# Patient Record
Sex: FEMALE | Race: WHITE | NOT HISPANIC OR LATINO | Employment: UNEMPLOYED | URBAN - METROPOLITAN AREA
[De-identification: names, ages, dates, MRNs, and addresses within clinical notes are randomized per-mention and may not be internally consistent; named-entity substitution may affect disease eponyms.]

---

## 2019-02-07 ENCOUNTER — HOSPITAL ENCOUNTER (EMERGENCY)
Facility: HOSPITAL | Age: 54
Discharge: HOME | End: 2019-02-08
Attending: EMERGENCY MEDICINE | Admitting: EMERGENCY MEDICINE
Payer: COMMERCIAL

## 2019-02-07 DIAGNOSIS — N39.0 UTI (URINARY TRACT INFECTION): ICD-10-CM

## 2019-02-07 DIAGNOSIS — I89.0 LYMPHEDEMA: ICD-10-CM

## 2019-02-07 DIAGNOSIS — R03.0 ELEVATED BLOOD PRESSURE READING: Primary | ICD-10-CM

## 2019-02-07 LAB
ALBUMIN SERPL BCP-MCNC: 4.4 G/DL (ref 3.5–5.7)
ALP SERPL-CCNC: 105 U/L (ref 40–150)
ALT SERPL W P-5'-P-CCNC: 39 U/L (ref 7–52)
AMPHETAMINES SERPL QL SCN: NEGATIVE
ANION GAP SERPL CALCULATED.3IONS-SCNC: 10 MMOL/L (ref 4–13)
AST SERPL W P-5'-P-CCNC: 22 U/L (ref 13–39)
ATRIAL RATE: 99 BPM
BACTERIA UR QL AUTO: ABNORMAL /HPF
BARBITURATES UR QL: NEGATIVE
BASOPHILS # BLD AUTO: 0.1 THOUSANDS/ΜL (ref 0–0.1)
BASOPHILS NFR BLD AUTO: 1 % (ref 0–2)
BENZODIAZ UR QL: NEGATIVE
BILIRUB SERPL-MCNC: 0.7 MG/DL (ref 0.2–1)
BILIRUB UR QL STRIP: NEGATIVE
BUN SERPL-MCNC: 22 MG/DL (ref 7–25)
CALCIUM SERPL-MCNC: 9.9 MG/DL (ref 8.6–10.5)
CHLORIDE SERPL-SCNC: 104 MMOL/L (ref 98–107)
CLARITY UR: ABNORMAL
CO2 SERPL-SCNC: 22 MMOL/L (ref 21–31)
COCAINE UR QL: NEGATIVE
COLOR UR: YELLOW
CREAT SERPL-MCNC: 1.07 MG/DL (ref 0.6–1.2)
EOSINOPHIL # BLD AUTO: 0.5 THOUSAND/ΜL (ref 0–0.61)
EOSINOPHIL NFR BLD AUTO: 4 % (ref 0–5)
ERYTHROCYTE [DISTWIDTH] IN BLOOD BY AUTOMATED COUNT: 14.8 % (ref 11.5–14.5)
ETHANOL SERPL-MCNC: <10 MG/DL
FINE GRAN CASTS URNS QL MICRO: ABNORMAL /LPF
GFR SERPL CREATININE-BSD FRML MDRD: 59 ML/MIN/1.73SQ M
GLUCOSE SERPL-MCNC: 119 MG/DL (ref 65–99)
GLUCOSE UR STRIP-MCNC: NEGATIVE MG/DL
HCT VFR BLD AUTO: 41 % (ref 34.8–46.1)
HGB BLD-MCNC: 13.5 G/DL (ref 12–16)
HGB UR QL STRIP.AUTO: ABNORMAL
HYALINE CASTS #/AREA URNS LPF: ABNORMAL /LPF
KETONES UR STRIP-MCNC: NEGATIVE MG/DL
LEUKOCYTE ESTERASE UR QL STRIP: ABNORMAL
LYMPHOCYTES # BLD AUTO: 2.4 THOUSANDS/ΜL (ref 0.6–4.47)
LYMPHOCYTES NFR BLD AUTO: 19 % (ref 21–51)
MCH RBC QN AUTO: 27.1 PG (ref 26–34)
MCHC RBC AUTO-ENTMCNC: 33 G/DL (ref 31–37)
MCV RBC AUTO: 82 FL (ref 81–99)
METHADONE UR QL: NEGATIVE
MONOCYTES # BLD AUTO: 1 THOUSAND/ΜL (ref 0.17–1.22)
MONOCYTES NFR BLD AUTO: 8 % (ref 2–12)
NEUTROPHILS # BLD AUTO: 8.7 THOUSANDS/ΜL (ref 1.4–6.5)
NEUTS SEG NFR BLD AUTO: 69 % (ref 42–75)
NITRITE UR QL STRIP: POSITIVE
NON-SQ EPI CELLS URNS QL MICRO: ABNORMAL /HPF
NRBC BLD AUTO-RTO: 0 /100 WBCS
OPIATES UR QL SCN: NEGATIVE
P AXIS: 62 DEGREES
PCP UR QL: NEGATIVE
PH UR STRIP.AUTO: 5 [PH] (ref 5–8)
PLATELET # BLD AUTO: 337 THOUSANDS/UL (ref 149–390)
PMV BLD AUTO: 7.8 FL (ref 8.6–11.7)
POTASSIUM SERPL-SCNC: 4.3 MMOL/L (ref 3.5–5.5)
PR INTERVAL: 152 MS
PROT SERPL-MCNC: 7.9 G/DL (ref 6.4–8.9)
PROT UR STRIP-MCNC: ABNORMAL MG/DL
QRS AXIS: 67 DEGREES
QRSD INTERVAL: 78 MS
QT INTERVAL: 356 MS
QTC INTERVAL: 456 MS
RBC # BLD AUTO: 4.99 MILLION/UL (ref 3.9–5.2)
RBC #/AREA URNS AUTO: ABNORMAL /HPF
SODIUM SERPL-SCNC: 136 MMOL/L (ref 134–143)
SP GR UR STRIP.AUTO: >=1.03 (ref 1–1.03)
T WAVE AXIS: 33 DEGREES
THC UR QL: NEGATIVE
UROBILINOGEN UR QL STRIP.AUTO: 0.2 E.U./DL
VENTRICULAR RATE: 99 BPM
WBC # BLD AUTO: 12.6 THOUSAND/UL (ref 4.8–10.8)
WBC #/AREA URNS AUTO: ABNORMAL /HPF

## 2019-02-07 PROCEDURE — 93005 ELECTROCARDIOGRAM TRACING: CPT

## 2019-02-07 PROCEDURE — 93010 ELECTROCARDIOGRAM REPORT: CPT | Performed by: INTERNAL MEDICINE

## 2019-02-07 PROCEDURE — 85025 COMPLETE CBC W/AUTO DIFF WBC: CPT | Performed by: EMERGENCY MEDICINE

## 2019-02-07 PROCEDURE — 36415 COLL VENOUS BLD VENIPUNCTURE: CPT | Performed by: EMERGENCY MEDICINE

## 2019-02-07 PROCEDURE — 99284 EMERGENCY DEPT VISIT MOD MDM: CPT

## 2019-02-07 PROCEDURE — 80320 DRUG SCREEN QUANTALCOHOLS: CPT | Performed by: EMERGENCY MEDICINE

## 2019-02-07 PROCEDURE — 80307 DRUG TEST PRSMV CHEM ANLYZR: CPT | Performed by: EMERGENCY MEDICINE

## 2019-02-07 PROCEDURE — 81001 URINALYSIS AUTO W/SCOPE: CPT | Performed by: EMERGENCY MEDICINE

## 2019-02-07 PROCEDURE — 80053 COMPREHEN METABOLIC PANEL: CPT | Performed by: EMERGENCY MEDICINE

## 2019-02-07 RX ORDER — IBUPROFEN 200 MG
600 TABLET ORAL EVERY 6 HOURS PRN
COMMUNITY
End: 2019-02-27 | Stop reason: SDUPTHER

## 2019-02-07 RX ORDER — TRAMADOL HYDROCHLORIDE 50 MG/1
50 TABLET ORAL ONCE
Status: COMPLETED | OUTPATIENT
Start: 2019-02-07 | End: 2019-02-07

## 2019-02-07 RX ORDER — NITROFURANTOIN 25; 75 MG/1; MG/1
100 CAPSULE ORAL 2 TIMES DAILY
Qty: 10 CAPSULE | Refills: 0 | Status: SHIPPED | OUTPATIENT
Start: 2019-02-07 | End: 2019-02-12

## 2019-02-07 RX ORDER — METOPROLOL TARTRATE 50 MG/1
25 TABLET, FILM COATED ORAL DAILY
Qty: 10 TABLET | Refills: 0 | Status: SHIPPED | OUTPATIENT
Start: 2019-02-07 | End: 2019-02-27

## 2019-02-07 RX ORDER — NITROFURANTOIN 25; 75 MG/1; MG/1
100 CAPSULE ORAL 2 TIMES DAILY WITH MEALS
Status: DISCONTINUED | OUTPATIENT
Start: 2019-02-08 | End: 2019-02-08 | Stop reason: HOSPADM

## 2019-02-07 RX ADMIN — METOPROLOL TARTRATE 25 MG: 25 TABLET ORAL at 22:57

## 2019-02-07 RX ADMIN — TRAMADOL HYDROCHLORIDE 50 MG: 50 TABLET, COATED ORAL at 22:57

## 2019-02-08 VITALS
SYSTOLIC BLOOD PRESSURE: 132 MMHG | BODY MASS INDEX: 45.99 KG/M2 | RESPIRATION RATE: 16 BRPM | TEMPERATURE: 98.6 F | HEIGHT: 67 IN | WEIGHT: 293 LBS | DIASTOLIC BLOOD PRESSURE: 94 MMHG | HEART RATE: 100 BPM | OXYGEN SATURATION: 97 %

## 2019-02-08 RX ORDER — TRAMADOL HYDROCHLORIDE 50 MG/1
50 TABLET ORAL EVERY 6 HOURS PRN
Qty: 30 TABLET | Refills: 0 | Status: SHIPPED | OUTPATIENT
Start: 2019-02-07 | End: 2019-02-17

## 2019-02-08 NOTE — ED NOTES
Crisis contacted [facility] and spoke with [address] [facility] [address] @ [phone] who brought the Patient to the ED  He stated he was called to the foreclosed home of Patient's  father after an off duty  saw a light coming from the shed on the property  The trooper stated the Patient has been staying there for 2 days and the realtor was aware  Patient agreed to come to the ED to be evaluated according to the luis but she did not see anything wrong with staying in the shed  Crisis will f/u with Patient once she is medically cleared

## 2019-02-08 NOTE — DISCHARGE INSTRUCTIONS
PLEASE TAKE YOUR BLOOD PRESSURE MEDICINE ONCE DAILY AS PRESCRIBED  IN ADDITION THE ANTIBIOTIC FOR THE URINARY TRACT INFECTION IS 1 CAPSULE TWICE DAILY, FOR 5 DAYS  PLEASE DISCUSS WITH YOUR FAMILY MEMBERS HAVING THEIR DOCTORS A SIMILARLY YOU INTO THEIR PRACTICE TO THAT YOU CAN HAVE YOUR BLOOD PRESSURE RECHECK AND FOLLOW, AS WELL AS YOUR LYMPHEDEMA ADDRESSED  Urinary Tract Infection in Women   WHAT YOU NEED TO KNOW:   A urinary tract infection (UTI) is caused by bacteria that get inside your urinary tract  Most bacteria that enter your urinary tract come out when you urinate  If the bacteria stay in your urinary tract, you may get an infection  Your urinary tract includes your kidneys, ureters, bladder, and urethra  Urine is made in your kidneys, and it flows from the ureters to the bladder  Urine leaves the bladder through the urethra  A UTI is more common in your lower urinary tract, which includes your bladder and urethra  DISCHARGE INSTRUCTIONS:   Return to the emergency department if:   · You are urinating very little or not at all  · You have a high fever with shaking chills  · You have side or back pain that gets worse  Contact your healthcare provider if:   · You have a fever  · You do not feel better after 2 days of taking antibiotics  · You are vomiting  · You have questions or concerns about your condition or care  Medicines:   · Antibiotics  help fight a bacterial infection  · Medicines  may be given to decrease pain and burning when you urinate  They will also help decrease the feeling that you need to urinate often  These medicines will make your urine orange or red  · Take your medicine as directed  Contact your healthcare provider if you think your medicine is not helping or if you have side effects  Tell him or her if you are allergic to any medicine  Keep a list of the medicines, vitamins, and herbs you take   Include the amounts, and when and why you take them  Bring the list or the pill bottles to follow-up visits  Carry your medicine list with you in case of an emergency  Follow up with your healthcare provider as directed:  Write down your questions so you remember to ask them during your visits  Prevent another UTI:   · Empty your bladder often  Urinate and empty your bladder as soon as you feel the need  Do not hold your urine for long periods of time  · Wipe from front to back after you urinate or have a bowel movement  This will help prevent germs from getting into your urinary tract through your urethra  · Drink liquids as directed  Ask how much liquid to drink each day and which liquids are best for you  You may need to drink more liquids than usual to help flush out the bacteria  Do not drink alcohol, caffeine, or citrus juices  These can irritate your bladder and increase your symptoms  Your healthcare provider may recommend cranberry juice to help prevent a UTI  · Urinate after you have sex  This can help flush out bacteria passed during sex  · Do not douche or use feminine deodorants  These can change the chemical balance in your vagina  · Change sanitary pads or tampons often  This will help prevent germs from getting into your urinary tract  · Do pelvic muscle exercises often  Pelvic muscle exercises may help you start and stop urinating  Strong pelvic muscles may help you empty your bladder easier  Squeeze these muscles tightly for 5 seconds like you are trying to hold back urine  Then relax for 5 seconds  Gradually work up to squeezing for 10 seconds  Do 3 sets of 15 repetitions a day, or as directed  © 2017 [address] Information is for End User's use only and may not be sold, redistributed or otherwise used for commercial purposes  All illustrations and images included in CareNotes® are the copyrighted property of [employer]  or [employer] Matta    The above information is an educational aid only  It is not intended as medical advice for individual conditions or treatments  Talk to your doctor, nurse or pharmacist before following any medical regimen to see if it is safe and effective for you  Lymphedema   WHAT YOU NEED TO KNOW:   There is no cure for lymphedema  Treatment can relieve symptoms and prevent lymphedema from getting worse  DISCHARGE INSTRUCTIONS:   Contact your healthcare provider or lymphedema specialist if:   · You have a fever or chills  · You have an open area of skin that looks red or swollen, or drains pus  · Your symptoms, such as swelling or pain, get worse  · Your arms or legs feel heavy, or you cannot move them  · Your skin becomes hard, thick, or rough  · You have a skin wound that will not heal      · Your shoes, clothes, or jewelry feel tighter  · You have questions or concerns about your condition or care  Self-care:   · Elevate  your arm or leg above the level of your heart as often as you can  This will help decrease swelling and pain  Prop your arm or leg on pillows or blankets to keep it elevated comfortably  · Wear compression socks, sleeves, or bandages  as directed  Compression devices must be fitted by a healthcare provider  Compression devices may need to be replaced every 3 to 6 months  · Exercise  can help you maintain or regain function of your arm or leg  Ask your healthcare provider what type of exercise to do and how often to do it  Start slow, take breaks, and gradually do more each day  Do not do vigorous, repeated exercises  Watch for changes in your arm or leg during exercise  Stop and rest if you have swelling, increased pain, or heaviness  Elevate your arm or leg above the level of your heart  · Change your position often  to help move lymphatic fluid through your body  Do not sit or  one position for more than 30 minutes  Do not cross your legs when you sit   These actions can cause lymphatic fluid to buildup  · Maintain a healthy weight  Ask your healthcare provider what you should weigh  Weight loss may improve your symptoms  If you need to lose weight, your healthcare provider can help you create a weight loss program   Prevent infection with proper skin care:  A skin infection can make lymphedema worse  Do the following to decrease your risk for a skin infection in your arm or leg with lymphedema:  · Wash your skin gently and dry it well  Use a mild soap to wash your skin  Gently pat your skin dry after you bathe  Apply a mild cream or lotion to moisturize your skin and prevent dryness or cracking  Keep your feet clean and dry  · Protect your skin from injury  Wear gloves when you garden and wash dishes  Cut your nails straight across to prevent injury to your fingers and toes  Use sunscreen and insect repellant to avoid burns and punctures  Wear slippers in the house  Wear shoes when you go outside  · Check your skin every day for signs of infection  Signs of infection include redness, swelling, increased heat, or pus  You may also have a fever or chills  · Care for cuts, scratches or burns  Apply antibiotic ointment to cuts and other small breaks in the skin  Apply a cold pack or cold water to a burn for 15 minutes  Then wash it with soap and water  Cover scratches, cuts, or burns with a clean, dry gauze or bandage as directed  Keep the area clean and dry  · Tell healthcare providers that you have lymphedema  Tell them not to do, IVs, blood draws, and blood pressure readings in the arm or leg with lymphedema  If there is lymphedema in both arms, ask them to take your blood pressure on your leg  Do not allow flu shots or vaccinations in your arm with lymphedema  Follow up with your healthcare provider or lymphedema specialist as directed: You will need regular visits so healthcare providers can examine the affected areas   You may also be referred to a clinic that specializes in lymphedema treatment  Write down your questions so you remember to ask them during your visits  © 2017 [address] Information is for End User's use only and may not be sold, redistributed or otherwise used for commercial purposes  All illustrations and images included in CareNotes® are the copyrighted property of [employer]  or [employer] Matta  The above information is an  only  It is not intended as medical advice for individual conditions or treatments  Talk to your doctor, nurse or pharmacist before following any medical regimen to see if it is safe and effective for you  Hypertension   WHAT YOU NEED TO KNOW:   Hypertension is high blood pressure (BP)  Your BP is the force of your blood moving against the walls of your arteries  Normal BP is less than 120/80  Prehypertension is between 120/80 and 139/89  Hypertension is 140/90 or higher  Hypertension causes your BP to get so high that your heart has to work much harder than normal  This can damage your heart  You can control hypertension with a healthy lifestyle or medicines  A controlled blood pressure helps protect your organs, such as your heart, lungs, brain, and kidneys  DISCHARGE INSTRUCTIONS:   Call 911 for any of the following:   · You have discomfort in your chest that feels like squeezing, pressure, fullness, or pain  · You become confused or have difficulty speaking  · You suddenly feel lightheaded or have trouble breathing  · You have pain or discomfort in your back, neck, jaw, stomach, or arm  Return to the emergency department if:   · You have a severe headache or vision loss  · You have weakness in an arm or leg  Contact your healthcare provider if:   · You feel faint, dizzy, confused, or drowsy  · You have been taking your BP medicine and your BP is still higher than your healthcare provider says it should be      · You have questions or concerns about your condition or care   Medicines: You may  need any of the following:  · Medicine  may be used to help lower your BP  You may need more than one type of medicine  Take the medicine exactly as directed  · Diuretics  help decrease extra fluid that collects in your body  This will help lower your BP  You may urinate more often while you take this medicine  · Cholesterol medicine  helps lower your cholesterol level  A low cholesterol level helps prevent heart disease and makes it easier to control your blood pressure  · Take your medicine as directed  Contact your healthcare provider if you think your medicine is not helping or if you have side effects  Tell him or her if you are allergic to any medicine  Keep a list of the medicines, vitamins, and herbs you take  Include the amounts, and when and why you take them  Bring the list or the pill bottles to follow-up visits  Carry your medicine list with you in case of an emergency  Follow up with your healthcare provider as directed: You will need to return to have your BP checked and to have other lab tests done  Write down your questions so you remember to ask them during your visits  Manage hypertension:  Talk with your healthcare provider about these and other ways to manage hypertension:  · Check your BP at home  Sit and rest for 5 minutes before you take your BP  Extend your arm and support it on a flat surface  Your arm should be at the same level as your heart  Follow the directions that came with your BP monitor  If possible, take at least 2 BP readings each time  Take your BP at least twice a day at the same times each day, such as morning and evening  Keep a record of your BP readings and bring it to your follow-up visits  Ask your healthcare provider what your BP should be  · Limit sodium (salt) as directed  Too much sodium can affect your fluid balance  Check labels to find low-sodium or no-salt-added foods   Some low-sodium foods use potassium salts for flavor  Too much potassium can also cause health problems  Your healthcare provider will tell you how much sodium and potassium are safe for you to have in a day  He or she may recommend that you limit sodium to 2,300 mg a day  · Follow the meal plan recommended by your healthcare provider  A dietitian or your provider can give you more information on low-sodium plans or the DASH (Dietary Approaches to Stop Hypertension) eating plan  The DASH plan is low in sodium, unhealthy fats, and total fat  It is high in potassium, calcium, and fiber  · Exercise to maintain a healthy weight  Exercise at least 30 minutes per day, on most days of the week  This will help decrease your blood pressure  Ask your healthcare provider about the best exercise plan for you  · Decrease stress  This may help lower your BP  Learn ways to relax, such as deep breathing or listening to music  · Limit alcohol  Women should limit alcohol to 1 drink a day  Men should limit alcohol to 2 drinks a day  A drink of alcohol is 12 ounces of beer, 5 ounces of wine, or 1½ ounces of liquor  · Do not smoke  Nicotine and other chemicals in cigarettes and cigars can increase your BP and also cause lung damage  Ask your healthcare provider for information if you currently smoke and need help to quit  E-cigarettes or smokeless tobacco still contain nicotine  Talk to your healthcare provider before you use these products  · Manage any other health conditions you have  Health conditions such as diabetes can increase your risk for hypertension  Follow your healthcare provider's instructions and take all your medicines as directed  © 2017 [facility] Information is for End User's use only and may not be sold, redistributed or otherwise used for commercial purposes  All illustrations and images included in CareNotes® are the copyrighted property of [employer]  or Vladimir Matta    The above information is an  only  It is not intended as medical advice for individual conditions or treatments  Talk to your doctor, nurse or pharmacist before following any medical regimen to see if it is safe and effective for you

## 2019-02-08 NOTE — ED PROVIDER NOTES
History  Chief Complaint   Patient presents with    Psychiatric Evaluation     Welfare check, neighbors observed pt staying in outdoor shed on  father's property     [facility]  AMBULANCE WAS CALLED BY POLICE AND THEY WERE DIRECTED TO BRING THE PATIENT TO THE EMERGENCY ROOM FOR EVALUATION, A WELFARE CHECK  PATIENT HAS BEEN LIVING AT HER FATHER'S PROPERTY  SHE IS ESSENTIALLY SQUATTING ON THE PROPERTY, AS HER FATHER PASSED AWAY IN  AND THE PROPERTY WAS RECENTLY SOLID AT A 'S SALE APPROXIMATELY 2 WEEKS AGO  WHEN SHE WAS EVICTED FROM THE HOUSE THIS PAST WEEK, SHE HAS BEEN STAYING IN A SHED THAT IS ON THE LOT ADJACENT TO THE HOUSE  THE PATIENT DENIES ANY COMPLAINTS  SHE ADMITS TO CHRONIC LYMPHEDEMA AND DISCOMFORT OF HER LOWER LEGS, FOR WHICH SHE TAKES 600 MG OF IBUPROFEN TWICE DAILY  SHE DENIES ANY CHEST PAIN, HEADACHE, SHORTNESS OF BREATH  DENIES ANY FEVER CHILLS  SHE DENIES ANY CONFUSION  Prior to Admission Medications   Prescriptions Last Dose Informant Patient Reported? Taking?   ibuprofen (MOTRIN) 200 mg tablet   Yes Yes   Sig: Take 600 mg by mouth every 6 (six) hours as needed for mild pain (back pain TID)      Facility-Administered Medications: None       Past Medical History:   Diagnosis Date    Lymphedema 2006    Sciatic leg pain 2006    left side       Past Surgical History:   Procedure Laterality Date     SECTION      TONSILLECTOMY         History reviewed  No pertinent family history  I have reviewed and agree with the history as documented  Social History   Substance Use Topics    Smoking status: Current Some Day Smoker     Types: Cigarettes    Smokeless tobacco: Never Used      Comment: occasional less than one per day    Alcohol use No        Review of Systems   Constitutional: Negative for chills and fever  HENT: Negative for sore throat  Eyes: Negative for visual disturbance     Respiratory: Negative for cough and shortness of breath  Cardiovascular: Positive for leg swelling  Negative for chest pain and palpitations  CHRONIC, SEVERE LOWER EXTREMITY EDEMA, FOR MANY YEARS  Gastrointestinal: Negative for abdominal pain and nausea  Genitourinary: Positive for frequency and urgency  Negative for difficulty urinating, dysuria and flank pain  Musculoskeletal: Positive for arthralgias  CHRONIC ARTHRALGIAS, NO ACUTE CHANGES   Skin: Negative for rash and wound  Neurological: Negative for dizziness, weakness and headaches  Psychiatric/Behavioral: Negative for confusion  Physical Exam  Physical Exam   Constitutional: She is oriented to person, place, and time  She appears well-developed and well-nourished  PLEASANT AMBULATORY FEMALE WHO IS MORBIDLY OBESE  GENERAL HYGIENE IS ACCEPTABLE  DENTAL HYGIENE IS POOR HOWEVER  HENT:   Head: Normocephalic and atraumatic  Mouth/Throat: Oropharynx is clear and moist    MULTIPLE CARIES AND MISSING TEETH  Eyes: Conjunctivae and EOM are normal  No scleral icterus  Neck: Normal range of motion  Neck supple  Cardiovascular: Normal rate, regular rhythm and normal heart sounds  Pulmonary/Chest: Effort normal and breath sounds normal  No respiratory distress  She has no wheezes  Abdominal: Soft  Bowel sounds are normal  There is no tenderness  Musculoskeletal: She exhibits edema  She exhibits no deformity  4+ LYMPHEDEMA CHANGES  NO DISTINCT STASIS DERMATITIS OR ULCERATIONS  SOME DISCOLORATION HOWEVER  Lymphadenopathy:     She has no cervical adenopathy  Neurological: She is alert and oriented to person, place, and time  She displays normal reflexes  No cranial nerve deficit  She exhibits normal muscle tone  GAIT IS STEADY  Skin: Capillary refill takes less than 2 seconds  No erythema         Vital Signs  ED Triage Vitals   Temperature Pulse Respirations Blood Pressure SpO2   02/07/19 1949 02/07/19 1949 02/07/19 1949 02/07/19 1949 02/07/19 1949 98 °F (36 7 °C) (!) 114 22 (!) 193/105 94 %      Temp Source Heart Rate Source Patient Position - Orthostatic VS BP Location FiO2 (%)   02/07/19 1949 02/07/19 1949 02/07/19 1949 02/07/19 1949 --   Temporal Monitor Sitting Left arm       Pain Score       02/07/19 1950       No Pain           Vitals:    02/07/19 2257 02/07/19 2314 02/07/19 2315 02/08/19 0012   BP: (!) 188/98 (!) 140/102 (!) 140/102 132/94   Pulse:  (!) 114 (!) 110 100   Patient Position - Orthostatic VS:  Sitting Sitting Sitting       Visual Acuity      ED Medications  Medications   metoprolol tartrate (LOPRESSOR) tablet 25 mg (25 mg Oral Given 2/7/19 2257)   traMADol (ULTRAM) tablet 50 mg (50 mg Oral Given 2/7/19 2257)       Diagnostic Studies  Results Reviewed     Procedure Component Value Units Date/Time    Rapid drug screen, urine [[id number]]  (Normal) Collected:  02/07/19 2239    Lab Status:  Final result Specimen:  Urine from Urine, Clean Catch Updated:  02/07/19 2305     Amph/Meth UR Negative     Barbiturate Ur Negative     Benzodiazepine Urine Negative     Cocaine Urine Negative     Methadone Urine Negative     Opiate Urine Negative     PCP Ur Negative     THC Urine Negative    Narrative:         FOR MEDICAL PURPOSES ONLY  IF CONFIRMATION NEEDED PLEASE CONTACT THE LAB WITHIN 5 DAYS      Drug Screen Cutoff Levels:  AMPHETAMINE/METHAMPHETAMINES  1000 ng/mL  BARBITURATES     200 ng/mL  BENZODIAZEPINES     200 ng/mL  COCAINE      300 ng/mL  METHADONE      300 ng/mL  OPIATES      300 ng/mL  PHENCYCLIDINE     25 ng/mL  THC       50 ng/mL    Urine Microscopic [[id number]]  (Abnormal) Collected:  02/07/19 2239    Lab Status:  Final result Specimen:  Urine from Urine, Clean Catch Updated:  02/07/19 2254     RBC, UA 2-4 (A) /hpf      WBC, UA 10-20 (A) /hpf      Epithelial Cells Occasional /hpf      Bacteria, UA Moderate (A) /hpf      Hyaline Casts, UA 1-2 (A) /lpf      Fine granular casts 0-1 /lpf     UA w Reflex to Microscopic [[id number]]  (Abnormal) Collected:  02/07/19 2239    Lab Status:  Final result Specimen:  Urine from Urine, Clean Catch Updated:  02/07/19 2247     Color, UA Yellow     Clarity, UA Slightly Cloudy (A)     Specific Gravity, UA >=1 030 (H)     pH, UA 5 0     Leukocytes, UA 1+ (A)     Nitrite, UA Positive (A)     Protein, UA Trace (A) mg/dl      Glucose, UA Negative mg/dl      Ketones, UA Negative mg/dl      Urobilinogen, UA 0 2 E U /dl      Bilirubin, UA Negative     Blood, UA Trace-Intact (A)    Ethanol [[id number]]  (Normal) Collected:  02/07/19 2120    Lab Status:  Final result Specimen:  Blood from Arm, Left Updated:  02/07/19 2145     Ethanol Lvl <10 mg/dL     Comprehensive metabolic panel [[id number]]  (Abnormal) Collected:  02/07/19 2120    Lab Status:  Final result Specimen:  Blood from Arm, Left Updated:  02/07/19 2145     Sodium 136 mmol/L      Potassium 4 3 mmol/L      Chloride 104 mmol/L      CO2 22 mmol/L      ANION GAP 10 mmol/L      BUN 22 mg/dL      Creatinine 1 07 mg/dL      Glucose 119 (H) mg/dL      Calcium 9 9 mg/dL      AST 22 U/L      ALT 39 U/L      Alkaline Phosphatase 105 U/L      Total Protein 7 9 g/dL      Albumin 4 4 g/dL      Total Bilirubin 0 70 mg/dL      eGFR 59 ml/min/1 73sq m     Narrative:         National Kidney Disease Education Program recommendations are as follows:  GFR calculation is accurate only with a steady state creatinine  Chronic Kidney disease less than 60 ml/min/1 73 sq  meters  Kidney failure less than 15 ml/min/1 73 sq  meters      CBC and differential [[id number]]  (Abnormal) Collected:  02/07/19 2120    Lab Status:  Final result Specimen:  Blood from Arm, Left Updated:  02/07/19 2127     WBC 12 60 (H) Thousand/uL      RBC 4 99 Million/uL      Hemoglobin 13 5 g/dL      Hematocrit 41 0 %      MCV 82 fL      MCH 27 1 pg      MCHC 33 0 g/dL      RDW 14 8 (H) %      MPV 7 8 (L) fL      Platelets 661 Thousands/uL      nRBC 0 /100 WBCs      Neutrophils Relative 69 %      Lymphocytes Relative 19 (L) %      Monocytes Relative 8 %      Eosinophils Relative 4 %      Basophils Relative 1 %      Neutrophils Absolute 8 70 (H) Thousands/µL      Lymphocytes Absolute 2 40 Thousands/µL      Monocytes Absolute 1 00 Thousand/µL      Eosinophils Absolute 0 50 Thousand/µL      Basophils Absolute 0 10 Thousands/µL                  No orders to display              Procedures  Procedures       Phone Contacts  ED Phone Contact    ED Course      EXAMINATION AND EVALUATION  GCS IS 15  PATIENT HAS NO SIGNS OF CONFUSION  ON EXAM SHE IS HYPERTENSIVE HAD OBESE BUT OTHERWISE NO PARTICULAR PHYSICAL FINDINGS  FOR HER ELEVATED BLOOD PRESSURE, I DID TREAT THAT WITH 25 MG OF METOPROLOL WITH SOME MODERATE IMPROVEMENT  IN ADDITION PICK FOR HER CHRONIC LOWER EXTREMITY PAIN THAT SHE REPORTS FROM THE LYMPHEDEMA SHE WAS GIVEN TRAMADOL  URINALYSIS REVEALS PYURIA AND BACTERIURIA  SHE WAS GIVEN MACRODANTIN AS WELL AS A PRESCRIPTION  OVERALL, THE PATIENT IS APPROPRIATE FOR DISCHARGE  SHE POLITELY BUT ADAMANTLY DECLINES REFERRALS TO ANY SHELTERS  SHE INDICATES THAT HER SON WORKS OVERNIGHT AND WILL PICK HER UP IN THE MORNING  SHE REQUESTED TO BE DISCHARGED AND INDICATED SHE WOULD WAIT IN THE WAITING ROOM UNTIL HER SON COULD PICK HER UP                           MDM    Disposition  Final diagnoses:   Elevated blood pressure reading   UTI (urinary tract infection)   Lymphedema     Time reflects when diagnosis was documented in both MDM as applicable and the Disposition within this note     Time User Action Codes Description Comment    2/7/2019 11:56 PM Twila Goltz Add [R03 0] Elevated blood pressure reading     2/7/2019 11:57 PM Twila Goltz Add [N39 0] UTI (urinary tract infection)     2/7/2019 11:57 PM Twila Goltz Add [I89 0] Lymphedema       ED Disposition     ED Disposition Condition Date/Time Comment    Discharge  Thu Feb 7, 2019 11:57 PM Swati Castro discharge to home/self care      Condition at discharge: Stable Follow-up Information     Follow up With Specialties Details Why Contact Info      In 1 week BP RECHECK           Discharge Medication List as of 2/8/2019 12:03 AM      START taking these medications    Details   metoprolol tartrate (LOPRESSOR) 50 mg tablet Take 0 5 tablets (25 mg total) by mouth daily, Starting u 2/7/2019, Print      nitrofurantoin (MACROBID) 100 mg capsule Take 1 capsule (100 mg total) by mouth 2 (two) times a day for 10 doses, Starting Thu 2/7/2019, Until Tue 2/12/2019, Print      traMADol (ULTRAM) 50 mg tablet Take 1 tablet (50 mg total) by mouth every 6 (six) hours as needed for moderate pain for up to 10 days, Starting Thu 2/7/2019, Until Sun 2/17/2019, Print         CONTINUE these medications which have NOT CHANGED    Details   ibuprofen (MOTRIN) 200 mg tablet Take 600 mg by mouth every 6 (six) hours as needed for mild pain (back pain TID), Historical Med           No discharge procedures on file      ED Provider  Electronically Signed by           Lyla Day DO  02/08/19 8753

## 2019-02-22 ENCOUNTER — HOSPITAL ENCOUNTER (EMERGENCY)
Facility: HOSPITAL | Age: 54
Discharge: HOME | End: 2019-02-22
Payer: COMMERCIAL

## 2019-02-22 ENCOUNTER — APPOINTMENT (EMERGENCY)
Dept: RADIOLOGY | Facility: HOSPITAL | Age: 54
End: 2019-02-22
Payer: COMMERCIAL

## 2019-02-22 VITALS
RESPIRATION RATE: 22 BRPM | BODY MASS INDEX: 45.99 KG/M2 | SYSTOLIC BLOOD PRESSURE: 124 MMHG | WEIGHT: 293 LBS | OXYGEN SATURATION: 97 % | HEART RATE: 78 BPM | TEMPERATURE: 97.6 F | DIASTOLIC BLOOD PRESSURE: 68 MMHG | HEIGHT: 67 IN

## 2019-02-22 DIAGNOSIS — R06.02 SOB (SHORTNESS OF BREATH): Primary | ICD-10-CM

## 2019-02-22 DIAGNOSIS — E66.01 MORBID OBESITY (HCC): ICD-10-CM

## 2019-02-22 DIAGNOSIS — I10 HYPERTENSION, UNSPECIFIED TYPE: ICD-10-CM

## 2019-02-22 LAB
ALBUMIN SERPL BCP-MCNC: 3.8 G/DL (ref 3.5–5.7)
ALP SERPL-CCNC: 96 U/L (ref 40–150)
ALT SERPL W P-5'-P-CCNC: 52 U/L (ref 7–52)
ANION GAP SERPL CALCULATED.3IONS-SCNC: 6 MMOL/L (ref 4–13)
APTT PPP: 34 SECONDS (ref 26–38)
AST SERPL W P-5'-P-CCNC: 29 U/L (ref 13–39)
ATRIAL RATE: 63 BPM
BASOPHILS # BLD AUTO: 0.1 THOUSANDS/ΜL (ref 0–0.1)
BASOPHILS NFR BLD AUTO: 1 % (ref 0–2)
BILIRUB SERPL-MCNC: 0.5 MG/DL (ref 0.2–1)
BNP SERPL-MCNC: 135 PG/ML (ref 1–100)
BUN SERPL-MCNC: 12 MG/DL (ref 7–25)
CALCIUM SERPL-MCNC: 9 MG/DL (ref 8.6–10.5)
CHLORIDE SERPL-SCNC: 105 MMOL/L (ref 98–107)
CO2 SERPL-SCNC: 28 MMOL/L (ref 21–31)
CREAT SERPL-MCNC: 0.9 MG/DL (ref 0.6–1.2)
EOSINOPHIL # BLD AUTO: 0.6 THOUSAND/ΜL (ref 0–0.61)
EOSINOPHIL NFR BLD AUTO: 8 % (ref 0–5)
ERYTHROCYTE [DISTWIDTH] IN BLOOD BY AUTOMATED COUNT: 14.6 % (ref 11.5–14.5)
GFR SERPL CREATININE-BSD FRML MDRD: 72 ML/MIN/1.73SQ M
GLUCOSE SERPL-MCNC: 106 MG/DL (ref 65–99)
HCT VFR BLD AUTO: 35.4 % (ref 42–47)
HGB BLD-MCNC: 11.8 G/DL (ref 12–16)
INR PPP: 1.03 (ref 0.9–1.5)
LYMPHOCYTES # BLD AUTO: 1.8 THOUSANDS/ΜL (ref 0.6–4.47)
LYMPHOCYTES NFR BLD AUTO: 24 % (ref 21–51)
MCH RBC QN AUTO: 27.7 PG (ref 26–34)
MCHC RBC AUTO-ENTMCNC: 33.5 G/DL (ref 31–37)
MCV RBC AUTO: 83 FL (ref 81–99)
MONOCYTES # BLD AUTO: 0.6 THOUSAND/ΜL (ref 0.17–1.22)
MONOCYTES NFR BLD AUTO: 9 % (ref 2–12)
NEUTROPHILS # BLD AUTO: 4.1 THOUSANDS/ΜL (ref 1.4–6.5)
NEUTS SEG NFR BLD AUTO: 58 % (ref 42–75)
NRBC BLD AUTO-RTO: 0 /100 WBCS
P AXIS: 57 DEGREES
PLATELET # BLD AUTO: 294 THOUSANDS/UL (ref 149–390)
PMV BLD AUTO: 7.9 FL (ref 8.6–11.7)
POTASSIUM SERPL-SCNC: 4.5 MMOL/L (ref 3.5–5.5)
PR INTERVAL: 180 MS
PROT SERPL-MCNC: 6.6 G/DL (ref 6.4–8.9)
PROTHROMBIN TIME: 11.9 SECONDS (ref 10.2–13)
QRS AXIS: 49 DEGREES
QRSD INTERVAL: 82 MS
QT INTERVAL: 424 MS
QTC INTERVAL: 433 MS
RBC # BLD AUTO: 4.27 MILLION/UL (ref 3.9–5.2)
SODIUM SERPL-SCNC: 139 MMOL/L (ref 134–143)
T WAVE AXIS: 31 DEGREES
TROPONIN I SERPL-MCNC: <0.03 NG/ML
TROPONIN I SERPL-MCNC: <0.03 NG/ML
VENTRICULAR RATE: 63 BPM
WBC # BLD AUTO: 7.2 THOUSAND/UL (ref 4.8–10.8)

## 2019-02-22 PROCEDURE — 36415 COLL VENOUS BLD VENIPUNCTURE: CPT

## 2019-02-22 PROCEDURE — 83880 ASSAY OF NATRIURETIC PEPTIDE: CPT

## 2019-02-22 PROCEDURE — 96376 TX/PRO/DX INJ SAME DRUG ADON: CPT

## 2019-02-22 PROCEDURE — 93005 ELECTROCARDIOGRAM TRACING: CPT

## 2019-02-22 PROCEDURE — 85610 PROTHROMBIN TIME: CPT

## 2019-02-22 PROCEDURE — 84484 ASSAY OF TROPONIN QUANT: CPT

## 2019-02-22 PROCEDURE — 80053 COMPREHEN METABOLIC PANEL: CPT

## 2019-02-22 PROCEDURE — 71046 X-RAY EXAM CHEST 2 VIEWS: CPT

## 2019-02-22 PROCEDURE — 96374 THER/PROPH/DIAG INJ IV PUSH: CPT

## 2019-02-22 PROCEDURE — 85730 THROMBOPLASTIN TIME PARTIAL: CPT

## 2019-02-22 PROCEDURE — 85025 COMPLETE CBC W/AUTO DIFF WBC: CPT

## 2019-02-22 PROCEDURE — 99285 EMERGENCY DEPT VISIT HI MDM: CPT

## 2019-02-22 PROCEDURE — 93010 ELECTROCARDIOGRAM REPORT: CPT | Performed by: INTERNAL MEDICINE

## 2019-02-22 RX ORDER — HYDRALAZINE HYDROCHLORIDE 20 MG/ML
20 INJECTION INTRAMUSCULAR; INTRAVENOUS ONCE
Status: COMPLETED | OUTPATIENT
Start: 2019-02-22 | End: 2019-02-22

## 2019-02-22 RX ORDER — HYDRALAZINE HYDROCHLORIDE 20 MG/ML
10 INJECTION INTRAMUSCULAR; INTRAVENOUS ONCE
Status: COMPLETED | OUTPATIENT
Start: 2019-02-22 | End: 2019-02-22

## 2019-02-22 RX ORDER — LISINOPRIL 10 MG/1
10 TABLET ORAL DAILY
Qty: 30 TABLET | Refills: 0 | Status: SHIPPED | OUTPATIENT
Start: 2019-02-22 | End: 2019-02-27 | Stop reason: SDUPTHER

## 2019-02-22 RX ADMIN — HYDRALAZINE HYDROCHLORIDE 10 MG: 20 INJECTION INTRAMUSCULAR; INTRAVENOUS at 13:55

## 2019-02-22 RX ADMIN — HYDRALAZINE HYDROCHLORIDE 20 MG: 20 INJECTION INTRAMUSCULAR; INTRAVENOUS at 11:56

## 2019-02-22 NOTE — SOCIAL WORK
CM was consulted regarding pt in ED  CM met with pt at bedside in ED and explained role  Pt is accompanied by 2 [facility], Samara Dakin and Emanuel  Pt reports she has been homeless since earlier this month  Pt had been essentially squatting at the foreclosed home of her  father for some time  Pt was then forced off the property by the authorities  Since that time pt had been sleeping outside and other various places  Pt's [employer] has been paying for her to stay in a motel for the past few days  Per pt she can stay in motel for the next few days as the [employer] has paid for same  Per pt she has no source of income at this time  She has no friends or family that she would be able to stay with temporarily or permanently  Per [employer] support they are seeking assistance with getting pt a PCP appointment in order to assist pt with completing welfare paperwork in order to gain housing  They are aware They are looking at getting pt into the [facility] but need paperwork completed for same  CM was able to secure an appointment with Laine BARTON at the [address] [place] [address] in [place] [address] on  at 1100 AM  They were made aware of pts needs and are able to assist  CM contacted the [facility] on Aging regarding pt  Per Haskel Bamberger since pt is not over the age of 59 they are unable to assist her  Haskel Bamberger did provided CM with additional resources for potential placement  CM met with pt and [employer] support at bedside again to review  They were made aware of follow up PCP appointment that was scheduled  CM also provided them with information for women's homeless shelters  [facility] in [place],  [facility] and [address] [place] [address] of [facility]  CM also provided pt with assisted living [address]  in [place] and  [address] in [facility]   Both were given to CM by Chel Teran from Aging as places that have taken pt on emergency basis if they have no source of income in the past  Pt indicates she has 5 more days of her blood pressure medication that was given to her in ED earlier this month  Pt reports her BP meds only cost her $1  Same will hold her over until PCP appointment on 2/26  She does have [employer] that is active and has a prescription plan  CM encouraged her to follow up with her insurance company for assistance with medical needs in the future  She was made aware they can assign her a  through her insurance for further assistance  Pt and supports voiced agreement and understanding of plan  Supports will transport back to [facility]

## 2019-02-22 NOTE — ED NOTES
Awaiting call back from [facility] for pt homeless situation and pt unable to secure a family doctor  msg left on their answer machine       Michelle Ortiz RN  02/22/19 3100

## 2019-02-22 NOTE — ED PROVIDER NOTES
History  Chief Complaint   Patient presents with    Shortness of Breath     pt here for SOB for past 2 weeks  Pt taking BP pills,has only 5 more left and BP is high  Pt is homeless and was brought here by [facility] women  Frances Cotter is a 78-year-old female who was brought to the emergency department by her friends due to shortness of breath with started several days prior to arrival   She was also noted to have high blood pressure  Patient denies chest pain  History provided by:  Patient   used: No    Shortness of Breath   Severity:  Mild  Onset quality:  Gradual  Duration: Several   Timing:  Intermittent  Progression:  Waxing and waning  Chronicity:  New  Context: activity    Context: not animal exposure, not emotional upset, not fumes, not known allergens, not occupational exposure, not pollens, not smoke exposure, not strong odors, not URI and not weather changes    Relieved by:  Nothing  Worsened by:  Nothing  Ineffective treatments:  None tried  Associated symptoms: no abdominal pain, no chest pain, no claudication, no cough, no diaphoresis, no ear pain, no fever, no headaches, no hemoptysis, no neck pain, no PND, no rash, no sore throat, no sputum production, no syncope, no swollen glands, no vomiting and no wheezing        Prior to Admission Medications   Prescriptions Last Dose Informant Patient Reported?  Taking?   ibuprofen (MOTRIN) 200 mg tablet 2019 at Unknown time  Yes Yes   Sig: Take 600 mg by mouth every 6 (six) hours as needed for mild pain (back pain TID)   metoprolol tartrate (LOPRESSOR) 50 mg tablet 2019 at Unknown time  No Yes   Sig: Take 0 5 tablets (25 mg total) by mouth daily      Facility-Administered Medications: None       Past Medical History:   Diagnosis Date    Hypertension     Lymphedema 2006    Sciatic leg pain 2006    left side       Past Surgical History:   Procedure Laterality Date     SECTION      TONSILLECTOMY         History reviewed  No pertinent family history  I have reviewed and agree with the history as documented  Social History     Tobacco Use    Smoking status: Current Some Day Smoker     Types: Cigarettes    Smokeless tobacco: Never Used    Tobacco comment: occasional less than one per day   Substance Use Topics    Alcohol use: No    Drug use: No        Review of Systems   Constitutional: Negative for diaphoresis and fever  HENT: Negative for ear pain and sore throat  Eyes: Negative  Respiratory: Positive for shortness of breath  Negative for cough, hemoptysis, sputum production and wheezing  Cardiovascular: Negative for chest pain, claudication, syncope and PND  Gastrointestinal: Negative for abdominal pain and vomiting  Endocrine: Negative  Genitourinary: Negative  Musculoskeletal: Negative for neck pain  Skin: Negative for rash  Allergic/Immunologic: Negative  Neurological: Negative for headaches  Hematological: Negative  Psychiatric/Behavioral: Negative  Physical Exam  Physical Exam   Constitutional: She is oriented to person, place, and time  She appears well-developed and well-nourished  Non-toxic appearance  She does not appear ill  No distress  HENT:   Head: Normocephalic and atraumatic  Mouth/Throat: Oropharynx is clear and moist  No oropharyngeal exudate or posterior oropharyngeal edema  Eyes: Pupils are equal, round, and reactive to light  EOM are normal    Neck: Normal range of motion  Neck supple  No hepatojugular reflux and no JVD present  No tracheal deviation present  No thyromegaly present  Cardiovascular: Normal rate and normal heart sounds  Pulmonary/Chest: Effort normal and breath sounds normal  No stridor  No apnea  No respiratory distress  She exhibits no mass, no tenderness, no laceration and no crepitus  Abdominal: Soft  Bowel sounds are normal  She exhibits no distension  There is no tenderness     Musculoskeletal:        Right lower leg: Normal  She exhibits no tenderness and no edema  Left lower leg: Normal  She exhibits no tenderness and no edema  Lymphadenopathy:     She has no cervical adenopathy  Neurological: She is alert and oriented to person, place, and time  She is not disoriented  No cranial nerve deficit  Skin: Skin is warm and dry  Capillary refill takes less than 2 seconds  No ecchymosis and no rash noted  She is not diaphoretic  No cyanosis or erythema  No pallor  Nails show no clubbing  Psychiatric: She has a normal mood and affect  Her behavior is normal  Her mood appears not anxious  She is not agitated  Nursing note and vitals reviewed        Vital Signs  ED Triage Vitals   Temperature Pulse Respirations Blood Pressure SpO2   02/22/19 1131 02/22/19 1130 02/22/19 1133 02/22/19 1130 02/22/19 1130   97 6 °F (36 4 °C) 66 22 (!) 205/93 97 %      Temp Source Heart Rate Source Patient Position - Orthostatic VS BP Location FiO2 (%)   02/22/19 1131 02/22/19 1133 02/22/19 1133 02/22/19 1133 --   Temporal Monitor Sitting Left arm       Pain Score       02/22/19 1133       9           Vitals:    02/22/19 1300 02/22/19 1315 02/22/19 1330 02/22/19 1355   BP:    (!) 179/100   Pulse: 68 64 65    Patient Position - Orthostatic VS:           Visual Acuity      ED Medications  Medications   hydrALAZINE (APRESOLINE) injection 20 mg (20 mg Intravenous Given 2/22/19 1156)   hydrALAZINE (APRESOLINE) injection 10 mg (10 mg Intravenous Given 2/22/19 1355)       Diagnostic Studies  Results Reviewed     Procedure Component Value Units Date/Time    Troponin I [[id number]]     Lab Status:  No result Specimen:  Blood     Troponin I [[id number]] Collected:  02/22/19 1533    Lab Status:  No result Specimen:  Blood from Arm, Left     B-Type Natriuretic Peptide [facility] ONLY) [[id number]]  (Abnormal) Collected:  02/22/19 1150    Lab Status:  Final result Specimen:  Blood from Arm, Left Updated:  02/22/19 1246      pg/mL     Troponin I [[id number]]  (Normal) Collected:  02/22/19 1150    Lab Status:  Final result Specimen:  Blood from Arm, Left Updated:  02/22/19 1219     Troponin I <0 03 ng/mL     Protime-INR [[id number]]  (Normal) Collected:  02/22/19 1150    Lab Status:  Final result Specimen:  Blood from Arm, Left Updated:  02/22/19 1219     Protime 11 9 seconds      INR 1 03    APTT [[id number]]  (Normal) Collected:  02/22/19 1150    Lab Status:  Final result Specimen:  Blood from Arm, Left Updated:  02/22/19 1219     PTT 34 seconds     CBC and differential [[id number]]  (Abnormal) Collected:  02/22/19 1150    Lab Status:  Final result Specimen:  Blood from Arm, Left Updated:  02/22/19 1219     WBC 7 20 Thousand/uL      RBC 4 27 Million/uL      Hemoglobin 11 8 g/dL      Hematocrit 35 4 %      MCV 83 fL      MCH 27 7 pg      MCHC 33 5 g/dL      RDW 14 6 %      MPV 7 9 fL      Platelets 967 Thousands/uL      nRBC 0 /100 WBCs      Neutrophils Relative 58 %      Lymphocytes Relative 24 %      Monocytes Relative 9 %      Eosinophils Relative 8 %      Basophils Relative 1 %      Neutrophils Absolute 4 10 Thousands/µL      Lymphocytes Absolute 1 80 Thousands/µL      Monocytes Absolute 0 60 Thousand/µL      Eosinophils Absolute 0 60 Thousand/µL      Basophils Absolute 0 10 Thousands/µL     Comprehensive metabolic panel [[id number]]  (Abnormal) Collected:  02/22/19 1150    Lab Status:  Final result Specimen:  Blood from Arm, Left Updated:  02/22/19 1215     Sodium 139 mmol/L      Potassium 4 5 mmol/L      Chloride 105 mmol/L      CO2 28 mmol/L      ANION GAP 6 mmol/L      BUN 12 mg/dL      Creatinine 0 90 mg/dL      Glucose 106 mg/dL      Calcium 9 0 mg/dL      AST 29 U/L      ALT 52 U/L      Alkaline Phosphatase 96 U/L      Total Protein 6 6 g/dL      Albumin 3 8 g/dL      Total Bilirubin 0 50 mg/dL      eGFR 72 ml/min/1 73sq m     Narrative:       National Kidney Disease Education Program recommendations are as follows:  GFR calculation is accurate only with a steady state creatinine  Chronic Kidney disease less than 60 ml/min/1 73 sq  meters  Kidney failure less than 15 ml/min/1 73 sq  meters  XR chest 2 views   ED Interpretation by Sharona Maloney MD (02/22 5900)   No acute disease      Final Result by Cristina Arevalo MD (02/22 4378)      No acute cardiopulmonary disease  Workstation performed: [id number]                    Procedures  ECG 12 Lead Documentation  Date/Time: 2/22/2019 12:09 PM  Performed by: Sharona Maloney MD  Authorized by: Sharona Maloney MD     Indications / Diagnosis:  Shortness of breath  ECG reviewed by me, the ED Provider: yes    Patient location:  ED and bedside  Previous ECG:     Previous ECG:  Unavailable    Comparison to cardiac monitor: Yes    Interpretation:     Interpretation: normal    Rate:     ECG rate:  63 beats per minute    ECG rate assessment: normal    Rhythm:     Rhythm: sinus rhythm    Ectopy:     Ectopy: none    QRS:     QRS axis:  Normal    QRS intervals:  Normal  Conduction:     Conduction: normal    ST segments:     ST segments:  Normal  T waves:     T waves: normal             Phone Contacts  ED Phone Contact    ED Course  ED Course as of Feb 22 1535   Fri Feb 22, 2019   1305 Patient is resting comfortably on the stretcher  Blood pressure is improved from the hydralazine that was given earlier  I discussed with her the results of the blood work,chest x-ray and EKG  At this time for a consult to the  will be initiated                                    MDM  Number of Diagnoses or Management Options  Hypertension, unspecified type: new and requires workup  Morbid obesity ([address] ): new and requires workup  SOB (shortness of breath): new and requires workup     Amount and/or Complexity of Data Reviewed  Clinical lab tests: ordered and reviewed  Tests in the radiology section of CPT®: ordered and reviewed  Tests in the medicine section of CPT®: ordered and reviewed  Decide to obtain previous medical records or to obtain history from someone other than the patient: yes  Obtain history from someone other than the patient: yes  Review and summarize past medical records: yes  Independent visualization of images, tracings, or specimens: yes    Risk of Complications, Morbidity, and/or Mortality  Presenting problems: moderate  Diagnostic procedures: moderate  Management options: moderate    Patient Progress  Patient progress: stable      Disposition  Final diagnoses:   SOB (shortness of breath)   Morbid obesity ([facility] )   Hypertension, unspecified type     Time reflects when diagnosis was documented in both MDM as applicable and the Disposition within this note     Time User Action Codes Description Comment    2/22/2019  1:09 PM Zain Duran Add [R06 02] SOB (shortness of breath)     2/22/2019  1:09 PM Chapincito Ambriz Add [E66 01] Morbid obesity ([facility] [address] )     2/22/2019  1:09 PM Chapincito Ambriz Add [I10] Hypertension, unspecified type       ED Disposition     ED Disposition Condition Date/Time Comment    Discharge Stable Fri Feb 22, 2019  3:34 PM Juan Diego Nair discharge to home/self care  Follow-up Information     Follow up With Specialties Details Why Contact Info    Primary care provider  Schedule an appointment as soon as possible for a visit             Patient's Medications   Discharge Prescriptions    LISINOPRIL (ZESTRIL) 10 MG TABLET    Take 1 tablet (10 mg total) by mouth daily for 30 days       Start Date: 2/22/2019 End Date: 3/24/2019       Order Dose: 10 mg       Quantity: 30 tablet    Refills: 0    METOPROLOL TARTRATE (LOPRESSOR) 25 MG TABLET    Take 1 tablet (25 mg total) by mouth every 12 (twelve) hours for 30 days       Start Date: 2/22/2019 End Date: 3/24/2019       Order Dose: 25 mg       Quantity: 60 tablet    Refills: 0     No discharge procedures on file      ED Provider  Electronically Signed by           Fred Castrejon MD  02/22/19 9365

## 2019-02-26 ENCOUNTER — TELEPHONE (OUTPATIENT)
Dept: FAMILY MEDICINE CLINIC | Facility: CLINIC | Age: 54
End: 2019-02-26

## 2019-02-27 ENCOUNTER — OFFICE VISIT (OUTPATIENT)
Dept: FAMILY MEDICINE CLINIC | Facility: CLINIC | Age: 54
End: 2019-02-27
Payer: COMMERCIAL

## 2019-02-27 VITALS
TEMPERATURE: 96.7 F | OXYGEN SATURATION: 98 % | BODY MASS INDEX: 45.99 KG/M2 | WEIGHT: 293 LBS | SYSTOLIC BLOOD PRESSURE: 142 MMHG | HEIGHT: 67 IN | HEART RATE: 75 BPM | DIASTOLIC BLOOD PRESSURE: 84 MMHG

## 2019-02-27 DIAGNOSIS — Z76.89 ENCOUNTER TO ESTABLISH CARE: Primary | ICD-10-CM

## 2019-02-27 DIAGNOSIS — E66.01 MORBID OBESITY (HCC): ICD-10-CM

## 2019-02-27 DIAGNOSIS — I10 HYPERTENSION, UNSPECIFIED TYPE: ICD-10-CM

## 2019-02-27 DIAGNOSIS — I10 ESSENTIAL HYPERTENSION: ICD-10-CM

## 2019-02-27 DIAGNOSIS — R06.02 SOB (SHORTNESS OF BREATH): ICD-10-CM

## 2019-02-27 DIAGNOSIS — I89.0 LYMPHEDEMA: ICD-10-CM

## 2019-02-27 PROBLEM — L73.2 HIDRADENITIS SUPPURATIVA: Status: ACTIVE | Noted: 2019-02-27

## 2019-02-27 PROCEDURE — T1015 CLINIC SERVICE: HCPCS | Performed by: FAMILY MEDICINE

## 2019-02-27 RX ORDER — IBUPROFEN 600 MG/1
600 TABLET ORAL EVERY 8 HOURS PRN
Qty: 90 TABLET | Refills: 0 | Status: SHIPPED | OUTPATIENT
Start: 2019-02-27 | End: 2019-03-28 | Stop reason: SDUPTHER

## 2019-02-27 RX ORDER — LISINOPRIL 10 MG/1
10 TABLET ORAL DAILY
Qty: 30 TABLET | Refills: 0 | Status: SHIPPED | OUTPATIENT
Start: 2019-02-27 | End: 2019-03-14 | Stop reason: SDUPTHER

## 2019-02-27 NOTE — PROGRESS NOTES
Assessment/Plan:     Diagnoses and all orders for this visit:    Encounter to establish care    Morbid obesity ([facility] )  -     HEMOGLOBIN A1C W/ EAG ESTIMATION; Future    Essential hypertension  -     CBC and differential; Future  -     Comprehensive metabolic panel; Future  -     TSH, 3rd generation with Free T4 reflex; Future  -     Lipid Panel with Direct LDL reflex; Future  -     UA w Reflex to Microscopic w Reflex to Culture -Lab Collect  -     Microalbumin / creatinine urine ratio; Future    Lymphedema  -     TEDS Stockings  -     Ambulatory referral to Physical Therapy; Future  -     Compression bandages  -     ibuprofen (IBU) 600 mg tablet; Take 1 tablet (600 mg total) by mouth every 8 (eight) hours as needed (pain)    SOB (shortness of breath)  -     metoprolol tartrate (LOPRESSOR) 25 mg tablet; Take 1 tablet (25 mg total) by mouth every 12 (twelve) hours  -     lisinopril (ZESTRIL) 10 mg tablet; Take 1 tablet (10 mg total) by mouth daily    Hypertension, unspecified type  -     metoprolol tartrate (LOPRESSOR) 25 mg tablet; Take 1 tablet (25 mg total) by mouth every 12 (twelve) hours  -     lisinopril (ZESTRIL) 10 mg tablet; Take 1 tablet (10 mg total) by mouth daily        Discussion/Plan:  Establish care  Morbid obesity - discussed need to change diet and exercise as able  Reduce stress  Nutritional advice given  Lymphedema - SUZY stockings and compression bandages ordered  Referral to PT  Ibuprofen renewed for pain  Discussed need for weight loss and exercise to improve lymphedema  Hypertension - start lisinopril  Continue metoprolol  Healthy diet, exercise, weight loss  Check labs  RTC 2 weeks or sooner if needed  Subjective:      Patient ID: Fadumo Maracno is a 52 y o  female  Chief Complaint   Patient presents with   [address] [facility] [place] [place] Hypertension       Patient is a 52year old female who presents to the office today to establish care   She has history of hypertension, lymphedema, obesity, hidradenitis  She is requesting to have disability forms filled out, states she cannot work secondary to lymphedema  She reports she cannot stand or sit for too long, gets shortness of breath with minimal activity  She is morbidly obese with BMI of 61 83, she states she is obese due to lymphedema  She is very stressed  She is homeless, was living in shed, which prompted her first ED visit  [place] living [place] the mall  She is currently staying in room at [facility] secondary to help from [employer]  She was found to have hypertension, taking metoprolol  She has not started lisinopril  The following portions of the patient's history were reviewed and updated as appropriate: allergies, current medications, past family history, past medical history, past social history, past surgical history and problem list   Patient Active Problem List   Diagnosis    Lymphedema    Essential hypertension    Morbid obesity ([facility] )    Hidradenitis suppurativa     No current outpatient medications on file prior to visit  No current facility-administered medications on file prior to visit  Review of Systems   Constitutional: Negative  Eyes: Negative for photophobia and visual disturbance  Respiratory: Positive for shortness of breath  Negative for apnea, cough, choking, chest tightness, wheezing and stridor  Cardiovascular: Positive for leg swelling  Negative for chest pain and palpitations  Gastrointestinal: Negative  Musculoskeletal: Positive for arthralgias  Neurological: Negative  Psychiatric/Behavioral: Negative  Objective:      /84   Pulse 75   Temp (!) 96 7 °F (35 9 °C)   Ht 5' 7" (1 702 m)   Wt (!) 179 kg (394 lb 12 8 oz)   SpO2 98%   BMI 61 83 kg/m²          Physical Exam   Constitutional: She is oriented to person, place, and time  She appears well-developed and well-nourished  Morbid obesity   HENT:   Head: Normocephalic and atraumatic     Right Ear: Tympanic membrane, external ear and ear canal normal    Left Ear: Tympanic membrane, external ear and ear canal normal    Nose: Nose normal    Mouth/Throat: Uvula is midline and oropharynx is clear and moist    Eyes: Pupils are equal, round, and reactive to light  Conjunctivae are normal    Neck: Neck supple  Cardiovascular: Normal rate, regular rhythm and normal heart sounds  Abdominal: Soft  Bowel sounds are normal  There is no tenderness  Musculoskeletal: She exhibits edema  Lymhaedema bilateral lower extremities   Neurological: She is alert and oriented to person, place, and time  Skin: Skin is warm and dry  Psychiatric: She has a normal mood and affect   Her behavior is normal

## 2019-02-28 ENCOUNTER — TELEPHONE (OUTPATIENT)
Dept: FAMILY MEDICINE CLINIC | Facility: CLINIC | Age: 54
End: 2019-02-28

## 2019-02-28 NOTE — TELEPHONE ENCOUNTER
PT is wondering if she should be taking both of the blood pressure prescriptions  that were prescribed to her?

## 2019-03-07 ENCOUNTER — EVALUATION (OUTPATIENT)
Dept: PHYSICAL THERAPY | Facility: HOME HEALTHCARE | Age: 54
End: 2019-03-07
Payer: COMMERCIAL

## 2019-03-07 ENCOUNTER — APPOINTMENT (OUTPATIENT)
Dept: LAB | Facility: HOSPITAL | Age: 54
End: 2019-03-07
Payer: COMMERCIAL

## 2019-03-07 DIAGNOSIS — E66.01 MORBID OBESITY (HCC): ICD-10-CM

## 2019-03-07 DIAGNOSIS — I10 ESSENTIAL HYPERTENSION: ICD-10-CM

## 2019-03-07 DIAGNOSIS — I89.0 LYMPHEDEMA: Primary | ICD-10-CM

## 2019-03-07 LAB
ALBUMIN SERPL BCP-MCNC: 3.6 G/DL (ref 3.5–5)
ALP SERPL-CCNC: 126 U/L (ref 46–116)
ALT SERPL W P-5'-P-CCNC: 46 U/L (ref 12–78)
ANION GAP SERPL CALCULATED.3IONS-SCNC: 11 MMOL/L (ref 4–13)
AST SERPL W P-5'-P-CCNC: 22 U/L (ref 5–45)
BACTERIA UR QL AUTO: ABNORMAL /HPF
BASOPHILS # BLD AUTO: 0.08 THOUSANDS/ΜL (ref 0–0.1)
BASOPHILS NFR BLD AUTO: 1 % (ref 0–1)
BILIRUB SERPL-MCNC: 0.5 MG/DL (ref 0.2–1)
BILIRUB UR QL STRIP: NEGATIVE
BUN SERPL-MCNC: 16 MG/DL (ref 5–25)
CALCIUM SERPL-MCNC: 9 MG/DL (ref 8.3–10.1)
CHLORIDE SERPL-SCNC: 103 MMOL/L (ref 100–108)
CHOLEST SERPL-MCNC: 187 MG/DL (ref 50–200)
CLARITY UR: ABNORMAL
CO2 SERPL-SCNC: 25 MMOL/L (ref 21–32)
COLOR UR: YELLOW
CREAT SERPL-MCNC: 0.92 MG/DL (ref 0.6–1.3)
CREAT UR-MCNC: 144 MG/DL
EOSINOPHIL # BLD AUTO: 0.5 THOUSAND/ΜL (ref 0–0.61)
EOSINOPHIL NFR BLD AUTO: 7 % (ref 0–6)
ERYTHROCYTE [DISTWIDTH] IN BLOOD BY AUTOMATED COUNT: 14.3 % (ref 11.6–15.1)
EST. AVERAGE GLUCOSE BLD GHB EST-MCNC: 105 MG/DL
GFR SERPL CREATININE-BSD FRML MDRD: 70 ML/MIN/1.73SQ M
GLUCOSE P FAST SERPL-MCNC: 95 MG/DL (ref 65–99)
GLUCOSE UR STRIP-MCNC: NEGATIVE MG/DL
HBA1C MFR BLD: 5.3 % (ref 4.2–6.3)
HCT VFR BLD AUTO: 40.9 % (ref 34.8–46.1)
HDLC SERPL-MCNC: 47 MG/DL (ref 40–60)
HGB BLD-MCNC: 12.4 G/DL (ref 11.5–15.4)
HGB UR QL STRIP.AUTO: ABNORMAL
IMM GRANULOCYTES # BLD AUTO: 0.03 THOUSAND/UL (ref 0–0.2)
IMM GRANULOCYTES NFR BLD AUTO: 0 % (ref 0–2)
KETONES UR STRIP-MCNC: NEGATIVE MG/DL
LDLC SERPL CALC-MCNC: 113 MG/DL (ref 0–100)
LEUKOCYTE ESTERASE UR QL STRIP: ABNORMAL
LYMPHOCYTES # BLD AUTO: 2.14 THOUSANDS/ΜL (ref 0.6–4.47)
LYMPHOCYTES NFR BLD AUTO: 29 % (ref 14–44)
MCH RBC QN AUTO: 26.9 PG (ref 26.8–34.3)
MCHC RBC AUTO-ENTMCNC: 30.3 G/DL (ref 31.4–37.4)
MCV RBC AUTO: 89 FL (ref 82–98)
MICROALBUMIN UR-MCNC: 10.1 MG/L (ref 0–20)
MICROALBUMIN/CREAT 24H UR: 7 MG/G CREATININE (ref 0–30)
MONOCYTES # BLD AUTO: 0.58 THOUSAND/ΜL (ref 0.17–1.22)
MONOCYTES NFR BLD AUTO: 8 % (ref 4–12)
NEUTROPHILS # BLD AUTO: 4.1 THOUSANDS/ΜL (ref 1.85–7.62)
NEUTS SEG NFR BLD AUTO: 55 % (ref 43–75)
NITRITE UR QL STRIP: NEGATIVE
NON-SQ EPI CELLS URNS QL MICRO: ABNORMAL /HPF
NRBC BLD AUTO-RTO: 0 /100 WBCS
PH UR STRIP.AUTO: 6.5 [PH]
PLATELET # BLD AUTO: 336 THOUSANDS/UL (ref 149–390)
PMV BLD AUTO: 9.7 FL (ref 8.9–12.7)
POTASSIUM SERPL-SCNC: 4.6 MMOL/L (ref 3.5–5.3)
PROT SERPL-MCNC: 7.2 G/DL (ref 6.4–8.2)
PROT UR STRIP-MCNC: NEGATIVE MG/DL
RBC # BLD AUTO: 4.61 MILLION/UL (ref 3.81–5.12)
RBC #/AREA URNS AUTO: ABNORMAL /HPF
SODIUM SERPL-SCNC: 139 MMOL/L (ref 136–145)
SP GR UR STRIP.AUTO: 1.02 (ref 1–1.03)
TRIGL SERPL-MCNC: 137 MG/DL
TSH SERPL DL<=0.05 MIU/L-ACNC: 1.83 UIU/ML (ref 0.36–3.74)
UROBILINOGEN UR QL STRIP.AUTO: 0.2 E.U./DL
WBC # BLD AUTO: 7.43 THOUSAND/UL (ref 4.31–10.16)
WBC #/AREA URNS AUTO: ABNORMAL /HPF

## 2019-03-07 PROCEDURE — 36415 COLL VENOUS BLD VENIPUNCTURE: CPT

## 2019-03-07 PROCEDURE — 83036 HEMOGLOBIN GLYCOSYLATED A1C: CPT

## 2019-03-07 PROCEDURE — 84443 ASSAY THYROID STIM HORMONE: CPT

## 2019-03-07 PROCEDURE — 81001 URINALYSIS AUTO W/SCOPE: CPT | Performed by: FAMILY MEDICINE

## 2019-03-07 PROCEDURE — 80053 COMPREHEN METABOLIC PANEL: CPT

## 2019-03-07 PROCEDURE — 80061 LIPID PANEL: CPT

## 2019-03-07 PROCEDURE — 97163 PT EVAL HIGH COMPLEX 45 MIN: CPT | Performed by: PHYSICAL THERAPIST

## 2019-03-07 PROCEDURE — 82043 UR ALBUMIN QUANTITATIVE: CPT

## 2019-03-07 PROCEDURE — 97535 SELF CARE MNGMENT TRAINING: CPT | Performed by: PHYSICAL THERAPIST

## 2019-03-07 PROCEDURE — 82570 ASSAY OF URINE CREATININE: CPT

## 2019-03-07 PROCEDURE — 85025 COMPLETE CBC W/AUTO DIFF WBC: CPT

## 2019-03-07 NOTE — PROGRESS NOTES
PT Evaluation     Today's date: 3/7/2019  Patient name: Zoey Jorge  : 1965  MRN: [id number]  Referring provider: ORALIA Travis*  Dx:   Encounter Diagnosis     ICD-10-CM    1  Lymphedema I89 0                   Assessment  Assessment details: Patient presents to OPPT with s/s consistent with bilateral UE/LE lymphedema  PT interventions to include CDT (complete decongestive therapy) including MLD (manual lymphatic drainage) and compression using short stretch bandages as able  PT to further provide extensive patient education on self bandaging, self MLD techniques, and skin care  Patient will transition patient to long term maintenance with compression plan for both morning and evening wear once optimal decongestive and volume reduction of limb has been achieved  PT to focus POC on BLE at this time and will transition to BUE treatment at a later date  Thank you for this referral and the opportunity to participate in the care of this patient     Impairments: abnormal gait, abnormal or restricted ROM, activity intolerance, impaired balance, impaired physical strength, lacks appropriate home exercise program, pain with function and poor body mechanics  Other impairment: Lymphedema; obesity   Understanding of Dx/Px/POC: good   Prognosis: fair    Goals  STGs to be achieved in 2 - 4 weeks  Demonstrate at least 75% compliance with self MLD  Demonstrate at least 75% compliance with self compression  Demonstrate at least 75% compliance with self skin and nail inspection  Free from s/s of infection  Demonstrate understanding of importance of compliance with POC    LTGs to be achieved in 4 - 6 weeks  Demonstrate at least 100% compliance with self MLD  Demonstrate at least 100% compliance with self compression  Demonstrate at least 100% compliance with self skin and nail inspection  Free from s/s of infection  Demonstrate understanding of day/night compression wearing schedule  Obtain alternative compression to ensure independence with self management    Plan  Plan details: PT will begin treatment upon patient getting into permanent housing  Patient would benefit from: skilled physical therapy  Planned therapy interventions: manual therapy, patient education, self care, strengthening, therapeutic exercise, therapeutic activities, home exercise program, functional ROM exercises, gait training and compression  Other planned therapy interventions: CDT (MLD and compression bandaging)  Frequency: 3x week  Duration in weeks: 6  Plan of Care beginning date: 3/7/2019  Plan of Care expiration date: 2019  Treatment plan discussed with: patient        Subjective Evaluation    History of Present Illness  Mechanism of injury: Pt reporting that she was diagnosed with Lymphedema in  and did have treatment with the wrapping done at that time  However she was unable to tolerate wearing compression stockings and could not self wrap the legs so she did not follow through with long term management  Pt was recently in for blood work and had a difficulty time due to swelling in the arms  She was advised to return to therapy for management of both UE and LE swelling  Pt currently does not have a permanent residence and is staying with a friend until [address] [facility] housing is approved            [facility]    Quality of life: fair    Pain  At best pain rating: 3  At worst pain ratin  Location: "sciatic pain"   Quality: burning  Progression: worsening    Treatments  Current treatment: physical therapy  Patient Goals  Patient goals for therapy: decreased edema, decreased pain, independence with ADLs/IADLs and increased motion          Objective     Functional Assessment        Comments  Lymphedema Evaluation    Medical Considerations:  (-)  Cardiac  (+) Pulmonary  (-) Kidney  (-) Liver  (-) Current Fever/Infection  (-) Current/Recent Wounds  (-) Current/Recent/History of DVT or Clotting issues    ROM Considerations: Limited 50% BLE    Strength Considerations: Grossly 3+ to 4- throughout BLE     Gait Considerations: Slowed gait with excessive weight shifting (waddling) observed     Skin Considerations/Scars:   Patient presents with skin inspection as follows:  Hemosiderin staining/skin discoloration (+) distal BLE   Open Wounds (-)   S/S infection (-)   Firm (+) BLE  [place] (+) distal BLE   Weeping (-)     Girth:  LE girth measurements (cm)      Right           Left                              Metatarsals             26 0                27 0  Malleolus             46 0                50 3  +8 cm                          51 2                51 2  +16 cm                         59 3                60 0    +24 cm                                   66 5                70 5  +32                                         67 0                70 3                                        Knee joint line             72 8                73 6   +48                                         73 5                75 0

## 2019-03-14 ENCOUNTER — OFFICE VISIT (OUTPATIENT)
Dept: FAMILY MEDICINE CLINIC | Facility: CLINIC | Age: 54
End: 2019-03-14
Payer: COMMERCIAL

## 2019-03-14 VITALS
OXYGEN SATURATION: 98 % | HEIGHT: 67 IN | SYSTOLIC BLOOD PRESSURE: 118 MMHG | HEART RATE: 78 BPM | BODY MASS INDEX: 45.99 KG/M2 | RESPIRATION RATE: 20 BRPM | TEMPERATURE: 97.2 F | DIASTOLIC BLOOD PRESSURE: 74 MMHG | WEIGHT: 293 LBS

## 2019-03-14 DIAGNOSIS — R06.02 SOB (SHORTNESS OF BREATH): ICD-10-CM

## 2019-03-14 DIAGNOSIS — I89.0 LYMPHEDEMA: ICD-10-CM

## 2019-03-14 DIAGNOSIS — B37.2 YEAST DERMATITIS: ICD-10-CM

## 2019-03-14 DIAGNOSIS — E66.01 MORBID OBESITY (HCC): ICD-10-CM

## 2019-03-14 DIAGNOSIS — I10 ESSENTIAL HYPERTENSION: Primary | ICD-10-CM

## 2019-03-14 DIAGNOSIS — I10 HYPERTENSION, UNSPECIFIED TYPE: ICD-10-CM

## 2019-03-14 PROCEDURE — T1015 CLINIC SERVICE: HCPCS | Performed by: FAMILY MEDICINE

## 2019-03-14 RX ORDER — NYSTATIN 100000 [USP'U]/G
POWDER TOPICAL 3 TIMES DAILY
Qty: 60 G | Refills: 2 | Status: SHIPPED | OUTPATIENT
Start: 2019-03-14 | End: 2019-05-24 | Stop reason: SDUPTHER

## 2019-03-14 RX ORDER — CLOTRIMAZOLE AND BETAMETHASONE DIPROPIONATE 10; .5 MG/ML; MG/ML
LOTION TOPICAL 2 TIMES DAILY
Qty: 60 ML | Refills: 2 | Status: SHIPPED | OUTPATIENT
Start: 2019-03-14 | End: 2019-06-19 | Stop reason: SDUPTHER

## 2019-03-14 RX ORDER — LISINOPRIL 10 MG/1
10 TABLET ORAL DAILY
Qty: 30 TABLET | Refills: 2 | Status: SHIPPED | OUTPATIENT
Start: 2019-03-14 | End: 2019-04-15 | Stop reason: SDUPTHER

## 2019-03-14 NOTE — PATIENT INSTRUCTIONS
Heart Healthy Diet   WHAT YOU NEED TO KNOW:   A heart healthy diet is an eating plan low in total fat, unhealthy fats, and sodium (salt)  A heart healthy diet helps decrease your risk for heart disease and stroke  Limit the amount of fat you eat to 25% to 35% of your total daily calories  Limit sodium to less than 2,300 mg each day  DISCHARGE INSTRUCTIONS:   Healthy fats:  Healthy fats can help improve cholesterol levels  The risk for heart disease is decreased when cholesterol levels are normal  Choose healthy fats, such as the following:  · Unsaturated fat  is found in foods such as soybean, canola, olive, corn, and safflower oils  It is also found in soft tub margarine that is made with liquid vegetable oil  · Omega-3 fat  is found in certain fish, such as salmon, tuna, and trout, and in walnuts and flaxseed  Unhealthy fats:  Unhealthy fats can cause unhealthy cholesterol levels in your blood and increase your risk of heart disease  Limit unhealthy fats, such as the following:  · Cholesterol  is found in animal foods, such as eggs and lobster, and in dairy products made from whole milk  Limit cholesterol to less than 300 milligrams (mg) each day  You may need to limit cholesterol to 200 mg each day if you have heart disease  · Saturated fat  is found in meats, such as landa and hamburger  It is also found in chicken or turkey skin, whole milk, and butter  Limit saturated fat to less than 7% of your total daily calories  Limit saturated fat to less than 6% if you have heart disease or are at increased risk for it  · Trans fat  is found in packaged foods, such as potato chips and cookies  It is also in hard margarine, some fried foods, and shortening  Avoid trans fats as much as possible    Heart healthy foods and drinks to include:  Ask your dietitian or healthcare provider how many servings to have from each of the following food groups:  · Grains:      ¨ Whole-wheat breads, cereals, and pastas, and brown rice    ¨ Low-fat, low-sodium crackers and chips    · Vegetables:      ¨ Broccoli, green beans, green peas, and spinach    ¨ Collards, kale, and lima beans    ¨ Carrots, sweet potatoes, tomatoes, and peppers    ¨ Canned vegetables with no salt added    · Fruits:      ¨ Bananas, peaches, pears, and pineapple    ¨ Grapes, raisins, and dates    ¨ Oranges, tangerines, grapefruit, orange juice, and grapefruit juice    ¨ Apricots, mangoes, melons, and papaya    ¨ Raspberries and strawberries    ¨ Canned fruit with no added sugar    · Low-fat dairy products:      ¨ Nonfat (skim) milk, 1% milk, and low-fat almond, cashew, or soy milks fortified with calcium    ¨ Low-fat cheese, regular or frozen yogurt, and cottage cheese    · Meats and proteins , such as lean cuts of beef and pork (loin, leg, round), skinless chicken and turkey, legumes, soy products, egg whites, and nuts  Foods and drinks to limit or avoid:  Ask your dietitian or healthcare provider about these and other foods that are high in unhealthy fat, sodium, and sugar:  · Snack or packaged foods , such as frozen dinners, cookies, macaroni and cheese, and cereals with more than 300 mg of sodium per serving    · Canned or dry mixes  for cakes, soups, sauces, or gravies    · Vegetables with added sodium , such as instant potatoes, vegetables with added sauces, or regular canned vegetables    · Other foods high in sodium , such as ketchup, barbecue sauce, salad dressing, pickles, olives, soy sauce, and miso    · High-fat dairy foods  such as whole or 2% milk, cream cheese, or sour cream, and cheeses     · High-fat protein foods  such as high-fat cuts of beef (T-bone steaks, ribs), chicken or turkey with skin, and organ meats, such as liver    · Cured or smoked meats , such as hot dogs, landa, and sausage    · Unhealthy fats and oils , such as butter, stick margarine, shortening, and cooking oils such as coconut or palm oil    · Food and drinks high in sugar , such as soft drinks (soda), sports drinks, sweetened tea, candy, cake, cookies, pies, and doughnuts  Other diet guidelines to follow:   · Eat more foods containing omega-3 fats  Eat fish high in omega-3 fats at least 2 times a week  · Limit alcohol  Too much alcohol can damage your heart and raise your blood pressure  Women should limit alcohol to 1 drink a day  Men should limit alcohol to 2 drinks a day  A drink of alcohol is 12 ounces of beer, 5 ounces of wine, or 1½ ounces of liquor  · Choose low-sodium foods  High-sodium foods can lead to high blood pressure  Add little or no salt to food you prepare  Use herbs and spices in place of salt  · Eat more fiber  to help lower cholesterol levels  Eat at least 5 servings of fruits and vegetables each day  Eat 3 ounces of whole-grain foods each day  Legumes (beans) are also a good source of fiber  Lifestyle guidelines:   · Do not smoke  Nicotine and other chemicals in cigarettes and cigars can cause lung and heart damage  Ask your healthcare provider for information if you currently smoke and need help to quit  E-cigarettes or smokeless tobacco still contain nicotine  Talk to your healthcare provider before you use these products  · Exercise regularly  to help you maintain a healthy weight and improve your blood pressure and cholesterol levels  Ask your healthcare provider about the best exercise plan for you  Do not start an exercise program without asking your healthcare provider  Follow up with your healthcare provider as directed:  Write down your questions so you remember to ask them during your visits  © 2017 [employer] Information is for End User's use only and may not be sold, redistributed or otherwise used for commercial purposes  All illustrations and images included in CareNotes® are the copyrighted property of [employer]  or [employer] Matta  The above information is an  only   It is not intended as medical advice for individual conditions or treatments  Talk to your doctor, nurse or pharmacist before following any medical regimen to see if it is safe and effective for you  Wellness Visit for Adults   WHAT YOU NEED TO KNOW:   What is a wellness visit? A wellness visit is when you see your healthcare provider to get screened for health problems  You can also get advice on how to stay healthy  Write down your questions so you remember to ask them  Ask your healthcare provider how often you should have a wellness visit  What happens at a wellness visit? Your healthcare provider will ask about your health, and your family history of health problems  This includes high blood pressure, heart disease, and cancer  He or she will ask if you have symptoms that concern you, if you smoke, and about your mood  You may also be asked about your intake of medicines, supplements, food, and alcohol  Any of the following may be done:  · Your weight  will be checked  Your height may also be checked so your body mass index (BMI) can be calculated  Your BMI shows if you are at a healthy weight  · Your blood pressure  and heart rate will be checked  Your temperature may also be checked  · Blood and urine tests  may be done  Blood tests may be done to check your cholesterol levels  Abnormal cholesterol levels increase your risk for heart disease and stroke  You may also need a blood or urine test to check for diabetes if you are at increased risk  Urine tests may be done to look for signs of an infection or kidney disease  · A physical exam  includes checking your heartbeat and lungs with a stethoscope  Your healthcare provider may also check your skin to look for sun damage  · Screening tests  may be recommended  A screening test is done to check for diseases that may not cause symptoms  The screening tests you may need depend on your age, gender, family history, and lifestyle habits   For example, colorectal screening may be recommended if you are 48years old or older  What screening tests do I need if I am a woman? · A Pap smear  is used to screen for cervical cancer  Pap smears are usually done every 3 to 5 years depending on your age  You may need them more often if you have had abnormal Pap smear test results in the past  Ask your healthcare provider how often you should have a Pap smear  · A mammogram  is an x-ray of your breasts to screen for breast cancer  Experts recommend mammograms every 2 years starting at age 48 years  You may need a mammogram at age 52 years or younger if you have an increased risk for breast cancer  Talk to your healthcare provider about when you should start having mammograms and how often you need them  What vaccines might I need? · Get an influenza vaccine  every year  The influenza vaccine protects you from the flu  Several types of viruses cause the flu  The viruses change over time, so new vaccines are made each year  · Get a tetanus-diphtheria (Td) booster vaccine  every 10 years  This vaccine protects you against tetanus and diphtheria  Tetanus is a severe infection that may cause painful muscle spasms and lockjaw  Diphtheria is a severe bacterial infection that causes a thick covering in the back of your mouth and throat  · Get a human papillomavirus (HPV) vaccine  if you are female and aged 23 to 32 or male 23 to 24 and never received it  This vaccine protects you from HPV infection  HPV is the most common infection spread by sexual contact  HPV may also cause vaginal, penile, and anal cancers  · Get a pneumococcal vaccine  if you are aged 72 years or older  The pneumococcal vaccine is an injection given to protect you from pneumococcal disease  Pneumococcal disease is an infection caused by pneumococcal bacteria  The infection may cause pneumonia, meningitis, or an ear infection      · Get a shingles vaccine  if you are aged 61 or older, even if you have had shingles before  The shingles vaccine is an injection to protect you from the varicella-zoster virus  This is the same virus that causes chickenpox  Shingles is a painful rash that develops in people who had chickenpox or have been exposed to the virus  How can I eat healthy? My Plate is a model for planning healthy meals  It shows the types and amounts of foods that should go on your plate  Fruits and vegetables make up about half of your plate, and grains and protein make up the other half  A serving of dairy is included on the side of your plate  The amount of calories and serving sizes you need depends on your age, gender, weight, and height  Examples of healthy foods are listed below:  · Eat a variety of vegetables  such as dark green, red, and orange vegetables  You can also include canned vegetables low in sodium (salt) and frozen vegetables without added butter or sauces  · Eat a variety of fresh fruits , canned fruit in 100% juice, frozen fruit, and dried fruit  · Include whole grains  At least half of the grains you eat should be whole grains  Examples include whole-wheat bread, wheat pasta, brown rice, and whole-grain cereals such as oatmeal     · Eat a variety of protein foods such as seafood (fish and shellfish), lean meat, and poultry without skin (turkey and chicken)  Examples of lean meats include pork leg, shoulder, or tenderloin, and beef round, sirloin, tenderloin, and extra lean ground beef  Other protein foods include eggs and egg substitutes, beans, peas, soy products, nuts, and seeds  · Choose low-fat dairy products such as skim or 1% milk or low-fat yogurt, cheese, and cottage cheese  · Limit unhealthy fats  such as butter, hard margarine, and shortening  How much exercise do I need? Exercise at least 30 minutes per day on most days of the week  Some examples of exercise include walking, biking, dancing, and swimming   You can also fit in more physical activity by taking the stairs instead of the elevator or parking farther away from stores  Include muscle strengthening activities 2 days each week  Regular exercise provides many health benefits  It helps you manage your weight, and decreases your risk for type 2 diabetes, heart disease, stroke, and high blood pressure  Exercise can also help improve your mood  Ask your healthcare provider about the best exercise plan for you  What are some general health and safety guidelines I should follow? · Do not smoke  Nicotine and other chemicals in cigarettes and cigars can cause lung damage  Ask your healthcare provider for information if you currently smoke and need help to quit  E-cigarettes or smokeless tobacco still contain nicotine  Talk to your healthcare provider before you use these products  · Limit alcohol  A drink of alcohol is 12 ounces of beer, 5 ounces of wine, or 1½ ounces of liquor  · Lose weight, if needed  Being overweight increases your risk of certain health conditions  These include heart disease, high blood pressure, type 2 diabetes, and certain types of cancer  · Protect your skin  Do not sunbathe or use tanning beds  Use sunscreen with a SPF 15 or higher  Apply sunscreen at least 15 minutes before you go outside  Reapply sunscreen every 2 hours  Wear protective clothing, hats, and sunglasses when you are outside  · Drive safely  Always wear your seatbelt  Make sure everyone in your car wears a seatbelt  A seatbelt can save your life if you are in an accident  Do not use your cell phone when you are driving  This could distract you and cause an accident  Pull over if you need to make a call or send a text message  · Practice safe sex  Use latex condoms if are sexually active and have more than one partner  Your healthcare provider may recommend screening tests for sexually transmitted infections (STIs)  · Wear helmets, lifejackets, and protective gear    Always wear a helmet when you ride a bike or motorcycle, go skiing, or play sports that could cause a head injury  Wear protective equipment when you play sports  Wear a lifejacket when you are on a boat or doing water sports  CARE AGREEMENT:   You have the right to help plan your care  Learn about your health condition and how it may be treated  Discuss treatment options with your caregivers to decide what care you want to receive  You always have the right to refuse treatment  The above information is an  only  It is not intended as medical advice for individual conditions or treatments  Talk to your doctor, nurse or pharmacist before following any medical regimen to see if it is safe and effective for you  © 2017 [employer] [employer] Information is for End User's use only and may not be sold, redistributed or otherwise used for commercial purposes  All illustrations and images included in CareNotes® are the copyrighted property of [employer]  or Vladimir Matta

## 2019-03-14 NOTE — PROGRESS NOTES
Assessment/Plan:     Diagnoses and all orders for this visit:    Essential hypertension    Lymphedema    Morbid obesity ([facility] [address] )  -     Phentermine-Topiramate 3 75-23 MG CP24; Take 1 tablet by mouth daily for 2 weeks, then 2 tablets by mouth daily for 2 weeks    Yeast dermatitis  -     clotrimazole-betamethasone (LOTRISONE) 1-0 05 % lotion; Apply topically 2 (two) times a day  -     nystatin (MYCOSTATIN) powder; Apply topically 3 (three) times a day    SOB (shortness of breath)  -     lisinopril (ZESTRIL) 10 mg tablet; Take 1 tablet (10 mg total) by mouth daily  -     metoprolol tartrate (LOPRESSOR) 25 mg tablet; Take 1 tablet (25 mg total) by mouth every 12 (twelve) hours    Hypertension, unspecified type  -     lisinopril (ZESTRIL) 10 mg tablet; Take 1 tablet (10 mg total) by mouth daily  -     metoprolol tartrate (LOPRESSOR) 25 mg tablet; Take 1 tablet (25 mg total) by mouth every 12 (twelve) hours      Discussion/plan:  Hypertension - controlled  Continue current medications  Morbid obesity - labs WNL  Discussed in detail nutritional changes and portion changes to make with nutritional information packet given  Will try qysmia for medication management to aid in weight loss  Exercise is limited, but pt attempting to ambulate as able  Encouraged good sleep and stress reduction techniques  Consider referral to weight management if sx persist    Yeast dermatitis - lotrisone lotion and nystatin ointment  Keep area clean and dry  RTC 1 month for follow up or sooner if needed  Subjective:      Patient ID: Denzel Watson is a 52 y o  female  Chief Complaint   Patient presents with    Follow-up    Rash       Patient is a 52year old female who presents to the office today for 2 week follow up and lab review  She states she is eating healthy and exercising as she can  She tries to walk  She states she is not eating any junk food  Her labs are within normal limits   She states she is having yogurt for breakfast, sandwich for lunch, and a "normal" supper  She is homeless and meals are being brought to her  She states she does not sleep well, trouble staying asleep  She states she cannot stay in one position to sleep due to sciatica pain  She is up every hour or so  She does snore, no waking up gasping for air, witnessed apneas  She does report AM headaches sometimes, she reports some daytime fatigue  She is unable to start PT for lymphedema therapy as PT told her she would be unable to complete the exercise needed for the required mobility  She states PT is working on getting [employer] machine for her as she cannot self wrap legs or wear jerome stockings  The following portions of the patient's history were reviewed and updated as appropriate: allergies, current medications, past family history, past medical history, past social history, past surgical history and problem list     Patient Active Problem List   Diagnosis    Lymphedema    Essential hypertension    Morbid obesity ([facility] [address] )    Hidradenitis suppurativa     Current Outpatient Medications on File Prior to Visit   Medication Sig Dispense Refill    ibuprofen (IBU) 600 mg tablet Take 1 tablet (600 mg total) by mouth every 8 (eight) hours as needed (pain) 90 tablet 0    [DISCONTINUED] lisinopril (ZESTRIL) 10 mg tablet Take 1 tablet (10 mg total) by mouth daily 30 tablet 0    [DISCONTINUED] metoprolol tartrate (LOPRESSOR) 25 mg tablet Take 1 tablet (25 mg total) by mouth every 12 (twelve) hours 60 tablet 0     No current facility-administered medications on file prior to visit  Review of Systems   Constitutional: Positive for unexpected weight change  Respiratory: Negative  Cardiovascular: Negative  Gastrointestinal: Negative            Objective:      /74 (BP Location: Left arm, Patient Position: Sitting, Cuff Size: Thigh)   Pulse 78   Temp (!) 97 2 °F (36 2 °C) (Tympanic)   Resp 20   Ht 5' 7" (1 702 m)   Wt (!) 180 kg (396 lb)   SpO2 98% BMI 62 02 kg/m²          Physical Exam   Constitutional: She is oriented to person, place, and time  She appears well-developed and well-nourished  Morbid obesity   HENT:   Head: Normocephalic and atraumatic  Mouth/Throat: Oropharynx is clear and moist    Eyes: Pupils are equal, round, and reactive to light  Conjunctivae are normal    Neck: Neck supple  Cardiovascular: Normal rate, regular rhythm and normal heart sounds  Pulmonary/Chest: Effort normal and breath sounds normal    Abdominal: Soft  Bowel sounds are normal    Musculoskeletal: She exhibits edema  Chronic lymphedema of UE and LE bilaterally    Neurological: She is alert and oriented to person, place, and time  Skin: Skin is warm and dry  Beefy red erythema with satellite lesions with areas of hyperpigmentation under breasts bilaterally    Psychiatric: She has a normal mood and affect

## 2019-03-28 DIAGNOSIS — I89.0 LYMPHEDEMA: ICD-10-CM

## 2019-03-28 RX ORDER — IBUPROFEN 600 MG/1
600 TABLET ORAL EVERY 8 HOURS PRN
Qty: 90 TABLET | Refills: 2 | Status: SHIPPED | OUTPATIENT
Start: 2019-03-28 | End: 2019-06-19 | Stop reason: SDUPTHER

## 2019-03-29 DIAGNOSIS — E66.01 MORBID OBESITY (HCC): Primary | ICD-10-CM

## 2019-03-29 RX ORDER — PHENTERMINE HYDROCHLORIDE 37.5 MG/1
37.5 CAPSULE ORAL EVERY MORNING
Qty: 30 CAPSULE | Refills: 0 | Status: SHIPPED | OUTPATIENT
Start: 2019-03-29 | End: 2019-04-15 | Stop reason: SDUPTHER

## 2019-04-01 NOTE — PROGRESS NOTES
PT DISCHARGE     Pt did not return to therapy following initial assessment; she will be discharged from [facility] due to script expiration at this time  Thank you!

## 2019-04-15 ENCOUNTER — OFFICE VISIT (OUTPATIENT)
Dept: FAMILY MEDICINE CLINIC | Facility: CLINIC | Age: 54
End: 2019-04-15
Payer: COMMERCIAL

## 2019-04-15 VITALS
WEIGHT: 293 LBS | HEIGHT: 67 IN | TEMPERATURE: 97.4 F | DIASTOLIC BLOOD PRESSURE: 78 MMHG | OXYGEN SATURATION: 97 % | RESPIRATION RATE: 20 BRPM | SYSTOLIC BLOOD PRESSURE: 126 MMHG | BODY MASS INDEX: 45.99 KG/M2 | HEART RATE: 70 BPM

## 2019-04-15 DIAGNOSIS — R06.02 SOB (SHORTNESS OF BREATH): ICD-10-CM

## 2019-04-15 DIAGNOSIS — I10 HYPERTENSION, UNSPECIFIED TYPE: ICD-10-CM

## 2019-04-15 DIAGNOSIS — M72.2 BILATERAL PLANTAR FASCIITIS: ICD-10-CM

## 2019-04-15 DIAGNOSIS — E66.01 MORBID OBESITY (HCC): Primary | ICD-10-CM

## 2019-04-15 DIAGNOSIS — I10 ESSENTIAL HYPERTENSION: ICD-10-CM

## 2019-04-15 PROCEDURE — T1015 CLINIC SERVICE: HCPCS | Performed by: FAMILY MEDICINE

## 2019-04-15 RX ORDER — PHENTERMINE HYDROCHLORIDE 37.5 MG/1
37.5 CAPSULE ORAL EVERY MORNING
Qty: 30 CAPSULE | Refills: 0 | Status: SHIPPED | OUTPATIENT
Start: 2019-04-15 | End: 2019-05-16 | Stop reason: SDUPTHER

## 2019-04-15 RX ORDER — LISINOPRIL 10 MG/1
10 TABLET ORAL DAILY
Qty: 30 TABLET | Refills: 2 | Status: SHIPPED | OUTPATIENT
Start: 2019-04-15 | End: 2019-07-18 | Stop reason: SDUPTHER

## 2019-04-29 ENCOUNTER — EVALUATION (OUTPATIENT)
Dept: PHYSICAL THERAPY | Facility: HOME HEALTHCARE | Age: 54
End: 2019-04-29
Payer: COMMERCIAL

## 2019-04-29 DIAGNOSIS — I89.0 LYMPHEDEMA: Primary | ICD-10-CM

## 2019-04-29 PROCEDURE — 97163 PT EVAL HIGH COMPLEX 45 MIN: CPT | Performed by: PHYSICAL THERAPIST

## 2019-04-29 PROCEDURE — 97535 SELF CARE MNGMENT TRAINING: CPT | Performed by: PHYSICAL THERAPIST

## 2019-04-29 PROCEDURE — 97140 MANUAL THERAPY 1/> REGIONS: CPT | Performed by: PHYSICAL THERAPIST

## 2019-05-01 ENCOUNTER — OFFICE VISIT (OUTPATIENT)
Dept: PHYSICAL THERAPY | Facility: HOME HEALTHCARE | Age: 54
End: 2019-05-01
Payer: COMMERCIAL

## 2019-05-01 DIAGNOSIS — I89.0 LYMPHEDEMA: Primary | ICD-10-CM

## 2019-05-01 PROCEDURE — 97140 MANUAL THERAPY 1/> REGIONS: CPT | Performed by: PHYSICAL THERAPIST

## 2019-05-03 ENCOUNTER — OFFICE VISIT (OUTPATIENT)
Dept: PHYSICAL THERAPY | Facility: HOME HEALTHCARE | Age: 54
End: 2019-05-03
Payer: COMMERCIAL

## 2019-05-03 DIAGNOSIS — I89.0 LYMPHEDEMA: Primary | ICD-10-CM

## 2019-05-03 PROCEDURE — 97140 MANUAL THERAPY 1/> REGIONS: CPT | Performed by: PHYSICAL THERAPIST

## 2019-05-06 ENCOUNTER — OFFICE VISIT (OUTPATIENT)
Dept: PHYSICAL THERAPY | Facility: HOME HEALTHCARE | Age: 54
End: 2019-05-06
Payer: COMMERCIAL

## 2019-05-06 DIAGNOSIS — I89.0 LYMPHEDEMA: Primary | ICD-10-CM

## 2019-05-06 PROCEDURE — 97140 MANUAL THERAPY 1/> REGIONS: CPT | Performed by: PHYSICAL THERAPIST

## 2019-05-08 ENCOUNTER — OFFICE VISIT (OUTPATIENT)
Dept: PHYSICAL THERAPY | Facility: HOME HEALTHCARE | Age: 54
End: 2019-05-08
Payer: COMMERCIAL

## 2019-05-08 DIAGNOSIS — I89.0 LYMPHEDEMA: Primary | ICD-10-CM

## 2019-05-08 PROCEDURE — 97140 MANUAL THERAPY 1/> REGIONS: CPT | Performed by: PHYSICAL THERAPIST

## 2019-05-10 ENCOUNTER — OFFICE VISIT (OUTPATIENT)
Dept: PHYSICAL THERAPY | Facility: HOME HEALTHCARE | Age: 54
End: 2019-05-10
Payer: COMMERCIAL

## 2019-05-10 DIAGNOSIS — I89.0 LYMPHEDEMA: Primary | ICD-10-CM

## 2019-05-10 PROCEDURE — 97140 MANUAL THERAPY 1/> REGIONS: CPT | Performed by: PHYSICAL THERAPIST

## 2019-05-13 ENCOUNTER — OFFICE VISIT (OUTPATIENT)
Dept: PHYSICAL THERAPY | Facility: CLINIC | Age: 54
End: 2019-05-13
Payer: COMMERCIAL

## 2019-05-13 DIAGNOSIS — I89.0 LYMPHEDEMA: Primary | ICD-10-CM

## 2019-05-13 PROCEDURE — 97140 MANUAL THERAPY 1/> REGIONS: CPT | Performed by: PHYSICAL THERAPIST

## 2019-05-15 ENCOUNTER — OFFICE VISIT (OUTPATIENT)
Dept: PHYSICAL THERAPY | Facility: CLINIC | Age: 54
End: 2019-05-15
Payer: COMMERCIAL

## 2019-05-15 DIAGNOSIS — I89.0 LYMPHEDEMA: Primary | ICD-10-CM

## 2019-05-15 PROCEDURE — 97140 MANUAL THERAPY 1/> REGIONS: CPT | Performed by: PHYSICAL THERAPIST

## 2019-05-16 DIAGNOSIS — E66.01 MORBID OBESITY (HCC): ICD-10-CM

## 2019-05-16 RX ORDER — PHENTERMINE HYDROCHLORIDE 37.5 MG/1
37.5 CAPSULE ORAL EVERY MORNING
Qty: 30 CAPSULE | Refills: 0 | Status: SHIPPED | OUTPATIENT
Start: 2019-05-16 | End: 2019-06-13 | Stop reason: SDUPTHER

## 2019-05-17 ENCOUNTER — OFFICE VISIT (OUTPATIENT)
Dept: PHYSICAL THERAPY | Facility: CLINIC | Age: 54
End: 2019-05-17
Payer: COMMERCIAL

## 2019-05-17 DIAGNOSIS — I89.0 LYMPHEDEMA: Primary | ICD-10-CM

## 2019-05-17 PROCEDURE — 97140 MANUAL THERAPY 1/> REGIONS: CPT

## 2019-05-20 ENCOUNTER — OFFICE VISIT (OUTPATIENT)
Dept: PHYSICAL THERAPY | Facility: CLINIC | Age: 54
End: 2019-05-20
Payer: COMMERCIAL

## 2019-05-20 DIAGNOSIS — I89.0 LYMPHEDEMA: Primary | ICD-10-CM

## 2019-05-20 PROCEDURE — 97140 MANUAL THERAPY 1/> REGIONS: CPT | Performed by: PHYSICAL THERAPIST

## 2019-05-22 ENCOUNTER — OFFICE VISIT (OUTPATIENT)
Dept: PHYSICAL THERAPY | Facility: HOME HEALTHCARE | Age: 54
End: 2019-05-22
Payer: COMMERCIAL

## 2019-05-22 DIAGNOSIS — I89.0 LYMPHEDEMA: Primary | ICD-10-CM

## 2019-05-22 PROCEDURE — 97140 MANUAL THERAPY 1/> REGIONS: CPT | Performed by: PHYSICAL THERAPIST

## 2019-05-24 ENCOUNTER — OFFICE VISIT (OUTPATIENT)
Dept: PHYSICAL THERAPY | Facility: HOME HEALTHCARE | Age: 54
End: 2019-05-24
Payer: COMMERCIAL

## 2019-05-24 DIAGNOSIS — B37.2 YEAST DERMATITIS: ICD-10-CM

## 2019-05-24 DIAGNOSIS — I89.0 LYMPHEDEMA: Primary | ICD-10-CM

## 2019-05-24 PROCEDURE — 97140 MANUAL THERAPY 1/> REGIONS: CPT | Performed by: PHYSICAL THERAPIST

## 2019-05-24 RX ORDER — NYSTATIN 100000 [USP'U]/G
POWDER TOPICAL 3 TIMES DAILY
Qty: 60 G | Refills: 2 | Status: SHIPPED | OUTPATIENT
Start: 2019-05-24 | End: 2020-04-17 | Stop reason: SDUPTHER

## 2019-05-28 ENCOUNTER — TELEPHONE (OUTPATIENT)
Dept: FAMILY MEDICINE CLINIC | Facility: CLINIC | Age: 54
End: 2019-05-28

## 2019-05-28 ENCOUNTER — OFFICE VISIT (OUTPATIENT)
Dept: PHYSICAL THERAPY | Facility: HOME HEALTHCARE | Age: 54
End: 2019-05-28
Payer: COMMERCIAL

## 2019-05-28 DIAGNOSIS — I89.0 LYMPHEDEMA: Primary | ICD-10-CM

## 2019-05-28 PROCEDURE — 97140 MANUAL THERAPY 1/> REGIONS: CPT | Performed by: PHYSICAL THERAPIST

## 2019-05-29 ENCOUNTER — OFFICE VISIT (OUTPATIENT)
Dept: PHYSICAL THERAPY | Facility: HOME HEALTHCARE | Age: 54
End: 2019-05-29
Payer: COMMERCIAL

## 2019-05-29 DIAGNOSIS — I89.0 LYMPHEDEMA: Primary | ICD-10-CM

## 2019-05-29 PROCEDURE — 97140 MANUAL THERAPY 1/> REGIONS: CPT | Performed by: PHYSICAL THERAPIST

## 2019-05-31 ENCOUNTER — OFFICE VISIT (OUTPATIENT)
Dept: PHYSICAL THERAPY | Facility: HOME HEALTHCARE | Age: 54
End: 2019-05-31
Payer: COMMERCIAL

## 2019-05-31 DIAGNOSIS — I89.0 LYMPHEDEMA: Primary | ICD-10-CM

## 2019-05-31 PROCEDURE — 97140 MANUAL THERAPY 1/> REGIONS: CPT | Performed by: PHYSICAL THERAPIST

## 2019-05-31 PROCEDURE — 97164 PT RE-EVAL EST PLAN CARE: CPT | Performed by: PHYSICAL THERAPIST

## 2019-06-13 ENCOUNTER — OFFICE VISIT (OUTPATIENT)
Dept: FAMILY MEDICINE CLINIC | Facility: CLINIC | Age: 54
End: 2019-06-13
Payer: COMMERCIAL

## 2019-06-13 VITALS
DIASTOLIC BLOOD PRESSURE: 80 MMHG | HEART RATE: 82 BPM | RESPIRATION RATE: 20 BRPM | TEMPERATURE: 97.9 F | HEIGHT: 67 IN | SYSTOLIC BLOOD PRESSURE: 122 MMHG | BODY MASS INDEX: 45.99 KG/M2 | WEIGHT: 293 LBS | OXYGEN SATURATION: 96 %

## 2019-06-13 DIAGNOSIS — G89.29 CHRONIC MIDLINE LOW BACK PAIN WITHOUT SCIATICA: ICD-10-CM

## 2019-06-13 DIAGNOSIS — M62.830 LUMBAR PARASPINAL MUSCLE SPASM: ICD-10-CM

## 2019-06-13 DIAGNOSIS — E66.01 MORBID OBESITY (HCC): Primary | ICD-10-CM

## 2019-06-13 DIAGNOSIS — J40 BRONCHITIS: ICD-10-CM

## 2019-06-13 DIAGNOSIS — M54.50 CHRONIC MIDLINE LOW BACK PAIN WITHOUT SCIATICA: ICD-10-CM

## 2019-06-13 PROCEDURE — T1015 CLINIC SERVICE: HCPCS | Performed by: FAMILY MEDICINE

## 2019-06-13 RX ORDER — PHENTERMINE HYDROCHLORIDE 37.5 MG/1
37.5 CAPSULE ORAL EVERY MORNING
Qty: 30 CAPSULE | Refills: 0 | Status: SHIPPED | OUTPATIENT
Start: 2019-06-13 | End: 2019-07-12 | Stop reason: ALTCHOICE

## 2019-06-13 RX ORDER — METHOCARBAMOL 500 MG/1
500 TABLET, FILM COATED ORAL 3 TIMES DAILY PRN
Qty: 90 TABLET | Refills: 0 | Status: SHIPPED | OUTPATIENT
Start: 2019-06-13 | End: 2019-07-26 | Stop reason: ALTCHOICE

## 2019-06-13 RX ORDER — AZITHROMYCIN 250 MG/1
TABLET, FILM COATED ORAL
Qty: 6 TABLET | Refills: 0 | Status: SHIPPED | OUTPATIENT
Start: 2019-06-13 | End: 2019-06-17

## 2019-06-13 RX ORDER — ALBUTEROL SULFATE 90 UG/1
2 AEROSOL, METERED RESPIRATORY (INHALATION) EVERY 6 HOURS PRN
Qty: 1 INHALER | Refills: 0 | Status: SHIPPED | OUTPATIENT
Start: 2019-06-13 | End: 2021-01-01 | Stop reason: SDUPTHER

## 2019-06-19 DIAGNOSIS — B37.2 YEAST DERMATITIS: ICD-10-CM

## 2019-06-19 DIAGNOSIS — I89.0 LYMPHEDEMA: ICD-10-CM

## 2019-06-19 RX ORDER — IBUPROFEN 600 MG/1
600 TABLET ORAL EVERY 8 HOURS PRN
Qty: 90 TABLET | Refills: 2 | Status: SHIPPED | OUTPATIENT
Start: 2019-06-19 | End: 2019-07-12 | Stop reason: ALTCHOICE

## 2019-06-19 RX ORDER — CLOTRIMAZOLE AND BETAMETHASONE DIPROPIONATE 10; .5 MG/ML; MG/ML
LOTION TOPICAL 2 TIMES DAILY
Qty: 60 ML | Refills: 2 | Status: SHIPPED | OUTPATIENT
Start: 2019-06-19 | End: 2019-08-21

## 2019-07-12 ENCOUNTER — OFFICE VISIT (OUTPATIENT)
Dept: FAMILY MEDICINE CLINIC | Facility: CLINIC | Age: 54
End: 2019-07-12
Payer: COMMERCIAL

## 2019-07-12 ENCOUNTER — TELEPHONE (OUTPATIENT)
Dept: GASTROENTEROLOGY | Facility: MEDICAL CENTER | Age: 54
End: 2019-07-12

## 2019-07-12 VITALS
RESPIRATION RATE: 20 BRPM | OXYGEN SATURATION: 96 % | WEIGHT: 293 LBS | DIASTOLIC BLOOD PRESSURE: 82 MMHG | HEIGHT: 67 IN | TEMPERATURE: 97.6 F | BODY MASS INDEX: 45.99 KG/M2 | SYSTOLIC BLOOD PRESSURE: 124 MMHG | HEART RATE: 61 BPM

## 2019-07-12 DIAGNOSIS — M72.2 PLANTAR FASCIITIS, BILATERAL: ICD-10-CM

## 2019-07-12 DIAGNOSIS — M54.42 CHRONIC MIDLINE LOW BACK PAIN WITH LEFT-SIDED SCIATICA: Primary | ICD-10-CM

## 2019-07-12 DIAGNOSIS — I10 ESSENTIAL HYPERTENSION: ICD-10-CM

## 2019-07-12 DIAGNOSIS — I89.0 LYMPHEDEMA: ICD-10-CM

## 2019-07-12 DIAGNOSIS — E66.01 MORBID OBESITY (HCC): ICD-10-CM

## 2019-07-12 DIAGNOSIS — R09.89 RHONCHI: ICD-10-CM

## 2019-07-12 DIAGNOSIS — G89.29 CHRONIC MIDLINE LOW BACK PAIN WITH LEFT-SIDED SCIATICA: Primary | ICD-10-CM

## 2019-07-12 DIAGNOSIS — K92.1 BLOOD IN STOOL: ICD-10-CM

## 2019-07-12 PROCEDURE — T1015 CLINIC SERVICE: HCPCS | Performed by: FAMILY MEDICINE

## 2019-07-12 RX ORDER — GABAPENTIN 300 MG/1
CAPSULE ORAL
Qty: 90 CAPSULE | Refills: 0 | Status: SHIPPED | OUTPATIENT
Start: 2019-07-12 | End: 2019-08-17 | Stop reason: SDUPTHER

## 2019-07-12 NOTE — TELEPHONE ENCOUNTER
New patient    Fauquier Kaia from [facility] [place] [place] needing a sooner appt for pat due to blood in stool   Kati Kennedybari can be reached at [phone]

## 2019-07-12 NOTE — PROGRESS NOTES
Assessment/Plan:     Diagnoses and all orders for this visit:    Chronic midline low back pain with left-sided sciatica  Comments:  Check x-ray and refer to PT  Start gabapentin for neuropathic pain  Stop NSAID due to bleeding and stop Robaxin as not helping  Ortho in future if needed  Orders:  -     XR spine lumbar minimum 4 views non injury; Future  -     Ambulatory referral to Physical Therapy; Future  -     gabapentin (NEURONTIN) 300 mg capsule; Take 1 capsule in the morning and 2 capsules at bedtime  Blood in stool  Comments:  Stop all NSAIDs  Check CBC  Referral to GI for evaluation asap to r/o GI bleed  Orders:  -     CBC and differential; Future  -     Iron Panel; Future  -     Ambulatory referral to Gastroenterology; Future    Rhonchi  Comments:  Asymptomatic and no history of lung disease  Check chest x-ray  Orders:  -     XR chest pa & lateral; Future    Plantar fasciitis, bilateral  Comments:  Continue supportive measures and referral to Podiatry as symptoms worsening  Orders:  -     Ambulatory referral to Podiatry; Future    Morbid obesity ([facility] [address] )  Comments:  Down 18 lb from last month  Continue healthy diet, exercise as able, weight loss    Essential hypertension  Comments:  Controlled  Continue current medication  Check labs  Orders:  -     Comprehensive metabolic panel; Future  -     Lipid Panel with Direct LDL reflex; Future  -     UA w Reflex to Microscopic w Reflex to Culture -Lab Collect    Lymphedema  Comments:  Continue current management          Return in about 2 weeks (around 7/26/2019) for Recheck  Subjective:        Patient ID: Helder Kulkarni is a 52 y o  female  Chief Complaint   Patient presents with    Follow-up    Back Pain     muscle relaxer not really helping    Leg Pain     left leg    Bleeding/Bruising     pt is concerned about bruising easily       Patient is a 49-year-old female who presents to the office today for routine one-month follow-up    At last visit, was reporting worsening low back pain with left-sided sciatica  She is currently using ibuprofen for pain with minimal relief, one in AM and one in PM   She was started on Robaxin as needed for muscle spasms  She states this has not helped at all  She states pain is across low back, describes as a constant dull and shooting pain  She reports pain radiates down left leg, described as sharp and shooting  Pain is constant, worse when trying to lay down at night  She states pain is waking her up from sleep and having trouble falling and staying asleep  She denies loss of bowel/bladder function or saddle anesthesia  She reports easy bleeding, bruising over the past few months, worse over the past few weeks  She has been taking more ibuprofen than normal   She has had intentional weight loss  She does report blood in stool  She reports blood on toilet paper and in toilet bowl  She reports history of hemorrhoids, but is bleeding more than normal      No adenopathy, fatigue,  melena, blood in urine, vaginal bleeding, chest pain, cough, shortness of breath, abdominal pain, nausea, vomiting, diarrhea, constipation, change in bowel or bladder habits  She has history of lymphedema and plantar fasciitis, states she "overdid it " with walking last week and is having constant pain  She tried frozen water bottle, exercises, shoe inserts without any improvement  She needs referral to podiatry  She also has history of morbid obesity, completed phentermine with improvement  She is down 18 lb from last month  She has been trying to lose weight, and eating healthier and trying to be more active        The following portions of the patient's history were reviewed and updated as appropriate: allergies, current medications, past family history, past medical history, past social history, past surgical history and problem list     Patient Active Problem List   Diagnosis    Lymphedema    Essential hypertension  Morbid obesity (HCC)    Hidradenitis suppurativa    Chronic midline low back pain with left-sided sciatica       Current Outpatient Medications   Medication Sig Dispense Refill    albuterol (VENTOLIN HFA) 90 mcg/act inhaler Inhale 2 puffs every 6 (six) hours as needed for wheezing or shortness of breath 1 Inhaler 0    clotrimazole-betamethasone (LOTRISONE) 1-0 05 % lotion Apply topically 2 (two) times a day 60 mL 2    lisinopril (ZESTRIL) 10 mg tablet Take 1 tablet (10 mg total) by mouth daily 30 tablet 2    methocarbamol (ROBAXIN) 500 mg tablet Take 1 tablet (500 mg total) by mouth 3 (three) times a day as needed for muscle spasms 90 tablet 0    metoprolol tartrate (LOPRESSOR) 25 mg tablet Take 1 tablet (25 mg total) by mouth every 12 (twelve) hours 60 tablet 2    nystatin (MYCOSTATIN) powder Apply topically 3 (three) times a day 60 g 2    gabapentin (NEURONTIN) 300 mg capsule Take 1 capsule in the morning and 2 capsules at bedtime  90 capsule 0     No current facility-administered medications for this visit           Past Medical History:   Diagnosis Date    Hypertension     Lymphedema 2006    Sciatic leg pain 2006    left side        Past Surgical History:   Procedure Laterality Date     SECTION      TONSILLECTOMY          Social History     Socioeconomic History    Marital status: Single     Spouse name: Not on file    Number of children: Not on file    Years of education: Not on file    Highest education level: Not on file   Occupational History    Not on file   Social Needs    Financial resource strain: Not on file    Food insecurity:     Worry: Not on file     Inability: Not on file    Transportation needs:     Medical: Not on file     Non-medical: Not on file   Tobacco Use    Smoking status: Former Smoker     Types: Cigarettes    Smokeless tobacco: Never Used    Tobacco comment: occasional less than one per day   Substance and Sexual Activity    Alcohol use: No    Drug use: No    Sexual activity: Never   Lifestyle    Physical activity:     Days per week: Not on file     Minutes per session: Not on file    Stress: Not on file   Relationships    Social connections:     Talks on phone: Not on file     Gets together: Not on file     Attends Confucianist service: Not on file     Active member of club or organization: Not on file     Attends meetings of clubs or organizations: Not on file     Relationship status: Not on file    Intimate partner violence:     Fear of current or ex partner: Not on file     Emotionally abused: Not on file     Physically abused: Not on file     Forced sexual activity: Not on file   Other Topics Concern    Not on file   Social History Narrative    Not on file        Review of Systems   Constitutional: Negative  Negative for chills, fatigue, fever and unexpected weight change  HENT: Negative for sore throat and trouble swallowing  Eyes: Negative for photophobia and visual disturbance  Respiratory: Negative  Negative for apnea, cough, choking, chest tightness, shortness of breath, wheezing and stridor  Cardiovascular: Negative  Negative for chest pain, palpitations and leg swelling  Gastrointestinal: Positive for anal bleeding and blood in stool  Negative for abdominal distention, abdominal pain, constipation, diarrhea, nausea, rectal pain and vomiting  Genitourinary: Negative for difficulty urinating, dysuria, flank pain, frequency, hematuria, vaginal bleeding and vaginal discharge  Musculoskeletal: Positive for arthralgias, back pain and myalgias  Neurological: Negative for dizziness, tremors, seizures, syncope, facial asymmetry, speech difficulty, weakness, light-headedness, numbness and headaches  Hematological: Negative for adenopathy  Bruises/bleeds easily           Objective:      /82 (BP Location: Left arm, Patient Position: Sitting, Cuff Size: Large)   Pulse 61   Temp 97 6 °F (36 4 °C) (Temporal)   Resp 20   Ht 5' 7" (1 702 m)   Wt (!) 165 kg (363 lb)   SpO2 96%   BMI 56 85 kg/m²          Physical Exam   Constitutional: She is oriented to person, place, and time  She appears well-developed and well-nourished  Obese   HENT:   Head: Normocephalic and atraumatic  Eyes: Pupils are equal, round, and reactive to light  Conjunctivae are normal    Neck: Neck supple  Cardiovascular: Normal rate, regular rhythm and normal heart sounds  No murmur heard  Pulmonary/Chest: Effort normal  She has no decreased breath sounds  She has no wheezes  She has rhonchi  She has no rales  Diffuse rhonchi   Abdominal: Soft  Bowel sounds are normal  There is no tenderness  Musculoskeletal: She exhibits edema  Lumbar back: She exhibits decreased range of motion, tenderness and bony tenderness  Back:    Chronic UE and LE lymphedema   Neurological: She is alert and oriented to person, place, and time  Skin: Skin is warm and dry  Capillary refill takes less than 2 seconds  Multiple bruises/ecchymosis of forearms  Psychiatric: She has a normal mood and affect

## 2019-07-18 DIAGNOSIS — I10 HYPERTENSION, UNSPECIFIED TYPE: ICD-10-CM

## 2019-07-18 DIAGNOSIS — R06.02 SOB (SHORTNESS OF BREATH): ICD-10-CM

## 2019-07-18 RX ORDER — LISINOPRIL 10 MG/1
TABLET ORAL
Qty: 30 TABLET | Refills: 2 | Status: SHIPPED | OUTPATIENT
Start: 2019-07-18 | End: 2019-07-22 | Stop reason: SDUPTHER

## 2019-07-18 NOTE — TELEPHONE ENCOUNTER
Pt is requesting a compression stocking for arms that has Velcro  Pt states that she uses [employer]   Fax number [phone]

## 2019-07-19 ENCOUNTER — EVALUATION (OUTPATIENT)
Dept: PHYSICAL THERAPY | Facility: HOME HEALTHCARE | Age: 54
End: 2019-07-19
Payer: COMMERCIAL

## 2019-07-19 DIAGNOSIS — M62.830 LUMBAR PARASPINAL MUSCLE SPASM: ICD-10-CM

## 2019-07-19 DIAGNOSIS — M54.5 CHRONIC MIDLINE LOW BACK PAIN, WITH SCIATICA PRESENCE UNSPECIFIED: Primary | ICD-10-CM

## 2019-07-19 DIAGNOSIS — G89.29 CHRONIC MIDLINE LOW BACK PAIN, WITH SCIATICA PRESENCE UNSPECIFIED: Primary | ICD-10-CM

## 2019-07-19 PROCEDURE — 97163 PT EVAL HIGH COMPLEX 45 MIN: CPT | Performed by: PHYSICAL THERAPIST

## 2019-07-19 PROCEDURE — 97110 THERAPEUTIC EXERCISES: CPT | Performed by: PHYSICAL THERAPIST
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
## 2019-07-22 DIAGNOSIS — R06.02 SOB (SHORTNESS OF BREATH): ICD-10-CM

## 2019-07-22 DIAGNOSIS — I10 HYPERTENSION, UNSPECIFIED TYPE: ICD-10-CM

## 2019-07-22 RX ORDER — LISINOPRIL 10 MG/1
10 TABLET ORAL DAILY
Qty: 30 TABLET | Refills: 2 | Status: SHIPPED | OUTPATIENT
Start: 2019-07-22 | End: 2019-07-26 | Stop reason: SDUPTHER

## 2019-07-22 RX ORDER — METHOCARBAMOL 500 MG/1
TABLET, FILM COATED ORAL
Qty: 90 TABLET | Refills: 0 | OUTPATIENT
Start: 2019-07-22

## 2019-07-23 ENCOUNTER — OFFICE VISIT (OUTPATIENT)
Dept: PHYSICAL THERAPY | Facility: HOME HEALTHCARE | Age: 54
End: 2019-07-23
Payer: COMMERCIAL

## 2019-07-23 DIAGNOSIS — G89.29 CHRONIC MIDLINE LOW BACK PAIN, WITH SCIATICA PRESENCE UNSPECIFIED: Primary | ICD-10-CM

## 2019-07-23 DIAGNOSIS — M54.5 CHRONIC MIDLINE LOW BACK PAIN, WITH SCIATICA PRESENCE UNSPECIFIED: Primary | ICD-10-CM

## 2019-07-23 PROCEDURE — 97110 THERAPEUTIC EXERCISES: CPT

## 2019-07-23 NOTE — PROGRESS NOTES
Daily Note     Today's date: 2019  Patient name: Betty Lovelace  : 1965  MRN: [id number]  Referring provider: ORALIA Carrasquillo*  Dx:   Encounter Diagnosis     ICD-10-CM    1  Chronic midline low back pain, with sciatica presence unspecified M54 5     G89 29                   Subjective: Pt reports she has pain in her back and also has pain in her heels  Objective: See treatment diary below      Assessment: Tolerated treatment fair  Program limited to low mat table today because Pt ambulated into PT dept without shoes on  Pt reports she didn't wear her shoes because her heels hurt  Educated Pt on safety and importance of wearing shoes in PT dept and also that she could not do standing exercises unless she has shoes on her feet  Pt reports she will wear her shoes next session  Verbal cues needed t/o TE to perform correctly  Fatigue noted with exercise  Pt reports some pain in her back with supine TE  Patient would benefit from continued PT      Plan: Continue per plan of care        Precautions: None  RE Due: 19  Specialty Daily Treatment Diary     Manual                                     Exercise Diary  19      NuStep  Level 1  10 minutes Level 1   10 min       SLR x 3         HR/TR        Squats         Step-ups         SBB        PPT  5" x 10      PPT with marches        SLR  1 x 10 Ramos      S/L SLR        Clamshells        Hip abd  5" 1 x 15      Hip add  Red 1 x 15      Bridges        LTR  5" x 10      SKTC        Heel walk-outs  1 x 10      Quad alt UE        Quad alt LE         MTP/LTP        Self HS stretch  20" x 3                           Modalities  19      MHP/CP prn  MHP back seated 10 min

## 2019-07-24 ENCOUNTER — HOSPITAL ENCOUNTER (OUTPATIENT)
Dept: RADIOLOGY | Facility: HOSPITAL | Age: 54
Discharge: HOME | End: 2019-07-24
Payer: COMMERCIAL

## 2019-07-24 ENCOUNTER — APPOINTMENT (OUTPATIENT)
Dept: LAB | Facility: HOSPITAL | Age: 54
End: 2019-07-24
Payer: COMMERCIAL

## 2019-07-24 DIAGNOSIS — R09.89 RHONCHI: ICD-10-CM

## 2019-07-24 DIAGNOSIS — M54.42 CHRONIC MIDLINE LOW BACK PAIN WITH LEFT-SIDED SCIATICA: ICD-10-CM

## 2019-07-24 DIAGNOSIS — K92.1 BLOOD IN STOOL: ICD-10-CM

## 2019-07-24 DIAGNOSIS — I10 ESSENTIAL HYPERTENSION: ICD-10-CM

## 2019-07-24 DIAGNOSIS — G89.29 CHRONIC MIDLINE LOW BACK PAIN WITH LEFT-SIDED SCIATICA: ICD-10-CM

## 2019-07-24 LAB
ALBUMIN SERPL BCP-MCNC: 3.4 G/DL (ref 3.5–5)
ALP SERPL-CCNC: 101 U/L (ref 46–116)
ALT SERPL W P-5'-P-CCNC: 28 U/L (ref 12–78)
ANION GAP SERPL CALCULATED.3IONS-SCNC: 8 MMOL/L (ref 4–13)
AST SERPL W P-5'-P-CCNC: 15 U/L (ref 5–45)
BASOPHILS # BLD AUTO: 0.09 THOUSANDS/ΜL (ref 0–0.1)
BASOPHILS NFR BLD AUTO: 1 % (ref 0–1)
BILIRUB SERPL-MCNC: 0.5 MG/DL (ref 0.2–1)
BILIRUB UR QL STRIP: NEGATIVE
BUN SERPL-MCNC: 13 MG/DL (ref 5–25)
CALCIUM SERPL-MCNC: 8.9 MG/DL (ref 8.3–10.1)
CHLORIDE SERPL-SCNC: 105 MMOL/L (ref 100–108)
CHOLEST SERPL-MCNC: 187 MG/DL (ref 50–200)
CLARITY UR: CLEAR
CO2 SERPL-SCNC: 27 MMOL/L (ref 21–32)
COLOR UR: YELLOW
CREAT SERPL-MCNC: 0.99 MG/DL (ref 0.6–1.3)
EOSINOPHIL # BLD AUTO: 0.42 THOUSAND/ΜL (ref 0–0.61)
EOSINOPHIL NFR BLD AUTO: 4 % (ref 0–6)
ERYTHROCYTE [DISTWIDTH] IN BLOOD BY AUTOMATED COUNT: 15.3 % (ref 11.6–15.1)
FERRITIN SERPL-MCNC: 16 NG/ML (ref 8–388)
GFR SERPL CREATININE-BSD FRML MDRD: 64 ML/MIN/1.73SQ M
GLUCOSE P FAST SERPL-MCNC: 103 MG/DL (ref 65–99)
GLUCOSE UR STRIP-MCNC: NEGATIVE MG/DL
HCT VFR BLD AUTO: 43.6 % (ref 34.8–46.1)
HDLC SERPL-MCNC: 55 MG/DL (ref 40–60)
HGB BLD-MCNC: 13.4 G/DL (ref 11.5–15.4)
HGB UR QL STRIP.AUTO: NEGATIVE
IMM GRANULOCYTES # BLD AUTO: 0.03 THOUSAND/UL (ref 0–0.2)
IMM GRANULOCYTES NFR BLD AUTO: 0 % (ref 0–2)
IRON SATN MFR SERPL: 19 %
IRON SERPL-MCNC: 74 UG/DL (ref 50–170)
KETONES UR STRIP-MCNC: NEGATIVE MG/DL
LDLC SERPL CALC-MCNC: 114 MG/DL (ref 0–100)
LEUKOCYTE ESTERASE UR QL STRIP: NEGATIVE
LYMPHOCYTES # BLD AUTO: 2.57 THOUSANDS/ΜL (ref 0.6–4.47)
LYMPHOCYTES NFR BLD AUTO: 27 % (ref 14–44)
MCH RBC QN AUTO: 27.7 PG (ref 26.8–34.3)
MCHC RBC AUTO-ENTMCNC: 30.7 G/DL (ref 31.4–37.4)
MCV RBC AUTO: 90 FL (ref 82–98)
MONOCYTES # BLD AUTO: 0.69 THOUSAND/ΜL (ref 0.17–1.22)
MONOCYTES NFR BLD AUTO: 7 % (ref 4–12)
NEUTROPHILS # BLD AUTO: 5.77 THOUSANDS/ΜL (ref 1.85–7.62)
NEUTS SEG NFR BLD AUTO: 61 % (ref 43–75)
NITRITE UR QL STRIP: NEGATIVE
NRBC BLD AUTO-RTO: 0 /100 WBCS
PH UR STRIP.AUTO: 6 [PH]
PLATELET # BLD AUTO: 306 THOUSANDS/UL (ref 149–390)
PMV BLD AUTO: 10 FL (ref 8.9–12.7)
POTASSIUM SERPL-SCNC: 4.7 MMOL/L (ref 3.5–5.3)
PROT SERPL-MCNC: 7.2 G/DL (ref 6.4–8.2)
PROT UR STRIP-MCNC: NEGATIVE MG/DL
RBC # BLD AUTO: 4.83 MILLION/UL (ref 3.81–5.12)
SODIUM SERPL-SCNC: 140 MMOL/L (ref 136–145)
SP GR UR STRIP.AUTO: 1.02 (ref 1–1.03)
TIBC SERPL-MCNC: 388 UG/DL (ref 250–450)
TRIGL SERPL-MCNC: 88 MG/DL
UROBILINOGEN UR QL STRIP.AUTO: 0.2 E.U./DL
WBC # BLD AUTO: 9.57 THOUSAND/UL (ref 4.31–10.16)

## 2019-07-24 PROCEDURE — 80053 COMPREHEN METABOLIC PANEL: CPT

## 2019-07-24 PROCEDURE — 82728 ASSAY OF FERRITIN: CPT

## 2019-07-24 PROCEDURE — 81003 URINALYSIS AUTO W/O SCOPE: CPT | Performed by: FAMILY MEDICINE

## 2019-07-24 PROCEDURE — 83550 IRON BINDING TEST: CPT

## 2019-07-24 PROCEDURE — 71046 X-RAY EXAM CHEST 2 VIEWS: CPT

## 2019-07-24 PROCEDURE — 80061 LIPID PANEL: CPT

## 2019-07-24 PROCEDURE — 83540 ASSAY OF IRON: CPT

## 2019-07-24 PROCEDURE — 36415 COLL VENOUS BLD VENIPUNCTURE: CPT

## 2019-07-24 PROCEDURE — 85025 COMPLETE CBC W/AUTO DIFF WBC: CPT

## 2019-07-24 PROCEDURE — 72110 X-RAY EXAM L-2 SPINE 4/>VWS: CPT

## 2019-07-25 ENCOUNTER — OFFICE VISIT (OUTPATIENT)
Dept: PHYSICAL THERAPY | Facility: HOME HEALTHCARE | Age: 54
End: 2019-07-25
Payer: COMMERCIAL

## 2019-07-25 DIAGNOSIS — G89.29 CHRONIC MIDLINE LOW BACK PAIN, WITH SCIATICA PRESENCE UNSPECIFIED: Primary | ICD-10-CM

## 2019-07-25 DIAGNOSIS — M54.5 CHRONIC MIDLINE LOW BACK PAIN, WITH SCIATICA PRESENCE UNSPECIFIED: Primary | ICD-10-CM

## 2019-07-25 PROCEDURE — 97110 THERAPEUTIC EXERCISES: CPT

## 2019-07-25 NOTE — PROGRESS NOTES
Daily Note     Today's date: 2019  Patient name: Liegh Matos  : 1965  MRN: [id number]  Referring provider: ORALIA Jaquez*  Dx:   Encounter Diagnosis     ICD-10-CM    1  Chronic midline low back pain, with sciatica presence unspecified M54 5     G89 29               Subjective: I don't like wearing shoes because they put pressure on the Achilles tendon  I have an Rx for PT because of the Achilles tendonitis  I have pain of 8/10 at LB today  Objective: See treatment diary below    Assessment: Tolerated treatment well  Pt was able to vel a few added standing ex today and some increased reps during hutchins[pine  Patient would benefit from continued PT    Plan: Continue per plan of care        Precautions: None  RE Due: 19  Specialty Daily Treatment Diary     Manual                                     Exercise Diary  19     NuStep  Level 1  10 minutes Level 1   10 min  L 2  10'     SLR x 3    Ramos 1 x 10 ea     HR/TR   1 x 10     Squats         Step-ups         SBB   10" x 5     PPT  5" x 10 5" x 10     PPT with marches        SLR  1 x 10 Ramos 1 x 10 Ramos     S/L SLR        Clamshells        Hip abd  5" 1 x 15 5" 1 x 15     Hip add  Red 1 x 15 Green 1 x 15     Bridges        LTR  5" x 10 5" 1 x 15     SKTC        Heel walk-outs  1 x 10 1 x10     Quad alt UE        Quad alt LE         MTP/LTP        Self HS stretch  20" x 3  20" x 3 ea  Calf/HS                         Modalities  19     MHP/CP prn  MHP back seated 10 min MHP LB 10'  seated

## 2019-07-26 ENCOUNTER — OFFICE VISIT (OUTPATIENT)
Dept: FAMILY MEDICINE CLINIC | Facility: CLINIC | Age: 54
End: 2019-07-26
Payer: COMMERCIAL

## 2019-07-26 VITALS
HEART RATE: 61 BPM | SYSTOLIC BLOOD PRESSURE: 124 MMHG | HEIGHT: 67 IN | OXYGEN SATURATION: 97 % | DIASTOLIC BLOOD PRESSURE: 82 MMHG | TEMPERATURE: 97.1 F | RESPIRATION RATE: 16 BRPM | WEIGHT: 293 LBS | BODY MASS INDEX: 45.99 KG/M2

## 2019-07-26 DIAGNOSIS — I10 HYPERTENSION, UNSPECIFIED TYPE: Primary | ICD-10-CM

## 2019-07-26 DIAGNOSIS — E61.1 IRON DEFICIENCY: ICD-10-CM

## 2019-07-26 DIAGNOSIS — M62.830 LUMBAR PARASPINAL MUSCLE SPASM: ICD-10-CM

## 2019-07-26 DIAGNOSIS — G89.29 CHRONIC MIDLINE LOW BACK PAIN WITH LEFT-SIDED SCIATICA: ICD-10-CM

## 2019-07-26 DIAGNOSIS — M54.42 CHRONIC MIDLINE LOW BACK PAIN WITH LEFT-SIDED SCIATICA: ICD-10-CM

## 2019-07-26 DIAGNOSIS — M19.90 ARTHRITIS: ICD-10-CM

## 2019-07-26 DIAGNOSIS — F41.8 OTHER SPECIFIED ANXIETY DISORDERS: ICD-10-CM

## 2019-07-26 DIAGNOSIS — E78.5 BORDERLINE HYPERLIPIDEMIA: ICD-10-CM

## 2019-07-26 PROCEDURE — T1015 CLINIC SERVICE: HCPCS | Performed by: FAMILY MEDICINE

## 2019-07-26 RX ORDER — SERTRALINE HYDROCHLORIDE 25 MG/1
25 TABLET, FILM COATED ORAL DAILY
Qty: 30 TABLET | Refills: 0 | Status: SHIPPED | OUTPATIENT
Start: 2019-07-26 | End: 2019-08-17 | Stop reason: SDUPTHER

## 2019-07-26 RX ORDER — HYDROXYZINE HYDROCHLORIDE 10 MG/1
TABLET, FILM COATED ORAL
Qty: 90 TABLET | Refills: 1 | Status: SHIPPED | OUTPATIENT
Start: 2019-07-26 | End: 2019-09-19 | Stop reason: SDUPTHER

## 2019-07-26 RX ORDER — IBUPROFEN 600 MG/1
600 TABLET ORAL EVERY 6 HOURS PRN
Qty: 90 TABLET | Refills: 0 | Status: CANCELLED | OUTPATIENT
Start: 2019-07-26

## 2019-07-26 RX ORDER — IBUPROFEN 600 MG/1
TABLET ORAL EVERY 6 HOURS PRN
COMMUNITY
End: 2019-07-26 | Stop reason: SINTOL

## 2019-07-26 RX ORDER — CYCLOBENZAPRINE HCL 10 MG
10 TABLET ORAL 3 TIMES DAILY PRN
Qty: 90 TABLET | Refills: 0 | Status: SHIPPED | OUTPATIENT
Start: 2019-07-26 | End: 2019-08-17 | Stop reason: SDUPTHER

## 2019-07-26 RX ORDER — FERROUS SULFATE TAB EC 324 MG (65 MG FE EQUIVALENT) 324 (65 FE) MG
324 TABLET DELAYED RESPONSE ORAL
Qty: 60 TABLET | Refills: 1 | Status: SHIPPED | OUTPATIENT
Start: 2019-07-26 | End: 2019-09-19 | Stop reason: SDUPTHER

## 2019-07-26 RX ORDER — METHOCARBAMOL 500 MG/1
500 TABLET, FILM COATED ORAL 3 TIMES DAILY PRN
Qty: 90 TABLET | Refills: 0 | Status: CANCELLED | OUTPATIENT
Start: 2019-07-26

## 2019-07-26 RX ORDER — LISINOPRIL 10 MG/1
10 TABLET ORAL DAILY
Qty: 90 TABLET | Refills: 1 | Status: SHIPPED | OUTPATIENT
Start: 2019-07-26 | End: 2020-03-19 | Stop reason: SDUPTHER

## 2019-07-26 RX ORDER — LISINOPRIL 10 MG/1
10 TABLET ORAL DAILY
Qty: 30 TABLET | Refills: 2 | Status: SHIPPED | OUTPATIENT
Start: 2019-07-26 | End: 2019-07-26 | Stop reason: SDUPTHER

## 2019-07-26 NOTE — PROGRESS NOTES
Assessment/Plan:     Diagnoses and all orders for this visit:    Hypertension, unspecified type  Comments:  continue current medications  continue healthy diet, exercise, weight loss  Orders:  -     Discontinue: lisinopril (ZESTRIL) 10 mg tablet; Take 1 tablet (10 mg total) by mouth daily  -     lisinopril (ZESTRIL) 10 mg tablet; Take 1 tablet (10 mg total) by mouth daily    Lumbar paraspinal muscle spasm  Comments:  no relief with robaxin  will try flexeril TID PRN  Orders:  -     cyclobenzaprine (FLEXERIL) 10 mg tablet; Take 1 tablet (10 mg total) by mouth 3 (three) times a day as needed for muscle spasms    Chronic midline low back pain with left-sided sciatica  Comments:  xrays in process  no PO NSAIDS until GI evaluation due to rectal bleeding  Diclofenac gel sent to pharmacy  Continue gabapentin, physical therapy, weight loss  Orders:  -     diclofenac sodium (VOLTAREN) 1 %; Apply 2 g topically 4 (four) times a day    Other specified anxiety disorders  Comments:  start zoloft 25 mg daily with hydroxyzine PRN acute anxiety  No SI/HI/AH/VH/manic sx  Orders:  -     sertraline (ZOLOFT) 25 mg tablet; Take 1 tablet (25 mg total) by mouth daily  -     hydrOXYzine HCL (ATARAX) 10 mg tablet; Take 1-2 tablets every 8 hours as needed for anxiety    Arthritis  -     diclofenac sodium (VOLTAREN) 1 %; Apply 2 g topically 4 (four) times a day    Iron deficiency  Comments:  CBC WNL  Low iron saturation and low-normal iron level  ferrous sulfate supplement given  recheck labs next visit  Orders:  -     ferrous sulfate 324 (65 Fe) mg; Take 1 tablet (324 mg total) by mouth 2 (two) times a day before meals    Borderline hyperlipidemia  Comments:  will monitor  advised reducing saturated fats, healthy diet, exercise, weight loss  Other orders  -     Discontinue: ibuprofen (MOTRIN) 600 mg tablet; Take by mouth every 6 (six) hours as needed  -     Cancel: ibuprofen (MOTRIN) 600 mg tablet;  Take 1 tablet (600 mg total) by mouth every 6 (six) hours as needed (as needed for pain)  -     Cancel: methocarbamol (ROBAXIN) 500 mg tablet; Take 1 tablet (500 mg total) by mouth 3 (three) times a day as needed for muscle spasms          Return in about 4 weeks (around 8/23/2019) for Recheck sciatica, anxiety, labs  Subjective:        Patient ID: Paco Forrester is a 52 y o  female  Chief Complaint   Patient presents with    Follow-up    Sciatica    Back Pain    Stress       Patient is a 57-year-old female who presents to the office today for 2 week follow-up and lab review  At last visit, was reporting or rectal bleeding  She does have history of hemorrhoids, but reported more bleeding than normal and easy bruising  She was advised to stop ibuprofen and labs obtained  She was referred to GI, has appointment in September  Labs were completed, CBC within normal limits  Iron saturation and iron level mildly low  Lipid panel also revealing borderline hyperlipidemia  At last visit, she was also started on gabapentin and referred to physical therapy for chronic back pain with sciatica  Robaxin was stopped as it was not helping  She reports ongoing back pain, muscular tightness in low back and upper back, sharp stabbing pain and low back that radiates down leg  She has started physical therapy and had x-ray done - results not yet available  She states gabapentin is helping some, but states she did have to take Motrin for pain relief  She denies change in bowel or bladder function, saddle anesthesia  She reports long history of anxiety, was on medication in the past   She states with recent health issues, financial issues, and chronic pain that her anxiety is worsening  She denies SI/HI/AH/VH/manic sx, but  reports feeling on edge and overwhelmed/stressed  with acute episodes of panic/anxiety  She reports trouble sleeping, appetite is good         The following portions of the patient's history were reviewed and updated as appropriate: allergies, current medications, past family history, past medical history, past social history, past surgical history and problem list     Patient Active Problem List   Diagnosis    Lymphedema    Hypertension    Morbid obesity ([address] )    Hidradenitis suppurativa    Chronic midline low back pain with left-sided sciatica    Other specified anxiety disorders    Lumbar paraspinal muscle spasm    Borderline hyperlipidemia    Iron deficiency       Current Outpatient Medications   Medication Sig Dispense Refill    albuterol (VENTOLIN HFA) 90 mcg/act inhaler Inhale 2 puffs every 6 (six) hours as needed for wheezing or shortness of breath 1 Inhaler 0    clotrimazole-betamethasone (LOTRISONE) 1-0 05 % lotion Apply topically 2 (two) times a day 60 mL 2    gabapentin (NEURONTIN) 300 mg capsule Take 1 capsule in the morning and 2 capsules at bedtime  90 capsule 0    lisinopril (ZESTRIL) 10 mg tablet Take 1 tablet (10 mg total) by mouth daily 90 tablet 1    metoprolol tartrate (LOPRESSOR) 25 mg tablet Take 1 tablet (25 mg total) by mouth every 12 (twelve) hours 180 tablet 0    nystatin (MYCOSTATIN) powder Apply topically 3 (three) times a day 60 g 2    cyclobenzaprine (FLEXERIL) 10 mg tablet Take 1 tablet (10 mg total) by mouth 3 (three) times a day as needed for muscle spasms 90 tablet 0    diclofenac sodium (VOLTAREN) 1 % Apply 2 g topically 4 (four) times a day 300 g 2    ferrous sulfate 324 (65 Fe) mg Take 1 tablet (324 mg total) by mouth 2 (two) times a day before meals 60 tablet 1    hydrOXYzine HCL (ATARAX) 10 mg tablet Take 1-2 tablets every 8 hours as needed for anxiety 90 tablet 1    sertraline (ZOLOFT) 25 mg tablet Take 1 tablet (25 mg total) by mouth daily 30 tablet 0     No current facility-administered medications for this visit           Past Medical History:   Diagnosis Date    Hypertension     Lymphedema 2006    Sciatic leg pain 2006    left side        Past Surgical History:   Procedure Laterality Date     SECTION      TONSILLECTOMY          Social History     Socioeconomic History    Marital status: Single     Spouse name: Not on file    Number of children: Not on file    Years of education: Not on file    Highest education level: Not on file   Occupational History    Not on file   Social Needs    Financial resource strain: Not on file    Food insecurity:     Worry: Not on file     Inability: Not on file    Transportation needs:     Medical: Not on file     Non-medical: Not on file   Tobacco Use    Smoking status: Former Smoker     Types: Cigarettes    Smokeless tobacco: Never Used    Tobacco comment: occasional less than one per day   Substance and Sexual Activity    Alcohol use: No    Drug use: No    Sexual activity: Never   Lifestyle    Physical activity:     Days per week: Not on file     Minutes per session: Not on file    Stress: Not on file   Relationships    Social connections:     Talks on phone: Not on file     Gets together: Not on file     Attends Nondenominational service: Not on file     Active member of club or organization: Not on file     Attends meetings of clubs or organizations: Not on file     Relationship status: Not on file    Intimate partner violence:     Fear of current or ex partner: Not on file     Emotionally abused: Not on file     Physically abused: Not on file     Forced sexual activity: Not on file   Other Topics Concern    Not on file   Social History Narrative    Not on file        Review of Systems   Constitutional: Negative  Respiratory: Negative  Cardiovascular: Negative  Gastrointestinal: Negative  Musculoskeletal: Positive for arthralgias and back pain  Neurological: Positive for numbness  Neuropathic pain   Psychiatric/Behavioral: Positive for dysphoric mood and sleep disturbance   Negative for behavioral problems, confusion, decreased concentration, hallucinations, self-injury and suicidal ideas  The patient is nervous/anxious  The patient is not hyperactive  Objective:      /82 (BP Location: Left arm, Patient Position: Standing, Cuff Size: Large)   Pulse 61   Temp (!) 97 1 °F (36 2 °C) (Tympanic)   Resp 16   Ht 5' 7" (1 702 m)   Wt (!) 165 kg (363 lb)   SpO2 97%   BMI 56 85 kg/m²          Physical Exam   Constitutional: She is oriented to person, place, and time  She appears well-developed and well-nourished  Morbid obesity   HENT:   Head: Normocephalic and atraumatic  Eyes: Pupils are equal, round, and reactive to light  Conjunctivae are normal    Neck: Neck supple  Cardiovascular: Normal rate and regular rhythm  Pulmonary/Chest: Effort normal and breath sounds normal    Abdominal: Soft  Bowel sounds are normal  There is no tenderness  Musculoskeletal: She exhibits edema  Chronic UE and LE lymphedema   Neurological: She is alert and oriented to person, place, and time  Skin: Skin is warm and dry  Capillary refill takes less than 2 seconds  Psychiatric: Her speech is normal and behavior is normal  Her mood appears anxious

## 2019-07-30 ENCOUNTER — OFFICE VISIT (OUTPATIENT)
Dept: PHYSICAL THERAPY | Facility: HOME HEALTHCARE | Age: 54
End: 2019-07-30
Payer: COMMERCIAL

## 2019-07-30 DIAGNOSIS — M54.5 CHRONIC MIDLINE LOW BACK PAIN, WITH SCIATICA PRESENCE UNSPECIFIED: Primary | ICD-10-CM

## 2019-07-30 DIAGNOSIS — G89.29 CHRONIC MIDLINE LOW BACK PAIN, WITH SCIATICA PRESENCE UNSPECIFIED: Primary | ICD-10-CM

## 2019-07-30 PROCEDURE — 97110 THERAPEUTIC EXERCISES: CPT

## 2019-07-30 NOTE — PROGRESS NOTES
Daily Note     Today's date: 2019  Patient name: Kaley Palma  : 1965  MRN: [id number]  Referring provider: ORALIA Sutton*  Dx:   Encounter Diagnosis     ICD-10-CM    1  Chronic midline low back pain, with sciatica presence unspecified M54 5     G89 29               Subjective: Pt reports she has pain in her low back  Objective: See treatment diary below      Assessment: Tolerated treatment fairly well today  Verbal cues needed t/o TE to perform correctly  Pain level same t/o session  Mild fatigue noted with TE at end of session  Patient would benefit from continued PT      Plan: Continue per plan of care        Precautions: None  RE Due: 19  Specialty Daily Treatment Diary     Manual                                     Exercise Diary  19    NuStep  Level 1  10 minutes Level 1   10 min  L 2  10' Level 2   10 min    SLR x 3    Ramos 1 x 10 ea Ramos 1 x 10 ea     HR/TR   1 x 10 1 x 10 ea     Squats         Step-ups         SBB   10" x 5 10" x 5     PPT  5" x 10 5" x 10 5" x 10    PPT with marches        SLR  1 x 10 Ramos 1 x 10 Ramos 1x 10 Ramos    S/L SLR        Clamshells        Hip abd  5" 1 x 15 5" 1 x 15 5" 1 x 15    Hip add  Red 1 x 15 Green 1 x 15 Green 1 x 15    Bridges        LTR  5" x 10 5" 1 x 15 5" 1 x 15    SKTC        Heel walk-outs  1 x 10 1 x10 1 x 10    Quad alt UE        Quad alt LE         MTP/LTP        Self HS stretch  20" x 3  20" x 3 ea  Calf/HS 20" x 3 ea   Calf/HS                        Modalities  19    MHP/CP prn  MHP back seated 10 min MHP LB 10'  seated MHP LB seated 10 min

## 2019-08-01 ENCOUNTER — APPOINTMENT (OUTPATIENT)
Dept: PHYSICAL THERAPY | Facility: HOME HEALTHCARE | Age: 54
End: 2019-08-01
Payer: COMMERCIAL

## 2019-08-01 ENCOUNTER — EVALUATION (OUTPATIENT)
Dept: PHYSICAL THERAPY | Facility: HOME HEALTHCARE | Age: 54
End: 2019-08-01
Payer: COMMERCIAL

## 2019-08-01 DIAGNOSIS — M76.62 ACHILLES TENDONITIS, BILATERAL: Primary | ICD-10-CM

## 2019-08-01 DIAGNOSIS — M76.61 ACHILLES TENDONITIS, BILATERAL: Primary | ICD-10-CM

## 2019-08-01 PROCEDURE — 97116 GAIT TRAINING THERAPY: CPT | Performed by: PHYSICAL THERAPIST

## 2019-08-01 PROCEDURE — 97110 THERAPEUTIC EXERCISES: CPT | Performed by: PHYSICAL THERAPIST

## 2019-08-01 PROCEDURE — 97162 PT EVAL MOD COMPLEX 30 MIN: CPT | Performed by: PHYSICAL THERAPIST

## 2019-08-01 NOTE — LETTER
2019    [facility] [id number]    Patient: Andrea Dorsey   YOB: 1965   Date of Visit: 2019     Encounter Diagnosis     ICD-10-CM    1  Achilles tendonitis, bilateral M76 61     M76 62        Dear Dr Kiran Sanchez: Thank you for your recent referral of Andrea Dorsey  Please review the attached evaluation summary from Marjan's recent visit  Please verify that you agree with the plan of care by signing the attached order  If you have any questions or concerns, please do not hesitate to call  I sincerely appreciate the opportunity to share in the care of one of your patients and hope to have another opportunity to work with you in the near future  Sincerely,    Reyna Valles, PT      Referring Provider:      I certify that I have read the below Plan of Care and certify the need for these services furnished under this plan of treatment while under my care  [facility] [address]  [address] [place] [address] Avenue: [phone]          PT Evaluation     Today's date: 2019  Patient name: Andrea Dorsey  : 1965  MRN: [id number]  Referring provider: Jaden Tang DPM  Dx:   Encounter Diagnosis     ICD-10-CM    1  Achilles tendonitis, bilateral M76 61     M76 62                   Assessment  Assessment details: Pt Leeanne Triana is a 52 y o  who presents to OPPT with s/s consistent with B Achilles tendonesis  PT notes that pt continues to demonstrate B ankle ROM WFL and with only mild strength deficits noted at this time  However, pt is very TTP along Achilles > towards insertion point  She reports that wearing shoes irritates the back of her heels and she is limited with walking distances because of the pain  PT notes that pt has diagnosis of lipedema and is carrying excess adipose tissue in B LE which is contributing to inflammatory process currently present   Pt would benefit from skilled therapy services to address outlined impairments, work towards goals, and restore pts PLOF  Thank you! Impairments: abnormal gait, abnormal or restricted ROM, abnormal movement, activity intolerance, difficulty understanding, impaired balance, impaired physical strength, lacks appropriate home exercise program, pain with function, safety issue and weight-bearing intolerance  Other impairment: lipiedema   Barriers to therapy: Pt with difficulty understanding Lipedema diagnosis and impact on 2* conditions   Understanding of Dx/Px/POC: fair   Prognosis: fair    Goals  STG: to be achieved within 4 weeks   Decrease pain 25-50%  Improve ankle DF by 2-3 degrees   Improve strength by 1/2 grade     LTG: to be achieved within 8 weeks   Independent with HEP   ADL function returned to PLOF  Ambulation > 30 minutes without increase in pain   Pt able to wear shoes without irritation to heel     Plan  Patient would benefit from: skilled physical therapy  Planned modality interventions: cryotherapy  Planned therapy interventions: manual therapy, neuromuscular re-education, patient education, self care, strengthening, stretching, therapeutic exercise, home exercise program, gait training, functional ROM exercises, flexibility and balance  Frequency: 1x week  Duration in weeks: 6  Treatment plan discussed with: patient        Subjective Evaluation    History of Present Illness  Mechanism of injury: Pt reporting that she has been have B ankle pain for several months  She notes that she finally went to see [facility] per Physical Therapist recommendation  She was given oral steroids to take which she is now finished and states didn't change her pain  She is being referred to OPPT for conservative management of s/s and will return to MD again in 3 weeks     Pain  At best pain ratin  At worst pain rating: 10  Location: R > L Achillies   Quality: burning and sharp  Relieving factors: ice  Aggravating factors: standing, walking and stair climbing  Progression: worsening      Diagnostic Tests  No diagnostic tests performed  Treatments  Current treatment: physical therapy  Patient Goals  Patient goals for therapy: decreased edema, increased motion, decreased pain, increased strength and independence with ADLs/IADLs          Objective     Tenderness   Left Ankle/Foot   Tenderness in the Achilles insertion  Right Ankle/Foot   Tenderness in the Achilles insertion       Neurological Testing     Sensation     Ankle/Foot   Left Ankle/Foot   Intact: light touch    Right Ankle/Foot   Intact: light touch     Active Range of Motion   Left Ankle/Foot   Dorsiflexion (ke): 0 degrees   Plantar flexion: 60 degrees   Inversion: 20 degrees   Eversion: 20 degrees     Right Ankle/Foot   Dorsiflexion (ke): 0 degrees   Plantar flexion: 60 degrees   Inversion: 20 degrees   Eversion: 20 degrees     Strength/Myotome Testing     Left Ankle/Foot   Dorsiflexion: 4-  Plantar flexion: 4-  Inversion: 4-  Eversion: 4-    Right Ankle/Foot   Dorsiflexion: 4-  Plantar flexion: 4-  Inversion: 4-  Eversion: 4-      Flowsheet Rows      Most Recent Value   PT/OT G-Codes   Current Score  32   Projected Score  51             Precautions: None  RE Due: 9/1/19  Specialty Daily Treatment Diary     Manual  8/1/19                           Exercise Diary  8/1/19       NuStep  Level 1  10 minutes       Pro stretch        Ankle ROM        Ankle circles        Tband: all planes        Self towel stretch        BAPs         Towel in/ev        Toe curls        Standing HR/TR        Step-ups         Patricia orantes        SLS - EO/EC        Tadem - EO/EC        Biodex LOS        Biodex LOS        Biodex Maze        Biodex Maze        Biodex Random        Biodex Random            Modalities        CP prn

## 2019-08-01 NOTE — PROGRESS NOTES
PT Evaluation     Today's date: 2019  Patient name: Juventino Rowley  : 1965  MRN: [id number]  Referring provider: Ammon Lopez DPM  Dx:   Encounter Diagnosis     ICD-10-CM    1  Achilles tendonitis, bilateral M76 61     M76 62                   Assessment  Assessment details: Pt Maykel Silveira is a 52 y o  who presents to OPPT with s/s consistent with B Achilles tendonesis  PT notes that pt continues to demonstrate B ankle ROM WFL and with only mild strength deficits noted at this time  However, pt is very TTP along Achilles > towards insertion point  She reports that wearing shoes irritates the back of her heels and she is limited with walking distances because of the pain  PT notes that pt has diagnosis of lipedema and is carrying excess adipose tissue in B LE which is contributing to inflammatory process currently present  Pt would benefit from skilled therapy services to address outlined impairments, work towards goals, and restore pts PLOF  Thank you!    Impairments: abnormal gait, abnormal or restricted ROM, abnormal movement, activity intolerance, difficulty understanding, impaired balance, impaired physical strength, lacks appropriate home exercise program, pain with function, safety issue and weight-bearing intolerance  Other impairment: lipiedema   Barriers to therapy: Pt with difficulty understanding Lipedema diagnosis and impact on 2* conditions   Understanding of Dx/Px/POC: fair   Prognosis: fair    Goals  STG: to be achieved within 4 weeks   Decrease pain 25-50%  Improve ankle DF by 2-3 degrees   Improve strength by 1/2 grade     LTG: to be achieved within 8 weeks   Independent with HEP   ADL function returned to PLOF  Ambulation > 30 minutes without increase in pain   Pt able to wear shoes without irritation to heel     Plan  Patient would benefit from: skilled physical therapy  Planned modality interventions: cryotherapy  Planned therapy interventions: manual therapy, neuromuscular re-education, patient education, self care, strengthening, stretching, therapeutic exercise, home exercise program, gait training, functional ROM exercises, flexibility and balance  Frequency: 1x week  Duration in weeks: 6  Treatment plan discussed with: patient        Subjective Evaluation    History of Present Illness  Mechanism of injury: Pt reporting that she has been have B ankle pain for several months  She notes that she finally went to [facility] per Physical Therapist recommendation  She was given oral steroids to take which she is now finished and states didn't change her pain  She is being referred to OPPT for conservative management of s/s and will return to MD again in 3 weeks  Pain  At best pain ratin  At worst pain rating: 10  Location: R > L Achillies   Quality: burning and sharp  Relieving factors: ice  Aggravating factors: standing, walking and stair climbing  Progression: worsening      Diagnostic Tests  No diagnostic tests performed  Treatments  Current treatment: physical therapy  Patient Goals  Patient goals for therapy: decreased edema, increased motion, decreased pain, increased strength and independence with ADLs/IADLs          Objective     Tenderness   Left Ankle/Foot   Tenderness in the Achilles insertion  Right Ankle/Foot   Tenderness in the Achilles insertion       Neurological Testing     Sensation     Ankle/Foot   Left Ankle/Foot   Intact: light touch    Right Ankle/Foot   Intact: light touch     Active Range of Motion   Left Ankle/Foot   Dorsiflexion (ke): 0 degrees   Plantar flexion: 60 degrees   Inversion: 20 degrees   Eversion: 20 degrees     Right Ankle/Foot   Dorsiflexion (ke): 0 degrees   Plantar flexion: 60 degrees   Inversion: 20 degrees   Eversion: 20 degrees     Strength/Myotome Testing     Left Ankle/Foot   Dorsiflexion: 4-  Plantar flexion: 4-  Inversion: 4-  Eversion: 4-    Right Ankle/Foot   Dorsiflexion: 4-  Plantar flexion: 4-  Inversion: 4-  Eversion: 4-      Flowsheet Rows      Most Recent Value   PT/OT G-Codes   Current Score  32   Projected Score  51             Precautions: None  RE Due: 9/1/19  Specialty Daily Treatment Diary     Manual  8/1/19                           Exercise Diary  8/1/19       NuStep  Level 1  10 minutes       Pro stretch        Ankle ROM        Ankle circles        Tband: all planes        Self towel stretch        BAPs         Towel in/ev        Toe curls        Standing HR/TR        Step-ups         Bosu lunges        SLS - EO/EC        Tadem - EO/EC        Biodex LOS        Biodex LOS        Biodex Maze        Biodex Maze        Biodex Random        Biodex Random            Modalities        CP prn

## 2019-08-05 RX ORDER — METHYLPREDNISOLONE 4 MG/1
TABLET ORAL
Refills: 0 | COMMUNITY
Start: 2019-07-25 | End: 2019-08-06 | Stop reason: HOSPADM

## 2019-08-06 ENCOUNTER — CONSULT (OUTPATIENT)
Dept: GASTROENTEROLOGY | Facility: HOSPITAL | Age: 54
End: 2019-08-06
Payer: COMMERCIAL

## 2019-08-06 VITALS
HEART RATE: 68 BPM | HEIGHT: 67 IN | DIASTOLIC BLOOD PRESSURE: 75 MMHG | SYSTOLIC BLOOD PRESSURE: 117 MMHG | BODY MASS INDEX: 45.99 KG/M2 | TEMPERATURE: 98.7 F | WEIGHT: 293 LBS

## 2019-08-06 DIAGNOSIS — K92.1 BLOOD IN STOOL: Primary | ICD-10-CM

## 2019-08-06 PROCEDURE — 99244 OFF/OP CNSLTJ NEW/EST MOD 40: CPT | Performed by: INTERNAL MEDICINE

## 2019-08-06 NOTE — PATIENT INSTRUCTIONS
Scheduled patient for Colonoscopy with Dr Jimmy Moreno on 08/21/2019 @ [facility] gave  Miralax/ Dulcolax directions

## 2019-08-06 NOTE — H&P (VIEW-ONLY)
[facility] - Outpatient Consultation  Brie Keller 52 y o  female MRN: [id number]  Encounter: [id number]          ASSESSMENT AND PLAN:    Brie Keller is a 52 y o  female who presents with complaint of rectal bleeding  DDx includes hemorrhoids vs fissures vs SRUS vs polyp/malignancy  It has been ongoing for several years, which is reassuring  Labs and imaging reviewed  1  Blood in stool      Orders Placed This Encounter   Procedures    Colonoscopy       -- Will try to get colonoscopy as soon as possible  ______________________________________________________________________    HPI:    Brie Keller is a 52 y o  female who presents with complaint of rectal bleeding  She has been having several years (at least 21) of blood in her stool, now with some blood clots in the toilet  It is intermittent  It occurs once every few months  Last occurred 2 weeks ago  She was previously told it is from hemorrhoids  She has occasional constipated, once a week, with hard stools and some straining  She can have blood even when she is not straining  No change in stool caliber  No abdominal pain, black stools  Never had a colonoscopy  No heartburn, dysphagia, odynophagia  Some nausea but no vomiting  She lost 50 lbs this year, and she has been taking a diet pill  No FH of colon cancer       REVIEW OF SYSTEMS:  10 point ROS reviewed and negative, except as above    Historical Information   Past Medical History:   Diagnosis Date    Hypertension     Lymphedema     Sciatic leg pain 2006    left side     Past Surgical History:   Procedure Laterality Date     SECTION      TONSILLECTOMY       Social History   Social History     Substance and Sexual Activity   Alcohol Use No     Social History     Substance and Sexual Activity   Drug Use No     Social History     Tobacco Use   Smoking Status Former Smoker    Types: Cigarettes   Smokeless Tobacco Never Used Tobacco Comment    occasional less than one per day     Family History   Problem Relation Age of Onset    Cancer Mother     Hypertension Mother     COPD Father     Diabetes Father        Meds/Allergies       Current Outpatient Medications:     albuterol (VENTOLIN HFA) 90 mcg/act inhaler    clotrimazole-betamethasone (LOTRISONE) 1-0 05 % lotion    cyclobenzaprine (FLEXERIL) 10 mg tablet    diclofenac sodium (VOLTAREN) 1 %    ferrous sulfate 324 (65 Fe) mg    gabapentin (NEURONTIN) 300 mg capsule    hydrOXYzine HCL (ATARAX) 10 mg tablet    lisinopril (ZESTRIL) 10 mg tablet    metoprolol tartrate (LOPRESSOR) 25 mg tablet    nystatin (MYCOSTATIN) powder    sertraline (ZOLOFT) 25 mg tablet    Allergies   Allergen Reactions    Bee Venom            Objective     Blood pressure 117/75, pulse 68, temperature 98 7 °F (37 1 °C), temperature source Tympanic, height 5' 7" (1 702 m), weight (!) 159 kg (350 lb)  Body mass index is 54 82 kg/m²  PHYSICAL EXAM:      General Appearance:   Alert, cooperative, no distress   HEENT:   Normocephalic, atraumatic, anicteric  Neck:  Supple, symmetrical, trachea midline   Lungs:   Clear to auscultation bilaterally; no rales, rhonchi or wheezing; respirations unlabored    Heart[de-identified]   Regular rate and rhythm; no murmur, rub, or gallop  Abdomen:   Soft, non-tender, non-distended; normal bowel sounds; no masses, no organomegaly    Genitalia:   Deferred    Rectal:   Deferred    Extremities:  No cyanosis, clubbing or edema    Pulses:  2+ and symmetric    Skin:  No jaundice, rashes, or lesions    Lymph nodes:  No palpable cervical lymphadenopathy        Lab Results:   No visits with results within 1 Day(s) from this visit     Latest known visit with results is:   Appointment on 07/24/2019   Component Date Value    WBC 07/24/2019 9 57     RBC 07/24/2019 4 83     Hemoglobin 07/24/2019 13 4     Hematocrit 07/24/2019 43 6     MCV 07/24/2019 90     MCH 07/24/2019 27 7  MCHC 07/24/2019 30 7*    RDW 07/24/2019 15 3*    MPV 07/24/2019 10 0     Platelets 95/71/0839 306     nRBC 07/24/2019 0     Neutrophils Relative 07/24/2019 61     Immat GRANS % 07/24/2019 0     Lymphocytes Relative 07/24/2019 27     Monocytes Relative 07/24/2019 7     Eosinophils Relative 07/24/2019 4     Basophils Relative 07/24/2019 1     Neutrophils Absolute 07/24/2019 5 77     Immature Grans Absolute 07/24/2019 0 03     Lymphocytes Absolute 07/24/2019 2 57     Monocytes Absolute 07/24/2019 0 69     Eosinophils Absolute 07/24/2019 0 42     Basophils Absolute 07/24/2019 0 09     Sodium 07/24/2019 140     Potassium 07/24/2019 4 7     Chloride 07/24/2019 105     CO2 07/24/2019 27     ANION GAP 07/24/2019 8     BUN 07/24/2019 13     Creatinine 07/24/2019 0 99     Glucose, Fasting 07/24/2019 103*    Calcium 07/24/2019 8 9     AST 07/24/2019 15     ALT 07/24/2019 28     Alkaline Phosphatase 07/24/2019 101     Total Protein 07/24/2019 7 2     Albumin 07/24/2019 3 4*    Total Bilirubin 07/24/2019 0 50     eGFR 07/24/2019 64     Cholesterol 07/24/2019 187     Triglycerides 07/24/2019 88     HDL, Direct 07/24/2019 55     LDL Calculated 07/24/2019 114*    Iron Saturation 07/24/2019 19     TIBC 07/24/2019 388     Iron 07/24/2019 74     Ferritin 07/24/2019 16          Radiology Results:   Xr Chest Pa & Lateral    Result Date: 7/28/2019  Narrative: CHEST INDICATION:   R09 89: Other specified symptoms and signs involving the circulatory and respiratory systems  COMPARISON:  None EXAM PERFORMED/VIEWS:  XR CHEST PA & LATERAL FINDINGS: Cardiomediastinal silhouette appears unremarkable  The lungs are clear  No pneumothorax or pleural effusion  Osseous structures appear within normal limits for patient age  Impression: No acute cardiopulmonary disease   Workstation performed: [id number]     Xr Spine Lumbar Minimum 4 Views Non Injury    Result Date: 7/28/2019  Narrative: LUMBAR SPINE INDICATION:   M54 42: Lumbago with sciatica, left side G89 29: Other chronic pain  COMPARISON:  None VIEWS:  XR SPINE LUMBAR MINIMUM 4 VIEWS NON INJURY FINDINGS: There is no evidence of acute fracture or destructive osseous lesion  Mild scoliotic deformity is noted  Alignment is otherwise unremarkable  There are mild endplate and facet joint degenerative changes throughout the lumbar spine most prominent at L4-5 and L5-S1  The pedicles appear intact  Soft tissues are unremarkable  Impression: No acute osseous abnormality  Degenerative changes as described   Workstation performed: [id number]

## 2019-08-06 NOTE — PROGRESS NOTES
[facility] - Outpatient Consultation  Lefty Ross 52 y o  female MRN: [id number]  Encounter: [id number]          ASSESSMENT AND PLAN:    Lefty Ross is a 52 y o  female who presents with complaint of rectal bleeding  DDx includes hemorrhoids vs fissures vs SRUS vs polyp/malignancy  It has been ongoing for several years, which is reassuring  Labs and imaging reviewed  1  Blood in stool      Orders Placed This Encounter   Procedures    Colonoscopy       -- Will try to get colonoscopy as soon as possible  ______________________________________________________________________    HPI:    Lefty Ross is a 52 y o  female who presents with complaint of rectal bleeding  She has been having several years (at least 21) of blood in her stool, now with some blood clots in the toilet  It is intermittent  It occurs once every few months  Last occurred 2 weeks ago  She was previously told it is from hemorrhoids  She has occasional constipated, once a week, with hard stools and some straining  She can have blood even when she is not straining  No change in stool caliber  No abdominal pain, black stools  Never had a colonoscopy  No heartburn, dysphagia, odynophagia  Some nausea but no vomiting  She lost 50 lbs this year, and she has been taking a diet pill  No FH of colon cancer       REVIEW OF SYSTEMS:  10 point ROS reviewed and negative, except as above    Historical Information   Past Medical History:   Diagnosis Date    Hypertension     Lymphedema     Sciatic leg pain 2006    left side     Past Surgical History:   Procedure Laterality Date     SECTION      TONSILLECTOMY       Social History   Social History     Substance and Sexual Activity   Alcohol Use No     Social History     Substance and Sexual Activity   Drug Use No     Social History     Tobacco Use   Smoking Status Former Smoker    Types: Cigarettes   Smokeless Tobacco Never Used Tobacco Comment    occasional less than one per day     Family History   Problem Relation Age of Onset    Cancer Mother     Hypertension Mother     COPD Father     Diabetes Father        Meds/Allergies       Current Outpatient Medications:     albuterol (VENTOLIN HFA) 90 mcg/act inhaler    clotrimazole-betamethasone (LOTRISONE) 1-0 05 % lotion    cyclobenzaprine (FLEXERIL) 10 mg tablet    diclofenac sodium (VOLTAREN) 1 %    ferrous sulfate 324 (65 Fe) mg    gabapentin (NEURONTIN) 300 mg capsule    hydrOXYzine HCL (ATARAX) 10 mg tablet    lisinopril (ZESTRIL) 10 mg tablet    metoprolol tartrate (LOPRESSOR) 25 mg tablet    nystatin (MYCOSTATIN) powder    sertraline (ZOLOFT) 25 mg tablet    Allergies   Allergen Reactions    Bee Venom            Objective     Blood pressure 117/75, pulse 68, temperature 98 7 °F (37 1 °C), temperature source Tympanic, height 5' 7" (1 702 m), weight (!) 159 kg (350 lb)  Body mass index is 54 82 kg/m²  PHYSICAL EXAM:      General Appearance:   Alert, cooperative, no distress   HEENT:   Normocephalic, atraumatic, anicteric  Neck:  Supple, symmetrical, trachea midline   Lungs:   Clear to auscultation bilaterally; no rales, rhonchi or wheezing; respirations unlabored    Heart[de-identified]   Regular rate and rhythm; no murmur, rub, or gallop  Abdomen:   Soft, non-tender, non-distended; normal bowel sounds; no masses, no organomegaly    Genitalia:   Deferred    Rectal:   Deferred    Extremities:  No cyanosis, clubbing or edema    Pulses:  2+ and symmetric    Skin:  No jaundice, rashes, or lesions    Lymph nodes:  No palpable cervical lymphadenopathy        Lab Results:   No visits with results within 1 Day(s) from this visit     Latest known visit with results is:   Appointment on 07/24/2019   Component Date Value    WBC 07/24/2019 9 57     RBC 07/24/2019 4 83     Hemoglobin 07/24/2019 13 4     Hematocrit 07/24/2019 43 6     MCV 07/24/2019 90     MCH 07/24/2019 27 7  MCHC 07/24/2019 30 7*    RDW 07/24/2019 15 3*    MPV 07/24/2019 10 0     Platelets 99/87/2466 306     nRBC 07/24/2019 0     Neutrophils Relative 07/24/2019 61     Immat GRANS % 07/24/2019 0     Lymphocytes Relative 07/24/2019 27     Monocytes Relative 07/24/2019 7     Eosinophils Relative 07/24/2019 4     Basophils Relative 07/24/2019 1     Neutrophils Absolute 07/24/2019 5 77     Immature Grans Absolute 07/24/2019 0 03     Lymphocytes Absolute 07/24/2019 2 57     Monocytes Absolute 07/24/2019 0 69     Eosinophils Absolute 07/24/2019 0 42     Basophils Absolute 07/24/2019 0 09     Sodium 07/24/2019 140     Potassium 07/24/2019 4 7     Chloride 07/24/2019 105     CO2 07/24/2019 27     ANION GAP 07/24/2019 8     BUN 07/24/2019 13     Creatinine 07/24/2019 0 99     Glucose, Fasting 07/24/2019 103*    Calcium 07/24/2019 8 9     AST 07/24/2019 15     ALT 07/24/2019 28     Alkaline Phosphatase 07/24/2019 101     Total Protein 07/24/2019 7 2     Albumin 07/24/2019 3 4*    Total Bilirubin 07/24/2019 0 50     eGFR 07/24/2019 64     Cholesterol 07/24/2019 187     Triglycerides 07/24/2019 88     HDL, Direct 07/24/2019 55     LDL Calculated 07/24/2019 114*    Iron Saturation 07/24/2019 19     TIBC 07/24/2019 388     Iron 07/24/2019 74     Ferritin 07/24/2019 16          Radiology Results:   Xr Chest Pa & Lateral    Result Date: 7/28/2019  Narrative: CHEST INDICATION:   R09 89: Other specified symptoms and signs involving the circulatory and respiratory systems  COMPARISON:  None EXAM PERFORMED/VIEWS:  XR CHEST PA & LATERAL FINDINGS: Cardiomediastinal silhouette appears unremarkable  The lungs are clear  No pneumothorax or pleural effusion  Osseous structures appear within normal limits for patient age  Impression: No acute cardiopulmonary disease   Workstation performed: [id number]     Xr Spine Lumbar Minimum 4 Views Non Injury    Result Date: 7/28/2019  Narrative: LUMBAR SPINE INDICATION:   M54 42: Lumbago with sciatica, left side G89 29: Other chronic pain  COMPARISON:  None VIEWS:  XR SPINE LUMBAR MINIMUM 4 VIEWS NON INJURY FINDINGS: There is no evidence of acute fracture or destructive osseous lesion  Mild scoliotic deformity is noted  Alignment is otherwise unremarkable  There are mild endplate and facet joint degenerative changes throughout the lumbar spine most prominent at L4-5 and L5-S1  The pedicles appear intact  Soft tissues are unremarkable  Impression: No acute osseous abnormality  Degenerative changes as described   Workstation performed: [id number]

## 2019-08-08 ENCOUNTER — TRANSCRIBE ORDERS (OUTPATIENT)
Dept: PHYSICAL THERAPY | Facility: HOME HEALTHCARE | Age: 54
End: 2019-08-08

## 2019-08-08 ENCOUNTER — APPOINTMENT (OUTPATIENT)
Dept: PHYSICAL THERAPY | Facility: HOME HEALTHCARE | Age: 54
End: 2019-08-08
Payer: COMMERCIAL

## 2019-08-08 DIAGNOSIS — M76.62 TENDONITIS, ACHILLES, LEFT: ICD-10-CM

## 2019-08-08 DIAGNOSIS — M76.61 TENDONITIS, ACHILLES, RIGHT: Primary | ICD-10-CM

## 2019-08-13 ENCOUNTER — OFFICE VISIT (OUTPATIENT)
Dept: PHYSICAL THERAPY | Facility: HOME HEALTHCARE | Age: 54
End: 2019-08-13
Payer: COMMERCIAL

## 2019-08-13 DIAGNOSIS — G89.29 CHRONIC MIDLINE LOW BACK PAIN, WITH SCIATICA PRESENCE UNSPECIFIED: Primary | ICD-10-CM

## 2019-08-13 DIAGNOSIS — M54.5 CHRONIC MIDLINE LOW BACK PAIN, WITH SCIATICA PRESENCE UNSPECIFIED: Primary | ICD-10-CM

## 2019-08-13 PROCEDURE — 97110 THERAPEUTIC EXERCISES: CPT

## 2019-08-13 NOTE — PROGRESS NOTES
Daily Note     Today's date: 2019  Patient name: Lise Smith  : 1965  MRN: [id number]  Referring provider: ORALIA Mcdaniel*  Dx:   Encounter Diagnosis     ICD-10-CM    1  Chronic midline low back pain, with sciatica presence unspecified M54 5     G89 29               Subjective: Pt reports she has some pain in her LB  Objective: See treatment diary below      Assessment: Tolerated treatment fairly well  Verbal cues needed t/o TE to perform correctly  No increased pain reported t/o session  Some fatigue noted with exercise  Patient would benefit from continued PT      Plan: Continue per plan of care        Precautions: None  RE Due: 19  Specialty Daily Treatment Diary     Manual                                     Exercise Diary  19   NuStep  Level 1  10 minutes Level 1   10 min  L 2  10' Level 2   10 min Level 2   10 min   SLR x 3    Ramos 1 x 10 ea Ramos 1 x 10 ea  Ramos 1 x 10 ea    HR/TR   1 x 10 1 x 10 ea  1 x 10 ea    Squats         Step-ups         SBB   10" x 5 10" x 5  10" x 5    PPT  5" x 10 5" x 10 5" x 10 5" x 10   PPT with marches        SLR  1 x 10 Ramos 1 x 10 Ramos 1x 10 Ramos 1 x 10 Ramos    S/L SLR        Clamshells        Hip abd  5" 1 x 15 5" 1 x 15 5" 1 x 15 5" 1 x 15    Hip add  Red 1 x 15 Green 1 x 15 Green 1 x 15 Green 1 x 15    Bridges        LTR  5" x 10 5" 1 x 15 5" 1 x 15 5" x 15   SKTC        Heel walk-outs  1 x 10 1 x10 1 x 10 1 x 10   Quad alt UE        Quad alt LE         MTP/LTP        Self HS stretch  20" x 3  20" x 3 ea  Calf/HS 20" x 3 ea   Calf/HS 20" x 3 ea   Calf/HS                       Modalities  19   MHP/CP prn  MHP back seated 10 min MHP LB 10'  seated MHP LB seated 10 min MHP LB  Seated 10 min

## 2019-08-15 ENCOUNTER — OFFICE VISIT (OUTPATIENT)
Dept: PHYSICAL THERAPY | Facility: HOME HEALTHCARE | Age: 54
End: 2019-08-15
Payer: COMMERCIAL

## 2019-08-15 ENCOUNTER — APPOINTMENT (OUTPATIENT)
Dept: PHYSICAL THERAPY | Facility: HOME HEALTHCARE | Age: 54
End: 2019-08-15
Payer: COMMERCIAL

## 2019-08-15 DIAGNOSIS — M76.61 ACHILLES TENDONITIS, BILATERAL: Primary | ICD-10-CM

## 2019-08-15 DIAGNOSIS — M76.62 ACHILLES TENDONITIS, BILATERAL: Primary | ICD-10-CM

## 2019-08-15 PROCEDURE — 97110 THERAPEUTIC EXERCISES: CPT | Performed by: PHYSICAL THERAPIST

## 2019-08-15 NOTE — PROGRESS NOTES
Daily Note     Today's date: 8/15/2019  Patient name: Shannon Allen  : 1965  MRN: [id number]  Referring provider: Ernie Fleischer, PA*  Dx:   Encounter Diagnosis     ICD-10-CM    1  Achilles tendonitis, bilateral M76 61     M76 62                   Subjective: Pt reporting that sometimes she walks without her shoes on  Objective: See treatment diary below      Assessment: Pt with poor tolerance to initiation of TE program, stating increase in pain and non agreeable to further exercises after basic ROM and stretching activities  PT providing education on proper rest and ice to address inflammation in B heels  Plan: Continue per plan of care        Precautions: None  RE Due: 19  Specialty Daily Treatment Diary     Manual  8/1/19 8/15/19                          Exercise Diary  8/1/19 8/15/19      NuStep  Level 1  10 minutes Level 1  10 minutes      Pro stretch        Ankle ROM  2 x 10       Ankle circles        Tband: all planes        Self towel stretch  With strap   15" x 4       BAPs         Towel in/ev        Toe curls        Standing HR/TR  Seated   2 x 10       Step-ups         Patricia lungpartha        SLS - EO/EC        Tadem - EO/EC        Biodex LOS        Biodex LOS        Biodex Maze        Biodex Maze        Biodex Random        Biodex Random            Modalities        CP prn  10 minutes

## 2019-08-17 DIAGNOSIS — F41.8 OTHER SPECIFIED ANXIETY DISORDERS: ICD-10-CM

## 2019-08-17 DIAGNOSIS — M54.42 CHRONIC MIDLINE LOW BACK PAIN WITH LEFT-SIDED SCIATICA: ICD-10-CM

## 2019-08-17 DIAGNOSIS — M62.830 LUMBAR PARASPINAL MUSCLE SPASM: ICD-10-CM

## 2019-08-17 DIAGNOSIS — G89.29 CHRONIC MIDLINE LOW BACK PAIN WITH LEFT-SIDED SCIATICA: ICD-10-CM

## 2019-08-20 ENCOUNTER — ANESTHESIA EVENT (OUTPATIENT)
Dept: PERIOP | Facility: HOSPITAL | Age: 54
End: 2019-08-20

## 2019-08-20 ENCOUNTER — OFFICE VISIT (OUTPATIENT)
Dept: PHYSICAL THERAPY | Facility: HOME HEALTHCARE | Age: 54
End: 2019-08-20
Payer: COMMERCIAL

## 2019-08-20 DIAGNOSIS — M54.5 CHRONIC MIDLINE LOW BACK PAIN, WITH SCIATICA PRESENCE UNSPECIFIED: Primary | ICD-10-CM

## 2019-08-20 DIAGNOSIS — G89.29 CHRONIC MIDLINE LOW BACK PAIN, WITH SCIATICA PRESENCE UNSPECIFIED: Primary | ICD-10-CM

## 2019-08-20 PROCEDURE — 97110 THERAPEUTIC EXERCISES: CPT

## 2019-08-20 RX ORDER — SERTRALINE HYDROCHLORIDE 25 MG/1
TABLET, FILM COATED ORAL
Qty: 30 TABLET | Refills: 0 | Status: SHIPPED | OUTPATIENT
Start: 2019-08-20 | End: 2019-09-19 | Stop reason: SDUPTHER

## 2019-08-20 RX ORDER — GABAPENTIN 300 MG/1
CAPSULE ORAL
Qty: 90 CAPSULE | Refills: 0 | Status: SHIPPED | OUTPATIENT
Start: 2019-08-20 | End: 2019-09-19 | Stop reason: SDUPTHER

## 2019-08-20 RX ORDER — CYCLOBENZAPRINE HCL 10 MG
TABLET ORAL
Qty: 90 TABLET | Refills: 0 | Status: SHIPPED | OUTPATIENT
Start: 2019-08-20 | End: 2019-09-19 | Stop reason: SDUPTHER

## 2019-08-20 NOTE — PROGRESS NOTES
Daily Note     Today's date: 2019  Patient name: Libia Alexandre  : 1965  MRN: [id number]  Referring provider: ORALIA Caldwell*  Dx:   Encounter Diagnosis     ICD-10-CM    1  Chronic midline low back pain, with sciatica presence unspecified M54 5     G89 29               Subjective: Pt reports she is ok right now, doesn't have any pain in her back  Objective: See treatment diary below      Assessment: Tolerated treatment fairly well  Verbal cues needed t/o TE to perform correctly  Added SKTC and MTP/LTP to program today  Pt reports no pain in her back t/o session  Patient would benefit from continued PT      Plan: Continue per plan of care        Precautions: None  RE Due: 19  Specialty Daily Treatment Diary     Manual                                     Exercise Diary  19   NuStep  Level 2  10 minutes Level 1   10 min  L 2  10' Level 2   10 min Level 2   10 min   SLR x 3  Ramos 1 x 10 ea   Ramos 1 x 10 ea Ramos 1 x 10 ea  Ramos 1 x 10 ea    HR/TR 1 x 10 ea   1 x 10 1 x 10 ea  1 x 10 ea    Squats         Step-ups         SBB 10"x 5  10" x 5 10" x 5  10" x 5    PPT 5" x 10 5" x 10 5" x 10 5" x 10 5" x 10   PPT with marches        SLR 1 x 15 Ramos 1 x 10 Ramos 1 x 10 Ramos 1x 10 Ramos 1 x 10 Ramos    S/L SLR        Clamshells        Hip abd 5" 1 x 15 5" 1 x 15 5" 1 x 15 5" 1 x 15 5" 1 x 15    Hip add Green 1 x 15 Red 1 x 15 Green 1 x 15 Green 1 x 15 Green 1 x 15    Bridges        LTR 5" x 15 5" x 10 5" 1 x 15 5" 1 x 15 5" x 15   SKTC 5" x 5        Heel walk-outs 1 x 10 1 x 10 1 x10 1 x 10 1 x 10   Quad alt UE        Quad alt LE         MTP/LTP Red 1  X 15 ea        Self HS stretch 20" x 3 ea   Calf/HS 20" x 3  20" x 3 ea  Calf/HS 20" x 3 ea   Calf/HS 20" x 3 ea   Calf/HS                       Modalities 19   MHP/CP prn MHP LB seated 10 min  MHP back seated 10 min MHP LB 10'  seated MHP LB seated 10 min MHP LB  Seated 10 min

## 2019-08-21 ENCOUNTER — HOSPITAL ENCOUNTER (OUTPATIENT)
Dept: PERIOP | Facility: HOSPITAL | Age: 54
Setting detail: OUTPATIENT SURGERY
Discharge: HOME | End: 2019-08-21
Attending: INTERNAL MEDICINE
Payer: COMMERCIAL

## 2019-08-21 ENCOUNTER — ANESTHESIA (OUTPATIENT)
Dept: PERIOP | Facility: HOSPITAL | Age: 54
End: 2019-08-21

## 2019-08-21 VITALS
DIASTOLIC BLOOD PRESSURE: 67 MMHG | HEART RATE: 67 BPM | OXYGEN SATURATION: 99 % | TEMPERATURE: 96.7 F | RESPIRATION RATE: 18 BRPM | SYSTOLIC BLOOD PRESSURE: 135 MMHG

## 2019-08-21 DIAGNOSIS — K92.1 BLOOD IN STOOL: ICD-10-CM

## 2019-08-21 PROCEDURE — 45330 DIAGNOSTIC SIGMOIDOSCOPY: CPT | Performed by: INTERNAL MEDICINE

## 2019-08-21 RX ORDER — PROPOFOL 10 MG/ML
INJECTION, EMULSION INTRAVENOUS AS NEEDED
Status: DISCONTINUED | OUTPATIENT
Start: 2019-08-21 | End: 2019-08-21 | Stop reason: SURG

## 2019-08-21 RX ORDER — ONDANSETRON 2 MG/ML
4 INJECTION INTRAMUSCULAR; INTRAVENOUS ONCE AS NEEDED
Status: DISCONTINUED | OUTPATIENT
Start: 2019-08-21 | End: 2019-08-25 | Stop reason: HOSPADM

## 2019-08-21 RX ORDER — LIDOCAINE HYDROCHLORIDE 10 MG/ML
INJECTION, SOLUTION INFILTRATION; PERINEURAL AS NEEDED
Status: DISCONTINUED | OUTPATIENT
Start: 2019-08-21 | End: 2019-08-21 | Stop reason: SURG

## 2019-08-21 RX ORDER — SODIUM CHLORIDE, SODIUM LACTATE, POTASSIUM CHLORIDE, CALCIUM CHLORIDE 600; 310; 30; 20 MG/100ML; MG/100ML; MG/100ML; MG/100ML
125 INJECTION, SOLUTION INTRAVENOUS CONTINUOUS
Status: DISCONTINUED | OUTPATIENT
Start: 2019-08-21 | End: 2019-08-25 | Stop reason: HOSPADM

## 2019-08-21 RX ADMIN — PROPOFOL 30 MG: 10 INJECTION, EMULSION INTRAVENOUS at 07:30

## 2019-08-21 RX ADMIN — LIDOCAINE HYDROCHLORIDE 50 MG: 10 INJECTION, SOLUTION INFILTRATION; PERINEURAL at 07:26

## 2019-08-21 RX ADMIN — SODIUM CHLORIDE, SODIUM LACTATE, POTASSIUM CHLORIDE, AND CALCIUM CHLORIDE 125 ML/HR: .6; .31; .03; .02 INJECTION, SOLUTION INTRAVENOUS at 07:20

## 2019-08-21 RX ADMIN — PROPOFOL 100 MG: 10 INJECTION, EMULSION INTRAVENOUS at 07:26

## 2019-08-21 NOTE — ANESTHESIA PREPROCEDURE EVALUATION
Review of Systems/Medical History  Patient summary reviewed    No history of anesthetic complications     Cardiovascular  EKG reviewed, Hyperlipidemia, Hypertension controlled, No dysrhythmias , No angina ,    Pulmonary  Smoker cigarette smoker  , Tobacco cessation counseling given , No shortness of breath, Sleep apnea ,        GI/Hepatic    Bowel prep       Negative  ROS        Endo/Other  Negative endo/other ROS   Obesity  morbid obesity   GYN       Hematology  Negative hematology ROS      Musculoskeletal  Negative musculoskeletal ROS        Neurology  Negative neurology ROS      Psychology   Anxiety,              Physical Exam    Airway    Mallampati score: I  TM Distance: >3 FB  Neck ROM: full     Dental       Cardiovascular  Rhythm: regular, Rate: normal,     Pulmonary  Breath sounds clear to auscultation,     Other Findings        Anesthesia Plan  ASA Score- 3     Anesthesia Type- IV sedation with anesthesia with ASA Monitors  Additional Monitors:   Airway Plan:         Plan Factors- Patient instructed to abstain from smoking on day of procedure  Patient smoked on day of surgery  Induction- intravenous  Postoperative Plan-     Informed Consent- Anesthetic plan and risks discussed with patient  I personally reviewed this patient with the CRNA  Discussed and agreed on the Anesthesia Plan with the CRNA  Sally Yan

## 2019-08-21 NOTE — ANESTHESIA POSTPROCEDURE EVALUATION
Post-Op Assessment Note    CV Status:  Stable  Pain Score: 0    Pain management: adequate     Mental Status:  Alert and awake   Hydration Status:  Euvolemic   PONV Controlled:  Controlled   Airway Patency:  Patent   Post Op Vitals Reviewed: Yes      Staff: Anesthesiologist, CRNA           BP   103/68   Temp  96 9   Pulse  62   Resp   20   SpO2  100%

## 2019-08-21 NOTE — INTERVAL H&P NOTE
H&P reviewed  After examining the patient I find no changes in the patients condition since the H&P had been written      Vitals:    08/21/19 0632   BP: 134/71   Pulse: 84   Resp: 20   Temp: (!) 97 3 °F (36 3 °C)   SpO2: 95%

## 2019-08-22 ENCOUNTER — APPOINTMENT (OUTPATIENT)
Dept: PHYSICAL THERAPY | Facility: HOME HEALTHCARE | Age: 54
End: 2019-08-22
Payer: COMMERCIAL

## 2019-08-26 ENCOUNTER — OFFICE VISIT (OUTPATIENT)
Dept: FAMILY MEDICINE CLINIC | Facility: CLINIC | Age: 54
End: 2019-08-26
Payer: COMMERCIAL

## 2019-08-26 VITALS
WEIGHT: 293 LBS | DIASTOLIC BLOOD PRESSURE: 84 MMHG | SYSTOLIC BLOOD PRESSURE: 126 MMHG | RESPIRATION RATE: 16 BRPM | BODY MASS INDEX: 45.99 KG/M2 | OXYGEN SATURATION: 99 % | TEMPERATURE: 96.4 F | HEIGHT: 67 IN | HEART RATE: 67 BPM

## 2019-08-26 DIAGNOSIS — R22.31 MASS OF RIGHT UPPER EXTREMITY: ICD-10-CM

## 2019-08-26 DIAGNOSIS — F41.8 OTHER SPECIFIED ANXIETY DISORDERS: ICD-10-CM

## 2019-08-26 DIAGNOSIS — I89.0 LYMPHEDEMA: ICD-10-CM

## 2019-08-26 DIAGNOSIS — E66.01 MORBID OBESITY (HCC): Primary | ICD-10-CM

## 2019-08-26 DIAGNOSIS — Z23 NEED FOR TDAP VACCINATION: ICD-10-CM

## 2019-08-26 DIAGNOSIS — G89.29 CHRONIC MIDLINE LOW BACK PAIN WITH LEFT-SIDED SCIATICA: ICD-10-CM

## 2019-08-26 DIAGNOSIS — I10 ESSENTIAL HYPERTENSION: ICD-10-CM

## 2019-08-26 DIAGNOSIS — M54.42 CHRONIC MIDLINE LOW BACK PAIN WITH LEFT-SIDED SCIATICA: ICD-10-CM

## 2019-08-26 PROCEDURE — T1015 CLINIC SERVICE: HCPCS | Performed by: FAMILY MEDICINE

## 2019-08-26 NOTE — PROGRESS NOTES
Assessment/Plan:     Diagnoses and all orders for this visit:    Morbid obesity ([address] )    Essential hypertension    Mass of right upper extremity  -     US extremity soft tissue; Future    Need for Tdap vaccination  -     Tdap vaccine greater than or equal to 6yo IM    Lymphedema    Other specified anxiety disorders    Chronic midline low back pain with left-sided sciatica        Hypertension - controlled  Continue current medications, healthy diet, exercise, weight loss  Obesity - down 2 lbs from 7/26  Continue healthy diet, portion reduction, physical activity as able  Lymphedema - continue f/u specialists  Will be starting compression wraps  Anxiety - symptoms improved and controlled  Continue zoloft 25mg daily  Chronic back pain with sciatica - improving  Continue current medications, physical therapy, weight loss  Mass of RUE - likely cyst  Check US and further management once results available  TDAP given in office today  Return in about 6 weeks (around 10/7/2019) for Recheck, Next scheduled follow up  Subjective:        Patient ID: Fernando Dutton is a 52 y o  female  Chief Complaint   Patient presents with    Medication Refill    Arm Pain     right arm pain       Patient is a 52year old female who presents to the office today for follow up  She reports right upper arm lump that is painful when she touches area  No RUE weakness, numbness/tingling, joint pain, myalgia  No known trauma/injury  No fevers/chills, wound, erythema, edema, drainage  Lump has been present for awhile, no change in size or symptoms since she first noticed it  She was scheduled to undergo colonoscopy, poor prep and has to repeat  She has history of chronic UE and LE edema, will be starting wraps soon to help  She has been trying to lose weight, following healthy diet and is physically active as able  She has lost 2 lbs since last visit in office 1 month ago       She states anxiety has improved and feeling much better since starting zoloft  No SI/HI/AH/VH/manic sx  She states sciatica has improved with gabapentin  She has started PT for sciatica, doing well  No change in bowel/bladder function or saddle anesthesia         The following portions of the patient's history were reviewed and updated as appropriate: allergies, current medications, past family history, past medical history, past social history, past surgical history and problem list     Patient Active Problem List   Diagnosis    Lymphedema    Hypertension    Morbid obesity ([address] )    Hidradenitis suppurativa    Chronic midline low back pain with left-sided sciatica    Other specified anxiety disorders    Lumbar paraspinal muscle spasm    Borderline hyperlipidemia    Iron deficiency       Current Outpatient Medications   Medication Sig Dispense Refill    albuterol (VENTOLIN HFA) 90 mcg/act inhaler Inhale 2 puffs every 6 (six) hours as needed for wheezing or shortness of breath 1 Inhaler 0    cyclobenzaprine (FLEXERIL) 10 mg tablet take 1 tablet by mouth three times a day if needed for muscle spasm 90 tablet 0    diclofenac sodium (VOLTAREN) 1 % Apply 2 g topically 4 (four) times a day 300 g 2    ferrous sulfate 324 (65 Fe) mg Take 1 tablet (324 mg total) by mouth 2 (two) times a day before meals 60 tablet 1    gabapentin (NEURONTIN) 300 mg capsule TAKE 1 CAPSULE BY MOUTH IN THE MORNING AND 2 CAPSULES AT BEDTIME 90 capsule 0    hydrOXYzine HCL (ATARAX) 10 mg tablet Take 1-2 tablets every 8 hours as needed for anxiety 90 tablet 1    lisinopril (ZESTRIL) 10 mg tablet Take 1 tablet (10 mg total) by mouth daily 90 tablet 1    metoprolol tartrate (LOPRESSOR) 25 mg tablet Take 1 tablet (25 mg total) by mouth every 12 (twelve) hours 180 tablet 0    nystatin (MYCOSTATIN) powder Apply topically 3 (three) times a day 60 g 2    sertraline (ZOLOFT) 25 mg tablet take 1 tablet by mouth once daily 30 tablet 0     No current facility-administered medications for this visit  Past Medical History:   Diagnosis Date    Anxiety     Hypertension     Lymphedema 2006    Sciatic leg pain 2006    left side        Past Surgical History:   Procedure Laterality Date     SECTION      TONSILLECTOMY          Social History     Socioeconomic History    Marital status: Single     Spouse name: Not on file    Number of children: Not on file    Years of education: Not on file    Highest education level: Not on file   Occupational History    Not on file   Social Needs    Financial resource strain: Not on file    Food insecurity:     Worry: Not on file     Inability: Not on file    Transportation needs:     Medical: Not on file     Non-medical: Not on file   Tobacco Use    Smoking status: Former Smoker     Types: Cigarettes    Smokeless tobacco: Never Used    Tobacco comment: occasional less than one per day   Substance and Sexual Activity    Alcohol use: No    Drug use: No    Sexual activity: Never   Lifestyle    Physical activity:     Days per week: Not on file     Minutes per session: Not on file    Stress: Not on file   Relationships    Social connections:     Talks on phone: Not on file     Gets together: Not on file     Attends Orthodox service: Not on file     Active member of club or organization: Not on file     Attends meetings of clubs or organizations: Not on file     Relationship status: Not on file    Intimate partner violence:     Fear of current or ex partner: Not on file     Emotionally abused: Not on file     Physically abused: Not on file     Forced sexual activity: Not on file   Other Topics Concern    Not on file   Social History Narrative    Not on file          Review of Systems      Objective:      /84 (BP Location: Left arm, Patient Position: Sitting, Cuff Size: Large)   Pulse 67   Temp (!) 96 4 °F (35 8 °C) (Tympanic)   Resp 16   Ht 5' 7" (1 702 m)   Wt (!) 164 kg (361 lb)   SpO2 99%   BMI 56 54 kg/m²          Physical Exam   Constitutional: She is oriented to person, place, and time  She appears well-developed and well-nourished  obese   HENT:   Head: Normocephalic and atraumatic  Right Ear: Tympanic membrane, external ear and ear canal normal    Left Ear: Tympanic membrane, external ear and ear canal normal    Nose: Nose normal    Mouth/Throat: Uvula is midline and oropharynx is clear and moist    Eyes: Pupils are equal, round, and reactive to light  Conjunctivae are normal    Cardiovascular: Normal rate, regular rhythm and normal heart sounds  Exam reveals no gallop and no friction rub  No murmur heard  Pulmonary/Chest: Effort normal and breath sounds normal    Abdominal: Soft  Bowel sounds are normal  There is no tenderness  Musculoskeletal: She exhibits no edema  Arms:  Neurological: She is alert and oriented to person, place, and time  Skin: Skin is warm and dry  Capillary refill takes less than 2 seconds  Chronic UE and LE lymphedema  Psychiatric: She has a normal mood and affect

## 2019-08-27 ENCOUNTER — APPOINTMENT (OUTPATIENT)
Dept: PHYSICAL THERAPY | Facility: HOME HEALTHCARE | Age: 54
End: 2019-08-27
Payer: COMMERCIAL

## 2019-08-29 ENCOUNTER — OFFICE VISIT (OUTPATIENT)
Dept: PHYSICAL THERAPY | Facility: HOME HEALTHCARE | Age: 54
End: 2019-08-29
Payer: COMMERCIAL

## 2019-08-29 DIAGNOSIS — M76.61 ACHILLES TENDONITIS, BILATERAL: Primary | ICD-10-CM

## 2019-08-29 DIAGNOSIS — M76.62 ACHILLES TENDONITIS, BILATERAL: Primary | ICD-10-CM

## 2019-08-29 PROCEDURE — 97110 THERAPEUTIC EXERCISES: CPT | Performed by: PHYSICAL THERAPIST

## 2019-08-29 NOTE — PROGRESS NOTES
PT DISCHARGE     Pt was last treated in clinic on 19  She did not return to therapy x 3 weeks and will be D/C from OPPT 2* to script expiration  Daily Note     Today's date: 2019  Patient name: Shannon Allen  : 1965  MRN: [id number]  Referring provider: Ernie Fleischer, PA*  Dx:   Encounter Diagnosis     ICD-10-CM    1  Achilles tendonitis, bilateral M76 61     M76 62                   Subjective: Pt reporting that she will see the foot Dr Roddy Daniel later today  Objective: See treatment diary below      Assessment: Pt remains limited with program due to complaints of increased pain with all activities  She was able to complete HR standing this session but states that standing/walking in the shoes is very painful and she likes to walk without shoes as much as possible  Plan: Continue per plan of care        Precautions: None  RE Due: 19  Specialty Daily Treatment Diary     Manual  8/1/19 8/15/19 8/29/19                         Exercise Diary  8/1/19 8/15/19 8/29/19     NuStep  Level 1  10 minutes Level 1  10 minutes Level 1  10 minutes     Pro stretch   Seated  20" x 4      Ankle ROM  2 x 10  2 x 10      Ankle circles        Tband: all planes        Self towel stretch  With strap   15" x 4  NP     BAPs         Towel in/ev        Toe curls   1 x 20      Standing HR/TR  Seated   2 x 10  Standing HR  X 20      Step-ups         Patricia orantes        SLS - EO/EC        Tadem - EO/EC        Biodex LOS        Biodex LOS        Biodex Maze        Biodex Maze        Biodex Random        Biodex Random            Modalities        CP prn  10 minutes 10 minutes

## 2019-09-03 ENCOUNTER — APPOINTMENT (OUTPATIENT)
Dept: PHYSICAL THERAPY | Facility: HOME HEALTHCARE | Age: 54
End: 2019-09-03
Payer: COMMERCIAL

## 2019-09-05 ENCOUNTER — APPOINTMENT (OUTPATIENT)
Dept: PHYSICAL THERAPY | Facility: HOME HEALTHCARE | Age: 54
End: 2019-09-05
Payer: COMMERCIAL

## 2019-09-12 ENCOUNTER — APPOINTMENT (OUTPATIENT)
Dept: PHYSICAL THERAPY | Facility: HOME HEALTHCARE | Age: 54
End: 2019-09-12
Payer: COMMERCIAL

## 2019-09-19 DIAGNOSIS — F41.8 OTHER SPECIFIED ANXIETY DISORDERS: ICD-10-CM

## 2019-09-19 DIAGNOSIS — G89.29 CHRONIC MIDLINE LOW BACK PAIN WITH LEFT-SIDED SCIATICA: ICD-10-CM

## 2019-09-19 DIAGNOSIS — M62.830 LUMBAR PARASPINAL MUSCLE SPASM: ICD-10-CM

## 2019-09-19 DIAGNOSIS — E61.1 IRON DEFICIENCY: ICD-10-CM

## 2019-09-19 DIAGNOSIS — M54.42 CHRONIC MIDLINE LOW BACK PAIN WITH LEFT-SIDED SCIATICA: ICD-10-CM

## 2019-09-22 RX ORDER — GABAPENTIN 300 MG/1
CAPSULE ORAL
Qty: 90 CAPSULE | Refills: 3 | Status: SHIPPED | OUTPATIENT
Start: 2019-09-22 | End: 2020-03-19

## 2019-09-22 RX ORDER — HYDROXYZINE HYDROCHLORIDE 10 MG/1
TABLET, FILM COATED ORAL
Qty: 90 TABLET | Refills: 3 | Status: SHIPPED | OUTPATIENT
Start: 2019-09-22 | End: 2020-03-19

## 2019-09-22 RX ORDER — SERTRALINE HYDROCHLORIDE 25 MG/1
TABLET, FILM COATED ORAL
Qty: 30 TABLET | Refills: 3 | Status: SHIPPED | OUTPATIENT
Start: 2019-09-22 | End: 2020-01-17

## 2019-09-22 RX ORDER — CYCLOBENZAPRINE HCL 10 MG
TABLET ORAL
Qty: 90 TABLET | Refills: 3 | Status: SHIPPED | OUTPATIENT
Start: 2019-09-22 | End: 2020-01-17

## 2019-09-22 RX ORDER — FERROUS SULFATE TAB EC 324 MG (65 MG FE EQUIVALENT) 324 (65 FE) MG
TABLET DELAYED RESPONSE ORAL
Qty: 60 TABLET | Refills: 3 | Status: SHIPPED | OUTPATIENT
Start: 2019-09-22 | End: 2020-03-19

## 2019-09-24 ENCOUNTER — EVALUATION (OUTPATIENT)
Dept: PHYSICAL THERAPY | Facility: HOME HEALTHCARE | Age: 54
End: 2019-09-24
Payer: COMMERCIAL

## 2019-09-24 ENCOUNTER — APPOINTMENT (OUTPATIENT)
Dept: PHYSICAL THERAPY | Facility: HOME HEALTHCARE | Age: 54
End: 2019-09-24
Payer: COMMERCIAL

## 2019-09-24 DIAGNOSIS — M62.830 LUMBAR PARASPINAL MUSCLE SPASM: Primary | ICD-10-CM

## 2019-09-24 DIAGNOSIS — M76.61 ACHILLES TENDONITIS, BILATERAL: ICD-10-CM

## 2019-09-24 DIAGNOSIS — M76.62 ACHILLES TENDONITIS, BILATERAL: ICD-10-CM

## 2019-09-24 PROCEDURE — 97110 THERAPEUTIC EXERCISES: CPT

## 2019-09-24 PROCEDURE — 97164 PT RE-EVAL EST PLAN CARE: CPT

## 2019-09-24 NOTE — PROGRESS NOTES
Daily Note     Today's date: 2019  Patient name: Pasha Holloway  : 1965  MRN: [id number]  Referring provider: ORALIA Brambila*  Dx:   Encounter Diagnosis     ICD-10-CM    1  Chronic midline low back pain, with sciatica presence unspecified M54 5     G89 29               Subjective: Pt reports she has a little pain in her back today  Objective: See treatment diary below      Assessment: See re-eval today dated 19 for full assessment  Plan: Continue per plan of care        Precautions: None  RE Due: 19  Specialty Daily Treatment Diary     Manual                                     Exercise Diary  19   NuStep  Level 2  10 minutes Level 2  10 min  L 2  10' Level 2   10 min Level 2   10 min   SLR x 3  Ramos 1 x 10 ea  Ramos 1 x 10 ea  Ramos 1 x 10 ea Ramos 1 x 10 ea  Ramos 1 x 10 ea    HR/TR 1 x 10 ea  1 x 10 ea  1 x 10 1 x 10 ea  1 x 10 ea    Squats         Step-ups         SBB 10"x 5 10" x 5  10" x 5 10" x 5  10" x 5    PPT 5" x 10 5" x 10 5" x 10 5" x 10 5" x 10   PPT with marches        SLR 1 x 15 Ramos 1 x 15 Ramos 1 x 10 Ramos 1x 10 Ramos 1 x 10 Ramos    S/L SLR        Clamshells        Hip abd 5" 1 x 15 5" 1 x 15 5" 1 x 15 5" 1 x 15 5" 1 x 15    Hip add Green 1 x 15 Green  1 x 15 Green 1 x 15 Green 1 x 15 Green 1 x 15    Bridges        LTR 5" x 15 5" x 15 5" 1 x 15 5" 1 x 15 5" x 15   SKTC 5" x 5  5" x 5      Heel walk-outs 1 x 10 1 x 10 1 x10 1 x 10 1 x 10   Quad alt UE        Quad alt LE         MTP/LTP Red 1  X 15 ea  Red 1 x 15 ea       Self HS stretch 20" x 3 ea   Calf/HS 20" x 3 ea   Calf/HS 20" x 3 ea  Calf/HS 20" x 3 ea   Calf/HS 20" x 3 ea   Calf/HS                       Modalities 19   MHP/CP prn MHP LB seated 10 min  MHP back seated 10 min MHP LB 10'  seated MHP LB seated 10 min MHP LB  Seated 10 min
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
## 2019-09-25 ENCOUNTER — HOSPITAL ENCOUNTER (OUTPATIENT)
Dept: ULTRASOUND IMAGING | Facility: HOSPITAL | Age: 54
Discharge: HOME | End: 2019-09-25
Payer: COMMERCIAL

## 2019-09-25 ENCOUNTER — TRANSCRIBE ORDERS (OUTPATIENT)
Dept: ADMINISTRATIVE | Facility: HOSPITAL | Age: 54
End: 2019-09-25

## 2019-09-25 ENCOUNTER — HOSPITAL ENCOUNTER (OUTPATIENT)
Dept: RADIOLOGY | Facility: HOSPITAL | Age: 54
Discharge: HOME | End: 2019-09-25
Attending: PODIATRIST
Payer: COMMERCIAL

## 2019-09-25 DIAGNOSIS — M79.672 LEFT FOOT PAIN: ICD-10-CM

## 2019-09-25 DIAGNOSIS — R22.31 MASS OF RIGHT UPPER EXTREMITY: ICD-10-CM

## 2019-09-25 DIAGNOSIS — M79.671 RIGHT FOOT PAIN: ICD-10-CM

## 2019-09-25 DIAGNOSIS — M79.671 RIGHT FOOT PAIN: Primary | ICD-10-CM

## 2019-09-25 PROCEDURE — 73630 X-RAY EXAM OF FOOT: CPT

## 2019-09-25 PROCEDURE — 76882 US LMTD JT/FCL EVL NVASC XTR: CPT

## 2019-09-26 ENCOUNTER — OFFICE VISIT (OUTPATIENT)
Dept: PHYSICAL THERAPY | Facility: HOME HEALTHCARE | Age: 54
End: 2019-09-26
Payer: COMMERCIAL

## 2019-09-26 ENCOUNTER — APPOINTMENT (OUTPATIENT)
Dept: PHYSICAL THERAPY | Facility: HOME HEALTHCARE | Age: 54
End: 2019-09-26
Payer: COMMERCIAL

## 2019-09-26 DIAGNOSIS — M62.830 LUMBAR PARASPINAL MUSCLE SPASM: Primary | ICD-10-CM

## 2019-09-26 PROCEDURE — 97110 THERAPEUTIC EXERCISES: CPT

## 2019-09-26 NOTE — PROGRESS NOTES
Daily Note     Today's date: 2019  Patient name: Dallas Myrick  : 1965  MRN: [id number]  Referring provider: ORALIA Styles*  Dx:   Encounter Diagnosis     ICD-10-CM    1  Lumbar paraspinal muscle spasm M62 830                 Subjective: LBP of 5/10 today  Objective: See treatment diary below    Assessment: Tolerated treatment well  Pt requires vc's for correct form and to perform in available ranges  Pt reports same pain at end  Patient would benefit from continued PT    Plan: Continue per plan of care          Precautions: None  RE Due: 19  Specialty Daily Treatment Diary      Manual                                                              Exercise Diary  19   NuStep  Level 2  10 minutes Level 2  10 min  L 2  10' Level 2   10 min Level 2   10 min   SLR x 3  Ramos 1 x 10 ea  Ramos 1 x 10 ea  Ramos 1 x 10 ea Ramos 1 x 10 ea  Ramos 1 x 10 ea    HR/TR 1 x 10 ea  1 x 10 ea  1 x 10 1 x 10 ea  1 x 10 ea    Squats              Step-ups              SBB 10"x 5 10" x 5  10" x 5 10" x 5  10" x 5    PPT 5" x 10 5" x 10 5" x 10 5" x 10 5" x 10   PPT with marches             SLR 1 x 15 Ramos 1 x 15 Ramos 1 x 10 Ramos 1x 10 Ramos 1 x 10 Ramos    S/L SLR             Clamshells             Hip abd 5" 1 x 15 5" 1 x 15 5" 1 x 15 5" 1 x 15 5" 1 x 15    Hip add Green 1 x 15 Green  1 x 15 Green 1 x 15 Green 1 x 15 Green 1 x 15    Bridges             LTR 5" x 15 5" x 15 5" 1 x 15 5" 1 x 15 5" x 15   SKTC 5" x 5  5" x 5         Heel walk-outs 1 x 10 1 x 10 1 x10 1 x 10 1 x 10   Quad alt UE             Quad alt LE              MTP/LTP Red 1  X 15 ea  Red 1 x 15 ea          Self HS stretch 20" x 3 ea   Calf/HS 20" x 3 ea   Calf/HS 20" x 3 ea  Calf/HS 20" x 3 ea   Calf/HS 20" x 3 ea   Calf/HS                                     Modalities 19   MHP/CP prn MHP LB seated 10 min  MHP back seated 10 min MHP LB 10'  seated MHP LB seated 10 min MHP LB  Seated 10 min

## 2019-10-12 DIAGNOSIS — I89.0 LYMPHEDEMA: ICD-10-CM

## 2019-10-13 RX ORDER — IBUPROFEN 600 MG/1
TABLET ORAL
Qty: 90 TABLET | Refills: 2 | OUTPATIENT
Start: 2019-10-13

## 2019-10-13 NOTE — TELEPHONE ENCOUNTER
She is to avoid Ibuprofen and NSAIDs due to blood in stool, colonoscopy repeat as recommended by GI

## 2019-10-20 DIAGNOSIS — I89.0 LYMPHEDEMA: ICD-10-CM

## 2019-10-21 RX ORDER — IBUPROFEN 600 MG/1
TABLET ORAL
Qty: 90 TABLET | Refills: 2 | OUTPATIENT
Start: 2019-10-21

## 2019-10-28 NOTE — PROGRESS NOTES
PT DISCHARGE     Pt was last seen in clinic on 9/26/19  She did not continue with POC and has not phoned clinic with status update  She will be D/C from OPPT 2* to script expiration  Thank you

## 2019-11-05 DIAGNOSIS — R06.02 SOB (SHORTNESS OF BREATH): ICD-10-CM

## 2019-11-05 DIAGNOSIS — I10 HYPERTENSION, UNSPECIFIED TYPE: ICD-10-CM

## 2019-12-04 ENCOUNTER — OFFICE VISIT (OUTPATIENT)
Dept: URGENT CARE | Facility: CLINIC | Age: 54
End: 2019-12-04
Payer: COMMERCIAL

## 2019-12-04 ENCOUNTER — APPOINTMENT (OUTPATIENT)
Dept: RADIOLOGY | Facility: CLINIC | Age: 54
End: 2019-12-04
Payer: COMMERCIAL

## 2019-12-04 VITALS
HEART RATE: 88 BPM | OXYGEN SATURATION: 97 % | WEIGHT: 293 LBS | RESPIRATION RATE: 20 BRPM | TEMPERATURE: 98.1 F | SYSTOLIC BLOOD PRESSURE: 140 MMHG | BODY MASS INDEX: 45.99 KG/M2 | HEIGHT: 67 IN | DIASTOLIC BLOOD PRESSURE: 80 MMHG

## 2019-12-04 DIAGNOSIS — R05.9 COUGH: ICD-10-CM

## 2019-12-04 DIAGNOSIS — R05.9 COUGH: Primary | ICD-10-CM

## 2019-12-04 PROCEDURE — S9088 SERVICES PROVIDED IN URGENT: HCPCS | Performed by: PHYSICIAN ASSISTANT

## 2019-12-04 PROCEDURE — 71046 X-RAY EXAM CHEST 2 VIEWS: CPT

## 2019-12-04 PROCEDURE — 99213 OFFICE O/P EST LOW 20 MIN: CPT | Performed by: PHYSICIAN ASSISTANT

## 2019-12-04 RX ORDER — BENZONATATE 100 MG/1
100 CAPSULE ORAL 3 TIMES DAILY PRN
Qty: 30 CAPSULE | Refills: 0 | Status: SHIPPED | OUTPATIENT
Start: 2019-12-04 | End: 2020-03-19

## 2019-12-04 RX ORDER — PREDNISONE 10 MG/1
TABLET ORAL
Qty: 18 TABLET | Refills: 0 | Status: SHIPPED | OUTPATIENT
Start: 2019-12-04 | End: 2020-03-19

## 2019-12-04 RX ORDER — AZITHROMYCIN 250 MG/1
TABLET, FILM COATED ORAL
Qty: 6 TABLET | Refills: 0 | Status: SHIPPED | OUTPATIENT
Start: 2019-12-04 | End: 2019-12-08

## 2019-12-04 NOTE — PROGRESS NOTES
[facility]        NAME: Meron Holt is a 52 y o  female  : 1965    MRN: [id number]  DATE: 2019  TIME: 11:59 AM    Assessment and Plan   Cough [R05]  1  Cough  XR chest pa & lateral    azithromycin (ZITHROMAX) 250 mg tablet    benzonatate (TESSALON PERLES) 100 mg capsule    predniSONE 10 mg tablet         Patient Instructions   Patient Instructions     Chest x-ray is clear  Lungs are okay  Due to several weeks of cough we can treat with azithromycin cough suppression and prednisone  Follow up with PCP  Follow up with PCP in 3-5 days  Proceed to  ER if symptoms worsen  Chief Complaint     Chief Complaint   Patient presents with    Cold Like Symptoms     cough and congestion started last month  History of Present Illness        43-year-old female presents to the clinic with cough and congestion for 1 month  She has been taking Mucinex over-the-counter with no relief  No fever  No nausea vomiting  Intermittent sore throat  No sinus pain or pressure  She denies a history of asthma or inhaler use  Review of Systems   Review of Systems   Constitutional: Negative for chills and fever  HENT: Positive for congestion and sore throat  Negative for postnasal drip and rhinorrhea  Respiratory: Positive for cough and wheezing  Negative for shortness of breath  Cardiovascular: Negative  Gastrointestinal: Negative            Current Medications       Current Outpatient Medications:     albuterol (VENTOLIN HFA) 90 mcg/act inhaler, Inhale 2 puffs every 6 (six) hours as needed for wheezing or shortness of breath, Disp: 1 Inhaler, Rfl: 0    cyclobenzaprine (FLEXERIL) 10 mg tablet, TAKE 1 TABLET BY MOUTH 3 TIMES A DAY IF NEEDED FOR MUSCLE SPASM, Disp: 90 tablet, Rfl: 3    diclofenac sodium (VOLTAREN) 1 %, Apply 2 g topically 4 (four) times a day, Disp: 300 g, Rfl: 2    ferrous sulfate 324 (65 Fe) mg, take 1 tablet by mouth twice a day before meals, Disp: 60 tablet, Rfl: 3    gabapentin (NEURONTIN) 300 mg capsule, TAKE 1 CAPSULE BY MOUTH IN THE MORNING AND 2 CAPSULES AT BEDTIME, Disp: 90 capsule, Rfl: 3    hydrOXYzine HCL (ATARAX) 10 mg tablet, take 1-2 tablets by mouth every 8 hours if needed for anxiety, Disp: 90 tablet, Rfl: 3    lisinopril (ZESTRIL) 10 mg tablet, Take 1 tablet (10 mg total) by mouth daily, Disp: 90 tablet, Rfl: 1    metoprolol tartrate (LOPRESSOR) 25 mg tablet, take 1 tablet by mouth twice a day, Disp: 180 tablet, Rfl: 0    nystatin (MYCOSTATIN) powder, Apply topically 3 (three) times a day, Disp: 60 g, Rfl: 2    sertraline (ZOLOFT) 25 mg tablet, take 1 tablet by mouth once daily, Disp: 30 tablet, Rfl: 3    azithromycin (ZITHROMAX) 250 mg tablet, 2 tabs on day 1, then 1 tab daily for 4 days, Disp: 6 tablet, Rfl: 0    benzonatate (TESSALON PERLES) 100 mg capsule, Take 1 capsule (100 mg total) by mouth 3 (three) times a day as needed for cough, Disp: 30 capsule, Rfl: 0    predniSONE 10 mg tablet, 3 tabs daily for 3 days, 2 tabs daily for 3 days, 1 tab daily for 3 days, Disp: 18 tablet, Rfl: 0    Current Allergies     Allergies as of 2019 - Reviewed 2019   Allergen Reaction Noted    Bee venom  2011            The following portions of the patient's history were reviewed and updated as appropriate: allergies, current medications, past family history, past medical history, past social history, past surgical history and problem list      Past Medical History:   Diagnosis Date    Anxiety     Hypertension     Lymphedema 2006    Sciatic leg pain 2006    left side       Past Surgical History:   Procedure Laterality Date     SECTION      TONSILLECTOMY         Family History   Problem Relation Age of Onset    Cancer Mother     Hypertension Mother     COPD Father     Diabetes Father          Medications have been verified          Objective   /80   Pulse 88   Temp 98 1 °F (36 7 °C)   Resp 20   Ht 5' 7" (1 702 m)   Wt (!) 163 kg (360 lb)   SpO2 97%   BMI 56 38 kg/m²        Physical Exam     Physical Exam   Constitutional: She is oriented to person, place, and time  She appears well-developed and well-nourished  No distress  HENT:   Head: Normocephalic and atraumatic  Right Ear: Tympanic membrane, external ear and ear canal normal  Tympanic membrane is not erythematous, not retracted and not bulging  No middle ear effusion  Left Ear: Tympanic membrane, external ear and ear canal normal  Tympanic membrane is not erythematous, not retracted and not bulging  No middle ear effusion  Nose: Nose normal  No mucosal edema or rhinorrhea  Right sinus exhibits no maxillary sinus tenderness and no frontal sinus tenderness  Left sinus exhibits no maxillary sinus tenderness and no frontal sinus tenderness  Mouth/Throat: Oropharynx is clear and moist and mucous membranes are normal  No posterior oropharyngeal erythema  Eyes: Pupils are equal, round, and reactive to light  Conjunctivae and EOM are normal  Right eye exhibits no chemosis and no discharge  Left eye exhibits no chemosis and no discharge  Right conjunctiva is not injected  Left conjunctiva is not injected  Neck: Normal range of motion  Neck supple  Cardiovascular: Normal rate, regular rhythm and normal heart sounds  Pulmonary/Chest: Effort normal and breath sounds normal  No respiratory distress  She has no wheezes  She has no rales  Lymphadenopathy:     She has no cervical adenopathy  Right cervical: No superficial cervical adenopathy present  Left cervical: No superficial cervical adenopathy present  Neurological: She is alert and oriented to person, place, and time  No cranial nerve deficit  Skin: Skin is warm and dry  No rash noted

## 2019-12-04 NOTE — PATIENT INSTRUCTIONS
Chest x-ray is clear  Lungs are okay  Due to several weeks of cough we can treat with azithromycin cough suppression and prednisone  Follow up with PCP

## 2020-01-13 DIAGNOSIS — F41.8 OTHER SPECIFIED ANXIETY DISORDERS: ICD-10-CM

## 2020-01-13 DIAGNOSIS — M62.830 LUMBAR PARASPINAL MUSCLE SPASM: ICD-10-CM

## 2020-01-17 DIAGNOSIS — E61.1 IRON DEFICIENCY: ICD-10-CM

## 2020-01-17 DIAGNOSIS — G89.29 CHRONIC MIDLINE LOW BACK PAIN WITH LEFT-SIDED SCIATICA: ICD-10-CM

## 2020-01-17 DIAGNOSIS — M54.42 CHRONIC MIDLINE LOW BACK PAIN WITH LEFT-SIDED SCIATICA: ICD-10-CM

## 2020-01-17 DIAGNOSIS — F41.8 OTHER SPECIFIED ANXIETY DISORDERS: ICD-10-CM

## 2020-01-17 RX ORDER — SERTRALINE HYDROCHLORIDE 25 MG/1
TABLET, FILM COATED ORAL
Qty: 30 TABLET | Refills: 0 | Status: SHIPPED | OUTPATIENT
Start: 2020-01-17 | End: 2020-02-20

## 2020-01-17 RX ORDER — CYCLOBENZAPRINE HCL 10 MG
TABLET ORAL
Qty: 90 TABLET | Refills: 0 | Status: SHIPPED | OUTPATIENT
Start: 2020-01-17 | End: 2020-02-20

## 2020-01-29 DIAGNOSIS — R06.02 SOB (SHORTNESS OF BREATH): ICD-10-CM

## 2020-01-29 DIAGNOSIS — I10 HYPERTENSION, UNSPECIFIED TYPE: ICD-10-CM

## 2020-02-13 DIAGNOSIS — Z12.31 ENCOUNTER FOR SCREENING MAMMOGRAM FOR BREAST CANCER: Primary | ICD-10-CM

## 2020-02-20 DIAGNOSIS — M62.830 LUMBAR PARASPINAL MUSCLE SPASM: ICD-10-CM

## 2020-02-20 DIAGNOSIS — F41.8 OTHER SPECIFIED ANXIETY DISORDERS: ICD-10-CM

## 2020-02-20 RX ORDER — CYCLOBENZAPRINE HCL 10 MG
TABLET ORAL
Qty: 90 TABLET | Refills: 0 | Status: SHIPPED | OUTPATIENT
Start: 2020-02-20 | End: 2020-03-23 | Stop reason: SDUPTHER

## 2020-02-20 RX ORDER — SERTRALINE HYDROCHLORIDE 25 MG/1
TABLET, FILM COATED ORAL
Qty: 30 TABLET | Refills: 0 | Status: SHIPPED | OUTPATIENT
Start: 2020-02-20 | End: 2020-03-19 | Stop reason: SDUPTHER

## 2020-03-09 DIAGNOSIS — R06.02 SOB (SHORTNESS OF BREATH): ICD-10-CM

## 2020-03-09 DIAGNOSIS — I10 HYPERTENSION, UNSPECIFIED TYPE: ICD-10-CM

## 2020-03-19 ENCOUNTER — OFFICE VISIT (OUTPATIENT)
Dept: FAMILY MEDICINE CLINIC | Facility: CLINIC | Age: 55
End: 2020-03-19
Payer: COMMERCIAL

## 2020-03-19 VITALS
RESPIRATION RATE: 16 BRPM | BODY MASS INDEX: 45.99 KG/M2 | SYSTOLIC BLOOD PRESSURE: 128 MMHG | TEMPERATURE: 98.6 F | HEIGHT: 67 IN | OXYGEN SATURATION: 97 % | WEIGHT: 293 LBS | DIASTOLIC BLOOD PRESSURE: 80 MMHG | HEART RATE: 97 BPM

## 2020-03-19 DIAGNOSIS — I10 HYPERTENSION, UNSPECIFIED TYPE: ICD-10-CM

## 2020-03-19 DIAGNOSIS — I89.0 LYMPHEDEMA: ICD-10-CM

## 2020-03-19 DIAGNOSIS — Z12.4 CERVICAL CANCER SCREENING: ICD-10-CM

## 2020-03-19 DIAGNOSIS — E66.01 MORBID OBESITY (HCC): ICD-10-CM

## 2020-03-19 DIAGNOSIS — F41.8 OTHER SPECIFIED ANXIETY DISORDERS: ICD-10-CM

## 2020-03-19 DIAGNOSIS — Z11.4 ENCOUNTER FOR SCREENING FOR HIV: ICD-10-CM

## 2020-03-19 DIAGNOSIS — E78.5 BORDERLINE HYPERLIPIDEMIA: ICD-10-CM

## 2020-03-19 DIAGNOSIS — Z11.59 NEED FOR HEPATITIS C SCREENING TEST: ICD-10-CM

## 2020-03-19 DIAGNOSIS — R06.02 SOB (SHORTNESS OF BREATH): ICD-10-CM

## 2020-03-19 DIAGNOSIS — Z13.1 SCREENING FOR DIABETES MELLITUS: ICD-10-CM

## 2020-03-19 DIAGNOSIS — I10 ESSENTIAL HYPERTENSION: Primary | ICD-10-CM

## 2020-03-19 PROCEDURE — T1015 CLINIC SERVICE: HCPCS | Performed by: FAMILY MEDICINE

## 2020-03-19 RX ORDER — LISINOPRIL 10 MG/1
10 TABLET ORAL DAILY
Qty: 90 TABLET | Refills: 1 | Status: SHIPPED | OUTPATIENT
Start: 2020-03-19 | End: 2020-10-22 | Stop reason: SDUPTHER

## 2020-03-19 RX ORDER — SERTRALINE HYDROCHLORIDE 25 MG/1
25 TABLET, FILM COATED ORAL DAILY
Qty: 30 TABLET | Refills: 0 | Status: SHIPPED | OUTPATIENT
Start: 2020-03-19 | End: 2020-04-15

## 2020-03-19 RX ORDER — PHENTERMINE HYDROCHLORIDE 37.5 MG/1
37.5 CAPSULE ORAL EVERY MORNING
Qty: 30 CAPSULE | Refills: 0 | Status: SHIPPED | OUTPATIENT
Start: 2020-03-19 | End: 2020-04-22 | Stop reason: SDUPTHER

## 2020-03-19 NOTE — PROGRESS NOTES
Assessment/Plan:     Diagnoses and all orders for this visit:    Essential hypertension  -     CBC and differential; Future  -     Comprehensive metabolic panel; Future  -     TSH, 3rd generation with Free T4 reflex; Future  -     UA w Reflex to Microscopic w Reflex to Culture -Lab Collect  -     Microalbumin / creatinine urine ratio; Future    Morbid obesity (HCC)  -     phentermine 37 5 MG capsule; Take 1 capsule (37 5 mg total) by mouth every morning    Lymphedema  -     Ambulatory referral to Physical Therapy; Future    Borderline hyperlipidemia  -     Lipid Panel with Direct LDL reflex; Future    Need for hepatitis C screening test  -     Hepatitis C antibody; Future    Encounter for screening for HIV  -     HIV 1/2 Antigen/Antibody (4th Generation) w Reflex [facility]; Future    Screening for diabetes mellitus  -     HEMOGLOBIN A1C W/ EAG ESTIMATION; Future    Cervical cancer screening  -     Ambulatory referral to Obstetrics / Gynecology; Future    SOB (shortness of breath)  -     metoprolol tartrate (LOPRESSOR) 25 mg tablet; Take 1 tablet (25 mg total) by mouth 2 (two) times a day    Hypertension, unspecified type  -     metoprolol tartrate (LOPRESSOR) 25 mg tablet; Take 1 tablet (25 mg total) by mouth 2 (two) times a day  -     lisinopril (ZESTRIL) 10 mg tablet; Take 1 tablet (10 mg total) by mouth daily    Hypertension, unspecified type  Comments:  continue current medications  continue healthy diet, exercise, weight loss  Orders:  -     metoprolol tartrate (LOPRESSOR) 25 mg tablet; Take 1 tablet (25 mg total) by mouth 2 (two) times a day  -     lisinopril (ZESTRIL) 10 mg tablet; Take 1 tablet (10 mg total) by mouth daily    Other specified anxiety disorders  Comments:  start zoloft 25 mg daily with hydroxyzine PRN acute anxiety  No SI/HI/AH/VH/manic sx  Orders:  -     sertraline (ZOLOFT) 25 mg tablet; Take 1 tablet (25 mg total) by mouth daily          Restart phentermine daily for morbid obesity    PDMP reviewed without red flags  Follow-up in 1 month for weight check/med refill  BMI Counseling: Body mass index is 60 93 kg/m²  The BMI is above normal  Nutrition recommendations include reducing portion sizes, decreasing overall calorie intake, 3-5 servings of fruits/vegetables daily, reducing fast food intake, consuming healthier snacks, decreasing soda and/or juice intake, moderation in carbohydrate intake, increasing intake of lean protein, reducing intake of saturated fat and trans fat and reducing intake of cholesterol  Exercise recommendations include exercising 3-5 times per week and strength training exercises  Pharmacotherapy was ordered for patient to aid in weight loss  Continue other medications as directed, medications renewed today  Referral to physical therapy for lymphedema management  Continue follow-up with specialty advised use of compression stockings  Continue follow-up with podiatry for calcaneal spurs/plantar fasciitis  Routine labs and OBGYN referral ordered today  Return in about 4 weeks (around 4/16/2020), or if symptoms worsen or fail to improve, for routine follow up, and follow up with specialist as directed  Subjective:        Patient ID: Liang Gutierrez is a 62 y o  female  Chief Complaint   Patient presents with    heal spur    Wants referral       Patient is a 68-year-old female who presents the office today for follow-up  She has past medical history of morbid obesity, lymphedema, hypertension, anxiety  She also has known history of heel spurs and plantar fasciitis, following Podiatry  She reports burning pain bottoms of both feet, worse with for steps in the morning and limit mobility at times  She has tried physical therapy without much relief  She is considering steroid injections per podiatry  She is not a candidate for surgery if for the heel spurs secondary to her lymphedema    She has followed up with specialists for lymphedema, needs referral to physical therapy today for continued care& to aid in use of compression stockings  Her weight has gone up to 389 lb from 361 lb  She has been working on diet with minimal exercise  She has cut down on sodium and cooks own meals, no fast food, soda, etc   She would like to try phentermine again to help her lose some of the weight  She had good results with phentermine in the past, last fill May 2019  She tolerated medication well without side effects  She reports anxiety is relatively controlled with Zoloft 25 mg daily, she reports having increased symptoms some days & reports that her pain and limited mobility does contribute to this  She reports good sleep and appetite, no SI/HI/AH/VH  She is due for labs and OBGYN referral for cervical cancer screening  She denies headache, dizziness, fevers or chills, cough, shortness of breath, chest pain, dyspnea on exertion, abdominal pain, nausea vomiting diarrhea, blood in stool, weakness  She offers no additional complaints or concerns today        The following portions of the patient's history were reviewed and updated as appropriate: allergies, current medications, past family history, past medical history, past social history, past surgical history and problem list     Patient Active Problem List   Diagnosis    Lymphedema    Hypertension    Morbid obesity ([facility] [address] )    Hidradenitis suppurativa    Chronic midline low back pain with left-sided sciatica    Other specified anxiety disorders    Lumbar paraspinal muscle spasm    Borderline hyperlipidemia    Iron deficiency       Current Outpatient Medications   Medication Sig Dispense Refill    albuterol (VENTOLIN HFA) 90 mcg/act inhaler Inhale 2 puffs every 6 (six) hours as needed for wheezing or shortness of breath 1 Inhaler 0    cyclobenzaprine (FLEXERIL) 10 mg tablet take 1 tablet by mouth three times a day if needed for muscle spasm 90 tablet 0    diclofenac sodium (VOLTAREN) 1 % Apply 2 g topically 4 (four) times a day 300 g 2    lisinopril (ZESTRIL) 10 mg tablet Take 1 tablet (10 mg total) by mouth daily 90 tablet 1    metoprolol tartrate (LOPRESSOR) 25 mg tablet Take 1 tablet (25 mg total) by mouth 2 (two) times a day 180 tablet 1    nystatin (MYCOSTATIN) powder Apply topically 3 (three) times a day 60 g 2    sertraline (ZOLOFT) 25 mg tablet Take 1 tablet (25 mg total) by mouth daily 30 tablet 0    phentermine 37 5 MG capsule Take 1 capsule (37 5 mg total) by mouth every morning 30 capsule 0     No current facility-administered medications for this visit           Past Medical History:   Diagnosis Date    Anxiety     Hypertension     Lymphedema 2006    Sciatic leg pain 2006    left side        Past Surgical History:   Procedure Laterality Date     SECTION      TONSILLECTOMY          Social History     Socioeconomic History    Marital status: Single     Spouse name: Not on file    Number of children: Not on file    Years of education: Not on file    Highest education level: Not on file   Occupational History    Not on file   Social Needs    Financial resource strain: Not on file    Food insecurity:     Worry: Not on file     Inability: Not on file    Transportation needs:     Medical: Not on file     Non-medical: Not on file   Tobacco Use    Smoking status: Former Smoker     Types: Cigarettes    Smokeless tobacco: Never Used    Tobacco comment: occasional less than one per day   Substance and Sexual Activity    Alcohol use: No    Drug use: No    Sexual activity: Never   Lifestyle    Physical activity:     Days per week: Not on file     Minutes per session: Not on file    Stress: Not on file   Relationships    Social connections:     Talks on phone: Not on file     Gets together: Not on file     Attends Jainism service: Not on file     Active member of club or organization: Not on file     Attends meetings of clubs or organizations: Not on file     Relationship status: Not on file    Intimate partner violence:     Fear of current or ex partner: Not on file     Emotionally abused: Not on file     Physically abused: Not on file     Forced sexual activity: Not on file   Other Topics Concern    Not on file   Social History Narrative    Not on file        Review of Systems   Constitutional: Negative  Respiratory: Negative  Cardiovascular: Negative  Gastrointestinal: Negative  Musculoskeletal: Positive for arthralgias  Neurological: Negative  Objective:      /80 (BP Location: Right arm, Patient Position: Sitting, Cuff Size: Large)   Pulse 97   Temp 98 6 °F (37 °C) (Tympanic)   Resp 16   Ht 5' 7" (1 702 m)   Wt (!) 176 kg (389 lb)   SpO2 97%   BMI 60 93 kg/m²          Physical Exam   Constitutional: She is oriented to person, place, and time  She appears well-developed and well-nourished  obese   HENT:   Head: Normocephalic and atraumatic  Mouth/Throat: Oropharynx is clear and moist    Eyes: Pupils are equal, round, and reactive to light  Conjunctivae are normal    Neck: Neck supple  Cardiovascular: Normal rate and regular rhythm  Pulmonary/Chest: Effort normal  She has no decreased breath sounds  She has no wheezes  She has rhonchi  She has no rales  Diffuse rhonchi   Abdominal: Soft  Bowel sounds are normal  There is no tenderness  Neurological: She is alert and oriented to person, place, and time  Skin: Skin is warm and dry  Capillary refill takes less than 2 seconds  Psychiatric: She has a normal mood and affect

## 2020-03-22 DIAGNOSIS — M62.830 LUMBAR PARASPINAL MUSCLE SPASM: ICD-10-CM

## 2020-03-23 DIAGNOSIS — M62.830 LUMBAR PARASPINAL MUSCLE SPASM: ICD-10-CM

## 2020-03-23 RX ORDER — CYCLOBENZAPRINE HCL 10 MG
10 TABLET ORAL 3 TIMES DAILY PRN
Qty: 90 TABLET | Refills: 1 | Status: SHIPPED | OUTPATIENT
Start: 2020-03-23 | End: 2020-06-01

## 2020-03-23 RX ORDER — HYDROXYZINE HYDROCHLORIDE 10 MG/1
TABLET, FILM COATED ORAL
Qty: 90 TABLET | Refills: 0 | Status: SHIPPED | OUTPATIENT
Start: 2020-03-23 | End: 2020-04-17 | Stop reason: SDUPTHER

## 2020-03-23 RX ORDER — FERROUS SULFATE TAB EC 324 MG (65 MG FE EQUIVALENT) 324 (65 FE) MG
TABLET DELAYED RESPONSE ORAL
Qty: 60 TABLET | Refills: 0 | Status: SHIPPED | OUTPATIENT
Start: 2020-03-23 | End: 2020-04-17 | Stop reason: SDUPTHER

## 2020-03-23 RX ORDER — GABAPENTIN 300 MG/1
CAPSULE ORAL
Qty: 90 CAPSULE | Refills: 0 | Status: SHIPPED | OUTPATIENT
Start: 2020-03-23 | End: 2020-04-01 | Stop reason: ALTCHOICE

## 2020-03-23 RX ORDER — CYCLOBENZAPRINE HCL 10 MG
TABLET ORAL
Qty: 90 TABLET | Refills: 0 | OUTPATIENT
Start: 2020-03-23

## 2020-03-26 DIAGNOSIS — K92.1 BLOOD IN STOOL: Primary | ICD-10-CM

## 2020-03-26 NOTE — TELEPHONE ENCOUNTER
rcvd fax from [facility] [phone]  suprep bowel prep kit not on formulary     Alternative medication is requested    Routing to pa  What do you suggest?  Thank you

## 2020-03-26 NOTE — TELEPHONE ENCOUNTER
Patient had a poor prep (Miralax/Dulcolax) on last colonoscopy  Would you prefer Suprep for the upcoming colonoscopy on 6/10 with Dr Edyth Lesch? I will mail direction to her home

## 2020-03-31 ENCOUNTER — PREP FOR PROCEDURE (OUTPATIENT)
Dept: SURGERY | Facility: CLINIC | Age: 55
End: 2020-03-31

## 2020-03-31 DIAGNOSIS — E61.1 IRON DEFICIENCY: Primary | ICD-10-CM

## 2020-04-01 ENCOUNTER — TELEMEDICINE (OUTPATIENT)
Dept: FAMILY MEDICINE CLINIC | Facility: CLINIC | Age: 55
End: 2020-04-01
Payer: COMMERCIAL

## 2020-04-01 DIAGNOSIS — S80.822A BLISTER OF LEFT LOWER EXTREMITY, INITIAL ENCOUNTER: ICD-10-CM

## 2020-04-01 DIAGNOSIS — Z01.20 DENTAL EXAMINATION: ICD-10-CM

## 2020-04-01 DIAGNOSIS — M54.42 CHRONIC MIDLINE LOW BACK PAIN WITH LEFT-SIDED SCIATICA: ICD-10-CM

## 2020-04-01 DIAGNOSIS — G89.29 CHRONIC MIDLINE LOW BACK PAIN WITH LEFT-SIDED SCIATICA: ICD-10-CM

## 2020-04-01 DIAGNOSIS — Z12.4 SCREENING FOR CERVICAL CANCER: ICD-10-CM

## 2020-04-01 PROCEDURE — G0071 COMM SVCS BY RHC/FQHC 5 MIN: HCPCS | Performed by: FAMILY MEDICINE

## 2020-04-01 RX ORDER — CEPHALEXIN 500 MG/1
500 CAPSULE ORAL EVERY 6 HOURS SCHEDULED
Qty: 20 CAPSULE | Refills: 0 | Status: SHIPPED | OUTPATIENT
Start: 2020-04-01 | End: 2020-04-06

## 2020-04-01 RX ORDER — NAPROXEN 500 MG/1
500 TABLET ORAL 2 TIMES DAILY WITH MEALS
Qty: 60 TABLET | Refills: 1 | Status: SHIPPED | OUTPATIENT
Start: 2020-04-01 | End: 2020-06-01 | Stop reason: SDUPTHER

## 2020-04-12 DIAGNOSIS — F41.8 OTHER SPECIFIED ANXIETY DISORDERS: ICD-10-CM

## 2020-04-15 RX ORDER — SERTRALINE HYDROCHLORIDE 25 MG/1
TABLET, FILM COATED ORAL
Qty: 30 TABLET | Refills: 2 | Status: SHIPPED | OUTPATIENT
Start: 2020-04-15 | End: 2020-08-10 | Stop reason: SDUPTHER

## 2020-04-17 ENCOUNTER — TELEMEDICINE (OUTPATIENT)
Dept: FAMILY MEDICINE CLINIC | Facility: HOME HEALTHCARE | Age: 55
End: 2020-04-17
Payer: COMMERCIAL

## 2020-04-17 DIAGNOSIS — F41.8 OTHER SPECIFIED ANXIETY DISORDERS: ICD-10-CM

## 2020-04-17 DIAGNOSIS — E61.1 IRON DEFICIENCY: ICD-10-CM

## 2020-04-17 DIAGNOSIS — L03.116 CELLULITIS OF LEFT LOWER EXTREMITY: Primary | ICD-10-CM

## 2020-04-17 DIAGNOSIS — B37.2 YEAST DERMATITIS: ICD-10-CM

## 2020-04-17 PROCEDURE — G0071 COMM SVCS BY RHC/FQHC 5 MIN: HCPCS | Performed by: FAMILY MEDICINE

## 2020-04-17 RX ORDER — HYDROXYZINE HYDROCHLORIDE 10 MG/1
TABLET, FILM COATED ORAL
Qty: 90 TABLET | Refills: 0 | Status: SHIPPED | OUTPATIENT
Start: 2020-04-17 | End: 2020-06-01

## 2020-04-17 RX ORDER — SULFAMETHOXAZOLE AND TRIMETHOPRIM 800; 160 MG/1; MG/1
1 TABLET ORAL EVERY 12 HOURS SCHEDULED
Qty: 14 TABLET | Refills: 0 | Status: SHIPPED | OUTPATIENT
Start: 2020-04-17 | End: 2020-04-24

## 2020-04-17 RX ORDER — FERROUS SULFATE TAB EC 324 MG (65 MG FE EQUIVALENT) 324 (65 FE) MG
324 TABLET DELAYED RESPONSE ORAL
Qty: 60 TABLET | Refills: 0 | Status: SHIPPED | OUTPATIENT
Start: 2020-04-17 | End: 2020-06-01

## 2020-04-17 RX ORDER — NYSTATIN 100000 [USP'U]/G
POWDER TOPICAL 3 TIMES DAILY
Qty: 60 G | Refills: 2 | Status: SHIPPED | OUTPATIENT
Start: 2020-04-17 | End: 2021-01-01

## 2020-04-22 ENCOUNTER — TELEMEDICINE (OUTPATIENT)
Dept: FAMILY MEDICINE CLINIC | Facility: HOME HEALTHCARE | Age: 55
End: 2020-04-22
Payer: COMMERCIAL

## 2020-04-22 DIAGNOSIS — L03.116 CELLULITIS OF LEFT ANTERIOR LOWER LEG: Primary | ICD-10-CM

## 2020-04-22 DIAGNOSIS — E66.01 MORBID OBESITY (HCC): ICD-10-CM

## 2020-04-22 PROCEDURE — G0071 COMM SVCS BY RHC/FQHC 5 MIN: HCPCS | Performed by: FAMILY MEDICINE

## 2020-04-22 RX ORDER — CLOTRIMAZOLE 1 %
1 CREAM (GRAM) TOPICAL DAILY PRN
COMMUNITY
End: 2020-09-21 | Stop reason: SDUPTHER

## 2020-04-22 RX ORDER — PHENTERMINE HYDROCHLORIDE 37.5 MG/1
37.5 CAPSULE ORAL EVERY MORNING
Qty: 30 CAPSULE | Refills: 0 | Status: SHIPPED | OUTPATIENT
Start: 2020-04-22 | End: 2020-06-01

## 2020-06-01 DIAGNOSIS — F41.8 OTHER SPECIFIED ANXIETY DISORDERS: ICD-10-CM

## 2020-06-01 DIAGNOSIS — E61.1 IRON DEFICIENCY: ICD-10-CM

## 2020-06-01 DIAGNOSIS — M62.830 LUMBAR PARASPINAL MUSCLE SPASM: ICD-10-CM

## 2020-06-01 DIAGNOSIS — G89.29 CHRONIC MIDLINE LOW BACK PAIN WITH LEFT-SIDED SCIATICA: ICD-10-CM

## 2020-06-01 DIAGNOSIS — Z11.59 SCREENING FOR VIRAL DISEASE: Primary | ICD-10-CM

## 2020-06-01 DIAGNOSIS — M54.42 CHRONIC MIDLINE LOW BACK PAIN WITH LEFT-SIDED SCIATICA: ICD-10-CM

## 2020-06-01 DIAGNOSIS — E66.01 MORBID OBESITY (HCC): ICD-10-CM

## 2020-06-01 RX ORDER — CYCLOBENZAPRINE HCL 10 MG
TABLET ORAL
Qty: 90 TABLET | Refills: 1 | Status: SHIPPED | OUTPATIENT
Start: 2020-06-01 | End: 2020-08-06

## 2020-06-01 RX ORDER — FERROUS SULFATE TAB EC 324 MG (65 MG FE EQUIVALENT) 324 (65 FE) MG
TABLET DELAYED RESPONSE ORAL
Qty: 60 TABLET | Refills: 2 | Status: SHIPPED | OUTPATIENT
Start: 2020-06-01 | End: 2020-10-19

## 2020-06-01 RX ORDER — HYDROXYZINE HYDROCHLORIDE 10 MG/1
TABLET, FILM COATED ORAL
Qty: 90 TABLET | Refills: 0 | Status: SHIPPED | OUTPATIENT
Start: 2020-06-01 | End: 2020-08-06

## 2020-06-01 RX ORDER — NAPROXEN 500 MG/1
500 TABLET ORAL 2 TIMES DAILY WITH MEALS
Qty: 60 TABLET | Refills: 2 | Status: SHIPPED | OUTPATIENT
Start: 2020-06-01 | End: 2020-08-06 | Stop reason: ALTCHOICE

## 2020-06-01 RX ORDER — PHENTERMINE HYDROCHLORIDE 37.5 MG/1
CAPSULE ORAL
Qty: 30 CAPSULE | Refills: 0 | Status: SHIPPED | OUTPATIENT
Start: 2020-06-01 | End: 2021-01-01 | Stop reason: ALTCHOICE

## 2020-06-01 RX ORDER — SERTRALINE HYDROCHLORIDE 25 MG/1
25 TABLET, FILM COATED ORAL DAILY
Qty: 30 TABLET | Refills: 2 | OUTPATIENT
Start: 2020-06-01

## 2020-06-02 ENCOUNTER — TELEPHONE (OUTPATIENT)
Dept: GASTROENTEROLOGY | Facility: CLINIC | Age: 55
End: 2020-06-02

## 2020-06-17 ENCOUNTER — OFFICE VISIT (OUTPATIENT)
Dept: URGENT CARE | Facility: CLINIC | Age: 55
End: 2020-06-17
Payer: COMMERCIAL

## 2020-06-17 ENCOUNTER — APPOINTMENT (OUTPATIENT)
Dept: RADIOLOGY | Facility: CLINIC | Age: 55
End: 2020-06-17
Payer: COMMERCIAL

## 2020-06-17 VITALS
SYSTOLIC BLOOD PRESSURE: 147 MMHG | HEART RATE: 77 BPM | HEIGHT: 67 IN | RESPIRATION RATE: 22 BRPM | OXYGEN SATURATION: 96 % | WEIGHT: 293 LBS | BODY MASS INDEX: 45.99 KG/M2 | DIASTOLIC BLOOD PRESSURE: 83 MMHG | TEMPERATURE: 99 F

## 2020-06-17 DIAGNOSIS — S83.91XA SPRAIN OF RIGHT KNEE, UNSPECIFIED LIGAMENT, INITIAL ENCOUNTER: Primary | ICD-10-CM

## 2020-06-17 DIAGNOSIS — S83.91XA SPRAIN OF RIGHT KNEE, UNSPECIFIED LIGAMENT, INITIAL ENCOUNTER: ICD-10-CM

## 2020-06-17 PROCEDURE — 99214 OFFICE O/P EST MOD 30 MIN: CPT | Performed by: FAMILY MEDICINE

## 2020-06-17 PROCEDURE — 73564 X-RAY EXAM KNEE 4 OR MORE: CPT

## 2020-06-17 PROCEDURE — S9088 SERVICES PROVIDED IN URGENT: HCPCS | Performed by: FAMILY MEDICINE

## 2020-06-17 RX ORDER — CELECOXIB 200 MG/1
200 CAPSULE ORAL 2 TIMES DAILY
Qty: 30 CAPSULE | Refills: 0 | Status: SHIPPED | OUTPATIENT
Start: 2020-06-17 | End: 2020-08-06 | Stop reason: SDUPTHER

## 2020-06-23 ENCOUNTER — OFFICE VISIT (OUTPATIENT)
Dept: OBGYN CLINIC | Facility: CLINIC | Age: 55
End: 2020-06-23
Payer: COMMERCIAL

## 2020-06-23 VITALS
SYSTOLIC BLOOD PRESSURE: 135 MMHG | HEART RATE: 81 BPM | HEIGHT: 67 IN | WEIGHT: 293 LBS | DIASTOLIC BLOOD PRESSURE: 86 MMHG | TEMPERATURE: 96.7 F | BODY MASS INDEX: 45.99 KG/M2

## 2020-06-23 DIAGNOSIS — M17.11 PRIMARY OSTEOARTHRITIS OF RIGHT KNEE: Primary | ICD-10-CM

## 2020-06-23 DIAGNOSIS — S83.91XA SPRAIN OF RIGHT KNEE, UNSPECIFIED LIGAMENT, INITIAL ENCOUNTER: ICD-10-CM

## 2020-06-23 DIAGNOSIS — M17.12 PRIMARY OSTEOARTHRITIS OF LEFT KNEE: ICD-10-CM

## 2020-06-23 PROCEDURE — 99203 OFFICE O/P NEW LOW 30 MIN: CPT | Performed by: ORTHOPAEDIC SURGERY

## 2020-06-23 PROCEDURE — 3008F BODY MASS INDEX DOCD: CPT | Performed by: ORTHOPAEDIC SURGERY

## 2020-06-23 PROCEDURE — 1036F TOBACCO NON-USER: CPT | Performed by: ORTHOPAEDIC SURGERY

## 2020-06-23 PROCEDURE — 3079F DIAST BP 80-89 MM HG: CPT | Performed by: ORTHOPAEDIC SURGERY

## 2020-06-23 PROCEDURE — 3075F SYST BP GE 130 - 139MM HG: CPT | Performed by: ORTHOPAEDIC SURGERY

## 2020-07-09 DIAGNOSIS — F41.8 OTHER SPECIFIED ANXIETY DISORDERS: ICD-10-CM

## 2020-08-05 DIAGNOSIS — M62.830 LUMBAR PARASPINAL MUSCLE SPASM: ICD-10-CM

## 2020-08-06 ENCOUNTER — TELEPHONE (OUTPATIENT)
Dept: GASTROENTEROLOGY | Facility: CLINIC | Age: 55
End: 2020-08-06

## 2020-08-06 ENCOUNTER — DOCUMENTATION (OUTPATIENT)
Dept: FAMILY MEDICINE CLINIC | Facility: CLINIC | Age: 55
End: 2020-08-06

## 2020-08-06 ENCOUNTER — TELEMEDICINE (OUTPATIENT)
Dept: FAMILY MEDICINE CLINIC | Facility: CLINIC | Age: 55
End: 2020-08-06
Payer: COMMERCIAL

## 2020-08-06 DIAGNOSIS — E66.01 MORBID OBESITY WITH BMI OF 60.0-69.9, ADULT (HCC): ICD-10-CM

## 2020-08-06 DIAGNOSIS — F41.8 OTHER SPECIFIED ANXIETY DISORDERS: ICD-10-CM

## 2020-08-06 DIAGNOSIS — M17.11 PRIMARY OSTEOARTHRITIS OF RIGHT KNEE: ICD-10-CM

## 2020-08-06 DIAGNOSIS — I89.0 LYMPHEDEMA: Primary | ICD-10-CM

## 2020-08-06 PROCEDURE — 99213 OFFICE O/P EST LOW 20 MIN: CPT | Performed by: FAMILY MEDICINE

## 2020-08-06 RX ORDER — CELECOXIB 200 MG/1
200 CAPSULE ORAL 2 TIMES DAILY
Qty: 60 CAPSULE | Refills: 2 | Status: SHIPPED | OUTPATIENT
Start: 2020-08-06 | End: 2020-11-09 | Stop reason: SDUPTHER

## 2020-08-06 RX ORDER — HYDROXYZINE HYDROCHLORIDE 10 MG/1
TABLET, FILM COATED ORAL
Qty: 90 TABLET | Refills: 0 | Status: SHIPPED | OUTPATIENT
Start: 2020-08-06 | End: 2020-09-18

## 2020-08-06 RX ORDER — CYCLOBENZAPRINE HCL 10 MG
TABLET ORAL
Qty: 90 TABLET | Refills: 1 | Status: SHIPPED | OUTPATIENT
Start: 2020-08-06 | End: 2020-10-19

## 2020-08-10 DIAGNOSIS — F41.8 OTHER SPECIFIED ANXIETY DISORDERS: ICD-10-CM

## 2020-08-10 RX ORDER — SERTRALINE HYDROCHLORIDE 25 MG/1
25 TABLET, FILM COATED ORAL DAILY
Qty: 30 TABLET | Refills: 2 | Status: SHIPPED | OUTPATIENT
Start: 2020-08-10 | End: 2020-11-16

## 2020-08-11 ENCOUNTER — HOSPITAL ENCOUNTER (OUTPATIENT)
Dept: PERIOP | Facility: HOSPITAL | Age: 55
Setting detail: OUTPATIENT SURGERY
Discharge: HOME | End: 2020-08-11
Attending: INTERNAL MEDICINE

## 2020-08-26 ENCOUNTER — TELEPHONE (OUTPATIENT)
Dept: OBGYN CLINIC | Facility: HOSPITAL | Age: 55
End: 2020-08-26

## 2020-08-26 NOTE — TELEPHONE ENCOUNTER
TO BE COMPLETED BY :     Office location appointment scheduled: [place]    Date of First Appointment scheduled: 08/28/20    Additional Comments:

## 2020-08-28 ENCOUNTER — OFFICE VISIT (OUTPATIENT)
Dept: OBGYN CLINIC | Facility: CLINIC | Age: 55
End: 2020-08-28
Payer: COMMERCIAL

## 2020-08-28 VITALS
BODY MASS INDEX: 45.99 KG/M2 | HEART RATE: 82 BPM | DIASTOLIC BLOOD PRESSURE: 81 MMHG | TEMPERATURE: 96.9 F | HEIGHT: 67 IN | SYSTOLIC BLOOD PRESSURE: 124 MMHG | WEIGHT: 293 LBS

## 2020-08-28 DIAGNOSIS — M17.11 PRIMARY OSTEOARTHRITIS OF RIGHT KNEE: Primary | ICD-10-CM

## 2020-08-28 DIAGNOSIS — M17.12 PRIMARY OSTEOARTHRITIS OF LEFT KNEE: ICD-10-CM

## 2020-08-28 PROCEDURE — 3079F DIAST BP 80-89 MM HG: CPT | Performed by: ORTHOPAEDIC SURGERY

## 2020-08-28 PROCEDURE — 3008F BODY MASS INDEX DOCD: CPT | Performed by: ORTHOPAEDIC SURGERY

## 2020-08-28 PROCEDURE — 1036F TOBACCO NON-USER: CPT | Performed by: ORTHOPAEDIC SURGERY

## 2020-08-28 PROCEDURE — 20610 DRAIN/INJ JOINT/BURSA W/O US: CPT | Performed by: ORTHOPAEDIC SURGERY

## 2020-08-28 PROCEDURE — 3074F SYST BP LT 130 MM HG: CPT | Performed by: ORTHOPAEDIC SURGERY

## 2020-08-28 NOTE — PROGRESS NOTES
Assessment/Plan:  Assessment/Plan   Diagnoses and all orders for this visit:    Primary osteoarthritis of right knee  -     Large joint arthrocentesis    Primary osteoarthritis of left knee  -     Large joint arthrocentesis    Patient was provided with bilateral Synvisc-One viscosupplementation injection of the knees  Discussed with patient that she can repeat this treatment every 6 months  She should continue working with weight management on weight loss  Continue crutches as needed until she is able to walk without a limp  Prior authorization for repeat injections were ordered for 6 months from today  Should she have any recurrence of her symptoms, she can contact the office and schedule a sooner appointment  Subjective:   Patient ID: Elver Schultz is a 62 y o  female  HPI  Patient presents today for follow-up evaluation and Synvisc-One viscosupplementation injections for bilateral primary osteoarthritis of the knees  Patient reports that she has had less symptom exacerbation with weight-bearing activity, however she still uses bilateral crutches for support  She states that she experiences feelings of weakness and giving way in the right knee almost daily  She denies any recent bruising, numbness, tingling, or mechanical locking      The following portions of the patient's history were reviewed and updated as appropriate: allergies, current medications, past family history, past medical history, past social history, past surgical history and problem list     Past Medical History:   Diagnosis Date    Anxiety     Hypertension     Lymphedema 2006    Sciatic leg pain 2006    left side     Past Surgical History:   Procedure Laterality Date     SECTION      TONSILLECTOMY       Family History   Problem Relation Age of Onset   Caldera Cancer Mother     Hypertension Mother     COPD Father     Diabetes Father      Social History     Socioeconomic History    Marital status: Single     Spouse name: None    Number of children: None    Years of education: None    Highest education level: None   Occupational History    None   Social Needs    Financial resource strain: None    Food insecurity     Worry: None     Inability: None    Transportation needs     Medical: None     Non-medical: None   Tobacco Use    Smoking status: Former Smoker     Types: Cigarettes    Smokeless tobacco: Never Used    Tobacco comment: occasional less than one per day   Substance and Sexual Activity    Alcohol use: No    Drug use: No    Sexual activity: Never   Lifestyle    Physical activity     Days per week: None     Minutes per session: None    Stress: None   Relationships    Social connections     Talks on phone: None     Gets together: None     Attends Congregation service: None     Active member of club or organization: None     Attends meetings of clubs or organizations: None     Relationship status: None    Intimate partner violence     Fear of current or ex partner: None     Emotionally abused: None     Physically abused: None     Forced sexual activity: None   Other Topics Concern    None   Social History Narrative    None       Current Outpatient Medications:     albuterol (VENTOLIN HFA) 90 mcg/act inhaler, Inhale 2 puffs every 6 (six) hours as needed for wheezing or shortness of breath, Disp: 1 Inhaler, Rfl: 0    celecoxib (CeleBREX) 200 mg capsule, Take 1 capsule (200 mg total) by mouth 2 (two) times a day, Disp: 60 capsule, Rfl: 2    clotrimazole (LOTRIMIN) 1 % cream, Apply 1 application topically daily as needed, Disp: , Rfl:     cyclobenzaprine (FLEXERIL) 10 mg tablet, take 1 tablet by mouth three times a day if needed for muscle spasm, Disp: 90 tablet, Rfl: 1    ferrous sulfate 324 (65 Fe) mg, take 1 tablet by mouth twice a day before meals, Disp: 60 tablet, Rfl: 2    hydrOXYzine HCL (ATARAX) 10 mg tablet, TAKE 1 TO 2 TABLETS BY MOUTH EVERY 8 HOURS IF NEEDED FOR ANXIETY, Disp: 90 tablet, Rfl: 0    lisinopril (ZESTRIL) 10 mg tablet, Take 1 tablet (10 mg total) by mouth daily, Disp: 90 tablet, Rfl: 1    metoprolol tartrate (LOPRESSOR) 25 mg tablet, Take 1 tablet (25 mg total) by mouth 2 (two) times a day, Disp: 180 tablet, Rfl: 1    mupirocin (BACTROBAN) 2 % ointment, Apply topically 3 (three) times a day (Patient not taking: Reported on 6/17/2020), Disp: 22 g, Rfl: 0    nystatin (MYCOSTATIN) powder, Apply topically 3 (three) times a day, Disp: 60 g, Rfl: 2    phentermine 37 5 MG capsule, take 1 capsule by mouth every morning, Disp: 30 capsule, Rfl: 0    polyethylene glycol (GOLYTELY) 4000 mL solution, Take 4,000 mL by mouth once for 1 dose (Patient not taking: Reported on 4/22/2020), Disp: 4000 mL, Rfl: 0    sertraline (ZOLOFT) 25 mg tablet, Take 1 tablet (25 mg total) by mouth daily, Disp: 30 tablet, Rfl: 2    Allergies   Allergen Reactions    Bee Venom Anaphylaxis, Shortness Of Breath and Swelling       Review of Systems    Objective:  /81 (BP Location: Right arm, Patient Position: Sitting, Cuff Size: Large)   Pulse 82   Temp (!) 96 9 °F (36 1 °C)   Ht 5' 7" (1 702 m)   Wt (!) 180 kg (396 lb)   BMI 62 02 kg/m²     Ortho Exam  Bilateral knees -   Patient ambulates with bilateral crutches  Venous stasis dermatitis noted in bilateral lower extremities  Notable soft tissue swelling secondary to lymphedema and lipedema  Patient tender to palpation in medial and lateral joint line  Able to demonstrate active knee extension  ROM 0°-110°  Knees are stable to varus and valgus stress  Distal pulses not palpable due to soft tissue proliferation    Physical Exam    Imaging:  No new imaging reviewed this visit     Large joint arthrocentesis: bilateral knee  Date/Time: 8/28/2020 8:48 AM  Consent given by: patient  Supporting Documentation  Indications: pain and joint swelling   Procedure Details  Location: knee - bilateral knee  Needle size: 22 G  Ultrasound guidance: no  Approach: anterolateral    Medications (Right): 48 mg hylan 48 MG/6MLMedications (Left): 48 mg hylan 48 MG/6ML   Patient tolerance: patient tolerated the procedure well with no immediate complications  Dressing:  Sterile dressing applied        Scribe Attestation    I,:   Santino Zuñiga am acting as a scribe while in the presence of the attending physician :        I,:   Summer Arias MD personally performed the services described in this documentation    as scribed in my presence :

## 2020-09-08 ENCOUNTER — TELEMEDICINE (OUTPATIENT)
Dept: FAMILY MEDICINE CLINIC | Facility: CLINIC | Age: 55
End: 2020-09-08
Payer: COMMERCIAL

## 2020-09-08 DIAGNOSIS — M53.3 COCCYGEAL PAIN, ACUTE: Primary | ICD-10-CM

## 2020-09-08 PROCEDURE — 99213 OFFICE O/P EST LOW 20 MIN: CPT | Performed by: FAMILY MEDICINE

## 2020-09-08 RX ORDER — PREDNISONE 20 MG/1
TABLET ORAL
Qty: 18 TABLET | Refills: 0 | Status: SHIPPED | OUTPATIENT
Start: 2020-09-08 | End: 2020-11-09 | Stop reason: ALTCHOICE

## 2020-09-08 NOTE — PROGRESS NOTES
Virtual Regular Visit      Assessment/Plan:    Problem List Items Addressed This Visit     None      Visit Diagnoses     Coccygeal pain, acute    -  Primary    Relevant Medications    predniSONE 20 mg tablet    Misc  Devices (Carex Coccyx Cushion) MISC    Other Relevant Orders    XR sacrum and coccyx        -advised patient to hold celebrex until prednisone is completed  Recommend donut pillow and leaning forward to relief pain  X-ray to evaluate for fracture and/or osteoarthritis given patient history  -patient states she is also due for blood work which she will complete when getting x-ray  -follow up in 1 month for annual physical or sooner if pain does not improve        Reason for visit is   Chief Complaint   Patient presents with    Follow-up     Pt is complaining of pain in her tailbone, she denies any falls that would cause this     Virtual Regular Visit        Encounter provider Marcelino Pierre PA-C    Provider located at [address] [id number]      Recent Visits  No visits were found meeting these conditions  Showing recent visits within past 7 days and meeting all other requirements     Today's Visits  Date Type Provider Dept   09/08/20 Telemedicine Cindy Smiley PA-C [facility] [address] [facility] today's visits and meeting all other requirements     Future Appointments  No visits were found meeting these conditions  Showing future appointments within next 150 days and meeting all other requirements        The patient was identified by name and date of birth  Quan Moore was informed that this is a telemedicine visit and that the visit is being conducted through [employer] and patient was informed that this is not a secure, HIPAA-complaint platform  She agrees to proceed     My office door was closed  No one else was in the room  She acknowledged consent and understanding of privacy and security of the video platform  The patient has agreed to participate and understands they can discontinue the visit at any time  Patient is aware this is a billable service  Subjective  Celestine Mckinnon is a 62 y o  female who presents with complains of acute tailbone pain for the past 3 days  She states she has not injured the area of fallen  It is a sharp constant pain that does not radiate  It is worse with sitting and relieved with laying down or walking  She denies associated numbness or tingling  No bowel/bladder incontinence  She has known osteoarthrits is multiple locations and is taking celebrex BID without relief of symptoms  She follow with orthopaedics           HPI     Past Medical History:   Diagnosis Date    Anxiety     Hypertension     Lymphedema 2006    Sciatic leg pain 2006    left side       Past Surgical History:   Procedure Laterality Date     SECTION      TONSILLECTOMY         Current Outpatient Medications   Medication Sig Dispense Refill    albuterol (VENTOLIN HFA) 90 mcg/act inhaler Inhale 2 puffs every 6 (six) hours as needed for wheezing or shortness of breath 1 Inhaler 0    celecoxib (CeleBREX) 200 mg capsule Take 1 capsule (200 mg total) by mouth 2 (two) times a day 60 capsule 2    clotrimazole (LOTRIMIN) 1 % cream Apply 1 application topically daily as needed      cyclobenzaprine (FLEXERIL) 10 mg tablet take 1 tablet by mouth three times a day if needed for muscle spasm 90 tablet 1    ferrous sulfate 324 (65 Fe) mg take 1 tablet by mouth twice a day before meals 60 tablet 2    hydrOXYzine HCL (ATARAX) 10 mg tablet TAKE 1 TO 2 TABLETS BY MOUTH EVERY 8 HOURS IF NEEDED FOR ANXIETY 90 tablet 0    lisinopril (ZESTRIL) 10 mg tablet Take 1 tablet (10 mg total) by mouth daily 90 tablet 1    metoprolol tartrate (LOPRESSOR) 25 mg tablet Take 1 tablet (25 mg total) by mouth 2 (two) times a day 180 tablet 1    nystatin (MYCOSTATIN) powder Apply topically 3 (three) times a day 60 g 2    sertraline (ZOLOFT) 25 mg tablet Take 1 tablet (25 mg total) by mouth daily 30 tablet 2    Misc  Devices (Carex Coccyx Cushion) MISC by Does not apply route 2 (two) times a day for 10 days 1 each 0    mupirocin (BACTROBAN) 2 % ointment Apply topically 3 (three) times a day (Patient not taking: Reported on 6/17/2020) 22 g 0    phentermine 37 5 MG capsule take 1 capsule by mouth every morning (Patient not taking: Reported on 9/8/2020) 30 capsule 0    polyethylene glycol (GOLYTELY) 4000 mL solution Take 4,000 mL by mouth once for 1 dose (Patient not taking: Reported on 4/22/2020) 4000 mL 0    predniSONE 20 mg tablet Take 3 tablets once daily for 3 days  Take 2 tablets once daily for 3 days  Take 1 tablet once daily for 3 days  18 tablet 0     No current facility-administered medications for this visit  Allergies   Allergen Reactions    Bee Venom Anaphylaxis, Shortness Of Breath and Swelling       Review of Systems   Genitourinary:        Denies bowel or bladder incontinence    Musculoskeletal: Positive for arthralgias and back pain  Negative for gait problem, joint swelling, myalgias, neck pain and neck stiffness  Skin: Negative for rash and wound  Neurological: Negative for weakness and numbness  Video Exam    There were no vitals filed for this visit  Physical Exam  Constitutional:       Appearance: Normal appearance  She is obese  HENT:      Head: Normocephalic and atraumatic  Pulmonary:      Effort: Pulmonary effort is normal  No respiratory distress  Neurological:      General: No focal deficit present  Mental Status: She is alert and oriented to person, place, and time  Mental status is at baseline  Psychiatric:         Mood and Affect: Mood normal          Behavior: Behavior normal          Thought Content:  Thought content normal          Judgment: Judgment normal           I spent 15 minutes with patient today in which greater than 50% of the time was spent in counseling/coordination of care regarding assessment and plan of care      VIRTUAL VISIT [address] acknowledges that she has consented to an online visit or consultation  She understands that the online visit is based solely on information provided by her, and that, in the absence of a face-to-face physical evaluation by the physician, the diagnosis she receives is both limited and provisional in terms of accuracy and completeness  This is not intended to replace a full medical face-to-face evaluation by the physician  Fadumo Marcano understands and accepts these terms

## 2020-09-09 ENCOUNTER — APPOINTMENT (OUTPATIENT)
Dept: LAB | Facility: HOSPITAL | Age: 55
End: 2020-09-09
Payer: COMMERCIAL

## 2020-09-09 ENCOUNTER — HOSPITAL ENCOUNTER (OUTPATIENT)
Dept: RADIOLOGY | Facility: HOSPITAL | Age: 55
Discharge: HOME | End: 2020-09-09
Payer: COMMERCIAL

## 2020-09-09 DIAGNOSIS — Z13.1 SCREENING FOR DIABETES MELLITUS: ICD-10-CM

## 2020-09-09 DIAGNOSIS — I10 ESSENTIAL HYPERTENSION: ICD-10-CM

## 2020-09-09 DIAGNOSIS — M53.3 COCCYGEAL PAIN, ACUTE: ICD-10-CM

## 2020-09-09 DIAGNOSIS — Z11.59 NEED FOR HEPATITIS C SCREENING TEST: ICD-10-CM

## 2020-09-09 DIAGNOSIS — Z11.4 ENCOUNTER FOR SCREENING FOR HIV: ICD-10-CM

## 2020-09-09 DIAGNOSIS — E78.5 BORDERLINE HYPERLIPIDEMIA: ICD-10-CM

## 2020-09-09 LAB
ALBUMIN SERPL BCP-MCNC: 3.3 G/DL (ref 3.5–5)
ALP SERPL-CCNC: 107 U/L (ref 46–116)
ALT SERPL W P-5'-P-CCNC: 32 U/L (ref 12–78)
ANION GAP SERPL CALCULATED.3IONS-SCNC: 9 MMOL/L (ref 4–13)
AST SERPL W P-5'-P-CCNC: 9 U/L (ref 5–45)
BASOPHILS # BLD AUTO: 0.06 THOUSANDS/ΜL (ref 0–0.1)
BASOPHILS NFR BLD AUTO: 0 % (ref 0–1)
BILIRUB SERPL-MCNC: 0.4 MG/DL (ref 0.2–1)
BILIRUB UR QL STRIP: NEGATIVE
BUN SERPL-MCNC: 21 MG/DL (ref 5–25)
CALCIUM SERPL-MCNC: 9.2 MG/DL (ref 8.3–10.1)
CHLORIDE SERPL-SCNC: 104 MMOL/L (ref 100–108)
CHOLEST SERPL-MCNC: 198 MG/DL (ref 50–200)
CLARITY UR: NORMAL
CO2 SERPL-SCNC: 26 MMOL/L (ref 21–32)
COLOR UR: YELLOW
CREAT SERPL-MCNC: 1.08 MG/DL (ref 0.6–1.3)
CREAT UR-MCNC: 140 MG/DL
EOSINOPHIL # BLD AUTO: 0.07 THOUSAND/ΜL (ref 0–0.61)
EOSINOPHIL NFR BLD AUTO: 1 % (ref 0–6)
ERYTHROCYTE [DISTWIDTH] IN BLOOD BY AUTOMATED COUNT: 13.8 % (ref 11.6–15.1)
EST. AVERAGE GLUCOSE BLD GHB EST-MCNC: 117 MG/DL
GFR SERPL CREATININE-BSD FRML MDRD: 58 ML/MIN/1.73SQ M
GLUCOSE P FAST SERPL-MCNC: 103 MG/DL (ref 65–99)
GLUCOSE UR STRIP-MCNC: NEGATIVE MG/DL
HBA1C MFR BLD: 5.7 %
HCT VFR BLD AUTO: 41.2 % (ref 34.8–46.1)
HCV AB SER QL: NORMAL
HDLC SERPL-MCNC: 59 MG/DL
HGB BLD-MCNC: 13.1 G/DL (ref 11.5–15.4)
HGB UR QL STRIP.AUTO: NEGATIVE
IMM GRANULOCYTES # BLD AUTO: 0.07 THOUSAND/UL (ref 0–0.2)
IMM GRANULOCYTES NFR BLD AUTO: 1 % (ref 0–2)
KETONES UR STRIP-MCNC: NEGATIVE MG/DL
LDLC SERPL CALC-MCNC: 126 MG/DL (ref 0–100)
LEUKOCYTE ESTERASE UR QL STRIP: NEGATIVE
LYMPHOCYTES # BLD AUTO: 2.46 THOUSANDS/ΜL (ref 0.6–4.47)
LYMPHOCYTES NFR BLD AUTO: 18 % (ref 14–44)
MCH RBC QN AUTO: 27.2 PG (ref 26.8–34.3)
MCHC RBC AUTO-ENTMCNC: 31.8 G/DL (ref 31.4–37.4)
MCV RBC AUTO: 86 FL (ref 82–98)
MICROALBUMIN UR-MCNC: 10.3 MG/L (ref 0–20)
MICROALBUMIN/CREAT 24H UR: 7 MG/G CREATININE (ref 0–30)
MONOCYTES # BLD AUTO: 0.93 THOUSAND/ΜL (ref 0.17–1.22)
MONOCYTES NFR BLD AUTO: 7 % (ref 4–12)
NEUTROPHILS # BLD AUTO: 9.76 THOUSANDS/ΜL (ref 1.85–7.62)
NEUTS SEG NFR BLD AUTO: 73 % (ref 43–75)
NITRITE UR QL STRIP: NEGATIVE
NRBC BLD AUTO-RTO: 0 /100 WBCS
PH UR STRIP.AUTO: 6 [PH]
PLATELET # BLD AUTO: 437 THOUSANDS/UL (ref 149–390)
PMV BLD AUTO: 8.8 FL (ref 8.9–12.7)
POTASSIUM SERPL-SCNC: 4.1 MMOL/L (ref 3.5–5.3)
PROT SERPL-MCNC: 7.9 G/DL (ref 6.4–8.2)
PROT UR STRIP-MCNC: NEGATIVE MG/DL
RBC # BLD AUTO: 4.82 MILLION/UL (ref 3.81–5.12)
SODIUM SERPL-SCNC: 139 MMOL/L (ref 136–145)
SP GR UR STRIP.AUTO: 1.02 (ref 1–1.03)
TRIGL SERPL-MCNC: 67 MG/DL
TSH SERPL DL<=0.05 MIU/L-ACNC: 0.91 UIU/ML (ref 0.36–3.74)
UROBILINOGEN UR QL STRIP.AUTO: 0.2 E.U./DL
WBC # BLD AUTO: 13.35 THOUSAND/UL (ref 4.31–10.16)

## 2020-09-09 PROCEDURE — 83036 HEMOGLOBIN GLYCOSYLATED A1C: CPT

## 2020-09-09 PROCEDURE — 72220 X-RAY EXAM SACRUM TAILBONE: CPT

## 2020-09-09 PROCEDURE — 85025 COMPLETE CBC W/AUTO DIFF WBC: CPT

## 2020-09-09 PROCEDURE — 87389 HIV-1 AG W/HIV-1&-2 AB AG IA: CPT

## 2020-09-09 PROCEDURE — 80053 COMPREHEN METABOLIC PANEL: CPT

## 2020-09-09 PROCEDURE — 82043 UR ALBUMIN QUANTITATIVE: CPT

## 2020-09-09 PROCEDURE — 86803 HEPATITIS C AB TEST: CPT

## 2020-09-09 PROCEDURE — 80061 LIPID PANEL: CPT

## 2020-09-09 PROCEDURE — 84443 ASSAY THYROID STIM HORMONE: CPT

## 2020-09-09 PROCEDURE — 81003 URINALYSIS AUTO W/O SCOPE: CPT | Performed by: FAMILY MEDICINE

## 2020-09-09 PROCEDURE — 82570 ASSAY OF URINE CREATININE: CPT

## 2020-09-09 PROCEDURE — 36415 COLL VENOUS BLD VENIPUNCTURE: CPT

## 2020-09-10 LAB — HIV 1+2 AB+HIV1 P24 AG SERPL QL IA: NORMAL

## 2020-09-14 ENCOUNTER — TELEPHONE (OUTPATIENT)
Dept: FAMILY MEDICINE CLINIC | Facility: CLINIC | Age: 55
End: 2020-09-14

## 2020-09-14 NOTE — TELEPHONE ENCOUNTER
----- Message from Norma Evans PA-C sent at 9/14/2020  8:56 AM EDT -----  Please let patient know her x-ray showed arthritis in her lumbar/lower spine  No fractures in her tailbone  Thanks!

## 2020-09-17 DIAGNOSIS — F41.8 OTHER SPECIFIED ANXIETY DISORDERS: ICD-10-CM

## 2020-09-18 RX ORDER — HYDROXYZINE HYDROCHLORIDE 10 MG/1
TABLET, FILM COATED ORAL
Qty: 90 TABLET | Refills: 0 | Status: SHIPPED | OUTPATIENT
Start: 2020-09-18 | End: 2020-10-22

## 2020-09-21 DIAGNOSIS — B37.2 YEAST DERMATITIS: Primary | ICD-10-CM

## 2020-09-21 RX ORDER — CLOTRIMAZOLE 1 %
1 CREAM (GRAM) TOPICAL DAILY PRN
Qty: 30 G | Refills: 1 | Status: SHIPPED | OUTPATIENT
Start: 2020-09-21 | End: 2020-09-22 | Stop reason: ALTCHOICE

## 2020-09-22 ENCOUNTER — TELEPHONE (OUTPATIENT)
Dept: FAMILY MEDICINE CLINIC | Facility: CLINIC | Age: 55
End: 2020-09-22

## 2020-09-22 DIAGNOSIS — B37.2 YEAST DERMATITIS: Primary | ICD-10-CM

## 2020-09-22 RX ORDER — CLOTRIMAZOLE AND BETAMETHASONE DIPROPIONATE 10; .64 MG/G; MG/G
CREAM TOPICAL 2 TIMES DAILY
Qty: 30 G | Refills: 1 | Status: SHIPPED | OUTPATIENT
Start: 2020-09-22 | End: 2020-11-09 | Stop reason: SDUPTHER

## 2020-09-22 NOTE — TELEPHONE ENCOUNTER
Pt is concerned the cream that was called in is the wrong cream  She said she is used to getting clotrimazole-betamethasone and she thinks this was an error and that it won't work as well  Please advise

## 2020-10-19 DIAGNOSIS — M62.830 LUMBAR PARASPINAL MUSCLE SPASM: ICD-10-CM

## 2020-10-19 DIAGNOSIS — E61.1 IRON DEFICIENCY: ICD-10-CM

## 2020-10-19 RX ORDER — CYCLOBENZAPRINE HCL 10 MG
TABLET ORAL
Qty: 90 TABLET | Refills: 2 | Status: SHIPPED | OUTPATIENT
Start: 2020-10-19 | End: 2021-03-15

## 2020-10-19 RX ORDER — FERROUS SULFATE TAB EC 324 MG (65 MG FE EQUIVALENT) 324 (65 FE) MG
TABLET DELAYED RESPONSE ORAL
Qty: 60 TABLET | Refills: 2 | Status: SHIPPED | OUTPATIENT
Start: 2020-10-19 | End: 2022-01-01 | Stop reason: HOSPADM

## 2020-10-22 DIAGNOSIS — I10 HYPERTENSION, UNSPECIFIED TYPE: ICD-10-CM

## 2020-10-22 DIAGNOSIS — F41.8 OTHER SPECIFIED ANXIETY DISORDERS: ICD-10-CM

## 2020-10-22 RX ORDER — HYDROXYZINE HYDROCHLORIDE 10 MG/1
TABLET, FILM COATED ORAL
Qty: 90 TABLET | Refills: 2 | Status: SHIPPED | OUTPATIENT
Start: 2020-10-22 | End: 2021-02-15

## 2020-10-22 RX ORDER — LISINOPRIL 10 MG/1
10 TABLET ORAL DAILY
Qty: 90 TABLET | Refills: 1 | Status: SHIPPED | OUTPATIENT
Start: 2020-10-22 | End: 2021-02-09 | Stop reason: SDUPTHER

## 2020-11-03 RX ORDER — SERTRALINE HYDROCHLORIDE 25 MG/1
TABLET, FILM COATED ORAL
Qty: 30 TABLET | Refills: 2 | OUTPATIENT
Start: 2020-11-03

## 2020-11-09 ENCOUNTER — OFFICE VISIT (OUTPATIENT)
Dept: FAMILY MEDICINE CLINIC | Facility: CLINIC | Age: 55
End: 2020-11-09
Payer: COMMERCIAL

## 2020-11-09 VITALS
SYSTOLIC BLOOD PRESSURE: 116 MMHG | TEMPERATURE: 97.3 F | WEIGHT: 293 LBS | HEART RATE: 82 BPM | HEIGHT: 67 IN | BODY MASS INDEX: 45.99 KG/M2 | RESPIRATION RATE: 18 BRPM | DIASTOLIC BLOOD PRESSURE: 88 MMHG | OXYGEN SATURATION: 97 %

## 2020-11-09 DIAGNOSIS — Z12.11 COLON CANCER SCREENING: ICD-10-CM

## 2020-11-09 DIAGNOSIS — I10 HYPERTENSION, UNSPECIFIED TYPE: ICD-10-CM

## 2020-11-09 DIAGNOSIS — R22.31 MASS OF RIGHT UPPER EXTREMITY: ICD-10-CM

## 2020-11-09 DIAGNOSIS — M17.11 PRIMARY OSTEOARTHRITIS OF RIGHT KNEE: ICD-10-CM

## 2020-11-09 DIAGNOSIS — B37.2 YEAST DERMATITIS: ICD-10-CM

## 2020-11-09 DIAGNOSIS — S31.109A OPEN WOUND OF ANTERIOR ABDOMINAL WALL, INITIAL ENCOUNTER: Primary | ICD-10-CM

## 2020-11-09 PROCEDURE — T1015 CLINIC SERVICE: HCPCS | Performed by: FAMILY MEDICINE

## 2020-11-09 RX ORDER — CLOTRIMAZOLE AND BETAMETHASONE DIPROPIONATE 10; .64 MG/G; MG/G
CREAM TOPICAL 2 TIMES DAILY
Qty: 30 G | Refills: 3 | Status: SHIPPED | OUTPATIENT
Start: 2020-11-09 | End: 2021-03-19

## 2020-11-09 RX ORDER — CLINDAMYCIN PHOSPHATE 11.9 MG/ML
SOLUTION TOPICAL 2 TIMES DAILY
Qty: 30 ML | Refills: 0 | Status: SHIPPED | OUTPATIENT
Start: 2020-11-09 | End: 2021-03-30 | Stop reason: ALTCHOICE

## 2020-11-09 RX ORDER — CELECOXIB 200 MG/1
200 CAPSULE ORAL 2 TIMES DAILY
Qty: 60 CAPSULE | Refills: 2 | Status: SHIPPED | OUTPATIENT
Start: 2020-11-09 | End: 2021-03-15

## 2020-11-14 DIAGNOSIS — F41.8 OTHER SPECIFIED ANXIETY DISORDERS: ICD-10-CM

## 2020-11-16 ENCOUNTER — TELEPHONE (OUTPATIENT)
Dept: OBGYN CLINIC | Facility: CLINIC | Age: 55
End: 2020-11-16

## 2020-11-16 DIAGNOSIS — R06.02 SOB (SHORTNESS OF BREATH): ICD-10-CM

## 2020-11-16 DIAGNOSIS — I10 HYPERTENSION, UNSPECIFIED TYPE: ICD-10-CM

## 2020-11-16 RX ORDER — SERTRALINE HYDROCHLORIDE 25 MG/1
TABLET, FILM COATED ORAL
Qty: 30 TABLET | Refills: 2 | Status: SHIPPED | OUTPATIENT
Start: 2020-11-16 | End: 2021-02-11

## 2020-11-17 DIAGNOSIS — M17.11 PRIMARY OSTEOARTHRITIS OF RIGHT KNEE: Primary | ICD-10-CM

## 2020-11-17 DIAGNOSIS — M17.12 PRIMARY OSTEOARTHRITIS OF LEFT KNEE: ICD-10-CM

## 2020-11-18 ENCOUNTER — OFFICE VISIT (OUTPATIENT)
Dept: WOUND CARE | Facility: CLINIC | Age: 55
End: 2020-11-18
Payer: COMMERCIAL

## 2020-11-18 VITALS
TEMPERATURE: 96.8 F | RESPIRATION RATE: 22 BRPM | HEART RATE: 91 BPM | WEIGHT: 293 LBS | DIASTOLIC BLOOD PRESSURE: 85 MMHG | SYSTOLIC BLOOD PRESSURE: 153 MMHG | HEIGHT: 67 IN | BODY MASS INDEX: 45.99 KG/M2

## 2020-11-18 DIAGNOSIS — S21.002A OPEN WOUND OF LEFT BREAST, INITIAL ENCOUNTER: Primary | ICD-10-CM

## 2020-11-18 DIAGNOSIS — S31.109A OPEN WOUND OF ANTERIOR ABDOMINAL WALL, INITIAL ENCOUNTER: ICD-10-CM

## 2020-11-18 DIAGNOSIS — L73.2 HIDRADENITIS SUPPURATIVA: ICD-10-CM

## 2020-11-18 DIAGNOSIS — L98.9 DISORDER OF THE SKIN AND SUBCUTANEOUS TISSUE, UNSPECIFIED: ICD-10-CM

## 2020-11-18 DIAGNOSIS — I89.0 LYMPHEDEMA: ICD-10-CM

## 2020-11-18 DIAGNOSIS — E66.01 MORBID OBESITY (HCC): ICD-10-CM

## 2020-11-18 PROCEDURE — 99204 OFFICE O/P NEW MOD 45 MIN: CPT | Performed by: NURSE PRACTITIONER

## 2020-11-18 PROCEDURE — 99213 OFFICE O/P EST LOW 20 MIN: CPT | Performed by: NURSE PRACTITIONER

## 2020-11-18 RX ORDER — LIDOCAINE 40 MG/G
CREAM TOPICAL ONCE
Status: COMPLETED | OUTPATIENT
Start: 2020-11-18 | End: 2020-11-18

## 2020-11-18 RX ORDER — NITROFURANTOIN 25; 75 MG/1; MG/1
CAPSULE ORAL
COMMUNITY
End: 2021-01-01 | Stop reason: ALTCHOICE

## 2020-11-18 RX ADMIN — LIDOCAINE: 40 CREAM TOPICAL at 09:49

## 2020-11-22 NOTE — PROGRESS NOTES
PT Evaluation     Today's date: 2020  Patient name: David Shepherd  : 1965  MRN: [id number]  Referring provider: ORALIA Mota*  Dx:   Encounter Diagnosis     ICD-10-CM    1  Primary osteoarthritis of right knee  M17 11    2  Primary osteoarthritis of left knee  M17 12                   Assessment  Assessment details: The patient is a 63 y/o female who presents to PT with diagnosis of primary OA of B knees  She has complaints of pain with most pain being   She also demonstrates deficits with decreased ROM and strength, decreased flexibility, limited standing tolerance, difficulty with stair negotiation, decreased balance and proprioception and pain with completing her ADLs  She remains I with all her ADLs though she has pain with completing them  The patient would benefit from continued PT to address deficits and improve function  Tx to include ROM, stretching, strengthening, modalities, HEP, pt education, postural ed, lifting/body mechanics, neuro re-ed, balance/proprioception Te, MT and equipment  Impairments: abnormal gait, abnormal or restricted ROM, activity intolerance, impaired balance, impaired physical strength, lacks appropriate home exercise program, pain with function and weight-bearing intolerance  Other impairment: decreased flexibility  Functional limitations: difficulty with stair negotiationUnderstanding of Dx/Px/POC: good   Prognosis: fair    Goals  STGs:  1  Initiate and complete HEP with verbal cues  2   Decrease B knee pain by 25% in 4 weeks  3   Improve B knee ROM by 5-10 degrees in 4 weeks  4   Improve BLE strength by 1/2 grade in 4 weeks  LTGs:  1  Patient to be I with HEP in 12 weeks  2  Improve B knee ROM to degrees in 12 weeks to improve function  3  Improve BLE strength to 5/5 t/o in 12 weeks to improve function  4  Decrease B knee pain to < or = to /10 with activity in 12 weeks to improve function    5   Patient to ambulate with normalized gait pattern in 12 weeks  6   Stair negotiation is improved to PLOF in 12 weeks  7   Recreational performance is improved to PLOF in 12 weeks  8   ADL performance is improved to PLOF in 12 weeks  9   Work performance is improved to maximal level of function in 12 weeks  Plan  Plan details: Modalities and therapy interventions prn      Patient would benefit from: skilled physical therapy  Planned modality interventions: cryotherapy and thermotherapy: hydrocollator packs  Planned therapy interventions: manual therapy, balance, balance/weight bearing training, neuromuscular re-education, patient education, postural training, self care, strengthening, therapeutic activities, stretching, therapeutic exercise, flexibility and home exercise program  Frequency: 2x week  Duration in weeks: 12  Plan of Care beginning date: 12/2/2020  Plan of Care expiration date: 3/2/2021  Treatment plan discussed with: patient        Subjective Evaluation    Pain  Location: B Knees    Patient Goals  Patient goals for therapy: increased motion, decreased pain and increased strength          Objective           Precautions: None  Re-Eval DUE: 1/2/21    Manuals 12/2/20       BLE NV                               Neuro Re-Ed        BOSU Lunges        SLS        Tandem Stance                                        Ther Ex        NuStep        HR/TR        SLR x 3        Marches        Squats        Ham Curls        TKE w/TBand        Stepups F/L        LAQ        Hams Curl with TBand        QSets        SLR        Hip Add w/Ball        Hip Abd with TBand        SAQ        Heel Slides                        Ther Activity                        Gait Training                        Modalities        HP/CP prn

## 2020-12-01 ENCOUNTER — CONSULT (OUTPATIENT)
Dept: SURGERY | Facility: CLINIC | Age: 55
End: 2020-12-01
Payer: COMMERCIAL

## 2020-12-01 VITALS
SYSTOLIC BLOOD PRESSURE: 123 MMHG | TEMPERATURE: 98.5 F | HEIGHT: 67 IN | WEIGHT: 293 LBS | BODY MASS INDEX: 45.99 KG/M2 | HEART RATE: 91 BPM | DIASTOLIC BLOOD PRESSURE: 85 MMHG

## 2020-12-01 DIAGNOSIS — R22.31 MASS OF RIGHT UPPER EXTREMITY: Primary | ICD-10-CM

## 2020-12-01 PROCEDURE — 3008F BODY MASS INDEX DOCD: CPT | Performed by: SURGERY

## 2020-12-01 PROCEDURE — 3079F DIAST BP 80-89 MM HG: CPT | Performed by: SURGERY

## 2020-12-01 PROCEDURE — 4004F PT TOBACCO SCREEN RCVD TLK: CPT | Performed by: SURGERY

## 2020-12-01 PROCEDURE — 3074F SYST BP LT 130 MM HG: CPT | Performed by: SURGERY

## 2020-12-01 PROCEDURE — 99244 OFF/OP CNSLTJ NEW/EST MOD 40: CPT | Performed by: SURGERY

## 2020-12-01 RX ORDER — HEPARIN SODIUM 5000 [USP'U]/ML
5000 INJECTION, SOLUTION INTRAVENOUS; SUBCUTANEOUS ONCE
Status: CANCELLED | OUTPATIENT
Start: 2020-12-08 | End: 2020-12-01

## 2020-12-01 RX ORDER — CHLORHEXIDINE GLUCONATE 4 G/100ML
SOLUTION TOPICAL DAILY PRN
Status: CANCELLED | OUTPATIENT
Start: 2020-12-01

## 2020-12-01 RX ORDER — SODIUM CHLORIDE, SODIUM LACTATE, POTASSIUM CHLORIDE, CALCIUM CHLORIDE 600; 310; 30; 20 MG/100ML; MG/100ML; MG/100ML; MG/100ML
125 INJECTION, SOLUTION INTRAVENOUS CONTINUOUS
Status: CANCELLED | OUTPATIENT
Start: 2020-12-08

## 2020-12-02 ENCOUNTER — APPOINTMENT (OUTPATIENT)
Dept: PHYSICAL THERAPY | Facility: HOME HEALTHCARE | Age: 55
End: 2020-12-02
Payer: COMMERCIAL

## 2020-12-04 ENCOUNTER — HOSPITAL ENCOUNTER (OUTPATIENT)
Dept: ULTRASOUND IMAGING | Facility: HOSPITAL | Age: 55
Discharge: HOME | End: 2020-12-04
Attending: SURGERY
Payer: COMMERCIAL

## 2020-12-04 ENCOUNTER — HOSPITAL ENCOUNTER (OUTPATIENT)
Dept: NON INVASIVE DIAGNOSTICS | Facility: HOSPITAL | Age: 55
Discharge: HOME | End: 2020-12-04
Attending: SURGERY
Payer: COMMERCIAL

## 2020-12-04 DIAGNOSIS — R22.31 MASS OF RIGHT UPPER EXTREMITY: ICD-10-CM

## 2020-12-04 LAB
ATRIAL RATE: 80 BPM
P AXIS: 80 DEGREES
PR INTERVAL: 194 MS
QRS AXIS: 76 DEGREES
QRSD INTERVAL: 72 MS
QT INTERVAL: 362 MS
QTC INTERVAL: 417 MS
T WAVE AXIS: 74 DEGREES
VENTRICULAR RATE: 80 BPM

## 2020-12-04 PROCEDURE — 93005 ELECTROCARDIOGRAM TRACING: CPT

## 2020-12-04 PROCEDURE — 76882 US LMTD JT/FCL EVL NVASC XTR: CPT

## 2020-12-04 PROCEDURE — 93010 ELECTROCARDIOGRAM REPORT: CPT | Performed by: INTERNAL MEDICINE

## 2020-12-08 ENCOUNTER — HOSPITAL ENCOUNTER (OUTPATIENT)
Facility: HOSPITAL | Age: 55
Setting detail: OUTPATIENT SURGERY
Discharge: HOME | End: 2020-12-08
Attending: SURGERY | Admitting: SURGERY
Payer: COMMERCIAL

## 2020-12-08 ENCOUNTER — ANESTHESIA EVENT (OUTPATIENT)
Dept: PERIOP | Facility: HOSPITAL | Age: 55
End: 2020-12-08
Payer: COMMERCIAL

## 2020-12-08 ENCOUNTER — ANESTHESIA (OUTPATIENT)
Dept: PERIOP | Facility: HOSPITAL | Age: 55
End: 2020-12-08
Payer: COMMERCIAL

## 2020-12-08 VITALS
RESPIRATION RATE: 18 BRPM | HEART RATE: 66 BPM | SYSTOLIC BLOOD PRESSURE: 145 MMHG | DIASTOLIC BLOOD PRESSURE: 56 MMHG | OXYGEN SATURATION: 97 % | TEMPERATURE: 97 F | WEIGHT: 293 LBS | BODY MASS INDEX: 45.99 KG/M2 | HEIGHT: 67 IN

## 2020-12-08 VITALS — HEART RATE: 79 BPM

## 2020-12-08 DIAGNOSIS — R22.31 MASS OF RIGHT UPPER EXTREMITY: ICD-10-CM

## 2020-12-08 PROCEDURE — 88304 TISSUE EXAM BY PATHOLOGIST: CPT | Performed by: PATHOLOGY

## 2020-12-08 PROCEDURE — 24075 EXC ARM/ELBOW LES SC < 3 CM: CPT | Performed by: PHYSICIAN ASSISTANT

## 2020-12-08 PROCEDURE — 24075 EXC ARM/ELBOW LES SC < 3 CM: CPT | Performed by: SURGERY

## 2020-12-08 RX ORDER — FENTANYL CITRATE/PF 50 MCG/ML
50 SYRINGE (ML) INJECTION
Status: DISCONTINUED | OUTPATIENT
Start: 2020-12-08 | End: 2020-12-08 | Stop reason: HOSPADM

## 2020-12-08 RX ORDER — HEPARIN SODIUM 5000 [USP'U]/ML
5000 INJECTION, SOLUTION INTRAVENOUS; SUBCUTANEOUS ONCE
Status: COMPLETED | OUTPATIENT
Start: 2020-12-08 | End: 2020-12-08

## 2020-12-08 RX ORDER — ONDANSETRON 2 MG/ML
4 INJECTION INTRAMUSCULAR; INTRAVENOUS EVERY 8 HOURS PRN
Status: DISCONTINUED | OUTPATIENT
Start: 2020-12-08 | End: 2020-12-08 | Stop reason: HOSPADM

## 2020-12-08 RX ORDER — MAGNESIUM HYDROXIDE 1200 MG/15ML
LIQUID ORAL AS NEEDED
Status: DISCONTINUED | OUTPATIENT
Start: 2020-12-08 | End: 2020-12-08 | Stop reason: HOSPADM

## 2020-12-08 RX ORDER — LIDOCAINE HYDROCHLORIDE 10 MG/ML
INJECTION, SOLUTION EPIDURAL; INFILTRATION; INTRACAUDAL; PERINEURAL AS NEEDED
Status: DISCONTINUED | OUTPATIENT
Start: 2020-12-08 | End: 2020-12-08

## 2020-12-08 RX ORDER — MORPHINE SULFATE 4 MG/ML
2 INJECTION, SOLUTION INTRAMUSCULAR; INTRAVENOUS EVERY 4 HOURS PRN
Status: DISCONTINUED | OUTPATIENT
Start: 2020-12-08 | End: 2020-12-08 | Stop reason: HOSPADM

## 2020-12-08 RX ORDER — OXYCODONE HYDROCHLORIDE AND ACETAMINOPHEN 5; 325 MG/1; MG/1
2 TABLET ORAL EVERY 4 HOURS PRN
Status: DISCONTINUED | OUTPATIENT
Start: 2020-12-08 | End: 2020-12-08 | Stop reason: HOSPADM

## 2020-12-08 RX ORDER — FENTANYL CITRATE 50 UG/ML
INJECTION, SOLUTION INTRAMUSCULAR; INTRAVENOUS AS NEEDED
Status: DISCONTINUED | OUTPATIENT
Start: 2020-12-08 | End: 2020-12-08

## 2020-12-08 RX ORDER — METOCLOPRAMIDE HYDROCHLORIDE 5 MG/ML
10 INJECTION INTRAMUSCULAR; INTRAVENOUS ONCE AS NEEDED
Status: DISCONTINUED | OUTPATIENT
Start: 2020-12-08 | End: 2020-12-08 | Stop reason: HOSPADM

## 2020-12-08 RX ORDER — PROPOFOL 10 MG/ML
INJECTION, EMULSION INTRAVENOUS CONTINUOUS PRN
Status: DISCONTINUED | OUTPATIENT
Start: 2020-12-08 | End: 2020-12-08

## 2020-12-08 RX ORDER — PROPOFOL 10 MG/ML
INJECTION, EMULSION INTRAVENOUS AS NEEDED
Status: DISCONTINUED | OUTPATIENT
Start: 2020-12-08 | End: 2020-12-08

## 2020-12-08 RX ORDER — SODIUM CHLORIDE, SODIUM LACTATE, POTASSIUM CHLORIDE, CALCIUM CHLORIDE 600; 310; 30; 20 MG/100ML; MG/100ML; MG/100ML; MG/100ML
125 INJECTION, SOLUTION INTRAVENOUS CONTINUOUS
Status: DISCONTINUED | OUTPATIENT
Start: 2020-12-08 | End: 2020-12-08

## 2020-12-08 RX ORDER — ONDANSETRON 2 MG/ML
4 INJECTION INTRAMUSCULAR; INTRAVENOUS ONCE AS NEEDED
Status: DISCONTINUED | OUTPATIENT
Start: 2020-12-08 | End: 2020-12-08 | Stop reason: HOSPADM

## 2020-12-08 RX ORDER — SODIUM CHLORIDE, SODIUM LACTATE, POTASSIUM CHLORIDE, CALCIUM CHLORIDE 600; 310; 30; 20 MG/100ML; MG/100ML; MG/100ML; MG/100ML
125 INJECTION, SOLUTION INTRAVENOUS CONTINUOUS
Status: DISCONTINUED | OUTPATIENT
Start: 2020-12-08 | End: 2020-12-08 | Stop reason: HOSPADM

## 2020-12-08 RX ORDER — BUPIVACAINE HYDROCHLORIDE 5 MG/ML
INJECTION, SOLUTION EPIDURAL; INTRACAUDAL AS NEEDED
Status: DISCONTINUED | OUTPATIENT
Start: 2020-12-08 | End: 2020-12-08 | Stop reason: HOSPADM

## 2020-12-08 RX ORDER — CHLORHEXIDINE GLUCONATE 4 G/100ML
SOLUTION TOPICAL DAILY PRN
Status: DISCONTINUED | OUTPATIENT
Start: 2020-12-08 | End: 2020-12-08 | Stop reason: HOSPADM

## 2020-12-08 RX ORDER — MIDAZOLAM HYDROCHLORIDE 2 MG/2ML
INJECTION, SOLUTION INTRAMUSCULAR; INTRAVENOUS AS NEEDED
Status: DISCONTINUED | OUTPATIENT
Start: 2020-12-08 | End: 2020-12-08

## 2020-12-08 RX ADMIN — FENTANYL CITRATE 50 MCG: 50 INJECTION, SOLUTION INTRAMUSCULAR; INTRAVENOUS at 10:42

## 2020-12-08 RX ADMIN — CEFAZOLIN 3000 MG: 1 INJECTION, POWDER, FOR SOLUTION INTRAMUSCULAR; INTRAVENOUS at 10:39

## 2020-12-08 RX ADMIN — PROPOFOL 75 MCG/KG/MIN: 10 INJECTION, EMULSION INTRAVENOUS at 10:53

## 2020-12-08 RX ADMIN — SODIUM CHLORIDE, SODIUM LACTATE, POTASSIUM CHLORIDE, AND CALCIUM CHLORIDE 125 ML/HR: .6; .31; .03; .02 INJECTION, SOLUTION INTRAVENOUS at 09:49

## 2020-12-08 RX ADMIN — HEPARIN SODIUM 5000 UNITS: 5000 INJECTION INTRAVENOUS; SUBCUTANEOUS at 09:56

## 2020-12-08 RX ADMIN — MIDAZOLAM HYDROCHLORIDE 2 MG: 1 INJECTION, SOLUTION INTRAMUSCULAR; INTRAVENOUS at 10:44

## 2020-12-08 RX ADMIN — LIDOCAINE HYDROCHLORIDE 50 MG: 10 INJECTION, SOLUTION EPIDURAL; INFILTRATION; INTRACAUDAL; PERINEURAL at 10:47

## 2020-12-08 RX ADMIN — PROPOFOL 80 MG: 10 INJECTION, EMULSION INTRAVENOUS at 10:47

## 2020-12-08 RX ADMIN — PROPOFOL 50 MG: 10 INJECTION, EMULSION INTRAVENOUS at 10:49

## 2020-12-08 RX ADMIN — FENTANYL CITRATE 50 MCG: 50 INJECTION, SOLUTION INTRAMUSCULAR; INTRAVENOUS at 10:59

## 2020-12-08 RX ADMIN — PROPOFOL 50 MG: 10 INJECTION, EMULSION INTRAVENOUS at 10:53

## 2020-12-10 ENCOUNTER — EVALUATION (OUTPATIENT)
Dept: PHYSICAL THERAPY | Facility: HOME HEALTHCARE | Age: 55
End: 2020-12-10
Payer: COMMERCIAL

## 2020-12-10 DIAGNOSIS — M17.12 PRIMARY OSTEOARTHRITIS OF LEFT KNEE: Primary | ICD-10-CM

## 2020-12-10 DIAGNOSIS — M17.11 PRIMARY OSTEOARTHRITIS OF RIGHT KNEE: ICD-10-CM

## 2020-12-10 PROCEDURE — 97162 PT EVAL MOD COMPLEX 30 MIN: CPT | Performed by: PHYSICAL THERAPIST

## 2020-12-15 ENCOUNTER — OFFICE VISIT (OUTPATIENT)
Dept: PHYSICAL THERAPY | Facility: HOME HEALTHCARE | Age: 55
End: 2020-12-15
Payer: COMMERCIAL

## 2020-12-15 DIAGNOSIS — M17.11 PRIMARY OSTEOARTHRITIS OF RIGHT KNEE: ICD-10-CM

## 2020-12-15 DIAGNOSIS — M17.12 PRIMARY OSTEOARTHRITIS OF LEFT KNEE: Primary | ICD-10-CM

## 2020-12-15 PROCEDURE — 97110 THERAPEUTIC EXERCISES: CPT

## 2020-12-17 ENCOUNTER — APPOINTMENT (OUTPATIENT)
Dept: PHYSICAL THERAPY | Facility: HOME HEALTHCARE | Age: 55
End: 2020-12-17
Payer: COMMERCIAL

## 2020-12-18 ENCOUNTER — APPOINTMENT (OUTPATIENT)
Dept: PHYSICAL THERAPY | Facility: HOME HEALTHCARE | Age: 55
End: 2020-12-18
Payer: COMMERCIAL

## 2020-12-22 ENCOUNTER — APPOINTMENT (OUTPATIENT)
Dept: PHYSICAL THERAPY | Facility: HOME HEALTHCARE | Age: 55
End: 2020-12-22
Payer: COMMERCIAL

## 2020-12-24 ENCOUNTER — APPOINTMENT (OUTPATIENT)
Dept: PHYSICAL THERAPY | Facility: HOME HEALTHCARE | Age: 55
End: 2020-12-24
Payer: COMMERCIAL

## 2020-12-29 ENCOUNTER — APPOINTMENT (OUTPATIENT)
Dept: PHYSICAL THERAPY | Facility: HOME HEALTHCARE | Age: 55
End: 2020-12-29
Payer: COMMERCIAL

## 2020-12-29 ENCOUNTER — TELEMEDICINE (OUTPATIENT)
Dept: FAMILY MEDICINE CLINIC | Facility: CLINIC | Age: 55
End: 2020-12-29
Payer: COMMERCIAL

## 2020-12-29 DIAGNOSIS — J01.00 ACUTE NON-RECURRENT MAXILLARY SINUSITIS: ICD-10-CM

## 2020-12-29 DIAGNOSIS — J01.00 ACUTE NON-RECURRENT MAXILLARY SINUSITIS: Primary | ICD-10-CM

## 2020-12-29 PROCEDURE — U0003 INFECTIOUS AGENT DETECTION BY NUCLEIC ACID (DNA OR RNA); SEVERE ACUTE RESPIRATORY SYNDROME CORONAVIRUS 2 (SARS-COV-2) (CORONAVIRUS DISEASE [COVID-19]), AMPLIFIED PROBE TECHNIQUE, MAKING USE OF HIGH THROUGHPUT TECHNOLOGIES AS DESCRIBED BY CMS-2020-01-R: HCPCS | Performed by: PHYSICIAN ASSISTANT

## 2020-12-29 PROCEDURE — 99213 OFFICE O/P EST LOW 20 MIN: CPT | Performed by: FAMILY MEDICINE

## 2020-12-29 RX ORDER — FLUTICASONE PROPIONATE 50 MCG
1 SPRAY, SUSPENSION (ML) NASAL DAILY
Qty: 1 BOTTLE | Refills: 5 | Status: SHIPPED | OUTPATIENT
Start: 2020-12-29 | End: 2021-01-01

## 2020-12-29 RX ORDER — AMOXICILLIN AND CLAVULANATE POTASSIUM 875; 125 MG/1; MG/1
1 TABLET, FILM COATED ORAL EVERY 12 HOURS SCHEDULED
Qty: 14 TABLET | Refills: 0 | Status: SHIPPED | OUTPATIENT
Start: 2020-12-29 | End: 2021-01-05

## 2020-12-30 ENCOUNTER — TELEPHONE (OUTPATIENT)
Dept: FAMILY MEDICINE CLINIC | Facility: CLINIC | Age: 55
End: 2020-12-30

## 2020-12-30 LAB — SARS-COV-2 RNA SPEC QL NAA+PROBE: DETECTED

## 2020-12-30 NOTE — RESULT ENCOUNTER NOTE
Please call the patient and let her know that her COVID-19 swab was positive  Continue symptomatic treatment  Advise that she implement home isolation measures including      Staying home  Stay in a specific "sick room" or area and away from other people or animals, including pets  Wear a mask when leaving your room  Use a separate bathroom, if available  Wipe down all commonly touched surfaces with household   Please schedule follow up video visit tomorrow

## 2020-12-31 ENCOUNTER — TELEMEDICINE (OUTPATIENT)
Dept: FAMILY MEDICINE CLINIC | Facility: CLINIC | Age: 55
End: 2020-12-31
Payer: COMMERCIAL

## 2020-12-31 ENCOUNTER — APPOINTMENT (OUTPATIENT)
Dept: PHYSICAL THERAPY | Facility: HOME HEALTHCARE | Age: 55
End: 2020-12-31
Payer: COMMERCIAL

## 2020-12-31 DIAGNOSIS — U07.1 COVID-19: Primary | ICD-10-CM

## 2020-12-31 PROCEDURE — G0071 COMM SVCS BY RHC/FQHC 5 MIN: HCPCS | Performed by: FAMILY MEDICINE

## 2021-01-01 ENCOUNTER — TELEMEDICINE (OUTPATIENT)
Dept: FAMILY MEDICINE CLINIC | Facility: CLINIC | Age: 56
End: 2021-01-01
Payer: COMMERCIAL

## 2021-01-01 ENCOUNTER — OFFICE VISIT (OUTPATIENT)
Dept: FAMILY MEDICINE CLINIC | Facility: CLINIC | Age: 56
End: 2021-01-01
Payer: COMMERCIAL

## 2021-01-01 ENCOUNTER — TELEPHONE (OUTPATIENT)
Dept: FAMILY MEDICINE CLINIC | Facility: CLINIC | Age: 56
End: 2021-01-01

## 2021-01-01 ENCOUNTER — TELEPHONE (OUTPATIENT)
Dept: BARIATRICS | Facility: CLINIC | Age: 56
End: 2021-01-01

## 2021-01-01 ENCOUNTER — TELEMEDICINE (OUTPATIENT)
Dept: BARIATRICS | Facility: CLINIC | Age: 56
End: 2021-01-01
Payer: COMMERCIAL

## 2021-01-01 ENCOUNTER — OFFICE VISIT (OUTPATIENT)
Dept: WOUND CARE | Facility: CLINIC | Age: 56
End: 2021-01-01
Payer: COMMERCIAL

## 2021-01-01 VITALS
TEMPERATURE: 96.1 F | DIASTOLIC BLOOD PRESSURE: 72 MMHG | SYSTOLIC BLOOD PRESSURE: 113 MMHG | RESPIRATION RATE: 18 BRPM | HEART RATE: 91 BPM

## 2021-01-01 VITALS
SYSTOLIC BLOOD PRESSURE: 138 MMHG | WEIGHT: 293 LBS | TEMPERATURE: 96.1 F | HEIGHT: 67 IN | OXYGEN SATURATION: 99 % | BODY MASS INDEX: 45.99 KG/M2 | HEART RATE: 80 BPM | DIASTOLIC BLOOD PRESSURE: 86 MMHG

## 2021-01-01 VITALS
OXYGEN SATURATION: 93 % | DIASTOLIC BLOOD PRESSURE: 76 MMHG | TEMPERATURE: 98.6 F | WEIGHT: 293 LBS | BODY MASS INDEX: 63.02 KG/M2 | SYSTOLIC BLOOD PRESSURE: 136 MMHG | HEART RATE: 76 BPM | RESPIRATION RATE: 20 BRPM

## 2021-01-01 VITALS
SYSTOLIC BLOOD PRESSURE: 135 MMHG | HEART RATE: 119 BPM | TEMPERATURE: 97.2 F | DIASTOLIC BLOOD PRESSURE: 84 MMHG | RESPIRATION RATE: 24 BRPM

## 2021-01-01 DIAGNOSIS — G43.009 MIGRAINE WITHOUT AURA AND WITHOUT STATUS MIGRAINOSUS, NOT INTRACTABLE: ICD-10-CM

## 2021-01-01 DIAGNOSIS — B34.9 VIRAL SYNDROME: ICD-10-CM

## 2021-01-01 DIAGNOSIS — E66.01 MORBID OBESITY WITH BMI OF 60.0-69.9, ADULT (HCC): ICD-10-CM

## 2021-01-01 DIAGNOSIS — R10.9 ABDOMINAL CRAMPING: ICD-10-CM

## 2021-01-01 DIAGNOSIS — R11.0 NAUSEA: ICD-10-CM

## 2021-01-01 DIAGNOSIS — R11.0 NAUSEA: Primary | ICD-10-CM

## 2021-01-01 DIAGNOSIS — J40 BRONCHITIS: ICD-10-CM

## 2021-01-01 DIAGNOSIS — R06.02 SOB (SHORTNESS OF BREATH): ICD-10-CM

## 2021-01-01 DIAGNOSIS — I10 ESSENTIAL HYPERTENSION: Primary | ICD-10-CM

## 2021-01-01 DIAGNOSIS — S31.109A OPEN WOUND OF ANTERIOR ABDOMINAL WALL, INITIAL ENCOUNTER: ICD-10-CM

## 2021-01-01 DIAGNOSIS — G89.29 CHRONIC MIDLINE LOW BACK PAIN WITH LEFT-SIDED SCIATICA: ICD-10-CM

## 2021-01-01 DIAGNOSIS — E78.5 BORDERLINE HYPERLIPIDEMIA: ICD-10-CM

## 2021-01-01 DIAGNOSIS — L73.2 HIDRADENITIS SUPPURATIVA: Primary | ICD-10-CM

## 2021-01-01 DIAGNOSIS — M17.11 PRIMARY OSTEOARTHRITIS OF RIGHT KNEE: ICD-10-CM

## 2021-01-01 DIAGNOSIS — K21.9 GASTROESOPHAGEAL REFLUX DISEASE, UNSPECIFIED WHETHER ESOPHAGITIS PRESENT: ICD-10-CM

## 2021-01-01 DIAGNOSIS — Z12.4 SCREENING FOR CERVICAL CANCER: ICD-10-CM

## 2021-01-01 DIAGNOSIS — B37.2 YEAST DERMATITIS: ICD-10-CM

## 2021-01-01 DIAGNOSIS — M62.830 LUMBAR PARASPINAL MUSCLE SPASM: ICD-10-CM

## 2021-01-01 DIAGNOSIS — I10 ESSENTIAL HYPERTENSION: ICD-10-CM

## 2021-01-01 DIAGNOSIS — S21.002A OPEN WOUND OF LEFT BREAST, INITIAL ENCOUNTER: ICD-10-CM

## 2021-01-01 DIAGNOSIS — I10 HYPERTENSION, UNSPECIFIED TYPE: ICD-10-CM

## 2021-01-01 DIAGNOSIS — S71.109A MULTIPLE OPEN WOUNDS OF THIGH: ICD-10-CM

## 2021-01-01 DIAGNOSIS — Z12.31 ENCOUNTER FOR SCREENING MAMMOGRAM FOR MALIGNANT NEOPLASM OF BREAST: Primary | ICD-10-CM

## 2021-01-01 DIAGNOSIS — R30.0 DYSURIA: ICD-10-CM

## 2021-01-01 DIAGNOSIS — B36.9 FUNGAL DERMATITIS: Primary | ICD-10-CM

## 2021-01-01 DIAGNOSIS — R63.5 ABNORMAL WEIGHT GAIN: Primary | ICD-10-CM

## 2021-01-01 DIAGNOSIS — Z12.11 SCREENING FOR COLON CANCER: ICD-10-CM

## 2021-01-01 DIAGNOSIS — F41.9 ANXIETY: ICD-10-CM

## 2021-01-01 DIAGNOSIS — F41.8 OTHER SPECIFIED ANXIETY DISORDERS: ICD-10-CM

## 2021-01-01 DIAGNOSIS — S71.109A MULTIPLE OPEN WOUNDS OF THIGH: Primary | ICD-10-CM

## 2021-01-01 DIAGNOSIS — R31.0 GROSS HEMATURIA: Primary | ICD-10-CM

## 2021-01-01 DIAGNOSIS — M54.42 CHRONIC MIDLINE LOW BACK PAIN WITH LEFT-SIDED SCIATICA: ICD-10-CM

## 2021-01-01 DIAGNOSIS — L73.2 HIDRADENITIS SUPPURATIVA: ICD-10-CM

## 2021-01-01 DIAGNOSIS — R10.9 ABDOMINAL CRAMPING: Primary | ICD-10-CM

## 2021-01-01 PROCEDURE — G0071 COMM SVCS BY RHC/FQHC 5 MIN: HCPCS | Performed by: FAMILY MEDICINE

## 2021-01-01 PROCEDURE — 99214 OFFICE O/P EST MOD 30 MIN: CPT | Performed by: FAMILY MEDICINE

## 2021-01-01 PROCEDURE — T1015 CLINIC SERVICE: HCPCS | Performed by: FAMILY MEDICINE

## 2021-01-01 PROCEDURE — G0071 COMM SVCS BY RHC/FQHC 5 MIN: HCPCS | Performed by: PHYSICIAN ASSISTANT

## 2021-01-01 PROCEDURE — 4004F PT TOBACCO SCREEN RCVD TLK: CPT | Performed by: PHYSICIAN ASSISTANT

## 2021-01-01 PROCEDURE — 99244 OFF/OP CNSLTJ NEW/EST MOD 40: CPT | Performed by: PHYSICIAN ASSISTANT

## 2021-01-01 RX ORDER — ALBUTEROL SULFATE 90 UG/1
2 AEROSOL, METERED RESPIRATORY (INHALATION) EVERY 6 HOURS PRN
Qty: 18 G | Refills: 5 | Status: SHIPPED | OUTPATIENT
Start: 2021-01-01

## 2021-01-01 RX ORDER — DOXYCYCLINE HYCLATE 100 MG/1
100 CAPSULE ORAL EVERY 12 HOURS SCHEDULED
Qty: 14 CAPSULE | Refills: 0 | Status: SHIPPED | OUTPATIENT
Start: 2021-01-01 | End: 2021-01-01

## 2021-01-01 RX ORDER — CYCLOBENZAPRINE HCL 10 MG
TABLET ORAL
Qty: 90 TABLET | Refills: 2 | Status: SHIPPED | OUTPATIENT
Start: 2021-01-01 | End: 2021-01-01

## 2021-01-01 RX ORDER — LISINOPRIL 10 MG/1
10 TABLET ORAL DAILY
Qty: 90 TABLET | Refills: 1 | Status: SHIPPED | OUTPATIENT
Start: 2021-01-01 | End: 2022-01-01 | Stop reason: SDUPTHER

## 2021-01-01 RX ORDER — DICYCLOMINE HCL 20 MG
TABLET ORAL
Qty: 60 TABLET | Refills: 2 | Status: SHIPPED | OUTPATIENT
Start: 2021-01-01 | End: 2022-01-01 | Stop reason: ALTCHOICE

## 2021-01-01 RX ORDER — SUMATRIPTAN 25 MG/1
TABLET, FILM COATED ORAL
Qty: 9 TABLET | Refills: 2 | Status: SHIPPED | OUTPATIENT
Start: 2021-01-01

## 2021-01-01 RX ORDER — CELECOXIB 200 MG/1
CAPSULE ORAL
Qty: 60 CAPSULE | Refills: 2 | Status: SHIPPED | OUTPATIENT
Start: 2021-01-01 | End: 2021-01-01

## 2021-01-01 RX ORDER — ONDANSETRON HYDROCHLORIDE 8 MG/1
8 TABLET, FILM COATED ORAL EVERY 8 HOURS PRN
Qty: 20 TABLET | Refills: 0 | Status: SHIPPED | OUTPATIENT
Start: 2021-01-01 | End: 2021-01-01

## 2021-01-01 RX ORDER — CYCLOBENZAPRINE HCL 10 MG
TABLET ORAL
Qty: 90 TABLET | Refills: 2 | Status: SHIPPED | OUTPATIENT
Start: 2021-01-01 | End: 2022-01-01

## 2021-01-01 RX ORDER — HYDROXYZINE HYDROCHLORIDE 10 MG/1
TABLET, FILM COATED ORAL
Qty: 90 TABLET | Refills: 2 | Status: SHIPPED | OUTPATIENT
Start: 2021-01-01 | End: 2021-01-01

## 2021-01-01 RX ORDER — FLUCONAZOLE 200 MG/1
200 TABLET ORAL DAILY
Qty: 7 TABLET | Refills: 0 | Status: SHIPPED | OUTPATIENT
Start: 2021-01-01 | End: 2021-01-01

## 2021-01-01 RX ORDER — CLOTRIMAZOLE 1 %
CREAM (GRAM) TOPICAL 2 TIMES DAILY
Qty: 60 G | Refills: 1 | Status: SHIPPED | OUTPATIENT
Start: 2021-01-01 | End: 2022-01-01 | Stop reason: ALTCHOICE

## 2021-01-01 RX ORDER — DICYCLOMINE HCL 20 MG
20 TABLET ORAL 4 TIMES DAILY PRN
Qty: 60 TABLET | Refills: 2 | Status: SHIPPED | OUTPATIENT
Start: 2021-01-01 | End: 2021-01-01

## 2021-01-01 RX ORDER — CLINDAMYCIN PHOSPHATE 11.9 MG/ML
SOLUTION TOPICAL 2 TIMES DAILY
Qty: 30 ML | Refills: 1 | Status: SHIPPED | OUTPATIENT
Start: 2021-01-01 | End: 2022-01-01 | Stop reason: HOSPADM

## 2021-01-01 RX ORDER — CLOTRIMAZOLE AND BETAMETHASONE DIPROPIONATE 10; .64 MG/G; MG/G
CREAM TOPICAL
Qty: 30 G | Refills: 3 | Status: SHIPPED | OUTPATIENT
Start: 2021-01-01 | End: 2021-01-01

## 2021-01-01 RX ORDER — OMEPRAZOLE 20 MG/1
20 CAPSULE, DELAYED RELEASE ORAL DAILY
Qty: 90 CAPSULE | Refills: 1 | Status: SHIPPED | OUTPATIENT
Start: 2021-01-01 | End: 2022-01-01 | Stop reason: SDUPTHER

## 2021-01-01 RX ORDER — SUMATRIPTAN 25 MG/1
TABLET, FILM COATED ORAL
Qty: 9 TABLET | Refills: 2 | Status: SHIPPED | OUTPATIENT
Start: 2021-01-01 | End: 2021-01-01

## 2021-01-01 RX ORDER — HYDROXYZINE HYDROCHLORIDE 25 MG/1
25 TABLET, FILM COATED ORAL 3 TIMES DAILY PRN
Qty: 90 TABLET | Refills: 2 | Status: SHIPPED | OUTPATIENT
Start: 2021-01-01 | End: 2021-01-01

## 2021-01-01 RX ORDER — DICYCLOMINE HCL 20 MG
TABLET ORAL
Qty: 60 TABLET | Refills: 2 | Status: SHIPPED | OUTPATIENT
Start: 2021-01-01 | End: 2021-01-01

## 2021-01-01 RX ORDER — NITROFURANTOIN 25; 75 MG/1; MG/1
100 CAPSULE ORAL 2 TIMES DAILY
Qty: 10 CAPSULE | Refills: 0 | Status: SHIPPED | OUTPATIENT
Start: 2021-01-01 | End: 2021-01-01

## 2021-01-01 RX ORDER — SULFAMETHOXAZOLE AND TRIMETHOPRIM 800; 160 MG/1; MG/1
1 TABLET ORAL EVERY 12 HOURS SCHEDULED
Qty: 14 TABLET | Refills: 0 | Status: SHIPPED | OUTPATIENT
Start: 2021-01-01 | End: 2021-01-01

## 2021-01-01 RX ORDER — ONDANSETRON HYDROCHLORIDE 8 MG/1
TABLET, FILM COATED ORAL
Qty: 20 TABLET | Refills: 0 | Status: SHIPPED | OUTPATIENT
Start: 2021-01-01 | End: 2021-01-01 | Stop reason: ALTCHOICE

## 2021-01-01 RX ORDER — CEPHALEXIN 500 MG/1
500 CAPSULE ORAL EVERY 6 HOURS SCHEDULED
Qty: 28 CAPSULE | Refills: 0 | Status: SHIPPED | OUTPATIENT
Start: 2021-01-01 | End: 2021-01-01

## 2021-01-01 RX ORDER — HYDROXYZINE HYDROCHLORIDE 10 MG/1
TABLET, FILM COATED ORAL
Qty: 90 TABLET | Refills: 2 | Status: SHIPPED | OUTPATIENT
Start: 2021-01-01 | End: 2021-01-01 | Stop reason: SDUPTHER

## 2021-01-02 NOTE — PROGRESS NOTES
PT Re-Evaluation     Today's date: 2021  Patient name: Isis Colbert  : 1965  MRN: [id number]  Referring provider: David Licea MD  Dx:   Encounter Diagnosis     ICD-10-CM    1  Primary osteoarthritis of left knee  M17 12                   Assessment  Assessment details: The patient has been seen in PT for a total of visits since [facility]  She had missed visits secondary to being diagnosed with COVID  She has complaints of pain with most pain being   She also demonstrates deficits with decreased ROM and strength, decreased flexibility, antalgic gait with use of AD, decreased balance and proprioception, decreased activity tolerance, difficulty with stair negotiation and pain and difficulty with completing her ADLs and tasks at home  Isis Colbert is a 62 y o  female who presents to outpatient PT with a  Primary osteoarthritis of left knee  (primary encounter diagnosis)  Primary osteoarthritis of right knee  No further referral appears necessary at this time based upon examination results  Pt presents with decreased strength, ROM, balance, functional activity tolerance, and pain with movement in her bilateral knees  which is  limiting her ability to perform the aforementioned functional activities  Etiologic factors include repetitive poor body mechanics  Patient ambulates with Bilateral axillary crutches this date, Significant lower extremity lymphedema, and excessive left ankle pronation during amb  Prognosis is fair given HEP compliance and PT 2/wk  Please contact me if you have any questions or recommendations  Thank you for the opportunity to share in  [facility]       Impairments: abnormal coordination, abnormal gait, abnormal muscle firing, abnormal muscle tone, abnormal or restricted ROM, abnormal movement, activity intolerance, difficulty understanding, impaired balance, impaired physical strength, lacks appropriate home exercise program, safety issue, weight-bearing intolerance, poor posture  and poor body mechanics  Functional limitations: ambulating with assistive device, difficulty with ambulation, stair negotiation  Symptom irritability: moderateUnderstanding of Dx/Px/POC: fair   Prognosis: fair    Goals  STG to be achieved in 4 weeks     1  Pt will reduce subjective pain rating by at least 50 percent the help facilitate return to PLOF  2  Pt will improve ROM by at least 10 degrees to help promote improved functional activity tolerance   3  Pt will improve MMT scores by at least 1/2 grade to promote improved functional activity tolerance       LTG to be achieved in 6-8 weeks   1  Pt will be complaint with HEP   2  Pt will improve ROM to [facility], to help facilitate independence with ADL's, IADL's, and functional activities   3  Pt will improve Strength to [facility] to help facilitate independence with ADL's, IADL's, and functional activities   4  Pt will have no limitations with ambulation to help facilitate independence at home and in the community  5  Pt will have no limitations with stair negotiation to help facilitate independence at home and in the community       Plan  Plan details: Modalities and therapy interventions prn  Patient would benefit from: skilled physical therapy  Planned therapy interventions: ADL training, flexibility, functional ROM exercises, gait training, graded activity, graded exercise, home exercise program, joint mobilization, manual therapy, neuromuscular re-education, patient education, postural training, strengthening, stretching, therapeutic activities and therapeutic exercise  Frequency: 2x week  Duration in weeks: 12  Plan of Care beginning date: 1/12/2021  Plan of Care expiration date: 4/12/2021  Treatment plan discussed with: patient        Subjective Evaluation    History of Present Illness  Mechanism of injury: The patient is a 62year old female who presents to outpatient PT with a chief complaint of right knee pain   Patient reports a history of arthritis in B/L Knees  Reports she receives injections in B/L knee, which do help to ease sx  She ambulates with B/L axillary crutches, and feels like she is going to fall if she doesn't use them  Primary impairments are prolonged walking, and standing     Pain  Current pain ratin  At best pain rating: 3  At worst pain ratin  Quality: grinding  Relieving factors: change in position, relaxation, rest, support and medications  Aggravating factors: standing, stair climbing and sitting  Progression: no change    Treatments  Current treatment: physical therapy  Patient Goals  Patient goals for therapy: increased strength, decreased pain and increased motion          Objective     Active Range of Motion   Left Knee   Flexion: 75 degrees   Extension: -5 degrees     Right Knee   Flexion: 105 degrees   Extension: -5 degrees     Strength/Myotome Testing     Left Hip   Planes of Motion   Flexion: 4-  Abduction: 4-  Adduction: 4-    Right Hip   Planes of Motion   Flexion: 4-  Abduction: 4-  Adduction: 4-    Left Knee   Flexion: 4-  Extension: 4-    Right Knee   Flexion: 4-  Extension: 4-

## 2021-01-04 ENCOUNTER — TELEMEDICINE (OUTPATIENT)
Dept: FAMILY MEDICINE CLINIC | Facility: CLINIC | Age: 56
End: 2021-01-04
Payer: COMMERCIAL

## 2021-01-04 DIAGNOSIS — U07.1 COVID-19: Primary | ICD-10-CM

## 2021-01-04 PROCEDURE — 99213 OFFICE O/P EST LOW 20 MIN: CPT | Performed by: FAMILY MEDICINE

## 2021-01-04 RX ORDER — BENZONATATE 100 MG/1
100 CAPSULE ORAL 3 TIMES DAILY PRN
Qty: 20 CAPSULE | Refills: 0 | Status: SHIPPED | OUTPATIENT
Start: 2021-01-04 | End: 2021-03-30 | Stop reason: ALTCHOICE

## 2021-01-04 NOTE — PROGRESS NOTES
COVID-19 Virtual Visit     Assessment/Plan:    Problem List Items Addressed This Visit     None      Visit Diagnoses     COVID-19    -  Primary    Relevant Medications    benzonatate (TESSALON PERLES) 100 mg capsule         Disposition:     I recommended continued isolation until at least 24 hours have passed since recovery defined as resolution of fever without the use of fever-reducing medications and improvement in respiratory symptoms (e g , cough, shortness of breath) AND 10 days have passed since onset of symptoms  Finish course of augmentin as prescribed  Will prescribe tessalon pearls for additional cough relief  Encouraged to call the office with any worsening symptoms  Otherwise follow up in 3 days for recheck  I have spent 10 minutes directly with the patient  Encounter provider Flaco Nava PA-C    Provider located at [address] [place] [address]    Recent Visits  Date Type Provider Dept   12/31/20 [address] [place] [address]   12/29/20 Telemedicine [facility]   Showing recent visits within past 7 days and meeting all other requirements     Today's Visits  Date Type Provider Dept   01/04/21 Telemedicine [facility]   Showing today's visits and meeting all other requirements     Future Appointments  No visits were found meeting these conditions  Showing future appointments within next 150 days and meeting all other requirements        Patient agrees to participate in a virtual check in via telephone or video visit instead of presenting to the office to address urgent/immediate medical needs  Patient is aware this is a billable service  After connecting through [facility], the patient was identified by name and date of birth   Cathy Pina was informed that this was a telemedicine visit and that the exam was being conducted confidentially over secure lines  My office door was closed  No one else was in the room  David Shepherd acknowledged consent and understanding of privacy and security of the telemedicine visit  I informed the patient that I have reviewed her record in Epic and presented the opportunity for her to ask any questions regarding the visit today  The patient agreed to participate  Subjective:   David Shepherd is a 60 y o  female who has been screened for COVID-19  Symptom change since last report: improving  Date of symptom onset: 12/23/2020    Patient's symptoms include cough  Patient denies fever, chills, fatigue, congestion, rhinorrhea, sore throat, anosmia, loss of taste, shortness of breath, chest tightness, abdominal pain, nausea, vomiting, diarrhea, myalgias and headaches  Mariann Russo has been staying home and has isolated themselves in her home  She is taking care to not share personal items and is cleaning all surfaces that are touched often, like counters, tabletops, and doorknobs using household cleaning sprays or wipes  She is wearing a mask when she leaves her room  Symptoms began approximately 12/23  COVID test positive on 12/29  Patient was started on Augmentin, Mucinex, and Flonase on 12/29  Has been taking tylenol prn  Today she reports symptoms are improving  Her only remaining symptom is a persistent cough, occasionally productive of brown mucus, otherwise dry  She has been taking mucinex without much relief  Reports having 1 day left of Augmentin  Denies SOB, wheezing or chest tightness      Lab Results   Component Value Date    SARSCOV2 Detected (A) 12/29/2020     Past Medical History:   Diagnosis Date    Anxiety     Arthritis     GERD (gastroesophageal reflux disease)     Hidradenitis suppurativa     Hypertension     Lipodermatosclerosis of both lower extremities     Lymphedema 2006    Sciatic leg pain 2006    left side    SOB (shortness of breath)  Stomach ulcer      Past Surgical History:   Procedure Laterality Date     SECTION      ID EXC SKIN BENIG 3 1-4 CM TRUNK,ARM,LEG Right 2020    Procedure: EXCISION BIOPSY TISSUE LESION/MASS UPPER EXTREMITY;  Surgeon: Carlos Johansen DO;  Location: [facility];  Service: General    TONSILLECTOMY      TUMOR REMOVAL Right     5th finger     Current Outpatient Medications   Medication Sig Dispense Refill    albuterol (VENTOLIN HFA) 90 mcg/act inhaler Inhale 2 puffs every 6 (six) hours as needed for wheezing or shortness of breath 1 Inhaler 0    amoxicillin-clavulanate (AUGMENTIN) 875-125 mg per tablet Take 1 tablet by mouth every 12 (twelve) hours for 7 days 14 tablet 0    celecoxib (CeleBREX) 200 mg capsule Take 1 capsule (200 mg total) by mouth 2 (two) times a day 60 capsule 2    cyclobenzaprine (FLEXERIL) 10 mg tablet take 1 tablet by mouth three times a day if needed for muscle spasm 90 tablet 2    dextromethorphan-guaifenesin (MUCINEX DM)  MG per 12 hr tablet Take 1 tablet by mouth every 12 (twelve) hours 14 tablet 0    ferrous sulfate 324 (65 Fe) mg take 1 tablet by mouth twice a day before meals 60 tablet 2    fluticasone (FLONASE) 50 mcg/act nasal spray 1 spray into each nostril daily 1 Bottle 5    hydrOXYzine HCL (ATARAX) 10 mg tablet take 1-2 tablets by mouth every 8 hours if needed for anxiety 90 tablet 2    lisinopril (ZESTRIL) 10 mg tablet Take 1 tablet (10 mg total) by mouth daily 90 tablet 1    metoprolol tartrate (LOPRESSOR) 25 mg tablet Take 1 tablet (25 mg total) by mouth 2 (two) times a day 180 tablet 1    nitrofurantoin (MACROBID) 100 mg capsule nitrofurantoin monohydrate/macrocrystals 100 mg capsule      nystatin (MYCOSTATIN) powder Apply topically 3 (three) times a day 60 g 2    sertraline (ZOLOFT) 25 mg tablet take 1 tablet by mouth once daily 30 tablet 2    benzonatate (TESSALON PERLES) 100 mg capsule Take 1 capsule (100 mg total) by mouth 3 (three) times a day as needed for cough 20 capsule 0    clindamycin (CLEOCIN T) 1 % external solution Apply topically 2 (two) times a day (Patient not taking: Reported on 12/29/2020) 30 mL 0    clotrimazole-betamethasone (LOTRISONE) 1-0 05 % cream Apply topically 2 (two) times a day (Patient not taking: Reported on 12/29/2020) 30 g 3    mupirocin (BACTROBAN) 2 % ointment Apply topically 3 (three) times a day (Patient not taking: Reported on 12/29/2020) 22 g 0    phentermine 37 5 MG capsule take 1 capsule by mouth every morning (Patient not taking: Reported on 12/1/2020) 30 capsule 0    polyethylene glycol (GOLYTELY) 4000 mL solution Take 4,000 mL by mouth once for 1 dose (Patient not taking: Reported on 4/22/2020) 4000 mL 0     No current facility-administered medications for this visit  Allergies   Allergen Reactions    Bee Venom Anaphylaxis, Shortness Of Breath and Swelling       Review of Systems   Constitutional: Negative for chills, fatigue and fever  HENT: Negative for congestion, ear pain, rhinorrhea and sore throat  Respiratory: Positive for cough  Negative for chest tightness, shortness of breath and wheezing  Cardiovascular: Negative for chest pain and palpitations  Gastrointestinal: Negative for abdominal pain, diarrhea, nausea and vomiting  Musculoskeletal: Negative for myalgias  Neurological: Negative for headaches  Objective: There were no vitals filed for this visit  Physical Exam  Constitutional:       General: She is not in acute distress  Appearance: She is obese  HENT:      Head: Normocephalic and atraumatic  Pulmonary:      Effort: Pulmonary effort is normal  No respiratory distress  Neurological:      General: No focal deficit present  Mental Status: She is alert and oriented to person, place, and time     Psychiatric:         Mood and Affect: Mood normal          Behavior: Behavior normal        VIRTUAL VISIT [facility] acknowledges that she has consented to an online visit or consultation  She understands that the online visit is based solely on information provided by her, and that, in the absence of a face-to-face physical evaluation by the physician, the diagnosis she receives is both limited and provisional in terms of accuracy and completeness  This is not intended to replace a full medical face-to-face evaluation by the physician  David Shepherd understands and accepts these terms

## 2021-01-05 ENCOUNTER — APPOINTMENT (OUTPATIENT)
Dept: PHYSICAL THERAPY | Facility: HOME HEALTHCARE | Age: 56
End: 2021-01-05
Payer: COMMERCIAL

## 2021-01-07 ENCOUNTER — TELEMEDICINE (OUTPATIENT)
Dept: FAMILY MEDICINE CLINIC | Facility: CLINIC | Age: 56
End: 2021-01-07
Payer: COMMERCIAL

## 2021-01-07 DIAGNOSIS — J34.89 RHINORRHEA: ICD-10-CM

## 2021-01-07 DIAGNOSIS — U07.1 COVID-19: Primary | ICD-10-CM

## 2021-01-07 PROCEDURE — G0071 COMM SVCS BY RHC/FQHC 5 MIN: HCPCS | Performed by: FAMILY MEDICINE

## 2021-01-07 RX ORDER — LORATADINE 10 MG/1
10 TABLET ORAL DAILY
Qty: 30 TABLET | Refills: 2 | Status: SHIPPED | OUTPATIENT
Start: 2021-01-07 | End: 2021-04-26

## 2021-01-07 NOTE — PROGRESS NOTES
COVID-19 Virtual Visit     Assessment/Plan:    Problem List Items Addressed This Visit     None      Visit Diagnoses     COVID-19    -  Primary    Relevant Medications    loratadine (CLARITIN) 10 mg tablet    Rhinorrhea        Relevant Medications    loratadine (CLARITIN) 10 mg tablet         Disposition:     I recommended continued isolation until at least 24 hours have passed since recovery defined as resolution of fever without the use of fever-reducing medications AND improvement in COVID symptoms AND 10 days have passed since onset of symptoms (or 10 days have passed since date of first positive viral diagnostic test for asymptomatic patients)  Continue supportive care with tylenol, tessalon pearls, and Flonase  Will add Claritin for sinus symptoms  May use a humidifier to help with cough and sinuses  Defer further antibiotic treatment at this time  Follow up on Monday for recheck  Advised to call the office sooner if symptoms worsen  I have spent 10 minutes directly with the patient  Encounter provider Eamon Espinal PA-C    Provider located at [address] [place] [id number]    Recent Visits  Date Type Provider Dept   01/04/21 [address] [place], [place] [address]L[facility]   12/31/20 [address] [place] [address] 55 recent visits within past 7 days and meeting all other requirements     Today's Visits  Date Type Provider Dept   01/07/21 Telemedicine [facility]   Showing today's visits and meeting all other requirements     Future Appointments  No visits were found meeting these conditions  Showing future appointments within next 150 days and meeting all other requirements        Patient agrees to participate in a virtual check in via telephone or video visit instead of presenting to the office to address urgent/immediate medical needs  Patient is aware this is a billable service  After connecting through Telephone, the patient was identified by name and date of birth  Argenis Carrasco was informed that this was a telemedicine visit and that the exam was being conducted confidentially over secure lines  My office door was closed  No one else was in the room  Argenis Carrasco acknowledged consent and understanding of privacy and security of the telemedicine visit  I informed the patient that I have reviewed her record in Epic and presented the opportunity for her to ask any questions regarding the visit today  The patient agreed to participate  It was my intent to perform this visit via video technology but the patient was not able to do a video connection so the visit was completed via audio telephone only  Subjective:   Argenis Carrasco is a 60 y o  female who has been screened for COVID-19  Symptom change since last report: worsening  Patient's symptoms include rhinorrhea, cough and headache  Patient denies fever, chills, fatigue, congestion, sore throat, anosmia, loss of taste, shortness of breath, chest tightness, abdominal pain, nausea, vomiting, diarrhea and myalgias  Lisa Josue has been staying home and has isolated themselves in her home  She is taking care to not share personal items and is cleaning all surfaces that are touched often, like counters, tabletops, and doorknobs using household cleaning sprays or wipes  She is wearing a mask when she leaves her room  Date of symptom onset: 12/23/2020  Date of positive COVID-19 PCR: 12/29/2020    Symptoms began approximately 12/23  COVID test positive on 12/29  Patient was treated with Augmentin x 7 days, Mucinex, and Flonase on 12/29  At last visit, patient was improving and stated her only remaining symptoms was a cough    However, today she reports that after finishing the Augmentin her symptoms seem to have come back and now complains of rhinorrhea, headache, and ear pain  Denies fevers, sinus pressure, SOB, wheezing, chest tightness, N/V/D  States her cough has been improving  Continues to use Flonase and tessalon pearls as well as tylenol for headache      Lab Results   Component Value Date    SARSCOV2 Detected (A) 2020     Past Medical History:   Diagnosis Date    Anxiety     Arthritis     GERD (gastroesophageal reflux disease)     Hidradenitis suppurativa     Hypertension     Lipodermatosclerosis of both lower extremities     Lymphedema     Sciatic leg pain     left side    SOB (shortness of breath)     Stomach ulcer      Past Surgical History:   Procedure Laterality Date     SECTION      UT EXC SKIN BENIG 3 1-4 CM TRUNK,ARM,LEG Right 2020    Procedure: EXCISION BIOPSY TISSUE LESION/MASS UPPER EXTREMITY;  Surgeon: Bhavana Hernandez DO;  Location: [facility];  Service: General    TONSILLECTOMY      TUMOR REMOVAL Right     5th finger     Current Outpatient Medications   Medication Sig Dispense Refill    albuterol (VENTOLIN HFA) 90 mcg/act inhaler Inhale 2 puffs every 6 (six) hours as needed for wheezing or shortness of breath 1 Inhaler 0    benzonatate (TESSALON PERLES) 100 mg capsule Take 1 capsule (100 mg total) by mouth 3 (three) times a day as needed for cough 20 capsule 0    celecoxib (CeleBREX) 200 mg capsule Take 1 capsule (200 mg total) by mouth 2 (two) times a day 60 capsule 2    cyclobenzaprine (FLEXERIL) 10 mg tablet take 1 tablet by mouth three times a day if needed for muscle spasm 90 tablet 2    dextromethorphan-guaifenesin (MUCINEX DM)  MG per 12 hr tablet Take 1 tablet by mouth every 12 (twelve) hours 14 tablet 0    ferrous sulfate 324 (65 Fe) mg take 1 tablet by mouth twice a day before meals 60 tablet 2    fluticasone (FLONASE) 50 mcg/act nasal spray 1 spray into each nostril daily 1 Bottle 5    hydrOXYzine HCL (ATARAX) 10 mg tablet take 1-2 tablets by mouth every 8 hours if needed for anxiety 90 tablet 2    lisinopril (ZESTRIL) 10 mg tablet Take 1 tablet (10 mg total) by mouth daily 90 tablet 1    metoprolol tartrate (LOPRESSOR) 25 mg tablet Take 1 tablet (25 mg total) by mouth 2 (two) times a day 180 tablet 1    nitrofurantoin (MACROBID) 100 mg capsule nitrofurantoin monohydrate/macrocrystals 100 mg capsule      sertraline (ZOLOFT) 25 mg tablet take 1 tablet by mouth once daily 30 tablet 2    clindamycin (CLEOCIN T) 1 % external solution Apply topically 2 (two) times a day (Patient not taking: Reported on 12/29/2020) 30 mL 0    clotrimazole-betamethasone (LOTRISONE) 1-0 05 % cream Apply topically 2 (two) times a day (Patient not taking: Reported on 12/29/2020) 30 g 3    loratadine (CLARITIN) 10 mg tablet Take 1 tablet (10 mg total) by mouth daily 30 tablet 2    mupirocin (BACTROBAN) 2 % ointment Apply topically 3 (three) times a day (Patient not taking: Reported on 12/29/2020) 22 g 0    nystatin (MYCOSTATIN) powder Apply topically 3 (three) times a day (Patient not taking: Reported on 1/7/2021) 60 g 2    phentermine 37 5 MG capsule take 1 capsule by mouth every morning (Patient not taking: Reported on 12/1/2020) 30 capsule 0    polyethylene glycol (GOLYTELY) 4000 mL solution Take 4,000 mL by mouth once for 1 dose (Patient not taking: Reported on 4/22/2020) 4000 mL 0     No current facility-administered medications for this visit  Allergies   Allergen Reactions    Bee Venom Anaphylaxis, Shortness Of Breath and Swelling       Review of Systems   Constitutional: Negative for chills, fatigue and fever  HENT: Positive for ear pain, postnasal drip and rhinorrhea  Negative for congestion, ear discharge, sinus pressure, sinus pain and sore throat  Respiratory: Positive for cough  Negative for chest tightness, shortness of breath and wheezing  Cardiovascular: Negative for chest pain and palpitations  Gastrointestinal: Negative for abdominal pain, diarrhea, nausea and vomiting  Musculoskeletal: Negative for myalgias  Neurological: Positive for headaches  Negative for dizziness, syncope, weakness and light-headedness  VIRTUAL VISIT Michelle 109 acknowledges that she has consented to an online visit or consultation  She understands that the online visit is based solely on information provided by her, and that, in the absence of a face-to-face physical evaluation by the physician, the diagnosis she receives is both limited and provisional in terms of accuracy and completeness  This is not intended to replace a full medical face-to-face evaluation by the physician  Kavitha Alcala understands and accepts these terms

## 2021-01-08 ENCOUNTER — APPOINTMENT (OUTPATIENT)
Dept: PHYSICAL THERAPY | Facility: HOME HEALTHCARE | Age: 56
End: 2021-01-08
Payer: COMMERCIAL

## 2021-01-11 ENCOUNTER — TELEMEDICINE (OUTPATIENT)
Dept: FAMILY MEDICINE CLINIC | Facility: CLINIC | Age: 56
End: 2021-01-11
Payer: COMMERCIAL

## 2021-01-11 DIAGNOSIS — U07.1 COVID-19: Primary | ICD-10-CM

## 2021-01-11 PROCEDURE — G0071 COMM SVCS BY RHC/FQHC 5 MIN: HCPCS | Performed by: FAMILY MEDICINE

## 2021-01-11 NOTE — PROGRESS NOTES
COVID-19 Virtual Visit     Assessment/Plan:    Problem List Items Addressed This Visit     None      Visit Diagnoses     COVID-19    -  Primary         Disposition:     I recommended continued isolation until at least 24 hours have passed since recovery defined as resolution of fever without the use of fever-reducing medications AND improvement in COVID symptoms AND 10 days have passed since onset of symptoms (or 10 days have passed since date of first positive viral diagnostic test for asymptomatic patients)  Patient is now 14 days since her positive COVID test and has not had fever or respiratory symptoms in the last 24 hours  Advised to continue with Claritin and Flonase and may alternate tylenol/ibuprofen prn headache  Advised to increase water intake as well  Return if symptoms persist or worsen  I have spent 10 minutes directly with the patient  Encounter provider Pavel Jimenez PA-C    Provider located at [address] [place] [address]    Recent Visits  Date Type Provider Dept   01/07/21 [address] [place], [place] [address] Cln   01/04/21 Telemedicine [facility]   Showing recent visits within past 7 days and meeting all other requirements     Today's Visits  Date Type Provider Dept   01/11/21 Telemedicine [facility]   Showing today's visits and meeting all other requirements     Future Appointments  No visits were found meeting these conditions  Showing future appointments within next 150 days and meeting all other requirements        Patient agrees to participate in a virtual check in via telephone or video visit instead of presenting to the office to address urgent/immediate medical needs  Patient is aware this is a billable service  After connecting through Telephone, the patient was identified by name and date of birth   Lizandro Bravo Sohail Silva was informed that this was a telemedicine visit and that the exam was being conducted confidentially over secure lines  My office door was closed  No one else was in the room  Celeste Jaime acknowledged consent and understanding of privacy and security of the telemedicine visit  I informed the patient that I have reviewed her record in Epic and presented the opportunity for her to ask any questions regarding the visit today  The patient agreed to participate  It was my intent to perform this visit via video technology but the patient was not able to do a video connection so the visit was completed via audio telephone only  Subjective:   Celeste Jaime is a 60 y o  female who has been screened for COVID-19  Symptom change since last report: improving  Patient's symptoms include fatigue and headache  Patient denies fever, chills, congestion, rhinorrhea, sore throat, anosmia, loss of taste, cough, shortness of breath, chest tightness, abdominal pain, nausea, vomiting, diarrhea and myalgias  Catia Díaz has been staying home and has isolated themselves in her home  She is taking care to not share personal items and is cleaning all surfaces that are touched often, like counters, tabletops, and doorknobs using household cleaning sprays or wipes  She is wearing a mask when she leaves her room  Date of symptom onset: 12/23/2020  Date of positive COVID-19 PCR: 12/29/2020    Symptoms began approximately 12/23  COVID test positive on 12/29  Patient was treated with Augmentin x 7 days, Mucinex, and Flonase on 12/29  At last visit, patient complained of ongoing rhinorrhea, headache, and ear pain  She was started on Claritin  Today patient denies fevers, congestion, sinus pressure, cough, SOB, wheezing, chest tightness, N/V/D  Continues to use Flonase and Claritin daily  Reports she continues to get headaches and feels tired, but overall improved      Lab Results   Component Value Date SARSCOV2 Detected (A) 2020     Past Medical History:   Diagnosis Date    Anxiety     Arthritis     GERD (gastroesophageal reflux disease)     Hidradenitis suppurativa     Hypertension     Lipodermatosclerosis of both lower extremities     Lymphedema     Sciatic leg pain 2006    left side    SOB (shortness of breath)     Stomach ulcer      Past Surgical History:   Procedure Laterality Date     SECTION      LA EXC SKIN BENIG 3 1-4 CM TRUNK,ARM,LEG Right 2020    Procedure: EXCISION BIOPSY TISSUE LESION/MASS UPPER EXTREMITY;  Surgeon: Yuly Eckert DO;  Location: [facility];  Service: General    TONSILLECTOMY      TUMOR REMOVAL Right     5th finger     Current Outpatient Medications   Medication Sig Dispense Refill    albuterol (VENTOLIN HFA) 90 mcg/act inhaler Inhale 2 puffs every 6 (six) hours as needed for wheezing or shortness of breath 1 Inhaler 0    benzonatate (TESSALON PERLES) 100 mg capsule Take 1 capsule (100 mg total) by mouth 3 (three) times a day as needed for cough 20 capsule 0    celecoxib (CeleBREX) 200 mg capsule Take 1 capsule (200 mg total) by mouth 2 (two) times a day 60 capsule 2    cyclobenzaprine (FLEXERIL) 10 mg tablet take 1 tablet by mouth three times a day if needed for muscle spasm 90 tablet 2    dextromethorphan-guaifenesin (MUCINEX DM)  MG per 12 hr tablet Take 1 tablet by mouth every 12 (twelve) hours 14 tablet 0    ferrous sulfate 324 (65 Fe) mg take 1 tablet by mouth twice a day before meals 60 tablet 2    fluticasone (FLONASE) 50 mcg/act nasal spray 1 spray into each nostril daily 1 Bottle 5    hydrOXYzine HCL (ATARAX) 10 mg tablet take 1-2 tablets by mouth every 8 hours if needed for anxiety 90 tablet 2    lisinopril (ZESTRIL) 10 mg tablet Take 1 tablet (10 mg total) by mouth daily 90 tablet 1    loratadine (CLARITIN) 10 mg tablet Take 1 tablet (10 mg total) by mouth daily 30 tablet 2    metoprolol tartrate (LOPRESSOR) 25 mg tablet Take 1 tablet (25 mg total) by mouth 2 (two) times a day 180 tablet 1    nitrofurantoin (MACROBID) 100 mg capsule nitrofurantoin monohydrate/macrocrystals 100 mg capsule      sertraline (ZOLOFT) 25 mg tablet take 1 tablet by mouth once daily 30 tablet 2    clindamycin (CLEOCIN T) 1 % external solution Apply topically 2 (two) times a day (Patient not taking: Reported on 12/29/2020) 30 mL 0    clotrimazole-betamethasone (LOTRISONE) 1-0 05 % cream Apply topically 2 (two) times a day (Patient not taking: Reported on 12/29/2020) 30 g 3    mupirocin (BACTROBAN) 2 % ointment Apply topically 3 (three) times a day (Patient not taking: Reported on 12/29/2020) 22 g 0    nystatin (MYCOSTATIN) powder Apply topically 3 (three) times a day (Patient not taking: Reported on 1/7/2021) 60 g 2    phentermine 37 5 MG capsule take 1 capsule by mouth every morning (Patient not taking: Reported on 12/1/2020) 30 capsule 0    polyethylene glycol (GOLYTELY) 4000 mL solution Take 4,000 mL by mouth once for 1 dose (Patient not taking: Reported on 4/22/2020) 4000 mL 0     No current facility-administered medications for this visit  Allergies   Allergen Reactions    Bee Venom Anaphylaxis, Shortness Of Breath and Swelling       Review of Systems   Constitutional: Positive for fatigue  Negative for chills and fever  HENT: Negative for congestion, rhinorrhea and sore throat  Eyes: Negative for photophobia, pain, discharge, redness, itching and visual disturbance  Respiratory: Negative for cough, chest tightness and shortness of breath  Gastrointestinal: Negative for abdominal pain, diarrhea, nausea and vomiting  Musculoskeletal: Negative for myalgias  Neurological: Positive for headaches  Negative for dizziness, syncope, weakness and light-headedness  VIRTUAL VISIT Michelle 109 acknowledges that she has consented to an online visit or consultation   She understands that the online visit is based solely on information provided by her, and that, in the absence of a face-to-face physical evaluation by the physician, the diagnosis she receives is both limited and provisional in terms of accuracy and completeness  This is not intended to replace a full medical face-to-face evaluation by the physician  Dorene Melgoza understands and accepts these terms

## 2021-01-12 ENCOUNTER — APPOINTMENT (OUTPATIENT)
Dept: PHYSICAL THERAPY | Facility: HOME HEALTHCARE | Age: 56
End: 2021-01-12
Payer: COMMERCIAL

## 2021-01-15 ENCOUNTER — APPOINTMENT (OUTPATIENT)
Dept: PHYSICAL THERAPY | Facility: HOME HEALTHCARE | Age: 56
End: 2021-01-15
Payer: COMMERCIAL

## 2021-01-19 ENCOUNTER — EVALUATION (OUTPATIENT)
Dept: PHYSICAL THERAPY | Facility: HOME HEALTHCARE | Age: 56
End: 2021-01-19
Payer: COMMERCIAL

## 2021-01-19 DIAGNOSIS — M17.12 PRIMARY OSTEOARTHRITIS OF LEFT KNEE: Primary | ICD-10-CM

## 2021-01-19 DIAGNOSIS — M17.11 PRIMARY OSTEOARTHRITIS OF RIGHT KNEE: ICD-10-CM

## 2021-01-19 PROCEDURE — 97110 THERAPEUTIC EXERCISES: CPT

## 2021-01-19 NOTE — PROGRESS NOTES
Daily Note     Today's date: 2021  Patient name: Carson Day  : 1965  MRN: [id number]  Referring provider: Tom Thompson MD  Dx:   Encounter Diagnosis     ICD-10-CM    1  Primary osteoarthritis of left knee  M17 12    2  Primary osteoarthritis of right knee  M17 11               Subjective: Pt reports she has 8/10 pain in both of her knees today  Objective: See treatment diary below      Assessment: Tolerated treatment fair  Verbal cues needed t/o exercise to perform correctly  Pt with decreased endurance and fatigue with exercises  Pt reports increased pain in both knees with exercise  Patient would benefit from continued PT      Plan: Continue per plan of care  Re-eval next session        Precautions:     Manuals  12-15-20 1/19/21       -Attempted   -Pt declined 2* pain w/knee ext              Neuro Re-Ed                        Ther Ex  12-15-20 1/19/21     Nu Step   L 1  6' L1  6 min      Hip Adduction   5" 1 x 10 5" 1 x 10      Hip abduction   Red 1 x 10 Red 1 x 10      LAQ /SAQ  Ramos 1 x 10 Ramos 1 x 10      HS curls   Ramos  Red 1 x 10 Ramos Red 1 x 10      Marches   1 x 10 Standing   1 x 10 Ramos     Heel Slides   Ramos 1 x 10 Ramos 1 x 10      HR/TR   1 x 10 1 x 10 ea      Mini Squat  NP NV     Standing Three way   1 x 10 Ramos 1 x 10 ea Ramos     Step Up                                                                 Ther Activity                        Gait Training                        Modalities

## 2021-01-19 NOTE — PROGRESS NOTES
PT Re-Evaluation     Today's date: 2021  Patient name: Lilian Franco  : 1965  MRN: [id number]  Referring provider: Vinny Henry MD  Dx:   Encounter Diagnosis     ICD-10-CM    1  Primary osteoarthritis of left knee  M17 12                   Assessment  Assessment details: The patient has been seen in PT for a total of 4 visits since [facility]  She had missed appointments secondary to COVID-19  She has complaints of pain in B knees  She also demonstrates deficits with decreased ROM and strength, decreased balance and proprioception, functional activity tolerance, difficulty with stair negotiation and pain with completing her ADLs and tasks at home  Lilian Franco is a 62 y o  female who presents to outpatient PT with a  Primary osteoarthritis of left knee  (primary encounter diagnosis)  Primary osteoarthritis of right knee  No further referral appears necessary at this time based upon examination results  Pt presents with decreased strength, ROM, balance, functional activity tolerance, and pain with movement in her bilateral knees  which is  limiting her ability to perform the aforementioned functional activities  Etiologic factors include repetitive poor body mechanics  Patient ambulates with Bilateral axillary crutches this date, Significant lower extremity lymphedema, and excessive left ankle pronation during amb  Prognosis is fair given HEP compliance and PT 2/wk  Please contact me if you have any questions or recommendations  Thank you for the opportunity to share in  [facility]       Impairments: abnormal coordination, abnormal gait, abnormal muscle firing, abnormal muscle tone, abnormal or restricted ROM, abnormal movement, activity intolerance, difficulty understanding, impaired balance, impaired physical strength, lacks appropriate home exercise program, safety issue, weight-bearing intolerance, poor posture  and poor body mechanics  Functional limitations: ambulating with assistive device, difficulty with ambulation, stair negotiation  Symptom irritability: moderateUnderstanding of Dx/Px/POC: fair   Prognosis: fair    Goals  STG to be achieved in 4 weeks     1  Pt will reduce subjective pain rating by at least 50 percent the help facilitate return to PLOF  2  Pt will improve ROM by at least 10 degrees to help promote improved functional activity tolerance   3  Pt will improve MMT scores by at least 1/2 grade to promote improved functional activity tolerance       LTG to be achieved in 6-8 weeks   1  Pt will be complaint with HEP   2  Pt will improve ROM to [facility], to help facilitate independence with ADL's, IADL's, and functional activities   3  Pt will improve Strength to [facility] to help facilitate independence with ADL's, IADL's, and functional activities   4  Pt will have no limitations with ambulation to help facilitate independence at home and in the community  5  Pt will have no limitations with stair negotiation to help facilitate independence at home and in the community       Plan  Plan details: Modalities and therapy interventions prn  Patient would benefit from: skilled physical therapy  Planned therapy interventions: ADL training, flexibility, functional ROM exercises, gait training, graded activity, graded exercise, home exercise program, joint mobilization, manual therapy, neuromuscular re-education, patient education, postural training, strengthening, stretching, therapeutic activities and therapeutic exercise  Frequency: 2x week  Duration in weeks: 12  Plan of Care beginning date: 1/21/2021  Plan of Care expiration date: 4/21/2021  Treatment plan discussed with: patient, PTA and referring physician        Subjective Evaluation    History of Present Illness  Mechanism of injury: The patient is a 62year old female who presents to outpatient PT with a chief complaint of right knee pain  Patient reports a history of arthritis in B/L Knees   Reports she receives injections in B/L knee, which do help to ease sx  She ambulates with B/L axillary crutches, and feels like she is going to fall if she doesn't use them  Primary impairments are prolonged walking, and standing     Pain  Current pain ratin  At best pain rating: 3  At worst pain ratin  Quality: grinding  Relieving factors: change in position, relaxation, rest, support and medications  Aggravating factors: standing, stair climbing and sitting  Progression: no change    Treatments  Current treatment: physical therapy  Patient Goals  Patient goals for therapy: increased strength, decreased pain and increased motion          Objective     Active Range of Motion   Left Knee   Flexion: 75 degrees   Extension: -5 degrees     Right Knee   Flexion: 105 degrees   Extension: -5 degrees     Strength/Myotome Testing     Left Hip   Planes of Motion   Flexion: 4-  Abduction: 4-  Adduction: 4-    Right Hip   Planes of Motion   Flexion: 4-  Abduction: 4-  Adduction: 4-    Left Knee   Flexion: 4-  Extension: 4-    Right Knee   Flexion: 4-  Extension: 4-

## 2021-01-22 ENCOUNTER — APPOINTMENT (OUTPATIENT)
Dept: PHYSICAL THERAPY | Facility: HOME HEALTHCARE | Age: 56
End: 2021-01-22
Payer: COMMERCIAL

## 2021-01-25 ENCOUNTER — TELEMEDICINE (OUTPATIENT)
Dept: FAMILY MEDICINE CLINIC | Facility: CLINIC | Age: 56
End: 2021-01-25
Payer: COMMERCIAL

## 2021-01-25 DIAGNOSIS — J06.9 UPPER RESPIRATORY TRACT INFECTION, UNSPECIFIED TYPE: Primary | ICD-10-CM

## 2021-01-25 PROCEDURE — 99213 OFFICE O/P EST LOW 20 MIN: CPT | Performed by: FAMILY MEDICINE

## 2021-01-25 RX ORDER — AZITHROMYCIN 250 MG/1
TABLET, FILM COATED ORAL
Qty: 6 TABLET | Refills: 0 | Status: SHIPPED | OUTPATIENT
Start: 2021-01-25 | End: 2021-01-29

## 2021-01-25 NOTE — PROGRESS NOTES
Virtual Regular Visit      Assessment/Plan:    Problem List Items Addressed This Visit     None      Visit Diagnoses     Upper respiratory tract infection, unspecified type    -  Primary    Relevant Medications    azithromycin (ZITHROMAX) 250 mg tablet        - Advised to continue Claritin and Flonase daily  Ibuprofen/tylenol prn  Will treat with Antonina Root  Return if no improvement  Reason for visit is   Chief Complaint   Patient presents with    Virtual Regular Visit        Encounter provider Charlotte Bronson PA-C    Provider located at [address] [place] [id number]      Recent Visits  No visits were found meeting these conditions  Showing recent visits within past 7 days and meeting all other requirements     Today's Visits  Date Type Provider Dept   01/25/21 Telemedicine LUCY Nation  Cln   Showing today's visits and meeting all other requirements     Future Appointments  No visits were found meeting these conditions  Showing future appointments within next 150 days and meeting all other requirements        The patient was identified by name and date of birth  Zachariah Tomlin was informed that this is a telemedicine visit and that the visit is being conducted through [employer] and patient was informed that this is not a secure, HIPAA-compliant platform  She agrees to proceed     My office door was closed  No one else was in the room  She acknowledged consent and understanding of privacy and security of the video platform  The patient has agreed to participate and understands they can discontinue the visit at any time  Patient is aware this is a billable service  Subjective  Zachariah Tomlin is a 60 y o  female  Kadeem Jones is a 60year old female with history of HTN, lymphedema, morbid obesity, and hidradenitis suppurativa, presenting with URI symptoms      Patient has a history of COVID infection with positive test on 2020  She was treated with Augmentin x 7 days, Mucinex, and Flonase and reported overall feeling improved at our last follow up on   Today patient presents with complaint of worsening fatigue, HA, sore throat, bilateral ear pain, rhinorrhea, cough, nausea, muscle aches x 3 days  Reports she has continued to take Flonase and Claritin daily with tylenol prn         Past Medical History:   Diagnosis Date    Anxiety     Arthritis     GERD (gastroesophageal reflux disease)     Hidradenitis suppurativa     Hypertension     Lipodermatosclerosis of both lower extremities     Lymphedema     Sciatic leg pain     left side    SOB (shortness of breath)     Stomach ulcer        Past Surgical History:   Procedure Laterality Date     SECTION      NE EXC SKIN BENIG 3 1-4 CM TRUNK,ARM,LEG Right 2020    Procedure: EXCISION BIOPSY TISSUE LESION/MASS UPPER EXTREMITY;  Surgeon: Worley Severs, DO;  Location: [facility];  Service: General    TONSILLECTOMY      TUMOR REMOVAL Right     5th finger       Current Outpatient Medications   Medication Sig Dispense Refill    albuterol (VENTOLIN HFA) 90 mcg/act inhaler Inhale 2 puffs every 6 (six) hours as needed for wheezing or shortness of breath 1 Inhaler 0    azithromycin (ZITHROMAX) 250 mg tablet Take 2 tablets today then 1 tablet daily x 4 days 6 tablet 0    benzonatate (TESSALON PERLES) 100 mg capsule Take 1 capsule (100 mg total) by mouth 3 (three) times a day as needed for cough 20 capsule 0    celecoxib (CeleBREX) 200 mg capsule Take 1 capsule (200 mg total) by mouth 2 (two) times a day 60 capsule 2    clindamycin (CLEOCIN T) 1 % external solution Apply topically 2 (two) times a day (Patient not taking: Reported on 2020) 30 mL 0    clotrimazole-betamethasone (LOTRISONE) 1-0 05 % cream Apply topically 2 (two) times a day (Patient not taking: Reported on 2020) 30 g 3    cyclobenzaprine (FLEXERIL) 10 mg tablet take 1 tablet by mouth three times a day if needed for muscle spasm 90 tablet 2    dextromethorphan-guaifenesin (MUCINEX DM)  MG per 12 hr tablet Take 1 tablet by mouth every 12 (twelve) hours 14 tablet 0    ferrous sulfate 324 (65 Fe) mg take 1 tablet by mouth twice a day before meals 60 tablet 2    fluticasone (FLONASE) 50 mcg/act nasal spray 1 spray into each nostril daily 1 Bottle 5    hydrOXYzine HCL (ATARAX) 10 mg tablet take 1-2 tablets by mouth every 8 hours if needed for anxiety 90 tablet 2    lisinopril (ZESTRIL) 10 mg tablet Take 1 tablet (10 mg total) by mouth daily 90 tablet 1    loratadine (CLARITIN) 10 mg tablet Take 1 tablet (10 mg total) by mouth daily 30 tablet 2    metoprolol tartrate (LOPRESSOR) 25 mg tablet Take 1 tablet (25 mg total) by mouth 2 (two) times a day 180 tablet 1    mupirocin (BACTROBAN) 2 % ointment Apply topically 3 (three) times a day (Patient not taking: Reported on 12/29/2020) 22 g 0    nitrofurantoin (MACROBID) 100 mg capsule nitrofurantoin monohydrate/macrocrystals 100 mg capsule      nystatin (MYCOSTATIN) powder Apply topically 3 (three) times a day (Patient not taking: Reported on 1/7/2021) 60 g 2    phentermine 37 5 MG capsule take 1 capsule by mouth every morning (Patient not taking: Reported on 12/1/2020) 30 capsule 0    polyethylene glycol (GOLYTELY) 4000 mL solution Take 4,000 mL by mouth once for 1 dose (Patient not taking: Reported on 4/22/2020) 4000 mL 0    sertraline (ZOLOFT) 25 mg tablet take 1 tablet by mouth once daily 30 tablet 2     No current facility-administered medications for this visit  Allergies   Allergen Reactions    Bee Venom Anaphylaxis, Shortness Of Breath and Swelling       Review of Systems   Constitutional: Positive for chills and fatigue  Negative for fever  HENT: Positive for ear pain, rhinorrhea, sneezing and sore throat  Negative for congestion and sinus pressure      Eyes: Negative for photophobia, pain, discharge, redness, itching and visual disturbance  Respiratory: Positive for cough  Negative for chest tightness, shortness of breath and wheezing  Cardiovascular: Negative for chest pain, palpitations and leg swelling  Gastrointestinal: Positive for nausea  Negative for abdominal pain, constipation, diarrhea and vomiting  Musculoskeletal: Positive for myalgias  Skin: Negative for rash and wound  Neurological: Positive for headaches  Negative for dizziness, syncope, weakness and light-headedness  Video Exam    There were no vitals filed for this visit  Physical Exam  Constitutional:       General: She is not in acute distress  Appearance: Normal appearance  She is obese  HENT:      Head: Normocephalic and atraumatic  Pulmonary:      Effort: Pulmonary effort is normal  No respiratory distress  Neurological:      General: No focal deficit present  Mental Status: She is alert and oriented to person, place, and time  Psychiatric:         Mood and Affect: Mood normal          Behavior: Behavior normal           I spent 10 minutes directly with the patient during this visit      VIRTUAL VISIT Michelle 109 acknowledges that she has consented to an online visit or consultation  She understands that the online visit is based solely on information provided by her, and that, in the absence of a face-to-face physical evaluation by the physician, the diagnosis she receives is both limited and provisional in terms of accuracy and completeness  This is not intended to replace a full medical face-to-face evaluation by the physician  Hong Sharp understands and accepts these terms

## 2021-02-09 ENCOUNTER — OFFICE VISIT (OUTPATIENT)
Dept: FAMILY MEDICINE CLINIC | Facility: CLINIC | Age: 56
End: 2021-02-09
Payer: COMMERCIAL

## 2021-02-09 VITALS
DIASTOLIC BLOOD PRESSURE: 70 MMHG | OXYGEN SATURATION: 97 % | WEIGHT: 293 LBS | HEART RATE: 88 BPM | HEIGHT: 67 IN | RESPIRATION RATE: 18 BRPM | TEMPERATURE: 97 F | BODY MASS INDEX: 45.99 KG/M2 | SYSTOLIC BLOOD PRESSURE: 138 MMHG

## 2021-02-09 DIAGNOSIS — I10 ESSENTIAL HYPERTENSION: ICD-10-CM

## 2021-02-09 DIAGNOSIS — E66.01 MORBID OBESITY WITH BMI OF 60.0-69.9, ADULT (HCC): ICD-10-CM

## 2021-02-09 DIAGNOSIS — G43.009 MIGRAINE WITHOUT AURA AND WITHOUT STATUS MIGRAINOSUS, NOT INTRACTABLE: ICD-10-CM

## 2021-02-09 DIAGNOSIS — Z00.00 ANNUAL PHYSICAL EXAM: Primary | ICD-10-CM

## 2021-02-09 DIAGNOSIS — Z91.89 NEED FOR DENTAL CARE: ICD-10-CM

## 2021-02-09 DIAGNOSIS — Z20.822 ENCOUNTER FOR SCREENING LABORATORY TESTING FOR COVID-19 VIRUS IN ASYMPTOMATIC PATIENT: ICD-10-CM

## 2021-02-09 PROBLEM — R22.31 MASS OF RIGHT UPPER EXTREMITY: Status: RESOLVED | Noted: 2020-12-01 | Resolved: 2021-02-09

## 2021-02-09 PROCEDURE — U0005 INFEC AGEN DETEC AMPLI PROBE: HCPCS | Performed by: PHYSICIAN ASSISTANT

## 2021-02-09 PROCEDURE — T1015 CLINIC SERVICE: HCPCS | Performed by: FAMILY MEDICINE

## 2021-02-09 PROCEDURE — U0003 INFECTIOUS AGENT DETECTION BY NUCLEIC ACID (DNA OR RNA); SEVERE ACUTE RESPIRATORY SYNDROME CORONAVIRUS 2 (SARS-COV-2) (CORONAVIRUS DISEASE [COVID-19]), AMPLIFIED PROBE TECHNIQUE, MAKING USE OF HIGH THROUGHPUT TECHNOLOGIES AS DESCRIBED BY CMS-2020-01-R: HCPCS | Performed by: PHYSICIAN ASSISTANT

## 2021-02-09 RX ORDER — LISINOPRIL 10 MG/1
10 TABLET ORAL DAILY
Qty: 90 TABLET | Refills: 1 | Status: SHIPPED | OUTPATIENT
Start: 2021-02-09 | End: 2021-01-01 | Stop reason: SDUPTHER

## 2021-02-09 RX ORDER — SUMATRIPTAN 25 MG/1
25 TABLET, FILM COATED ORAL ONCE AS NEEDED
Qty: 10 TABLET | Refills: 0 | Status: SHIPPED | OUTPATIENT
Start: 2021-02-09 | End: 2021-03-10

## 2021-02-09 NOTE — PROGRESS NOTES
ADULT ANNUAL PHYSICAL  [address]    NAME: Chacho Mercado  AGE: 60 y o  SEX: female  : 1965     DATE: 2021     Assessment and Plan:     Problem List Items Addressed This Visit        Cardiovascular and Mediastinum    Hypertension    Relevant Medications    lisinopril (ZESTRIL) 10 mg tablet       Other    Morbid obesity with BMI of 60 0-69 9, adult ([facility] )      Other Visit Diagnoses     Annual physical exam    -  Primary    Migraine without aura and without status migrainosus, not intractable        Relevant Medications    SUMAtriptan (IMITREX) 25 mg tablet    Encounter for screening laboratory testing for COVID-19 virus in asymptomatic patient        Relevant Orders    Novel Coronavirus (COVID-19), PCR [facility] Collected in Office    Need for dental care        Relevant Orders    Ambulatory referral to Dentistry        - Patient declines mammogram and pap smear  Cologuard previously ordered, patient states she will complete this  - Follow up with ortho and bariatrics as scheduled  Immunizations and preventive care screenings were discussed with patient today  Appropriate education was printed on patient's after visit summary  Counseling:  Alcohol/drug use: discussed moderation in alcohol intake, the recommendations for healthy alcohol use, and avoidance of illicit drug use  Dental Health: discussed importance of regular tooth brushing, flossing, and dental visits  Injury prevention: discussed safety/seat belts, safety helmets, smoke detectors, carbon dioxide detectors, and smoking near bedding or upholstery  Sexual health: discussed sexually transmitted diseases, partner selection, use of condoms, avoidance of unintended pregnancy, and contraceptive alternatives  · Exercise: the importance of regular exercise/physical activity was discussed  Recommend exercise 3-5 times per week for at least 30 minutes       BMI Counseling: Body mass index is 62 84 kg/m²  The BMI is above normal  Nutrition recommendations include decreasing portion sizes, encouraging healthy choices of fruits and vegetables, decreasing fast food intake, consuming healthier snacks, limiting drinks that contain sugar, moderation in carbohydrate intake, increasing intake of lean protein, reducing intake of saturated and trans fat and reducing intake of cholesterol  Exercise recommendations include moderate physical activity 150 minutes/week, vigorous physical activity 75 minutes/week, exercising 3-5 times per week, obtaining a gym membership and strength training exercises  No pharmacotherapy was ordered  Patient referred to weight management due to patient being overweight  Return in about 3 months (around 5/9/2021) for Next scheduled follow up  Chief Complaint:     Chief Complaint   Patient presents with    Annual Exam      History of Present Illness:     Adult Annual Physical   Patient here for a comprehensive physical exam  The patient reports no problems  Diet and Physical Activity  · Diet/Nutrition: well balanced diet, limited junk food, consuming 3-5 servings of fruits/vegetables daily and adequate whole grain intake  · Exercise: no formal exercise  Depression Screening  PHQ-9 Depression Screening    PHQ-9:   Frequency of the following problems over the past two weeks:           General Health  · Sleep: sleeps poorly, gets 1-3 hours of sleep on average and snores loudly  · Hearing: normal - bilateral   · Vision: vision problems: blurred vision with far objects, most recent eye exam >1 year ago and wears glasses  · Dental: no dental visits for >1 year, brushes teeth once daily and does not floss         /GYN Health  · Patient is: postmenopausal  · Last menstrual period: approx 13 years ago  · Contraceptive method: postmenopausal      Review of Systems:     Review of Systems   Constitutional: Negative for chills, diaphoresis and fever  HENT: Negative for congestion, ear pain, rhinorrhea, sinus pressure and sore throat  Eyes: Negative for photophobia, pain, discharge, redness and itching  Respiratory: Negative for cough, chest tightness, shortness of breath and wheezing  Cardiovascular: Negative for chest pain and palpitations  Gastrointestinal: Negative for abdominal pain, blood in stool, constipation, diarrhea, nausea and vomiting  Genitourinary: Negative for difficulty urinating, dysuria, frequency, hematuria and urgency  Skin: Positive for wound  Negative for rash  Neurological: Negative for dizziness, syncope, weakness, light-headedness and headaches        Past Medical History:     Past Medical History:   Diagnosis Date    Anxiety     Arthritis     GERD (gastroesophageal reflux disease)     Hidradenitis suppurativa     Hypertension     Lipodermatosclerosis of both lower extremities     Lymphedema     Sciatic leg pain 2006    left side    SOB (shortness of breath)     Stomach ulcer       Past Surgical History:     Past Surgical History:   Procedure Laterality Date     SECTION      NC EXC SKIN BENIG 3 1-4 CM TRUNK,ARM,LEG Right 2020    Procedure: EXCISION BIOPSY TISSUE LESION/MASS UPPER EXTREMITY;  Surgeon: Yuly Eckert DO;  Location: [facility];  Service: General    TONSILLECTOMY      TUMOR REMOVAL Right     5th finger      Social History:     E-Cigarette/Vaping    E-Cigarette Use Never User      E-Cigarette/Vaping Substances    Nicotine No     THC No     CBD No     Flavoring No     Other No     Unknown No      Social History     Socioeconomic History    Marital status: Single     Spouse name: None    Number of children: 2    Years of education: Some college    Highest education level: Some college, no degree   Occupational History    None   Social Needs    Financial resource strain: None    Food insecurity     Worry: None     Inability: None    Transportation needs Medical: None     Non-medical: None   Tobacco Use    Smoking status: Current Some Day Smoker     Packs/day: 0 25     Types: Cigarettes    Smokeless tobacco: Never Used    Tobacco comment: currently smokes some days - 1 pack per week   Substance and Sexual Activity    Alcohol use: Not Currently     Frequency: Monthly or less     Drinks per session: 1 or 2     Binge frequency: Never     Comment: holidays    Drug use: No    Sexual activity: Not Currently   Lifestyle    Physical activity     Days per week: None     Minutes per session: None    Stress: None   Relationships    Social connections     Talks on phone: None     Gets together: None     Attends Gnosticist service: None     Active member of club or organization: None     Attends meetings of clubs or organizations: None     Relationship status: None    Intimate partner violence     Fear of current or ex partner: None     Emotionally abused: None     Physically abused: None     Forced sexual activity: None   Other Topics Concern    None   Social History Narrative    None      Family History:     Family History   Problem Relation Age of Onset    Cancer Mother     Hypertension Mother     COPD Father     Diabetes Father       Current Medications:     Current Outpatient Medications   Medication Sig Dispense Refill    albuterol (VENTOLIN HFA) 90 mcg/act inhaler Inhale 2 puffs every 6 (six) hours as needed for wheezing or shortness of breath 1 Inhaler 0    celecoxib (CeleBREX) 200 mg capsule Take 1 capsule (200 mg total) by mouth 2 (two) times a day 60 capsule 2    cyclobenzaprine (FLEXERIL) 10 mg tablet take 1 tablet by mouth three times a day if needed for muscle spasm 90 tablet 2    fluticasone (FLONASE) 50 mcg/act nasal spray 1 spray into each nostril daily 1 Bottle 5    hydrOXYzine HCL (ATARAX) 10 mg tablet take 1-2 tablets by mouth every 8 hours if needed for anxiety 90 tablet 2    lisinopril (ZESTRIL) 10 mg tablet Take 1 tablet (10 mg total) by mouth daily 90 tablet 1    loratadine (CLARITIN) 10 mg tablet Take 1 tablet (10 mg total) by mouth daily 30 tablet 2    metoprolol tartrate (LOPRESSOR) 25 mg tablet Take 1 tablet (25 mg total) by mouth 2 (two) times a day 180 tablet 1    sertraline (ZOLOFT) 25 mg tablet take 1 tablet by mouth once daily 30 tablet 2    benzonatate (TESSALON PERLES) 100 mg capsule Take 1 capsule (100 mg total) by mouth 3 (three) times a day as needed for cough (Patient not taking: Reported on 2/9/2021) 20 capsule 0    clindamycin (CLEOCIN T) 1 % external solution Apply topically 2 (two) times a day (Patient not taking: Reported on 12/29/2020) 30 mL 0    clotrimazole-betamethasone (LOTRISONE) 1-0 05 % cream Apply topically 2 (two) times a day (Patient not taking: Reported on 12/29/2020) 30 g 3    dextromethorphan-guaifenesin (MUCINEX DM)  MG per 12 hr tablet Take 1 tablet by mouth every 12 (twelve) hours (Patient not taking: Reported on 2/9/2021) 14 tablet 0    ferrous sulfate 324 (65 Fe) mg take 1 tablet by mouth twice a day before meals (Patient not taking: Reported on 2/9/2021) 60 tablet 2    mupirocin (BACTROBAN) 2 % ointment Apply topically 3 (three) times a day (Patient not taking: Reported on 12/29/2020) 22 g 0    nitrofurantoin (MACROBID) 100 mg capsule nitrofurantoin monohydrate/macrocrystals 100 mg capsule      nystatin (MYCOSTATIN) powder Apply topically 3 (three) times a day (Patient not taking: Reported on 1/7/2021) 60 g 2    phentermine 37 5 MG capsule take 1 capsule by mouth every morning (Patient not taking: Reported on 12/1/2020) 30 capsule 0    polyethylene glycol (GOLYTELY) 4000 mL solution Take 4,000 mL by mouth once for 1 dose (Patient not taking: Reported on 4/22/2020) 4000 mL 0    SUMAtriptan (IMITREX) 25 mg tablet Take 1 tablet (25 mg total) by mouth once as needed for migraine for up to 1 dose 10 tablet 0     No current facility-administered medications for this visit         Allergies: Allergies   Allergen Reactions    Bee Venom Anaphylaxis, Shortness Of Breath and Swelling      Physical Exam:     /70 (BP Location: Left arm, Patient Position: Sitting, Cuff Size: Large)   Pulse 88   Temp (!) 97 °F (36 1 °C) (Tympanic)   Resp 18   Ht 5' 7" (1 702 m)   Wt (!) 182 kg (401 lb 3 2 oz)   SpO2 97%   BMI 62 84 kg/m²     Physical Exam  Vitals signs and nursing note reviewed  Constitutional:       General: She is not in acute distress  Appearance: Normal appearance  She is obese  HENT:      Head: Normocephalic and atraumatic  Right Ear: Tympanic membrane, ear canal and external ear normal       Left Ear: Tympanic membrane, ear canal and external ear normal       Nose: Nose normal       Mouth/Throat:      Mouth: Mucous membranes are moist       Dentition: Dental caries present  Pharynx: Oropharynx is clear  No oropharyngeal exudate  Eyes:      Extraocular Movements: Extraocular movements intact  Conjunctiva/sclera: Conjunctivae normal       Pupils: Pupils are equal, round, and reactive to light  Neck:      Musculoskeletal: Normal range of motion and neck supple  Cardiovascular:      Rate and Rhythm: Normal rate and regular rhythm  Heart sounds: Normal heart sounds  No murmur  Pulmonary:      Effort: Pulmonary effort is normal  No respiratory distress  Breath sounds: Normal breath sounds  No wheezing  Lymphadenopathy:      Cervical: No cervical adenopathy  Skin:     General: Skin is warm and dry  Neurological:      General: No focal deficit present  Mental Status: She is alert and oriented to person, place, and time  Cranial Nerves: No cranial nerve deficit     Psychiatric:         Mood and Affect: Mood normal          Behavior: Behavior normal           Lawrence Seats, [address] [facility]

## 2021-02-09 NOTE — PATIENT INSTRUCTIONS

## 2021-02-11 DIAGNOSIS — F41.8 OTHER SPECIFIED ANXIETY DISORDERS: ICD-10-CM

## 2021-02-11 LAB — SARS-COV-2 RNA RESP QL NAA+PROBE: NEGATIVE

## 2021-02-11 RX ORDER — SERTRALINE HYDROCHLORIDE 25 MG/1
TABLET, FILM COATED ORAL
Qty: 30 TABLET | Refills: 5 | Status: SHIPPED | OUTPATIENT
Start: 2021-02-11 | End: 2021-01-01 | Stop reason: SDUPTHER

## 2021-02-13 DIAGNOSIS — F41.8 OTHER SPECIFIED ANXIETY DISORDERS: ICD-10-CM

## 2021-02-15 RX ORDER — HYDROXYZINE HYDROCHLORIDE 10 MG/1
TABLET, FILM COATED ORAL
Qty: 90 TABLET | Refills: 2 | Status: SHIPPED | OUTPATIENT
Start: 2021-02-15 | End: 2021-01-01

## 2021-02-16 ENCOUNTER — APPOINTMENT (OUTPATIENT)
Dept: PHYSICAL THERAPY | Facility: HOME HEALTHCARE | Age: 56
End: 2021-02-16
Payer: COMMERCIAL

## 2021-02-19 ENCOUNTER — OFFICE VISIT (OUTPATIENT)
Dept: PHYSICAL THERAPY | Facility: HOME HEALTHCARE | Age: 56
End: 2021-02-19
Payer: COMMERCIAL

## 2021-02-19 DIAGNOSIS — M17.12 PRIMARY OSTEOARTHRITIS OF LEFT KNEE: Primary | ICD-10-CM

## 2021-02-19 PROCEDURE — 97110 THERAPEUTIC EXERCISES: CPT

## 2021-02-19 NOTE — PROGRESS NOTES
Daily Note     Today's date: 2021  Patient name: Kavitha Alcala  : 1965  MRN: [id number]  Referring provider: Whitney Chowdary MD  Dx: No diagnosis found  Subjective: I don't know why but the other night I had weird cramping type pain in my L ankle  Both knees are about the same with pain of 7-8/10 today  Objective: See treatment diary below    Assessment: Tolerated treatment fair  Pt required encouragement with TE as per flow sheet and was able to increase reps of a few ex without incident  Pt with strength limitations evident  Encouraged HEP as daily ability allows  Patient would benefit from continued PT    Plan: Pt  DC to I HEP        Precautions:     Manuals  12-15-20 1/19/21 2-19-21      -Attempted   -Pt declined 2* pain w/knee ext              Neuro Re-Ed                        Ther Ex  12-15-20 1/19/21 2-19-21    Nu Step   L 1  6' L1  6 min  L 1  6'    Hip Adduction   5" 1 x 10 5" 1 x 10  5" 1 x 9    Hip abduction   Red 1 x 10 Red 1 x 10  Red 1 x 15    LAQ  Ramos 1 x 10 Ramos 1 x 10  Ramos 1 x 10    HS curls   Ramos  Red 1 x 10 Ramos Red 1 x 10  Ramos Red 1  x10    Marches   1 x 10 Standing   1 x 10 Ramos Standing   1 x 10 Ramos    SAQ    1 x 15 Ramos     Heel Slides   Ramos 1 x 10 Ramos 1 x 10  Ramos 1 x 15    HR/TR   1 x 10 1 x 10 ea  1 x 10    Mini Squat  NP NV NV    Standing Three way   1 x 10 Ramso 1 x 10 ea Ramos 1 x 10 ea Ramos    Step Up                                                                 Ther Activity                        Gait Training                        Modalities

## 2021-02-19 NOTE — PROGRESS NOTES
PT Discharge    Today's date: 2021  Patient name: John Barker  : 1965  MRN: [id number]  Referring provider: Pascual Cantu MD  Dx:   Encounter Diagnosis     ICD-10-CM    1  Primary osteoarthritis of left knee  M17 12        Start Time: 0700  Stop Time: 3385  Total time in clinic (min): 50 minutes    Assessment  Assessment details: John Barker is a 62 y o  female who presents to outpatient PT with a  Primary osteoarthritis of left knee  (primary encounter diagnosis)  Primary osteoarthritis of right knee  No further referral appears necessary at this time based upon examination results  Pt presents with decreased strength, ROM, balance, functional activity tolerance, and pain with movement in her bilateral knees  which is  limiting her ability to perform the aforementioned functional activities  Etiologic factors include repetitive poor body mechanics  Patient ambulates with Bilateral axillary crutches this date, Significant lower extremity lymphedema, and excessive left ankle pronation during amb  Prognosis is fair given HEP compliance and PT 2/wk  Please contact me if you have any questions or recommendations  Thank you for the opportunity to share in  [place] care  UPDATE: Pt has only been seen in therapy 4 times over the past 3 months  She will be D/C from OPPT due to non-compliance with therapy sessions and will need to return to MD for further assessment in order to return to therapy  Goals have not been met due to lack of attendance       Impairments: abnormal coordination, abnormal gait, abnormal muscle firing, abnormal muscle tone, abnormal or restricted ROM, abnormal movement, activity intolerance, difficulty understanding, impaired balance, impaired physical strength, lacks appropriate home exercise program, safety issue, weight-bearing intolerance, poor posture  and poor body mechanics  Functional limitations: ambulating with assistive device, difficulty with ambulation, stair negotiation  Symptom irritability: moderateBarriers to therapy: Bilateral Lower extremity lymphedema, Discussed possible referral to lymphedema, PT  Understanding of Dx/Px/POC: fair   Prognosis: fair    Goals  Goals have not been met due to lack of attendance  STG to be achieved in 4 weeks     1  Pt will reduce subjective pain rating by at least 50 percent the help facilitate return to PLOF  2  Pt will improve ROM by at least 10 degrees to help promote improved functional activity tolerance   3  Pt will improve MMT scores by at least 1/2 grade to promote improved functional activity tolerance       LTG to be achieved in 6-8 weeks   1  Pt will be complaint with HEP   2  Pt will improve ROM to [facility], to help facilitate independence with ADL's, IADL's, and functional activities   3  Pt will improve Strength to [facility] to help facilitate independence with ADL's, IADL's, and functional activities   4  Pt will have no limitations with ambulation to help facilitate independence at home and in the community  5  Pt will have no limitations with stair negotiation to help facilitate independence at home and in the community       Plan  Plan details: Pt to be D/C from OPPT   Frequency: 2x week  Duration in weeks: 12  Plan of Care beginning date: 12/10/2020  Plan of Care expiration date: 3/10/2021  Treatment plan discussed with: patient        Subjective Evaluation    History of Present Illness  Mechanism of injury: The patient is a 62year old female who presents to outpatient PT with a chief complaint of right knee pain  Patient reports a history of arthritis in B/L Knees  Reports she receives injections in B/L knee, which do help to ease sx  She ambulates with B/L axillary crutches, and feels like she is going to fall if she doesn't use them  Primary impairments are prolonged walking, and standing     Pain  Current pain ratin  At best pain rating: 3  At worst pain ratin  Quality: grinding  Relieving factors: change in position, relaxation, rest, support and medications  Aggravating factors: standing, stair climbing and sitting  Progression: no change    Treatments  Current treatment: physical therapy  Patient Goals  Patient goals for therapy: increased strength, decreased pain and increased motion          Objective     Active Range of Motion   Left Knee   Flexion: 75 degrees   Extension: -5 degrees     Right Knee   Flexion: 105 degrees   Extension: -5 degrees     Strength/Myotome Testing     Left Hip   Planes of Motion   Flexion: 4-  Abduction: 4-  Adduction: 4-    Right Hip   Planes of Motion   Flexion: 4-  Abduction: 4-  Adduction: 4-    Left Knee   Flexion: 4-  Extension: 4-    Right Knee   Flexion: 4-  Extension: 4-

## 2021-03-02 ENCOUNTER — OFFICE VISIT (OUTPATIENT)
Dept: OBGYN CLINIC | Facility: CLINIC | Age: 56
End: 2021-03-02
Payer: COMMERCIAL

## 2021-03-02 VITALS
SYSTOLIC BLOOD PRESSURE: 150 MMHG | WEIGHT: 293 LBS | BODY MASS INDEX: 45.99 KG/M2 | HEIGHT: 67 IN | HEART RATE: 88 BPM | DIASTOLIC BLOOD PRESSURE: 85 MMHG

## 2021-03-02 DIAGNOSIS — M17.11 PRIMARY OSTEOARTHRITIS OF RIGHT KNEE: Primary | ICD-10-CM

## 2021-03-02 DIAGNOSIS — M17.12 PRIMARY OSTEOARTHRITIS OF LEFT KNEE: ICD-10-CM

## 2021-03-02 PROCEDURE — 20610 DRAIN/INJ JOINT/BURSA W/O US: CPT | Performed by: PHYSICIAN ASSISTANT

## 2021-03-02 NOTE — PROGRESS NOTES
60 y o  female presents for Durolane injection to the bilateral knee  Warts having Visco supplements in the past 2020  Just both knees are tender  She was in physical therapy but had to stop due to COVID she wants to continue therapy and is requesting new therapy script      Review of Systems  Review of systems negative unless otherwise specified in HPI    Past Medical History  Past Medical History:   Diagnosis Date    Anxiety     Arthritis     GERD (gastroesophageal reflux disease)     Hidradenitis suppurativa     Hypertension     Lipodermatosclerosis of both lower extremities     Lymphedema     Sciatic leg pain     left side    SOB (shortness of breath)     Stomach ulcer      Past Surgical History  Past Surgical History:   Procedure Laterality Date     SECTION      CA EXC SKIN BENIG 3 1-4 CM TRUNK,ARM,LEG Right 2020    Procedure: EXCISION BIOPSY TISSUE LESION/MASS UPPER EXTREMITY;  Surgeon: Raffy Montes De Oca DO;  Location: [facility];  Service: General    TONSILLECTOMY      TUMOR REMOVAL Right     5th finger     Current Medications  Current Outpatient Medications on File Prior to Visit   Medication Sig Dispense Refill    albuterol (VENTOLIN HFA) 90 mcg/act inhaler Inhale 2 puffs every 6 (six) hours as needed for wheezing or shortness of breath 1 Inhaler 0    benzonatate (TESSALON PERLES) 100 mg capsule Take 1 capsule (100 mg total) by mouth 3 (three) times a day as needed for cough (Patient not taking: Reported on 2021) 20 capsule 0    celecoxib (CeleBREX) 200 mg capsule Take 1 capsule (200 mg total) by mouth 2 (two) times a day 60 capsule 2    clindamycin (CLEOCIN T) 1 % external solution Apply topically 2 (two) times a day (Patient not taking: Reported on 2020) 30 mL 0    clotrimazole-betamethasone (LOTRISONE) 1-0 05 % cream Apply topically 2 (two) times a day (Patient not taking: Reported on 2020) 30 g 3    cyclobenzaprine (FLEXERIL) 10 mg tablet take 1 tablet by mouth three times a day if needed for muscle spasm 90 tablet 2    dextromethorphan-guaifenesin (MUCINEX DM)  MG per 12 hr tablet Take 1 tablet by mouth every 12 (twelve) hours (Patient not taking: Reported on 2/9/2021) 14 tablet 0    ferrous sulfate 324 (65 Fe) mg take 1 tablet by mouth twice a day before meals (Patient not taking: Reported on 2/9/2021) 60 tablet 2    fluticasone (FLONASE) 50 mcg/act nasal spray 1 spray into each nostril daily 1 Bottle 5    hydrOXYzine HCL (ATARAX) 10 mg tablet take 1-2 tablets by mouth every 8 hours if needed for anxiety 90 tablet 2    lisinopril (ZESTRIL) 10 mg tablet Take 1 tablet (10 mg total) by mouth daily 90 tablet 1    loratadine (CLARITIN) 10 mg tablet Take 1 tablet (10 mg total) by mouth daily 30 tablet 2    metoprolol tartrate (LOPRESSOR) 25 mg tablet Take 1 tablet (25 mg total) by mouth 2 (two) times a day 180 tablet 1    mupirocin (BACTROBAN) 2 % ointment Apply topically 3 (three) times a day (Patient not taking: Reported on 12/29/2020) 22 g 0    nitrofurantoin (MACROBID) 100 mg capsule nitrofurantoin monohydrate/macrocrystals 100 mg capsule      nystatin (MYCOSTATIN) powder Apply topically 3 (three) times a day (Patient not taking: Reported on 1/7/2021) 60 g 2    phentermine 37 5 MG capsule take 1 capsule by mouth every morning (Patient not taking: Reported on 12/1/2020) 30 capsule 0    polyethylene glycol (GOLYTELY) 4000 mL solution Take 4,000 mL by mouth once for 1 dose (Patient not taking: Reported on 4/22/2020) 4000 mL 0    sertraline (ZOLOFT) 25 mg tablet take 1 tablet by mouth once daily 30 tablet 5    SUMAtriptan (IMITREX) 25 mg tablet Take 1 tablet (25 mg total) by mouth once as needed for migraine for up to 1 dose 10 tablet 0     No current facility-administered medications on file prior to visit        Recent Labs [facility])  0   Lab Value Date/Time    HCT 41 2 09/09/2020 0921    HGB 13 1 09/09/2020 0921    WBC 13 35 (H) 09/09/2020 0921    INR 1 03 02/22/2019 1150    HGBA1C 5 7 (H) 09/09/2020 0921     Physical exam:  Body mass index is 62 81 kg/m²  · General: Awake, Alert, Oriented  · Eyes: Pupils equal, round and reactive to light  · Heart: regular rate and rhythm  · Lungs: No audible wheezing  · Abdomen: soft  bilateral Knee exam  ·  unable to palpate  anygross effusion, exam difficult due to the lipodermatosclerosis of both legs and large size  Signs of stasis dermatitis at the ankles bilaterally  Pain with palpation over both knees  No gross ligamentous laxity  Imaging    None today    Assessment:  bilateral knee arthritis  Large joint arthrocentesis: R knee  Universal Protocol:  Consent: Verbal consent obtained  Risks and benefits: risks, benefits and alternatives were discussed  Consent given by: patient  Time out: Immediately prior to procedure a "time out" was called to verify the correct patient, procedure, equipment, support staff and site/side marked as required  Timeout called at: 3/2/2021 10:18 AM   Patient understanding: patient states understanding of the procedure being performed  Site marked: the operative site was marked  Patient identity confirmed: verbally with patient    Supporting Documentation  Indications: pain   Procedure Details  Location: knee - R knee  Preparation: Patient was prepped and draped in the usual sterile fashion  Needle size: 22 G  Ultrasound guidance: no  Approach: anterolateral  Medications administered: 3 mL sodium hyaluronate 60 MG/3ML    Patient tolerance: patient tolerated the procedure well with no immediate complications  Dressing:  Sterile dressing applied    Large joint arthrocentesis: L knee  Universal Protocol:  Consent: Verbal consent obtained    Risks and benefits: risks, benefits and alternatives were discussed  Consent given by: patient  Time out: Immediately prior to procedure a "time out" was called to verify the correct patient, procedure, equipment, support staff and site/side marked as required  Timeout called at: 3/2/2021 10:19 AM   Patient understanding: patient states understanding of the procedure being performed  Site marked: the operative site was marked  Patient identity confirmed: verbally with patient    Supporting Documentation  Indications: pain   Procedure Details  Location: knee - L knee  Preparation: Patient was prepped and draped in the usual sterile fashion  Needle size: 22 G  Ultrasound guidance: no  Approach: anterolateral  Medications administered: 3 mL sodium hyaluronate 60 MG/3ML    Patient tolerance: patient tolerated the procedure well with no immediate complications  Dressing:  Sterile dressing applied            Plan:   Patient will ice bilateral knee for 20 minutes 1-2 times today  Limit activity for the next 24 hours before resuming normal activity  Weightbearing as tolerated  Patient may use over-the-counter ibuprofen or Tylenol as needed for discomfort  Follow up in 4 months for repeat evaluation and decision on review ordering injection  Prescription for therapy provided  This note was created with voice recognition software

## 2021-03-02 NOTE — PATIENT INSTRUCTIONS
Patient will ice bilateral knee for 20 minutes 1-2 times today  Limit activity for the next 24 hours before resuming normal activity  Weightbearing as tolerated  Patient may use over-the-counter ibuprofen or Tylenol as needed for discomfort  Follow up in 4 months for repeat evaluation and decision on review ordering injection  Prescription for therapy provided

## 2021-03-10 DIAGNOSIS — G43.009 MIGRAINE WITHOUT AURA AND WITHOUT STATUS MIGRAINOSUS, NOT INTRACTABLE: ICD-10-CM

## 2021-03-10 RX ORDER — SUMATRIPTAN 25 MG/1
TABLET, FILM COATED ORAL
Qty: 9 TABLET | Refills: 2 | Status: SHIPPED | OUTPATIENT
Start: 2021-03-10 | End: 2021-01-01

## 2021-03-14 DIAGNOSIS — M17.11 PRIMARY OSTEOARTHRITIS OF RIGHT KNEE: ICD-10-CM

## 2021-03-14 DIAGNOSIS — M62.830 LUMBAR PARASPINAL MUSCLE SPASM: ICD-10-CM

## 2021-03-15 RX ORDER — CYCLOBENZAPRINE HCL 10 MG
TABLET ORAL
Qty: 90 TABLET | Refills: 2 | Status: SHIPPED | OUTPATIENT
Start: 2021-03-15 | End: 2021-01-01

## 2021-03-15 RX ORDER — CELECOXIB 200 MG/1
CAPSULE ORAL
Qty: 60 CAPSULE | Refills: 2 | Status: SHIPPED | OUTPATIENT
Start: 2021-03-15 | End: 2021-01-01

## 2021-03-18 ENCOUNTER — EVALUATION (OUTPATIENT)
Dept: PHYSICAL THERAPY | Facility: HOME HEALTHCARE | Age: 56
End: 2021-03-18
Payer: COMMERCIAL

## 2021-03-18 DIAGNOSIS — M17.12 PRIMARY OSTEOARTHRITIS OF LEFT KNEE: ICD-10-CM

## 2021-03-18 DIAGNOSIS — M17.11 PRIMARY OSTEOARTHRITIS OF RIGHT KNEE: ICD-10-CM

## 2021-03-18 PROCEDURE — 97163 PT EVAL HIGH COMPLEX 45 MIN: CPT | Performed by: PHYSICAL THERAPIST

## 2021-03-18 PROCEDURE — 97110 THERAPEUTIC EXERCISES: CPT | Performed by: PHYSICAL THERAPIST

## 2021-03-18 PROCEDURE — 97530 THERAPEUTIC ACTIVITIES: CPT | Performed by: PHYSICAL THERAPIST

## 2021-03-18 NOTE — PROGRESS NOTES
PT Evaluation     Today's date: 3/18/2021  Patient name: Ruby Shaw  : 1965  MRN: [id number]  Referring provider: Damian De Luna PA-C  Dx:   Encounter Diagnosis     ICD-10-CM    1  Primary osteoarthritis of right knee  M17 11 Ambulatory referral to Physical Therapy   2  Primary osteoarthritis of left knee  M17 12 Ambulatory referral to Physical Therapy                  Assessment  Assessment details: Ruby Shaw is a 62 y o  female who presents to outpatient PT with a  Primary osteoarthritis of left knee  (primary encounter diagnosis)  Primary osteoarthritis of right knee  Pt presents with decreased strength, ROM, balance, functional activity tolerance, and pain with movement in her bilateral knees,  which is  limiting her ability to perform functional activities such as transfers, stair negotiation, ambulation, and washing/dressing  Etiologic factors include repetitive poor body mechanics  Patient ambulates with Bilateral axillary crutches this date, Significant lower extremity lipidemia, and excessive left ankle pronation during amb  Prognosis is fair given HEP compliance and PT 2/wk  Please contact me if you have any questions or recommendations     Impairments: abnormal gait, abnormal or restricted ROM, abnormal movement, activity intolerance, difficulty understanding, impaired balance, impaired physical strength, lacks appropriate home exercise program, pain with function, safety issue, weight-bearing intolerance, poor posture  and poor body mechanics  Other impairment: lipidemia  Barriers to therapy: Prior therapy with poor attendance and compliance to HEP   Lipidemia in BUE/BLE with decreased understanding of Lipidemia condition   Understanding of Dx/Px/POC: poor   Prognosis: poor    Goals  STGs to be achieved in 4 weeks:  -Pt to demonstrate reduced subjective pain rating "at worst" by at least 2-3 points from Initial Eval to allow for reduced pain with ADLs and improved functional activity tolerance    -Pt to demonstrate pain free B knee ROM WFL in order to maximize joint mobility and function and allow for progression of exercise program and achievement of goals    -Pt to demonstrate increased MMT of B knees by at least 1/2 grade in order to improve safety and stability with ADLs and functional mobility  LTGs to be achieved upon discharge:   -Pt will be I with HEP in order to continue to improve quality of life and independence and reduce risk for re-injury    -Pt to demonstrate return to activities of daily living without limiations or restrictions    -Pt will return to ambulation >10 to help facilitate return to community activities independently   -Pt to demonstrate return to work activities without limitations or restrictions  Plan  Patient would benefit from: skilled physical therapy  Planned modality interventions: thermotherapy: hydrocollator packs  Planned therapy interventions: balance, balance/weight bearing training, manual therapy, neuromuscular re-education, home exercise program, gait training, functional ROM exercises, flexibility, therapeutic activities, therapeutic exercise and patient education  Frequency: 2x week  Duration in weeks: 4  Plan of Care beginning date: 3/18/2021  Plan of Care expiration date: 4/18/2021  Treatment plan discussed with: patient        Subjective Evaluation    History of Present Illness  Mechanism of injury: The patient is a 62year old female who presents to outpatient PT with a chief complaint of right knee pain  Patient reports a history of arthritis in B/L Knees  Reports she received 2nd set of injections in B/L knee about 1-2 weeks ago which have not really changed her sx  [facility] therapy completed in January 2021 with poor attendance and no significant changes in pain  She ambulates with B/L axillary crutches, and feels like she is going to fall if she doesn't use them  Primary impairments are prolonged walking, and standing     Quality of life: poor    Pain  At best pain ratin  At worst pain ratin  Quality: dull ache and tight  Relieving factors: heat and medications  Aggravating factors: standing, walking, stair climbing and sitting  Progression: worsening    Social Support  Stairs in house: no   Lives in: apartment  Lives with: alone    Employment status: not working    Diagnostic Tests  X-ray: abnormal  Treatments  Previous treatment: injection treatment  Current treatment: physical therapy  Patient Goals  Patient goals for therapy: increased strength, independence with ADLs/IADLs, increased motion, improved balance and decreased pain          Objective     Observations     Additional Observation Details  Significant pronation of B feet L > R side during ambulation     Tenderness   Left Knee   Tenderness in the medial joint line  No tenderness in the lateral joint line  Right Knee   Tenderness in the medial joint line  No tenderness in the lateral joint line  Neurological Testing     Sensation     Knee   Left Knee   Intact: light touch    Right Knee   Intact: light touch     Active Range of Motion   Left Knee   Flexion: 102 degrees   Extension: -3 degrees     Right Knee   Flexion: 100 degrees   Extension: -5 degrees with pain    Strength/Myotome Testing     Left Knee   Flexion: 3+  Extension: 3+  Quadriceps contraction: fair    Right Knee   Flexion: 3  Extension: 3  Quadriceps contraction: fair    Ambulation   Weight-Bearing Status   Assistive device used: crutches    Additional Weight-Bearing Status Details  Can walk short distances around her apartment without use of crutches     Ambulation: Level Surfaces     Additional Level Surfaces Ambulation Details  Tolerance x 10 minutes with use of crutches     Ambulation: Stairs   Pattern: non-reciprocal  Railings: one rail  Pattern: non-reciprocal    Additional Stairs Ambulation Details  Only has to complete 2-3 steps to get on transit bus     General Comments:       Ankle/Foot Comments   Lipidemia in B UE/LE with wrist and foot sparing   Mild trophic changes noted B LE proximal to ankles with possible mild fluid retention; PT advised pt to wear compression wraps previously obtained to manage edema and discomfort                Re-eval Date: 4/18/21    Date 3/18       Visit Count 1       FOTO              Precautions: Antonietta Lewis; chronic pain       Manuals 3/18                                       Neuro Re-Ed                                         Ther Ex        NuStep  L1  10 minutes        HR/TR        Standing hip flex/abd/ext        Step-ups   Fwd/Lat        Step-down        LAQ         Ham curl with tband        QS        Heel slides        Hip add        Hip abd        SLR        Bridges                Self HS stretch                 Ther Activity        Education  Lipidemia condition and physiological impact on joints/knee pain   Reviewed management options                Gait Training                        Modalities

## 2021-03-19 DIAGNOSIS — B37.2 YEAST DERMATITIS: ICD-10-CM

## 2021-03-19 RX ORDER — CLOTRIMAZOLE AND BETAMETHASONE DIPROPIONATE 10; .64 MG/G; MG/G
CREAM TOPICAL
Qty: 30 G | Refills: 3 | Status: SHIPPED | OUTPATIENT
Start: 2021-03-19 | End: 2021-01-01

## 2021-03-23 ENCOUNTER — OFFICE VISIT (OUTPATIENT)
Dept: PHYSICAL THERAPY | Facility: HOME HEALTHCARE | Age: 56
End: 2021-03-23
Payer: COMMERCIAL

## 2021-03-23 DIAGNOSIS — M17.11 PRIMARY OSTEOARTHRITIS OF RIGHT KNEE: Primary | ICD-10-CM

## 2021-03-23 PROCEDURE — 97110 THERAPEUTIC EXERCISES: CPT

## 2021-03-25 ENCOUNTER — OFFICE VISIT (OUTPATIENT)
Dept: PHYSICAL THERAPY | Facility: HOME HEALTHCARE | Age: 56
End: 2021-03-25
Payer: COMMERCIAL

## 2021-03-25 DIAGNOSIS — M17.11 PRIMARY OSTEOARTHRITIS OF RIGHT KNEE: Primary | ICD-10-CM

## 2021-03-25 PROCEDURE — 97110 THERAPEUTIC EXERCISES: CPT

## 2021-03-25 NOTE — PROGRESS NOTES
Daily Note     Today's date: 3/25/2021  Patient name: David Shepherd  : 1965  MRN: [id number]  Referring provider: Juan Dodge PA-C  Dx:   Encounter Diagnosis     ICD-10-CM    1  Primary osteoarthritis of right knee  M17 11        Start Time: 0700          Subjective: I did ok with the ex last visit  I have pain at B knees and my back hurts  Objective: See treatment diary below    Assessment: Tolerated treatment well  Able to vel added TE as per flow sheet with minimal rests needed  Pt reports pain behind R knee during QS  Adjusted crutches as per pt request  Pt voiced interest in wt loss and was provided with information pamphlet for wt management physician  Patient would benefit from continued PT    Plan: Continue per plan of care        Re-eval Date: 21    Date 3/18 3-23-21 3-25-21     Visit Count 1 2 3     FOTO              Precautions: Lipidemia; chronic pain       Manuals 3/18 3-23-21 3-25-21             Neuro Re-Ed                         Ther Ex        NuStep  L1  10 minutes  L 1    10' L 1   10'     HR/TR        Standing hip flex/abd/ext  R only  1 x 5 ea R 1 x 5 ea  L x 5 fwd     Standing HS curl  1 x 5   R only 1 x 5 Virginie        Step-ups   Fwd/Lat        Step-down        LAQ   Virginie 1 x 10 Virginie 1 x 10     Seated march  1 x 10 1 x 10     Ham curl with tband  NV Red 1 x 10 virginie     QS  Virginie 5" 1 x 10 Virginie 5" 1 x 10     Heel slides        Hip add  Seated 1 x 10 Seated 1 x 10     Hip abd        SLR        Bridges  1 x 10 1 x 10             Self HS stretch   NV NP             Ther Activity        Education  Lipidemia condition and physiological impact on joints/knee pain   Reviewed management options                Gait Training                        Modalities

## 2021-03-30 ENCOUNTER — OFFICE VISIT (OUTPATIENT)
Dept: PHYSICAL THERAPY | Facility: HOME HEALTHCARE | Age: 56
End: 2021-03-30
Payer: COMMERCIAL

## 2021-03-30 ENCOUNTER — TELEMEDICINE (OUTPATIENT)
Dept: FAMILY MEDICINE CLINIC | Facility: CLINIC | Age: 56
End: 2021-03-30
Payer: COMMERCIAL

## 2021-03-30 DIAGNOSIS — M54.2 NECK PAIN: ICD-10-CM

## 2021-03-30 DIAGNOSIS — M54.50 ACUTE MIDLINE LOW BACK PAIN WITHOUT SCIATICA: Primary | ICD-10-CM

## 2021-03-30 DIAGNOSIS — M17.12 PRIMARY OSTEOARTHRITIS OF LEFT KNEE: ICD-10-CM

## 2021-03-30 DIAGNOSIS — M17.11 PRIMARY OSTEOARTHRITIS OF RIGHT KNEE: Primary | ICD-10-CM

## 2021-03-30 PROCEDURE — 99213 OFFICE O/P EST LOW 20 MIN: CPT | Performed by: FAMILY MEDICINE

## 2021-03-30 PROCEDURE — 97110 THERAPEUTIC EXERCISES: CPT

## 2021-03-30 RX ORDER — LIDOCAINE 50 MG/G
1 PATCH TOPICAL DAILY
Qty: 30 PATCH | Refills: 0 | Status: SHIPPED | OUTPATIENT
Start: 2021-03-30 | End: 2022-01-01 | Stop reason: SDDI

## 2021-03-30 NOTE — PROGRESS NOTES
Daily Note     Today's date: 3/30/2021  Patient name: Jennifer Melendez  : 1965  MRN: [id number]  Referring provider: Aurora Schultz PA-C  Dx:   Encounter Diagnosis     ICD-10-CM    1  Primary osteoarthritis of right knee  M17 11    2  Primary osteoarthritis of left knee  M17 12               Subjective: Pt reports she has a lot of pain in her back and tailbone since last session  Pt also reports she has 8/10 pain in both of her knees  Objective: See treatment diary below      Assessment: Tolerated treatment fair  Verbal cues needed t/o exercise to perform correctly  Pt reports pain in her back t/o session and tailbone with sitting  Modified program today due to Pt's request to hold standing exercises in parallel bars due to back pain  Increased reps to fifteen with seated and supine exercises today  Attempted supine SLR but Pt was unable to complete  Plan: Continue per plan of care        Re-eval Date: 21    Date 3/18 3-23-21 3-25-21 3/30/21    Visit Count 1 2 3 4    FOTO              Precautions: Lipidemia; chronic pain       Manuals 3/18 3-23-21 3-25-21 3/30/21            Neuro Re-Ed                         Ther Ex        NuStep  L1  10 minutes  L 1    10' L 1   10' L1  10 min     HR/TR        Standing hip flex/abd/ext  R only  1 x 5 ea R 1 x 5 ea  L x 5 fwd Hold     Standing HS curl  1 x 5   R only 1 x 5 Virginie    Hold     Step-ups   Fwd/Lat        Step-down        LAQ   Virginie 1 x 10 Virginie 1 x 10 Virginie 1 x 15     Seated march  1 x 10 1 x 10 1 x 15     Ham curl with tband  NV Red 1 x 10 virginie Red 1 x 15     QS  Virginie 5" 1 x 10 Virginie 5" 1 x 10 Virginie 5" 1 x 15     Heel slides        Hip add  Seated 1 x 10 Seated 1 x 10 Seated 1 x 15     Hip abd        SLR    Pt unable     Bridges  1 x 10 1 x 10 Hold             Self HS stretch   NV NP NP            Ther Activity        Education  Lipidemia condition and physiological impact on joints/knee pain   Reviewed management options                Gait Training Modalities

## 2021-03-30 NOTE — PROGRESS NOTES
Virtual Regular Visit      Assessment/Plan:    Problem List Items Addressed This Visit     None      Visit Diagnoses     Acute midline low back pain without sciatica    -  Primary    Relevant Medications    lidocaine (LIDODERM) 5 %    Neck pain        Relevant Medications    lidocaine (LIDODERM) 5 %        - Advised patient to re-start Celebrex and flexeril as previously prescribed  Will add a lidocaine patch for addition pain relief  Return if no improvement  Reason for visit is   Chief Complaint   Patient presents with    Back Pain    Neck Pain    Virtual Regular Visit        Encounter provider Mike Uriarte PA-C    Provider located at [address] [place] [address]      Recent Visits  No visits were found meeting these conditions  Showing recent visits within past 7 days and meeting all other requirements     Today's Visits  Date Type Provider Dept   03/30/21 Telemedicine [facility]   Showing today's visits and meeting all other requirements     Future Appointments  No visits were found meeting these conditions  Showing future appointments within next 150 days and meeting all other requirements        The patient was identified by name and date of birth  Kavitha Alcala was informed that this is a telemedicine visit and that the visit is being conducted through [employer] and patient was informed that this is not a secure, HIPAA-compliant platform  She agrees to proceed     My office door was closed  No one else was in the room  She acknowledged consent and understanding of privacy and security of the video platform  The patient has agreed to participate and understands they can discontinue the visit at any time  Patient is aware this is a billable service  Subjective  Kavitha Alcala is a 60 y o  female        Arnulfo Mason is a 60year old female with history of HTN, lymphedema, morbid obesity, hidradenitis suppurativa, and bilateral knee osteoarthritis, presenting with concern for back and neck pain  Patient has been attending PT for bilateral knee pain secondary to osteoarthritis  Reports that for the past 6 days she has been having worsening back and neck pain  She is prescribed flexeril 10 mg TID and Celebrex 200 mg BID for chronic pain, however, reports that she has been out of these for the past week and just picked up her refills today  In the interim she has been taking ibuprofen without relief  Endorses midline low back pain and neck pain which is constant  Denies radiation into her arms or legs  Denies numbness, tingling, or weakness in her arms or legs         Past Medical History:   Diagnosis Date    Anxiety     Arthritis     GERD (gastroesophageal reflux disease)     Hidradenitis suppurativa     Hypertension     Lipodermatosclerosis of both lower extremities     Lymphedema     Sciatic leg pain     left side    SOB (shortness of breath)     Stomach ulcer        Past Surgical History:   Procedure Laterality Date     SECTION      [place] EXC SKIN BENIG 3 1-4 CM TRUNK,ARM,LEG Right 2020    Procedure: EXCISION BIOPSY TISSUE LESION/MASS UPPER EXTREMITY;  Surgeon: Kyle Martinez DO;  Location: [facility];  Service: General    TONSILLECTOMY      TUMOR REMOVAL Right     5th finger       Current Outpatient Medications   Medication Sig Dispense Refill    albuterol (VENTOLIN HFA) 90 mcg/act inhaler Inhale 2 puffs every 6 (six) hours as needed for wheezing or shortness of breath 1 Inhaler 0    celecoxib (CeleBREX) 200 mg capsule take 1 capsule by mouth twice a day 60 capsule 2    clotrimazole-betamethasone (LOTRISONE) 1-0 05 % cream apply to affected area twice a day 30 g 3    cyclobenzaprine (FLEXERIL) 10 mg tablet take 1 tablet by mouth three times a day if needed for muscle spasm 90 tablet 2    fluticasone (FLONASE) 50 mcg/act nasal spray 1 spray into each nostril daily 1 Bottle 5    hydrOXYzine HCL (ATARAX) 10 mg tablet take 1-2 tablets by mouth every 8 hours if needed for anxiety 90 tablet 2    lisinopril (ZESTRIL) 10 mg tablet Take 1 tablet (10 mg total) by mouth daily 90 tablet 1    loratadine (CLARITIN) 10 mg tablet Take 1 tablet (10 mg total) by mouth daily 30 tablet 2    metoprolol tartrate (LOPRESSOR) 25 mg tablet Take 1 tablet (25 mg total) by mouth 2 (two) times a day 180 tablet 1    nitrofurantoin (MACROBID) 100 mg capsule nitrofurantoin monohydrate/macrocrystals 100 mg capsule      nystatin (MYCOSTATIN) powder Apply topically 3 (three) times a day 60 g 2    sertraline (ZOLOFT) 25 mg tablet take 1 tablet by mouth once daily 30 tablet 5    SUMAtriptan (IMITREX) 25 mg tablet TAKE 1 TABLET BY MOUTH ONCE AS NEEDED FOR MIGRAINE FOR UP TO 1 DOSE 9 tablet 2    ferrous sulfate 324 (65 Fe) mg take 1 tablet by mouth twice a day before meals (Patient not taking: Reported on 2/9/2021) 60 tablet 2    lidocaine (LIDODERM) 5 % Apply 1 patch topically daily Remove & Discard patch within 12 hours or as directed by MD 30 patch 0    phentermine 37 5 MG capsule take 1 capsule by mouth every morning (Patient not taking: Reported on 12/1/2020) 30 capsule 0     No current facility-administered medications for this visit  Allergies   Allergen Reactions    Bee Venom Anaphylaxis, Shortness Of Breath and Swelling       Review of Systems   Constitutional: Negative for chills, diaphoresis and fever  Respiratory: Negative for cough, chest tightness, shortness of breath and wheezing  Cardiovascular: Negative for chest pain and palpitations  Gastrointestinal: Negative for abdominal pain, blood in stool, constipation, diarrhea, nausea and vomiting  Musculoskeletal: Positive for back pain and neck pain  Skin: Negative for rash and wound  Neurological: Negative for dizziness, syncope, weakness, light-headedness, numbness and headaches         Video Exam    There were no vitals filed for this visit  Physical Exam  Constitutional:       General: She is not in acute distress  Appearance: Normal appearance  She is obese  HENT:      Head: Normocephalic and atraumatic  Pulmonary:      Effort: Pulmonary effort is normal  No respiratory distress  Neurological:      General: No focal deficit present  Mental Status: She is alert and oriented to person, place, and time  Psychiatric:         Mood and Affect: Mood normal          Behavior: Behavior normal           I spent 10 minutes directly with the patient during this visit      VIRTUAL VISIT Michelle Greco acknowledges that she has consented to an online visit or consultation  She understands that the online visit is based solely on information provided by her, and that, in the absence of a face-to-face physical evaluation by the physician, the diagnosis she receives is both limited and provisional in terms of accuracy and completeness  This is not intended to replace a full medical face-to-face evaluation by the physician  Angelina Moulton understands and accepts these terms

## 2021-04-01 ENCOUNTER — OFFICE VISIT (OUTPATIENT)
Dept: PHYSICAL THERAPY | Facility: HOME HEALTHCARE | Age: 56
End: 2021-04-01
Payer: COMMERCIAL

## 2021-04-01 DIAGNOSIS — M17.12 PRIMARY OSTEOARTHRITIS OF LEFT KNEE: ICD-10-CM

## 2021-04-01 DIAGNOSIS — M17.11 PRIMARY OSTEOARTHRITIS OF RIGHT KNEE: Primary | ICD-10-CM

## 2021-04-01 PROCEDURE — 97110 THERAPEUTIC EXERCISES: CPT

## 2021-04-01 NOTE — PROGRESS NOTES
Daily Note     Today's date: 2021  Patient name: João Miranda  : 1965  MRN: [id number]  Referring provider: Johnathan Cruz PA-C  Dx:   Encounter Diagnosis     ICD-10-CM    1  Primary osteoarthritis of right knee  M17 11    2  Primary osteoarthritis of left knee  M17 12               Subjective: Pt reports she still has a lot of pain in her back and some pain in her legs  Pt reports 9/10 back pain  Pt also reports she is waiting for a pain patch to be called in to the pharmacy from her Dr Paul Toussaint: See treatment diary below      Assessment: Tolerated treatment fair  Pt reports pain in her back t/o exercise  No increased pain reported in her knees t/o session  Modified program again this session due back pain  Unable to progress program  due to back pain and high pain levels  Plan: Continue per plan of care        Re-eval Date: 21    Date 3/18 3-23-21 3-25-21 3/30/21 4/1/21   Visit Count 1 2 3 4 5   FOTO              Precautions: Lipidemia; chronic pain       Manuals 3/18 3-23-21 3-25-21 3/30/21 4/1/21           Neuro Re-Ed                         Ther Ex        NuStep  L1  10 minutes  L 1    10' L 1   10' L1  10 min  L1  10 min    HR/TR        Standing hip flex/abd/ext  R only  1 x 5 ea R 1 x 5 ea  L x 5 fwd Hold  Hold    Standing HS curl  1 x 5   R only 1 x 5 Virginie    Hold  Hold    Step-ups   Fwd/Lat        Step-down        LAQ   Virginie 1 x 10 Virginie 1 x 10 Virginie 1 x 15  Virginie 1 x 15    Seated march  1 x 10 1 x 10 1 x 15  1 x 15    Ham curl with tband  NV Red 1 x 10 virginie Red 1 x 15  Red 1 x 15    QS  Virginie 5" 1 x 10 Virginie 5" 1 x 10 Virginie 5" 1 x 15  Virginie 5" 1 x 5    Heel slides        Hip add  Seated 1 x 10 Seated 1 x 10 Seated 1 x 15  Seated 1 x 15    Hip abd        SLR    Pt unable     Bridges  1 x 10 1 x 10 Hold  Hold            Self HS stretch   NV NP NP NP           Ther Activity        Education  Lipidemia condition and physiological impact on joints/knee pain   Reviewed management options Gait Training                        Modalities

## 2021-04-06 ENCOUNTER — APPOINTMENT (OUTPATIENT)
Dept: PHYSICAL THERAPY | Facility: HOME HEALTHCARE | Age: 56
End: 2021-04-06
Payer: COMMERCIAL

## 2021-04-07 ENCOUNTER — OFFICE VISIT (OUTPATIENT)
Dept: PHYSICAL THERAPY | Facility: HOME HEALTHCARE | Age: 56
End: 2021-04-07
Payer: COMMERCIAL

## 2021-04-07 DIAGNOSIS — M17.11 PRIMARY OSTEOARTHRITIS OF RIGHT KNEE: Primary | ICD-10-CM

## 2021-04-07 DIAGNOSIS — M17.12 PRIMARY OSTEOARTHRITIS OF LEFT KNEE: ICD-10-CM

## 2021-04-07 PROCEDURE — 97110 THERAPEUTIC EXERCISES: CPT | Performed by: PHYSICAL THERAPIST

## 2021-04-07 NOTE — PROGRESS NOTES
Daily Note     Today's date: 2021  Patient name: Siddharth Singh  : 1965  MRN: [id number]  Referring provider: Noe Simpson PA-C  Dx:   Encounter Diagnosis     ICD-10-CM    1  Primary osteoarthritis of right knee  M17 11    2  Primary osteoarthritis of left knee  M17 12                   Subjective: Pt with no new complaints this date  Objective: See treatment diary below      Assessment: Pt was able to complete standing activities this date but with evident SOB noted with minimal time standing  She also requires use of p-bars for balance and weight bearing support  Plan: Continue per plan of care        Re-eval Date: 21    Date 21   Visit Count 6    5   FOTO              Precautions: Lipidemia; chronic pain       Manuals 21           Neuro Re-Ed                         Ther Ex        NuStep  L1 10 minutes     L1  10 min    HR/TR 1 x 15        Standing hip flex/abd/ext 1 x 15     Hold    Standing HS curl     Hold    Step-ups   Fwd/Lat        Step-down        LAQ  Ramos x 15     Ramos 1 x 15    Seated march 1 x 15     1 x 15    Ham curl with tband Green x 15     Red 1 x 15    QS     Ramos 5" 1 x 5    Heel slides        Hip add Seated 1 x 15     Seated 1 x 15    Hip abd        SLR        Bridges     Hold            Self HS stretch      NP           Ther Activity        Education                 Gait Training                        Modalities

## 2021-04-13 ENCOUNTER — OFFICE VISIT (OUTPATIENT)
Dept: PHYSICAL THERAPY | Facility: HOME HEALTHCARE | Age: 56
End: 2021-04-13
Payer: COMMERCIAL

## 2021-04-13 DIAGNOSIS — M17.11 PRIMARY OSTEOARTHRITIS OF RIGHT KNEE: Primary | ICD-10-CM

## 2021-04-13 PROCEDURE — 97110 THERAPEUTIC EXERCISES: CPT

## 2021-04-13 NOTE — PROGRESS NOTES
Daily Note     Today's date: 2021  Patient name: Isauro Shah  : 1965  MRN: [id number]  Referring provider: Naveen Hernandez PA-C  Dx:   Encounter Diagnosis     ICD-10-CM    1  Primary osteoarthritis of right knee  M17 11        Start Time: 0700          Subjective: My back is 7/10 and my knees are a little better 5/10  I wouldn't be able to do the PT without my medication  I use a Lidocaine patch at my back  Objective: See treatment diary below    Assessment: Pt with fair vel to session  She reports L knee pain > R during wt bearing  Pt was ablle to resume QS and heel slides today, but declined bridges 2* LBP  Patient would benefit from continued PT to improve endurance, strength and flexibility  Plan: Continue per plan of care        Re-eval Date: 21    Date 21   Visit Count 6 7   5   FOTO              Precautions: Lipidemia; chronic pain       Manuals 21           Neuro Re-Ed                         Ther Ex  21      NuStep  L1 10 minutes  L 1   10'   L1  10 min    HR/TR 1 x 15  1 x 15      Standing hip flex/abd/ext 1 x 15 ea 1 x 10 flex  1 x 15 abd  1 x 10 ext   Hold    Standing HS curl     Hold    Step-ups   Fwd/Lat        Step-down        LAQ  Ramos x 15  Ramos 1 x 15   Ramos 1 x 15    Seated march 1 x 15  1 x 15    1 x 15    Ham curl with tband Green x 15  Green x 15   Red 1 x 15    QS  1 x 15 Ramos   Ramos 5" 1 x 5    Heel slides  1 x 10 Ramos      Hip add Seated 1 x 15  Seated 1 x 15   Seated 1 x 15    Hip abd        SLR        Bridges     Hold            Self HS stretch      NP           Ther Activity        Education                 Gait Training                        Modalities

## 2021-04-16 ENCOUNTER — OFFICE VISIT (OUTPATIENT)
Dept: PHYSICAL THERAPY | Facility: HOME HEALTHCARE | Age: 56
End: 2021-04-16
Payer: COMMERCIAL

## 2021-04-16 DIAGNOSIS — M17.11 PRIMARY OSTEOARTHRITIS OF RIGHT KNEE: Primary | ICD-10-CM

## 2021-04-16 PROCEDURE — 97110 THERAPEUTIC EXERCISES: CPT

## 2021-04-16 NOTE — PROGRESS NOTES
Daily Note     Today's date: 2021  Patient name: Everardo Villanueva  : 1965  MRN: [id number]  Referring provider: Dandre Claros PA-C  Dx: No diagnosis found  Subjective: My R knee hurts more than the L but that is not unusual      Objective: See treatment diary below    Assessment: Tolerated treatment well  Pt was able to vle advancement in program as per flow sheet without incident  Pt reports feeling well at end of Tx and states slight improvement with ambulation distance and vel to ADL's since [facility]  Patient would benefit from continued PT    Plan: Continue per plan of care        Re-eval Date: 21    Date 21   Visit Count 6 7 8  5   FOTO              Precautions: Lipidemia; chronic pain       Manuals 21           Neuro Re-Ed                         Ther Ex  21     NuStep  L1 10 minutes  L 1   10' L 1  10'    L1  10 min    HR/TR 1 x 15  1 x 15 1 x 15     Standing hip flex/abd/ext 1 x 15 ea 1 x 10 flex  1 x 15 abd  1 x 10 ext 1 x 15 flex  1 x 15 abd  1 x 15 ext  Hold    Standing March   1 x 15  Hold    Step-ups   Fwd/Lat        Step-down        LAQ  Ramos x 15  Ramos 1 x 15 Ramos 1 x 15  Ramos 1 x 15    Seated march 1 x 15  1 x 15  Standing above  1 x 15    Ham curl with tband Green x 15  Green x 15 Green x 15  Red 1 x 15    QS  1 x 15 Ramos 1 x 15 Ramos  Ramos 5" 1 x 5    Heel slides  1 x 10 Ramos 1 x 10 Ramos     Hip add Seated 1 x 15  Seated 1 x 15 Seated 1 x 15  Seated 1 x 15    Hip abd        SLR        Bridges     Hold            Self HS stretch    Calf  15" x 3 Ramos  NP           Ther Activity        Education                 Gait Training                        Modalities

## 2021-04-20 ENCOUNTER — EVALUATION (OUTPATIENT)
Dept: PHYSICAL THERAPY | Facility: HOME HEALTHCARE | Age: 56
End: 2021-04-20
Payer: COMMERCIAL

## 2021-04-20 DIAGNOSIS — M17.12 PRIMARY OSTEOARTHRITIS OF LEFT KNEE: ICD-10-CM

## 2021-04-20 DIAGNOSIS — M17.11 PRIMARY OSTEOARTHRITIS OF RIGHT KNEE: Primary | ICD-10-CM

## 2021-04-20 PROCEDURE — 97110 THERAPEUTIC EXERCISES: CPT

## 2021-04-20 NOTE — PROGRESS NOTES
PT Discharge    Today's date: 2021  Patient name: Osvaldo Barriga  : 1965  MRN: [id number]  Referring provider: Isidoro Garay PA-C  Dx:   Encounter Diagnosis     ICD-10-CM    1  Primary osteoarthritis of right knee  M17 11    2  Primary osteoarthritis of left knee  M17 12                   Assessment  Assessment details: Osvaldo Barriga is a 62 y o  female who presents to outpatient PT with a  Primary osteoarthritis of left knee  (primary encounter diagnosis)  Primary osteoarthritis of right knee  Pt presents with decreased strength, ROM, balance, functional activity tolerance, and pain with movement in her bilateral knees,  which is  limiting her ability to perform functional activities such as transfers, stair negotiation, ambulation, and washing/dressing  Etiologic factors include repetitive poor body mechanics  Patient ambulates with Bilateral axillary crutches this date, Significant lower extremity lipidemia, and excessive left ankle pronation during amb  Prognosis is fair given HEP compliance and PT 2/wk  Please contact me if you have any questions or recommendations  UPDATE: Pt with no significant changes since SOC  PT has spent extensive time reviewing lipedema dx with patient and the impact on her orthopedic conditions and overall declined mobility  She was advised to follow up with primary care physician and speak about possible medical management for lipedema as condition remains primary source for pain/immobility  She voiced understanding  DC from OPPT 2* to lack of progression seen with POC   Thank you for this referral    Impairments: abnormal gait, abnormal or restricted ROM, abnormal movement, activity intolerance, difficulty understanding, impaired balance, impaired physical strength, lacks appropriate home exercise program, pain with function, safety issue, weight-bearing intolerance, poor posture  and poor body mechanics  Other impairment: lipidemia  Barriers to therapy: Prior therapy with poor attendance and compliance to HEP   Lipidemia in BUE/BLE with decreased understanding of Lipidemia condition   Understanding of Dx/Px/POC: poor   Prognosis: poor    Goals  STGs to be achieved in 4 weeks:  -Pt to demonstrate reduced subjective pain rating "at worst" by at least 2-3 points from Initial Eval to allow for reduced pain with ADLs and improved functional activity tolerance  - NOT MET  -Pt to demonstrate pain free B knee ROM WFL in order to maximize joint mobility and function and allow for progression of exercise program and achievement of goals  - NOT MET  -Pt to demonstrate increased MMT of B knees by at least 1/2 grade in order to improve safety and stability with ADLs and functional mobility  - NOT MET    LTGs to be achieved upon discharge:   -Pt will be I with HEP in order to continue to improve quality of life and independence and reduce risk for re-injury  - NOT MET  -Pt to demonstrate return to activities of daily living without limiations or restrictions  - NOT MET  -Pt will return to ambulation >10 to help facilitate return to community activities independently - NOT MET  -Pt to demonstrate return to work activities without limitations or restrictions  - NOT MET    Plan  Plan details: Pt to be D/C from OPPT   Frequency: 2x week  Duration in weeks: 4  Plan of Care beginning date: 3/18/2021  Plan of Care expiration date: 4/20/2021  Treatment plan discussed with: patient        Subjective Evaluation    History of Present Illness  Mechanism of injury: The patient is a 62year old female who presents to outpatient PT with a chief complaint of right knee pain  Patient reports a history of arthritis in B/L Knees  Reports she received 2nd set of injections in B/L knee about 1-2 weeks ago which have not really changed her sx  Piror therapy completed in January 2021 with poor attendance and no significant changes in pain   She ambulates with B/L axillary crutches, and feels like she is going to fall if she doesn't use them  Primary impairments are prolonged walking, and standing  UPDATE: Pt notes that she still has pain in the legs and difficulty getting around; not much change since start of therapy  Quality of life: poor    Pain  At best pain ratin  At worst pain ratin  Quality: dull ache and tight  Relieving factors: heat and medications  Aggravating factors: standing, walking, stair climbing and sitting  Progression: worsening    Social Support  Stairs in house: no   Lives in: apartment  Lives with: alone    Employment status: not working    Diagnostic Tests  X-ray: abnormal  Treatments  Previous treatment: injection treatment  Current treatment: physical therapy  Patient Goals  Patient goals for therapy: increased strength, independence with ADLs/IADLs, increased motion, improved balance and decreased pain          Objective     Observations     Additional Observation Details  Significant pronation of B feet L > R side during ambulation     Tenderness   Left Knee   Tenderness in the medial joint line  No tenderness in the lateral joint line  Right Knee   Tenderness in the medial joint line  No tenderness in the lateral joint line       Neurological Testing     Sensation     Knee   Left Knee   Intact: light touch    Right Knee   Intact: light touch     Active Range of Motion   Left Knee   Flexion: 102 degrees   Extension: -3 degrees     Right Knee   Flexion: 100 degrees   Extension: -5 degrees with pain    Strength/Myotome Testing     Left Knee   Flexion: 3+  Extension: 3+  Quadriceps contraction: fair    Right Knee   Flexion: 3  Extension: 3  Quadriceps contraction: fair    Ambulation   Weight-Bearing Status   Assistive device used: crutches    Additional Weight-Bearing Status Details  Can walk short distances around her apartment without use of crutches     Ambulation: Level Surfaces     Additional Level Surfaces Ambulation Details  Tolerance x 10 minutes with use of crutches     Ambulation: Stairs   Pattern: non-reciprocal  Railings: one rail  Pattern: non-reciprocal    Additional Stairs Ambulation Details  Only has to complete 2-3 steps to get on transit bus     General Comments:       Ankle/Foot Comments   Lipidemia in B UE/LE with wrist and foot sparing   Mild trophic changes noted B LE proximal to ankles with possible mild fluid retention; PT advised pt to wear compression wraps previously obtained to manage edema and discomfort

## 2021-04-20 NOTE — PROGRESS NOTES
Daily Note     Today's date: 2021  Patient name: Megan Strauss  : 1965  MRN: [id number]  Referring provider: Travis Garcia PA-C  Dx:   Encounter Diagnosis     ICD-10-CM    1  Primary osteoarthritis of right knee  M17 11    2  Primary osteoarthritis of left knee  M17 12               Subjective: Pt reports her knees have been buckling when she walks  Objective: See treatment diary below      Assessment: Tolerated treatment well  Pt with no c/o pain t/o session  See re-eval/ DC summary  today dated 21 for assessment  Plan: Pt to be DC from PT        Re-eval Date: 21    Date 21   Visit Count 6 7 8 9 5   FOTO              Precautions: Lipidemia; chronic pain       Manuals 21           Neuro Re-Ed                         Ther Ex  21     NuStep  L1 10 minutes  L 1   10' L 1  10'   L 1  10 min  L1  10 min    HR/TR 1 x 15  1 x 15 1 x 15 1 x 15     Standing hip flex/abd/ext 1 x 15 ea 1 x 10 flex  1 x 15 abd  1 x 10 ext 1 x 15 flex  1 x 15 abd  1 x 15 ext 1 x 15 flex  1 x 15 abd  1 x 15 ext Hold    Standing March   1 x 15 1 x 15  Hold    Step-ups   Fwd/Lat        Step-down        LAQ  Ramos x 15  Ramos 1 x 15 Ramos 1 x 15 Ramos 1 x 15  Ramos 1 x 15    Seated march 1 x 15  1 x 15  Standing above  1 x 15    Ham curl with tband Green x 15  Green x 15 Green x 15 Green 1 x 15  Red 1 x 15    QS  1 x 15 Ramos 1 x 15 Ramos 1 x 15 Ramos  Ramos 5" 1 x 5    Heel slides  1 x 10 Ramos 1 x 10 Ramos 1 x 10 Ramos    Hip add Seated 1 x 15  Seated 1 x 15 Seated 1 x 15 Seated 1 x 15  Seated 1 x 15    Hip abd        SLR        Bridges     Hold            Self HS stretch    Calf  15" x 3 Ramos Calf 15" x 3 Ramos NP           Ther Activity        Education                 Gait Training                        Modalities

## 2021-04-22 ENCOUNTER — APPOINTMENT (OUTPATIENT)
Dept: PHYSICAL THERAPY | Facility: HOME HEALTHCARE | Age: 56
End: 2021-04-22
Payer: COMMERCIAL

## 2021-04-26 DIAGNOSIS — J34.89 RHINORRHEA: ICD-10-CM

## 2021-04-26 DIAGNOSIS — U07.1 COVID-19: ICD-10-CM

## 2021-04-26 RX ORDER — LORATADINE 10 MG/1
TABLET ORAL
Qty: 30 TABLET | Refills: 11 | Status: SHIPPED | OUTPATIENT
Start: 2021-04-26

## 2021-04-27 ENCOUNTER — APPOINTMENT (OUTPATIENT)
Dept: PHYSICAL THERAPY | Facility: HOME HEALTHCARE | Age: 56
End: 2021-04-27
Payer: COMMERCIAL

## 2021-04-29 ENCOUNTER — APPOINTMENT (OUTPATIENT)
Dept: PHYSICAL THERAPY | Facility: HOME HEALTHCARE | Age: 56
End: 2021-04-29
Payer: COMMERCIAL

## 2021-05-10 NOTE — PROGRESS NOTES
Assessment/Plan:     Diagnoses and all orders for this visit:    Essential hypertension  -     lisinopril (ZESTRIL) 10 mg tablet; Take 1 tablet (10 mg total) by mouth daily    Dysuria  -     nitrofurantoin (MACROBID) 100 mg capsule; Take 1 capsule (100 mg total) by mouth 2 (two) times a day for 5 days    Abdominal cramping  -     dicyclomine (BENTYL) 20 mg tablet; Take 1 tablet (20 mg total) by mouth 4 (four) times a day as needed (abdominal cramping)    Bronchitis  -     albuterol (Ventolin HFA) 90 mcg/act inhaler; Inhale 2 puffs every 6 (six) hours as needed for wheezing or shortness of breath    Morbid obesity with BMI of 60 0-69 9, adult [facility])  -     Ambulatory referral to Bariatric Surgery; Future        - Will treat suspected UTI with Macrobid BID x 5 days  Patient states she is unable to void today  Advised to call the office if symptoms persist and will attempt to obtain a UA for culture  - Trial bentyl for abdominal cramping  Advised to call if symptoms persist or worsen  - Referral provided to weight management for bariatric surgery consult  - Will order routine labs at next visit  Return in about 3 months (around 8/10/2021) for Next scheduled follow up  Subjective:        Patient ID: João Miranda is a 60 y o  female  Chief Complaint   Patient presents with    Hypertension     Pt is also complaining of GI issues, says she gets period like cramps related to bowels but her stools are soft so she does not believe she is constipated   Urinary Tract Infection     pt also thinks she may have a UTI, says she feels like she has to go but is hard to go        Ana M is a 60year old female with history of HTN, lymphedema, morbid obesity, and hidradenitis suppurativa, presenting for routine follow up  HTN managed with metoprolol 25 mg BID and lisinopril 10 mg daily  /86 today  Patient denies HA, dizziness, chest pain or palpitations  Patients current BMI is 65 75    She has been referred to weight management in the past but has not established  She continues with PT for chronic lymphedema and reports using her compression device once daily  Today patient is concerned with UTI symptoms  Reports dysuria, hesitancy, and incomplete bladder emptying x 1 week  Denies hematuria, flank pain or fever  Patient also reports 1 week of abdominal cramping and frequent soft stools which occur after eating  There are no specific foods which prompt this  She denies diarrhea, constipation, nausea or vomiting        The following portions of the patient's history were reviewed and updated as appropriate: allergies, current medications, past family history, past medical history, past social history, past surgical history and problem list     Patient Active Problem List   Diagnosis    Lymphedema    Hypertension    Morbid obesity with BMI of 60 0-69 9, adult ([facility] [address] )    Hidradenitis suppurativa    Chronic midline low back pain with left-sided sciatica    Other specified anxiety disorders    Lumbar paraspinal muscle spasm    Borderline hyperlipidemia    Iron deficiency       Current Outpatient Medications   Medication Sig Dispense Refill    albuterol (Ventolin HFA) 90 mcg/act inhaler Inhale 2 puffs every 6 (six) hours as needed for wheezing or shortness of breath 18 g 5    celecoxib (CeleBREX) 200 mg capsule take 1 capsule by mouth twice a day 60 capsule 2    clotrimazole-betamethasone (LOTRISONE) 1-0 05 % cream apply to affected area twice a day 30 g 3    cyclobenzaprine (FLEXERIL) 10 mg tablet take 1 tablet by mouth three times a day if needed for muscle spasm 90 tablet 2    fluticasone (FLONASE) 50 mcg/act nasal spray 1 spray into each nostril daily 1 Bottle 5    hydrOXYzine HCL (ATARAX) 10 mg tablet take 1-2 tablets by mouth every 8 hours if needed for anxiety 90 tablet 2    lidocaine (LIDODERM) 5 % Apply 1 patch topically daily Remove & Discard patch within 12 hours or as directed by MD 30 patch 0    lisinopril (ZESTRIL) 10 mg tablet Take 1 tablet (10 mg total) by mouth daily 90 tablet 1    loratadine (CLARITIN) 10 mg tablet take 1 tablet by mouth once daily 30 tablet 11    sertraline (ZOLOFT) 25 mg tablet take 1 tablet by mouth once daily 30 tablet 5    SUMAtriptan (IMITREX) 25 mg tablet TAKE 1 TABLET BY MOUTH ONCE AS NEEDED FOR MIGRAINE FOR UP TO 1 DOSE 9 tablet 2    dicyclomine (BENTYL) 20 mg tablet Take 1 tablet (20 mg total) by mouth 4 (four) times a day as needed (abdominal cramping) 60 tablet 2    ferrous sulfate 324 (65 Fe) mg take 1 tablet by mouth twice a day before meals (Patient not taking: Reported on 2021) 60 tablet 2    metoprolol tartrate (LOPRESSOR) 25 mg tablet take 1 tablet by mouth twice a day 180 tablet 1    nitrofurantoin (MACROBID) 100 mg capsule Take 1 capsule (100 mg total) by mouth 2 (two) times a day for 5 days 10 capsule 0    nystatin (MYCOSTATIN) powder Apply topically 3 (three) times a day (Patient not taking: Reported on 5/10/2021) 60 g 2    phentermine 37 5 MG capsule take 1 capsule by mouth every morning (Patient not taking: Reported on 2020) 30 capsule 0     No current facility-administered medications for this visit           Past Medical History:   Diagnosis Date    Anxiety     Arthritis     GERD (gastroesophageal reflux disease)     Hidradenitis suppurativa     Hypertension     Lipodermatosclerosis of both lower extremities     Lymphedema     Sciatic leg pain 2006    left side    SOB (shortness of breath)     Stomach ulcer         Past Surgical History:   Procedure Laterality Date     SECTION      DC EXC SKIN BENIG 3 1-4 CM TRUNK,ARM,LEG Right 2020    Procedure: EXCISION BIOPSY TISSUE LESION/MASS UPPER EXTREMITY;  Surgeon: Savannah Murillo DO;  Location: [facility];  Service: General    TONSILLECTOMY      TUMOR REMOVAL Right     5th finger        Social History     Socioeconomic History    Marital status: Single     Spouse name: Not on file    Number of children: 2    Years of education: Some college    Highest education level: Some college, no degree   Occupational History    Not on file   Social Needs    Financial resource strain: Not on file    Food insecurity     Worry: Not on file     Inability: Not on file    Transportation needs     Medical: Not on file     Non-medical: Not on file   Tobacco Use    Smoking status: Light Tobacco Smoker     Packs/day: 0 25     Years: 20 00     Pack years: 5 00     Types: Cigarettes    Smokeless tobacco: Never Used    Tobacco comment: Only smoke once in awhile   Substance and Sexual Activity    Alcohol use: No     Frequency: Monthly or less     Drinks per session: 1 or 2     Binge frequency: Never     Comment: holidays    Drug use: No    Sexual activity: Not Currently   Lifestyle    Physical activity     Days per week: Not on file     Minutes per session: Not on file    Stress: Not on file   Relationships    Social connections     Talks on phone: Not on file     Gets together: Not on file     Attends [employer] service: Not on file     Active member of club or organization: Not on file     Attends meetings of clubs or organizations: Not on file     Relationship status: Not on file    Intimate partner violence     Fear of current or ex partner: Not on file     Emotionally abused: Not on file     Physically abused: Not on file     Forced sexual activity: Not on file   Other Topics Concern    Not on file   Social History Narrative    Not on file        Review of Systems   Constitutional: Negative for chills, diaphoresis and fever  HENT: Negative for congestion, ear pain, postnasal drip, rhinorrhea, sinus pressure and sore throat  Respiratory: Negative for cough, chest tightness, shortness of breath and wheezing  Cardiovascular: Positive for leg swelling  Negative for chest pain and palpitations  Gastrointestinal: Positive for abdominal pain   Negative for blood in stool, constipation, diarrhea, nausea and vomiting  Genitourinary: Positive for decreased urine volume, difficulty urinating and dysuria  Negative for flank pain and hematuria  Skin: Negative for rash and wound  Neurological: Negative for dizziness, syncope, weakness, light-headedness and headaches  Objective:      /86   Pulse 80   Temp (!) 96 1 °F (35 6 °C) (Tympanic)   Ht 5' 7" (1 702 m)   Wt (!) 190 kg (419 lb 12 8 oz)   SpO2 99%   BMI 65 75 kg/m²          Physical Exam  Vitals signs and nursing note reviewed  Constitutional:       General: She is not in acute distress  Appearance: Normal appearance  She is obese  Comments: Ambulating with crutches   HENT:      Head: Normocephalic and atraumatic  Cardiovascular:      Rate and Rhythm: Normal rate and regular rhythm  Heart sounds: Normal heart sounds  No murmur  Pulmonary:      Effort: Pulmonary effort is normal  No respiratory distress  Breath sounds: Normal breath sounds  No wheezing  Abdominal:      Palpations: Abdomen is soft  Tenderness: There is no abdominal tenderness  Musculoskeletal:      Right lower leg: Edema present  Left lower leg: Edema present  Skin:     General: Skin is warm and dry  Neurological:      General: No focal deficit present  Mental Status: She is alert and oriented to person, place, and time  Cranial Nerves: No cranial nerve deficit  Motor: No weakness     Psychiatric:         Mood and Affect: Mood normal          Behavior: Behavior normal

## 2021-07-21 NOTE — PROGRESS NOTES
Virtual Brief Visit    Verification of patient location:    Patient is currently located in the [place] [facility]  Patient is currently located in a state in which I am licensed    Assessment/Plan:    Problem List Items Addressed This Visit     None      Visit Diagnoses     Nausea    -  Primary    Relevant Medications    ondansetron (ZOFRAN) 8 mg tablet    Viral syndrome            - Symptoms are likely viral   Advised Tylenol/ibuprofen p r n  headache/fever  Will prescribe Zofran p r n  nausea  Follow-up if symptoms persist or worsen  Reason for visit is   Chief Complaint   Patient presents with    Nausea     since sunday    Headache     intermittent    Earache     intermittent has lessened since sunday    Virtual Brief Visit        Encounter provider Ankit Pryor PA-C    Provider located at [address] [place] [address] [facility], [address]    Recent Visits  No visits were found meeting these conditions  Showing recent visits within past 7 days and meeting all other requirements  Today's Visits  Date Type Provider Dept   07/21/21 Telemedicine LUCY Jain   Showing today's visits and meeting all other requirements  Future Appointments  No visits were found meeting these conditions  Showing future appointments within next 150 days and meeting all other requirements       After connecting through telephone, the patient was identified by name and date of birth  Sudhado Crew was informed that this is a telemedicine visit and that the visit is being conducted through telephone  My office door was closed  No one else was in the room  She acknowledged consent and understanding of privacy and security of the platform  The patient has agreed to participate and understands she can discontinue the visit at any time    It was my intent to perform this visit via video technology but the patient was not able to do a video connection so the visit was completed via audio telephone only  Patient is aware this is a billable service  Subjective    Crystal De La Vega is a 60 y o  female  Andra López is a 60year old female with history of HTN, lymphedema, morbid obesity, and hidradenitis suppurativa, presenting for an acute visit  Patient endorses nausea, chills, body aches, decreased appetite, headaches, and ear pain x3 days  Reports T-max 101 4° last evening  She states that since symptom onset her headaches and ear pain have been improving, however she continues with significant nausea  She has not been taking any medication for her symptoms  Denies congestion, sinus pressure, sore throat, cough, wheezing, shortness of breath, chest tightness, abdominal pain, vomiting, diarrhea  Denies sick contacts          Past Medical History:   Diagnosis Date    Anxiety     Arthritis     GERD (gastroesophageal reflux disease)     Hidradenitis suppurativa     Hypertension     Lipodermatosclerosis of both lower extremities     Lymphedema     Sciatic leg pain     left side    SOB (shortness of breath)     Stomach ulcer        Past Surgical History:   Procedure Laterality Date     SECTION      [place] EXC SKIN BENIG 3 1-4 CM TRUNK,ARM,LEG Right 2020    Procedure: EXCISION BIOPSY TISSUE LESION/MASS UPPER EXTREMITY;  Surgeon: Ruel Payton DO;  Location: [facility];  Service: General    TONSILLECTOMY      TUMOR REMOVAL Right     5th finger       Current Outpatient Medications   Medication Sig Dispense Refill    celecoxib (CeleBREX) 200 mg capsule take 1 capsule by mouth twice a day 60 capsule 2    clotrimazole-betamethasone (LOTRISONE) 1-0 05 % cream apply to affected area twice a day 30 g 3    cyclobenzaprine (FLEXERIL) 10 mg tablet take 1 tablet by mouth three times a day if needed for muscle spasm 90 tablet 2    dicyclomine (BENTYL) 20 mg tablet take 1 tablet by mouth four times a day if needed for ABDOMINAL CRAMPING 60 tablet 2    fluticasone (FLONASE) 50 mcg/act nasal spray 1 spray into each nostril daily 1 Bottle 5    hydrOXYzine HCL (ATARAX) 10 mg tablet take 1-2 tablets by mouth every 8 hours if needed for anxiety 90 tablet 2    lidocaine (LIDODERM) 5 % Apply 1 patch topically daily Remove & Discard patch within 12 hours or as directed by MD 30 patch 0    lisinopril (ZESTRIL) 10 mg tablet Take 1 tablet (10 mg total) by mouth daily 90 tablet 1    loratadine (CLARITIN) 10 mg tablet take 1 tablet by mouth once daily 30 tablet 11    metoprolol tartrate (LOPRESSOR) 25 mg tablet take 1 tablet by mouth twice a day 180 tablet 1    sertraline (ZOLOFT) 25 mg tablet take 1 tablet by mouth once daily 30 tablet 5    SUMAtriptan (IMITREX) 25 mg tablet TAKE 1 TABLET BY MOUTH ONCE AS NEEDED FOR MIGRAINE FOR UP TO 1 DOSE 9 tablet 2    albuterol (Ventolin HFA) 90 mcg/act inhaler Inhale 2 puffs every 6 (six) hours as needed for wheezing or shortness of breath 18 g 5    ferrous sulfate 324 (65 Fe) mg take 1 tablet by mouth twice a day before meals (Patient not taking: Reported on 2/9/2021) 60 tablet 2    nystatin (MYCOSTATIN) powder Apply topically 3 (three) times a day (Patient not taking: Reported on 5/10/2021) 60 g 2    ondansetron (ZOFRAN) 8 mg tablet Take 1 tablet (8 mg total) by mouth every 8 (eight) hours as needed for nausea or vomiting 20 tablet 0    phentermine 37 5 MG capsule take 1 capsule by mouth every morning (Patient not taking: Reported on 12/1/2020) 30 capsule 0     No current facility-administered medications for this visit  Allergies   Allergen Reactions    Bee Venom Anaphylaxis, Shortness Of Breath and Swelling       Review of Systems   Constitutional: Positive for chills, fatigue and fever (101 4)  Negative for diaphoresis  HENT: Positive for ear pain  Negative for congestion, postnasal drip, rhinorrhea, sinus pressure and sore throat      Respiratory: Negative for cough, chest tightness, shortness of breath and wheezing  Cardiovascular: Negative for chest pain and palpitations  Gastrointestinal: Positive for nausea  Negative for abdominal pain, blood in stool, diarrhea and vomiting  Musculoskeletal: Positive for myalgias  Skin: Negative for rash and wound  Neurological: Positive for weakness and headaches  Negative for dizziness, syncope and light-headedness  There were no vitals filed for this visit  I spent 10 minutes directly with the patient during this visit    VIRTUAL VISIT [facility] verbally agrees to participate in [employer]  Pt is aware that [employer] could be limited without vital signs or the ability to perform a full hands-on physical [place] understands she or the provider may request at any time to terminate the video visit and request the patient to seek care or treatment in person

## 2021-08-16 NOTE — PATIENT INSTRUCTIONS
Goals: Food log (ie ) www myfitnesspal com,sparkpeople  com,loseit com,calorieking  com,etc  baritastic  No sugary beverages  At least 64oz of water daily  Increase physical activity by 10 minutes daily  Gradually increase physical activity to a goal of 5 days per week for 30 minutes of MODERATE intensity PLUS 2 days per week of FULL BODY resistance training  5-10 servings of fruits and vegetables per day and 25-35 grams of dietary fiber per day, gradually increasing  Measure all portions: creamers, sugars, cooking oils/butters, condiments, dressings, etc and log calories  7575-1137 calories   If choosing starch pick whole grain/complex carbs and keep to 1/2-3/4 cup: oatmeal, brown rice, quinoa, whole wheat pasta, potatoes, sweet potato, chickpea noodles, red lentil noodles  Recommend checking lab coverage before having labs drawn  Monitor blood pressure and notify the provider if consistently around 100/60 or you have symptoms of low blood pressure (lightheadedness/dizziness)  Recommend checking lab coverage before having labs drawn    GERD (Gastroesophageal Reflux Disease)   WHAT YOU NEED TO KNOW:   What is gastroesophageal reflux disease (GERD)? GERD is reflux that occurs more than twice a week for a few weeks  Reflux means acid and food in the stomach back up into the esophagus  It usually causes heartburn and other symptoms  GERD can cause other health problems over time if it is not treated  What causes GERD? GERD often occurs when the lower muscle (sphincter) of the esophagus does not close properly  The sphincter normally opens to let food into the stomach  It then closes to keep food and stomach acid in the stomach  If the sphincter does not close properly, stomach acid and food back up (reflux) into the esophagus   The following may increase your risk for GERD:  · Certain foods such as spicy foods, chocolate, foods that contain caffeine, peppermint, and fried foods    · Hiatal hernia    · Certain medicines such as calcium channel blockers (used to treat high blood pressure), allergy medicines, sedatives, or antidepressants    · Pregnancy or obesity    · Lying down after a meal    · Drinking alcohol or smoking    What are the signs and symptoms of GERD? · Heartburn (burning pain in your chest)    · Pain after meals that spreads to your neck, jaw, or shoulder    · Pain that gets better when you change positions    · Bitter or acid taste in your mouth    · A dry cough    · Trouble swallowing or pain with swallowing    · Hoarseness or a sore throat    · Burping or hiccups    · Feeling full soon after you start eating    How is GERD diagnosed? Your healthcare provider will ask about your symptoms and when they started  Tell him or her about other medical conditions you have, your eating habits, and your activities  You may also need any of the following:  · The amount of acid  in your esophagus and stomach may be checked  A small probe is used to check the amount  · An endoscopy  is a procedure used to look at the inside of your esophagus and stomach  An endoscope is a bendable tube with a light and camera on the end  Your healthcare provider may remove a small sample of tissue and send it to a lab for tests  · X-ray  pictures may be taken of your stomach and intestines (bowel)  You may be given a chalky liquid to drink before the pictures are taken  This liquid helps your stomach and intestines show up better on the x-rays  · Pressure and function  tests of your esophagus can help find problems such as a hiatal hernia  How is GERD treated? · Medicines  are used to decrease stomach acid  Medicine may also be used to help your lower esophageal sphincter and stomach contract (tighten) more  · Surgery  is done to wrap the upper part of the stomach around the esophageal sphincter  This will strengthen the sphincter and prevent reflux  How can I manage GERD?        · Do not have foods or drinks that may increase heartburn  These include chocolate, peppermint, fried or fatty foods, drinks that contain caffeine, or carbonated drinks (soda)  Other foods include spicy foods, onions, tomatoes, and tomato-based foods  Do not have foods or drinks that can irritate your esophagus, such as citrus fruits, juices, and alcohol  · Do not eat large meals  When you eat a lot of food at one time, your stomach needs more acid to digest it  Eat 6 small meals each day instead of 3 large ones, and eat slowly  Do not eat meals 2 to 3 hours before bedtime  · Elevate the head of your bed  Place 6-inch blocks under the head of your bed frame  You may also use more than one pillow under your head and shoulders while you sleep  · Maintain a healthy weight  If you are overweight, weight loss may help relieve symptoms of GERD  · Do not smoke  Smoking weakens the lower esophageal sphincter and increases the risk of GERD  Ask your healthcare provider for information if you currently smoke and need help to quit  E-cigarettes or smokeless tobacco still contain nicotine  Talk to your healthcare provider before you use these products  · Do not wear clothing that is tight around your waist   Tight clothing can put pressure on your stomach and cause or worsen GERD symptoms  Call your local emergency number (911 in the [address] [facility]) if:   · You have severe chest pain and sudden trouble breathing  When should I seek immediate care? · You have trouble breathing after you vomit  · You have trouble swallowing, or pain with swallowing  · Your bowel movements are black, bloody, or tarry-looking  · Your vomit looks like coffee grounds or has blood in it  When should I call my doctor? · You feel full and cannot burp or vomit  · You vomit large amounts, or you vomit often  · You are losing weight without trying  · Your symptoms get worse or do not improve with treatment      · You have questions or concerns about your condition or care  CARE AGREEMENT:   You have the right to help plan your care  Learn about your health condition and how it may be treated  Discuss treatment options with your healthcare providers to decide what care you want to receive  You always have the right to refuse treatment  The above information is an  only  It is not intended as medical advice for individual conditions or treatments  Talk to your doctor, nurse or pharmacist before following any medical regimen to see if it is safe and effective for you  © Copyright [employer] 2021 Information is for End User's use only and may not be sold, redistributed or otherwise used for commercial purposes   All illustrations and images included in CareNotes® are the copyrighted property of A D A M , Inc  or [address] [place], [place] [address]pe

## 2021-08-16 NOTE — PROGRESS NOTES
Assessment/Plan:  -Discussed options of [facility]-Intensive Lifestyle Intervention Program, Very Low Calorie Diet-VLCD, Conservative Program, [place]-En-Y Gastric Bypass and Vertical Sleeve Gastrectomy and the role of weight loss medications   -Initial weight loss goal of 5-10% weight loss for improved health  -Not interested in bariatric surgery: has panic attacks in hospital settings    -Screening labs: as ordered with Mg as she is considering VLCD; A1c, LP, TSH, CMP reviewed from 9/9/21  -Patient is interested in pursuing Conservative Program   -Stopped smoking due to nausea- encouraged staying away from smoking!! Risks reviewed  -Calorie goals, sample menu, portion size guidelines, and food logging reviewed with the patient  Check STOP BANG at next visit  Discussed realistic goal setting    Hypertension  -taking lisinopril  -may improve with weight loss and dietary changes    GERD (gastroesophageal reflux disease)  -started on PPI today  -may improve with weight loss and dietary changes      Follow up in approximately 2 months with Non-Surgical Physician/Advanced Practitioner  Goals:  Food log (ie ) www myfitnesspal com,sparkpeople  com,loseit com,calorieking  com,etc  baritastic  No sugary beverages  At least 64oz of water daily  Increase physical activity by 10 minutes daily   Gradually increase physical activity to a goal of 5 days per week for 30 minutes of MODERATE intensity PLUS 2 days per week of FULL BODY resistance training  5-10 servings of fruits and vegetables per day and 25-35 grams of dietary fiber per day, gradually increasing  Measure all portions: creamers, sugars, cooking oils/butters, condiments, dressings, etc and log calories  0907-0654 calories   If choosing starch pick whole grain/complex carbs and keep to 1/2-3/4 cup: oatmeal, brown rice, quinoa, whole wheat pasta, potatoes, sweet potato, chickpea noodles, red lentil noodles  Recommend checking lab coverage before having labs drawn  Monitor blood pressure and notify the provider if consistently around 100/60 or you have symptoms of low blood pressure (lightheadedness/dizziness)  Recommend checking lab coverage before having labs drawn    Diagnoses and all orders for this visit:    Abnormal weight gain  -     Magnesium; Future    Essential hypertension  -     Magnesium; Future    Gastroesophageal reflux disease, unspecified whether esophagitis present    Body mass index 60 0-69 9, adult (HCC)        Subjective:   Chief Complaint   Patient presents with    Virtual Regular Visit     Patient ID: Skinny Bhandari  is a 60 y o  female with excess weight/obesity here to pursue weight management  Past Medical History:   Diagnosis Date    Anxiety     Arthritis     GERD (gastroesophageal reflux disease)     Hidradenitis suppurativa     Hypertension     Lipodermatosclerosis of both lower extremities     Lymphedema 2006    Sciatic leg pain 2006    left side    SOB (shortness of breath)     Stomach ulcer      HPI:  Obesity/Excess Weight:  Severity: Very Severe  Onset: Many years    Modifiers: Prescription Weight Loss Medications - 3 months and then had to stop per prescribing provider to due potential SE  Contributing factors: lymphedema  Associated symptoms: comorbid conditions and trouble walking    Goals: weigh 140- discussed realistic goal setting  Hydration: water intake varies, drinks ice tea and soda "sometimes," drinks coffee with cream/half and half  Alcohol: denies  Exercise: no  Occupation: disability    B: skips or 2-3 eggs cooked in butter +2 slices with butter (white)  S: none  L: salad or yogurt  S: none  D: meat or spaghetti  S: none    The following portions of the patient's history were reviewed and updated as appropriate: allergies, current medications, past family history, past medical history, past social history, past surgical history and problem list     Objective:     There were no vitals taken for this visit  Reviewed from her PCP appt earlier today  Virtual Regular Visit    Verification of patient location:    Patient is located in the following state in which I hold an active license [place]      Assessment/Plan:    Problem List Items Addressed This Visit        Digestive    GERD (gastroesophageal reflux disease)     -started on PPI today  -may improve with weight loss and dietary changes            Cardiovascular and Mediastinum    Hypertension     -taking lisinopril  -may improve with weight loss and dietary changes         Relevant Orders    Magnesium      Other Visit Diagnoses     Abnormal weight gain    -  Primary    Relevant Orders    Magnesium    Body mass index 60 0-69 9, adult ([facility] [address] )                   Reason for visit is   Chief Complaint   Patient presents with    Virtual Regular Visit        Encounter provider Rajat Miller PA-C    Provider located at [address] [address] [id number] [phone]      Recent Visits  No visits were found meeting these conditions  Showing recent visits within past 7 days and meeting all other requirements  Today's Visits  Date Type Provider Dept   08/16/21 Telemedicine [facility], [facility] [place]   08/16/21 Office Visit [facility]UC[facility] Holly  Thadde[facility]   Showing today's visits and meeting all other requirements  Future Appointments  No visits were found meeting these conditions  Showing future appointments within next 150 days and meeting all other requirements       The patient was identified by name and date of birth  Joseph Marcelino was informed that this is a telemedicine visit and that the visit is being conducted through [address] [place] [address] Now and patient was informed that this is a secure, HIPAA-compliant platform  She agrees to proceed     My office door was closed  Other methods to assure confidentiality were taken  No one else was in the room    She acknowledged consent and understanding of privacy and security of the video platform  The patient has agreed to participate and understands they can discontinue the visit at any time  Patient is aware this is a billable service  Subjective  Morelia Stuart is a 60 y o  female for [facility] Consult       HPI     Past Medical History:   Diagnosis Date    Anxiety     Arthritis     GERD (gastroesophageal reflux disease)     Hidradenitis suppurativa     Hypertension     Lipodermatosclerosis of both lower extremities     Lymphedema     Sciatic leg pain     left side    SOB (shortness of breath)     Stomach ulcer        Past Surgical History:   Procedure Laterality Date     SECTION      WA EXC SKIN BENIG 3 1-4 CM TRUNK,ARM,LEG Right 2020    Procedure: EXCISION BIOPSY TISSUE LESION/MASS UPPER EXTREMITY;  Surgeon: Pete Rich DO;  Location: [facility];  Service: General    TONSILLECTOMY      TUMOR REMOVAL Right     5th finger       Current Outpatient Medications   Medication Sig Dispense Refill    albuterol (Ventolin HFA) 90 mcg/act inhaler Inhale 2 puffs every 6 (six) hours as needed for wheezing or shortness of breath 18 g 5    celecoxib (CeleBREX) 200 mg capsule take 1 capsule by mouth twice a day 60 capsule 2    clotrimazole-betamethasone (LOTRISONE) 1-0 05 % cream apply to affected area twice a day 30 g 3    cyclobenzaprine (FLEXERIL) 10 mg tablet take 1 tablet by mouth three times a day if needed for muscle spasm 90 tablet 2    dicyclomine (BENTYL) 20 mg tablet take 1 tablet by mouth four times a day if needed for ABDOMINAL CRAMPING (Patient not taking: Reported on 2021) 60 tablet 2    ferrous sulfate 324 (65 Fe) mg take 1 tablet by mouth twice a day before meals (Patient not taking: Reported on 2021) 60 tablet 2    fluticasone (FLONASE) 50 mcg/act nasal spray 1 spray into each nostril daily 1 Bottle 5    hydrOXYzine HCL (ATARAX) 25 mg tablet Take 1 tablet (25 mg total) by mouth 3 (three) times a day as needed for anxiety 90 tablet 2    lidocaine (LIDODERM) 5 % Apply 1 patch topically daily Remove & Discard patch within 12 hours or as directed by MD 30 patch 0    lisinopril (ZESTRIL) 10 mg tablet Take 1 tablet (10 mg total) by mouth daily 90 tablet 1    loratadine (CLARITIN) 10 mg tablet take 1 tablet by mouth once daily 30 tablet 11    metoprolol tartrate (LOPRESSOR) 25 mg tablet take 1 tablet by mouth twice a day 180 tablet 1    nystatin (MYCOSTATIN) powder Apply topically 3 (three) times a day (Patient not taking: Reported on 5/10/2021) 60 g 2    omeprazole (PriLOSEC) 20 mg delayed release capsule Take 1 capsule (20 mg total) by mouth daily 90 capsule 1    sertraline (ZOLOFT) 50 mg tablet Take 1 tablet (50 mg total) by mouth daily 90 tablet 1    SUMAtriptan (IMITREX) 25 mg tablet TAKE 1 TABLET BY MOUTH ONCE AS NEEDED FOR MIGRAINE FOR UP TO 1 DOSE 9 tablet 2     No current facility-administered medications for this visit  Allergies   Allergen Reactions    Bee Venom Anaphylaxis, Shortness Of Breath and Swelling       Review of Systems   Cardiovascular: Negative for chest pain and palpitations  Gastrointestinal: Positive for abdominal pain (and GERD) and nausea  Saw her PCP for this today   Psychiatric/Behavioral:        Denies SI/HI       Video Exam    There were no vitals filed for this visit  Reviewed from her PCP appt today    Physical Exam  Vitals and nursing note reviewed  Constitutional   General appearance: Abnormal   well developed and morbidly obese  Pulmonary   Respiratory effort: No increased work of breathing or signs of respiratory distress  Abdomen   Abdomen: Abnormal   The abdomen was obese     Musculoskeletal   Gait and station: Normal     Psychiatric   Orientation to person, place and time: Normal     Affect: appropriate    I spent 30 minutes with patient today in which greater than 50% of the time was spent in counseling/coordination of care regarding   surgical and nonsurgical interventions for the treatment of excess weight  Discussed the advantages and long-term outcomes with regards to bariatric surgery  Discussed in detail nonsurgical options including intensive lifestyle intervention program, very low-calorie diet program and conservative program   Discussed the role of weight loss medications  Counseled patient on diet behavior and exercise modification for weight loss  VIRTUAL VISIT [facility] verbally agrees to participate in [employer]  Pt is aware that [employer] could be limited without vital signs or the ability to perform a full hands-on physical [place] understands she or the provider may request at any time to terminate the video visit and request the patient to seek care or treatment in person

## 2021-08-16 NOTE — PROGRESS NOTES
Assessment/Plan:     Diagnoses and all orders for this visit:    Abdominal cramping  -     omeprazole (PriLOSEC) 20 mg delayed release capsule; Take 1 capsule (20 mg total) by mouth daily  -     Ambulatory referral to Gastroenterology; Future  -     Ambulatory referral to Obstetrics / Gynecology; Future    Nausea  -     omeprazole (PriLOSEC) 20 mg delayed release capsule; Take 1 capsule (20 mg total) by mouth daily  -     Ambulatory referral to Gastroenterology; Future    Screening for colon cancer  -     Ambulatory referral to Gastroenterology; Future    Screening for cervical cancer  -     Ambulatory referral to Obstetrics / Gynecology; Future    Anxiety  -     sertraline (ZOLOFT) 50 mg tablet; Take 1 tablet (50 mg total) by mouth daily  -     hydrOXYzine HCL (ATARAX) 25 mg tablet; Take 1 tablet (25 mg total) by mouth 3 (three) times a day as needed for anxiety    Essential hypertension  -     CBC and differential; Future  -     Comprehensive metabolic panel; Future  -     TSH, 3rd generation with Free T4 reflex; Future    Borderline hyperlipidemia  -     Lipid panel; Future    Morbid obesity with BMI of 60 0-69 9, adult (HCC)  -     HEMOGLOBIN A1C W/ EAG ESTIMATION; Future    Other orders  -     Cancel: Mammo screening bilateral w 3d & cad; Future  -     Cancel: Ambulatory referral to Obstetrics / Gynecology; Future        - Will start omeprazole 20 mg daily for abd pain/nausea/suspected GERD  Advised patient that she will need to follow up with GI for a repeat colonoscopy  - Patient was referred to GYN for routine exam/PAP  - Will increase Zoloft to 50 mg daily and increase hydroxyzine to 25 mg TID Prn anxiety   - Routine labs as ordered prior to next visit  Return in about 3 months (around 11/16/2021) for Next scheduled follow up  Subjective:        Patient ID: Nancy Mcknight is a 60 y o  female      Chief Complaint   Patient presents with    Follow-up    Hypertension    Depression       Bandar Franco is a 60year old female with history of HTN, lymphedema, anxiety, morbid obesity, and hidradenitis suppurativa, presenting for routine follow up  HTN managed with metoprolol 25 mg BID and lisinopril 10 mg daily  /76 today  Patient denies HA, dizziness, chest pain or palpitations  Anxiety has been managed with Zoloft 25 mg daily and hydroxyzine prn  Patient reports needing to take hydroxyzine more often recently due to increasing anxiety and trouble sleeping  She denies any medication side effects  Reports she was previously given valium for her anxiety  No other medications have been tried  Patients current BMI is 63 02  She has been referred to weight management and is scheduled to establish today  Patient is concerned for ongoing abdominal cramping and nausea which has been present for the past few months  She has previously been prescribed Bentyl and Zofran without relief  Reports symptoms are similar to when she had a gastric ulcer in the past and she feels that she is having a lot of acid reflux  She has been taking OTC tums and gasx without much improvement  Denies constipation, diarrhea, or vomiting  Screening colonoscopy was attempted in 2019 but aborted due to inadequate bowel prep  She has not gone for a repeat procedure  Patient has previously declined cervical and breast cancer screenings  She denies abnormal vaginal bleeding or discharge        The following portions of the patient's history were reviewed and updated as appropriate: allergies, current medications, past family history, past medical history, past social history, past surgical history and problem list     Patient Active Problem List   Diagnosis    Lymphedema    Hypertension    Morbid obesity with BMI of 60 0-69 9, adult ([facility] [address] )    Hidradenitis suppurativa    Chronic midline low back pain with left-sided sciatica    Other specified anxiety disorders    Lumbar paraspinal muscle spasm    Borderline hyperlipidemia    Iron deficiency       Current Outpatient Medications   Medication Sig Dispense Refill    albuterol (Ventolin HFA) 90 mcg/act inhaler Inhale 2 puffs every 6 (six) hours as needed for wheezing or shortness of breath 18 g 5    celecoxib (CeleBREX) 200 mg capsule take 1 capsule by mouth twice a day 60 capsule 2    clotrimazole-betamethasone (LOTRISONE) 1-0 05 % cream apply to affected area twice a day 30 g 3    cyclobenzaprine (FLEXERIL) 10 mg tablet take 1 tablet by mouth three times a day if needed for muscle spasm 90 tablet 2    fluticasone (FLONASE) 50 mcg/act nasal spray 1 spray into each nostril daily 1 Bottle 5    hydrOXYzine HCL (ATARAX) 25 mg tablet Take 1 tablet (25 mg total) by mouth 3 (three) times a day as needed for anxiety 90 tablet 2    lidocaine (LIDODERM) 5 % Apply 1 patch topically daily Remove & Discard patch within 12 hours or as directed by MD 30 patch 0    lisinopril (ZESTRIL) 10 mg tablet Take 1 tablet (10 mg total) by mouth daily 90 tablet 1    loratadine (CLARITIN) 10 mg tablet take 1 tablet by mouth once daily 30 tablet 11    metoprolol tartrate (LOPRESSOR) 25 mg tablet take 1 tablet by mouth twice a day 180 tablet 1    sertraline (ZOLOFT) 50 mg tablet Take 1 tablet (50 mg total) by mouth daily 90 tablet 1    SUMAtriptan (IMITREX) 25 mg tablet TAKE 1 TABLET BY MOUTH ONCE AS NEEDED FOR MIGRAINE FOR UP TO 1 DOSE 9 tablet 2    dicyclomine (BENTYL) 20 mg tablet take 1 tablet by mouth four times a day if needed for ABDOMINAL CRAMPING (Patient not taking: Reported on 8/16/2021) 60 tablet 2    ferrous sulfate 324 (65 Fe) mg take 1 tablet by mouth twice a day before meals (Patient not taking: Reported on 2/9/2021) 60 tablet 2    nystatin (MYCOSTATIN) powder Apply topically 3 (three) times a day (Patient not taking: Reported on 5/10/2021) 60 g 2    omeprazole (PriLOSEC) 20 mg delayed release capsule Take 1 capsule (20 mg total) by mouth daily 90 capsule 1     No current facility-administered medications for this visit  Past Medical History:   Diagnosis Date    Anxiety     Arthritis     GERD (gastroesophageal reflux disease)     Hidradenitis suppurativa     Hypertension     Lipodermatosclerosis of both lower extremities     Lymphedema 2006    Sciatic leg pain 2006    left side    SOB (shortness of breath)     Stomach ulcer         Past Surgical History:   Procedure Laterality Date     SECTION      MT EXC SKIN BENIG 3 1-4 CM TRUNK,ARM,LEG Right 2020    Procedure: EXCISION BIOPSY TISSUE LESION/MASS UPPER EXTREMITY;  Surgeon: Claudy Rosenbaum DO;  Location: [facility];  Service: General    TONSILLECTOMY      TUMOR REMOVAL Right     5th finger        Social History     Socioeconomic History    Marital status: Single     Spouse name: Not on file    Number of children: 2    Years of education: Some college    Highest education level: Some college, no degree   Occupational History    Not on file   Tobacco Use    Smoking status: Light Tobacco Smoker     Packs/day: 0 25     Years: 20 00     Pack years: 5 00     Types: Cigarettes    Smokeless tobacco: Never Used    Tobacco comment: Only smoke once in awhile   Vaping Use    Vaping Use: Never used   Substance and Sexual Activity    Alcohol use: No     Comment: holidays    Drug use: No    Sexual activity: Not Currently   Other Topics Concern    Not on file   Social History Narrative    Not on file     Social Determinants of Health     Financial Resource Strain:     Difficulty of Paying Living Expenses:    Food Insecurity:     Worried About Running Out of Food in the Last Year:     Ran Out of Food in the Last Year:    Transportation Needs:     Lack of Transportation (Medical):      Lack of Transportation (Non-Medical):    Physical Activity:     Days of Exercise per Week:     Minutes of Exercise per Session:    Stress:     Feeling of Stress :    Social Connections:     Frequency of Communication with Friends and Family:     Frequency of Social Gatherings with Friends and Family:     Attends Adventism Services:     Active Member of Clubs or Organizations:     Attends Club or Organization Meetings:     Marital Status:    Intimate Partner Violence:     Fear of Current or Ex-Partner:     Emotionally Abused:     Physically Abused:     Sexually Abused:    Review of Systems   Constitutional: Negative for chills, diaphoresis and fever  HENT: Negative for congestion, ear pain, postnasal drip, rhinorrhea, sinus pressure and sore throat  Respiratory: Negative for cough, chest tightness, shortness of breath and wheezing  Cardiovascular: Positive for leg swelling (chronic lymphedema)  Negative for chest pain and palpitations  Gastrointestinal: Positive for abdominal pain and nausea  Negative for blood in stool, constipation, diarrhea and vomiting  Skin: Negative for rash and wound  Neurological: Negative for dizziness, syncope, weakness, light-headedness and headaches  Objective:      /76   Pulse 76   Temp 98 6 °F (37 °C)   Resp 20   Wt (!) 183 kg (402 lb 6 4 oz)   SpO2 93%   BMI 63 02 kg/m²          Physical Exam  Vitals and nursing note reviewed  Constitutional:       General: She is not in acute distress  Appearance: Normal appearance  She is obese  HENT:      Head: Normocephalic and atraumatic  Eyes:      Extraocular Movements: Extraocular movements intact  Conjunctiva/sclera: Conjunctivae normal       Pupils: Pupils are equal, round, and reactive to light  Cardiovascular:      Rate and Rhythm: Normal rate and regular rhythm  Heart sounds: Normal heart sounds  No murmur heard  Pulmonary:      Effort: Pulmonary effort is normal  No respiratory distress  Breath sounds: Normal breath sounds  No wheezing  Skin:     General: Skin is warm and dry  Neurological:      General: No focal deficit present        Mental Status: She is alert and oriented to person, place, and time  Cranial Nerves: No cranial nerve deficit     Psychiatric:         Mood and Affect: Mood normal          Behavior: Behavior normal

## 2021-09-29 NOTE — TELEPHONE ENCOUNTER
Pt left message that she would like the antibiotic you gave her before that starts with an H  She said she has a lot of open wounds even though she uses the open wound bandages from the wound center it just keeps coming out and she can't close them and they are really big  Thinks maybe an antibiotic will help heal them faster

## 2021-09-30 NOTE — TELEPHONE ENCOUNTER
Script sent for topical clindamycin  Advise scheduling follow up with wound care if the area is not healing

## 2022-01-01 ENCOUNTER — TELEPHONE (OUTPATIENT)
Dept: PALLIATIVE MEDICINE | Facility: CLINIC | Age: 57
End: 2022-01-01

## 2022-01-01 ENCOUNTER — OFFICE VISIT (OUTPATIENT)
Dept: HEMATOLOGY ONCOLOGY | Facility: CLINIC | Age: 57
End: 2022-01-01
Payer: COMMERCIAL

## 2022-01-01 ENCOUNTER — APPOINTMENT (INPATIENT)
Dept: RADIOLOGY | Facility: HOSPITAL | Age: 57
DRG: 710 | End: 2022-01-01
Payer: COMMERCIAL

## 2022-01-01 ENCOUNTER — TELEPHONE (OUTPATIENT)
Dept: OTOLARYNGOLOGY | Facility: CLINIC | Age: 57
End: 2022-01-01

## 2022-01-01 ENCOUNTER — VBI (OUTPATIENT)
Dept: ADMINISTRATIVE | Facility: OTHER | Age: 57
End: 2022-01-01

## 2022-01-01 ENCOUNTER — ANESTHESIA (INPATIENT)
Dept: PERIOP | Facility: HOSPITAL | Age: 57
DRG: 710 | End: 2022-01-01
Payer: COMMERCIAL

## 2022-01-01 ENCOUNTER — TELEPHONE (OUTPATIENT)
Dept: OTHER | Facility: OTHER | Age: 57
End: 2022-01-01

## 2022-01-01 ENCOUNTER — DOCUMENTATION (OUTPATIENT)
Dept: SOCIAL WORK | Facility: HOSPITAL | Age: 57
End: 2022-01-01

## 2022-01-01 ENCOUNTER — APPOINTMENT (INPATIENT)
Dept: CT IMAGING | Facility: HOSPITAL | Age: 57
DRG: 244 | End: 2022-01-01
Payer: COMMERCIAL

## 2022-01-01 ENCOUNTER — TELEPHONE (OUTPATIENT)
Dept: UROLOGY | Facility: CLINIC | Age: 57
End: 2022-01-01

## 2022-01-01 ENCOUNTER — TELEPHONE (OUTPATIENT)
Dept: FAMILY MEDICINE CLINIC | Facility: CLINIC | Age: 57
End: 2022-01-01

## 2022-01-01 ENCOUNTER — NURSING HOME VISIT (OUTPATIENT)
Dept: GERIATRICS | Facility: OTHER | Age: 57
End: 2022-01-01
Payer: COMMERCIAL

## 2022-01-01 ENCOUNTER — TRANSITIONAL CARE MANAGEMENT (OUTPATIENT)
Dept: FAMILY MEDICINE CLINIC | Facility: HOME HEALTHCARE | Age: 57
End: 2022-01-01

## 2022-01-01 ENCOUNTER — TELEPHONE (OUTPATIENT)
Dept: GASTROENTEROLOGY | Facility: CLINIC | Age: 57
End: 2022-01-01

## 2022-01-01 ENCOUNTER — APPOINTMENT (INPATIENT)
Dept: CT IMAGING | Facility: HOSPITAL | Age: 57
DRG: 469 | End: 2022-01-01
Payer: COMMERCIAL

## 2022-01-01 ENCOUNTER — APPOINTMENT (EMERGENCY)
Dept: CT IMAGING | Facility: HOSPITAL | Age: 57
DRG: 244 | End: 2022-01-01
Payer: COMMERCIAL

## 2022-01-01 ENCOUNTER — APPOINTMENT (INPATIENT)
Dept: ULTRASOUND IMAGING | Facility: HOSPITAL | Age: 57
DRG: 469 | End: 2022-01-01
Payer: COMMERCIAL

## 2022-01-01 ENCOUNTER — APPOINTMENT (INPATIENT)
Dept: NON INVASIVE DIAGNOSTICS | Facility: HOSPITAL | Age: 57
DRG: 710 | End: 2022-01-01
Payer: COMMERCIAL

## 2022-01-01 ENCOUNTER — HOSPICE ADMISSION (OUTPATIENT)
Dept: HOME HOSPICE | Facility: HOSPICE | Age: 57
End: 2022-01-01
Payer: COMMERCIAL

## 2022-01-01 ENCOUNTER — ANESTHESIA EVENT (INPATIENT)
Dept: PERIOP | Facility: HOSPITAL | Age: 57
DRG: 710 | End: 2022-01-01
Payer: COMMERCIAL

## 2022-01-01 ENCOUNTER — APPOINTMENT (EMERGENCY)
Dept: RADIOLOGY | Facility: HOSPITAL | Age: 57
DRG: 710 | End: 2022-01-01
Payer: COMMERCIAL

## 2022-01-01 ENCOUNTER — HOSPITAL ENCOUNTER (INPATIENT)
Facility: HOSPITAL | Age: 57
LOS: 7 days | Discharge: HOME HEALTH | DRG: 469 | End: 2022-03-18
Attending: EMERGENCY MEDICINE | Admitting: INTERNAL MEDICINE
Payer: COMMERCIAL

## 2022-01-01 ENCOUNTER — TELEPHONE (OUTPATIENT)
Dept: HEMATOLOGY ONCOLOGY | Facility: CLINIC | Age: 57
End: 2022-01-01

## 2022-01-01 ENCOUNTER — APPOINTMENT (INPATIENT)
Dept: ULTRASOUND IMAGING | Facility: HOSPITAL | Age: 57
DRG: 244 | End: 2022-01-01
Payer: COMMERCIAL

## 2022-01-01 ENCOUNTER — OFFICE VISIT (OUTPATIENT)
Dept: FAMILY MEDICINE CLINIC | Facility: CLINIC | Age: 57
End: 2022-01-01
Payer: COMMERCIAL

## 2022-01-01 ENCOUNTER — IMMUNIZATIONS (INPATIENT)
Dept: FAMILY MEDICINE CLINIC | Facility: HOSPITAL | Age: 57
DRG: 710 | End: 2022-01-01
Payer: COMMERCIAL

## 2022-01-01 ENCOUNTER — APPOINTMENT (EMERGENCY)
Dept: CT IMAGING | Facility: HOSPITAL | Age: 57
DRG: 469 | End: 2022-01-01
Payer: COMMERCIAL

## 2022-01-01 ENCOUNTER — DOCUMENTATION (OUTPATIENT)
Dept: HEMATOLOGY ONCOLOGY | Facility: CLINIC | Age: 57
End: 2022-01-01

## 2022-01-01 ENCOUNTER — TELEPHONE (OUTPATIENT)
Dept: UROLOGY | Facility: MEDICAL CENTER | Age: 57
End: 2022-01-01

## 2022-01-01 ENCOUNTER — HOSPITAL ENCOUNTER (INPATIENT)
Facility: HOSPITAL | Age: 57
LOS: 10 days | DRG: 244 | End: 2022-01-16
Attending: EMERGENCY MEDICINE | Admitting: FAMILY MEDICINE
Payer: COMMERCIAL

## 2022-01-01 ENCOUNTER — HOSPITAL ENCOUNTER (INPATIENT)
Facility: HOSPITAL | Age: 57
LOS: 39 days | Discharge: NURSING, REHAB OR LONG-TERM CARE | DRG: 710 | End: 2022-02-24
Attending: STUDENT IN AN ORGANIZED HEALTH CARE EDUCATION/TRAINING PROGRAM | Admitting: STUDENT IN AN ORGANIZED HEALTH CARE EDUCATION/TRAINING PROGRAM
Payer: COMMERCIAL

## 2022-01-01 ENCOUNTER — NURSING HOME VISIT (OUTPATIENT)
Dept: WOUND CARE | Facility: HOSPITAL | Age: 57
End: 2022-01-01
Payer: COMMERCIAL

## 2022-01-01 ENCOUNTER — APPOINTMENT (EMERGENCY)
Dept: RADIOLOGY | Facility: HOSPITAL | Age: 57
DRG: 469 | End: 2022-01-01
Payer: COMMERCIAL

## 2022-01-01 VITALS
WEIGHT: 293 LBS | TEMPERATURE: 98.1 F | OXYGEN SATURATION: 94 % | RESPIRATION RATE: 21 BRPM | HEIGHT: 67 IN | SYSTOLIC BLOOD PRESSURE: 130 MMHG | BODY MASS INDEX: 45.99 KG/M2 | DIASTOLIC BLOOD PRESSURE: 95 MMHG | HEART RATE: 99 BPM

## 2022-01-01 VITALS
OXYGEN SATURATION: 95 % | BODY MASS INDEX: 45.99 KG/M2 | RESPIRATION RATE: 18 BRPM | SYSTOLIC BLOOD PRESSURE: 148 MMHG | HEART RATE: 97 BPM | WEIGHT: 293 LBS | TEMPERATURE: 97.6 F | HEIGHT: 67 IN | DIASTOLIC BLOOD PRESSURE: 96 MMHG

## 2022-01-01 VITALS
WEIGHT: 293 LBS | BODY MASS INDEX: 45.99 KG/M2 | DIASTOLIC BLOOD PRESSURE: 85 MMHG | HEIGHT: 67 IN | OXYGEN SATURATION: 95 % | HEART RATE: 96 BPM | RESPIRATION RATE: 18 BRPM | SYSTOLIC BLOOD PRESSURE: 124 MMHG | TEMPERATURE: 98.2 F

## 2022-01-01 VITALS
SYSTOLIC BLOOD PRESSURE: 133 MMHG | BODY MASS INDEX: 45.99 KG/M2 | HEIGHT: 67 IN | RESPIRATION RATE: 20 BRPM | WEIGHT: 293 LBS | OXYGEN SATURATION: 93 % | HEART RATE: 98 BPM | DIASTOLIC BLOOD PRESSURE: 88 MMHG | TEMPERATURE: 100.5 F

## 2022-01-01 VITALS
DIASTOLIC BLOOD PRESSURE: 70 MMHG | HEART RATE: 120 BPM | TEMPERATURE: 99.9 F | OXYGEN SATURATION: 97 % | SYSTOLIC BLOOD PRESSURE: 120 MMHG

## 2022-01-01 DIAGNOSIS — N13.30 HYDROURETERONEPHROSIS: ICD-10-CM

## 2022-01-01 DIAGNOSIS — K57.20 DIVERTICULITIS OF LARGE INTESTINE WITH PERFORATION WITHOUT BLEEDING: Primary | ICD-10-CM

## 2022-01-01 DIAGNOSIS — I10 PRIMARY HYPERTENSION: ICD-10-CM

## 2022-01-01 DIAGNOSIS — E66.01 MORBID OBESITY WITH BMI OF 50.0-59.9, ADULT (HCC): ICD-10-CM

## 2022-01-01 DIAGNOSIS — N76.1 SUBACUTE VAGINITIS: ICD-10-CM

## 2022-01-01 DIAGNOSIS — N17.9 AKI (ACUTE KIDNEY INJURY) (HCC): ICD-10-CM

## 2022-01-01 DIAGNOSIS — L03.311 CELLULITIS OF ABDOMINAL WALL: ICD-10-CM

## 2022-01-01 DIAGNOSIS — G89.29 CHRONIC MIDLINE LOW BACK PAIN WITH LEFT-SIDED SCIATICA: ICD-10-CM

## 2022-01-01 DIAGNOSIS — R21 RASH: ICD-10-CM

## 2022-01-01 DIAGNOSIS — S31.109A OPEN WOUND OF ANTERIOR ABDOMINAL WALL, INITIAL ENCOUNTER: ICD-10-CM

## 2022-01-01 DIAGNOSIS — M62.830 LUMBAR PARASPINAL MUSCLE SPASM: ICD-10-CM

## 2022-01-01 DIAGNOSIS — C77.2 CANCER OF SIGMOID COLON METASTATIC TO INTRA-ABDOMINAL LYMPH NODE (HCC): ICD-10-CM

## 2022-01-01 DIAGNOSIS — C18.7 CANCER OF SIGMOID COLON METASTATIC TO INTRA-ABDOMINAL LYMPH NODE (HCC): ICD-10-CM

## 2022-01-01 DIAGNOSIS — C18.9 COLON ADENOCARCINOMA (HCC): ICD-10-CM

## 2022-01-01 DIAGNOSIS — Z79.2 LONG TERM CURRENT USE OF ANTIBIOTICS: ICD-10-CM

## 2022-01-01 DIAGNOSIS — R18.8 PELVIC FLUID COLLECTION: ICD-10-CM

## 2022-01-01 DIAGNOSIS — R26.2 AMBULATORY DYSFUNCTION: ICD-10-CM

## 2022-01-01 DIAGNOSIS — Z76.89 ENCOUNTER FOR SUPPORT AND COORDINATION OF TRANSITION OF CARE: Primary | ICD-10-CM

## 2022-01-01 DIAGNOSIS — E66.01 MORBID OBESITY WITH BMI OF 60.0-69.9, ADULT (HCC): ICD-10-CM

## 2022-01-01 DIAGNOSIS — K21.9 GASTROESOPHAGEAL REFLUX DISEASE, UNSPECIFIED WHETHER ESOPHAGITIS PRESENT: ICD-10-CM

## 2022-01-01 DIAGNOSIS — N17.9 ACUTE RENAL FAILURE, UNSPECIFIED ACUTE RENAL FAILURE TYPE (HCC): ICD-10-CM

## 2022-01-01 DIAGNOSIS — L03.311 CELLULITIS OF ABDOMINAL WALL: Primary | ICD-10-CM

## 2022-01-01 DIAGNOSIS — M54.42 CHRONIC MIDLINE LOW BACK PAIN WITH LEFT-SIDED SCIATICA: ICD-10-CM

## 2022-01-01 DIAGNOSIS — N39.0 UTI (URINARY TRACT INFECTION): ICD-10-CM

## 2022-01-01 DIAGNOSIS — K57.92 DIVERTICULITIS: Primary | ICD-10-CM

## 2022-01-01 DIAGNOSIS — I10 ESSENTIAL HYPERTENSION: ICD-10-CM

## 2022-01-01 DIAGNOSIS — N13.39 OTHER HYDRONEPHROSIS: Primary | ICD-10-CM

## 2022-01-01 DIAGNOSIS — R13.10 DYSPHAGIA, UNSPECIFIED TYPE: ICD-10-CM

## 2022-01-01 DIAGNOSIS — L73.2 HIDRADENITIS SUPPURATIVA: Primary | ICD-10-CM

## 2022-01-01 DIAGNOSIS — E66.01 MORBID OBESITY (HCC): ICD-10-CM

## 2022-01-01 DIAGNOSIS — N13.4 HYDROURETER: ICD-10-CM

## 2022-01-01 DIAGNOSIS — B37.2 YEAST DERMATITIS: ICD-10-CM

## 2022-01-01 DIAGNOSIS — F41.8 OTHER SPECIFIED ANXIETY DISORDERS: ICD-10-CM

## 2022-01-01 DIAGNOSIS — R10.32 LLQ ABDOMINAL PAIN: Primary | ICD-10-CM

## 2022-01-01 DIAGNOSIS — R11.0 NAUSEA: ICD-10-CM

## 2022-01-01 LAB
25(OH)D3 SERPL-MCNC: 11.1 NG/ML (ref 30–100)
ABO GROUP BLD BPU: NORMAL
ABO GROUP BLD: NORMAL
ALBUMIN SERPL BCP-MCNC: 1.9 G/DL (ref 3.5–5)
ALBUMIN SERPL BCP-MCNC: 2 G/DL (ref 3.5–5)
ALBUMIN SERPL BCP-MCNC: 2.1 G/DL (ref 3.5–5)
ALBUMIN SERPL BCP-MCNC: 2.3 G/DL (ref 3.5–5)
ALBUMIN SERPL BCP-MCNC: 2.4 G/DL (ref 3.5–5)
ALBUMIN SERPL BCP-MCNC: 2.5 G/DL (ref 3.5–5)
ALBUMIN SERPL BCP-MCNC: 2.7 G/DL (ref 3.5–5)
ALBUMIN SERPL ELPH-MCNC: 2.63 G/DL (ref 3.5–5)
ALBUMIN SERPL ELPH-MCNC: 43.9 % (ref 52–65)
ALP SERPL-CCNC: 100 U/L (ref 46–116)
ALP SERPL-CCNC: 100 U/L (ref 46–116)
ALP SERPL-CCNC: 101 U/L (ref 46–116)
ALP SERPL-CCNC: 109 U/L (ref 46–116)
ALP SERPL-CCNC: 62 U/L (ref 46–116)
ALP SERPL-CCNC: 62 U/L (ref 46–116)
ALP SERPL-CCNC: 72 U/L (ref 46–116)
ALP SERPL-CCNC: 74 U/L (ref 46–116)
ALP SERPL-CCNC: 88 U/L (ref 46–116)
ALP SERPL-CCNC: 89 U/L (ref 46–116)
ALP SERPL-CCNC: 90 U/L (ref 46–116)
ALP SERPL-CCNC: 92 U/L (ref 46–116)
ALP SERPL-CCNC: 93 U/L (ref 46–116)
ALP SERPL-CCNC: 95 U/L (ref 46–116)
ALPHA1 GLOB SERPL ELPH-MCNC: 0.56 G/DL (ref 0.1–0.4)
ALPHA1 GLOB SERPL ELPH-MCNC: 9.4 % (ref 2.5–5)
ALPHA2 GLOB SERPL ELPH-MCNC: 1.04 G/DL (ref 0.4–1.2)
ALPHA2 GLOB SERPL ELPH-MCNC: 17.3 % (ref 7–13)
ALT SERPL W P-5'-P-CCNC: 10 U/L (ref 12–78)
ALT SERPL W P-5'-P-CCNC: 11 U/L (ref 12–78)
ALT SERPL W P-5'-P-CCNC: 12 U/L (ref 12–78)
ALT SERPL W P-5'-P-CCNC: 13 U/L (ref 12–78)
ALT SERPL W P-5'-P-CCNC: 14 U/L (ref 12–78)
ALT SERPL W P-5'-P-CCNC: 14 U/L (ref 12–78)
ALT SERPL W P-5'-P-CCNC: 15 U/L (ref 12–78)
ALT SERPL W P-5'-P-CCNC: 19 U/L (ref 12–78)
ALT SERPL W P-5'-P-CCNC: 20 U/L (ref 12–78)
ALT SERPL W P-5'-P-CCNC: 21 U/L (ref 12–78)
ALT SERPL W P-5'-P-CCNC: 21 U/L (ref 12–78)
ALT SERPL W P-5'-P-CCNC: 22 U/L (ref 12–78)
ALT SERPL W P-5'-P-CCNC: 25 U/L (ref 12–78)
ALT SERPL W P-5'-P-CCNC: 9 U/L (ref 12–78)
ANION GAP SERPL CALCULATED.3IONS-SCNC: 10 MMOL/L (ref 4–13)
ANION GAP SERPL CALCULATED.3IONS-SCNC: 10 MMOL/L (ref 4–13)
ANION GAP SERPL CALCULATED.3IONS-SCNC: 11 MMOL/L (ref 4–13)
ANION GAP SERPL CALCULATED.3IONS-SCNC: 14 MMOL/L (ref 4–13)
ANION GAP SERPL CALCULATED.3IONS-SCNC: 14 MMOL/L (ref 4–13)
ANION GAP SERPL CALCULATED.3IONS-SCNC: 2 MMOL/L (ref 4–13)
ANION GAP SERPL CALCULATED.3IONS-SCNC: 3 MMOL/L (ref 4–13)
ANION GAP SERPL CALCULATED.3IONS-SCNC: 3 MMOL/L (ref 4–13)
ANION GAP SERPL CALCULATED.3IONS-SCNC: 4 MMOL/L (ref 4–13)
ANION GAP SERPL CALCULATED.3IONS-SCNC: 5 MMOL/L (ref 4–13)
ANION GAP SERPL CALCULATED.3IONS-SCNC: 6 MMOL/L (ref 4–13)
ANION GAP SERPL CALCULATED.3IONS-SCNC: 7 MMOL/L (ref 4–13)
ANION GAP SERPL CALCULATED.3IONS-SCNC: 8 MMOL/L (ref 4–13)
ANION GAP SERPL CALCULATED.3IONS-SCNC: 9 MMOL/L (ref 4–13)
ANION GAP SERPL CALCULATED.3IONS-SCNC: 9 MMOL/L (ref 4–13)
ANISOCYTOSIS BLD QL SMEAR: PRESENT
ANISOCYTOSIS BLD QL SMEAR: PRESENT
APTT PPP: 33 SECONDS (ref 23–37)
APTT PPP: 34 SECONDS (ref 23–37)
ARTERIAL PATENCY WRIST A: YES
ARTIFACT: PRESENT
ARTIFACT: PRESENT
AST SERPL W P-5'-P-CCNC: 10 U/L (ref 5–45)
AST SERPL W P-5'-P-CCNC: 11 U/L (ref 5–45)
AST SERPL W P-5'-P-CCNC: 11 U/L (ref 5–45)
AST SERPL W P-5'-P-CCNC: 12 U/L (ref 5–45)
AST SERPL W P-5'-P-CCNC: 13 U/L (ref 5–45)
AST SERPL W P-5'-P-CCNC: 14 U/L (ref 5–45)
AST SERPL W P-5'-P-CCNC: 16 U/L (ref 5–45)
AST SERPL W P-5'-P-CCNC: 16 U/L (ref 5–45)
AST SERPL W P-5'-P-CCNC: 20 U/L (ref 5–45)
AST SERPL W P-5'-P-CCNC: 24 U/L (ref 5–45)
AST SERPL W P-5'-P-CCNC: 39 U/L (ref 5–45)
AST SERPL W P-5'-P-CCNC: 8 U/L (ref 5–45)
AST SERPL W P-5'-P-CCNC: 9 U/L (ref 5–45)
AST SERPL W P-5'-P-CCNC: 9 U/L (ref 5–45)
ATRIAL RATE: 101 BPM
ATRIAL RATE: 87 BPM
ATRIAL RATE: 95 BPM
BACTERIA BLD CULT: NORMAL
BACTERIA SPEC ANAEROBE CULT: NORMAL
BACTERIA TISS AEROBE CULT: ABNORMAL
BACTERIA TISS AEROBE CULT: ABNORMAL
BACTERIA UR CULT: ABNORMAL
BACTERIA UR CULT: NORMAL
BACTERIA UR QL AUTO: ABNORMAL /HPF
BACTERIA UR QL AUTO: ABNORMAL /HPF
BACTERIA WND AEROBE CULT: ABNORMAL
BACTERIA WND AEROBE CULT: ABNORMAL
BASE EX.OXY STD BLDV CALC-SCNC: 66.3 % (ref 60–80)
BASE EXCESS BLDA CALC-SCNC: -5 MMOL/L (ref -2–3)
BASE EXCESS BLDA CALC-SCNC: -6 MMOL/L (ref -2–3)
BASE EXCESS BLDA CALC-SCNC: -6.1 MMOL/L
BASE EXCESS BLDA CALC-SCNC: 0.7 MMOL/L
BASE EXCESS BLDV CALC-SCNC: -1.8 MMOL/L
BASOPHILS # BLD AUTO: 0.01 THOUSANDS/ΜL (ref 0–0.1)
BASOPHILS # BLD AUTO: 0.02 THOUSANDS/ΜL (ref 0–0.1)
BASOPHILS # BLD AUTO: 0.03 THOUSANDS/ΜL (ref 0–0.1)
BASOPHILS # BLD AUTO: 0.05 THOUSANDS/ΜL (ref 0–0.1)
BASOPHILS # BLD AUTO: 0.06 THOUSANDS/ΜL (ref 0–0.1)
BASOPHILS # BLD AUTO: 0.07 THOUSANDS/ΜL (ref 0–0.1)
BASOPHILS # BLD AUTO: 0.08 THOUSANDS/ΜL (ref 0–0.1)
BASOPHILS # BLD AUTO: 0.09 THOUSANDS/ΜL (ref 0–0.1)
BASOPHILS # BLD AUTO: 0.09 THOUSANDS/ΜL (ref 0–0.1)
BASOPHILS # BLD AUTO: 0.1 THOUSANDS/ΜL (ref 0–0.1)
BASOPHILS # BLD AUTO: 0.11 THOUSANDS/ΜL (ref 0–0.1)
BASOPHILS # BLD AUTO: 0.13 THOUSANDS/ΜL (ref 0–0.1)
BASOPHILS # BLD MANUAL: 0 THOUSAND/UL (ref 0–0.1)
BASOPHILS # BLD MANUAL: 0 THOUSAND/UL (ref 0–0.1)
BASOPHILS # BLD MANUAL: 0.1 THOUSAND/UL (ref 0–0.1)
BASOPHILS NFR BLD AUTO: 0 % (ref 0–1)
BASOPHILS NFR BLD AUTO: 1 % (ref 0–1)
BASOPHILS NFR MAR MANUAL: 0 % (ref 0–1)
BASOPHILS NFR MAR MANUAL: 0 % (ref 0–1)
BASOPHILS NFR MAR MANUAL: 1 % (ref 0–1)
BETA GLOB ABNORMAL SERPL ELPH-MCNC: 0.32 G/DL (ref 0.4–0.8)
BETA1 GLOB SERPL ELPH-MCNC: 5.3 % (ref 5–13)
BETA2 GLOB SERPL ELPH-MCNC: 6.7 % (ref 2–8)
BETA2+GAMMA GLOB SERPL ELPH-MCNC: 0.4 G/DL (ref 0.2–0.5)
BILIRUB DIRECT SERPL-MCNC: 0.06 MG/DL (ref 0–0.2)
BILIRUB SERPL-MCNC: 0.21 MG/DL (ref 0.2–1)
BILIRUB SERPL-MCNC: 0.23 MG/DL (ref 0.2–1)
BILIRUB SERPL-MCNC: 0.24 MG/DL (ref 0.2–1)
BILIRUB SERPL-MCNC: 0.24 MG/DL (ref 0.2–1)
BILIRUB SERPL-MCNC: 0.25 MG/DL (ref 0.2–1)
BILIRUB SERPL-MCNC: 0.27 MG/DL (ref 0.2–1)
BILIRUB SERPL-MCNC: 0.28 MG/DL (ref 0.2–1)
BILIRUB SERPL-MCNC: 0.29 MG/DL (ref 0.2–1)
BILIRUB SERPL-MCNC: 0.36 MG/DL (ref 0.2–1)
BILIRUB SERPL-MCNC: 0.37 MG/DL (ref 0.2–1)
BILIRUB SERPL-MCNC: 0.43 MG/DL (ref 0.2–1)
BILIRUB SERPL-MCNC: 0.44 MG/DL (ref 0.2–1)
BILIRUB UR QL STRIP: NEGATIVE
BILIRUB UR QL STRIP: NEGATIVE
BLD GP AB SCN SERPL QL: NEGATIVE
BLD GP AB SCN SERPL QL: NEGATIVE
BODY TEMPERATURE: 97.8 DEGREES FEHRENHEIT
BODY TEMPERATURE: 98 DEGREES FEHRENHEIT
BPU ID: NORMAL
BUN SERPL-MCNC: 11 MG/DL (ref 5–25)
BUN SERPL-MCNC: 12 MG/DL (ref 5–25)
BUN SERPL-MCNC: 13 MG/DL (ref 5–25)
BUN SERPL-MCNC: 14 MG/DL (ref 5–25)
BUN SERPL-MCNC: 15 MG/DL (ref 5–25)
BUN SERPL-MCNC: 16 MG/DL (ref 5–25)
BUN SERPL-MCNC: 16 MG/DL (ref 5–25)
BUN SERPL-MCNC: 17 MG/DL (ref 5–25)
BUN SERPL-MCNC: 17 MG/DL (ref 5–25)
BUN SERPL-MCNC: 18 MG/DL (ref 5–25)
BUN SERPL-MCNC: 18 MG/DL (ref 5–25)
BUN SERPL-MCNC: 19 MG/DL (ref 5–25)
BUN SERPL-MCNC: 20 MG/DL (ref 5–25)
BUN SERPL-MCNC: 21 MG/DL (ref 5–25)
BUN SERPL-MCNC: 21 MG/DL (ref 5–25)
BUN SERPL-MCNC: 22 MG/DL (ref 5–25)
BUN SERPL-MCNC: 25 MG/DL (ref 5–25)
BUN SERPL-MCNC: 25 MG/DL (ref 5–25)
BUN SERPL-MCNC: 27 MG/DL (ref 5–25)
BUN SERPL-MCNC: 34 MG/DL (ref 5–25)
BUN SERPL-MCNC: 39 MG/DL (ref 5–25)
BUN SERPL-MCNC: 41 MG/DL (ref 5–25)
BUN SERPL-MCNC: 47 MG/DL (ref 5–25)
BUN SERPL-MCNC: 57 MG/DL (ref 5–25)
BUN SERPL-MCNC: 66 MG/DL (ref 5–25)
BUN SERPL-MCNC: 7 MG/DL (ref 5–25)
BUN SERPL-MCNC: 8 MG/DL (ref 5–25)
BUN SERPL-MCNC: 8 MG/DL (ref 5–25)
C3 SERPL-MCNC: 171 MG/DL (ref 90–180)
C4 SERPL-MCNC: 42 MG/DL (ref 10–40)
CA-I BLD-SCNC: 1.03 MMOL/L (ref 1.12–1.32)
CA-I BLD-SCNC: 1.08 MMOL/L (ref 1.12–1.32)
CA-I BLD-SCNC: 1.09 MMOL/L (ref 1.12–1.32)
CA-I BLD-SCNC: 1.09 MMOL/L (ref 1.12–1.32)
CA-I BLD-SCNC: 1.11 MMOL/L (ref 1.12–1.32)
CALCIUM ALBUM COR SERPL-MCNC: 10 MG/DL (ref 8.3–10.1)
CALCIUM ALBUM COR SERPL-MCNC: 10.3 MG/DL (ref 8.3–10.1)
CALCIUM ALBUM COR SERPL-MCNC: 10.4 MG/DL (ref 8.3–10.1)
CALCIUM ALBUM COR SERPL-MCNC: 9.2 MG/DL (ref 8.3–10.1)
CALCIUM ALBUM COR SERPL-MCNC: 9.2 MG/DL (ref 8.3–10.1)
CALCIUM ALBUM COR SERPL-MCNC: 9.7 MG/DL (ref 8.3–10.1)
CALCIUM ALBUM COR SERPL-MCNC: 9.8 MG/DL (ref 8.3–10.1)
CALCIUM SERPL-MCNC: 7.7 MG/DL (ref 8.3–10.1)
CALCIUM SERPL-MCNC: 7.8 MG/DL (ref 8.3–10.1)
CALCIUM SERPL-MCNC: 8.2 MG/DL (ref 8.3–10.1)
CALCIUM SERPL-MCNC: 8.3 MG/DL (ref 8.3–10.1)
CALCIUM SERPL-MCNC: 8.3 MG/DL (ref 8.3–10.1)
CALCIUM SERPL-MCNC: 8.4 MG/DL (ref 8.3–10.1)
CALCIUM SERPL-MCNC: 8.5 MG/DL (ref 8.3–10.1)
CALCIUM SERPL-MCNC: 8.6 MG/DL (ref 8.3–10.1)
CALCIUM SERPL-MCNC: 8.7 MG/DL (ref 8.3–10.1)
CALCIUM SERPL-MCNC: 8.8 MG/DL (ref 8.3–10.1)
CALCIUM SERPL-MCNC: 8.9 MG/DL (ref 8.3–10.1)
CALCIUM SERPL-MCNC: 9.1 MG/DL (ref 8.3–10.1)
CALCIUM SERPL-MCNC: 9.2 MG/DL (ref 8.3–10.1)
CALCIUM SERPL-MCNC: 9.5 MG/DL (ref 8.3–10.1)
CARDIAC TROPONIN I PNL SERPL HS: 5 NG/L (ref 8–18)
CEA SERPL-MCNC: 3.6 NG/ML (ref 0–3)
CHLORIDE SERPL-SCNC: 100 MMOL/L (ref 100–108)
CHLORIDE SERPL-SCNC: 101 MMOL/L (ref 100–108)
CHLORIDE SERPL-SCNC: 102 MMOL/L (ref 100–108)
CHLORIDE SERPL-SCNC: 103 MMOL/L (ref 100–108)
CHLORIDE SERPL-SCNC: 104 MMOL/L (ref 100–108)
CHLORIDE SERPL-SCNC: 105 MMOL/L (ref 100–108)
CHLORIDE SERPL-SCNC: 106 MMOL/L (ref 100–108)
CHLORIDE SERPL-SCNC: 106 MMOL/L (ref 100–108)
CHLORIDE SERPL-SCNC: 107 MMOL/L (ref 100–108)
CHLORIDE SERPL-SCNC: 107 MMOL/L (ref 100–108)
CHLORIDE SERPL-SCNC: 108 MMOL/L (ref 100–108)
CHLORIDE SERPL-SCNC: 109 MMOL/L (ref 100–108)
CHLORIDE SERPL-SCNC: 110 MMOL/L (ref 100–108)
CHLORIDE SERPL-SCNC: 111 MMOL/L (ref 100–108)
CHLORIDE SERPL-SCNC: 112 MMOL/L (ref 100–108)
CHLORIDE SERPL-SCNC: 112 MMOL/L (ref 100–108)
CHLORIDE SERPL-SCNC: 113 MMOL/L (ref 100–108)
CHOLEST SERPL-MCNC: 167 MG/DL
CK SERPL-CCNC: 8 U/L (ref 26–192)
CLARITY UR: ABNORMAL
CLARITY UR: ABNORMAL
CO2 SERPL-SCNC: 17 MMOL/L (ref 21–32)
CO2 SERPL-SCNC: 17 MMOL/L (ref 21–32)
CO2 SERPL-SCNC: 19 MMOL/L (ref 21–32)
CO2 SERPL-SCNC: 19 MMOL/L (ref 21–32)
CO2 SERPL-SCNC: 21 MMOL/L (ref 21–32)
CO2 SERPL-SCNC: 22 MMOL/L (ref 21–32)
CO2 SERPL-SCNC: 22 MMOL/L (ref 21–32)
CO2 SERPL-SCNC: 23 MMOL/L (ref 21–32)
CO2 SERPL-SCNC: 24 MMOL/L (ref 21–32)
CO2 SERPL-SCNC: 25 MMOL/L (ref 21–32)
CO2 SERPL-SCNC: 26 MMOL/L (ref 21–32)
CO2 SERPL-SCNC: 27 MMOL/L (ref 21–32)
CO2 SERPL-SCNC: 28 MMOL/L (ref 21–32)
CO2 SERPL-SCNC: 30 MMOL/L (ref 21–32)
CO2 SERPL-SCNC: 30 MMOL/L (ref 21–32)
COLOR UR: YELLOW
COLOR UR: YELLOW
CREAT SERPL-MCNC: 0.87 MG/DL (ref 0.6–1.3)
CREAT SERPL-MCNC: 0.88 MG/DL (ref 0.6–1.3)
CREAT SERPL-MCNC: 0.89 MG/DL (ref 0.6–1.3)
CREAT SERPL-MCNC: 0.9 MG/DL (ref 0.6–1.3)
CREAT SERPL-MCNC: 0.91 MG/DL (ref 0.6–1.3)
CREAT SERPL-MCNC: 0.92 MG/DL (ref 0.6–1.3)
CREAT SERPL-MCNC: 0.92 MG/DL (ref 0.6–1.3)
CREAT SERPL-MCNC: 0.93 MG/DL (ref 0.6–1.3)
CREAT SERPL-MCNC: 0.97 MG/DL (ref 0.6–1.3)
CREAT SERPL-MCNC: 1.01 MG/DL (ref 0.6–1.3)
CREAT SERPL-MCNC: 1.02 MG/DL (ref 0.6–1.3)
CREAT SERPL-MCNC: 1.05 MG/DL (ref 0.6–1.3)
CREAT SERPL-MCNC: 1.09 MG/DL (ref 0.6–1.3)
CREAT SERPL-MCNC: 1.1 MG/DL (ref 0.6–1.3)
CREAT SERPL-MCNC: 1.11 MG/DL (ref 0.6–1.3)
CREAT SERPL-MCNC: 1.12 MG/DL (ref 0.6–1.3)
CREAT SERPL-MCNC: 1.16 MG/DL (ref 0.6–1.3)
CREAT SERPL-MCNC: 1.16 MG/DL (ref 0.6–1.3)
CREAT SERPL-MCNC: 1.18 MG/DL (ref 0.6–1.3)
CREAT SERPL-MCNC: 1.27 MG/DL (ref 0.6–1.3)
CREAT SERPL-MCNC: 1.27 MG/DL (ref 0.6–1.3)
CREAT SERPL-MCNC: 1.3 MG/DL (ref 0.6–1.3)
CREAT SERPL-MCNC: 1.46 MG/DL (ref 0.6–1.3)
CREAT SERPL-MCNC: 1.58 MG/DL (ref 0.6–1.3)
CREAT SERPL-MCNC: 1.59 MG/DL (ref 0.6–1.3)
CREAT SERPL-MCNC: 1.65 MG/DL (ref 0.6–1.3)
CREAT SERPL-MCNC: 1.68 MG/DL (ref 0.6–1.3)
CREAT SERPL-MCNC: 1.71 MG/DL (ref 0.6–1.3)
CREAT SERPL-MCNC: 1.72 MG/DL (ref 0.6–1.3)
CREAT SERPL-MCNC: 1.79 MG/DL (ref 0.6–1.3)
CREAT SERPL-MCNC: 1.84 MG/DL (ref 0.6–1.3)
CREAT SERPL-MCNC: 1.91 MG/DL (ref 0.6–1.3)
CREAT SERPL-MCNC: 2.03 MG/DL (ref 0.6–1.3)
CREAT SERPL-MCNC: 2.25 MG/DL (ref 0.6–1.3)
CREAT SERPL-MCNC: 2.27 MG/DL (ref 0.6–1.3)
CREAT SERPL-MCNC: 2.73 MG/DL (ref 0.6–1.3)
CREAT SERPL-MCNC: 3.24 MG/DL (ref 0.6–1.3)
CREAT SERPL-MCNC: 4.56 MG/DL (ref 0.6–1.3)
CREAT UR-MCNC: 188 MG/DL
CREAT UR-MCNC: 330 MG/DL
CREAT UR-MCNC: 57.3 MG/DL
CREAT UR-MCNC: 63.6 MG/DL
CROSSMATCH: NORMAL
CRP SERPL QL: 201 MG/L
DACRYOCYTES BLD QL SMEAR: PRESENT
DACRYOCYTES BLD QL SMEAR: PRESENT
EOSINOPHIL # BLD AUTO: 0.11 THOUSAND/ΜL (ref 0–0.61)
EOSINOPHIL # BLD AUTO: 0.17 THOUSAND/ΜL (ref 0–0.61)
EOSINOPHIL # BLD AUTO: 0.23 THOUSAND/ΜL (ref 0–0.61)
EOSINOPHIL # BLD AUTO: 0.4 THOUSAND/ΜL (ref 0–0.61)
EOSINOPHIL # BLD AUTO: 0.41 THOUSAND/ΜL (ref 0–0.61)
EOSINOPHIL # BLD AUTO: 0.44 THOUSAND/ΜL (ref 0–0.61)
EOSINOPHIL # BLD AUTO: 0.46 THOUSAND/ΜL (ref 0–0.61)
EOSINOPHIL # BLD AUTO: 0.47 THOUSAND/ΜL (ref 0–0.61)
EOSINOPHIL # BLD AUTO: 0.47 THOUSAND/ΜL (ref 0–0.61)
EOSINOPHIL # BLD AUTO: 0.48 THOUSAND/ΜL (ref 0–0.61)
EOSINOPHIL # BLD AUTO: 0.48 THOUSAND/ΜL (ref 0–0.61)
EOSINOPHIL # BLD AUTO: 0.54 THOUSAND/ΜL (ref 0–0.61)
EOSINOPHIL # BLD AUTO: 0.54 THOUSAND/ΜL (ref 0–0.61)
EOSINOPHIL # BLD AUTO: 0.59 THOUSAND/ΜL (ref 0–0.61)
EOSINOPHIL # BLD AUTO: 0.68 THOUSAND/ΜL (ref 0–0.61)
EOSINOPHIL # BLD AUTO: 0.73 THOUSAND/ΜL (ref 0–0.61)
EOSINOPHIL # BLD AUTO: 0.76 THOUSAND/ΜL (ref 0–0.61)
EOSINOPHIL # BLD AUTO: 0.76 THOUSAND/ΜL (ref 0–0.61)
EOSINOPHIL # BLD AUTO: 0.78 THOUSAND/ΜL (ref 0–0.61)
EOSINOPHIL # BLD AUTO: 0.81 THOUSAND/ΜL (ref 0–0.61)
EOSINOPHIL # BLD AUTO: 0.84 THOUSAND/ΜL (ref 0–0.61)
EOSINOPHIL # BLD AUTO: 0.92 THOUSAND/ΜL (ref 0–0.61)
EOSINOPHIL # BLD AUTO: 1.41 THOUSAND/ΜL (ref 0–0.61)
EOSINOPHIL # BLD MANUAL: 0 THOUSAND/UL (ref 0–0.4)
EOSINOPHIL # BLD MANUAL: 0.2 THOUSAND/UL (ref 0–0.4)
EOSINOPHIL # BLD MANUAL: 0.59 THOUSAND/UL (ref 0–0.4)
EOSINOPHIL NFR BLD AUTO: 1 % (ref 0–6)
EOSINOPHIL NFR BLD AUTO: 1 % (ref 0–6)
EOSINOPHIL NFR BLD AUTO: 10 % (ref 0–6)
EOSINOPHIL NFR BLD AUTO: 11 % (ref 0–6)
EOSINOPHIL NFR BLD AUTO: 14 % (ref 0–6)
EOSINOPHIL NFR BLD AUTO: 2 % (ref 0–6)
EOSINOPHIL NFR BLD AUTO: 4 % (ref 0–6)
EOSINOPHIL NFR BLD AUTO: 4 % (ref 0–6)
EOSINOPHIL NFR BLD AUTO: 5 % (ref 0–6)
EOSINOPHIL NFR BLD AUTO: 6 % (ref 0–6)
EOSINOPHIL NFR BLD AUTO: 7 % (ref 0–6)
EOSINOPHIL NFR BLD AUTO: 7 % (ref 0–6)
EOSINOPHIL NFR BLD AUTO: 8 % (ref 0–6)
EOSINOPHIL NFR BLD AUTO: 8 % (ref 0–6)
EOSINOPHIL NFR BLD AUTO: 9 % (ref 0–6)
EOSINOPHIL NFR BLD MANUAL: 0 % (ref 0–6)
EOSINOPHIL NFR BLD MANUAL: 2 % (ref 0–6)
EOSINOPHIL NFR BLD MANUAL: 6 % (ref 0–6)
EOSINOPHIL NFR URNS MANUAL: 0 %
ERYTHROCYTE [DISTWIDTH] IN BLOOD BY AUTOMATED COUNT: 16.1 % (ref 11.6–15.1)
ERYTHROCYTE [DISTWIDTH] IN BLOOD BY AUTOMATED COUNT: 16.4 % (ref 11.6–15.1)
ERYTHROCYTE [DISTWIDTH] IN BLOOD BY AUTOMATED COUNT: 16.5 % (ref 11.6–15.1)
ERYTHROCYTE [DISTWIDTH] IN BLOOD BY AUTOMATED COUNT: 17.1 % (ref 11.6–15.1)
ERYTHROCYTE [DISTWIDTH] IN BLOOD BY AUTOMATED COUNT: 17.1 % (ref 11.6–15.1)
ERYTHROCYTE [DISTWIDTH] IN BLOOD BY AUTOMATED COUNT: 17.5 % (ref 11.6–15.1)
ERYTHROCYTE [DISTWIDTH] IN BLOOD BY AUTOMATED COUNT: 17.7 % (ref 11.6–15.1)
ERYTHROCYTE [DISTWIDTH] IN BLOOD BY AUTOMATED COUNT: 17.8 % (ref 11.6–15.1)
ERYTHROCYTE [DISTWIDTH] IN BLOOD BY AUTOMATED COUNT: 17.9 % (ref 11.6–15.1)
ERYTHROCYTE [DISTWIDTH] IN BLOOD BY AUTOMATED COUNT: 17.9 % (ref 11.6–15.1)
ERYTHROCYTE [DISTWIDTH] IN BLOOD BY AUTOMATED COUNT: 18.4 % (ref 11.6–15.1)
ERYTHROCYTE [DISTWIDTH] IN BLOOD BY AUTOMATED COUNT: 18.6 % (ref 11.6–15.1)
ERYTHROCYTE [DISTWIDTH] IN BLOOD BY AUTOMATED COUNT: 18.6 % (ref 11.6–15.1)
ERYTHROCYTE [DISTWIDTH] IN BLOOD BY AUTOMATED COUNT: 18.7 % (ref 11.6–15.1)
ERYTHROCYTE [DISTWIDTH] IN BLOOD BY AUTOMATED COUNT: 19 % (ref 11.6–15.1)
ERYTHROCYTE [DISTWIDTH] IN BLOOD BY AUTOMATED COUNT: 19.1 % (ref 11.6–15.1)
ERYTHROCYTE [DISTWIDTH] IN BLOOD BY AUTOMATED COUNT: 19.2 % (ref 11.6–15.1)
ERYTHROCYTE [DISTWIDTH] IN BLOOD BY AUTOMATED COUNT: 19.5 % (ref 11.6–15.1)
ERYTHROCYTE [DISTWIDTH] IN BLOOD BY AUTOMATED COUNT: 20.2 % (ref 11.6–15.1)
ERYTHROCYTE [DISTWIDTH] IN BLOOD BY AUTOMATED COUNT: 20.3 % (ref 11.6–15.1)
ERYTHROCYTE [DISTWIDTH] IN BLOOD BY AUTOMATED COUNT: 20.7 % (ref 11.6–15.1)
ERYTHROCYTE [DISTWIDTH] IN BLOOD BY AUTOMATED COUNT: 20.8 % (ref 11.6–15.1)
ERYTHROCYTE [DISTWIDTH] IN BLOOD BY AUTOMATED COUNT: 20.8 % (ref 11.6–15.1)
ERYTHROCYTE [DISTWIDTH] IN BLOOD BY AUTOMATED COUNT: 20.9 % (ref 11.6–15.1)
ERYTHROCYTE [DISTWIDTH] IN BLOOD BY AUTOMATED COUNT: 20.9 % (ref 11.6–15.1)
ERYTHROCYTE [DISTWIDTH] IN BLOOD BY AUTOMATED COUNT: 21.2 % (ref 11.6–15.1)
ERYTHROCYTE [DISTWIDTH] IN BLOOD BY AUTOMATED COUNT: 21.2 % (ref 11.6–15.1)
ERYTHROCYTE [DISTWIDTH] IN BLOOD BY AUTOMATED COUNT: 21.3 % (ref 11.6–15.1)
ERYTHROCYTE [DISTWIDTH] IN BLOOD BY AUTOMATED COUNT: 21.6 % (ref 11.6–15.1)
ERYTHROCYTE [DISTWIDTH] IN BLOOD BY AUTOMATED COUNT: 21.7 % (ref 11.6–15.1)
EST. AVERAGE GLUCOSE BLD GHB EST-MCNC: 120 MG/DL
FERRITIN SERPL-MCNC: 83 NG/ML (ref 8–388)
FLUAV RNA RESP QL NAA+PROBE: NEGATIVE
FLUBV RNA RESP QL NAA+PROBE: NEGATIVE
FOLATE SERPL-MCNC: 2.9 NG/ML (ref 3.1–17.5)
FUNGUS SPEC CULT: NORMAL
GAMMA GLOB ABNORMAL SERPL ELPH-MCNC: 1.04 G/DL (ref 0.5–1.6)
GAMMA GLOB SERPL ELPH-MCNC: 17.4 % (ref 12–22)
GFR SERPL CREATININE-BSD FRML MDRD: 15 ML/MIN/1.73SQ M
GFR SERPL CREATININE-BSD FRML MDRD: 18 ML/MIN/1.73SQ M
GFR SERPL CREATININE-BSD FRML MDRD: 23 ML/MIN/1.73SQ M
GFR SERPL CREATININE-BSD FRML MDRD: 23 ML/MIN/1.73SQ M
GFR SERPL CREATININE-BSD FRML MDRD: 26 ML/MIN/1.73SQ M
GFR SERPL CREATININE-BSD FRML MDRD: 28 ML/MIN/1.73SQ M
GFR SERPL CREATININE-BSD FRML MDRD: 29 ML/MIN/1.73SQ M
GFR SERPL CREATININE-BSD FRML MDRD: 30 ML/MIN/1.73SQ M
GFR SERPL CREATININE-BSD FRML MDRD: 32 ML/MIN/1.73SQ M
GFR SERPL CREATININE-BSD FRML MDRD: 32 ML/MIN/1.73SQ M
GFR SERPL CREATININE-BSD FRML MDRD: 33 ML/MIN/1.73SQ M
GFR SERPL CREATININE-BSD FRML MDRD: 33 ML/MIN/1.73SQ M
GFR SERPL CREATININE-BSD FRML MDRD: 35 ML/MIN/1.73SQ M
GFR SERPL CREATININE-BSD FRML MDRD: 35 ML/MIN/1.73SQ M
GFR SERPL CREATININE-BSD FRML MDRD: 39 ML/MIN/1.73SQ M
GFR SERPL CREATININE-BSD FRML MDRD: 45 ML/MIN/1.73SQ M
GFR SERPL CREATININE-BSD FRML MDRD: 46 ML/MIN/1.73SQ M
GFR SERPL CREATININE-BSD FRML MDRD: 46 ML/MIN/1.73SQ M
GFR SERPL CREATININE-BSD FRML MDRD: 50 ML/MIN/1.73SQ M
GFR SERPL CREATININE-BSD FRML MDRD: 52 ML/MIN/1.73SQ M
GFR SERPL CREATININE-BSD FRML MDRD: 52 ML/MIN/1.73SQ M
GFR SERPL CREATININE-BSD FRML MDRD: 54 ML/MIN/1.73SQ M
GFR SERPL CREATININE-BSD FRML MDRD: 54 ML/MIN/1.73SQ M
GFR SERPL CREATININE-BSD FRML MDRD: 55 ML/MIN/1.73SQ M
GFR SERPL CREATININE-BSD FRML MDRD: 56 ML/MIN/1.73SQ M
GFR SERPL CREATININE-BSD FRML MDRD: 58 ML/MIN/1.73SQ M
GFR SERPL CREATININE-BSD FRML MDRD: 60 ML/MIN/1.73SQ M
GFR SERPL CREATININE-BSD FRML MDRD: 61 ML/MIN/1.73SQ M
GFR SERPL CREATININE-BSD FRML MDRD: 64 ML/MIN/1.73SQ M
GFR SERPL CREATININE-BSD FRML MDRD: 67 ML/MIN/1.73SQ M
GFR SERPL CREATININE-BSD FRML MDRD: 68 ML/MIN/1.73SQ M
GFR SERPL CREATININE-BSD FRML MDRD: 68 ML/MIN/1.73SQ M
GFR SERPL CREATININE-BSD FRML MDRD: 69 ML/MIN/1.73SQ M
GFR SERPL CREATININE-BSD FRML MDRD: 70 ML/MIN/1.73SQ M
GFR SERPL CREATININE-BSD FRML MDRD: 71 ML/MIN/1.73SQ M
GFR SERPL CREATININE-BSD FRML MDRD: 72 ML/MIN/1.73SQ M
GFR SERPL CREATININE-BSD FRML MDRD: 73 ML/MIN/1.73SQ M
GFR SERPL CREATININE-BSD FRML MDRD: 9 ML/MIN/1.73SQ M
GLUCOSE SERPL-MCNC: 100 MG/DL (ref 65–140)
GLUCOSE SERPL-MCNC: 100 MG/DL (ref 65–140)
GLUCOSE SERPL-MCNC: 102 MG/DL (ref 65–140)
GLUCOSE SERPL-MCNC: 102 MG/DL (ref 65–140)
GLUCOSE SERPL-MCNC: 104 MG/DL (ref 65–140)
GLUCOSE SERPL-MCNC: 105 MG/DL (ref 65–140)
GLUCOSE SERPL-MCNC: 105 MG/DL (ref 65–140)
GLUCOSE SERPL-MCNC: 106 MG/DL (ref 65–140)
GLUCOSE SERPL-MCNC: 106 MG/DL (ref 65–140)
GLUCOSE SERPL-MCNC: 107 MG/DL (ref 65–140)
GLUCOSE SERPL-MCNC: 107 MG/DL (ref 65–140)
GLUCOSE SERPL-MCNC: 110 MG/DL (ref 65–140)
GLUCOSE SERPL-MCNC: 110 MG/DL (ref 65–140)
GLUCOSE SERPL-MCNC: 111 MG/DL (ref 65–140)
GLUCOSE SERPL-MCNC: 114 MG/DL (ref 65–140)
GLUCOSE SERPL-MCNC: 114 MG/DL (ref 65–140)
GLUCOSE SERPL-MCNC: 115 MG/DL (ref 65–140)
GLUCOSE SERPL-MCNC: 117 MG/DL (ref 65–140)
GLUCOSE SERPL-MCNC: 117 MG/DL (ref 65–140)
GLUCOSE SERPL-MCNC: 119 MG/DL (ref 65–140)
GLUCOSE SERPL-MCNC: 120 MG/DL (ref 65–140)
GLUCOSE SERPL-MCNC: 120 MG/DL (ref 65–140)
GLUCOSE SERPL-MCNC: 124 MG/DL (ref 65–140)
GLUCOSE SERPL-MCNC: 126 MG/DL (ref 65–140)
GLUCOSE SERPL-MCNC: 141 MG/DL (ref 65–140)
GLUCOSE SERPL-MCNC: 147 MG/DL (ref 65–140)
GLUCOSE SERPL-MCNC: 158 MG/DL (ref 65–140)
GLUCOSE SERPL-MCNC: 172 MG/DL (ref 65–140)
GLUCOSE SERPL-MCNC: 84 MG/DL (ref 65–140)
GLUCOSE SERPL-MCNC: 89 MG/DL (ref 65–140)
GLUCOSE SERPL-MCNC: 92 MG/DL (ref 65–140)
GLUCOSE SERPL-MCNC: 92 MG/DL (ref 65–140)
GLUCOSE SERPL-MCNC: 94 MG/DL (ref 65–140)
GLUCOSE SERPL-MCNC: 96 MG/DL (ref 65–140)
GLUCOSE SERPL-MCNC: 96 MG/DL (ref 65–140)
GLUCOSE SERPL-MCNC: 97 MG/DL (ref 65–140)
GLUCOSE SERPL-MCNC: 98 MG/DL (ref 65–140)
GLUCOSE SERPL-MCNC: 99 MG/DL (ref 65–140)
GLUCOSE SERPL-MCNC: 99 MG/DL (ref 65–140)
GLUCOSE UR STRIP-MCNC: NEGATIVE MG/DL
GLUCOSE UR STRIP-MCNC: NEGATIVE MG/DL
GRAM STN SPEC: ABNORMAL
HBA1C MFR BLD: 5.8 %
HCO3 BLDA-SCNC: 19.2 MMOL/L (ref 22–28)
HCO3 BLDA-SCNC: 20.8 MMOL/L (ref 22–28)
HCO3 BLDA-SCNC: 21.1 MMOL/L (ref 22–28)
HCO3 BLDA-SCNC: 25.9 MMOL/L (ref 22–28)
HCO3 BLDV-SCNC: 23.7 MMOL/L (ref 24–30)
HCT VFR BLD AUTO: 23.6 % (ref 34.8–46.1)
HCT VFR BLD AUTO: 23.6 % (ref 34.8–46.1)
HCT VFR BLD AUTO: 23.7 % (ref 34.8–46.1)
HCT VFR BLD AUTO: 24.5 % (ref 34.8–46.1)
HCT VFR BLD AUTO: 25.5 % (ref 34.8–46.1)
HCT VFR BLD AUTO: 25.8 % (ref 34.8–46.1)
HCT VFR BLD AUTO: 26.2 % (ref 34.8–46.1)
HCT VFR BLD AUTO: 26.4 % (ref 34.8–46.1)
HCT VFR BLD AUTO: 26.8 % (ref 34.8–46.1)
HCT VFR BLD AUTO: 27.2 % (ref 34.8–46.1)
HCT VFR BLD AUTO: 27.3 % (ref 34.8–46.1)
HCT VFR BLD AUTO: 27.8 % (ref 34.8–46.1)
HCT VFR BLD AUTO: 27.9 % (ref 34.8–46.1)
HCT VFR BLD AUTO: 28 % (ref 34.8–46.1)
HCT VFR BLD AUTO: 28.3 % (ref 34.8–46.1)
HCT VFR BLD AUTO: 28.7 % (ref 34.8–46.1)
HCT VFR BLD AUTO: 28.9 % (ref 34.8–46.1)
HCT VFR BLD AUTO: 29.1 % (ref 34.8–46.1)
HCT VFR BLD AUTO: 29.7 % (ref 34.8–46.1)
HCT VFR BLD AUTO: 29.8 % (ref 34.8–46.1)
HCT VFR BLD AUTO: 30.2 % (ref 34.8–46.1)
HCT VFR BLD AUTO: 30.2 % (ref 34.8–46.1)
HCT VFR BLD AUTO: 30.3 % (ref 34.8–46.1)
HCT VFR BLD AUTO: 30.4 % (ref 34.8–46.1)
HCT VFR BLD AUTO: 30.6 % (ref 34.8–46.1)
HCT VFR BLD AUTO: 30.6 % (ref 34.8–46.1)
HCT VFR BLD AUTO: 30.8 % (ref 34.8–46.1)
HCT VFR BLD AUTO: 30.8 % (ref 34.8–46.1)
HCT VFR BLD AUTO: 31.3 % (ref 34.8–46.1)
HCT VFR BLD AUTO: 31.3 % (ref 34.8–46.1)
HCT VFR BLD AUTO: 31.5 % (ref 34.8–46.1)
HCT VFR BLD AUTO: 31.8 % (ref 34.8–46.1)
HCT VFR BLD AUTO: 32.6 % (ref 34.8–46.1)
HCT VFR BLD AUTO: 32.6 % (ref 34.8–46.1)
HCT VFR BLD AUTO: 33.1 % (ref 34.8–46.1)
HCT VFR BLD AUTO: 33.6 % (ref 34.8–46.1)
HCT VFR BLD AUTO: 34.3 % (ref 34.8–46.1)
HCT VFR BLD CALC: 26 % (ref 34.8–46.1)
HCT VFR BLD CALC: 28 % (ref 34.8–46.1)
HDLC SERPL-MCNC: 36 MG/DL
HELMET CELLS BLD QL SMEAR: PRESENT
HGB BLD-MCNC: 10 G/DL (ref 11.5–15.4)
HGB BLD-MCNC: 10.1 G/DL (ref 11.5–15.4)
HGB BLD-MCNC: 10.5 G/DL (ref 11.5–15.4)
HGB BLD-MCNC: 6.9 G/DL (ref 11.5–15.4)
HGB BLD-MCNC: 7.1 G/DL (ref 11.5–15.4)
HGB BLD-MCNC: 7.1 G/DL (ref 11.5–15.4)
HGB BLD-MCNC: 7.3 G/DL (ref 11.5–15.4)
HGB BLD-MCNC: 7.4 G/DL (ref 11.5–15.4)
HGB BLD-MCNC: 7.7 G/DL (ref 11.5–15.4)
HGB BLD-MCNC: 7.8 G/DL (ref 11.5–15.4)
HGB BLD-MCNC: 8 G/DL (ref 11.5–15.4)
HGB BLD-MCNC: 8.1 G/DL (ref 11.5–15.4)
HGB BLD-MCNC: 8.1 G/DL (ref 11.5–15.4)
HGB BLD-MCNC: 8.2 G/DL (ref 11.5–15.4)
HGB BLD-MCNC: 8.2 G/DL (ref 11.5–15.4)
HGB BLD-MCNC: 8.3 G/DL (ref 11.5–15.4)
HGB BLD-MCNC: 8.6 G/DL (ref 11.5–15.4)
HGB BLD-MCNC: 8.6 G/DL (ref 11.5–15.4)
HGB BLD-MCNC: 8.8 G/DL (ref 11.5–15.4)
HGB BLD-MCNC: 8.9 G/DL (ref 11.5–15.4)
HGB BLD-MCNC: 9.1 G/DL (ref 11.5–15.4)
HGB BLD-MCNC: 9.2 G/DL (ref 11.5–15.4)
HGB BLD-MCNC: 9.3 G/DL (ref 11.5–15.4)
HGB BLD-MCNC: 9.4 G/DL (ref 11.5–15.4)
HGB BLD-MCNC: 9.5 G/DL (ref 11.5–15.4)
HGB BLD-MCNC: 9.5 G/DL (ref 11.5–15.4)
HGB BLD-MCNC: 9.6 G/DL (ref 11.5–15.4)
HGB BLD-MCNC: 9.8 G/DL (ref 11.5–15.4)
HGB BLD-MCNC: 9.8 G/DL (ref 11.5–15.4)
HGB BLD-MCNC: 9.9 G/DL (ref 11.5–15.4)
HGB BLD-MCNC: 9.9 G/DL (ref 11.5–15.4)
HGB BLDA-MCNC: 8.8 G/DL (ref 11.5–15.4)
HGB BLDA-MCNC: 9.5 G/DL (ref 11.5–15.4)
HGB UR QL STRIP.AUTO: ABNORMAL
HGB UR QL STRIP.AUTO: ABNORMAL
IGG/ALB SER: 0.78 {RATIO} (ref 1.1–1.8)
IMM GRANULOCYTES # BLD AUTO: 0.02 THOUSAND/UL (ref 0–0.2)
IMM GRANULOCYTES # BLD AUTO: 0.03 THOUSAND/UL (ref 0–0.2)
IMM GRANULOCYTES # BLD AUTO: 0.04 THOUSAND/UL (ref 0–0.2)
IMM GRANULOCYTES # BLD AUTO: 0.05 THOUSAND/UL (ref 0–0.2)
IMM GRANULOCYTES # BLD AUTO: 0.06 THOUSAND/UL (ref 0–0.2)
IMM GRANULOCYTES # BLD AUTO: 0.07 THOUSAND/UL (ref 0–0.2)
IMM GRANULOCYTES # BLD AUTO: 0.07 THOUSAND/UL (ref 0–0.2)
IMM GRANULOCYTES # BLD AUTO: 0.08 THOUSAND/UL (ref 0–0.2)
IMM GRANULOCYTES # BLD AUTO: 0.08 THOUSAND/UL (ref 0–0.2)
IMM GRANULOCYTES # BLD AUTO: 0.09 THOUSAND/UL (ref 0–0.2)
IMM GRANULOCYTES # BLD AUTO: 0.09 THOUSAND/UL (ref 0–0.2)
IMM GRANULOCYTES # BLD AUTO: 0.1 THOUSAND/UL (ref 0–0.2)
IMM GRANULOCYTES # BLD AUTO: 0.19 THOUSAND/UL (ref 0–0.2)
IMM GRANULOCYTES NFR BLD AUTO: 0 % (ref 0–2)
IMM GRANULOCYTES NFR BLD AUTO: 1 % (ref 0–2)
IMM GRANULOCYTES NFR BLD AUTO: 2 % (ref 0–2)
INR PPP: 1.28 (ref 0.84–1.19)
INR PPP: 1.35 (ref 0.84–1.19)
INR PPP: 1.55 (ref 0.84–1.19)
IRON SATN MFR SERPL: 9 % (ref 15–50)
IRON SERPL-MCNC: 27 UG/DL (ref 50–170)
KETONES UR STRIP-MCNC: NEGATIVE MG/DL
KETONES UR STRIP-MCNC: NEGATIVE MG/DL
LACTATE SERPL-SCNC: 0.8 MMOL/L (ref 0.5–2)
LACTATE SERPL-SCNC: 0.9 MMOL/L (ref 0.5–2)
LACTATE SERPL-SCNC: 1 MMOL/L (ref 0.5–2)
LACTATE SERPL-SCNC: 1.3 MMOL/L (ref 0.5–2)
LACTATE SERPL-SCNC: 1.4 MMOL/L (ref 0.5–2)
LDLC SERPL CALC-MCNC: 100 MG/DL (ref 0–100)
LEUKOCYTE ESTERASE UR QL STRIP: ABNORMAL
LEUKOCYTE ESTERASE UR QL STRIP: ABNORMAL
LIPASE SERPL-CCNC: 139 U/L (ref 73–393)
LYMPHOCYTES # BLD AUTO: 0.36 THOUSAND/UL (ref 0.6–4.47)
LYMPHOCYTES # BLD AUTO: 1.02 THOUSANDS/ΜL (ref 0.6–4.47)
LYMPHOCYTES # BLD AUTO: 1.17 THOUSANDS/ΜL (ref 0.6–4.47)
LYMPHOCYTES # BLD AUTO: 1.28 THOUSAND/UL (ref 0.6–4.47)
LYMPHOCYTES # BLD AUTO: 1.33 THOUSANDS/ΜL (ref 0.6–4.47)
LYMPHOCYTES # BLD AUTO: 1.42 THOUSANDS/ΜL (ref 0.6–4.47)
LYMPHOCYTES # BLD AUTO: 1.42 THOUSANDS/ΜL (ref 0.6–4.47)
LYMPHOCYTES # BLD AUTO: 1.47 THOUSANDS/ΜL (ref 0.6–4.47)
LYMPHOCYTES # BLD AUTO: 1.48 THOUSANDS/ΜL (ref 0.6–4.47)
LYMPHOCYTES # BLD AUTO: 1.53 THOUSANDS/ΜL (ref 0.6–4.47)
LYMPHOCYTES # BLD AUTO: 1.58 THOUSAND/UL (ref 0.6–4.47)
LYMPHOCYTES # BLD AUTO: 1.58 THOUSANDS/ΜL (ref 0.6–4.47)
LYMPHOCYTES # BLD AUTO: 1.6 THOUSANDS/ΜL (ref 0.6–4.47)
LYMPHOCYTES # BLD AUTO: 1.6 THOUSANDS/ΜL (ref 0.6–4.47)
LYMPHOCYTES # BLD AUTO: 1.67 THOUSANDS/ΜL (ref 0.6–4.47)
LYMPHOCYTES # BLD AUTO: 1.69 THOUSANDS/ΜL (ref 0.6–4.47)
LYMPHOCYTES # BLD AUTO: 1.69 THOUSANDS/ΜL (ref 0.6–4.47)
LYMPHOCYTES # BLD AUTO: 1.8 THOUSANDS/ΜL (ref 0.6–4.47)
LYMPHOCYTES # BLD AUTO: 1.84 THOUSANDS/ΜL (ref 0.6–4.47)
LYMPHOCYTES # BLD AUTO: 1.85 THOUSANDS/ΜL (ref 0.6–4.47)
LYMPHOCYTES # BLD AUTO: 1.97 THOUSANDS/ΜL (ref 0.6–4.47)
LYMPHOCYTES # BLD AUTO: 1.97 THOUSANDS/ΜL (ref 0.6–4.47)
LYMPHOCYTES # BLD AUTO: 13 % (ref 14–44)
LYMPHOCYTES # BLD AUTO: 16 % (ref 14–44)
LYMPHOCYTES # BLD AUTO: 2.05 THOUSANDS/ΜL (ref 0.6–4.47)
LYMPHOCYTES # BLD AUTO: 2.14 THOUSANDS/ΜL (ref 0.6–4.47)
LYMPHOCYTES # BLD AUTO: 2.24 THOUSANDS/ΜL (ref 0.6–4.47)
LYMPHOCYTES # BLD AUTO: 2.61 THOUSANDS/ΜL (ref 0.6–4.47)
LYMPHOCYTES # BLD AUTO: 4 % (ref 14–44)
LYMPHOCYTES NFR BLD AUTO: 10 % (ref 14–44)
LYMPHOCYTES NFR BLD AUTO: 10 % (ref 14–44)
LYMPHOCYTES NFR BLD AUTO: 11 % (ref 14–44)
LYMPHOCYTES NFR BLD AUTO: 12 % (ref 14–44)
LYMPHOCYTES NFR BLD AUTO: 13 % (ref 14–44)
LYMPHOCYTES NFR BLD AUTO: 15 % (ref 14–44)
LYMPHOCYTES NFR BLD AUTO: 17 % (ref 14–44)
LYMPHOCYTES NFR BLD AUTO: 17 % (ref 14–44)
LYMPHOCYTES NFR BLD AUTO: 18 % (ref 14–44)
LYMPHOCYTES NFR BLD AUTO: 19 % (ref 14–44)
LYMPHOCYTES NFR BLD AUTO: 19 % (ref 14–44)
LYMPHOCYTES NFR BLD AUTO: 20 % (ref 14–44)
LYMPHOCYTES NFR BLD AUTO: 21 % (ref 14–44)
LYMPHOCYTES NFR BLD AUTO: 21 % (ref 14–44)
LYMPHOCYTES NFR BLD AUTO: 22 % (ref 14–44)
LYMPHOCYTES NFR BLD AUTO: 24 % (ref 14–44)
LYMPHOCYTES NFR BLD AUTO: 24 % (ref 14–44)
LYMPHOCYTES NFR BLD AUTO: 26 % (ref 14–44)
MAGNESIUM SERPL-MCNC: 1.3 MG/DL (ref 1.6–2.6)
MAGNESIUM SERPL-MCNC: 1.5 MG/DL (ref 1.6–2.6)
MAGNESIUM SERPL-MCNC: 1.6 MG/DL (ref 1.6–2.6)
MAGNESIUM SERPL-MCNC: 1.7 MG/DL (ref 1.6–2.6)
MAGNESIUM SERPL-MCNC: 1.8 MG/DL (ref 1.6–2.6)
MAGNESIUM SERPL-MCNC: 1.9 MG/DL (ref 1.6–2.6)
MAGNESIUM SERPL-MCNC: 2 MG/DL (ref 1.6–2.6)
MAGNESIUM SERPL-MCNC: 2.1 MG/DL (ref 1.6–2.6)
MAGNESIUM SERPL-MCNC: 2.3 MG/DL (ref 1.6–2.6)
MAGNESIUM SERPL-MCNC: 2.3 MG/DL (ref 1.6–2.6)
MAGNESIUM SERPL-MCNC: 2.5 MG/DL (ref 1.6–2.6)
MAGNESIUM SERPL-MCNC: 3.5 MG/DL (ref 1.6–2.6)
MCH RBC QN AUTO: 23.7 PG (ref 26.8–34.3)
MCH RBC QN AUTO: 23.9 PG (ref 26.8–34.3)
MCH RBC QN AUTO: 24.1 PG (ref 26.8–34.3)
MCH RBC QN AUTO: 24.2 PG (ref 26.8–34.3)
MCH RBC QN AUTO: 24.3 PG (ref 26.8–34.3)
MCH RBC QN AUTO: 24.3 PG (ref 26.8–34.3)
MCH RBC QN AUTO: 24.4 PG (ref 26.8–34.3)
MCH RBC QN AUTO: 24.5 PG (ref 26.8–34.3)
MCH RBC QN AUTO: 24.6 PG (ref 26.8–34.3)
MCH RBC QN AUTO: 24.8 PG (ref 26.8–34.3)
MCH RBC QN AUTO: 24.9 PG (ref 26.8–34.3)
MCH RBC QN AUTO: 25 PG (ref 26.8–34.3)
MCH RBC QN AUTO: 25 PG (ref 26.8–34.3)
MCH RBC QN AUTO: 25.2 PG (ref 26.8–34.3)
MCH RBC QN AUTO: 25.3 PG (ref 26.8–34.3)
MCH RBC QN AUTO: 25.4 PG (ref 26.8–34.3)
MCH RBC QN AUTO: 25.5 PG (ref 26.8–34.3)
MCH RBC QN AUTO: 25.8 PG (ref 26.8–34.3)
MCH RBC QN AUTO: 25.9 PG (ref 26.8–34.3)
MCH RBC QN AUTO: 26 PG (ref 26.8–34.3)
MCH RBC QN AUTO: 26.1 PG (ref 26.8–34.3)
MCH RBC QN AUTO: 26.3 PG (ref 26.8–34.3)
MCH RBC QN AUTO: 26.3 PG (ref 26.8–34.3)
MCH RBC QN AUTO: 26.6 PG (ref 26.8–34.3)
MCH RBC QN AUTO: 28.2 PG (ref 26.8–34.3)
MCHC RBC AUTO-ENTMCNC: 28.6 G/DL (ref 31.4–37.4)
MCHC RBC AUTO-ENTMCNC: 28.7 G/DL (ref 31.4–37.4)
MCHC RBC AUTO-ENTMCNC: 29 G/DL (ref 31.4–37.4)
MCHC RBC AUTO-ENTMCNC: 29.1 G/DL (ref 31.4–37.4)
MCHC RBC AUTO-ENTMCNC: 29.1 G/DL (ref 31.4–37.4)
MCHC RBC AUTO-ENTMCNC: 29.2 G/DL (ref 31.4–37.4)
MCHC RBC AUTO-ENTMCNC: 29.4 G/DL (ref 31.4–37.4)
MCHC RBC AUTO-ENTMCNC: 29.5 G/DL (ref 31.4–37.4)
MCHC RBC AUTO-ENTMCNC: 29.6 G/DL (ref 31.4–37.4)
MCHC RBC AUTO-ENTMCNC: 29.6 G/DL (ref 31.4–37.4)
MCHC RBC AUTO-ENTMCNC: 29.7 G/DL (ref 31.4–37.4)
MCHC RBC AUTO-ENTMCNC: 29.8 G/DL (ref 31.4–37.4)
MCHC RBC AUTO-ENTMCNC: 29.9 G/DL (ref 31.4–37.4)
MCHC RBC AUTO-ENTMCNC: 29.9 G/DL (ref 31.4–37.4)
MCHC RBC AUTO-ENTMCNC: 30 G/DL (ref 31.4–37.4)
MCHC RBC AUTO-ENTMCNC: 30.1 G/DL (ref 31.4–37.4)
MCHC RBC AUTO-ENTMCNC: 30.1 G/DL (ref 31.4–37.4)
MCHC RBC AUTO-ENTMCNC: 30.2 G/DL (ref 31.4–37.4)
MCHC RBC AUTO-ENTMCNC: 30.4 G/DL (ref 31.4–37.4)
MCHC RBC AUTO-ENTMCNC: 30.5 G/DL (ref 31.4–37.4)
MCHC RBC AUTO-ENTMCNC: 30.6 G/DL (ref 31.4–37.4)
MCHC RBC AUTO-ENTMCNC: 30.7 G/DL (ref 31.4–37.4)
MCHC RBC AUTO-ENTMCNC: 30.7 G/DL (ref 31.4–37.4)
MCHC RBC AUTO-ENTMCNC: 31 G/DL (ref 31.4–37.4)
MCHC RBC AUTO-ENTMCNC: 31.1 G/DL (ref 31.4–37.4)
MCHC RBC AUTO-ENTMCNC: 31.1 G/DL (ref 31.4–37.4)
MCHC RBC AUTO-ENTMCNC: 31.2 G/DL (ref 31.4–37.4)
MCHC RBC AUTO-ENTMCNC: 31.3 G/DL (ref 31.4–37.4)
MCHC RBC AUTO-ENTMCNC: 31.6 G/DL (ref 31.4–37.4)
MCHC RBC AUTO-ENTMCNC: 32 G/DL (ref 31.4–37.4)
MCV RBC AUTO: 78 FL (ref 82–98)
MCV RBC AUTO: 78 FL (ref 82–98)
MCV RBC AUTO: 79 FL (ref 82–98)
MCV RBC AUTO: 79 FL (ref 82–98)
MCV RBC AUTO: 80 FL (ref 82–98)
MCV RBC AUTO: 81 FL (ref 82–98)
MCV RBC AUTO: 82 FL (ref 82–98)
MCV RBC AUTO: 84 FL (ref 82–98)
MCV RBC AUTO: 85 FL (ref 82–98)
MCV RBC AUTO: 85 FL (ref 82–98)
MCV RBC AUTO: 86 FL (ref 82–98)
MCV RBC AUTO: 86 FL (ref 82–98)
MCV RBC AUTO: 87 FL (ref 82–98)
MCV RBC AUTO: 88 FL (ref 82–98)
MCV RBC AUTO: 89 FL (ref 82–98)
MCV RBC AUTO: 91 FL (ref 82–98)
MICROALBUMIN UR-MCNC: 336 MG/L (ref 0–20)
MICROALBUMIN/CREAT 24H UR: 102 MG/G CREATININE (ref 0–30)
MICROCYTES BLD QL AUTO: PRESENT
MICROCYTES BLD QL AUTO: PRESENT
MONOCYTES # BLD AUTO: 0.1 THOUSAND/UL (ref 0–1.22)
MONOCYTES # BLD AUTO: 0.36 THOUSAND/UL (ref 0–1.22)
MONOCYTES # BLD AUTO: 0.54 THOUSAND/ΜL (ref 0.17–1.22)
MONOCYTES # BLD AUTO: 0.57 THOUSAND/ΜL (ref 0.17–1.22)
MONOCYTES # BLD AUTO: 0.58 THOUSAND/ΜL (ref 0.17–1.22)
MONOCYTES # BLD AUTO: 0.6 THOUSAND/ΜL (ref 0.17–1.22)
MONOCYTES # BLD AUTO: 0.61 THOUSAND/ΜL (ref 0.17–1.22)
MONOCYTES # BLD AUTO: 0.63 THOUSAND/ΜL (ref 0.17–1.22)
MONOCYTES # BLD AUTO: 0.63 THOUSAND/ΜL (ref 0.17–1.22)
MONOCYTES # BLD AUTO: 0.64 THOUSAND/ΜL (ref 0.17–1.22)
MONOCYTES # BLD AUTO: 0.64 THOUSAND/ΜL (ref 0.17–1.22)
MONOCYTES # BLD AUTO: 0.65 THOUSAND/ΜL (ref 0.17–1.22)
MONOCYTES # BLD AUTO: 0.66 THOUSAND/ΜL (ref 0.17–1.22)
MONOCYTES # BLD AUTO: 0.69 THOUSAND/UL (ref 0–1.22)
MONOCYTES # BLD AUTO: 0.7 THOUSAND/ΜL (ref 0.17–1.22)
MONOCYTES # BLD AUTO: 0.72 THOUSAND/ΜL (ref 0.17–1.22)
MONOCYTES # BLD AUTO: 0.73 THOUSAND/ΜL (ref 0.17–1.22)
MONOCYTES # BLD AUTO: 0.74 THOUSAND/ΜL (ref 0.17–1.22)
MONOCYTES # BLD AUTO: 0.75 THOUSAND/ΜL (ref 0.17–1.22)
MONOCYTES # BLD AUTO: 0.8 THOUSAND/ΜL (ref 0.17–1.22)
MONOCYTES # BLD AUTO: 0.85 THOUSAND/ΜL (ref 0.17–1.22)
MONOCYTES # BLD AUTO: 0.91 THOUSAND/ΜL (ref 0.17–1.22)
MONOCYTES # BLD AUTO: 0.92 THOUSAND/ΜL (ref 0.17–1.22)
MONOCYTES # BLD AUTO: 0.93 THOUSAND/ΜL (ref 0.17–1.22)
MONOCYTES NFR BLD AUTO: 10 % (ref 4–12)
MONOCYTES NFR BLD AUTO: 6 % (ref 4–12)
MONOCYTES NFR BLD AUTO: 7 % (ref 4–12)
MONOCYTES NFR BLD AUTO: 8 % (ref 4–12)
MONOCYTES NFR BLD AUTO: 9 % (ref 4–12)
MONOCYTES NFR BLD AUTO: 9 % (ref 4–12)
MONOCYTES NFR BLD: 1 % (ref 4–12)
MONOCYTES NFR BLD: 4 % (ref 4–12)
MONOCYTES NFR BLD: 7 % (ref 4–12)
MRSA NOSE QL CULT: NORMAL
MYELOCYTES NFR BLD MANUAL: 1 % (ref 0–1)
MYELOCYTES NFR BLD MANUAL: 2 % (ref 0–1)
NASAL CANNULA: 4
NEUTROPHILS # BLD AUTO: 10.16 THOUSANDS/ΜL (ref 1.85–7.62)
NEUTROPHILS # BLD AUTO: 12.56 THOUSANDS/ΜL (ref 1.85–7.62)
NEUTROPHILS # BLD AUTO: 4.34 THOUSANDS/ΜL (ref 1.85–7.62)
NEUTROPHILS # BLD AUTO: 4.37 THOUSANDS/ΜL (ref 1.85–7.62)
NEUTROPHILS # BLD AUTO: 4.75 THOUSANDS/ΜL (ref 1.85–7.62)
NEUTROPHILS # BLD AUTO: 4.88 THOUSANDS/ΜL (ref 1.85–7.62)
NEUTROPHILS # BLD AUTO: 4.99 THOUSANDS/ΜL (ref 1.85–7.62)
NEUTROPHILS # BLD AUTO: 5.04 THOUSANDS/ΜL (ref 1.85–7.62)
NEUTROPHILS # BLD AUTO: 5.09 THOUSANDS/ΜL (ref 1.85–7.62)
NEUTROPHILS # BLD AUTO: 5.21 THOUSANDS/ΜL (ref 1.85–7.62)
NEUTROPHILS # BLD AUTO: 5.27 THOUSANDS/ΜL (ref 1.85–7.62)
NEUTROPHILS # BLD AUTO: 5.46 THOUSANDS/ΜL (ref 1.85–7.62)
NEUTROPHILS # BLD AUTO: 5.54 THOUSANDS/ΜL (ref 1.85–7.62)
NEUTROPHILS # BLD AUTO: 5.92 THOUSANDS/ΜL (ref 1.85–7.62)
NEUTROPHILS # BLD AUTO: 6.13 THOUSANDS/ΜL (ref 1.85–7.62)
NEUTROPHILS # BLD AUTO: 6.16 THOUSANDS/ΜL (ref 1.85–7.62)
NEUTROPHILS # BLD AUTO: 6.46 THOUSANDS/ΜL (ref 1.85–7.62)
NEUTROPHILS # BLD AUTO: 6.67 THOUSANDS/ΜL (ref 1.85–7.62)
NEUTROPHILS # BLD AUTO: 7.19 THOUSANDS/ΜL (ref 1.85–7.62)
NEUTROPHILS # BLD AUTO: 7.73 THOUSANDS/ΜL (ref 1.85–7.62)
NEUTROPHILS # BLD AUTO: 7.83 THOUSANDS/ΜL (ref 1.85–7.62)
NEUTROPHILS # BLD AUTO: 8.43 THOUSANDS/ΜL (ref 1.85–7.62)
NEUTROPHILS # BLD AUTO: 8.75 THOUSANDS/ΜL (ref 1.85–7.62)
NEUTROPHILS # BLD MANUAL: 7.08 THOUSAND/UL (ref 1.85–7.62)
NEUTROPHILS # BLD MANUAL: 7.79 THOUSAND/UL (ref 1.85–7.62)
NEUTROPHILS # BLD MANUAL: 8.22 THOUSAND/UL (ref 1.85–7.62)
NEUTS BAND NFR BLD MANUAL: 11 % (ref 0–8)
NEUTS BAND NFR BLD MANUAL: 2 % (ref 0–8)
NEUTS SEG NFR BLD AUTO: 51 % (ref 43–75)
NEUTS SEG NFR BLD AUTO: 58 % (ref 43–75)
NEUTS SEG NFR BLD AUTO: 59 % (ref 43–75)
NEUTS SEG NFR BLD AUTO: 60 % (ref 43–75)
NEUTS SEG NFR BLD AUTO: 61 % (ref 43–75)
NEUTS SEG NFR BLD AUTO: 61 % (ref 43–75)
NEUTS SEG NFR BLD AUTO: 62 % (ref 43–75)
NEUTS SEG NFR BLD AUTO: 62 % (ref 43–75)
NEUTS SEG NFR BLD AUTO: 63 % (ref 43–75)
NEUTS SEG NFR BLD AUTO: 64 % (ref 43–75)
NEUTS SEG NFR BLD AUTO: 65 % (ref 43–75)
NEUTS SEG NFR BLD AUTO: 67 % (ref 43–75)
NEUTS SEG NFR BLD AUTO: 67 % (ref 43–75)
NEUTS SEG NFR BLD AUTO: 69 % (ref 43–75)
NEUTS SEG NFR BLD AUTO: 70 % (ref 43–75)
NEUTS SEG NFR BLD AUTO: 72 % (ref 43–75)
NEUTS SEG NFR BLD AUTO: 76 % (ref 43–75)
NEUTS SEG NFR BLD AUTO: 79 % (ref 43–75)
NEUTS SEG NFR BLD AUTO: 81 % (ref 43–75)
NEUTS SEG NFR BLD AUTO: 82 % (ref 43–75)
NITRITE UR QL STRIP: NEGATIVE
NITRITE UR QL STRIP: POSITIVE
NON VENT ROOM AIR: ABNORMAL %
NON-SQ EPI CELLS URNS QL MICRO: ABNORMAL /HPF
NON-SQ EPI CELLS URNS QL MICRO: ABNORMAL /HPF
NRBC BLD AUTO-RTO: 0 /100 WBCS
NRBC BLD AUTO-RTO: 1 /100 WBC (ref 0–2)
O2 CT BLDA-SCNC: 13 ML/DL (ref 16–23)
O2 CT BLDA-SCNC: 13.4 ML/DL (ref 16–23)
O2 CT BLDV-SCNC: 8 ML/DL
OXYHGB MFR BLDA: 93.7 % (ref 94–97)
OXYHGB MFR BLDA: 97.4 % (ref 94–97)
P AXIS: 71 DEGREES
P AXIS: 72 DEGREES
P AXIS: 78 DEGREES
PCO2 BLD: 22 MMOL/L (ref 21–32)
PCO2 BLD: 22 MMOL/L (ref 21–32)
PCO2 BLD: 41.9 MM HG (ref 36–44)
PCO2 BLD: 42.9 MM HG (ref 42–50)
PCO2 BLD: 44 MM HG (ref 36–44)
PCO2 BLDA: 37.4 MM HG (ref 36–44)
PCO2 BLDA: 43.7 MM HG (ref 36–44)
PCO2 BLDV: 43.5 MM HG (ref 42–50)
PCO2 TEMP ADJ BLDA: 36.8 MM HG (ref 36–44)
PH BLD: 7.28 [PH] (ref 7.35–7.45)
PH BLD: 7.31 [PH] (ref 7.35–7.45)
PH BLD: 7.33 [PH] (ref 7.35–7.45)
PH BLD: 7.36 [PH] (ref 7.3–7.4)
PH BLDA: 7.33 [PH] (ref 7.35–7.45)
PH BLDA: 7.39 [PH] (ref 7.35–7.45)
PH BLDV: 7.35 [PH] (ref 7.3–7.4)
PH UR STRIP.AUTO: 5 [PH]
PH UR STRIP.AUTO: 6 [PH]
PHOSPHATE SERPL-MCNC: 2.4 MG/DL (ref 2.7–4.5)
PHOSPHATE SERPL-MCNC: 2.5 MG/DL (ref 2.7–4.5)
PHOSPHATE SERPL-MCNC: 2.9 MG/DL (ref 2.7–4.5)
PHOSPHATE SERPL-MCNC: 3 MG/DL (ref 2.7–4.5)
PHOSPHATE SERPL-MCNC: 3.1 MG/DL (ref 2.7–4.5)
PHOSPHATE SERPL-MCNC: 3.1 MG/DL (ref 2.7–4.5)
PHOSPHATE SERPL-MCNC: 3.3 MG/DL (ref 2.7–4.5)
PHOSPHATE SERPL-MCNC: 3.4 MG/DL (ref 2.7–4.5)
PHOSPHATE SERPL-MCNC: 3.5 MG/DL (ref 2.7–4.5)
PHOSPHATE SERPL-MCNC: 4.4 MG/DL (ref 2.7–4.5)
PHOSPHATE SERPL-MCNC: 4.7 MG/DL (ref 2.7–4.5)
PHOSPHATE SERPL-MCNC: 5.3 MG/DL (ref 2.7–4.5)
PHOSPHATE SERPL-MCNC: 5.3 MG/DL (ref 2.7–4.5)
PHOSPHATE SERPL-MCNC: 5.4 MG/DL (ref 2.7–4.5)
PHOSPHATE SERPL-MCNC: 5.6 MG/DL (ref 2.7–4.5)
PLATELET # BLD AUTO: 179 THOUSANDS/UL (ref 149–390)
PLATELET # BLD AUTO: 220 THOUSANDS/UL (ref 149–390)
PLATELET # BLD AUTO: 226 THOUSANDS/UL (ref 149–390)
PLATELET # BLD AUTO: 244 THOUSANDS/UL (ref 149–390)
PLATELET # BLD AUTO: 252 THOUSANDS/UL (ref 149–390)
PLATELET # BLD AUTO: 257 THOUSANDS/UL (ref 149–390)
PLATELET # BLD AUTO: 262 THOUSANDS/UL (ref 149–390)
PLATELET # BLD AUTO: 263 THOUSANDS/UL (ref 149–390)
PLATELET # BLD AUTO: 265 THOUSANDS/UL (ref 149–390)
PLATELET # BLD AUTO: 271 THOUSANDS/UL (ref 149–390)
PLATELET # BLD AUTO: 293 THOUSANDS/UL (ref 149–390)
PLATELET # BLD AUTO: 310 THOUSANDS/UL (ref 149–390)
PLATELET # BLD AUTO: 314 THOUSANDS/UL (ref 149–390)
PLATELET # BLD AUTO: 317 THOUSANDS/UL (ref 149–390)
PLATELET # BLD AUTO: 320 THOUSANDS/UL (ref 149–390)
PLATELET # BLD AUTO: 328 THOUSANDS/UL (ref 149–390)
PLATELET # BLD AUTO: 331 THOUSANDS/UL (ref 149–390)
PLATELET # BLD AUTO: 338 THOUSANDS/UL (ref 149–390)
PLATELET # BLD AUTO: 338 THOUSANDS/UL (ref 149–390)
PLATELET # BLD AUTO: 340 THOUSANDS/UL (ref 149–390)
PLATELET # BLD AUTO: 341 THOUSANDS/UL (ref 149–390)
PLATELET # BLD AUTO: 346 THOUSANDS/UL (ref 149–390)
PLATELET # BLD AUTO: 353 THOUSANDS/UL (ref 149–390)
PLATELET # BLD AUTO: 361 THOUSANDS/UL (ref 149–390)
PLATELET # BLD AUTO: 365 THOUSANDS/UL (ref 149–390)
PLATELET # BLD AUTO: 379 THOUSANDS/UL (ref 149–390)
PLATELET # BLD AUTO: 385 THOUSANDS/UL (ref 149–390)
PLATELET # BLD AUTO: 391 THOUSANDS/UL (ref 149–390)
PLATELET # BLD AUTO: 398 THOUSANDS/UL (ref 149–390)
PLATELET # BLD AUTO: 404 THOUSANDS/UL (ref 149–390)
PLATELET # BLD AUTO: 420 THOUSANDS/UL (ref 149–390)
PLATELET # BLD AUTO: 431 THOUSANDS/UL (ref 149–390)
PLATELET # BLD AUTO: 449 THOUSANDS/UL (ref 149–390)
PLATELET # BLD AUTO: 476 THOUSANDS/UL (ref 149–390)
PLATELET # BLD AUTO: 482 THOUSANDS/UL (ref 149–390)
PLATELET # BLD AUTO: 487 THOUSANDS/UL (ref 149–390)
PLATELET # BLD AUTO: 510 THOUSANDS/UL (ref 149–390)
PLATELET # BLD AUTO: 514 THOUSANDS/UL (ref 149–390)
PLATELET BLD QL SMEAR: ABNORMAL
PLATELET BLD QL SMEAR: ADEQUATE
PLATELET BLD QL SMEAR: ADEQUATE
PMV BLD AUTO: 8.4 FL (ref 8.9–12.7)
PMV BLD AUTO: 8.5 FL (ref 8.9–12.7)
PMV BLD AUTO: 8.6 FL (ref 8.9–12.7)
PMV BLD AUTO: 8.7 FL (ref 8.9–12.7)
PMV BLD AUTO: 8.8 FL (ref 8.9–12.7)
PMV BLD AUTO: 8.9 FL (ref 8.9–12.7)
PMV BLD AUTO: 9 FL (ref 8.9–12.7)
PMV BLD AUTO: 9.1 FL (ref 8.9–12.7)
PMV BLD AUTO: 9.2 FL (ref 8.9–12.7)
PMV BLD AUTO: 9.3 FL (ref 8.9–12.7)
PMV BLD AUTO: 9.4 FL (ref 8.9–12.7)
PMV BLD AUTO: 9.5 FL (ref 8.9–12.7)
PMV BLD AUTO: 9.7 FL (ref 8.9–12.7)
PO2 BLD: 108 MM HG (ref 75–129)
PO2 BLD: 129 MM HG (ref 75–129)
PO2 BLD: 153.8 MM HG (ref 75–129)
PO2 BLDA: 156.1 MM HG (ref 75–129)
PO2 BLDA: 75.5 MM HG (ref 75–129)
PO2 BLDV: 37.7 MM HG (ref 35–45)
PO2 VENOUS TEMP CORRECTED: 36.9 MM HG (ref 35–45)
POIKILOCYTOSIS BLD QL SMEAR: PRESENT
POIKILOCYTOSIS BLD QL SMEAR: PRESENT
POLYCHROMASIA BLD QL SMEAR: PRESENT
POLYCHROMASIA BLD QL SMEAR: PRESENT
POTASSIUM BLD-SCNC: 4.5 MMOL/L (ref 3.5–5.3)
POTASSIUM BLD-SCNC: 4.6 MMOL/L (ref 3.5–5.3)
POTASSIUM SERPL-SCNC: 3.5 MMOL/L (ref 3.5–5.3)
POTASSIUM SERPL-SCNC: 3.6 MMOL/L (ref 3.5–5.3)
POTASSIUM SERPL-SCNC: 3.7 MMOL/L (ref 3.5–5.3)
POTASSIUM SERPL-SCNC: 3.8 MMOL/L (ref 3.5–5.3)
POTASSIUM SERPL-SCNC: 3.9 MMOL/L (ref 3.5–5.3)
POTASSIUM SERPL-SCNC: 4 MMOL/L (ref 3.5–5.3)
POTASSIUM SERPL-SCNC: 4.1 MMOL/L (ref 3.5–5.3)
POTASSIUM SERPL-SCNC: 4.2 MMOL/L (ref 3.5–5.3)
POTASSIUM SERPL-SCNC: 4.2 MMOL/L (ref 3.5–5.3)
POTASSIUM SERPL-SCNC: 4.3 MMOL/L (ref 3.5–5.3)
POTASSIUM SERPL-SCNC: 4.4 MMOL/L (ref 3.5–5.3)
POTASSIUM SERPL-SCNC: 4.5 MMOL/L (ref 3.5–5.3)
POTASSIUM SERPL-SCNC: 4.6 MMOL/L (ref 3.5–5.3)
POTASSIUM SERPL-SCNC: 4.6 MMOL/L (ref 3.5–5.3)
POTASSIUM SERPL-SCNC: 4.8 MMOL/L (ref 3.5–5.3)
POTASSIUM SERPL-SCNC: 4.8 MMOL/L (ref 3.5–5.3)
POTASSIUM SERPL-SCNC: 4.9 MMOL/L (ref 3.5–5.3)
POTASSIUM SERPL-SCNC: 5.1 MMOL/L (ref 3.5–5.3)
POTASSIUM SERPL-SCNC: 5.2 MMOL/L (ref 3.5–5.3)
POTASSIUM SERPL-SCNC: 5.4 MMOL/L (ref 3.5–5.3)
PR INTERVAL: 168 MS
PR INTERVAL: 174 MS
PR INTERVAL: 180 MS
PROCALCITONIN SERPL-MCNC: 0.18 NG/ML
PROCALCITONIN SERPL-MCNC: 0.19 NG/ML
PROCALCITONIN SERPL-MCNC: 0.23 NG/ML
PROCALCITONIN SERPL-MCNC: 0.24 NG/ML
PROCALCITONIN SERPL-MCNC: 0.28 NG/ML
PROCALCITONIN SERPL-MCNC: 0.32 NG/ML
PROCALCITONIN SERPL-MCNC: 0.38 NG/ML
PROCALCITONIN SERPL-MCNC: 0.43 NG/ML
PROT PATTERN SERPL ELPH-IMP: ABNORMAL
PROT SERPL-MCNC: 5.4 G/DL (ref 6.4–8.2)
PROT SERPL-MCNC: 5.7 G/DL (ref 6.4–8.2)
PROT SERPL-MCNC: 6 G/DL (ref 6.4–8.2)
PROT SERPL-MCNC: 6.2 G/DL (ref 6.4–8.2)
PROT SERPL-MCNC: 6.4 G/DL (ref 6.4–8.2)
PROT SERPL-MCNC: 6.5 G/DL (ref 6.4–8.2)
PROT SERPL-MCNC: 6.7 G/DL (ref 6.4–8.2)
PROT SERPL-MCNC: 6.8 G/DL (ref 6.4–8.2)
PROT SERPL-MCNC: 7 G/DL (ref 6.4–8.2)
PROT SERPL-MCNC: 7.1 G/DL (ref 6.4–8.2)
PROT SERPL-MCNC: 7.3 G/DL (ref 6.4–8.2)
PROT UR STRIP-MCNC: ABNORMAL MG/DL
PROT UR STRIP-MCNC: ABNORMAL MG/DL
PROT UR-MCNC: 103 MG/DL
PROT UR-MCNC: 185 MG/DL
PROT/CREAT UR: 0.55 MG/G{CREAT} (ref 0–0.1)
PROT/CREAT UR: 0.56 MG/G{CREAT} (ref 0–0.1)
PROTHROMBIN TIME: 15.3 SECONDS (ref 11.6–14.5)
PROTHROMBIN TIME: 16 SECONDS (ref 11.6–14.5)
PROTHROMBIN TIME: 17.9 SECONDS (ref 11.6–14.5)
PS CM H2O: 20
PS VENT FIO2: 50
PS VENT PEEP: 6
QRS AXIS: 45 DEGREES
QRS AXIS: 48 DEGREES
QRS AXIS: 65 DEGREES
QRSD INTERVAL: 64 MS
QRSD INTERVAL: 66 MS
QRSD INTERVAL: 68 MS
QT INTERVAL: 310 MS
QT INTERVAL: 354 MS
QT INTERVAL: 366 MS
QTC INTERVAL: 401 MS
QTC INTERVAL: 440 MS
QTC INTERVAL: 444 MS
RBC # BLD AUTO: 2.83 MILLION/UL (ref 3.81–5.12)
RBC # BLD AUTO: 2.84 MILLION/UL (ref 3.81–5.12)
RBC # BLD AUTO: 2.93 MILLION/UL (ref 3.81–5.12)
RBC # BLD AUTO: 2.93 MILLION/UL (ref 3.81–5.12)
RBC # BLD AUTO: 2.94 MILLION/UL (ref 3.81–5.12)
RBC # BLD AUTO: 3.02 MILLION/UL (ref 3.81–5.12)
RBC # BLD AUTO: 3.04 MILLION/UL (ref 3.81–5.12)
RBC # BLD AUTO: 3.1 MILLION/UL (ref 3.81–5.12)
RBC # BLD AUTO: 3.13 MILLION/UL (ref 3.81–5.12)
RBC # BLD AUTO: 3.21 MILLION/UL (ref 3.81–5.12)
RBC # BLD AUTO: 3.21 MILLION/UL (ref 3.81–5.12)
RBC # BLD AUTO: 3.23 MILLION/UL (ref 3.81–5.12)
RBC # BLD AUTO: 3.26 MILLION/UL (ref 3.81–5.12)
RBC # BLD AUTO: 3.28 MILLION/UL (ref 3.81–5.12)
RBC # BLD AUTO: 3.29 MILLION/UL (ref 3.81–5.12)
RBC # BLD AUTO: 3.29 MILLION/UL (ref 3.81–5.12)
RBC # BLD AUTO: 3.34 MILLION/UL (ref 3.81–5.12)
RBC # BLD AUTO: 3.38 MILLION/UL (ref 3.81–5.12)
RBC # BLD AUTO: 3.47 MILLION/UL (ref 3.81–5.12)
RBC # BLD AUTO: 3.53 MILLION/UL (ref 3.81–5.12)
RBC # BLD AUTO: 3.64 MILLION/UL (ref 3.81–5.12)
RBC # BLD AUTO: 3.69 MILLION/UL (ref 3.81–5.12)
RBC # BLD AUTO: 3.72 MILLION/UL (ref 3.81–5.12)
RBC # BLD AUTO: 3.76 MILLION/UL (ref 3.81–5.12)
RBC # BLD AUTO: 3.77 MILLION/UL (ref 3.81–5.12)
RBC # BLD AUTO: 3.8 MILLION/UL (ref 3.81–5.12)
RBC # BLD AUTO: 3.82 MILLION/UL (ref 3.81–5.12)
RBC # BLD AUTO: 3.85 MILLION/UL (ref 3.81–5.12)
RBC # BLD AUTO: 3.87 MILLION/UL (ref 3.81–5.12)
RBC # BLD AUTO: 3.88 MILLION/UL (ref 3.81–5.12)
RBC # BLD AUTO: 3.88 MILLION/UL (ref 3.81–5.12)
RBC # BLD AUTO: 3.93 MILLION/UL (ref 3.81–5.12)
RBC # BLD AUTO: 3.94 MILLION/UL (ref 3.81–5.12)
RBC # BLD AUTO: 4.01 MILLION/UL (ref 3.81–5.12)
RBC # BLD AUTO: 4.06 MILLION/UL (ref 3.81–5.12)
RBC # BLD AUTO: 4.11 MILLION/UL (ref 3.81–5.12)
RBC # BLD AUTO: 4.31 MILLION/UL (ref 3.81–5.12)
RBC #/AREA URNS AUTO: ABNORMAL /HPF
RBC #/AREA URNS AUTO: ABNORMAL /HPF
RBC MORPH BLD: NORMAL
RBC MORPH BLD: PRESENT
RBC MORPH BLD: PRESENT
RH BLD: POSITIVE
RSV RNA RESP QL NAA+PROBE: NEGATIVE
RYE IGE QN: NEGATIVE
SAO2 % BLD FROM PO2: 98 % (ref 60–85)
SAO2 % BLD FROM PO2: 98 % (ref 60–85)
SARS-COV-2 RNA RESP QL NAA+PROBE: POSITIVE
SODIUM 24H UR-SCNC: 25 MOL/L
SODIUM 24H UR-SCNC: 48 MOL/L
SODIUM 24H UR-SCNC: 67 MOL/L
SODIUM 24H UR-SCNC: 68 MOL/L
SODIUM BLD-SCNC: 137 MMOL/L (ref 136–145)
SODIUM BLD-SCNC: 138 MMOL/L (ref 136–145)
SODIUM SERPL-SCNC: 135 MMOL/L (ref 136–145)
SODIUM SERPL-SCNC: 136 MMOL/L (ref 136–145)
SODIUM SERPL-SCNC: 137 MMOL/L (ref 136–145)
SODIUM SERPL-SCNC: 138 MMOL/L (ref 136–145)
SODIUM SERPL-SCNC: 139 MMOL/L (ref 136–145)
SODIUM SERPL-SCNC: 140 MMOL/L (ref 136–145)
SODIUM SERPL-SCNC: 141 MMOL/L (ref 136–145)
SODIUM SERPL-SCNC: 142 MMOL/L (ref 136–145)
SODIUM SERPL-SCNC: 143 MMOL/L (ref 136–145)
SODIUM SERPL-SCNC: 144 MMOL/L (ref 136–145)
SP GR UR STRIP.AUTO: 1.02 (ref 1–1.03)
SP GR UR STRIP.AUTO: <=1.005 (ref 1–1.03)
SPECIMEN EXPIRATION DATE: NORMAL
SPECIMEN EXPIRATION DATE: NORMAL
SPECIMEN SOURCE: ABNORMAL
T WAVE AXIS: 53 DEGREES
T WAVE AXIS: 54 DEGREES
T WAVE AXIS: 63 DEGREES
TARGETS BLD QL SMEAR: PRESENT
TIBC SERPL-MCNC: 296 UG/DL (ref 250–450)
TOXIC GRANULES BLD QL SMEAR: PRESENT
TRIGL SERPL-MCNC: 154 MG/DL
TRIGL SERPL-MCNC: 234 MG/DL
TSH SERPL DL<=0.05 MIU/L-ACNC: 1.08 UIU/ML (ref 0.36–3.74)
TSH SERPL DL<=0.05 MIU/L-ACNC: 1.09 UIU/ML (ref 0.36–3.74)
UNIT DISPENSE STATUS: NORMAL
UNIT PRODUCT CODE: NORMAL
UNIT PRODUCT VOLUME: 350 ML
UNIT RH: NORMAL
UROBILINOGEN UR QL STRIP.AUTO: 0.2 E.U./DL
UROBILINOGEN UR QL STRIP.AUTO: 0.2 E.U./DL
UUN 24H UR-MCNC: 347 MG/DL
UUN 24H UR-MCNC: 511 MG/DL
UUN 24H UR-MCNC: 820 MG/DL
VANCOMYCIN SERPL-MCNC: 13.7 UG/ML (ref 10–20)
VANCOMYCIN SERPL-MCNC: 25.4 UG/ML
VANCOMYCIN TROUGH SERPL-MCNC: 33.3 UG/ML (ref 10–20)
VENT - PS: ABNORMAL
VENTRICULAR RATE: 101 BPM
VENTRICULAR RATE: 87 BPM
VENTRICULAR RATE: 95 BPM
VIT B12 SERPL-MCNC: 385 PG/ML (ref 100–900)
WBC # BLD AUTO: 10.04 THOUSAND/UL (ref 4.31–10.16)
WBC # BLD AUTO: 10.13 THOUSAND/UL (ref 4.31–10.16)
WBC # BLD AUTO: 10.25 THOUSAND/UL (ref 4.31–10.16)
WBC # BLD AUTO: 10.29 THOUSAND/UL (ref 4.31–10.16)
WBC # BLD AUTO: 10.34 THOUSAND/UL (ref 4.31–10.16)
WBC # BLD AUTO: 10.38 THOUSAND/UL (ref 4.31–10.16)
WBC # BLD AUTO: 10.4 THOUSAND/UL (ref 4.31–10.16)
WBC # BLD AUTO: 10.67 THOUSAND/UL (ref 4.31–10.16)
WBC # BLD AUTO: 10.72 THOUSAND/UL (ref 4.31–10.16)
WBC # BLD AUTO: 10.72 THOUSAND/UL (ref 4.31–10.16)
WBC # BLD AUTO: 10.75 THOUSAND/UL (ref 4.31–10.16)
WBC # BLD AUTO: 11.11 THOUSAND/UL (ref 4.31–10.16)
WBC # BLD AUTO: 11.36 THOUSAND/UL (ref 4.31–10.16)
WBC # BLD AUTO: 11.4 THOUSAND/UL (ref 4.31–10.16)
WBC # BLD AUTO: 11.77 THOUSAND/UL (ref 4.31–10.16)
WBC # BLD AUTO: 11.79 THOUSAND/UL (ref 4.31–10.16)
WBC # BLD AUTO: 13.43 THOUSAND/UL (ref 4.31–10.16)
WBC # BLD AUTO: 15.41 THOUSAND/UL (ref 4.31–10.16)
WBC # BLD AUTO: 6.84 THOUSAND/UL (ref 4.31–10.16)
WBC # BLD AUTO: 7 THOUSAND/UL (ref 4.31–10.16)
WBC # BLD AUTO: 7.7 THOUSAND/UL (ref 4.31–10.16)
WBC # BLD AUTO: 7.97 THOUSAND/UL (ref 4.31–10.16)
WBC # BLD AUTO: 8.09 THOUSAND/UL (ref 4.31–10.16)
WBC # BLD AUTO: 8.23 THOUSAND/UL (ref 4.31–10.16)
WBC # BLD AUTO: 8.29 THOUSAND/UL (ref 4.31–10.16)
WBC # BLD AUTO: 8.4 THOUSAND/UL (ref 4.31–10.16)
WBC # BLD AUTO: 8.42 THOUSAND/UL (ref 4.31–10.16)
WBC # BLD AUTO: 8.63 THOUSAND/UL (ref 4.31–10.16)
WBC # BLD AUTO: 8.67 THOUSAND/UL (ref 4.31–10.16)
WBC # BLD AUTO: 8.81 THOUSAND/UL (ref 4.31–10.16)
WBC # BLD AUTO: 8.91 THOUSAND/UL (ref 4.31–10.16)
WBC # BLD AUTO: 8.93 THOUSAND/UL (ref 4.31–10.16)
WBC # BLD AUTO: 9.39 THOUSAND/UL (ref 4.31–10.16)
WBC # BLD AUTO: 9.56 THOUSAND/UL (ref 4.31–10.16)
WBC # BLD AUTO: 9.79 THOUSAND/UL (ref 4.31–10.16)
WBC # BLD AUTO: 9.84 THOUSAND/UL (ref 4.31–10.16)
WBC # BLD AUTO: 9.86 THOUSAND/UL (ref 4.31–10.16)
WBC #/AREA URNS AUTO: ABNORMAL /HPF
WBC #/AREA URNS AUTO: ABNORMAL /HPF

## 2022-01-01 PROCEDURE — 88341 IMHCHEM/IMCYTCHM EA ADD ANTB: CPT | Performed by: PATHOLOGY

## 2022-01-01 PROCEDURE — 99024 POSTOP FOLLOW-UP VISIT: CPT | Performed by: SURGERY

## 2022-01-01 PROCEDURE — 84145 PROCALCITONIN (PCT): CPT | Performed by: EMERGENCY MEDICINE

## 2022-01-01 PROCEDURE — 99233 SBSQ HOSP IP/OBS HIGH 50: CPT | Performed by: FAMILY MEDICINE

## 2022-01-01 PROCEDURE — 84443 ASSAY THYROID STIM HORMONE: CPT | Performed by: EMERGENCY MEDICINE

## 2022-01-01 PROCEDURE — T2045 HOSPICE GENERAL CARE: HCPCS

## 2022-01-01 PROCEDURE — 84300 ASSAY OF URINE SODIUM: CPT | Performed by: NURSE PRACTITIONER

## 2022-01-01 PROCEDURE — 99254 IP/OBS CNSLTJ NEW/EST MOD 60: CPT

## 2022-01-01 PROCEDURE — 85025 COMPLETE CBC W/AUTO DIFF WBC: CPT

## 2022-01-01 PROCEDURE — 80048 BASIC METABOLIC PNL TOTAL CA: CPT | Performed by: PHYSICIAN ASSISTANT

## 2022-01-01 PROCEDURE — 80048 BASIC METABOLIC PNL TOTAL CA: CPT | Performed by: SURGERY

## 2022-01-01 PROCEDURE — NC001 PR NO CHARGE: Performed by: INTERNAL MEDICINE

## 2022-01-01 PROCEDURE — 97535 SELF CARE MNGMENT TRAINING: CPT

## 2022-01-01 PROCEDURE — 83605 ASSAY OF LACTIC ACID: CPT | Performed by: EMERGENCY MEDICINE

## 2022-01-01 PROCEDURE — 86850 RBC ANTIBODY SCREEN: CPT | Performed by: NURSE ANESTHETIST, CERTIFIED REGISTERED

## 2022-01-01 PROCEDURE — G1004 CDSM NDSC: HCPCS

## 2022-01-01 PROCEDURE — XW023S6 INTRODUCTION OF COVID-19 VACCINE DOSE 1 INTO MUSCLE, PERCUTANEOUS APPROACH, NEW TECHNOLOGY GROUP 6: ICD-10-PCS | Performed by: INTERNAL MEDICINE

## 2022-01-01 PROCEDURE — 86920 COMPATIBILITY TEST SPIN: CPT

## 2022-01-01 PROCEDURE — 82948 REAGENT STRIP/BLOOD GLUCOSE: CPT

## 2022-01-01 PROCEDURE — 99254 IP/OBS CNSLTJ NEW/EST MOD 60: CPT | Performed by: NURSE PRACTITIONER

## 2022-01-01 PROCEDURE — 99255 IP/OBS CONSLTJ NEW/EST HI 80: CPT | Performed by: SURGERY

## 2022-01-01 PROCEDURE — 94760 N-INVAS EAR/PLS OXIMETRY 1: CPT

## 2022-01-01 PROCEDURE — 99255 IP/OBS CONSLTJ NEW/EST HI 80: CPT | Performed by: PHYSICIAN ASSISTANT

## 2022-01-01 PROCEDURE — 99232 SBSQ HOSP IP/OBS MODERATE 35: CPT | Performed by: INTERNAL MEDICINE

## 2022-01-01 PROCEDURE — 82728 ASSAY OF FERRITIN: CPT | Performed by: INTERNAL MEDICINE

## 2022-01-01 PROCEDURE — 85027 COMPLETE CBC AUTOMATED: CPT | Performed by: REGISTERED NURSE

## 2022-01-01 PROCEDURE — 80048 BASIC METABOLIC PNL TOTAL CA: CPT

## 2022-01-01 PROCEDURE — 84145 PROCALCITONIN (PCT): CPT | Performed by: FAMILY MEDICINE

## 2022-01-01 PROCEDURE — 85027 COMPLETE CBC AUTOMATED: CPT | Performed by: SURGERY

## 2022-01-01 PROCEDURE — 85025 COMPLETE CBC W/AUTO DIFF WBC: CPT | Performed by: PHYSICIAN ASSISTANT

## 2022-01-01 PROCEDURE — 97530 THERAPEUTIC ACTIVITIES: CPT

## 2022-01-01 PROCEDURE — 85025 COMPLETE CBC W/AUTO DIFF WBC: CPT | Performed by: FAMILY MEDICINE

## 2022-01-01 PROCEDURE — 84443 ASSAY THYROID STIM HORMONE: CPT

## 2022-01-01 PROCEDURE — 82570 ASSAY OF URINE CREATININE: CPT | Performed by: NURSE PRACTITIONER

## 2022-01-01 PROCEDURE — 99024 POSTOP FOLLOW-UP VISIT: CPT | Performed by: STUDENT IN AN ORGANIZED HEALTH CARE EDUCATION/TRAINING PROGRAM

## 2022-01-01 PROCEDURE — 36415 COLL VENOUS BLD VENIPUNCTURE: CPT | Performed by: EMERGENCY MEDICINE

## 2022-01-01 PROCEDURE — 83036 HEMOGLOBIN GLYCOSYLATED A1C: CPT | Performed by: SURGERY

## 2022-01-01 PROCEDURE — 99232 SBSQ HOSP IP/OBS MODERATE 35: CPT | Performed by: NURSE PRACTITIONER

## 2022-01-01 PROCEDURE — 82570 ASSAY OF URINE CREATININE: CPT | Performed by: INTERNAL MEDICINE

## 2022-01-01 PROCEDURE — 83735 ASSAY OF MAGNESIUM: CPT | Performed by: SURGERY

## 2022-01-01 PROCEDURE — 82330 ASSAY OF CALCIUM: CPT

## 2022-01-01 PROCEDURE — 84295 ASSAY OF SERUM SODIUM: CPT

## 2022-01-01 PROCEDURE — 84100 ASSAY OF PHOSPHORUS: CPT

## 2022-01-01 PROCEDURE — 80053 COMPREHEN METABOLIC PANEL: CPT | Performed by: FAMILY MEDICINE

## 2022-01-01 PROCEDURE — 99255 IP/OBS CONSLTJ NEW/EST HI 80: CPT | Performed by: INTERNAL MEDICINE

## 2022-01-01 PROCEDURE — 99305 1ST NF CARE MODERATE MDM 35: CPT | Performed by: NURSE PRACTITIONER

## 2022-01-01 PROCEDURE — 85027 COMPLETE CBC AUTOMATED: CPT

## 2022-01-01 PROCEDURE — 82607 VITAMIN B-12: CPT | Performed by: INTERNAL MEDICINE

## 2022-01-01 PROCEDURE — NC001 PR NO CHARGE

## 2022-01-01 PROCEDURE — C2617 STENT, NON-COR, TEM W/O DEL: HCPCS | Performed by: STUDENT IN AN ORGANIZED HEALTH CARE EDUCATION/TRAINING PROGRAM

## 2022-01-01 PROCEDURE — 83735 ASSAY OF MAGNESIUM: CPT | Performed by: INTERNAL MEDICINE

## 2022-01-01 PROCEDURE — 99223 1ST HOSP IP/OBS HIGH 75: CPT | Performed by: FAMILY MEDICINE

## 2022-01-01 PROCEDURE — NC001 PR NO CHARGE: Performed by: PHYSICIAN ASSISTANT

## 2022-01-01 PROCEDURE — 74176 CT ABD & PELVIS W/O CONTRAST: CPT

## 2022-01-01 PROCEDURE — 99233 SBSQ HOSP IP/OBS HIGH 50: CPT | Performed by: INTERNAL MEDICINE

## 2022-01-01 PROCEDURE — 97116 GAIT TRAINING THERAPY: CPT

## 2022-01-01 PROCEDURE — 80053 COMPREHEN METABOLIC PANEL: CPT

## 2022-01-01 PROCEDURE — 84100 ASSAY OF PHOSPHORUS: CPT | Performed by: INTERNAL MEDICINE

## 2022-01-01 PROCEDURE — 92610 EVALUATE SWALLOWING FUNCTION: CPT

## 2022-01-01 PROCEDURE — 93010 ELECTROCARDIOGRAM REPORT: CPT | Performed by: INTERNAL MEDICINE

## 2022-01-01 PROCEDURE — 80202 ASSAY OF VANCOMYCIN: CPT | Performed by: FAMILY MEDICINE

## 2022-01-01 PROCEDURE — 85610 PROTHROMBIN TIME: CPT | Performed by: REGISTERED NURSE

## 2022-01-01 PROCEDURE — 74420 UROGRAPHY RTRGR +-KUB: CPT

## 2022-01-01 PROCEDURE — 83735 ASSAY OF MAGNESIUM: CPT | Performed by: PHYSICIAN ASSISTANT

## 2022-01-01 PROCEDURE — 99285 EMERGENCY DEPT VISIT HI MDM: CPT | Performed by: EMERGENCY MEDICINE

## 2022-01-01 PROCEDURE — C9113 INJ PANTOPRAZOLE SODIUM, VIA: HCPCS | Performed by: INTERNAL MEDICINE

## 2022-01-01 PROCEDURE — 99448 NTRPROF PH1/NTRNET/EHR 21-30: CPT | Performed by: INTERNAL MEDICINE

## 2022-01-01 PROCEDURE — 99232 SBSQ HOSP IP/OBS MODERATE 35: CPT | Performed by: SURGERY

## 2022-01-01 PROCEDURE — 87075 CULTR BACTERIA EXCEPT BLOOD: CPT | Performed by: STUDENT IN AN ORGANIZED HEALTH CARE EDUCATION/TRAINING PROGRAM

## 2022-01-01 PROCEDURE — 71045 X-RAY EXAM CHEST 1 VIEW: CPT

## 2022-01-01 PROCEDURE — C9113 INJ PANTOPRAZOLE SODIUM, VIA: HCPCS

## 2022-01-01 PROCEDURE — 0D1N0Z4 BYPASS SIGMOID COLON TO CUTANEOUS, OPEN APPROACH: ICD-10-PCS | Performed by: STUDENT IN AN ORGANIZED HEALTH CARE EDUCATION/TRAINING PROGRAM

## 2022-01-01 PROCEDURE — 87102 FUNGUS ISOLATION CULTURE: CPT | Performed by: STUDENT IN AN ORGANIZED HEALTH CARE EDUCATION/TRAINING PROGRAM

## 2022-01-01 PROCEDURE — 84165 PROTEIN E-PHORESIS SERUM: CPT | Performed by: PATHOLOGY

## 2022-01-01 PROCEDURE — 88305 TISSUE EXAM BY PATHOLOGIST: CPT | Performed by: PATHOLOGY

## 2022-01-01 PROCEDURE — 99232 SBSQ HOSP IP/OBS MODERATE 35: CPT | Performed by: FAMILY MEDICINE

## 2022-01-01 PROCEDURE — 84484 ASSAY OF TROPONIN QUANT: CPT | Performed by: EMERGENCY MEDICINE

## 2022-01-01 PROCEDURE — 85027 COMPLETE CBC AUTOMATED: CPT | Performed by: STUDENT IN AN ORGANIZED HEALTH CARE EDUCATION/TRAINING PROGRAM

## 2022-01-01 PROCEDURE — 99232 SBSQ HOSP IP/OBS MODERATE 35: CPT | Performed by: PHYSICIAN ASSISTANT

## 2022-01-01 PROCEDURE — 97163 PT EVAL HIGH COMPLEX 45 MIN: CPT

## 2022-01-01 PROCEDURE — 76770 US EXAM ABDO BACK WALL COMP: CPT

## 2022-01-01 PROCEDURE — 93005 ELECTROCARDIOGRAM TRACING: CPT

## 2022-01-01 PROCEDURE — 74018 RADEX ABDOMEN 1 VIEW: CPT

## 2022-01-01 PROCEDURE — 86160 COMPLEMENT ANTIGEN: CPT | Performed by: PHYSICIAN ASSISTANT

## 2022-01-01 PROCEDURE — 36415 COLL VENOUS BLD VENIPUNCTURE: CPT | Performed by: STUDENT IN AN ORGANIZED HEALTH CARE EDUCATION/TRAINING PROGRAM

## 2022-01-01 PROCEDURE — 93970 EXTREMITY STUDY: CPT | Performed by: SURGERY

## 2022-01-01 PROCEDURE — NC001 PR NO CHARGE: Performed by: SURGERY

## 2022-01-01 PROCEDURE — 80048 BASIC METABOLIC PNL TOTAL CA: CPT | Performed by: STUDENT IN AN ORGANIZED HEALTH CARE EDUCATION/TRAINING PROGRAM

## 2022-01-01 PROCEDURE — 87081 CULTURE SCREEN ONLY: CPT | Performed by: INTERNAL MEDICINE

## 2022-01-01 PROCEDURE — 36600 WITHDRAWAL OF ARTERIAL BLOOD: CPT

## 2022-01-01 PROCEDURE — 80048 BASIC METABOLIC PNL TOTAL CA: CPT | Performed by: NURSE PRACTITIONER

## 2022-01-01 PROCEDURE — 83735 ASSAY OF MAGNESIUM: CPT | Performed by: FAMILY MEDICINE

## 2022-01-01 PROCEDURE — 84300 ASSAY OF URINE SODIUM: CPT | Performed by: INTERNAL MEDICINE

## 2022-01-01 PROCEDURE — 84145 PROCALCITONIN (PCT): CPT | Performed by: INTERNAL MEDICINE

## 2022-01-01 PROCEDURE — 82550 ASSAY OF CK (CPK): CPT | Performed by: INTERNAL MEDICINE

## 2022-01-01 PROCEDURE — 81001 URINALYSIS AUTO W/SCOPE: CPT | Performed by: EMERGENCY MEDICINE

## 2022-01-01 PROCEDURE — 85025 COMPLETE CBC W/AUTO DIFF WBC: CPT | Performed by: STUDENT IN AN ORGANIZED HEALTH CARE EDUCATION/TRAINING PROGRAM

## 2022-01-01 PROCEDURE — 3E03317 INTRODUCTION OF OTHER THROMBOLYTIC INTO PERIPHERAL VEIN, PERCUTANEOUS APPROACH: ICD-10-PCS | Performed by: SURGERY

## 2022-01-01 PROCEDURE — RECHECK: Performed by: FAMILY MEDICINE

## 2022-01-01 PROCEDURE — 99233 SBSQ HOSP IP/OBS HIGH 50: CPT | Performed by: SURGERY

## 2022-01-01 PROCEDURE — 0UT20ZZ RESECTION OF BILATERAL OVARIES, OPEN APPROACH: ICD-10-PCS | Performed by: STUDENT IN AN ORGANIZED HEALTH CARE EDUCATION/TRAINING PROGRAM

## 2022-01-01 PROCEDURE — C1751 CATH, INF, PER/CENT/MIDLINE: HCPCS

## 2022-01-01 PROCEDURE — 02HV33Z INSERTION OF INFUSION DEVICE INTO SUPERIOR VENA CAVA, PERCUTANEOUS APPROACH: ICD-10-PCS | Performed by: SURGERY

## 2022-01-01 PROCEDURE — 99223 1ST HOSP IP/OBS HIGH 75: CPT | Performed by: STUDENT IN AN ORGANIZED HEALTH CARE EDUCATION/TRAINING PROGRAM

## 2022-01-01 PROCEDURE — 83605 ASSAY OF LACTIC ACID: CPT

## 2022-01-01 PROCEDURE — 96376 TX/PRO/DX INJ SAME DRUG ADON: CPT

## 2022-01-01 PROCEDURE — 80061 LIPID PANEL: CPT

## 2022-01-01 PROCEDURE — 85027 COMPLETE CBC AUTOMATED: CPT | Performed by: PHYSICIAN ASSISTANT

## 2022-01-01 PROCEDURE — 70450 CT HEAD/BRAIN W/O DYE: CPT

## 2022-01-01 PROCEDURE — 87205 SMEAR GRAM STAIN: CPT | Performed by: PHYSICIAN ASSISTANT

## 2022-01-01 PROCEDURE — 82570 ASSAY OF URINE CREATININE: CPT | Performed by: PHYSICIAN ASSISTANT

## 2022-01-01 PROCEDURE — 84478 ASSAY OF TRIGLYCERIDES: CPT | Performed by: SURGERY

## 2022-01-01 PROCEDURE — 99232 SBSQ HOSP IP/OBS MODERATE 35: CPT

## 2022-01-01 PROCEDURE — 96374 THER/PROPH/DIAG INJ IV PUSH: CPT

## 2022-01-01 PROCEDURE — 83540 ASSAY OF IRON: CPT | Performed by: INTERNAL MEDICINE

## 2022-01-01 PROCEDURE — 87040 BLOOD CULTURE FOR BACTERIA: CPT | Performed by: EMERGENCY MEDICINE

## 2022-01-01 PROCEDURE — 82330 ASSAY OF CALCIUM: CPT | Performed by: SURGERY

## 2022-01-01 PROCEDURE — 80048 BASIC METABOLIC PNL TOTAL CA: CPT | Performed by: INTERNAL MEDICINE

## 2022-01-01 PROCEDURE — 97166 OT EVAL MOD COMPLEX 45 MIN: CPT

## 2022-01-01 PROCEDURE — 94664 DEMO&/EVAL PT USE INHALER: CPT

## 2022-01-01 PROCEDURE — C9113 INJ PANTOPRAZOLE SODIUM, VIA: HCPCS | Performed by: PHYSICIAN ASSISTANT

## 2022-01-01 PROCEDURE — 84100 ASSAY OF PHOSPHORUS: CPT | Performed by: PHYSICIAN ASSISTANT

## 2022-01-01 PROCEDURE — 80053 COMPREHEN METABOLIC PANEL: CPT | Performed by: EMERGENCY MEDICINE

## 2022-01-01 PROCEDURE — 71250 CT THORAX DX C-: CPT

## 2022-01-01 PROCEDURE — 82805 BLOOD GASES W/O2 SATURATION: CPT | Performed by: SURGERY

## 2022-01-01 PROCEDURE — 86850 RBC ANTIBODY SCREEN: CPT | Performed by: SURGERY

## 2022-01-01 PROCEDURE — 92526 ORAL FUNCTION THERAPY: CPT

## 2022-01-01 PROCEDURE — BT1D1ZZ FLUOROSCOPY OF RIGHT KIDNEY, URETER AND BLADDER USING LOW OSMOLAR CONTRAST: ICD-10-PCS | Performed by: RADIOLOGY

## 2022-01-01 PROCEDURE — 82378 CARCINOEMBRYONIC ANTIGEN: CPT | Performed by: SURGERY

## 2022-01-01 PROCEDURE — 82805 BLOOD GASES W/O2 SATURATION: CPT | Performed by: REGISTERED NURSE

## 2022-01-01 PROCEDURE — 85730 THROMBOPLASTIN TIME PARTIAL: CPT | Performed by: EMERGENCY MEDICINE

## 2022-01-01 PROCEDURE — C1758 CATHETER, URETERAL: HCPCS | Performed by: STUDENT IN AN ORGANIZED HEALTH CARE EDUCATION/TRAINING PROGRAM

## 2022-01-01 PROCEDURE — 84300 ASSAY OF URINE SODIUM: CPT | Performed by: PHYSICIAN ASSISTANT

## 2022-01-01 PROCEDURE — 96365 THER/PROPH/DIAG IV INF INIT: CPT

## 2022-01-01 PROCEDURE — 85025 COMPLETE CBC W/AUTO DIFF WBC: CPT | Performed by: SURGERY

## 2022-01-01 PROCEDURE — 4004F PT TOBACCO SCREEN RCVD TLK: CPT | Performed by: INTERNAL MEDICINE

## 2022-01-01 PROCEDURE — 83735 ASSAY OF MAGNESIUM: CPT | Performed by: NURSE PRACTITIONER

## 2022-01-01 PROCEDURE — 83605 ASSAY OF LACTIC ACID: CPT | Performed by: FAMILY MEDICINE

## 2022-01-01 PROCEDURE — 85025 COMPLETE CBC W/AUTO DIFF WBC: CPT | Performed by: INTERNAL MEDICINE

## 2022-01-01 PROCEDURE — 84156 ASSAY OF PROTEIN URINE: CPT | Performed by: INTERNAL MEDICINE

## 2022-01-01 PROCEDURE — 99233 SBSQ HOSP IP/OBS HIGH 50: CPT | Performed by: SPECIALIST

## 2022-01-01 PROCEDURE — 80053 COMPREHEN METABOLIC PANEL: CPT | Performed by: REGISTERED NURSE

## 2022-01-01 PROCEDURE — 85025 COMPLETE CBC W/AUTO DIFF WBC: CPT | Performed by: NURSE PRACTITIONER

## 2022-01-01 PROCEDURE — 88309 TISSUE EXAM BY PATHOLOGIST: CPT | Performed by: PATHOLOGY

## 2022-01-01 PROCEDURE — 87077 CULTURE AEROBIC IDENTIFY: CPT | Performed by: EMERGENCY MEDICINE

## 2022-01-01 PROCEDURE — 85007 BL SMEAR W/DIFF WBC COUNT: CPT | Performed by: SURGERY

## 2022-01-01 PROCEDURE — C9113 INJ PANTOPRAZOLE SODIUM, VIA: HCPCS | Performed by: REGISTERED NURSE

## 2022-01-01 PROCEDURE — 52332 CYSTOSCOPY AND TREATMENT: CPT | Performed by: UROLOGY

## 2022-01-01 PROCEDURE — 88342 IMHCHEM/IMCYTCHM 1ST ANTB: CPT | Performed by: PATHOLOGY

## 2022-01-01 PROCEDURE — 44141 PARTIAL REMOVAL OF COLON: CPT | Performed by: STUDENT IN AN ORGANIZED HEALTH CARE EDUCATION/TRAINING PROGRAM

## 2022-01-01 PROCEDURE — 80053 COMPREHEN METABOLIC PANEL: CPT | Performed by: INTERNAL MEDICINE

## 2022-01-01 PROCEDURE — 85025 COMPLETE CBC W/AUTO DIFF WBC: CPT | Performed by: EMERGENCY MEDICINE

## 2022-01-01 PROCEDURE — 99223 1ST HOSP IP/OBS HIGH 75: CPT | Performed by: INTERNAL MEDICINE

## 2022-01-01 PROCEDURE — 87205 SMEAR GRAM STAIN: CPT | Performed by: STUDENT IN AN ORGANIZED HEALTH CARE EDUCATION/TRAINING PROGRAM

## 2022-01-01 PROCEDURE — 86923 COMPATIBILITY TEST ELECTRIC: CPT

## 2022-01-01 PROCEDURE — T1015 CLINIC SERVICE: HCPCS | Performed by: FAMILY MEDICINE

## 2022-01-01 PROCEDURE — 97110 THERAPEUTIC EXERCISES: CPT

## 2022-01-01 PROCEDURE — 84100 ASSAY OF PHOSPHORUS: CPT | Performed by: SURGERY

## 2022-01-01 PROCEDURE — 80053 COMPREHEN METABOLIC PANEL: CPT | Performed by: SURGERY

## 2022-01-01 PROCEDURE — 86140 C-REACTIVE PROTEIN: CPT | Performed by: INTERNAL MEDICINE

## 2022-01-01 PROCEDURE — 83605 ASSAY OF LACTIC ACID: CPT | Performed by: INTERNAL MEDICINE

## 2022-01-01 PROCEDURE — 85610 PROTHROMBIN TIME: CPT

## 2022-01-01 PROCEDURE — 80053 COMPREHEN METABOLIC PANEL: CPT | Performed by: PHYSICIAN ASSISTANT

## 2022-01-01 PROCEDURE — 87186 SC STD MICRODIL/AGAR DIL: CPT | Performed by: EMERGENCY MEDICINE

## 2022-01-01 PROCEDURE — 84540 ASSAY OF URINE/UREA-N: CPT | Performed by: PHYSICIAN ASSISTANT

## 2022-01-01 PROCEDURE — 82805 BLOOD GASES W/O2 SATURATION: CPT | Performed by: EMERGENCY MEDICINE

## 2022-01-01 PROCEDURE — 85007 BL SMEAR W/DIFF WBC COUNT: CPT | Performed by: REGISTERED NURSE

## 2022-01-01 PROCEDURE — 84165 PROTEIN E-PHORESIS SERUM: CPT | Performed by: PHYSICIAN ASSISTANT

## 2022-01-01 PROCEDURE — 87205 SMEAR GRAM STAIN: CPT

## 2022-01-01 PROCEDURE — 30233N1 TRANSFUSION OF NONAUTOLOGOUS RED BLOOD CELLS INTO PERIPHERAL VEIN, PERCUTANEOUS APPROACH: ICD-10-PCS | Performed by: SURGERY

## 2022-01-01 PROCEDURE — 96366 THER/PROPH/DIAG IV INF ADDON: CPT

## 2022-01-01 PROCEDURE — 84100 ASSAY OF PHOSPHORUS: CPT | Performed by: REGISTERED NURSE

## 2022-01-01 PROCEDURE — 99252 IP/OBS CONSLTJ NEW/EST SF 35: CPT | Performed by: UROLOGY

## 2022-01-01 PROCEDURE — 82803 BLOOD GASES ANY COMBINATION: CPT

## 2022-01-01 PROCEDURE — 82947 ASSAY GLUCOSE BLOOD QUANT: CPT

## 2022-01-01 PROCEDURE — 0T788DZ DILATION OF BILATERAL URETERS WITH INTRALUMINAL DEVICE, VIA NATURAL OR ARTIFICIAL OPENING ENDOSCOPIC: ICD-10-PCS | Performed by: UROLOGY

## 2022-01-01 PROCEDURE — 83735 ASSAY OF MAGNESIUM: CPT

## 2022-01-01 PROCEDURE — 99239 HOSP IP/OBS DSCHRG MGMT >30: CPT | Performed by: INTERNAL MEDICINE

## 2022-01-01 PROCEDURE — 71275 CT ANGIOGRAPHY CHEST: CPT

## 2022-01-01 PROCEDURE — 74177 CT ABD & PELVIS W/CONTRAST: CPT

## 2022-01-01 PROCEDURE — 85014 HEMATOCRIT: CPT

## 2022-01-01 PROCEDURE — 82043 UR ALBUMIN QUANTITATIVE: CPT | Performed by: INTERNAL MEDICINE

## 2022-01-01 PROCEDURE — 83605 ASSAY OF LACTIC ACID: CPT | Performed by: SURGERY

## 2022-01-01 PROCEDURE — 99285 EMERGENCY DEPT VISIT HI MDM: CPT

## 2022-01-01 PROCEDURE — 99291 CRITICAL CARE FIRST HOUR: CPT | Performed by: STUDENT IN AN ORGANIZED HEALTH CARE EDUCATION/TRAINING PROGRAM

## 2022-01-01 PROCEDURE — 82746 ASSAY OF FOLIC ACID SERUM: CPT | Performed by: INTERNAL MEDICINE

## 2022-01-01 PROCEDURE — 83735 ASSAY OF MAGNESIUM: CPT | Performed by: EMERGENCY MEDICINE

## 2022-01-01 PROCEDURE — 84100 ASSAY OF PHOSPHORUS: CPT | Performed by: STUDENT IN AN ORGANIZED HEALTH CARE EDUCATION/TRAINING PROGRAM

## 2022-01-01 PROCEDURE — 82330 ASSAY OF CALCIUM: CPT | Performed by: REGISTERED NURSE

## 2022-01-01 PROCEDURE — 84540 ASSAY OF URINE/UREA-N: CPT | Performed by: INTERNAL MEDICINE

## 2022-01-01 PROCEDURE — 85730 THROMBOPLASTIN TIME PARTIAL: CPT

## 2022-01-01 PROCEDURE — 86900 BLOOD TYPING SEROLOGIC ABO: CPT | Performed by: SURGERY

## 2022-01-01 PROCEDURE — 80202 ASSAY OF VANCOMYCIN: CPT | Performed by: SURGERY

## 2022-01-01 PROCEDURE — 91305 COVID-19 PFIZER VACC TRIS-SUCROSE GRAY CAP 0.3 ML: CPT

## 2022-01-01 PROCEDURE — 97162 PT EVAL MOD COMPLEX 30 MIN: CPT

## 2022-01-01 PROCEDURE — 82306 VITAMIN D 25 HYDROXY: CPT | Performed by: INTERNAL MEDICINE

## 2022-01-01 PROCEDURE — 99254 IP/OBS CNSLTJ NEW/EST MOD 60: CPT | Performed by: PHYSICIAN ASSISTANT

## 2022-01-01 PROCEDURE — 99215 OFFICE O/P EST HI 40 MIN: CPT | Performed by: INTERNAL MEDICINE

## 2022-01-01 PROCEDURE — 87086 URINE CULTURE/COLONY COUNT: CPT | Performed by: EMERGENCY MEDICINE

## 2022-01-01 PROCEDURE — 84100 ASSAY OF PHOSPHORUS: CPT | Performed by: NURSE PRACTITIONER

## 2022-01-01 PROCEDURE — 86901 BLOOD TYPING SEROLOGIC RH(D): CPT | Performed by: NURSE ANESTHETIST, CERTIFIED REGISTERED

## 2022-01-01 PROCEDURE — P9016 RBC LEUKOCYTES REDUCED: HCPCS

## 2022-01-01 PROCEDURE — 85007 BL SMEAR W/DIFF WBC COUNT: CPT | Performed by: STUDENT IN AN ORGANIZED HEALTH CARE EDUCATION/TRAINING PROGRAM

## 2022-01-01 PROCEDURE — 83605 ASSAY OF LACTIC ACID: CPT | Performed by: REGISTERED NURSE

## 2022-01-01 PROCEDURE — 80048 BASIC METABOLIC PNL TOTAL CA: CPT | Performed by: EMERGENCY MEDICINE

## 2022-01-01 PROCEDURE — 86901 BLOOD TYPING SEROLOGIC RH(D): CPT | Performed by: SURGERY

## 2022-01-01 PROCEDURE — 85610 PROTHROMBIN TIME: CPT | Performed by: EMERGENCY MEDICINE

## 2022-01-01 PROCEDURE — 99255 IP/OBS CONSLTJ NEW/EST HI 80: CPT | Performed by: STUDENT IN AN ORGANIZED HEALTH CARE EDUCATION/TRAINING PROGRAM

## 2022-01-01 PROCEDURE — 99306 1ST NF CARE HIGH MDM 50: CPT | Performed by: FAMILY MEDICINE

## 2022-01-01 PROCEDURE — 94668 MNPJ CHEST WALL SBSQ: CPT

## 2022-01-01 PROCEDURE — 85049 AUTOMATED PLATELET COUNT: CPT | Performed by: SURGERY

## 2022-01-01 PROCEDURE — 96361 HYDRATE IV INFUSION ADD-ON: CPT

## 2022-01-01 PROCEDURE — 83690 ASSAY OF LIPASE: CPT | Performed by: EMERGENCY MEDICINE

## 2022-01-01 PROCEDURE — 87070 CULTURE OTHR SPECIMN AEROBIC: CPT

## 2022-01-01 PROCEDURE — 99309 SBSQ NF CARE MODERATE MDM 30: CPT | Performed by: NURSE PRACTITIONER

## 2022-01-01 PROCEDURE — 94002 VENT MGMT INPAT INIT DAY: CPT

## 2022-01-01 PROCEDURE — 84132 ASSAY OF SERUM POTASSIUM: CPT

## 2022-01-01 PROCEDURE — 97167 OT EVAL HIGH COMPLEX 60 MIN: CPT

## 2022-01-01 PROCEDURE — 0051A COVID-19 PFIZER VACC TRIS-SUCROSE GRAY CAP 0.3 ML: CPT

## 2022-01-01 PROCEDURE — 0WPFX0Z REMOVAL OF DRAINAGE DEVICE FROM ABDOMINAL WALL, EXTERNAL APPROACH: ICD-10-PCS | Performed by: SURGERY

## 2022-01-01 PROCEDURE — 0241U HB NFCT DS VIR RESP RNA 4 TRGT: CPT | Performed by: NURSE ANESTHETIST, CERTIFIED REGISTERED

## 2022-01-01 PROCEDURE — 83550 IRON BINDING TEST: CPT | Performed by: INTERNAL MEDICINE

## 2022-01-01 PROCEDURE — 86900 BLOOD TYPING SEROLOGIC ABO: CPT | Performed by: NURSE ANESTHETIST, CERTIFIED REGISTERED

## 2022-01-01 PROCEDURE — 83735 ASSAY OF MAGNESIUM: CPT | Performed by: STUDENT IN AN ORGANIZED HEALTH CARE EDUCATION/TRAINING PROGRAM

## 2022-01-01 PROCEDURE — 82330 ASSAY OF CALCIUM: CPT | Performed by: NURSE PRACTITIONER

## 2022-01-01 PROCEDURE — 86038 ANTINUCLEAR ANTIBODIES: CPT | Performed by: PHYSICIAN ASSISTANT

## 2022-01-01 PROCEDURE — 0UT50ZZ RESECTION OF RIGHT FALLOPIAN TUBE, OPEN APPROACH: ICD-10-PCS | Performed by: STUDENT IN AN ORGANIZED HEALTH CARE EDUCATION/TRAINING PROGRAM

## 2022-01-01 PROCEDURE — 87070 CULTURE OTHR SPECIMN AEROBIC: CPT | Performed by: STUDENT IN AN ORGANIZED HEALTH CARE EDUCATION/TRAINING PROGRAM

## 2022-01-01 PROCEDURE — 0DBN0ZZ EXCISION OF SIGMOID COLON, OPEN APPROACH: ICD-10-PCS | Performed by: STUDENT IN AN ORGANIZED HEALTH CARE EDUCATION/TRAINING PROGRAM

## 2022-01-01 PROCEDURE — 3008F BODY MASS INDEX DOCD: CPT | Performed by: INTERNAL MEDICINE

## 2022-01-01 PROCEDURE — 36569 INSJ PICC 5 YR+ W/O IMAGING: CPT

## 2022-01-01 PROCEDURE — 80048 BASIC METABOLIC PNL TOTAL CA: CPT | Performed by: FAMILY MEDICINE

## 2022-01-01 PROCEDURE — 83735 ASSAY OF MAGNESIUM: CPT | Performed by: REGISTERED NURSE

## 2022-01-01 PROCEDURE — 84156 ASSAY OF PROTEIN URINE: CPT | Performed by: PHYSICIAN ASSISTANT

## 2022-01-01 PROCEDURE — 93970 EXTREMITY STUDY: CPT

## 2022-01-01 PROCEDURE — 80076 HEPATIC FUNCTION PANEL: CPT | Performed by: EMERGENCY MEDICINE

## 2022-01-01 DEVICE — STENT CONTOUR INJ 6FR 30CM: Type: IMPLANTABLE DEVICE | Site: URETER | Status: FUNCTIONAL

## 2022-01-01 RX ORDER — ALBUTEROL SULFATE 90 UG/1
2 AEROSOL, METERED RESPIRATORY (INHALATION) EVERY 6 HOURS PRN
Status: DISCONTINUED | OUTPATIENT
Start: 2022-01-01 | End: 2022-01-01

## 2022-01-01 RX ORDER — PROPOFOL 10 MG/ML
INJECTION, EMULSION INTRAVENOUS CONTINUOUS PRN
Status: DISCONTINUED | OUTPATIENT
Start: 2022-01-01 | End: 2022-01-01

## 2022-01-01 RX ORDER — OXYCODONE HYDROCHLORIDE 5 MG/1
5 TABLET ORAL EVERY 4 HOURS PRN
Status: DISCONTINUED | OUTPATIENT
Start: 2022-01-01 | End: 2022-01-01 | Stop reason: HOSPADM

## 2022-01-01 RX ORDER — SENNA PLUS 8.6 MG/1
1 TABLET ORAL DAILY PRN
COMMUNITY
End: 2022-01-01 | Stop reason: SDUPTHER

## 2022-01-01 RX ORDER — METRONIDAZOLE 500 MG/1
500 TABLET ORAL EVERY 8 HOURS SCHEDULED
Status: DISCONTINUED | OUTPATIENT
Start: 2022-01-01 | End: 2022-01-01

## 2022-01-01 RX ORDER — LANOLIN ALCOHOL/MO/W.PET/CERES
6 CREAM (GRAM) TOPICAL
Status: DISCONTINUED | OUTPATIENT
Start: 2022-01-01 | End: 2022-01-01 | Stop reason: HOSPADM

## 2022-01-01 RX ORDER — FENTANYL CITRATE 50 UG/ML
100 INJECTION, SOLUTION INTRAMUSCULAR; INTRAVENOUS
Status: DISCONTINUED | OUTPATIENT
Start: 2022-01-01 | End: 2022-01-01

## 2022-01-01 RX ORDER — NYSTATIN 100000 [USP'U]/G
POWDER TOPICAL 3 TIMES DAILY
Status: DISCONTINUED | OUTPATIENT
Start: 2022-01-01 | End: 2022-01-01 | Stop reason: HOSPADM

## 2022-01-01 RX ORDER — DOCUSATE SODIUM 100 MG/1
100 CAPSULE, LIQUID FILLED ORAL 2 TIMES DAILY
Status: CANCELLED | OUTPATIENT
Start: 2022-01-01

## 2022-01-01 RX ORDER — CEFEPIME HYDROCHLORIDE 1 G/50ML
1000 INJECTION, SOLUTION INTRAVENOUS EVERY 24 HOURS
Status: DISCONTINUED | OUTPATIENT
Start: 2022-01-01 | End: 2022-01-01

## 2022-01-01 RX ORDER — CALCIUM GLUCONATE 20 MG/ML
2 INJECTION, SOLUTION INTRAVENOUS ONCE
Status: COMPLETED | OUTPATIENT
Start: 2022-01-01 | End: 2022-01-01

## 2022-01-01 RX ORDER — FLUCONAZOLE 2 MG/ML
400 INJECTION, SOLUTION INTRAVENOUS EVERY 24 HOURS
Status: DISCONTINUED | OUTPATIENT
Start: 2022-01-01 | End: 2022-01-01

## 2022-01-01 RX ORDER — NYSTATIN 100000 [USP'U]/G
POWDER TOPICAL 2 TIMES DAILY
Qty: 15 G | Refills: 0
Start: 2022-01-01 | End: 2022-01-01 | Stop reason: SDUPTHER

## 2022-01-01 RX ORDER — MAGNESIUM HYDROXIDE/ALUMINUM HYDROXICE/SIMETHICONE 120; 1200; 1200 MG/30ML; MG/30ML; MG/30ML
30 SUSPENSION ORAL EVERY 4 HOURS PRN
Status: DISCONTINUED | OUTPATIENT
Start: 2022-01-01 | End: 2022-01-01 | Stop reason: HOSPADM

## 2022-01-01 RX ORDER — HYDROXYZINE HYDROCHLORIDE 25 MG/1
25 TABLET, FILM COATED ORAL 3 TIMES DAILY PRN
Status: CANCELLED | OUTPATIENT
Start: 2022-01-01

## 2022-01-01 RX ORDER — PROPOFOL 10 MG/ML
INJECTION, EMULSION INTRAVENOUS AS NEEDED
Status: DISCONTINUED | OUTPATIENT
Start: 2022-01-01 | End: 2022-01-01

## 2022-01-01 RX ORDER — ALBUTEROL SULFATE 90 UG/1
2 AEROSOL, METERED RESPIRATORY (INHALATION) EVERY 6 HOURS PRN
Status: CANCELLED | OUTPATIENT
Start: 2022-01-01

## 2022-01-01 RX ORDER — POTASSIUM CHLORIDE 20 MEQ/1
40 TABLET, EXTENDED RELEASE ORAL ONCE
Status: COMPLETED | OUTPATIENT
Start: 2022-01-01 | End: 2022-01-01

## 2022-01-01 RX ORDER — ALPRAZOLAM 0.5 MG/1
0.5 TABLET ORAL 4 TIMES DAILY PRN
Status: DISCONTINUED | OUTPATIENT
Start: 2022-01-01 | End: 2022-01-01 | Stop reason: HOSPADM

## 2022-01-01 RX ORDER — HYDROCODONE BITARTRATE AND ACETAMINOPHEN 5; 325 MG/1; MG/1
1 TABLET ORAL EVERY 6 HOURS PRN
Status: DISCONTINUED | OUTPATIENT
Start: 2022-01-01 | End: 2022-01-01

## 2022-01-01 RX ORDER — FENTANYL CITRATE 50 UG/ML
50 INJECTION, SOLUTION INTRAMUSCULAR; INTRAVENOUS
Status: DISCONTINUED | OUTPATIENT
Start: 2022-01-01 | End: 2022-01-01

## 2022-01-01 RX ORDER — DOCUSATE SODIUM 100 MG/1
100 CAPSULE, LIQUID FILLED ORAL 2 TIMES DAILY
Status: DISCONTINUED | OUTPATIENT
Start: 2022-01-01 | End: 2022-01-01 | Stop reason: HOSPADM

## 2022-01-01 RX ORDER — DEXTROSE, SODIUM CHLORIDE, AND POTASSIUM CHLORIDE 5; .45; .15 G/100ML; G/100ML; G/100ML
50 INJECTION INTRAVENOUS CONTINUOUS
Status: DISCONTINUED | OUTPATIENT
Start: 2022-01-01 | End: 2022-01-01

## 2022-01-01 RX ORDER — OXYCODONE HYDROCHLORIDE 10 MG/1
10 TABLET ORAL EVERY 4 HOURS PRN
Status: CANCELLED | OUTPATIENT
Start: 2022-01-01

## 2022-01-01 RX ORDER — OXYCODONE HYDROCHLORIDE 10 MG/1
10 TABLET ORAL EVERY 4 HOURS PRN
Status: DISCONTINUED | OUTPATIENT
Start: 2022-01-01 | End: 2022-01-01 | Stop reason: HOSPADM

## 2022-01-01 RX ORDER — CEFTRIAXONE 1 G/50ML
1000 INJECTION, SOLUTION INTRAVENOUS EVERY 24 HOURS
Status: DISCONTINUED | OUTPATIENT
Start: 2022-01-01 | End: 2022-01-01

## 2022-01-01 RX ORDER — ALBUTEROL SULFATE 90 UG/1
2 AEROSOL, METERED RESPIRATORY (INHALATION) EVERY 4 HOURS PRN
Status: DISCONTINUED | OUTPATIENT
Start: 2022-01-01 | End: 2022-01-01 | Stop reason: HOSPADM

## 2022-01-01 RX ORDER — CEFTRIAXONE 2 G/50ML
2000 INJECTION, SOLUTION INTRAVENOUS EVERY 24 HOURS
Status: CANCELLED | OUTPATIENT
Start: 2022-01-01

## 2022-01-01 RX ORDER — ALPRAZOLAM 2 MG/1
2 TABLET ORAL 2 TIMES DAILY PRN
Qty: 45 TABLET | Refills: 0 | Status: SHIPPED | OUTPATIENT
Start: 2022-01-01

## 2022-01-01 RX ORDER — SCOLOPAMINE TRANSDERMAL SYSTEM 1 MG/1
1 PATCH, EXTENDED RELEASE TRANSDERMAL
Status: DISCONTINUED | OUTPATIENT
Start: 2022-01-01 | End: 2022-01-01

## 2022-01-01 RX ORDER — NYSTATIN 100000 [USP'U]/G
POWDER TOPICAL 2 TIMES DAILY
Qty: 15 G | Refills: 5 | Status: SHIPPED | OUTPATIENT
Start: 2022-01-01

## 2022-01-01 RX ORDER — MAGNESIUM HYDROXIDE 1200 MG/15ML
LIQUID ORAL AS NEEDED
Status: DISCONTINUED | OUTPATIENT
Start: 2022-01-01 | End: 2022-01-01 | Stop reason: HOSPADM

## 2022-01-01 RX ORDER — OMEPRAZOLE 20 MG/1
20 CAPSULE, DELAYED RELEASE ORAL DAILY
Qty: 90 CAPSULE | Refills: 1 | Status: SHIPPED | OUTPATIENT
Start: 2022-01-01

## 2022-01-01 RX ORDER — MAGNESIUM SULFATE HEPTAHYDRATE 40 MG/ML
4 INJECTION, SOLUTION INTRAVENOUS ONCE
Status: COMPLETED | OUTPATIENT
Start: 2022-01-01 | End: 2022-01-01

## 2022-01-01 RX ORDER — DEXAMETHASONE SODIUM PHOSPHATE 10 MG/ML
INJECTION, SOLUTION INTRAMUSCULAR; INTRAVENOUS AS NEEDED
Status: DISCONTINUED | OUTPATIENT
Start: 2022-01-01 | End: 2022-01-01

## 2022-01-01 RX ORDER — DOCUSATE SODIUM 100 MG/1
100 CAPSULE, LIQUID FILLED ORAL DAILY PRN
Qty: 90 CAPSULE | Refills: 1 | Status: SHIPPED | OUTPATIENT
Start: 2022-01-01

## 2022-01-01 RX ORDER — ONDANSETRON 2 MG/ML
4 INJECTION INTRAMUSCULAR; INTRAVENOUS EVERY 6 HOURS PRN
Status: DISCONTINUED | OUTPATIENT
Start: 2022-01-01 | End: 2022-01-01 | Stop reason: HOSPADM

## 2022-01-01 RX ORDER — ACETAMINOPHEN 325 MG/1
975 TABLET ORAL EVERY 8 HOURS SCHEDULED
Status: DISCONTINUED | OUTPATIENT
Start: 2022-01-01 | End: 2022-01-01 | Stop reason: HOSPADM

## 2022-01-01 RX ORDER — PANTOPRAZOLE SODIUM 40 MG/1
40 INJECTION, POWDER, FOR SOLUTION INTRAVENOUS EVERY 12 HOURS SCHEDULED
Status: DISCONTINUED | OUTPATIENT
Start: 2022-01-01 | End: 2022-01-01

## 2022-01-01 RX ORDER — NYSTATIN 100000 U/G
CREAM TOPICAL 2 TIMES DAILY
Status: CANCELLED | OUTPATIENT
Start: 2022-01-01

## 2022-01-01 RX ORDER — SENNOSIDES 8.6 MG
8.6 TABLET ORAL 2 TIMES DAILY
Refills: 0
Start: 2022-01-01 | End: 2022-01-01 | Stop reason: ALTCHOICE

## 2022-01-01 RX ORDER — ACETAMINOPHEN 325 MG/1
975 TABLET ORAL EVERY 8 HOURS SCHEDULED
Status: CANCELLED | OUTPATIENT
Start: 2022-01-01

## 2022-01-01 RX ORDER — SODIUM CHLORIDE 9 MG/ML
INJECTION, SOLUTION INTRAVENOUS CONTINUOUS PRN
Status: DISCONTINUED | OUTPATIENT
Start: 2022-01-01 | End: 2022-01-01

## 2022-01-01 RX ORDER — FLUTICASONE PROPIONATE 50 MCG
1 SPRAY, SUSPENSION (ML) NASAL DAILY
Status: CANCELLED | OUTPATIENT
Start: 2022-01-01

## 2022-01-01 RX ORDER — HYDROXYZINE HYDROCHLORIDE 25 MG/1
25 TABLET, FILM COATED ORAL 3 TIMES DAILY PRN
Status: DISCONTINUED | OUTPATIENT
Start: 2022-01-01 | End: 2022-01-01 | Stop reason: HOSPADM

## 2022-01-01 RX ORDER — SODIUM BICARBONATE 650 MG/1
650 TABLET ORAL
Status: COMPLETED | OUTPATIENT
Start: 2022-01-01 | End: 2022-01-01

## 2022-01-01 RX ORDER — MAGNESIUM HYDROXIDE/ALUMINUM HYDROXICE/SIMETHICONE 120; 1200; 1200 MG/30ML; MG/30ML; MG/30ML
30 SUSPENSION ORAL EVERY 4 HOURS PRN
Status: CANCELLED | OUTPATIENT
Start: 2022-01-01

## 2022-01-01 RX ORDER — HYDROMORPHONE HCL/PF 1 MG/ML
0.5 SYRINGE (ML) INJECTION
Status: DISCONTINUED | OUTPATIENT
Start: 2022-01-01 | End: 2022-01-01

## 2022-01-01 RX ORDER — MAGNESIUM HYDROXIDE/ALUMINUM HYDROXICE/SIMETHICONE 120; 1200; 1200 MG/30ML; MG/30ML; MG/30ML
30 SUSPENSION ORAL EVERY 6 HOURS PRN
Status: DISCONTINUED | OUTPATIENT
Start: 2022-01-01 | End: 2022-01-01

## 2022-01-01 RX ORDER — CEFTRIAXONE 1 G/50ML
1000 INJECTION, SOLUTION INTRAVENOUS ONCE
Status: COMPLETED | OUTPATIENT
Start: 2022-01-01 | End: 2022-01-01

## 2022-01-01 RX ORDER — CEFAZOLIN SODIUM 1 G/3ML
INJECTION, POWDER, FOR SOLUTION INTRAMUSCULAR; INTRAVENOUS AS NEEDED
Status: DISCONTINUED | OUTPATIENT
Start: 2022-01-01 | End: 2022-01-01

## 2022-01-01 RX ORDER — HYDROMORPHONE HCL/PF 1 MG/ML
1 SYRINGE (ML) INJECTION EVERY 4 HOURS PRN
Status: DISCONTINUED | OUTPATIENT
Start: 2022-01-01 | End: 2022-01-01 | Stop reason: HOSPADM

## 2022-01-01 RX ORDER — SODIUM CHLORIDE, SODIUM GLUCONATE, SODIUM ACETATE, POTASSIUM CHLORIDE, MAGNESIUM CHLORIDE, SODIUM PHOSPHATE, DIBASIC, AND POTASSIUM PHOSPHATE .53; .5; .37; .037; .03; .012; .00082 G/100ML; G/100ML; G/100ML; G/100ML; G/100ML; G/100ML; G/100ML
100 INJECTION, SOLUTION INTRAVENOUS CONTINUOUS
Status: DISCONTINUED | OUTPATIENT
Start: 2022-01-01 | End: 2022-01-01

## 2022-01-01 RX ORDER — HYDROMORPHONE HCL IN WATER/PF 6 MG/30 ML
0.2 PATIENT CONTROLLED ANALGESIA SYRINGE INTRAVENOUS
Status: DISCONTINUED | OUTPATIENT
Start: 2022-01-01 | End: 2022-01-01

## 2022-01-01 RX ORDER — FENTANYL CITRATE 50 UG/ML
INJECTION, SOLUTION INTRAMUSCULAR; INTRAVENOUS AS NEEDED
Status: DISCONTINUED | OUTPATIENT
Start: 2022-01-01 | End: 2022-01-01

## 2022-01-01 RX ORDER — POLYETHYLENE GLYCOL 3350 17 G/17G
17 POWDER, FOR SOLUTION ORAL DAILY PRN
Status: DISCONTINUED | OUTPATIENT
Start: 2022-01-01 | End: 2022-01-01

## 2022-01-01 RX ORDER — LIDOCAINE 50 MG/G
1 PATCH TOPICAL DAILY
Status: DISCONTINUED | OUTPATIENT
Start: 2022-01-01 | End: 2022-01-01 | Stop reason: HOSPADM

## 2022-01-01 RX ORDER — HYDROMORPHONE HCL/PF 1 MG/ML
1 SYRINGE (ML) INJECTION EVERY 6 HOURS PRN
Status: DISCONTINUED | OUTPATIENT
Start: 2022-01-01 | End: 2022-01-01

## 2022-01-01 RX ORDER — SODIUM CHLORIDE 9 MG/ML
50 INJECTION, SOLUTION INTRAVENOUS CONTINUOUS
Status: DISCONTINUED | OUTPATIENT
Start: 2022-01-01 | End: 2022-01-01

## 2022-01-01 RX ORDER — FENTANYL CITRATE 50 UG/ML
50 INJECTION, SOLUTION INTRAMUSCULAR; INTRAVENOUS EVERY 2 HOUR PRN
Status: DISCONTINUED | OUTPATIENT
Start: 2022-01-01 | End: 2022-01-01

## 2022-01-01 RX ORDER — ACETAMINOPHEN 325 MG/1
975 TABLET ORAL EVERY 6 HOURS PRN
Status: DISCONTINUED | OUTPATIENT
Start: 2022-01-01 | End: 2022-01-01

## 2022-01-01 RX ORDER — MAGNESIUM SULFATE HEPTAHYDRATE 40 MG/ML
2 INJECTION, SOLUTION INTRAVENOUS ONCE
Status: COMPLETED | OUTPATIENT
Start: 2022-01-01 | End: 2022-01-01

## 2022-01-01 RX ORDER — SENNA PLUS 8.6 MG/1
1 TABLET ORAL DAILY PRN
Qty: 90 TABLET | Refills: 1 | Status: SHIPPED | OUTPATIENT
Start: 2022-01-01

## 2022-01-01 RX ORDER — ONDANSETRON 2 MG/ML
4 INJECTION INTRAMUSCULAR; INTRAVENOUS EVERY 6 HOURS PRN
Status: CANCELLED | OUTPATIENT
Start: 2022-01-01

## 2022-01-01 RX ORDER — ALBUMIN, HUMAN INJ 5% 5 %
SOLUTION INTRAVENOUS CONTINUOUS PRN
Status: DISCONTINUED | OUTPATIENT
Start: 2022-01-01 | End: 2022-01-01

## 2022-01-01 RX ORDER — METHOCARBAMOL 500 MG/1
500 TABLET, FILM COATED ORAL EVERY 12 HOURS
COMMUNITY
End: 2022-01-01 | Stop reason: SDUPTHER

## 2022-01-01 RX ORDER — LORAZEPAM 1 MG/1
1 TABLET ORAL EVERY 8 HOURS PRN
Status: DISCONTINUED | OUTPATIENT
Start: 2022-01-01 | End: 2022-01-01 | Stop reason: HOSPADM

## 2022-01-01 RX ORDER — GABAPENTIN 300 MG/1
300 CAPSULE ORAL 3 TIMES DAILY
Status: DISCONTINUED | OUTPATIENT
Start: 2022-01-01 | End: 2022-01-01 | Stop reason: HOSPADM

## 2022-01-01 RX ORDER — OXYCODONE HYDROCHLORIDE 5 MG/1
5 TABLET ORAL EVERY 4 HOURS PRN
Status: DISCONTINUED | OUTPATIENT
Start: 2022-01-01 | End: 2022-01-01

## 2022-01-01 RX ORDER — HYDROMORPHONE HYDROCHLORIDE 2 MG/1
TABLET ORAL
Qty: 50 TABLET | Refills: 0 | Status: SHIPPED | OUTPATIENT
Start: 2022-01-01

## 2022-01-01 RX ORDER — PANTOPRAZOLE SODIUM 40 MG/1
40 INJECTION, POWDER, FOR SOLUTION INTRAVENOUS
Status: DISCONTINUED | OUTPATIENT
Start: 2022-01-01 | End: 2022-01-01

## 2022-01-01 RX ORDER — XYLITOL/YERBA SANTA
5 AEROSOL, SPRAY WITH PUMP (ML) MUCOUS MEMBRANE 4 TIMES DAILY PRN
Status: DISCONTINUED | OUTPATIENT
Start: 2022-01-01 | End: 2022-01-01 | Stop reason: HOSPADM

## 2022-01-01 RX ORDER — LIDOCAINE HYDROCHLORIDE 10 MG/ML
INJECTION, SOLUTION EPIDURAL; INFILTRATION; INTRACAUDAL; PERINEURAL AS NEEDED
Status: DISCONTINUED | OUTPATIENT
Start: 2022-01-01 | End: 2022-01-01

## 2022-01-01 RX ORDER — NYSTATIN 100000 [USP'U]/G
POWDER TOPICAL 3 TIMES DAILY
Status: CANCELLED | OUTPATIENT
Start: 2022-01-01

## 2022-01-01 RX ORDER — CYCLOBENZAPRINE HCL 10 MG
TABLET ORAL
Qty: 90 TABLET | Refills: 2 | Status: SHIPPED | OUTPATIENT
Start: 2022-01-01 | End: 2022-01-01 | Stop reason: SDDI

## 2022-01-01 RX ORDER — XYLITOL/YERBA SANTA
5 AEROSOL, SPRAY WITH PUMP (ML) MUCOUS MEMBRANE 4 TIMES DAILY PRN
Status: CANCELLED | OUTPATIENT
Start: 2022-01-01

## 2022-01-01 RX ORDER — NYSTATIN 100000 [USP'U]/G
POWDER TOPICAL 2 TIMES DAILY
Status: DISCONTINUED | OUTPATIENT
Start: 2022-01-01 | End: 2022-01-01 | Stop reason: HOSPADM

## 2022-01-01 RX ORDER — LORAZEPAM 1 MG/1
1 TABLET ORAL EVERY 8 HOURS PRN
Qty: 30 TABLET | Refills: 0 | Status: SHIPPED | OUTPATIENT
Start: 2022-01-01 | End: 2022-01-01 | Stop reason: HOSPADM

## 2022-01-01 RX ORDER — POTASSIUM CHLORIDE 20 MEQ/1
40 TABLET, EXTENDED RELEASE ORAL 2 TIMES DAILY
Status: COMPLETED | OUTPATIENT
Start: 2022-01-01 | End: 2022-01-01

## 2022-01-01 RX ORDER — AMOXICILLIN AND CLAVULANATE POTASSIUM 875; 125 MG/1; MG/1
1 TABLET, FILM COATED ORAL EVERY 12 HOURS SCHEDULED
Status: DISCONTINUED | OUTPATIENT
Start: 2022-01-01 | End: 2022-01-01

## 2022-01-01 RX ORDER — PROPOFOL 10 MG/ML
5-50 INJECTION, EMULSION INTRAVENOUS
Status: DISCONTINUED | OUTPATIENT
Start: 2022-01-01 | End: 2022-01-01

## 2022-01-01 RX ORDER — METHOCARBAMOL 500 MG/1
500 TABLET, FILM COATED ORAL EVERY 12 HOURS
Qty: 60 TABLET | Refills: 2 | Status: SHIPPED | OUTPATIENT
Start: 2022-01-01

## 2022-01-01 RX ORDER — SENNOSIDES 8.6 MG
1 TABLET ORAL
Status: DISCONTINUED | OUTPATIENT
Start: 2022-01-01 | End: 2022-01-01

## 2022-01-01 RX ORDER — LISINOPRIL 5 MG/1
10 TABLET ORAL DAILY
Status: DISCONTINUED | OUTPATIENT
Start: 2022-01-01 | End: 2022-01-01

## 2022-01-01 RX ORDER — CEFTRIAXONE 2 G/50ML
2000 INJECTION, SOLUTION INTRAVENOUS EVERY 24 HOURS
Status: DISCONTINUED | OUTPATIENT
Start: 2022-01-01 | End: 2022-01-01

## 2022-01-01 RX ORDER — ERGOCALCIFEROL 1.25 MG/1
50000 CAPSULE ORAL WEEKLY
Qty: 2 CAPSULE | Refills: 0 | Status: SHIPPED | OUTPATIENT
Start: 2022-01-01 | End: 2022-01-01

## 2022-01-01 RX ORDER — METHOCARBAMOL 500 MG/1
500 TABLET, FILM COATED ORAL EVERY 6 HOURS SCHEDULED
Status: DISCONTINUED | OUTPATIENT
Start: 2022-01-01 | End: 2022-01-01 | Stop reason: HOSPADM

## 2022-01-01 RX ORDER — HYDROMORPHONE HCL/PF 1 MG/ML
0.5 SYRINGE (ML) INJECTION EVERY 4 HOURS PRN
Status: DISCONTINUED | OUTPATIENT
Start: 2022-01-01 | End: 2022-01-01

## 2022-01-01 RX ORDER — HYDROMORPHONE HCL/PF 1 MG/ML
0.5 SYRINGE (ML) INJECTION ONCE
Status: COMPLETED | OUTPATIENT
Start: 2022-01-01 | End: 2022-01-01

## 2022-01-01 RX ORDER — MAGNESIUM HYDROXIDE/ALUMINUM HYDROXICE/SIMETHICONE 120; 1200; 1200 MG/30ML; MG/30ML; MG/30ML
30 SUSPENSION ORAL EVERY 6 HOURS PRN
Status: DISCONTINUED | OUTPATIENT
Start: 2022-01-01 | End: 2022-01-01 | Stop reason: HOSPADM

## 2022-01-01 RX ORDER — ONDANSETRON 4 MG/1
4 TABLET, FILM COATED ORAL
Qty: 90 TABLET | Refills: 2 | Status: SHIPPED | OUTPATIENT
Start: 2022-01-01

## 2022-01-01 RX ORDER — ALPRAZOLAM 0.5 MG/1
2 TABLET ORAL 2 TIMES DAILY PRN
Status: DISCONTINUED | OUTPATIENT
Start: 2022-01-01 | End: 2022-01-01

## 2022-01-01 RX ORDER — OXYCODONE HYDROCHLORIDE AND ACETAMINOPHEN 5; 325 MG/1; MG/1
1 TABLET ORAL EVERY 4 HOURS PRN
Status: DISCONTINUED | OUTPATIENT
Start: 2022-01-01 | End: 2022-01-01

## 2022-01-01 RX ORDER — SODIUM BICARBONATE 650 MG/1
1300 TABLET ORAL
Status: DISCONTINUED | OUTPATIENT
Start: 2022-01-01 | End: 2022-01-01

## 2022-01-01 RX ORDER — HYDROMORPHONE HCL/PF 1 MG/ML
0.5 SYRINGE (ML) INJECTION ONCE
Status: CANCELLED | OUTPATIENT
Start: 2022-01-01

## 2022-01-01 RX ORDER — ROCURONIUM BROMIDE 10 MG/ML
INJECTION, SOLUTION INTRAVENOUS AS NEEDED
Status: DISCONTINUED | OUTPATIENT
Start: 2022-01-01 | End: 2022-01-01

## 2022-01-01 RX ORDER — METRONIDAZOLE 500 MG/1
500 TABLET ORAL EVERY 8 HOURS SCHEDULED
Status: DISCONTINUED | OUTPATIENT
Start: 2022-01-01 | End: 2022-01-01 | Stop reason: HOSPADM

## 2022-01-01 RX ORDER — METHOCARBAMOL 500 MG/1
500 TABLET, FILM COATED ORAL EVERY 8 HOURS SCHEDULED
Status: CANCELLED | OUTPATIENT
Start: 2022-01-01

## 2022-01-01 RX ORDER — SODIUM POLYSTYRENE SULFONATE 4.1 MEQ/G
15 POWDER, FOR SUSPENSION ORAL; RECTAL ONCE
Status: COMPLETED | OUTPATIENT
Start: 2022-01-01 | End: 2022-01-01

## 2022-01-01 RX ORDER — LISINOPRIL 10 MG/1
10 TABLET ORAL DAILY
Status: DISCONTINUED | OUTPATIENT
Start: 2022-01-01 | End: 2022-01-01

## 2022-01-01 RX ORDER — LIDOCAINE 50 MG/G
1 PATCH TOPICAL DAILY
Status: CANCELLED | OUTPATIENT
Start: 2022-01-01

## 2022-01-01 RX ORDER — ACETAMINOPHEN 325 MG/1
650 TABLET ORAL EVERY 6 HOURS PRN
Status: DISCONTINUED | OUTPATIENT
Start: 2022-01-01 | End: 2022-01-01 | Stop reason: HOSPADM

## 2022-01-01 RX ORDER — NYSTATIN 100000 U/G
CREAM TOPICAL 2 TIMES DAILY
Status: DISCONTINUED | OUTPATIENT
Start: 2022-01-01 | End: 2022-01-01 | Stop reason: HOSPADM

## 2022-01-01 RX ORDER — ONDANSETRON 4 MG/1
4 TABLET, FILM COATED ORAL
COMMUNITY
End: 2022-01-01 | Stop reason: SDUPTHER

## 2022-01-01 RX ORDER — OXYCODONE HYDROCHLORIDE 10 MG/1
10-15 TABLET ORAL EVERY 4 HOURS PRN
Qty: 90 TABLET | Refills: 0 | Status: SHIPPED | OUTPATIENT
Start: 2022-01-01 | End: 2022-01-01 | Stop reason: HOSPADM

## 2022-01-01 RX ORDER — LISINOPRIL 10 MG/1
10 TABLET ORAL DAILY
Qty: 90 TABLET | Refills: 1 | Status: SHIPPED | OUTPATIENT
Start: 2022-01-01 | End: 2022-01-01 | Stop reason: HOSPADM

## 2022-01-01 RX ORDER — HYDROMORPHONE HCL/PF 1 MG/ML
0.2 SYRINGE (ML) INJECTION EVERY 4 HOURS PRN
Status: CANCELLED | OUTPATIENT
Start: 2022-01-01

## 2022-01-01 RX ORDER — SODIUM CHLORIDE, SODIUM LACTATE, POTASSIUM CHLORIDE, CALCIUM CHLORIDE 600; 310; 30; 20 MG/100ML; MG/100ML; MG/100ML; MG/100ML
75 INJECTION, SOLUTION INTRAVENOUS CONTINUOUS
Status: DISCONTINUED | OUTPATIENT
Start: 2022-01-01 | End: 2022-01-01 | Stop reason: HOSPADM

## 2022-01-01 RX ORDER — METHOCARBAMOL 500 MG/1
500 TABLET, FILM COATED ORAL EVERY 8 HOURS SCHEDULED
Status: DISCONTINUED | OUTPATIENT
Start: 2022-01-01 | End: 2022-01-01 | Stop reason: HOSPADM

## 2022-01-01 RX ORDER — CEFAZOLIN SODIUM 2 G/50ML
2000 SOLUTION INTRAVENOUS ONCE
Status: COMPLETED | OUTPATIENT
Start: 2022-01-01 | End: 2022-01-01

## 2022-01-01 RX ORDER — MAGNESIUM CARB/ALUMINUM HYDROX 105-160MG
15 TABLET,CHEWABLE ORAL 2 TIMES DAILY
Status: CANCELLED | OUTPATIENT
Start: 2022-01-01

## 2022-01-01 RX ORDER — HYDROMORPHONE HYDROCHLORIDE 2 MG/1
TABLET ORAL
Qty: 90 TABLET | Refills: 0 | Status: ON HOLD | OUTPATIENT
Start: 2022-01-01 | End: 2022-01-01 | Stop reason: SDUPTHER

## 2022-01-01 RX ORDER — POTASSIUM CHLORIDE 14.9 MG/ML
20 INJECTION INTRAVENOUS ONCE
Status: COMPLETED | OUTPATIENT
Start: 2022-01-01 | End: 2022-01-01

## 2022-01-01 RX ORDER — ALBUTEROL SULFATE 2.5 MG/3ML
2.5 SOLUTION RESPIRATORY (INHALATION) EVERY 4 HOURS PRN
Status: DISCONTINUED | OUTPATIENT
Start: 2022-01-01 | End: 2022-01-01

## 2022-01-01 RX ORDER — HYDROXYZINE HYDROCHLORIDE 25 MG/1
25 TABLET, FILM COATED ORAL EVERY 6 HOURS PRN
Status: DISCONTINUED | OUTPATIENT
Start: 2022-01-01 | End: 2022-01-01

## 2022-01-01 RX ORDER — SODIUM CHLORIDE, SODIUM LACTATE, POTASSIUM CHLORIDE, CALCIUM CHLORIDE 600; 310; 30; 20 MG/100ML; MG/100ML; MG/100ML; MG/100ML
INJECTION, SOLUTION INTRAVENOUS CONTINUOUS PRN
Status: DISCONTINUED | OUTPATIENT
Start: 2022-01-01 | End: 2022-01-01

## 2022-01-01 RX ORDER — MIDAZOLAM HYDROCHLORIDE 2 MG/2ML
INJECTION, SOLUTION INTRAMUSCULAR; INTRAVENOUS AS NEEDED
Status: DISCONTINUED | OUTPATIENT
Start: 2022-01-01 | End: 2022-01-01

## 2022-01-01 RX ORDER — METRONIDAZOLE 500 MG/1
500 TABLET ORAL EVERY 8 HOURS SCHEDULED
Status: CANCELLED | OUTPATIENT
Start: 2022-01-01

## 2022-01-01 RX ORDER — KETAMINE HCL IN NACL, ISO-OSM 100MG/10ML
SYRINGE (ML) INJECTION AS NEEDED
Status: DISCONTINUED | OUTPATIENT
Start: 2022-01-01 | End: 2022-01-01

## 2022-01-01 RX ORDER — ERGOCALCIFEROL 1.25 MG/1
50000 CAPSULE ORAL WEEKLY
Status: DISCONTINUED | OUTPATIENT
Start: 2022-01-01 | End: 2022-01-01 | Stop reason: HOSPADM

## 2022-01-01 RX ORDER — HYDROMORPHONE HCL/PF 1 MG/ML
0.5 SYRINGE (ML) INJECTION EVERY 4 HOURS PRN
Status: DISCONTINUED | OUTPATIENT
Start: 2022-01-01 | End: 2022-01-01 | Stop reason: HOSPADM

## 2022-01-01 RX ORDER — DEXTROSE, SODIUM CHLORIDE, AND POTASSIUM CHLORIDE 5; .45; .15 G/100ML; G/100ML; G/100ML
101 INJECTION INTRAVENOUS CONTINUOUS
Status: DISCONTINUED | OUTPATIENT
Start: 2022-01-01 | End: 2022-01-01

## 2022-01-01 RX ORDER — HYDROMORPHONE HCL 110MG/55ML
PATIENT CONTROLLED ANALGESIA SYRINGE INTRAVENOUS AS NEEDED
Status: DISCONTINUED | OUTPATIENT
Start: 2022-01-01 | End: 2022-01-01

## 2022-01-01 RX ORDER — ALBUTEROL SULFATE 90 UG/1
AEROSOL, METERED RESPIRATORY (INHALATION) AS NEEDED
Status: DISCONTINUED | OUTPATIENT
Start: 2022-01-01 | End: 2022-01-01

## 2022-01-01 RX ORDER — FENTANYL CITRATE-0.9 % NACL/PF 10 MCG/ML
25 PLASTIC BAG, INJECTION (ML) INTRAVENOUS CONTINUOUS
Status: DISCONTINUED | OUTPATIENT
Start: 2022-01-01 | End: 2022-01-01

## 2022-01-01 RX ORDER — HYDROMORPHONE HYDROCHLORIDE 2 MG/1
TABLET ORAL
Qty: 30 TABLET | Refills: 0 | Status: SHIPPED | OUTPATIENT
Start: 2022-01-01 | End: 2022-01-01 | Stop reason: SDUPTHER

## 2022-01-01 RX ORDER — HYDROXYZINE HYDROCHLORIDE 10 MG/1
10 TABLET, FILM COATED ORAL EVERY 6 HOURS PRN
Status: DISCONTINUED | OUTPATIENT
Start: 2022-01-01 | End: 2022-01-01

## 2022-01-01 RX ORDER — FLUCONAZOLE 100 MG/1
150 TABLET ORAL DAILY
Status: COMPLETED | OUTPATIENT
Start: 2022-01-01 | End: 2022-01-01

## 2022-01-01 RX ORDER — PANTOPRAZOLE SODIUM 40 MG/1
40 TABLET, DELAYED RELEASE ORAL
Status: CANCELLED | OUTPATIENT
Start: 2022-01-01

## 2022-01-01 RX ORDER — PANTOPRAZOLE SODIUM 40 MG/1
40 TABLET, DELAYED RELEASE ORAL
Status: DISCONTINUED | OUTPATIENT
Start: 2022-01-01 | End: 2022-01-01 | Stop reason: HOSPADM

## 2022-01-01 RX ORDER — CEFTRIAXONE 2 G/50ML
2000 INJECTION, SOLUTION INTRAVENOUS EVERY 24 HOURS
Status: DISCONTINUED | OUTPATIENT
Start: 2022-01-01 | End: 2022-01-01 | Stop reason: HOSPADM

## 2022-01-01 RX ORDER — LANOLIN ALCOHOL/MO/W.PET/CERES
3 CREAM (GRAM) TOPICAL
Status: DISCONTINUED | OUTPATIENT
Start: 2022-01-01 | End: 2022-01-01

## 2022-01-01 RX ORDER — ACETAMINOPHEN 325 MG/1
650 TABLET ORAL EVERY 6 HOURS PRN
Status: DISCONTINUED | OUTPATIENT
Start: 2022-01-01 | End: 2022-01-01

## 2022-01-01 RX ORDER — SENNOSIDES 8.6 MG
1 TABLET ORAL 2 TIMES DAILY
Status: DISCONTINUED | OUTPATIENT
Start: 2022-01-01 | End: 2022-01-01 | Stop reason: HOSPADM

## 2022-01-01 RX ORDER — MAGNESIUM SULFATE HEPTAHYDRATE 40 MG/ML
2 INJECTION, SOLUTION INTRAVENOUS
Status: COMPLETED | OUTPATIENT
Start: 2022-01-01 | End: 2022-01-01

## 2022-01-01 RX ORDER — ONDANSETRON 2 MG/ML
INJECTION INTRAMUSCULAR; INTRAVENOUS AS NEEDED
Status: DISCONTINUED | OUTPATIENT
Start: 2022-01-01 | End: 2022-01-01

## 2022-01-01 RX ORDER — OXYCODONE HYDROCHLORIDE 10 MG/1
10 TABLET ORAL EVERY 4 HOURS PRN
Status: DISCONTINUED | OUTPATIENT
Start: 2022-01-01 | End: 2022-01-01

## 2022-01-01 RX ORDER — DOCUSATE SODIUM 100 MG/1
100 CAPSULE, LIQUID FILLED ORAL DAILY PRN
COMMUNITY
End: 2022-01-01 | Stop reason: SDUPTHER

## 2022-01-01 RX ORDER — HEPARIN SODIUM 5000 [USP'U]/ML
7500 INJECTION, SOLUTION INTRAVENOUS; SUBCUTANEOUS EVERY 8 HOURS SCHEDULED
Status: DISCONTINUED | OUTPATIENT
Start: 2022-01-01 | End: 2022-01-01 | Stop reason: HOSPADM

## 2022-01-01 RX ORDER — LABETALOL 20 MG/4 ML (5 MG/ML) INTRAVENOUS SYRINGE
10 EVERY 4 HOURS PRN
Status: DISCONTINUED | OUTPATIENT
Start: 2022-01-01 | End: 2022-01-01 | Stop reason: HOSPADM

## 2022-01-01 RX ORDER — HYDROMORPHONE HCL/PF 1 MG/ML
0.5 SYRINGE (ML) INJECTION EVERY 2 HOUR PRN
Status: COMPLETED | OUTPATIENT
Start: 2022-01-01 | End: 2022-01-01

## 2022-01-01 RX ORDER — ONDANSETRON 2 MG/ML
4 INJECTION INTRAMUSCULAR; INTRAVENOUS EVERY 8 HOURS PRN
Status: DISCONTINUED | OUTPATIENT
Start: 2022-01-01 | End: 2022-01-01

## 2022-01-01 RX ORDER — HYDROMORPHONE HCL/PF 1 MG/ML
0.2 SYRINGE (ML) INJECTION EVERY 4 HOURS PRN
Status: DISCONTINUED | OUTPATIENT
Start: 2022-01-01 | End: 2022-01-01 | Stop reason: HOSPADM

## 2022-01-01 RX ORDER — FLUCONAZOLE 100 MG/1
400 TABLET ORAL DAILY
Status: DISCONTINUED | OUTPATIENT
Start: 2022-01-01 | End: 2022-01-01 | Stop reason: HOSPADM

## 2022-01-01 RX ORDER — SUCCINYLCHOLINE/SOD CL,ISO/PF 100 MG/5ML
SYRINGE (ML) INTRAVENOUS AS NEEDED
Status: DISCONTINUED | OUTPATIENT
Start: 2022-01-01 | End: 2022-01-01

## 2022-01-01 RX ORDER — HYDROMORPHONE HYDROCHLORIDE 2 MG/1
TABLET ORAL
Qty: 20 TABLET | Refills: 0 | Status: SHIPPED | OUTPATIENT
Start: 2022-01-01 | End: 2022-01-01 | Stop reason: SDUPTHER

## 2022-01-01 RX ORDER — MICONAZOLE NITRATE 20 MG/G
CREAM TOPICAL 2 TIMES DAILY
Status: DISCONTINUED | OUTPATIENT
Start: 2022-01-01 | End: 2022-01-01 | Stop reason: HOSPADM

## 2022-01-01 RX ORDER — COLOSTOMY BAGS
EACH MISCELLANEOUS DAILY
Qty: 1 EACH | Refills: 11 | Status: SHIPPED | OUTPATIENT
Start: 2022-01-01

## 2022-01-01 RX ORDER — ALPRAZOLAM 2 MG/1
2 TABLET ORAL 2 TIMES DAILY PRN
Qty: 30 TABLET | Refills: 0 | Status: SHIPPED | OUTPATIENT
Start: 2022-01-01 | End: 2022-01-01 | Stop reason: SDUPTHER

## 2022-01-01 RX ORDER — ALBUTEROL SULFATE 90 UG/1
2 AEROSOL, METERED RESPIRATORY (INHALATION) EVERY 6 HOURS PRN
Status: DISCONTINUED | OUTPATIENT
Start: 2022-01-01 | End: 2022-01-01 | Stop reason: HOSPADM

## 2022-01-01 RX ORDER — SODIUM CHLORIDE 9 MG/ML
75 INJECTION, SOLUTION INTRAVENOUS CONTINUOUS
Status: DISCONTINUED | OUTPATIENT
Start: 2022-01-01 | End: 2022-01-01

## 2022-01-01 RX ORDER — DEXTROSE MONOHYDRATE 25 G/50ML
25 INJECTION, SOLUTION INTRAVENOUS ONCE
Status: COMPLETED | OUTPATIENT
Start: 2022-01-01 | End: 2022-01-01

## 2022-01-01 RX ORDER — FLUTICASONE PROPIONATE 50 MCG
1 SPRAY, SUSPENSION (ML) NASAL DAILY
Status: DISCONTINUED | OUTPATIENT
Start: 2022-01-01 | End: 2022-01-01 | Stop reason: HOSPADM

## 2022-01-01 RX ORDER — ONDANSETRON 4 MG/1
4 TABLET, ORALLY DISINTEGRATING ORAL EVERY 6 HOURS SCHEDULED
Status: DISCONTINUED | OUTPATIENT
Start: 2022-01-01 | End: 2022-01-01 | Stop reason: HOSPADM

## 2022-01-01 RX ORDER — CEFEPIME HYDROCHLORIDE 1 G/50ML
1000 INJECTION, SOLUTION INTRAVENOUS EVERY 12 HOURS
Status: DISCONTINUED | OUTPATIENT
Start: 2022-01-01 | End: 2022-01-01

## 2022-01-01 RX ORDER — SODIUM CHLORIDE, SODIUM LACTATE, POTASSIUM CHLORIDE, CALCIUM CHLORIDE 600; 310; 30; 20 MG/100ML; MG/100ML; MG/100ML; MG/100ML
75 INJECTION, SOLUTION INTRAVENOUS CONTINUOUS
Status: CANCELLED | OUTPATIENT
Start: 2022-01-01

## 2022-01-01 RX ORDER — DIAZEPAM 2 MG/1
2 TABLET ORAL EVERY 8 HOURS PRN
Status: DISCONTINUED | OUTPATIENT
Start: 2022-01-01 | End: 2022-01-01

## 2022-01-01 RX ORDER — OXYCODONE HYDROCHLORIDE 5 MG/1
5 TABLET ORAL EVERY 4 HOURS PRN
Status: CANCELLED | OUTPATIENT
Start: 2022-01-01

## 2022-01-01 RX ORDER — METOCLOPRAMIDE HYDROCHLORIDE 5 MG/ML
5 INJECTION INTRAMUSCULAR; INTRAVENOUS ONCE
Status: COMPLETED | OUTPATIENT
Start: 2022-01-01 | End: 2022-01-01

## 2022-01-01 RX ORDER — MAGNESIUM CARB/ALUMINUM HYDROX 105-160MG
15 TABLET,CHEWABLE ORAL 2 TIMES DAILY
Status: DISCONTINUED | OUTPATIENT
Start: 2022-01-01 | End: 2022-01-01 | Stop reason: HOSPADM

## 2022-01-01 RX ORDER — FLUCONAZOLE 100 MG/1
400 TABLET ORAL DAILY
Status: CANCELLED | OUTPATIENT
Start: 2022-01-01

## 2022-01-01 RX ORDER — ALBUMIN, HUMAN INJ 5% 5 %
12.5 SOLUTION INTRAVENOUS ONCE
Status: COMPLETED | OUTPATIENT
Start: 2022-01-01 | End: 2022-01-01

## 2022-01-01 RX ORDER — DOCUSATE SODIUM 100 MG/1
100 CAPSULE, LIQUID FILLED ORAL 2 TIMES DAILY
Refills: 0
Start: 2022-01-01 | End: 2022-01-01 | Stop reason: ALTCHOICE

## 2022-01-01 RX ORDER — CHLORHEXIDINE GLUCONATE 0.12 MG/ML
15 RINSE ORAL EVERY 12 HOURS SCHEDULED
Status: DISCONTINUED | OUTPATIENT
Start: 2022-01-01 | End: 2022-01-01

## 2022-01-01 RX ORDER — MELOXICAM 7.5 MG/1
7.5 TABLET ORAL DAILY
COMMUNITY
End: 2022-01-01 | Stop reason: HOSPADM

## 2022-01-01 RX ORDER — SODIUM CHLORIDE 9 MG/ML
125 INJECTION, SOLUTION INTRAVENOUS CONTINUOUS
Status: DISCONTINUED | OUTPATIENT
Start: 2022-01-01 | End: 2022-01-01

## 2022-01-01 RX ORDER — DEXMEDETOMIDINE 100 UG/ML
.1-.7 INJECTION, SOLUTION, CONCENTRATE INTRAVENOUS
Status: DISCONTINUED | OUTPATIENT
Start: 2022-01-01 | End: 2022-01-01

## 2022-01-01 RX ADMIN — PIPERACILLIN AND TAZOBACTAM 3.38 G: 3; .375 INJECTION, POWDER, LYOPHILIZED, FOR SOLUTION INTRAVENOUS at 07:50

## 2022-01-01 RX ADMIN — DOCUSATE SODIUM 100 MG: 100 CAPSULE ORAL at 18:01

## 2022-01-01 RX ADMIN — METHOCARBAMOL 500 MG: 500 TABLET ORAL at 21:09

## 2022-01-01 RX ADMIN — SERTRALINE HYDROCHLORIDE 50 MG: 50 TABLET ORAL at 09:25

## 2022-01-01 RX ADMIN — PIPERACILLIN AND TAZOBACTAM 4.5 G: 36; 4.5 INJECTION, POWDER, FOR SOLUTION INTRAVENOUS at 12:43

## 2022-01-01 RX ADMIN — HEPARIN SODIUM 7500 UNITS: 5000 INJECTION INTRAVENOUS; SUBCUTANEOUS at 06:05

## 2022-01-01 RX ADMIN — MICONAZOLE NITRATE: 20 CREAM TOPICAL at 08:38

## 2022-01-01 RX ADMIN — DOCUSATE SODIUM 100 MG: 100 CAPSULE ORAL at 09:23

## 2022-01-01 RX ADMIN — HYDROMORPHONE HYDROCHLORIDE 1 MG: 1 INJECTION, SOLUTION INTRAMUSCULAR; INTRAVENOUS; SUBCUTANEOUS at 04:41

## 2022-01-01 RX ADMIN — MICONAZOLE NITRATE: 20 CREAM TOPICAL at 08:45

## 2022-01-01 RX ADMIN — HEPARIN SODIUM 7500 UNITS: 5000 INJECTION INTRAVENOUS; SUBCUTANEOUS at 06:41

## 2022-01-01 RX ADMIN — HEPARIN SODIUM 7500 UNITS: 5000 INJECTION INTRAVENOUS; SUBCUTANEOUS at 13:48

## 2022-01-01 RX ADMIN — DICLOFENAC SODIUM 2 G: 10 GEL TOPICAL at 21:10

## 2022-01-01 RX ADMIN — HYDROMORPHONE HYDROCHLORIDE 0.5 MG: 1 INJECTION, SOLUTION INTRAMUSCULAR; INTRAVENOUS; SUBCUTANEOUS at 17:35

## 2022-01-01 RX ADMIN — NYSTATIN: 100000 POWDER TOPICAL at 17:38

## 2022-01-01 RX ADMIN — HEPARIN SODIUM 7500 UNITS: 5000 INJECTION INTRAVENOUS; SUBCUTANEOUS at 21:09

## 2022-01-01 RX ADMIN — HEPARIN SODIUM 7500 UNITS: 5000 INJECTION INTRAVENOUS; SUBCUTANEOUS at 15:52

## 2022-01-01 RX ADMIN — CEFEPIME HYDROCHLORIDE 1000 MG: 1 INJECTION, SOLUTION INTRAVENOUS at 05:12

## 2022-01-01 RX ADMIN — Medication 3 MG: at 21:21

## 2022-01-01 RX ADMIN — STANDARDIZED SENNA CONCENTRATE 8.6 MG: 8.6 TABLET ORAL at 09:26

## 2022-01-01 RX ADMIN — NYSTATIN: 100000 POWDER TOPICAL at 18:19

## 2022-01-01 RX ADMIN — DOCUSATE SODIUM 100 MG: 100 CAPSULE ORAL at 17:09

## 2022-01-01 RX ADMIN — DOCUSATE SODIUM 100 MG: 100 CAPSULE ORAL at 08:48

## 2022-01-01 RX ADMIN — MICONAZOLE NITRATE: 20 CREAM TOPICAL at 09:49

## 2022-01-01 RX ADMIN — HEPARIN SODIUM 7500 UNITS: 5000 INJECTION INTRAVENOUS; SUBCUTANEOUS at 05:36

## 2022-01-01 RX ADMIN — GABAPENTIN 300 MG: 300 CAPSULE ORAL at 09:49

## 2022-01-01 RX ADMIN — METRONIDAZOLE 500 MG: 500 TABLET ORAL at 05:00

## 2022-01-01 RX ADMIN — HYDROMORPHONE HYDROCHLORIDE 0.5 MG: 1 INJECTION, SOLUTION INTRAMUSCULAR; INTRAVENOUS; SUBCUTANEOUS at 10:21

## 2022-01-01 RX ADMIN — HYDROMORPHONE HYDROCHLORIDE 0.5 MG: 1 INJECTION, SOLUTION INTRAMUSCULAR; INTRAVENOUS; SUBCUTANEOUS at 16:57

## 2022-01-01 RX ADMIN — PANTOPRAZOLE SODIUM 40 MG: 40 TABLET, DELAYED RELEASE ORAL at 05:10

## 2022-01-01 RX ADMIN — CHLORHEXIDINE GLUCONATE 15 ML: 1.2 SOLUTION ORAL at 20:13

## 2022-01-01 RX ADMIN — GABAPENTIN 300 MG: 300 CAPSULE ORAL at 09:28

## 2022-01-01 RX ADMIN — HYDROCODONE BITARTRATE AND ACETAMINOPHEN 1 TABLET: 5; 325 TABLET ORAL at 22:37

## 2022-01-01 RX ADMIN — NYSTATIN: 100000 POWDER TOPICAL at 08:07

## 2022-01-01 RX ADMIN — HYDROMORPHONE HYDROCHLORIDE 0.5 MG: 1 INJECTION, SOLUTION INTRAMUSCULAR; INTRAVENOUS; SUBCUTANEOUS at 08:55

## 2022-01-01 RX ADMIN — ONDANSETRON 4 MG: 4 TABLET, ORALLY DISINTEGRATING ORAL at 05:47

## 2022-01-01 RX ADMIN — GABAPENTIN 300 MG: 300 CAPSULE ORAL at 17:38

## 2022-01-01 RX ADMIN — PANTOPRAZOLE SODIUM 40 MG: 40 INJECTION, POWDER, FOR SOLUTION INTRAVENOUS at 10:29

## 2022-01-01 RX ADMIN — NYSTATIN 500000 UNITS: 100000 SUSPENSION ORAL at 21:33

## 2022-01-01 RX ADMIN — ACETAMINOPHEN 975 MG: 325 TABLET, FILM COATED ORAL at 17:56

## 2022-01-01 RX ADMIN — NYSTATIN 500000 UNITS: 100000 SUSPENSION ORAL at 11:11

## 2022-01-01 RX ADMIN — HYDROMORPHONE HYDROCHLORIDE 0.2 MG: 0.2 INJECTION, SOLUTION INTRAMUSCULAR; INTRAVENOUS; SUBCUTANEOUS at 12:20

## 2022-01-01 RX ADMIN — HYDROMORPHONE HYDROCHLORIDE 0.2 MG: 0.2 INJECTION, SOLUTION INTRAMUSCULAR; INTRAVENOUS; SUBCUTANEOUS at 05:39

## 2022-01-01 RX ADMIN — HYDROMORPHONE HYDROCHLORIDE 0.5 MG: 1 INJECTION, SOLUTION INTRAMUSCULAR; INTRAVENOUS; SUBCUTANEOUS at 04:49

## 2022-01-01 RX ADMIN — HYDROMORPHONE HYDROCHLORIDE 0.5 MG: 1 INJECTION, SOLUTION INTRAMUSCULAR; INTRAVENOUS; SUBCUTANEOUS at 22:04

## 2022-01-01 RX ADMIN — DOCUSATE SODIUM 100 MG: 100 CAPSULE ORAL at 08:47

## 2022-01-01 RX ADMIN — ROCURONIUM BROMIDE 50 MG: 50 INJECTION, SOLUTION INTRAVENOUS at 19:28

## 2022-01-01 RX ADMIN — GABAPENTIN 300 MG: 300 CAPSULE ORAL at 17:21

## 2022-01-01 RX ADMIN — GABAPENTIN 300 MG: 300 CAPSULE ORAL at 20:32

## 2022-01-01 RX ADMIN — HEPARIN SODIUM 7500 UNITS: 5000 INJECTION INTRAVENOUS; SUBCUTANEOUS at 05:53

## 2022-01-01 RX ADMIN — HYDROMORPHONE HYDROCHLORIDE 0.2 MG: 0.2 INJECTION, SOLUTION INTRAMUSCULAR; INTRAVENOUS; SUBCUTANEOUS at 09:03

## 2022-01-01 RX ADMIN — ONDANSETRON 4 MG: 4 TABLET, ORALLY DISINTEGRATING ORAL at 11:52

## 2022-01-01 RX ADMIN — PANTOPRAZOLE SODIUM 40 MG: 40 TABLET, DELAYED RELEASE ORAL at 06:36

## 2022-01-01 RX ADMIN — PANTOPRAZOLE SODIUM 40 MG: 40 INJECTION, POWDER, FOR SOLUTION INTRAVENOUS at 05:01

## 2022-01-01 RX ADMIN — NYSTATIN: 100000 POWDER TOPICAL at 11:46

## 2022-01-01 RX ADMIN — NYSTATIN: 100000 POWDER TOPICAL at 08:49

## 2022-01-01 RX ADMIN — OXYCODONE HYDROCHLORIDE 10 MG: 10 TABLET ORAL at 05:39

## 2022-01-01 RX ADMIN — NYSTATIN: 100000 POWDER TOPICAL at 18:03

## 2022-01-01 RX ADMIN — OXYCODONE HYDROCHLORIDE 10 MG: 10 TABLET ORAL at 06:19

## 2022-01-01 RX ADMIN — POTASSIUM CHLORIDE 40 MEQ: 1500 TABLET, EXTENDED RELEASE ORAL at 15:29

## 2022-01-01 RX ADMIN — FLUCONAZOLE 400 MG: 100 TABLET ORAL at 09:53

## 2022-01-01 RX ADMIN — DOCUSATE SODIUM 100 MG: 100 CAPSULE ORAL at 22:26

## 2022-01-01 RX ADMIN — ENOXAPARIN SODIUM 60 MG: 60 INJECTION SUBCUTANEOUS at 08:48

## 2022-01-01 RX ADMIN — HYDROMORPHONE HYDROCHLORIDE 1 MG: 1 INJECTION, SOLUTION INTRAMUSCULAR; INTRAVENOUS; SUBCUTANEOUS at 06:25

## 2022-01-01 RX ADMIN — NYSTATIN: 100000 POWDER TOPICAL at 17:03

## 2022-01-01 RX ADMIN — METHOCARBAMOL 500 MG: 500 TABLET, FILM COATED ORAL at 13:20

## 2022-01-01 RX ADMIN — NYSTATIN: 100000 CREAM TOPICAL at 10:02

## 2022-01-01 RX ADMIN — NYSTATIN 500000 UNITS: 100000 SUSPENSION ORAL at 12:04

## 2022-01-01 RX ADMIN — PANTOPRAZOLE SODIUM 40 MG: 40 TABLET, DELAYED RELEASE ORAL at 05:32

## 2022-01-01 RX ADMIN — SERTRALINE HYDROCHLORIDE 50 MG: 50 TABLET ORAL at 08:01

## 2022-01-01 RX ADMIN — HYDROMORPHONE HYDROCHLORIDE 0.5 MG: 1 INJECTION, SOLUTION INTRAMUSCULAR; INTRAVENOUS; SUBCUTANEOUS at 10:48

## 2022-01-01 RX ADMIN — NYSTATIN: 100000 POWDER TOPICAL at 17:31

## 2022-01-01 RX ADMIN — OXYCODONE HYDROCHLORIDE 5 MG: 5 TABLET ORAL at 11:02

## 2022-01-01 RX ADMIN — ALUMINA, MAGNESIA, AND SIMETHICONE ORAL SUSPENSION REGULAR STRENGTH 10 ML: 1200; 1200; 120 SUSPENSION ORAL at 19:47

## 2022-01-01 RX ADMIN — SODIUM CHLORIDE 1000 ML: 0.9 INJECTION, SOLUTION INTRAVENOUS at 03:35

## 2022-01-01 RX ADMIN — SERTRALINE HYDROCHLORIDE 50 MG: 50 TABLET ORAL at 12:52

## 2022-01-01 RX ADMIN — STANDARDIZED SENNA CONCENTRATE 8.6 MG: 8.6 TABLET ORAL at 08:17

## 2022-01-01 RX ADMIN — PANTOPRAZOLE SODIUM 40 MG: 40 TABLET, DELAYED RELEASE ORAL at 17:06

## 2022-01-01 RX ADMIN — DEXTROSE, SODIUM CHLORIDE, AND POTASSIUM CHLORIDE 50 ML/HR: 5; .45; .15 INJECTION INTRAVENOUS at 21:44

## 2022-01-01 RX ADMIN — HYDROMORPHONE HYDROCHLORIDE 0.5 MG: 1 INJECTION, SOLUTION INTRAMUSCULAR; INTRAVENOUS; SUBCUTANEOUS at 11:59

## 2022-01-01 RX ADMIN — METRONIDAZOLE 500 MG: 500 TABLET ORAL at 14:10

## 2022-01-01 RX ADMIN — GABAPENTIN 300 MG: 300 CAPSULE ORAL at 08:48

## 2022-01-01 RX ADMIN — NYSTATIN 500000 UNITS: 100000 SUSPENSION ORAL at 17:51

## 2022-01-01 RX ADMIN — ACETAMINOPHEN 975 MG: 325 TABLET, FILM COATED ORAL at 14:09

## 2022-01-01 RX ADMIN — DICLOFENAC SODIUM 2 G: 10 GEL TOPICAL at 17:55

## 2022-01-01 RX ADMIN — MICONAZOLE NITRATE 1 APPLICATION: 20 CREAM TOPICAL at 18:10

## 2022-01-01 RX ADMIN — NYSTATIN 500000 UNITS: 100000 SUSPENSION ORAL at 21:21

## 2022-01-01 RX ADMIN — DOCUSATE SODIUM 100 MG: 100 CAPSULE ORAL at 08:01

## 2022-01-01 RX ADMIN — STANDARDIZED SENNA CONCENTRATE 8.6 MG: 8.6 TABLET ORAL at 16:26

## 2022-01-01 RX ADMIN — ALBUTEROL SULFATE 4 PUFF: 90 AEROSOL, METERED RESPIRATORY (INHALATION) at 19:19

## 2022-01-01 RX ADMIN — ONDANSETRON 4 MG: 4 TABLET, ORALLY DISINTEGRATING ORAL at 01:21

## 2022-01-01 RX ADMIN — NYSTATIN: 100000 POWDER TOPICAL at 08:05

## 2022-01-01 RX ADMIN — GABAPENTIN 300 MG: 300 CAPSULE ORAL at 09:59

## 2022-01-01 RX ADMIN — ONDANSETRON 4 MG: 4 TABLET, ORALLY DISINTEGRATING ORAL at 14:39

## 2022-01-01 RX ADMIN — NYSTATIN 500000 UNITS: 100000 SUSPENSION ORAL at 17:10

## 2022-01-01 RX ADMIN — PANTOPRAZOLE SODIUM 40 MG: 40 TABLET, DELAYED RELEASE ORAL at 15:52

## 2022-01-01 RX ADMIN — METHOCARBAMOL 500 MG: 500 TABLET, FILM COATED ORAL at 05:01

## 2022-01-01 RX ADMIN — MINERAL OIL 15 ML: 1000 SOLUTION ORAL at 21:30

## 2022-01-01 RX ADMIN — MAGNESIUM SULFATE HEPTAHYDRATE 2 G: 40 INJECTION, SOLUTION INTRAVENOUS at 12:39

## 2022-01-01 RX ADMIN — FENTANYL CITRATE 50 MCG: 50 INJECTION INTRAMUSCULAR; INTRAVENOUS at 20:59

## 2022-01-01 RX ADMIN — DOCUSATE SODIUM 100 MG: 100 CAPSULE ORAL at 17:51

## 2022-01-01 RX ADMIN — HEPARIN SODIUM 7500 UNITS: 5000 INJECTION INTRAVENOUS; SUBCUTANEOUS at 21:49

## 2022-01-01 RX ADMIN — NYSTATIN 500000 UNITS: 100000 SUSPENSION ORAL at 13:10

## 2022-01-01 RX ADMIN — FENTANYL CITRATE 50 MCG: 50 INJECTION INTRAMUSCULAR; INTRAVENOUS at 11:15

## 2022-01-01 RX ADMIN — METRONIDAZOLE 500 MG: 500 TABLET ORAL at 14:38

## 2022-01-01 RX ADMIN — PANTOPRAZOLE SODIUM 40 MG: 40 TABLET, DELAYED RELEASE ORAL at 17:31

## 2022-01-01 RX ADMIN — STANDARDIZED SENNA CONCENTRATE 8.6 MG: 8.6 TABLET ORAL at 17:59

## 2022-01-01 RX ADMIN — OXYCODONE HYDROCHLORIDE 10 MG: 10 TABLET ORAL at 06:33

## 2022-01-01 RX ADMIN — PANTOPRAZOLE SODIUM 40 MG: 40 TABLET, DELAYED RELEASE ORAL at 06:41

## 2022-01-01 RX ADMIN — OXYCODONE HYDROCHLORIDE 10 MG: 10 TABLET ORAL at 04:50

## 2022-01-01 RX ADMIN — GABAPENTIN 300 MG: 300 CAPSULE ORAL at 09:44

## 2022-01-01 RX ADMIN — MICONAZOLE NITRATE: 20 CREAM TOPICAL at 19:57

## 2022-01-01 RX ADMIN — PANTOPRAZOLE SODIUM 40 MG: 40 TABLET, DELAYED RELEASE ORAL at 17:00

## 2022-01-01 RX ADMIN — STANDARDIZED SENNA CONCENTRATE 8.6 MG: 8.6 TABLET ORAL at 17:16

## 2022-01-01 RX ADMIN — OXYCODONE HYDROCHLORIDE 10 MG: 10 TABLET ORAL at 12:25

## 2022-01-01 RX ADMIN — HEPARIN SODIUM 7500 UNITS: 5000 INJECTION INTRAVENOUS; SUBCUTANEOUS at 21:26

## 2022-01-01 RX ADMIN — OXYCODONE HYDROCHLORIDE 10 MG: 10 TABLET ORAL at 21:08

## 2022-01-01 RX ADMIN — ONDANSETRON 4 MG: 4 TABLET, ORALLY DISINTEGRATING ORAL at 17:52

## 2022-01-01 RX ADMIN — OXYCODONE HYDROCHLORIDE 10 MG: 10 TABLET ORAL at 15:02

## 2022-01-01 RX ADMIN — HEPARIN SODIUM 7500 UNITS: 5000 INJECTION INTRAVENOUS; SUBCUTANEOUS at 14:28

## 2022-01-01 RX ADMIN — VANCOMYCIN HYDROCHLORIDE 2000 MG: 5 INJECTION, POWDER, LYOPHILIZED, FOR SOLUTION INTRAVENOUS at 20:30

## 2022-01-01 RX ADMIN — NYSTATIN: 100000 POWDER TOPICAL at 17:50

## 2022-01-01 RX ADMIN — HEPARIN SODIUM 7500 UNITS: 5000 INJECTION INTRAVENOUS; SUBCUTANEOUS at 12:52

## 2022-01-01 RX ADMIN — ONDANSETRON 4 MG: 4 TABLET, ORALLY DISINTEGRATING ORAL at 13:21

## 2022-01-01 RX ADMIN — NYSTATIN: 100000 CREAM TOPICAL at 10:22

## 2022-01-01 RX ADMIN — PANTOPRAZOLE SODIUM 40 MG: 40 TABLET, DELAYED RELEASE ORAL at 06:51

## 2022-01-01 RX ADMIN — HEPARIN SODIUM 7500 UNITS: 5000 INJECTION INTRAVENOUS; SUBCUTANEOUS at 14:39

## 2022-01-01 RX ADMIN — PIPERACILLIN AND TAZOBACTAM 4.5 G: 36; 4.5 INJECTION, POWDER, FOR SOLUTION INTRAVENOUS at 17:35

## 2022-01-01 RX ADMIN — ONDANSETRON 4 MG: 4 TABLET, ORALLY DISINTEGRATING ORAL at 12:32

## 2022-01-01 RX ADMIN — DOCUSATE SODIUM 100 MG: 100 CAPSULE ORAL at 08:08

## 2022-01-01 RX ADMIN — Medication 5 SPRAY: at 14:02

## 2022-01-01 RX ADMIN — DOCUSATE SODIUM 100 MG: 100 CAPSULE ORAL at 17:25

## 2022-01-01 RX ADMIN — CEFEPIME HYDROCHLORIDE 1000 MG: 1 INJECTION, SOLUTION INTRAVENOUS at 19:38

## 2022-01-01 RX ADMIN — DOCUSATE SODIUM 100 MG: 100 CAPSULE ORAL at 09:37

## 2022-01-01 RX ADMIN — METOPROLOL TARTRATE 25 MG: 25 TABLET, FILM COATED ORAL at 20:31

## 2022-01-01 RX ADMIN — HYDROMORPHONE HYDROCHLORIDE 0.5 MG: 1 INJECTION, SOLUTION INTRAMUSCULAR; INTRAVENOUS; SUBCUTANEOUS at 11:50

## 2022-01-01 RX ADMIN — OXYCODONE HYDROCHLORIDE 10 MG: 10 TABLET ORAL at 05:26

## 2022-01-01 RX ADMIN — DOCUSATE SODIUM 100 MG: 100 CAPSULE ORAL at 08:44

## 2022-01-01 RX ADMIN — ACETAMINOPHEN 975 MG: 325 TABLET, FILM COATED ORAL at 06:23

## 2022-01-01 RX ADMIN — ONDANSETRON 4 MG: 4 TABLET, ORALLY DISINTEGRATING ORAL at 17:05

## 2022-01-01 RX ADMIN — HEPARIN SODIUM 7500 UNITS: 5000 INJECTION INTRAVENOUS; SUBCUTANEOUS at 21:00

## 2022-01-01 RX ADMIN — MICONAZOLE NITRATE 1 APPLICATION: 20 CREAM TOPICAL at 17:20

## 2022-01-01 RX ADMIN — HYDROMORPHONE HYDROCHLORIDE 0.5 MG: 1 INJECTION, SOLUTION INTRAMUSCULAR; INTRAVENOUS; SUBCUTANEOUS at 05:53

## 2022-01-01 RX ADMIN — NYSTATIN 1 APPLICATION: 100000 POWDER TOPICAL at 17:51

## 2022-01-01 RX ADMIN — HYDROMORPHONE HYDROCHLORIDE 0.5 MG: 1 INJECTION, SOLUTION INTRAMUSCULAR; INTRAVENOUS; SUBCUTANEOUS at 11:35

## 2022-01-01 RX ADMIN — DIAZEPAM 2 MG: 2 TABLET ORAL at 23:11

## 2022-01-01 RX ADMIN — ONDANSETRON 4 MG: 4 TABLET, ORALLY DISINTEGRATING ORAL at 11:53

## 2022-01-01 RX ADMIN — ONDANSETRON 4 MG: 4 TABLET, ORALLY DISINTEGRATING ORAL at 18:15

## 2022-01-01 RX ADMIN — HYDROMORPHONE HYDROCHLORIDE 0.2 MG: 1 INJECTION, SOLUTION INTRAMUSCULAR; INTRAVENOUS; SUBCUTANEOUS at 14:01

## 2022-01-01 RX ADMIN — STANDARDIZED SENNA CONCENTRATE 8.6 MG: 8.6 TABLET ORAL at 08:53

## 2022-01-01 RX ADMIN — Medication 30 MG: at 19:56

## 2022-01-01 RX ADMIN — NYSTATIN 500000 UNITS: 100000 SUSPENSION ORAL at 16:11

## 2022-01-01 RX ADMIN — NYSTATIN 500000 UNITS: 100000 SUSPENSION ORAL at 09:23

## 2022-01-01 RX ADMIN — MICONAZOLE NITRATE: 20 CREAM TOPICAL at 08:46

## 2022-01-01 RX ADMIN — NYSTATIN 500000 UNITS: 100000 SUSPENSION ORAL at 12:30

## 2022-01-01 RX ADMIN — ONDANSETRON 4 MG: 4 TABLET, ORALLY DISINTEGRATING ORAL at 12:10

## 2022-01-01 RX ADMIN — POTASSIUM CHLORIDE 20 MEQ: 14.9 INJECTION, SOLUTION INTRAVENOUS at 10:33

## 2022-01-01 RX ADMIN — NYSTATIN: 100000 CREAM TOPICAL at 17:30

## 2022-01-01 RX ADMIN — GABAPENTIN 300 MG: 300 CAPSULE ORAL at 17:00

## 2022-01-01 RX ADMIN — SODIUM POLYSTYRENE SULFONATE 15 G: 4.1 POWDER, FOR SUSPENSION ORAL; RECTAL at 13:31

## 2022-01-01 RX ADMIN — ENOXAPARIN SODIUM 60 MG: 60 INJECTION SUBCUTANEOUS at 08:55

## 2022-01-01 RX ADMIN — PANTOPRAZOLE SODIUM 40 MG: 40 TABLET, DELAYED RELEASE ORAL at 06:31

## 2022-01-01 RX ADMIN — NYSTATIN: 100000 POWDER TOPICAL at 17:08

## 2022-01-01 RX ADMIN — ROCURONIUM BROMIDE 30 MG: 50 INJECTION, SOLUTION INTRAVENOUS at 20:57

## 2022-01-01 RX ADMIN — SERTRALINE HYDROCHLORIDE 50 MG: 50 TABLET ORAL at 10:00

## 2022-01-01 RX ADMIN — NYSTATIN 500000 UNITS: 100000 SUSPENSION ORAL at 17:26

## 2022-01-01 RX ADMIN — LIDOCAINE 5% 1 PATCH: 700 PATCH TOPICAL at 10:00

## 2022-01-01 RX ADMIN — NYSTATIN 1 APPLICATION: 100000 POWDER TOPICAL at 09:13

## 2022-01-01 RX ADMIN — GABAPENTIN 300 MG: 300 CAPSULE ORAL at 17:51

## 2022-01-01 RX ADMIN — HEPARIN SODIUM 7500 UNITS: 5000 INJECTION INTRAVENOUS; SUBCUTANEOUS at 05:26

## 2022-01-01 RX ADMIN — CEFEPIME HYDROCHLORIDE 1000 MG: 1 INJECTION, SOLUTION INTRAVENOUS at 18:06

## 2022-01-01 RX ADMIN — NYSTATIN 500000 UNITS: 100000 SUSPENSION ORAL at 22:57

## 2022-01-01 RX ADMIN — ONDANSETRON 4 MG: 2 INJECTION INTRAMUSCULAR; INTRAVENOUS at 08:47

## 2022-01-01 RX ADMIN — ROCURONIUM BROMIDE 20 MG: 50 INJECTION, SOLUTION INTRAVENOUS at 21:42

## 2022-01-01 RX ADMIN — HEPARIN SODIUM 7500 UNITS: 5000 INJECTION INTRAVENOUS; SUBCUTANEOUS at 21:53

## 2022-01-01 RX ADMIN — NYSTATIN: 100000 POWDER TOPICAL at 17:46

## 2022-01-01 RX ADMIN — NYSTATIN 500000 UNITS: 100000 SUSPENSION ORAL at 17:52

## 2022-01-01 RX ADMIN — ONDANSETRON 4 MG: 4 TABLET, ORALLY DISINTEGRATING ORAL at 12:51

## 2022-01-01 RX ADMIN — HYDROMORPHONE HYDROCHLORIDE 0.5 MG: 1 INJECTION, SOLUTION INTRAMUSCULAR; INTRAVENOUS; SUBCUTANEOUS at 03:35

## 2022-01-01 RX ADMIN — HYDROMORPHONE HYDROCHLORIDE 0.5 MG: 1 INJECTION, SOLUTION INTRAMUSCULAR; INTRAVENOUS; SUBCUTANEOUS at 17:49

## 2022-01-01 RX ADMIN — CEFEPIME HYDROCHLORIDE 1000 MG: 1 INJECTION, SOLUTION INTRAVENOUS at 05:00

## 2022-01-01 RX ADMIN — OXYCODONE HYDROCHLORIDE 10 MG: 10 TABLET ORAL at 08:02

## 2022-01-01 RX ADMIN — NYSTATIN 500000 UNITS: 100000 SUSPENSION ORAL at 17:59

## 2022-01-01 RX ADMIN — NYSTATIN 500000 UNITS: 100000 SUSPENSION ORAL at 21:35

## 2022-01-01 RX ADMIN — FENTANYL CITRATE 50 MCG: 50 INJECTION INTRAMUSCULAR; INTRAVENOUS at 21:42

## 2022-01-01 RX ADMIN — NYSTATIN 500000 UNITS: 100000 SUSPENSION ORAL at 11:51

## 2022-01-01 RX ADMIN — HYDROCODONE BITARTRATE AND ACETAMINOPHEN 1 TABLET: 5; 325 TABLET ORAL at 19:07

## 2022-01-01 RX ADMIN — PANTOPRAZOLE SODIUM 40 MG: 40 INJECTION, POWDER, FOR SOLUTION INTRAVENOUS at 05:36

## 2022-01-01 RX ADMIN — ACETAMINOPHEN 975 MG: 325 TABLET, FILM COATED ORAL at 14:27

## 2022-01-01 RX ADMIN — METHOCARBAMOL 500 MG: 500 TABLET, FILM COATED ORAL at 17:00

## 2022-01-01 RX ADMIN — HYDROMORPHONE HYDROCHLORIDE 0.5 MG: 1 INJECTION, SOLUTION INTRAMUSCULAR; INTRAVENOUS; SUBCUTANEOUS at 22:30

## 2022-01-01 RX ADMIN — NYSTATIN 500000 UNITS: 100000 SUSPENSION ORAL at 11:00

## 2022-01-01 RX ADMIN — MICONAZOLE NITRATE: 20 CREAM TOPICAL at 17:09

## 2022-01-01 RX ADMIN — NYSTATIN: 100000 CREAM TOPICAL at 12:44

## 2022-01-01 RX ADMIN — HYDROMORPHONE HYDROCHLORIDE 0.5 MG: 1 INJECTION, SOLUTION INTRAMUSCULAR; INTRAVENOUS; SUBCUTANEOUS at 03:05

## 2022-01-01 RX ADMIN — HYDROMORPHONE HYDROCHLORIDE 0.5 MG: 1 INJECTION, SOLUTION INTRAMUSCULAR; INTRAVENOUS; SUBCUTANEOUS at 09:20

## 2022-01-01 RX ADMIN — DEXTROSE, SODIUM CHLORIDE, AND POTASSIUM CHLORIDE 101 ML/HR: 5; .45; .15 INJECTION INTRAVENOUS at 09:00

## 2022-01-01 RX ADMIN — DICLOFENAC SODIUM 2 G: 10 GEL TOPICAL at 09:29

## 2022-01-01 RX ADMIN — MICONAZOLE NITRATE: 20 CREAM TOPICAL at 17:22

## 2022-01-01 RX ADMIN — DOCUSATE SODIUM 100 MG: 100 CAPSULE ORAL at 09:59

## 2022-01-01 RX ADMIN — PANTOPRAZOLE SODIUM 40 MG: 40 TABLET, DELAYED RELEASE ORAL at 06:12

## 2022-01-01 RX ADMIN — HYDROMORPHONE HYDROCHLORIDE 0.5 MG: 1 INJECTION, SOLUTION INTRAMUSCULAR; INTRAVENOUS; SUBCUTANEOUS at 06:41

## 2022-01-01 RX ADMIN — OXYCODONE HYDROCHLORIDE 10 MG: 10 TABLET ORAL at 17:10

## 2022-01-01 RX ADMIN — PIPERACILLIN AND TAZOBACTAM 4.5 G: 36; 4.5 INJECTION, POWDER, FOR SOLUTION INTRAVENOUS at 01:00

## 2022-01-01 RX ADMIN — NYSTATIN 1 APPLICATION: 100000 POWDER TOPICAL at 21:21

## 2022-01-01 RX ADMIN — LORAZEPAM 1 MG: 1 TABLET ORAL at 13:44

## 2022-01-01 RX ADMIN — NYSTATIN 500000 UNITS: 100000 SUSPENSION ORAL at 13:17

## 2022-01-01 RX ADMIN — OXYCODONE HYDROCHLORIDE 10 MG: 10 TABLET ORAL at 13:22

## 2022-01-01 RX ADMIN — NYSTATIN: 100000 POWDER TOPICAL at 21:03

## 2022-01-01 RX ADMIN — DICLOFENAC SODIUM 2 G: 10 GEL TOPICAL at 11:44

## 2022-01-01 RX ADMIN — CEFEPIME HYDROCHLORIDE 1000 MG: 1 INJECTION, SOLUTION INTRAVENOUS at 02:29

## 2022-01-01 RX ADMIN — METHOCARBAMOL 500 MG: 500 TABLET, FILM COATED ORAL at 05:15

## 2022-01-01 RX ADMIN — HYDROMORPHONE HYDROCHLORIDE 0.5 MG: 1 INJECTION, SOLUTION INTRAMUSCULAR; INTRAVENOUS; SUBCUTANEOUS at 19:49

## 2022-01-01 RX ADMIN — HEPARIN SODIUM 7500 UNITS: 5000 INJECTION INTRAVENOUS; SUBCUTANEOUS at 13:00

## 2022-01-01 RX ADMIN — METRONIDAZOLE 500 MG: 500 TABLET ORAL at 14:54

## 2022-01-01 RX ADMIN — HEPARIN SODIUM 7500 UNITS: 5000 INJECTION INTRAVENOUS; SUBCUTANEOUS at 05:55

## 2022-01-01 RX ADMIN — NYSTATIN: 100000 POWDER TOPICAL at 17:44

## 2022-01-01 RX ADMIN — Medication: at 21:41

## 2022-01-01 RX ADMIN — MAGNESIUM SULFATE HEPTAHYDRATE 2 G: 40 INJECTION, SOLUTION INTRAVENOUS at 14:13

## 2022-01-01 RX ADMIN — DOCUSATE SODIUM 100 MG: 100 CAPSULE ORAL at 08:23

## 2022-01-01 RX ADMIN — HEPARIN SODIUM 7500 UNITS: 5000 INJECTION INTRAVENOUS; SUBCUTANEOUS at 22:00

## 2022-01-01 RX ADMIN — HYDROMORPHONE HYDROCHLORIDE 0.5 MG: 1 INJECTION, SOLUTION INTRAMUSCULAR; INTRAVENOUS; SUBCUTANEOUS at 09:23

## 2022-01-01 RX ADMIN — OXYCODONE HYDROCHLORIDE 15 MG: 10 TABLET ORAL at 04:19

## 2022-01-01 RX ADMIN — METRONIDAZOLE 500 MG: 500 TABLET ORAL at 06:13

## 2022-01-01 RX ADMIN — NYSTATIN 500000 UNITS: 100000 SUSPENSION ORAL at 21:53

## 2022-01-01 RX ADMIN — DICLOFENAC SODIUM 2 G: 10 GEL TOPICAL at 23:47

## 2022-01-01 RX ADMIN — HYDROMORPHONE HYDROCHLORIDE 0.5 MG: 1 INJECTION, SOLUTION INTRAMUSCULAR; INTRAVENOUS; SUBCUTANEOUS at 05:41

## 2022-01-01 RX ADMIN — DICLOFENAC SODIUM 2 G: 10 GEL TOPICAL at 18:19

## 2022-01-01 RX ADMIN — METOPROLOL TARTRATE 25 MG: 25 TABLET, FILM COATED ORAL at 09:17

## 2022-01-01 RX ADMIN — NYSTATIN 500000 UNITS: 100000 SUSPENSION ORAL at 11:50

## 2022-01-01 RX ADMIN — OXYCODONE HYDROCHLORIDE 10 MG: 10 TABLET ORAL at 23:52

## 2022-01-01 RX ADMIN — OXYCODONE HYDROCHLORIDE 10 MG: 10 TABLET ORAL at 03:08

## 2022-01-01 RX ADMIN — VANCOMYCIN HYDROCHLORIDE 1500 MG: 500 INJECTION, POWDER, LYOPHILIZED, FOR SOLUTION INTRAVENOUS at 00:46

## 2022-01-01 RX ADMIN — OXYCODONE HYDROCHLORIDE 10 MG: 10 TABLET ORAL at 06:50

## 2022-01-01 RX ADMIN — METHOCARBAMOL 500 MG: 500 TABLET ORAL at 21:39

## 2022-01-01 RX ADMIN — OXYCODONE HYDROCHLORIDE 10 MG: 10 TABLET ORAL at 15:52

## 2022-01-01 RX ADMIN — MAGNESIUM SULFATE HEPTAHYDRATE 2 G: 40 INJECTION, SOLUTION INTRAVENOUS at 07:01

## 2022-01-01 RX ADMIN — DOCUSATE SODIUM 100 MG: 100 CAPSULE ORAL at 09:24

## 2022-01-01 RX ADMIN — FLUTICASONE PROPIONATE 1 SPRAY: 50 SPRAY, METERED NASAL at 12:40

## 2022-01-01 RX ADMIN — NYSTATIN 500000 UNITS: 100000 SUSPENSION ORAL at 21:10

## 2022-01-01 RX ADMIN — SODIUM CHLORIDE, SODIUM GLUCONATE, SODIUM ACETATE, POTASSIUM CHLORIDE, MAGNESIUM CHLORIDE, SODIUM PHOSPHATE, DIBASIC, AND POTASSIUM PHOSPHATE 100 ML/HR: .53; .5; .37; .037; .03; .012; .00082 INJECTION, SOLUTION INTRAVENOUS at 19:49

## 2022-01-01 RX ADMIN — NYSTATIN 500000 UNITS: 100000 SUSPENSION ORAL at 21:46

## 2022-01-01 RX ADMIN — DIAZEPAM 2 MG: 2 TABLET ORAL at 22:02

## 2022-01-01 RX ADMIN — PIPERACILLIN AND TAZOBACTAM 4.5 G: 36; 4.5 INJECTION, POWDER, FOR SOLUTION INTRAVENOUS at 06:04

## 2022-01-01 RX ADMIN — NYSTATIN: 100000 POWDER TOPICAL at 17:56

## 2022-01-01 RX ADMIN — ALBUTEROL SULFATE 2 PUFF: 90 AEROSOL, METERED RESPIRATORY (INHALATION) at 20:57

## 2022-01-01 RX ADMIN — ONDANSETRON 4 MG: 2 INJECTION INTRAMUSCULAR; INTRAVENOUS at 13:09

## 2022-01-01 RX ADMIN — DIAZEPAM 2 MG: 2 TABLET ORAL at 15:27

## 2022-01-01 RX ADMIN — NYSTATIN: 100000 POWDER TOPICAL at 09:57

## 2022-01-01 RX ADMIN — ALBUTEROL SULFATE 2 PUFF: 90 AEROSOL, METERED RESPIRATORY (INHALATION) at 13:40

## 2022-01-01 RX ADMIN — METHOCARBAMOL 500 MG: 500 TABLET, FILM COATED ORAL at 17:05

## 2022-01-01 RX ADMIN — GABAPENTIN 300 MG: 300 CAPSULE ORAL at 08:23

## 2022-01-01 RX ADMIN — Medication 6 MG: at 20:32

## 2022-01-01 RX ADMIN — OXYCODONE HYDROCHLORIDE 15 MG: 10 TABLET ORAL at 18:25

## 2022-01-01 RX ADMIN — OXYCODONE HYDROCHLORIDE 10 MG: 10 TABLET ORAL at 15:23

## 2022-01-01 RX ADMIN — CEFEPIME HYDROCHLORIDE 1000 MG: 1 INJECTION, SOLUTION INTRAVENOUS at 18:01

## 2022-01-01 RX ADMIN — ALTEPLASE 2 MG: 2.2 INJECTION, POWDER, LYOPHILIZED, FOR SOLUTION INTRAVENOUS at 15:58

## 2022-01-01 RX ADMIN — VANCOMYCIN HYDROCHLORIDE 2000 MG: 1 INJECTION, POWDER, LYOPHILIZED, FOR SOLUTION INTRAVENOUS at 17:35

## 2022-01-01 RX ADMIN — HYDROMORPHONE HYDROCHLORIDE 1 MG: 2 INJECTION, SOLUTION INTRAMUSCULAR; INTRAVENOUS; SUBCUTANEOUS at 00:51

## 2022-01-01 RX ADMIN — NYSTATIN 500000 UNITS: 100000 SUSPENSION ORAL at 09:02

## 2022-01-01 RX ADMIN — ACETAMINOPHEN 975 MG: 325 TABLET, FILM COATED ORAL at 20:32

## 2022-01-01 RX ADMIN — GABAPENTIN 300 MG: 300 CAPSULE ORAL at 22:48

## 2022-01-01 RX ADMIN — DIAZEPAM 2 MG: 2 TABLET ORAL at 19:33

## 2022-01-01 RX ADMIN — ONDANSETRON 4 MG: 4 TABLET, ORALLY DISINTEGRATING ORAL at 11:03

## 2022-01-01 RX ADMIN — HYDROMORPHONE HYDROCHLORIDE 1 MG: 1 INJECTION, SOLUTION INTRAMUSCULAR; INTRAVENOUS; SUBCUTANEOUS at 09:52

## 2022-01-01 RX ADMIN — LIDOCAINE HYDROCHLORIDE 100 MG: 10 INJECTION, SOLUTION EPIDURAL; INFILTRATION; INTRACAUDAL; PERINEURAL at 19:21

## 2022-01-01 RX ADMIN — NYSTATIN: 100000 POWDER TOPICAL at 09:00

## 2022-01-01 RX ADMIN — OXYCODONE HYDROCHLORIDE 10 MG: 10 TABLET ORAL at 12:01

## 2022-01-01 RX ADMIN — METOPROLOL TARTRATE 25 MG: 25 TABLET, FILM COATED ORAL at 08:02

## 2022-01-01 RX ADMIN — DIAZEPAM 2 MG: 2 TABLET ORAL at 23:12

## 2022-01-01 RX ADMIN — NYSTATIN 500000 UNITS: 100000 SUSPENSION ORAL at 08:52

## 2022-01-01 RX ADMIN — METHOCARBAMOL 500 MG: 500 TABLET, FILM COATED ORAL at 05:33

## 2022-01-01 RX ADMIN — ONDANSETRON 4 MG: 4 TABLET, ORALLY DISINTEGRATING ORAL at 06:17

## 2022-01-01 RX ADMIN — METOCLOPRAMIDE HYDROCHLORIDE 5 MG: 5 INJECTION INTRAMUSCULAR; INTRAVENOUS at 09:09

## 2022-01-01 RX ADMIN — DIAZEPAM 2 MG: 2 TABLET ORAL at 06:35

## 2022-01-01 RX ADMIN — ENOXAPARIN SODIUM 60 MG: 60 INJECTION SUBCUTANEOUS at 12:38

## 2022-01-01 RX ADMIN — NYSTATIN 500000 UNITS: 100000 SUSPENSION ORAL at 18:10

## 2022-01-01 RX ADMIN — NYSTATIN 500000 UNITS: 100000 SUSPENSION ORAL at 17:00

## 2022-01-01 RX ADMIN — PANTOPRAZOLE SODIUM 40 MG: 40 INJECTION, POWDER, FOR SOLUTION INTRAVENOUS at 18:00

## 2022-01-01 RX ADMIN — NYSTATIN: 100000 POWDER TOPICAL at 17:09

## 2022-01-01 RX ADMIN — HYDROMORPHONE HYDROCHLORIDE 0.5 MG: 1 INJECTION, SOLUTION INTRAMUSCULAR; INTRAVENOUS; SUBCUTANEOUS at 08:10

## 2022-01-01 RX ADMIN — METOPROLOL TARTRATE 25 MG: 25 TABLET, FILM COATED ORAL at 08:08

## 2022-01-01 RX ADMIN — METRONIDAZOLE 500 MG: 500 TABLET ORAL at 14:27

## 2022-01-01 RX ADMIN — STANDARDIZED SENNA CONCENTRATE 8.6 MG: 8.6 TABLET ORAL at 18:08

## 2022-01-01 RX ADMIN — PANTOPRAZOLE SODIUM 40 MG: 40 INJECTION, POWDER, FOR SOLUTION INTRAVENOUS at 06:03

## 2022-01-01 RX ADMIN — HYDROMORPHONE HYDROCHLORIDE 0.5 MG: 1 INJECTION, SOLUTION INTRAMUSCULAR; INTRAVENOUS; SUBCUTANEOUS at 09:51

## 2022-01-01 RX ADMIN — HYDROMORPHONE HYDROCHLORIDE 0.5 MG: 1 INJECTION, SOLUTION INTRAMUSCULAR; INTRAVENOUS; SUBCUTANEOUS at 14:12

## 2022-01-01 RX ADMIN — METHOCARBAMOL 500 MG: 500 TABLET ORAL at 14:28

## 2022-01-01 RX ADMIN — OXYCODONE HYDROCHLORIDE 10 MG: 10 TABLET ORAL at 05:57

## 2022-01-01 RX ADMIN — STANDARDIZED SENNA CONCENTRATE 8.6 MG: 8.6 TABLET ORAL at 17:29

## 2022-01-01 RX ADMIN — NYSTATIN: 100000 POWDER TOPICAL at 09:25

## 2022-01-01 RX ADMIN — HYDROMORPHONE HYDROCHLORIDE 0.5 MG: 1 INJECTION, SOLUTION INTRAMUSCULAR; INTRAVENOUS; SUBCUTANEOUS at 12:00

## 2022-01-01 RX ADMIN — NYSTATIN: 100000 POWDER TOPICAL at 11:45

## 2022-01-01 RX ADMIN — OXYCODONE HYDROCHLORIDE 10 MG: 10 TABLET ORAL at 05:30

## 2022-01-01 RX ADMIN — NYSTATIN: 100000 POWDER TOPICAL at 10:18

## 2022-01-01 RX ADMIN — METHOCARBAMOL 500 MG: 500 TABLET, FILM COATED ORAL at 13:09

## 2022-01-01 RX ADMIN — ALPRAZOLAM 0.5 MG: 0.5 TABLET ORAL at 20:56

## 2022-01-01 RX ADMIN — CEFTRIAXONE 1000 MG: 1 INJECTION, SOLUTION INTRAVENOUS at 15:37

## 2022-01-01 RX ADMIN — ONDANSETRON 4 MG: 4 TABLET, ORALLY DISINTEGRATING ORAL at 23:48

## 2022-01-01 RX ADMIN — OXYCODONE HYDROCHLORIDE 10 MG: 10 TABLET ORAL at 13:14

## 2022-01-01 RX ADMIN — NYSTATIN: 100000 POWDER TOPICAL at 09:22

## 2022-01-01 RX ADMIN — STANDARDIZED SENNA CONCENTRATE 8.6 MG: 8.6 TABLET ORAL at 09:21

## 2022-01-01 RX ADMIN — DOCUSATE SODIUM 100 MG: 100 CAPSULE ORAL at 17:00

## 2022-01-01 RX ADMIN — PANTOPRAZOLE SODIUM 40 MG: 40 TABLET, DELAYED RELEASE ORAL at 05:57

## 2022-01-01 RX ADMIN — ONDANSETRON 4 MG: 4 TABLET, ORALLY DISINTEGRATING ORAL at 23:00

## 2022-01-01 RX ADMIN — POTASSIUM CHLORIDE 40 MEQ: 1500 TABLET, EXTENDED RELEASE ORAL at 09:21

## 2022-01-01 RX ADMIN — ONDANSETRON 4 MG: 4 TABLET, ORALLY DISINTEGRATING ORAL at 23:18

## 2022-01-01 RX ADMIN — DOCUSATE SODIUM 100 MG: 100 CAPSULE ORAL at 10:00

## 2022-01-01 RX ADMIN — ALBUTEROL SULFATE 2 PUFF: 90 AEROSOL, METERED RESPIRATORY (INHALATION) at 19:30

## 2022-01-01 RX ADMIN — NYSTATIN 500000 UNITS: 100000 SUSPENSION ORAL at 21:20

## 2022-01-01 RX ADMIN — HEPARIN SODIUM 7500 UNITS: 5000 INJECTION INTRAVENOUS; SUBCUTANEOUS at 13:30

## 2022-01-01 RX ADMIN — HYDROMORPHONE HYDROCHLORIDE 0.5 MG: 1 INJECTION, SOLUTION INTRAMUSCULAR; INTRAVENOUS; SUBCUTANEOUS at 15:02

## 2022-01-01 RX ADMIN — ONDANSETRON 4 MG: 4 TABLET, ORALLY DISINTEGRATING ORAL at 23:12

## 2022-01-01 RX ADMIN — METOPROLOL TARTRATE 25 MG: 25 TABLET, FILM COATED ORAL at 17:03

## 2022-01-01 RX ADMIN — FENTANYL CITRATE 50 MCG: 50 INJECTION INTRAMUSCULAR; INTRAVENOUS at 19:21

## 2022-01-01 RX ADMIN — STANDARDIZED SENNA CONCENTRATE 8.6 MG: 8.6 TABLET ORAL at 17:04

## 2022-01-01 RX ADMIN — DIAZEPAM 2 MG: 2 TABLET ORAL at 09:21

## 2022-01-01 RX ADMIN — NYSTATIN 500000 UNITS: 100000 SUSPENSION ORAL at 09:21

## 2022-01-01 RX ADMIN — STANDARDIZED SENNA CONCENTRATE 8.6 MG: 8.6 TABLET ORAL at 17:25

## 2022-01-01 RX ADMIN — METHOCARBAMOL 500 MG: 500 TABLET ORAL at 17:56

## 2022-01-01 RX ADMIN — PANTOPRAZOLE SODIUM 40 MG: 40 INJECTION, POWDER, FOR SOLUTION INTRAVENOUS at 05:51

## 2022-01-01 RX ADMIN — NYSTATIN: 100000 POWDER TOPICAL at 18:21

## 2022-01-01 RX ADMIN — GABAPENTIN 300 MG: 300 CAPSULE ORAL at 15:02

## 2022-01-01 RX ADMIN — SCOLOPAMINE TRANSDERMAL SYSTEM 1 PATCH: 1 PATCH, EXTENDED RELEASE TRANSDERMAL at 09:51

## 2022-01-01 RX ADMIN — Medication 3 MG: at 21:34

## 2022-01-01 RX ADMIN — DIAZEPAM 2 MG: 2 TABLET ORAL at 19:11

## 2022-01-01 RX ADMIN — HEPARIN SODIUM 7500 UNITS: 5000 INJECTION INTRAVENOUS; SUBCUTANEOUS at 15:32

## 2022-01-01 RX ADMIN — DOCUSATE SODIUM 100 MG: 100 CAPSULE ORAL at 17:30

## 2022-01-01 RX ADMIN — HYDROMORPHONE HYDROCHLORIDE 0.5 MG: 1 INJECTION, SOLUTION INTRAMUSCULAR; INTRAVENOUS; SUBCUTANEOUS at 01:01

## 2022-01-01 RX ADMIN — PANTOPRAZOLE SODIUM 40 MG: 40 TABLET, DELAYED RELEASE ORAL at 06:23

## 2022-01-01 RX ADMIN — ONDANSETRON 4 MG: 4 TABLET, ORALLY DISINTEGRATING ORAL at 05:33

## 2022-01-01 RX ADMIN — ONDANSETRON 4 MG: 4 TABLET, ORALLY DISINTEGRATING ORAL at 05:57

## 2022-01-01 RX ADMIN — HYDROMORPHONE HYDROCHLORIDE 0.5 MG: 1 INJECTION, SOLUTION INTRAMUSCULAR; INTRAVENOUS; SUBCUTANEOUS at 22:43

## 2022-01-01 RX ADMIN — DOCUSATE SODIUM 100 MG: 100 CAPSULE ORAL at 08:50

## 2022-01-01 RX ADMIN — STANDARDIZED SENNA CONCENTRATE 8.6 MG: 8.6 TABLET ORAL at 17:31

## 2022-01-01 RX ADMIN — METRONIDAZOLE 500 MG: 500 TABLET ORAL at 06:09

## 2022-01-01 RX ADMIN — NYSTATIN 500000 UNITS: 100000 SUSPENSION ORAL at 08:44

## 2022-01-01 RX ADMIN — GABAPENTIN 300 MG: 300 CAPSULE ORAL at 21:52

## 2022-01-01 RX ADMIN — NYSTATIN 500000 UNITS: 100000 SUSPENSION ORAL at 12:06

## 2022-01-01 RX ADMIN — ONDANSETRON 4 MG: 4 TABLET, ORALLY DISINTEGRATING ORAL at 17:51

## 2022-01-01 RX ADMIN — PANTOPRAZOLE SODIUM 40 MG: 40 TABLET, DELAYED RELEASE ORAL at 06:03

## 2022-01-01 RX ADMIN — HEPARIN SODIUM 7500 UNITS: 5000 INJECTION INTRAVENOUS; SUBCUTANEOUS at 22:35

## 2022-01-01 RX ADMIN — METHOCARBAMOL 500 MG: 500 TABLET ORAL at 14:00

## 2022-01-01 RX ADMIN — ONDANSETRON 4 MG: 4 TABLET, ORALLY DISINTEGRATING ORAL at 05:14

## 2022-01-01 RX ADMIN — PANTOPRAZOLE SODIUM 40 MG: 40 TABLET, DELAYED RELEASE ORAL at 06:09

## 2022-01-01 RX ADMIN — NYSTATIN 500000 UNITS: 100000 SUSPENSION ORAL at 12:32

## 2022-01-01 RX ADMIN — METHOCARBAMOL 500 MG: 500 TABLET, FILM COATED ORAL at 05:08

## 2022-01-01 RX ADMIN — FENTANYL CITRATE 50 MCG: 50 INJECTION INTRAMUSCULAR; INTRAVENOUS at 04:54

## 2022-01-01 RX ADMIN — OXYCODONE HYDROCHLORIDE 10 MG: 10 TABLET ORAL at 11:52

## 2022-01-01 RX ADMIN — DOCUSATE SODIUM 100 MG: 100 CAPSULE ORAL at 17:31

## 2022-01-01 RX ADMIN — METHOCARBAMOL 500 MG: 500 TABLET, FILM COATED ORAL at 01:34

## 2022-01-01 RX ADMIN — ACETAMINOPHEN 975 MG: 325 TABLET, FILM COATED ORAL at 14:00

## 2022-01-01 RX ADMIN — ONDANSETRON 4 MG: 4 TABLET, ORALLY DISINTEGRATING ORAL at 21:16

## 2022-01-01 RX ADMIN — METHOCARBAMOL 500 MG: 500 TABLET ORAL at 05:41

## 2022-01-01 RX ADMIN — HYDROMORPHONE HYDROCHLORIDE 0.5 MG: 1 INJECTION, SOLUTION INTRAMUSCULAR; INTRAVENOUS; SUBCUTANEOUS at 22:28

## 2022-01-01 RX ADMIN — SERTRALINE HYDROCHLORIDE 50 MG: 50 TABLET ORAL at 09:49

## 2022-01-01 RX ADMIN — HEPARIN SODIUM 7500 UNITS: 5000 INJECTION INTRAVENOUS; SUBCUTANEOUS at 21:15

## 2022-01-01 RX ADMIN — DICLOFENAC SODIUM 2 G: 10 GEL TOPICAL at 17:51

## 2022-01-01 RX ADMIN — DIAZEPAM 2 MG: 2 TABLET ORAL at 08:48

## 2022-01-01 RX ADMIN — SODIUM BICARBONATE 650 MG: 650 TABLET ORAL at 17:17

## 2022-01-01 RX ADMIN — ONDANSETRON 4 MG: 4 TABLET, ORALLY DISINTEGRATING ORAL at 12:04

## 2022-01-01 RX ADMIN — METHOCARBAMOL 500 MG: 500 TABLET, FILM COATED ORAL at 05:26

## 2022-01-01 RX ADMIN — OXYCODONE HYDROCHLORIDE 10 MG: 10 TABLET ORAL at 21:52

## 2022-01-01 RX ADMIN — OXYCODONE HYDROCHLORIDE 10 MG: 10 TABLET ORAL at 22:52

## 2022-01-01 RX ADMIN — DOCUSATE SODIUM 100 MG: 100 CAPSULE ORAL at 09:26

## 2022-01-01 RX ADMIN — LORAZEPAM 1 MG: 1 TABLET ORAL at 17:08

## 2022-01-01 RX ADMIN — MICONAZOLE NITRATE: 20 CREAM TOPICAL at 08:53

## 2022-01-01 RX ADMIN — OXYCODONE HYDROCHLORIDE 10 MG: 10 TABLET ORAL at 20:48

## 2022-01-01 RX ADMIN — PANTOPRAZOLE SODIUM 40 MG: 40 INJECTION, POWDER, FOR SOLUTION INTRAVENOUS at 17:19

## 2022-01-01 RX ADMIN — OXYCODONE HYDROCHLORIDE 10 MG: 10 TABLET ORAL at 20:06

## 2022-01-01 RX ADMIN — DICLOFENAC SODIUM 2 G: 10 GEL TOPICAL at 17:13

## 2022-01-01 RX ADMIN — HEPARIN SODIUM 7500 UNITS: 5000 INJECTION INTRAVENOUS; SUBCUTANEOUS at 13:31

## 2022-01-01 RX ADMIN — SODIUM BICARBONATE 650 MG: 650 TABLET ORAL at 17:29

## 2022-01-01 RX ADMIN — SODIUM CHLORIDE, SODIUM LACTATE, POTASSIUM CHLORIDE, AND CALCIUM CHLORIDE 1000 ML: .6; .31; .03; .02 INJECTION, SOLUTION INTRAVENOUS at 12:02

## 2022-01-01 RX ADMIN — STANDARDIZED SENNA CONCENTRATE 8.6 MG: 8.6 TABLET ORAL at 08:38

## 2022-01-01 RX ADMIN — OXYCODONE HYDROCHLORIDE 10 MG: 10 TABLET ORAL at 10:45

## 2022-01-01 RX ADMIN — MICONAZOLE NITRATE: 20 CREAM TOPICAL at 19:18

## 2022-01-01 RX ADMIN — ALPRAZOLAM 0.5 MG: 0.5 TABLET ORAL at 21:03

## 2022-01-01 RX ADMIN — NYSTATIN: 100000 POWDER TOPICAL at 09:56

## 2022-01-01 RX ADMIN — PANTOPRAZOLE SODIUM 40 MG: 40 INJECTION, POWDER, FOR SOLUTION INTRAVENOUS at 17:43

## 2022-01-01 RX ADMIN — HYDROCODONE BITARTRATE AND ACETAMINOPHEN 1 TABLET: 5; 325 TABLET ORAL at 10:44

## 2022-01-01 RX ADMIN — NYSTATIN: 100000 POWDER TOPICAL at 17:04

## 2022-01-01 RX ADMIN — ENOXAPARIN SODIUM 60 MG: 60 INJECTION SUBCUTANEOUS at 08:15

## 2022-01-01 RX ADMIN — GABAPENTIN 300 MG: 300 CAPSULE ORAL at 22:54

## 2022-01-01 RX ADMIN — HEPARIN SODIUM 7500 UNITS: 5000 INJECTION INTRAVENOUS; SUBCUTANEOUS at 13:24

## 2022-01-01 RX ADMIN — PROPOFOL 30 MCG/KG/MIN: 10 INJECTION, EMULSION INTRAVENOUS at 10:26

## 2022-01-01 RX ADMIN — NYSTATIN 500000 UNITS: 100000 SUSPENSION ORAL at 10:58

## 2022-01-01 RX ADMIN — ALPRAZOLAM 0.5 MG: 0.5 TABLET ORAL at 11:23

## 2022-01-01 RX ADMIN — Medication 3 MG: at 21:56

## 2022-01-01 RX ADMIN — METRONIDAZOLE 500 MG: 500 TABLET ORAL at 13:08

## 2022-01-01 RX ADMIN — DICLOFENAC SODIUM 2 G: 10 GEL TOPICAL at 13:23

## 2022-01-01 RX ADMIN — FLUTICASONE PROPIONATE 1 SPRAY: 50 SPRAY, METERED NASAL at 10:02

## 2022-01-01 RX ADMIN — METHOCARBAMOL 500 MG: 500 TABLET ORAL at 21:58

## 2022-01-01 RX ADMIN — ENOXAPARIN SODIUM 60 MG: 60 INJECTION SUBCUTANEOUS at 17:30

## 2022-01-01 RX ADMIN — MICONAZOLE NITRATE: 20 CREAM TOPICAL at 17:35

## 2022-01-01 RX ADMIN — SODIUM BICARBONATE 1300 MG: 650 TABLET ORAL at 18:07

## 2022-01-01 RX ADMIN — NYSTATIN: 100000 POWDER TOPICAL at 10:29

## 2022-01-01 RX ADMIN — OXYCODONE HYDROCHLORIDE 10 MG: 10 TABLET ORAL at 02:00

## 2022-01-01 RX ADMIN — NYSTATIN: 100000 POWDER TOPICAL at 10:52

## 2022-01-01 RX ADMIN — NYSTATIN 500000 UNITS: 100000 SUSPENSION ORAL at 17:17

## 2022-01-01 RX ADMIN — STANDARDIZED SENNA CONCENTRATE 8.6 MG: 8.6 TABLET ORAL at 09:19

## 2022-01-01 RX ADMIN — DOCUSATE SODIUM 100 MG: 100 CAPSULE ORAL at 17:04

## 2022-01-01 RX ADMIN — OXYCODONE HYDROCHLORIDE 10 MG: 10 TABLET ORAL at 12:51

## 2022-01-01 RX ADMIN — METHOCARBAMOL 500 MG: 500 TABLET, FILM COATED ORAL at 05:59

## 2022-01-01 RX ADMIN — OXYCODONE HYDROCHLORIDE 10 MG: 10 TABLET ORAL at 21:53

## 2022-01-01 RX ADMIN — NYSTATIN 500000 UNITS: 100000 SUSPENSION ORAL at 09:20

## 2022-01-01 RX ADMIN — DOCUSATE SODIUM 100 MG: 100 CAPSULE ORAL at 18:00

## 2022-01-01 RX ADMIN — MICONAZOLE NITRATE: 20 CREAM TOPICAL at 18:23

## 2022-01-01 RX ADMIN — METHOCARBAMOL 500 MG: 500 TABLET, FILM COATED ORAL at 11:51

## 2022-01-01 RX ADMIN — MICONAZOLE NITRATE: 20 CREAM TOPICAL at 17:47

## 2022-01-01 RX ADMIN — GABAPENTIN 300 MG: 300 CAPSULE ORAL at 21:53

## 2022-01-01 RX ADMIN — ALBUMIN (HUMAN): 12.5 INJECTION, SOLUTION INTRAVENOUS at 21:22

## 2022-01-01 RX ADMIN — HYDROMORPHONE HYDROCHLORIDE 0.5 MG: 1 INJECTION, SOLUTION INTRAMUSCULAR; INTRAVENOUS; SUBCUTANEOUS at 03:55

## 2022-01-01 RX ADMIN — HYDROCODONE BITARTRATE AND ACETAMINOPHEN 1 TABLET: 5; 325 TABLET ORAL at 11:53

## 2022-01-01 RX ADMIN — NYSTATIN: 100000 POWDER TOPICAL at 17:26

## 2022-01-01 RX ADMIN — ONDANSETRON 4 MG: 4 TABLET, ORALLY DISINTEGRATING ORAL at 13:17

## 2022-01-01 RX ADMIN — HEPARIN SODIUM 7500 UNITS: 5000 INJECTION INTRAVENOUS; SUBCUTANEOUS at 20:32

## 2022-01-01 RX ADMIN — HEPARIN SODIUM 7500 UNITS: 5000 INJECTION INTRAVENOUS; SUBCUTANEOUS at 13:05

## 2022-01-01 RX ADMIN — OXYCODONE HYDROCHLORIDE 10 MG: 10 TABLET ORAL at 16:23

## 2022-01-01 RX ADMIN — FENTANYL CITRATE 50 MCG: 50 INJECTION INTRAMUSCULAR; INTRAVENOUS at 20:12

## 2022-01-01 RX ADMIN — NYSTATIN: 100000 POWDER TOPICAL at 19:19

## 2022-01-01 RX ADMIN — STANDARDIZED SENNA CONCENTRATE 8.6 MG: 8.6 TABLET ORAL at 10:16

## 2022-01-01 RX ADMIN — DOCUSATE SODIUM 100 MG: 100 CAPSULE ORAL at 17:33

## 2022-01-01 RX ADMIN — PANTOPRAZOLE SODIUM 40 MG: 40 TABLET, DELAYED RELEASE ORAL at 05:14

## 2022-01-01 RX ADMIN — OXYCODONE HYDROCHLORIDE 10 MG: 10 TABLET ORAL at 19:16

## 2022-01-01 RX ADMIN — SERTRALINE HYDROCHLORIDE 50 MG: 50 TABLET ORAL at 12:42

## 2022-01-01 RX ADMIN — CEFTRIAXONE 2000 MG: 2 INJECTION, SOLUTION INTRAVENOUS at 17:28

## 2022-01-01 RX ADMIN — NYSTATIN: 100000 POWDER TOPICAL at 17:16

## 2022-01-01 RX ADMIN — MICONAZOLE NITRATE: 20 CREAM TOPICAL at 18:28

## 2022-01-01 RX ADMIN — ENOXAPARIN SODIUM 60 MG: 60 INJECTION SUBCUTANEOUS at 21:39

## 2022-01-01 RX ADMIN — HYDROMORPHONE HYDROCHLORIDE 1 MG: 1 INJECTION, SOLUTION INTRAMUSCULAR; INTRAVENOUS; SUBCUTANEOUS at 00:35

## 2022-01-01 RX ADMIN — OXYCODONE HYDROCHLORIDE 10 MG: 10 TABLET ORAL at 22:01

## 2022-01-01 RX ADMIN — MICONAZOLE NITRATE: 20 CREAM TOPICAL at 08:15

## 2022-01-01 RX ADMIN — ONDANSETRON 4 MG: 4 TABLET, ORALLY DISINTEGRATING ORAL at 23:08

## 2022-01-01 RX ADMIN — Medication: at 21:59

## 2022-01-01 RX ADMIN — FLUTICASONE PROPIONATE 1 SPRAY: 50 SPRAY, METERED NASAL at 09:20

## 2022-01-01 RX ADMIN — DOCUSATE SODIUM 100 MG: 100 CAPSULE ORAL at 08:07

## 2022-01-01 RX ADMIN — HYDROMORPHONE HYDROCHLORIDE 1 MG: 1 INJECTION, SOLUTION INTRAMUSCULAR; INTRAVENOUS; SUBCUTANEOUS at 20:17

## 2022-01-01 RX ADMIN — AMOXICILLIN AND CLAVULANATE POTASSIUM 1 TABLET: 875; 125 TABLET, FILM COATED ORAL at 12:05

## 2022-01-01 RX ADMIN — HYDROMORPHONE HYDROCHLORIDE 0.5 MG: 1 INJECTION, SOLUTION INTRAMUSCULAR; INTRAVENOUS; SUBCUTANEOUS at 20:22

## 2022-01-01 RX ADMIN — GABAPENTIN 300 MG: 300 CAPSULE ORAL at 21:27

## 2022-01-01 RX ADMIN — DOCUSATE SODIUM 100 MG: 100 CAPSULE ORAL at 17:13

## 2022-01-01 RX ADMIN — PANTOPRAZOLE SODIUM 40 MG: 40 TABLET, DELAYED RELEASE ORAL at 06:44

## 2022-01-01 RX ADMIN — NYSTATIN 500000 UNITS: 100000 SUSPENSION ORAL at 09:08

## 2022-01-01 RX ADMIN — DOCUSATE SODIUM 100 MG: 100 CAPSULE ORAL at 09:19

## 2022-01-01 RX ADMIN — IOHEXOL 100 ML: 350 INJECTION, SOLUTION INTRAVENOUS at 12:32

## 2022-01-01 RX ADMIN — NYSTATIN 1 APPLICATION: 100000 POWDER TOPICAL at 18:35

## 2022-01-01 RX ADMIN — Medication 3 MG: at 21:54

## 2022-01-01 RX ADMIN — DICLOFENAC SODIUM 2 G: 10 GEL TOPICAL at 21:49

## 2022-01-01 RX ADMIN — HEPARIN SODIUM 7500 UNITS: 5000 INJECTION INTRAVENOUS; SUBCUTANEOUS at 06:07

## 2022-01-01 RX ADMIN — DOCUSATE SODIUM 100 MG: 100 CAPSULE ORAL at 17:34

## 2022-01-01 RX ADMIN — NYSTATIN 500000 UNITS: 100000 SUSPENSION ORAL at 18:00

## 2022-01-01 RX ADMIN — DICLOFENAC SODIUM 2 G: 10 GEL TOPICAL at 22:55

## 2022-01-01 RX ADMIN — PANTOPRAZOLE SODIUM 40 MG: 40 TABLET, DELAYED RELEASE ORAL at 05:59

## 2022-01-01 RX ADMIN — NYSTATIN: 100000 POWDER TOPICAL at 09:19

## 2022-01-01 RX ADMIN — NYSTATIN 500000 UNITS: 100000 SUSPENSION ORAL at 14:37

## 2022-01-01 RX ADMIN — MAGNESIUM SULFATE HEPTAHYDRATE 4 G: 40 INJECTION, SOLUTION INTRAVENOUS at 04:16

## 2022-01-01 RX ADMIN — PANTOPRAZOLE SODIUM 40 MG: 40 TABLET, DELAYED RELEASE ORAL at 06:06

## 2022-01-01 RX ADMIN — HEPARIN SODIUM 7500 UNITS: 5000 INJECTION INTRAVENOUS; SUBCUTANEOUS at 21:25

## 2022-01-01 RX ADMIN — HEPARIN SODIUM 7500 UNITS: 5000 INJECTION INTRAVENOUS; SUBCUTANEOUS at 05:14

## 2022-01-01 RX ADMIN — PANTOPRAZOLE SODIUM 40 MG: 40 TABLET, DELAYED RELEASE ORAL at 05:22

## 2022-01-01 RX ADMIN — OXYCODONE HYDROCHLORIDE 10 MG: 10 TABLET ORAL at 20:40

## 2022-01-01 RX ADMIN — HYDROMORPHONE HYDROCHLORIDE 1 MG: 1 INJECTION, SOLUTION INTRAMUSCULAR; INTRAVENOUS; SUBCUTANEOUS at 14:03

## 2022-01-01 RX ADMIN — NYSTATIN: 100000 POWDER TOPICAL at 20:19

## 2022-01-01 RX ADMIN — ACETAMINOPHEN 975 MG: 325 TABLET, FILM COATED ORAL at 06:11

## 2022-01-01 RX ADMIN — ENOXAPARIN SODIUM 60 MG: 60 INJECTION SUBCUTANEOUS at 18:18

## 2022-01-01 RX ADMIN — NYSTATIN 500000 UNITS: 100000 SUSPENSION ORAL at 11:02

## 2022-01-01 RX ADMIN — HYDROMORPHONE HYDROCHLORIDE 0.5 MG: 1 INJECTION, SOLUTION INTRAMUSCULAR; INTRAVENOUS; SUBCUTANEOUS at 10:13

## 2022-01-01 RX ADMIN — STANDARDIZED SENNA CONCENTRATE 8.6 MG: 8.6 TABLET ORAL at 17:21

## 2022-01-01 RX ADMIN — NYSTATIN: 100000 CREAM TOPICAL at 20:00

## 2022-01-01 RX ADMIN — METHOCARBAMOL 500 MG: 500 TABLET, FILM COATED ORAL at 12:04

## 2022-01-01 RX ADMIN — MICONAZOLE NITRATE: 20 CREAM TOPICAL at 09:23

## 2022-01-01 RX ADMIN — DOCUSATE SODIUM 100 MG: 100 CAPSULE ORAL at 17:16

## 2022-01-01 RX ADMIN — CEFAZOLIN 3000 MG: 1 INJECTION, POWDER, FOR SOLUTION INTRAMUSCULAR; INTRAVENOUS at 23:35

## 2022-01-01 RX ADMIN — HEPARIN SODIUM 7500 UNITS: 5000 INJECTION INTRAVENOUS; SUBCUTANEOUS at 22:43

## 2022-01-01 RX ADMIN — ALPRAZOLAM 0.5 MG: 0.5 TABLET ORAL at 06:21

## 2022-01-01 RX ADMIN — ACETAMINOPHEN 975 MG: 325 TABLET, FILM COATED ORAL at 22:45

## 2022-01-01 RX ADMIN — CEFAZOLIN 3000 MG: 1 INJECTION, POWDER, FOR SOLUTION INTRAMUSCULAR; INTRAVENOUS at 19:30

## 2022-01-01 RX ADMIN — SODIUM CHLORIDE, SODIUM GLUCONATE, SODIUM ACETATE, POTASSIUM CHLORIDE, MAGNESIUM CHLORIDE, SODIUM PHOSPHATE, DIBASIC, AND POTASSIUM PHOSPHATE 150 ML/HR: .53; .5; .37; .037; .03; .012; .00082 INJECTION, SOLUTION INTRAVENOUS at 02:43

## 2022-01-01 RX ADMIN — NYSTATIN: 100000 POWDER TOPICAL at 17:28

## 2022-01-01 RX ADMIN — NYSTATIN 500000 UNITS: 100000 SUSPENSION ORAL at 09:47

## 2022-01-01 RX ADMIN — HYDROMORPHONE HYDROCHLORIDE 0.5 MG: 1 INJECTION, SOLUTION INTRAMUSCULAR; INTRAVENOUS; SUBCUTANEOUS at 05:04

## 2022-01-01 RX ADMIN — NYSTATIN 500000 UNITS: 100000 SUSPENSION ORAL at 13:50

## 2022-01-01 RX ADMIN — DOCUSATE SODIUM 100 MG: 100 CAPSULE ORAL at 17:06

## 2022-01-01 RX ADMIN — STANDARDIZED SENNA CONCENTRATE 8.6 MG: 8.6 TABLET ORAL at 08:26

## 2022-01-01 RX ADMIN — NYSTATIN: 100000 POWDER TOPICAL at 16:13

## 2022-01-01 RX ADMIN — DOCUSATE SODIUM 100 MG: 100 CAPSULE ORAL at 09:12

## 2022-01-01 RX ADMIN — ACETAMINOPHEN 975 MG: 325 TABLET, FILM COATED ORAL at 13:44

## 2022-01-01 RX ADMIN — METRONIDAZOLE 500 MG: 500 TABLET ORAL at 14:17

## 2022-01-01 RX ADMIN — HYDROMORPHONE HYDROCHLORIDE 0.5 MG: 1 INJECTION, SOLUTION INTRAMUSCULAR; INTRAVENOUS; SUBCUTANEOUS at 20:34

## 2022-01-01 RX ADMIN — METRONIDAZOLE 500 MG: 500 TABLET ORAL at 21:38

## 2022-01-01 RX ADMIN — NYSTATIN: 100000 POWDER TOPICAL at 18:23

## 2022-01-01 RX ADMIN — HEPARIN SODIUM 7500 UNITS: 5000 INJECTION INTRAVENOUS; SUBCUTANEOUS at 05:03

## 2022-01-01 RX ADMIN — METOPROLOL TARTRATE 25 MG: 25 TABLET, FILM COATED ORAL at 18:01

## 2022-01-01 RX ADMIN — SCOLOPAMINE TRANSDERMAL SYSTEM 1 PATCH: 1 PATCH, EXTENDED RELEASE TRANSDERMAL at 05:30

## 2022-01-01 RX ADMIN — ALBUMIN (HUMAN) 12.5 G: 12.5 INJECTION, SOLUTION INTRAVENOUS at 08:28

## 2022-01-01 RX ADMIN — OXYCODONE HYDROCHLORIDE 10 MG: 10 TABLET ORAL at 23:34

## 2022-01-01 RX ADMIN — METOPROLOL TARTRATE 25 MG: 25 TABLET, FILM COATED ORAL at 10:29

## 2022-01-01 RX ADMIN — NYSTATIN 1 APPLICATION: 100000 POWDER TOPICAL at 08:53

## 2022-01-01 RX ADMIN — ACETAMINOPHEN 975 MG: 325 TABLET, FILM COATED ORAL at 05:26

## 2022-01-01 RX ADMIN — NYSTATIN: 100000 POWDER TOPICAL at 17:33

## 2022-01-01 RX ADMIN — OXYCODONE HYDROCHLORIDE 10 MG: 10 TABLET ORAL at 22:22

## 2022-01-01 RX ADMIN — OXYCODONE HYDROCHLORIDE 10 MG: 10 TABLET ORAL at 21:09

## 2022-01-01 RX ADMIN — PANTOPRAZOLE SODIUM 40 MG: 40 TABLET, DELAYED RELEASE ORAL at 17:21

## 2022-01-01 RX ADMIN — OXYCODONE HYDROCHLORIDE 10 MG: 10 TABLET ORAL at 09:54

## 2022-01-01 RX ADMIN — ACETAMINOPHEN 975 MG: 325 TABLET, FILM COATED ORAL at 06:31

## 2022-01-01 RX ADMIN — PIPERACILLIN AND TAZOBACTAM 4.5 G: 36; 4.5 INJECTION, POWDER, FOR SOLUTION INTRAVENOUS at 00:25

## 2022-01-01 RX ADMIN — PANTOPRAZOLE SODIUM 40 MG: 40 INJECTION, POWDER, FOR SOLUTION INTRAVENOUS at 05:14

## 2022-01-01 RX ADMIN — FENTANYL CITRATE 100 MCG: 50 INJECTION INTRAMUSCULAR; INTRAVENOUS at 19:18

## 2022-01-01 RX ADMIN — NYSTATIN: 100000 POWDER TOPICAL at 09:47

## 2022-01-01 RX ADMIN — ENOXAPARIN SODIUM 60 MG: 60 INJECTION SUBCUTANEOUS at 09:53

## 2022-01-01 RX ADMIN — SERTRALINE HYDROCHLORIDE 50 MG: 50 TABLET ORAL at 10:31

## 2022-01-01 RX ADMIN — HYDROMORPHONE HYDROCHLORIDE 0.5 MG: 1 INJECTION, SOLUTION INTRAMUSCULAR; INTRAVENOUS; SUBCUTANEOUS at 08:52

## 2022-01-01 RX ADMIN — MICONAZOLE NITRATE: 20 CREAM TOPICAL at 17:24

## 2022-01-01 RX ADMIN — OXYCODONE HYDROCHLORIDE 15 MG: 10 TABLET ORAL at 05:56

## 2022-01-01 RX ADMIN — NYSTATIN 500000 UNITS: 100000 SUSPENSION ORAL at 13:39

## 2022-01-01 RX ADMIN — NYSTATIN 500000 UNITS: 100000 SUSPENSION ORAL at 21:54

## 2022-01-01 RX ADMIN — METHOCARBAMOL 500 MG: 500 TABLET, FILM COATED ORAL at 05:57

## 2022-01-01 RX ADMIN — HEPARIN SODIUM 7500 UNITS: 5000 INJECTION INTRAVENOUS; SUBCUTANEOUS at 15:00

## 2022-01-01 RX ADMIN — ONDANSETRON 4 MG: 4 TABLET, ORALLY DISINTEGRATING ORAL at 17:13

## 2022-01-01 RX ADMIN — Medication 6 MG: at 22:48

## 2022-01-01 RX ADMIN — HYDROMORPHONE HYDROCHLORIDE 0.5 MG: 1 INJECTION, SOLUTION INTRAMUSCULAR; INTRAVENOUS; SUBCUTANEOUS at 00:11

## 2022-01-01 RX ADMIN — NYSTATIN: 100000 POWDER TOPICAL at 20:31

## 2022-01-01 RX ADMIN — STANDARDIZED SENNA CONCENTRATE 8.6 MG: 8.6 TABLET ORAL at 17:51

## 2022-01-01 RX ADMIN — LIDOCAINE 5% 1 PATCH: 700 PATCH TOPICAL at 09:53

## 2022-01-01 RX ADMIN — STANDARDIZED SENNA CONCENTRATE 8.6 MG: 8.6 TABLET ORAL at 09:59

## 2022-01-01 RX ADMIN — GABAPENTIN 300 MG: 300 CAPSULE ORAL at 17:52

## 2022-01-01 RX ADMIN — DOCUSATE SODIUM 100 MG: 100 CAPSULE ORAL at 09:55

## 2022-01-01 RX ADMIN — DICLOFENAC SODIUM 2 G: 10 GEL TOPICAL at 13:17

## 2022-01-01 RX ADMIN — ALUMINA, MAGNESIA, AND SIMETHICONE ORAL SUSPENSION REGULAR STRENGTH 10 ML: 1200; 1200; 120 SUSPENSION ORAL at 09:25

## 2022-01-01 RX ADMIN — CEFTRIAXONE 2000 MG: 2 INJECTION, SOLUTION INTRAVENOUS at 17:20

## 2022-01-01 RX ADMIN — GABAPENTIN 300 MG: 300 CAPSULE ORAL at 09:55

## 2022-01-01 RX ADMIN — HYDROMORPHONE HYDROCHLORIDE 0.5 MG: 1 INJECTION, SOLUTION INTRAMUSCULAR; INTRAVENOUS; SUBCUTANEOUS at 15:50

## 2022-01-01 RX ADMIN — SODIUM CHLORIDE 100 ML/HR: 0.9 INJECTION, SOLUTION INTRAVENOUS at 15:39

## 2022-01-01 RX ADMIN — METHOCARBAMOL 500 MG: 500 TABLET, FILM COATED ORAL at 23:00

## 2022-01-01 RX ADMIN — FLUCONAZOLE 150 MG: 100 TABLET ORAL at 21:40

## 2022-01-01 RX ADMIN — DOCUSATE SODIUM 100 MG: 100 CAPSULE ORAL at 08:59

## 2022-01-01 RX ADMIN — METHOCARBAMOL 500 MG: 500 TABLET, FILM COATED ORAL at 17:51

## 2022-01-01 RX ADMIN — HYDROMORPHONE HYDROCHLORIDE 0.5 MG: 1 INJECTION, SOLUTION INTRAMUSCULAR; INTRAVENOUS; SUBCUTANEOUS at 01:30

## 2022-01-01 RX ADMIN — DOCUSATE SODIUM 100 MG: 100 CAPSULE ORAL at 18:15

## 2022-01-01 RX ADMIN — ONDANSETRON 4 MG: 4 TABLET, ORALLY DISINTEGRATING ORAL at 06:50

## 2022-01-01 RX ADMIN — METHOCARBAMOL 500 MG: 500 TABLET ORAL at 06:11

## 2022-01-01 RX ADMIN — OXYCODONE HYDROCHLORIDE 10 MG: 10 TABLET ORAL at 09:26

## 2022-01-01 RX ADMIN — HYDROMORPHONE HYDROCHLORIDE 0.5 MG: 1 INJECTION, SOLUTION INTRAMUSCULAR; INTRAVENOUS; SUBCUTANEOUS at 16:37

## 2022-01-01 RX ADMIN — DICLOFENAC SODIUM 2 G: 10 GEL TOPICAL at 09:57

## 2022-01-01 RX ADMIN — ONDANSETRON 4 MG: 4 TABLET, ORALLY DISINTEGRATING ORAL at 05:15

## 2022-01-01 RX ADMIN — METHOCARBAMOL 500 MG: 500 TABLET, FILM COATED ORAL at 23:47

## 2022-01-01 RX ADMIN — PIPERACILLIN AND TAZOBACTAM 4.5 G: 36; 4.5 INJECTION, POWDER, FOR SOLUTION INTRAVENOUS at 01:59

## 2022-01-01 RX ADMIN — MICONAZOLE NITRATE: 20 CREAM TOPICAL at 09:20

## 2022-01-01 RX ADMIN — ENOXAPARIN SODIUM 60 MG: 60 INJECTION SUBCUTANEOUS at 09:12

## 2022-01-01 RX ADMIN — HYDROMORPHONE HYDROCHLORIDE 0.5 MG: 1 INJECTION, SOLUTION INTRAMUSCULAR; INTRAVENOUS; SUBCUTANEOUS at 01:38

## 2022-01-01 RX ADMIN — DOCUSATE SODIUM 100 MG: 100 CAPSULE ORAL at 18:34

## 2022-01-01 RX ADMIN — OXYCODONE HYDROCHLORIDE 10 MG: 10 TABLET ORAL at 06:20

## 2022-01-01 RX ADMIN — MICONAZOLE NITRATE: 20 CREAM TOPICAL at 12:30

## 2022-01-01 RX ADMIN — HYDROMORPHONE HYDROCHLORIDE 0.5 MG: 1 INJECTION, SOLUTION INTRAMUSCULAR; INTRAVENOUS; SUBCUTANEOUS at 15:28

## 2022-01-01 RX ADMIN — NYSTATIN 500000 UNITS: 100000 SUSPENSION ORAL at 17:07

## 2022-01-01 RX ADMIN — MICONAZOLE NITRATE: 20 CREAM TOPICAL at 17:08

## 2022-01-01 RX ADMIN — DOCUSATE SODIUM 100 MG: 100 CAPSULE ORAL at 09:48

## 2022-01-01 RX ADMIN — PANTOPRAZOLE SODIUM 40 MG: 40 INJECTION, POWDER, FOR SOLUTION INTRAVENOUS at 17:49

## 2022-01-01 RX ADMIN — ALPRAZOLAM 0.5 MG: 0.5 TABLET ORAL at 05:02

## 2022-01-01 RX ADMIN — PANTOPRAZOLE SODIUM 40 MG: 40 TABLET, DELAYED RELEASE ORAL at 16:10

## 2022-01-01 RX ADMIN — PANTOPRAZOLE SODIUM 40 MG: 40 INJECTION, POWDER, FOR SOLUTION INTRAVENOUS at 17:14

## 2022-01-01 RX ADMIN — OXYCODONE HYDROCHLORIDE 10 MG: 10 TABLET ORAL at 20:20

## 2022-01-01 RX ADMIN — HYDROMORPHONE HYDROCHLORIDE 1 MG: 1 INJECTION, SOLUTION INTRAMUSCULAR; INTRAVENOUS; SUBCUTANEOUS at 02:30

## 2022-01-01 RX ADMIN — METHOCARBAMOL 500 MG: 500 TABLET, FILM COATED ORAL at 00:25

## 2022-01-01 RX ADMIN — OXYCODONE HYDROCHLORIDE 10 MG: 10 TABLET ORAL at 04:04

## 2022-01-01 RX ADMIN — PANTOPRAZOLE SODIUM 40 MG: 40 INJECTION, POWDER, FOR SOLUTION INTRAVENOUS at 17:28

## 2022-01-01 RX ADMIN — ONDANSETRON 4 MG: 4 TABLET, ORALLY DISINTEGRATING ORAL at 06:20

## 2022-01-01 RX ADMIN — HYDROMORPHONE HYDROCHLORIDE 0.5 MG: 1 INJECTION, SOLUTION INTRAMUSCULAR; INTRAVENOUS; SUBCUTANEOUS at 23:55

## 2022-01-01 RX ADMIN — NYSTATIN 500000 UNITS: 100000 SUSPENSION ORAL at 09:12

## 2022-01-01 RX ADMIN — DOCUSATE SODIUM 100 MG: 100 CAPSULE ORAL at 09:20

## 2022-01-01 RX ADMIN — PANTOPRAZOLE SODIUM 40 MG: 40 INJECTION, POWDER, FOR SOLUTION INTRAVENOUS at 13:57

## 2022-01-01 RX ADMIN — FLUTICASONE PROPIONATE 1 SPRAY: 50 SPRAY, METERED NASAL at 09:24

## 2022-01-01 RX ADMIN — Medication 3 MG: at 21:53

## 2022-01-01 RX ADMIN — DIAZEPAM 2 MG: 2 TABLET ORAL at 09:33

## 2022-01-01 RX ADMIN — DEXTROSE, SODIUM CHLORIDE, AND POTASSIUM CHLORIDE 101 ML/HR: 5; .45; .15 INJECTION INTRAVENOUS at 05:21

## 2022-01-01 RX ADMIN — NYSTATIN 500000 UNITS: 100000 SUSPENSION ORAL at 17:27

## 2022-01-01 RX ADMIN — NYSTATIN: 100000 POWDER TOPICAL at 08:53

## 2022-01-01 RX ADMIN — DIAZEPAM 2 MG: 2 TABLET ORAL at 21:44

## 2022-01-01 RX ADMIN — ALUMINA, MAGNESIA, AND SIMETHICONE ORAL SUSPENSION REGULAR STRENGTH 10 ML: 1200; 1200; 120 SUSPENSION ORAL at 12:06

## 2022-01-01 RX ADMIN — MICONAZOLE NITRATE: 20 CREAM TOPICAL at 18:00

## 2022-01-01 RX ADMIN — CEFAZOLIN SODIUM 2000 MG: 2 SOLUTION INTRAVENOUS at 09:00

## 2022-01-01 RX ADMIN — METHOCARBAMOL 500 MG: 500 TABLET, FILM COATED ORAL at 01:21

## 2022-01-01 RX ADMIN — NYSTATIN 500000 UNITS: 100000 SUSPENSION ORAL at 13:24

## 2022-01-01 RX ADMIN — ONDANSETRON 4 MG: 4 TABLET, ORALLY DISINTEGRATING ORAL at 00:17

## 2022-01-01 RX ADMIN — GABAPENTIN 300 MG: 300 CAPSULE ORAL at 21:34

## 2022-01-01 RX ADMIN — HYDROMORPHONE HYDROCHLORIDE 0.5 MG: 1 INJECTION, SOLUTION INTRAMUSCULAR; INTRAVENOUS; SUBCUTANEOUS at 13:04

## 2022-01-01 RX ADMIN — OXYCODONE HYDROCHLORIDE 10 MG: 10 TABLET ORAL at 17:07

## 2022-01-01 RX ADMIN — ALPRAZOLAM 0.5 MG: 0.5 TABLET ORAL at 00:28

## 2022-01-01 RX ADMIN — ONDANSETRON 4 MG: 4 TABLET, ORALLY DISINTEGRATING ORAL at 11:51

## 2022-01-01 RX ADMIN — ALUMINA, MAGNESIA, AND SIMETHICONE ORAL SUSPENSION REGULAR STRENGTH 10 ML: 1200; 1200; 120 SUSPENSION ORAL at 10:42

## 2022-01-01 RX ADMIN — HYDROMORPHONE HYDROCHLORIDE 0.5 MG: 1 INJECTION, SOLUTION INTRAMUSCULAR; INTRAVENOUS; SUBCUTANEOUS at 00:03

## 2022-01-01 RX ADMIN — NYSTATIN 500000 UNITS: 100000 SUSPENSION ORAL at 12:10

## 2022-01-01 RX ADMIN — CEFTRIAXONE 2000 MG: 2 INJECTION, SOLUTION INTRAVENOUS at 09:39

## 2022-01-01 RX ADMIN — METRONIDAZOLE 500 MG: 500 TABLET ORAL at 05:32

## 2022-01-01 RX ADMIN — STANDARDIZED SENNA CONCENTRATE 8.6 MG: 8.6 TABLET ORAL at 08:06

## 2022-01-01 RX ADMIN — OXYCODONE HYDROCHLORIDE 10 MG: 10 TABLET ORAL at 18:29

## 2022-01-01 RX ADMIN — MICONAZOLE NITRATE: 20 CREAM TOPICAL at 17:43

## 2022-01-01 RX ADMIN — HYDROMORPHONE HYDROCHLORIDE 0.5 MG: 1 INJECTION, SOLUTION INTRAMUSCULAR; INTRAVENOUS; SUBCUTANEOUS at 02:24

## 2022-01-01 RX ADMIN — METHOCARBAMOL 500 MG: 500 TABLET ORAL at 05:32

## 2022-01-01 RX ADMIN — HEPARIN SODIUM 7500 UNITS: 5000 INJECTION INTRAVENOUS; SUBCUTANEOUS at 05:07

## 2022-01-01 RX ADMIN — PANTOPRAZOLE SODIUM 40 MG: 40 INJECTION, POWDER, FOR SOLUTION INTRAVENOUS at 17:35

## 2022-01-01 RX ADMIN — STANDARDIZED SENNA CONCENTRATE 8.6 MG: 8.6 TABLET ORAL at 08:41

## 2022-01-01 RX ADMIN — HYDROMORPHONE HYDROCHLORIDE 0.5 MG: 1 INJECTION, SOLUTION INTRAMUSCULAR; INTRAVENOUS; SUBCUTANEOUS at 19:51

## 2022-01-01 RX ADMIN — HYDROMORPHONE HYDROCHLORIDE 1 MG: 1 INJECTION, SOLUTION INTRAMUSCULAR; INTRAVENOUS; SUBCUTANEOUS at 05:28

## 2022-01-01 RX ADMIN — ONDANSETRON 4 MG: 4 TABLET, ORALLY DISINTEGRATING ORAL at 00:25

## 2022-01-01 RX ADMIN — PANTOPRAZOLE SODIUM 40 MG: 40 TABLET, DELAYED RELEASE ORAL at 17:51

## 2022-01-01 RX ADMIN — HYDROMORPHONE HYDROCHLORIDE 0.5 MG: 1 INJECTION, SOLUTION INTRAMUSCULAR; INTRAVENOUS; SUBCUTANEOUS at 21:47

## 2022-01-01 RX ADMIN — FLUCONAZOLE 400 MG: 2 INJECTION, SOLUTION INTRAVENOUS at 09:41

## 2022-01-01 RX ADMIN — METHOCARBAMOL 500 MG: 500 TABLET, FILM COATED ORAL at 09:29

## 2022-01-01 RX ADMIN — Medication: at 21:09

## 2022-01-01 RX ADMIN — HYDROMORPHONE HYDROCHLORIDE 0.5 MG: 1 INJECTION, SOLUTION INTRAMUSCULAR; INTRAVENOUS; SUBCUTANEOUS at 08:16

## 2022-01-01 RX ADMIN — PANTOPRAZOLE SODIUM 40 MG: 40 TABLET, DELAYED RELEASE ORAL at 15:02

## 2022-01-01 RX ADMIN — ALUMINA, MAGNESIA, AND SIMETHICONE ORAL SUSPENSION REGULAR STRENGTH 10 ML: 1200; 1200; 120 SUSPENSION ORAL at 17:48

## 2022-01-01 RX ADMIN — MIDAZOLAM 2 MG: 1 INJECTION INTRAMUSCULAR; INTRAVENOUS at 19:12

## 2022-01-01 RX ADMIN — MICONAZOLE NITRATE: 20 CREAM TOPICAL at 16:13

## 2022-01-01 RX ADMIN — SODIUM CHLORIDE, SODIUM GLUCONATE, SODIUM ACETATE, POTASSIUM CHLORIDE, MAGNESIUM CHLORIDE, SODIUM PHOSPHATE, DIBASIC, AND POTASSIUM PHOSPHATE 100 ML/HR: .53; .5; .37; .037; .03; .012; .00082 INJECTION, SOLUTION INTRAVENOUS at 05:32

## 2022-01-01 RX ADMIN — ONDANSETRON 4 MG: 4 TABLET, ORALLY DISINTEGRATING ORAL at 23:47

## 2022-01-01 RX ADMIN — DOCUSATE SODIUM 100 MG: 100 CAPSULE ORAL at 17:52

## 2022-01-01 RX ADMIN — DEXTROSE, SODIUM CHLORIDE, AND POTASSIUM CHLORIDE 50 ML/HR: 5; .45; .15 INJECTION INTRAVENOUS at 10:33

## 2022-01-01 RX ADMIN — FENTANYL CITRATE 50 MCG: 50 INJECTION INTRAMUSCULAR; INTRAVENOUS at 16:09

## 2022-01-01 RX ADMIN — OXYCODONE HYDROCHLORIDE 10 MG: 10 TABLET ORAL at 17:29

## 2022-01-01 RX ADMIN — HYDROMORPHONE HYDROCHLORIDE 0.5 MG: 1 INJECTION, SOLUTION INTRAMUSCULAR; INTRAVENOUS; SUBCUTANEOUS at 04:24

## 2022-01-01 RX ADMIN — NYSTATIN 500000 UNITS: 100000 SUSPENSION ORAL at 21:56

## 2022-01-01 RX ADMIN — OXYCODONE HYDROCHLORIDE 10 MG: 10 TABLET ORAL at 05:14

## 2022-01-01 RX ADMIN — PANTOPRAZOLE SODIUM 40 MG: 40 TABLET, DELAYED RELEASE ORAL at 06:00

## 2022-01-01 RX ADMIN — HYDROMORPHONE HYDROCHLORIDE 0.5 MG: 1 INJECTION, SOLUTION INTRAMUSCULAR; INTRAVENOUS; SUBCUTANEOUS at 16:31

## 2022-01-01 RX ADMIN — NYSTATIN: 100000 POWDER TOPICAL at 17:30

## 2022-01-01 RX ADMIN — SODIUM CHLORIDE, SODIUM LACTATE, POTASSIUM CHLORIDE, AND CALCIUM CHLORIDE: .6; .31; .03; .02 INJECTION, SOLUTION INTRAVENOUS at 19:14

## 2022-01-01 RX ADMIN — NYSTATIN: 100000 POWDER TOPICAL at 09:23

## 2022-01-01 RX ADMIN — HEPARIN SODIUM 7500 UNITS: 5000 INJECTION INTRAVENOUS; SUBCUTANEOUS at 05:08

## 2022-01-01 RX ADMIN — STANDARDIZED SENNA CONCENTRATE 8.6 MG: 8.6 TABLET ORAL at 08:59

## 2022-01-01 RX ADMIN — NYSTATIN: 100000 POWDER TOPICAL at 08:41

## 2022-01-01 RX ADMIN — MICONAZOLE NITRATE: 20 CREAM TOPICAL at 17:56

## 2022-01-01 RX ADMIN — FLUCONAZOLE 400 MG: 2 INJECTION, SOLUTION INTRAVENOUS at 09:43

## 2022-01-01 RX ADMIN — NYSTATIN: 100000 POWDER TOPICAL at 18:27

## 2022-01-01 RX ADMIN — GABAPENTIN 300 MG: 300 CAPSULE ORAL at 17:13

## 2022-01-01 RX ADMIN — NYSTATIN 500000 UNITS: 100000 SUSPENSION ORAL at 17:30

## 2022-01-01 RX ADMIN — NYSTATIN 500000 UNITS: 100000 SUSPENSION ORAL at 17:31

## 2022-01-01 RX ADMIN — HEPARIN SODIUM 7500 UNITS: 5000 INJECTION INTRAVENOUS; SUBCUTANEOUS at 05:52

## 2022-01-01 RX ADMIN — MICONAZOLE NITRATE: 20 CREAM TOPICAL at 18:17

## 2022-01-01 RX ADMIN — METHOCARBAMOL 500 MG: 500 TABLET, FILM COATED ORAL at 13:17

## 2022-01-01 RX ADMIN — PANTOPRAZOLE SODIUM 40 MG: 40 TABLET, DELAYED RELEASE ORAL at 09:55

## 2022-01-01 RX ADMIN — NYSTATIN: 100000 POWDER TOPICAL at 22:22

## 2022-01-01 RX ADMIN — ALTEPLASE 2 MG: 2.2 INJECTION, POWDER, LYOPHILIZED, FOR SOLUTION INTRAVENOUS at 15:59

## 2022-01-01 RX ADMIN — ACETAMINOPHEN 975 MG: 325 TABLET, FILM COATED ORAL at 21:58

## 2022-01-01 RX ADMIN — NYSTATIN 1 APPLICATION: 100000 POWDER TOPICAL at 18:10

## 2022-01-01 RX ADMIN — STANDARDIZED SENNA CONCENTRATE 8.6 MG: 8.6 TABLET ORAL at 17:05

## 2022-01-01 RX ADMIN — HYDROMORPHONE HYDROCHLORIDE 0.5 MG: 1 INJECTION, SOLUTION INTRAMUSCULAR; INTRAVENOUS; SUBCUTANEOUS at 02:23

## 2022-01-01 RX ADMIN — HYDROMORPHONE HYDROCHLORIDE 0.2 MG: 1 INJECTION, SOLUTION INTRAMUSCULAR; INTRAVENOUS; SUBCUTANEOUS at 21:58

## 2022-01-01 RX ADMIN — LIDOCAINE 5% 1 PATCH: 700 PATCH TOPICAL at 13:55

## 2022-01-01 RX ADMIN — NYSTATIN 500000 UNITS: 100000 SUSPENSION ORAL at 11:41

## 2022-01-01 RX ADMIN — NYSTATIN 500000 UNITS: 100000 SUSPENSION ORAL at 21:58

## 2022-01-01 RX ADMIN — SERTRALINE HYDROCHLORIDE 50 MG: 50 TABLET ORAL at 09:53

## 2022-01-01 RX ADMIN — NYSTATIN 500000 UNITS: 100000 SUSPENSION ORAL at 21:00

## 2022-01-01 RX ADMIN — POTASSIUM CHLORIDE 40 MEQ: 1500 TABLET, EXTENDED RELEASE ORAL at 12:04

## 2022-01-01 RX ADMIN — NYSTATIN 500000 UNITS: 100000 SUSPENSION ORAL at 08:17

## 2022-01-01 RX ADMIN — HYDROMORPHONE HYDROCHLORIDE 0.5 MG: 1 INJECTION, SOLUTION INTRAMUSCULAR; INTRAVENOUS; SUBCUTANEOUS at 00:13

## 2022-01-01 RX ADMIN — FENTANYL CITRATE 50 MCG: 50 INJECTION INTRAMUSCULAR; INTRAVENOUS at 07:46

## 2022-01-01 RX ADMIN — CEFEPIME HYDROCHLORIDE 1000 MG: 1 INJECTION, SOLUTION INTRAVENOUS at 16:17

## 2022-01-01 RX ADMIN — OXYCODONE HYDROCHLORIDE 15 MG: 10 TABLET ORAL at 16:12

## 2022-01-01 RX ADMIN — NYSTATIN 500000 UNITS: 100000 SUSPENSION ORAL at 13:00

## 2022-01-01 RX ADMIN — ENOXAPARIN SODIUM 60 MG: 60 INJECTION SUBCUTANEOUS at 17:25

## 2022-01-01 RX ADMIN — NYSTATIN 500000 UNITS: 100000 SUSPENSION ORAL at 13:56

## 2022-01-01 RX ADMIN — NYSTATIN 500000 UNITS: 100000 SUSPENSION ORAL at 17:20

## 2022-01-01 RX ADMIN — OXYCODONE HYDROCHLORIDE 10 MG: 10 TABLET ORAL at 22:57

## 2022-01-01 RX ADMIN — PANTOPRAZOLE SODIUM 40 MG: 40 TABLET, DELAYED RELEASE ORAL at 15:50

## 2022-01-01 RX ADMIN — NYSTATIN: 100000 POWDER TOPICAL at 17:55

## 2022-01-01 RX ADMIN — SODIUM CHLORIDE: 0.9 INJECTION, SOLUTION INTRAVENOUS at 19:29

## 2022-01-01 RX ADMIN — ONDANSETRON 4 MG: 4 TABLET, ORALLY DISINTEGRATING ORAL at 05:01

## 2022-01-01 RX ADMIN — HEPARIN SODIUM 7500 UNITS: 5000 INJECTION INTRAVENOUS; SUBCUTANEOUS at 15:02

## 2022-01-01 RX ADMIN — HEPARIN SODIUM 7500 UNITS: 5000 INJECTION INTRAVENOUS; SUBCUTANEOUS at 06:20

## 2022-01-01 RX ADMIN — ENOXAPARIN SODIUM 60 MG: 60 INJECTION SUBCUTANEOUS at 18:34

## 2022-01-01 RX ADMIN — METOPROLOL TARTRATE 25 MG: 25 TABLET, FILM COATED ORAL at 08:55

## 2022-01-01 RX ADMIN — Medication: at 21:48

## 2022-01-01 RX ADMIN — STANDARDIZED SENNA CONCENTRATE 8.6 MG: 8.6 TABLET ORAL at 09:23

## 2022-01-01 RX ADMIN — SODIUM CHLORIDE, SODIUM GLUCONATE, SODIUM ACETATE, POTASSIUM CHLORIDE, MAGNESIUM CHLORIDE, SODIUM PHOSPHATE, DIBASIC, AND POTASSIUM PHOSPHATE 100 ML/HR: .53; .5; .37; .037; .03; .012; .00082 INJECTION, SOLUTION INTRAVENOUS at 10:25

## 2022-01-01 RX ADMIN — Medication: at 21:46

## 2022-01-01 RX ADMIN — ONDANSETRON 4 MG: 4 TABLET, ORALLY DISINTEGRATING ORAL at 23:05

## 2022-01-01 RX ADMIN — METRONIDAZOLE 500 MG: 500 TABLET ORAL at 21:20

## 2022-01-01 RX ADMIN — DICLOFENAC SODIUM 2 G: 10 GEL TOPICAL at 08:43

## 2022-01-01 RX ADMIN — STANDARDIZED SENNA CONCENTRATE 8.6 MG: 8.6 TABLET ORAL at 17:03

## 2022-01-01 RX ADMIN — STANDARDIZED SENNA CONCENTRATE 8.6 MG: 8.6 TABLET ORAL at 08:23

## 2022-01-01 RX ADMIN — HEPARIN SODIUM 7500 UNITS: 5000 INJECTION INTRAVENOUS; SUBCUTANEOUS at 13:22

## 2022-01-01 RX ADMIN — MICONAZOLE NITRATE: 20 CREAM TOPICAL at 09:51

## 2022-01-01 RX ADMIN — HEPARIN SODIUM 7500 UNITS: 5000 INJECTION INTRAVENOUS; SUBCUTANEOUS at 13:35

## 2022-01-01 RX ADMIN — FLUCONAZOLE 150 MG: 100 TABLET ORAL at 10:31

## 2022-01-01 RX ADMIN — FLUCONAZOLE 400 MG: 2 INJECTION, SOLUTION INTRAVENOUS at 08:36

## 2022-01-01 RX ADMIN — ONDANSETRON 4 MG: 4 TABLET, ORALLY DISINTEGRATING ORAL at 17:16

## 2022-01-01 RX ADMIN — PANTOPRAZOLE SODIUM 40 MG: 40 TABLET, DELAYED RELEASE ORAL at 17:52

## 2022-01-01 RX ADMIN — HYDROMORPHONE HYDROCHLORIDE 0.5 MG: 1 INJECTION, SOLUTION INTRAMUSCULAR; INTRAVENOUS; SUBCUTANEOUS at 15:51

## 2022-01-01 RX ADMIN — HEPARIN SODIUM 7500 UNITS: 5000 INJECTION INTRAVENOUS; SUBCUTANEOUS at 06:03

## 2022-01-01 RX ADMIN — OXYCODONE HYDROCHLORIDE 10 MG: 10 TABLET ORAL at 09:47

## 2022-01-01 RX ADMIN — SODIUM BICARBONATE 650 MG: 650 TABLET ORAL at 09:25

## 2022-01-01 RX ADMIN — FLUTICASONE PROPIONATE 1 SPRAY: 50 SPRAY, METERED NASAL at 08:03

## 2022-01-01 RX ADMIN — DOCUSATE SODIUM 100 MG: 100 CAPSULE ORAL at 09:21

## 2022-01-01 RX ADMIN — HYDROMORPHONE HYDROCHLORIDE 1 MG: 1 INJECTION, SOLUTION INTRAMUSCULAR; INTRAVENOUS; SUBCUTANEOUS at 21:29

## 2022-01-01 RX ADMIN — DEXAMETHASONE SODIUM PHOSPHATE 4 MG: 10 INJECTION, SOLUTION INTRAMUSCULAR; INTRAVENOUS at 19:29

## 2022-01-01 RX ADMIN — STANDARDIZED SENNA CONCENTRATE 8.6 MG: 8.6 TABLET ORAL at 17:52

## 2022-01-01 RX ADMIN — NYSTATIN 500000 UNITS: 100000 SUSPENSION ORAL at 12:54

## 2022-01-01 RX ADMIN — NYSTATIN 500000 UNITS: 100000 SUSPENSION ORAL at 14:39

## 2022-01-01 RX ADMIN — HEPARIN SODIUM 7500 UNITS: 5000 INJECTION INTRAVENOUS; SUBCUTANEOUS at 22:57

## 2022-01-01 RX ADMIN — HEPARIN SODIUM 7500 UNITS: 5000 INJECTION INTRAVENOUS; SUBCUTANEOUS at 13:21

## 2022-01-01 RX ADMIN — METHOCARBAMOL 500 MG: 500 TABLET, FILM COATED ORAL at 06:07

## 2022-01-01 RX ADMIN — METHOCARBAMOL 500 MG: 500 TABLET, FILM COATED ORAL at 17:43

## 2022-01-01 RX ADMIN — METHOCARBAMOL 500 MG: 500 TABLET, FILM COATED ORAL at 11:21

## 2022-01-01 RX ADMIN — STANDARDIZED SENNA CONCENTRATE 8.6 MG: 8.6 TABLET ORAL at 17:43

## 2022-01-01 RX ADMIN — FENTANYL CITRATE 50 MCG: 50 INJECTION INTRAMUSCULAR; INTRAVENOUS at 14:09

## 2022-01-01 RX ADMIN — VANCOMYCIN HYDROCHLORIDE 1500 MG: 500 INJECTION, POWDER, LYOPHILIZED, FOR SOLUTION INTRAVENOUS at 00:55

## 2022-01-01 RX ADMIN — HEPARIN SODIUM 7500 UNITS: 5000 INJECTION INTRAVENOUS; SUBCUTANEOUS at 21:08

## 2022-01-01 RX ADMIN — DOCUSATE SODIUM 100 MG: 100 CAPSULE ORAL at 12:39

## 2022-01-01 RX ADMIN — NYSTATIN: 100000 POWDER TOPICAL at 17:43

## 2022-01-01 RX ADMIN — HEPARIN SODIUM 7500 UNITS: 5000 INJECTION INTRAVENOUS; SUBCUTANEOUS at 21:10

## 2022-01-01 RX ADMIN — OXYCODONE HYDROCHLORIDE 10 MG: 10 TABLET ORAL at 04:52

## 2022-01-01 RX ADMIN — METOPROLOL TARTRATE 25 MG: 25 TABLET, FILM COATED ORAL at 09:25

## 2022-01-01 RX ADMIN — HEPARIN SODIUM 7500 UNITS: 5000 INJECTION INTRAVENOUS; SUBCUTANEOUS at 13:33

## 2022-01-01 RX ADMIN — OXYCODONE HYDROCHLORIDE 10 MG: 10 TABLET ORAL at 18:02

## 2022-01-01 RX ADMIN — METHOCARBAMOL 500 MG: 500 TABLET, FILM COATED ORAL at 05:47

## 2022-01-01 RX ADMIN — OXYCODONE HYDROCHLORIDE 10 MG: 10 TABLET ORAL at 06:24

## 2022-01-01 RX ADMIN — NYSTATIN 500000 UNITS: 100000 SUSPENSION ORAL at 09:55

## 2022-01-01 RX ADMIN — OXYCODONE HYDROCHLORIDE 10 MG: 10 TABLET ORAL at 11:32

## 2022-01-01 RX ADMIN — HEPARIN SODIUM 7500 UNITS: 5000 INJECTION INTRAVENOUS; SUBCUTANEOUS at 05:00

## 2022-01-01 RX ADMIN — DOCUSATE SODIUM 100 MG: 100 CAPSULE ORAL at 17:21

## 2022-01-01 RX ADMIN — ALPRAZOLAM 0.5 MG: 0.5 TABLET ORAL at 03:46

## 2022-01-01 RX ADMIN — MICONAZOLE NITRATE: 20 CREAM TOPICAL at 08:59

## 2022-01-01 RX ADMIN — NYSTATIN 500000 UNITS: 100000 SUSPENSION ORAL at 08:26

## 2022-01-01 RX ADMIN — PANTOPRAZOLE SODIUM 40 MG: 40 TABLET, DELAYED RELEASE ORAL at 05:00

## 2022-01-01 RX ADMIN — PANTOPRAZOLE SODIUM 40 MG: 40 TABLET, DELAYED RELEASE ORAL at 06:16

## 2022-01-01 RX ADMIN — METHOCARBAMOL 500 MG: 500 TABLET, FILM COATED ORAL at 11:44

## 2022-01-01 RX ADMIN — SODIUM CHLORIDE 75 ML/HR: 0.9 INJECTION, SOLUTION INTRAVENOUS at 08:54

## 2022-01-01 RX ADMIN — DOCUSATE SODIUM 100 MG: 100 CAPSULE ORAL at 09:44

## 2022-01-01 RX ADMIN — NYSTATIN 500000 UNITS: 100000 SUSPENSION ORAL at 09:59

## 2022-01-01 RX ADMIN — DEXTROSE, SODIUM CHLORIDE, AND POTASSIUM CHLORIDE 101 ML/HR: 5; .45; .15 INJECTION INTRAVENOUS at 18:49

## 2022-01-01 RX ADMIN — ONDANSETRON 4 MG: 4 TABLET, ORALLY DISINTEGRATING ORAL at 23:06

## 2022-01-01 RX ADMIN — METHOCARBAMOL 500 MG: 500 TABLET ORAL at 13:08

## 2022-01-01 RX ADMIN — CHLORHEXIDINE GLUCONATE 15 ML: 1.2 SOLUTION ORAL at 08:16

## 2022-01-01 RX ADMIN — HYDROMORPHONE HYDROCHLORIDE 0.5 MG: 1 INJECTION, SOLUTION INTRAMUSCULAR; INTRAVENOUS; SUBCUTANEOUS at 16:36

## 2022-01-01 RX ADMIN — METOPROLOL TARTRATE 25 MG: 25 TABLET, FILM COATED ORAL at 19:48

## 2022-01-01 RX ADMIN — OXYCODONE HYDROCHLORIDE 10 MG: 10 TABLET ORAL at 04:55

## 2022-01-01 RX ADMIN — NYSTATIN: 100000 POWDER TOPICAL at 08:50

## 2022-01-01 RX ADMIN — NYSTATIN 500000 UNITS: 100000 SUSPENSION ORAL at 08:48

## 2022-01-01 RX ADMIN — FENTANYL CITRATE 50 MCG: 50 INJECTION INTRAMUSCULAR; INTRAVENOUS at 22:47

## 2022-01-01 RX ADMIN — NYSTATIN 500000 UNITS: 100000 SUSPENSION ORAL at 08:50

## 2022-01-01 RX ADMIN — HEPARIN SODIUM 7500 UNITS: 5000 INJECTION INTRAVENOUS; SUBCUTANEOUS at 05:33

## 2022-01-01 RX ADMIN — HEPARIN SODIUM 7500 UNITS: 5000 INJECTION INTRAVENOUS; SUBCUTANEOUS at 21:21

## 2022-01-01 RX ADMIN — METHOCARBAMOL 500 MG: 500 TABLET, FILM COATED ORAL at 06:04

## 2022-01-01 RX ADMIN — NYSTATIN: 100000 CREAM TOPICAL at 13:17

## 2022-01-01 RX ADMIN — PANTOPRAZOLE SODIUM 40 MG: 40 TABLET, DELAYED RELEASE ORAL at 06:18

## 2022-01-01 RX ADMIN — PANTOPRAZOLE SODIUM 40 MG: 40 TABLET, DELAYED RELEASE ORAL at 06:20

## 2022-01-01 RX ADMIN — HEPARIN SODIUM 7500 UNITS: 5000 INJECTION INTRAVENOUS; SUBCUTANEOUS at 14:33

## 2022-01-01 RX ADMIN — HYDROMORPHONE HYDROCHLORIDE 0.5 MG: 1 INJECTION, SOLUTION INTRAMUSCULAR; INTRAVENOUS; SUBCUTANEOUS at 08:41

## 2022-01-01 RX ADMIN — ENOXAPARIN SODIUM 60 MG: 60 INJECTION SUBCUTANEOUS at 10:00

## 2022-01-01 RX ADMIN — METHOCARBAMOL 500 MG: 500 TABLET, FILM COATED ORAL at 06:41

## 2022-01-01 RX ADMIN — SODIUM BICARBONATE 1300 MG: 650 TABLET ORAL at 19:41

## 2022-01-01 RX ADMIN — SODIUM CHLORIDE, SODIUM LACTATE, POTASSIUM CHLORIDE, AND CALCIUM CHLORIDE 500 ML: .6; .31; .03; .02 INJECTION, SOLUTION INTRAVENOUS at 09:25

## 2022-01-01 RX ADMIN — Medication 3 MG: at 21:41

## 2022-01-01 RX ADMIN — STANDARDIZED SENNA CONCENTRATE 8.6 MG: 8.6 TABLET ORAL at 18:22

## 2022-01-01 RX ADMIN — METOPROLOL TARTRATE 25 MG: 25 TABLET, FILM COATED ORAL at 08:47

## 2022-01-01 RX ADMIN — ONDANSETRON 4 MG: 4 TABLET, ORALLY DISINTEGRATING ORAL at 11:50

## 2022-01-01 RX ADMIN — NYSTATIN 500000 UNITS: 100000 SUSPENSION ORAL at 19:57

## 2022-01-01 RX ADMIN — NYSTATIN 500000 UNITS: 100000 SUSPENSION ORAL at 22:03

## 2022-01-01 RX ADMIN — NYSTATIN 500000 UNITS: 100000 SUSPENSION ORAL at 11:53

## 2022-01-01 RX ADMIN — DICLOFENAC SODIUM 2 G: 10 GEL TOPICAL at 10:17

## 2022-01-01 RX ADMIN — ALBUTEROL SULFATE 4 PUFF: 90 AEROSOL, METERED RESPIRATORY (INHALATION) at 19:06

## 2022-01-01 RX ADMIN — GABAPENTIN 300 MG: 300 CAPSULE ORAL at 08:42

## 2022-01-01 RX ADMIN — HYDROMORPHONE HYDROCHLORIDE 0.5 MG: 1 INJECTION, SOLUTION INTRAMUSCULAR; INTRAVENOUS; SUBCUTANEOUS at 22:32

## 2022-01-01 RX ADMIN — STANDARDIZED SENNA CONCENTRATE 8.6 MG: 8.6 TABLET ORAL at 09:20

## 2022-01-01 RX ADMIN — NYSTATIN: 100000 POWDER TOPICAL at 08:18

## 2022-01-01 RX ADMIN — VANCOMYCIN HYDROCHLORIDE 1500 MG: 500 INJECTION, POWDER, LYOPHILIZED, FOR SOLUTION INTRAVENOUS at 14:17

## 2022-01-01 RX ADMIN — METHOCARBAMOL 500 MG: 500 TABLET, FILM COATED ORAL at 17:08

## 2022-01-01 RX ADMIN — DOCUSATE SODIUM 100 MG: 100 CAPSULE ORAL at 16:10

## 2022-01-01 RX ADMIN — MAGNESIUM SULFATE HEPTAHYDRATE 2 G: 40 INJECTION, SOLUTION INTRAVENOUS at 15:29

## 2022-01-01 RX ADMIN — NYSTATIN 500000 UNITS: 100000 SUSPENSION ORAL at 08:15

## 2022-01-01 RX ADMIN — SODIUM CHLORIDE, SODIUM GLUCONATE, SODIUM ACETATE, POTASSIUM CHLORIDE, MAGNESIUM CHLORIDE, SODIUM PHOSPHATE, DIBASIC, AND POTASSIUM PHOSPHATE 100 ML/HR: .53; .5; .37; .037; .03; .012; .00082 INJECTION, SOLUTION INTRAVENOUS at 02:35

## 2022-01-01 RX ADMIN — PANTOPRAZOLE SODIUM 40 MG: 40 INJECTION, POWDER, FOR SOLUTION INTRAVENOUS at 17:18

## 2022-01-01 RX ADMIN — PIPERACILLIN AND TAZOBACTAM 4.5 G: 36; 4.5 INJECTION, POWDER, FOR SOLUTION INTRAVENOUS at 08:35

## 2022-01-01 RX ADMIN — DOCUSATE SODIUM 100 MG: 100 CAPSULE ORAL at 08:10

## 2022-01-01 RX ADMIN — HYDROMORPHONE HYDROCHLORIDE 0.5 MG: 1 INJECTION, SOLUTION INTRAMUSCULAR; INTRAVENOUS; SUBCUTANEOUS at 17:47

## 2022-01-01 RX ADMIN — NYSTATIN: 100000 POWDER TOPICAL at 22:30

## 2022-01-01 RX ADMIN — HEPARIN SODIUM 7500 UNITS: 5000 INJECTION INTRAVENOUS; SUBCUTANEOUS at 05:01

## 2022-01-01 RX ADMIN — METHOCARBAMOL 500 MG: 500 TABLET ORAL at 06:23

## 2022-01-01 RX ADMIN — OXYCODONE HYDROCHLORIDE 10 MG: 10 TABLET ORAL at 00:19

## 2022-01-01 RX ADMIN — ACETAMINOPHEN 975 MG: 325 TABLET, FILM COATED ORAL at 22:55

## 2022-01-01 RX ADMIN — DIAZEPAM 2 MG: 2 TABLET ORAL at 01:10

## 2022-01-01 RX ADMIN — HEPARIN SODIUM 7500 UNITS: 5000 INJECTION INTRAVENOUS; SUBCUTANEOUS at 22:54

## 2022-01-01 RX ADMIN — METHOCARBAMOL 500 MG: 500 TABLET ORAL at 21:20

## 2022-01-01 RX ADMIN — OXYCODONE HYDROCHLORIDE 5 MG: 5 TABLET ORAL at 08:17

## 2022-01-01 RX ADMIN — ACETAMINOPHEN 975 MG: 325 TABLET, FILM COATED ORAL at 22:57

## 2022-01-01 RX ADMIN — PANTOPRAZOLE SODIUM 40 MG: 40 INJECTION, POWDER, FOR SOLUTION INTRAVENOUS at 06:05

## 2022-01-01 RX ADMIN — PIPERACILLIN AND TAZOBACTAM 4.5 G: 36; 4.5 INJECTION, POWDER, FOR SOLUTION INTRAVENOUS at 08:30

## 2022-01-01 RX ADMIN — DOCUSATE SODIUM 100 MG: 100 CAPSULE ORAL at 10:16

## 2022-01-01 RX ADMIN — FLUCONAZOLE 400 MG: 100 TABLET ORAL at 08:01

## 2022-01-01 RX ADMIN — METRONIDAZOLE 500 MG: 500 INJECTION, SOLUTION INTRAVENOUS at 20:40

## 2022-01-01 RX ADMIN — PANTOPRAZOLE SODIUM 40 MG: 40 INJECTION, POWDER, FOR SOLUTION INTRAVENOUS at 05:23

## 2022-01-01 RX ADMIN — DOCUSATE SODIUM 100 MG: 100 CAPSULE ORAL at 17:27

## 2022-01-01 RX ADMIN — HYDROMORPHONE HYDROCHLORIDE 0.5 MG: 1 INJECTION, SOLUTION INTRAMUSCULAR; INTRAVENOUS; SUBCUTANEOUS at 16:59

## 2022-01-01 RX ADMIN — HEPARIN SODIUM 7500 UNITS: 5000 INJECTION INTRAVENOUS; SUBCUTANEOUS at 13:11

## 2022-01-01 RX ADMIN — HEPARIN SODIUM 7500 UNITS: 5000 INJECTION INTRAVENOUS; SUBCUTANEOUS at 13:44

## 2022-01-01 RX ADMIN — ONDANSETRON 4 MG: 4 TABLET, ORALLY DISINTEGRATING ORAL at 06:07

## 2022-01-01 RX ADMIN — ONDANSETRON 4 MG: 4 TABLET, ORALLY DISINTEGRATING ORAL at 11:21

## 2022-01-01 RX ADMIN — IOHEXOL 100 ML: 350 INJECTION, SOLUTION INTRAVENOUS at 13:14

## 2022-01-01 RX ADMIN — ONDANSETRON 4 MG: 4 TABLET, ORALLY DISINTEGRATING ORAL at 17:54

## 2022-01-01 RX ADMIN — METRONIDAZOLE 500 MG: 500 TABLET ORAL at 06:11

## 2022-01-01 RX ADMIN — DIAZEPAM 2 MG: 2 TABLET ORAL at 13:25

## 2022-01-01 RX ADMIN — NYSTATIN: 100000 POWDER TOPICAL at 21:52

## 2022-01-01 RX ADMIN — NYSTATIN 500000 UNITS: 100000 SUSPENSION ORAL at 16:27

## 2022-01-01 RX ADMIN — MICONAZOLE NITRATE: 20 CREAM TOPICAL at 09:25

## 2022-01-01 RX ADMIN — ENOXAPARIN SODIUM 60 MG: 60 INJECTION SUBCUTANEOUS at 22:57

## 2022-01-01 RX ADMIN — NYSTATIN: 100000 POWDER TOPICAL at 17:05

## 2022-01-01 RX ADMIN — ACETAMINOPHEN 975 MG: 325 TABLET, FILM COATED ORAL at 13:56

## 2022-01-01 RX ADMIN — NYSTATIN 500000 UNITS: 100000 SUSPENSION ORAL at 17:06

## 2022-01-01 RX ADMIN — NYSTATIN 500000 UNITS: 100000 SUSPENSION ORAL at 21:23

## 2022-01-01 RX ADMIN — HEPARIN SODIUM 7500 UNITS: 5000 INJECTION INTRAVENOUS; SUBCUTANEOUS at 06:44

## 2022-01-01 RX ADMIN — NYSTATIN: 100000 POWDER TOPICAL at 09:08

## 2022-01-01 RX ADMIN — MICONAZOLE NITRATE: 20 CREAM TOPICAL at 18:19

## 2022-01-01 RX ADMIN — PANTOPRAZOLE SODIUM 40 MG: 40 TABLET, DELAYED RELEASE ORAL at 05:28

## 2022-01-01 RX ADMIN — SODIUM CHLORIDE 100 ML/HR: 0.9 INJECTION, SOLUTION INTRAVENOUS at 09:44

## 2022-01-01 RX ADMIN — METHOCARBAMOL 500 MG: 500 TABLET ORAL at 14:17

## 2022-01-01 RX ADMIN — HYDROMORPHONE HYDROCHLORIDE 0.5 MG: 1 INJECTION, SOLUTION INTRAMUSCULAR; INTRAVENOUS; SUBCUTANEOUS at 15:13

## 2022-01-01 RX ADMIN — PANTOPRAZOLE SODIUM 40 MG: 40 INJECTION, POWDER, FOR SOLUTION INTRAVENOUS at 17:44

## 2022-01-01 RX ADMIN — STANDARDIZED SENNA CONCENTRATE 8.6 MG: 8.6 TABLET ORAL at 17:50

## 2022-01-01 RX ADMIN — PANTOPRAZOLE SODIUM 40 MG: 40 TABLET, DELAYED RELEASE ORAL at 15:22

## 2022-01-01 RX ADMIN — HEPARIN SODIUM 7500 UNITS: 5000 INJECTION INTRAVENOUS; SUBCUTANEOUS at 05:34

## 2022-01-01 RX ADMIN — METOPROLOL TARTRATE 25 MG: 25 TABLET, FILM COATED ORAL at 17:45

## 2022-01-01 RX ADMIN — NYSTATIN 500000 UNITS: 100000 SUSPENSION ORAL at 22:37

## 2022-01-01 RX ADMIN — MICONAZOLE NITRATE: 20 CREAM TOPICAL at 18:26

## 2022-01-01 RX ADMIN — ONDANSETRON 4 MG: 4 TABLET, ORALLY DISINTEGRATING ORAL at 16:27

## 2022-01-01 RX ADMIN — HYDROMORPHONE HYDROCHLORIDE 0.5 MG: 1 INJECTION, SOLUTION INTRAMUSCULAR; INTRAVENOUS; SUBCUTANEOUS at 14:24

## 2022-01-01 RX ADMIN — HYDROMORPHONE HYDROCHLORIDE 0.5 MG: 1 INJECTION, SOLUTION INTRAMUSCULAR; INTRAVENOUS; SUBCUTANEOUS at 14:20

## 2022-01-01 RX ADMIN — HEPARIN SODIUM 7500 UNITS: 5000 INJECTION INTRAVENOUS; SUBCUTANEOUS at 13:14

## 2022-01-01 RX ADMIN — HYDROMORPHONE HYDROCHLORIDE 0.5 MG: 1 INJECTION, SOLUTION INTRAMUSCULAR; INTRAVENOUS; SUBCUTANEOUS at 19:36

## 2022-01-01 RX ADMIN — METRONIDAZOLE 500 MG: 500 TABLET ORAL at 21:09

## 2022-01-01 RX ADMIN — PANTOPRAZOLE SODIUM 40 MG: 40 TABLET, DELAYED RELEASE ORAL at 08:59

## 2022-01-01 RX ADMIN — DICLOFENAC SODIUM 2 G: 10 GEL TOPICAL at 09:49

## 2022-01-01 RX ADMIN — ONDANSETRON 4 MG: 4 TABLET, ORALLY DISINTEGRATING ORAL at 23:10

## 2022-01-01 RX ADMIN — NYSTATIN 500000 UNITS: 100000 SUSPENSION ORAL at 12:00

## 2022-01-01 RX ADMIN — NYSTATIN 500000 UNITS: 100000 SUSPENSION ORAL at 12:56

## 2022-01-01 RX ADMIN — NYSTATIN 500000 UNITS: 100000 SUSPENSION ORAL at 09:37

## 2022-01-01 RX ADMIN — NYSTATIN: 100000 POWDER TOPICAL at 10:31

## 2022-01-01 RX ADMIN — LORAZEPAM 1 MG: 1 TABLET ORAL at 03:07

## 2022-01-01 RX ADMIN — NYSTATIN 500000 UNITS: 100000 SUSPENSION ORAL at 08:01

## 2022-01-01 RX ADMIN — PROPOFOL 200 MG: 10 INJECTION, EMULSION INTRAVENOUS at 19:21

## 2022-01-01 RX ADMIN — DOCUSATE SODIUM 100 MG: 100 CAPSULE ORAL at 10:14

## 2022-01-01 RX ADMIN — OXYCODONE HYDROCHLORIDE 10 MG: 10 TABLET ORAL at 00:36

## 2022-01-01 RX ADMIN — STANDARDIZED SENNA CONCENTRATE 8.6 MG: 8.6 TABLET ORAL at 08:44

## 2022-01-01 RX ADMIN — METHOCARBAMOL 500 MG: 500 TABLET ORAL at 22:58

## 2022-01-01 RX ADMIN — DOCUSATE SODIUM 100 MG: 100 CAPSULE ORAL at 09:28

## 2022-01-01 RX ADMIN — HYDROMORPHONE HYDROCHLORIDE 0.5 MG: 1 INJECTION, SOLUTION INTRAMUSCULAR; INTRAVENOUS; SUBCUTANEOUS at 00:27

## 2022-01-01 RX ADMIN — ONDANSETRON 4 MG: 4 TABLET, ORALLY DISINTEGRATING ORAL at 06:41

## 2022-01-01 RX ADMIN — HYDROMORPHONE HYDROCHLORIDE 0.5 MG: 1 INJECTION, SOLUTION INTRAMUSCULAR; INTRAVENOUS; SUBCUTANEOUS at 18:34

## 2022-01-01 RX ADMIN — OXYCODONE HYDROCHLORIDE 5 MG: 5 TABLET ORAL at 17:05

## 2022-01-01 RX ADMIN — MICONAZOLE NITRATE: 20 CREAM TOPICAL at 17:13

## 2022-01-01 RX ADMIN — DIAZEPAM 2 MG: 2 TABLET ORAL at 01:12

## 2022-01-01 RX ADMIN — OXYCODONE HYDROCHLORIDE 10 MG: 10 TABLET ORAL at 11:02

## 2022-01-01 RX ADMIN — STANDARDIZED SENNA CONCENTRATE 8.6 MG: 8.6 TABLET ORAL at 08:10

## 2022-01-01 RX ADMIN — METRONIDAZOLE 500 MG: 500 TABLET ORAL at 09:36

## 2022-01-01 RX ADMIN — OXYCODONE HYDROCHLORIDE 10 MG: 10 TABLET ORAL at 22:49

## 2022-01-01 RX ADMIN — CEFEPIME HYDROCHLORIDE 1000 MG: 1 INJECTION, SOLUTION INTRAVENOUS at 05:54

## 2022-01-01 RX ADMIN — STANDARDIZED SENNA CONCENTRATE 8.6 MG: 8.6 TABLET ORAL at 17:34

## 2022-01-01 RX ADMIN — STANDARDIZED SENNA CONCENTRATE 8.6 MG: 8.6 TABLET ORAL at 09:44

## 2022-01-01 RX ADMIN — PIPERACILLIN AND TAZOBACTAM 4.5 G: 36; 4.5 INJECTION, POWDER, FOR SOLUTION INTRAVENOUS at 17:17

## 2022-01-01 RX ADMIN — HYDROMORPHONE HYDROCHLORIDE 0.5 MG: 1 INJECTION, SOLUTION INTRAMUSCULAR; INTRAVENOUS; SUBCUTANEOUS at 04:38

## 2022-01-01 RX ADMIN — NYSTATIN: 100000 POWDER TOPICAL at 09:49

## 2022-01-01 RX ADMIN — OXYCODONE HYDROCHLORIDE 10 MG: 10 TABLET ORAL at 09:24

## 2022-01-01 RX ADMIN — FLUCONAZOLE 400 MG: 100 TABLET ORAL at 12:40

## 2022-01-01 RX ADMIN — METHOCARBAMOL 500 MG: 500 TABLET, FILM COATED ORAL at 17:21

## 2022-01-01 RX ADMIN — HYDROMORPHONE HYDROCHLORIDE 0.5 MG: 1 INJECTION, SOLUTION INTRAMUSCULAR; INTRAVENOUS; SUBCUTANEOUS at 18:15

## 2022-01-01 RX ADMIN — ACETAMINOPHEN 975 MG: 325 TABLET, FILM COATED ORAL at 21:38

## 2022-01-01 RX ADMIN — STANDARDIZED SENNA CONCENTRATE 8.6 MG: 8.6 TABLET ORAL at 09:24

## 2022-01-01 RX ADMIN — METHOCARBAMOL 500 MG: 500 TABLET ORAL at 06:31

## 2022-01-01 RX ADMIN — NYSTATIN 500000 UNITS: 100000 SUSPENSION ORAL at 14:10

## 2022-01-01 RX ADMIN — ONDANSETRON 4 MG: 2 INJECTION INTRAMUSCULAR; INTRAVENOUS at 17:08

## 2022-01-01 RX ADMIN — ALPRAZOLAM 0.5 MG: 0.5 TABLET ORAL at 18:17

## 2022-01-01 RX ADMIN — ONDANSETRON 4 MG: 4 TABLET, ORALLY DISINTEGRATING ORAL at 23:13

## 2022-01-01 RX ADMIN — OXYCODONE HYDROCHLORIDE 10 MG: 10 TABLET ORAL at 06:10

## 2022-01-01 RX ADMIN — DOCUSATE SODIUM 100 MG: 100 CAPSULE ORAL at 08:55

## 2022-01-01 RX ADMIN — OXYCODONE HYDROCHLORIDE 10 MG: 10 TABLET ORAL at 02:47

## 2022-01-01 RX ADMIN — DIAZEPAM 2 MG: 2 TABLET ORAL at 04:57

## 2022-01-01 RX ADMIN — PANTOPRAZOLE SODIUM 40 MG: 40 TABLET, DELAYED RELEASE ORAL at 16:23

## 2022-01-01 RX ADMIN — NYSTATIN: 100000 POWDER TOPICAL at 08:14

## 2022-01-01 RX ADMIN — HEPARIN SODIUM 7500 UNITS: 5000 INJECTION INTRAVENOUS; SUBCUTANEOUS at 06:06

## 2022-01-01 RX ADMIN — FENTANYL CITRATE 50 MCG: 50 INJECTION INTRAMUSCULAR; INTRAVENOUS at 08:28

## 2022-01-01 RX ADMIN — HEPARIN SODIUM 7500 UNITS: 5000 INJECTION INTRAVENOUS; SUBCUTANEOUS at 13:20

## 2022-01-01 RX ADMIN — ALUMINA, MAGNESIA, AND SIMETHICONE ORAL SUSPENSION REGULAR STRENGTH 10 ML: 1200; 1200; 120 SUSPENSION ORAL at 17:32

## 2022-01-01 RX ADMIN — HEPARIN SODIUM 7500 UNITS: 5000 INJECTION INTRAVENOUS; SUBCUTANEOUS at 05:15

## 2022-01-01 RX ADMIN — ENOXAPARIN SODIUM 60 MG: 60 INJECTION SUBCUTANEOUS at 12:27

## 2022-01-01 RX ADMIN — ACETAMINOPHEN 975 MG: 325 TABLET, FILM COATED ORAL at 10:21

## 2022-01-01 RX ADMIN — MICONAZOLE NITRATE: 20 CREAM TOPICAL at 17:10

## 2022-01-01 RX ADMIN — DICLOFENAC SODIUM 2 G: 10 GEL TOPICAL at 17:03

## 2022-01-01 RX ADMIN — NYSTATIN 500000 UNITS: 100000 SUSPENSION ORAL at 17:34

## 2022-01-01 RX ADMIN — MICONAZOLE NITRATE: 20 CREAM TOPICAL at 09:36

## 2022-01-01 RX ADMIN — NYSTATIN: 100000 POWDER TOPICAL at 08:25

## 2022-01-01 RX ADMIN — STANDARDIZED SENNA CONCENTRATE 8.6 MG: 8.6 TABLET ORAL at 09:28

## 2022-01-01 RX ADMIN — OXYCODONE HYDROCHLORIDE 10 MG: 10 TABLET ORAL at 17:16

## 2022-01-01 RX ADMIN — NYSTATIN 500000 UNITS: 100000 SUSPENSION ORAL at 21:52

## 2022-01-01 RX ADMIN — NYSTATIN: 100000 POWDER TOPICAL at 17:49

## 2022-01-01 RX ADMIN — FLUCONAZOLE 150 MG: 100 TABLET ORAL at 09:50

## 2022-01-01 RX ADMIN — NYSTATIN: 100000 POWDER TOPICAL at 23:04

## 2022-01-01 RX ADMIN — STANDARDIZED SENNA CONCENTRATE 8.6 MG: 8.6 TABLET ORAL at 08:48

## 2022-01-01 RX ADMIN — DOCUSATE SODIUM 100 MG: 100 CAPSULE ORAL at 17:14

## 2022-01-01 RX ADMIN — DOCUSATE SODIUM 100 MG: 100 CAPSULE ORAL at 09:09

## 2022-01-01 RX ADMIN — HEPARIN SODIUM 7500 UNITS: 5000 INJECTION INTRAVENOUS; SUBCUTANEOUS at 21:56

## 2022-01-01 RX ADMIN — HEPARIN SODIUM 7500 UNITS: 5000 INJECTION INTRAVENOUS; SUBCUTANEOUS at 16:29

## 2022-01-01 RX ADMIN — NYSTATIN: 100000 POWDER TOPICAL at 17:14

## 2022-01-01 RX ADMIN — HEPARIN SODIUM 7500 UNITS: 5000 INJECTION INTRAVENOUS; SUBCUTANEOUS at 21:35

## 2022-01-01 RX ADMIN — HYDROMORPHONE HYDROCHLORIDE 0.5 MG: 1 INJECTION, SOLUTION INTRAMUSCULAR; INTRAVENOUS; SUBCUTANEOUS at 19:35

## 2022-01-01 RX ADMIN — METOPROLOL TARTRATE 25 MG: 25 TABLET, FILM COATED ORAL at 17:30

## 2022-01-01 RX ADMIN — OXYCODONE HYDROCHLORIDE 15 MG: 10 TABLET ORAL at 13:13

## 2022-01-01 RX ADMIN — DOCUSATE SODIUM 100 MG: 100 CAPSULE ORAL at 17:56

## 2022-01-01 RX ADMIN — METHOCARBAMOL 500 MG: 500 TABLET, FILM COATED ORAL at 23:06

## 2022-01-01 RX ADMIN — DIAZEPAM 2 MG: 2 TABLET ORAL at 17:08

## 2022-01-01 RX ADMIN — NYSTATIN: 100000 POWDER TOPICAL at 21:30

## 2022-01-01 RX ADMIN — MICONAZOLE NITRATE: 20 CREAM TOPICAL at 09:59

## 2022-01-01 RX ADMIN — OXYCODONE HYDROCHLORIDE 10 MG: 10 TABLET ORAL at 22:51

## 2022-01-01 RX ADMIN — NYSTATIN: 100000 CREAM TOPICAL at 11:58

## 2022-01-01 RX ADMIN — HYDROMORPHONE HYDROCHLORIDE 0.5 MG: 1 INJECTION, SOLUTION INTRAMUSCULAR; INTRAVENOUS; SUBCUTANEOUS at 22:08

## 2022-01-01 RX ADMIN — HEPARIN SODIUM 7500 UNITS: 5000 INJECTION INTRAVENOUS; SUBCUTANEOUS at 13:54

## 2022-01-01 RX ADMIN — GABAPENTIN 300 MG: 300 CAPSULE ORAL at 09:24

## 2022-01-01 RX ADMIN — MICONAZOLE NITRATE: 20 CREAM TOPICAL at 09:47

## 2022-01-01 RX ADMIN — FENTANYL CITRATE 50 MCG: 50 INJECTION INTRAMUSCULAR; INTRAVENOUS at 22:43

## 2022-01-01 RX ADMIN — HEPARIN SODIUM 7500 UNITS: 5000 INJECTION INTRAVENOUS; SUBCUTANEOUS at 22:22

## 2022-01-01 RX ADMIN — NYSTATIN 500000 UNITS: 100000 SUSPENSION ORAL at 11:34

## 2022-01-01 RX ADMIN — ONDANSETRON 4 MG: 4 TABLET, ORALLY DISINTEGRATING ORAL at 17:10

## 2022-01-01 RX ADMIN — OXYCODONE HYDROCHLORIDE 10 MG: 10 TABLET ORAL at 09:09

## 2022-01-01 RX ADMIN — LIDOCAINE 5% 1 PATCH: 700 PATCH TOPICAL at 12:51

## 2022-01-01 RX ADMIN — NYSTATIN: 100000 POWDER TOPICAL at 11:30

## 2022-01-01 RX ADMIN — ACETAMINOPHEN 975 MG: 325 TABLET, FILM COATED ORAL at 16:33

## 2022-01-01 RX ADMIN — DOCUSATE SODIUM 100 MG: 100 CAPSULE ORAL at 12:40

## 2022-01-01 RX ADMIN — HEPARIN SODIUM 7500 UNITS: 5000 INJECTION INTRAVENOUS; SUBCUTANEOUS at 21:33

## 2022-01-01 RX ADMIN — DOCUSATE SODIUM 100 MG: 100 CAPSULE ORAL at 08:17

## 2022-01-01 RX ADMIN — NYSTATIN 500000 UNITS: 100000 SUSPENSION ORAL at 09:19

## 2022-01-01 RX ADMIN — ENOXAPARIN SODIUM 60 MG: 60 INJECTION SUBCUTANEOUS at 10:28

## 2022-01-01 RX ADMIN — INSULIN HUMAN 10 UNITS: 100 INJECTION, SOLUTION PARENTERAL at 13:27

## 2022-01-01 RX ADMIN — POTASSIUM CHLORIDE 40 MEQ: 1500 TABLET, EXTENDED RELEASE ORAL at 12:11

## 2022-01-01 RX ADMIN — HYDROMORPHONE HYDROCHLORIDE 0.5 MG: 1 INJECTION, SOLUTION INTRAMUSCULAR; INTRAVENOUS; SUBCUTANEOUS at 04:41

## 2022-01-01 RX ADMIN — METRONIDAZOLE 500 MG: 500 TABLET ORAL at 21:58

## 2022-01-01 RX ADMIN — HEPARIN SODIUM 7500 UNITS: 5000 INJECTION INTRAVENOUS; SUBCUTANEOUS at 14:31

## 2022-01-01 RX ADMIN — OXYCODONE HYDROCHLORIDE 10 MG: 10 TABLET ORAL at 18:37

## 2022-01-01 RX ADMIN — HYDROMORPHONE HYDROCHLORIDE 1 MG: 1 INJECTION, SOLUTION INTRAMUSCULAR; INTRAVENOUS; SUBCUTANEOUS at 10:28

## 2022-01-01 RX ADMIN — PANTOPRAZOLE SODIUM 40 MG: 40 TABLET, DELAYED RELEASE ORAL at 17:10

## 2022-01-01 RX ADMIN — NYSTATIN 500000 UNITS: 100000 SUSPENSION ORAL at 11:52

## 2022-01-01 RX ADMIN — GABAPENTIN 300 MG: 300 CAPSULE ORAL at 21:41

## 2022-01-01 RX ADMIN — ACETAMINOPHEN 975 MG: 325 TABLET, FILM COATED ORAL at 21:20

## 2022-01-01 RX ADMIN — ACETAMINOPHEN 975 MG: 325 TABLET, FILM COATED ORAL at 22:47

## 2022-01-01 RX ADMIN — NYSTATIN 500000 UNITS: 100000 SUSPENSION ORAL at 08:23

## 2022-01-01 RX ADMIN — METOPROLOL TARTRATE 25 MG: 25 TABLET, FILM COATED ORAL at 12:46

## 2022-01-01 RX ADMIN — SERTRALINE HYDROCHLORIDE 50 MG: 50 TABLET ORAL at 09:21

## 2022-01-01 RX ADMIN — Medication 3 MG: at 23:07

## 2022-01-01 RX ADMIN — CEFEPIME HYDROCHLORIDE 1000 MG: 1 INJECTION, SOLUTION INTRAVENOUS at 15:31

## 2022-01-01 RX ADMIN — METOPROLOL TARTRATE 25 MG: 25 TABLET, FILM COATED ORAL at 09:53

## 2022-01-01 RX ADMIN — NYSTATIN: 100000 POWDER TOPICAL at 17:15

## 2022-01-01 RX ADMIN — MICONAZOLE NITRATE: 20 CREAM TOPICAL at 08:25

## 2022-01-01 RX ADMIN — ACETAMINOPHEN 975 MG: 325 TABLET, FILM COATED ORAL at 06:07

## 2022-01-01 RX ADMIN — NYSTATIN 500000 UNITS: 100000 SUSPENSION ORAL at 13:20

## 2022-01-01 RX ADMIN — METOPROLOL TARTRATE 25 MG: 25 TABLET, FILM COATED ORAL at 09:54

## 2022-01-01 RX ADMIN — ALUMINA, MAGNESIA, AND SIMETHICONE ORAL SUSPENSION REGULAR STRENGTH 10 ML: 1200; 1200; 120 SUSPENSION ORAL at 12:41

## 2022-01-01 RX ADMIN — FLUCONAZOLE 400 MG: 100 TABLET ORAL at 09:24

## 2022-01-01 RX ADMIN — ONDANSETRON 4 MG: 4 TABLET, ORALLY DISINTEGRATING ORAL at 05:55

## 2022-01-01 RX ADMIN — LIDOCAINE 5% 1 PATCH: 700 PATCH TOPICAL at 08:02

## 2022-01-01 RX ADMIN — NYSTATIN: 100000 POWDER TOPICAL at 08:27

## 2022-01-01 RX ADMIN — ONDANSETRON 4 MG: 4 TABLET, ORALLY DISINTEGRATING ORAL at 17:34

## 2022-01-01 RX ADMIN — DICLOFENAC SODIUM 2 G: 10 GEL TOPICAL at 11:26

## 2022-01-01 RX ADMIN — HEPARIN SODIUM 7500 UNITS: 5000 INJECTION INTRAVENOUS; SUBCUTANEOUS at 21:43

## 2022-01-01 RX ADMIN — DOCUSATE SODIUM 100 MG: 100 CAPSULE ORAL at 08:41

## 2022-01-01 RX ADMIN — FLUCONAZOLE 400 MG: 2 INJECTION, SOLUTION INTRAVENOUS at 09:24

## 2022-01-01 RX ADMIN — METHOCARBAMOL 500 MG: 500 TABLET, FILM COATED ORAL at 11:28

## 2022-01-01 RX ADMIN — DIAZEPAM 2 MG: 2 TABLET ORAL at 09:54

## 2022-01-01 RX ADMIN — NYSTATIN 500000 UNITS: 100000 SUSPENSION ORAL at 21:41

## 2022-01-01 RX ADMIN — METHOCARBAMOL 500 MG: 500 TABLET ORAL at 13:39

## 2022-01-01 RX ADMIN — DOCUSATE SODIUM 100 MG: 100 CAPSULE ORAL at 17:45

## 2022-01-01 RX ADMIN — NYSTATIN: 100000 POWDER TOPICAL at 09:51

## 2022-01-01 RX ADMIN — DOCUSATE SODIUM 100 MG: 100 CAPSULE ORAL at 18:21

## 2022-01-01 RX ADMIN — HYDROMORPHONE HYDROCHLORIDE 0.5 MG: 1 INJECTION, SOLUTION INTRAMUSCULAR; INTRAVENOUS; SUBCUTANEOUS at 02:54

## 2022-01-01 RX ADMIN — NYSTATIN 500000 UNITS: 100000 SUSPENSION ORAL at 17:33

## 2022-01-01 RX ADMIN — HYDROMORPHONE HYDROCHLORIDE 0.5 MG: 1 INJECTION, SOLUTION INTRAMUSCULAR; INTRAVENOUS; SUBCUTANEOUS at 19:07

## 2022-01-01 RX ADMIN — HYDROMORPHONE HYDROCHLORIDE 1 MG: 1 INJECTION, SOLUTION INTRAMUSCULAR; INTRAVENOUS; SUBCUTANEOUS at 22:33

## 2022-01-01 RX ADMIN — ALBUMIN (HUMAN): 12.5 INJECTION, SOLUTION INTRAVENOUS at 21:34

## 2022-01-01 RX ADMIN — OXYCODONE HYDROCHLORIDE 10 MG: 10 TABLET ORAL at 13:41

## 2022-01-01 RX ADMIN — FLUTICASONE PROPIONATE 1 SPRAY: 50 SPRAY, METERED NASAL at 13:09

## 2022-01-01 RX ADMIN — ENOXAPARIN SODIUM 60 MG: 60 INJECTION SUBCUTANEOUS at 09:24

## 2022-01-01 RX ADMIN — FENTANYL CITRATE 100 MCG: 50 INJECTION INTRAMUSCULAR; INTRAVENOUS at 13:51

## 2022-01-01 RX ADMIN — MICONAZOLE NITRATE: 20 CREAM TOPICAL at 08:50

## 2022-01-01 RX ADMIN — NYSTATIN 500000 UNITS: 100000 SUSPENSION ORAL at 18:07

## 2022-01-01 RX ADMIN — PANTOPRAZOLE SODIUM 40 MG: 40 TABLET, DELAYED RELEASE ORAL at 16:11

## 2022-01-01 RX ADMIN — PANTOPRAZOLE SODIUM 40 MG: 40 TABLET, DELAYED RELEASE ORAL at 05:07

## 2022-01-01 RX ADMIN — ENOXAPARIN SODIUM 60 MG: 60 INJECTION SUBCUTANEOUS at 09:50

## 2022-01-01 RX ADMIN — OXYCODONE HYDROCHLORIDE 10 MG: 10 TABLET ORAL at 05:06

## 2022-01-01 RX ADMIN — METOPROLOL TARTRATE 25 MG: 25 TABLET, FILM COATED ORAL at 18:17

## 2022-01-01 RX ADMIN — MINERAL OIL 15 ML: 1000 SOLUTION ORAL at 11:12

## 2022-01-01 RX ADMIN — GABAPENTIN 300 MG: 300 CAPSULE ORAL at 15:52

## 2022-01-01 RX ADMIN — Medication 3 MG: at 21:27

## 2022-01-01 RX ADMIN — ALUMINA, MAGNESIA, AND SIMETHICONE ORAL SUSPENSION REGULAR STRENGTH 10 ML: 1200; 1200; 120 SUSPENSION ORAL at 14:02

## 2022-01-01 RX ADMIN — HYDROMORPHONE HYDROCHLORIDE 0.5 MG: 1 INJECTION, SOLUTION INTRAMUSCULAR; INTRAVENOUS; SUBCUTANEOUS at 01:57

## 2022-01-01 RX ADMIN — OXYCODONE HYDROCHLORIDE 15 MG: 10 TABLET ORAL at 16:08

## 2022-01-01 RX ADMIN — METHOCARBAMOL 500 MG: 500 TABLET, FILM COATED ORAL at 00:17

## 2022-01-01 RX ADMIN — ONDANSETRON 4 MG: 4 TABLET, ORALLY DISINTEGRATING ORAL at 13:24

## 2022-01-01 RX ADMIN — NYSTATIN: 100000 POWDER TOPICAL at 08:22

## 2022-01-01 RX ADMIN — ONDANSETRON 4 MG: 2 INJECTION INTRAMUSCULAR; INTRAVENOUS at 17:39

## 2022-01-01 RX ADMIN — METHOCARBAMOL 500 MG: 500 TABLET, FILM COATED ORAL at 13:24

## 2022-01-01 RX ADMIN — DIAZEPAM 2 MG: 2 TABLET ORAL at 17:44

## 2022-01-01 RX ADMIN — OXYCODONE HYDROCHLORIDE 10 MG: 10 TABLET ORAL at 11:50

## 2022-01-01 RX ADMIN — HYDROMORPHONE HYDROCHLORIDE 0.5 MG: 1 INJECTION, SOLUTION INTRAMUSCULAR; INTRAVENOUS; SUBCUTANEOUS at 16:01

## 2022-01-01 RX ADMIN — OXYCODONE HYDROCHLORIDE 10 MG: 10 TABLET ORAL at 21:14

## 2022-01-01 RX ADMIN — NOREPINEPHRINE BITARTRATE 5 MCG/KG/MIN: 1 INJECTION, SOLUTION, CONCENTRATE INTRAVENOUS at 19:52

## 2022-01-01 RX ADMIN — NYSTATIN: 100000 POWDER TOPICAL at 17:58

## 2022-01-01 RX ADMIN — ACETAMINOPHEN 975 MG: 325 TABLET, FILM COATED ORAL at 05:41

## 2022-01-01 RX ADMIN — HYDROMORPHONE HYDROCHLORIDE 0.5 MG: 1 INJECTION, SOLUTION INTRAMUSCULAR; INTRAVENOUS; SUBCUTANEOUS at 22:09

## 2022-01-01 RX ADMIN — DIAZEPAM 2 MG: 2 TABLET ORAL at 17:21

## 2022-01-01 RX ADMIN — METHOCARBAMOL 500 MG: 500 TABLET ORAL at 06:03

## 2022-01-01 RX ADMIN — MAGNESIUM SULFATE HEPTAHYDRATE 2 G: 40 INJECTION, SOLUTION INTRAVENOUS at 14:10

## 2022-01-01 RX ADMIN — DOCUSATE SODIUM 100 MG: 100 CAPSULE ORAL at 08:38

## 2022-01-01 RX ADMIN — CHLORHEXIDINE GLUCONATE 15 ML: 1.2 SOLUTION ORAL at 02:42

## 2022-01-01 RX ADMIN — NYSTATIN: 100000 POWDER TOPICAL at 09:20

## 2022-01-01 RX ADMIN — NYSTATIN 500000 UNITS: 100000 SUSPENSION ORAL at 09:44

## 2022-01-01 RX ADMIN — NYSTATIN 500000 UNITS: 100000 SUSPENSION ORAL at 12:51

## 2022-01-01 RX ADMIN — PROPOFOL 30 MCG/KG/MIN: 10 INJECTION, EMULSION INTRAVENOUS at 14:32

## 2022-01-01 RX ADMIN — PROPOFOL 50 MCG/KG/MIN: 10 INJECTION, EMULSION INTRAVENOUS at 01:38

## 2022-01-01 RX ADMIN — DICLOFENAC SODIUM 2 G: 10 GEL TOPICAL at 08:51

## 2022-01-01 RX ADMIN — HYDROMORPHONE HYDROCHLORIDE 0.5 MG: 1 INJECTION, SOLUTION INTRAMUSCULAR; INTRAVENOUS; SUBCUTANEOUS at 21:00

## 2022-01-01 RX ADMIN — NYSTATIN: 100000 POWDER TOPICAL at 19:57

## 2022-01-01 RX ADMIN — METOPROLOL TARTRATE 25 MG: 25 TABLET, FILM COATED ORAL at 22:26

## 2022-01-01 RX ADMIN — OXYCODONE HYDROCHLORIDE 10 MG: 10 TABLET ORAL at 13:17

## 2022-01-01 RX ADMIN — HEPARIN SODIUM 7500 UNITS: 5000 INJECTION INTRAVENOUS; SUBCUTANEOUS at 14:20

## 2022-01-01 RX ADMIN — ENOXAPARIN SODIUM 60 MG: 60 INJECTION SUBCUTANEOUS at 17:05

## 2022-01-01 RX ADMIN — METHOCARBAMOL 500 MG: 500 TABLET ORAL at 05:00

## 2022-01-01 RX ADMIN — CEFEPIME HYDROCHLORIDE 1000 MG: 1 INJECTION, SOLUTION INTRAVENOUS at 14:39

## 2022-01-01 RX ADMIN — GABAPENTIN 300 MG: 300 CAPSULE ORAL at 09:09

## 2022-01-01 RX ADMIN — PROPOFOL 50 MCG/KG/MIN: 10 INJECTION, EMULSION INTRAVENOUS at 02:42

## 2022-01-01 RX ADMIN — PANTOPRAZOLE SODIUM 40 MG: 40 TABLET, DELAYED RELEASE ORAL at 05:27

## 2022-01-01 RX ADMIN — ONDANSETRON 4 MG: 4 TABLET, ORALLY DISINTEGRATING ORAL at 11:44

## 2022-01-01 RX ADMIN — DEXTROSE, SODIUM CHLORIDE, AND POTASSIUM CHLORIDE 101 ML/HR: 5; .45; .15 INJECTION INTRAVENOUS at 02:47

## 2022-01-01 RX ADMIN — STANDARDIZED SENNA CONCENTRATE 8.6 MG: 8.6 TABLET ORAL at 17:13

## 2022-01-01 RX ADMIN — NYSTATIN 500000 UNITS: 100000 SUSPENSION ORAL at 17:28

## 2022-01-01 RX ADMIN — HEPARIN SODIUM 7500 UNITS: 5000 INJECTION INTRAVENOUS; SUBCUTANEOUS at 15:23

## 2022-01-01 RX ADMIN — HEPARIN SODIUM 7500 UNITS: 5000 INJECTION INTRAVENOUS; SUBCUTANEOUS at 21:47

## 2022-01-01 RX ADMIN — NYSTATIN 500000 UNITS: 100000 SUSPENSION ORAL at 17:43

## 2022-01-01 RX ADMIN — ONDANSETRON 4 MG: 4 TABLET, ORALLY DISINTEGRATING ORAL at 17:50

## 2022-01-01 RX ADMIN — HYDROMORPHONE HYDROCHLORIDE 1 MG: 1 INJECTION, SOLUTION INTRAMUSCULAR; INTRAVENOUS; SUBCUTANEOUS at 01:32

## 2022-01-01 RX ADMIN — OXYCODONE HYDROCHLORIDE 10 MG: 10 TABLET ORAL at 06:38

## 2022-01-01 RX ADMIN — DIAZEPAM 2 MG: 2 TABLET ORAL at 03:34

## 2022-01-01 RX ADMIN — NYSTATIN 500000 UNITS: 100000 SUSPENSION ORAL at 21:14

## 2022-01-01 RX ADMIN — STANDARDIZED SENNA CONCENTRATE 8.6 MG: 8.6 TABLET ORAL at 17:00

## 2022-01-01 RX ADMIN — NYSTATIN 500000 UNITS: 100000 SUSPENSION ORAL at 21:25

## 2022-01-01 RX ADMIN — CEFEPIME HYDROCHLORIDE 1000 MG: 1 INJECTION, SOLUTION INTRAVENOUS at 18:18

## 2022-01-01 RX ADMIN — NYSTATIN 500000 UNITS: 100000 SUSPENSION ORAL at 10:14

## 2022-01-01 RX ADMIN — STANDARDIZED SENNA CONCENTRATE 8.6 MG: 8.6 TABLET ORAL at 17:07

## 2022-01-01 RX ADMIN — DIAZEPAM 2 MG: 2 TABLET ORAL at 01:34

## 2022-01-01 RX ADMIN — NYSTATIN: 100000 POWDER TOPICAL at 17:13

## 2022-01-01 RX ADMIN — METRONIDAZOLE 500 MG: 500 TABLET ORAL at 06:03

## 2022-01-01 RX ADMIN — OXYCODONE HYDROCHLORIDE 10 MG: 10 TABLET ORAL at 13:54

## 2022-01-01 RX ADMIN — OXYCODONE HYDROCHLORIDE 15 MG: 10 TABLET ORAL at 09:28

## 2022-01-01 RX ADMIN — HYDROMORPHONE HYDROCHLORIDE 0.2 MG: 1 INJECTION, SOLUTION INTRAMUSCULAR; INTRAVENOUS; SUBCUTANEOUS at 07:25

## 2022-01-01 RX ADMIN — GABAPENTIN 300 MG: 300 CAPSULE ORAL at 08:50

## 2022-01-01 RX ADMIN — NYSTATIN: 100000 POWDER TOPICAL at 08:55

## 2022-01-01 RX ADMIN — NYSTATIN 500000 UNITS: 100000 SUSPENSION ORAL at 21:27

## 2022-01-01 RX ADMIN — PANTOPRAZOLE SODIUM 40 MG: 40 TABLET, DELAYED RELEASE ORAL at 06:11

## 2022-01-01 RX ADMIN — Medication: at 22:20

## 2022-01-01 RX ADMIN — GABAPENTIN 300 MG: 300 CAPSULE ORAL at 09:21

## 2022-01-01 RX ADMIN — ONDANSETRON 4 MG: 4 TABLET, ORALLY DISINTEGRATING ORAL at 17:01

## 2022-01-01 RX ADMIN — METHOCARBAMOL 500 MG: 500 TABLET, FILM COATED ORAL at 17:25

## 2022-01-01 RX ADMIN — HYDROMORPHONE HYDROCHLORIDE 0.5 MG: 1 INJECTION, SOLUTION INTRAMUSCULAR; INTRAVENOUS; SUBCUTANEOUS at 21:05

## 2022-01-01 RX ADMIN — GABAPENTIN 300 MG: 300 CAPSULE ORAL at 16:11

## 2022-01-01 RX ADMIN — Medication 160 MG: at 19:21

## 2022-01-01 RX ADMIN — MICONAZOLE NITRATE: 20 CREAM TOPICAL at 10:00

## 2022-01-01 RX ADMIN — HYDROMORPHONE HYDROCHLORIDE 0.5 MG: 1 INJECTION, SOLUTION INTRAMUSCULAR; INTRAVENOUS; SUBCUTANEOUS at 21:20

## 2022-01-01 RX ADMIN — DEXTROSE, SODIUM CHLORIDE, AND POTASSIUM CHLORIDE 101 ML/HR: 5; .45; .15 INJECTION INTRAVENOUS at 21:35

## 2022-01-01 RX ADMIN — ONDANSETRON 4 MG: 4 TABLET, ORALLY DISINTEGRATING ORAL at 05:20

## 2022-01-01 RX ADMIN — DIAZEPAM 2 MG: 2 TABLET ORAL at 17:51

## 2022-01-01 RX ADMIN — DOCUSATE SODIUM 100 MG: 100 CAPSULE ORAL at 17:43

## 2022-01-01 RX ADMIN — SODIUM CHLORIDE, SODIUM GLUCONATE, SODIUM ACETATE, POTASSIUM CHLORIDE, MAGNESIUM CHLORIDE, SODIUM PHOSPHATE, DIBASIC, AND POTASSIUM PHOSPHATE 100 ML/HR: .53; .5; .37; .037; .03; .012; .00082 INJECTION, SOLUTION INTRAVENOUS at 08:36

## 2022-01-01 RX ADMIN — HYDROMORPHONE HYDROCHLORIDE 0.5 MG: 1 INJECTION, SOLUTION INTRAMUSCULAR; INTRAVENOUS; SUBCUTANEOUS at 12:31

## 2022-01-01 RX ADMIN — HYDROMORPHONE HYDROCHLORIDE 0.5 MG: 1 INJECTION, SOLUTION INTRAMUSCULAR; INTRAVENOUS; SUBCUTANEOUS at 01:55

## 2022-01-01 RX ADMIN — HEPARIN SODIUM 7500 UNITS: 5000 INJECTION INTRAVENOUS; SUBCUTANEOUS at 05:21

## 2022-01-01 RX ADMIN — LIDOCAINE 5% 1 PATCH: 700 PATCH TOPICAL at 09:25

## 2022-01-01 RX ADMIN — STANDARDIZED SENNA CONCENTRATE 8.6 MG: 8.6 TABLET ORAL at 17:28

## 2022-01-01 RX ADMIN — HYDROMORPHONE HYDROCHLORIDE 0.5 MG: 1 INJECTION, SOLUTION INTRAMUSCULAR; INTRAVENOUS; SUBCUTANEOUS at 11:20

## 2022-01-01 RX ADMIN — ONDANSETRON 4 MG: 4 TABLET, ORALLY DISINTEGRATING ORAL at 17:44

## 2022-01-01 RX ADMIN — Medication 20 MG: at 20:50

## 2022-01-01 RX ADMIN — SODIUM CHLORIDE, SODIUM LACTATE, POTASSIUM CHLORIDE, AND CALCIUM CHLORIDE: .6; .31; .03; .02 INJECTION, SOLUTION INTRAVENOUS at 21:07

## 2022-01-01 RX ADMIN — HYDROMORPHONE HYDROCHLORIDE 0.5 MG: 2 INJECTION, SOLUTION INTRAMUSCULAR; INTRAVENOUS; SUBCUTANEOUS at 22:20

## 2022-01-01 RX ADMIN — METOPROLOL TARTRATE 25 MG: 25 TABLET, FILM COATED ORAL at 19:54

## 2022-01-01 RX ADMIN — NYSTATIN: 100000 POWDER TOPICAL at 18:08

## 2022-01-01 RX ADMIN — OXYCODONE HYDROCHLORIDE 10 MG: 10 TABLET ORAL at 09:40

## 2022-01-01 RX ADMIN — MICONAZOLE NITRATE: 20 CREAM TOPICAL at 17:16

## 2022-01-01 RX ADMIN — DOCUSATE SODIUM 100 MG: 100 CAPSULE ORAL at 08:26

## 2022-01-01 RX ADMIN — NYSTATIN 500000 UNITS: 100000 SUSPENSION ORAL at 22:22

## 2022-01-01 RX ADMIN — OXYCODONE HYDROCHLORIDE 10 MG: 10 TABLET ORAL at 21:35

## 2022-01-01 RX ADMIN — STANDARDIZED SENNA CONCENTRATE 8.6 MG: 8.6 TABLET ORAL at 17:33

## 2022-01-01 RX ADMIN — NYSTATIN: 100000 POWDER TOPICAL at 09:59

## 2022-01-01 RX ADMIN — OXYCODONE HYDROCHLORIDE 10 MG: 10 TABLET ORAL at 18:33

## 2022-01-01 RX ADMIN — DIAZEPAM 2 MG: 2 TABLET ORAL at 06:21

## 2022-01-01 RX ADMIN — FLUCONAZOLE 150 MG: 100 TABLET ORAL at 13:28

## 2022-01-01 RX ADMIN — SODIUM CHLORIDE 50 ML/HR: 0.9 INJECTION, SOLUTION INTRAVENOUS at 11:59

## 2022-01-01 RX ADMIN — HYDROMORPHONE HYDROCHLORIDE 0.5 MG: 1 INJECTION, SOLUTION INTRAMUSCULAR; INTRAVENOUS; SUBCUTANEOUS at 05:33

## 2022-01-01 RX ADMIN — NYSTATIN: 100000 POWDER TOPICAL at 17:47

## 2022-01-01 RX ADMIN — SODIUM CHLORIDE 100 ML/HR: 0.9 INJECTION, SOLUTION INTRAVENOUS at 10:04

## 2022-01-01 RX ADMIN — NYSTATIN 500000 UNITS: 100000 SUSPENSION ORAL at 21:32

## 2022-01-01 RX ADMIN — HYDROMORPHONE HYDROCHLORIDE 0.5 MG: 1 INJECTION, SOLUTION INTRAMUSCULAR; INTRAVENOUS; SUBCUTANEOUS at 10:40

## 2022-01-01 RX ADMIN — NYSTATIN 500000 UNITS: 100000 SUSPENSION ORAL at 09:09

## 2022-01-01 RX ADMIN — HYDROMORPHONE HYDROCHLORIDE 0.5 MG: 1 INJECTION, SOLUTION INTRAMUSCULAR; INTRAVENOUS; SUBCUTANEOUS at 17:21

## 2022-01-01 RX ADMIN — NYSTATIN: 100000 POWDER TOPICAL at 08:08

## 2022-01-01 RX ADMIN — HYDROMORPHONE HYDROCHLORIDE 0.5 MG: 1 INJECTION, SOLUTION INTRAMUSCULAR; INTRAVENOUS; SUBCUTANEOUS at 17:34

## 2022-01-01 RX ADMIN — ACETAMINOPHEN 975 MG: 325 TABLET, FILM COATED ORAL at 05:31

## 2022-01-01 RX ADMIN — NYSTATIN: 100000 CREAM TOPICAL at 10:52

## 2022-01-01 RX ADMIN — METRONIDAZOLE 500 MG: 500 TABLET ORAL at 05:28

## 2022-01-01 RX ADMIN — STANDARDIZED SENNA CONCENTRATE 8.6 MG: 8.6 TABLET ORAL at 17:08

## 2022-01-01 RX ADMIN — FENTANYL CITRATE 50 MCG: 50 INJECTION INTRAMUSCULAR; INTRAVENOUS at 23:39

## 2022-01-01 RX ADMIN — ACETAMINOPHEN 975 MG: 325 TABLET, FILM COATED ORAL at 05:47

## 2022-01-01 RX ADMIN — DEXTROSE, SODIUM CHLORIDE, AND POTASSIUM CHLORIDE 50 ML/HR: 5; .45; .15 INJECTION INTRAVENOUS at 17:36

## 2022-01-01 RX ADMIN — MICONAZOLE NITRATE: 20 CREAM TOPICAL at 17:50

## 2022-01-01 RX ADMIN — DICLOFENAC SODIUM 2 G: 10 GEL TOPICAL at 20:48

## 2022-01-01 RX ADMIN — NYSTATIN: 100000 POWDER TOPICAL at 12:30

## 2022-01-01 RX ADMIN — OXYCODONE HYDROCHLORIDE 10 MG: 10 TABLET ORAL at 09:59

## 2022-01-01 RX ADMIN — OXYCODONE HYDROCHLORIDE 15 MG: 10 TABLET ORAL at 01:36

## 2022-01-01 RX ADMIN — NYSTATIN: 100000 POWDER TOPICAL at 10:07

## 2022-01-01 RX ADMIN — CEFEPIME HYDROCHLORIDE 1000 MG: 1 INJECTION, SOLUTION INTRAVENOUS at 06:24

## 2022-01-01 RX ADMIN — MICONAZOLE NITRATE: 20 CREAM TOPICAL at 09:08

## 2022-01-01 RX ADMIN — HYDROMORPHONE HYDROCHLORIDE 0.5 MG: 1 INJECTION, SOLUTION INTRAMUSCULAR; INTRAVENOUS; SUBCUTANEOUS at 08:26

## 2022-01-01 RX ADMIN — METHOCARBAMOL 500 MG: 500 TABLET, FILM COATED ORAL at 11:00

## 2022-01-01 RX ADMIN — HYDROMORPHONE HYDROCHLORIDE 0.5 MG: 1 INJECTION, SOLUTION INTRAMUSCULAR; INTRAVENOUS; SUBCUTANEOUS at 00:19

## 2022-01-01 RX ADMIN — HEPARIN SODIUM 7500 UNITS: 5000 INJECTION INTRAVENOUS; SUBCUTANEOUS at 22:06

## 2022-01-01 RX ADMIN — NYSTATIN: 100000 POWDER TOPICAL at 12:44

## 2022-01-01 RX ADMIN — HYDROMORPHONE HYDROCHLORIDE 0.5 MG: 1 INJECTION, SOLUTION INTRAMUSCULAR; INTRAVENOUS; SUBCUTANEOUS at 07:49

## 2022-01-01 RX ADMIN — MICONAZOLE NITRATE: 20 CREAM TOPICAL at 08:06

## 2022-01-01 RX ADMIN — METOPROLOL TARTRATE 25 MG: 25 TABLET, FILM COATED ORAL at 10:34

## 2022-01-01 RX ADMIN — HEPARIN SODIUM 7500 UNITS: 5000 INJECTION INTRAVENOUS; SUBCUTANEOUS at 21:48

## 2022-01-01 RX ADMIN — NYSTATIN 500000 UNITS: 100000 SUSPENSION ORAL at 12:57

## 2022-01-01 RX ADMIN — NYSTATIN 3 APPLICATION: 100000 POWDER TOPICAL at 22:54

## 2022-01-01 RX ADMIN — ALPRAZOLAM 0.5 MG: 0.5 TABLET ORAL at 06:09

## 2022-01-01 RX ADMIN — STANDARDIZED SENNA CONCENTRATE 8.6 MG: 8.6 TABLET ORAL at 18:15

## 2022-01-01 RX ADMIN — NYSTATIN: 100000 POWDER TOPICAL at 08:45

## 2022-01-01 RX ADMIN — HYDROXYZINE HYDROCHLORIDE 10 MG: 10 TABLET ORAL at 21:23

## 2022-01-01 RX ADMIN — DEXTROSE, SODIUM CHLORIDE, AND POTASSIUM CHLORIDE 50 ML/HR: 5; .45; .15 INJECTION INTRAVENOUS at 04:51

## 2022-01-01 RX ADMIN — OXYCODONE HYDROCHLORIDE 5 MG: 5 TABLET ORAL at 14:43

## 2022-01-01 RX ADMIN — ONDANSETRON 4 MG: 4 TABLET, ORALLY DISINTEGRATING ORAL at 23:34

## 2022-01-01 RX ADMIN — FLUCONAZOLE 150 MG: 100 TABLET ORAL at 09:13

## 2022-01-01 RX ADMIN — ONDANSETRON 4 MG: 4 TABLET, ORALLY DISINTEGRATING ORAL at 12:14

## 2022-01-01 RX ADMIN — MICONAZOLE NITRATE: 20 CREAM TOPICAL at 17:03

## 2022-01-01 RX ADMIN — METHOCARBAMOL 500 MG: 500 TABLET, FILM COATED ORAL at 18:15

## 2022-01-01 RX ADMIN — NYSTATIN 500000 UNITS: 100000 SUSPENSION ORAL at 21:08

## 2022-01-01 RX ADMIN — METHOCARBAMOL 500 MG: 500 TABLET ORAL at 13:56

## 2022-01-01 RX ADMIN — HYDROMORPHONE HYDROCHLORIDE 0.5 MG: 1 INJECTION, SOLUTION INTRAMUSCULAR; INTRAVENOUS; SUBCUTANEOUS at 17:44

## 2022-01-01 RX ADMIN — PIPERACILLIN AND TAZOBACTAM 4.5 G: 36; 4.5 INJECTION, POWDER, FOR SOLUTION INTRAVENOUS at 18:30

## 2022-01-01 RX ADMIN — HEPARIN SODIUM 7500 UNITS: 5000 INJECTION INTRAVENOUS; SUBCUTANEOUS at 06:12

## 2022-01-01 RX ADMIN — MICONAZOLE NITRATE: 20 CREAM TOPICAL at 08:28

## 2022-01-01 RX ADMIN — ENOXAPARIN SODIUM 60 MG: 60 INJECTION SUBCUTANEOUS at 18:01

## 2022-01-01 RX ADMIN — PANTOPRAZOLE SODIUM 40 MG: 40 INJECTION, POWDER, FOR SOLUTION INTRAVENOUS at 05:00

## 2022-01-01 RX ADMIN — ONDANSETRON 4 MG: 4 TABLET, ORALLY DISINTEGRATING ORAL at 11:41

## 2022-01-01 RX ADMIN — ONDANSETRON 4 MG: 4 TABLET, ORALLY DISINTEGRATING ORAL at 05:26

## 2022-01-01 RX ADMIN — DOCUSATE SODIUM 100 MG: 100 CAPSULE ORAL at 09:53

## 2022-01-01 RX ADMIN — OXYCODONE HYDROCHLORIDE 10 MG: 10 TABLET ORAL at 09:19

## 2022-01-01 RX ADMIN — METHOCARBAMOL 500 MG: 500 TABLET, FILM COATED ORAL at 23:12

## 2022-01-01 RX ADMIN — FLUCONAZOLE 400 MG: 100 TABLET ORAL at 10:00

## 2022-01-01 RX ADMIN — GABAPENTIN 300 MG: 300 CAPSULE ORAL at 21:09

## 2022-01-01 RX ADMIN — NYSTATIN 1 APPLICATION: 100000 POWDER TOPICAL at 17:19

## 2022-01-01 RX ADMIN — NYSTATIN 500000 UNITS: 100000 SUSPENSION ORAL at 13:04

## 2022-01-01 RX ADMIN — OXYCODONE HYDROCHLORIDE 10 MG: 10 TABLET ORAL at 05:15

## 2022-01-01 RX ADMIN — NYSTATIN: 100000 POWDER TOPICAL at 13:17

## 2022-01-01 RX ADMIN — CEFTRIAXONE 2000 MG: 2 INJECTION, SOLUTION INTRAVENOUS at 18:34

## 2022-01-01 RX ADMIN — PIPERACILLIN AND TAZOBACTAM 4.5 G: 36; 4.5 INJECTION, POWDER, FOR SOLUTION INTRAVENOUS at 00:39

## 2022-01-01 RX ADMIN — PIPERACILLIN AND TAZOBACTAM 4.5 G: 36; 4.5 INJECTION, POWDER, FOR SOLUTION INTRAVENOUS at 17:44

## 2022-01-01 RX ADMIN — CEFTRIAXONE 1000 MG: 1 INJECTION, SOLUTION INTRAVENOUS at 08:39

## 2022-01-01 RX ADMIN — HYDROMORPHONE HYDROCHLORIDE 0.5 MG: 1 INJECTION, SOLUTION INTRAMUSCULAR; INTRAVENOUS; SUBCUTANEOUS at 23:29

## 2022-01-01 RX ADMIN — NYSTATIN 500000 UNITS: 100000 SUSPENSION ORAL at 09:24

## 2022-01-01 RX ADMIN — SODIUM BICARBONATE 1300 MG: 650 TABLET ORAL at 12:44

## 2022-01-01 RX ADMIN — NYSTATIN 500000 UNITS: 100000 SUSPENSION ORAL at 17:50

## 2022-01-01 RX ADMIN — NYSTATIN 500000 UNITS: 100000 SUSPENSION ORAL at 21:17

## 2022-01-01 RX ADMIN — DIAZEPAM 2 MG: 2 TABLET ORAL at 04:19

## 2022-01-01 RX ADMIN — DOCUSATE SODIUM 100 MG: 100 CAPSULE ORAL at 08:39

## 2022-01-01 RX ADMIN — MICONAZOLE NITRATE: 20 CREAM TOPICAL at 10:20

## 2022-01-01 RX ADMIN — HEPARIN SODIUM 7500 UNITS: 5000 INJECTION INTRAVENOUS; SUBCUTANEOUS at 05:23

## 2022-01-01 RX ADMIN — GABAPENTIN 300 MG: 300 CAPSULE ORAL at 17:25

## 2022-01-01 RX ADMIN — ONDANSETRON 4 MG: 4 TABLET, ORALLY DISINTEGRATING ORAL at 17:40

## 2022-01-01 RX ADMIN — HYDROMORPHONE HYDROCHLORIDE 0.5 MG: 1 INJECTION, SOLUTION INTRAMUSCULAR; INTRAVENOUS; SUBCUTANEOUS at 13:13

## 2022-01-01 RX ADMIN — ONDANSETRON 4 MG: 4 TABLET, ORALLY DISINTEGRATING ORAL at 00:20

## 2022-01-01 RX ADMIN — HYDROMORPHONE HYDROCHLORIDE 0.5 MG: 1 INJECTION, SOLUTION INTRAMUSCULAR; INTRAVENOUS; SUBCUTANEOUS at 22:55

## 2022-01-01 RX ADMIN — HEPARIN SODIUM 7500 UNITS: 5000 INJECTION INTRAVENOUS; SUBCUTANEOUS at 05:49

## 2022-01-01 RX ADMIN — PIPERACILLIN AND TAZOBACTAM 4.5 G: 36; 4.5 INJECTION, POWDER, FOR SOLUTION INTRAVENOUS at 16:29

## 2022-01-01 RX ADMIN — NYSTATIN: 100000 POWDER TOPICAL at 15:47

## 2022-01-01 RX ADMIN — ONDANSETRON 4 MG: 2 INJECTION INTRAMUSCULAR; INTRAVENOUS at 12:36

## 2022-01-01 RX ADMIN — ONDANSETRON 4 MG: 4 TABLET, ORALLY DISINTEGRATING ORAL at 06:03

## 2022-01-01 RX ADMIN — CEFEPIME HYDROCHLORIDE 1000 MG: 1 INJECTION, SOLUTION INTRAVENOUS at 05:34

## 2022-01-01 RX ADMIN — METRONIDAZOLE 500 MG: 500 TABLET ORAL at 05:41

## 2022-01-01 RX ADMIN — HYDROMORPHONE HYDROCHLORIDE 0.5 MG: 1 INJECTION, SOLUTION INTRAMUSCULAR; INTRAVENOUS; SUBCUTANEOUS at 15:00

## 2022-01-01 RX ADMIN — STANDARDIZED SENNA CONCENTRATE 8.6 MG: 8.6 TABLET ORAL at 08:01

## 2022-01-01 RX ADMIN — NYSTATIN 500000 UNITS: 100000 SUSPENSION ORAL at 09:53

## 2022-01-01 RX ADMIN — METHOCARBAMOL 500 MG: 500 TABLET ORAL at 14:10

## 2022-01-01 RX ADMIN — METOPROLOL TARTRATE 25 MG: 25 TABLET, FILM COATED ORAL at 17:17

## 2022-01-01 RX ADMIN — ONDANSETRON 4 MG: 4 TABLET, ORALLY DISINTEGRATING ORAL at 23:03

## 2022-01-01 RX ADMIN — NYSTATIN: 100000 POWDER TOPICAL at 19:48

## 2022-01-01 RX ADMIN — OXYCODONE HYDROCHLORIDE 10 MG: 10 TABLET ORAL at 18:12

## 2022-01-01 RX ADMIN — MICONAZOLE NITRATE: 20 CREAM TOPICAL at 09:57

## 2022-01-01 RX ADMIN — DICLOFENAC SODIUM 2 G: 10 GEL TOPICAL at 06:35

## 2022-01-01 RX ADMIN — HEPARIN SODIUM 7500 UNITS: 5000 INJECTION INTRAVENOUS; SUBCUTANEOUS at 13:09

## 2022-01-01 RX ADMIN — DOCUSATE SODIUM 100 MG: 100 CAPSULE ORAL at 17:50

## 2022-01-01 RX ADMIN — METOPROLOL TARTRATE 25 MG: 25 TABLET, FILM COATED ORAL at 17:56

## 2022-01-01 RX ADMIN — OXYCODONE HYDROCHLORIDE 10 MG: 10 TABLET ORAL at 12:50

## 2022-01-01 RX ADMIN — MICONAZOLE NITRATE: 20 CREAM TOPICAL at 17:28

## 2022-01-01 RX ADMIN — NYSTATIN 500000 UNITS: 100000 SUSPENSION ORAL at 12:14

## 2022-01-01 RX ADMIN — CALCIUM GLUCONATE 2 G: 20 INJECTION, SOLUTION INTRAVENOUS at 08:28

## 2022-01-01 RX ADMIN — HEPARIN SODIUM 7500 UNITS: 5000 INJECTION INTRAVENOUS; SUBCUTANEOUS at 21:20

## 2022-01-01 RX ADMIN — NYSTATIN: 100000 CREAM TOPICAL at 09:56

## 2022-01-01 RX ADMIN — HEPARIN SODIUM 7500 UNITS: 5000 INJECTION INTRAVENOUS; SUBCUTANEOUS at 21:41

## 2022-01-01 RX ADMIN — Medication 6 MG: at 22:54

## 2022-01-01 RX ADMIN — PANTOPRAZOLE SODIUM 40 MG: 40 TABLET, DELAYED RELEASE ORAL at 17:30

## 2022-01-01 RX ADMIN — HEPARIN SODIUM 7500 UNITS: 5000 INJECTION INTRAVENOUS; SUBCUTANEOUS at 05:20

## 2022-01-01 RX ADMIN — NYSTATIN: 100000 POWDER TOPICAL at 17:24

## 2022-01-01 RX ADMIN — NYSTATIN 500000 UNITS: 100000 SUSPENSION ORAL at 21:19

## 2022-01-01 RX ADMIN — NYSTATIN: 100000 CREAM TOPICAL at 21:48

## 2022-01-01 RX ADMIN — ACETAMINOPHEN 975 MG: 325 TABLET, FILM COATED ORAL at 13:08

## 2022-01-01 RX ADMIN — GABAPENTIN 300 MG: 300 CAPSULE ORAL at 20:06

## 2022-01-01 RX ADMIN — OXYCODONE HYDROCHLORIDE 5 MG: 5 TABLET ORAL at 11:09

## 2022-01-01 RX ADMIN — PANTOPRAZOLE SODIUM 40 MG: 40 INJECTION, POWDER, FOR SOLUTION INTRAVENOUS at 05:33

## 2022-01-01 RX ADMIN — ONDANSETRON 4 MG: 4 TABLET, ORALLY DISINTEGRATING ORAL at 17:33

## 2022-01-01 RX ADMIN — HEPARIN SODIUM 7500 UNITS: 5000 INJECTION INTRAVENOUS; SUBCUTANEOUS at 13:23

## 2022-01-01 RX ADMIN — OXYCODONE HYDROCHLORIDE 10 MG: 10 TABLET ORAL at 15:44

## 2022-01-01 RX ADMIN — GABAPENTIN 300 MG: 300 CAPSULE ORAL at 10:16

## 2022-01-01 RX ADMIN — DOCUSATE SODIUM 100 MG: 100 CAPSULE ORAL at 17:05

## 2022-01-01 RX ADMIN — OXYCODONE HYDROCHLORIDE 10 MG: 10 TABLET ORAL at 21:36

## 2022-01-01 RX ADMIN — OXYCODONE HYDROCHLORIDE 10 MG: 10 TABLET ORAL at 12:33

## 2022-01-01 RX ADMIN — DOCUSATE SODIUM 100 MG: 100 CAPSULE ORAL at 17:38

## 2022-01-01 RX ADMIN — NYSTATIN 500000 UNITS: 100000 SUSPENSION ORAL at 18:27

## 2022-01-01 RX ADMIN — ONDANSETRON 4 MG: 4 TABLET, ORALLY DISINTEGRATING ORAL at 11:28

## 2022-01-01 RX ADMIN — OXYCODONE HYDROCHLORIDE 10 MG: 10 TABLET ORAL at 17:44

## 2022-01-01 RX ADMIN — HYDROMORPHONE HYDROCHLORIDE 0.2 MG: 1 INJECTION, SOLUTION INTRAMUSCULAR; INTRAVENOUS; SUBCUTANEOUS at 10:51

## 2022-01-01 RX ADMIN — DOCUSATE SODIUM 100 MG: 100 CAPSULE ORAL at 18:27

## 2022-01-01 RX ADMIN — OXYCODONE HYDROCHLORIDE 10 MG: 10 TABLET ORAL at 01:33

## 2022-01-01 RX ADMIN — OXYCODONE HYDROCHLORIDE 10 MG: 10 TABLET ORAL at 14:26

## 2022-01-01 RX ADMIN — OXYCODONE HYDROCHLORIDE 10 MG: 10 TABLET ORAL at 06:40

## 2022-01-01 RX ADMIN — SERTRALINE HYDROCHLORIDE 50 MG: 50 TABLET ORAL at 09:37

## 2022-01-01 RX ADMIN — HEPARIN SODIUM 7500 UNITS: 5000 INJECTION INTRAVENOUS; SUBCUTANEOUS at 06:04

## 2022-01-01 RX ADMIN — ONDANSETRON 4 MG: 4 TABLET, ORALLY DISINTEGRATING ORAL at 05:06

## 2022-01-01 RX ADMIN — HYDROMORPHONE HYDROCHLORIDE 0.5 MG: 1 INJECTION, SOLUTION INTRAMUSCULAR; INTRAVENOUS; SUBCUTANEOUS at 11:44

## 2022-01-01 RX ADMIN — FENTANYL CITRATE 50 MCG: 50 INJECTION INTRAMUSCULAR; INTRAVENOUS at 17:44

## 2022-01-01 RX ADMIN — HYDROMORPHONE HYDROCHLORIDE 0.5 MG: 1 INJECTION, SOLUTION INTRAMUSCULAR; INTRAVENOUS; SUBCUTANEOUS at 05:03

## 2022-01-01 RX ADMIN — NYSTATIN: 100000 CREAM TOPICAL at 09:23

## 2022-01-01 RX ADMIN — DICLOFENAC SODIUM 2 G: 10 GEL TOPICAL at 13:16

## 2022-01-01 RX ADMIN — NYSTATIN: 100000 CREAM TOPICAL at 17:45

## 2022-01-01 RX ADMIN — METHOCARBAMOL 500 MG: 500 TABLET, FILM COATED ORAL at 05:14

## 2022-01-01 RX ADMIN — PANTOPRAZOLE SODIUM 40 MG: 40 TABLET, DELAYED RELEASE ORAL at 17:40

## 2022-01-01 RX ADMIN — HYDROMORPHONE HYDROCHLORIDE 0.5 MG: 1 INJECTION, SOLUTION INTRAMUSCULAR; INTRAVENOUS; SUBCUTANEOUS at 16:11

## 2022-01-01 RX ADMIN — NYSTATIN 500000 UNITS: 100000 SUSPENSION ORAL at 12:40

## 2022-01-01 RX ADMIN — HYDROMORPHONE HYDROCHLORIDE 0.5 MG: 1 INJECTION, SOLUTION INTRAMUSCULAR; INTRAVENOUS; SUBCUTANEOUS at 22:40

## 2022-01-01 RX ADMIN — ALPRAZOLAM 0.5 MG: 0.5 TABLET ORAL at 17:08

## 2022-01-01 RX ADMIN — HYDROMORPHONE HYDROCHLORIDE 0.5 MG: 1 INJECTION, SOLUTION INTRAMUSCULAR; INTRAVENOUS; SUBCUTANEOUS at 22:25

## 2022-01-01 RX ADMIN — PANTOPRAZOLE SODIUM 40 MG: 40 INJECTION, POWDER, FOR SOLUTION INTRAVENOUS at 17:04

## 2022-01-01 RX ADMIN — MICONAZOLE NITRATE: 20 CREAM TOPICAL at 08:07

## 2022-01-01 RX ADMIN — NYSTATIN: 100000 POWDER TOPICAL at 09:37

## 2022-01-01 RX ADMIN — OXYCODONE HYDROCHLORIDE 10 MG: 10 TABLET ORAL at 19:47

## 2022-01-01 RX ADMIN — OXYCODONE HYDROCHLORIDE 15 MG: 10 TABLET ORAL at 13:21

## 2022-01-01 RX ADMIN — HEPARIN SODIUM 7500 UNITS: 5000 INJECTION INTRAVENOUS; SUBCUTANEOUS at 15:21

## 2022-01-01 RX ADMIN — HYDROMORPHONE HYDROCHLORIDE 0.5 MG: 1 INJECTION, SOLUTION INTRAMUSCULAR; INTRAVENOUS; SUBCUTANEOUS at 20:47

## 2022-01-01 RX ADMIN — PANTOPRAZOLE SODIUM 40 MG: 40 INJECTION, POWDER, FOR SOLUTION INTRAVENOUS at 05:05

## 2022-01-01 RX ADMIN — NYSTATIN 500000 UNITS: 100000 SUSPENSION ORAL at 11:46

## 2022-01-01 RX ADMIN — PANTOPRAZOLE SODIUM 40 MG: 40 INJECTION, POWDER, FOR SOLUTION INTRAVENOUS at 05:03

## 2022-01-01 RX ADMIN — NYSTATIN 500000 UNITS: 100000 SUSPENSION ORAL at 13:33

## 2022-01-01 RX ADMIN — HYDROMORPHONE HYDROCHLORIDE 0.2 MG: 1 INJECTION, SOLUTION INTRAMUSCULAR; INTRAVENOUS; SUBCUTANEOUS at 19:55

## 2022-01-01 RX ADMIN — NYSTATIN: 100000 POWDER TOPICAL at 08:47

## 2022-01-01 RX ADMIN — DIAZEPAM 2 MG: 2 TABLET ORAL at 17:00

## 2022-01-01 RX ADMIN — STANDARDIZED SENNA CONCENTRATE 8.6 MG: 8.6 TABLET ORAL at 09:49

## 2022-01-01 RX ADMIN — SODIUM CHLORIDE, SODIUM LACTATE, POTASSIUM CHLORIDE, AND CALCIUM CHLORIDE: .6; .31; .03; .02 INJECTION, SOLUTION INTRAVENOUS at 00:33

## 2022-01-01 RX ADMIN — HYDROMORPHONE HYDROCHLORIDE 1 MG: 1 INJECTION, SOLUTION INTRAMUSCULAR; INTRAVENOUS; SUBCUTANEOUS at 08:42

## 2022-01-01 RX ADMIN — STANDARDIZED SENNA CONCENTRATE 8.6 MG: 8.6 TABLET ORAL at 17:09

## 2022-01-01 RX ADMIN — SODIUM CHLORIDE 50 ML/HR: 0.9 INJECTION, SOLUTION INTRAVENOUS at 10:47

## 2022-01-01 RX ADMIN — HEPARIN SODIUM 7500 UNITS: 5000 INJECTION INTRAVENOUS; SUBCUTANEOUS at 22:03

## 2022-01-01 RX ADMIN — HEPARIN SODIUM 7500 UNITS: 5000 INJECTION INTRAVENOUS; SUBCUTANEOUS at 13:36

## 2022-01-01 RX ADMIN — NYSTATIN 500000 UNITS: 100000 SUSPENSION ORAL at 17:44

## 2022-01-01 RX ADMIN — ONDANSETRON 4 MG: 4 TABLET, ORALLY DISINTEGRATING ORAL at 06:44

## 2022-01-01 RX ADMIN — NYSTATIN 500000 UNITS: 100000 SUSPENSION ORAL at 22:06

## 2022-01-01 RX ADMIN — PANTOPRAZOLE SODIUM 40 MG: 40 TABLET, DELAYED RELEASE ORAL at 06:17

## 2022-01-01 RX ADMIN — METHOCARBAMOL 500 MG: 500 TABLET ORAL at 21:40

## 2022-01-01 RX ADMIN — ACETAMINOPHEN 975 MG: 325 TABLET, FILM COATED ORAL at 13:21

## 2022-01-01 RX ADMIN — OXYCODONE HYDROCHLORIDE 10 MG: 10 TABLET ORAL at 14:40

## 2022-01-01 RX ADMIN — HYDROMORPHONE HYDROCHLORIDE 1 MG: 1 INJECTION, SOLUTION INTRAMUSCULAR; INTRAVENOUS; SUBCUTANEOUS at 18:02

## 2022-01-01 RX ADMIN — DOCUSATE SODIUM 100 MG: 100 CAPSULE ORAL at 16:26

## 2022-01-01 RX ADMIN — DOCUSATE SODIUM 100 MG: 100 CAPSULE ORAL at 18:17

## 2022-01-01 RX ADMIN — METRONIDAZOLE 500 MG: 500 TABLET ORAL at 22:51

## 2022-01-01 RX ADMIN — NYSTATIN: 100000 CREAM TOPICAL at 17:35

## 2022-01-01 RX ADMIN — FLUTICASONE PROPIONATE 1 SPRAY: 50 SPRAY, METERED NASAL at 10:08

## 2022-01-01 RX ADMIN — METHOCARBAMOL 500 MG: 500 TABLET, FILM COATED ORAL at 13:21

## 2022-01-01 RX ADMIN — PANTOPRAZOLE SODIUM 40 MG: 40 TABLET, DELAYED RELEASE ORAL at 15:14

## 2022-01-01 RX ADMIN — NYSTATIN: 100000 CREAM TOPICAL at 17:58

## 2022-01-01 RX ADMIN — ONDANSETRON 4 MG: 4 TABLET, ORALLY DISINTEGRATING ORAL at 13:50

## 2022-01-01 RX ADMIN — OXYCODONE HYDROCHLORIDE 10 MG: 10 TABLET ORAL at 23:39

## 2022-01-01 RX ADMIN — METHOCARBAMOL 500 MG: 500 TABLET, FILM COATED ORAL at 23:08

## 2022-01-01 RX ADMIN — METHOCARBAMOL 500 MG: 500 TABLET, FILM COATED ORAL at 23:04

## 2022-01-01 RX ADMIN — PANTOPRAZOLE SODIUM 40 MG: 40 INJECTION, POWDER, FOR SOLUTION INTRAVENOUS at 05:07

## 2022-01-01 RX ADMIN — PANTOPRAZOLE SODIUM 40 MG: 40 TABLET, DELAYED RELEASE ORAL at 17:34

## 2022-01-01 RX ADMIN — AMOXICILLIN AND CLAVULANATE POTASSIUM 1 TABLET: 875; 125 TABLET, FILM COATED ORAL at 21:32

## 2022-01-01 RX ADMIN — METOPROLOL TARTRATE 25 MG: 25 TABLET, FILM COATED ORAL at 08:15

## 2022-01-01 RX ADMIN — MICONAZOLE NITRATE: 20 CREAM TOPICAL at 18:03

## 2022-01-01 RX ADMIN — ONDANSETRON 4 MG: 4 TABLET, ORALLY DISINTEGRATING ORAL at 17:59

## 2022-01-01 RX ADMIN — ONDANSETRON 4 MG: 4 TABLET, ORALLY DISINTEGRATING ORAL at 13:20

## 2022-01-01 RX ADMIN — NYSTATIN 500000 UNITS: 100000 SUSPENSION ORAL at 18:22

## 2022-01-01 RX ADMIN — HEPARIN SODIUM 7500 UNITS: 5000 INJECTION INTRAVENOUS; SUBCUTANEOUS at 06:50

## 2022-01-01 RX ADMIN — MICONAZOLE NITRATE: 20 CREAM TOPICAL at 08:22

## 2022-01-01 RX ADMIN — ENOXAPARIN SODIUM 60 MG: 60 INJECTION SUBCUTANEOUS at 20:19

## 2022-01-01 RX ADMIN — ENOXAPARIN SODIUM 60 MG: 60 INJECTION SUBCUTANEOUS at 22:20

## 2022-01-01 RX ADMIN — NYSTATIN 500000 UNITS: 100000 SUSPENSION ORAL at 12:01

## 2022-01-01 RX ADMIN — OXYCODONE HYDROCHLORIDE 10 MG: 10 TABLET ORAL at 21:21

## 2022-01-01 RX ADMIN — METHOCARBAMOL 500 MG: 500 TABLET ORAL at 21:32

## 2022-01-01 RX ADMIN — NYSTATIN 500000 UNITS: 100000 SUSPENSION ORAL at 21:15

## 2022-01-01 RX ADMIN — MICONAZOLE NITRATE: 20 CREAM TOPICAL at 17:51

## 2022-01-01 RX ADMIN — OXYCODONE HYDROCHLORIDE 10 MG: 10 TABLET ORAL at 21:56

## 2022-01-01 RX ADMIN — ENOXAPARIN SODIUM 60 MG: 60 INJECTION SUBCUTANEOUS at 20:51

## 2022-01-01 RX ADMIN — HEPARIN SODIUM 7500 UNITS: 5000 INJECTION INTRAVENOUS; SUBCUTANEOUS at 14:37

## 2022-01-01 RX ADMIN — NYSTATIN: 100000 POWDER TOPICAL at 08:15

## 2022-01-01 RX ADMIN — ONDANSETRON 4 MG: 4 TABLET, ORALLY DISINTEGRATING ORAL at 01:34

## 2022-01-01 RX ADMIN — DICLOFENAC SODIUM 2 G: 10 GEL TOPICAL at 17:21

## 2022-01-01 RX ADMIN — HEPARIN SODIUM 7500 UNITS: 5000 INJECTION INTRAVENOUS; SUBCUTANEOUS at 13:50

## 2022-01-01 RX ADMIN — NYSTATIN 500000 UNITS: 100000 SUSPENSION ORAL at 21:47

## 2022-01-01 RX ADMIN — ACETAMINOPHEN 975 MG: 325 TABLET, FILM COATED ORAL at 05:00

## 2022-01-01 RX ADMIN — PIPERACILLIN AND TAZOBACTAM 4.5 G: 36; 4.5 INJECTION, POWDER, FOR SOLUTION INTRAVENOUS at 09:08

## 2022-01-01 RX ADMIN — METRONIDAZOLE 500 MG: 500 TABLET ORAL at 22:45

## 2022-01-01 RX ADMIN — ACETAMINOPHEN 975 MG: 325 TABLET, FILM COATED ORAL at 21:31

## 2022-01-01 RX ADMIN — METHOCARBAMOL 500 MG: 500 TABLET, FILM COATED ORAL at 17:13

## 2022-01-01 RX ADMIN — VANCOMYCIN HYDROCHLORIDE 1500 MG: 500 INJECTION, POWDER, LYOPHILIZED, FOR SOLUTION INTRAVENOUS at 14:10

## 2022-01-01 RX ADMIN — OXYCODONE HYDROCHLORIDE 10 MG: 10 TABLET ORAL at 08:51

## 2022-01-01 RX ADMIN — METOPROLOL TARTRATE 25 MG: 25 TABLET, FILM COATED ORAL at 09:12

## 2022-01-01 RX ADMIN — HEPARIN SODIUM 7500 UNITS: 5000 INJECTION INTRAVENOUS; SUBCUTANEOUS at 05:46

## 2022-01-01 RX ADMIN — METRONIDAZOLE 500 MG: 500 TABLET ORAL at 13:56

## 2022-01-01 RX ADMIN — DEXTROSE, SODIUM CHLORIDE, AND POTASSIUM CHLORIDE 50 ML/HR: 5; .45; .15 INJECTION INTRAVENOUS at 13:03

## 2022-01-01 RX ADMIN — ERGOCALCIFEROL 50000 UNITS: 1.25 CAPSULE ORAL at 11:01

## 2022-01-01 RX ADMIN — ONDANSETRON 4 MG: 4 TABLET, ORALLY DISINTEGRATING ORAL at 17:26

## 2022-01-01 RX ADMIN — MICONAZOLE NITRATE: 20 CREAM TOPICAL at 17:30

## 2022-01-01 RX ADMIN — NYSTATIN 500000 UNITS: 100000 SUSPENSION ORAL at 17:16

## 2022-01-01 RX ADMIN — HYDROMORPHONE HYDROCHLORIDE 1 MG: 1 INJECTION, SOLUTION INTRAMUSCULAR; INTRAVENOUS; SUBCUTANEOUS at 16:46

## 2022-01-01 RX ADMIN — ALPRAZOLAM 0.5 MG: 0.5 TABLET ORAL at 13:46

## 2022-01-01 RX ADMIN — NYSTATIN 500000 UNITS: 100000 SUSPENSION ORAL at 17:05

## 2022-01-01 RX ADMIN — ACETAMINOPHEN 975 MG: 325 TABLET, FILM COATED ORAL at 13:39

## 2022-01-01 RX ADMIN — NYSTATIN 500000 UNITS: 100000 SUSPENSION ORAL at 08:10

## 2022-01-01 RX ADMIN — OXYCODONE HYDROCHLORIDE 10 MG: 10 TABLET ORAL at 00:48

## 2022-01-01 RX ADMIN — MICONAZOLE NITRATE: 20 CREAM TOPICAL at 17:14

## 2022-01-01 RX ADMIN — DOCUSATE SODIUM 100 MG: 100 CAPSULE ORAL at 09:02

## 2022-01-01 RX ADMIN — NYSTATIN 500000 UNITS: 100000 SUSPENSION ORAL at 08:59

## 2022-01-01 RX ADMIN — HYDROMORPHONE HYDROCHLORIDE 0.5 MG: 1 INJECTION, SOLUTION INTRAMUSCULAR; INTRAVENOUS; SUBCUTANEOUS at 13:11

## 2022-01-01 RX ADMIN — NYSTATIN: 100000 POWDER TOPICAL at 08:40

## 2022-01-01 RX ADMIN — ENOXAPARIN SODIUM 60 MG: 60 INJECTION SUBCUTANEOUS at 12:40

## 2022-01-01 RX ADMIN — PIPERACILLIN AND TAZOBACTAM 4.5 G: 36; 4.5 INJECTION, POWDER, FOR SOLUTION INTRAVENOUS at 08:45

## 2022-01-01 RX ADMIN — NYSTATIN 500000 UNITS: 100000 SUSPENSION ORAL at 17:45

## 2022-01-01 RX ADMIN — CEFEPIME HYDROCHLORIDE 1000 MG: 1 INJECTION, SOLUTION INTRAVENOUS at 03:04

## 2022-01-01 RX ADMIN — HYDROMORPHONE HYDROCHLORIDE 0.5 MG: 1 INJECTION, SOLUTION INTRAMUSCULAR; INTRAVENOUS; SUBCUTANEOUS at 15:14

## 2022-01-01 RX ADMIN — METHOCARBAMOL 500 MG: 500 TABLET, FILM COATED ORAL at 15:16

## 2022-01-01 RX ADMIN — STANDARDIZED SENNA CONCENTRATE 8.6 MG: 8.6 TABLET ORAL at 18:27

## 2022-01-01 RX ADMIN — HYDROMORPHONE HYDROCHLORIDE 0.5 MG: 1 INJECTION, SOLUTION INTRAMUSCULAR; INTRAVENOUS; SUBCUTANEOUS at 16:29

## 2022-01-01 RX ADMIN — FENTANYL CITRATE 50 MCG: 50 INJECTION INTRAMUSCULAR; INTRAVENOUS at 12:34

## 2022-01-01 RX ADMIN — PANTOPRAZOLE SODIUM 40 MG: 40 TABLET, DELAYED RELEASE ORAL at 17:16

## 2022-01-01 RX ADMIN — HEPARIN SODIUM 7500 UNITS: 5000 INJECTION INTRAVENOUS; SUBCUTANEOUS at 22:47

## 2022-01-01 RX ADMIN — ONDANSETRON 4 MG: 4 TABLET, ORALLY DISINTEGRATING ORAL at 05:10

## 2022-01-01 RX ADMIN — PANTOPRAZOLE SODIUM 40 MG: 40 TABLET, DELAYED RELEASE ORAL at 17:13

## 2022-01-01 RX ADMIN — MICONAZOLE NITRATE: 20 CREAM TOPICAL at 17:39

## 2022-01-01 RX ADMIN — HYDROMORPHONE HYDROCHLORIDE 0.5 MG: 1 INJECTION, SOLUTION INTRAMUSCULAR; INTRAVENOUS; SUBCUTANEOUS at 02:30

## 2022-01-01 RX ADMIN — ENOXAPARIN SODIUM 60 MG: 60 INJECTION SUBCUTANEOUS at 20:01

## 2022-01-01 RX ADMIN — ONDANSETRON 4 MG: 4 TABLET, ORALLY DISINTEGRATING ORAL at 17:21

## 2022-01-01 RX ADMIN — CEFTRIAXONE 1000 MG: 1 INJECTION, SOLUTION INTRAVENOUS at 08:15

## 2022-01-01 RX ADMIN — HYDROMORPHONE HYDROCHLORIDE 1 MG: 1 INJECTION, SOLUTION INTRAMUSCULAR; INTRAVENOUS; SUBCUTANEOUS at 18:49

## 2022-01-01 RX ADMIN — METOPROLOL TARTRATE 25 MG: 25 TABLET, FILM COATED ORAL at 08:39

## 2022-01-01 RX ADMIN — NYSTATIN 500000 UNITS: 100000 SUSPENSION ORAL at 18:01

## 2022-01-01 RX ADMIN — HEPARIN SODIUM 7500 UNITS: 5000 INJECTION INTRAVENOUS; SUBCUTANEOUS at 05:05

## 2022-01-01 RX ADMIN — SODIUM CHLORIDE 100 ML/HR: 0.9 INJECTION, SOLUTION INTRAVENOUS at 03:06

## 2022-01-01 RX ADMIN — METOPROLOL TARTRATE 25 MG: 25 TABLET, FILM COATED ORAL at 18:34

## 2022-01-01 RX ADMIN — ONDANSETRON 4 MG: 4 TABLET, ORALLY DISINTEGRATING ORAL at 05:59

## 2022-01-01 RX ADMIN — SODIUM BICARBONATE 1300 MG: 650 TABLET ORAL at 09:14

## 2022-01-01 RX ADMIN — NYSTATIN 500000 UNITS: 100000 SUSPENSION ORAL at 09:26

## 2022-01-01 RX ADMIN — ACETAMINOPHEN 975 MG: 325 TABLET, FILM COATED ORAL at 22:51

## 2022-01-01 RX ADMIN — PANTOPRAZOLE SODIUM 40 MG: 40 TABLET, DELAYED RELEASE ORAL at 15:12

## 2022-01-01 RX ADMIN — HYDROMORPHONE HYDROCHLORIDE 0.5 MG: 1 INJECTION, SOLUTION INTRAMUSCULAR; INTRAVENOUS; SUBCUTANEOUS at 08:47

## 2022-01-01 RX ADMIN — NYSTATIN: 100000 POWDER TOPICAL at 17:35

## 2022-01-01 RX ADMIN — MICONAZOLE NITRATE: 20 CREAM TOPICAL at 17:04

## 2022-01-01 RX ADMIN — NYSTATIN 500000 UNITS: 100000 SUSPENSION ORAL at 17:40

## 2022-01-01 RX ADMIN — STANDARDIZED SENNA CONCENTRATE 8.6 MG: 8.6 TABLET ORAL at 09:02

## 2022-01-01 RX ADMIN — CEFEPIME HYDROCHLORIDE 1000 MG: 1 INJECTION, SOLUTION INTRAVENOUS at 17:03

## 2022-01-01 RX ADMIN — GABAPENTIN 300 MG: 300 CAPSULE ORAL at 21:54

## 2022-01-01 RX ADMIN — HYDROMORPHONE HYDROCHLORIDE 0.5 MG: 1 INJECTION, SOLUTION INTRAMUSCULAR; INTRAVENOUS; SUBCUTANEOUS at 09:04

## 2022-01-01 RX ADMIN — METOPROLOL TARTRATE 25 MG: 25 TABLET, FILM COATED ORAL at 17:26

## 2022-01-01 RX ADMIN — NYSTATIN: 100000 POWDER TOPICAL at 10:03

## 2022-01-01 RX ADMIN — OXYCODONE HYDROCHLORIDE 15 MG: 10 TABLET ORAL at 10:16

## 2022-01-01 RX ADMIN — STANDARDIZED SENNA CONCENTRATE 8.6 MG: 8.6 TABLET ORAL at 08:50

## 2022-01-01 RX ADMIN — ENOXAPARIN SODIUM 60 MG: 60 INJECTION SUBCUTANEOUS at 17:03

## 2022-01-01 RX ADMIN — ONDANSETRON 4 MG: 2 INJECTION INTRAMUSCULAR; INTRAVENOUS at 19:29

## 2022-01-01 RX ADMIN — NYSTATIN 500000 UNITS: 100000 SUSPENSION ORAL at 17:04

## 2022-01-01 RX ADMIN — HYDROMORPHONE HYDROCHLORIDE 0.5 MG: 1 INJECTION, SOLUTION INTRAMUSCULAR; INTRAVENOUS; SUBCUTANEOUS at 05:15

## 2022-01-01 RX ADMIN — HEPARIN SODIUM 7500 UNITS: 5000 INJECTION INTRAVENOUS; SUBCUTANEOUS at 05:59

## 2022-01-01 RX ADMIN — HYDROMORPHONE HYDROCHLORIDE 0.5 MG: 1 INJECTION, SOLUTION INTRAMUSCULAR; INTRAVENOUS; SUBCUTANEOUS at 22:48

## 2022-01-01 RX ADMIN — NYSTATIN 1 APPLICATION: 100000 POWDER TOPICAL at 08:16

## 2022-01-01 RX ADMIN — NYSTATIN 500000 UNITS: 100000 SUSPENSION ORAL at 12:34

## 2022-01-01 RX ADMIN — HEPARIN SODIUM 7500 UNITS: 5000 INJECTION INTRAVENOUS; SUBCUTANEOUS at 21:23

## 2022-01-01 RX ADMIN — ONDANSETRON 4 MG: 4 TABLET, ORALLY DISINTEGRATING ORAL at 17:00

## 2022-01-01 RX ADMIN — HYDROMORPHONE HYDROCHLORIDE 0.5 MG: 1 INJECTION, SOLUTION INTRAMUSCULAR; INTRAVENOUS; SUBCUTANEOUS at 10:22

## 2022-01-01 RX ADMIN — METRONIDAZOLE 500 MG: 500 TABLET ORAL at 21:40

## 2022-01-01 RX ADMIN — MICONAZOLE NITRATE: 20 CREAM TOPICAL at 11:45

## 2022-01-01 RX ADMIN — OXYCODONE HYDROCHLORIDE 5 MG: 5 TABLET ORAL at 14:02

## 2022-01-01 RX ADMIN — NYSTATIN: 100000 POWDER TOPICAL at 08:39

## 2022-01-01 RX ADMIN — PANTOPRAZOLE SODIUM 40 MG: 40 TABLET, DELAYED RELEASE ORAL at 06:07

## 2022-01-01 RX ADMIN — DEXTROSE, SODIUM CHLORIDE, AND POTASSIUM CHLORIDE 101 ML/HR: 5; .45; .15 INJECTION INTRAVENOUS at 08:01

## 2022-01-01 RX ADMIN — ONDANSETRON 4 MG: 4 TABLET, ORALLY DISINTEGRATING ORAL at 17:09

## 2022-01-01 RX ADMIN — METRONIDAZOLE 500 MG: 500 TABLET ORAL at 22:20

## 2022-01-01 RX ADMIN — OXYCODONE HYDROCHLORIDE 15 MG: 10 TABLET ORAL at 06:07

## 2022-01-01 RX ADMIN — FENTANYL CITRATE 50 MCG: 50 INJECTION INTRAMUSCULAR; INTRAVENOUS at 07:32

## 2022-01-01 RX ADMIN — DOCUSATE SODIUM 100 MG: 100 CAPSULE ORAL at 19:40

## 2022-01-01 RX ADMIN — NYSTATIN: 100000 POWDER TOPICAL at 17:22

## 2022-01-01 RX ADMIN — MICONAZOLE NITRATE: 20 CREAM TOPICAL at 08:39

## 2022-01-01 RX ADMIN — METOPROLOL TARTRATE 25 MG: 25 TABLET, FILM COATED ORAL at 17:05

## 2022-01-01 RX ADMIN — CEFEPIME HYDROCHLORIDE 1000 MG: 1 INJECTION, SOLUTION INTRAVENOUS at 05:03

## 2022-01-01 RX ADMIN — NYSTATIN 500000 UNITS: 100000 SUSPENSION ORAL at 08:38

## 2022-01-01 RX ADMIN — HYDROMORPHONE HYDROCHLORIDE 0.5 MG: 1 INJECTION, SOLUTION INTRAMUSCULAR; INTRAVENOUS; SUBCUTANEOUS at 23:01

## 2022-01-01 RX ADMIN — HYDROMORPHONE HYDROCHLORIDE 0.5 MG: 1 INJECTION, SOLUTION INTRAMUSCULAR; INTRAVENOUS; SUBCUTANEOUS at 12:11

## 2022-01-01 RX ADMIN — Medication 3 MG: at 21:09

## 2022-01-01 RX ADMIN — ACETAMINOPHEN 975 MG: 325 TABLET, FILM COATED ORAL at 14:17

## 2022-01-01 RX ADMIN — HYDROMORPHONE HYDROCHLORIDE 0.5 MG: 1 INJECTION, SOLUTION INTRAMUSCULAR; INTRAVENOUS; SUBCUTANEOUS at 13:48

## 2022-01-01 RX ADMIN — METOPROLOL TARTRATE 25 MG: 25 TABLET, FILM COATED ORAL at 09:37

## 2022-01-01 RX ADMIN — OXYCODONE HYDROCHLORIDE 10 MG: 10 TABLET ORAL at 12:54

## 2022-01-01 RX ADMIN — ALPRAZOLAM 2 MG: 0.5 TABLET ORAL at 01:20

## 2022-01-01 RX ADMIN — HYDROCODONE BITARTRATE AND ACETAMINOPHEN 1 TABLET: 5; 325 TABLET ORAL at 02:18

## 2022-01-01 RX ADMIN — ONDANSETRON 4 MG: 2 INJECTION INTRAMUSCULAR; INTRAVENOUS at 10:34

## 2022-01-01 RX ADMIN — DOCUSATE SODIUM 100 MG: 100 CAPSULE ORAL at 08:53

## 2022-01-01 RX ADMIN — OXYCODONE HYDROCHLORIDE 10 MG: 10 TABLET ORAL at 22:34

## 2022-01-01 RX ADMIN — HEPARIN SODIUM 7500 UNITS: 5000 INJECTION INTRAVENOUS; SUBCUTANEOUS at 21:02

## 2022-01-01 RX ADMIN — ONDANSETRON 4 MG: 2 INJECTION INTRAMUSCULAR; INTRAVENOUS at 17:09

## 2022-01-01 RX ADMIN — METOPROLOL TARTRATE 25 MG: 25 TABLET, FILM COATED ORAL at 13:55

## 2022-01-01 RX ADMIN — CHLORHEXIDINE GLUCONATE 15 ML: 1.2 SOLUTION ORAL at 08:28

## 2022-01-01 RX ADMIN — ONDANSETRON 4 MG: 4 TABLET, ORALLY DISINTEGRATING ORAL at 18:27

## 2022-01-01 RX ADMIN — SODIUM CHLORIDE: 0.9 INJECTION, SOLUTION INTRAVENOUS at 22:00

## 2022-01-01 RX ADMIN — ACETAMINOPHEN 975 MG: 325 TABLET, FILM COATED ORAL at 21:09

## 2022-01-01 RX ADMIN — MICONAZOLE NITRATE: 20 CREAM TOPICAL at 17:33

## 2022-01-01 RX ADMIN — HEPARIN SODIUM 7500 UNITS: 5000 INJECTION INTRAVENOUS; SUBCUTANEOUS at 05:11

## 2022-01-01 RX ADMIN — LIDOCAINE 5% 1 PATCH: 700 PATCH TOPICAL at 09:38

## 2022-01-01 RX ADMIN — PANTOPRAZOLE SODIUM 40 MG: 40 TABLET, DELAYED RELEASE ORAL at 05:20

## 2022-01-01 RX ADMIN — ONDANSETRON 4 MG: 4 TABLET, ORALLY DISINTEGRATING ORAL at 17:08

## 2022-01-01 RX ADMIN — STANDARDIZED SENNA CONCENTRATE 8.6 MG: 8.6 TABLET ORAL at 09:09

## 2022-01-01 RX ADMIN — PANTOPRAZOLE SODIUM 40 MG: 40 TABLET, DELAYED RELEASE ORAL at 16:26

## 2022-01-01 RX ADMIN — ONDANSETRON 4 MG: 2 INJECTION INTRAMUSCULAR; INTRAVENOUS at 02:18

## 2022-01-01 RX ADMIN — DOCUSATE SODIUM 100 MG: 100 CAPSULE ORAL at 18:08

## 2022-01-01 RX ADMIN — PROPOFOL 50 MCG/KG/MIN: 10 INJECTION, EMULSION INTRAVENOUS at 04:16

## 2022-01-01 RX ADMIN — NYSTATIN 500000 UNITS: 100000 SUSPENSION ORAL at 08:06

## 2022-01-01 RX ADMIN — HEPARIN SODIUM 7500 UNITS: 5000 INJECTION INTRAVENOUS; SUBCUTANEOUS at 21:46

## 2022-01-01 RX ADMIN — HYDROMORPHONE HYDROCHLORIDE 0.5 MG: 1 INJECTION, SOLUTION INTRAMUSCULAR; INTRAVENOUS; SUBCUTANEOUS at 08:11

## 2022-01-01 RX ADMIN — DICLOFENAC SODIUM 2 G: 10 GEL TOPICAL at 22:47

## 2022-01-01 RX ADMIN — ACETAMINOPHEN 975 MG: 325 TABLET, FILM COATED ORAL at 13:09

## 2022-01-01 RX ADMIN — MICONAZOLE NITRATE 1 APPLICATION: 20 CREAM TOPICAL at 08:17

## 2022-01-01 RX ADMIN — OXYCODONE HYDROCHLORIDE 10 MG: 10 TABLET ORAL at 05:21

## 2022-01-01 RX ADMIN — HEPARIN SODIUM 7500 UNITS: 5000 INJECTION INTRAVENOUS; SUBCUTANEOUS at 23:06

## 2022-01-01 RX ADMIN — HEPARIN SODIUM 7500 UNITS: 5000 INJECTION INTRAVENOUS; SUBCUTANEOUS at 21:14

## 2022-01-01 RX ADMIN — NYSTATIN: 100000 POWDER TOPICAL at 17:37

## 2022-01-01 RX ADMIN — VANCOMYCIN HYDROCHLORIDE 2000 MG: 5 INJECTION, POWDER, LYOPHILIZED, FOR SOLUTION INTRAVENOUS at 21:13

## 2022-01-01 RX ADMIN — NYSTATIN: 100000 POWDER TOPICAL at 10:00

## 2022-01-01 RX ADMIN — ALBUMIN (HUMAN): 12.5 INJECTION, SOLUTION INTRAVENOUS at 20:50

## 2022-01-01 RX ADMIN — HEPARIN SODIUM 7500 UNITS: 5000 INJECTION INTRAVENOUS; SUBCUTANEOUS at 21:16

## 2022-01-01 RX ADMIN — METOPROLOL TARTRATE 25 MG: 25 TABLET, FILM COATED ORAL at 10:00

## 2022-01-01 RX ADMIN — NYSTATIN: 100000 CREAM TOPICAL at 09:25

## 2022-01-01 RX ADMIN — GABAPENTIN 300 MG: 300 CAPSULE ORAL at 17:43

## 2022-01-01 RX ADMIN — ENOXAPARIN SODIUM 60 MG: 60 INJECTION SUBCUTANEOUS at 21:31

## 2022-01-01 RX ADMIN — ENOXAPARIN SODIUM 60 MG: 60 INJECTION SUBCUTANEOUS at 08:39

## 2022-01-01 RX ADMIN — CEFEPIME HYDROCHLORIDE 1000 MG: 1 INJECTION, SOLUTION INTRAVENOUS at 17:30

## 2022-01-01 RX ADMIN — ENOXAPARIN SODIUM 60 MG: 60 INJECTION SUBCUTANEOUS at 08:01

## 2022-01-01 RX ADMIN — NYSTATIN: 100000 CREAM TOPICAL at 18:08

## 2022-01-01 RX ADMIN — STANDARDIZED SENNA CONCENTRATE 8.6 MG: 8.6 TABLET ORAL at 17:39

## 2022-01-01 RX ADMIN — STANDARDIZED SENNA CONCENTRATE 8.6 MG: 8.6 TABLET ORAL at 17:14

## 2022-01-01 RX ADMIN — NYSTATIN 500000 UNITS: 100000 SUSPENSION ORAL at 20:01

## 2022-01-01 RX ADMIN — METOPROLOL TARTRATE 25 MG: 25 TABLET, FILM COATED ORAL at 17:33

## 2022-01-01 RX ADMIN — DOCUSATE SODIUM 100 MG: 100 CAPSULE ORAL at 17:10

## 2022-01-01 RX ADMIN — NYSTATIN 500000 UNITS: 100000 SUSPENSION ORAL at 11:15

## 2022-01-01 RX ADMIN — DICLOFENAC SODIUM 2 G: 10 GEL TOPICAL at 22:02

## 2022-01-01 RX ADMIN — PANTOPRAZOLE SODIUM 40 MG: 40 INJECTION, POWDER, FOR SOLUTION INTRAVENOUS at 18:14

## 2022-01-01 RX ADMIN — ENOXAPARIN SODIUM 60 MG: 60 INJECTION SUBCUTANEOUS at 08:08

## 2022-01-01 RX ADMIN — LIDOCAINE 5% 1 PATCH: 700 PATCH TOPICAL at 09:20

## 2022-01-01 RX ADMIN — STANDARDIZED SENNA CONCENTRATE 8.6 MG: 8.6 TABLET ORAL at 17:06

## 2022-01-01 RX ADMIN — SODIUM CHLORIDE 50 ML/HR: 0.9 INJECTION, SOLUTION INTRAVENOUS at 08:51

## 2022-01-01 RX ADMIN — NYSTATIN 500000 UNITS: 100000 SUSPENSION ORAL at 10:00

## 2022-01-01 RX ADMIN — NYSTATIN 500000 UNITS: 100000 SUSPENSION ORAL at 21:48

## 2022-01-01 RX ADMIN — HEPARIN SODIUM 7500 UNITS: 5000 INJECTION INTRAVENOUS; SUBCUTANEOUS at 13:17

## 2022-01-01 RX ADMIN — NYSTATIN 500000 UNITS: 100000 SUSPENSION ORAL at 17:14

## 2022-01-01 RX ADMIN — OXYCODONE HYDROCHLORIDE 15 MG: 10 TABLET ORAL at 20:32

## 2022-01-01 RX ADMIN — STANDARDIZED SENNA CONCENTRATE 8.6 MG: 8.6 TABLET ORAL at 16:10

## 2022-01-01 RX ADMIN — FLUTICASONE PROPIONATE 1 SPRAY: 50 SPRAY, METERED NASAL at 09:57

## 2022-01-01 RX ADMIN — MICONAZOLE NITRATE: 20 CREAM TOPICAL at 09:45

## 2022-01-01 RX ADMIN — HYDROXYZINE HYDROCHLORIDE 10 MG: 10 TABLET ORAL at 09:44

## 2022-01-01 RX ADMIN — ONDANSETRON 4 MG: 4 TABLET, ORALLY DISINTEGRATING ORAL at 05:53

## 2022-01-01 RX ADMIN — PANTOPRAZOLE SODIUM 40 MG: 40 TABLET, DELAYED RELEASE ORAL at 17:59

## 2022-01-01 RX ADMIN — ENOXAPARIN SODIUM 60 MG: 60 INJECTION SUBCUTANEOUS at 20:31

## 2022-01-01 RX ADMIN — ACETAMINOPHEN 975 MG: 325 TABLET, FILM COATED ORAL at 06:03

## 2022-01-01 RX ADMIN — METHOCARBAMOL 500 MG: 500 TABLET, FILM COATED ORAL at 17:38

## 2022-01-01 RX ADMIN — FLUTICASONE PROPIONATE 1 SPRAY: 50 SPRAY, METERED NASAL at 09:55

## 2022-01-01 RX ADMIN — PANTOPRAZOLE SODIUM 40 MG: 40 TABLET, DELAYED RELEASE ORAL at 17:43

## 2022-01-01 RX ADMIN — DOCUSATE SODIUM 100 MG: 100 CAPSULE ORAL at 10:28

## 2022-01-01 RX ADMIN — SODIUM CHLORIDE 1000 ML: 0.9 INJECTION, SOLUTION INTRAVENOUS at 07:50

## 2022-01-01 RX ADMIN — METRONIDAZOLE 500 MG: 500 TABLET ORAL at 16:24

## 2022-01-01 RX ADMIN — SODIUM BICARBONATE 1300 MG: 650 TABLET ORAL at 09:52

## 2022-01-01 RX ADMIN — SODIUM CHLORIDE, SODIUM LACTATE, POTASSIUM CHLORIDE, AND CALCIUM CHLORIDE 75 ML/HR: .6; .31; .03; .02 INJECTION, SOLUTION INTRAVENOUS at 09:56

## 2022-01-01 RX ADMIN — MICONAZOLE NITRATE 1 APPLICATION: 20 CREAM TOPICAL at 08:16

## 2022-01-01 RX ADMIN — PANTOPRAZOLE SODIUM 40 MG: 40 TABLET, DELAYED RELEASE ORAL at 17:26

## 2022-01-01 RX ADMIN — HEPARIN SODIUM 7500 UNITS: 5000 INJECTION INTRAVENOUS; SUBCUTANEOUS at 13:38

## 2022-01-01 RX ADMIN — CALCIUM GLUCONATE 2 G: 20 INJECTION, SOLUTION INTRAVENOUS at 03:17

## 2022-01-01 RX ADMIN — MICONAZOLE NITRATE: 20 CREAM TOPICAL at 08:41

## 2022-01-01 RX ADMIN — POTASSIUM CHLORIDE 40 MEQ: 1500 TABLET, EXTENDED RELEASE ORAL at 17:10

## 2022-01-01 RX ADMIN — METOPROLOL TARTRATE 25 MG: 25 TABLET, FILM COATED ORAL at 17:27

## 2022-01-01 RX ADMIN — NYSTATIN 500000 UNITS: 100000 SUSPENSION ORAL at 23:07

## 2022-01-01 RX ADMIN — FLUCONAZOLE 400 MG: 100 TABLET ORAL at 09:37

## 2022-01-01 RX ADMIN — GABAPENTIN 300 MG: 300 CAPSULE ORAL at 21:21

## 2022-01-01 RX ADMIN — ACETAMINOPHEN 975 MG: 325 TABLET, FILM COATED ORAL at 05:28

## 2022-01-01 RX ADMIN — AMOXICILLIN AND CLAVULANATE POTASSIUM 1 TABLET: 875; 125 TABLET, FILM COATED ORAL at 08:01

## 2022-01-01 RX ADMIN — ALUMINA, MAGNESIA, AND SIMETHICONE ORAL SUSPENSION REGULAR STRENGTH 30 ML: 1200; 1200; 120 SUSPENSION ORAL at 17:13

## 2022-01-01 RX ADMIN — STANDARDIZED SENNA CONCENTRATE 8.6 MG: 8.6 TABLET ORAL at 10:14

## 2022-01-01 RX ADMIN — HYDROMORPHONE HYDROCHLORIDE 0.5 MG: 1 INJECTION, SOLUTION INTRAMUSCULAR; INTRAVENOUS; SUBCUTANEOUS at 08:01

## 2022-01-01 RX ADMIN — DEXTROSE MONOHYDRATE 25 ML: 25 INJECTION, SOLUTION INTRAVENOUS at 13:23

## 2022-01-01 RX ADMIN — DOCUSATE SODIUM 100 MG: 100 CAPSULE ORAL at 17:07

## 2022-01-01 RX ADMIN — HYDROMORPHONE HYDROCHLORIDE 1 MG: 1 INJECTION, SOLUTION INTRAMUSCULAR; INTRAVENOUS; SUBCUTANEOUS at 13:58

## 2022-01-01 RX ADMIN — MICONAZOLE NITRATE: 20 CREAM TOPICAL at 09:24

## 2022-01-01 RX ADMIN — ACETAMINOPHEN 975 MG: 325 TABLET, FILM COATED ORAL at 21:40

## 2022-01-01 RX ADMIN — ONDANSETRON 4 MG: 4 TABLET, ORALLY DISINTEGRATING ORAL at 13:09

## 2022-01-01 RX ADMIN — ONDANSETRON 4 MG: 4 TABLET, ORALLY DISINTEGRATING ORAL at 11:00

## 2022-01-01 RX ADMIN — STANDARDIZED SENNA CONCENTRATE 8.6 MG: 8.6 TABLET ORAL at 09:55

## 2022-01-01 RX ADMIN — AMOXICILLIN AND CLAVULANATE POTASSIUM 1 TABLET: 875; 125 TABLET, FILM COATED ORAL at 22:57

## 2022-01-01 RX ADMIN — NYSTATIN: 100000 POWDER TOPICAL at 09:45

## 2022-01-01 RX ADMIN — METHOCARBAMOL 500 MG: 500 TABLET, FILM COATED ORAL at 23:07

## 2022-01-06 PROBLEM — K57.32 DIVERTICULITIS LARGE INTESTINE: Status: ACTIVE | Noted: 2022-01-01

## 2022-01-06 PROBLEM — L03.311 CELLULITIS OF ABDOMINAL WALL: Status: ACTIVE | Noted: 2022-01-01

## 2022-01-06 PROBLEM — E87.5 HYPERKALEMIA: Status: ACTIVE | Noted: 2022-01-01

## 2022-01-06 PROBLEM — K57.20 DIVERTICULITIS OF LARGE INTESTINE WITH PERFORATION: Status: ACTIVE | Noted: 2022-01-01

## 2022-01-06 PROBLEM — N17.9 ACUTE RENAL FAILURE (ARF) (HCC): Status: ACTIVE | Noted: 2022-01-01

## 2022-01-06 PROBLEM — N13.30 HYDROURETERONEPHROSIS: Status: ACTIVE | Noted: 2022-01-01

## 2022-01-06 PROBLEM — N76.1 SUBACUTE VAGINITIS: Status: ACTIVE | Noted: 2022-01-01

## 2022-01-06 NOTE — ASSESSMENT & PLAN NOTE
Right Hydroureteronephrosis noted on CT abdomen pelvis on 01/06/22  Most probably secondary to intra abdominal pathology     Galan in place  Monitor for symptoms  Follow BMP   Renal US tomorrow (01/07)

## 2022-01-06 NOTE — ED PROVIDER NOTES
History  Chief Complaint   Patient presents with    Wound Check     patient states she has had open wounds under her breasts, inside of thigs, genitals, stomach, buttocks for over a month  states over the past week she has had increased pain making walking and getting up difficult  49-year-old female presents for evaluation of open sores  Patient reports she has sores underneath her breasts, on her thighs and buttocks  She has previously seen wound care for these, per chart review patient was previously diagnosed with hidradenitis supperativa as well as fungal dermatitis  Patient reports she was on an antibiotic previously however she believes she has a yeast infection as a result of it as she has burning in her genital region  Patient states she was given a prescription for an antifungal however she had difficulty filling it at the pharmacy  She reports increased pain, she has not taken anything for pain at home  She denies fevers  She is also complaining of a sore throat, denies other cold symptoms  Prior to Admission Medications   Prescriptions Last Dose Informant Patient Reported? Taking?    Diclofenac Sodium (VOLTAREN) 1 %   Yes No   Sig: Apply 2 g topically 4 (four) times a day   SUMAtriptan (IMITREX) 25 mg tablet   No No   Sig: TAKE 1 TABLET BY MOUTH ONCE AS NEEDED FOR MIGRAINE FOR UP TO 1 DOSE   albuterol (Ventolin HFA) 90 mcg/act inhaler   No No   Sig: Inhale 2 puffs every 6 (six) hours as needed for wheezing or shortness of breath   celecoxib (CeleBREX) 200 mg capsule   No No   Sig: take 1 capsule by mouth twice a day   clindamycin (CLEOCIN T) 1 % external solution   No No   Sig: Apply topically 2 (two) times a day   Patient not taking: Reported on 11/22/2021    clotrimazole (LOTRIMIN) 1 % cream   No No   Sig: Apply topically 2 (two) times a day   cyclobenzaprine (FLEXERIL) 10 mg tablet   No No   Sig: take 1 tablet by mouth three times a day if needed for muscle spasm   dicyclomine (BENTYL) 20 mg tablet   No No   Sig: take 1 tablet by mouth four times a day if needed for ABDOMINAL CRAMPING   ferrous sulfate 324 (65 Fe) mg   No No   Sig: take 1 tablet by mouth twice a day before meals   Patient not taking: Reported on 2021   fluticasone (FLONASE) 50 mcg/act nasal spray   No No   Sig: instill 1 spray into each nostril once daily   hydrOXYzine HCL (ATARAX) 25 mg tablet   No No   Sig: take 1 tablet by mouth three times a day if needed for anxiety   lidocaine (LIDODERM) 5 %  Self No No   Sig: Apply 1 patch topically daily Remove & Discard patch within 12 hours or as directed by MD   lisinopril (ZESTRIL) 10 mg tablet   No No   Sig: Take 1 tablet (10 mg total) by mouth daily   loratadine (CLARITIN) 10 mg tablet   No No   Sig: take 1 tablet by mouth once daily   metoprolol tartrate (LOPRESSOR) 25 mg tablet   No No   Sig: take 1 tablet by mouth twice a day   omeprazole (PriLOSEC) 20 mg delayed release capsule   No No   Sig: Take 1 capsule (20 mg total) by mouth daily   sertraline (ZOLOFT) 50 mg tablet   No No   Sig: Take 1 tablet (50 mg total) by mouth daily      Facility-Administered Medications: None       Past Medical History:   Diagnosis Date    Anxiety     Arthritis     GERD (gastroesophageal reflux disease)     Hidradenitis suppurativa     Hypertension     Lipodermatosclerosis of both lower extremities     Lymphedema     Sciatic leg pain 2006    left side    SOB (shortness of breath)     Stomach ulcer        Past Surgical History:   Procedure Laterality Date     SECTION      PA EXC SKIN BENIG 3 1-4 CM TRUNK,ARM,LEG Right 2020    Procedure: EXCISION BIOPSY TISSUE LESION/MASS UPPER EXTREMITY;  Surgeon: Thor Rogers DO;  Location: [facility];  Service: General    TONSILLECTOMY      TUMOR REMOVAL Right     5th finger       Family History   Problem Relation Age of Onset    Cancer Mother     Hypertension Mother     COPD Father     Diabetes Father      I have reviewed and agree with the history as documented  E-Cigarette/Vaping    E-Cigarette Use Never User      E-Cigarette/Vaping Substances    Nicotine No     THC No     CBD No     Flavoring No     Other No     Unknown No      Social History     Tobacco Use    Smoking status: Light Tobacco Smoker     Packs/day: 0 25     Years: 20 00     Pack years: 5 00     Types: Cigarettes    Smokeless tobacco: Never Used    Tobacco comment: Only smoke once in awhile   Vaping Use    Vaping Use: Never used   Substance Use Topics    Alcohol use: No     Comment: holidays    Drug use: No       Review of Systems   Constitutional: Positive for chills  Negative for fever  HENT: Positive for sore throat  Respiratory: Negative for shortness of breath  Cardiovascular: Negative for chest pain  Gastrointestinal: Negative for abdominal pain  Genitourinary: Negative for dysuria  Skin: Positive for rash  All other systems reviewed and are negative  Physical Exam  Physical Exam  Vitals reviewed  Constitutional:       Appearance: Normal appearance  She is obese  HENT:      Right Ear: External ear normal       Left Ear: External ear normal       Mouth/Throat:      Mouth: Mucous membranes are dry  Pharynx: Posterior oropharyngeal erythema present  No oropharyngeal exudate  Eyes:      General:         Right eye: No discharge  Left eye: No discharge  Cardiovascular:      Rate and Rhythm: Normal rate  Pulmonary:      Effort: Pulmonary effort is normal  No respiratory distress  Abdominal:      General: There is no distension  Palpations: Abdomen is soft  Tenderness: There is no abdominal tenderness  There is no guarding or rebound  Skin:     General: Skin is warm  Comments: Moist erythematous rash underneath breasts fungal in appearance, left side with open wound   Neurological:      General: No focal deficit present  Mental Status: She is alert  Mental status is at baseline  Vital Signs  ED Triage Vitals   Temperature Pulse Respirations Blood Pressure SpO2   01/06/22 0256 01/06/22 0256 01/06/22 0256 01/06/22 0256 01/06/22 0256   98 2 °F (36 8 °C) 89 20 96/51 98 %      Temp Source Heart Rate Source Patient Position - Orthostatic VS BP Location FiO2 (%)   01/06/22 0256 01/06/22 0256 01/06/22 0256 01/06/22 0256 --   Tympanic Monitor Lying Right arm       Pain Score       01/06/22 0335       10 - Worst Possible Pain           Vitals:    01/06/22 1900 01/06/22 2115 01/07/22 0130 01/07/22 0900   BP: 114/64 102/52  107/59   Pulse: 90 88 87 90   Patient Position - Orthostatic VS:  Lying Lying          Visual Acuity      ED Medications  Medications   albuterol (PROVENTIL HFA,VENTOLIN HFA) inhaler 2 puff (has no administration in time range)   fluticasone (FLONASE) 50 mcg/act nasal spray 1 spray (1 spray Each Nare Not Given 1/7/22 1019)   hydrOXYzine HCL (ATARAX) tablet 25 mg (has no administration in time range)   metoprolol tartrate (LOPRESSOR) tablet 25 mg (25 mg Oral Given 1/7/22 1034)   sertraline (ZOLOFT) tablet 50 mg (50 mg Oral Given 1/7/22 1031)   sodium chloride 0 9 % infusion (100 mL/hr Intravenous New Bag 1/6/22 0944)   acetaminophen (TYLENOL) tablet 650 mg (has no administration in time range)   docusate sodium (COLACE) capsule 100 mg (100 mg Oral Not Given 1/7/22 1019)   ondansetron (ZOFRAN) injection 4 mg (has no administration in time range)   nicotine (NICODERM CQ) 7 mg/24hr TD 24 hr patch 1 patch (1 patch Transdermal Not Given 1/7/22 1030)   fluconazole (DIFLUCAN) tablet 150 mg (150 mg Oral Given 1/7/22 1031)   HYDROcodone-acetaminophen (NORCO) 5-325 mg per tablet 1 tablet (1 tablet Oral Given 1/7/22 1153)   nystatin (MYCOSTATIN) powder ( Topical Given 1/7/22 1031)   metroNIDAZOLE (FLAGYL) tablet 500 mg (500 mg Oral Given 1/7/22 0609)   cefepime (MAXIPIME) IVPB (premix in dextrose) 1,000 mg 50 mL (0 mg Intravenous Stopped 1/6/22 8138)   vancomycin (VANCOCIN) 2,000 mg in sodium chloride 0 9 % 500 mL IVPB (has no administration in time range)   al mag oxide-diphenhydramine-lidocaine viscous (MAGIC MOUTHWASH) suspension 10 mL (has no administration in time range)   nystatin (MYCOSTATIN) cream ( Topical Given 1/7/22 1158)   sodium chloride 0 9 % bolus 1,000 mL (0 mL Intravenous Stopped 1/6/22 0749)   HYDROmorphone (DILAUDID) injection 0 5 mg (0 5 mg Intravenous Given 1/6/22 0335)   HYDROmorphone (DILAUDID) injection 0 5 mg (0 5 mg Intravenous Given 1/6/22 0503)   piperacillin-tazobactam (ZOSYN) 3 375 g in sodium chloride 0 9 % 100 mL IVPB (0 g Intravenous Stopped 1/6/22 0859)   ceFAZolin (ANCEF) IVPB (premix in dextrose) 2,000 mg 50 mL (0 mg Intravenous Stopped 1/6/22 0936)   sodium chloride 0 9 % bolus 1,000 mL (0 mL Intravenous Stopped 1/6/22 0936)   HYDROmorphone (DILAUDID) injection 0 5 mg (0 5 mg Intravenous Given 1/6/22 1040)   sodium polystyrene (KAYEXALATE) powder 15 g (15 g Oral Given 1/6/22 1331)   insulin regular (HumuLIN R,NovoLIN R) injection 10 Units (10 Units Intravenous Given 1/6/22 1327)   dextrose 50 % IV solution 25 mL (25 mL Intravenous Given 1/6/22 1323)   vancomycin (VANCOCIN) 2,000 mg in sodium chloride 0 9 % 500 mL IVPB (0 mg Intravenous Stopped 1/6/22 1938)       Diagnostic Studies  Results Reviewed     Procedure Component Value Units Date/Time    Urine Eosinophils [[id number]]     Lab Status: No result Specimen: Urine     Comprehensive metabolic panel [[id number]]  (Abnormal) Collected: 01/07/22 0641    Lab Status: Final result Specimen: Blood from Arm, Right Updated: 01/07/22 0724     Sodium 139 mmol/L      Potassium 5 2 mmol/L      Chloride 108 mmol/L      CO2 17 mmol/L      ANION GAP 14 mmol/L      BUN 57 mg/dL      Creatinine 3 24 mg/dL      Glucose 92 mg/dL      Calcium 9 1 mg/dL      Corrected Calcium 10 4 mg/dL      AST 39 U/L      ALT 25 U/L      Alkaline Phosphatase 100 U/L      Total Protein 7 1 g/dL      Albumin 2 4 g/dL Total Bilirubin 0 36 mg/dL      eGFR 15 ml/min/1 73sq m     Narrative:      [employer] guidelines for Chronic Kidney Disease (CKD):     Stage 1 with normal or high GFR (GFR > 90 mL/min/1 73 square meters)    Stage 2 Mild CKD (GFR = 60-89 mL/min/1 73 square meters)    Stage 3A Moderate CKD (GFR = 45-59 mL/min/1 73 square meters)    Stage 3B Moderate CKD (GFR = 30-44 mL/min/1 73 square meters)    Stage 4 Severe CKD (GFR = 15-29 mL/min/1 73 square meters)    Stage 5 End Stage CKD (GFR <15 mL/min/1 73 square meters)  Note: GFR calculation is accurate only with a steady state creatinine    Magnesium [[id number]]  (Normal) Collected: 01/07/22 0641    Lab Status: Final result Specimen: Blood from Arm, Right Updated: 01/07/22 0721     Magnesium 1 8 mg/dL     Phosphorus [[id number]]  (Abnormal) Collected: 01/07/22 0641    Lab Status: Final result Specimen: Blood from Arm, Right Updated: 01/07/22 0721     Phosphorus 5 3 mg/dL     APTT [[id number]]  (Normal) Collected: 01/07/22 0641    Lab Status: Final result Specimen: Blood from Arm, Right Updated: 01/07/22 0720     PTT 33 seconds     Protime-INR [[id number]]  (Abnormal) Collected: 01/07/22 0641    Lab Status: Final result Specimen: Blood from Arm, Right Updated: 01/07/22 0720     Protime 15 3 seconds      INR 1 28    CBC and differential [[id number]]  (Abnormal) Collected: 01/07/22 0641    Lab Status: Final result Specimen: Blood from Arm, Right Updated: 01/07/22 0657     WBC 8 63 Thousand/uL      RBC 4 31 Million/uL      Hemoglobin 10 5 g/dL      Hematocrit 34 3 %      MCV 80 fL      MCH 24 4 pg      MCHC 30 6 g/dL      RDW 16 4 %      MPV 8 5 fL      Platelets 867 Thousands/uL      nRBC 0 /100 WBCs      Neutrophils Relative 69 %      Immat GRANS % 0 %      Lymphocytes Relative 17 %      Monocytes Relative 8 %      Eosinophils Relative 5 %      Basophils Relative 1 %      Neutrophils Absolute 5 92 Thousands/µL      Immature Grans Absolute 0 03 Thousand/uL      Lymphocytes Absolute 1 48 Thousands/µL      Monocytes Absolute 0 65 Thousand/µL      Eosinophils Absolute 0 47 Thousand/µL      Basophils Absolute 0 08 Thousands/µL     Procalcitonin Reflex [[id number]] Collected: 01/07/22 1171    Lab Status: In process Specimen: Blood from Arm, Right Updated: 01/07/22 0653    Fingerstick Glucose (POCT) [[id number]]  (Normal) Collected: 01/06/22 2132    Lab Status: Final result Updated: 01/06/22 2134     POC Glucose 105 mg/dl     Protein / creatinine ratio, urine [[id number]]  (Abnormal) Collected: 01/06/22 1623    Lab Status: Final result Specimen: Urine, Catheter Updated: 01/06/22 1959     Creatinine, Ur 188 0 mg/dL      Protein Urine Random 103 mg/dL      Prot/Creat Ratio, Ur 0 55    Sodium, urine, random [[id number]] Collected: 01/06/22 1623    Lab Status: Final result Specimen: Urine, Catheter Updated: 01/06/22 1959     Sodium, Ur 68    Urea nitrogen, urine [[id number]] Collected: 01/06/22 1623    Lab Status: Final result Specimen: Urine, Catheter Updated: 01/06/22 1959     Urea Nitrogen, Ur 511 mg/dL     Urine Microscopic [[id number]]  (Abnormal) Collected: 01/06/22 1622    Lab Status: Final result Specimen: Urine, Indwelling Galan Catheter Updated: 01/06/22 1650     RBC, UA 2-4 /hpf      WBC, UA Innumerable /hpf      Epithelial Cells Occasional /hpf      Bacteria, UA Moderate /hpf     Urine culture [[id number]] Collected: 01/06/22 1622    Lab Status:  In process Specimen: Urine, Indwelling Galan Catheter Updated: 01/06/22 1650    UA w Reflex to Microscopic w Reflex to Culture [[id number]]  (Abnormal) Collected: 01/06/22 1622    Lab Status: Final result Specimen: Urine, Indwelling Galan Catheter Updated: 01/06/22 1639     Color, UA Yellow     Clarity, UA Cloudy     Specific Gravity, UA 1 025     pH, UA 5 0     Leukocytes, UA Moderate     Nitrite, UA Negative     Protein, UA 30 (1+) mg/dl      Glucose, UA Negative mg/dl      Ketones, UA Negative mg/dl      Urobilinogen, UA 0 2 E U /dl      Bilirubin, UA Negative     Blood, UA Moderate    Blood culture #1 [[id number]] Collected: 01/06/22 0447    Lab Status: Preliminary result Specimen: Blood from Arm, Left Updated: 01/06/22 1401     Blood Culture Received in Microbiology Lab  Culture in Progress  Blood culture #2 [[id number]] Collected: 01/06/22 0447    Lab Status: Preliminary result Specimen: Blood from Arm, Left Updated: 01/06/22 1401     Blood Culture Received in Microbiology Lab  Culture in Progress  Wound culture and Gram stain [[id number]]     Lab Status: No result Specimen: Wound from Abdominal     TSH, 3rd generation [[id number]]  (Normal) Collected: 01/06/22 0335    Lab Status: Final result Specimen: Blood from Arm, Left Updated: 01/06/22 1008     TSH 3RD GENERATON 1 078 uIU/mL     Narrative:      Patients undergoing fluorescein dye angiography may retain small amounts of fluorescein in the body for 48-72 hours post procedure  Samples containing fluorescein can produce falsely depressed TSH values  If the patient had this procedure,a specimen should be resubmitted post fluorescein clearance        Procalcitonin with AM Reflex [[id number]]  (Abnormal) Collected: 01/06/22 0447    Lab Status: Final result Specimen: Blood from Arm, Left Updated: 01/06/22 1008     Procalcitonin 0 32 ng/ml     Comprehensive metabolic panel [[id number]]  (Abnormal) Collected: 01/06/22 0447    Lab Status: Final result Specimen: Blood from Arm, Left Updated: 01/06/22 0526     Sodium 139 mmol/L      Potassium 5 4 mmol/L      Chloride 107 mmol/L      CO2 22 mmol/L      ANION GAP 10 mmol/L      BUN 66 mg/dL      Creatinine 4 56 mg/dL      Glucose 106 mg/dL      Calcium 8 8 mg/dL      Corrected Calcium 10 0 mg/dL      AST 13 U/L      ALT 21 U/L      Alkaline Phosphatase 101 U/L      Total Protein 7 0 g/dL      Albumin 2 5 g/dL      Total Bilirubin 0 37 mg/dL      eGFR 9 ml/min/1 73sq m     Narrative:      [employer] guidelines for Chronic Kidney Disease (CKD):     Stage 1 with normal or high GFR (GFR > 90 mL/min/1 73 square meters)    Stage 2 Mild CKD (GFR = 60-89 mL/min/1 73 square meters)    Stage 3A Moderate CKD (GFR = 45-59 mL/min/1 73 square meters)    Stage 3B Moderate CKD (GFR = 30-44 mL/min/1 73 square meters)    Stage 4 Severe CKD (GFR = 15-29 mL/min/1 73 square meters)    Stage 5 End Stage CKD (GFR <15 mL/min/1 73 square meters)  Note: GFR calculation is accurate only with a steady state creatinine    Lactic acid [[id number]]  (Normal) Collected: 01/06/22 0447    Lab Status: Final result Specimen: Blood from Arm, Left Updated: 01/06/22 0519     LACTIC ACID 0 9 mmol/L     Narrative:      Result may be elevated if tourniquet was used during collection      APTT [[id number]]  (Normal) Collected: 01/06/22 0447    Lab Status: Final result Specimen: Blood from Arm, Left Updated: 01/06/22 0511     PTT 34 seconds     Protime-INR [[id number]]  (Abnormal) Collected: 01/06/22 0447    Lab Status: Final result Specimen: Blood from Arm, Left Updated: 01/06/22 0511     Protime 16 0 seconds      INR 1 35    CBC and differential [[id number]]  (Abnormal) Collected: 01/06/22 0447    Lab Status: Final result Specimen: Blood from Arm, Left Updated: 01/06/22 0502     WBC 13 43 Thousand/uL      RBC 3 88 Million/uL      Hemoglobin 9 5 g/dL      Hematocrit 30 4 %      MCV 78 fL      MCH 24 5 pg      MCHC 31 3 g/dL      RDW 16 1 %      MPV 8 8 fL      Platelets 211 Thousands/uL      nRBC 0 /100 WBCs      Neutrophils Relative 76 %      Immat GRANS % 0 %      Lymphocytes Relative 12 %      Monocytes Relative 7 %      Eosinophils Relative 4 %      Basophils Relative 1 %      Neutrophils Absolute 10 16 Thousands/µL      Immature Grans Absolute 0 05 Thousand/uL      Lymphocytes Absolute 1 67 Thousands/µL      Monocytes Absolute 0 91 Thousand/µL      Eosinophils Absolute 0 54 Thousand/µL      Basophils Absolute 0 10 Thousands/µL                   kidney and bladder   Final Result by Mary Holder MD (01/06 2339)      1  Challenging exam due to habitus, however the right renal collecting system appears minimally dilated similar to prior CT  Lajean Cassette No shadowing calculi  2   Normal kidney size and echogenicity  3   Cholelithiasis  Workstation performed: [id number]         CT chest abdomen pelvis wo contrast   Final Result by Manuel Perdomo MD (01/06 0702)      Findings compatible with acute sigmoid diverticulitis with microperforation and an adjacent small right pelvic abscess  The study was limited by the lack of oral contrast   Surgical consultation is recommended  No evidence of large or small bowel obstruction  No infiltrate or pleural effusion  No evidence of a subcutaneous abscess in the region of the visualized breast, inguinal or rectal areas  Minimal to mild right-sided hydroureteronephrosis with no obstructing calculus  The findings may be secondary to the active inflammatory changes adjacent to the sigmoid colon  I personally discussed this study with Giovani Hernandez on 1/6/2022 at 6:31 AM                   Workstation performed: [id number]                    Procedures  Procedures         ED Course  ED Course as of 01/07/22 1200   Thu Jan 06, 2022   5373 Procedure Note: EKG  Date/Time: 01/06/22 3:34 AM   Interpreted by: Asaf Glynn  Indications / Diagnosis: sepsis eval  ECG reviewed by me, the ED Provider: yes   The EKG demonstrates:  Rhythm: normal sinus  Intervals: normal intervals  Axis: normal axis  QRS/Blocks: normal QRS  ST Changes: No acute ST Changes, no STD/FARHAD        0453 Wounds assessed with Tigist Ya RN, pictures taken for chart and for comparison in media tab  Compared to previous about a month ago, definite worsened lesions and erythema  Wounds are very painful for patient to lift breasts, take pants off   Will additionally order CT imaging to r/o deep infection, abscesses--particularly for left inguinal area as it seems frausto than right   0514 Hemoglobin(!): 9 5  Previously 12-13, although 1-2yrs ago   0514 WBC(!): 13 43   0523 LACTIC ACID: 0 9   0631 CT chest abdomen pelvis wo contrast  Rads: perforated diverticulum, pelvis abscess; no complication from rashes   0700 Per Dr Ceci Galvan, okay to keep at [facility] with [facility] findings, suggests admitting to medicine, IVF; states abscess is small and if needs IR, they are on campus tomorrow                                             MDM  Number of Diagnoses or Management Options  ZAHIRA (acute kidney injury) ([facility] 75 )  Diverticulitis  Rash  Diagnosis management comments: 57-year-old female presents for evaluation of rash  Patient states she has a rash underneath her bilateral breasts, in her genital region, near her buttocks  Unfortunately patient was 1st roomed in the hallway due to capacity and cannot fully visualize rashes  Will evaluate with septic labs, will attempt to room patient in order to get better view of more private areas of rash         Disposition  Final diagnoses:   Diverticulitis   ZAHIRA (acute kidney injury) ([facility] 75 )   Rash     Time reflects when diagnosis was documented in both MDM as applicable and the Disposition within this note     Time User Action Codes Description Comment    1/6/2022  7:11 AM Saralymee Paresh Add [K57 92] Diverticulitis     1/6/2022  7:11 AM Saralyn Paresh Add [N17 9] ZAHIRA (acute kidney injury) ([facility] 75 )     1/6/2022  7:11 AM Sarshayy Yoder Add [R21] Rash     1/6/2022 12:39 PM Amando Hidalgo Add [P68 681] Cellulitis of abdominal wall, chest wall and groin with wounds     1/6/2022  3:48 PM Kwame Coelho [E66 01,  Z68 44] Morbid obesity with BMI of 60 0-69 9, adult [facility])       ED Disposition     ED Disposition Condition Date/Time Comment    Admit Stable Thu Jan 6, 2022  7:10 AM Case was discussed with ANDRES and the patient's admission status was agreed to be Admission Status: inpatient status to the service of   Brigida Saleh   Follow-up Information    None         Patient's Medications   Discharge Prescriptions    No medications on file       No discharge procedures on file      PDMP Review       Value Time User    PDMP Reviewed  Yes 4/22/2020 10:32 AM Lary Steve PA-C          ED Provider  Electronically Signed by           Flori Daniel DO  01/07/22 0388

## 2022-01-06 NOTE — ED NOTES
Patient repositioned in bed  External catheter remains in place        Christal Corona RN  01/06/22 2072

## 2022-01-06 NOTE — ED NOTES
Patient states she got these open areas due to yeast from antibiotics and that she does not want anymore antibiotics  Tried explaining that antibiotics have the potential for yeast infections        Bandar Willett RN  01/06/22 0029

## 2022-01-06 NOTE — ED NOTES
Multiple attempts made to obtain blood pressure  Patient has lymphedema and edematous upper and lower extremities  Thigh cuff applied to left thigh  BP reads 98/60       Wilfrido Brooks RN  01/06/22 5393

## 2022-01-06 NOTE — ASSESSMENT & PLAN NOTE
Patient presented with pain, erythema, swelling with overlying wounds, actively draining purulent material involving the inframammary area, lower abdomen, groin and inner thighs  Elevated WBC count at 13 43    Started on vancomycin, Cefepime and Metronidazole    IV fluids  Blood Cx - 2 sent  Consult ID  Consult Wound care  Follow CBC

## 2022-01-06 NOTE — CONSULTS
Consultation - Nephrology   Willian Sheets 60 y o  female MRN: [id number]  Unit/Bed#: RM11 Encounter: [id number]      Assessment and Plan    1  Acute kidney injury, present admission, superimposed on chronic kidney disease  · Baseline serum creatinine 0 9 mg/dL on 09/09/2020  · With presenting serum creatinine 4 5 mg/dL on 01/06/2022  · Assessment/ Workup:  · Three day history of multiple episodes of diarrhea, poor p o  Intake in the setting of Celebrex and lisinopril use  · No recent IV contrast exposure  · Hydroureteronephrosis on CT, 01/06/2022  · Reports history of pyelonephritis  · Etiology:  · Suspect multifactorial in setting of urinary obstruction, poor p o  Intake, NSAID and dysautoregulation in the setting of ACE-inhibitor use  · Plan:  · Hold home lisinopril, celecoxib  · Trial volume expansion with isotonic saline  Cautiously monitor volume status  Weights will be important as edema is difficult to assess  No echocardiogram available for review  · Obtain urine studies  · Management of hydronephrosis per Urology recommendations  Trend renal function  Continue to provide supportive care  Optimize hemodynamics  Maintain mean arterial pressure greater than 65 mmHg  Renally dose medications  Please discuss with renal any diuretics or possible nephrotoxic agents, such as NSAIDs or IV contrast  Recommend avoidance of PPIs in favor of H2 blocker, if appropriate for clinical scenario  · Monitor for urinary retention- strict I&Os, record bladder scan PVRs and follow urinary retention protocol  · Patient is at risk of requiring dialysis  No acute need for initiation of hemodialysis at this time  Goal is to correct and prevent further hyperkalemia  Trial of volume expansion with isotonic saline  Would avoid potassium containing IV fluids at this time    2  Chronic kidney disease, stage II with baseline creatinine 0 9 mg/dL  · Risk factors include tobacco use, NSAID use, morbid obesity, history of uncontrolled hypertension, prediabetes  · Minimal to mild right-sided hydroureteronephrosis with no evidence of calculus on CT of chest abdomen and pelvis without contrast   · Defer baseline renal ultrasound at this time as study will likely be limited by body habitus  · Obtain urinalysis  3  Hypertension  · Hypotensive on admission  Hold home lisinopril due to acute kidney injury  4  Cellulitis with wounds of abdominal wall / hydradenitis suppurativa  · Cefazolin, cefepime, metronidazole, vancomycin, per primary  Renally dose antibiotics  5  Diverticulitis of large intestine with perforation  · Reviewed surgery consultation on 01/06/2022  Plan is for IR to evaluate for possible drainage  6  Lymphedema  · Treated at home with compression devices  Did not utilize diuretics  Compression defer due to wounds  Diuretics deferred due to acute kidney injury and likely volume depletion  7  Morbid obesity with BMI 60  · A risk of progression of renal disease due to glomerular hyperfiltration  Recommend weight loss through low-sodium, whole foods, reduced calorie diet  8  Hyperkalemia  · Likely secondary to reduced renal excretion due to acute kidney injury and lisinopril use  Request strict 2 g potassium restriction  Avoid Ace, Arb, potassium-sparing diuretic  Avoid potassium containing IV fluids, such as Plasmalyte/ Isolyte/ Lactated Ringer's  Utilize insulin as needed to control hyperglycemia to shift potassium intracellular  Rule out hemolysis  Check LDH, hemolysis smear  Rule out urinary retention  Consider use of loop diuretic  Avoid SPS in patients with CHF due to sodium load, if possible  · Do not use SPS in patient with bowel obstruction  Thank you for allowing us to participate in the care of your patient  Please feel free to contact us regarding the care of this patient, or any other questions/concerns that may be applicable      Patient Active Problem List Diagnosis    Lymphedema    Hypertension    Morbid obesity with BMI of 60 0-69 9, adult (HCC)    Hidradenitis suppurativa    Chronic midline low back pain with left-sided sciatica    Other specified anxiety disorders    Lumbar paraspinal muscle spasm    Borderline hyperlipidemia    Iron deficiency    GERD (gastroesophageal reflux disease)    Acute renal failure (ARF) (HCC)    Cellulitis of abdominal wall, chest wall and groin with wounds    Diverticulitis of large intestine with perforation       History of Present Illness     Physician Requesting Consult: Crystal Rosas MD    Reason for Consult / Principal Problem:  Acute kidney injury    Hx and PE limited by:  None    HPI: Lisa Epperson is a 60y o  year old female who presents to [facility] Emergency Department on 01/06/2022 with complaint of painful wounds and sores underneath breast, on thighs and buttocks  She has history of hydradenitis suppurativa and has been followed by wound care  Blood pressure was found to be 96/51 with a heart rate of 89  rate Intake labs revealed acute kidney injury with BUN 66 mg/dL, creatinine 1 46 mg/dL estimated GFR 9 mL/min with associated hyperkalemia with potassium 5 4 millimole per L on 01/06/2022, prompting Nephrology consultation  Baseline renal function appears to be with stage II or 3 chronic kidney disease with a baseline creatinine was 0 9-1 0 mg/dL  Patient denies any known history of chronic kidney disease  She was diagnosed with hypertension in 2019 after a long period with no medical follow-up prior due to mobility issues getting to appointments  Her most recent hemoglobin A1c was 5 7%  She does have a history of pyelonephritis with hematuria but denies history of nephrolithiasis  Home medications include Celebrex, which she took twice daily and lisinopril 10 mg daily  She is not take any diuretics    She denies any additional NSAID use, over-the-counter medications, herbal supplements, alcohol intake or street drugs  She believe she had a grandmother with kidney disease but denies any family members requiring dialysis  Upon speaking with the patient, she does report poor appetite and diarrhea over 3 days prior to admission  She complains of chills and wound pain  History obtained from chart review and the patient    Review of Systems    Pain from arthritis and wounds  A complete 10 point review of systems was performed and is otherwise negative         Historical Information   Past Medical History:   Diagnosis Date    Anxiety     Arthritis     GERD (gastroesophageal reflux disease)     Hidradenitis suppurativa     Hypertension     Lipodermatosclerosis of both lower extremities     Lymphedema     Sciatic leg pain     left side    SOB (shortness of breath)     Stomach ulcer      Past Surgical History:   Procedure Laterality Date     SECTION      LA EXC SKIN BENIG 3 1-4 CM TRUNK,ARM,LEG Right 2020    Procedure: EXCISION BIOPSY TISSUE LESION/MASS UPPER EXTREMITY;  Surgeon: Salazar Lopes DO;  Location: [facility];  Service: General    TONSILLECTOMY      TUMOR REMOVAL Right     5th finger     Social History   Social History     Substance and Sexual Activity   Alcohol Use No    Comment: holidays     Social History     Substance and Sexual Activity   Drug Use No     Social History     Tobacco Use   Smoking Status Light Tobacco Smoker    Packs/day: 0 25    Years: 20 00    Pack years: 5 00    Types: Cigarettes   Smokeless Tobacco Never Used   Tobacco Comment    Only smoke once in awhile     Family History   Problem Relation Age of Onset    Cancer Mother     Hypertension Mother     COPD Father     Diabetes Father        Meds/Allergies   all current active meds have been reviewed, current meds:   Current Facility-Administered Medications   Medication Dose Route Frequency    acetaminophen (TYLENOL) tablet 650 mg  650 mg Oral Q6H PRN    albuterol (PROVENTIL HFA,VENTOLIN HFA) inhaler 2 puff  2 puff Inhalation Q6H PRN    cefepime (MAXIPIME) IVPB (premix in dextrose) 1,000 mg 50 mL  1,000 mg Intravenous Q24H    docusate sodium (COLACE) capsule 100 mg  100 mg Oral BID    fluconazole (DIFLUCAN) tablet 150 mg  150 mg Oral Daily    fluticasone (FLONASE) 50 mcg/act nasal spray 1 spray  1 spray Each Nare Daily    HYDROcodone-acetaminophen (NORCO) 5-325 mg per tablet 1 tablet  1 tablet Oral Q6H PRN    hydrOXYzine HCL (ATARAX) tablet 25 mg  25 mg Oral TID PRN    metoprolol tartrate (LOPRESSOR) tablet 25 mg  25 mg Oral BID    metroNIDAZOLE (FLAGYL) tablet 500 mg  500 mg Oral Q8H [address]    nicotine (NICODERM CQ) 7 mg/24hr TD 24 hr patch 1 patch  1 patch Transdermal Daily    nystatin (MYCOSTATIN) powder   Topical TID    ondansetron (ZOFRAN) injection 4 mg  4 mg Intravenous Q6H PRN    sertraline (ZOLOFT) tablet 50 mg  50 mg Oral Daily    sodium chloride 0 9 % infusion  100 mL/hr Intravenous Continuous    vancomycin (VANCOCIN) 2,000 mg in sodium chloride 0 9 % 500 mL IVPB  2,000 mg Intravenous Once    [START ON 1/8/2022] vancomycin (VANCOCIN) 2,000 mg in sodium chloride 0 9 % 500 mL IVPB  2,000 mg Intravenous Q48H    and PTA meds:  (Not in a hospital admission)        Allergies   Allergen Reactions    Bee Venom Anaphylaxis, Shortness Of Breath and Swelling       Objective     Intake/Output Summary (Last 24 hours) at 1/6/2022 1420  Last data filed at 1/6/2022 0936  Gross per 24 hour   Intake 1050 ml   Output --   Net 1050 ml       Invasive Devices:        Physical Exam  Vitals and nursing note reviewed  Exam conducted with a chaperone present  Constitutional:       General: She is awake  She is not in acute distress  Appearance: Normal appearance  She is morbidly obese  She is not ill-appearing or toxic-appearing  HENT:      Head: Normocephalic and atraumatic  Nose: Nose normal  No congestion or rhinorrhea        Mouth/Throat:      Mouth: Mucous membranes are moist       Pharynx: Oropharynx is clear  Eyes:      General: No scleral icterus  Right eye: No discharge  Left eye: No discharge  Extraocular Movements: Extraocular movements intact  Conjunctiva/sclera: Conjunctivae normal       Pupils: Pupils are equal, round, and reactive to light  Cardiovascular:      Rate and Rhythm: Normal rate and regular rhythm  Pulses: Normal pulses  Heart sounds: Normal heart sounds  No murmur heard  Comments: Difficult to assess lower extremity and edema due to adiposity, unable to specifically elicit pitting edema  Pulmonary:      Effort: Pulmonary effort is normal  No respiratory distress  Breath sounds: Decreased breath sounds present  No wheezing  Abdominal:      General: Abdomen is flat  Bowel sounds are normal  There is no distension  Palpations: Abdomen is soft  There is no mass  Tenderness: There is no abdominal tenderness  Musculoskeletal:         General: No swelling or deformity  Normal range of motion  Cervical back: Normal range of motion and neck supple  No rigidity or tenderness  Right lower le+ Pitting Edema present  Left lower le+ Pitting Edema present  Skin:     General: Skin is warm and dry  Coloration: Skin is not jaundiced or pale  Findings: Wound (Exquisitely tender wounds) present  No bruising  Neurological:      General: No focal deficit present  Mental Status: She is alert and oriented to person, place, and time  Mental status is at baseline  Psychiatric:         Mood and Affect: Mood normal          Behavior: Behavior normal  Behavior is cooperative  Thought Content: Thought content normal          Judgment: Judgment normal            No intake/output data recorded  Vitals:    22 0935   BP: 127/87   Pulse: 90   Resp: 18   Temp: 98 9 °F (37 2 °C)   SpO2: 94%         Current Weight:    First Weight:      Lab Results:   I have personally reviewed pertinent labs  CBC:   Lab Results   Component Value Date    WBC 13 43 (H) 01/06/2022    HGB 9 5 (L) 01/06/2022    HCT 30 4 (L) 01/06/2022    MCV 78 (L) 01/06/2022     (H) 01/06/2022    MCH 24 5 (L) 01/06/2022    MCHC 31 3 (L) 01/06/2022    RDW 16 1 (H) 01/06/2022    MPV 8 8 (L) 01/06/2022    NRBC 0 01/06/2022     CMP:   Lab Results   Component Value Date    K 5 4 (H) 01/06/2022     01/06/2022    CO2 22 01/06/2022    BUN 66 (H) 01/06/2022    CREATININE 4 56 (H) 01/06/2022    CALCIUM 8 8 01/06/2022    AST 13 01/06/2022    ALT 21 01/06/2022    ALKPHOS 101 01/06/2022    EGFR 9 01/06/2022     Phosphorus: No results found for: PHOS  Magnesium: No results found for: MG  Urinalysis: No results found for: [place], SPECGRAV, PHUR, LEUKOCYTESUR, NITRITE, PROTEINUA, GLUCOSEU, KETONESU, BILIRUBINUR, BLOODU  Ionized Calcium: No results found for: CAION  Coagulation:   Lab Results   Component Value Date    INR 1 35 (H) 01/06/2022     Troponin: No results found for: TROPONINI  ABG: No results found for: PHART, [id number], PO2ART, BTJ0PUV, H5OMUHFU, BEART, SOURCE    Results from last 7 days   Lab Units 01/06/22  0447   POTASSIUM mmol/L 5 4*   CHLORIDE mmol/L 107   CO2 mmol/L 22   BUN mg/dL 66*   CREATININE mg/dL 4 56*   CALCIUM mg/dL 8 8   ALK PHOS U/L 101   ALT U/L 21   AST U/L 13       Radiology review:  Procedure: CT chest abdomen pelvis wo contrast    Result Date: 1/6/2022  Narrative: CT CHEST, ABDOMEN AND PELVIS WITHOUT IV CONTRAST INDICATION:   r/o deep infection, abscesses to b/l breasts, inguinal, rectal areas; now with ARF  COMPARISON:  None  TECHNIQUE: CT examination of the chest, abdomen and pelvis was performed without intravenous contrast   Axial, sagittal, and coronal 2D reformatted images were created from the source data and submitted for interpretation  Radiation dose length product (DLP) for this visit:  1950  97 mGy-cm     This examination, like all CT scans performed in the [facility], was performed utilizing techniques to minimize radiation dose exposure, including the use of iterative reconstruction and automated exposure control  Enteric contrast was not administered  FINDINGS: CHEST LUNGS:  There is no infiltrate or pleural effusion  There is a thin-walled air cysts adjacent to the minor fissure measuring 15 mm on (series 3, image 43)  There is no tracheal or endobronchial lesion  PLEURA:  Unremarkable  HEART/GREAT VESSELS: Heart is unremarkable for patient's age  No thoracic aortic aneurysm  MEDIASTINUM AND EZE:  Unremarkable  CHEST WALL AND LOWER NECK:   Unremarkable  ABDOMEN LIVER/BILIARY TREE:  Unremarkable  GALLBLADDER:  There are gallstone(s) within the gallbladder, without pericholecystic inflammatory changes  SPLEEN:  Unremarkable  PANCREAS:  Unremarkable  ADRENAL GLANDS:  Unremarkable  KIDNEYS/URETERS:  There is minimal to mild right-sided hydroureter and hydronephrosis with no evidence of a genitourinary tract calculus  The left kidney is unremarkable  STOMACH AND BOWEL:  There is a segment of moderate pericolonic inflammatory stranding involving the distal sigmoid colon with multiple diverticula most compatible with acute diverticulitis  There a few adjacent foci of extraluminal air most compatible with microperforation  To the right of the sigmoid colon is a 3 2 x 2 8 cm air-fluid collection (series 2, image 99) is compatible with an abscess  APPENDIX:  A normal appendix was visualized  ABDOMINOPELVIC CAVITY:  No ascites  No pneumoperitoneum  No lymphadenopathy  VESSELS:  Unremarkable for patient's age  PELVIS REPRODUCTIVE ORGANS:  Unremarkable for patient's age  URINARY BLADDER:  Unremarkable  ABDOMINAL WALL/INGUINAL REGIONS:  Unremarkable  OSSEOUS STRUCTURES:  No acute fracture or destructive osseous lesion       Impression: Findings compatible with acute sigmoid diverticulitis with microperforation and an adjacent small right pelvic abscess  The study was limited by the lack of oral contrast   Surgical consultation is recommended  No evidence of large or small bowel obstruction  No infiltrate or pleural effusion  No evidence of a subcutaneous abscess in the region of the visualized breast, inguinal or rectal areas  Minimal to mild right-sided hydroureteronephrosis with no obstructing calculus  The findings may be secondary to the active inflammatory changes adjacent to the sigmoid colon  I personally discussed this study with Rojas Jones on 1/6/2022 at 6:31 AM  Workstation performed: [id number]         Lauryn Card PA-C      Portions of the record may have been created with voice recognition software  Occasional wrong word or "sound a like" substitutions may have occurred due to the inherent limitations of voice recognition software  Read the chart carefully and recognize, using context, where substitutions have occurred

## 2022-01-06 NOTE — H&P
[address] [facility] [address]  H&P- Rosalind Peña 1965, 60 y o  female MRN: [id number]  Unit/Bed#: RM11 Encounter: [id number]  Primary Care Provider: Noé Fraga PA-C   Date and time admitted to hospital: 1/6/2022  2:43 AM    * Cellulitis of abdominal wall, chest wall and groin with wounds  Assessment & Plan  Patient presented with pain, erythema, swelling with overlying wounds, actively draining purulent material involving the inframammary area, lower abdomen, groin and inner thighs  Elevated WBC count at 13 43    Started on vancomycin, Cefepime and Metronidazole  IV fluids  Blood Cx - 2 sent  Consult ID  Consult Wound care  Follow CBC             Acute renal failure (ARF) (HCC)  Assessment & Plan  Elevated Bun and Sr  Creatinine with baseline of 1 08  No h/o CKD  Probably secondary to decreased P O intake  Nephrology consulted: Recommendations highly appreciated  - Hold home lisinopril, celecoxib  - Monitor for urinary retention- strict I&Os, record bladder scan PVRs and follow urinary retention protocol  - Urine sodium  - IV fluids  - Follow BMP  - Avoid nephrotoxic drugs  - Strict I&Os  - Daily weights  - urinary retention protocol      Diverticulitis of large intestine with perforation  Assessment & Plan    CT abdomen on 01/06/22 shows - sigmoid diverticulitis with microperforation and adjacent small pelvic abscess  Consulted surgery: recommendations highly appreciated  - IR consult to evaluate for drainage of pelvic abscess  - NPO      Hyperkalemia  Assessment & Plan  Elevated Sr  Potassium level on admission  Continue Kayexalate    Follow potassium level    Subacute vaginitis  Assessment & Plan  Patient complaints of vaginal discharge  Was taking anti-fungals on out patient basis  Couldn't perform the exam because of morbid obesity and pain      - start fluconazole  - monitor for symptoms          Right Hydroureteronephrosis  Assessment & Plan  Right Hydroureteronephrosis noted on CT abdomen pelvis on 01/06/22  Most probably secondary to intra abdominal pathology  Galan in place  Monitor for symptoms  Follow BMP   Renal US tomorrow (01/07)    Borderline hyperlipidemia  Assessment & Plan  This is a chronic stable condition  Currently not on any medication  Follow up with PCP on out patient basis  Morbid obesity with BMI of 60 0-69 9, adult [facility])  Assessment & Plan  Patient is morbidly obese with BMI of 63 02      Nutrition consult      Hypertension  Assessment & Plan  Blood pressure under control  Hold Lisinopril because of ZAHIRA  Monitor Blood pressure trend  VTE Prophylaxis: patient might need surgery  / sequential compression device   Code Status: level 1 full code  POLST: There is no POLST form on file for this patient (pre-hospital)  Discussion with family: no family at bedside    Anticipated Length of Stay:  Patient will be admitted on an Inpatient basis with an anticipated length of stay of  morethan 2 midnights  Justification for Hospital Stay: extensive cellulitis covering >20% of BSA with ZAHIRA and sigmoid diverticulitis with microperforation  Total Time for Visit, including Counseling / Coordination of Care: 60 minutes  Greater than 50% of this total time spent on direct patient counseling and coordination of care  Chief Complaint:   Open wounds and inflammation under the breasts, lower abdomen and groin for a month  History of Present Illness:    Willian Sheets is a 60 y o  female with PMH significant for HTN, morbid obesity, sciatica, hidradenitis suppurative, fungal dermatitis presents to the ED with open sores under breasts, lower abdomen, inner thighs and buttocks  Patient complains started 1 month ago, using antibiotics on out-patinet basis  Patient reports that she is having fungal infection     Review of Systems:    Review of Systems   Constitutional: Positive for activity change   Negative for appetite change, chills, fever and unexpected weight change  HENT: Positive for sore throat  Negative for ear pain, hearing loss, rhinorrhea and sneezing  Eyes: Negative for pain and visual disturbance  Respiratory: Negative for cough, chest tightness, shortness of breath and wheezing  Cardiovascular: Negative for chest pain and palpitations  Gastrointestinal: Positive for abdominal pain  Negative for constipation, diarrhea, nausea and vomiting  Genitourinary: Positive for vaginal discharge  Negative for dysuria, frequency, hematuria and urgency  Musculoskeletal: Negative for arthralgias and back pain  Skin: Positive for wound  Negative for color change  Neurological: Negative for dizziness, tremors, seizures, syncope and light-headedness  Psychiatric/Behavioral: Negative for behavioral problems, confusion, decreased concentration and sleep disturbance  The patient is not nervous/anxious  All other systems reviewed and are negative  Past Medical and Surgical History:     Past Medical History:   Diagnosis Date    Anxiety     Arthritis     GERD (gastroesophageal reflux disease)     Hidradenitis suppurativa     Hypertension     Lipodermatosclerosis of both lower extremities     Lymphedema     Sciatic leg pain     left side    SOB (shortness of breath)     Stomach ulcer        Past Surgical History:   Procedure Laterality Date     SECTION      NM EXC SKIN BENIG 3 1-4 CM TRUNK,ARM,LEG Right 2020    Procedure: EXCISION BIOPSY TISSUE LESION/MASS UPPER EXTREMITY;  Surgeon: Shalonda Day DO;  Location: [facility];  Service: General    TONSILLECTOMY      TUMOR REMOVAL Right     5th finger       Meds/Allergies:    Prior to Admission medications    Medication Sig Start Date End Date Taking?  Authorizing Provider   albuterol (Ventolin HFA) 90 mcg/act inhaler Inhale 2 puffs every 6 (six) hours as needed for wheezing or shortness of breath 5/10/21   Clare Monroy PA-C   celecoxib (CeleBREX) 200 mg capsule take 1 capsule by mouth twice a day 9/27/21   Keeley Villaseñor PA-C   clindamycin (CLEOCIN T) 1 % external solution Apply topically 2 (two) times a day  Patient not taking: Reported on 11/22/2021 9/30/21   Keeley Villaseñor PA-C   clotrimazole (LOTRIMIN) 1 % cream Apply topically 2 (two) times a day 12/6/21   Ashley Low MD   cyclobenzaprine (FLEXERIL) 10 mg tablet take 1 tablet by mouth three times a day if needed for muscle spasm 1/3/22   Keeley Villaseñor PA-C   Diclofenac Sodium (VOLTAREN) 1 % Apply 2 g topically 4 (four) times a day    Historical Provider, MD   dicyclomine (BENTYL) 20 mg tablet take 1 tablet by mouth four times a day if needed for ABDOMINAL CRAMPING 8/12/21   Keeley Villaseñor PA-C   ferrous sulfate 324 (65 Fe) mg take 1 tablet by mouth twice a day before meals  Patient not taking: Reported on 11/9/2021 10/19/20   Keeley Villaseñor PA-C   fluticasone [facility]) 50 mcg/act nasal spray instill 1 spray into each nostril once daily 11/15/21   Keeley Villaseñor PA-C   hydrOXYzine HCL (ATARAX) 25 mg tablet take 1 tablet by mouth three times a day if needed for anxiety 11/15/21   Keeley Villaseñor PA-C   lidocaine (LIDODERM) 5 % Apply 1 patch topically daily Remove & Discard patch within 12 hours or as directed by MD 3/30/21   Keeley Villaseñor PA-C   lisinopril (ZESTRIL) 10 mg tablet Take 1 tablet (10 mg total) by mouth daily 5/10/21   Keeley Villaseñor PA-C   loratadine (CLARITIN) 10 mg tablet take 1 tablet by mouth once daily 4/26/21   Keeley Villaseñor PA-C   metoprolol tartrate (LOPRESSOR) 25 mg tablet take 1 tablet by mouth twice a day 11/29/21   Keeley Villaseñor PA-C   omeprazole (PriLOSEC) 20 mg delayed release capsule Take 1 capsule (20 mg total) by mouth daily 8/16/21   Keeley Villaseñor PA-C   sertraline (ZOLOFT) 50 mg tablet Take 1 tablet (50 mg total) by mouth daily 8/16/21   Keeley Villaseñor PA-C   SUMAtriptan (IMITREX) 25 mg tablet TAKE 1 TABLET BY MOUTH ONCE AS NEEDED FOR MIGRAINE FOR UP TO 1 DOSE 12/23/21 Patrick Thompson PA-C     I have reviewed home medications with patient personally  Allergies: Allergies   Allergen Reactions    Bee Venom Anaphylaxis, Shortness Of Breath and Swelling       Social History:     Marital Status:    Occupation: not working  Patient Pre-hospital Living Situation: home  Patient Pre-hospital Level of Mobility: crutches  Patient Pre-hospital Diet Restrictions: none  Substance Use History:   Social History     Substance and Sexual Activity   Alcohol Use No    Comment: holidays     Social History     Tobacco Use   Smoking Status Light Tobacco Smoker    Packs/day: 0 25    Years: 20 00    Pack years: 5 00    Types: Cigarettes   Smokeless Tobacco Never Used   Tobacco Comment    Only smoke once in awhile     Social History     Substance and Sexual Activity   Drug Use No       Family History:    Family History   Problem Relation Age of Onset    Cancer Mother     Hypertension Mother     COPD Father     Diabetes Father        Physical Exam:     Vitals:   Blood Pressure: 127/87 (01/06/22 0935)  Pulse: 90 (01/06/22 0935)  Temperature: 98 9 °F (37 2 °C) (01/06/22 0935)  Temp Source: Tympanic (01/06/22 0256)  Respirations: 18 (01/06/22 0935)  SpO2: 94 % (01/06/22 0935)    Physical Exam  Vitals and nursing note reviewed  Constitutional:       General: She is not in acute distress  Appearance: She is well-developed  She is obese  She is ill-appearing  HENT:      Head: Normocephalic and atraumatic  Right Ear: External ear normal       Left Ear: External ear normal       Nose: Nose normal  No congestion or rhinorrhea  Mouth/Throat:      Mouth: Mucous membranes are moist       Pharynx: Oropharynx is clear  Eyes:      General: No scleral icterus  Extraocular Movements: Extraocular movements intact  Conjunctiva/sclera: Conjunctivae normal       Pupils: Pupils are equal, round, and reactive to light     Cardiovascular:      Rate and Rhythm: Normal rate and regular rhythm  Heart sounds: Normal heart sounds  No murmur heard  Pulmonary:      Effort: Pulmonary effort is normal  No respiratory distress  Breath sounds: Normal breath sounds  No wheezing  Abdominal:      General: Bowel sounds are normal       Palpations: Abdomen is soft  Tenderness: There is abdominal tenderness  Musculoskeletal:         General: No swelling  Normal range of motion  Cervical back: Normal range of motion and neck supple  Right lower leg: Edema present  Left lower leg: Edema present  Skin:     General: Skin is warm and dry  Capillary Refill: Capillary refill takes less than 2 seconds  Findings: No rash  Neurological:      General: No focal deficit present  Mental Status: She is alert and oriented to person, place, and time  Motor: No weakness  Psychiatric:         Mood and Affect: Mood normal          Behavior: Behavior normal          Additional Data:     Lab Results: I have personally reviewed pertinent reports  Results from last 7 days   Lab Units 01/06/22  0447   WBC Thousand/uL 13 43*   HEMOGLOBIN g/dL 9 5*   HEMATOCRIT % 30 4*   PLATELETS Thousands/uL 431*   NEUTROS PCT % 76*   LYMPHS PCT % 12*   MONOS PCT % 7   EOS PCT % 4     Results from last 7 days   Lab Units 01/06/22  0447   SODIUM mmol/L 139   POTASSIUM mmol/L 5 4*   CHLORIDE mmol/L 107   CO2 mmol/L 22   BUN mg/dL 66*   CREATININE mg/dL 4 56*   ANION GAP mmol/L 10   CALCIUM mg/dL 8 8   ALBUMIN g/dL 2 5*   TOTAL BILIRUBIN mg/dL 0 37   ALK PHOS U/L 101   ALT U/L 21   AST U/L 13   GLUCOSE RANDOM mg/dL 106     Results from last 7 days   Lab Units 01/06/22  0447   INR  1 35*             Results from last 7 days   Lab Units 01/06/22  0447   LACTIC ACID mmol/L 0 9   PROCALCITONIN ng/ml 0 32*       Imaging: I have personally reviewed pertinent reports        CT chest abdomen pelvis wo contrast   Final Result by Marek Montenegro MD ([id number])      Findings compatible with acute sigmoid diverticulitis with microperforation and an adjacent small right pelvic abscess  The study was limited by the lack of oral contrast   Surgical consultation is recommended  No evidence of large or small bowel obstruction  No infiltrate or pleural effusion  No evidence of a subcutaneous abscess in the region of the visualized breast, inguinal or rectal areas  Minimal to mild right-sided hydroureteronephrosis with no obstructing calculus  The findings may be secondary to the active inflammatory changes adjacent to the sigmoid colon  I personally discussed this study with Janie Kumari on 1/6/2022 at 6:31 AM                   Workstation performed: [id number]         US kidney and bladder with pvr    (Results Pending)       EKG, Pathology, and Other Studies Reviewed on Admission:   · EKG: sinus rhythm    Allscripts / Epic Records Reviewed: Yes     ** Please Note: This note has been constructed using a voice recognition system   **

## 2022-01-06 NOTE — ASSESSMENT & PLAN NOTE
Elevated Bun and Sr  Creatinine with baseline of 1 08  No h/o CKD  Probably secondary to decreased P O intake  Nephrology consulted: Recommendations highly appreciated  - Hold home lisinopril, celecoxib    - Monitor for urinary retention- strict I&Os, record bladder scan PVRs and follow urinary retention protocol  - Urine sodium  - IV fluids  - Follow BMP  - Avoid nephrotoxic drugs  - Strict I&Os  - Daily weights  - urinary retention protocol

## 2022-01-06 NOTE — ASSESSMENT & PLAN NOTE
CT abdomen on 01/06/22 shows - sigmoid diverticulitis with microperforation and adjacent small pelvic abscess    Consulted surgery: recommendations highly appreciated  - IR consult to evaluate for drainage of pelvic abscess  - NPO

## 2022-01-06 NOTE — ED NOTES
Unable to obtain blood work, appears there is not adequate perfusion to obtain  IV line established  Will give IVF and attempt again  Provider made aware       Martinez Brush RN  01/06/22 2350

## 2022-01-06 NOTE — PROGRESS NOTES
Vancomycin Assessment    Lisa Epperson is a 60 y o  female who is currently receiving vancomycin 2000mg now  Check labs tomorrow AM to see if SCr improves for skin-soft tissue infection     Relevant clinical data and objective history reviewed:  Creatinine   Date Value Ref Range Status   01/06/2022 4 56 (H) 0 60 - 1 30 mg/dL Final     Comment:     Standardized to IDMS reference method   09/09/2020 1 08 0 60 - 1 30 mg/dL Final     Comment:     Standardized to IDMS reference method   07/24/2019 0 99 0 60 - 1 30 mg/dL Final     Comment:     Standardized to IDMS reference method     /87 (BP Location: Left arm)   Pulse 90   Temp 98 9 °F (37 2 °C)   Resp 18   SpO2 94%   No intake/output data recorded  Lab Results   Component Value Date/Time    BUN 66 (H) 01/06/2022 04:47 AM    WBC 13 43 (H) 01/06/2022 04:47 AM    HGB 9 5 (L) 01/06/2022 04:47 AM    HCT 30 4 (L) 01/06/2022 04:47 AM    MCV 78 (L) 01/06/2022 04:47 AM     (H) 01/06/2022 04:47 AM     Temp Readings from Last 3 Encounters:   01/06/22 98 9 °F (37 2 °C)   12/06/21 (!) 96 1 °F (35 6 °C)   11/22/21 (!) 97 2 °F (36 2 °C)     Vancomycin Days of Therapy: 1    Assessment/Plan  The patient is currently on vancomycin utilizing scheduled dosing based on adjusted body weight (due to obesity)  Baseline risks associated with therapy include: pre-existing renal impairment  The patient will be given 2000mg now and will follow up tomorrow with labwork  Pharmacy will also follow closely for s/sx of nephrotoxicity, infusion reactions, and appropriateness of therapy  BMP and CBC will be ordered per protocol  Due to infection severity, will target a trough of 15-20 (appropriate for most indications)   Pharmacy will continue to follow the patients culture results and clinical progress daily      Darinel Pollock, Pharmacist

## 2022-01-06 NOTE — ASSESSMENT & PLAN NOTE
This is a chronic stable condition  Currently not on any medication  Follow up with PCP on out patient basis

## 2022-01-06 NOTE — ASSESSMENT & PLAN NOTE
Patient complaints of vaginal discharge  Was taking anti-fungals on out patient basis  Couldn't perform the exam because of morbid obesity and pain      - start fluconazole  - monitor for symptoms

## 2022-01-06 NOTE — CONSULTS
Consultation - General Surgery   Rosalind Peña 60 y o  female MRN: [id number]  Unit/Bed#: RM11 Encounter: [id number]    Assessment/Plan     Assessment:  59yo F found to have sigmoid diverticulitis with microperforation and abscess collection on imaging study   - 3 2 x 2 8cm air fluid collection noted on CT   - afebrile, VSS  - leukocytosis, WBC 13 4  - Lactic acid normal 0 9  - CT chest/abdomen/pelvis 01/06/22: "Findings compatible with acute sigmoid diverticulitis with microperforation and an adjacent small right pelvic abscess  The study was limited by the lack of oral contrast   Surgical consultation is recommended "    ZAHIRA, POA  - Cr 4 5, baseline ~1 0  Rash to bilateral breasts, abdominal pannus, groin, and inner thighs  - rash appears fungal in nature  - multiple wounds noted within area of rash  - management per primary     PMH: obesity, HTN, Lymphedema, Sciatica, Anxiety, GERD    Plan:  - Consult IR for recommendations to see if intraabdominal abscess is amenable to drainage   - IV Zosyn   - IVF hydration   - Trend WBC, closely monitor vitals   - Maintain NPO status   - PRN analgesia, antiemetics  - serial abdominal exams  - Medical management per primary team     Discussed plan with Dr Sonia Sow, general surgeon  History of Present Illness   HPI:  Rosalind Peña is a 60 y o  female who presents to [facility] ED with complaints of wounds of her bilateral breasts, abdomen, and groin  She was seen in outpatient wound care center ~1 month ago for the same  States wounds have progressively gotten more red and painful over the past month  General surgery was asked to see patient in consultation due to CT scan obtained in ED with findings significant for acute sigmoid diverticulitis with microperforation and abscess  Abscess collection measures 3 2 x 2 8cm on imaging  Patient states she has some mild abdominal pain in her LLQ, but overall she attributes her pain level to her wounds  Denies N/V   Denies constipation/diarrhea  States her last bowel movement was yesterday  Surgical hx significant for caesarian section x 2  Inpatient consult to Acute Care Surgery  Consult performed by: Artis Gilliam PA-C  Consult ordered by: Valorie Brown MD        Review of Systems   Constitutional: Positive for appetite change (decreased) and chills  Negative for diaphoresis and fever  HENT: Negative for congestion  Respiratory: Negative for chest tightness, shortness of breath and wheezing  Cardiovascular: Positive for leg swelling  Negative for chest pain and palpitations  Gastrointestinal: Positive for diarrhea  Negative for abdominal distention, abdominal pain, constipation, nausea and vomiting  Genitourinary: Negative for decreased urine volume, difficulty urinating, dysuria and hematuria  Musculoskeletal: Negative for back pain and neck pain  Skin: Positive for rash and wound  Neurological: Negative for dizziness, weakness and light-headedness  Psychiatric/Behavioral: Negative for confusion     All other ROS negative unless stated above   Historical Information   Past Medical History:   Diagnosis Date    Anxiety     Arthritis     GERD (gastroesophageal reflux disease)     Hidradenitis suppurativa     Hypertension     Lipodermatosclerosis of both lower extremities     Lymphedema     Sciatic leg pain 2006    left side    SOB (shortness of breath)     Stomach ulcer      Past Surgical History:   Procedure Laterality Date     SECTION      WY EXC SKIN BENIG 3 1-4 CM TRUNK,ARM,LEG Right 2020    Procedure: EXCISION BIOPSY TISSUE LESION/MASS UPPER EXTREMITY;  Surgeon: Gordy Walker DO;  Location: [facility];  Service: General    TONSILLECTOMY      TUMOR REMOVAL Right     5th finger     Social History   Social History     Substance and Sexual Activity   Alcohol Use No    Comment: holidays     Social History     Substance and Sexual Activity   Drug Use No E-Cigarette/Vaping    E-Cigarette Use Never User      E-Cigarette/Vaping Substances    Nicotine No     THC No     CBD No     Flavoring No     Other No     Unknown No      Social History     Tobacco Use   Smoking Status Light Tobacco Smoker    Packs/day: 0 25    Years: 20 00    Pack years: 5 00    Types: Cigarettes   Smokeless Tobacco Never Used   Tobacco Comment    Only smoke once in awhile     Family History: non-contributory    Meds/Allergies   all current active meds have been reviewed  Allergies   Allergen Reactions    Bee Venom Anaphylaxis, Shortness Of Breath and Swelling       Objective   First Vitals:   Blood Pressure: 96/51 (01/06/22 0256)  Pulse: 89 (01/06/22 0256)  Temperature: 98 2 °F (36 8 °C) (01/06/22 0256)  Temp Source: Tympanic (01/06/22 0256)  Respirations: 20 (01/06/22 0256)  SpO2: 98 % (01/06/22 0256)    Current Vitals:   Blood Pressure: 127/87 (01/06/22 0935)  Pulse: 90 (01/06/22 0935)  Temperature: 98 9 °F (37 2 °C) (01/06/22 0935)  Temp Source: Tympanic (01/06/22 0256)  Respirations: 18 (01/06/22 0935)  SpO2: 94 % (01/06/22 0935)      Intake/Output Summary (Last 24 hours) at 1/6/2022 1007  Last data filed at 1/6/2022 0936  Gross per 24 hour   Intake 1050 ml   Output --   Net 1050 ml       Invasive Devices  Report    Peripheral Intravenous Line            Peripheral IV 01/06/22 Left Antecubital <1 day    Peripheral IV 01/06/22 Right;Ventral (anterior) Forearm <1 day          Drain            External Urinary Catheter <1 day                Physical Exam  Vitals and nursing note reviewed  Constitutional:       General: She is not in acute distress  Appearance: She is obese  She is not ill-appearing, toxic-appearing or diaphoretic  HENT:      Head: Normocephalic and atraumatic  Nose: Nose normal  No congestion  Mouth/Throat:      Mouth: Mucous membranes are moist       Pharynx: No oropharyngeal exudate  Eyes:      General: No scleral icterus  Conjunctiva/sclera: Conjunctivae normal       Pupils: Pupils are equal, round, and reactive to light  Cardiovascular:      Rate and Rhythm: Normal rate and regular rhythm  Pulses: Normal pulses  Heart sounds: Normal heart sounds  No murmur heard  Pulmonary:      Effort: Pulmonary effort is normal       Breath sounds: Normal breath sounds  No wheezing, rhonchi or rales  Abdominal:      General: Bowel sounds are normal  There is no distension  Palpations: Abdomen is soft  Tenderness: There is abdominal tenderness (diffuse, worse in LLQ)  There is no guarding or rebound  Musculoskeletal:         General: No swelling or tenderness  Normal range of motion  Cervical back: Normal range of motion and neck supple  No tenderness  Right lower leg: Edema present  Left lower leg: Edema present  Skin:     General: Skin is warm  Capillary Refill: Capillary refill takes less than 2 seconds  Coloration: Skin is not jaundiced  Findings: Rash present  Comments: Moist erythematous rash underneath bilateral breasts  Not malodorous  Fungal in appearance, left side with open wound  Rash similar in appearance noted in abdominal pannus extending to groin and inner thighs   Photos below  Neurological:      General: No focal deficit present  Mental Status: She is alert and oriented to person, place, and time  Motor: No weakness  Psychiatric:         Mood and Affect: Mood normal          Behavior: Behavior normal             Left breast       Right breast       Abdomen/groin/thighs       Buttocks      Lab Results:   I have personally reviewed pertinent lab results    , CBC:   Lab Results   Component Value Date    WBC 13 43 (H) 01/06/2022    HGB 9 5 (L) 01/06/2022    HCT 30 4 (L) 01/06/2022    MCV 78 (L) 01/06/2022     (H) 01/06/2022    MCH 24 5 (L) 01/06/2022    MCHC 31 3 (L) 01/06/2022    RDW 16 1 (H) 01/06/2022    MPV 8 8 (L) 01/06/2022    NRBC 0 01/06/2022   , CMP:   Lab Results   Component Value Date    SODIUM 139 01/06/2022    K 5 4 (H) 01/06/2022     01/06/2022    CO2 22 01/06/2022    BUN 66 (H) 01/06/2022    CREATININE 4 56 (H) 01/06/2022    CALCIUM 8 8 01/06/2022    AST 13 01/06/2022    ALT 21 01/06/2022    ALKPHOS 101 01/06/2022    EGFR 9 01/06/2022   , Coagulation:   Lab Results   Component Value Date    INR 1 35 (H) 01/06/2022     Lactic acid: 0 9    Imaging: I have personally reviewed pertinent reports  CT chest/abd/pelvis 01/06/22: "Findings compatible with acute sigmoid diverticulitis with microperforation and an adjacent small right pelvic abscess  The study was limited by the lack of oral contrast   Surgical consultation is recommended      No evidence of large or small bowel obstruction      No infiltrate or pleural effusion      No evidence of a subcutaneous abscess in the region of the visualized breast, inguinal or rectal areas      Minimal to mild right-sided hydroureteronephrosis with no obstructing calculus  The findings may be secondary to the active inflammatory changes adjacent to the sigmoid colon "    EKG, Pathology, and Other Studies: I have personally reviewed pertinent reports  Counseling / Coordination of Care  Total floor / unit time spent today 50 minutes  Greater than 50% of total time was spent with the patient and / or family counseling and / or coordination of care       Ambreen Quan PA-C  01/06/22

## 2022-01-07 PROBLEM — E87.5 HYPERKALEMIA: Status: RESOLVED | Noted: 2022-01-01 | Resolved: 2022-01-01

## 2022-01-07 NOTE — UTILIZATION REVIEW
Inpatient Admission Authorization Request   NOTIFICATION OF INPATIENT ADMISSION/INPATIENT AUTHORIZATION REQUEST   SERVICING FACILITY:   [address] [place] [address]  [address]  Tax ID:  [id number]  NPI: [id number]  Place of Service: [place] [address]  Place of Service Code: 24     ATTENDING PROVIDER:  Attending Name and NPI#: Philip Culver, Sterling Baez [[id number]]  Address: [address]  Phone: [phone]     UTILIZATION REVIEW CONTACT:  [facility] Utilization   Network Utilization Review Department  Phone: [phone]  Fax [phone]  Email: [email or web address]     PHYSICIAN ADVISORY SERVICES:  FOR [id number] REVIEW - MEDICAL NECESSITY DENIAL  Phone: [phone]  Fax: [phone]  Email: [email or web address]     TYPE OF REQUEST:  Inpatient Status     ADMISSION INFORMATION:  ADMISSION DATE/TIME: 1/6/22  7:12 AM  PATIENT DIAGNOSIS CODE/DESCRIPTION:  Wound cellulitis [L03 90]  DISCHARGE DATE/TIME: No discharge date for patient encounter  DISCHARGE DISPOSITION (IF DISCHARGED): Home/Self Care     IMPORTANT INFORMATION:  Please contact the [facility] directly with any questions or concerns regarding this request  Department voicemails are confidential     Send requests for admission clinical reviews, concurrent reviews, approvals, and administrative denials due to lack of clinical to fax [phone]

## 2022-01-07 NOTE — ASSESSMENT & PLAN NOTE
Patient presented with pain, erythema, swelling with overlying wounds, actively draining purulent material involving the inframammary area, lower abdomen, groin and inner thighs    Elevated WBC count at 13 43 on admission; WBC trending down    Continue vancomycin, Cefepime and Metronidazole day#2  Continue IV fluids  Follow Blood Cx   Consult ID  Consulted Wound care

## 2022-01-07 NOTE — ED NOTES
Pt moved from ED stretcher to hospital bed for greater comfort and reducing pressure on sores    Pt able to stand, pivot to commode, then stand, pivot to hospital bed with encouragement and light assist      José Luis Castellon RN  01/06/22 6596

## 2022-01-07 NOTE — ASSESSMENT & PLAN NOTE
Patient is morbidly obese with BMI of 63 02  No overnight drop in SpO2; not on CPAP at home      Nutrition consulted

## 2022-01-07 NOTE — PROGRESS NOTES
[address] [facility] [address]  Progress Note - Fiorella Wilton 1965, 60 y o  female MRN: [id number]  Unit/Bed#: RM11 Encounter: [id number]  Primary Care Provider: Abbie Heard PA-C   Date and time admitted to hospital: 1/6/2022  2:43 AM    * Cellulitis of abdominal wall, chest wall and groin with wounds  Assessment & Plan  Patient presented with pain, erythema, swelling with overlying wounds, actively draining purulent material involving the inframammary area, lower abdomen, groin and inner thighs  Elevated WBC count at 13 43 on admission; WBC trending down    Continue vancomycin, Cefepime and Metronidazole day#2  Continue IV fluids  Follow Blood Cx   Consult ID  Consulted Wound care          Acute renal failure (ARF) (HCC)  Assessment & Plan  Elevated Bun and Sr  Creatinine with baseline of 1 08 on admission; Sr  Cr trending down 3 24<< 4 56  No h/o CKD  Probably secondary to decreased P O intake  Nephrology consulted: Recommendations highly appreciated  - Hold home lisinopril, celecoxib  - Monitor for urinary retention- strict I&Os, record bladder scan PVRs and follow urinary retention protocol  - Urine sodium normal,  - IV fluids  - Follow BMP  - Avoid nephrotoxic drugs  - Strict I&Os  - Daily weights  - urinary retention protocol      Diverticulitis of large intestine with perforation  Assessment & Plan    CT abdomen on 01/06/22 shows - sigmoid diverticulitis with microperforation and adjacent small pelvic abscess  Consulted surgery: recommendations highly appreciated  IR consulted: highly appreciate recommendations                         - IR does not feel a safe window is present for drain placement at this time  Continue to monitor for symptoms  Surgical soft diet      Subacute vaginitis  Assessment & Plan  Patient complaints of vaginal discharge  Was taking anti-fungals on out patient basis  Couldn't perform the exam because of morbid obesity and pain      Continuet Dr Mccoy left detailed message for patient.   fluconazole  Continue to monitor for symptoms          Right Hydroureteronephrosis  Assessment & Plan  Right Hydroureteronephrosis noted on CT abdomen pelvis on 01/06/22  Renal US : right renal collecting system appears minimally dilated similar to prior CT  Most probably secondary to intra abdominal pathology  Galan in place - draining clear urine  Continue to monitor for symptoms  Follow BMP   Urine sodium and urea nitrogen normal    Borderline hyperlipidemia  Assessment & Plan  This is a chronic stable condition  Currently not on any medication  Follow up with PCP on out patient basis  Morbid obesity with BMI of 60 0-69 9, adult [facility])  Assessment & Plan  Patient is morbidly obese with BMI of 63 02  No overnight drop in SpO2; not on CPAP at home  Nutrition consulted      Hypertension  Assessment & Plan  Blood pressure under control  Hold Lisinopril because of ZAHIRA  Continue Metoprolol 25mg  Monitor Blood pressure trend  Hyperkalemia-resolved as of 1/7/2022  Assessment & Plan  Elevated Sr  Potassium level on admission  Trended down to 5 2 today  Follow potassium level        VTE Pharmacologic Prophylaxis:   Pharmacologic: Enoxaparin (Lovenox)  Mechanical VTE Prophylaxis in Place: Yes    Patient Centered Rounds: I have performed bedside rounds with nursing staff today  Discussions with Specialists or Other Care Team Provider: IR, nephrology    Education and Discussions with Family / Patient: yes    Time Spent for Care: 30 minutes  More than 50% of total time spent on counseling and coordination of care as described above  Current Length of Stay: 1 day(s)    Current Patient Status: Inpatient   Certification Statement: The patient will continue to require additional inpatient hospital stay due to ongoing managment    Discharge Plan: rehab    Code Status: Level 1 - Full Code      Subjective:   Seen and examined the patient at bedside today, no overnight events  Complaints of soar throat   Denies chest pain, chest tightness, dizziness, lightheadedness  Objective:     Vitals:   Temp (24hrs), Av °F (36 1 °C), Min:97 °F (36 1 °C), Max:97 °F (36 1 °C)    Temp:  [97 °F (36 1 °C)] 97 °F (36 1 °C)  HR:  [87-90] 90  Resp:  [17-20] 20  BP: (102-114)/(52-64) 107/59  SpO2:  [93 %-98 %] 98 %  There is no height or weight on file to calculate BMI  Input and Output Summary (last 24 hours): Intake/Output Summary (Last 24 hours) at 2022 1650  Last data filed at 2022 2139  Gross per 24 hour   Intake 550 ml   Output 1300 ml   Net -750 ml       Physical Exam:     Physical Exam  Vitals and nursing note reviewed  Constitutional:       General: She is not in acute distress  Appearance: She is well-developed  She is obese  HENT:      Head: Normocephalic and atraumatic  Right Ear: External ear normal       Left Ear: External ear normal       Nose: Nose normal  No rhinorrhea  Mouth/Throat:      Mouth: Mucous membranes are moist       Pharynx: Oropharynx is clear  Eyes:      General: No scleral icterus  Extraocular Movements: Extraocular movements intact  Conjunctiva/sclera: Conjunctivae normal       Pupils: Pupils are equal, round, and reactive to light  Cardiovascular:      Rate and Rhythm: Normal rate and regular rhythm  Pulses: Normal pulses  Heart sounds: Normal heart sounds  No murmur heard  Pulmonary:      Effort: Pulmonary effort is normal  No respiratory distress  Breath sounds: Normal breath sounds  No wheezing  Abdominal:      General: Bowel sounds are normal       Palpations: Abdomen is soft  Tenderness: There is abdominal tenderness  There is no guarding  Musculoskeletal:      Cervical back: Normal range of motion and neck supple  Skin:     General: Skin is warm  Capillary Refill: Capillary refill takes less than 2 seconds  Findings: Erythema and wound present  Neurological:      General: No focal deficit present  Mental Status: She is alert and oriented to person, place, and time  Cranial Nerves: No cranial nerve deficit  Motor: No weakness  Psychiatric:         Mood and Affect: Mood normal          Behavior: Behavior normal        Additional Data:     Labs:    Results from last 7 days   Lab Units 01/07/22  0641   WBC Thousand/uL 8 63   HEMOGLOBIN g/dL 10 5*   HEMATOCRIT % 34 3*   PLATELETS Thousands/uL 398*   NEUTROS PCT % 69   LYMPHS PCT % 17   MONOS PCT % 8   EOS PCT % 5     Results from last 7 days   Lab Units 01/07/22  0641   SODIUM mmol/L 139   POTASSIUM mmol/L 5 2   CHLORIDE mmol/L 108   CO2 mmol/L 17*   BUN mg/dL 57*   CREATININE mg/dL 3 24*   ANION GAP mmol/L 14*   CALCIUM mg/dL 9 1   ALBUMIN g/dL 2 4*   TOTAL BILIRUBIN mg/dL 0 36   ALK PHOS U/L 100   ALT U/L 25   AST U/L 39   GLUCOSE RANDOM mg/dL 92     Results from last 7 days   Lab Units 01/07/22  0641   INR  1 28*     Results from last 7 days   Lab Units 01/06/22  2132   POC GLUCOSE mg/dl 105         Results from last 7 days   Lab Units 01/07/22  0642 01/06/22  0447   LACTIC ACID mmol/L  --  0 9   PROCALCITONIN ng/ml 0 38* 0 32*       * I Have Reviewed All Lab Data Listed Above  * Additional Pertinent Lab Tests Reviewed: All Labs For Current Hospital Admission Reviewed    Imaging:    Imaging Reports Reviewed Today Include: US kidney and bladder: showing mild enlargement in right collecting system  Recent Cultures (last 7 days):     Results from last 7 days   Lab Units 01/06/22  0447   BLOOD CULTURE  No Growth at 24 hrs  No Growth at 24 hrs         Last 24 Hours Medication List:   Current Facility-Administered Medications   Medication Dose Route Frequency Provider Last Rate    acetaminophen  650 mg Oral Q6H PRN Lauren Kee MD      al mag oxide-diphenhydramine-lidocaine viscous  10 mL Swish & Swallow Q4H PRN Gera Maher MD      albuterol  2 puff Inhalation Q6H PRN Lauren Kee MD      cefepime  1,000 mg Intravenous Q24H Castillo Saini, MD 1,000 mg (01/07/22 1439)    docusate sodium  100 mg Oral BID Castillo Spikes, MD      enoxaparin  60 mg Subcutaneous Q12H [address] Deetta Spikes, MD      fluconazole  150 mg Oral Daily Lucy Mackey MD      fluticasone  1 spray Each Nare Daily Deetta Spikes, MD      HYDROcodone-acetaminophen  1 tablet Oral Q6H PRN Deetta Spikes, MD      hydrOXYzine HCL  25 mg Oral TID PRN Deetta Spikes, MD      metoprolol tartrate  25 mg Oral BID Deetta Spikes, MD      metroNIDAZOLE  500 mg Oral Q8H [address] Deetta Spikes, MD      nicotine  1 patch Transdermal Daily Deetta Spikes, MD      nystatin   Topical BID Lucy Mackey, MD      nystatin   Topical TID Castillo Saini, MD      ondansetron  4 mg Intravenous Q6H PRN Deetta Spikes, MD      sertraline  50 mg Oral Daily Deetta Parveen, MD      sodium bicarbonate  1,300 mg Oral BID after meals Gera Noonan PA-C      sodium chloride  100 mL/hr Intravenous Continuous Castillo Saini  mL/hr (01/06/22 0957)    vancomycin  2,000 mg Intravenous Q24H Lucy Mackey MD          Today, Patient Was Seen By: Castillo Saini MD    ** Please Note: Dictation voice to text software may have been used in the creation of this document   **

## 2022-01-07 NOTE — ASSESSMENT & PLAN NOTE
Blood pressure under control  Hold Lisinopril because of ZAHIRA  Continue Metoprolol 25mg  Monitor Blood pressure trend

## 2022-01-07 NOTE — ASSESSMENT & PLAN NOTE
Elevated Bun and Sr  Creatinine with baseline of 1 08 on admission; Sr  Cr trending down 3 24<< 4 56  No h/o CKD  Probably secondary to decreased P O intake  Nephrology consulted: Recommendations highly appreciated  - Hold home lisinopril, celecoxib    - Monitor for urinary retention- strict I&Os, record bladder scan PVRs and follow urinary retention protocol  - Urine sodium normal,  - IV fluids  - Follow BMP  - Avoid nephrotoxic drugs  - Strict I&Os  - Daily weights  - urinary retention protocol

## 2022-01-07 NOTE — PROGRESS NOTES
Progress Note - General Surgery   Yanely Mascorro 60 y o  female MRN: [id number]  Unit/Bed#: RM11 Encounter: [id number]    Assessment:  59yo F found to have sigmoid diverticulitis with microperforation and abscess collection on imaging study   - 3 2 x 2 8cm air fluid collection noted on CT   - afebrile, VSS  - leukocytosis resolved, WBC 8 6 (13 4)  - patient denies abdominal pain, states all her pain is related to skin wounds    ZAHIRA  - Cr 3 2 today from 4 5  Wounds and rash to bilateral breasts, abdominal pannus, groin, and inner thighs  PMH: obesity, HTN, Lymphedema, Sciatica, Anxiety, GERD    Plan:  - Dr Miroslava Romero discussed possible drainage with IR, due to location of abscess and comorbities, drainage at this time would not be appropriate  See note from Dr Gina Vaughn on 01/07 for further details    - No surgical intervention planned at this time, may advance diet as tolerated  - wound care consultation   - IV abx  - IVF hydration   - PRN analgesia  - medical management per primary team     Subjective/Objective   Subjective: No acute events overnight  Patient states that she feels ok today  Denies abdominal pain, attributes pain to skin rashes  Denies N/V  Having BM and passing flatus  Objective:   Blood pressure 102/52, pulse 87, temperature (!) 97 °F (36 1 °C), temperature source Tympanic, resp  rate 20, SpO2 98 %  ,There is no height or weight on file to calculate BMI        Intake/Output Summary (Last 24 hours) at 1/7/2022 0752  Last data filed at 1/6/2022 2139  Gross per 24 hour   Intake 600 ml   Output 2100 ml   Net -1500 ml       Invasive Devices  Report    Peripheral Intravenous Line            Peripheral IV 01/06/22 Left Antecubital 1 day    Peripheral IV 01/06/22 Right;Ventral (anterior) Forearm 1 day          Drain            Urethral Catheter 16 Fr  <1 day                Physical Exam: /59   Pulse 90   Temp (!) 97 °F (36 1 °C) (Tympanic)   Resp 20   SpO2 98%   General appearance: alert and oriented, in no acute distress, appears stated age and cooperative  Head: Normocephalic, without obvious abnormality, atraumatic  Lungs: clear to auscultation bilaterally  Heart: regular rate and rhythm, S1, S2 normal, no murmur, click, rub or gallop  Abdomen: obese abdomen, soft, nontender to palpation, nondistended, bowel sounds active     Skin: Diffuse fungal appearing rash in inframammary creases bilaterally, on abdominal pannus, groin and inner thighs  Several small areas with active drainage     Lab, Imaging and other studies:  I have personally reviewed pertinent lab results    , CBC:   Lab Results   Component Value Date    WBC 8 63 01/07/2022    HGB 10 5 (L) 01/07/2022    HCT 34 3 (L) 01/07/2022    MCV 80 (L) 01/07/2022     (H) 01/07/2022    MCH 24 4 (L) 01/07/2022    MCHC 30 6 (L) 01/07/2022    RDW 16 4 (H) 01/07/2022    MPV 8 5 (L) 01/07/2022    NRBC 0 01/07/2022   , CMP:   Lab Results   Component Value Date    SODIUM 139 01/07/2022    K 5 2 01/07/2022     01/07/2022    CO2 17 (L) 01/07/2022    BUN 57 (H) 01/07/2022    CREATININE 3 24 (H) 01/07/2022    CALCIUM 9 1 01/07/2022    AST 39 01/07/2022    ALT 25 01/07/2022    ALKPHOS 100 01/07/2022    EGFR 15 01/07/2022       VTE Mechanical Prophylaxis: sequential compression device    Charley Gilliland PA-C  01/07/22

## 2022-01-07 NOTE — ASSESSMENT & PLAN NOTE
Patient complaints of vaginal discharge  Was taking anti-fungals on out patient basis  Couldn't perform the exam because of morbid obesity and pain      Continuet fluconazole  Continue to monitor for symptoms

## 2022-01-07 NOTE — PROGRESS NOTES
Progress Note - Nephrology   Rosalia Cadet 60 y o  female MRN: [id number]  Unit/Bed#: RM11 Encounter: [id number]    Assessment and Plan    1  Acute kidney injury, present admission, superimposed on chronic kidney disease  · Baseline serum creatinine 0 9 mg/dL on 09/09/2020  · With presenting serum creatinine 4 5 mg/dL on 01/06/2022  · Assessment/ Workup:  · Three day history of multiple episodes of diarrhea, poor p o  Intake in the setting of Celebrex and lisinopril use  · No recent IV contrast exposure  · Hydroureteronephrosis on CT, 01/06/2022  · Reports history of pyelonephritis  · Urinalysis reveals hematuria, proteinuria  Spot PC ratio 0 55  · Fractional excretion of sodium is elevated at 1 19%  · Visible urinary sediment in Galan tubing  · Etiology:  · Suspect multifactorial in setting of urinary obstruction, poor p o  Intake, NSAID and dysautoregulation in the setting of ACE-inhibitor use  · Fraction excretion of sodium is elevated consistent with intrinsic renal disease  · Plan:  · Hold home lisinopril, celecoxib  · May continue volume expansion with isotonic saline  She has developed a metabolic acidosis, but would avoid potassium containing fluids at this time given borderline hyperkalemia and would like to avoid SPS due to sigmoid diverticulitis with perforation  · Follow-up results of urine culture  · Thankfully, renal function has improved  No acute indication for initiation of dialysis  Will request baseline serologic workup; KELLE, C3, C4, SPEP  · Consider renal biopsy pending results of workup  Overall, uncertain if patient would be a good candidate due to body habitus  Trend renal function  Continue to provide supportive care  Optimize hemodynamics  Maintain mean arterial pressure greater than 65 mmHg  Renally dose medications     Monitor vancomycin trough   Please discuss with renal any diuretics or possible nephrotoxic agents, such as NSAIDs or IV contrast  Recommend avoidance of PPIs in favor of H2 blocker, if appropriate for clinical scenario  · Monitor for urinary retention- strict I&Os, record bladder scan PVRs and follow urinary retention protocol  2  Chronic kidney disease, stage II with baseline creatinine 0 9 mg/dL  ? Risk factors include tobacco use, NSAID use, morbid obesity, history of uncontrolled hypertension, prediabetes  ? Minimal to mild right-sided hydroureteronephrosis with no evidence of calculus on CT of chest abdomen and pelvis without contrast   ? Defer baseline renal ultrasound at this time as study will likely be limited by body habitus  ? Urinalysis 01/06/2022 reveals hematuria and proteinuria  Urine spot PC ratio 0 55   3  Hypertension  ? Continues to be hypotensive  Hold lisinopril due to acute kidney injury  Further antihypertensives with hold parameters  4  Cellulitis with wounds of abdominal wall / hydradenitis suppurativa  ? Cefepime, metronidazole, vancomycin, per primary  Renally dose antibiotics  5  Diverticulitis of large intestine with perforation  ? Drainage not appropriate at this time per Dr Leia Callejas on 01/07/2022  6  Lymphedema  ? Serum albumin 2 4 grams/deciliter on 01/07/2022  Treated at home with compression devices  Not on pre-hospital diuretics  ? Recommend 2 g sodium restriction would patient resumes diet  7  Morbid obesity with BMI 60  ? A risk of progression of renal disease due to glomerular hyperfiltration and FSGS  8  Hyperkalemia  · Continue to hold lisinopril  Request strict 2 g potassium restriction  Avoid Ace, Arb, potassium-sparing diuretic  Avoid potassium containing IV fluids, such as Plasmalyte/ Isolyte/ Lactated Ringer's  Utilize insulin as needed to control hyperglycemia to shift potassium intracellular  Rule out hemolysis  Check LDH, hemolysis smear  Rule out urinary retention  Consider use of loop diuretic  · Would avoid SPS due to diverticulitis with perforation     9  Elevated anion gap Metabolic acidosis  · Likely secondary to isotonic saline and acute kidney injury  Consider balanced IV fluid, but will hold given borderline hyperkalemia  · Will give sodium bicarbonate supplementation  10  Sore mouth and tongue  · Discussed with primary    Follow up reason for today's visit:  Acute kidney injury    Cellulitis of abdominal wall    Patient Active Problem List   Diagnosis    Lymphedema    Hypertension    Morbid obesity with BMI of 60 0-69 9, adult ([facility])    Hidradenitis suppurativa    Chronic midline low back pain with left-sided sciatica    Other specified anxiety disorders    Lumbar paraspinal muscle spasm    Borderline hyperlipidemia    Iron deficiency    GERD (gastroesophageal reflux disease)    Acute renal failure (ARF) ([facility])    Cellulitis of abdominal wall, chest wall and groin with wounds    Diverticulitis of large intestine with perforation    Right Hydroureteronephrosis    Subacute vaginitis         Subjective:   Complains of sore mouth and tongue that began late yesterday  Patient remains NPO  A complete 10 point review of systems was performed and is otherwise negative  Objective:     Vitals: Blood pressure 107/59, pulse 90, temperature (!) 97 °F (36 1 °C), temperature source Tympanic, resp  rate 20, SpO2 98 %  ,There is no height or weight on file to calculate BMI  Weight (last 2 days)     None            Intake/Output Summary (Last 24 hours) at 1/7/2022 1410  Last data filed at 1/6/2022 2139  Gross per 24 hour   Intake 550 ml   Output 2100 ml   Net -1550 ml     I/O last 3 completed shifts: In: 1600 [IV Piggyback:1600]  Out: 2100 [Urine:2100]    Urethral Catheter 16 Fr   (Active)   Reasons to continue Urinary Catheter  Acute urinary retention/obstruction failing urinary retention protocol 01/06/22 1444   Goal for Removal No longer needed- Will place order to discontinue 01/06/22 1444   Site Assessment Clean;Skin intact 01/06/22 1444   Galan Care Done 01/06/22 1444   Collection Container Standard drainage bag 01/06/22 1444       Physical Exam: /59   Pulse 90   Temp (!) 97 °F (36 1 °C) (Tympanic)   Resp 20   SpO2 98%     General Appearance:    Alert, cooperative, no distress, appears stated age, morbidly obese   Head:    Normocephalic, without obvious abnormality, atraumatic   Eyes:    Conjunctiva/corneas clear, no scleral icterus    Ears:    Normal external ears   Nose:   Nares normal, septum midline, mucosa normal, no drainage  or sinus tenderness   Throat:   Lips, mucosa, and tongue normal   Neck:   Supple, symmetrical   Back:     Symmetric, no curvature, ROM normal, no CVA tenderness   Lungs:     Clear to auscultation bilaterally, respirations unlabored   Chest wall:    No tenderness or deformity   Heart:    Regular rate and rhythm, S1 and S2 normal, no murmur, rub or gallop   Abdomen:     Soft, non-tender, bowel sounds active   Extremities:   Extremities normal, atraumatic, no cyanosis, no pitting edema a generally puffy lower extremities   Skin:   Lower extremity erythema without warmth or tenderness, texture, turgor normal, no rashes, no lesions   Lymph nodes:   Cervical normal   Neurologic:   CNII-XII intact            Lab, Imaging and other studies: I have personally reviewed pertinent labs  CBC:   Lab Results   Component Value Date    WBC 8 63 01/07/2022    HGB 10 5 (L) 01/07/2022    HCT 34 3 (L) 01/07/2022    MCV 80 (L) 01/07/2022     (H) 01/07/2022    MCH 24 4 (L) 01/07/2022    MCHC 30 6 (L) 01/07/2022    RDW 16 4 (H) 01/07/2022    MPV 8 5 (L) 01/07/2022    NRBC 0 01/07/2022     CMP:   Lab Results   Component Value Date    K 5 2 01/07/2022     01/07/2022    CO2 17 (L) 01/07/2022    BUN 57 (H) 01/07/2022    CREATININE 3 24 (H) 01/07/2022    CALCIUM 9 1 01/07/2022    AST 39 01/07/2022    ALT 25 01/07/2022    ALKPHOS 100 01/07/2022    EGFR 15 01/07/2022           Results from last 7 days   Lab Units 01/07/22  0641 01/06/22  0447   POTASSIUM mmol/L 5 2 5 4*   CHLORIDE mmol/L 108 107   CO2 mmol/L 17* 22   BUN mg/dL 57* 66*   CREATININE mg/dL 3 24* 4 56*   CALCIUM mg/dL 9 1 8 8   ALK PHOS U/L 100 101   ALT U/L 25 21   AST U/L 39 13         Phosphorus:   Lab Results   Component Value Date    PHOS 5 3 (H) 01/07/2022     Magnesium:   Lab Results   Component Value Date    MG 1 8 01/07/2022     Urinalysis:   Lab Results   Component Value Date    COLORU Yellow 01/06/2022    CLARITYU Cloudy 01/06/2022    SPECGRAV 1 025 01/06/2022    PHUR 5 0 01/06/2022    LEUKOCYTESUR Moderate (A) 01/06/2022    NITRITE Negative 01/06/2022    GLUCOSEU Negative 01/06/2022    KETONESU Negative 01/06/2022    BILIRUBINUR Negative 01/06/2022    BLOODU Moderate (A) 01/06/2022     Ionized Calcium: No results found for: CAION  Coagulation:   Lab Results   Component Value Date    INR 1 28 (H) 01/07/2022     Troponin: No results found for: TROPONINI  ABG: No results found for: PHART, LLK3APF, PO2ART, BHX3ZHU, K2JFZBEM, BEART, SOURCE  Radiology review:     IMAGING  Procedure: CT chest abdomen pelvis wo contrast    Result Date: 1/6/2022  Narrative: CT CHEST, ABDOMEN AND PELVIS WITHOUT IV CONTRAST INDICATION:   r/o deep infection, abscesses to b/l breasts, inguinal, rectal areas; now with ARF  COMPARISON:  None  TECHNIQUE: CT examination of the chest, abdomen and pelvis was performed without intravenous contrast   Axial, sagittal, and coronal 2D reformatted images were created from the source data and submitted for interpretation  Radiation dose length product (DLP) for this visit:  1950  97 mGy-cm   This examination, like all CT scans performed in the [facility], was performed utilizing techniques to minimize radiation dose exposure, including the use of iterative reconstruction and automated exposure control  Enteric contrast was not administered  FINDINGS: CHEST LUNGS:  There is no infiltrate or pleural effusion    There is a thin-walled air cysts adjacent to the minor fissure measuring 15 mm on (series 3, image 43)  There is no tracheal or endobronchial lesion  PLEURA:  Unremarkable  HEART/GREAT VESSELS: Heart is unremarkable for patient's age  No thoracic aortic aneurysm  MEDIASTINUM AND EZE:  Unremarkable  CHEST WALL AND LOWER NECK:   Unremarkable  ABDOMEN LIVER/BILIARY TREE:  Unremarkable  GALLBLADDER:  There are gallstone(s) within the gallbladder, without pericholecystic inflammatory changes  SPLEEN:  Unremarkable  PANCREAS:  Unremarkable  ADRENAL GLANDS:  Unremarkable  KIDNEYS/URETERS:  There is minimal to mild right-sided hydroureter and hydronephrosis with no evidence of a genitourinary tract calculus  The left kidney is unremarkable  STOMACH AND BOWEL:  There is a segment of moderate pericolonic inflammatory stranding involving the distal sigmoid colon with multiple diverticula most compatible with acute diverticulitis  There a few adjacent foci of extraluminal air most compatible with microperforation  To the right of the sigmoid colon is a 3 2 x 2 8 cm air-fluid collection (series 2, image 99) is compatible with an abscess  APPENDIX:  A normal appendix was visualized  ABDOMINOPELVIC CAVITY:  No ascites  No pneumoperitoneum  No lymphadenopathy  VESSELS:  Unremarkable for patient's age  PELVIS REPRODUCTIVE ORGANS:  Unremarkable for patient's age  URINARY BLADDER:  Unremarkable  ABDOMINAL WALL/INGUINAL REGIONS:  Unremarkable  OSSEOUS STRUCTURES:  No acute fracture or destructive osseous lesion  Impression: Findings compatible with acute sigmoid diverticulitis with microperforation and an adjacent small right pelvic abscess  The study was limited by the lack of oral contrast   Surgical consultation is recommended  No evidence of large or small bowel obstruction  No infiltrate or pleural effusion  No evidence of a subcutaneous abscess in the region of the visualized breast, inguinal or rectal areas   Minimal to mild right-sided hydroureteronephrosis with no obstructing calculus  The findings may be secondary to the active inflammatory changes adjacent to the sigmoid colon  I personally discussed this study with Cate Leal on 1/6/2022 at 6:31 AM  Workstation performed: [id number]     Procedure: US kidney and bladder    Result Date: 1/6/2022  Narrative: RENAL ULTRASOUND INDICATION:   right hydronephrosis  COMPARISON: Same-day CT chest/abdomen/pelvis  TECHNIQUE:   Ultrasound of the retroperitoneum was performed with a curvilinear transducer utilizing volumetric sweeps and still imaging techniques  FINDINGS: KIDNEYS: Symmetric and normal size  Right kidney:  10 3 x 5 1 x 5 0 cm  Left kidney:  10 6 x 6 9 x 5 8 cm  Right kidney Normal echogenicity and contour  No suspicious masses detected  No hydronephrosis  No shadowing calculi  No perinephric fluid collections  Left kidney Normal echogenicity and contour  No suspicious masses detected  No hydronephrosis  No shadowing calculi  No perinephric fluid collections  URETERS: Nonvisualized  BLADDER: A villanueva catheter is in place, decompressing the bladder and limiting its evaluation  OTHER: Again noted is a solitary gallstone  Impression: 1  Challenging exam due to habitus, however the right renal collecting system appears minimally dilated similar to prior CT  Monia Le No shadowing calculi  2   Normal kidney size and echogenicity  3   Cholelithiasis   Workstation performed: [id number]       Current Facility-Administered Medications   Medication Dose Route Frequency    acetaminophen (TYLENOL) tablet 650 mg  650 mg Oral Q6H PRN    al mag oxide-diphenhydramine-lidocaine viscous (MAGIC MOUTHWASH) suspension 10 mL  10 mL Swish & Swallow Q4H PRN    albuterol (PROVENTIL HFA,VENTOLIN HFA) inhaler 2 puff  2 puff Inhalation Q6H PRN    cefepime (MAXIPIME) IVPB (premix in dextrose) 1,000 mg 50 mL  1,000 mg Intravenous Q24H    docusate sodium (COLACE) capsule 100 mg  100 mg Oral BID    enoxaparin (LOVENOX) subcutaneous injection 60 mg  60 mg Subcutaneous Q12H [address]    fluconazole (DIFLUCAN) tablet 150 mg  150 mg Oral Daily    fluticasone (FLONASE) 50 mcg/act nasal spray 1 spray  1 spray Each Nare Daily    HYDROcodone-acetaminophen (NORCO) 5-325 mg per tablet 1 tablet  1 tablet Oral Q6H PRN    hydrOXYzine HCL (ATARAX) tablet 25 mg  25 mg Oral TID PRN    metoprolol tartrate (LOPRESSOR) tablet 25 mg  25 mg Oral BID    metroNIDAZOLE (FLAGYL) tablet 500 mg  500 mg Oral Q8H [address]    nicotine (NICODERM CQ) 7 mg/24hr TD 24 hr patch 1 patch  1 patch Transdermal Daily    nystatin (MYCOSTATIN) cream   Topical BID    nystatin (MYCOSTATIN) powder   Topical TID    ondansetron (ZOFRAN) injection 4 mg  4 mg Intravenous Q6H PRN    sertraline (ZOLOFT) tablet 50 mg  50 mg Oral Daily    sodium chloride 0 9 % infusion  100 mL/hr Intravenous Continuous    vancomycin (VANCOCIN) 2,000 mg in sodium chloride 0 9 % 500 mL IVPB  2,000 mg Intravenous Q24H     Medications Discontinued During This Encounter   Medication Reason    iohexol (OMNIPAQUE) 350 MG/ML injection (SINGLE-DOSE) 100 mL     piperacillin-tazobactam (ZOSYN) 3 375 g in sodium chloride 0 9 % 100 mL IVPB     piperacillin-tazobactam (ZOSYN) 2 25 g in sodium chloride 0 9 % 50 mL IVPB     lisinopril (ZESTRIL) tablet 10 mg     aluminum-magnesium hydroxide-simethicone (MYLANTA) oral suspension 30 mL     cefepime (MAXIPIME) 500 mg in sodium chloride 0 9 % 50 mL IVPB     vancomycin (VANCOCIN) 2,000 mg in sodium chloride 0 9 % 500 mL IVPB        Mendy Chi PA-C    Portions of the record may have been created with voice recognition software  Occasional wrong word or "sound a like" substitutions may have occurred due to the inherent limitations of voice recognition software  Read the chart carefully and recognize, using context, where substitutions have occurred

## 2022-01-07 NOTE — ASSESSMENT & PLAN NOTE
CT abdomen on 01/06/22 shows - sigmoid diverticulitis with microperforation and adjacent small pelvic abscess  Consulted surgery: recommendations highly appreciated  IR consulted: highly appreciate recommendations                         - IR does not feel a safe window is present for drain placement at this time     Continue to monitor for symptoms

## 2022-01-07 NOTE — PLAN OF CARE
Problem: OCCUPATIONAL THERAPY ADULT  Goal: Performs self-care activities at highest level of function for planned discharge setting  See evaluation for individualized goals  Description: Treatment Interventions: ADL retraining,Functional transfer training,UE strengthening/ROM,Endurance training,Patient/family training,Activityengagement          See flowsheet documentation for full assessment, interventions and recommendations  Note: Limitation: Decreased ADL status,Decreased UE strength,Decreased Safe judgement during ADL,Decreased endurance,Decreased self-care trans,Decreased high-level ADLs     Assessment: Pt is a 60 y o  female seen for OT evaluation s/p admit to [facility] on 1/6/2022 w/ Cellulitis of abdominal wall  Comorbidities affecting pt's functional performance at time of assessment include: lymphedema, sciatic leg pain, obesity, GERD, stomach ulcer, anxiety, lipodermatosclerosis of B LEs  Personal factors affecting pt at time of IE include:limited home support, behavioral pattern, difficulty performing ADLS, difficulty performing IADLS , limited insight into deficits, decreased initiation and engagement  and health management   Prior to admission, pt reports independence with ADLs and IADLs, however does not appear to care for herself  Upon evaluation: Pt requires min-max (A) x2 with all mobility 2* the following deficits impacting occupational performance: weakness, decreased strength, decreased balance, decreased tolerance, impaired initiation, impaired problem solving, decreased safety awareness, increased pain, impaired interpersonal skills and decreased coping skills  Pt to benefit from continued skilled OT tx while in the hospital to address deficits as defined above and maximize level of functional independence w ADL's and functional mobility   Occupational Performance areas to address include: grooming, bathing/shower, toilet hygiene, dressing, functional mobility, community mobility and clothing management  The patient's raw score on the AM-PAC Daily Activity inpatient short form is 14, standardized score is 33 39, less than 39 4  Patients at this level are likely to benefit from discharge to post-acute rehabilitation services  Please refer to the recommendation of the Occupational Therapist for safe discharge planning  Co treatment with PT secondary to complex medical condition of pt, possible A of 2 required to achieve and maintain transitional movements, requiring the need of skilled therapeutic intervention of 2 therapists to achieve delivery of services       OT Discharge Recommendation: Post acute rehabilitation services

## 2022-01-07 NOTE — PHYSICAL THERAPY NOTE
Physical Therapy Evaluation    Patient Name: Fiorella Estes    YWPZC'K Date: 2022     Problem List  Principal Problem:    Cellulitis of abdominal wall, chest wall and groin with wounds  Active Problems:    Hypertension    Morbid obesity with BMI of 60 0-69 9, adult ([facility] [address] )    Borderline hyperlipidemia    Acute renal failure ([facility]) ([facility] )    Diverticulitis of large intestine with perforation    Right Hydroureteronephrosis    Subacute vaginitis       Past Medical History  Past Medical History:   Diagnosis Date    Anxiety     Arthritis     GERD (gastroesophageal reflux disease)     Hidradenitis suppurativa     Hypertension     Lipodermatosclerosis of both lower extremities     Lymphedema 2006    Sciatic leg pain     left side    SOB (shortness of breath)     Stomach ulcer         Past Surgical History  Past Surgical History:   Procedure Laterality Date     SECTION      [place] EXC SKIN BENIG 3 1-4 CM TRUNK,ARM,LEG Right 2020    Procedure: EXCISION BIOPSY TISSUE LESION/MASS UPPER EXTREMITY;  Surgeon: Salazar Lopes DO;  Location: [facility];  Service: General    TONSILLECTOMY      TUMOR REMOVAL Right     5th finger           22 1216   PT Last Visit   PT Visit Date 22   Note Type   Note type Evaluation   Pain Assessment   Pain Assessment Tool 0-10   Pain Score 9   Pain Location/Orientation Location: Generalized  (multiple wounds present)   Restrictions/Precautions   Weight Bearing Precautions Per Order No   Other Precautions Pain; Fall Risk;Multiple lines   Home Living   Type of [address] [place] [address] One level;Ramped entrance;Elevator   Home Equipment Crutches   Additional Comments pt reports use of B axillary crutches at baseline   Prior Function   Level of [place] Independent with ADLs and functional mobility; Needs assistance with IADLs   Lives With Alone   ADL Assistance Independent   IADLs Needs assistance Falls in the last 6 months 1 to 4   Comments pt uses public transportation; reports independence in ADLs which is questionable based upon current presentation   General   Family/Caregiver Present No   Cognition   Overall Cognitive Status WFL   Arousal/Participation Alert   Orientation Level Oriented X4   Following Commands Follows one step commands with increased time or repetition   Comments command following impaired by extreme anxiety   Subjective   Subjective "I can't lay flat"   RLE Assessment   RLE Assessment X  (unable to assess d/t body habitus; at least 4-/5 grossly)   LLE Assessment   LLE Assessment X  (unable to assess d/t body habitus; at least 4-/5 grossly)   Bed Mobility   Supine to Sit 2  Maximal assistance   Additional items Assist x 2; Increased time required;Verbal cues   Sit to Supine 2  Maximal assistance   Additional items Assist x 2; Increased time required;Verbal cues   Transfers   Sit to Stand 4  Minimal assistance   Additional items Increased time required;Verbal cues; Assist x 2   Stand to Sit 4  Minimal assistance   Additional items Assist x 2; Increased time required;Verbal cues   Ambulation/Elevation   Gait pattern Not appropriate; Not tested   Balance   Static Sitting Fair +   Dynamic Sitting Fair   Static Standing Fair -   Dynamic Standing Fair -   Endurance Deficit   Endurance Deficit Yes   Endurance Deficit Description pt's endurance significantly impaired by level of anxiety   Activity Tolerance   Activity Tolerance Patient limited by pain; Patient limited by fatigue; Other (Comment)  (anxiety)   Assessment   Prognosis Guarded   Problem List Decreased strength;Decreased endurance; Impaired balance;Decreased mobility; Decreased safety awareness;Pain;Obesity   Assessment Patient is a 60 y o  female evaluated by Physical Therapy s/p admit to [facility] on 1/6/2022 with admitting diagnosis of: Wound cellulitis and principal problem of: Cellulitis of abdominal wall   PT was consulted to assess patient's functional mobility and discharge needs  Ordered are PT Evaluation and treatment  Comorbidities affecting patient's physical performance at time of assessment include: lymphedema, HTN, lipodermatosclerosis of B LEs, arthritis, GERD  Personal factors affecting the patient at time of IE include: ambulating with assistive device, inability to navigate community distances, history of fall(s), impaired safety awareness, decreased initiation and engagement, anxiety, limited insight into impairments, inability/difficulty performing IADLs and inability/difficulty performing ADLs  Please locate objective findings from PT assessment regarding body systems outlined above  Upon evaluation, pt requiring min-maxA x 2, bariatric RW, and increased time  Pt requiring extensive verbal cuing and encouragement to participate d/t significant anxiety  Frequent rest breaks taken d/t the same  Pt also very limited at this time by weeping, painful wounds on multiple areas including underneath B breasts, inguinal area, underneath pannis, buttocks, and lateral L hip  Ambulation not trialed this date d/t safety concerns, but pt able to side step x 3 at EOB  At end of session, pt assisted back to supine and repositioned with nursing staff for safety and comfort  The patient's AM-PAC Basic Mobility Inpatient Short Form Raw Score is 8  A Raw score of less than or equal to 16 suggests the patient may benefit from discharge to post-acute rehabilitation services  Please also refer to the recommendation of the Physical Therapist for safe discharge planning  Co treatment with OT secondary to complex medical condition of pt, possible A of 2 required to achieve and maintain transitional movements, requiring the need of skilled therapeutic intervention of 2 therapists to achieve delivery of services    Pt will benefit from continued PT intervention during LOS to address current deficits, increase LOF, and facilitate safe d/c to next level of care when medically appropriate  D/c recommendation at this time is post-acute rehabilitation services  Goals   Patient Goals to have less pain   Short Term Goal #1 Pt will participate in B LE strengthening exericses to facilitate improved functional mobility  LTG Expiration Date 01/21/22   Long Term Goal #1 Pt will perform all functional trasnfers and bed mobility with Rafael x 1 and good safety awareness  Long Term Goal #2 Pt will ambulate 100' with bariatric RW and SUP while maintaining good functional dynamic balance  Plan   Treatment/Interventions Functional transfer training;LE strengthening/ROM; Therapeutic exercise; Endurance training;Bed mobility;Gait training   PT Frequency 3-5x/wk   Recommendation   PT Discharge Recommendation Post acute rehabilitation services   Equipment Recommended [facility] Recommended Wheeled walker  (bariatric)   AM-PAC Basic Mobility Inpatient   Turning in Bed Without Bedrails 1   Lying on Back to Sitting on Edge of Flat Bed 1   Moving Bed to Chair 2   Standing Up From Chair 2   Walk in Room 1   Climb 3-5 Stairs 1   Basic Mobility Inpatient Raw Score 8   Turning Head Towards Sound 4   Follow Simple Instructions 2   Low Function Basic Mobility Raw Score 14   Low Function Basic Mobility Standardized Score 22 01   Highest Level Of Mobility   JH-HLM Goal 3: Sit at edge of bed   JH-HLM Highest Level of Mobility 5: Stand (1 or more minutes)   JH-HLM Goal Achieved Yes   End of Consult   Patient Position at End of Consult Supine; Other (comment)  (RN and PCA at bedside to perform wound care)

## 2022-01-07 NOTE — TELEMEDICINE
e-Consult (IPC)  - Interventional Radiology  Celestine Mckinnon 60 y o  female MRN: [id number]  Unit/Bed#: RM11 Encounter: [id number]    IR has been consulted to evaluate the patient, determine the appropriate procedure, and whether or not a procedure can and should be performed regarding the care of Celestine Mckinnon  IP Consult to IR  Consult performed by: Stu Zurita MD  Consult ordered by: Artis Gilliam PA-C        01/07/22      Assessment/Recommendation:     60year old female with sigmoid diverticulitis and 2 5cm abscess adjacent to the right pelvis  IR does not feel a safe window is present for drain placement at this time  The possible needle/drain trajectory would be anterior to the right iliac crest, posterior to the colon  The right external iliac artery is immediately adjacent posterior to the collection and indistinguishable in that region  Considering the patients habitus and length of needle placement required to reach the collection, as well as the fact that if the colon shifts slightly the window will be lost, IR will not proceed with drain placement at this time  Please contact IR if the patients clinical condition worsens  Total time spent in review of data, discussion with requesting provider and rendering advice was 15 minutes     Thank you for allowing Interventional Radiology to participate in the care of Celestine Mckinnon  Please don't hesitate to call or TigerText us with any questions       Stu Zurita MD

## 2022-01-07 NOTE — ASSESSMENT & PLAN NOTE
Right Hydroureteronephrosis noted on CT abdomen pelvis on 01/06/22  Renal US : right renal collecting system appears minimally dilated similar to prior CT  Most probably secondary to intra abdominal pathology     Galan in place - draining clear urine  Continue to monitor for symptoms  Follow BMP   Urine sodium and urea nitrogen normal

## 2022-01-07 NOTE — PROGRESS NOTES
Vancomycin IV Pharmacy-to-Dose Consultation    Yanely Mascorro is a 60 y o  female who is currently receiving Vancomycin IV with management by the Pharmacy Consult service  Assessment/Plan:  The patient was reviewed  Renal function is improving and no signs or symptoms of nephrotoxicity and/or infusion reactions were documented in the chart  Based on todays assessment, continue current vancomycin (day # 2) dosing of 2000mg q 24h, with a plan for trough to be drawn at 16:30 on 01/09/22  We will continue to follow the patients culture results and clinical progress daily      Bandar Schneider, Pharmacist

## 2022-01-07 NOTE — CONSULTS
REQUIRED DOCUMENTATION:     1  This service was provided via Telemedicine  2  Provider located at [facility]   3  TeleMed provider: Julia Hudson MD   4  Identify all parties in room with patient during tele consult:  Patient   5  After connecting through [email or web address], patient was identified by name and date of birth and assistant checked wristband  Patient was then informed that this was a Telemedicine visit and that the exam was being conducted confidentially over secure lines  My office door was closed  No one else was in the room  Patient acknowledged consent and understanding of privacy and security of the Telemedicine visit, and gave us permission to have the assistant stay in the room in order to assist with the history and to conduct the exam   I informed the patient that I have reviewed their record in Epic and presented the opportunity for them to ask any questions regarding the visit today  The patient agreed to participate  TeleConsultation - Infectious Disease   Ted Lozoya 60 y o  female MRN: [id number]  Unit/Bed#: RM11 Encounter: [id number]      IMPRESSION & RECOMMENDATIONS:     1  Leukocytosis-POA  Due to sigmoid diverticulitis with microperforation and small pelvic abscess  Patient also with fungal dermatitis and multiple chronic wounds with concern for mild surrounding cellulitis  She does not have fever or hypotension  Leukocytosis is improving with antibiotics  -continue vancomycin (dosing per pharmacy)  Anticipate a 5 day course for superimposed bacterial infection of chronic wounds   -monitor WBC count, fever curve   -continue cefepime and flagyl   -will need repeat imaging next week to ensure improvement in abscess, and then likely switch to oral antibiotics    2  Fungal dermatitis  On pannus, mons, legs  In setting or morbid obesity  No improvement with topical therapy     -local wound care   -nystatin cream   -continue fluconazole 150mg PO daily, anticipate a 5 day course if improvement    3  Perforated sigmoid diverticulitis with abscess  No surgical or IR intervention  Continue conservative management with antibiotics  -antibiotic plan as above   -surgery following   -recheck CT A/P next week for resolution or sooner if clinically worsens    4  Chronic wounds  Concern for superimposed infection  -wound care consult    5  ZAHIRA  Baseline Cr 0 9, admitted with Cr of 4 5  Likely prerenal due to diarrhea, poor PO intake  Creatinine improving with IVF  -nephrology consulted   -abx dose adjusted for CrCl    6  Morbid obesity  BMI 63  Risk factor for infection   -encourage weight loss       Extensive review of the medical records in [facility] was performed including review of the notes, radiographs, and laboratory results    Discussed the above management plan in detail with the primary service  ID will follow  I spent 40 minutes in evaluation of the patient of which 25 minutes was in counseling/coordination of care    HISTORY OF PRESENT ILLNESS:  Reason for Consult: sepsis, fungal dermatitis, pelvic abscess  HPI: Raul Bolanos is a 60 y o  woman with a history of HTN, morbid obesity, sciatica, hidradenitis suppurativa, and fungal dermatitis who presented to the [facility] ED 1/6/22 with open painful sores and skin rash under her breasts, lower abdomen, inner thighs and buttocks  ID is consulted for antibiotic management  The patient lives alone and states that for the past week she was not getting out of bed due to weakness  She denies fever, chills, abdominal pain  She has had several days of diarrhea and poor PO intake  She was being treated for a fungal dermatitis of the abdomen with clotrimazole cream but states that her skin lesions have been worsening  In the ED, the patient was afebrile with a WBC count of 13 43  Labs were also significant for a creatinine of 4 56, Hgb of 9 5, UA with innumerable WBC   CT A/P showed acute sigmoid diverticulitis with microperforation and an adjacent small right pelvic abscess and right hydroureteronephrosis  She was seen by surgery, who recommended IR drainage of the abscess  Per IR, abscess is not amenable to drainage  She is currently receiving vancomycin, metronidazole, cefepime, and fluconazole      REVIEW OF SYSTEMS:  A complete 12 point system-based review of systems is negative other than that noted in the HPI  PAST MEDICAL HISTORY:  Past Medical History:   Diagnosis Date    Anxiety     Arthritis     GERD (gastroesophageal reflux disease)     Hidradenitis suppurativa     Hypertension     Lipodermatosclerosis of both lower extremities     Lymphedema     Sciatic leg pain 2006    left side    SOB (shortness of breath)     Stomach ulcer      Past Surgical History:   Procedure Laterality Date     SECTION      [place] EXC SKIN BENIG 3 1-4 CM TRUNK,ARM,LEG Right 2020    Procedure: EXCISION BIOPSY TISSUE LESION/MASS UPPER EXTREMITY;  Surgeon: Tina Godfrey DO;  Location: [facility];  Service: General    TONSILLECTOMY      TUMOR REMOVAL Right     5th finger       FAMILY HISTORY:  Non-contributory    SOCIAL HISTORY:  Social History   Social History     Substance and Sexual Activity   Alcohol Use No    Comment: holidays     Social History     Substance and Sexual Activity   Drug Use No     Social History     Tobacco Use   Smoking Status Light Tobacco Smoker    Packs/day: 0 25    Years: 20 00    Pack years: 5 00    Types: Cigarettes   Smokeless Tobacco Never Used   Tobacco Comment    Only smoke once in awhile       ALLERGIES:  Allergies   Allergen Reactions    Bee Venom Anaphylaxis, Shortness Of Breath and Swelling       MEDICATIONS:  All current active medications have been reviewed        PHYSICAL EXAM:  Temp:  [97 °F (36 1 °C)] 97 °F (36 1 °C)  HR:  [87-90] 90  Resp:  [17-20] 20  BP: (102-114)/(52-64) 107/59  SpO2:  [93 %-98 %] 98 %  Temp (24hrs), Av °F (36 1 °C), Min:97 °F (36 1 °C), Max:97 °F (36 1 °C)  Current: Temperature: (!) 97 °F (36 1 °C)    Intake/Output Summary (Last 24 hours) at 1/7/2022 1739  Last data filed at 1/6/2022 2139  Gross per 24 hour   Intake 550 ml   Output 1300 ml   Net -750 ml       Documented physical exam has been primarily done by the patient's nurse and/or the primary service due to limited examination abilities on telemedicine    General Appearance:  Nontoxic  Chronically ill appearing  Morbidly obese   Head:  Normocephalic, without obvious abnormality, atraumatic   Eyes:  Conjunctiva pink and sclera anicteric, both eyes   Nose: Nares normal, mucosa normal, no drainage   Throat: Oropharynx moist without lesions   Neck: Supple, symmetrical, no adenopathy   Back:   Symmetric, no curvature, ROM normal, no CVA tenderness   Lungs:   Clear to auscultation bilaterally, respirations unlabored   Chest Wall:  No tenderness or deformity   Heart:  RRR; no murmur, rub or gallop   Abdomen:   Abdominal tenderness   Extremities: No cyanosis, clubbing or edema   Skin: Pannus, mons, and upper/inner thighs with moist, erythematous rash with satellite lesions  Multiple wounds on pannus and under breast with erythema nad purulent drainage   Lymph nodes: Cervical, supraclavicular nodes normal   Neurologic: Alert and oriented times 3, extremity strength 5/5 and symmetric       LABS, IMAGING, & OTHER STUDIES:  Lab Results:  I have personally reviewed pertinent labs    Results from last 7 days   Lab Units 01/07/22  0641 01/06/22  0447   WBC Thousand/uL 8 63 13 43*   HEMOGLOBIN g/dL 10 5* 9 5*   PLATELETS Thousands/uL 398* 431*     Results from last 7 days   Lab Units 01/07/22  0641 01/06/22  0447 01/06/22  0447   POTASSIUM mmol/L 5 2   < > 5 4*   CHLORIDE mmol/L 108   < > 107   CO2 mmol/L 17*   < > 22   BUN mg/dL 57*   < > 66*   CREATININE mg/dL 3 24*   < > 4 56*   EGFR ml/min/1 73sq m 15   < > 9   CALCIUM mg/dL 9 1   < > 8 8   AST U/L 39  --  13   ALT U/L 25   < > 21   ALK PHOS U/L 100   < > 101 < > = values in this interval not displayed  Results from last 7 days   Lab Units 01/07/22  0642 01/06/22  0447   PROCALCITONIN ng/ml 0 38* 0 32*                   Micro:  Results from last 7 days   Lab Units 01/06/22  0447   BLOOD CULTURE  No Growth at 24 hrs  No Growth at 24 hrs  Imaging Studies:   I have personally reviewed pertinent imaging study reports and images in PACS  CT C/A/P 1/6:  Acute sigmoid diverticulitis with microperforation and an adjacent small right pelvic abscess  Right-sided hydroureteronephrosis with no obstructing calculus  Other Studies:   I have personally reviewed pertinent reports

## 2022-01-07 NOTE — PLAN OF CARE
Problem: PHYSICAL THERAPY ADULT  Goal: Performs mobility at highest level of function for planned discharge setting  See evaluation for individualized goals  Description: Treatment/Interventions: Functional transfer training,LE strengthening/ROM,Therapeutic exercise,Endurance training,Bed mobility,Gait training  Equipment Recommended: Baljit Anderson       See flowsheet documentation for full assessment, interventions and recommendations  Note: Prognosis: Guarded  Problem List: Decreased strength,Decreased endurance,Impaired balance,Decreased mobility,Decreased safety awareness,Pain,Obesity  Assessment: Patient is a 60 y o  female evaluated by Physical Therapy s/p admit to [address] [place] [address],[address] on 1/6/2022 with admitting diagnosis of: Wound cellulitis and principal problem of: Cellulitis of abdominal wall  PT was consulted to assess patient's functional mobility and discharge needs  Ordered are PT Evaluation and treatment  Comorbidities affecting patient's physical performance at time of assessment include: lymphedema, HTN, lipodermatosclerosis of B LEs, arthritis, GERD  Personal factors affecting the patient at time of IE include: ambulating with assistive device, inability to navigate community distances, history of fall(s), impaired safety awareness, decreased initiation and engagement, anxiety, limited insight into impairments, inability/difficulty performing IADLs and inability/difficulty performing ADLs  Please locate objective findings from PT assessment regarding body systems outlined above  Upon evaluation, pt requiring min-maxA x 2, bariatric RW, and increased time  Pt requiring extensive verbal cuing and encouragement to participate d/t significant anxiety  Frequent rest breaks taken d/t the same  Pt also very limited at this time by weeping, painful wounds on multiple areas including underneath B breasts, inguinal area, underneath pannis, buttocks, and lateral L hip   Ambulation not trialed this date d/t safety concerns, but pt able to side step x 3 at EOB  At end of session, pt assisted back to supine and repositioned with nursing staff for safety and comfort  The patient's AM-PAC Basic Mobility Inpatient Short Form Raw Score is 8  A Raw score of less than or equal to 16 suggests the patient may benefit from discharge to post-acute rehabilitation services  Please also refer to the recommendation of the Physical Therapist for safe discharge planning  Co treatment with OT secondary to complex medical condition of pt, possible A of 2 required to achieve and maintain transitional movements, requiring the need of skilled therapeutic intervention of 2 therapists to achieve delivery of services  Pt will benefit from continued PT intervention during LOS to address current deficits, increase LOF, and facilitate safe d/c to next level of care when medically appropriate  D/c recommendation at this time is post-acute rehabilitation services  PT Discharge Recommendation: Post acute rehabilitation services          See flowsheet documentation for full assessment

## 2022-01-07 NOTE — OCCUPATIONAL THERAPY NOTE
Occupational Therapy Evaluation     Patient Name: Yanely Mascorro  AMKJJ'P Date: 2022  Problem List  Principal Problem:    Cellulitis of abdominal wall, chest wall and groin with wounds  Active Problems:    Hypertension    Morbid obesity with BMI of 60 0-69 9, adult (HCC)    Borderline hyperlipidemia    Acute renal failure (ARF) ([facility] [address] )    Diverticulitis of large intestine with perforation    Right Hydroureteronephrosis    Subacute vaginitis    Past Medical History  Past Medical History:   Diagnosis Date    Anxiety     Arthritis     GERD (gastroesophageal reflux disease)     Hidradenitis suppurativa     Hypertension     Lipodermatosclerosis of both lower extremities     Lymphedema     Sciatic leg pain     left side    SOB (shortness of breath)     Stomach ulcer      Past Surgical History  Past Surgical History:   Procedure Laterality Date     SECTION      IN EXC SKIN BENIG 3 1-4 CM TRUNK,ARM,LEG Right 2020    Procedure: EXCISION BIOPSY TISSUE LESION/MASS UPPER EXTREMITY;  Surgeon: Isamar Larsen DO;  Location: [facility];  Service: General    TONSILLECTOMY      TUMOR REMOVAL Right     5th finger             22 1215   OT Last Visit   OT Visit Date 22   Note Type   Note type Evaluation   Restrictions/Precautions   Weight Bearing Precautions Per Order No   Other Precautions Pain; Fall Risk;Multiple lines   Pain Assessment   Pain Assessment Tool 0-10   Pain Score 9   Pain Location/Orientation Location: Generalized  (pt with multiple wounds )   Home Living   Type of [address] One level;Performs ADLs on one level; Able to live on main level with bedroom/bathroom; Ramped entrance;Elevator   Bathroom Shower/Tub Tub/shower unit   Bathroom Toilet Standard   Bathroom Equipment Grab bars in shower   [address]   Additional Comments pt reports mobility at baseline with crutches; pt also utilizes [employer] for transportation   Prior Function   Level of [place] Independent with ADLs and functional mobility   Lives With Alone   ADL Assistance Independent   IADLs Independent   Falls in the last 6 months 1 to 4   Vocational On disability   Comments pt reports independence, however with observing pt skin, toenails, and overall appearance, it does not appear pt cares for herself at home    Psychosocial   Psychosocial (WDL) X   Patient Behaviors/Mood Anxious   Subjective   Subjective "It hurts so bad"   ADL   Where Assessed Edge of bed   Eating Assistance 7  [address] 3  Moderate Assistance   LB Bathing Assistance 2  Maximal [address] around back   [address] 2  Maximal Assistance   LB Dressing Deficit Don/doff L sock; Don/doff R sock; Thread RLE into underwear; Thread LLE into underwear;Pull up over hips   Toileting Assistance  2  Maximal Assistance   Toileting Deficit Perineal hygiene   Additional Comments pt with incontinence during session of bowel; multiple wounds and drainage present; pt with inability to care for herself adequately at this time    Bed Mobility   Supine to Sit 2  Maximal assistance   Additional items Assist x 2; Increased time required;Verbal cues;LE management; Bedrails   Sit to Supine 2  Maximal assistance   Additional items Assist x 2; Increased time required;Verbal cues;LE management   Additional Comments pt sits EOB for ~15 minutes and with anxiety about performing transfers at this time; no complaints of SOB, however severe anxiety about mobility due to pain with all mobility due to wounds; requires (A) of 3 for safe transfer into supine position at end of session; nurse present to address multiple wounds and apply medication   Transfers   Sit to Stand 4  Minimal assistance   Additional items Assist x 2;Bedrails; Increased time required;Verbal cues  (RW) Stand to Sit 4  Minimal assistance   Additional items Assist x 2; Increased time required;Verbal cues  (RW)   Additional Comments pt performs x1 functional transfer with RW at min (A) x2 level; no significant LOB or instability   Functional Mobility   Functional Mobility 4  Minimal assistance   Additional Comments x1 with side stepping towards HOB; performs ~5 steps    Additional items Rolling walker   Balance   Static Sitting Fair +   Dynamic Sitting Fair +   Static Standing Fair -   Dynamic Standing Fair -   Activity Tolerance   Activity Tolerance Patient limited by fatigue;Patient limited by pain  (anxiety)   RUE Assessment   RUE Assessment WFL   LUE Assessment   LUE Assessment WFL   Hand Function   Gross Motor Coordination Functional   Fine Motor Coordination Functional   Sensation   Light Touch No apparent deficits   Sharp/Dull No apparent deficits   Additional Comments pt with significant lymphedema throughout all limbs; reports chronic   Cognition   Overall Cognitive Status WFL   Arousal/Participation Alert   Attention Within functional limits   Orientation Level Oriented X4   Memory Within functional limits   Following Commands Follows all commands and directions without difficulty   Assessment   Limitation Decreased ADL status; Decreased UE strength;Decreased Safe judgement during ADL;Decreased endurance;Decreased self-care trans;Decreased high-level ADLs   Assessment Pt is a 60 y o  female seen for OT evaluation s/p admit to [facility] on 1/6/2022 w/ Cellulitis of abdominal wall  Comorbidities affecting pt's functional performance at time of assessment include: lymphedema, sciatic leg pain, obesity, GERD, stomach ulcer, anxiety, lipodermatosclerosis of B LEs  Personal factors affecting pt at time of IE include:limited home support, behavioral pattern, difficulty performing ADLS, difficulty performing IADLS , limited insight into deficits, decreased initiation and engagement  and health management    Prior to admission, pt reports independence with ADLs and IADLs, however does not appear to care for herself  Upon evaluation: Pt requires min-max (A) x2 with all mobility 2* the following deficits impacting occupational performance: weakness, decreased strength, decreased balance, decreased tolerance, impaired initiation, impaired problem solving, decreased safety awareness, increased pain, impaired interpersonal skills and decreased coping skills  Pt to benefit from continued skilled OT tx while in the hospital to address deficits as defined above and maximize level of functional independence w ADL's and functional mobility  Occupational Performance areas to address include: grooming, bathing/shower, toilet hygiene, dressing, functional mobility, community mobility and clothing management  The patient's raw score on the AM-PAC Daily Activity inpatient short form is 14, standardized score is 33 39, less than 39 4  Patients at this level are likely to benefit from discharge to post-acute rehabilitation services  Please refer to the recommendation of the Occupational Therapist for safe discharge planning  Co treatment with PT secondary to complex medical condition of pt, possible A of 2 required to achieve and maintain transitional movements, requiring the need of skilled therapeutic intervention of 2 therapists to achieve delivery of services  Goals   Patient Goals to go home    Short Term Goal  pt will perform UE strengthening exercises    Long Term Goal #1 pt will demonstrate toilet transfers at min (A) level    Long Term Goal #2 pt will demonstrate functional mobility with RW mod (I) level    Long Term Goal pt will demonstrate UB bathing and grooming tasks at min (A) level   Plan   Treatment Interventions ADL retraining;Functional transfer training;UE strengthening/ROM; Endurance training;Patient/family training; Activityengagement   Goal Expiration Date 01/07/22   OT Frequency 3-5x/wk   Recommendation   OT Discharge Recommendation Post acute rehabilitation services   AM-PAC Daily Activity Inpatient   Lower Body Dressing 2   Bathing 2   Toileting 2   Upper Body Dressing 2   Grooming 2   Eating 4   Daily Activity Raw Score 14   Daily Activity Standardized Score (Calc for Raw Score >=11) 33 39   AM-PAC Applied Cognition Inpatient   Following a Speech/Presentation 4   Understanding Ordinary Conversation 4   Taking Medications 4   Remembering Where Things Are Placed or Put Away 4   Remembering List of 4-5 Errands 4   Taking Care of Complicated Tasks 4   Applied Cognition Raw Score 24   Applied Cognition Standardized Score 62 21     Pt will benefit from continued OT services in order to maximize (I) c ADL performance, FM c RW, and improve overall endurance/strength required to complete functional tasks in preparation for d/c  Pt left supine in bed at end of session; all needs within reach; all lines intact

## 2022-01-07 NOTE — UTILIZATION REVIEW
Initial Clinical Review    Admission: Date/Time/Statement:   Admission Orders (From admission, onward)     Ordered        01/06/22 0858  INPATIENT ADMISSION  Once                      Orders Placed This Encounter   Procedures    INPATIENT ADMISSION     Standing Status:   Standing     Number of Occurrences:   1     Order Specific Question:   Level of Care     Answer:   Med Surg [16]     Order Specific Question:   Estimated length of stay     Answer:   Not Applicable     ED Arrival Information     Expected Arrival Acuity    - 1/6/2022 02:43 Urgent         Means of arrival Escorted by Service Admission type    Ambulance [facility]         Arrival complaint            Chief Complaint   Patient presents with    Wound Check     patient states she has had open wounds under her breasts, inside of thigs, genitals, stomach, buttocks for over a month  states over the past week she has had increased pain making walking and getting up difficult  Initial Presentation:  60year old year old female presents to ed via ems  from home for evaluation and treatment of multiple open wounds  She is having increased pain making walking difficult  Clinical assessment shows pain 10/10, UA: mod leukocytes, procalcitonin 0 32, potassium 5 4, bun 66, creatinine 4 56, wbc 13 43, hb 9 5  Imaging shows acute sigmoid diverticulitis with microperforation  Treated in ed with iv  9% ns bolus, iv piperacillin, iv ancef, iv dilaudid, iv vancomycin, iv D 50%  Admit to inpatient medical surgical for cellulitis of the abdominal wall, chest wall and groin wounds, acute kidney injury, diverticulitis with microperforation and abscess  Consult general surgery    I would also consult wound care as the patient does have a few open draining wounds of her pannus/groin region    Wound care would be able to assist with management of those wounds in drainage management which would assist in treating the fungal infection  Will continue non op management of diverticulitis  Consult nephrology  Creatinine baseline is 0 9    § Hold home lisinopril, celecoxib  § Trial volume expansion with isotonic saline  Cautiously monitor volume status  Weights will be important as edema is difficult to assess  No echocardiogram available for review  § Obtain urine studies  § Management of hydronephrosis per Urology recommendations  · Trend renal function  Continue to provide supportive care  · Optimize hemodynamics  Maintain mean arterial pressure greater than 65 mmHg  · Renally dose medications  Please discuss with renal any diuretics or possible nephrotoxic agents, such as NSAIDs or IV contrast  Recommend avoidance of PPIs in favor of H2 blocker, if appropriate for clinical scenario  § Monitor for urinary retention- strict I&Os, record bladder scan PVRs and follow urinary retention protocol  § Patient is at risk of requiring dialysis  No acute need for initiation of hemodialysis at this time  Goal is to correct and prevent further hyperkalemia  Trial of volume expansion with isotonic saline  Would avoid potassium containing IV fluids at this time  Date:  1-7-22   Day 2: med surg    Consult infectious disease  Wound care nurse consulted  IR consulted for possible drainage of intra abdominal abscess  Continue iv zosyn, iv fluids, npo, prn analgesia           ED Triage Vitals   01/06/22 0256 01/06/22 0256 01/06/22 0256 01/06/22 0256 01/06/22 0256   98 2 °F (36 8 °C) 89 20 96/51 98 %      Tympanic Monitor         10 - Worst Possible Pain          08/16/21 (!) 183 kg (402 lb 6 4 oz)     Additional Vital Signs:     Date/Time Temp Pulse Resp BP MAP SpO2 O2 Device   01/07/22 0130 -- 87 20 -- -- 98 % None (Room air)   01/06/22 2115 97 °F (36 1 °C)   Abnormal  88 18 102/52 -- 97 % None (Room air)   01/06/22 1900 -- 90 17 114/64 81 93 % None (Room air)   01/06/22 0935 98 9 °F (37 2 °C) 90 18 127/87 -- 94 % None (Room air)   01/06/22 0830 -- 89 18 98/52 71 93 % None (Room air)   01/06/22 0815 -- 75 18 98/60 38 96 % None (Room air)   01/06/22 0745 -- 80 20 106/62 -- 97 % None (Room air)           Pertinent Labs/Diagnostic Test Results:       US kidney and bladder   Final   (01/06 1753)      1  Challenging exam due to habitus, however the right renal collecting system appears minimally dilated similar to prior CT  Shaye [place] No shadowing calculi  2   Normal kidney size and echogenicity  3   Cholelithiasis  CT chest abdomen pelvis wo contrast   Final  (01/06 0623)      Findings compatible with acute sigmoid diverticulitis with microperforation and an adjacent small right pelvic abscess  The study was limited by the lack of oral contrast   Surgical consultation is recommended  No evidence of large or small bowel obstruction  No infiltrate or pleural effusion  No evidence of a subcutaneous abscess in the region of the visualized breast, inguinal or rectal areas  Minimal to mild right-sided hydroureteronephrosis with no obstructing calculus  The findings may be secondary to the active inflammatory changes adjacent to the sigmoid colon  Collection Time Result Time Vent R Atrial R CO Int  QRSD Int  QT Int  QTC Int   P Axis QRS Axis T Wave Ax    01/06/22 03:31:39 01/06/22 13:44:54 87 87 180 68 366 440 78 48 54      Normal sinus rhythm   Low voltage QRS   Borderline ECG   When compared with ECG of 04-DEC-2020 11:58,   No significant change was found                       Results from last 7 days   Lab Units 01/07/22  0641 01/06/22 0447   WBC Thousand/uL 8 63 13 43*   HEMOGLOBIN g/dL 10 5* 9 5*   HEMATOCRIT % 34 3* 30 4*   PLATELETS Thousands/uL 398* 431*   NEUTROS ABS Thousands/µL 5 92 10 16*         Results from last 7 days   Lab Units 01/07/22  0641 01/06/22 0447   SODIUM mmol/L 139 139   POTASSIUM mmol/L 5 2 5 4*   CHLORIDE mmol/L 108 107   CO2 mmol/L 17* 22   ANION GAP mmol/L 14* 10 BUN mg/dL 57* 66*   CREATININE mg/dL 3 24* 4 56*   EGFR ml/min/1 73sq m 15 9   CALCIUM mg/dL 9 1 8 8   MAGNESIUM mg/dL 1 8  --    PHOSPHORUS mg/dL 5 3*  --      Results from last 7 days   Lab Units 01/07/22  0641 01/06/22  0447   AST U/L 39 13   ALT U/L 25 21   ALK PHOS U/L 100 101   TOTAL PROTEIN g/dL 7 1 7 0   ALBUMIN g/dL 2 4* 2 5*   TOTAL BILIRUBIN mg/dL 0 36 0 37     Results from last 7 days   Lab Units 01/06/22  2132   POC GLUCOSE mg/dl 105     Results from last 7 days   Lab Units 01/07/22  0641 01/06/22  0447   GLUCOSE RANDOM mg/dL 92 106           Results from last 7 days   Lab Units 01/07/22  0641 01/06/22  0447   PROTIME seconds 15 3* 16 0*   INR  1 28* 1 35*   PTT seconds 33 34     Results from last 7 days   Lab Units 01/06/22  0335   TSH 3RD GENERATON uIU/mL 1 078     Results from last 7 days   Lab Units 01/06/22  0447   PROCALCITONIN ng/ml 0 32*     Results from last 7 days   Lab Units 01/06/22  0447   LACTIC ACID mmol/L 0 9       Results from last 7 days   Lab Units 01/06/22  1623 01/06/22  1622   CLARITY UA   --  Cloudy   COLOR UA   --  Yellow   SPEC GRAV UA   --  1 025   PH UA   --  5 0   GLUCOSE UA mg/dl  --  Negative   KETONES UA mg/dl  --  Negative   BLOOD UA   --  Moderate*   PROTEIN UA mg/dl  --  30 (1+)*   NITRITE UA   --  Negative   BILIRUBIN UA   --  Negative   UROBILINOGEN UA E U /dl  --  0 2   LEUKOCYTES UA   --  Moderate*   WBC UA /hpf  --  Innumerable*   RBC UA /hpf  --  2-4   BACTERIA UA /hpf  --  Moderate*   EPITHELIAL CELLS WET PREP /hpf  --  Occasional   SODIUM UR  68  --    CREATININE UR mg/dL 188 0  --    PROTEIN UR mg/dL 103  --    PROT/CREAT RATIO UR  0 55*  --        Results from last 7 days   Lab Units 01/06/22  0447   BLOOD CULTURE  Received in Microbiology Lab  Culture in Progress  Received in Microbiology Lab  Culture in Progress                 ED Treatment:   Medication Administration from 01/06/2022 0243 to 01/07/2022 0807       Date/Time Order Dose Route Action 01/06/2022 0335 sodium chloride 0 9 % bolus 1,000 mL 1,000 mL Intravenous New Bag     01/06/2022 0335 HYDROmorphone (DILAUDID) injection 0 5 mg 0 5 mg Intravenous Given     01/06/2022 0503 HYDROmorphone (DILAUDID) injection 0 5 mg 0 5 mg Intravenous Given     01/06/2022 0750 piperacillin-tazobactam (ZOSYN) 3 375 g in sodium chloride 0 9 % 100 mL IVPB 3 375 g Intravenous New Bag     01/06/2022 0900 ceFAZolin (ANCEF) IVPB (premix in dextrose) 2,000 mg 50 mL 2,000 mg Intravenous New Bag     01/06/2022 0750 sodium chloride 0 9 % bolus 1,000 mL 1,000 mL Intravenous New Bag     01/06/2022 0949 sertraline (ZOLOFT) tablet 50 mg 50 mg Oral Given     01/06/2022 0944 sodium chloride 0 9 % infusion 100 mL/hr Intravenous New Bag     01/06/2022 1040 HYDROmorphone (DILAUDID) injection 0 5 mg 0 5 mg Intravenous Given     01/06/2022 1331 sodium polystyrene (KAYEXALATE) powder 15 g 15 g Oral Given     01/06/2022 1327 insulin regular (HumuLIN R,NovoLIN R) injection 10 Units 10 Units Intravenous Given     01/06/2022 1323 dextrose 50 % IV solution 25 mL 25 mL Intravenous Given     01/06/2022 1328 fluconazole (DIFLUCAN) tablet 150 mg 150 mg Oral Given     01/07/2022 0218 HYDROcodone-acetaminophen (NORCO) 5-325 mg per tablet 1 tablet 1 tablet Oral Given     01/06/2022 1907 HYDROcodone-acetaminophen (NORCO) 5-325 mg per tablet 1 tablet 1 tablet Oral Given     01/06/2022 1735 vancomycin (VANCOCIN) 2,000 mg in sodium chloride 0 9 % 500 mL IVPB 2,000 mg Intravenous New Bag     01/06/2022 2254 nystatin (MYCOSTATIN) powder 3 application Topical Given     01/07/2022 0609 metroNIDAZOLE (FLAGYL) tablet 500 mg 500 mg Oral Given     01/06/2022 2251 metroNIDAZOLE (FLAGYL) tablet 500 mg 500 mg Oral Given     01/06/2022 1624 metroNIDAZOLE (FLAGYL) tablet 500 mg 500 mg Oral Given     01/06/2022 1617 cefepime (MAXIPIME) IVPB (premix in dextrose) 1,000 mg 50 mL 1,000 mg Intravenous New Bag        Past Medical History:   Diagnosis Date    Anxiety     Arthritis     GERD (gastroesophageal reflux disease)     Hidradenitis suppurativa     Hypertension     Lipodermatosclerosis of both lower extremities     Lymphedema 2006    Sciatic leg pain 2006    left side    SOB (shortness of breath)     Stomach ulcer      Present on Admission:   Borderline hyperlipidemia   Hypertension   Diverticulitis of large intestine with perforation      Admitting Diagnosis: Wound cellulitis [L03 90]     Age/Sex: 60 y o  female    Scheduled Medications:    cefepime, 1,000 mg, Intravenous, Q24H  docusate sodium, 100 mg, Oral, BID  fluconazole, 150 mg, Oral, Daily  fluticasone, 1 spray, Each Nare, Daily  metoprolol tartrate, 25 mg, Oral, BID  metroNIDAZOLE, 500 mg, Oral, Q8H IRIS  nicotine, 1 patch, Transdermal, Daily  nystatin, , Topical, TID  sertraline, 50 mg, Oral, Daily  vancomycin, 2,000 mg, Intravenous, Q24H      Continuous IV Infusions:  sodium chloride, 100 mL/hr, Intravenous, Continuous      PRN Meds:  acetaminophen, 650 mg, Oral, Q6H PRN  albuterol, 2 puff, Inhalation, Q6H PRN  HYDROcodone-acetaminophen, 1 tablet, Oral, Q6H PRN  hydrOXYzine HCL, 25 mg, Oral, TID PRN  ondansetron, 4 mg, Intravenous, Q6H PRN        IP CONSULT TO ACUTE CARE SURGERY  INPATIENT CONSULT TO IR  IP CONSULT TO NEPHROLOGY  IP CONSULT TO INFECTIOUS DISEASES  IP CONSULT TO PHARMACY  IP CONSULT TO NUTRITION SERVICES    Network Utilization Review Department  ATTENTION: Please call with any questions or concerns to [phone] and carefully listen to the prompts so that you are directed to the right person  All voicemails are confidential   Ty Limb all requests for admission clinical reviews, approved or denied determinations and any other requests to dedicated fax number below belonging to the campus where the patient is receiving treatment   List of dedicated fax numbers for the Facilities:  FACILITY NAME UR FAX NUMBER   ADMISSION DENIALS (Administrative/Medical Necessity) [phone]   PARENT CHILD HEALTH (Maternity/NICU/Pediatrics) [address] [facility] [facility] [address] [facility]  [phone]   [address] [facility] [address] [id number] [place] [address] [address] [facility] [address] [facility] [phone]

## 2022-01-08 NOTE — WOUND OSTOMY CARE
Consult Note - Wound   Osvaldo Barriga 60 y o  female MRN: [id number]  Unit/Bed#: 342-71 Encounter: [id number]      History and Present Illness:  Patient admitted with cellulitis of the abdominal wall, chest wall and groin wounds  Wound care consulted for same  Patient history significant for hydradinitis suppurativa, HTN, morbid obesity with a BMI of 63 02, and currently with diverticulitis of large intestine with perforation  Assessment Findings:   Patient seen in the ED, she is in a bed and is not able to reposition in the bed due to body habitus  She has a villanueva catheter in place at this time  She has chronic lymphedema to the lower extremities  She is not incontinent of stool  Patient states she showers at least twice a day that it relieves the pain of the hydradenitis sores  She had been to the wound center for hydradenitis and was recommended to see a dermatologist  She states she cannot find a dermatologist locally and does not have transportation available  She states she bough Interdry and was using it at home but that it would fall out of her folds because she is not able to tolerate clothing  She states she stopped using it because the instructions state to not use on open wounds  She has a mixed etiology of hydradenitis suppurativa with fungal rash  She states she was taking an oral antibiotic and developed the fungal rash  Discussed with her that when taking an antibiotic it is suggested that yogurt is an effective food to help prevent fungal rash growth  1  Mix of fungal rash and hydradenitis suppurativa with open draining sores  to the areas beneath the breasts, abdominal fold, inner thighs, groin and inner buttocks  Patient declined to have buttocks and sacrum assessed at this time  Open wounds draining a white milky drainage, painful, and periwound of all areas are beefy red with erythema  Patient not able to tolerate measurement and cleansing of wound beds   4X4 dressings removed from folds and Maxorb Ag placed beneath breast folds and ABD's in abdominal folds  Nystatin powder is ordered as well as Nystatin cream  Patient has IV antibiotics as well as fluconazole for treatment  2  Bilateral heels intact    Skin and Wound Care Plan:   1  Apply skin nourishing cream to intact skin daily  2  Bariatric ehob offloading cushion to chair when OOB  3  Elevate heels off of bed with pillows to offload  4  Kreg bariatric bed  5  Turn and reposition patient Q2 hours  6  Gently cleanse areas beneath b/l breasts, abdominal folds, inner thighs, groin folds and inner buttock wounds with NSS  Pat dry with gauze  Dust inner folds with Nystatin antifungal powder as ordered by provider avoiding dusting open wound beds  Place Maxorb Ag in wound beds of b/l breasts, abdominal folds and buttocks, apply antifungal cream to b/l groin as ordered by provider    Will see patient on Monday to reassess areas of breakdown and assess buttocks and sacrum since was not able to assess while in the ED    Wounds:  Wound 11/22/21 Other (comment) Breast Left; Lower (Active)   Wound Image   01/07/22 1611   Wound Description Beefy red;Drainage;Fragile; White;Yellow 01/07/22 1611   Luisa-wound Assessment Erythema;Rash 01/07/22 1611   Dressing Calcium Alginate with Silver 01/07/22 1611   Patient Tolerance Tolerated poorly 01/07/22 1611       Wound 11/22/21 Other (comment) Abdomen Medial;Lower (Active)   Wound Image     01/07/22 1617   Wound Description Beefy red;Drainage;Fragile; White;Yellow 01/07/22 1617   Drainage Amount Small 01/07/22 1617   Drainage Description Milky; Yellow 01/07/22 1617   Dressing ABD 01/07/22 1617   Patient Tolerance Tolerated poorly 01/07/22 1617       Wound 01/06/22 Other (comment) Other (Comment) Perineum (Active)   Wound Description Drainage;Pale;Yellow 01/06/22 0511   Pressure Injury Stage  01/06/22 0511   Luisa-wound Assessment Erythema 01/06/22 0511       Wound 01/07/22 Breast Right; Lower (Active)   Wound Image 01/07/22 1613   Wound Description Beefy red;Drainage;Fragile; White 01/07/22 1613   Luisa-wound Assessment Erythema;Rash 01/07/22 1613   Dressing Calcium Alginate with Silver 01/07/22 1613   Patient Tolerance Tolerated poorly 01/07/22 1613     Reviewed plan of care with primary RN Annamarie Moserr  Recommendations written as orders  Wound care team to follow weekly while admitted  Questions or concerns Jodi CASTELLONN, RN, Patricia Byrnes

## 2022-01-08 NOTE — PROGRESS NOTES
Progress Note - General Surgery   Ted Lozoya 60 y o  female MRN: [id number]  Unit/Bed#: 517-33 Encounter: [id number]    Assessment:  61yo F found to have sigmoid diverticulitis with microperforation and abscess collection on imaging study   - 3 2 x 2 8cm air fluid collection noted on CT    - afebrile, VSS  - leukocytosis resolved, WBC 8 4 today  - exam:  Abdomen soft, nondistended, nontender to palpation, bowel sounds active throughout normal in character    ZAHIRA  - creatinine 4 5 at time of admission, now down to 2 7  - nephrology following  Wounds and rash to bilateral breasts, abdominal pannus, groin, and inner thighs  - seen by wound care nurse, appreciate recommendations  PMH: obesity, HTN, Lymphedema, Sciatica, Anxiety, GERD    Plan:  - no acute surgical intervention indicated at this time  Abscess collection is not amenable to drainage by IR, please refer to formal consultation note from 1/7  - seen by wound care nursing, appreciate recommendations  - continue IV antibiotics  - ADAT  - monitor vitals, a m  labs  - p r n  analgesia  - seen and evaluated by PT/OT recommending post acute rehabilitation at this time  - management of medical comorbidities per primary team    Subjective/Objective   Subjective:  No acute events overnight  Patient states she feels well today, notes improvement from previous days  Patient tolerated clear liquid diet well yesterday evening, patient stated she was still hungry and primary team advanced diet to surgical soft  States she is not having any abdominal pain, attributes pain level to current rash/wound  Denies chest pain, shortness of breath, nausea/vomiting, pain in extremities, urinary complaints  Objective:   Blood pressure 120/72, pulse 90, temperature 97 9 °F (36 6 °C), resp  rate 17, height 5' 7" (1 702 m), weight (!) 183 kg (403 lb 7 1 oz), SpO2 92 %  ,Body mass index is 63 19 kg/m²        Intake/Output Summary (Last 24 hours) at 1/8/2022 0321  Last data filed at 1/8/2022 0300  Gross per 24 hour   Intake 50 ml   Output 1050 ml   Net -1000 ml       Invasive Devices  Report    Peripheral Intravenous Line            Peripheral IV 01/07/22 Right Forearm <1 day          Drain            Urethral Catheter 16 Fr  1 day                Physical Exam: /72   Pulse 90   Temp 97 9 °F (36 6 °C)   Resp 17   Ht 5' 7" (1 702 m)   Wt (!) 183 kg (403 lb 7 1 oz)   SpO2 92%   BMI 63 19 kg/m²   General appearance: alert and oriented, in no acute distress, appears stated age and cooperative  Lungs: clear to auscultation bilaterally  Heart: regular rate and rhythm, S1, S2 normal, no murmur, click, rub or gallop  Abdomen: Protuberant abdomen, soft, nondistended, nontender to palpation, bowel sounds active  Skin: Moist, fungal appearing rash noted on inframammary folds bilaterally, abdominal pannus, groin, medial thighs with several open wounds which are draining  Appears improved from previous examinations  Still painful with palpation  Lab, Imaging and other studies:  I have personally reviewed pertinent lab results    , CBC:   Lab Results   Component Value Date    WBC 8 42 01/08/2022    HGB 9 5 (L) 01/08/2022    HCT 31 3 (L) 01/08/2022    MCV 78 (L) 01/08/2022     (H) 01/08/2022    MCH 23 7 (L) 01/08/2022    MCHC 30 4 (L) 01/08/2022    RDW 16 5 (H) 01/08/2022    MPV 8 5 (L) 01/08/2022    NRBC 0 01/08/2022   , CMP:   Lab Results   Component Value Date    SODIUM 140 01/08/2022    K 5 1 01/08/2022     (H) 01/08/2022    CO2 19 (L) 01/08/2022    BUN 47 (H) 01/08/2022    CREATININE 2 73 (H) 01/08/2022    CALCIUM 8 9 01/08/2022    AST 24 01/08/2022    ALT 20 01/08/2022    ALKPHOS 95 01/08/2022    EGFR 18 01/08/2022     VTE Pharmacologic Prophylaxis: Enoxaparin (Lovenox)  VTE Mechanical Prophylaxis: sequential compression device    Juventino Clemente PA-C  01/08/22

## 2022-01-08 NOTE — ASSESSMENT & PLAN NOTE
Right Hydroureteronephrosis noted on CT abdomen pelvis on 01/06/22    Renal US : right renal collecting system appears minimally dilated similar to prior CT  Per radiology suspecting related to inflammatory changes in the sigmoid colon  Galan in place - draining clear urine with creatinine continuing to improve Profore 0 5 at admission to 2 7  Continue to monitor for symptoms  Follow BMP

## 2022-01-08 NOTE — PROGRESS NOTES
Vancomycin IV Pharmacy-to-Dose Consultation    Lisa Epperson is a 60 y o  female who is currently receiving Vancomycin IV with management by the Pharmacy Consult service  Assessment/Plan:  The patient was reviewed  Renal function is stable and no signs or symptoms of nephrotoxicity and/or infusion reactions were documented in the chart  Based on todays assessment, continue current vancomycin (day # 3) dosing of 2000mg q24h, with a plan for trough to be drawn at 1630 on 1/9/22  We will continue to follow the patients culture results and clinical progress daily      Cyndi Ferguson, Pharmacist

## 2022-01-08 NOTE — PROGRESS NOTES
[address] [facility]  Progress Note - Swati Castro 1965, 60 y o  female MRN: [id number]  Unit/Bed#: 281-89 Encounter: [id number]  Primary Care Provider: Robert Oquendo PA-C   Date and time admitted to hospital: 1/6/2022  2:43 AM    * Cellulitis of abdominal wall, chest wall and groin with wounds  Assessment & Plan  Patient presented with pain, erythema, swelling with overlying wounds, actively draining purulent material involving the inframammary area, lower abdomen, groin and inner thighs  Elevated WBC count at 13 43 on admission; WBC trending down    Continue vancomycin, Cefepime and Metronidazole day#3, f/u wound cultures   Continue IV fluids  Follow Blood Cx   Id input appreciated, recommending continuing antifungal with fluconazole 150 mg for 5 day therapy  Wound care consult, input appreciated          Acute renal failure (ARF) (HCC)  Assessment & Plan  Elevated Bun and creatinine with baseline of 1 08 on admission; Sr  Cr trending down 2 73<<3 24<<4 56  No h/o CKD  Probably secondary to decreased P O intake  Nephrology consulted: Recommendations highly appreciated  - Hold home lisinopril, celecoxib  - Monitor for urinary retention- strict I&Os, record bladder scan PVRs and follow urinary retention protocol  - Urine sodium normal  - IV fluids  - Follow BMP  - Avoid nephrotoxic drugs  - Strict I&Os  - Daily weights  - urinary retention protocol      Diverticulitis of large intestine with perforation  Assessment & Plan    CT abdomen on 01/06/22 shows - sigmoid diverticulitis with microperforation and adjacent small pelvic abscess  Consulted surgery: recommendations highly appreciated  IR consulted: highly appreciate recommendations                         - IR does not feel a safe window is present for drain placement at this time     Continue to monitor for symptoms  Continue broad-spectrum antibiotics  Diet has been advanced, general surgery in agreement        Right Hydroureteronephrosis  Assessment & Plan  Right Hydroureteronephrosis noted on CT abdomen pelvis on 01/06/22  Renal US : right renal collecting system appears minimally dilated similar to prior CT  Per radiology suspecting related to inflammatory changes in the sigmoid colon  Galan in place - draining clear urine with creatinine continuing to improve Profore 0 5 at admission to 2 7  Continue to monitor for symptoms  Follow BMP       Borderline hyperlipidemia  Assessment & Plan  This is a chronic stable condition  Currently not on any medication  Follow up with PCP on out patient basis  Morbid obesity with BMI of 60 0-69 9, adult [facility])  Assessment & Plan  Patient is morbidly obese with BMI of 63 02  No overnight drop in SpO2; not on CPAP at home  Nutrition consulted      Hypertension  Assessment & Plan  Blood pressure under control  Lisinopril on hold due to ZAHIRA  Continue Metoprolol 25mg  Monitor Blood pressure trend  VTE Pharmacologic Prophylaxis: VTE Score: 6 High Risk (Score >/= 5) - Pharmacological DVT Prophylaxis Ordered: enoxaparin (Lovenox)  Sequential Compression Devices Ordered  Patient Centered Rounds: I performed bedside rounds with nursing staff today  Discussions with Specialists or Other Care Team Provider: General surgery    Education and Discussions with Family / Patient: Patient   Time Spent for Care: 45 minutes  More than 50% of total time spent on counseling and coordination of care as described above  Current Length of Stay: 2 day(s)  Current Patient Status: Inpatient   Certification Statement: The patient will continue to require additional inpatient hospital stay due to close monitoring, IV treatments, safe discharge planning    Discharge Plan: Anticipate discharge in >72 hrs to rehab facility  Code Status: Level 1 - Full Code    Subjective:   Patient seen and examined  She has complaining of pain at her abdominal rash and wounds  Afebrile    No other complaints  Objective:     Vitals:   Temp (24hrs), Av 2 °F (36 8 °C), Min:97 9 °F (36 6 °C), Max:98 4 °F (36 9 °C)    Temp:  [97 9 °F (36 6 °C)-98 4 °F (36 9 °C)] 98 4 °F (36 9 °C)  HR:  [] 89  Resp:  [16-19] 16  BP: (117-122)/(72-87) 117/87  SpO2:  [92 %-95 %] 92 %  Body mass index is 63 19 kg/m²  Input and Output Summary (last 24 hours): Intake/Output Summary (Last 24 hours) at 2022 1623  Last data filed at 2022 1446  Gross per 24 hour   Intake 230 ml   Output 2600 ml   Net -2370 ml       Physical Exam:   Physical Exam  Constitutional:       General: She is not in acute distress  HENT:      Head: Normocephalic and atraumatic  Nose: No congestion  Mouth/Throat:      Pharynx: Oropharynx is clear  Eyes:      Conjunctiva/sclera: Conjunctivae normal    Cardiovascular:      Rate and Rhythm: Normal rate and regular rhythm  Heart sounds: No murmur heard  Pulmonary:      Effort: No respiratory distress  Breath sounds: No wheezing or rales  Abdominal:      General: There is no distension  Tenderness: There is no abdominal tenderness  There is no guarding  Musculoskeletal:      Right lower leg: No edema  Left lower leg: No edema  Skin:     Findings: Erythema and rash present  Comments: Multiple purulent wounds   Neurological:      Mental Status: She is oriented to person, place, and time     Psychiatric:         Mood and Affect: Mood normal           Additional Data:     Labs:  Results from last 7 days   Lab Units 22  0432   WBC Thousand/uL 8 42   HEMOGLOBIN g/dL 9 5*   HEMATOCRIT % 31 3*   PLATELETS Thousands/uL 391*   NEUTROS PCT % 65   LYMPHS PCT % 20   MONOS PCT % 8   EOS PCT % 5     Results from last 7 days   Lab Units 22  0432   SODIUM mmol/L 140   POTASSIUM mmol/L 5 1   CHLORIDE mmol/L 110*   CO2 mmol/L 19*   BUN mg/dL 47*   CREATININE mg/dL 2 73*   ANION GAP mmol/L 11   CALCIUM mg/dL 8 9   ALBUMIN g/dL 2 3*   TOTAL BILIRUBIN mg/dL 0 44   ALK PHOS U/L 95   ALT U/L 20   AST U/L 24   GLUCOSE RANDOM mg/dL 99     Results from last 7 days   Lab Units 01/07/22  0641   INR  1 28*     Results from last 7 days   Lab Units 01/06/22  2132   POC GLUCOSE mg/dl 105         Results from last 7 days   Lab Units 01/07/22  0642 01/06/22  0447   LACTIC ACID mmol/L  --  0 9   PROCALCITONIN ng/ml 0 38* 0 32*       Lines/Drains:  Invasive Devices  Report    Peripheral Intravenous Line            Peripheral IV 01/07/22 Right Forearm <1 day          Drain            Urethral Catheter 16 Fr  2 days              Urinary Catheter:  Goal for removal: Voiding trial when ambulation improves               Imaging: No pertinent imaging reviewed  Recent Cultures (last 7 days):   Results from last 7 days   Lab Units 01/06/22  1622 01/06/22  0447   BLOOD CULTURE   --  No Growth at 48 hrs  No Growth at 48 hrs     URINE CULTURE  Culture too young- will reincubate  --        Last 24 Hours Medication List:   Current Facility-Administered Medications   Medication Dose Route Frequency Provider Last Rate    acetaminophen  975 mg Oral Q8H [facility] Joanne Warner MD      al mag oxide-diphenhydramine-lidocaine viscous  10 mL Swish & Swallow Q4H PRN Jing Tate MD      albuterol  2 puff Inhalation Q6H PRN Lita So MD      cefepime  1,000 mg Intravenous Q12H Lashawn Wright MD 1,000 mg (01/08/22 1531)    docusate sodium  100 mg Oral BID Lita So MD      enoxaparin  60 mg Subcutaneous Q12H Miguel Marlow MD      fluticasone  1 spray Each Nare Daily Lita So MD      HYDROmorphone  0 2 mg Intravenous Q4H PRN Jing Tate MD      hydrOXYzine HCL  25 mg Oral TID PRN Lita So MD      metoprolol tartrate  25 mg Oral BID Lita So MD      metroNIDAZOLE  500 mg Oral Q8H [facility] Lita So MD      nicotine  1 patch Transdermal Daily Lita So MD      nystatin   Topical BID Jing Tate MD  nystatin   Topical TID Ranjith Tracy MD      ondansetron  4 mg Intravenous Q6H PRN Ranjith Tracy MD      oxyCODONE  5 mg Oral Q4H PRN Geraldo Schroeder MD      Or   Elisabet Morales oxyCODONE  10 mg Oral Q4H PRN Geraldo Schroeder MD      sertraline  50 mg Oral Daily Ranjith Tracy MD      sodium bicarbonate  1,300 mg Oral BID after meals Roberto Noonan PA-C      sodium chloride  100 mL/hr Intravenous Continuous Ranjith Tracy  mL/hr (01/08/22 0306)    vancomycin  2,000 mg Intravenous Q24H Geraldo Schroeder MD 2,000 mg (01/07/22 2113)        Today, Patient Was Seen By: Keiko Crump MD    **Please Note: This note may have been constructed using a voice recognition system  **

## 2022-01-08 NOTE — ASSESSMENT & PLAN NOTE
Elevated Bun and creatinine with baseline of 1 08 on admission; Sr  Cr trending down 2 73<<3 24<<4 56  No h/o CKD  Probably secondary to decreased P O intake  Nephrology consulted: Recommendations highly appreciated  - Hold home lisinopril, celecoxib    - Monitor for urinary retention- strict I&Os, record bladder scan PVRs and follow urinary retention protocol  - Urine sodium normal  - IV fluids  - Follow BMP  - Avoid nephrotoxic drugs  - Strict I&Os  - Daily weights  - urinary retention protocol

## 2022-01-08 NOTE — ASSESSMENT & PLAN NOTE
CT abdomen on 01/06/22 shows - sigmoid diverticulitis with microperforation and adjacent small pelvic abscess  Consulted surgery: recommendations highly appreciated  IR consulted: highly appreciate recommendations                         - IR does not feel a safe window is present for drain placement at this time     Continue to monitor for symptoms  Continue broad-spectrum antibiotics  Diet has been advanced, general surgery in agreement

## 2022-01-08 NOTE — ASSESSMENT & PLAN NOTE
Blood pressure under control  Lisinopril on hold due to ZAHIRA  Continue Metoprolol 25mg  Monitor Blood pressure trend

## 2022-01-08 NOTE — PROGRESS NOTES
Progress Note - Nephrology   Baron Wood 60 y o  female MRN: [id number]  Unit/Bed#: 418-01 Encounter: [id number]    A/P:  1  Acute kidney injury present on admission   - presented with a creatinine of 4 5 mg/dL   - patient states she had not been feeling well for 2 months   She had pelvic pain and vaginal pain  - reported incomplete emptying and urinary hesitancy  - she presented with pyelonephritis and hydroureteronephrosis on CT   - kidney function deterioration is most likely due to acute tubular necrosis due to infection and volume depletion as well as incomplete emptying    - serologies are negative  - she received volume expansion with isotonic saline   - having ongoing volume loss with diarrhea  She does have metabolic acidosis due to this   - Continue oral bicarbonate tablets   - kidney function is improving  Creatinine has trended down to 2 73 mg/dL today   - Potassium dietary restriction ordered    2  Urinary retention   - continue Galan drainage    3  Perforated sigmoid diverticulitis with abscess   - receiving antibiotics and conservative management   - dose adjust all drugs for renal insufficiency    4   Hypoalbuminemia   - start [employer] with meals      Follow up reason for today's visit: Acute kidney injury    Cellulitis of abdominal wall    Patient Active Problem List   Diagnosis    Lymphedema    Hypertension    Morbid obesity with BMI of 60 0-69 9, adult (HCC)    Hidradenitis suppurativa    Chronic midline low back pain with left-sided sciatica    Other specified anxiety disorders    Lumbar paraspinal muscle spasm    Borderline hyperlipidemia    Iron deficiency    GERD (gastroesophageal reflux disease)    [facility]    Cellulitis of abdominal wall, chest wall and groin with wounds    Diverticulitis of large intestine with perforation    Right Hydroureteronephrosis    Subacute vaginitis         Subjective:   No chest pain, dyspnea or abdominal pain, NV has diarrhea  Has a villanueva    Objective:     Vitals: Blood pressure 120/72, pulse 90, temperature 97 9 °F (36 6 °C), resp  rate 17, height 5' 7" (1 702 m), weight (!) 183 kg (403 lb 7 1 oz), SpO2 92 %  ,Body mass index is 63 19 kg/m²  Weight (last 2 days)     Date/Time Weight    01/07/22 2149 183 (403 44)            Intake/Output Summary (Last 24 hours) at 1/8/2022 1327  Last data filed at 1/8/2022 0913  Gross per 24 hour   Intake 230 ml   Output 1050 ml   Net -820 ml     I/O last 3 completed shifts: In: 550 [IV Piggyback:550]  Out: 2350 [Urine:2350]    Urethral Catheter 16 Fr   (Active)   Reasons to continue Urinary Catheter  Acute urinary retention/obstruction failing urinary retention protocol 01/06/22 1444   Goal for Removal No longer needed- Will place order to discontinue 01/06/22 1444   Site Assessment Clean;Skin intact 01/06/22 1444   Villanueva Care Done 01/06/22 1444   Collection Container Standard drainage bag 01/06/22 1444   Output (mL) 1050 mL 01/08/22 0300       Physical Exam: /72   Pulse 90   Temp 97 9 °F (36 6 °C)   Resp 17   Ht 5' 7" (1 702 m)   Wt (!) 183 kg (403 lb 7 1 oz)   SpO2 92%   BMI 63 19 kg/m²     General Appearance:    Alert, cooperative obese , no distress, appears stated age   Head:    Normocephalic, without obvious abnormality, atraumatic   Eyes:    Conjunctiva/corneas clear   Ears:    Normal external ears   Nose:   Nares normal, septum midline, mucosa normal, no drainage    or sinus tenderness   Throat:   Lips, mucosa, and tongue normal;poor tentition   Neck:   Supple, symmetrical, trachea midline, no adenopathy;        thyroid:  No enlargement/tenderness/nodules; no carotid    bruit or JVD   Back:     Symmetric, no curvature, ROM normal, no CVA tenderness   Lungs:     Clear to auscultation bilaterally, respirations unlabored   Chest wall:    No tenderness or deformity   Heart:    Regular rate and rhythm, S1 and S2 normal, no murmur, rub   or gallop   Abdomen: Soft, non-tender, bowel sounds active   Extremities:   Extremities normal, atraumatic, no cyanosis or edema   Skin:   Skin color,  - has fungal dermatitidis   Lymph nodes:   Cervical normal   Neurologic:   CNII-XII intact            Lab, Imaging and other studies: I have personally reviewed pertinent labs  CBC:   Lab Results   Component Value Date    WBC 8 42 01/08/2022    HGB 9 5 (L) 01/08/2022    HCT 31 3 (L) 01/08/2022    MCV 78 (L) 01/08/2022     (H) 01/08/2022    MCH 23 7 (L) 01/08/2022    MCHC 30 4 (L) 01/08/2022    RDW 16 5 (H) 01/08/2022    MPV 8 5 (L) 01/08/2022    NRBC 0 01/08/2022     CMP:   Lab Results   Component Value Date    K 5 1 01/08/2022     (H) 01/08/2022    CO2 19 (L) 01/08/2022    BUN 47 (H) 01/08/2022    CREATININE 2 73 (H) 01/08/2022    CALCIUM 8 9 01/08/2022    AST 24 01/08/2022    ALT 20 01/08/2022    ALKPHOS 95 01/08/2022    EGFR 18 01/08/2022         Results from last 7 days   Lab Units 01/08/22  0432 01/07/22  0641 01/06/22  0447   POTASSIUM mmol/L 5 1 5 2 5 4*   CHLORIDE mmol/L 110* 108 107   CO2 mmol/L 19* 17* 22   BUN mg/dL 47* 57* 66*   CREATININE mg/dL 2 73* 3 24* 4 56*   CALCIUM mg/dL 8 9 9 1 8 8   ALK PHOS U/L 95 100 101   ALT U/L 20 25 21   AST U/L 24 39 13         Phosphorus: No results found for: PHOS  Magnesium: No results found for: MG  Urinalysis: No results found for: COLORU, CLARITYU, SPECGRAV, PHUR, LEUKOCYTESUR, NITRITE, PROTEINUA, GLUCOSEU, KETONESU, BILIRUBINUR, BLOODU  Ionized Calcium: No results found for: CAION  Coagulation: No results found for: PT, INR, APTT  Troponin: No results found for: TROPONINI  ABG: No results found for: PHART, AIN7SDM, PO2ART, BAK0ZAT, V3GAHZOB, BEART, SOURCE  Radiology review:     IMAGING  Procedure: CT chest abdomen pelvis wo contrast    Result Date: 1/6/2022  Narrative: CT CHEST, ABDOMEN AND PELVIS WITHOUT IV CONTRAST INDICATION:   r/o deep infection, abscesses to b/l breasts, inguinal, rectal areas; now with ARF  COMPARISON:  None  TECHNIQUE: CT examination of the chest, abdomen and pelvis was performed without intravenous contrast   Axial, sagittal, and coronal 2D reformatted images were created from the source data and submitted for interpretation  Radiation dose length product (DLP) for this visit:  1948  97 mGy-cm   This examination, like all CT scans performed in the [facility], was performed utilizing techniques to minimize radiation dose exposure, including the use of iterative reconstruction and automated exposure control  Enteric contrast was not administered  FINDINGS: CHEST LUNGS:  There is no infiltrate or pleural effusion  There is a thin-walled air cysts adjacent to the minor fissure measuring 15 mm on (series 3, image 43)  There is no tracheal or endobronchial lesion  PLEURA:  Unremarkable  HEART/GREAT VESSELS: Heart is unremarkable for patient's age  No thoracic aortic aneurysm  MEDIASTINUM AND EZE:  Unremarkable  CHEST WALL AND LOWER NECK:   Unremarkable  ABDOMEN LIVER/BILIARY TREE:  Unremarkable  GALLBLADDER:  There are gallstone(s) within the gallbladder, without pericholecystic inflammatory changes  SPLEEN:  Unremarkable  PANCREAS:  Unremarkable  ADRENAL GLANDS:  Unremarkable  KIDNEYS/URETERS:  There is minimal to mild right-sided hydroureter and hydronephrosis with no evidence of a genitourinary tract calculus  The left kidney is unremarkable  STOMACH AND BOWEL:  There is a segment of moderate pericolonic inflammatory stranding involving the distal sigmoid colon with multiple diverticula most compatible with acute diverticulitis  There a few adjacent foci of extraluminal air most compatible with microperforation  To the right of the sigmoid colon is a 3 2 x 2 8 cm air-fluid collection (series 2, image 99) is compatible with an abscess  APPENDIX:  A normal appendix was visualized  ABDOMINOPELVIC CAVITY:  No ascites  No pneumoperitoneum  No lymphadenopathy   VESSELS:  Unremarkable for patient's age  PELVIS REPRODUCTIVE ORGANS:  Unremarkable for patient's age  URINARY BLADDER:  Unremarkable  ABDOMINAL WALL/INGUINAL REGIONS:  Unremarkable  OSSEOUS STRUCTURES:  No acute fracture or destructive osseous lesion  Impression: Findings compatible with acute sigmoid diverticulitis with microperforation and an adjacent small right pelvic abscess  The study was limited by the lack of oral contrast   Surgical consultation is recommended  No evidence of large or small bowel obstruction  No infiltrate or pleural effusion  No evidence of a subcutaneous abscess in the region of the visualized breast, inguinal or rectal areas  Minimal to mild right-sided hydroureteronephrosis with no obstructing calculus  The findings may be secondary to the active inflammatory changes adjacent to the sigmoid colon  I personally discussed this study with Rojas Jones on 1/6/2022 at 6:31 AM  Workstation performed: [id number]     Procedure: US kidney and bladder    Result Date: 1/6/2022  Narrative: RENAL ULTRASOUND INDICATION:   right hydronephrosis  COMPARISON: Same-day CT chest/abdomen/pelvis  TECHNIQUE:   Ultrasound of the retroperitoneum was performed with a curvilinear transducer utilizing volumetric sweeps and still imaging techniques  FINDINGS: KIDNEYS: Symmetric and normal size  Right kidney:  10 3 x 5 1 x 5 0 cm  Left kidney:  10 6 x 6 9 x 5 8 cm  Right kidney Normal echogenicity and contour  No suspicious masses detected  No hydronephrosis  No shadowing calculi  No perinephric fluid collections  Left kidney Normal echogenicity and contour  No suspicious masses detected  No hydronephrosis  No shadowing calculi  No perinephric fluid collections  URETERS: Nonvisualized  BLADDER: A villanueva catheter is in place, decompressing the bladder and limiting its evaluation  OTHER: Again noted is a solitary gallstone  Impression: 1    Challenging exam due to habitus, however the right renal collecting system appears minimally dilated similar to prior CT  Shelvy Mingle No shadowing calculi  2   Normal kidney size and echogenicity  3   Cholelithiasis   Workstation performed: [id number]       Current Facility-Administered Medications   Medication Dose Route Frequency    acetaminophen (TYLENOL) tablet 650 mg  650 mg Oral Q6H PRN    al mag oxide-diphenhydramine-lidocaine viscous (MAGIC MOUTHWASH) suspension 10 mL  10 mL Swish & Swallow Q4H PRN    albuterol (PROVENTIL HFA,VENTOLIN HFA) inhaler 2 puff  2 puff Inhalation Q6H PRN    cefepime (MAXIPIME) IVPB (premix in dextrose) 1,000 mg 50 mL  1,000 mg Intravenous Q12H    docusate sodium (COLACE) capsule 100 mg  100 mg Oral BID    enoxaparin (LOVENOX) subcutaneous injection 60 mg  60 mg Subcutaneous Q12H IRIS    fluticasone (FLONASE) 50 mcg/act nasal spray 1 spray  1 spray Each Nare Daily    HYDROcodone-acetaminophen (NORCO) 5-325 mg per tablet 1 tablet  1 tablet Oral Q6H PRN    hydrOXYzine HCL (ATARAX) tablet 25 mg  25 mg Oral TID PRN    metoprolol tartrate (LOPRESSOR) tablet 25 mg  25 mg Oral BID    metroNIDAZOLE (FLAGYL) tablet 500 mg  500 mg Oral Q8H [address]    nicotine (NICODERM CQ) 7 mg/24hr TD 24 hr patch 1 patch  1 patch Transdermal Daily    nystatin (MYCOSTATIN) cream   Topical BID    nystatin (MYCOSTATIN) powder   Topical TID    ondansetron (ZOFRAN) injection 4 mg  4 mg Intravenous Q6H PRN    sertraline (ZOLOFT) tablet 50 mg  50 mg Oral Daily    sodium bicarbonate tablet 1,300 mg  1,300 mg Oral BID after meals    sodium chloride 0 9 % infusion  100 mL/hr Intravenous Continuous    vancomycin (VANCOCIN) 2,000 mg in sodium chloride 0 9 % 500 mL IVPB  2,000 mg Intravenous Q24H     Medications Discontinued During This Encounter   Medication Reason    iohexol (OMNIPAQUE) 350 MG/ML injection (SINGLE-DOSE) 100 mL     piperacillin-tazobactam (ZOSYN) 3 375 g in sodium chloride 0 9 % 100 mL IVPB     piperacillin-tazobactam (ZOSYN) 2 25 g in sodium chloride 0 9 % 50 mL IVPB     lisinopril (ZESTRIL) tablet 10 mg     aluminum-magnesium hydroxide-simethicone (MYLANTA) oral suspension 30 mL     cefepime (MAXIPIME) 500 mg in sodium chloride 0 9 % 50 mL IVPB     vancomycin (VANCOCIN) 2,000 mg in sodium chloride 0 9 % 500 mL IVPB     cefepime (MAXIPIME) IVPB (premix in dextrose) 1,000 mg 50 mL        Kathryn Arteaga MD      This progress note was produced in part using a dictation device which may document imprecise wording from author's original intent

## 2022-01-08 NOTE — ASSESSMENT & PLAN NOTE
Patient presented with pain, erythema, swelling with overlying wounds, actively draining purulent material involving the inframammary area, lower abdomen, groin and inner thighs    Elevated WBC count at 13 43 on admission; WBC trending down    Continue vancomycin, Cefepime and Metronidazole day#3, f/u wound cultures   Continue IV fluids  Follow Blood Cx   Id input appreciated, recommending continuing antifungal with fluconazole 150 mg for 5 day therapy  Wound care consult, input appreciated

## 2022-01-09 PROBLEM — R13.10 DYSPHAGIA: Status: ACTIVE | Noted: 2022-01-01

## 2022-01-09 NOTE — ASSESSMENT & PLAN NOTE
Patient complains of choking sensation, initially with solids, recently she noticed that she is choking on water  In patient words " I feel like there is a pill struck in my throat even with drinking plain water"  Poor dental heigine with multiple dental caries noted on exam  No thrush noted  Being treated for aphthous ulcer and sore throat  Speech and swallow eval  GI consult to rule out obstructive causes

## 2022-01-09 NOTE — ASSESSMENT & PLAN NOTE
Right Hydroureteronephrosis noted on CT abdomen pelvis on 01/06/22    Renal US : right renal collecting system appears minimally dilated similar to prior CT  Per radiology suspecting related to inflammatory changes in the sigmoid colon  Galan in place - draining clear urine with creatinine continuing to improve   Continue to monitor for symptoms  Follow BMP

## 2022-01-09 NOTE — PROGRESS NOTES
Progress Note - Nephrology   Celestine Mckinnon 60 y o  female MRN: [id number]  Unit/Bed#: 418-01 Encounter: [id number]    A/P:  1  Acute kidney injury present on admission   - presented with a creatinine of 4 5 mg/dL  - found to have acute tubular necrosis due to infection of volume depletion as well as incomplete emptying   - kidney function is slowly improving with volume replacement and Galan drainage   - creatinine has trended down from a peak 4 56 mg/dL  To 2-2 27 mg/dL today   - nonoliguric:  280/1900 (-2 940)   - continue daily evaluations and avoid nephrotoxic agent she   - she appears to have ongoing recovery from ATN    2  Urinary retention   - had right hydro ureteral nephrosis   - continue Galan drainage   - radiology thought that hydroureteronephrosis was related to inflammatory changes in the sigmoid colon    3  Hypertension   - blood pressure is low at 107/64 today    4  Diverticulitis of the large intestine with perforation   - received cefepime and vancomycin with improvement    5  Volume status   - Would dec IVF rate and stop when completely po      Follow up reason for today's visit:  Acute kidney injury present on admission    Cellulitis of abdominal wall    Patient Active Problem List   Diagnosis    Lymphedema    Hypertension    Morbid obesity with BMI of 60 0-69 9, adult (HCC)    Hidradenitis suppurativa    Chronic midline low back pain with left-sided sciatica    Other specified anxiety disorders    Lumbar paraspinal muscle spasm    Borderline hyperlipidemia    Iron deficiency    GERD (gastroesophageal reflux disease)    Acute renal failure (ARF) (HCC)    Cellulitis of abdominal wall, chest wall and groin with wounds    Diverticulitis of large intestine with perforation    Right Hydroureteronephrosis    Subacute vaginitis         Subjective:   Complains of abdominal discomfort and nausea    Denies chest pain shortness of breath or dysuria    Objective:     Vitals: Blood pressure 107/64, pulse 84, temperature (!) 97 4 °F (36 3 °C), temperature source Temporal, resp  rate 18, height 5' 7" (1 702 m), weight (!) 183 kg (403 lb 7 1 oz), SpO2 96 %  ,Body mass index is 63 19 kg/m²  Weight (last 2 days)     Date/Time Weight    01/09/22 0600 183 (403 44)    01/08/22 0600 183 (403 44)    01/07/22 2149 183 (403 44)    01/07/22 0600 183 (403 44)            Intake/Output Summary (Last 24 hours) at 1/9/2022 1040  Last data filed at 1/9/2022 0829  Gross per 24 hour   Intake 280 ml   Output 1900 ml   Net -1620 ml     I/O last 3 completed shifts: In: 280 [P O :280]  Out: 2950 [Urine:2950]    Urethral Catheter 16 Fr   (Active)   Reasons to continue Urinary Catheter  Acute urinary retention/obstruction failing urinary retention protocol 01/06/22 1444   Goal for Removal No longer needed- Will place order to discontinue 01/06/22 1444   Site Assessment Clean;Skin intact 01/06/22 1444   Galan Care Done 01/09/22 0544   Collection Container Standard drainage bag 01/06/22 1444   Output (mL) 350 mL 01/09/22 0544       Physical Exam: /64   Pulse 84   Temp (!) 97 4 °F (36 3 °C) (Temporal)   Resp 18   Ht 5' 7" (1 702 m)   Wt (!) 183 kg (403 lb 7 1 oz)   SpO2 96%   BMI 63 19 kg/m²     General Appearance:    Alert,obses  cooperative, no distress, appears stated age   Head:    Normocephalic, without obvious abnormality, atraumatic   Eyes:    Conjunctiva/corneas clear   Ears:    Normal external ears   Nose:   Nares normal, septum midline, mucosa normal, no drainage    or sinus tenderness   Throat:   Lips, mucosa, and tongue normal; teeth and gums normal   Neck:   Supple, symmetrical, trachea midline, no adenopathy;        thyroid:  No enlargement/tenderness/nodules; no carotid    bruit or JVD   Back:     Symmetric, no curvature, ROM normal, no CVA tenderness   Lungs:     Clear to auscultation bilaterally, respirations unlabored   Chest wall:    No tenderness or deformity   Heart:    Regular rate and rhythm, S1 and S2 normal, no murmur, rub   or gallop   Abdomen:     Soft, non-tender, bowel sounds active   Extremities:   Extremities normal, atraumatic, no cyanosis or edema   Skin:   Skin colo rhas rash in skin folds   Lymph nodes:   Cervical normal   Neurologic:   CNII-XII intact            Lab, Imaging and other studies: I have personally reviewed pertinent labs  CBC:   Lab Results   Component Value Date    WBC 8 29 01/09/2022    HGB 9 4 (L) 01/09/2022    HCT 30 6 (L) 01/09/2022    MCV 79 (L) 01/09/2022     01/09/2022    MCH 24 2 (L) 01/09/2022    MCHC 30 7 (L) 01/09/2022    RDW 17 1 (H) 01/09/2022    MPV 8 7 (L) 01/09/2022    NRBC 0 01/09/2022     CMP:   Lab Results   Component Value Date    K 4 8 01/09/2022     (H) 01/09/2022    CO2 17 (L) 01/09/2022    BUN 41 (H) 01/09/2022    CREATININE 2 27 (H) 01/09/2022    CALCIUM 8 7 01/09/2022    EGFR 23 01/09/2022         Results from last 7 days   Lab Units 01/09/22  0534 01/08/22  0432 01/07/22  0641 01/06/22  0447 01/06/22  0447   POTASSIUM mmol/L 4 8 5 1 5 2   < > 5 4*   CHLORIDE mmol/L 111* 110* 108   < > 107   CO2 mmol/L 17* 19* 17*   < > 22   BUN mg/dL 41* 47* 57*   < > 66*   CREATININE mg/dL 2 27* 2 73* 3 24*   < > 4 56*   CALCIUM mg/dL 8 7 8 9 9 1   < > 8 8   ALK PHOS U/L  --  95 100  --  101   ALT U/L  --  20 25  --  21   AST U/L  --  24 39  --  13    < > = values in this interval not displayed           Phosphorus: No results found for: PHOS  Magnesium: No results found for: MG  Urinalysis: No results found for: Zain Melo, SPECGRAV, PHUR, LEUKOCYTESUR, NITRITE, PROTEINUA, GLUCOSEU, KETONESU, BILIRUBINUR, BLOODU  Ionized Calcium: No results found for: CAION  Coagulation: No results found for: PT, INR, APTT  Troponin: No results found for: TROPONINI  ABG: No results found for: PHART, DPR5UPW, PO2ART, HHI4IEZ, R9NWOMZN, BEART, SOURCE  Radiology review:     IMAGING  Procedure: US kidney and bladder    Result Date: 1/6/2022  Narrative: RENAL ULTRASOUND INDICATION:   right hydronephrosis  COMPARISON: Same-day CT chest/abdomen/pelvis  TECHNIQUE:   Ultrasound of the retroperitoneum was performed with a curvilinear transducer utilizing volumetric sweeps and still imaging techniques  FINDINGS: KIDNEYS: Symmetric and normal size  Right kidney:  10 3 x 5 1 x 5 0 cm  Left kidney:  10 6 x 6 9 x 5 8 cm  Right kidney Normal echogenicity and contour  No suspicious masses detected  No hydronephrosis  No shadowing calculi  No perinephric fluid collections  Left kidney Normal echogenicity and contour  No suspicious masses detected  No hydronephrosis  No shadowing calculi  No perinephric fluid collections  URETERS: Nonvisualized  BLADDER: A villanueva catheter is in place, decompressing the bladder and limiting its evaluation  OTHER: Again noted is a solitary gallstone  Impression: 1  Challenging exam due to habitus, however the right renal collecting system appears minimally dilated similar to prior CT  Troy Goody No shadowing calculi  2   Normal kidney size and echogenicity  3   Cholelithiasis   Workstation performed: [id number]       Current Facility-Administered Medications   Medication Dose Route Frequency    acetaminophen (TYLENOL) tablet 975 mg  975 mg Oral Q8H [facility]    al mag oxide-diphenhydramine-lidocaine viscous (MAGIC MOUTHWASH) suspension 10 mL  10 mL Swish & Swallow Q4H PRN    albuterol (PROVENTIL HFA,VENTOLIN HFA) inhaler 2 puff  2 puff Inhalation Q6H PRN    cefepime (MAXIPIME) IVPB (premix in dextrose) 1,000 mg 50 mL  1,000 mg Intravenous Q12H    docusate sodium (COLACE) capsule 100 mg  100 mg Oral BID    enoxaparin (LOVENOX) subcutaneous injection 60 mg  60 mg Subcutaneous Q12H IRIS    fluticasone (FLONASE) 50 mcg/act nasal spray 1 spray  1 spray Each Nare Daily    HYDROmorphone (DILAUDID) injection 0 2 mg  0 2 mg Intravenous Q4H PRN    hydrOXYzine HCL (ATARAX) tablet 25 mg  25 mg Oral TID PRN    metoprolol tartrate (LOPRESSOR) tablet 25 mg  25 mg Oral BID  metroNIDAZOLE (FLAGYL) tablet 500 mg  500 mg Oral Q8H [facility]    nicotine (NICODERM CQ) 7 mg/24hr TD 24 hr patch 1 patch  1 patch Transdermal Daily    nystatin (MYCOSTATIN) cream   Topical BID    nystatin (MYCOSTATIN) powder   Topical TID    ondansetron (ZOFRAN) injection 4 mg  4 mg Intravenous Q6H PRN    oxyCODONE (ROXICODONE) IR tablet 5 mg  5 mg Oral Q4H PRN    Or    oxyCODONE (ROXICODONE) immediate release tablet 10 mg  10 mg Oral Q4H PRN    sertraline (ZOLOFT) tablet 50 mg  50 mg Oral Daily    sodium bicarbonate tablet 1,300 mg  1,300 mg Oral BID after meals    sodium chloride 0 9 % infusion  100 mL/hr Intravenous Continuous    vancomycin (VANCOCIN) 2,000 mg in sodium chloride 0 9 % 500 mL IVPB  2,000 mg Intravenous Q24H     Medications Discontinued During This Encounter   Medication Reason    iohexol (OMNIPAQUE) 350 MG/ML injection (SINGLE-DOSE) 100 mL     piperacillin-tazobactam (ZOSYN) 3 375 g in sodium chloride 0 9 % 100 mL IVPB     piperacillin-tazobactam (ZOSYN) 2 25 g in sodium chloride 0 9 % 50 mL IVPB     lisinopril (ZESTRIL) tablet 10 mg     aluminum-magnesium hydroxide-simethicone (MYLANTA) oral suspension 30 mL     cefepime (MAXIPIME) 500 mg in sodium chloride 0 9 % 50 mL IVPB     vancomycin (VANCOCIN) 2,000 mg in sodium chloride 0 9 % 500 mL IVPB     cefepime (MAXIPIME) IVPB (premix in dextrose) 1,000 mg 50 mL     acetaminophen (TYLENOL) tablet 650 mg     HYDROcodone-acetaminophen (NORCO) 5-325 mg per tablet 1 tablet        Yulia Xiong MD      This progress note was produced in part using a dictation device which may document imprecise wording from author's original intent

## 2022-01-09 NOTE — ASSESSMENT & PLAN NOTE
Elevated Bun and creatinine with baseline of 1 08 on admission; Sr  Cr continued to trend down with IVF 2 27<<2 73<<3 24<<4 56  No h/o CKD  Probably secondary to decreased P O intake  Nephrology consulted: Recommendations highly appreciated  - Hold home lisinopril, celecoxib    - Monitor for urinary retention  - Urine sodium normal  - IV fluids  - Follow BMP  - Avoid nephrotoxic drugs  - Strict I&Os  - Daily weights  - urinary retention protocol

## 2022-01-09 NOTE — PROGRESS NOTES
Progress Note - General Surgery   [place] 60 y o  female MRN: [id number]  Unit/Bed#: 416-58 Encounter: [id number]    Assessment:  75-year-old female found to have sigmoid diverticulitis with micro perforation and abscess collection on imaging study  - 3 2 x 2 8 cm air-fluid collection noted on CT, evaluated by IR, not amenable to drainage at this time  - afebrile, vital signs stable  - leukocytosis resolved  - adequate urine output  - tolerating diet without complications  - exam:  Abdomen soft, nondistended, nontender to palpation, bowel sounds active throughout normal in character  Does note some tenderness to palpation in right flank  Plan:  - no acute surgical intervention indicated at this time  Abscess collection not amenable to drainage by IR   - New onset one time episode of right flank pain this AM, will continue to monitor, if patient has recurring episodes of pain, fevers, tachycardia, or elevation of WBC consider additional imaging   - continue diet as tolerated  - monitor vitals, trend a m  labs  - encourage leg elevation   - medical management per primary team     Discussed plan with Dr Scottie Denver, general surgeon on call  Subjective/Objective   Subjective:  No acute events overnight  Patient states she is feeling better than previous days  Her only complaint is right flank pain which started this morning, lasted for approximately 1 hour, now resolved  Denies any left-sided abdominal pain Has been having mild intermittent nausea, not associated with meals, no vomiting  Continues passing flatus and having bowel movements  Denies chest pain, shortness of breath, palpitations, pain/swelling in extremities  Denies urinary complaints  Objective:   Blood pressure 107/64, pulse 84, temperature (!) 97 4 °F (36 3 °C), temperature source Temporal, resp  rate 18, height 5' 7" (1 702 m), weight (!) 183 kg (403 lb 7 1 oz), SpO2 96 %  ,Body mass index is 63 19 kg/m²  Intake/Output Summary (Last 24 hours) at 1/9/2022 0758  Last data filed at 1/9/2022 0544  Gross per 24 hour   Intake 280 ml   Output 1900 ml   Net -1620 ml       Invasive Devices  Report    Peripheral Intravenous Line            Peripheral IV 01/08/22 Left;Ventral (anterior) Forearm <1 day          Drain            Urethral Catheter 16 Fr  2 days                Physical Exam: /64   Pulse 84   Temp (!) 97 4 °F (36 3 °C) (Temporal)   Resp 18   Ht 5' 7" (1 702 m)   Wt (!) 183 kg (403 lb 7 1 oz)   SpO2 96%   BMI 63 19 kg/m²   General appearance: alert and oriented, in no acute distress, appears stated age and cooperative, seen and examined sitting up in bed eating breakfast   Head: Normocephalic, without obvious abnormality, atraumatic  Lungs: clear to auscultation bilaterally  Heart: regular rate and rhythm, S1, S2 normal, no murmur, click, rub or gallop  Abdomen: obese, soft, nondistended, bowel sounds active and normal in character, nontender to palpation in LLQ, mildly tender to palpation in R flank  Extremities:normal, warm, well perfused, chronic lymphedema     Lab, Imaging and other studies:  I have personally reviewed pertinent lab results    , CBC:   Lab Results   Component Value Date    WBC 8 29 01/09/2022    HGB 9 4 (L) 01/09/2022    HCT 30 6 (L) 01/09/2022    MCV 79 (L) 01/09/2022     01/09/2022    MCH 24 2 (L) 01/09/2022    MCHC 30 7 (L) 01/09/2022    RDW 17 1 (H) 01/09/2022    MPV 8 7 (L) 01/09/2022    NRBC 0 01/09/2022   , CMP:   Lab Results   Component Value Date    SODIUM 142 01/09/2022    K 4 8 01/09/2022     (H) 01/09/2022    CO2 17 (L) 01/09/2022    BUN 41 (H) 01/09/2022    CREATININE 2 27 (H) 01/09/2022    CALCIUM 8 7 01/09/2022    EGFR 23 01/09/2022     VTE Pharmacologic Prophylaxis: Enoxaparin (Lovenox)  VTE Mechanical Prophylaxis: sequential compression device    Yaya Meng PA-C  01/09/22

## 2022-01-09 NOTE — CASE MANAGEMENT
Case Management Assessment    Patient name Elver Schultz  Location [address] [id number] MRN [id number]  : 1965 Date 2022       Current Admission Date: 2022  Current Admission Diagnosis:Cellulitis of abdominal wall, chest wall and groin with wounds   Patient Active Problem List    Diagnosis Date Noted    Acute renal failure (ARF) ([facility] ) 2022    Cellulitis of abdominal wall, chest wall and groin with wounds 2022    Diverticulitis of large intestine with perforation 2022    Right Hydroureteronephrosis 2022    Subacute vaginitis 2022    GERD (gastroesophageal reflux disease)     Other specified anxiety disorders 2019    Lumbar paraspinal muscle spasm 2019    Borderline hyperlipidemia 2019    Iron deficiency 2019    Chronic midline low back pain with left-sided sciatica 2019    Hypertension 2019    Morbid obesity with BMI of 60 0-69 9, adult ([facility] ) 2019    Hidradenitis suppurativa 2019    Lymphedema 2006      LOS (days): 3  Geometric Mean LOS (GMLOS) (days):   Days to GMLOS:     OBJECTIVE:    Risk of Unplanned Readmission Score: 13         Current admission status: Inpatient       Preferred Pharmacy:   [facility] [address] [address] [place] [address]/ [facility] [id number]  Phone: [phone] Fax: [phone]    Primary Care Provider: Warner Merlin, PA-C    Primary Insurance: [facility]  Secondary Insurance:     ASSESSMENT:  Active Health Care Agents    There are no active Health Care Agents on file  Readmission Root Cause  30 Day Readmission: No    Patient Information  Admitted from[de-identified] Home  Mental Status: Alert  Assessment information provided by[de-identified] Patient  Primary Caregiver: Self  Support Systems: Self  [place] of Residence: [address] do you live in?: [address] entry access options   Select all that apply : Ramp,Elevator  Type of Current Residence: Apartment  Floor Level: 4  Upon entering residence, is there a bedroom on the main floor (no further steps)?: Yes  Upon entering residence, is there a bathroom on the main floor (no further steps)?: Yes  In the last 12 months, how many places have you lived?: 1  In the last 12 months, was there a time when you did not have a steady place to sleep or slept in a shelter (including now)?: No  Homeless/housing insecurity resource given?: N/A  Living Arrangements: Lives Alone  Is patient a ?: No    Activities of Daily Living Prior to Admission  Functional Status: Independent (Pt said she has a hard time doing ADL's    Pt is somewhat unkempt)  Completes ADLs independently?: Yes  Ambulates independently?: Yes  Does patient use assisted devices?: Yes  Assisted Devices (DME) used: Crutches  Does patient currently own DME?: Yes  What DME does the patient currently own?: Crutches  Does patient have a history of Outpatient Therapy (PT/OT)?: No  Does the patient have a history of Short-Term Rehab?: No  Does patient have a history of HHC?: Yes (Pt had "[employer] home care in the past )  Does patient currently have [address]?: No         Patient Information Continued  Income Source: SSI/SSD  Does patient have prescription coverage?: Yes  Within the past 12 months, you worried that your food would run out before you got the money to buy more : Never true  Within the past 12 months, the food you bought just didnt last and you didnt have money to get more : Never true  Food insecurity resource given?: N/A  Does patient receive dialysis treatments?: No  Does patient have a history of substance abuse?: No  Does patient have a history of Mental Health Diagnosis?: No         Means of Transportation  [facility] [employer] (Pt uses the [employer] bus to get to appts)  In the past 12 months, has lack of transportation kept you from medical appointments or from getting medications?: No  In the past 12 months, has lack of transportation kept you from meetings, work, or from getting things needed for daily living?: No  Was application for public transport provided?: N/A    PT is recommending patient go to [facility]  Pt is agreeable to same  Pt is unvaccinated  Pt is agreeable to me making referrals to places and see who will accept her  I will give patient phone number for [employer] so she can pursue getting help in the home when she gets home from rehab

## 2022-01-09 NOTE — PLAN OF CARE
Problem: Potential for Falls  Goal: Patient will remain free of falls  Description: INTERVENTIONS:  - Educate patient/family on patient safety including physical limitations  - Instruct patient to call for assistance with activity   - Consult OT/PT to assist with strengthening/mobility   - Keep Call bell within reach  - Keep bed low and locked with side rails adjusted as appropriate  - Keep care items and personal belongings within reach  - Initiate and maintain comfort rounds  - Make Fall Risk Sign visible to staff  - Offer Toileting every  Hours, in advance of need  - Initiate/Maintain alarm  - Obtain necessary fall risk management equipment:   - Apply yellow socks and bracelet for high fall risk patients  - Consider moving patient to room near nurses station  Outcome: Progressing     Problem: Prexisting or High Potential for Compromised Skin Integrity  Goal: Skin integrity is maintained or improved  Description: INTERVENTIONS:  - Identify patients at risk for skin breakdown  - Assess and monitor skin integrity  - Assess and monitor nutrition and hydration status  - Monitor labs   - Assess for incontinence   - Turn and reposition patient  - Assist with mobility/ambulation  - Relieve pressure over bony prominences  - Avoid friction and shearing  - Provide appropriate hygiene as needed including keeping skin clean and dry  - Evaluate need for skin moisturizer/barrier cream  - Collaborate with interdisciplinary team   - Patient/family teaching  - Consider wound care consult   Outcome: Progressing     Problem: MOBILITY - ADULT  Goal: Maintain or return to baseline ADL function  Description: INTERVENTIONS:  -  Assess patient's ability to carry out ADLs; assess patient's baseline for ADL function and identify physical deficits which impact ability to perform ADLs (bathing, care of mouth/teeth, toileting, grooming, dressing, etc )  - Assess/evaluate cause of self-care deficits   - Assess range of motion  - Assess patient's mobility; develop plan if impaired  - Assess patient's need for assistive devices and provide as appropriate  - Encourage maximum independence but intervene and supervise when necessary  - Involve family in performance of ADLs  - Assess for home care needs following discharge   - Consider OT consult to assist with ADL evaluation and planning for discharge  - Provide patient education as appropriate  Outcome: Progressing  Goal: Maintains/Returns to pre admission functional level  Description: INTERVENTIONS:  - Perform BMAT or MOVE assessment daily    - Set and communicate daily mobility goal to care team and patient/family/caregiver  - Collaborate with rehabilitation services on mobility goals if consulted  - Perform Range of Motion  times a day  - Reposition patient every  hours  - Dangle patient  times a day  - Stand patient  times a day  - Ambulate patient  times a day  - Out of bed to chair  times a day   - Out of bed for meals  times a day  - Out of bed for toileting  - Record patient progress and toleration of activity level   Outcome: Progressing     Problem: Nutrition/Hydration-ADULT  Goal: Nutrient/Hydration intake appropriate for improving, restoring or maintaining nutritional needs  Description: Monitor and assess patient's nutrition/hydration status for malnutrition  Collaborate with interdisciplinary team and initiate plan and interventions as ordered  Monitor patient's weight and dietary intake as ordered or per policy  Utilize nutrition screening tool and intervene as necessary  Determine patient's food preferences and provide high-protein, high-caloric foods as appropriate       INTERVENTIONS:  - Monitor oral intake, urinary output, labs, and treatment plans  - Assess nutrition and hydration status and recommend course of action  - Evaluate amount of meals eaten  - Assist patient with eating if necessary   - Allow adequate time for meals  - Recommend/ encourage appropriate diets, oral nutritional supplements, and vitamin/mineral supplements  - Order, calculate, and assess calorie counts as needed  - Recommend, monitor, and adjust tube feedings and TPN/PPN based on assessed needs  - Assess need for intravenous fluids  - Provide specific nutrition/hydration education as appropriate  - Include patient/family/caregiver in decisions related to nutrition  Outcome: Progressing     Problem: PAIN - ADULT  Goal: Verbalizes/displays adequate comfort level or baseline comfort level  Description: Interventions:  - Encourage patient to monitor pain and request assistance  - Assess pain using appropriate pain scale  - Administer analgesics based on type and severity of pain and evaluate response  - Implement non-pharmacological measures as appropriate and evaluate response  - Consider cultural and social influences on pain and pain management  - Notify physician/advanced practitioner if interventions unsuccessful or patient reports new pain  Outcome: Progressing     Problem: INFECTION - ADULT  Goal: Absence or prevention of progression during hospitalization  Description: INTERVENTIONS:  - Assess and monitor for signs and symptoms of infection  - Monitor lab/diagnostic results  - Monitor all insertion sites, i e  indwelling lines, tubes, and drains  - Monitor endotracheal if appropriate and nasal secretions for changes in amount and color  - [place] appropriate cooling/warming therapies per order  - Administer medications as ordered  - Instruct and encourage patient and family to use good hand hygiene technique  - Identify and instruct in appropriate isolation precautions for identified infection/condition  Outcome: Progressing  Goal: Absence of fever/infection during neutropenic period  Description: INTERVENTIONS:  - Monitor WBC    Outcome: Progressing     Problem: SAFETY ADULT  Goal: Patient will remain free of falls  Description: INTERVENTIONS:  - Educate patient/family on patient safety including physical limitations  - Instruct patient to call for assistance with activity   - Consult OT/PT to assist with strengthening/mobility   - Keep Call bell within reach  - Keep bed low and locked with side rails adjusted as appropriate  - Keep care items and personal belongings within reach  - Initiate and maintain comfort rounds  - Make Fall Risk Sign visible to staff  - Offer Toileting every Hours, in advance of need  - Initiate/Maintain alarm  - Obtain necessary fall risk management equipment:   - Apply yellow socks and bracelet for high fall risk patients  - Consider moving patient to room near nurses station  Outcome: Progressing  Goal: Maintain or return to baseline ADL function  Description: INTERVENTIONS:  -  Assess patient's ability to carry out ADLs; assess patient's baseline for ADL function and identify physical deficits which impact ability to perform ADLs (bathing, care of mouth/teeth, toileting, grooming, dressing, etc )  - Assess/evaluate cause of self-care deficits   - Assess range of motion  - Assess patient's mobility; develop plan if impaired  - Assess patient's need for assistive devices and provide as appropriate  - Encourage maximum independence but intervene and supervise when necessary  - Involve family in performance of ADLs  - Assess for home care needs following discharge   - Consider OT consult to assist with ADL evaluation and planning for discharge  - Provide patient education as appropriate  Outcome: Progressing  Goal: Maintains/Returns to pre admission functional level  Description: INTERVENTIONS:  - Perform BMAT or MOVE assessment daily    - Set and communicate daily mobility goal to care team and patient/family/caregiver  - Collaborate with rehabilitation services on mobility goals if consulted  - Perform Range of Motion times a day  - Reposition patient every  hours    - Dangle patient  times a day  - Stand patient  times a day  - Ambulate patient  times a day  - Out of bed to chair  times a day   - Out of bed for meals  times a day  - Out of bed for toileting  - Record patient progress and toleration of activity level   Outcome: Progressing     Problem: DISCHARGE PLANNING  Goal: Discharge to home or other facility with appropriate resources  Description: INTERVENTIONS:  - Identify barriers to discharge w/patient and caregiver  - Arrange for needed discharge resources and transportation as appropriate  - Identify discharge learning needs (meds, wound care, etc )  - Arrange for interpretive services to assist at discharge as needed  - Refer to Case Management Department for coordinating discharge planning if the patient needs post-hospital services based on physician/advanced practitioner order or complex needs related to functional status, cognitive ability, or social support system  Outcome: Progressing     Problem: Knowledge Deficit  Goal: Patient/family/caregiver demonstrates understanding of disease process, treatment plan, medications, and discharge instructions  Description: Complete learning assessment and assess knowledge base    Interventions:  - Provide teaching at level of understanding  - Provide teaching via preferred learning methods  Outcome: Progressing

## 2022-01-09 NOTE — ASSESSMENT & PLAN NOTE
Patient presented with pain, erythema, swelling with overlying wounds, actively draining purulent material involving the inframammary area, lower abdomen, groin and inner thighs  Elevated WBC count at 13 43 on admission; trended down to normal range      Continue vancomycin, Cefepime and Metronidazole day#4  Continue IV fluids  Wound cultures - no polys or bacteria seen  Blood Cx- no growth after 24 hr  ID input appreciated, recommending continuing antifungal with fluconazole 150 mg for 5 day therapy  Wound care consult, input appreciated

## 2022-01-09 NOTE — PROGRESS NOTES
[address] [facility] 160[facility]  Progress Note - Rosalind Peña 1965, 60 y o  female MRN: [id number]  Unit/Bed#: 582-84 Encounter: [id number]  Primary Care Provider: Noé Fraga PA-C   Date and time admitted to hospital: 1/6/2022  2:43 AM    * Cellulitis of abdominal wall, chest wall and groin with wounds  Assessment & Plan  Patient presented with pain, erythema, swelling with overlying wounds, actively draining purulent material involving the inframammary area, lower abdomen, groin and inner thighs  Elevated WBC count at 13 43 on admission; trended down to normal range  Continue vancomycin, Cefepime and Metronidazole day#4  Continue IV fluids  Wound cultures - no polys or bacteria seen  Blood Cx- no growth after 24 hr  ID input appreciated, recommending continuing antifungal with fluconazole 150 mg for 5 day therapy  Wound care consult, input appreciated          Acute renal failure (ARF) (HCC)  Assessment & Plan  Elevated Bun and creatinine with baseline of 1 08 on admission; Sr  Cr continued to trend down with IVF 2 27<<2 73<<3 24<<4 56  No h/o CKD  Probably secondary to decreased P O intake  Nephrology consulted: Recommendations highly appreciated  - Hold home lisinopril, celecoxib  - Monitor for urinary retention  - Urine sodium normal  - IV fluids  - Follow BMP  - Avoid nephrotoxic drugs  - Strict I&Os  - Daily weights  - urinary retention protocol      Diverticulitis of large intestine with perforation  Assessment & Plan    CT abdomen on 01/06/22 shows - sigmoid diverticulitis with microperforation and adjacent small pelvic abscess  Consulted surgery: recommendations highly appreciated  IR consulted: highly appreciate recommendations                         - IR does not feel a safe window is present for drain placement at this time     Continue to monitor for symptoms  Continue broad-spectrum antibiotics  Diet has been advanced, general surgery in agreement        Dysphagia  Assessment & Plan  Patient complains of choking sensation, initially with solids, recently she noticed that she is choking on water  In patient words " I feel like there is a pill struck in my throat even with drinking plain water"  Poor dental heigine with multiple dental caries noted on exam  No thrush noted  Being treated for aphthous ulcer and sore throat  Speech and swallow eval  GI consult to rule out obstructive causes  Subacute vaginitis  Assessment & Plan  Patient complaints of vaginal discharge  Was taking anti-fungals on out patient basis  Couldn't perform the exam because of morbid obesity and pain  Continuet fluconazole  Continue to monitor for symptoms          Right Hydroureteronephrosis  Assessment & Plan  Right Hydroureteronephrosis noted on CT abdomen pelvis on 01/06/22  Renal US : right renal collecting system appears minimally dilated similar to prior CT  Per radiology suspecting related to inflammatory changes in the sigmoid colon  Galan in place - draining clear urine with creatinine continuing to improve   Continue to monitor for symptoms  Follow BMP       Borderline hyperlipidemia  Assessment & Plan  This is a chronic stable condition  Currently not on any medication  Follow up with PCP on out patient basis  Morbid obesity with BMI of 60 0-69 9, adult [facility])  Assessment & Plan  Patient is morbidly obese with BMI of 63 02  No overnight drop in SpO2; not on CPAP at home  Nutrition consulted      Hypertension  Assessment & Plan  Blood pressure under control  Lisinopril on hold due to ZAHIRA  Continue Metoprolol 25mg  Monitor Blood pressure trend  VTE Pharmacologic Prophylaxis:   Pharmacologic: Enoxaparin (Lovenox)  Mechanical VTE Prophylaxis in Place: Yes    Patient Centered Rounds: I have performed bedside rounds with nursing staff today      Discussions with Specialists or Other Care Team Provider:  yes    Education and Discussions with Family / Patient: called her friend to update about hospital stay and progress    Time Spent for Care: 30 minutes  More than 50% of total time spent on counseling and coordination of care as described above  Current Length of Stay: 3 day(s)    Current Patient Status: Inpatient   Certification Statement: The patient will continue to require additional inpatient hospital stay due to on going treatment    Discharge Plan: home    Code Status: Level 1 - Full Code      Subjective:  Feeling better compared to previous days  I have seen and examined the patient today  Not in distress  No overnight events  Patient complains of right flank pain started today morning, constant ache, 610 on pain scale increased with movement  Continued to complain of nausea  Denies fever, headache, chest tightness, chest pain, palpitations, dizziness, lightheadedness  Patient also complains of choking sensation with water  She used to choke on solid foods now it progressed to water  And also     Objective:     Vitals:   Temp (24hrs), Av 7 °F (36 5 °C), Min:97 3 °F (36 3 °C), Max:98 4 °F (36 9 °C)    Temp:  [97 3 °F (36 3 °C)-98 4 °F (36 9 °C)] 97 4 °F (36 3 °C)  HR:  [78-89] 84  Resp:  [16-21] 18  BP: (107-117)/(64-87) 107/64  SpO2:  [92 %-98 %] 96 %  Body mass index is 63 19 kg/m²  Input and Output Summary (last 24 hours): Intake/Output Summary (Last 24 hours) at 2022 1145  Last data filed at 2022 0829  Gross per 24 hour   Intake 280 ml   Output 1900 ml   Net -1620 ml       Physical Exam:     Physical Exam  Vitals and nursing note reviewed  Constitutional:       General: She is not in acute distress  Appearance: She is well-developed  She is obese  HENT:      Head: Normocephalic and atraumatic  Right Ear: External ear normal       Left Ear: External ear normal       Nose: Nose normal  No congestion or rhinorrhea        Mouth/Throat:      Mouth: Mucous membranes are moist       Pharynx: Oropharynx is clear    Eyes:      General: No scleral icterus  Extraocular Movements: Extraocular movements intact  Conjunctiva/sclera: Conjunctivae normal    Cardiovascular:      Rate and Rhythm: Normal rate and regular rhythm  Pulses: Normal pulses  Heart sounds: Normal heart sounds  No murmur heard  Pulmonary:      Effort: Pulmonary effort is normal  No respiratory distress  Breath sounds: Normal breath sounds  No wheezing  Abdominal:      General: Abdomen is flat  Bowel sounds are normal       Palpations: Abdomen is soft  Tenderness: There is no abdominal tenderness  There is no guarding  Musculoskeletal:         General: Swelling and tenderness present  Cervical back: Neck supple  No tenderness  Skin:     General: Skin is warm  Capillary Refill: Capillary refill takes less than 2 seconds  Findings: Erythema and lesion present  Neurological:      General: No focal deficit present  Mental Status: She is alert and oriented to person, place, and time  Cranial Nerves: No cranial nerve deficit  Sensory: No sensory deficit  Motor: No weakness     Psychiatric:         Mood and Affect: Mood normal          Behavior: Behavior normal        Additional Data:     Labs:    Results from last 7 days   Lab Units 01/09/22  0534   WBC Thousand/uL 8 29   HEMOGLOBIN g/dL 9 4*   HEMATOCRIT % 30 6*   PLATELETS Thousands/uL 361   NEUTROS PCT % 60   LYMPHS PCT % 24   MONOS PCT % 8   EOS PCT % 6     Results from last 7 days   Lab Units 01/09/22  0534 01/08/22  0432 01/08/22  0432   SODIUM mmol/L 142   < > 140   POTASSIUM mmol/L 4 8   < > 5 1   CHLORIDE mmol/L 111*   < > 110*   CO2 mmol/L 17*   < > 19*   BUN mg/dL 41*   < > 47*   CREATININE mg/dL 2 27*   < > 2 73*   ANION GAP mmol/L 14*   < > 11   CALCIUM mg/dL 8 7   < > 8 9   ALBUMIN g/dL  --   --  2 3*   TOTAL BILIRUBIN mg/dL  --   --  0 44   ALK PHOS U/L  --   --  95   ALT U/L  --   --  20   AST U/L  --   --  24 GLUCOSE RANDOM mg/dL 100   < > 99    < > = values in this interval not displayed  Results from last 7 days   Lab Units 01/07/22  0641   INR  1 28*     Results from last 7 days   Lab Units 01/06/22  2132   POC GLUCOSE mg/dl 105         Results from last 7 days   Lab Units 01/07/22  0642 01/06/22  0447   LACTIC ACID mmol/L  --  0 9   PROCALCITONIN ng/ml 0 38* 0 32*           * I Have Reviewed All Lab Data Listed Above  * Additional Pertinent Lab Tests Reviewed: [address] Admission Reviewed    Imaging:    Imaging Reports Reviewed Today Include: none    Recent Cultures (last 7 days):     Results from last 7 days   Lab Units 01/08/22  0046 01/06/22  1622 01/06/22  0447   BLOOD CULTURE   --   --  No Growth at 48 hrs  No Growth at 48 hrs     GRAM STAIN RESULT  No Polys or Bacteria seen  --   --    URINE CULTURE   --  <10,000 cfu/ml   --    WOUND CULTURE  No growth  --   --        Last 24 Hours Medication List:   Current Facility-Administered Medications   Medication Dose Route Frequency Provider Last Rate    acetaminophen  975 mg Oral Q8H [address] Joanne Warner MD      al mag oxide-diphenhydramine-lidocaine viscous  10 mL Swish & Swallow Q4H PRN Jing Tate MD      albuterol  2 puff Inhalation Q6H PRN Lita So MD      aluminum-magnesium hydroxide-simethicone  30 mL Oral Q4H PRN Jing Tate MD      cefepime  1,000 mg Intravenous Q12H Lashawn Wright MD 1,000 mg (01/09/22 0229)    docusate sodium  100 mg Oral BID Lita So MD      enoxaparin  60 mg Subcutaneous Q12H [address] Lita So MD      fluticasone  1 spray Each Nare Daily Lita So MD      HYDROmorphone  0 2 mg Intravenous Q4H PRN Jing Tate MD      hydrOXYzine HCL  25 mg Oral TID PRN Lita So MD      lidocaine  1 patch Topical Daily Jing Tate MD      methocarbamol  500 mg Oral Q8H [address] Jing Tate MD      metoprolol tartrate  25 mg Oral BID Lita So MD      metroNIDAZOLE  500 mg Oral Q8H Dez Ochoa MD      nicotine  1 patch Transdermal Daily Sofia Danielle MD      nystatin   Topical BID Haley Hatch MD      nystatin   Topical TID Sofia Danielle MD      ondansetron  4 mg Intravenous Q6H PRN Sofia Danielle MD      oxyCODONE  5 mg Oral Q4H PRN Haley Hatch MD      Or   Fede Angel oxyCODONE  10 mg Oral Q4H PRN Haley Hatch MD      sertraline  50 mg Oral Daily Sofia Danielle MD      sodium bicarbonate  1,300 mg Oral BID after meals Brittaney Noonan PA-C      sodium chloride  50 mL/hr Intravenous Continuous Mendel Goes,  mL/hr (01/09/22 1004)    vancomycin  2,000 mg Intravenous Q24H Haley Hatch MD 2,000 mg (01/08/22 2030)        Today, Patient Was Seen By: Sofia Danielle MD    ** Please Note: Dictation voice to text software may have been used in the creation of this document   **

## 2022-01-09 NOTE — SPEECH THERAPY NOTE
Speech Language/Pathology  Speech/Language Pathology  Assessment      Speech-Language Pathology Bedside Swallow Evaluation      Patient Name: Rosalia Cadet    [id number] Date: 2022     Problem List  Principal Problem:    Cellulitis of abdominal wall, chest wall and groin with wounds  Active Problems:    Hypertension    Morbid obesity with BMI of 60 0-69 9, adult (HCC)    Borderline hyperlipidemia    Acute renal failure (ARF) ([address] )    Diverticulitis of large intestine with perforation    Right Hydroureteronephrosis    Subacute vaginitis    Dysphagia      Past Medical History  Past Medical History:   Diagnosis Date    Anxiety     Arthritis     GERD (gastroesophageal reflux disease)     Hidradenitis suppurativa     Hypertension     Lipodermatosclerosis of both lower extremities     Lymphedema     Sciatic leg pain     left side    SOB (shortness of breath)     Stomach ulcer        Past Surgical History  Past Surgical History:   Procedure Laterality Date     SECTION      [place] EXC SKIN BENIG 3 1-4 CM TRUNK,ARM,LEG Right 2020    Procedure: EXCISION BIOPSY TISSUE LESION/MASS UPPER EXTREMITY;  Surgeon: Noreen Tuttle DO;  Location: [facility];  Service: General    TONSILLECTOMY      TUMOR REMOVAL Right     5th finger       Summary   Pt presented with s/s suggestive of mild oral and suspected mild pharyngeal dysphagia  Symptoms or concerns included mildly prolonged mastication with solids likely due to poor/missing dentition as well as reported discomfort/"sticking" sensation with trials of solids and liquids (worsened with solids)  Suspect ? thrush in posterior oral cavity during oral motor exam   Pt reports that a few weeks ago she had a canker sore and a sore throat (feels that she may have eaten a cucumber which she is allergic to and then had the canker sore due to this)  Pt attempted to drink water while she had the sore throat and felt that she "choked" on this   Since this incident, she feels that when she consumes solids and liquids, "something is going along with it" and reports globus sensation in her throat  Symptoms seem to worsen with solids vs liquids  Pt reports that she has been attempting to order softer foods  Pt also reports difficulty with pills "getting stuck" in her throat (prior to canker sore/sore throat) and she has been having pills cut into pieces and mixed with applesauce to help with swallowing these  Noted ? Appearance of thrush in posterior oral cavity during oral motor evaluation  With trials of solids from lunch tray, pt noted to have physical signs of discomfort (elevating shoulders, closing eyes) when swallowing and repeatedly stated that "the flap might not be closing in my throat and it feels like something is there " Pt provided with sips of thin liquids after each bite of solids and she reported that this improved her symptoms  No overt s/s of aspiration were noted across all trials of solids and liquids  Pt and SLP discussed recommendations: changing to softer diet while symptoms are occurring, consistently alternating solids and liquids, putting meds in puree, and further GI consult  Risk/s for Aspiration: ? Thrush, ? Esophageal dysphagia component  Recommended Diet: soft/level 3 diet and thin liquids   Recommended Form of Meds: crushed with puree   Aspiration precautions and swallowing strategies: upright posture, only feed when fully alert, slow rate of feeding, small bites/sips, alternating bites and sips and Avoiding overly dry/difficult solids  Other Recommendations: Continue frequent oral care        Current Medical Status  Pt is a 60 y o  female who presented to [address] with cellulitis of abdominal wall    "Nikole Crowe is a 60 y o  female with PMH significant for HTN, morbid obesity, sciatica, hidradenitis suppurative, fungal dermatitis presents to the ED with open sores under breasts, lower abdomen, inner thighs and buttocks  Patient complains started 1 month ago, using antibiotics on out-patinet basis  Patient reports that she is having fungal infection "    Current Precautions:  Fall      Allergies:  Pt reports allergy to cucumbers    Past medical history:  Please see H&P for details    Special Studies:  No previous video swallows in [address] [place], [place] [address]Seldom Seen [facility] or Care Everywhere  Swallow Information   Current Risks for Dysphagia & Aspiration: Pt's reported increasing dysphagia over past few weeks  Current Symptoms/Concerns: c/o globus sensation with PO intake  Current Diet: regular diet and thin liquids   Baseline Diet: regular diet and thin liquids      Baseline Assessment   Behavior/Cognition: alert  Speech/Language Status: able to participate in conversation  Patient Positioning: Sitting upright on edge of bed for entire evaluation  Pain Status/Interventions/Response to Interventions: 8/10 ("everywhere") - RN aware  Swallow Mechanism Exam  Facial: symmetrical  Labial: WFL  Lingual: WFL  Velum: symmetrical (noted small white coating on velum- ? Appearance of thrush)  Mandible: adequate ROM  Dentition: poor oral hygiene and Noted many missing upper dentition; front teeth in poor condition  Vocal quality:clear/adequate   Volitional Cough: strong/productive   Respiratory Status: on RA   Tracheostomy: n/a      Consistencies Assessed and Performance   Consistencies Administered: thin liquids, mechanical soft solids, soft solids and hard solids  Materials administered included - chicken noodle soup, green beans, thin liquids by cup    Oral Stage: mild, decreased mastication and decreased bolus formation  Noted mildly prolonged mastication with solids from meal tray  Suspect that pt's missing dentition impacting mastication skills  Pt reports purposefully chewing foods well prior to swallowing due to ongoing dysphagia       Pharyngeal Stage: suspected and minimal; grossly WFL for pt  Swallow Mechanics:  Swallowing initiation appeared prompt  Laryngeal rise was palpated and judged to be within functional limits (however, some difficulty with palpation)  No coughing, throat clearing, change in vocal quality or respiratory status noted today  ? If any pharyngeal difficulties are impacting pt's    Esophageal Concerns: globus sensation    Strategies and Efficacy: Suspect possible esophageal dysphagia component vs possible thrush impacting  Summary and Recommendations (see above)    Results Reviewed with: patient, RN and MD     Treatment Recommended: Recommend further assessment of thrush, GI evaluation  Frequency of treatment: 1-3x for follow up    Patient Stated Goal: To have globus sensation reduced with PO intake  Dysphagia LTG  -Patient will demonstrate safe and effective oral intake (without overt s/s significant oral/pharyngeal dysphagia including s/s penetration or aspiration) for the highest appropriate diet level  Short Term Goals:    -Pt will tolerate Dysphagia 3/advanced (dental soft) diet and thin liquid with no significant s/s oral or pharyngeal dysphagia across 1-3 diagnostic session/s     -Patient will tolerate trials of upgraded food and/or liquid texture with no significant s/s of oral or pharyngeal dysphagia including aspiration across 1-3 diagnostic sessions       Re: Compensatory Strategies    -Patient will demonstrate independent use of recommended safe swallowing strategies during a clinician assessed meal across 1-3 diagnostic sessions            Speech Therapy Prognosis   Prognosis: good    Prognosis Considerations: medical status     ST Priyanka 1:26 PM 1/9/2022

## 2022-01-10 NOTE — PLAN OF CARE
Problem: Nutrition/Hydration-ADULT  Goal: Nutrient/Hydration intake appropriate for improving, restoring or maintaining nutritional needs  Description: Monitor and assess patient's nutrition/hydration status for malnutrition  Collaborate with interdisciplinary team and initiate plan and interventions as ordered  Monitor patient's weight and dietary intake as ordered or per policy  Utilize nutrition screening tool and intervene as necessary  Determine patient's food preferences and provide high-protein, high-caloric foods as appropriate       INTERVENTIONS:  - Monitor oral intake, urinary output, labs, and treatment plans  - Assess nutrition and hydration status and recommend course of action  - Evaluate amount of meals eaten  - Assist patient with eating if necessary   - Allow adequate time for meals  - Recommend/ encourage appropriate diets, oral nutritional supplements, and vitamin/mineral supplements  - Order, calculate, and assess calorie counts as needed  - Recommend, monitor, and adjust tube feedings and TPN/PPN based on assessed needs  - Assess need for intravenous fluids  - Provide specific nutrition/hydration education as appropriate  - Include patient/family/caregiver in decisions related to nutrition  Outcome: Progressing; , meal completion improved today

## 2022-01-10 NOTE — PHYSICAL THERAPY NOTE
PHYSICAL THERAPY TREATMENT NOTE  NAME:  Joselyn Nelson  DATE: 01/10/22    Length Of Stay: 4  Performed at least 2 patient identifiers during session: Name and Birthday    TREATMENT:    01/10/22 0923   PT Last Visit   PT Visit Date 01/10/22   Note Type   Note Type Treatment   Pain Assessment   Pain Assessment Tool 0-10   Pain Score 10 - Worst Possible Pain   Pain Location/Orientation Orientation: Bilateral;Location: Buttocks   Restrictions/Precautions   Weight Bearing Precautions Per Order No   Other Precautions Pain; Fall Risk;Multiple lines; Chair Alarm; Bed Alarm   General   Chart Reviewed Yes   Response to Previous Treatment Patient with no complaints from previous session  Family/Caregiver Present No   Cognition   Arousal/Participation Alert   Orientation Level Oriented X4   Following Commands Follows one step commands with increased time or repetition   Subjective   Subjective "When I stand I may scream because of my wound"   Bed Mobility   Supine to Sit 4  Minimal assistance   Additional items Assist x 1; Increased time required;Verbal cues;LE management   Additional Comments Pt supine at start of session and requiring minAx1 for LLE management due to pain from wound  Pt sitting at EOB ~15'+ with F/F- static balance with pt holding onto bedrails for support  Transfers   Sit to Stand   (cga)   Additional items Assist x 1; Increased time required;Verbal cues   Stand to Sit   (cga)   Additional items Assist x 1; Increased time required;Verbal cues   Stand pivot   (cga)   Additional items Assist x 1; Increased time required;Verbal cues   Toilet transfer 4  Minimal assistance   Additional items Assist x 1; Increased time required;Verbal cues;Standard toilet   Additional Comments Use of RW for all transfers  Pt initally pulling from back of commode to achieve standing and progressing to using bedrail for second and third transfer with min vc'ing for sequencing and technique   Pt seated on standard toilet at end of session with pt verbalizing ringing for assistance when finished  Ambulation/Elevation   Gait pattern Decreased foot clearance; Wide KODY; Forward Flexion; Improper Weight shift; Antalgic;Decreased heel strike; Step through pattern;Excessively slow   Gait Assistance 4  Minimal assist   Additional items Assist x 2;Verbal cues   Assistive Device Rolling walker   Distance 3 sidesteps to [facility], 10'x1 with RW and minAx2 for safety with vc'ing to step into KODY of RW    Balance   Static Sitting Fair   Dynamic Sitting Fair -   Static Standing Fair -   Dynamic Standing Poor +   Ambulatory Poor +   Endurance Deficit   Endurance Deficit Yes   Endurance Deficit Description Pt fatigues easily when standing   Activity Tolerance   Activity Tolerance Patient limited by pain; Patient limited by fatigue   Assessment   Prognosis Guarded   Problem List Decreased strength;Decreased endurance; Impaired balance;Decreased mobility; Decreased safety awareness;Pain;Obesity   Goals   Patient Goals "Get better"   PT Treatment Day 1   Plan   Treatment/Interventions Functional transfer training;LE strengthening/ROM; Therapeutic exercise; Endurance training;Patient/family training;Bed mobility;Gait training; Compensatory technique education;OT   Progress Slow progress, decreased activity tolerance   PT Frequency 3-5x/wk   Recommendation   PT Discharge Recommendation Post acute rehabilitation services   Equipment Recommended [facility] Recommended Wheeled walker  (bariatric)   AM-PAC Basic Mobility Inpatient   Turning in Bed Without Bedrails 1   Lying on Back to Sitting on Edge of Flat Bed 2   Moving Bed to Chair 3   Standing Up From Chair 3   Walk in Room 3   Climb 3-5 Stairs 1   Basic Mobility Inpatient Raw Score 13   Basic Mobility Standardized Score 33 99   Highest Level Of Mobility   JH-HLM Goal 4: Move to chair/commode   JH-HLM Highest Level of Mobility 6: Walk 10 steps or more   JH-HLM Goal Achieved Yes   Education   Education Provided Mobility training;Home exercise program;Assistive device   Patient Reinforcement needed   End of Consult   Patient Position at End of Consult Other (comment)  (In bathroom on toilet)   End of Consult Comments   (Verbalized agreeement to ring for assistance when finished)     The patient's AM-PAC Basic Mobility Inpatient Short Form Raw Score is 13  A Raw score of less than or equal to 16 suggests the patient may benefit from discharge to post-acute rehabilitation services  Please also refer to the recommendation of the Physical Therapist for safe discharge planning  Pt seen for PT treatment session this date with interventions consisting of gait training w/ emphasis on improving pt's ability to ambulate level surfaces x 10' with min A of 2 provided by therapist with RW and therapeutic activity consisting of training: bed mobility, supine<>sit transfers, sit<>stand transfers and stand pivot transfers  Pt agreeable to PT treatment session upon arrival, pt found supine in bed, in no apparent distress  In comparison to previous session, pt with improvements in decreased assistance for transfers with increase in ambulation distances  Pt educated on seated therex with pt verbalizing agreement on trialing  She continues to be limited by decreased activity tolerance, weakness, and body habitus  Post session: pt seated on toilet and verbalizing agreement to ring for assistance when finished  Continue to recommend STR at time of d/c in order to maximize pt's functional independence and safety w/ mobility  Pt continues to be functioning below baseline level, and remains limited 2* factors listed above  PT will continue to see pt while here in order to address the deficits listed above and provide interventions consistent w/ POC in effort to achieve STGs  Pt requires PT /OT co-treat due to signficant assistance with mobility and cognitive-behavioral impairments      Iman Huerta, PTA

## 2022-01-10 NOTE — WOUND OSTOMY CARE
Progress Note - Wound   Tavaresol Round 60 y o  female MRN: [id number]  Unit/Bed#: 794-95 Encounter: [id number]      History and Present Illness:  Patient admitted with cellulitis of the abdominal wall, chest wall and groin wounds  Seen today for wound care weeky follow up  Patient history significant for hydradinitis suppurativa, HTN, morbid obesity with a BMI of 63 02, and currently with diverticulitis of large intestine with perforation  Galan catheter to straight drainage for bladder management  Pt requires moderate assistance to transfer from edges of bed to lying position        Assessment:   1)Bilateral heels intact  2) Pannus, abdomen beneath skin folds of both breasts have decreased erythema  3)Hidradenitis wounds multiple areas  open wounds on bilateral buttocks, beneath bilateral breasts, on lower abdomen and anterior and medial bilateral upper thighs  All areas gently cleansed with NSS> pat dry and maxorb ag applied to wound bed only  Wounds were then covered with Allevyn bordered foam  No tape applied to skin  3M applied as skin barrier and allowed to dry as skin protectant  All wound photo and measurements below  Very painful to touch  Decreased drainage since admission noted  Skin and Wound Care Plan:   1  Apply skin nourishing cream to intact skin daily  2  Bariatric ehob offloading cushion to chair when OOB  3  Elevate heels off of bed with pillows to offload  4  Kreg bariatric bed  5  Turn and reposition patient Q2 hours  6  Gently cleanse areas beneath b/l breasts, abdominal folds, inner thighs, groin folds and inner buttock wounds with NSS  Pat dry with gauze  Dust inner folds with Nystatin antifungal powder as ordered by provider  7-Gently cleanse wound beds beneath bilateral breasts, abdomen, thighs and bilateral buttocks with NSS> pat dry apply Maxorb AG to wound beds and cover with Allevyn bordered foam  Change every other day and prn soil or dislodgement          Wound 11/22/21 Other (comment) Breast Left; Lower (Active)   Wound Image   01/10/22 1043   Wound Description Beefy red;Drainage 01/10/22 1043   Luisa-wound Assessment Intact;Edema; Erythema; Excoriated 01/10/22 1043   Wound Length (cm) 7 cm 01/10/22 1043   Wound Width (cm) 9 cm 01/10/22 1043   Wound Depth (cm) 0 1 cm 01/10/22 1043   Wound Surface Area (cm^2) 63 cm^2 01/10/22 1043   Wound Volume (cm^3) 6 3 cm^3 01/10/22 1043   Calculated Wound Volume (cm^3) 6 3 cm^3 01/10/22 1043   Change in Wound Size % -530 01/10/22 1043   Drainage Amount Scant 01/10/22 1043   Drainage Description Bloody 01/10/22 1043   Non-staged Wound Description Partial thickness 01/10/22 1043   Treatments Cleansed;Site care 01/10/22 1043   Dressing Calcium Alginate with Silver; Foam, Silicon (eg  Allevyn, etc) 01/10/22 1043   Wound packed? No 01/09/22 1000   Dressing Changed New 01/10/22 1043   Patient Tolerance Tolerated well 01/10/22 1043   Dressing Status Clean;Dry; Intact 01/10/22 1043       Wound 11/22/21 Other (comment) Abdomen Medial;Lower (Active)   Wound Image   01/10/22 1045   Wound Description Intact; Beefy red 01/10/22 1045   Luisa-wound Assessment Intact;Fragile 01/10/22 1045   Wound Length (cm) 28 cm 01/10/22 1045   Wound Width (cm) 12 cm 01/10/22 1045   Wound Depth (cm) 0 1 cm 01/10/22 1045   Wound Surface Area (cm^2) 336 cm^2 01/10/22 1045   Wound Volume (cm^3) 33 6 cm^3 01/10/22 1045   Calculated Wound Volume (cm^3) 33 6 cm^3 01/10/22 1045   Change in Wound Size % -522 22 01/10/22 1045   Drainage Amount None 01/10/22 1045   Drainage Description Milky; Yellow 01/07/22 1617   Treatments Cleansed;Site care 01/10/22 1045   Dressing Calcium Alginate with Silver; Foam, Silicon (eg  Allevyn, etc) 01/10/22 1045   Wound packed? No 01/09/22 1000   Dressing Changed New 01/10/22 1045   Patient Tolerance Tolerated well 01/10/22 1045   Dressing Status Clean;Dry; Intact 01/10/22 1045       Wound 11/22/21 Other (comment) Thigh Anterior;Right;Medial (Active)   Wound Image   01/10/22 1101   Wound Description Intact; Beefy red;Fragile 01/10/22 1101   Luisa-wound Assessment Excoriated;Fragile 01/10/22 1101   Wound Length (cm) 5 cm 01/10/22 1101   Wound Width (cm) 4 cm 01/10/22 1101   Wound Depth (cm) 0 1 cm 01/10/22 1101   Wound Surface Area (cm^2) 20 cm^2 01/10/22 1101   Wound Volume (cm^3) 2 cm^3 01/10/22 1101   Calculated Wound Volume (cm^3) 2 cm^3 01/10/22 1101   Change in Wound Size % -900 01/10/22 1101   Drainage Amount None 01/10/22 1101   Non-staged Wound Description Partial thickness 01/10/22 1101   Treatments Cleansed;Site care 01/10/22 1101   Dressing Calcium Alginate with Silver; Foam, Silicon (eg  Allevyn, etc) 01/10/22 1101   Dressing Changed New 01/10/22 1101   Patient Tolerance Tolerated well 01/10/22 1101       Wound 11/22/21 Other (comment) Thigh Anterior;Left;Medial (Active)   Wound Description Clean;Beefy red;Granulation tissue;Pink 01/09/22 1000   Luisa-wound Assessment Clean;Dry; Intact 01/09/22 1000   Drainage Amount None 01/09/22 1000   Treatments Cleansed;Irrigation with NSS 01/08/22 1000   Dressing Other (Comment) 01/09/22 1000   Wound packed? No 01/09/22 1000   Dressing Changed New 01/08/22 1000   Patient Tolerance Tolerated well 01/08/22 1000   Dressing Status Clean;Dry; Intact 01/09/22 1000       Wound 01/06/22 Other (comment) Other (Comment) Perineum (Active)   Wound Image   01/10/22 1027   Wound Description Beefy red;Drainage;Fragile 01/10/22 1027   Pressure Injury Stage 2 01/06/22 0511   Luisa-wound Assessment Dry; Intact; Erythema 01/10/22 1027   Wound Length (cm) 10 5 cm 01/10/22 1027   Wound Width (cm) 9 cm 01/10/22 1027   Wound Depth (cm) 0 1 cm 01/10/22 1027   Wound Surface Area (cm^2) 94 5 cm^2 01/10/22 1027   Wound Volume (cm^3) 9 45 cm^3 01/10/22 1027   Calculated Wound Volume (cm^3) 9 45 cm^3 01/10/22 1027   Drainage Amount None 01/10/22 1027   Non-staged Wound Description Partial thickness 01/10/22 1027   Treatments Cleansed;Site care 01/10/22 1027 Dressing ABD;Calcium Alginate with Silver;Dry dressing; Foam, Silicon (eg  Allevyn, etc) 01/10/22 1027   Wound packed? No 01/09/22 1000   Dressing Changed New 01/10/22 1027   Patient Tolerance Tolerated well 01/10/22 1027   Dressing Status Clean;Dry; Intact 01/10/22 1027       Wound 01/07/22 Breast Right; Lower (Active)   Wound Image   01/10/22 1044   Wound Description Beefy red;Edema;Fragile 01/10/22 1044   Luisa-wound Assessment Intact; Erythema; Excoriated 01/10/22 1044   Wound Length (cm) 13 cm 01/10/22 1044   Wound Width (cm) 12 cm 01/10/22 1044   Wound Depth (cm) 0 1 cm 01/10/22 1044   Wound Surface Area (cm^2) 156 cm^2 01/10/22 1044   Wound Volume (cm^3) 15 6 cm^3 01/10/22 1044   Calculated Wound Volume (cm^3) 15 6 cm^3 01/10/22 1044   Drainage Amount None 01/10/22 1044   Drainage Description Serosanguineous; Chery 01/09/22 1000   Non-staged Wound Description Partial thickness 01/10/22 1044   Treatments Ice applied;Site care 01/10/22 1044   Dressing Calcium Alginate with Silver; Foam, Silicon (eg  Allevyn, etc) 01/10/22 1044   Wound packed? No 01/09/22 1000   Dressing Changed New 01/10/22 1044   Patient Tolerance Tolerated well 01/10/22 1044   Dressing Status Clean;Dry; Intact 01/10/22 1044                 Call or tigertext with any questions  Wound Care will continue to follow    Kandis Moura

## 2022-01-10 NOTE — DISCHARGE INSTR - OTHER ORDERS
Skin and Wound Care Plan:   1  Apply skin nourishing cream to intact skin daily  2  Bariatric ehob offloading cushion to chair when OOB  3  Elevate heels off of bed with pillows to offload  4  Kreg bariatric bed  5  Turn and reposition patient Q2 hours  6  Gently cleanse areas beneath b/l breasts, abdominal folds, inner thighs, groin folds and inner buttock wounds with NSS  Pat dry with gauze  Dust inner folds with Nystatin antifungal powder as ordered by provider  7-Gently cleanse wound beds beneath bilateral breasts, abdomen, thighs and bilateral buttocks with NSS> pat dry apply Maxorb AG to wound beds and cover with Allevyn bordered foam  Change every other day and prn soil or dislodgement

## 2022-01-10 NOTE — ASSESSMENT & PLAN NOTE
Patient presented with pain, erythema, swelling with overlying wounds, actively draining purulent material involving the inframammary area, lower abdomen, groin and inner thighs  Elevated WBC count at 13 43 on admission; trended down to normal range      Continue vancomycin, Cefepime and Metronidazole day#5  ID recommending 5 days of IV Diflucan  Follow-up MRSA screenWound care consult, input appreciated

## 2022-01-10 NOTE — PROGRESS NOTES
[address] [facility] 160[facility]  Progress Note - Lucinda Rojas 1965, 60 y o  female MRN: [id number]  Unit/Bed#: 821-51 Encounter: [id number]  Primary Care Provider: Renée yKle PA-C   Date and time admitted to hospital: 1/6/2022  2:43 AM    * Cellulitis of abdominal wall, chest wall and groin with wounds  Assessment & Plan  Patient presented with pain, erythema, swelling with overlying wounds, actively draining purulent material involving the inframammary area, lower abdomen, groin and inner thighs  Elevated WBC count at 13 43 on admission; trended down to normal range  Continue vancomycin, Cefepime and Metronidazole day#5  ID recommending 5 days of IV Diflucan  Follow-up MRSA screenWound care consult, input appreciated          Diverticulitis of large intestine with perforation  Assessment & Plan    CT abdomen on 01/06/22 shows - sigmoid diverticulitis with microperforation and adjacent small pelvic abscess  Surgical input appreciated  IR any but to place drain due to body habitus  Continue vancomycin/cefepime/Flagyl  Abdominal exam is benign, and patient is tolerating diet        Right Hydroureteronephrosis  Assessment & Plan  Right Hydroureteronephrosis noted on CT abdomen pelvis on 01/06/22    Renal US : right renal collecting system appears minimally dilated similar to prior CT  Per radiology suspecting related to inflammatory changes in the sigmoid colon  Galan in place - draining clear urine with creatinine continuing to improve   Outpatient follow up with urology       Acute renal failure (ARF) ([facility] [address] )  Assessment & Plan  Superimposed on chronic kidney disease with a baseline creatinine of 1  Continue to hold lisinopril and Celebrex  Continue Galan catheter and will have patient follow-up as an outpatient  DC IV fluids, patient eating and drinking    Morbid obesity with BMI of 60 0-69 9, adult [facility])  Assessment & Plan  Patient is morbidly obese with BMI of 63 02  Hypertension  Assessment & Plan  Lisinopril on hold due to ZAHIRA  Continue Metoprolol 25mg          Progress Note - Elver Schultz 60 y o  female MRN: [id number]    Unit/Bed#: 418-01 Encounter: [id number]        Subjective:   Patient seen and examined, feeling better, less abdominal pain     Objective:     Vitals:   Vitals:    01/10/22 0914   BP: 116/73   Pulse: 80   Resp:    Temp:    SpO2: 94%     Body mass index is 63 19 kg/m²  Intake/Output Summary (Last 24 hours) at 1/10/2022 0942  Last data filed at 1/9/2022 1716  Gross per 24 hour   Intake 0 ml   Output 650 ml   Net -650 ml       Physical Exam:   /73   Pulse 80   Temp 98 6 °F (37 °C)   Resp 17   Ht 5' 7" (1 702 m)   Wt (!) 183 kg (403 lb 7 1 oz)   SpO2 94%   BMI 63 19 kg/m²   General appearance: alert and oriented, in no acute distress  Head: Normocephalic, without obvious abnormality, atraumatic  Lungs: clear to auscultation bilaterally  Heart: regular rate and rhythm, S1, S2 normal, no murmur, click, rub or gallop  Abdomen: no pain or tenderness, improving erythema, no fluctuant masses   Extremities: extremities normal, warm and well-perfused; no cyanosis, clubbing, or edema  Pulses: 2+ and symmetric  Neurologic: Grossly normal     Invasive Devices  Report    Peripheral Intravenous Line            Peripheral IV 01/09/22 Dorsal (posterior); Left Hand <1 day          Drain            Urethral Catheter 16 Fr  3 days                Results from last 7 days   Lab Units 01/10/22  0529 01/09/22  0534 01/08/22  0432   WBC Thousand/uL 7 00 8 29 8 42   HEMOGLOBIN g/dL 9 4* 9 4* 9 5*   HEMATOCRIT % 31 3* 30 6* 31 3*   PLATELETS Thousands/uL 340 361 391*       Results from last 7 days   Lab Units 01/10/22  0529 01/09/22  0534 01/08/22  0432 01/07/22  0641 01/07/22  0641   POTASSIUM mmol/L 4 9 4 8 5 1   < > 5 2   CHLORIDE mmol/L 111* 111* 110*   < > 108   CO2 mmol/L 19* 17* 19*   < > 17*   BUN mg/dL 34* 41* 47*   < > 57*   CREATININE mg/dL 1 79* 2 27* 2 73*   < > 3 24*   CALCIUM mg/dL 8 6 8 7 8 9   < > 9 1   ALK PHOS U/L 88  --  95  --  100   ALT U/L 22  --  20  --  25   AST U/L 16  --  24  --  39    < > = values in this interval not displayed         Medication Administration - last 24 hours from 01/09/2022 0942 to 01/10/2022 2735       Date/Time Order Dose Route Action Action by     01/10/2022 0920 fluticasone (FLONASE) 50 mcg/act nasal spray 1 spray 1 spray Each Nare Given Sumi Grant, MICHELLE     01/09/2022 1240 fluticasone (FLONASE) 50 mcg/act nasal spray 1 spray 1 spray Each Nare Given Ellen Whitehead, MICHELLE     01/10/2022 8574 metoprolol tartrate (LOPRESSOR) tablet 25 mg 25 mg Oral Given Sumi Grant RN     01/09/2022 1954 metoprolol tartrate (LOPRESSOR) tablet 25 mg 25 mg Oral Given Ellen Whitehead, MICHELLE     01/09/2022 1355 metoprolol tartrate (LOPRESSOR) tablet 25 mg 25 mg Oral Given Ellen Whitehead, MICHELLE     01/10/2022 4226 sertraline (ZOLOFT) tablet 50 mg 50 mg Oral Given Sumi Grant RN     01/09/2022 1242 sertraline (ZOLOFT) tablet 50 mg 50 mg Oral Given Ellen Whitehead, MICHELLE     01/09/2022 1219 sodium chloride 0 9 % infusion 50 mL/hr Intravenous Rate/Dose Change Ellen Whitehead RN     01/09/2022 1004 sodium chloride 0 9 % infusion 100 mL/hr Intravenous 1 \[facility] [place] [facility]\""     01/10/2022 0912 docusate sodium (COLACE) capsule 100 mg 100 mg Oral Given Sumi Grant, MICHELLE     01/09/2022 1808 docusate sodium (COLACE) capsule 100 mg 100 mg Oral Given Ellen Whitehead, MICHELLE     01/09/2022 1239 docusate sodium (COLACE) capsule 100 mg 100 mg Oral Given Ellen Whitehead, MICHELLE     01/09/2022 1236 ondansetron (ZOFRAN) injection 4 mg 4 mg Intravenous Given Ellen Whitehead RN     01/10/2022 0917 nicotine (NICODERM CQ) 7 mg/24hr TD 24 hr patch 1 patch 1 patch Transdermal Not Given Sumi Grant RN     01/09/2022 1241 nicotine (NICODERM CQ) 7 mg/24hr TD 24 hr patch 1 patch 1 patch Transdermal Not Given Ellen Whitehead, MICHELLE     01/10/2022 3856 nystatin (MYCOSTATIN) powder   Topical Given Rhonda Wood, RN     01/09/2022 2152 nystatin (MYCOSTATIN) powder   Topical Given Ruchi López, RN     01/09/2022 1808 nystatin (MYCOSTATIN) powder   Topical Given Deedee Jeramie, RN     01/09/2022 1244 nystatin (MYCOSTATIN) powder   Topical Given Deedee Jeramie, RN     01/10/2022 0541 metroNIDAZOLE (FLAGYL) tablet 500 mg 500 mg Oral Given Ruchi López, RN     01/09/2022 2140 metroNIDAZOLE (FLAGYL) tablet 500 mg 500 mg Oral Given Ruchi López, RN     01/09/2022 1410 metroNIDAZOLE (FLAGYL) tablet 500 mg 500 mg Oral Given Deedee Jeramie RN     01/09/2022 1755 vancomycin (VANCOCIN) 2,000 mg in sodium chloride 0 9 % 500 mL IVPB 0 mg Intravenous Hold Deedee MICHELLE Bobo     01/09/2022 1241 al mag oxide-diphenhydramine-lidocaine viscous (MAGIC MOUTHWASH) suspension 10 mL 10 mL Swish & Swallow Given Deedee Bobo RN     01/10/2022 3866 nystatin (MYCOSTATIN) cream   Topical Given Rhonda Wood, RN     01/09/2022 1808 nystatin (MYCOSTATIN) cream   Topical Given Deedee MICHELLE Bobo     01/09/2022 1244 nystatin (MYCOSTATIN) cream   Topical Given Deedee Jeramie, RN     01/10/2022 0912 enoxaparin (LOVENOX) subcutaneous injection 60 mg 60 mg Subcutaneous Given Rhonda Wood RN     01/09/2022 2139 enoxaparin (LOVENOX) subcutaneous injection 60 mg 60 mg Subcutaneous Given Ruchi López RN     01/09/2022 1240 enoxaparin (LOVENOX) subcutaneous injection 60 mg 60 mg Subcutaneous Given Deedee Jeramie, MICHELLE     01/10/2022 0914 sodium bicarbonate tablet 1,300 mg 1,300 mg Oral Given Rhonda Wood RN     01/09/2022 1807 sodium bicarbonate tablet 1,300 mg 1,300 mg Oral Given Deedee Jeramie, RN     01/09/2022 1244 sodium bicarbonate tablet 1,300 mg 1,300 mg Oral Given Deedee Jeramie, RN     01/10/2022 0554 cefepime (MAXIPIME) IVPB (premix in dextrose) 1,000 mg 50 mL 1,000 mg Intravenous 230 Wit Lokesh Dennis RN     01/10/2022 0325 cefepime (MAXIPIME) IVPB (premix in dextrose) 1,000 mg 50 mL   Intravenous Canceled Entry Idris Manuel RN     01/09/2022 1806 cefepime (MAXIPIME) IVPB (premix in dextrose) 1,000 mg 50 mL 1,000 mg Intravenous 1 [facility] [place]     01/10/2022 0541 acetaminophen (TYLENOL) tablet 975 mg 975 mg Oral Given Idris Manuel, RN     01/09/2022 2140 acetaminophen (TYLENOL) tablet 975 mg 975 mg Oral Given Idris Manuel, MICHELLE     01/09/2022 1409 acetaminophen (TYLENOL) tablet 975 mg 975 mg Oral Given Tiffanie Rizo RN     01/10/2022 0940 oxyCODONE (ROXICODONE) IR tablet 5 mg   Oral See Alternative Dc Chan RN     01/10/2022 0940 oxyCODONE (ROXICODONE) immediate release tablet 10 mg 10 mg Oral Given Dc Chan RN     01/09/2022 1955 HYDROmorphone (DILAUDID) injection 0 2 mg 0 2 mg Intravenous Given Tiffanie Rizo RN     01/10/2022 0541 methocarbamol (ROBAXIN) tablet 500 mg 500 mg Oral Given Idris Manuel, RN     01/09/2022 2140 methocarbamol (ROBAXIN) tablet 500 mg 500 mg Oral Given Idris Manuel, MICHELLE     01/09/2022 1410 methocarbamol (ROBAXIN) tablet 500 mg 500 mg Oral Given Tiffanie Rizo, MICHELLE     01/10/2022 0920 lidocaine (LIDODERM) 5 % patch 1 patch 1 patch Topical Medication Applied Dc Chan RN     01/10/2022 0212 lidocaine (LIDODERM) 5 % patch 1 patch 1 patch Topical Patch Removed Lucy Dennis, MICHELLE     01/09/2022 1355 lidocaine (LIDODERM) 5 % patch 1 patch 1 patch Topical Medication Applied Tiffanie Rizo RN     01/10/2022 0912 nystatin (MYCOSTATIN) oral suspension 500,000 Units 500,000 Units Swish & Swallow Given Dc Chan RN     01/09/2022 2152 nystatin (MYCOSTATIN) oral suspension 500,000 Units 500,000 Units Swish & Swallow Given Idris Manuel RN     01/09/2022 1807 nystatin (MYCOSTATIN) oral suspension 500,000 Units 500,000 Units Swish & Swallow Given Tiffanie Rizo, MICHELLE     01/09/2022 1410 nystatin (MYCOSTATIN) oral suspension 500,000 Units 500,000 Units Swish & Swallow Given Tiffanie Rizo, MICHELLE     01/10/2022 0913 fluconazole (DIFLUCAN) tablet 150 mg 150 mg Oral Given Karyle Caroli, RN     01/09/2022 2140 fluconazole (DIFLUCAN) tablet 150 mg 150 mg Oral Given Jazmine Mathur RN            Lab, Imaging and other studies: I have personally reviewed pertinent reports      VTE Pharmacologic Prophylaxis: Enoxaparin (Lovenox)  VTE Mechanical Prophylaxis: sequential compression device     Esperanza Villa MD  1/10/2022,9:42 AM

## 2022-01-10 NOTE — PROGRESS NOTES
Progress Note - General Surgery   Raul Bolanos 60 y o  female MRN: [id number]  Unit/Bed#: 017-98 Encounter: [id number]    Assessment:  49-year-old female found to have sigmoid diverticulitis with micro perforation and abscess collection on imaging study  - afebrile, VSS  - No leukocytosis, WBC 7 0 (8 29)  - Tolerating diet without complications  - Exam: abdomen soft, nondistended, nontender to palpation, bowel sounds active throughout  Rash overlying abdominal pannus is tender, but appears greatly improved from previous examinations  ZAHIRA  - Cr continues to improve, 1 79 (2 27, 2 73, 3 24)   - nephrology following  Rash and wounds to inframammary crease, pannus, groin, and medial thighs  - Greatly improved from previous exams  - Nystatin cream     PMH: obesity, HTN, Lymphedema, Sciatica, Anxiety, GERD    Plan:  - Continue conservative management for acute diverticulitis  No acute surgical intervention indicated at this time  - Diet as tolerated, seen by speech therapy, now on dysphagia diet   - Patient will need to follow up in wound care center once medically stable for discharge   - Medical management per primary team    Subjective/Objective   Subjective: No acute events overnight  No complaints of abdominal pain today  States her believes her rash and wounds are improving  Was seen by speech therapy yesterday due to complaints of trouble swallowing liquids, placed on dysphagia diet  Denies fever/chills, N/V, chest pain , SOB  Objective:   Blood pressure 116/73, pulse 80, temperature 98 6 °F (37 °C), resp  rate 17, height 5' 7" (1 702 m), weight (!) 183 kg (403 lb 7 1 oz), SpO2 94 %  ,Body mass index is 63 19 kg/m²  Intake/Output Summary (Last 24 hours) at 1/10/2022 0956  Last data filed at 1/9/2022 1716  Gross per 24 hour   Intake 0 ml   Output 650 ml   Net -650 ml       Invasive Devices  Report    Peripheral Intravenous Line            Peripheral IV 01/09/22 Dorsal (posterior); Left Hand <1 day Drain            Urethral Catheter 16 Fr  3 days                Physical Exam: /73   Pulse 80   Temp 98 4 °F (36 9 °C) (Tympanic)   Resp 18   Ht 5' 7" (1 702 m)   Wt (!) 183 kg (403 lb 7 1 oz)   SpO2 94%   BMI 63 19 kg/m²   General appearance: alert and oriented, in no acute distress, appears stated age and cooperative  Lungs: clear to auscultation bilaterally  Heart: regular rate and rhythm, S1, S2 normal, no murmur, click, rub or gallop  Abdomen: soft, non-tender; bowel sounds normal; no masses,  no organomegaly  Skin:  rash noted on inframammary folds bilaterally, abdominal pannus, groin, medial thighs with several open wounds  Appears improved from previous examinations  Still mildly painful with palpation        Lab, Imaging and other studies:  I have personally reviewed pertinent lab results    , CBC:   Lab Results   Component Value Date    WBC 7 00 01/10/2022    HGB 9 4 (L) 01/10/2022    HCT 31 3 (L) 01/10/2022    MCV 79 (L) 01/10/2022     01/10/2022    MCH 23 9 (L) 01/10/2022    MCHC 30 0 (L) 01/10/2022    RDW 17 1 (H) 01/10/2022    MPV 8 6 (L) 01/10/2022    NRBC 0 01/10/2022   , CMP:   Lab Results   Component Value Date    SODIUM 141 01/10/2022    K 4 9 01/10/2022     (H) 01/10/2022    CO2 19 (L) 01/10/2022    BUN 34 (H) 01/10/2022    CREATININE 1 79 (H) 01/10/2022    CALCIUM 8 6 01/10/2022    AST 16 01/10/2022    ALT 22 01/10/2022    ALKPHOS 88 01/10/2022    EGFR 30 01/10/2022     VTE Pharmacologic Prophylaxis: Enoxaparin (Lovenox)  VTE Mechanical Prophylaxis: sequential compression device    Fate Kussmaul, PA-C  01/10/22

## 2022-01-10 NOTE — PLAN OF CARE
Problem: Potential for Falls  Goal: Patient will remain free of falls  Description: INTERVENTIONS:  - Educate patient/family on patient safety including physical limitations  - Instruct patient to call for assistance with activity   - Consult OT/PT to assist with strengthening/mobility   - Keep Call bell within reach  - Keep bed low and locked with side rails adjusted as appropriate  - Keep care items and personal belongings within reach  - Initiate and maintain comfort rounds  - Make Fall Risk Sign visible to staff  - Offer Toileting every 2 Hours, in advance of need  - Initiate/Maintain bed alarm  - Obtain necessary fall risk management equipment:   - Apply yellow socks and bracelet for high fall risk patients  - Consider moving patient to room near nurses station  Outcome: Progressing     Problem: Prexisting or High Potential for Compromised Skin Integrity  Goal: Skin integrity is maintained or improved  Description: INTERVENTIONS:  - Identify patients at risk for skin breakdown  - Assess and monitor skin integrity  - Assess and monitor nutrition and hydration status  - Monitor labs   - Assess for incontinence   - Turn and reposition patient  - Assist with mobility/ambulation  - Relieve pressure over bony prominences  - Avoid friction and shearing  - Provide appropriate hygiene as needed including keeping skin clean and dry  - Evaluate need for skin moisturizer/barrier cream  - Collaborate with interdisciplinary team   - Patient/family teaching  - Consider wound care consult   Outcome: Progressing     Problem: MOBILITY - ADULT  Goal: Maintain or return to baseline ADL function  Description: INTERVENTIONS:  -  Assess patient's ability to carry out ADLs; assess patient's baseline for ADL function and identify physical deficits which impact ability to perform ADLs (bathing, care of mouth/teeth, toileting, grooming, dressing, etc )  - Assess/evaluate cause of self-care deficits   - Assess range of motion  - Assess patient's mobility; develop plan if impaired  - Assess patient's need for assistive devices and provide as appropriate  - Encourage maximum independence but intervene and supervise when necessary  - Involve family in performance of ADLs  - Assess for home care needs following discharge   - Consider OT consult to assist with ADL evaluation and planning for discharge  - Provide patient education as appropriate  Outcome: Progressing  Goal: Maintains/Returns to pre admission functional level  Description: INTERVENTIONS:  - Perform BMAT or MOVE assessment daily    - Set and communicate daily mobility goal to care team and patient/family/caregiver  - Collaborate with rehabilitation services on mobility goals if consulted    - Stand patient 2 times a day  - Ambulate patient 2 times a day  - Out of bed to chair 2 times a day   - Out of bed for meals 3 times a day  - Out of bed for toileting  - Record patient progress and toleration of activity level   Outcome: Progressing     Problem: Nutrition/Hydration-ADULT  Goal: Nutrient/Hydration intake appropriate for improving, restoring or maintaining nutritional needs  Description: Monitor and assess patient's nutrition/hydration status for malnutrition  Collaborate with interdisciplinary team and initiate plan and interventions as ordered  Monitor patient's weight and dietary intake as ordered or per policy  Utilize nutrition screening tool and intervene as necessary  Determine patient's food preferences and provide high-protein, high-caloric foods as appropriate       INTERVENTIONS:  - Monitor oral intake, urinary output, labs, and treatment plans  - Assess nutrition and hydration status and recommend course of action  - Evaluate amount of meals eaten  - Assist patient with eating if necessary   - Allow adequate time for meals  - Recommend/ encourage appropriate diets, oral nutritional supplements, and vitamin/mineral supplements  - Order, calculate, and assess calorie counts as needed  - Recommend, monitor, and adjust tube feedings and TPN/PPN based on assessed needs  - Assess need for intravenous fluids  - Provide specific nutrition/hydration education as appropriate  - Include patient/family/caregiver in decisions related to nutrition  Outcome: Progressing     Problem: PAIN - ADULT  Goal: Verbalizes/displays adequate comfort level or baseline comfort level  Description: Interventions:  - Encourage patient to monitor pain and request assistance  - Assess pain using appropriate pain scale  - Administer analgesics based on type and severity of pain and evaluate response  - Implement non-pharmacological measures as appropriate and evaluate response  - Consider cultural and social influences on pain and pain management  - Notify physician/advanced practitioner if interventions unsuccessful or patient reports new pain  Outcome: Progressing     Problem: INFECTION - ADULT  Goal: Absence or prevention of progression during hospitalization  Description: INTERVENTIONS:  - Assess and monitor for signs and symptoms of infection  - Monitor lab/diagnostic results  - Monitor all insertion sites, i e  indwelling lines, tubes, and drains  - Monitor endotracheal if appropriate and nasal secretions for changes in amount and color  - [place] appropriate cooling/warming therapies per order  - Administer medications as ordered  - Instruct and encourage patient and family to use good hand hygiene technique  - Identify and instruct in appropriate isolation precautions for identified infection/condition  Outcome: Progressing  Goal: Absence of fever/infection during neutropenic period  Description: INTERVENTIONS:  - Monitor WBC    Outcome: Progressing     Problem: SAFETY ADULT  Goal: Patient will remain free of falls  Description: INTERVENTIONS:  - Educate patient/family on patient safety including physical limitations  - Instruct patient to call for assistance with activity   - Consult OT/PT to assist with strengthening/mobility   - Keep Call bell within reach  - Keep bed low and locked with side rails adjusted as appropriate  - Keep care items and personal belongings within reach  - Initiate and maintain comfort rounds  - Make Fall Risk Sign visible to staff  - Offer Toileting every 2 Hours, in advance of need  - Initiate/Maintain bed alarm  - Obtain necessary fall risk management equipment:   - Apply yellow socks and bracelet for high fall risk patients  - Consider moving patient to room near nurses station  Outcome: Progressing  Goal: Maintain or return to baseline ADL function  Description: INTERVENTIONS:  -  Assess patient's ability to carry out ADLs; assess patient's baseline for ADL function and identify physical deficits which impact ability to perform ADLs (bathing, care of mouth/teeth, toileting, grooming, dressing, etc )  - Assess/evaluate cause of self-care deficits   - Assess range of motion  - Assess patient's mobility; develop plan if impaired  - Assess patient's need for assistive devices and provide as appropriate  - Encourage maximum independence but intervene and supervise when necessary  - Involve family in performance of ADLs  - Assess for home care needs following discharge   - Consider OT consult to assist with ADL evaluation and planning for discharge  - Provide patient education as appropriate  Outcome: Progressing  Goal: Maintains/Returns to pre admission functional level  Description: INTERVENTIONS:  - Perform BMAT or MOVE assessment daily    - Set and communicate daily mobility goal to care team and patient/family/caregiver     - Collaborate with rehabilitation services on mobility goals if consulted  - Stand patient 2 times a day  - Ambulate patient 2 times a day  - Out of bed to chair 2 times a day   - Out of bed for meals 3 times a day  - Out of bed for toileting  - Record patient progress and toleration of activity level   Outcome: Progressing     Problem: DISCHARGE PLANNING  Goal: Discharge to home or other facility with appropriate resources  Description: INTERVENTIONS:  - Identify barriers to discharge w/patient and caregiver  - Arrange for needed discharge resources and transportation as appropriate  - Identify discharge learning needs (meds, wound care, etc )  - Arrange for interpretive services to assist at discharge as needed  - Refer to Case Management Department for coordinating discharge planning if the patient needs post-hospital services based on physician/advanced practitioner order or complex needs related to functional status, cognitive ability, or social support system  Outcome: Progressing     Problem: Knowledge Deficit  Goal: Patient/family/caregiver demonstrates understanding of disease process, treatment plan, medications, and discharge instructions  Description: Complete learning assessment and assess knowledge base    Interventions:  - Provide teaching at level of understanding  - Provide teaching via preferred learning methods  Outcome: Progressing

## 2022-01-10 NOTE — PLAN OF CARE
Problem: OCCUPATIONAL THERAPY ADULT  Goal: Performs self-care activities at highest level of function for planned discharge setting  See evaluation for individualized goals  Description: Treatment Interventions: ADL retraining,Functional transfer training,UE strengthening/ROM,Endurance training,Patient/family training,Activityengagement          See flowsheet documentation for full assessment, interventions and recommendations  Outcome: Progressing  Note: Limitation: Decreased ADL status,Decreased UE strength,Decreased Safe judgement during ADL,Decreased endurance,Decreased self-care trans,Decreased high-level ADLs     Assessment: Patient participated in Skilled OT session this date with interventions consisting of ADL re training with the use of correct body mechnaics, Energy Conservation techniques, safety awareness and fall prevention techniques,  therapeutic activities to: increase activity tolerance, increase dynamic sit/ stand balance during functional activity  and increase OOB/ sitting tolerance   Co treatment with PTA secondary to complex medical condition of pt,  A of 2 required to achieve and maintain transitional movements, requiring the need of skilled therapeutic intervention of 2 therapists to achieve delivery of services  Patient agreeable to OT treatment session, upon arrival patient was found supine in bed  Patient requiring ocassional safety reminders  Patient continues to be functioning below baseline level, occupational performance remains limited secondary to factors listed above and increased risk for falls and injury  From OT standpoint, recommendation at time of d/c would be Post acute rehabilitation services  The patient's raw score on the AM-PAC Daily Activity inpatient short form is 15, standardized score is 34 69, less than 39 4  Patients at this level are likely to benefit from discharge to post-acute rehabilitation services   Please refer to the recommendation of the Occupational Therapist for safe discharge planning       OT Discharge Recommendation: Post acute rehabilitation services  OT - OK to Discharge: Yes (once medically cleared)     Bowen Foster OT

## 2022-01-10 NOTE — OCCUPATIONAL THERAPY NOTE
Occupational Therapy treatment Session     Patient Name: Camron ARRIAZA Date: 1/10/2022  Problem List  Principal Problem:    Cellulitis of abdominal wall, chest wall and groin with wounds  Active Problems:    Hypertension    Morbid obesity with BMI of 60 0-69 9, adult ([facility] [address] )    Borderline hyperlipidemia    Acute renal failure (ARF) ([facility] [address] )    Diverticulitis of large intestine with perforation    Right Hydroureteronephrosis    Subacute vaginitis    Dysphagia    Past Medical History  Past Medical History:   Diagnosis Date    Anxiety     Arthritis     GERD (gastroesophageal reflux disease)     Hidradenitis suppurativa     Hypertension     Lipodermatosclerosis of both lower extremities     Lymphedema 2006    Sciatic leg pain     left side    SOB (shortness of breath)     Stomach ulcer      Past Surgical History  Past Surgical History:   Procedure Laterality Date     SECTION      OK EXC SKIN BENIG 3 1-4 CM TRUNK,ARM,LEG Right 2020    Procedure: EXCISION BIOPSY TISSUE LESION/MASS UPPER EXTREMITY;  Surgeon: Florentino Dominguez DO;  Location: [facility];  Service: General    TONSILLECTOMY      TUMOR REMOVAL Right     5th finger        01/10/22 1012   OT Last Visit   OT Visit Date 01/10/22   Note Type   Note Type Treatment   Restrictions/Precautions   Weight Bearing Precautions Per Order No   Other Precautions Pain;Multiple lines; Bed Alarm;Telemetry; Fall Risk   Pain Assessment   Pain Assessment Tool 0-10   Pain Score 10 - Worst Possible Pain   Pain Location/Orientation Location: [facility]   Hospital Pain Intervention(s) Medication (See MAR)  (MICHELLE Farah present during session to give pain med)   ADL   Where Assessed Edge of bed   Grooming Assistance 5  Supervision/Setup   Grooming Deficit Supervision/safety; Wash/dry face   Grooming Comments Pt washed her face while seated EOB with supervision   UB Bathing Assistance 5  Supervision/Setup   UB Bathing Deficit Supervision/safety; Increased time to complete   UB Bathing Comments Pt washed UB while seated EOB with supervision  LB Bathing Comments Pt declined to perform LB bathing  UB Dressing Assistance 5  Supervision/Setup   UB Dressing Deficit Supervision/safety;Verbal cueing   UB Dressing Comments Pt supervision to don/doff gown while seated EOB   LB Dressing Assistance 1  Total Assistance   LB Dressing Deficit Don/doff R sock; Don/doff L sock   LB Dressing Comments Pt total A to don bilateral socks while seated EOB, pt unable to attempt task due to concerns for LOB when leaning forward  Toileting Comments Pt requested to complete toileting at end of session  Pt used toilet in bathroom in room, but was unable to go, therefore clothing management and hygiene during toileting not observed  Bed Mobility   Supine to Sit 4  Minimal assistance   Additional items Assist x 1; Increased time required;Verbal cues;LE management; Bedrails;HOB elevated   Additional Comments Pt supine in bed at start of session  Pt agreeable to sit EOB  Pt able tos it EOb for ~25 to 30 minutes with supervision no LOB  Transfers   Sit to Stand   (CGA)   Additional items Assist x 1; Increased time required;Verbal cues   Stand to Sit   (CGA)   Additional items Assist x 1; Increased time required;Verbal cues   Stand pivot   (CGA)   Additional items Assist x 1; Increased time required;Verbal cues   Toilet transfer 4  Minimal assistance   Additional items Assist x 1; Increased time required;Verbal cues;Standard toilet   Additional Comments Pt supported on RW during functional transfers  Pt required v c  to use grab bars during toilet transfer  no LOB or unsteadiness  Functional Mobility   Functional Mobility 4  Minimal assistance   Additional Comments Pt performed functional mobility in room with min A x1-2 and use of RW  No LOB or unsteadiness  Pt performed functional mobility from bed to wall x 2 with seated rest break   Pt then also performed functional mobility to bathroom in room and then back to bed  Vitals WNL during functional mobility  Additional items Rolling walker   Toilet Transfers   Toilet Transfer From Rolling walker   Toilet Transfer Type To   Toilet Transfer to Standard toilet   Toilet Transfer Technique Stand pivot   Toilet Transfers Minimal assistance   Toilet Transfers Comments v c  to use grab bars   Cognition   Overall Cognitive Status WFL   Arousal/Participation Alert; Responsive; Cooperative   Attention Within functional limits   Orientation Level Oriented X4   Memory Within functional limits   Following Commands Follows all commands and directions without difficulty   Comments Pt agreeable to OT treatment session and very pleasant  Activity Tolerance   Activity Tolerance Patient limited by pain   Medical Staff Made Aware RN Rebeka Bean verbalized pt appropriate for OT treatment and present part way through session to admin pain medication  PTA Markos Villafana present for part of session for co-treatment due to pt's medical complexity requiring A of 2 at times to ensure safety  Assessment   Assessment Patient participated in Skilled OT session this date with interventions consisting of ADL re training with the use of correct body mechnaics, Energy Conservation techniques, safety awareness and fall prevention techniques,  therapeutic activities to: increase activity tolerance, increase dynamic sit/ stand balance during functional activity  and increase OOB/ sitting tolerance   Co treatment with PTA secondary to complex medical condition of pt,  A of 2 required to achieve and maintain transitional movements, requiring the need of skilled therapeutic intervention of 2 therapists to achieve delivery of services  Patient agreeable to OT treatment session, upon arrival patient was found supine in bed  Patient requiring ocassional safety reminders   Patient continues to be functioning below baseline level, occupational performance remains limited secondary to factors listed above and increased risk for falls and injury  From OT standpoint, recommendation at time of d/c would be Post acute rehabilitation services  The patient's raw score on the AM-PAC Daily Activity inpatient short form is 15, standardized score is 34 69, less than 39 4  Patients at this level are likely to benefit from discharge to post-acute rehabilitation services  Please refer to the recommendation of the Occupational Therapist for safe discharge planning  Plan   Treatment Interventions ADL retraining;Functional transfer training; Endurance training   Goal Expiration Date 01/07/22   OT Treatment Day 1   OT Frequency 3-5x/wk   Recommendation   OT Discharge Recommendation Post acute rehabilitation services   OT - OK to Discharge Yes  (once medically cleared)   Additional Comments  Pt left seated EOB at end of session, all needs met, call bell in reach  , speech Martin Hair present      AM-PAC Daily Activity Inpatient   Lower Body Dressing 1   Bathing 2   Toileting 2   Upper Body Dressing 3   Grooming 3   Eating 4   Daily Activity Raw Score 15   Daily Activity Standardized Score (Calc for Raw Score >=11) 34 69   AM-PAC Applied Cognition Inpatient   Following a Speech/Presentation 4   Understanding Ordinary Conversation 4   Taking Medications 4   Remembering Where Things Are Placed or Put Away 4   Remembering List of 4-5 Errands 4   Taking Care of Complicated Tasks 4   Applied Cognition Raw Score 24   Applied Cognition Standardized Score 62 21        Laurel Kang, OT

## 2022-01-10 NOTE — PLAN OF CARE
Problem: Potential for Falls  Goal: Patient will remain free of falls  Description: INTERVENTIONS:  - Educate patient/family on patient safety including physical limitations  - Instruct patient to call for assistance with activity   - Consult OT/PT to assist with strengthening/mobility   - Keep Call bell within reach  - Keep bed low and locked with side rails adjusted as appropriate  - Keep care items and personal belongings within reach  - Initiate and maintain comfort rounds  - Make Fall Risk Sign visible to staff  - Offer Toileting every 4 Hours, in advance of need  - Initiate/Maintain bed alarm  - Obtain necessary fall risk management equipment  - Apply yellow socks and bracelet for high fall risk patients  - Consider moving patient to room near nurses station  Outcome: Progressing     Problem: Prexisting or High Potential for Compromised Skin Integrity  Goal: Skin integrity is maintained or improved  Description: INTERVENTIONS:  - Identify patients at risk for skin breakdown  - Assess and monitor skin integrity  - Assess and monitor nutrition and hydration status  - Monitor labs   - Assess for incontinence   - Turn and reposition patient  - Assist with mobility/ambulation  - Relieve pressure over bony prominences  - Avoid friction and shearing  - Provide appropriate hygiene as needed including keeping skin clean and dry  - Evaluate need for skin moisturizer/barrier cream  - Collaborate with interdisciplinary team   - Patient/family teaching  - Consider wound care consult   Outcome: Progressing     Problem: MOBILITY - ADULT  Goal: Maintain or return to baseline ADL function  Description: INTERVENTIONS:  -  Assess patient's ability to carry out ADLs; assess patient's baseline for ADL function and identify physical deficits which impact ability to perform ADLs (bathing, care of mouth/teeth, toileting, grooming, dressing, etc )  - Assess/evaluate cause of self-care deficits   - Assess range of motion  - Assess patient's mobility; develop plan if impaired  - Assess patient's need for assistive devices and provide as appropriate  - Encourage maximum independence but intervene and supervise when necessary  - Involve family in performance of ADLs  - Assess for home care needs following discharge   - Consider OT consult to assist with ADL evaluation and planning for discharge  - Provide patient education as appropriate  Outcome: Progressing  Goal: Maintains/Returns to pre admission functional level  Description: INTERVENTIONS:  - Perform BMAT or MOVE assessment daily    - Set and communicate daily mobility goal to care team and patient/family/caregiver  - Collaborate with rehabilitation services on mobility goals if consulted  - Perform Range of Motion 2 times a day  - Reposition patient every 2 hours  - Dangle patient 2 times a day  - Stand patient 2 times a day  - Ambulate patient 2 times a day  - Out of bed to chair 2 times a day   - Out of bed for meals 2 times a day  - Out of bed for toileting  - Record patient progress and toleration of activity level   Outcome: Progressing     Problem: Nutrition/Hydration-ADULT  Goal: Nutrient/Hydration intake appropriate for improving, restoring or maintaining nutritional needs  Description: Monitor and assess patient's nutrition/hydration status for malnutrition  Collaborate with interdisciplinary team and initiate plan and interventions as ordered  Monitor patient's weight and dietary intake as ordered or per policy  Utilize nutrition screening tool and intervene as necessary  Determine patient's food preferences and provide high-protein, high-caloric foods as appropriate       INTERVENTIONS:  - Monitor oral intake, urinary output, labs, and treatment plans  - Assess nutrition and hydration status and recommend course of action  - Evaluate amount of meals eaten  - Assist patient with eating if necessary   - Allow adequate time for meals  - Recommend/ encourage appropriate diets, oral nutritional supplements, and vitamin/mineral supplements  - Order, calculate, and assess calorie counts as needed  - Recommend, monitor, and adjust tube feedings and TPN/PPN based on assessed needs  - Assess need for intravenous fluids  - Provide specific nutrition/hydration education as appropriate  - Include patient/family/caregiver in decisions related to nutrition  Outcome: Progressing     Problem: PAIN - ADULT  Goal: Verbalizes/displays adequate comfort level or baseline comfort level  Description: Interventions:  - Encourage patient to monitor pain and request assistance  - Assess pain using appropriate pain scale  - Administer analgesics based on type and severity of pain and evaluate response  - Implement non-pharmacological measures as appropriate and evaluate response  - Consider cultural and social influences on pain and pain management  - Notify physician/advanced practitioner if interventions unsuccessful or patient reports new pain  Outcome: Progressing     Problem: INFECTION - ADULT  Goal: Absence or prevention of progression during hospitalization  Description: INTERVENTIONS:  - Assess and monitor for signs and symptoms of infection  - Monitor lab/diagnostic results  - Monitor all insertion sites, i e  indwelling lines, tubes, and drains  - Monitor endotracheal if appropriate and nasal secretions for changes in amount and color  - [place] appropriate cooling/warming therapies per order  - Administer medications as ordered  - Instruct and encourage patient and family to use good hand hygiene technique  - Identify and instruct in appropriate isolation precautions for identified infection/condition  Outcome: Progressing  Goal: Absence of fever/infection during neutropenic period  Description: INTERVENTIONS:  - Monitor WBC    Outcome: Progressing     Problem: SAFETY ADULT  Goal: Patient will remain free of falls  Description: INTERVENTIONS:  - Educate patient/family on patient safety including physical limitations  - Instruct patient to call for assistance with activity   - Consult OT/PT to assist with strengthening/mobility   - Keep Call bell within reach  - Keep bed low and locked with side rails adjusted as appropriate  - Keep care items and personal belongings within reach  - Initiate and maintain comfort rounds  - Make Fall Risk Sign visible to staff  - Offer Toileting every 4 Hours, in advance of need  - Initiate/Maintain bed alarm  - Obtain necessary fall risk management equipment  - Apply yellow socks and bracelet for high fall risk patients  - Consider moving patient to room near nurses station  Outcome: Progressing  Goal: Maintain or return to baseline ADL function  Description: INTERVENTIONS:  -  Assess patient's ability to carry out ADLs; assess patient's baseline for ADL function and identify physical deficits which impact ability to perform ADLs (bathing, care of mouth/teeth, toileting, grooming, dressing, etc )  - Assess/evaluate cause of self-care deficits   - Assess range of motion  - Assess patient's mobility; develop plan if impaired  - Assess patient's need for assistive devices and provide as appropriate  - Encourage maximum independence but intervene and supervise when necessary  - Involve family in performance of ADLs  - Assess for home care needs following discharge   - Consider OT consult to assist with ADL evaluation and planning for discharge  - Provide patient education as appropriate  Outcome: Progressing  Goal: Maintains/Returns to pre admission functional level  Description: INTERVENTIONS:  - Perform BMAT or MOVE assessment daily    - Set and communicate daily mobility goal to care team and patient/family/caregiver  - Collaborate with rehabilitation services on mobility goals if consulted  - Perform Range of Motion 2 times a day  - Reposition patient every 2 hours    - Dangle patient 2 times a day  - Stand patient 2 times a day  - Ambulate patient 2 times a day  - Out of bed to chair 2 times a day   - Out of bed for meals 2 times a day  - Out of bed for toileting  - Record patient progress and toleration of activity level   Outcome: Progressing     Problem: DISCHARGE PLANNING  Goal: Discharge to home or other facility with appropriate resources  Description: INTERVENTIONS:  - Identify barriers to discharge w/patient and caregiver  - Arrange for needed discharge resources and transportation as appropriate  - Identify discharge learning needs (meds, wound care, etc )  - Arrange for interpretive services to assist at discharge as needed  - Refer to Case Management Department for coordinating discharge planning if the patient needs post-hospital services based on physician/advanced practitioner order or complex needs related to functional status, cognitive ability, or social support system  Outcome: Progressing     Problem: Knowledge Deficit  Goal: Patient/family/caregiver demonstrates understanding of disease process, treatment plan, medications, and discharge instructions  Description: Complete learning assessment and assess knowledge base    Interventions:  - Provide teaching at level of understanding  - Provide teaching via preferred learning methods  Outcome: Progressing

## 2022-01-10 NOTE — PROGRESS NOTES
Vancomycin Assessment    Willian Sheets is a 60 y o  female who is currently receiving vancomycin 2000mg q24h for skin-soft tissue infection     Relevant clinical data and objective history reviewed:  Creatinine   Date Value Ref Range Status   01/10/2022 1 79 (H) 0 60 - 1 30 mg/dL Final     Comment:     Standardized to IDMS reference method   01/09/2022 2 27 (H) 0 60 - 1 30 mg/dL Final     Comment:     Standardized to IDMS reference method   01/08/2022 2 73 (H) 0 60 - 1 30 mg/dL Final     Comment:     Standardized to IDMS reference method     Vancomycin Rm   Date Value Ref Range Status   01/10/2022 25 4 ug/mL Final     /78   Pulse 76   Temp 98 1 °F (36 7 °C) (Temporal)   Resp 18   Ht 5' 7" (1 702 m)   Wt (!) 183 kg (403 lb 7 1 oz)   SpO2 93%   BMI 63 19 kg/m²   I/O last 3 completed shifts: In: 280 [P O :280]  Out: 1000 [Urine:1000]  Lab Results   Component Value Date/Time    BUN 34 (H) 01/10/2022 05:29 AM    WBC 7 00 01/10/2022 05:29 AM    HGB 9 4 (L) 01/10/2022 05:29 AM    HCT 31 3 (L) 01/10/2022 05:29 AM    MCV 79 (L) 01/10/2022 05:29 AM     01/10/2022 05:29 AM     Temp Readings from Last 3 Encounters:   01/10/22 98 1 °F (36 7 °C) (Temporal)   12/06/21 (!) 96 1 °F (35 6 °C)   11/22/21 (!) 97 2 °F (36 2 °C)     Vancomycin Days of Therapy: 5    Assessment/Plan  The patient is currently on vancomycin utilizing scheduled dosing  Baseline risks associated with therapy include: pre-existing renal impairment and dehydration  The patient is receiving 2000mg q24h with the most recent vancomycin level being at steady-state and supratherapeutic based on a goal of 15-20 (appropriate for most indications) ; therefore, after clinical evaluation will be changed to 1250mg q24h   Pharmacy will continue to follow closely for s/sx of nephrotoxicity, infusion reactions, and appropriateness of therapy  BMP and CBC will be ordered per protocol  Plan for random level to be drawn 1/11/22 at 0600    The lower adjusted dose can be started when a blood level is drawn less than 20  Pharmacy will continue to follow the patients culture results and clinical progress daily      Ling Hernadez, Pharmacist

## 2022-01-10 NOTE — ASSESSMENT & PLAN NOTE
Right Hydroureteronephrosis noted on CT abdomen pelvis on 01/06/22    Renal US : right renal collecting system appears minimally dilated similar to prior CT  Per radiology suspecting related to inflammatory changes in the sigmoid colon  Galan in place - draining clear urine with creatinine continuing to improve   Outpatient follow up with urology

## 2022-01-10 NOTE — ASSESSMENT & PLAN NOTE
Superimposed on chronic kidney disease with a baseline creatinine of 1  Continue to hold lisinopril and Celebrex  Continue Galan catheter and will have patient follow-up as an outpatient  DC IV fluids, patient eating and drinking

## 2022-01-10 NOTE — SPEECH THERAPY NOTE
Speech Language/Pathology    Speech/Language Pathology Progress Note    Patient Name: Steve SANCHEZ Date: 1/10/2022     Problem List  Principal Problem:    Cellulitis of abdominal wall, chest wall and groin with wounds  Active Problems:    Hypertension    Morbid obesity with BMI of 60 0-69 9, adult ([facility])    Borderline hyperlipidemia    Acute renal failure (ARF) ([facility] [address] )    Diverticulitis of large intestine with perforation    Right Hydroureteronephrosis    Subacute vaginitis    Dysphagia       Past Medical History  Past Medical History:   Diagnosis Date    Anxiety     Arthritis     GERD (gastroesophageal reflux disease)     Hidradenitis suppurativa     Hypertension     Lipodermatosclerosis of both lower extremities     Lymphedema     Sciatic leg pain     left side    SOB (shortness of breath)     Stomach ulcer         Past Surgical History  Past Surgical History:   Procedure Laterality Date     SECTION      [place] EXC SKIN BENIG 3 1-4 CM TRUNK,ARM,LEG Right 2020    Procedure: EXCISION BIOPSY TISSUE LESION/MASS UPPER EXTREMITY;  Surgeon: Randell Gurrola DO;  Location: [facility];  Service: General    TONSILLECTOMY      TUMOR REMOVAL Right     5th finger       Subjective:  Patient assisted off of the commode by PT  Pt seated on edge of bed for PO trials  Objective: Thin via straw:  100% acc i'ly    Assessment:  Patient demonstrated tolerance of thin via straw  Pt reports continued feeling of something "in my throat" prior to swallow initiation  Pt reports that it mimicks a "pill" feeling with no pill present  She indicates that this began very recently and is currently on antibiotics  GI consult requested      Plan/Recommendations:  ST to cont f/u care

## 2022-01-10 NOTE — PROGRESS NOTES
NEPHROLOGY PROGRESS NOTE   Quan Moore 60 y o  female MRN: [id number]  Unit/Bed#: 364-80 Encounter: [id number]    Assessment/Plan:    Quan Moore is a 60 y o  female whose pertinent medical problems include hypertension, obesity admitted 1/6 with chief complaint of open sores being treated for acute kidney injury, urinary retention, right hydroureteronephrosis status post indwelling urinary catheter, abdominal cellulitis, sigmoid diverticulitis with microperforation, dysphagia possibly due to esophageal candidiasis  Renal following along for ZAHIRA, obstructive uropathy  Plan outlined below  1  Acute kidney injury (POA) atop chronic kidney disease  · Presented with creatinine of 4 5 mg/dL -> 1 8 mg/dL today  Etiology likely acute tubular necrosis in the setting of volume depletion, Celebrex, lisinopril, and hydroureteronephrosis  Fortunately renal function continues to improve  · Plan:  · Trial discontinuation of volume expansion as she is eating and drinking well  · Continue to hold lisinopril and Celebrex  · Continue indwelling urinary catheter and outpatient follow-up with Urology Nephrology  · Maximize hemodynamics and avoid other potential nephrotoxins  2  Stage 2 chronic kidney disease baseline creatinine around 0 9-1 0 mg/dL  3  Obstructive uropathy, mild right hydroureteronephrosis  · Renal ultrasound difficult study  Indwelling urinary catheter remains intact  Outpatient follow-up with Urology  4  Hypertension the setting of CKD  · Blood pressure is appropriate  Continue to hold lisinopril  5  Proteinuria  · Repeat urine studies in stable steady state  May need to rule out monoclonal gammopathy as oupatient  Lisinopril on hold  6  Abdominal cellulitis with superficial wounds  · Management per Infectious Disease and primary team  7  Fungal dermatitis  · On fluconazole  Management per primary team and Infectious Disease  8   Obesity    ROS  No physical complaints on exam   A complete 10 point review of systems have been performed and are otherwise negative         Historical Information   Past Medical History:   Diagnosis Date    Anxiety     Arthritis     GERD (gastroesophageal reflux disease)     Hidradenitis suppurativa     Hypertension     Lipodermatosclerosis of both lower extremities     Lymphedema     Sciatic leg pain 2006    left side    SOB (shortness of breath)     Stomach ulcer      Past Surgical History:   Procedure Laterality Date     SECTION      KS EXC SKIN BENIG 3 1-4 CM TRUNK,ARM,LEG Right 2020    Procedure: EXCISION BIOPSY TISSUE LESION/MASS UPPER EXTREMITY;  Surgeon: Thor Rogers DO;  Location: [facility];  Service: General    TONSILLECTOMY      TUMOR REMOVAL Right     5th finger     Social History   Social History     Substance and Sexual Activity   Alcohol Use Not Currently    Comment: holidays     Social History     Substance and Sexual Activity   Drug Use No     Social History     Tobacco Use   Smoking Status Light Tobacco Smoker    Packs/day: 0 25    Years: 20 00    Pack years: 5 00    Types: Cigarettes   Smokeless Tobacco Never Used   Tobacco Comment    Only smoke once in awhile       Family History:   Family History   Problem Relation Age of Onset    Cancer Mother     Hypertension Mother     COPD Father     Diabetes Father        Medications:  Pertinent medications were reviewed  Current Facility-Administered Medications   Medication Dose Route Frequency Provider Last Rate    acetaminophen  975 mg Oral Q8H [facility] Joanne Warner MD      al mag oxide-diphenhydramine-lidocaine viscous  10 mL Swish & Swallow Q4H PRN Estefany Cavanaugh MD      albuterol  2 puff Inhalation Q6H PRN Mary Allison MD      aluminum-magnesium hydroxide-simethicone  30 mL Oral Q4H PRN Estefany Cavanaugh MD      cefepime  1,000 mg Intravenous Q12H Jean Pierre Erwin MD 1,000 mg (01/10/22 0554)    docusate sodium  100 mg Oral BID MD Joann Willams enoxaparin  60 mg Subcutaneous Q12H 98 Karli Allred MD      fluconazole  150 mg Oral Daily Jessi Marquez MD      fluticasone  1 spray Each Nare Daily Mirna Cameron MD      HYDROmorphone  0 2 mg Intravenous Q4H PRN Jessi Marquez MD      hydrOXYzine HCL  25 mg Oral TID PRN Mirna Cameron MD      lidocaine  1 patch Topical Daily Jessi Marquez MD      methocarbamol  500 mg Oral Q8H [facility] Jessi Marquez MD      metoprolol tartrate  25 mg Oral BID Mirna Cameron MD      metroNIDAZOLE  500 mg Oral Q8H [facility] Mirna Cameron MD      nicotine  1 patch Transdermal Daily Mirna Cameron MD      nystatin   Topical BID Jessi Marquez MD      nystatin  500,000 Units Swish & Swallow 4x Daily Mirna Cameron MD      nystatin   Topical TID Mirna Cameron MD      ondansetron  4 mg Intravenous Q6H PRN Mirna Cameron MD      oxyCODONE  5 mg Oral Q4H PRN Jessi Marquez MD      Or   Akhil Notus oxyCODONE  10 mg Oral Q4H PRN Jessi Marquez MD      sertraline  50 mg Oral Daily Mirna Cameron MD      sodium bicarbonate  1,300 mg Oral BID after meals Rosa M Kasper PA-C      sodium chloride  50 mL/hr Intravenous Continuous Jeffrey Maldonado MD 50 mL/hr (01/09/22 1219)   Akhil Guallpa [START ON 1/11/2022] vancomycin  20 mg/kg (Ideal) Intravenous Q24H Lorenzo Sylvester MD           Allergies   Allergen Reactions    Bee Venom Anaphylaxis, Shortness Of Breath and Swelling         Vitals:   /73   Pulse 80   Temp 98 6 °F (37 °C)   Resp 17   Ht 5' 7" (1 702 m)   Wt (!) 183 kg (403 lb 7 1 oz)   SpO2 94%   BMI 63 19 kg/m²   Body mass index is 63 19 kg/m²    SpO2: 94 %,   SpO2 Activity: At Rest,   O2 Device: None (Room air)      Intake/Output Summary (Last 24 hours) at 1/10/2022 0914  Last data filed at 1/9/2022 1716  Gross per 24 hour   Intake 0 ml   Output 650 ml   Net -650 ml     Invasive Devices  Report    Peripheral Intravenous Line            Peripheral IV 01/09/22 Dorsal (posterior); Left Hand <1 day          Drain            Urethral Catheter 16 Fr  3 days                Physical Exam  General: conscious, cooperative, in no acute distress  Eyes: conjunctivae pink, anicteric sclerae  ENT: lips and mucous membranes moist  Neck: supple, no JVD, no masses  Chest:  Diminished breath sounds bilaterally, no crackles, ronchus or wheezings  CVS: S1 & S2, normal rate, regular rhythm  Abdomen: soft, non-tender, non-distended, normoactive bowel sounds obese  Extremities:  Bilateral lower extremity lymphedema, erythema to feet  Skin: no rash  Neuro: awake, alert, oriented      Diagnostic Data:  Lab: I have personally reviewed pertinent lab results  ,   CBC:  Results from last 7 days   Lab Units 01/10/22  0529   WBC Thousand/uL 7 00   HEMOGLOBIN g/dL 9 4*   HEMATOCRIT % 31 3*   PLATELETS Thousands/uL 340      CMP:   Lab Results   Component Value Date    SODIUM 141 01/10/2022    K 4 9 01/10/2022     (H) 01/10/2022    CO2 19 (L) 01/10/2022    BUN 34 (H) 01/10/2022    CREATININE 1 79 (H) 01/10/2022    CALCIUM 8 6 01/10/2022    AST 16 01/10/2022    ALT 22 01/10/2022    ALKPHOS 88 01/10/2022    EGFR 30 01/10/2022   ,   PT/INR: No results found for: PT, INR,   Magnesium: No components found for: MAG,  Phosphorous:   Lab Results   Component Value Date    PHOS 3 4 01/10/2022       Microbiology:  @LABRCNTIP,(urinecx:7)@        MIRTA Damian    Portions of the record may have been created with voice recognition software  Occasional wrong word or "sound a like" substitutions may have occurred due to the inherent limitations of voice recognition software  Read the chart carefully and recognize, using context, where substitutions have occurred

## 2022-01-10 NOTE — PLAN OF CARE
Problem: PHYSICAL THERAPY ADULT  Goal: Performs mobility at highest level of function for planned discharge setting  See evaluation for individualized goals  Description: Treatment/Interventions: Functional transfer training,LE strengthening/ROM,Therapeutic exercise,Endurance training,Bed mobility,Gait training  Equipment Recommended: Fely Dillon       See flowsheet documentation for full assessment, interventions and recommendations  Outcome: Progressing  Note: Prognosis: Guarded  Problem List: Decreased strength,Decreased endurance,Impaired balance,Decreased mobility,Decreased safety awareness,Pain,Obesity  Assessment: Pt seen for PT treatment session this date with interventions consisting of gait training w/ emphasis on improving pt's ability to ambulate level surfaces x 10' with min A of 2 provided by therapist with RW and therapeutic activity consisting of training: bed mobility, supine<>sit transfers, sit<>stand transfers and stand pivot transfers  Pt agreeable to PT treatment session upon arrival, pt found supine in bed, in no apparent distress  In comparison to previous session, pt with improvements in decreased assistance for transfers with increase in ambulation distances  Pt educated on seated therex with pt verbalizing agreement on trialing  She continues to be limited by decreased activity tolerance, weakness, and body habitus  Post session: pt seated on toilet and verbalizing agreement to ring for assistance when finished  Continue to recommend STR at time of d/c in order to maximize pt's functional independence and safety w/ mobility  Pt continues to be functioning below baseline level, and remains limited 2* factors listed above  PT will continue to see pt while here in order to address the deficits listed above and provide interventions consistent w/ POC in effort to achieve STGs             PT Discharge Recommendation: Post acute rehabilitation services          See flowsheet documentation for full assessment

## 2022-01-10 NOTE — PROGRESS NOTES
Progress Note - Infectious Disease   Tracy Cardona 60 y o  female MRN: [id number]  Unit/Bed#: 418-01 Encounter: [id number]        REQUIRED DOCUMENTATION:     1  This service was provided via Telemedicine  2  Provider located at [facility]  3  TeleMed provider: Jordan Young MD   4  Identify all parties in room with patient during tele consult:RN  5  After connecting through [employer], patient was identified by name and date of birth and assistant checked wristband  Patient was then informed that this was a Telemedicine visit and that the exam was being conducted confidentially over secure lines  My office door was closed  No one else was in the room  Patient acknowledged consent and understanding of privacy and security of the Telemedicine visit, and gave us permission to have the assistant stay in the room in order to assist with the history and to conduct the exam   I informed the patient that I have reviewed their record in Epic and presented the opportunity for them to ask any questions regarding the visit today  The patient agreed to participate  Assessment/Recommendations     1  Perforated sigmoid diverticulitis with microperforation, small right pelvic abscess  - Managed conservatively, not amenable to drainage by IR  - Clinically improving, afebrile without leukocytosis    · Discontinue Vanc, cefepime and switch to Ceftriaxone 2g iv q24h  · Continue po flagyl 500 tid  · Anticipate 10 days of therapy pending clinical and radiologic resolution  · Recommend repeat CT abd/pelvis in 3-4 days or sooner with clinical worsneing  · Continue serial abdominal exams  · Additional management per surgery    2   Fungal dermatitis with secondary bacterial infection, present on admission  - Long standing rash, wounds present over pannus, under breasts, mons, legs  - In the setting of morbid obesity and hx hidradenitis suppurativa  - Wound cx negative till date    · Continue antibiotics as above  · Continue topical antifungals and PO fluconazole but increase dose to 400 mg daily as noted below, final duration to be determined  · Continue wound care, barrier protection  · Recommend offloading to allow buttock wounds to heal    3  Dysphagia, thrush, possible candidal esophagitis  - Patient reports pain and discomfort with swallowing and choking episodes    · Increase fluconazole dosing to 400 mg daily  · Recommend GI evaluation  · Recommend speech therapy    4  Acute kidney injury, R hydroureteronephrosis  - Likely pre renal  - CT notes new R mild hydroureteronephrosis but no obstruction, possible reactive  - creatinine improving    · Continue renal dosing of abx    5  Morbid obesity  - BMI 63  Risk factor for infection    · Recommend lifestyle modification and bariatric surgery consult as outpatient    Discussed with the primary service  History       Subjective: The patient notes improvement in pain and swelling under breast and pannus but persistent pain in buttocks and upper thigh  Also notes painful swallowing and discomfort with choking on water this morning  Denies fevers, chills, or sweats  Denies nausea, vomiting, or diarrhea      Antibiotics:  Vanc, cefepime, flagyl, fluconazole      Physical Exam     Temp:  [98 1 °F (36 7 °C)-98 6 °F (37 °C)] 98 6 °F (37 °C)  HR:  [75-85] 75  Resp:  [16-18] 17  BP: (101-122)/(59-84) 101/59  SpO2:  [93 %-96 %] 93 %  Temp (24hrs), Av 4 °F (36 9 °C), Min:98 1 °F (36 7 °C), Max:98 6 °F (37 °C)  Current: Temperature: 98 6 °F (37 °C)    Intake/Output Summary (Last 24 hours) at 1/10/2022 0835  Last data filed at 2022 1716  Gross per 24 hour   Intake 0 ml   Output 650 ml   Net -650 ml       Physical exam findings reported by bedside and primary medical team staff      General Appearance:  Appearing chronically ill, nontoxic, and in mild distress from pain, appears stated age   Throat: Oropharynx moist without white plaques; lips, mucosa, and tongue normal; teeth and gums normal   Lungs:   Clear to auscultation bilaterally, no audible wheezes, rhonchi and rales, respirations unlabored   Heart:  Regular rate and rhythm, S1, S2 normal, no murmur, rub or gallop   Abdomen:   Soft, non-tender, non-distended, positive bowel sounds, no masses, no organomegaly    No CVA tenderness   Extremities: Extremities normal, atraumatic, no cyanosis, clubbing or edema   Skin: Multiple ulcerated wounds over plaque like erythema under breast, pannus, mons, gluteus and upper posterior thighs   Neurologic: Alert and oriented times 3         Invasive Devices:   Peripheral IV 01/09/22 Dorsal (posterior); Left Hand (Active)       Urethral Catheter 16 Fr  (Active)   Reasons to continue Urinary Catheter  Acute urinary retention/obstruction failing urinary retention protocol 01/06/22 1444   Goal for Removal No longer needed- Will place order to discontinue 01/06/22 1444   Site Assessment Clean;Skin intact 01/06/22 1444   Galan Care Done 01/09/22 2100   Collection Container Standard drainage bag 01/06/22 1444   Output (mL) 650 mL 01/09/22 1512       Labs, Imaging, & Other Studies     Lab Results:    I have personally reviewed pertinent labs  Results from last 7 days   Lab Units 01/10/22  0529 01/09/22  0534 01/08/22  0432   WBC Thousand/uL 7 00 8 29 8 42   HEMOGLOBIN g/dL 9 4* 9 4* 9 5*   PLATELETS Thousands/uL 340 361 391*     Results from last 7 days   Lab Units 01/10/22  0529 01/09/22  0534 01/08/22  0432 01/07/22  0641 01/07/22  0641   POTASSIUM mmol/L 4 9   < > 5 1   < > 5 2   CHLORIDE mmol/L 111*   < > 110*   < > 108   CO2 mmol/L 19*   < > 19*   < > 17*   BUN mg/dL 34*   < > 47*   < > 57*   CREATININE mg/dL 1 79*   < > 2 73*   < > 3 24*   EGFR ml/min/1 73sq m 30   < > 18   < > 15   CALCIUM mg/dL 8 6   < > 8 9   < > 9 1   AST U/L 16  --  24  --  39   ALT U/L 22  --  20   < > 25   ALK PHOS U/L 88  --  95   < > 100    < > = values in this interval not displayed       Results from last 7 days   Lab Units 01/08/22  0046 01/06/22  1622 01/06/22  0447   BLOOD CULTURE   --   --  No Growth at 72 hrs  No Growth at 72 hrs  GRAM STAIN RESULT  No Polys or Bacteria seen  --   --    URINE CULTURE   --  <10,000 cfu/ml   --    WOUND CULTURE  No growth  --   --        Imaging Studies:   I have personally reviewed pertinent imaging study reports and images in PACS  EKG, Pathology, and Other Studies:   I have personally reviewed pertinent reports  Counseling/Coordination of care: Total 35 minutes communication with the patient via telehealth  Labs, medical tests and imaging studies were independently reviewed by me as noted above

## 2022-01-11 NOTE — PROGRESS NOTES
Progress Note - General Surgery   Raul Bolanos 60 y o  female MRN: [id number]  Unit/Bed#: 144-71 Encounter: [id number]    Assessment:  20-year-old female with numerous comorbidities admitted with sigmoid diverticulitis microperforation and small abscess  Currently treated non operatively IV antibiotics  No safe window for IR drainage  Leukocytosis resolved  No abdominal pain  Tolerating diet  Furthermore patient has severe fungal infection likely secondary to moisture associations of the groins and beneath her breasts  This could also be an association with her known hidradenitis  Infectious disease following  Plan:  - continue IV antibiotics as per Infectious Disease  - will plan for repeat CT scan the abdomen pelvis as recommended by Infectious Disease on tomorrow  - continue current local wound care as per wound care nurse recommendations  Her groins looks dramatically better  She may follow up with me at the [address] [place] [address] Road to deal with her chronic wounds of the pannus and hidradenitis  Subjective/Objective   Chief Complaint:  No new complaints    Subjective:  Overall patient feels much better  No abdominal pain  Tolerating diet  States minimal to no tenderness bilateral lower groins  No fevers or chills  Objective:     Blood pressure 133/82, pulse 89, temperature 97 5 °F (36 4 °C), temperature source Temporal, resp  rate 16, height 5' 7" (1 702 m), weight (!) 173 kg (382 lb 4 4 oz), SpO2 95 %  ,Body mass index is 59 87 kg/m²  Intake/Output Summary (Last 24 hours) at 1/11/2022 1345  Last data filed at 1/11/2022 0836  Gross per 24 hour   Intake 540 ml   Output 950 ml   Net -410 ml       Invasive Devices  Report    Peripheral Intravenous Line            Peripheral IV 01/09/22 Dorsal (posterior); Left Hand 1 day          Drain            Urethral Catheter 16 Fr  4 days                Physical Exam:   General:  Acute distress, resting comfortably  HEENT:  Normocephalic and atraumatic trachea non  CV:  S1-S2 audible, no murmurs rubs, regular rhythm  Pulmonary:  Clear auscultation bilaterally no wheezes rales noted  GI:  Abdomen is obese, soft, nontender to palpation, the bilateral groins with significant improvement and fungal infection  Minimal to no erythema  Wounds currently covered with Allevyn dressing  MSK:  Lower extremities are clubbing cyanosis  Skin soft tissue: Once again the skin of the breast and bilateral groins improving with regards to fungal infection  Neurologic:  Alert orient x3, cranial 2-12 grossly intact  Psych:  Affect appropriate            Lab, Imaging and other studies:  I have personally reviewed pertinent lab results    , CBC: No results found for: WBC, HGB, HCT, MCV, PLT, ADJUSTEDWBC, MCH, MCHC, RDW, MPV, NRBC, CMP:   Lab Results   Component Value Date    SODIUM 139 01/11/2022    K 4 6 01/11/2022     01/11/2022    CO2 21 01/11/2022    BUN 27 (H) 01/11/2022    CREATININE 1 65 (H) 01/11/2022    CALCIUM 8 2 (L) 01/11/2022    EGFR 33 01/11/2022   , Coagulation: No results found for: PT, INR, APTT, Urinalysis: No results found for: COLORU, CLARITYU, SPECGRAV, PHUR, LEUKOCYTESUR, NITRITE, PROTEINUA, GLUCOSEU, KETONESU, BILIRUBINUR, BLOODU, Amylase: No results found for: AMYLASE, Lipase: No results found for: LIPASE  VTE Pharmacologic Prophylaxis: Enoxaparin (Lovenox)  VTE Mechanical Prophylaxis: sequential compression device

## 2022-01-11 NOTE — PROGRESS NOTES
NEPHROLOGY PROGRESS NOTE   Esmer Gambino 60 y o  female MRN: [id number]  Unit/Bed#: 585-76 Encounter: [id number]    Assessment/Plan:       Esmer Gambino is a 60 y o  female whose pertinent medical problems include hypertension, obesity admitted 1/6 with chief complaint of open sores being treated for acute kidney injury, urinary retention, right hydroureteronephrosis status post indwelling urinary catheter, abdominal cellulitis, sigmoid diverticulitis with microperforation, dysphagia possibly due to esophageal candidiasis  Renal following along for ZAHIRA, obstructive uropathy  Plan outlined below       1  Acute kidney injury (POA) atop chronic kidney disease  · Renal function continues to slowly improve  Continue indwelling urinary catheter as outlined by Urology  Continue to hold lisinopril  Avoid potential nephrotoxins, maximize hemodynamics and provide supportive care  2  Stage 2 chronic kidney disease baseline creatinine around 0 9-1 0 mg/dL  3  Obstructive uropathy, mild right hydroureteronephrosis  · Indwelling urinary catheter remains intact per Urology  4  Hypertension the setting of CKD  · Blood pressure is appropriate  As above, continue to hold lisinopril  5  Proteinuria  · Plan to repeat urine studies in steady state +/-rule out monoclonal gammopathy as an outpatient  6  Abdominal cellulitis with superficial wounds  · Management per primary team  7  Fungal dermatitis  · Management per primary team  8  Hypomagnesemia  · Give 4 g IV magnesium now  9  Acidosis  · Wean sodium bicarbonate tablets and discontinue      ROS  No physical complaints  A complete 10 point review of systems have been performed and are otherwise negative         Historical Information   Past Medical History:   Diagnosis Date    Anxiety     Arthritis     GERD (gastroesophageal reflux disease)     Hidradenitis suppurativa     Hypertension     Lipodermatosclerosis of both lower extremities     Lymphedema 2006    Sciatic leg pain     left side    SOB (shortness of breath)     Stomach ulcer      Past Surgical History:   Procedure Laterality Date     SECTION      ID EXC SKIN BENIG 3 1-4 CM TRUNK,ARM,LEG Right 2020    Procedure: EXCISION BIOPSY TISSUE LESION/MASS UPPER EXTREMITY;  Surgeon: Anselmo Gomez DO;  Location: [facility];  Service: General    TONSILLECTOMY      TUMOR REMOVAL Right     5th finger     Social History   Social History     Substance and Sexual Activity   Alcohol Use Not Currently    Comment: holidays     Social History     Substance and Sexual Activity   Drug Use No     Social History     Tobacco Use   Smoking Status Light Tobacco Smoker    Packs/day: 0 25    Years: 20 00    Pack years: 5 00    Types: Cigarettes   Smokeless Tobacco Never Used   Tobacco Comment    Only smoke once in awhile       Family History:   Family History   Problem Relation Age of Onset    Cancer Mother     Hypertension Mother     COPD Father     Diabetes Father        Medications:  Pertinent medications were reviewed  Current Facility-Administered Medications   Medication Dose Route Frequency Provider Last Rate    acetaminophen  975 mg Oral Q8H [address] Joanne Warner MD      al mag oxide-diphenhydramine-lidocaine viscous  10 mL Swish & Swallow Q4H PRN Haley Hatch MD      albuterol  2 puff Inhalation Q6H PRN Sofia Danielle MD      aluminum-magnesium hydroxide-simethicone  30 mL Oral Q4H PRN Hlaey Hatch MD      cefTRIAXone  2,000 mg Intravenous Q24H Lorenzo Meehan MD      docusate sodium  100 mg Oral BID Sofia Danielle MD      enoxaparin  60 mg Subcutaneous Q12H Dez Ochoa MD      fluconazole  400 mg Oral Daily Lorenzo Meehan MD      fluticasone  1 spray Each Nare Daily Sofia Danielle MD      HYDROmorphone  0 2 mg Intravenous Q4H PRN Haley Hatch MD      hydrOXYzine HCL  25 mg Oral TID PRN Sofia Danielle MD      lidocaine  1 patch Topical Daily Joanne Jailene Pablo MD      magnesium sulfate  2 g Intravenous Q2H MIRTA Beaulieu      methocarbamol  500 mg Oral [facility] Dominique Schneider MD      metoprolol tartrate  25 mg Oral BID Salazar Presley MD      metroNIDAZOLE  500 mg Oral Q8H [address] Salazar Presley MD      nicotine  1 patch Transdermal Daily Salazar Presley MD      nystatin   Topical BID Dominique Schneider MD      nystatin  500,000 Units Swish & Swallow 4x Daily Salazar Presley MD      nystatin   Topical TID Salazar Presley MD      ondansetron  4 mg Intravenous Q6H PRN Salazar Presley MD      oxyCODONE  5 mg Oral Q4H PRN Dominique Schneider MD      Or   Libra Lamrafael oxyCODONE  10 mg Oral Q4H PRN Dominique Schneider MD      sertraline  50 mg Oral Daily Salazar Presley MD      sodium bicarbonate  1,300 mg Oral BID after meals Krishna Andrew Saran, PA-C           Allergies   Allergen Reactions    Bee Venom Anaphylaxis, Shortness Of Breath and Swelling         Vitals:   /72   Pulse 59   Temp 97 5 °F (36 4 °C) (Temporal)   Resp 16   Ht 5' 7" (1 702 m)   Wt (!) 173 kg (382 lb 4 4 oz)   SpO2 92%   BMI 59 87 kg/m²   Body mass index is 59 87 kg/m²  SpO2: 92 %,   SpO2 Activity: At Rest,   O2 Device: None (Room air)      Intake/Output Summary (Last 24 hours) at 1/11/2022 1005  Last data filed at 1/11/2022 0836  Gross per 24 hour   Intake 840 ml   Output 950 ml   Net -110 ml     Invasive Devices  Report    Peripheral Intravenous Line            Peripheral IV 01/09/22 Dorsal (posterior); Left Hand 1 day          Drain            Urethral Catheter 16 Fr  4 days                Physical Exam  General: conscious, cooperative, in no acute distress  Eyes: conjunctivae pink, anicteric sclerae  ENT: lips and mucous membranes moist poor dentition  Neck: supple, no JVD, no masses  Chest:  Diminished breath sounds bilaterally, no crackles, ronchus or wheezings  CVS: S1 & S2, normal rate, regular rhythm  Abdomen: soft, non-tender, non-distended, normoactive bowel sounds  Extremities: severe lypmphedema of both legs  Skin: no rash  Neuro: awake, alert, oriented      Diagnostic Data:  Lab: I have personally reviewed pertinent lab results  ,   CBC:  Results from last 7 days   Lab Units 01/10/22  0529   WBC Thousand/uL 7 00   HEMOGLOBIN g/dL 9 4*   HEMATOCRIT % 31 3*   PLATELETS Thousands/uL 340      CMP:   Lab Results   Component Value Date    SODIUM 139 01/11/2022    K 4 6 01/11/2022     01/11/2022    CO2 21 01/11/2022    BUN 27 (H) 01/11/2022    CREATININE 1 65 (H) 01/11/2022    CALCIUM 8 2 (L) 01/11/2022    EGFR 33 01/11/2022   ,   PT/INR: No results found for: PT, INR,   Magnesium: No components found for: MAG,  Phosphorous: No results found for: PHOS    Microbiology:  @LABRCNTIP,(urinecx:7)@        MIRTA Summers    Portions of the record may have been created with voice recognition software  Occasional wrong word or "sound a like" substitutions may have occurred due to the inherent limitations of voice recognition software  Read the chart carefully and recognize, using context, where substitutions have occurred

## 2022-01-11 NOTE — ASSESSMENT & PLAN NOTE
CT abdomen on 01/06/22 shows - sigmoid diverticulitis with microperforation and adjacent small pelvic abscess      Surgical input appreciated  IR any but to place drain due to body habitus  Rocephin & flagyl   Abdominal exam is benign, and patient is tolerating diet  Will need repeat CT per ID, will defer to surgical team as to when to obtain

## 2022-01-11 NOTE — APP STUDENT NOTE
JENNIFER STUDENT  Inpatient Progress Note for TRAINING ONLY  Not Part of Legal Medical Record       Progress Note - Megan Strauss 60 y o  female MRN: [id number]    Unit/Bed#: 660-86 Encounter: [id number]      Assessment and Plan:    Sigmoid Diverticulitis with microperforation and resultant right pelvic abscess: Confirmed on CT Chest and Abdomen from 01/06/2022  Not a candidate for drainage per IR note 01/07  - Currently afebrile with stable vital signs, without focal abdominal tenderness, and she is tolerating dysphagia diet  - Continue with conservative Tx in the form of Abx (follow ID reccs) and other supportive measures including pain control   - Continue with serial abdominal exams and, per ID recommendation, plan for repeat CT A/P tomorrow 01/12/2022  Fungal Dermatitis with Secondary Bacterial Infection: Improving: In the setting of morbid obesity and h/o hidradenitis  - Continue with current wound care per previous instruction   - Continue with antimicrobials per ID reccs  - Follow up with outpatient wound clinic after discharge  Leukocytosis: Resolved  - WBC continues to downtrend from 13 43 on date of admission to 7 00 as of 01/10/2022 at 0529   - Continue to monitor for signs and Sx of infection  ZAHIRA: Improving  -Cr continues to downtrend and most recently measured at 1 65 as of 01/11/2022 at 0810  Nephrology following  Subjective:   No new complaints today  The patient denies current focal abdominal pain, fever, CP, or SOB  She does admit to an episode of "sharp" right flank pain which occurred 2 days ago and has since resolved  She has not experienced this pain since this initial occurrence  The patient also continues with intermittent nausea (managed with Zofran PRN)  Objective:     Vitals: Blood pressure 133/82, pulse 89, temperature 97 5 °F (36 4 °C), temperature source Temporal, resp  rate 16, height 5' 7" (1 702 m), weight (!) 173 kg (382 lb 4 4 oz), SpO2 95 %  ,Body mass index is 59 87 kg/m²  Intake/Output Summary (Last 24 hours) at 1/11/2022 1426  Last data filed at 1/11/2022 0836  Gross per 24 hour   Intake 540 ml   Output 950 ml   Net -410 ml       Physical Exam: /82   Pulse 89   Temp 97 5 °F (36 4 °C) (Temporal)   Resp 16   Ht 5' 7" (1 702 m)   Wt (!) 173 kg (382 lb 4 4 oz)   SpO2 95%   BMI 59 87 kg/m²   General appearance: alert and oriented, in no acute distress and Morbid obesity noted  Head: Normocephalic, without obvious abnormality, atraumatic  Eyes: No conjunctival injection or icterus  Lungs: Speaking in full sentences without difficulty  No apparent respiratory distress  Abdomen: Obese abdomen which is soft and grossly nontender  The patient does complain of mild discomfort with palpation of the RUQ today  No appreciable masses  Extremities: Lymphedema noted to the BLE  Skin: Fungal rash noted over several areas including the bilateral inguinal regions, abdominal pannus, and inframmary folds bilaterally  Dressings are dry and intact, left in place on this examination  Neurologic: Grossly normal     Invasive Devices  Report    Peripheral Intravenous Line            Peripheral IV 01/09/22 Dorsal (posterior); Left Hand 1 day          Drain            Urethral Catheter 16 Fr  4 days                Lab, Imaging and other studies: I have personally reviewed pertinent reports      VTE Pharmacologic Prophylaxis: Enoxaparin (Lovenox)  VTE Mechanical Prophylaxis: reason for no mechanical VTE prophylaxis Pharmacologic management as above

## 2022-01-11 NOTE — ASSESSMENT & PLAN NOTE
Patient complains of choking sensation, initially with solids, recently she noticed that she is choking on water  In patient words " I feel like there is a pill struck in my throat even with drinking plain water"  Poor dental heigine with multiple dental caries noted on exam  No thrush noted  Being treated for aphthous ulcer and sore throat  Speech and swallow eval  GI consult to rule out obstructive causes     Increase diflucan to 400mg daily per ID

## 2022-01-11 NOTE — ASSESSMENT & PLAN NOTE
Superimposed on chronic kidney disease with a baseline creatinine of 1  Continue to hold lisinopril and Celebrex  Continue Galan catheter and will have patient follow-up as an outpatient  DC IV fluids, patient eating and drinking 1/11/21

## 2022-01-11 NOTE — PLAN OF CARE
Problem: Potential for Falls  Goal: Patient will remain free of falls  Description: INTERVENTIONS:  - Educate patient/family on patient safety including physical limitations  - Instruct patient to call for assistance with activity   - Consult OT/PT to assist with strengthening/mobility   - Keep Call bell within reach  - Keep bed low and locked with side rails adjusted as appropriate  - Keep care items and personal belongings within reach  - Initiate and maintain comfort rounds  - Make Fall Risk Sign visible to staff  - Offer Toileting every 2 Hours, in advance of need  - Initiate/Maintain fall alarm  - Obtain necessary fall risk management equipment: non-skid footwear  - Apply yellow socks and bracelet for high fall risk patients  - Consider moving patient to room near nurses station  Outcome: Progressing     Problem: Prexisting or High Potential for Compromised Skin Integrity  Goal: Skin integrity is maintained or improved  Description: INTERVENTIONS:  - Identify patients at risk for skin breakdown  - Assess and monitor skin integrity  - Assess and monitor nutrition and hydration status  - Monitor labs   - Assess for incontinence   - Turn and reposition patient  - Assist with mobility/ambulation  - Relieve pressure over bony prominences  - Avoid friction and shearing  - Provide appropriate hygiene as needed including keeping skin clean and dry  - Evaluate need for skin moisturizer/barrier cream  - Collaborate with interdisciplinary team   - Patient/family teaching  - Consider wound care consult   Outcome: Progressing     Problem: MOBILITY - ADULT  Goal: Maintain or return to baseline ADL function  Description: INTERVENTIONS:  -  Assess patient's ability to carry out ADLs; assess patient's baseline for ADL function and identify physical deficits which impact ability to perform ADLs (bathing, care of mouth/teeth, toileting, grooming, dressing, etc )  - Assess/evaluate cause of self-care deficits   - Assess range of motion  - Assess patient's mobility; develop plan if impaired  - Assess patient's need for assistive devices and provide as appropriate  - Encourage maximum independence but intervene and supervise when necessary  - Involve family in performance of ADLs  - Assess for home care needs following discharge   - Consider OT consult to assist with ADL evaluation and planning for discharge  - Provide patient education as appropriate  Outcome: Progressing  Goal: Maintains/Returns to pre admission functional level  Description: INTERVENTIONS:  - Perform BMAT or MOVE assessment daily    - Set and communicate daily mobility goal to care team and patient/family/caregiver  - Collaborate with rehabilitation services on mobility goals if consulted  - Perform Range of Motion 3 times a day  - Reposition patient every 2 hours  - Dangle patient 3 times a day  - Stand patient 3 times a day  - Ambulate patient 3 times a day  - Out of bed to chair 3 times a day   - Out of bed for meals 3 times a day  - Out of bed for toileting  - Record patient progress and toleration of activity level   Outcome: Progressing     Problem: Nutrition/Hydration-ADULT  Goal: Nutrient/Hydration intake appropriate for improving, restoring or maintaining nutritional needs  Description: Monitor and assess patient's nutrition/hydration status for malnutrition  Collaborate with interdisciplinary team and initiate plan and interventions as ordered  Monitor patient's weight and dietary intake as ordered or per policy  Utilize nutrition screening tool and intervene as necessary  Determine patient's food preferences and provide high-protein, high-caloric foods as appropriate       INTERVENTIONS:  - Monitor oral intake, urinary output, labs, and treatment plans  - Assess nutrition and hydration status and recommend course of action  - Evaluate amount of meals eaten  - Assist patient with eating if necessary   - Allow adequate time for meals  - Recommend/ encourage appropriate diets, oral nutritional supplements, and vitamin/mineral supplements  - Order, calculate, and assess calorie counts as needed  - Recommend, monitor, and adjust tube feedings and TPN/PPN based on assessed needs  - Assess need for intravenous fluids  - Provide specific nutrition/hydration education as appropriate  - Include patient/family/caregiver in decisions related to nutrition  Outcome: Progressing     Problem: PAIN - ADULT  Goal: Verbalizes/displays adequate comfort level or baseline comfort level  Description: Interventions:  - Encourage patient to monitor pain and request assistance  - Assess pain using appropriate pain scale  - Administer analgesics based on type and severity of pain and evaluate response  - Implement non-pharmacological measures as appropriate and evaluate response  - Consider cultural and social influences on pain and pain management  - Notify physician/advanced practitioner if interventions unsuccessful or patient reports new pain  Outcome: Progressing     Problem: INFECTION - ADULT  Goal: Absence or prevention of progression during hospitalization  Description: INTERVENTIONS:  - Assess and monitor for signs and symptoms of infection  - Monitor lab/diagnostic results  - Monitor all insertion sites, i e  indwelling lines, tubes, and drains  - Monitor endotracheal if appropriate and nasal secretions for changes in amount and color  - [place] appropriate cooling/warming therapies per order  - Administer medications as ordered  - Instruct and encourage patient and family to use good hand hygiene technique  - Identify and instruct in appropriate isolation precautions for identified infection/condition  Outcome: Progressing  Goal: Absence of fever/infection during neutropenic period  Description: INTERVENTIONS:  - Monitor WBC    Outcome: Progressing     Problem: SAFETY ADULT  Goal: Patient will remain free of falls  Description: INTERVENTIONS:  - Educate patient/family on patient safety including physical limitations  - Instruct patient to call for assistance with activity   - Consult OT/PT to assist with strengthening/mobility   - Keep Call bell within reach  - Keep bed low and locked with side rails adjusted as appropriate  - Keep care items and personal belongings within reach  - Initiate and maintain comfort rounds  - Make Fall Risk Sign visible to staff  - Offer Toileting every 2 Hours, in advance of need  - Initiate/Maintain fall alarm  - Obtain necessary fall risk management equipment: non-skid footwear  - Apply yellow socks and bracelet for high fall risk patients  - Consider moving patient to room near nurses station  Outcome: Progressing  Goal: Maintain or return to baseline ADL function  Description: INTERVENTIONS:  -  Assess patient's ability to carry out ADLs; assess patient's baseline for ADL function and identify physical deficits which impact ability to perform ADLs (bathing, care of mouth/teeth, toileting, grooming, dressing, etc )  - Assess/evaluate cause of self-care deficits   - Assess range of motion  - Assess patient's mobility; develop plan if impaired  - Assess patient's need for assistive devices and provide as appropriate  - Encourage maximum independence but intervene and supervise when necessary  - Involve family in performance of ADLs  - Assess for home care needs following discharge   - Consider OT consult to assist with ADL evaluation and planning for discharge  - Provide patient education as appropriate  Outcome: Progressing  Goal: Maintains/Returns to pre admission functional level  Description: INTERVENTIONS:  - Perform BMAT or MOVE assessment daily    - Set and communicate daily mobility goal to care team and patient/family/caregiver  - Collaborate with rehabilitation services on mobility goals if consulted  - Perform Range of Motion 3 times a day  - Reposition patient every 2 hours    - Dangle patient 3 times a day  - Stand patient 3 times a day  - Ambulate patient 3 times a day  - Out of bed to chair 3 times a day   - Out of bed for meals 3 times a day  - Out of bed for toileting  - Record patient progress and toleration of activity level   Outcome: Progressing     Problem: DISCHARGE PLANNING  Goal: Discharge to home or other facility with appropriate resources  Description: INTERVENTIONS:  - Identify barriers to discharge w/patient and caregiver  - Arrange for needed discharge resources and transportation as appropriate  - Identify discharge learning needs (meds, wound care, etc )  - Arrange for interpretive services to assist at discharge as needed  - Refer to Case Management Department for coordinating discharge planning if the patient needs post-hospital services based on physician/advanced practitioner order or complex needs related to functional status, cognitive ability, or social support system  Outcome: Progressing     Problem: Knowledge Deficit  Goal: Patient/family/caregiver demonstrates understanding of disease process, treatment plan, medications, and discharge instructions  Description: Complete learning assessment and assess knowledge base    Interventions:  - Provide teaching at level of understanding  - Provide teaching via preferred learning methods  Outcome: Progressing

## 2022-01-11 NOTE — PROGRESS NOTES
[address] [facility]  Progress Note - Fiorella Estes 1965, 60 y o  female MRN: [id number]  Unit/Bed#: 815-28 Encounter: [id number]  Primary Care Provider: Abbie Heard PA-C   Date and time admitted to hospital: 1/6/2022  2:43 AM    Diverticulitis of large intestine with perforation  Assessment & Plan    CT abdomen on 01/06/22 shows - sigmoid diverticulitis with microperforation and adjacent small pelvic abscess  Surgical input appreciated  IR any but to place drain due to body habitus  Rocephin & flagyl   Abdominal exam is benign, and patient is tolerating diet  Will need repeat CT per ID, will defer to surgical team as to when to obtain        * Cellulitis of abdominal wall, chest wall and groin with wounds  Assessment & Plan  Patient presented with pain, erythema, swelling with overlying wounds, actively draining purulent material involving the inframammary area, lower abdomen, groin and inner thighs  Elevated WBC count at 13 43 on admission; trended down to normal range  Continue vancomycin, Cefepime and Metronidazole day#5   Dc vancomycin and transition to Rocephin 2g IV q24 per ID 1/11/21  ID recommending 5 days of IV Diflucan  Follow-up MRSA screenWound care consult, input appreciated          Dysphagia  Assessment & Plan  Patient complains of choking sensation, initially with solids, recently she noticed that she is choking on water  In patient words " I feel like there is a pill struck in my throat even with drinking plain water"  Poor dental heigine with multiple dental caries noted on exam  No thrush noted  Being treated for aphthous ulcer and sore throat  Speech and swallow eval  GI consult to rule out obstructive causes  Increase diflucan to 400mg daily per ID     Right Hydroureteronephrosis  Assessment & Plan  Right Hydroureteronephrosis noted on CT abdomen pelvis on 01/06/22    Renal US : right renal collecting system appears minimally dilated similar to prior CT  Per radiology suspecting related to inflammatory changes in the sigmoid colon  Galan in place - draining clear urine with creatinine continuing to improve   Outpatient follow up with urology       Acute renal failure (ARF) (HCC)  Assessment & Plan  Superimposed on chronic kidney disease with a baseline creatinine of 1  Continue to hold lisinopril and Celebrex  Continue Galan catheter and will have patient follow-up as an outpatient  DC IV fluids, patient eating and drinking 1/11/21        Progress Note - Quan Moore 60 y o  female MRN: [id number]    Unit/Bed#: 418-01 Encounter: [id number]        Subjective:   Patient seen and examined at bedside  Feels well but with residual abdominal pain    Objective:     Vitals:   Vitals:    01/11/22 0728   BP: 122/72   Pulse: 59   Resp: 16   Temp:    SpO2: 92%     Body mass index is 59 87 kg/m²  Intake/Output Summary (Last 24 hours) at 1/11/2022 0921  Last data filed at 1/11/2022 0836  Gross per 24 hour   Intake 840 ml   Output 950 ml   Net -110 ml       Physical Exam:   /72   Pulse 59   Temp 97 5 °F (36 4 °C) (Temporal)   Resp 16   Ht 5' 7" (1 702 m)   Wt (!) 173 kg (382 lb 4 4 oz)   SpO2 92%   BMI 59 87 kg/m²   General appearance: alert and oriented, in no acute distress  Head: Normocephalic, without obvious abnormality, atraumatic  Lungs: clear to auscultation bilaterally  Heart: regular rate and rhythm, S1, S2 normal, no murmur, click, rub or gallop  Abdomen: mild abdominal tenderness in the RLQ without rebound/guarding/rigidity  Extremities: extremities normal, warm and well-perfused; no cyanosis, clubbing, or edema  Pulses: 2+ and symmetric  Neurologic: Grossly normal     Invasive Devices  Report    Peripheral Intravenous Line            Peripheral IV 01/09/22 Dorsal (posterior); Left Hand 1 day          Drain            Urethral Catheter 16 Fr  4 days                Results from last 7 days   Lab Units 01/10/22  0529 01/09/22  0534 01/08/22  0432   WBC Thousand/uL 7 00 8 29 8 42   HEMOGLOBIN g/dL 9 4* 9 4* 9 5*   HEMATOCRIT % 31 3* 30 6* 31 3*   PLATELETS Thousands/uL 340 361 391*       Results from last 7 days   Lab Units 01/11/22  0810 01/10/22  0529 01/09/22  0534 01/08/22  0432 01/08/22  0432 01/07/22  0641 01/07/22  0641   POTASSIUM mmol/L 4 6 4 9 4 8   < > 5 1   < > 5 2   CHLORIDE mmol/L 108 111* 111*   < > 110*   < > 108   CO2 mmol/L 21 19* 17*   < > 19*   < > 17*   BUN mg/dL 27* 34* 41*   < > 47*   < > 57*   CREATININE mg/dL 1 65* 1 79* 2 27*   < > 2 73*   < > 3 24*   CALCIUM mg/dL 8 2* 8 6 8 7   < > 8 9   < > 9 1   ALK PHOS U/L  --  88  --   --  95  --  100   ALT U/L  --  22  --   --  20  --  25   AST U/L  --  16  --   --  24  --  39    < > = values in this interval not displayed         Medication Administration - last 24 hours from 01/10/2022 0921 to 01/11/2022 6182       Date/Time Order Dose Route Action Action by     01/10/2022 1948 metoprolol tartrate (LOPRESSOR) tablet 25 mg 25 mg Oral Given Marylu Mccabe RN     01/10/2022 1940 docusate sodium (COLACE) capsule 100 mg 100 mg Oral Given Marylu Mccabe RN     01/10/2022 2150 nystatin (MYCOSTATIN) powder   Topical Refused Marylu Mccabe RN     01/10/2022 1948 nystatin (MYCOSTATIN) powder   Topical Given Marylu Mccabe RN     01/10/2022 4967 nystatin (MYCOSTATIN) powder   Topical Given Reuben Claros RN     01/11/2022 0500 metroNIDAZOLE (FLAGYL) tablet 500 mg 500 mg Oral Given Marylu Mccabe RN     01/10/2022 2158 metroNIDAZOLE (FLAGYL) tablet 500 mg 500 mg Oral Given Marylu Mccabe RN     01/10/2022 1427 metroNIDAZOLE (FLAGYL) tablet 500 mg 500 mg Oral Given Reuben Claros RN     01/10/2022 1947 al mag oxide-diphenhydramine-lidocaine viscous (MAGIC MOUTHWASH) suspension 10 mL 10 mL Swish & Swallow Given Marylu Mccabe RN     01/10/2022 2000 nystatin (MYCOSTATIN) cream   Topical Given Marylu Mccabe RN     01/10/2022 6237 nystatin (MYCOSTATIN) cream   Topical Given Reuben Claros RN 01/10/2022 2001 enoxaparin (LOVENOX) subcutaneous injection 60 mg 60 mg Subcutaneous Given Manjit Gail, RN     01/10/2022 1941 sodium bicarbonate tablet 1,300 mg 1,300 mg Oral Given Manjit Gail, RN     01/11/2022 0500 cefepime (MAXIPIME) IVPB (premix in dextrose) 1,000 mg 50 mL 1,000 mg Intravenous [address] Manjit Gail, RN     01/10/2022 1938 cefepime (MAXIPIME) IVPB (premix in dextrose) 1,000 mg 50 mL 1,000 mg Intravenous New Bag Manjit Gail, RN     01/11/2022 0500 acetaminophen (TYLENOL) tablet 975 mg 975 mg Oral Given Manjit Spratiya, RN     01/10/2022 2158 acetaminophen (TYLENOL) tablet 975 mg 975 mg Oral Given Manjit Spratiya, RN     01/10/2022 1427 acetaminophen (TYLENOL) tablet 975 mg 975 mg Oral Given Wang Matos, RN     01/11/2022 0530 oxyCODONE (ROXICODONE) IR tablet 5 mg   Oral See Alternative Manjit Spratiya, RN     01/10/2022 1947 oxyCODONE (ROXICODONE) IR tablet 5 mg   Oral See Alternative Manjit Spratiya, RN     01/10/2022 0940 oxyCODONE (ROXICODONE) IR tablet 5 mg   Oral See Alternative Wang Matos, RN     01/11/2022 0530 oxyCODONE (ROXICODONE) immediate release tablet 10 mg 10 mg Oral Given Manjit Spratiya, RN     01/10/2022 1947 oxyCODONE (ROXICODONE) immediate release tablet 10 mg 10 mg Oral Given Manjit Spratiya, RN     01/10/2022 0940 oxyCODONE (ROXICODONE) immediate release tablet 10 mg 10 mg Oral Given Wang Matos, RN     01/10/2022 2158 HYDROmorphone (DILAUDID) injection 0 2 mg 0 2 mg Intravenous Given Manjit Gail, RN     01/11/2022 0500 methocarbamol (ROBAXIN) tablet 500 mg 500 mg Oral Given Manjit Spratiya, RN     01/10/2022 2158 methocarbamol (ROBAXIN) tablet 500 mg 500 mg Oral Given Manjit Reynolds, MICHELLE     01/10/2022 1428 methocarbamol (ROBAXIN) tablet 500 mg 500 mg Oral Given Wang Matos, MICHELLE     01/10/2022 2159 lidocaine (LIDODERM) 5 % patch 1 patch 1 patch Topical Patch Removed Manjit Reynolds, MICHELLE     01/10/2022 2152 nystatin (MYCOSTATIN) oral suspension 500,000 Units 500,000 Units Swish & 221 Butch Gomez RN     01/10/2022 2001 nystatin (MYCOSTATIN) oral suspension 500,000 Units 500,000 Units Swish & Swallow Given Dany Kraus RN     01/10/2022 1111 nystatin (MYCOSTATIN) oral suspension 500,000 Units 500,000 Units Swish & Swallow Given Ross Cisneros RN            Lab, Imaging and other studies: I have personally reviewed pertinent reports      VTE Pharmacologic Prophylaxis: Enoxaparin (Lovenox)  VTE Mechanical Prophylaxis: sequential compression device     Wayne West MD  1/11/2022,9:21 AM

## 2022-01-11 NOTE — ASSESSMENT & PLAN NOTE
Patient presented with pain, erythema, swelling with overlying wounds, actively draining purulent material involving the inframammary area, lower abdomen, groin and inner thighs  Elevated WBC count at 13 43 on admission; trended down to normal range      Continue vancomycin, Cefepime and Metronidazole day#5   Dc vancomycin and transition to Rocephin 2g IV q24 per ID 1/11/21  ID recommending 5 days of IV Diflucan  Follow-up MRSA screenWound care consult, input appreciated

## 2022-01-11 NOTE — PLAN OF CARE
Problem: Potential for Falls  Goal: Patient will remain free of falls  Description: INTERVENTIONS:  - Educate patient/family on patient safety including physical limitations  - Instruct patient to call for assistance with activity   - Consult OT/PT to assist with strengthening/mobility   - Keep Call bell within reach  - Keep bed low and locked with side rails adjusted as appropriate  - Keep care items and personal belongings within reach  - Initiate and maintain comfort rounds  - Make Fall Risk Sign visible to staff  - Offer Toileting every 2 Hours, in advance of need  - Initiate/Maintain bed alarm  - Obtain necessary fall risk management equipment:   - Apply yellow socks and bracelet for high fall risk patients  - Consider moving patient to room near nurses station  Outcome: Progressing     Problem: Prexisting or High Potential for Compromised Skin Integrity  Goal: Skin integrity is maintained or improved  Description: INTERVENTIONS:  - Identify patients at risk for skin breakdown  - Assess and monitor skin integrity  - Assess and monitor nutrition and hydration status  - Monitor labs   - Assess for incontinence   - Turn and reposition patient  - Assist with mobility/ambulation  - Relieve pressure over bony prominences  - Avoid friction and shearing  - Provide appropriate hygiene as needed including keeping skin clean and dry  - Evaluate need for skin moisturizer/barrier cream  - Collaborate with interdisciplinary team   - Patient/family teaching  - Consider wound care consult   Outcome: Progressing     Problem: MOBILITY - ADULT  Goal: Maintain or return to baseline ADL function  Description: INTERVENTIONS:  -  Assess patient's ability to carry out ADLs; assess patient's baseline for ADL function and identify physical deficits which impact ability to perform ADLs (bathing, care of mouth/teeth, toileting, grooming, dressing, etc )  - Assess/evaluate cause of self-care deficits   - Assess range of motion  - Assess patient's mobility; develop plan if impaired  - Assess patient's need for assistive devices and provide as appropriate  - Encourage maximum independence but intervene and supervise when necessary  - Involve family in performance of ADLs  - Assess for home care needs following discharge   - Consider OT consult to assist with ADL evaluation and planning for discharge  - Provide patient education as appropriate  Outcome: Progressing  Goal: Maintains/Returns to pre admission functional level  Description: INTERVENTIONS:  - Perform BMAT or MOVE assessment daily    - Set and communicate daily mobility goal to care team and patient/family/caregiver  - Collaborate with rehabilitation services on mobility goals if consulted  - Stand patient 2 times a day  - Ambulate patient 2 times a day  - Out of bed to chair 3 times a day   - Out of bed for meals 3 times a day  - Out of bed for toileting  - Record patient progress and toleration of activity level   Outcome: Progressing     Problem: Nutrition/Hydration-ADULT  Goal: Nutrient/Hydration intake appropriate for improving, restoring or maintaining nutritional needs  Description: Monitor and assess patient's nutrition/hydration status for malnutrition  Collaborate with interdisciplinary team and initiate plan and interventions as ordered  Monitor patient's weight and dietary intake as ordered or per policy  Utilize nutrition screening tool and intervene as necessary  Determine patient's food preferences and provide high-protein, high-caloric foods as appropriate       INTERVENTIONS:  - Monitor oral intake, urinary output, labs, and treatment plans  - Assess nutrition and hydration status and recommend course of action  - Evaluate amount of meals eaten  - Assist patient with eating if necessary   - Allow adequate time for meals  - Recommend/ encourage appropriate diets, oral nutritional supplements, and vitamin/mineral supplements  - Order, calculate, and assess calorie counts as needed  - Recommend, monitor, and adjust tube feedings and TPN/PPN based on assessed needs  - Assess need for intravenous fluids  - Provide specific nutrition/hydration education as appropriate  - Include patient/family/caregiver in decisions related to nutrition  Outcome: Progressing     Problem: PAIN - ADULT  Goal: Verbalizes/displays adequate comfort level or baseline comfort level  Description: Interventions:  - Encourage patient to monitor pain and request assistance  - Assess pain using appropriate pain scale  - Administer analgesics based on type and severity of pain and evaluate response  - Implement non-pharmacological measures as appropriate and evaluate response  - Consider cultural and social influences on pain and pain management  - Notify physician/advanced practitioner if interventions unsuccessful or patient reports new pain  Outcome: Progressing     Problem: INFECTION - ADULT  Goal: Absence or prevention of progression during hospitalization  Description: INTERVENTIONS:  - Assess and monitor for signs and symptoms of infection  - Monitor lab/diagnostic results  - Monitor all insertion sites, i e  indwelling lines, tubes, and drains  - Monitor endotracheal if appropriate and nasal secretions for changes in amount and color  - [place] appropriate cooling/warming therapies per order  - Administer medications as ordered  - Instruct and encourage patient and family to use good hand hygiene technique  - Identify and instruct in appropriate isolation precautions for identified infection/condition  Outcome: Progressing  Goal: Absence of fever/infection during neutropenic period  Description: INTERVENTIONS:  - Monitor WBC    Outcome: Progressing     Problem: SAFETY ADULT  Goal: Patient will remain free of falls  Description: INTERVENTIONS:  - Educate patient/family on patient safety including physical limitations  - Instruct patient to call for assistance with activity   - Consult OT/PT to assist with strengthening/mobility   - Keep Call bell within reach  - Keep bed low and locked with side rails adjusted as appropriate  - Keep care items and personal belongings within reach  - Initiate and maintain comfort rounds  - Make Fall Risk Sign visible to staff  - Offer Toileting every 2 Hours, in advance of need  - Initiate/Maintain bed alarm  - Obtain necessary fall risk management equipment:   - Apply yellow socks and bracelet for high fall risk patients  - Consider moving patient to room near nurses station  Outcome: Progressing  Goal: Maintain or return to baseline ADL function  Description: INTERVENTIONS:  -  Assess patient's ability to carry out ADLs; assess patient's baseline for ADL function and identify physical deficits which impact ability to perform ADLs (bathing, care of mouth/teeth, toileting, grooming, dressing, etc )  - Assess/evaluate cause of self-care deficits   - Assess range of motion  - Assess patient's mobility; develop plan if impaired  - Assess patient's need for assistive devices and provide as appropriate  - Encourage maximum independence but intervene and supervise when necessary  - Involve family in performance of ADLs  - Assess for home care needs following discharge   - Consider OT consult to assist with ADL evaluation and planning for discharge  - Provide patient education as appropriate  Outcome: Progressing  Goal: Maintains/Returns to pre admission functional level  Description: INTERVENTIONS:  - Perform BMAT or MOVE assessment daily    - Set and communicate daily mobility goal to care team and patient/family/caregiver     - Collaborate with rehabilitation services on mobility goals if consulted  - Stand patient 2 times a day  - Ambulate patient 2 times a day  - Out of bed to chair 2 times a day   - Out of bed for meals 3 times a day  - Out of bed for toileting  - Record patient progress and toleration of activity level   Outcome: Progressing     Problem: DISCHARGE PLANNING  Goal: Discharge to home or other facility with appropriate resources  Description: INTERVENTIONS:  - Identify barriers to discharge w/patient and caregiver  - Arrange for needed discharge resources and transportation as appropriate  - Identify discharge learning needs (meds, wound care, etc )  - Arrange for interpretive services to assist at discharge as needed  - Refer to Case Management Department for coordinating discharge planning if the patient needs post-hospital services based on physician/advanced practitioner order or complex needs related to functional status, cognitive ability, or social support system  Outcome: Progressing     Problem: Knowledge Deficit  Goal: Patient/family/caregiver demonstrates understanding of disease process, treatment plan, medications, and discharge instructions  Description: Complete learning assessment and assess knowledge base    Interventions:  - Provide teaching at level of understanding  - Provide teaching via preferred learning methods  Outcome: Progressing

## 2022-01-11 NOTE — CONSULTS
Consultation - [facility]) [facility]  Archana Rivers 60 y o  female MRN: [id number]  Unit/Bed#: 233-61 Encounter: [id number]        Inpatient consult to gastroenterology  Consult performed by: Venus Da Silva PA-C  Consult ordered by: Sofia Danielle MD          Reason for Consult / Principal Problem:     Liquid dysphagia      ASSESSMENT AND PLAN:      49-year-old female with past medical history of hypertension and morbid obesity presented to the emergency department due to open sores  On admit she was found to have cellulitis of the abdominal wall/chest wall/groin, acute renal failure, and diverticulitis of the large intestine with perforation and small right pelvic abscess  GI has been consulted secondary to dysphagia  1  Dysphagia/globus sensation  The patient began with dysphagia just prior to admit  This is to both solids and liquids and began just prior to admission  She describes the sensation that a pill is stuck in her throat when she eats or drinks, even if she doesn't take any pills and is drinking just water  The patient does currently have thrush and is on fluconazole 400 mg daily  She was seen by speech with no abnormalities found, they recommended GI consult  No prior EGD  Exam reveals poor dentition with no overt thrush  - start daily PPI   - it is possible the patient has candida esophagitis as the cause of her dysphagia, continue fluconazole  - if patients dysphagia does not improve on PPI and fluconazole will need to consider EGD with biopsies, she is at increased risk for EGD given her morbid obesity  - diet as tolerated for now     ______________________________________________________________________    HPI:  49-year-old female with past medical history of hypertension and morbid obesity presented to the emergency department due to open sores    On admit she was found to have cellulitis of the abdominal wall/chest wall/groin, acute renal failure, and diverticulitis of the large intestine with perforation and small right pelvic abscess  GI has been consulted secondary to dysphagia  The patient states this is to both solids and liquids and began just prior to admission  She describes the sensation that a pill is stuck in her throat when she eats or drinks, even if she doesn't take any pills  The patient does currently have thrush and is on fluconazole 400 mg daily  She was seen by speech with no abnormalities found, they recommended GI consult  She has had no prior EGD  Colonoscopy 19 with thick solid stool in the rectum and procedure was aborted  Repeat not performed  REVIEW OF SYSTEMS:    CONSTITUTIONAL: Denies any fever, chills, rigors, and weight loss  HEENT: No earache or tinnitus  Denies hearing loss or visual disturbances  CARDIOVASCULAR: No chest pain or palpitations  RESPIRATORY: Denies any cough, hemoptysis, shortness of breath or dyspnea on exertion  GASTROINTESTINAL: As noted in the History of Present Illness  GENITOURINARY: No problems with urination  Denies any hematuria or dysuria  NEUROLOGIC: No dizziness or vertigo, denies headaches  MUSCULOSKELETAL: Denies any muscle or joint pain  SKIN: Denies skin rashes or itching  ENDOCRINE: Denies excessive thirst  Denies intolerance to heat or cold  PSYCHOSOCIAL: Denies depression or anxiety  Denies any recent memory loss         Historical Information   Past Medical History:   Diagnosis Date    Anxiety     Arthritis     GERD (gastroesophageal reflux disease)     Hidradenitis suppurativa     Hypertension     Lipodermatosclerosis of both lower extremities     Lymphedema     Sciatic leg pain     left side    SOB (shortness of breath)     Stomach ulcer      Past Surgical History:   Procedure Laterality Date     SECTION      RI EXC SKIN BENIG 3 1-4 CM TRUNK,ARM,LEG Right 2020    Procedure: EXCISION BIOPSY TISSUE LESION/MASS UPPER EXTREMITY;  Surgeon: Sridevi Isaac DO;  Location: [facility];  Service: General    TONSILLECTOMY      TUMOR REMOVAL Right     5th finger     Social History   Social History     Substance and Sexual Activity   Alcohol Use Not Currently    Comment: holidays     Social History     Substance and Sexual Activity   Drug Use No     Social History     Tobacco Use   Smoking Status Light Tobacco Smoker    Packs/day: 0 25    Years: 20 00    Pack years: 5 00    Types: Cigarettes   Smokeless Tobacco Never Used   Tobacco Comment    Only smoke once in awhile     Family History   Problem Relation Age of Onset    Cancer Mother     Hypertension Mother     COPD Father     Diabetes Father        Meds/Allergies     Medications Prior to Admission   Medication    albuterol (Ventolin HFA) 90 mcg/act inhaler    celecoxib (CeleBREX) 200 mg capsule    clindamycin (CLEOCIN T) 1 % external solution    clotrimazole (LOTRIMIN) 1 % cream    cyclobenzaprine (FLEXERIL) 10 mg tablet    Diclofenac Sodium (VOLTAREN) 1 %    dicyclomine (BENTYL) 20 mg tablet    ferrous sulfate 324 (65 Fe) mg    fluticasone (FLONASE) 50 mcg/act nasal spray    hydrOXYzine HCL (ATARAX) 25 mg tablet    lidocaine (LIDODERM) 5 %    lisinopril (ZESTRIL) 10 mg tablet    loratadine (CLARITIN) 10 mg tablet    metoprolol tartrate (LOPRESSOR) 25 mg tablet    omeprazole (PriLOSEC) 20 mg delayed release capsule    sertraline (ZOLOFT) 50 mg tablet    SUMAtriptan (IMITREX) 25 mg tablet     Current Facility-Administered Medications   Medication Dose Route Frequency    acetaminophen (TYLENOL) tablet 975 mg  975 mg Oral Q8H [address]    al mag oxide-diphenhydramine-lidocaine viscous (MAGIC MOUTHWASH) suspension 10 mL  10 mL Swish & Swallow Q4H PRN    albuterol (PROVENTIL HFA,VENTOLIN HFA) inhaler 2 puff  2 puff Inhalation Q6H PRN    aluminum-magnesium hydroxide-simethicone (MYLANTA) oral suspension 30 mL  30 mL Oral Q4H PRN    cefTRIAXone (ROCEPHIN) IVPB (premix in dextrose) 2,000 mg 50 mL  2,000 mg Intravenous Q24H    docusate sodium (COLACE) capsule 100 mg  100 mg Oral BID    enoxaparin (LOVENOX) subcutaneous injection 60 mg  60 mg Subcutaneous Q12H [address]    fluconazole (DIFLUCAN) tablet 400 mg  400 mg Oral Daily    fluticasone (FLONASE) 50 mcg/act nasal spray 1 spray  1 spray Each Nare Daily    HYDROmorphone (DILAUDID) injection 0 2 mg  0 2 mg Intravenous Q4H PRN    hydrOXYzine HCL (ATARAX) tablet 25 mg  25 mg Oral TID PRN    lidocaine (LIDODERM) 5 % patch 1 patch  1 patch Topical Daily    methocarbamol (ROBAXIN) tablet 500 mg  500 mg Oral Q8H [address]    metoprolol tartrate (LOPRESSOR) tablet 25 mg  25 mg Oral BID    metroNIDAZOLE (FLAGYL) tablet 500 mg  500 mg Oral Q8H [address]    nicotine (NICODERM CQ) 7 mg/24hr TD 24 hr patch 1 patch  1 patch Transdermal Daily    nystatin (MYCOSTATIN) cream   Topical BID    nystatin (MYCOSTATIN) oral suspension 500,000 Units  500,000 Units Swish & Swallow 4x Daily    nystatin (MYCOSTATIN) powder   Topical TID    ondansetron (ZOFRAN) injection 4 mg  4 mg Intravenous Q6H PRN    oxyCODONE (ROXICODONE) IR tablet 5 mg  5 mg Oral Q4H PRN    Or    oxyCODONE (ROXICODONE) immediate release tablet 10 mg  10 mg Oral Q4H PRN    sertraline (ZOLOFT) tablet 50 mg  50 mg Oral Daily    sodium bicarbonate tablet 1,300 mg  1,300 mg Oral BID after meals       Allergies   Allergen Reactions    Bee Venom Anaphylaxis, Shortness Of Breath and Swelling           Objective     Blood pressure 122/72, pulse 59, temperature 97 5 °F (36 4 °C), temperature source Temporal, resp  rate 16, height 5' 7" (1 702 m), weight (!) 173 kg (382 lb 4 4 oz), SpO2 92 %  Body mass index is 59 87 kg/m²        Intake/Output Summary (Last 24 hours) at 1/11/2022 0818  Last data filed at 1/11/2022 0530  Gross per 24 hour   Intake 900 ml   Output 950 ml   Net -50 ml         PHYSICAL EXAM:      General Appearance:   Alert, cooperative, no distress   HEENT:   Normocephalic, atraumatic, anicteric      Neck:  Supple, symmetrical, trachea midline   Lungs:   Clear to auscultation bilaterally; no rales, rhonchi or wheezing; respirations unlabored    Heart[de-identified]   Regular rate and rhythm; no murmur, rub, or gallop  Abdomen:   Obese, soft, non-distended, RLQ tenderness   Genitalia:   Deferred    Rectal:   Deferred    Extremities:  No cyanosis, clubbing or edema    Skin:  No jaundice, rashes, or lesions      Lab Results:   No results displayed because visit has over 200 results  Imaging Studies: I have personally reviewed pertinent imaging studies

## 2022-01-11 NOTE — UTILIZATION REVIEW
Continued Stay Review    Date: 1/11/22                      Current Patient Class: Inpatient Current Level of Care: Med Surg    HPI:56 y o  female initially admitted on 1/6/22 for cellulitis of the abdominal wall, chest wall and groin wounds, acute kidney injury, diverticulitis with microperforation and abscess  Abscess collection not amenable to drain placement by IR  Remained on IV fluids through 1/10  Remains on IV antibiotics  Nephrology and General Surgery on consult  1/11  General Surgery: currently treated non operatively IV antibiotics  No safe window for IR drainage  Leukocytosis resolved  No abdominal pain  Tolerating diet  Patient has severe fungal infection, likely secondary to moisture associations of the groins and beneath her breasts  This could also be in association with her known hidradenitis  Infectious disease following  Plan:  continue IV antibiotics as per Infectious Disease,  will plan for repeat CT scan of the abdomen pelvis tomorrow as recommended by Infectious Disease  Continue current local wound care as per wound care nurse recommendations  Her groins looks dramatically better  Internal Medicine: per Infectious Disease, transition antibiotics to IV Rocephin  Increase diflucan to 400 mg daily  Nephrology:  Renal function continue to slowly improve (baseline creatinine 0 9-1 0)  Continue to hold lisinopril, continue indwelling urinary catheter, per Urology  Hypomagnesemia, give 4 g IV magnesium now  Wean sodium bicarbonate tablets and D/C  Gastroenterology consult: patient began with dysphagia just prior to admit  This is to both solids and liquids  She describes the sensation that a pill is stuck in her throat when she eats or drinks, even if she doesn't take any pills and is drinking just water  The patient does currently have thrush and is on fluconazole 400 mg daily  She was seen by Speech with no abnormalities found, they recommended GI consult  No prior EGD   Exam reveals poor dentition with no overt thrush  It is possible the patient has candida esophagitis as the cause of her dysphagia, continue fluconazole  If patients dysphagia does not improve on fluconazole will need to consider EGD with biopsies, she is at increased risk for EGD given her morbid obesity  Diet as tolerated for now       Vital Signs:       Date/Time Temp Pulse Resp BP MAP (mmHg) SpO2 O2 Device   01/11/22 12:48:06 -- 89 -- 133/82 99 95 % --   01/11/22 1246 -- 89 -- 133/82 -- -- --   01/11/22 07:28:10 -- 59 16 122/72 89 92 % --   01/11/22 0727 97 5 °F (36 4 °C) -- -- -- -- -- --   01/10/22 23:46:39 97 9 °F (36 6 °C) 85 22 116/72 87 92 % --   01/10/22 1948 -- 92 -- 113/71 -- -- --   01/10/22 15:51:34 98 °F (36 7 °C) 87 18 98/58 71 94 % --   01/10/22 09:14:28 98 4 °F (36 9 °C) 80 18 116/73 87 94 % --   01/10/22 08:25:05 98 6 °F (37 °C) 75 17 101/59 73 93 % --   01/10/22 00:24:57 98 1 °F (36 7 °C) 76 18 118/78 80 93 % --   01/09/22 2150 -- -- -- -- -- -- None (Room air)   01/09/22 15:11:34 98 4 °F (36 9 °C) 85 16 112/74 87 96 % --   01/09/22 13:54:43 -- -- -- 122/84 97 -- --   01/09/22 07:14:37 -- 84 18 107/64 78 96 % --   01/09/22 0714 97 4 °F  -- -- -- -- -- --   01/09/22 03:41:59 97 6 °F (36 4 °C) 78 20 115/70 85 95 % --           Pertinent Labs/Diagnostic Results:       Results from last 7 days   Lab Units 01/10/22  0529 01/09/22  0534 01/08/22  0432 01/07/22  0641 01/06/22  0447   WBC Thousand/uL 7 00 8 29 8 42 8 63 13 43*   HEMOGLOBIN g/dL 9 4* 9 4* 9 5* 10 5* 9 5*   HEMATOCRIT % 31 3* 30 6* 31 3* 34 3* 30 4*   PLATELETS Thousands/uL 340 361 391* 398* 431*   NEUTROS ABS Thousands/µL 4 37 5 09 5 54 5 92 10 16*       Results from last 7 days   Lab Units 01/11/22  0810 01/10/22  0529 01/09/22  0534 01/08/22  0432 01/07/22  0641   SODIUM mmol/L 139 141 142 140 139   POTASSIUM mmol/L 4 6 4 9 4 8 5 1 5 2   CHLORIDE mmol/L 108 111* 111* 110* 108   CO2 mmol/L 21 19* 17* 19* 17*   ANION GAP mmol/L 10 11 14* 11 14*   BUN mg/dL 27* 34* 41* 47* 57*   CREATININE mg/dL 1 65* 1 79* 2 27* 2 73* 3 24*   EGFR ml/min/1 73sq m 33 30 23 18 15   CALCIUM mg/dL 8 2* 8 6 8 7 8 9 9 1   MAGNESIUM mg/dL 1 3* 1 5*  --   --  1 8   PHOSPHORUS mg/dL  --  3 4  --   --  5 3*     Results from last 7 days   Lab Units 01/10/22  0529 01/08/22  0432 01/07/22  0641 01/06/22  0447   AST U/L 16 24 39 13   ALT U/L 22 20 25 21   ALK PHOS U/L 88 95 100 101   TOTAL PROTEIN g/dL 6 5 6 7  6 0* 7 1 7 0   ALBUMIN g/dL 2 3* 2 3* 2 4* 2 5*   TOTAL BILIRUBIN mg/dL 0 36 0 44 0 36 0 37     Results from last 7 days   Lab Units 01/06/22  2132   POC GLUCOSE mg/dl 105     Results from last 7 days   Lab Units 01/11/22  0810 01/10/22  0529 01/09/22  0534 01/08/22  0432 01/07/22  0641 01/06/22  0447   GLUCOSE RANDOM mg/dL 102 96 100 99 92 106         Results from last 7 days   Lab Units 01/07/22  0641 01/06/22  0447   PROTIME seconds 15 3* 16 0*   INR  1 28* 1 35*   PTT seconds 33 34     Results from last 7 days   Lab Units 01/06/22  0335   TSH 3RD GENERATON uIU/mL 1 078     Results from last 7 days   Lab Units 01/10/22  0530 01/07/22  0642 01/06/22  0447   PROCALCITONIN ng/ml 0 28* 0 38* 0 32*     Results from last 7 days   Lab Units 01/06/22  0447   LACTIC ACID mmol/L 0 9           Results from last 7 days   Lab Units 01/08/22  1630 01/06/22  1623 01/06/22  1622   CLARITY UA   --   --  Cloudy   COLOR UA   --   --  Yellow   SPEC GRAV UA   --   --  1 025   PH UA   --   --  5 0   GLUCOSE UA mg/dl  --   --  Negative   KETONES UA mg/dl  --   --  Negative   BLOOD UA   --   --  Moderate*   PROTEIN UA mg/dl  --   --  30 (1+)*   NITRITE UA   --   --  Negative   BILIRUBIN UA   --   --  Negative   UROBILINOGEN UA E U /dl  --   --  0 2   LEUKOCYTES UA   --   --  Moderate*   WBC UA /hpf  --   --  Innumerable*   RBC UA /hpf  --   --  2-4   BACTERIA UA /hpf  --   --  Moderate*   EPITHELIAL CELLS WET PREP /hpf  --   --  Occasional   SODIUM UR  25 68  --    CREATININE UR mg/dL 330 0 330 0  330 0 188 0  --    PROTEIN UR mg/dL 185 103  --    PROT/CREAT RATIO UR  0 56* 0 55*  --            Results from last 7 days   Lab Units 01/08/22  0046 01/06/22  1622 01/06/22  0447   BLOOD CULTURE   --   --  No Growth After 4 Days  No Growth After 4 Days     GRAM STAIN RESULT  No Polys or Bacteria seen  --   --    URINE CULTURE   --  <10,000 cfu/ml   --    WOUND CULTURE  No growth  --   --                  Medications:   Scheduled Medications:      acetaminophen, 975 mg, Oral, Q8H IRIS  docusate sodium, 100 mg, Oral, BID  enoxaparin, 60 mg, Subcutaneous, Q12H IRIS  fluconazole, 400 mg, Oral, Daily  fluticasone, 1 spray, Each Nare, Daily  lidocaine, 1 patch, Topical, Daily  magnesium sulfate, 2 g, Intravenous, Q2H x 2 doses 1/11  methocarbamol, 500 mg, Oral, Q8H IRIS  metoprolol tartrate, 25 mg, Oral, BID  metroNIDAZOLE, 500 mg, Oral, Q8H [address]  nicotine, 1 patch, Transdermal, Daily  nystatin, , Topical, BID  nystatin, 500,000 Units, Swish & Swallow, 4x Daily  nystatin, , Topical, TID  sertraline, 50 mg, Oral, Daily  sodium bicarbonate, 650 mg, Oral, BID after meals    cefTRIAXone (ROCEPHIN) IVPB (premix in dextrose) 2,000 mg 50 mL  Dose: 2,000 mg  Freq: Every 24 hours Route: IV  Start: 01/11/22 1730            cefepime (MAXIPIME) IVPB (premix in dextrose) 1,000 mg 50 mL  Dose: 1,000 mg  Freq: Every 12 hours Route: IV  Last Dose: 1,000 mg (01/11/22 0500)  Start: 01/08/22 0200 End: 01/11/22 0737      vancomycin (VANCOCIN) 2,000 mg in sodium chloride 0 9 % 500 mL IVPB  Dose: 2,000 mg  Freq: Every 24 hours Route: IV  Last Dose: Stopped (01/09/22 1755)  Start: 01/07/22 1730 End: 01/10/22 0819        Continuous IV Infusions: None currently     sodium chloride 0 9 % infusion  Rate: 50 mL/hr Dose: 50 mL/hr  Freq: Continuous Route: IV  Indications of Use: IV Hydration  Start: 01/06/22 1000 End: 01/10/22 0922       PRN Meds:  al mag oxide-diphenhydramine-lidocaine viscous, 10 mL, Swish & Swallow, Q4H PRN x 1 dose 1/8, 1/9, 1/10  albuterol, 2 puff, Inhalation, Q6H PRN  aluminum-magnesium hydroxide-simethicone, 30 mL, Oral, Q4H PRN  HYDROmorphone, 0 2 mg, Intravenous, Q4H PRN x 1 dose 1/9, 1/10  hydrOXYzine HCL, 25 mg, Oral, TID PRN  ondansetron, 4 mg, Intravenous, Q6H PRN x 1 dose 1/9  oxyCODONE, 5 mg, Oral, Q4H PRN   Or  oxyCODONE, 10 mg, Oral, Q4H PRN x 2 doses 1/10, x 2 doses 1/11        Discharge Plan: D          Network Utilization Review Department  ATTENTION: Please call with any questions or concerns to [phone] and carefully listen to the prompts so that you are directed to the right person  All voicemails are confidential   Shannon Robert all requests for admission clinical reviews, approved or denied determinations and any other requests to dedicated fax number below belonging to the campus where the patient is receiving treatment   List of dedicated fax numbers for the Facilities:  [address] [place] [address] DENIALS (Administrative/Medical Necessity) [phone]   [address] (Maternity/NICU/Pediatrics) [phone] [address]  [address] [facility] [place] [address] [address] [place] [address] [address]cy [address]orm[address] [facility] [address] [facility] [phone]

## 2022-01-11 NOTE — SPEECH THERAPY NOTE
Speech Language/Pathology    Speech/Language Pathology Progress Note    Patient Name: Nikole Crowe  OOFTK'V Date: 2022     Problem List  Principal Problem:    Cellulitis of abdominal wall, chest wall and groin with wounds  Active Problems:    Hypertension    Morbid obesity with BMI of 60 0-69 9, adult (HCC)    Borderline hyperlipidemia    Acute renal failure (ARF) ([facility] [address] )    Diverticulitis of large intestine with perforation    Right Hydroureteronephrosis    Subacute vaginitis    Dysphagia       Past Medical History  Past Medical History:   Diagnosis Date    Anxiety     Arthritis     GERD (gastroesophageal reflux disease)     Hidradenitis suppurativa     Hypertension     Lipodermatosclerosis of both lower extremities     Lymphedema     Sciatic leg pain     left side    SOB (shortness of breath)     Stomach ulcer         Past Surgical History  Past Surgical History:   Procedure Laterality Date     SECTION      OR EXC SKIN BENIG 3 1-4 CM TRUNK,ARM,LEG Right 2020    Procedure: EXCISION BIOPSY TISSUE LESION/MASS UPPER EXTREMITY;  Surgeon: Yesica Salazar DO;  Location: [facility] MAIN OR;  Service: General    TONSILLECTOMY      TUMOR REMOVAL Right     5th finger         Subjective:  Patient received reclined in bed with complaints of hip and buttocks pain 2' wound dressing weeping and sticking to sheet  NSG aware  Objective: Thin via straw:  3 trials in reclined position with no difficulty, clear vocal quality, no overt s/s of penetration or aspiration  Assessment:  Patient reports having been seen by GI yesterday, patient continues to complain of varying sensations in her throat prior to and during PO trials  Suspect this is related to thrush, canker sore and otherwise decreased oral care at this time  Pt's swallow is functional with appropriate ROM, strength and coordination for thin via straw in compromised position    Pt also has high levels of pain, contributing to her overall well being  Plan/Recommendations:  Pt tolerating current diet of soft/thin as baseline, no further ST warranted as GI consultation placed and appropriate follow

## 2022-01-12 NOTE — PLAN OF CARE
Problem: Potential for Falls  Goal: Patient will remain free of falls  Description: INTERVENTIONS:  - Educate patient/family on patient safety including physical limitations  - Instruct patient to call for assistance with activity   - Consult OT/PT to assist with strengthening/mobility   - Keep Call bell within reach  - Keep bed low and locked with side rails adjusted as appropriate  - Keep care items and personal belongings within reach  - Initiate and maintain comfort rounds  - Make Fall Risk Sign visible to staff  - Offer Toileting every two Hours, in advance of need  - Initiate/Maintain bed alarm  - Obtain necessary fall risk management equipment: non slip socks  - Apply yellow socks and bracelet for high fall risk patients  - Consider moving patient to room near nurses station  Outcome: Progressing     Problem: Prexisting or High Potential for Compromised Skin Integrity  Goal: Skin integrity is maintained or improved  Description: INTERVENTIONS:  - Identify patients at risk for skin breakdown  - Assess and monitor skin integrity  - Assess and monitor nutrition and hydration status  - Monitor labs   - Assess for incontinence   - Turn and reposition patient  - Assist with mobility/ambulation  - Relieve pressure over bony prominences  - Avoid friction and shearing  - Provide appropriate hygiene as needed including keeping skin clean and dry  - Evaluate need for skin moisturizer/barrier cream  - Collaborate with interdisciplinary team   - Patient/family teaching  - Consider wound care consult   Outcome: Progressing     Problem: MOBILITY - ADULT  Goal: Maintain or return to baseline ADL function  Description: INTERVENTIONS:  -  Assess patient's ability to carry out ADLs; assess patient's baseline for ADL function and identify physical deficits which impact ability to perform ADLs (bathing, care of mouth/teeth, toileting, grooming, dressing, etc )  - Assess/evaluate cause of self-care deficits   - Assess range of motion  - Assess patient's mobility; develop plan if impaired  - Assess patient's need for assistive devices and provide as appropriate  - Encourage maximum independence but intervene and supervise when necessary  - Involve family in performance of ADLs  - Assess for home care needs following discharge   - Consider OT consult to assist with ADL evaluation and planning for discharge  - Provide patient education as appropriate  Outcome: Progressing  Goal: Maintains/Returns to pre admission functional level  Description: INTERVENTIONS:  - Perform BMAT or MOVE assessment daily    - Set and communicate daily mobility goal to care team and patient/family/caregiver  - Collaborate with rehabilitation services on mobility goals if consulted  - Perform Range of Motion three times a day  - Reposition patient every two hours  - Dangle patient three times a day  - Stand patient three times a day  - Ambulate patient three times a day  - Out of bed to chair three times a day   - Out of bed for meals three times a day  - Out of bed for toileting  - Record patient progress and toleration of activity level   Outcome: Progressing     Problem: Nutrition/Hydration-ADULT  Goal: Nutrient/Hydration intake appropriate for improving, restoring or maintaining nutritional needs  Description: Monitor and assess patient's nutrition/hydration status for malnutrition  Collaborate with interdisciplinary team and initiate plan and interventions as ordered  Monitor patient's weight and dietary intake as ordered or per policy  Utilize nutrition screening tool and intervene as necessary  Determine patient's food preferences and provide high-protein, high-caloric foods as appropriate       INTERVENTIONS:  - Monitor oral intake, urinary output, labs, and treatment plans  - Assess nutrition and hydration status and recommend course of action  - Evaluate amount of meals eaten  - Assist patient with eating if necessary   - Allow adequate time for meals  - Recommend/ encourage appropriate diets, oral nutritional supplements, and vitamin/mineral supplements  - Order, calculate, and assess calorie counts as needed  - Recommend, monitor, and adjust tube feedings and TPN/PPN based on assessed needs  - Assess need for intravenous fluids  - Provide specific nutrition/hydration education as appropriate  - Include patient/family/caregiver in decisions related to nutrition  Outcome: Progressing     Problem: PAIN - ADULT  Goal: Verbalizes/displays adequate comfort level or baseline comfort level  Description: Interventions:  - Encourage patient to monitor pain and request assistance  - Assess pain using appropriate pain scale  - Administer analgesics based on type and severity of pain and evaluate response  - Implement non-pharmacological measures as appropriate and evaluate response  - Consider cultural and social influences on pain and pain management  - Notify physician/advanced practitioner if interventions unsuccessful or patient reports new pain  Outcome: Progressing     Problem: INFECTION - ADULT  Goal: Absence or prevention of progression during hospitalization  Description: INTERVENTIONS:  - Assess and monitor for signs and symptoms of infection  - Monitor lab/diagnostic results  - Monitor all insertion sites, i e  indwelling lines, tubes, and drains  - Monitor endotracheal if appropriate and nasal secretions for changes in amount and color  - Ney appropriate cooling/warming therapies per order  - Administer medications as ordered  - Instruct and encourage patient and family to use good hand hygiene technique  - Identify and instruct in appropriate isolation precautions for identified infection/condition  Outcome: Progressing  Goal: Absence of fever/infection during neutropenic period  Description: INTERVENTIONS:  - Monitor WBC    Outcome: Progressing     Problem: SAFETY ADULT  Goal: Patient will remain free of falls  Description: INTERVENTIONS:  - Educate patient/family on patient safety including physical limitations  - Instruct patient to call for assistance with activity   - Consult OT/PT to assist with strengthening/mobility   - Keep Call bell within reach  - Keep bed low and locked with side rails adjusted as appropriate  - Keep care items and personal belongings within reach  - Initiate and maintain comfort rounds  - Make Fall Risk Sign visible to staff  - Offer Toileting every two Hours, in advance of need  - Initiate/Maintain bed alarm  - Obtain necessary fall risk management equipment: non slip socks  - Apply yellow socks and bracelet for high fall risk patients  - Consider moving patient to room near nurses station  Outcome: Progressing  Goal: Maintain or return to baseline ADL function  Description: INTERVENTIONS:  -  Assess patient's ability to carry out ADLs; assess patient's baseline for ADL function and identify physical deficits which impact ability to perform ADLs (bathing, care of mouth/teeth, toileting, grooming, dressing, etc )  - Assess/evaluate cause of self-care deficits   - Assess range of motion  - Assess patient's mobility; develop plan if impaired  - Assess patient's need for assistive devices and provide as appropriate  - Encourage maximum independence but intervene and supervise when necessary  - Involve family in performance of ADLs  - Assess for home care needs following discharge   - Consider OT consult to assist with ADL evaluation and planning for discharge  - Provide patient education as appropriate  Outcome: Progressing  Goal: Maintains/Returns to pre admission functional level  Description: INTERVENTIONS:  - Perform BMAT or MOVE assessment daily    - Set and communicate daily mobility goal to care team and patient/family/caregiver  - Collaborate with rehabilitation services on mobility goals if consulted  - Perform Range of Motion three times a day  - Reposition patient every three hours    - Dangle patient three times a day  - Stand patient three times a day  - Ambulate patient three times a day  - Out of bed to chair three times a day   - Out of bed for meals three times a day  - Out of bed for toileting  - Record patient progress and toleration of activity level   Outcome: Progressing     Problem: DISCHARGE PLANNING  Goal: Discharge to home or other facility with appropriate resources  Description: INTERVENTIONS:  - Identify barriers to discharge w/patient and caregiver  - Arrange for needed discharge resources and transportation as appropriate  - Identify discharge learning needs (meds, wound care, etc )  - Arrange for interpretive services to assist at discharge as needed  - Refer to Case Management Department for coordinating discharge planning if the patient needs post-hospital services based on physician/advanced practitioner order or complex needs related to functional status, cognitive ability, or social support system  Outcome: Progressing     Problem: Knowledge Deficit  Goal: Patient/family/caregiver demonstrates understanding of disease process, treatment plan, medications, and discharge instructions  Description: Complete learning assessment and assess knowledge base    Interventions:  - Provide teaching at level of understanding  - Provide teaching via preferred learning methods  Outcome: Progressing

## 2022-01-12 NOTE — QUICK NOTE
INF DIS PLAN OF CARE NOTE    Patient is afebrile without leukocytosis  No new complaints  Skin wounds improving  Wound cx reported as skin jamilah but with few cols of Enterococcus, coag negative staph  Repeat CT notes slight worsening of perisigmoid collection (3 1 x 4 cm) with evidence of potential obstruction to R EIA and increased R hydroureteronephrosis    A-  Perforated sigmoid diverticulitis, R pelvic abscess- increased in size with increased obstructive R hydronephrosis  Fungal intertrigo, hidradenitis   Possible candidal esophagitis, dysphagia    R-  Continue ceftriaxone, flagyl  Add vanc, dosing per pharmacy protocol for enterococcal coverage  Recommend surgery/IR re-evaluation for abscess drainage   Continue po fluconazole 400 mg daily, recommend GI fu    Reviewed case with primary team  Will follow  Please call with concerns or any changes in clinical status or new test results

## 2022-01-12 NOTE — PLAN OF CARE
Problem: PHYSICAL THERAPY ADULT  Goal: Performs mobility at highest level of function for planned discharge setting  See evaluation for individualized goals  Description: Treatment/Interventions: Functional transfer training,LE strengthening/ROM,Therapeutic exercise,Endurance training,Bed mobility,Gait training  Equipment Recommended: Mignon Barba       See flowsheet documentation for full assessment, interventions and recommendations  Outcome: Progressing  Note: Prognosis: Good  Problem List: Decreased strength,Decreased endurance,Impaired balance,Decreased mobility  Assessment: Pt  seen for PT treatment session this date with interventions consisting of  bed mobility, transfers and  gait training w/ emphasis on improving pt's ability to ambulate  Pt  Currently performing  tx and ambulation at ( min) x 1-2 level of function  Please also refer to physical therapy recommendation for safe DC planning  In comparison to previous session, Pt  With improvements in activity tolerance  Limited by weakness and fatigue  Pt is in need of continued activity in PT to improve strength balance endurance mobility transfers and ambulation with return to maximize LOF  From PT/mobility standpoint, recommendation at time of d/c would be post acute rehab  in order to promote return to PLOF and independence  The patient's AM-PAC Basic Mobility Inpatient Short Form Raw Score is 14  A Raw score of less than or equal to 16 suggests the patient may benefit from discharge to post-acute rehabilitation services  PT Discharge Recommendation: Post acute rehabilitation services          See flowsheet documentation for full assessment

## 2022-01-12 NOTE — PROGRESS NOTES
NEPHROLOGY PROGRESS NOTE   Quan Moore 60 y o  female MRN: [id number]  Unit/Bed#: 622-02 Encounter: [id number]    Assessment/Plan:    Edenilson Castle a 60 y  o  female whose pertinent medical problems include hypertension, obesity admitted 1/6 with chief complaint of open sores being treated for acute kidney injury, urinary retention, right hydroureteronephrosis status post indwelling urinary catheter, abdominal cellulitis, sigmoid diverticulitis with microperforation, dysphagia possibly due to esophageal candidiasis  Renal following along for ZAHIRA, obstructive uropathy  Plan outlined below       1  Acute kidney injury (POA) atop chronic kidney disease  · Renal function nearing baseline  Continue to hold ACE-inhibitor and Celebrex  Continue indwelling urinary catheter until re-evaluated by Urology  2  Stage 2 chronic kidney disease baseline creatinine around 0 9-1 0 mg/dL  3  Obstructive uropathy, mild right hydroureteronephrosis  · Continue indwelling urinary catheter until re-evaluated by Urology  4  Hypertension the setting of CKD  · Blood pressure appropriate  Continue to hold lisinopril  5  Proteinuria  · Repeat urine studies in steady state and rule out monoclonal gammopathy if necessary  6  Abdominal cellulitis with superficial wounds  · Management per primary team and surgery  7  Fungal dermatitis  · Management per primary team  8  Hypomagnesemia  · Resolved  9  Acidosis  · Sodium bicarbonate tablets will discontinue later today  10  Microperforation of sigmoid diverticulitis with small pelvic abscess  · Repeat CT scan today per ID      ROS  Complained of nausea last evening  No complaints today  A complete 10 point review of systems have been performed and are otherwise negative         Historical Information   Past Medical History:   Diagnosis Date    Anxiety     Arthritis     GERD (gastroesophageal reflux disease)     Hidradenitis suppurativa     Hypertension     Lipodermatosclerosis of both lower extremities     Lymphedema     Sciatic leg pain 2006    left side    SOB (shortness of breath)     Stomach ulcer      Past Surgical History:   Procedure Laterality Date     SECTION      [facility] EXC SKIN BENIG 3 1-4 CM TRUNK,ARM,LEG Right 2020    Procedure: EXCISION BIOPSY TISSUE LESION/MASS UPPER EXTREMITY;  Surgeon: Megha Perla DO;  Location: [facility];  Service: General    TONSILLECTOMY      TUMOR REMOVAL Right     5th finger     Social History   Social History     Substance and Sexual Activity   Alcohol Use Not Currently    Comment: holidays     Social History     Substance and Sexual Activity   Drug Use No     Social History     Tobacco Use   Smoking Status Light Tobacco Smoker    Packs/day: 0 25    Years: 20 00    Pack years: 5 00    Types: Cigarettes   Smokeless Tobacco Never Used   Tobacco Comment    Only smoke once in awhile       Family History:   Family History   Problem Relation Age of Onset    Cancer Mother     Hypertension Mother     COPD Father     Diabetes Father        Medications:  Pertinent medications were reviewed  Current Facility-Administered Medications   Medication Dose Route Frequency Provider Last Rate    acetaminophen  975 mg Oral Q8H [address] Joanne Warner MD      al mag oxide-diphenhydramine-lidocaine viscous  10 mL Swish & Swallow Q4H PRN Geraldo Schroeder MD      albuterol  2 puff Inhalation Q6H PRN Ranjith Tracy MD      aluminum-magnesium hydroxide-simethicone  30 mL Oral Q4H PRN Geraldo Schroeder MD      cefTRIAXone  2,000 mg Intravenous Q24H Lorenzo Wilde MD 2,000 mg (22 1720)    docusate sodium  100 mg Oral BID Ranjith Tracy MD      enoxaparin  60 mg Subcutaneous Q12H Fabián Cisneros MD      fluconazole  400 mg Oral Daily Lorenzo Wilde MD      fluticasone  1 spray Each Nare Daily Ranjith Tracy MD      HYDROmorphone  0 2 mg Intravenous Q4H PRN Geraldo Schroeder MD      hydrOXYzine HCL  25 mg Oral TID PRN Julio Cesar Navarrete MD      lidocaine  1 patch Topical Daily Philip Culver MD      methocarbamol  500 mg Oral [facility] Philip Culver MD      metoprolol tartrate  25 mg Oral BID Julio Cesar Navarrete MD      metroNIDAZOLE  500 mg Oral Q8H [facility] Julio Cesar Navarrete MD      nicotine  1 patch Transdermal Daily Julio Cesar Navarrete MD      nystatin   Topical BID Philip Culver MD      nystatin  500,000 Units Swish & Swallow 4x Daily Julio Cesar Navarrete MD      nystatin   Topical TID Julio Cesar Navarrete MD      ondansetron  4 mg Intravenous Q6H PRN Julio Cesar Navarrete MD      oxyCODONE  5 mg Oral Q4H PRN Philip Culver MD      Or   Molly Pall oxyCODONE  10 mg Oral Q4H PRN Philip Culver MD      pantoprazole  40 mg Oral Early Morning Yazmin Landaverde PA-C      sertraline  50 mg Oral Daily Julio Cesar Navarrete MD      sodium bicarbonate  650 mg Oral BID after meals [place], CRNP           Allergies   Allergen Reactions    Bee Venom Anaphylaxis, Shortness Of Breath and Swelling         Vitals:   /76 (BP Location: Right arm)   Pulse 84   Temp 97 6 °F (36 4 °C) (Oral)   Resp 18   Ht 5' 7" (1 702 m)   Wt (!) 174 kg (384 lb 0 7 oz)   SpO2 92%   BMI 60 15 kg/m²   Body mass index is 60 15 kg/m²  SpO2: 92 %,   SpO2 Activity: At Rest,   O2 Device: None (Room air)      Intake/Output Summary (Last 24 hours) at 1/12/2022 1019  Last data filed at 1/12/2022 0343  Gross per 24 hour   Intake 240 ml   Output 1850 ml   Net -1610 ml     Invasive Devices  Report    Peripheral Intravenous Line            Peripheral IV 01/09/22 Dorsal (posterior); Left Hand 2 days          Drain            Urethral Catheter 16 Fr  5 days                Physical Exam  General: conscious, cooperative, in no acute distress on room air  Eyes: conjunctivae pink, anicteric sclerae  ENT: lips and mucous membranes moist poor dentition  Neck: supple, no JVD, no masses  Chest:  Essentially clear breath sounds bilaterally, no crackles, ronchus or wheezings  CVS: S1 & S2, normal rate, regular rhythm  Abdomen: soft, non-tender, non-distended, normoactive bowel sounds obese  Extremities:  Chronic bilateral lymphedema  Skin: no rash multiple wounds to abdomen  Neuro: awake, alert, oriented      Diagnostic Data:  Lab: I have personally reviewed pertinent lab results  ,   CBC:  Results from last 7 days   Lab Units 01/12/22  0434   WBC Thousand/uL 6 84   HEMOGLOBIN g/dL 9 2*   HEMATOCRIT % 30 8*   PLATELETS Thousands/uL 257      CMP:   Lab Results   Component Value Date    SODIUM 136 01/12/2022    K 4 5 01/12/2022     01/12/2022    CO2 22 01/12/2022    BUN 18 01/12/2022    CREATININE 1 18 01/12/2022    CALCIUM 8 4 01/12/2022    EGFR 50 01/12/2022   ,   PT/INR: No results found for: PT, INR,   Magnesium: No components found for: MAG,  Phosphorous: No results found for: PHOS    Microbiology:  @LABNT,(urinecx:7)@        MIRTA Blanco    Portions of the record may have been created with voice recognition software  Occasional wrong word or "sound a like" substitutions may have occurred due to the inherent limitations of voice recognition software  Read the chart carefully and recognize, using context, where substitutions have occurred

## 2022-01-12 NOTE — PROGRESS NOTES
Progress Note - General Surgery   Baron Wood 60 y o  female MRN: [id number]  Unit/Bed#: 523-87 Encounter: [id number]    ASSESSMENT:  59 y/o F presenting with:   · Sigmoid Diverticulitis with microperforation and small abscess, with no safe window for IR drainage  · Cutaneous fungal infection  · Wounds of b/l groin creases and inframammary folds, likely 2/2 hx hidradenitis Improving  No significant surrounding erythema/warmth  No active drainage  Areas of ulceration with maxorb and Allevyn foam cover  ·  Dysphagia, workup per speech path and GI  · Right hydronephrosis per CT imaging, Galan in place, Cr improving   · Acute renal failure superimposed on chronic kidney disease, Nephrology following, Cr improving    -AVSS  -Labs stable, no leukocytosis   -Cr improving 1 18>1 65>1 79  -Wound cx growing enterococcus , staph coagulase neg   -Urine cx with no growth, only mized contaminants   -Blood [email or web address] com days  -UO: 1 8L, Galan in place draining clear yellow urine     PLAN:  -FU CTAP imaging study today   -IV abx and IV antifungal per ID recs  -FU MRSA cx  -Local wound care per nursing  -Workup per GI for dysphagia; PPI and antifungal for now, may require EGD  -Dysphagia diet + nutrition supplements   -use IS q hour  -OOB AD PENELOPE, encourage ambulation       SUBJECTIVE:  No acute complaints  Denies abdominal pain, N/V  Passing flatus, doesn't remember last BM  Tolerating dysphagia diet, but has some mouth/throat discomfort  New productive cough, no significant trouble breathing  OBJECTIVE:   Vitals:  Blood pressure 129/73, pulse 75, temperature (!) 97 1 °F (36 2 °C), temperature source Tympanic, resp  rate 20, height 5' 7" (1 702 m), weight (!) 174 kg (384 lb 0 7 oz), SpO2 92 %  ,Body mass index is 60 15 kg/m²    I/Os:    Intake/Output Summary (Last 24 hours) at 1/12/2022 0711  Last data filed at 1/12/2022 0343  Gross per 24 hour   Intake 480 ml   Output 1850 ml   Net -1370 ml     Lines/Drains:  Invasive Devices Report    Peripheral Intravenous Line            Peripheral IV 01/09/22 Dorsal (posterior); Left Hand 2 days          Drain            Urethral Catheter 16 Fr  5 days                Physical Exam:   Physical Exam  Vitals reviewed  Constitutional:       General: She is not in acute distress  Appearance: She is obese  HENT:      Head: Normocephalic and atraumatic  Eyes:      Extraocular Movements: Extraocular movements intact  Cardiovascular:      Rate and Rhythm: Normal rate and regular rhythm  Heart sounds: No murmur heard  Pulmonary:      Breath sounds: Wheezing present  Comments: Productive cough  Abdominal:      Palpations: Abdomen is soft  Tenderness: There is abdominal tenderness (minimal tenderness in RLQ near ecchymosis from SQ injections and LLQ)  There is no guarding or rebound  Genitourinary:     Comments: Galan intact, patent draining clear yellow urine  Musculoskeletal:         General: No tenderness  Cervical back: Neck supple  Right lower leg: No edema  Left lower leg: No edema  Skin:     General: Skin is warm and dry  Comments: Left arm with ecchymoses     Groin creases and inframammory folds with ulcerations, no signs of acute infection; no erythema/edema/warmth, no active drainage   Neurological:      General: No focal deficit present  Mental Status: She is alert and oriented to person, place, and time  Psychiatric:         Mood and Affect: Mood normal          Thought Content: Thought content normal          Judgment: Judgment normal          Diagnostics:  I have personally reviewed pertinent lab results  CT chest abdomen pelvis wo contrast    Result Date: 1/6/2022  Impression: Findings compatible with acute sigmoid diverticulitis with microperforation and an adjacent small right pelvic abscess  The study was limited by the lack of oral contrast   Surgical consultation is recommended  No evidence of large or small bowel obstruction  No infiltrate or pleural effusion  No evidence of a subcutaneous abscess in the region of the visualized breast, inguinal or rectal areas  Minimal to mild right-sided hydroureteronephrosis with no obstructing calculus  The findings may be secondary to the active inflammatory changes adjacent to the sigmoid colon  I personally discussed this study with Yesica Aguirre on 1/6/2022 at 6:31 AM  Workstation performed: [id number]     US kidney and bladder    Result Date: 1/6/2022  Impression: 1  Challenging exam due to habitus, however the right renal collecting system appears minimally dilated similar to prior CT  Surinder Heckler No shadowing calculi  2   Normal kidney size and echogenicity  3   Cholelithiasis   Workstation performed: [id number]     Recent Results (from the past 36 hour(s))   Procalcitonin Reflex    Collection Time: 01/11/22  8:10 AM   Result Value Ref Range    Procalcitonin 0 43 (H) <=0 25 ng/ml   Vancomycin, random    Collection Time: 01/11/22  8:10 AM   Result Value Ref Range    Vancomycin Rm 13 7 10 0 - 20 0 ug/mL   Basic metabolic panel    Collection Time: 01/11/22  8:10 AM   Result Value Ref Range    Sodium 139 136 - 145 mmol/L    Potassium 4 6 3 5 - 5 3 mmol/L    Chloride 108 100 - 108 mmol/L    CO2 21 21 - 32 mmol/L    ANION GAP 10 4 - 13 mmol/L    BUN 27 (H) 5 - 25 mg/dL    Creatinine 1 65 (H) 0 60 - 1 30 mg/dL    Glucose 102 65 - 140 mg/dL    Calcium 8 2 (L) 8 3 - 10 1 mg/dL    eGFR 33 ml/min/1 73sq m   Magnesium    Collection Time: 01/11/22  8:10 AM   Result Value Ref Range    Magnesium 1 3 (L) 1 6 - 2 6 mg/dL   CBC and differential    Collection Time: 01/12/22  4:34 AM   Result Value Ref Range    WBC 6 84 4 31 - 10 16 Thousand/uL    RBC 3 85 3 81 - 5 12 Million/uL    Hemoglobin 9 2 (L) 11 5 - 15 4 g/dL    Hematocrit 30 8 (L) 34 8 - 46 1 %    MCV 80 (L) 82 - 98 fL    MCH 23 9 (L) 26 8 - 34 3 pg    MCHC 29 9 (L) 31 4 - 37 4 g/dL    RDW 17 5 (H) 11 6 - 15 1 %    MPV 8 4 (L) 8 9 - 12 7 fL    Platelets 361 390 - 390 Thousands/uL    nRBC 0 /100 WBCs    Neutrophils Relative 63 43 - 75 %    Immat GRANS % 1 0 - 2 %    Lymphocytes Relative 19 14 - 44 %    Monocytes Relative 10 4 - 12 %    Eosinophils Relative 6 0 - 6 %    Basophils Relative 1 0 - 1 %    Neutrophils Absolute 4 34 1 85 - 7 62 Thousands/µL    Immature Grans Absolute 0 05 0 00 - 0 20 Thousand/uL    Lymphocytes Absolute 1 33 0 60 - 4 47 Thousands/µL    Monocytes Absolute 0 65 0 17 - 1 22 Thousand/µL    Eosinophils Absolute 0 40 0 00 - 0 61 Thousand/µL    Basophils Absolute 0 07 0 00 - 0 10 Thousands/µL   Basic metabolic panel    Collection Time: 01/12/22  4:34 AM   Result Value Ref Range    Sodium 136 136 - 145 mmol/L    Potassium 4 5 3 5 - 5 3 mmol/L    Chloride 105 100 - 108 mmol/L    CO2 22 21 - 32 mmol/L    ANION GAP 9 4 - 13 mmol/L    BUN 18 5 - 25 mg/dL    Creatinine 1 18 0 60 - 1 30 mg/dL    Glucose 94 65 - 140 mg/dL    Calcium 8 4 8 3 - 10 1 mg/dL    eGFR 50 ml/min/1 73sq m   Magnesium    Collection Time: 01/12/22  4:34 AM   Result Value Ref Range    Magnesium 2 0 1 6 - 2 6 mg/dL       Current Medications:  Scheduled Meds:  Current Facility-Administered Medications   Medication Dose Route Frequency Provider Last Rate    acetaminophen  975 mg Oral Q8H [address] Joanne Warner MD      al mag oxide-diphenhydramine-lidocaine viscous  10 mL Swish & Swallow Q4H PRN Glen Dubin, MD      albuterol  2 puff Inhalation Q6H PRN Rhonda Metzger MD      aluminum-magnesium hydroxide-simethicone  30 mL Oral Q4H PRN Glen Dubin, MD      cefTRIAXone  2,000 mg Intravenous Q24H Lorenzo Regalado MD 2,000 mg (01/11/22 1720)    docusate sodium  100 mg Oral BID Rhonda Metzger MD      enoxaparin  60 mg Subcutaneous Q12H Patricio Castillo MD      fluconazole  400 mg Oral Daily Lorenzo Regalado MD      fluticasone  1 spray Each Nare Daily Rhonda Metzger MD      HYDROmorphone  0 2 mg Intravenous Q4H PRN Glen Dubin, MD      hydrOXYzine HCL  25 mg Oral TID PRN Julio Cesar Navarrete MD      lidocaine  1 patch Topical Daily Philip Culver MD      methocarbamol  500 mg Oral Q8H [address] Philip Culver MD      metoprolol tartrate  25 mg Oral BID Julio Cesar Navarrete MD      metroNIDAZOLE  500 mg Oral Q8H [address] Julio Cesar Navarrete MD      nicotine  1 patch Transdermal Daily JulioC esar Navarrete MD      nystatin   Topical BID Philip Culver MD      nystatin  500,000 Units Swish & Swallow 4x Daily Julio Cesar Navarrete MD      nystatin   Topical TID Julio Cesar Navarrete MD      ondansetron  4 mg Intravenous Q6H PRN Julio Cesar Navarrete MD      oxyCODONE  5 mg Oral Q4H PRN Philip Culver MD      Or   Molly Pall oxyCODONE  10 mg Oral Q4H PRN Philip Culver MD      pantoprazole  40 mg Oral Early Morning Yazmin Landaverde PA-C      sertraline  50 mg Oral Daily Julio Cesar Navarrete MD      sodium bicarbonate  650 mg Oral BID after meals MIRTA Eisenberg       Continuous Infusions:   PRN Meds:  al mag oxide-diphenhydramine-lidocaine viscous    albuterol    aluminum-magnesium hydroxide-simethicone    HYDROmorphone    hydrOXYzine HCL    ondansetron    oxyCODONE **OR** oxyCODONE    VTE PPX:  VTE Pharmacologic Prophylaxis: Enoxaparin (Lovenox)  VTE Mechanical Prophylaxis: sequential compression device      LUCY Jaime  1/12/2022

## 2022-01-12 NOTE — ASSESSMENT & PLAN NOTE
Patient presented with pain, erythema, swelling with overlying wounds, actively draining purulent material involving the inframammary area, lower abdomen, groin and inner thighs        Continue vancomycin, Cefepime and Metronidazole day#5   Dc vancomycin and transition to Rocephin 2g IV q24 per ID 1/11/21  ID recommending 5 days of IV Diflucan  Follow-up MRSA screenWound care consult, input appreciated

## 2022-01-12 NOTE — PROGRESS NOTES
Vancomycin Assessment    Raul Bolanos is a 60 y o  female who is currently ordered vancomycin 2500mg IV q24h for other enterococcus cellulitis   Relevant clinical data and objective history reviewed:  Creatinine   Date Value Ref Range Status   01/12/2022 1 18 0 60 - 1 30 mg/dL Final     Comment:     Standardized to IDMS reference method   01/11/2022 1 65 (H) 0 60 - 1 30 mg/dL Final     Comment:     Standardized to IDMS reference method   01/10/2022 1 79 (H) 0 60 - 1 30 mg/dL Final     Comment:     Standardized to IDMS reference method     Vancomycin Rm   Date Value Ref Range Status   01/11/2022 13 7 10 0 - 20 0 ug/mL Final     /76 (BP Location: Right arm)   Pulse 84   Temp 97 6 °F (36 4 °C) (Oral)   Resp 18   Ht 5' 7" (1 702 m)   Wt (!) 174 kg (384 lb 0 7 oz)   SpO2 92%   BMI 60 15 kg/m²   I/O last 3 completed shifts: In: 480 [P O :480]  Out: 2800 [Urine:2800]  Lab Results   Component Value Date/Time    BUN 18 01/12/2022 04:34 AM    WBC 6 84 01/12/2022 04:34 AM    HGB 9 2 (L) 01/12/2022 04:34 AM    HCT 30 8 (L) 01/12/2022 04:34 AM    MCV 80 (L) 01/12/2022 04:34 AM     01/12/2022 04:34 AM     Temp Readings from Last 3 Encounters:   01/12/22 97 6 °F (36 4 °C) (Oral)   12/06/21 (!) 96 1 °F (35 6 °C)   11/22/21 (!) 97 2 °F (36 2 °C)     Vancomycin Days of Therapy: 1 (restarted)    Assessment/Plan  The patient is currently ordered vancomycin utilizing scheduled dosing  Baseline risks associated with therapy include: pre-existing renal impairment and advanced age  The patient is ordered 2500mg IV q24h and after clinical evaluation will be changed to 1500mg IV q12h   Pharmacy will continue to follow closely for s/sx of nephrotoxicity, infusion reactions, and appropriateness of therapy  BMP and CBC will be ordered per protocol  Plan for trough as patient approaches steady state, prior to the 5th  dose at approximately 1230 on 1/14/22   Pharmacy will continue to follow the patients culture results and clinical progress daily      Nick Esquivel, Pharmacist

## 2022-01-12 NOTE — PROGRESS NOTES
[address] [facility]  Progress Note - Ted Lozoya 1965, 60 y o  female MRN: [id number]  Unit/Bed#: 553-81 Encounter: [id number]  Primary Care Provider: Cornelius Enriquez PA-C   Date and time admitted to hospital: 1/6/2022  2:43 AM    Diverticulitis of large intestine with perforation  Assessment & Plan    CT abdomen on 01/06/22 shows - sigmoid diverticulitis with microperforation and adjacent small pelvic abscess  Surgical input appreciated  IR unable to place drain due to body habitus  Rocephin & flagyl   Abdominal exam is benign, follow-up repeat CT of the abdomen 01/12/2020 to        * Cellulitis of abdominal wall, chest wall and groin with wounds  Assessment & Plan  Patient presented with pain, erythema, swelling with overlying wounds, actively draining purulent material involving the inframammary area, lower abdomen, groin and inner thighs  Continue vancomycin, Cefepime and Metronidazole day#5   Dc vancomycin and transition to Rocephin 2g IV q24 per ID 1/11/21  ID recommending 5 days of IV Diflucan  Follow-up MRSA screenWound care consult, input appreciated          Acute renal failure (ARF) (HCC)  Assessment & Plan  Superimposed on chronic kidney disease with a baseline creatinine of 1  Continue to hold lisinopril and Celebrex  Continue Galan catheter and will have patient follow-up as an outpatient  DC IV fluids, patient eating and drinking 1/11/21    Primary hypertension  Assessment & Plan  Lisinopril on hold due to ZAHIRA  Continue Metoprolol 25mg      DC when cleared by surgery     Progress Note - Ted Lozoya 60 y o  female MRN: [id number]    Unit/Bed#: 418-01 Encounter: [id number]        Subjective:   Patient seen and examined at bedside  No complaints, and feels better this morning     Objective:     Vitals:   Vitals:    01/12/22 0722   BP: 123/76   Pulse: 84   Resp: 18   Temp: 97 6 °F (36 4 °C)   SpO2: 92%     Body mass index is 60 15 kg/m²      Intake/Output Summary (Last 24 hours) at 1/12/2022 1120  Last data filed at 1/12/2022 0343  Gross per 24 hour   Intake 240 ml   Output 1850 ml   Net -1610 ml       Physical Exam:   /76 (BP Location: Right arm)   Pulse 84   Temp 97 6 °F (36 4 °C) (Oral)   Resp 18   Ht 5' 7" (1 702 m)   Wt (!) 174 kg (384 lb 0 7 oz)   SpO2 92%   BMI 60 15 kg/m²   General appearance: alert and oriented, in no acute distress  Lungs: clear to auscultation bilaterally  Heart: regular rate and rhythm, S1, S2 normal, no murmur, click, rub or gallop  Abdomen: soft, non-tender; bowel sounds normal; no masses,  no organomegaly  Extremities: extremities normal, warm and well-perfused; no cyanosis, clubbing, or edema  Pulses: 2+ and symmetric  Neurologic: Grossly normal     Invasive Devices  Report    Peripheral Intravenous Line            Peripheral IV 01/09/22 Dorsal (posterior); Left Hand 2 days          Drain            Urethral Catheter 16 Fr  5 days                Results from last 7 days   Lab Units 01/12/22  0434 01/10/22  0529 01/09/22  0534   WBC Thousand/uL 6 84 7 00 8 29   HEMOGLOBIN g/dL 9 2* 9 4* 9 4*   HEMATOCRIT % 30 8* 31 3* 30 6*   PLATELETS Thousands/uL 257 340 361       Results from last 7 days   Lab Units 01/12/22  0434 01/11/22  0810 01/10/22  0529 01/09/22  0534 01/08/22  0432 01/07/22  0641 01/07/22  0641   POTASSIUM mmol/L 4 5 4 6 4 9   < > 5 1   < > 5 2   CHLORIDE mmol/L 105 108 111*   < > 110*   < > 108   CO2 mmol/L 22 21 19*   < > 19*   < > 17*   BUN mg/dL 18 27* 34*   < > 47*   < > 57*   CREATININE mg/dL 1 18 1 65* 1 79*   < > 2 73*   < > 3 24*   CALCIUM mg/dL 8 4 8 2* 8 6   < > 8 9   < > 9 1   ALK PHOS U/L  --   --  88  --  95  --  100   ALT U/L  --   --  22  --  20  --  25   AST U/L  --   --  16  --  24  --  39    < > = values in this interval not displayed         Medication Administration - last 24 hours from 01/11/2022 1120 to 01/12/2022 1120       Date/Time Order Dose Route Action Action by     01/12/2022 0924 fluticasone (FLONASE) 50 mcg/act nasal spray 1 spray 1 spray Each Nare Given Rhonda Wood RN     01/11/2022 1309 fluticasone (FLONASE) 50 mcg/act nasal spray 1 spray 1 spray Each Nare Given Sherri Turcios RN     01/11/2022 1254 hydrOXYzine HCL (ATARAX) tablet 25 mg 25 mg Oral Not Given Sherri Turcios RN     01/12/2022 0925 metoprolol tartrate (LOPRESSOR) tablet 25 mg 25 mg Oral Given Rhonda Wood RN     01/11/2022 1717 metoprolol tartrate (LOPRESSOR) tablet 25 mg 25 mg Oral Given Ryder Choi RN     01/11/2022 1246 metoprolol tartrate (LOPRESSOR) tablet 25 mg 25 mg Oral Given Sherri Turcios RN     01/12/2022 0925 sertraline (ZOLOFT) tablet 50 mg 50 mg Oral Given Rhonda Wood RN     01/11/2022 1252 sertraline (ZOLOFT) tablet 50 mg 50 mg Oral Given Sherri Turcios RN     01/12/2022 2116 docusate sodium (COLACE) capsule 100 mg 100 mg Oral Given Rhonda Wood RN     01/11/2022 1716 docusate sodium (COLACE) capsule 100 mg 100 mg Oral Given Ryder Choi RN     01/11/2022 1240 docusate sodium (COLACE) capsule 100 mg 100 mg Oral Given Sherri Turcios RN     01/11/2022 1251 nicotine (NICODERM CQ) 7 mg/24hr TD 24 hr patch 1 patch 1 patch Transdermal Not Given Sherri Turcios RN     01/11/2022 2230 nystatin (MYCOSTATIN) powder   Topical Given Ryder Choi RN     01/11/2022 1317 nystatin (MYCOSTATIN) powder   Topical Given Sherri Turcios RN     01/12/2022 0532 metroNIDAZOLE (FLAGYL) tablet 500 mg 500 mg Oral Given Dick Gonzales, RN     01/11/2022 2138 metroNIDAZOLE (FLAGYL) tablet 500 mg 500 mg Oral Given Ryder Choi RN     01/11/2022 1308 metroNIDAZOLE (FLAGYL) tablet 500 mg 500 mg Oral Given Sherri Turcios RN     01/12/2022 0783 al mag oxide-diphenhydramine-lidocaine viscous (MAGIC MOUTHWASH) suspension 10 mL 10 mL Swish & Swallow Given Rhonda Wood RN     01/11/2022 1215 nystatin (MYCOSTATIN) cream   Topical Given Ryder Choi RN     01/11/2022 1317 nystatin (MYCOSTATIN) cream   Topical Given Gledna Owen, RN     01/12/2022 0924 enoxaparin (LOVENOX) subcutaneous injection 60 mg 60 mg Subcutaneous Given Brigida Hills, RN     01/11/2022 2139 enoxaparin (LOVENOX) subcutaneous injection 60 mg 60 mg Subcutaneous Given Ju Downer, RN     01/11/2022 1238 enoxaparin (LOVENOX) subcutaneous injection 60 mg 60 mg Subcutaneous Given Glenda Owen, RN     01/11/2022 1313 sodium bicarbonate tablet 1,300 mg 1,300 mg Oral Not Given Glenda Owen, RN     01/12/2022 0531 acetaminophen (TYLENOL) tablet 975 mg 975 mg Oral Given Onnie Hard, RN     01/11/2022 2138 acetaminophen (TYLENOL) tablet 975 mg 975 mg Oral Given Ju Downer, RN     01/11/2022 1308 acetaminophen (TYLENOL) tablet 975 mg 975 mg Oral Given Glenda Owen, RN     01/11/2022 2152 oxyCODONE (ROXICODONE) IR tablet 5 mg   Oral See Alternative Ju Downer, RN     01/11/2022 1250 oxyCODONE (ROXICODONE) IR tablet 5 mg   Oral See Alternative Colie Lexie, RN     01/11/2022 2152 oxyCODONE (ROXICODONE) immediate release tablet 10 mg 10 mg Oral Given Ju Downer, RN     01/11/2022 1250 oxyCODONE (ROXICODONE) immediate release tablet 10 mg 10 mg Oral Given Romanalv Lexie, RN     01/12/2022 0532 methocarbamol (ROBAXIN) tablet 500 mg 500 mg Oral Given Onnie Hard, RN     01/11/2022 2139 methocarbamol (ROBAXIN) tablet 500 mg 500 mg Oral Given Ju Downer, RN     01/11/2022 1308 methocarbamol (ROBAXIN) tablet 500 mg 500 mg Oral Given Colie Lexie, RN     01/12/2022 0925 lidocaine (LIDODERM) 5 % patch 1 patch 1 patch Topical Medication Applied Brigida Hills, RN     01/12/2022 0033 lidocaine (LIDODERM) 5 % patch 1 patch 1 patch Topical Patch Removed Onnie Hard, RN     01/11/2022 1251 lidocaine (LIDODERM) 5 % patch 1 patch 1 patch Topical Medication Applied Glenda Owen RN     01/11/2022 1713 aluminum-magnesium hydroxide-simethicone (MYLANTA) oral suspension 30 mL 30 mL [facility]     01/12/2022 1661 nystatin (MYCOSTATIN) oral suspension 500,000 Units 500,000 Units Swish & Swallow Given Tanisha Ores, RN     01/11/2022 2149 nystatin (MYCOSTATIN) oral suspension 500,000 Units 500,000 Units Swish & Swallow Refused Ralene Barbie, RN     01/11/2022 2141 nystatin (MYCOSTATIN) oral suspension 500,000 Units 500,000 Units Swish & Swallow Given Ralene Barbie, RN     01/11/2022 1253 nystatin (MYCOSTATIN) oral suspension 500,000 Units 500,000 Units Swish & Swallow Not Given Jazmin Drones, RN     01/11/2022 1240 nystatin (MYCOSTATIN) oral suspension 500,000 Units 500,000 Units Swish & Swallow Given Jazmin Drones, RN     01/11/2022 1720 cefTRIAXone (ROCEPHIN) IVPB (premix in dextrose) 2,000 mg 50 mL 2,000 mg Intravenous New Bag Ralene Barbie, [place]     01/12/2022 8719 fluconazole (DIFLUCAN) tablet 400 mg 400 mg Oral Given Tanisha Ores, RN     01/11/2022 1240 fluconazole (DIFLUCAN) tablet 400 mg 400 mg Oral Given Jazmin Drones, RN     01/11/2022 1413 magnesium sulfate 2 g/50 mL IVPB (premix) 2 g 2 g Intravenous New Bag Tanisha [facility], RN     01/11/2022 1239 magnesium sulfate 2 g/50 mL IVPB (premix) 2 g 2 g Intravenous [address] Jazmin Droluis, RN     01/12/2022 3255 sodium bicarbonate tablet 650 mg 650 mg Oral Given Tanisha Ores, RN     01/11/2022 1717 sodium bicarbonate tablet 650 mg 650 mg Oral Given Ralene Barbie, RN     01/12/2022 0532 pantoprazole (PROTONIX) EC tablet 40 mg 40 mg Oral Given Ernesto Weinstein RN            Lab, Imaging and other studies: I have personally reviewed pertinent reports      VTE Pharmacologic Prophylaxis: lovenox  VTE Mechanical Prophylaxis: sequential compression device     Lorenzo Rader MD  1/12/2022,11:20 AM

## 2022-01-12 NOTE — PHYSICAL THERAPY NOTE
PHYSICAL THERAPY NOTE          Patient Name: Megan Strauss  HTGUN'R Date: 1/12/2022 01/12/22 5055   Note Type   Note Type Treatment   Restrictions/Precautions   Weight Bearing Precautions Per Order No   Other Precautions Pain; Fall Risk;Multiple lines   General   Chart Reviewed Yes   Response to Previous Treatment Patient with no complaints from previous session  Family/Caregiver Present No   Cognition   Overall Cognitive Status WFL   Following Commands Follows all commands and directions without difficulty   Subjective   Subjective Agreeable to therapy  Bed Mobility   Supine to Sit 4  Minimal assistance   Additional items Assist x 1;HOB elevated; Increased time required;Verbal cues   Additional Comments Increased time to scoot to EOB   Transfers   Sit to Stand 4  Minimal assistance   Additional items Assist x 1;Assist x 2; Increased time required;Verbal cues   Stand to Sit 4  Minimal assistance   Additional items Assist x 1; Increased time required;Verbal cues   Ambulation/Elevation   Gait pattern Excessively slow; Wide KODY; Improper Weight shift   Gait Assistance 4  Minimal assist   Additional items Assist x 1;Verbal cues   Assistive Device Rolling walker   Distance 30'   Balance   Static Sitting Fair +   Dynamic Sitting Fair +   Static Standing Fair -   Dynamic Standing Fair -   Ambulatory Fair -   Endurance Deficit   Endurance Deficit Yes   Activity Tolerance   Activity Tolerance Patient limited by fatigue;Patient limited by pain   Assessment   Prognosis Good   Problem List Decreased strength;Decreased endurance; Impaired balance;Decreased mobility   Assessment Pt  seen for PT treatment session this date with interventions consisting of  bed mobility, transfers and  gait training w/ emphasis on improving pt's ability to ambulate  Pt  Currently performing  tx and ambulation at ( min) x 1-2 level of function   Please also refer to physical therapy recommendation for safe DC planning  In comparison to previous session, Pt  With improvements in activity tolerance  Limited by weakness and fatigue  Pt is in need of continued activity in PT to improve strength balance endurance mobility transfers and ambulation with return to maximize LOF  From PT/mobility standpoint, recommendation at time of d/c would be post acute rehab  in order to promote return to PLOF and independence  The patient's AM-PAC Basic Mobility Inpatient Short Form Raw Score is 14  A Raw score of less than or equal to 16 suggests the patient may benefit from discharge to post-acute rehabilitation services  Goals   LTG Expiration Date 01/21/22   Plan   Treatment/Interventions Functional transfer training;LE strengthening/ROM; Therapeutic exercise; Endurance training;Bed mobility;Gait training   Progress Slow progress, decreased activity tolerance   Recommendation   PT Discharge Recommendation Post acute rehabilitation services   AM-PAC Basic Mobility Inpatient   Turning in Bed Without Bedrails 2   Lying on Back to Sitting on Edge of Flat Bed 2   Moving Bed to Chair 3   Standing Up From Chair 3   Walk in Room 3   Climb 3-5 Stairs 1   Basic Mobility Inpatient Raw Score 14   Basic Mobility Standardized Score 35 55   Highest Level Of Mobility   JH-HLM Goal 4: Move to chair/commode   JH-HLM Highest Level of Mobility 6: Walk 10 steps or more   JH-HLM Goal Achieved Yes   Education   Education Provided Mobility training   Patient Reinforcement needed   End of Consult   Patient Position at End of Consult   (wc for transport to test)

## 2022-01-12 NOTE — ASSESSMENT & PLAN NOTE
CT abdomen on 01/06/22 shows - sigmoid diverticulitis with microperforation and adjacent small pelvic abscess      Surgical input appreciated  IR unable to place drain due to body habitus  Rocephin & flagyl   Abdominal exam is benign, follow-up repeat CT of the abdomen 01/12/2020 to

## 2022-01-13 NOTE — PROGRESS NOTES
Vancomycin IV Pharmacy-to-Dose Consultation    Zoey Jorge is a 60 y o  female who is currently receiving Vancomycin IV with management by the Pharmacy Consult service  Assessment/Plan:  The patient was reviewed  Renal function is stable and no signs or symptoms of nephrotoxicity and/or infusion reactions were documented in the chart  Based on todays assessment, continue current vancomycin (day # 2 after restarting) dosing of 1500mg Q12H, with a plan for trough to be drawn at 12:30 on 01/14/2022  We will continue to follow the patients culture results and clinical progress daily      Bossman Middleton, Pharmacist

## 2022-01-13 NOTE — ASSESSMENT & PLAN NOTE
Patient complains of choking sensation, initially with solids, recently she noticed that she is choking on water  In patient words " I feel like there is a pill struck in my throat even with drinking plain water"  Poor dental heigine with multiple dental caries noted on exam  No thrush noted  Being treated for aphthous ulcer and sore throat  Speech and swallow eval  GI consult to rule out obstructive causes     Increase diflucan to 400mg daily per ID 1/11/21

## 2022-01-13 NOTE — PROGRESS NOTES
Progress Note - General Surgery   Joselyn Nelson 60 y o  female MRN: [id number]  Unit/Bed#: 240-98 Encounter: [id number]    Assessment:  59yo F presenting with sigmoid diverticulitis with microperforation and abscess collection  - afebrile, VSS  - no leukocytosis  - Hemoglobin stable   - repeat CTAP 01/12/2022: "Right pelvic collection shows very mild interval increase in size  In addition to encasing the right ureter, this collection also encases the right external iliac artery, and may cause arterial injury "    Cutaneous fungal infection   With wounds of bilateral inframammary folds and groin creases    - Blood cx NGTD  - Wound cx growing Enterococcus and Staph coagulase neg  ZAHIRA on CKD  - Cr continues to trend towards baseline   - Nephrology following   - good urine output recorded   Right hydroureteronephrosis   - Cr improving   - Galan catheter in place     PMH: obesity, HTN, Lymphedema, Sciatica, Anxiety, GERD    Plan:  - Discussed with Dr Tina Godfrey, no plan for acute surgical intervention at this time  - Cont IV abx and IV antifungal per ID recs while inpatient  Patient will require a course of oral abx once medically stable for discharge  Discussed with ID, they will see patient later today and provide further recommendations  - Local wound care per nursing  - will need outpatient follow up in wound care center at time of discharge   - cont dysphagia diet  - Dysphagia woukup per GI   - Medical management per primary team      Subjective/Objective   Subjective: No acute events overnight  Patient states she feels well overall today  Denies N/V/increasnig abd pain  States she has been passing flatus, does not remember timing of last BM  Denies fever/chills, chest pain, SOB, palpitations, pain in extremities  Objective:   Blood pressure 123/59, pulse 89, temperature 98 2 °F (36 8 °C), resp  rate 18, height 5' 7" (1 702 m), weight (!) 167 kg (368 lb 13 3 oz), SpO2 90 %  ,Body mass index is 57 77 kg/m²  Intake/Output Summary (Last 24 hours) at 1/13/2022 0724  Last data filed at 1/13/2022 0636  Gross per 24 hour   Intake 660 ml   Output 1975 ml   Net -1315 ml       Invasive Devices  Report    Peripheral Intravenous Line            Peripheral IV 01/12/22 Dorsal (posterior); Right Hand <1 day          Drain            Urethral Catheter 16 Fr  6 days                Physical Exam: /91   Pulse 84   Temp (!) 97 4 °F (36 3 °C)   Resp 18   Ht 5' 7" (1 702 m)   Wt (!) 167 kg (368 lb 13 3 oz)   SpO2 92%   BMI 57 77 kg/m²   General appearance: alert and oriented, in no acute distress, appears stated age and cooperative  Head: Normocephalic, without obvious abnormality, atraumatic  Lungs: clear to auscultation bilaterally  Heart: regular rate and rhythm, S1, S2 normal, no murmur, click, rub or gallop  Abdomen: obese, soft, mildly tender to palpation in RLQ, R flank  Bowel sounds active throughout and normal in character  No peritoneal signs  Extremities: warm, well-perfused, no signs of ischemia  + bilateral lymphedema   Galan catheter in place    Lab, Imaging and other studies:  I have personally reviewed pertinent lab results  , CBC:   Lab Results   Component Value Date    WBC 8 91 01/13/2022    HGB 9 4 (L) 01/13/2022    HCT 31 8 (L) 01/13/2022    MCV 81 (L) 01/13/2022     01/13/2022    MCH 23 9 (L) 01/13/2022    MCHC 29 6 (L) 01/13/2022    RDW 17 9 (H) 01/13/2022    MPV 8 5 (L) 01/13/2022    NRBC 0 01/13/2022   , CMP:   Lab Results   Component Value Date    SODIUM 135 (L) 01/13/2022    K 4 4 01/13/2022     01/13/2022    CO2 23 01/13/2022    BUN 13 01/13/2022    CREATININE 1 11 01/13/2022    CALCIUM 8 5 01/13/2022    AST 14 01/13/2022    ALT 21 01/13/2022    ALKPHOS 93 01/13/2022    EGFR 54 01/13/2022     CTAP 01/12/22: " 1  Right pelvic collection shows very mild interval increase in size    In addition to encasing the right ureter, this collection also encases the right external iliac artery, and may cause arterial injury  2   Persistent inflammatory changes surrounding the sigmoid colon with surrounding free air  3    Moderate right hydroureteronephrosis, increased from prior"    VTE Pharmacologic Prophylaxis: Enoxaparin (Lovenox)  VTE Mechanical Prophylaxis: sequential compression device    Kirsten Martinez PA-C  01/13/22

## 2022-01-13 NOTE — CASE MANAGEMENT
Case Management Discharge Planning Note    Patient name Esmer Sneed /[id number] MRN [id number]  : 1965 Date 2022       Current Admission Date: 2022  Current Admission Diagnosis:Cellulitis of abdominal wall, chest wall and groin with wounds   Patient Active Problem List    Diagnosis Date Noted    Dysphagia 2022    Acute renal failure (ARF) (RUST 75 ) 2022    Cellulitis of abdominal wall, chest wall and groin with wounds 2022    Diverticulitis of large intestine with perforation 2022    Right Hydroureteronephrosis 2022    Subacute vaginitis 2022    GERD (gastroesophageal reflux disease)     Other specified anxiety disorders 2019    Lumbar paraspinal muscle spasm 2019    Borderline hyperlipidemia 2019    Iron deficiency 2019    Chronic midline low back pain with left-sided sciatica 2019    Primary hypertension 2019    Morbid obesity with BMI of 60 0-69 9, adult (RUST 75 ) 2019    Hidradenitis suppurativa 2019    Lymphedema 2006      LOS (days): 7  Geometric Mean LOS (GMLOS) (days):   Days to GMLOS:     OBJECTIVE:  Risk of Unplanned Readmission Score: 10         Current admission status: Inpatient   Preferred Pharmacy:   [facility] [address] [place] [address] FELIPA/ Josiah Simons  [facility] [address]  Phone: [phone] Fax: [phone]    Primary Care Provider: Leopoldo [place], PA-C    Primary Insurance: [facility]  Secondary Insurance:     DISCHARGE DETAILS: Referral sent to Acute Rehab to see if they would be able to accept the patient

## 2022-01-13 NOTE — PROGRESS NOTES
Progress Note - Infectious Disease   Heath Pac 60 y o  female MRN: [id number]  Unit/Bed#: 418-01 Encounter: [id number]        REQUIRED DOCUMENTATION:     1  This service was provided via Telemedicine  2  Provider located at [facility]  3  TeleMed provider: Nicko Powell MD   4  Identify all parties in room with patient during tele consult:RN  5  After connecting through [employer], patient was identified by name and date of birth and assistant checked wristband  Patient was then informed that this was a Telemedicine visit and that the exam was being conducted confidentially over secure lines  My office door was closed  No one else was in the room  Patient acknowledged consent and understanding of privacy and security of the Telemedicine visit, and gave us permission to have the assistant stay in the room in order to assist with the history and to conduct the exam   I informed the patient that I have reviewed their record in Epic and presented the opportunity for them to ask any questions regarding the visit today  The patient agreed to participate  Assessment/Recommendations     1   Perforated sigmoid diverticulitis with microperforation, small right pelvic abscess  - Managed conservatively, not amenable to drainage by IR, no surgical intervention anticipated  - Repeat CT notes slight worsening of perisigmoid collection (3 1 x 4 cm) with evidence of potential obstruction to R EIA and increased R hydroureteronephrosis  - Clinically improved, afebrile without leukocytosis with with intermittent R flank pain    · Recommend IR re evaluation, if abscess remains inaccessible would discontinue Vanc, ceftriaxone, flagyl and switch to PO augmentin 875 bid   · Anticipate 3-4 weeks of PO therapy with repeat CT abd/pelvis in 3 weeks, final duration of therapy pending clinical and radiologic resolution of abscess  · Continue serial abdominal exams  · If R flank pain worsens or patient has fever/worsening leukocytosis would recommend repeat CT abd/pelvis  · Additional management per surgery    2  Right flank pain, intermittent  - CT notes slight worsening R hydroureteronephrosis likely due to relative obstruction from underlying perisigmoid abscess    · Recommend IR evaluation  · Antibiotics and imaging as above    3  Fungal dermatitis with secondary bacterial infection, present on admission  - Long standing rash, wounds present over pannus, under breasts, mons, legs  - In the setting of morbid obesity and hx hidradenitis suppurativa  - Wound cx Enterococcus and CoNS     · Continue antibiotics as above  · Continue topical antifungals and PO fluconazole through 1/19  · Continue wound care, barrier protection  · Recommend offloading to allow buttock wounds to heal    4  Dysphagia, thrush, possible candidal esophagitis  - Patient reports pain and discomfort with swallowing and choking episodes    · Continue fluconazole 400 mg daily x 14 days through 1/19  · Recommend outpatient GI evaluation, continue speech therapy    5  Acute kidney injury, R hydroureteronephrosis  - Likely pre renal  - CT notes R hydroureteronephrosis but no obstruction, possible reactive  - creatinine normal    · Continue supportive care    6  Morbid obesity  - BMI 63  Risk factor for infection    · Recommend lifestyle modification and bariatric surgery consult as outpatient    Discussed with the primary service  History       Subjective: The patient notes improvement in pain and swelling under breast and pannus and improving pain in buttocks and upper thigh  Less discomfort with swallowing  Continues to have intermittent right flank pain  Denies fevers, chills, or sweats  Denies nausea, vomiting, or diarrhea      Antibiotics:  Vanc, ceftriaxone, flagyl, fluconazole      Physical Exam     Temp:  [97 4 °F (36 3 °C)-98 7 °F (37 1 °C)] 97 4 °F (36 3 °C)  HR:  [80-91] 84  Resp:  [18] 18  BP: (119-153)/(59-91) 144/91  SpO2:  [90 %-93 %] 92 %  Temp (24hrs), Av 1 °F (36 7 °C), Min:97 4 °F (36 3 °C), Max:98 7 °F (37 1 °C)  Current: Temperature: (!) 97 4 °F (36 3 °C)    Intake/Output Summary (Last 24 hours) at 2022 1116  Last data filed at 2022 0636  Gross per 24 hour   Intake 480 ml   Output 1975 ml   Net -1495 ml       Physical exam findings reported by bedside and primary medical team staff      General Appearance:  Appearing chronically ill, nontoxic, and in mild distress from pain, appears stated age   Throat: Oropharynx moist without white plaques; lips, mucosa, and tongue normal; teeth and gums normal   Lungs:   Clear to auscultation bilaterally, no audible wheezes, rhonchi and rales, respirations unlabored   Heart:  Regular rate and rhythm, S1, S2 normal, no murmur, rub or gallop   Abdomen:   Soft, non-tender, non-distended, positive bowel sounds, no masses, no organomegaly    No CVA tenderness   Extremities: Extremities normal, atraumatic, no cyanosis, clubbing or edema   Skin: Multiple ulcerated wounds over plaque like erythema under breast, pannus, mons, gluteus and upper posterior thighs, overall improving    Neurologic: Alert and oriented times 3         Invasive Devices:   Peripheral IV 22 Dorsal (posterior); Left Hand (Active)       Urethral Catheter 16 Fr  (Active)   Reasons to continue Urinary Catheter  Acute urinary retention/obstruction failing urinary retention protocol 22 1444   Goal for Removal No longer needed- Will place order to discontinue 22 1444   Site Assessment Clean;Skin intact 22 1444   Galan Care Done 22 2100   Collection Container Standard drainage bag 22 1444   Output (mL) 650 mL 22 1512       Labs, Imaging, & Other Studies     Lab Results:    I have personally reviewed pertinent labs      Results from last 7 days   Lab Units 22  0626 22  0434 01/10/22  0529   WBC Thousand/uL 8 91 6 84 7 00   HEMOGLOBIN g/dL 9 4* 9 2* 9 4*   PLATELETS Thousands/uL 263 257 340 Results from last 7 days   Lab Units 01/13/22  0626 01/11/22  0810 01/10/22  0529 01/09/22  0534 01/08/22  0432   POTASSIUM mmol/L 4 4   < > 4 9   < > 5 1   CHLORIDE mmol/L 104   < > 111*   < > 110*   CO2 mmol/L 23   < > 19*   < > 19*   BUN mg/dL 13   < > 34*   < > 47*   CREATININE mg/dL 1 11   < > 1 79*   < > 2 73*   EGFR ml/min/1 73sq m 54   < > 30   < > 18   CALCIUM mg/dL 8 5   < > 8 6   < > 8 9   AST U/L 14  --  16  --  24   ALT U/L 21  --  22  --  20   ALK PHOS U/L 93  --  88  --  95    < > = values in this interval not displayed  Results from last 7 days   Lab Units 01/08/22  0046 01/06/22  1622   GRAM STAIN RESULT  No Polys or Bacteria seen  --    URINE CULTURE   --  <10,000 cfu/ml    WOUND CULTURE  Few Colonies of Enterococcus species*  Few Colonies of Staphylococcus coagulase negative*  --        Imaging Studies:   I have personally reviewed pertinent imaging study reports and images in PACS  EKG, Pathology, and Other Studies:   I have personally reviewed pertinent reports  Counseling/Coordination of care: Total 35 minutes communication with the patient via telehealth  Labs, medical tests and imaging studies were independently reviewed by me as noted above

## 2022-01-13 NOTE — OCCUPATIONAL THERAPY NOTE
Occupational Therapy Treatment Note     Patient Name: Tracy Cardona  EBCTO'Z Date: 1/13/2022  Problem List  Principal Problem:    Cellulitis of abdominal wall, chest wall and groin with wounds  Active Problems:    Primary hypertension    Morbid obesity with BMI of 60 0-69 9, adult ([facility] )    Borderline hyperlipidemia    Acute renal failure (ARF) ([facility] )    Diverticulitis of large intestine with perforation    Right Hydroureteronephrosis    Subacute vaginitis    Dysphagia                01/13/22 1105   OT Last Visit   OT Visit Date 01/13/22   Note Type   Note Type Treatment for insurance authorization   Restrictions/Precautions   Weight Bearing Precautions Per Order No   Other Precautions Fall Risk;Pain;Fluid restriction  (Angelia, )   General   Response to Previous Treatment Patient with no complaints from previous session   Lifestyle   Autonomy Pt reports being (I) c ADL/IADLs prior to arrival,  lives alone in an apartment with ramped entrance and uses crutches at baseline  Pt uses [employer] for transportation     Service to Others on disability    Pain Assessment   Pain Assessment Tool 0-10   Pain Score 5   Pain Location/Orientation Orientation: Lower; Location: Back   Pain Onset/Description Onset: Ongoing   Effect of Pain on Daily Activities limiting functional reach    Hospital Pain Intervention(s) Repositioned; Ambulation/increased activity   Multiple Pain Sites No   ADL   Where Assessed Chair  (supported in recliner )   Grooming Assistance 5  Supervision/Setup   Grooming Deficit Setup   Grooming Comments Pt washed face, applied baby power and deoderant    UB Bathing Assistance 5  Supervision/Setup   UB Bathing Deficit Setup;Supervision/safety   UB Bathing Comments Pt washed UB while seated EOB with supervision  LB Bathing Assistance 2  Maximal Assistance   LB Bathing Deficit Setup; Increased time to complete;Supervision/safety; Perineal area; Buttocks;Right lower leg including foot; Left lower leg including foot   LB Bathing Comments Max assist for lower legs, feet, praveen area  pt washed thighs    UB Dressing Assistance 5  Supervision/Setup   UB Dressing Deficit Supervision/safety;Setup   UB Dressing Comments Pt supervision to don/doff gown while seated in recliner    LB Dressing Assistance 1  Total Assistance   LB Dressing Deficit Don/doff R sock; Don/doff L sock; Setup   LB Dressing Comments Pt total A to don bilateral socks while seated in recliner    [address]  5  Supervision/Setup   Toileting Deficit Setup;Supervison/safety; Increased time to complete;Grab bar use;Verbal cueing   Toileting Comments Pt requested to complete toileting at end of session  Pt used toilet in bathroom in room, but was unable to go, therefore clothing management and hygiene during toileting not observed   Functional Standing Tolerance   Time 1min + 1 min    Activity to allow for pericare   Comments no LOB, min s/s of exertion  SpO2 92%    Bed Mobility   Additional Comments DNT: pt OOB in recliner upon arrival/end of session  Transfers   Sit to Stand 5  Supervision   Additional items Assist x 1; Armrests; Verbal cues; Increased time required  (RW)   Stand to Sit 5  Supervision   Additional items Assist x 1; Increased time required;Verbal cues  (RW)   Toilet transfer 4  Minimal assistance   Additional items Assist x 1; Increased time required;Standard toilet;Verbal cues   Additional Comments VC's for proper hand placement during all transfers  Initial report of lightheadedness c positional changes; did not progress symptomatically  Functional Mobility   Functional Mobility 4  Minimal assistance  (x1)   Additional Comments Functional mobility recliner<> bathroom with RW  Min s/s of exertion, no LOB  VC for RW management  Following bathrooom --> recliner mobility pt reports worsening dizziness  Pt positioned safely in recliner, "I am having a panic attack"   Emotional support and relaxation technique, verbally reported feeling better within 2 min  BP: 180/1-6 mmHg ()  Additional items Rolling walker   Toilet Transfers   Toilet Transfer From Rolling walker   Toilet Transfer Type To and from   Toilet Transfer to [employer] Transfers Minimal assistance   Toilet Transfers Comments VC for use GBs   Cognition   Overall Cognitive Status WFL   Arousal/Participation Alert; Responsive; Cooperative   Attention Within functional limits   Orientation Level Oriented X4   Memory Within functional limits   Following Commands Follows one step commands without difficulty   Comments Pt agreeable to OT session, pt education for fluid restriction reasons, importance of RW use, and explanation D/C recommendation  Pt agreeable to STR recommendation   Activity Tolerance   Activity Tolerance Patient limited by pain; Patient limited by fatigue   Medical Staff Made Aware RN verbalized pt appropriate to see    Assessment   Assessment Patient seen for OT treatment on 1/13/2022 s/p admission for Cellulitis of abdominal wall Patient presents with active orders for OT eval and treat  Upon arrival, pt found resting OOB in recliner showing no signs of distress  Patient agreeable to OT session  Patient participated in bathing/showering, toileting, dressing , personal hygiene/grooming , functional mobility, activity engagement  and fall prevention  with intervention focus on increasing strength and activity tolerance for increased functional independence during ADL/IADL tasks  Pt completed functional mobility with min assist and use of RW, and completed UB bathing tasks with supervision/set up  Pt required max assist for LB tasks    Raya Osmarmatilda is showing improvements in decreased activity tolerance , but is continuing to perform below baseline due to the following deficits: endurance , decreased muscular strength , decreased standing tolerance for self care tasks , decreased dynamic balance impacting functional reach and (+) pain   From OT standpoint, patient would benefit from skilled intervention to maximize independence with ADLs and functional mobility  Goals remain appropriate, continue POC  At this time, recommending D/C to: post-acute rehabilitation    Plan   Treatment Interventions ADL retraining; Endurance training;Functional transfer training;Patient/family training;Equipment evaluation/education   Goal Expiration Date 01/17/22   OT Treatment Day 2   OT Frequency 3-5x/wk   Recommendation   OT Discharge Recommendation Post acute rehabilitation services  (continued D/C )   OT - OK to Discharge Yes  (once medically clear)   Additional Comments  Pt left seated OOB in recliner at end of session, call button in reach  and chair alarm activated    Additional Comments 2 The patient's raw score on the AM-PAC Daily Activity inpatient short form is 14, standardized score is 33 39, less than 39 4  Patients at this level are likely to benefit from discharge to post-acute rehabilitation services  Please refer to the recommendation of the Occupational Therapist for safe discharge planning     AM-PAC Daily Activity Inpatient   Lower Body Dressing 1   Bathing 2   Toileting 2   Upper Body Dressing 3   Grooming 3   Eating 3   Daily Activity Raw Score 14   Daily Activity Standardized Score (Calc for Raw Score >=11) 33 39   AM-PAC Applied Cognition Inpatient   Following a Speech/Presentation 4   Understanding Ordinary Conversation 4   Taking Medications 3   Remembering Where Things Are Placed or Put Away 4   Remembering List of 4-5 Errands 4   Taking Care of Complicated Tasks 3   Applied Cognition Raw Score 22   Applied Cognition Standardized Score 47 83     Lizzie Pinedo, OT

## 2022-01-13 NOTE — ASSESSMENT & PLAN NOTE
Patient presented with pain, erythema, swelling with overlying wounds, actively draining purulent material involving the inframammary area, lower abdomen, groin and inner thighs        Continue vancomycin, Cefepime and Metronidazole day#5   wound culture growing E faecalis and coag negative staph, vanco added back  ID recommending 5 days of IV Diflucan  Follow-up MRSA screenWound care consult, input appreciated

## 2022-01-13 NOTE — CASE MANAGEMENT
Case Management Discharge Planning Note    Patient name Elver Prasad  Location /129-91 MRN [id number]  : 1965 Date 2022       Current Admission Date: 2022  Current Admission Diagnosis:Cellulitis of abdominal wall, chest wall and groin with wounds   Patient Active Problem List    Diagnosis Date Noted    Dysphagia 2022    Acute renal failure (ARF) ([facility] ) 2022    Cellulitis of abdominal wall, chest wall and groin with wounds 2022    Diverticulitis of large intestine with perforation 2022    Right Hydroureteronephrosis 2022    Subacute vaginitis 2022    GERD (gastroesophageal reflux disease)     Other specified anxiety disorders 2019    Lumbar paraspinal muscle spasm 2019    Borderline hyperlipidemia 2019    Iron deficiency 2019    Chronic midline low back pain with left-sided sciatica 2019    Primary hypertension 2019    Morbid obesity with BMI of 60 0-69 9, adult ([facility] ) 2019    Hidradenitis suppurativa 2019    Lymphedema 2006      LOS (days): 7  Geometric Mean LOS (GMLOS) (days):   Days to GMLOS:     OBJECTIVE:  Risk of Unplanned Readmission Score: 10         Current admission status: Inpatient   Preferred Pharmacy:   [facility] [address] [place] [address] FELIPA/ Josiah Simons  [facility] [id number]  Phone: [phone] Fax: [phone]    Primary Care Provider: Warner Merlin, PA-C    Primary Insurance: [facility]  Secondary Insurance:     DISCHARGE DETAILS:  Acute Rehab is evaluating patient, they want to have OT due a full set of ADL's with her  I notified OT of same  I sent messages to [facility] and [facility] to see if they can accept the patient

## 2022-01-13 NOTE — PROGRESS NOTES
[address] [facility] [address]  Progress Note - Bryce Rm 1965, 60 y o  female MRN: [id number]  Unit/Bed#: 913-34 Encounter: [id number]  Primary Care Provider: Truman Goldstein PA-C   Date and time admitted to hospital: 1/6/2022  2:43 AM    Diverticulitis of large intestine with perforation  Assessment & Plan    CT abdomen on 01/06/22 shows - sigmoid diverticulitis with microperforation and adjacent small pelvic abscess  Surgical input appreciated  IR unable to place drain due to body habitus, but ID requesting re-eval, will re-consult  Rocephin & flagyl   Abdominal exam is benign, repeat CT reviewed with surgical team         * Cellulitis of abdominal wall, chest wall and groin with wounds  Assessment & Plan  Patient presented with pain, erythema, swelling with overlying wounds, actively draining purulent material involving the inframammary area, lower abdomen, groin and inner thighs  Continue vancomycin, Cefepime and Metronidazole day#5   wound culture growing E faecalis and coag negative staph, vanco added back  ID recommending 5 days of IV Diflucan  Follow-up MRSA screenWound care consult, input appreciated          Dysphagia  Assessment & Plan  Patient complains of choking sensation, initially with solids, recently she noticed that she is choking on water  In patient words " I feel like there is a pill struck in my throat even with drinking plain water"  Poor dental heigine with multiple dental caries noted on exam  No thrush noted  Being treated for aphthous ulcer and sore throat  Speech and swallow eval  GI consult to rule out obstructive causes  Increase diflucan to 400mg daily per ID 1/11/21    Right Hydroureteronephrosis  Assessment & Plan  Right Hydroureteronephrosis noted on CT abdomen pelvis on 01/06/22    Renal US : right renal collecting system appears minimally dilated similar to prior CT  Per radiology suspecting related to inflammatory changes in the sigmoid colon  Galan in place - draining clear urine with creatinine continuing to improve   Outpatient follow up with urology and surgical team       Acute renal failure (ARF) ([facility] [address] )  Assessment & Plan  Superimposed on chronic kidney disease with a baseline creatinine of 1  Continue to hold lisinopril and Celebrex  Continue Galan catheter and will have patient follow-up as an outpatient  DC IV fluids, patient eating and drinking 1/11/21    Morbid obesity with BMI of 60 0-69 9, adult [facility])  Assessment & Plan  Patient is morbidly obese with BMI of 63 02        Primary hypertension  Assessment & Plan  Lisinopril on hold due to ZAHIRA  Continue Metoprolol 25mg          Progress Note - Yanely Mascorro 60 y o  female MRN: [id number]    Unit/Bed#: 418-01 Encounter: [id number]        Subjective:   Patient seen and examined at bedside  Feeling well no acute complains, still has mild dysphagia     Objective:     Vitals:   Vitals:    01/13/22 0756   BP: 144/91   Pulse: 84   Resp: 18   Temp: (!) 97 4 °F (36 3 °C)   SpO2: 92%     Body mass index is 57 77 kg/m²      Intake/Output Summary (Last 24 hours) at 1/13/2022 1029  Last data filed at 1/13/2022 0636  Gross per 24 hour   Intake 480 ml   Output 1975 ml   Net -1495 ml       Physical Exam:   /91   Pulse 84   Temp (!) 97 4 °F (36 3 °C)   Resp 18   Ht 5' 7" (1 702 m)   Wt (!) 167 kg (368 lb 13 3 oz)   SpO2 92%   BMI 57 77 kg/m²   General appearance: alert and oriented, in no acute distress  Head: Normocephalic, without obvious abnormality, atraumatic  Lungs: clear to auscultation bilaterally  Heart: regular rate and rhythm, S1, S2 normal, no murmur, click, rub or gallop  Abdomen: soft, non-tender; bowel sounds normal; no masses,  no organomegaly  Extremities: extremities normal, warm and well-perfused; no cyanosis, clubbing, or edema  Pulses: 2+ and symmetric  Neurologic: Grossly normal     Invasive Devices  Report    Peripheral Intravenous Line            Peripheral IV 01/12/22 Dorsal (posterior); Right Hand <1 day          Drain            Urethral Catheter 16 Fr  6 days                Results from last 7 days   Lab Units 01/13/22  0626 01/12/22  0434 01/10/22  0529   WBC Thousand/uL 8 91 6 84 7 00   HEMOGLOBIN g/dL 9 4* 9 2* 9 4*   HEMATOCRIT % 31 8* 30 8* 31 3*   PLATELETS Thousands/uL 263 257 340       Results from last 7 days   Lab Units 01/13/22  0626 01/12/22  0434 01/11/22  0810 01/10/22  0529 01/10/22  0529 01/09/22  0534 01/08/22  0432   POTASSIUM mmol/L 4 4 4 5 4 6   < > 4 9   < > 5 1   CHLORIDE mmol/L 104 105 108   < > 111*   < > 110*   CO2 mmol/L 23 22 21   < > 19*   < > 19*   BUN mg/dL 13 18 27*   < > 34*   < > 47*   CREATININE mg/dL 1 11 1 18 1 65*   < > 1 79*   < > 2 73*   CALCIUM mg/dL 8 5 8 4 8 2*   < > 8 6   < > 8 9   ALK PHOS U/L 93  --   --   --  88  --  95   ALT U/L 21  --   --   --  22  --  20   AST U/L 14  --   --   --  16  --  24    < > = values in this interval not displayed         Medication Administration - last 24 hours from 01/12/2022 1029 to 01/13/2022 1029       Date/Time Order Dose Route Action Action by     01/13/2022 1008 fluticasone (FLONASE) 50 mcg/act nasal spray 1 spray 1 spray Each Nare Given Jesi Hidalgo RN     01/13/2022 1000 metoprolol tartrate (LOPRESSOR) tablet 25 mg 25 mg Oral Given Jesi Hidalgo RN     01/12/2022 1733 metoprolol tartrate (LOPRESSOR) tablet 25 mg 25 mg Oral Given Fortino Serrano RN     01/13/2022 1000 sertraline (ZOLOFT) tablet 50 mg 50 mg Oral Given Jesi Hidalgo RN     01/13/2022 1000 docusate sodium (COLACE) capsule 100 mg 100 mg Oral Given Jesi Hidalgo RN     01/12/2022 1730 docusate sodium (COLACE) capsule 100 mg 100 mg Oral Given Fortino Serrano RN     01/13/2022 1022 nicotine (NICODERM CQ) 7 mg/24hr TD 24 hr patch 1 patch 1 patch Transdermal Not Given Jesi Hidalgo RN     01/13/2022 1007 nystatin (MYCOSTATIN) powder   Topical Given Jesi Hidalgo RN     01/12/2022 7166 nystatin (MYCOSTATIN) powder   Topical Given Heidi Pena, RN     01/12/2022 1547 nystatin (MYCOSTATIN) powder   Topical Given Radha Lepe, RN     01/13/2022 8974 metroNIDAZOLE (FLAGYL) tablet 500 mg 500 mg Oral Given Heidi Pena, RN     01/12/2022 2109 metroNIDAZOLE (FLAGYL) tablet 500 mg 500 mg Oral Given Heidi Pena, RN     01/12/2022 1417 metroNIDAZOLE (FLAGYL) tablet 500 mg 500 mg Oral Given Sarahi Sy, MICHELLE     01/12/2022 1732 al mag oxide-diphenhydramine-lidocaine viscous (MAGIC MOUTHWASH) suspension 10 mL 10 mL Swish & Swallow Given Lorenda Dessert, RN     01/13/2022 1022 nystatin (MYCOSTATIN) cream   Topical Given Hollie Lugo, RN     01/12/2022 1730 nystatin (MYCOSTATIN) cream   Topical Given Radha Lepe, RN     01/13/2022 1000 enoxaparin (LOVENOX) subcutaneous injection 60 mg 60 mg Subcutaneous Given Hollie Lugo, RN     01/12/2022 2051 enoxaparin (LOVENOX) subcutaneous injection 60 mg 60 mg Subcutaneous Given Heidi Pena, RN     01/13/2022 8158 acetaminophen (TYLENOL) tablet 975 mg 975 mg Oral Given Heidi Pena, RN     01/12/2022 2109 acetaminophen (TYLENOL) tablet 975 mg 975 mg Oral Given Heidi Pena, RN     01/12/2022 1417 acetaminophen (TYLENOL) tablet 975 mg 975 mg Oral Given Sarahi Sy, MICHELLE     01/12/2022 2108 oxyCODONE (ROXICODONE) IR tablet 5 mg   Oral See Alternative Heidi , RN     01/12/2022 1544 oxyCODONE (ROXICODONE) IR tablet 5 mg   Oral See Alternative Radha Lepe, RN     01/12/2022 2108 oxyCODONE (ROXICODONE) immediate release tablet 10 mg 10 mg Oral Given Heidi Pena, RN     01/12/2022 1544 oxyCODONE (ROXICODONE) immediate release tablet 10 mg 10 mg Oral Given Radha Lepe, RN     01/13/2022 2862 methocarbamol (ROBAXIN) tablet 500 mg 500 mg Oral Given Heidi Pena RN     01/12/2022 2109 methocarbamol (ROBAXIN) tablet 500 mg 500 mg Oral Given Heidi Pena RN     01/12/2022 1417 methocarbamol (ROBAXIN) tablet 500 mg 500 mg Oral Given Sarahi Sy RN 01/13/2022 1000 lidocaine (LIDODERM) 5 % patch 1 patch 1 patch Topical Medication Applied Parveen Madison RN     01/12/2022 2136 lidocaine (LIDODERM) 5 % patch 1 patch 1 patch Topical Patch Removed Elda Alex RN     01/13/2022 1000 nystatin (MYCOSTATIN) oral suspension 500,000 Units 500,000 Units Swish & Swallow Given Parveen Madison RN     01/12/2022 2203 nystatin (MYCOSTATIN) oral suspension 500,000 Units 500,000 Units Swish & Swallow Given Elda Alex RN     01/12/2022 1730 nystatin (MYCOSTATIN) oral suspension 500,000 Units 500,000 Units Swish & Swallow Given Bello Montez RN     01/12/2022 1256 nystatin (MYCOSTATIN) oral suspension 500,000 Units 500,000 Units Swish & Swallow Given Colette Joshua RN     01/12/2022 1834 cefTRIAXone (ROCEPHIN) IVPB (premix in dextrose) 2,000 mg 50 mL 2,000 mg Intravenous New Bag Bello Montez RN     01/13/2022 1000 fluconazole (DIFLUCAN) tablet 400 mg 400 mg Oral Given Parveen Madison RN     01/12/2022 1729 sodium bicarbonate tablet 650 mg 650 mg Oral Given Bello Montez RN     01/13/2022 0611 pantoprazole (PROTONIX) EC tablet 40 mg 40 mg Oral Given Elda Alex RN     01/13/2022 0046 vancomycin (VANCOCIN) 1,500 mg in sodium chloride 0 9 % 500 mL IVPB 1,500 mg Intravenous [address] [place] [address], MICHELLE     01/12/2022 1417 vancomycin (VANCOCIN) 1,500 mg in sodium chloride 0 9 % 500 mL IVPB 1,500 mg Intravenous New Bag Colette Joshua RN            Lab, Imaging and other studies: I have personally reviewed pertinent reports      VTE Pharmacologic Prophylaxis: Enoxaparin (Lovenox)  VTE Mechanical Prophylaxis: sequential compression device     Zoila Dawson MD  1/13/2022,10:29 AM

## 2022-01-13 NOTE — PLAN OF CARE
Problem: OCCUPATIONAL THERAPY ADULT  Goal: Performs self-care activities at highest level of function for planned discharge setting  See evaluation for individualized goals  Description: Treatment Interventions: ADL retraining,Functional transfer training,UE strengthening/ROM,Endurance training,Patient/family training,Activityengagement          See flowsheet documentation for full assessment, interventions and recommendations  Outcome: Progressing  Note: Limitation: Decreased ADL status,Decreased UE strength,Decreased Safe judgement during ADL,Decreased endurance,Decreased self-care trans,Decreased high-level ADLs     Assessment: Patient seen for OT treatment on 1/13/2022 s/p admission for Cellulitis of abdominal wall Patient presents with active orders for OT eval and treat  Upon arrival, pt found resting OOB in recliner showing no signs of distress  Patient agreeable to OT session  Patient participated in bathing/showering, toileting, dressing , personal hygiene/grooming , functional mobility, activity engagement  and fall prevention  with intervention focus on increasing strength and activity tolerance for increased functional independence during ADL/IADL tasks  Pt completed functional mobility with min assist and use of RW, and completed UB bathing tasks with supervision/set up  Pt required max assist for LB tasks  Rosalind Peña is showing improvements in decreased activity tolerance , but is continuing to perform below baseline due to the following deficits: endurance , decreased muscular strength , decreased standing tolerance for self care tasks , decreased dynamic balance impacting functional reach and (+) pain   From OT standpoint, patient would benefit from skilled intervention to maximize independence with ADLs and functional mobility  Goals remain appropriate, continue POC   At this time, recommending D/C to: post-acute rehabilitation      OT Discharge Recommendation: Post acute rehabilitation services (continued D/C )  OT - OK to Discharge: Yes (once medically clear)     Kleber Whitehead OT'

## 2022-01-13 NOTE — PLAN OF CARE
Problem: Potential for Falls  Goal: Patient will remain free of falls  Description: INTERVENTIONS:  - Educate patient/family on patient safety including physical limitations  - Instruct patient to call for assistance with activity (min assist and walker)  - Consult OT/PT to assist with strengthening/mobility   - Keep Call bell within reach  - Keep bed low and locked with side rails adjusted as appropriate  - Keep care items and personal belongings within reach  - Initiate and maintain comfort rounds  - Make Fall Risk Sign visible to staff (high fall risk)  - Offer Toileting every 2 Hours, in advance of need  - Initiate/Maintain bed/chair alarm  - Obtain necessary fall risk management equipment: nonskid footwear, fall bracelet, alarms  - Apply yellow socks and bracelet for high fall risk patients  - Consider moving patient to room near nurses station  Outcome: Progressing     Problem: Prexisting or High Potential for Compromised Skin Integrity  Goal: Skin integrity is maintained or improved  Description: INTERVENTIONS:  - Identify patients at risk for skin breakdown  - Assess and monitor skin integrity  - Assess and monitor nutrition and hydration status  - Monitor labs   - Assess for incontinence (has villanueva catheter)  - Turn and reposition patient (every 2 hours and prn)  - Assist with mobility/ambulation (min assist and walker)  - Relieve pressure over bony prominences  - Avoid friction and shearing  - Provide appropriate hygiene as needed including keeping skin clean and dry  - Evaluate need for skin moisturizer/barrier cream  - Collaborate with interdisciplinary team   - Patient/family teaching  - Consider wound care consult (consult done)  Outcome: Progressing     Problem: MOBILITY - ADULT  Goal: Maintain or return to baseline ADL function  Description: INTERVENTIONS:  - Educate patient/family on patient safety including physical limitations  - Instruct patient to call for assistance with activity (min assist and walker)  - Consult OT/PT to assist with strengthening/mobility   - Keep Call bell within reach  - Keep bed low and locked with side rails adjusted as appropriate  - Keep care items and personal belongings within reach  - Initiate and maintain comfort rounds  - Make Fall Risk Sign visible to staff (high fall risk)  - Offer Toileting every 2 Hours, in advance of need  - Initiate/Maintain bed/chair alarm  - Obtain necessary fall risk management equipment: nonskid footwear, fall bracelet, alarms  - Apply yellow socks and bracelet for high fall risk patients  - Consider moving patient to room near nurses station  Outcome: Progressing  Goal: Maintains/Returns to pre admission functional level  Description: INTERVENTIONS:  -  Assess patient's ability to carry out ADLs; (mod to max assist with adls)  - Assess/evaluate cause of self-care deficits (limited movement, obesity, fatigue, pain, wounds)  - Assess range of motion  - Assess patient's mobility; (min assist and walker)  - Assess patient's need for assistive devices and provide as appropriate  - Encourage maximum independence but intervene and supervise when necessary  - Assess for home care needs following discharge   - Consider OT consult to assist with ADL evaluation and planning for discharge  - Provide patient education as appropriate  Outcome: Progressing     Problem: Nutrition/Hydration-ADULT  Goal: Nutrient/Hydration intake appropriate for improving, restoring or maintaining nutritional needs  Description: Monitor and assess patient's nutrition/hydration status for malnutrition  Collaborate with interdisciplinary team and initiate plan and interventions as ordered  Monitor patient's weight and dietary intake as ordered or per policy  Utilize nutrition screening tool and intervene as necessary  Determine patient's food preferences and provide high-protein, high-caloric foods as appropriate       INTERVENTIONS:  - Monitor oral intake, urinary output, labs, and treatment plans  - Assess nutrition and hydration status and recommend course of action  - Evaluate amount of meals eaten  - Assist patient with eating if necessary   - Allow adequate time for meals  - Recommend/ encourage appropriate diets, oral nutritional supplements, and vitamin/mineral supplements  - Provide specific nutrition/hydration education as appropriate  - Include patient/family/caregiver in decisions related to nutrition  Outcome: Progressing     Problem: PAIN - ADULT  Goal: Verbalizes/displays adequate comfort level or baseline comfort level  Description: Interventions:  - Encourage patient to monitor pain and request assistance  - Assess pain using appropriate pain scale (0-10 pain scale)  - Administer analgesics based on type and severity of pain and evaluate response  - Implement non-pharmacological measures as appropriate and evaluate response  - Consider cultural and social influences on pain and pain management  - Notify physician/advanced practitioner if interventions unsuccessful or patient reports new pain  Outcome: Progressing     Problem: INFECTION - ADULT  Goal: Absence or prevention of progression during hospitalization  Description: INTERVENTIONS:  - Assess and monitor for signs and symptoms of infection  - Monitor lab/diagnostic results  - Monitor all insertion sites, i e  indwelling lines  - Administer medications as ordered  - Instruct and encourage patient and family to use good hand hygiene technique  Outcome: Progressing     Problem: SAFETY ADULT  Goal: Patient will remain free of falls  Description: INTERVENTIONS:  - Educate patient/family on patient safety including physical limitations  - Instruct patient to call for assistance with activity (min assist and walker)  - Consult OT/PT to assist with strengthening/mobility   - Keep Call bell within reach  - Keep bed low and locked with side rails adjusted as appropriate  - Keep care items and personal belongings within reach  - Initiate and maintain comfort rounds  - Make Fall Risk Sign visible to staff (high fall risk)  - Offer Toileting every 2 Hours, in advance of need  - Initiate/Maintain bed/chair alarm  - Obtain necessary fall risk management equipment: nonskid footwear, fall bracelet, alarms  - Apply yellow socks and bracelet for high fall risk patients  - Consider moving patient to room near nurses station  Outcome: Progressing  Goal: Maintain or return to baseline ADL function  Description: INTERVENTIONS:  - Educate patient/family on patient safety including physical limitations  - Instruct patient to call for assistance with activity (min assist and walker)  - Consult OT/PT to assist with strengthening/mobility   - Keep Call bell within reach  - Keep bed low and locked with side rails adjusted as appropriate  - Keep care items and personal belongings within reach  - Initiate and maintain comfort rounds  - Make Fall Risk Sign visible to staff (high fall risk)  - Offer Toileting every 2 Hours, in advance of need  - Initiate/Maintain bed/chair alarm  - Obtain necessary fall risk management equipment: nonskid footwear, fall bracelet, alarms  - Apply yellow socks and bracelet for high fall risk patients  - Consider moving patient to room near nurses station  Outcome: Progressing  Goal: Maintains/Returns to pre admission functional level  Description: INTERVENTIONS:  -  Assess patient's ability to carry out ADLs; (mod to max assist with adls)  - Assess/evaluate cause of self-care deficits (limited movement, obesity, fatigue, pain, wounds)  - Assess range of motion  - Assess patient's mobility; (min assist and walker)  - Assess patient's need for assistive devices and provide as appropriate  - Encourage maximum independence but intervene and supervise when necessary  - Assess for home care needs following discharge   - Consider OT consult to assist with ADL evaluation and planning for discharge  - Provide patient education as appropriate  Outcome: Progressing     Problem: DISCHARGE PLANNING  Goal: Discharge to home or other facility with appropriate resources  Description: INTERVENTIONS:  - Identify barriers to discharge w/patient and caregiver  - Arrange for needed discharge resources and transportation as appropriate  - Identify discharge learning needs (meds, wound care, etc )  - Refer to Case Management Department for coordinating discharge planning if the patient needs post-hospital services based on physician/advanced practitioner order or complex needs related to functional status, cognitive ability, or social support system  Outcome: Progressing     Problem: Knowledge Deficit  Goal: Patient/family/caregiver demonstrates understanding of disease process, treatment plan, medications, and discharge instructions  Description: Complete learning assessment and assess knowledge base    Interventions:  - Provide teaching at level of understanding  - Provide teaching via preferred learning methods  Outcome: Progressing     Problem: SKIN/TISSUE INTEGRITY - ADULT  Goal: Incision(s), wounds(s) or drain site(s) healing without S/S of infection  Description: INTERVENTIONS  - Assess and document dressing, incision, wound bed, drain sites and surrounding tissue  - Provide patient and family education  - Perform skin care/dressing changes as per orders  Outcome: Progressing

## 2022-01-13 NOTE — ASSESSMENT & PLAN NOTE
Right Hydroureteronephrosis noted on CT abdomen pelvis on 01/06/22    Renal US : right renal collecting system appears minimally dilated similar to prior CT  Per radiology suspecting related to inflammatory changes in the sigmoid colon  Galan in place - draining clear urine with creatinine continuing to improve   Outpatient follow up with urology and surgical team

## 2022-01-13 NOTE — PROGRESS NOTES
NEPHROLOGY PROGRESS NOTE   Megan Strauss 60 y o  female MRN: [id number]  Unit/Bed#: 210-03 Encounter: [id number]    Assessment/Plan:    Gloria Salgado a 60 y  o  female whose pertinent medical problems include hypertension, obesity admitted 1/6 with chief complaint of open sores being treated for acute kidney injury, urinary retention, right hydroureteronephrosis status post indwelling urinary catheter, abdominal cellulitis, sigmoid diverticulitis with microperforation, dysphagia possibly due to esophageal candidiasis  Renal following along for ZAHIRA, obstructive uropathy  Plan outlined below       1  Acute kidney injury (POA) atop chronic kidney disease  · Acute kidney injury is resolved, however, there is increased right hydroureteronephrosis on imaging as the right ureter courses through the collection per Radiology read  Recommend serial BMPs, urological and surgical follow-up  · Also, she may require standing diuretic dose which is fine from renal perspective  · Continue to hold lisinopril avoid Celebrex and other NSAIDs  2  Stage 2 chronic kidney disease baseline creatinine around 0 9-1 0 mg/dL  3  Obstructive uropathy, mild right hydroureteronephrosis  · As above  Appreciate neurology's recommendations  4  Hypertension in the setting of CKD  · Blood pressure remains appropriate, continue to hold lisinopril  5  Proteinuria  · Repeat urine studies once in steady stable state and rule out monoclonal gammopathy of indicated  6  Abdominal cellulitis with superficial wounds  · Management per general surgery and ID  7  Fungal dermatitis  · Management per primary team  8  Hypomagnesemia  · Will provide 2 g IV magnesium today  9  Hyponatremia  · Over drinking  Recommend gentle fluid restriction and consider daily loop diuretic  10  Microperforation of sigmoid diverticulitis with small pelvic abscess  · Management per ID, surgery and primary team    ROS  Complains of dry mouth    A complete 10 point review of systems have been performed and are otherwise negative         Historical Information   Past Medical History:   Diagnosis Date    Anxiety     Arthritis     GERD (gastroesophageal reflux disease)     Hidradenitis suppurativa     Hypertension     Lipodermatosclerosis of both lower extremities     Lymphedema 2006    Sciatic leg pain 2006    left side    SOB (shortness of breath)     Stomach ulcer      Past Surgical History:   Procedure Laterality Date     SECTION      OK EXC SKIN BENIG 3 1-4 CM TRUNK,ARM,LEG Right 2020    Procedure: EXCISION BIOPSY TISSUE LESION/MASS UPPER EXTREMITY;  Surgeon: Sy Khan DO;  Location: [facility];  Service: General    TONSILLECTOMY      TUMOR REMOVAL Right     5th finger     Social History   Social History     Substance and Sexual Activity   Alcohol Use Not Currently    Comment: holidays     Social History     Substance and Sexual Activity   Drug Use No     Social History     Tobacco Use   Smoking Status Light Tobacco Smoker    Packs/day: 0 25    Years: 20 00    Pack years: 5 00    Types: Cigarettes   Smokeless Tobacco Never Used   Tobacco Comment    Only smoke once in awhile       Family History:   Family History   Problem Relation Age of Onset    Cancer Mother     Hypertension Mother     COPD Father     Diabetes Father        Medications:  Pertinent medications were reviewed  Current Facility-Administered Medications   Medication Dose Route Frequency Provider Last Rate    acetaminophen  975 mg Oral Q8H [address] Joanne Warner MD      al mag oxide-diphenhydramine-lidocaine viscous  10 mL Swish & Swallow Q4H PRN Janette Ngo MD      albuterol  2 puff Inhalation Q6H PRN Neetu Juares MD      aluminum-magnesium hydroxide-simethicone  30 mL Oral Q4H PRN Janette Ngo MD      cefTRIAXone  2,000 mg Intravenous Q24H Hazem Elena Hall MD 2,000 mg (22 183)    docusate sodium  100 mg Oral BID Neetu Juares MD      enoxaparin 60 mg Subcutaneous Q12H [address] Lita So MD      fluconazole  400 mg Oral Daily Lorenzo Patterson MD      fluticasone  1 spray Each Nare Daily Lita So MD      HYDROmorphone  0 2 mg Intravenous Q4H PRN Jing Tate MD      hydrOXYzine HCL  25 mg Oral TID PRN Lita So MD      lidocaine  1 patch Topical Daily Jing Tate MD      methocarbamol  500 mg Oral Q8H [address] Jing Tate MD      metoprolol tartrate  25 mg Oral BID Lita So MD      metroNIDAZOLE  500 mg Oral Q8H [address] Lita So MD      nicotine  1 patch Transdermal Daily Lita So MD      nystatin   Topical BID Jing Tate MD      nystatin  500,000 Units Swish & Swallow 4x Daily Lita So MD      nystatin   Topical TID Lita So MD      ondansetron  4 mg Intravenous Q6H PRN Lita So MD      oxyCODONE  5 mg Oral Q4H PRN MD Jose Guadalupe Sahu oxyCODONE  10 mg Oral Q4H PRN Jing Tate MD      pantoprazole  40 mg Oral Early Morning Yazmin Landaverde PA-C      sertraline  50 mg Oral Daily Lita So MD      vancomycin  1,500 mg Intravenous Q12H Lorenzo Patterson MD 1,500 mg (01/13/22 0046)         Allergies   Allergen Reactions    Bee Venom Anaphylaxis, Shortness Of Breath and Swelling         Vitals:   /91   Pulse 84   Temp (!) 97 4 °F (36 3 °C)   Resp 18   Ht 5' 7" (1 702 m)   Wt (!) 167 kg (368 lb 13 3 oz)   SpO2 92%   BMI 57 77 kg/m²   Body mass index is 57 77 kg/m²  SpO2: 92 %,   SpO2 Activity: At Rest,   O2 Device: None (Room air)      Intake/Output Summary (Last 24 hours) at 1/13/2022 1030  Last data filed at 1/13/2022 0636  Gross per 24 hour   Intake 480 ml   Output 1975 ml   Net -1495 ml     Invasive Devices  Report    Peripheral Intravenous Line            Peripheral IV 01/12/22 Dorsal (posterior); Right Hand <1 day          Drain            Urethral Catheter 16 Fr  6 days                Physical Exam  General: conscious, cooperative, in no acute distress  Eyes: conjunctivae pink, anicteric sclerae  ENT: lips and mucous membranes moist  Neck: supple, no JVD, no masses  Chest:  Coarse breath sounds bilaterally  CVS: S1 & S2, normal rate, regular rhythm  Abdomen: soft, non-tender, non-distended, normoactive bowel sounds obese  Extremities:  Chronic lymph edema of both legs  Skin: no rash  Neuro: awake, alert, oriented      Diagnostic Data:  Lab: I have personally reviewed pertinent lab results  ,   CBC:  Results from last 7 days   Lab Units 01/13/22  0626   WBC Thousand/uL 8 91   HEMOGLOBIN g/dL 9 4*   HEMATOCRIT % 31 8*   PLATELETS Thousands/uL 263      CMP:   Lab Results   Component Value Date    SODIUM 135 (L) 01/13/2022    K 4 4 01/13/2022     01/13/2022    CO2 23 01/13/2022    BUN 13 01/13/2022    CREATININE 1 11 01/13/2022    CALCIUM 8 5 01/13/2022    AST 14 01/13/2022    ALT 21 01/13/2022    ALKPHOS 93 01/13/2022    EGFR 54 01/13/2022   ,   PT/INR: No results found for: PT, INR,   Magnesium: No components found for: MAG,  Phosphorous: No results found for: PHOS    Microbiology:  @[facility],(urinecx:7)@        MIRTA [place]    Portions of the record may have been created with voice recognition software  Occasional wrong word or "sound a like" substitutions may have occurred due to the inherent limitations of voice recognition software  Read the chart carefully and recognize, using context, where substitutions have occurred

## 2022-01-13 NOTE — ASSESSMENT & PLAN NOTE
CT abdomen on 01/06/22 shows - sigmoid diverticulitis with microperforation and adjacent small pelvic abscess      Surgical input appreciated  IR unable to place drain due to body habitus, but ID requesting re-eval, will re-consult  Rocephin & flagyl   Abdominal exam is benign, repeat CT reviewed with surgical team

## 2022-01-13 NOTE — CASE MANAGEMENT
Case Management Discharge Planning Note    Patient name Bryce Rm  Location /215-98 MRN [id number]  : 1965 Date 2022       Current Admission Date: 2022  Current Admission Diagnosis:Cellulitis of abdominal wall, chest wall and groin with wounds   Patient Active Problem List    Diagnosis Date Noted    Dysphagia 2022    Acute renal failure (ARF) ([facility] ) 2022    Cellulitis of abdominal wall, chest wall and groin with wounds 2022    Diverticulitis of large intestine with perforation 2022    Right Hydroureteronephrosis 2022    Subacute vaginitis 2022    GERD (gastroesophageal reflux disease)     Other specified anxiety disorders 2019    Lumbar paraspinal muscle spasm 2019    Borderline hyperlipidemia 2019    Iron deficiency 2019    Chronic midline low back pain with left-sided sciatica 2019    Primary hypertension 2019    Morbid obesity with BMI of 60 0-69 9, adult ([facility] ) 2019    Hidradenitis suppurativa 2019    Lymphedema 2006      LOS (days): 7  Geometric Mean LOS (GMLOS) (days):   Days to GMLOS:     OBJECTIVE:  Risk of Unplanned Readmission Score: 10         Current admission status: Inpatient   Preferred Pharmacy:   [place] [address] [place] [address] C/ [address]  [facility] [address]  Phone: [phone] Fax: [phone]    Primary Care Provider: Truman Goldstein PA-C    Primary Insurance: [facility]  Secondary Insurance:     DISCHARGE DETAILS:  Acute Rehab is unable to accept the patient  I called patient's [facility] and patient does not have in and out of network benefits  Some other facilities that are in patient' s network is : [facility], [facility] [employer]  [facility], [facility] at [address] [place] [address], [address] and [facility]  Referrals will be sent to new facilities

## 2022-01-13 NOTE — PHYSICAL THERAPY NOTE
PHYSICAL THERAPY NOTE          Patient Name: Everardo Villanueva JRAQE'Y Date: 1/13/2022 01/13/22 1048   Note Type   Note Type Treatment for insurance authorization   Pain Assessment   Pain Assessment Tool 0-10   Pain Score 5   Pain Location/Orientation Location: Back; Location: Leg   Restrictions/Precautions   Weight Bearing Precautions Per Order No   Other Precautions Fall Risk;Pain;Fluid restriction   General   Chart Reviewed Yes   Response to Previous Treatment Patient with no complaints from previous session  Family/Caregiver Present No   Cognition   Overall Cognitive Status WFL   Arousal/Participation Alert; Cooperative   Following Commands Follows one step commands without difficulty   Subjective   Subjective Agreeable to therapy  Bed Mobility   Supine to Sit 3  Moderate assistance   Additional items Assist x 1;HOB elevated; Bedrails; Increased time required;Verbal cues;LE management   Additional Comments increased time to scoot to EOB   Transfers   Sit to Stand 4  Minimal assistance   Additional items Assist x 1;Bedrails; Increased time required;Verbal cues   Stand to Sit 4  Minimal assistance   Additional items Assist x 1; Armrests; Increased time required;Verbal cues   Additional Comments Verbal cues for hand placement and technique  Ambulation/Elevation   Gait pattern Excessively slow;Decreased foot clearance; Forward Flexion; Improper Weight shift   Gait Assistance 4  Minimal assist   Additional items Assist x 1;Verbal cues   Assistive Device Rolling walker   Distance 30'   Balance   Static Sitting Fair +   Dynamic Sitting Fair +   Static Standing Fair -   Dynamic Standing Fair -   Ambulatory Fair -  (RW)   Endurance Deficit   Endurance Deficit Yes   Exercises   Quad Sets Supine;10 reps   Heelslides Supine;10 reps   Glute Sets Supine;10 reps   Knee AROM Long Arc Quad Sitting;10 reps   Ankle Pumps Sitting;10 reps Assessment   Prognosis Good   Problem List Decreased strength;Decreased endurance; Impaired balance;Decreased mobility;Pain;Obesity   Assessment Pt  seen for PT treatment session this date with interventions consisting of  therapeutic exercises, bed mobility, transfers and  gait training w/ emphasis on improving pt's ability to ambulate  Pt  Currently performing  tx and ambulation at ( mod-min) x 1 level of function  Please also refer to physical therapy recommendation for safe DC planning  In comparison to previous session, Pt  With improvements in activity tolerance  Pt is in need of continued activity in PT to improve strength balance endurance mobility transfers and ambulation with return to maximize LOF  From PT/mobility standpoint, recommendation at time of d/c would be post acute rehab  in order to promote return to PLOF and independence  The patient's [facility] Basic Mobility Inpatient Short Form Raw Score is 14  A Raw score of less than or equal to 16 suggests the patient may benefit from discharge to post-acute rehabilitation services  Goals   LTG Expiration Date 01/21/22   Plan   Treatment/Interventions Functional transfer training;LE strengthening/ROM; Therapeutic exercise; Endurance training;Bed mobility;Gait training   Progress Slow progress, decreased activity tolerance   Recommendation   PT Discharge Recommendation Post acute rehabilitation services   [facility] Basic Mobility Inpatient   Turning in Bed Without Bedrails 2   Lying on Back to Sitting on Edge of Flat Bed 2   Moving Bed to Chair 3   Standing Up From Chair 3   Walk in Room 3   Climb 3-5 Stairs 1   Basic Mobility Inpatient Raw Score 14   Basic Mobility Standardized Score 35 55   Highest Level Of Mobility   JH-HLM Goal 4: Move to chair/commode   JH-HLM Highest Level of Mobility 7: Walk 25 feet or more   JH-HLM Goal Achieved Yes   Education   Education Provided Mobility training   Patient Reinforcement needed   End of Consult   Patient Position at End of Consult Bedside chair;Bed/Chair alarm activated; All needs within reach   End of Consult Comments discussed POC with PT

## 2022-01-13 NOTE — PLAN OF CARE
Problem: PHYSICAL THERAPY ADULT  Goal: Performs mobility at highest level of function for planned discharge setting  See evaluation for individualized goals  Description: Treatment/Interventions: Functional transfer training,LE strengthening/ROM,Therapeutic exercise,Endurance training,Bed mobility,Gait training  Equipment Recommended: Ruth Ann Tipton       See flowsheet documentation for full assessment, interventions and recommendations  Outcome: Progressing  Note: Prognosis: Good  Problem List: Decreased strength,Decreased endurance,Impaired balance,Decreased mobility,Pain,Obesity  Assessment: Pt  seen for PT treatment session this date with interventions consisting of  therapeutic exercises, bed mobility, transfers and  gait training w/ emphasis on improving pt's ability to ambulate  Pt  Currently performing  tx and ambulation at ( mod-min) x 1 level of function  Please also refer to physical therapy recommendation for safe DC planning  In comparison to previous session, Pt  With improvements in activity tolerance  Pt is in need of continued activity in PT to improve strength balance endurance mobility transfers and ambulation with return to maximize LOF  From PT/mobility standpoint, recommendation at time of d/c would be post acute rehab  in order to promote return to PLOF and independence  The patient's AM-PAC Basic Mobility Inpatient Short Form Raw Score is 14  A Raw score of less than or equal to 16 suggests the patient may benefit from discharge to post-acute rehabilitation services  PT Discharge Recommendation: Post acute rehabilitation services          See flowsheet documentation for full assessment

## 2022-01-14 NOTE — PROGRESS NOTES
[address] [facility] [address]  Progress Note - Dexter Lara 1965, 60 y o  female MRN: [id number]  Unit/Bed#: 522-23 Encounter: [id number]  Primary Care Provider: Clare Monroy PA-C   Date and time admitted to hospital: 1/6/2022  2:43 AM    Diverticulitis of large intestine with perforation  Assessment & Plan    CT abdomen on 01/06/22 shows - sigmoid diverticulitis with microperforation and adjacent small pelvic abscess  Surgical input appreciated  IR unable to place drain due to body habitus  Rocephin & flagyl   Abdominal exam is benign, repeat CT reviewed with surgical team      Nupur Black vancomycin/Flagyl/Rocephin, transitioned to Augmentin 875/125 for 3-4 weeks      * Cellulitis of abdominal wall, chest wall and groin with wounds  Assessment & Plan  Patient presented with pain, erythema, swelling with overlying wounds, actively draining purulent material involving the inframammary area, lower abdomen, groin and inner thighs  Continue vancomycin, Cefepime and Metronidazole day#5   wound culture growing E faecalis and coag negative staph, vanco added back  ID recommending 5 days of IV Diflucan  Follow-up MRSA screenWound care consult, input appreciated    Discontinue vancomycin/Flagyl/Rocephin, transitioned to Augmentin 875/125 for 2-3 weeks          Dysphagia  Assessment & Plan  Patient complains of choking sensation, initially with solids, recently she noticed that she is choking on water  In patient words " I feel like there is a pill struck in my throat even with drinking plain water"  Poor dental heigine with multiple dental caries noted on exam  No thrush noted  Being treated for aphthous ulcer and sore throat  Speech and swallow eval  GI consult to rule out obstructive causes     Increase diflucan to 400mg daily per ID for total 2 weeks 1/11/21    Acute renal failure (ARF) (HCC)  Assessment & Plan  Superimposed on chronic kidney disease with a baseline creatinine of 1  Continue to hold lisinopril and Celebrex  Continue Galan catheter and will have patient follow-up as an outpatient  DC IV fluids, patient eating and drinking 1/11/21    Morbid obesity with BMI of 60 0-69 9, adult [facility])  Assessment & Plan  Patient is morbidly obese with BMI of 63 02            Progress Note - Zoey Jorge 60 y o  female MRN: [id number]    Unit/Bed#: 418-01 Encounter: [id number]        Subjective:   Patient seen and examined, no acute complaints or events overnight     Objective:     Vitals:   Vitals:    01/14/22 0831   BP: 141/84   Pulse: 98   Resp: 18   Temp: 98 6 °F (37 °C)   SpO2: 91%     Body mass index is 57 9 kg/m²  Intake/Output Summary (Last 24 hours) at 1/14/2022 1057  Last data filed at 1/14/2022 0626  Gross per 24 hour   Intake 480 ml   Output 1500 ml   Net -1020 ml       Physical Exam:   /84   Pulse 98   Temp 98 6 °F (37 °C)   Resp 18   Ht 5' 7" (1 702 m)   Wt (!) 168 kg (369 lb 11 4 oz)   SpO2 91%   BMI 57 90 kg/m²   General appearance: alert and oriented, in no acute distress  Head: Normocephalic, without obvious abnormality, atraumatic  Lungs: clear to auscultation bilaterally  Heart: regular rate and rhythm, S1, S2 normal, no murmur, click, rub or gallop  Abdomen: soft, non-tender; bowel sounds normal; no masses,  no organomegaly  Extremities: extremities normal, warm and well-perfused; no cyanosis, clubbing, or edema  Pulses: 2+ and symmetric  Neurologic: Grossly normal     Invasive Devices  Report    Peripheral Intravenous Line            Peripheral IV 01/12/22 Dorsal (posterior); Right Hand 1 day          Drain            Urethral Catheter 16 Fr  7 days                Results from last 7 days   Lab Units 01/14/22  0618 01/13/22  0626 01/12/22  0434   WBC Thousand/uL 7 70 8 91 6 84   HEMOGLOBIN g/dL 9 9* 9 4* 9 2*   HEMATOCRIT % 32 6* 31 8* 30 8*   PLATELETS Thousands/uL 262 263 257       Results from last 7 days   Lab Units 01/14/22  0618 01/13/22  6168 01/12/22  0434 01/11/22  0810 01/10/22  0529   POTASSIUM mmol/L 4 6 4 4 4 5   < > 4 9   CHLORIDE mmol/L 104 104 105   < > 111*   CO2 mmol/L 24 23 22   < > 19*   BUN mg/dL 12 13 18   < > 34*   CREATININE mg/dL 1 10 1 11 1 18   < > 1 79*   CALCIUM mg/dL 8 8 8 5 8 4   < > 8 6   ALK PHOS U/L 92 93  --   --  88   ALT U/L 19 21  --   --  22   AST U/L 16 14  --   --  16    < > = values in this interval not displayed         Medication Administration - last 24 hours from 01/13/2022 1057 to 01/14/2022 1057       Date/Time Order Dose Route Action Action by     01/13/2022 2057 albuterol (PROVENTIL HFA,VENTOLIN HFA) inhaler 2 puff 2 puff Inhalation Given Meka Morgan, RN     01/14/2022 0955 fluticasone (FLONASE) 50 mcg/act nasal spray 1 spray 1 spray Each Nare Given Thana Spruce, RN     01/14/2022 4118 metoprolol tartrate (LOPRESSOR) tablet 25 mg 25 mg Oral Given Thana Spruce, RN     01/13/2022 1727 metoprolol tartrate (LOPRESSOR) tablet 25 mg 25 mg Oral Given Thana Spruce, RN     01/14/2022 0953 sertraline (ZOLOFT) tablet 50 mg 50 mg Oral Given Thana Spruce, RN     01/14/2022 0953 docusate sodium (COLACE) capsule 100 mg 100 mg Oral Given Thana Spruce, RN     01/13/2022 1727 docusate sodium (COLACE) capsule 100 mg 100 mg Oral Given Thana Spruce, RN     01/14/2022 0956 nicotine (NICODERM CQ) 7 mg/24hr TD 24 hr patch 1 patch 1 patch Transdermal Not Given Thana Spruce, RN     01/14/2022 0956 nystatin (MYCOSTATIN) powder   Topical Given Thana Spruce, RN     01/13/2022 2121 nystatin (MYCOSTATIN) powder 1 application Topical Given Teresa Wu, RN     01/13/2022 1731 nystatin (MYCOSTATIN) powder   Topical Given Thana Spruce, RN     01/14/2022 0603 metroNIDAZOLE (FLAGYL) tablet 500 mg 500 mg Oral Given Teresa Wu RN     01/13/2022 2120 metroNIDAZOLE (FLAGYL) tablet 500 mg 500 mg Oral Given Teresa Wu RN     01/13/2022 1356 metroNIDAZOLE (FLAGYL) tablet 500 mg 500 mg Oral Given Kevin Fleming, RN     01/13/2022 1402 al Dolph Freeze oxide-diphenhydramine-lidocaine viscous (MAGIC MOUTHWASH) suspension 10 mL 10 mL Swish & Swallow Given Kevin Fleming, RN     01/14/2022 6073 nystatin (MYCOSTATIN) cream   Topical Given Kevin Fleming, RN     01/13/2022 1735 nystatin (MYCOSTATIN) cream   Topical Given Kevin Fleming, RN     01/14/2022 0953 enoxaparin (LOVENOX) subcutaneous injection 60 mg 60 mg Subcutaneous Given Kevin Fleming, RN     01/13/2022 2019 enoxaparin (LOVENOX) subcutaneous injection 60 mg 60 mg Subcutaneous Given Dionte Borrego, RN     01/14/2022 0603 acetaminophen (TYLENOL) tablet 975 mg 975 mg Oral Given Dionte Borrego, RN     01/13/2022 2120 acetaminophen (TYLENOL) tablet 975 mg 975 mg Oral Given Dionte Borrego, RN     01/13/2022 1356 acetaminophen (TYLENOL) tablet 975 mg 975 mg Oral Given Kevin Fleming, RN     01/14/2022 3707 oxyCODONE (ROXICODONE) IR tablet 5 mg 0 mg Oral Return to [facility], RN     01/14/2022 0359 oxyCODONE (ROXICODONE) IR tablet 5 mg   Oral See Alternative Dionte Borrego, RN     01/13/2022 1402 oxyCODONE (ROXICODONE) IR tablet 5 mg 5 mg Oral Given Kevin Fleming, RN     01/14/2022 0001 oxyCODONE (ROXICODONE) immediate release tablet 10 mg   Oral See Alternative Kevin Fleming, RN     01/14/2022 0640 oxyCODONE (ROXICODONE) immediate release tablet 10 mg 10 mg Oral Given Dionte Borrego, RN     01/13/2022 1402 oxyCODONE (ROXICODONE) immediate release tablet 10 mg   Oral See Alternative Kevin Fleming, RN     01/14/2022 0603 methocarbamol (ROBAXIN) tablet 500 mg 500 mg Oral Given Dionte Borrego, RN     01/13/2022 2120 methocarbamol (ROBAXIN) tablet 500 mg 500 mg Oral Given Dionte Borrego, RN     01/13/2022 1356 methocarbamol (ROBAXIN) tablet 500 mg 500 mg Oral Given Kevin Fleming, RN     01/14/2022 0953 lidocaine (LIDODERM) 5 % patch 1 patch 1 patch Topical Medication Applied Kevin Fleming RN     01/13/2022 2221 lidocaine (LIDODERM) 5 % patch 1 patch 1 patch Topical Patch Removed Jose Luis Astudillo, RN     01/14/2022 0953 nystatin (MYCOSTATIN) oral suspension 500,000 Units 500,000 Units Swish & Swallow Given Penlolisope Mirza, RN     01/13/2022 2120 nystatin (MYCOSTATIN) oral suspension 500,000 Units 500,000 Units Swish & Swallow Given Jose Luis Astudillo, RN     01/13/2022 1727 nystatin (MYCOSTATIN) oral suspension 500,000 Units 500,000 Units Swish & Swallow Given Pennelope Mirza, RN     01/13/2022 1356 nystatin (MYCOSTATIN) oral suspension 500,000 Units 500,000 Units Swish & Swallow Given Pennelope Mirza, RN     01/13/2022 1728 cefTRIAXone (ROCEPHIN) IVPB (premix in dextrose) 2,000 mg 50 mL 2,000 mg Intravenous New [address] [place] [address], [address] [place] [address]     01/14/2022 7801 fluconazole (DIFLUCAN) tablet 400 mg 400 mg Oral Given Catracho Blue, RN     01/14/2022 0603 pantoprazole (PROTONIX) EC tablet 40 mg 40 mg Oral Given Jose Luis Astudillo, MICHELLE     01/14/2022 0055 vancomycin (VANCOCIN) 1,500 mg in sodium chloride 0 9 % 500 mL IVPB 1,500 mg Intravenous [address] [place] [address] [facility], [facility]     01/13/2022 1410 vancomycin (VANCOCIN) 1,500 mg in sodium chloride 0 9 % 500 mL IVPB 1,500 mg Intravenous New [facility], [address] [place] [address]     01/13/2022 1402 saliva substitute (MOUTH KOTE) mucosal solution 5 spray 5 spray Mouth/Throat Given Penlolisope Mirza, RN     01/13/2022 1410 magnesium sulfate 2 g/50 mL IVPB (premix) 2 g 2 g Intravenous New Bag Catracho Blue, MICHELLE            Lab, Imaging and other studies: I have personally reviewed pertinent reports      VTE Pharmacologic Prophylaxis: Enoxaparin (Lovenox)  VTE Mechanical Prophylaxis: sequential compression device     Odilon Gentile MD  1/14/2022,10:57 AM

## 2022-01-14 NOTE — PLAN OF CARE
Problem: Potential for Falls  Goal: Patient will remain free of falls  Description: INTERVENTIONS:  - Educate patient/family on patient safety including physical limitations  - Instruct patient to call for assistance with activity (min assist and walker)  - Consult OT/PT to assist with strengthening/mobility   - Keep Call bell within reach  - Keep bed low and locked with side rails adjusted as appropriate  - Keep care items and personal belongings within reach  - Initiate and maintain comfort rounds  - Make Fall Risk Sign visible to staff (high fall risk)  - Offer Toileting every 2 Hours, in advance of need  - Initiate/Maintain bed/chair alarm  - Obtain necessary fall risk management equipment: nonskid footwear, fall bracelet, alarms  - Apply yellow socks and bracelet for high fall risk patients  - Consider moving patient to room near nurses station  Outcome: Progressing     Problem: Prexisting or High Potential for Compromised Skin Integrity  Goal: Skin integrity is maintained or improved  Description: INTERVENTIONS:  - Identify patients at risk for skin breakdown  - Assess and monitor skin integrity  - Assess and monitor nutrition and hydration status  - Monitor labs   - Assess for incontinence (has villanueva catheter)  - Turn and reposition patient (every 2 hours and prn)  - Assist with mobility/ambulation (min assist and walker)  - Relieve pressure over bony prominences  - Avoid friction and shearing  - Provide appropriate hygiene as needed including keeping skin clean and dry  - Evaluate need for skin moisturizer/barrier cream  - Collaborate with interdisciplinary team   - Patient/family teaching  - Consider wound care consult (consult done)  Outcome: Progressing     Problem: MOBILITY - ADULT  Goal: Maintain or return to baseline ADL function  Description: INTERVENTIONS:  - Educate patient/family on patient safety including physical limitations  - Instruct patient to call for assistance with activity (min assist and walker)  - Consult OT/PT to assist with strengthening/mobility   - Keep Call bell within reach  - Keep bed low and locked with side rails adjusted as appropriate  - Keep care items and personal belongings within reach  - Initiate and maintain comfort rounds  - Make Fall Risk Sign visible to staff (high fall risk)  - Offer Toileting every 2 Hours, in advance of need  - Initiate/Maintain bed/chair alarm  - Obtain necessary fall risk management equipment: nonskid footwear, fall bracelet, alarms  - Apply yellow socks and bracelet for high fall risk patients  - Consider moving patient to room near nurses station  Outcome: Progressing  Goal: Maintains/Returns to pre admission functional level  Description: INTERVENTIONS:  -  Assess patient's ability to carry out ADLs; (mod to max assist with adls)  - Assess/evaluate cause of self-care deficits (limited movement, obesity, fatigue, pain, wounds)  - Assess range of motion  - Assess patient's mobility; (min assist and walker)  - Assess patient's need for assistive devices and provide as appropriate  - Encourage maximum independence but intervene and supervise when necessary  - Assess for home care needs following discharge   - Consider OT consult to assist with ADL evaluation and planning for discharge  - Provide patient education as appropriate  Outcome: Progressing     Problem: Nutrition/Hydration-ADULT  Goal: Nutrient/Hydration intake appropriate for improving, restoring or maintaining nutritional needs  Description: Monitor and assess patient's nutrition/hydration status for malnutrition  Collaborate with interdisciplinary team and initiate plan and interventions as ordered  Monitor patient's weight and dietary intake as ordered or per policy  Utilize nutrition screening tool and intervene as necessary  Determine patient's food preferences and provide high-protein, high-caloric foods as appropriate       INTERVENTIONS:  - Monitor oral intake, urinary output, labs, and treatment plans  - Assess nutrition and hydration status and recommend course of action  - Evaluate amount of meals eaten  - Assist patient with eating if necessary   - Allow adequate time for meals  - Recommend/ encourage appropriate diets, oral nutritional supplements, and vitamin/mineral supplements  - Provide specific nutrition/hydration education as appropriate  - Include patient/family/caregiver in decisions related to nutrition  Outcome: Progressing     Problem: PAIN - ADULT  Goal: Verbalizes/displays adequate comfort level or baseline comfort level  Description: Interventions:  - Encourage patient to monitor pain and request assistance  - Assess pain using appropriate pain scale (0-10 pain scale)  - Administer analgesics based on type and severity of pain and evaluate response  - Implement non-pharmacological measures as appropriate and evaluate response  - Consider cultural and social influences on pain and pain management  - Notify physician/advanced practitioner if interventions unsuccessful or patient reports new pain  Outcome: Progressing     Problem: INFECTION - ADULT  Goal: Absence or prevention of progression during hospitalization  Description: INTERVENTIONS:  - Assess and monitor for signs and symptoms of infection  - Monitor lab/diagnostic results  - Monitor all insertion sites, i e  indwelling lines  - Administer medications as ordered  - Instruct and encourage patient and family to use good hand hygiene technique  Outcome: Progressing     Problem: SAFETY ADULT  Goal: Patient will remain free of falls  Description: INTERVENTIONS:  - Educate patient/family on patient safety including physical limitations  - Instruct patient to call for assistance with activity (min assist and walker)  - Consult OT/PT to assist with strengthening/mobility   - Keep Call bell within reach  - Keep bed low and locked with side rails adjusted as appropriate  - Keep care items and personal belongings within reach  - Initiate and maintain comfort rounds  - Make Fall Risk Sign visible to staff (high fall risk)  - Offer Toileting every 2 Hours, in advance of need  - Initiate/Maintain bed/chair alarm  - Obtain necessary fall risk management equipment: nonskid footwear, fall bracelet, alarms  - Apply yellow socks and bracelet for high fall risk patients  - Consider moving patient to room near nurses station  Outcome: Progressing  Goal: Maintain or return to baseline ADL function  Description: INTERVENTIONS:  - Educate patient/family on patient safety including physical limitations  - Instruct patient to call for assistance with activity (min assist and walker)  - Consult OT/PT to assist with strengthening/mobility   - Keep Call bell within reach  - Keep bed low and locked with side rails adjusted as appropriate  - Keep care items and personal belongings within reach  - Initiate and maintain comfort rounds  - Make Fall Risk Sign visible to staff (high fall risk)  - Offer Toileting every 2 Hours, in advance of need  - Initiate/Maintain bed/chair alarm  - Obtain necessary fall risk management equipment: nonskid footwear, fall bracelet, alarms  - Apply yellow socks and bracelet for high fall risk patients  - Consider moving patient to room near nurses station  Outcome: Progressing  Goal: Maintains/Returns to pre admission functional level  Description: INTERVENTIONS:  -  Assess patient's ability to carry out ADLs; (mod to max assist with adls)  - Assess/evaluate cause of self-care deficits (limited movement, obesity, fatigue, pain, wounds)  - Assess range of motion  - Assess patient's mobility; (min assist and walker)  - Assess patient's need for assistive devices and provide as appropriate  - Encourage maximum independence but intervene and supervise when necessary  - Assess for home care needs following discharge   - Consider OT consult to assist with ADL evaluation and planning for discharge  - Provide patient education as appropriate  Outcome: Progressing     Problem: DISCHARGE PLANNING  Goal: Discharge to home or other facility with appropriate resources  Description: INTERVENTIONS:  - Identify barriers to discharge w/patient and caregiver  - Arrange for needed discharge resources and transportation as appropriate  - Identify discharge learning needs (meds, wound care, etc )  - Refer to Case Management Department for coordinating discharge planning if the patient needs post-hospital services based on physician/advanced practitioner order or complex needs related to functional status, cognitive ability, or social support system  Outcome: Progressing     Problem: Knowledge Deficit  Goal: Patient/family/caregiver demonstrates understanding of disease process, treatment plan, medications, and discharge instructions  Description: Complete learning assessment and assess knowledge base    Interventions:  - Provide teaching at level of understanding  - Provide teaching via preferred learning methods  Outcome: Progressing     Problem: SKIN/TISSUE INTEGRITY - ADULT  Goal: Incision(s), wounds(s) or drain site(s) healing without S/S of infection  Description: INTERVENTIONS  - Assess and document dressing, incision, wound bed, drain sites and surrounding tissue  - Provide patient and family education  - Perform skin care/dressing changes as per orders  Outcome: Progressing Color consistent with ethnicity/race, warm, dry intact, resilient.

## 2022-01-14 NOTE — ASSESSMENT & PLAN NOTE
Patient presented with pain, erythema, swelling with overlying wounds, actively draining purulent material involving the inframammary area, lower abdomen, groin and inner thighs        Continue vancomycin, Cefepime and Metronidazole day#5   wound culture growing E faecalis and coag negative staph, vanco added back  ID recommending 5 days of IV Diflucan  Follow-up MRSA screenWound care consult, input appreciated    Discontinue vancomycin/Flagyl/Rocephin, transitioned to Augmentin 875/125 for 2-3 weeks

## 2022-01-14 NOTE — PROGRESS NOTES
Progress Note - Infectious Disease   Shari Branch 60 y o  female MRN: [id number]  Unit/Bed#: 418-01 Encounter: [id number]        REQUIRED DOCUMENTATION:     1  This service was provided via Telemedicine  2  Provider located at [facility]  3  TeleMed provider: Sulema Lopez MD   4  Identify all parties in room with patient during tele consult:RN  5  After connecting through [employer], patient was identified by name and date of birth and assistant checked wristband  Patient was then informed that this was a Telemedicine visit and that the exam was being conducted confidentially over secure lines  My office door was closed  No one else was in the room  Patient acknowledged consent and understanding of privacy and security of the Telemedicine visit, and gave us permission to have the assistant stay in the room in order to assist with the history and to conduct the exam   I informed the patient that I have reviewed their record in Epic and presented the opportunity for them to ask any questions regarding the visit today  The patient agreed to participate  Assessment/Recommendations     1   Perforated sigmoid diverticulitis with microperforation, small right pelvic abscess  - Repeat CT notes slight worsening of perisigmoid collection (3 1 x 4 cm) with evidence of potential obstruction to R EIA and increased R hydroureteronephrosis  - Managed conservatively by surgery, abscess appears small and given proximity to vital organs, risks of drain placement outweigh benefits per IR  - Clinically improved, afebrile without leukocytosis with with intermittent R flank pain    · Recommend discontinuing Vanc, ceftriaxone, flagyl and switch to PO augmentin 875 bid   · Anticipate 3-4 weeks of PO therapy with repeat CT abd/pelvis with contrast in 3 weeks, final duration of therapy pending clinical and radiologic resolution of abscess  · Continue serial abdominal exams  · If R flank pain worsens or patient has fever/worsening leukocytosis would recommend immediate repeat CT abd/pelvis  · Additional management per surgery    2  Right flank pain, intermittent  - CT notes slight worsening R hydroureteronephrosis likely due to relative obstruction from underlying perisigmoid abscess    · Antibiotics and imaging as above    3  Fungal dermatitis with secondary bacterial infection, present on admission  - Long standing rash, wounds present over pannus, under breasts, mons, legs  - In the setting of morbid obesity and hx hidradenitis suppurativa  - Wound cx Enterococcus and CoNS     · Continue antibiotics as above  · Continue topical antifungals and PO fluconazole through   · Continue wound care, barrier protection  · Recommend offloading to allow buttock wounds to heal    4  Dysphagia, thrush, possible candidal esophagitis  - Patient reports pain and discomfort with swallowing and choking episodes    · Continue fluconazole 400 mg daily x 14 days through   · Recommend outpatient GI evaluation, continue speech therapy    5  Acute kidney injury, R hydroureteronephrosis  - Likely pre renal  - CT notes R hydroureteronephrosis but no obstruction, possible reactive  - creatinine normalized    · Continue supportive care    6  Morbid obesity  - BMI 63  Risk factor for infection    · Recommend lifestyle modification and bariatric surgery consult as outpatient    Discussed with the primary service  History       Subjective: The patient notes a new sore on the left inner cheek  No other new complaints  No abdominal or flank pain  Denies fevers, chills, or sweats  Denies nausea, vomiting, or diarrhea      Antibiotics:  Vanc, ceftriaxone, flagyl, fluconazole      Physical Exam     Temp:  [97 2 °F (36 2 °C)-98 6 °F (37 °C)] 98 6 °F (37 °C)  HR:  [81-98] 98  Resp:  [18] 18  BP: (141-180)/() 141/84  SpO2:  [91 %-94 %] 91 %  Temp (24hrs), Av 9 °F (36 6 °C), Min:97 2 °F (36 2 °C), Max:98 6 °F (37 °C)  Current: Temperature: 98 6 °F (37 °C)    Intake/Output Summary (Last 24 hours) at 1/14/2022 0953  Last data filed at 1/14/2022 0837  Gross per 24 hour   Intake 480 ml   Output 1500 ml   Net -1020 ml       Physical exam findings reported by bedside and primary medical team staff      General Appearance:  Appearing chronically ill, nontoxic, and in no distress, appears stated age   Throat: Oropharynx moist without white plaques; lips, mucosa, and tongue normal; teeth and gums normal   Lungs:   Clear to auscultation bilaterally, no audible wheezes, rhonchi and rales, respirations unlabored   Heart:  Regular rate and rhythm, S1, S2 normal, no murmur, rub or gallop   Abdomen:   Soft, non-tender, non-distended, positive bowel sounds, no masses, no organomegaly    No CVA tenderness   Extremities: Extremities normal, atraumatic, no cyanosis, clubbing or edema   Skin: Multiple ulcerated wounds over plaque like erythema under breast, pannus, mons, gluteus and upper posterior thighs, no erythema, wounds clean base no drianage   Neurologic: Alert and oriented times 3         Invasive Devices:   Peripheral IV 01/09/22 Dorsal (posterior); Left Hand (Active)       Urethral Catheter 16 Fr  (Active)   Reasons to continue Urinary Catheter  Acute urinary retention/obstruction failing urinary retention protocol 01/06/22 1444   Goal for Removal No longer needed- Will place order to discontinue 01/06/22 1444   Site Assessment Clean;Skin intact 01/06/22 1444   Galan Care Done 01/09/22 2100   Collection Container Standard drainage bag 01/06/22 1444   Output (mL) 650 mL 01/09/22 1512       Labs, Imaging, & Other Studies     Lab Results:    I have personally reviewed pertinent labs      Results from last 7 days   Lab Units 01/14/22 0618 01/13/22  0626 01/12/22  0434   WBC Thousand/uL 7 70 8 91 6 84   HEMOGLOBIN g/dL 9 9* 9 4* 9 2*   PLATELETS Thousands/uL 262 263 257     Results from last 7 days   Lab Units 01/14/22 0618 01/13/22  0626 01/13/22  5939 01/11/22  0810 01/10/22  0529   POTASSIUM mmol/L 4 6   < > 4 4   < > 4 9   CHLORIDE mmol/L 104   < > 104   < > 111*   CO2 mmol/L 24   < > 23   < > 19*   BUN mg/dL 12   < > 13   < > 34*   CREATININE mg/dL 1 10   < > 1 11   < > 1 79*   EGFR ml/min/1 73sq m 55   < > 54   < > 30   CALCIUM mg/dL 8 8   < > 8 5   < > 8 6   AST U/L 16  --  14  --  16   ALT U/L 19   < > 21  --  22   ALK PHOS U/L 92   < > 93  --  88    < > = values in this interval not displayed  Results from last 7 days   Lab Units 01/08/22  0046   GRAM STAIN RESULT  No Polys or Bacteria seen   WOUND CULTURE  Few Colonies of Enterococcus species*  Few Colonies of Staphylococcus coagulase negative*       Imaging Studies:   I have personally reviewed pertinent imaging study reports and images in PACS  EKG, Pathology, and Other Studies:   I have personally reviewed pertinent reports  Counseling/Coordination of care: Total 35 minutes communication with the patient via telehealth  Labs, medical tests and imaging studies were independently reviewed by me as noted above

## 2022-01-14 NOTE — PROGRESS NOTES
e-Consult (IPC)  - Interventional Radiology  [facility] 60 y o  female MRN: [id number]  Unit/Bed#: 857-45 Encounter: [id number]    IR has been consulted to evaluate the patient, determine the appropriate procedure, and whether or not a procedure can and should be performed regarding the care of Pettibone Pac  IP Consult to IR  Consult performed by: Loco Sifuentes MD  Consult ordered by: Mikey Castro MD        01/14/22      Assessment/Recommendation:     60year old female with sigmoid diverticulitis  CT scan from 1/12/22 demonstrates a 3 9 x 3 1cm abscess in the right pelvis  Similar appearance to the comparison CT from 1/6/22, where the collection is indistinguishable from the adjacent right external iliac artery, perhaps encasing it  The depth of the collection, and adjacent structures including bowel and other pelvic vessels add considerable risk to drain placement as well  Based on the CT, the drainable fluid component of the collection is likely very small  For the above reasons, I feel the risks of drain placement outweigh benefits  IR will not proceed with drain placement at this time  Total time spent in review of data, discussion with requesting provider and rendering advice was 15 minutes     Thank you for allowing Interventional Radiology to participate in the care of Pett[place] Pac  Please don't hesitate to call or TigerText us with any questions       Loco Sifuentes MD

## 2022-01-14 NOTE — ASSESSMENT & PLAN NOTE
CT abdomen on 01/06/22 shows - sigmoid diverticulitis with microperforation and adjacent small pelvic abscess      Surgical input appreciated  IR unable to place drain due to body habitus  Rocephin & flagyl   Abdominal exam is benign, repeat CT reviewed with surgical team      Discontinue vancomycin/Flagyl/Rocephin, transitioned to Augmentin 875/125 for 3-4 weeks

## 2022-01-14 NOTE — PROGRESS NOTES
Progress Note - Nephrology   Nikole Crowe 60 y o  female MRN: [id number]  Unit/Bed#: 714-90 Encounter: [id number]    Assessment and Plan    1  Acute kidney injury (POA) atop chronic kidney disease  · Renal function has significantly improved from presenting creatinine 4 56 mg/dL on 01/06/2022 -> baseline serum creatinine 1 1 mg/dL on 01/14/2022  · Permanently discontinue Celebrex  Avoid NSAIDs  Hold lisinopril at discharge  Consider restarting at outpatient follow-up  · Recommend discharge with 2 L fluid restriction  · Recommend outpatient nephrology follow-up within 2 weeks with BMP prior  2  Stage 2 chronic kidney disease baseline creatinine around 0 9-1 0 mg/dL  3  Obstructive uropathy, mild right hydroureteronephrosis  · Per urology  4  Hypertension in the setting of CKD  · Blood pressure at goal   Hold lisinopril at discharge  Plan to restart outpatient follow-up  5  Proteinuria  · Again, lisinopril currently on hold  Check SPEP at outpatient follow-up  6  Abdominal cellulitis with superficial wounds  7  Fungal dermatitis  8  Hypomagnesemia  · Re-evaluate at follow-up in consider oral supplementation    9  Hyponatremia  · 2 L fluid restriction at discharge  10  Microperforation of sigmoid diverticulitis with small pelvic abscess  · Infectious disease following    Follow up reason for today's visit:  Acute kidney injury    Cellulitis of abdominal wall    Patient Active Problem List   Diagnosis    Lymphedema    Primary hypertension    Morbid obesity with BMI of 60 0-69 9, adult ([facility] [address] )    Hidradenitis suppurativa    Chronic midline low back pain with left-sided sciatica    Other specified anxiety disorders    Lumbar paraspinal muscle spasm    Borderline hyperlipidemia    Iron deficiency    GERD (gastroesophageal reflux disease)    Acute renal failure (ARF) (HCC)    Cellulitis of abdominal wall, chest wall and groin with wounds    Diverticulitis of large intestine with perforation    Right Hydroureteronephrosis    Subacute vaginitis    Dysphagia         Subjective:   Operative physical complaints  Would like to drink more fluids  Explained purpose of fluid restriction  A complete 10 point review of systems was performed and is otherwise negative  Objective:     Vitals: Blood pressure 138/85, pulse 89, temperature (!) 97 °F (36 1 °C), resp  rate 20, height 5' 7" (1 702 m), weight (!) 168 kg (369 lb 11 4 oz), SpO2 91 %  ,Body mass index is 57 9 kg/m²  Weight (last 2 days)     Date/Time Weight    01/14/22 0600 168 (369 71)    01/13/22 0600 167 (368 83)    01/12/22 0600 174 (384 04)            Intake/Output Summary (Last 24 hours) at 1/14/2022 1712  Last data filed at 1/14/2022 1145  Gross per 24 hour   Intake 1030 ml   Output 1100 ml   Net -70 ml     I/O last 3 completed shifts: In: 2510 [P O :960; IV Piggyback:1550]  Out: 2650 [Urine:2650]    Urethral Catheter 16 Fr  (Active)   Reasons to continue Urinary Catheter  Acute urinary retention/obstruction failing urinary retention protocol 01/14/22 0955   Goal for Removal Other (Comment) 01/14/22 0955   Site Assessment Clean;Skin intact 01/14/22 0955   Galan Care Done 01/13/22 2100   Collection Container Standard drainage bag 01/14/22 0955   Securement Method Securing device (Describe) 01/14/22 0955   Output (mL) 650 mL 01/14/22 0626       Physical Exam: /85   Pulse 89   Temp (!) 97 °F (36 1 °C)   Resp 20   Ht 5' 7" (1 702 m)   Wt (!) 168 kg (369 lb 11 4 oz)   SpO2 91%   BMI 57 90 kg/m²     General Appearance:    No acute distress  Cooperative  Appears stated age  Morbidly obese  Head:    Normocephalic  Atraumatic  Normal jaw occlusion  Eyes:    Lids, conjunctiva normal  No scleral icterus  Ears:    Normal external ears  Nose:   Nares normal  No drainage  Mouth:   Lips, tongue normal  Mucosa normal  Phonation normal   Poor dentition  Neck:   Supple  Symmetrical    Back:     Symmetric  No CVA tenderness     Lungs: Normal respiratory effort  Clear to auscultation bilaterally  Chest wall:    No tenderness or deformity  Heart:    Regular rate and rhythm  Normal S1 and S2  No murmur  No JVD  No edema  Abdomen:     Soft  Non-tender  Bowel sounds active  Genitourinary: Galan catheter with large amount clear, yellow urine output  Extremities:   Extremities normal  Atraumatic  No cyanosis  Skin:   Warm and dry  No pallor, jaundice, rash, ecchymoses  Neurologic:   Alert and oriented to person, place, time  No focal deficit  Lab, Imaging and other studies: I have personally reviewed pertinent labs  CBC:   Lab Results   Component Value Date    WBC 7 70 01/14/2022    HGB 9 9 (L) 01/14/2022    HCT 32 6 (L) 01/14/2022    MCV 80 (L) 01/14/2022     01/14/2022    MCH 24 4 (L) 01/14/2022    MCHC 30 4 (L) 01/14/2022    RDW 18 6 (H) 01/14/2022    MPV 8 9 01/14/2022    NRBC 0 01/14/2022     CMP:   Lab Results   Component Value Date    K 4 6 01/14/2022     01/14/2022    CO2 24 01/14/2022    BUN 12 01/14/2022    CREATININE 1 10 01/14/2022    CALCIUM 8 8 01/14/2022    AST 16 01/14/2022    ALT 19 01/14/2022    ALKPHOS 92 01/14/2022    EGFR 55 01/14/2022         Results from last 7 days   Lab Units 01/14/22  0618 01/13/22  0626 01/12/22  0434 01/11/22  0810 01/10/22  0529   POTASSIUM mmol/L 4 6 4 4 4 5   < > 4 9   CHLORIDE mmol/L 104 104 105   < > 111*   CO2 mmol/L 24 23 22   < > 19*   BUN mg/dL 12 13 18   < > 34*   CREATININE mg/dL 1 10 1 11 1 18   < > 1 79*   CALCIUM mg/dL 8 8 8 5 8 4   < > 8 6   ALK PHOS U/L 92 93  --   --  88   ALT U/L 19 21  --   --  22   AST U/L 16 14  --   --  16    < > = values in this interval not displayed           Phosphorus: No results found for: PHOS  Magnesium:   Lab Results   Component Value Date    MG 1 9 01/14/2022     Urinalysis: No results found for: Navi Ice, SPECGRAV, PHUR, LEUKOCYTESUR, NITRITE, PROTEINUA, GLUCOSEU, KETONESU, BILIRUBINUR, BLOODU  Ionized Calcium: No results found for: CAION  Coagulation: No results found for: PT, INR, APTT  Troponin: No results found for: TROPONINI  ABG: No results found for: PHART, RBS3WWS, PO2ART, CRX5RAK, D7MKJAHA, BEART, SOURCE  Radiology review:     IMAGING  Procedure: CT abdomen pelvis wo contrast    Result Date: 1/12/2022  Narrative: CT ABDOMEN AND PELVIS WITHOUT IV CONTRAST INDICATION:   follow up abscess  no contrast due to kidneys  COMPARISON:  CT 1/6/22 TECHNIQUE:  CT examination of the abdomen and pelvis was performed without intravenous contrast   Axial, sagittal, and coronal 2D reformatted images were created from the source data and submitted for interpretation  Radiation dose length product (DLP) for this visit:  1475 63 mGy-cm   This examination, like all CT scans performed in the [facility], was performed utilizing techniques to minimize radiation dose exposure, including the use of iterative reconstruction and automated exposure control  Enteric contrast was administered  FINDINGS: ABDOMEN LOWER CHEST:  No clinically significant abnormality identified in the visualized lower chest  LIVER/BILIARY TREE:  Unremarkable  GALLBLADDER:  Cholelithiasis SPLEEN:  Unremarkable  PANCREAS:  Unremarkable  ADRENAL GLANDS:  Unremarkable  KIDNEYS/URETERS:  Moderate The study was marked in EPIC for immediate notification  right hydroureteronephrosis, increased from prior  The right ureter courses through the below mentioned collection STOMACH AND BOWEL:  Again noted are inflammatory changes surrounding the sigmoid colon, with adjacent locules of extraluminal air  Collection is present to the right of the sigmoid colon which measures about 3 1 x 4 0 cm on series 2/68, mildly in size from prior when measured in same manner  This collection appears to fully encase the right external iliac artery, and abuts right external iliac vein  APPENDIX:  No findings to suggest appendicitis  ABDOMINOPELVIC CAVITY:  No ascites    No pneumoperitoneum  No lymphadenopathy  VESSELS:  Unremarkable for patient's age  PELVIS REPRODUCTIVE ORGANS:  Unremarkable for patient's age  URINARY BLADDER:  Unremarkable  ABDOMINAL WALL/INGUINAL REGIONS:  Unremarkable  OSSEOUS STRUCTURES:  No acute fracture or destructive osseous lesion  Impression: 1  Right pelvic collection shows very mild interval increase in size  In addition to encasing the right ureter, this collection also encases the right external iliac artery, and may cause arterial injury  2   Persistent inflammatory changes surrounding the sigmoid colon with surrounding free air 3  Moderate right hydroureteronephrosis, increased from prior The study was marked in [facility] for immediate notification   Workstation performed: [id number]       Current Facility-Administered Medications   Medication Dose Route Frequency    acetaminophen (TYLENOL) tablet 975 mg  975 mg Oral Q8H [address]    al mag oxide-diphenhydramine-lidocaine viscous (MAGIC MOUTHWASH) suspension 10 mL  10 mL Swish & Swallow Q4H PRN    albuterol (PROVENTIL HFA,VENTOLIN HFA) inhaler 2 puff  2 puff Inhalation Q6H PRN    aluminum-magnesium hydroxide-simethicone (MYLANTA) oral suspension 30 mL  30 mL Oral Q4H PRN    amoxicillin-clavulanate (AUGMENTIN) 875-125 mg per tablet 1 tablet  1 tablet Oral Q12H IRIS    docusate sodium (COLACE) capsule 100 mg  100 mg Oral BID    enoxaparin (LOVENOX) subcutaneous injection 60 mg  60 mg Subcutaneous Q12H [address]    fluconazole (DIFLUCAN) tablet 400 mg  400 mg Oral Daily    fluticasone (FLONASE) 50 mcg/act nasal spray 1 spray  1 spray Each Nare Daily    HYDROmorphone (DILAUDID) injection 0 2 mg  0 2 mg Intravenous Q4H PRN    hydrOXYzine HCL (ATARAX) tablet 25 mg  25 mg Oral TID PRN    lidocaine (LIDODERM) 5 % patch 1 patch  1 patch Topical Daily    methocarbamol (ROBAXIN) tablet 500 mg  500 mg Oral Q8H [address]    metoprolol tartrate (LOPRESSOR) tablet 25 mg  25 mg Oral BID    nicotine (NICODERM CQ) 7 mg/24hr TD 24 hr patch 1 patch  1 patch Transdermal Daily    nystatin (MYCOSTATIN) cream   Topical BID    nystatin (MYCOSTATIN) oral suspension 500,000 Units  500,000 Units Swish & Swallow 4x Daily    nystatin (MYCOSTATIN) powder   Topical TID    ondansetron (ZOFRAN) injection 4 mg  4 mg Intravenous Q6H PRN    oxyCODONE (ROXICODONE) IR tablet 5 mg  5 mg Oral Q4H PRN    Or    oxyCODONE (ROXICODONE) immediate release tablet 10 mg  10 mg Oral Q4H PRN    pantoprazole (PROTONIX) EC tablet 40 mg  40 mg Oral Early Morning    saliva substitute (MOUTH KOTE) mucosal solution 5 spray  5 spray Mouth/Throat 4x Daily PRN    sertraline (ZOLOFT) tablet 50 mg  50 mg Oral Daily     Medications Discontinued During This Encounter   Medication Reason    iohexol (OMNIPAQUE) 350 MG/ML injection (SINGLE-DOSE) 100 mL     piperacillin-tazobactam (ZOSYN) 3 375 g in sodium chloride 0 9 % 100 mL IVPB     piperacillin-tazobactam (ZOSYN) 2 25 g in sodium chloride 0 9 % 50 mL IVPB     lisinopril (ZESTRIL) tablet 10 mg     aluminum-magnesium hydroxide-simethicone (MYLANTA) oral suspension 30 mL     cefepime (MAXIPIME) 500 mg in sodium chloride 0 9 % 50 mL IVPB     vancomycin (VANCOCIN) 2,000 mg in sodium chloride 0 9 % 500 mL IVPB     cefepime (MAXIPIME) IVPB (premix in dextrose) 1,000 mg 50 mL     acetaminophen (TYLENOL) tablet 650 mg     HYDROcodone-acetaminophen (NORCO) 5-325 mg per tablet 1 tablet     vancomycin (VANCOCIN) 2,000 mg in sodium chloride 0 9 % 500 mL IVPB     sodium chloride 0 9 % infusion     cefepime (MAXIPIME) IVPB (premix in dextrose) 1,000 mg 50 mL     vancomycin (VANCOCIN) 1,250 mg in sodium chloride 0 9 % 250 mL IVPB     sodium bicarbonate tablet 1,300 mg     metroNIDAZOLE (FLAGYL) tablet 500 mg     cefTRIAXone (ROCEPHIN) IVPB (premix in dextrose) 2,000 mg 50 mL     vancomycin (VANCOCIN) 1,500 mg in sodium chloride 0 9 % 500 mL IVPB        Jessica Gonzales PA-C    Portions of the record may have been created with voice recognition software  Occasional wrong word or "sound a like" substitutions may have occurred due to the inherent limitations of voice recognition software  Read the chart carefully and recognize, using context, where substitutions have occurred

## 2022-01-14 NOTE — PLAN OF CARE
Problem: PHYSICAL THERAPY ADULT  Goal: Performs mobility at highest level of function for planned discharge setting  See evaluation for individualized goals  Description: Treatment/Interventions: Functional transfer training,LE strengthening/ROM,Therapeutic exercise,Endurance training,Bed mobility,Gait training  Equipment Recommended: Oneil Cruz       See flowsheet documentation for full assessment, interventions and recommendations  Outcome: Progressing  Note: Prognosis: Good  Problem List: Decreased strength,Decreased endurance,Impaired balance,Decreased mobility,Pain,Obesity  Assessment: Pt  seen for PT treatment session this date with interventions consisting of   bed mobility, transfers and  gait training w/ emphasis on improving pt's ability to ambulate  Pt  Currently performing  tx and ambulation at (mod-min ) x 1 level of function  Requires increased time for bed mobility  Cues for technique and encouragement due to anxiety  Please also refer to physical therapy recommendation for safe DC planning  In comparison to previous session, Pt  With improvements in activity tolerance  Pt is in need of continued activity in PT to improve strength balance endurance mobility transfers and ambulation with return to maximize LOF  From PT/mobility standpoint, recommendation at time of d/c would be post acute rehab  in order to promote return to PLOF and independence  The patient's AM-PAC Basic Mobility Inpatient Short Form Raw Score is 14  A Raw score of less than or equal to 16 suggests the patient may benefit from discharge to post-acute rehabilitation services  PT Discharge Recommendation: Post acute rehabilitation services          See flowsheet documentation for full assessment

## 2022-01-14 NOTE — TELEPHONE ENCOUNTER
----- Message from [address] [facility] [facility] sent at 1/14/2022  9:18 AM EST -----  Good morning,  Could we set this patient up for outpatient visit  She is currently hospitalized for cellulitis, ZAHIRA and diverticulitis  We were consulted for dysphagia    Thank you,  Toney Nunes

## 2022-01-14 NOTE — WOUND OSTOMY CARE
Progress Note - Wound   Esmer Gambino 60 y o  female MRN: [id number]  Unit/Bed#: 499-38 Encounter: [id number]    History and Present Illness:  Patient admitted with cellulitis of the abdominal wall, chest wall and groin wounds  Seen today for wound care weeky follow up  Patient history significant for hydradinitis suppurativa, HTN, morbid obesity with a BMI of 63 02, and currently with diverticulitis of large intestine with perforation  Galan catheter to straight drainage for bladder management  Pt requires moderate assistance to transfer from edges of bed to lying position  Has Bariatric bed         Assessment:   1)Bilateral heels intact  2) Pannus, abdomen beneath skin folds of both breasts have decreased erythema  3)Hidradenitis wounds multiple areas  open wounds on bilateral buttocks, beneath bilateral breasts, on lower abdomen and anterior and medial bilateral upper thighs  All areas gently cleansed with NSS> pat dry and maxorb ag applied to wound bed only  Wounds were then covered with Allevyn bordered foam  No tape applied to skin  3M applied as skin barrier and allowed to dry as skin protectant  All wound photo and measurements below  Very painful to touch  Decreased drainage since admission noted, as well as decreased erythema and size of wounds         Wound 11/22/21 Other (comment) Breast Left; Lower (Active)   Wound Image   01/14/22 1018   Wound Description Dry;Beefy red;Fragile 01/14/22 1018   Luisa-wound Assessment Dry; Intact 01/14/22 1018   Wound Length (cm) 10 cm 01/14/22 1018   Wound Width (cm) 6 cm 01/14/22 1018   Wound Depth (cm) 0 1 cm 01/10/22 1043   Wound Surface Area (cm^2) 60 cm^2 01/14/22 1018   Wound Volume (cm^3) 6 3 cm^3 01/10/22 1043   Calculated Wound Volume (cm^3) 6 3 cm^3 01/10/22 1043   Change in Wound Size % -530 01/10/22 1043   Drainage Amount None 01/14/22 1018   Drainage Description Bloody 01/11/22 2152   Non-staged Wound Description Partial thickness 01/14/22 1018 Treatments Cleansed 01/11/22 2152   Dressing Calcium Alginate with Silver; Foam, Silicon (eg  Allevyn, etc) 01/14/22 1018   Wound packed? No 01/09/22 1000   Dressing Changed New 01/14/22 1018   Patient Tolerance Tolerated well 01/14/22 1018   Dressing Status Clean;Dry; Intact 01/14/22 1018       Wound 11/22/21 Other (comment) Abdomen Medial;Lower (Active)   Wound Image   01/14/22 1015   Wound Description Dry; Intact; Beefy red 01/14/22 1015   Luisa-wound Assessment Intact;Fragile 01/14/22 1015   Wound Length (cm) 5 cm 01/14/22 1015   Wound Width (cm) 30 cm 01/14/22 1015   Wound Depth (cm) 0 1 cm 01/10/22 1045   Wound Surface Area (cm^2) 150 cm^2 01/14/22 1015   Wound Volume (cm^3) 33 6 cm^3 01/10/22 1045   Calculated Wound Volume (cm^3) 33 6 cm^3 01/10/22 1045   Change in Wound Size % -522 22 01/10/22 1045   Drainage Amount Moderate 01/14/22 1015   Drainage Description Bloody;Clear 01/14/22 1015   Non-staged Wound Description Partial thickness 01/14/22 1015   Treatments Cleansed;Site care 01/14/22 1015   Dressing ABD;Calcium Alginate with Silver 01/14/22 1015   Wound packed? No 01/12/22 1751   Dressing Changed New 01/14/22 1015   Patient Tolerance Tolerated well 01/14/22 1015   Dressing Status Clean;Dry; Intact 01/14/22 1015       Wound 11/22/21 Other (comment) Thigh Anterior;Right;Medial (Active)   Wound Image   01/10/22 1101   Wound Description Beefy red;Fragile 01/13/22 2000   Luisa-wound Assessment Erythema;Fragile 01/13/22 2000   Wound Length (cm) 5 cm 01/10/22 1101   Wound Width (cm) 4 cm 01/10/22 1101   Wound Depth (cm) 0 1 cm 01/10/22 1101   Wound Surface Area (cm^2) 20 cm^2 01/10/22 1101   Wound Volume (cm^3) 2 cm^3 01/10/22 1101   Calculated Wound Volume (cm^3) 2 cm^3 01/10/22 1101   Change in Wound Size % -900 01/10/22 1101   Drainage Amount Small 01/12/22 1751   Drainage Description Bloody;Brown 01/12/22 1751   Non-staged Wound Description Partial thickness 01/10/22 1101   Treatments Cleansed;Irrigation with NSS;Site care 01/12/22 1751   Dressing Calcium Alginate with Silver; Foam, Silicon (eg  Allevyn, etc) 01/13/22 2000   Dressing Changed Changed 01/12/22 1751   Patient Tolerance Tolerated well 01/12/22 1751   Dressing Status Clean;Dry; Intact 01/13/22 2000       Wound 11/22/21 Other (comment) Thigh Anterior;Left;Medial (Active)   Wound Description Beefy red;Fragile 01/13/22 2000   Luisa-wound Assessment Intact; Erythema;Fragile 01/13/22 2000   Drainage Amount None 01/09/22 1000   Treatments Irrigation with NSS;Site care 01/12/22 1751   Dressing Calcium Alginate with Silver; Foam, Silicon (eg  Allevyn, etc) 01/13/22 2000   Wound packed? No 01/12/22 1751   Dressing Changed Changed 01/12/22 1751   Patient Tolerance Tolerated well 01/12/22 1751   Dressing Status Clean;Dry; Intact 01/13/22 2000       Wound 01/06/22 Other (comment) Other (Comment) Perineum (Active)   Wound Image   01/10/22 1027   Wound Description Dry; Intact; Beefy red 01/14/22 1005   Pressure Injury Stage 2 01/12/22 1751   Luisa-wound Assessment Fragile 01/14/22 1005   Wound Length (cm) 14 cm 01/14/22 1005   Wound Width (cm) 12 cm 01/14/22 1005   Wound Depth (cm) 0 1 cm 01/14/22 1005   Wound Surface Area (cm^2) 168 cm^2 01/14/22 1005   Wound Volume (cm^3) 16 8 cm^3 01/14/22 1005   Calculated Wound Volume (cm^3) 16 8 cm^3 01/14/22 1005   Change in Wound Size % -77 78 01/14/22 1005   Drainage Amount Scant 01/14/22 1005   Drainage Description Bloody 01/14/22 1005   Non-staged Wound Description Partial thickness 01/14/22 1005   Treatments Cleansed;Site care 01/14/22 1005   Dressing Calcium Alginate with Silver; Foam, Silicon (eg  Allevyn, etc) 01/14/22 1005   Wound packed? No 01/12/22 1751   Dressing Changed Changed 01/14/22 1005   Patient Tolerance Tolerated well 01/14/22 1005   Dressing Status Clean;Dry; Intact 01/14/22 1005       Wound 01/07/22 Breast Right; Lower (Active)   Wound Image   01/14/22 1017   Wound Description Intact; Beefy red 01/14/22 1017   Luisa-wound Assessment Intact;Fragile 01/14/22 1017   Wound Length (cm) 8 cm 01/14/22 1017   Wound Width (cm) 0 1 cm 01/14/22 1017   Wound Depth (cm) 0 1 cm 01/10/22 1044   Wound Surface Area (cm^2) 0 8 cm^2 01/14/22 1017   Wound Volume (cm^3) 15 6 cm^3 01/10/22 1044   Calculated Wound Volume (cm^3) 15 6 cm^3 01/10/22 1044   Drainage Amount Scant 01/14/22 1017   Drainage Description Bloody 01/14/22 1017   Non-staged Wound Description Partial thickness 01/14/22 1017   Treatments Cleansed;Site care 01/14/22 1017   Dressing Calcium Alginate with Silver; Foam, Silicon (eg  Allevyn, etc) 01/14/22 1017   Wound packed? No 01/09/22 1000   Dressing Changed Changed 01/14/22 1017   Patient Tolerance Tolerated well 01/14/22 1017   Dressing Status Clean;Dry; Intact 01/14/22 1017       Wound 01/11/22 Pressure Injury Thigh Posterior;Left;Upper (Active)   Wound Image   01/14/22 1009   Wound Description Beefy red;Bleeding;Fragile 01/14/22 1009   Pressure Injury Stage 2 01/14/22 1009   Luisa-wound Assessment Fragile 01/14/22 1009   Wound Length (cm) 10 cm 01/14/22 1009   Wound Width (cm) 6 cm 01/14/22 1009   Wound Depth (cm) 0 1 cm 01/14/22 1009   Wound Surface Area (cm^2) 60 cm^2 01/14/22 1009   Wound Volume (cm^3) 6 cm^3 01/14/22 1009   Calculated Wound Volume (cm^3) 6 cm^3 01/14/22 1009   Drainage Amount Small 01/14/22 1009   Drainage Description Bloody 01/14/22 1009   Non-staged Wound Description Partial thickness 01/14/22 1009   Treatments Cleansed;Site care;Elevated 01/14/22 1009   Dressing Foam, Silicon (eg  Allevyn, etc); Non adherent 01/14/22 1009   Dressing Changed New 01/14/22 1009   Patient Tolerance Tolerated well 01/14/22 1009   Dressing Status Clean;Dry; Intact 01/14/22 1009        Skin and Wound Care Plan:   1  Apply skin nourishing cream to intact skin daily  2  Bariatric ehob offloading cushion to chair when OOB  3  Elevate heels off of bed with pillows to offload  4  Kreg bariatric bed  5  Turn and reposition patient Q2 hours  6  Gently cleanse areas beneath b/l breasts, abdominal folds, inner thighs, groin folds and inner buttock wounds with NSS  Pat dry with gauze   Dust inner folds with Nystatin antifungal powder as ordered by provider  7-Gently cleanse wound beds beneath bilateral breasts, abdomen, thighs and bilateral buttocks with NSS> pat dry apply Maxorb AG to wound beds and cover with Allevyn bordered foam  Change every other day and prn soil or dislodgement        Call or tigertext with any questions  Wound Care will continue to follow    Grace Lees

## 2022-01-14 NOTE — PROGRESS NOTES
Progress Note - General Surgery   Fiorella Dallas 60 y o  female MRN: [id number]  Unit/Bed#: 476-30 Encounter: [id number]    Assessment:  61yo F presenting with sigmoid diverticulitis with microperforation and abscess collection  - afebrile, VSS  - no leukocytosis  Cutaneous fungal infection   With wounds of bilateral inframammary folds and groin creases    - Blood cx NGTD  - Wound cx growing Enterococcus and Staph coagulase neg  ZAHIRA on CKD  - Cr continues to trend towards baseline   - Nephrology following   - good urine output recorded   Right hydroureteronephrosis   - Cr improving   - Galan catheter in place     Plan:  - no acute surgical intervention indication at this time  IR was reconsulted and has determined that abscess collection is not amenable to drainage  Please see full note from Dr Yuki Tian on 01/14 for further details  - Per ID, pt will need 2-3 wk course of PO abx upon discharge  - will need outpatient CT scan in 3 weeks to monitor intraabdominal collection   - dysphagia diet as tolerated   - encourage OOB and ambulation   - medical management per primary team     Subjective/Objective   Subjective: No acute events overnight  Patient states she feels well overall, still with mild RLQ/ R flank pain  Tolerating dysphagia diet  Still passing flatus and having BM  Denies N/V  Objective:   Blood pressure 141/84, pulse 98, temperature 98 6 °F (37 °C), resp  rate 18, height 5' 7" (1 702 m), weight (!) 168 kg (369 lb 11 4 oz), SpO2 91 %  ,Body mass index is 57 9 kg/m²  Intake/Output Summary (Last 24 hours) at 1/14/2022 0846  Last data filed at 1/14/2022 0626  Gross per 24 hour   Intake 480 ml   Output 1500 ml   Net -1020 ml       Invasive Devices  Report    Peripheral Intravenous Line            Peripheral IV 01/12/22 Dorsal (posterior); Right Hand 1 day          Drain            Urethral Catheter 16 Fr  7 days                Physical Exam: /84   Pulse 98   Temp 98 6 °F (37 °C)   Resp 18 Ht 5' 7" (1 702 m)   Wt (!) 168 kg (369 lb 11 4 oz)   SpO2 91%   BMI 57 90 kg/m²   General appearance: alert and oriented, in no acute distress, appears stated age and cooperative  Head: Normocephalic, without obvious abnormality, atraumatic  Lungs: clear to auscultation bilaterally  Heart: regular rate and rhythm, S1, S2 normal, no murmur, click, rub or gallop  Abdomen: soft, tender to palpation in RLQ/R flank, no peritoneal signs, bowel sounds active   Extremities: chronic lymphedemia     Lab, Imaging and other studies:  I have personally reviewed pertinent lab results    , CBC:   Lab Results   Component Value Date    WBC 7 70 01/14/2022    HGB 9 9 (L) 01/14/2022    HCT 32 6 (L) 01/14/2022    MCV 80 (L) 01/14/2022     01/14/2022    MCH 24 4 (L) 01/14/2022    MCHC 30 4 (L) 01/14/2022    RDW 18 6 (H) 01/14/2022    MPV 8 9 01/14/2022    NRBC 0 01/14/2022   , CMP:   Lab Results   Component Value Date    SODIUM 136 01/14/2022    K 4 6 01/14/2022     01/14/2022    CO2 24 01/14/2022    BUN 12 01/14/2022    CREATININE 1 10 01/14/2022    CALCIUM 8 8 01/14/2022    AST 16 01/14/2022    ALT 19 01/14/2022    ALKPHOS 92 01/14/2022    EGFR 55 01/14/2022     VTE Pharmacologic Prophylaxis: Enoxaparin (Lovenox)  VTE Mechanical Prophylaxis: sequential compression device    Kirsten Martinez PA-C  01/14/22

## 2022-01-14 NOTE — PHYSICAL THERAPY NOTE
PHYSICAL THERAPY NOTE          Patient Name: Megan Strauss  [facility] Date: 1/14/2022 01/14/22 1136   Note Type   Note Type Treatment   Pain Assessment   Pain Score 4   Pain Location/Orientation Location: Generalized; Location: Back   Restrictions/Precautions   Weight Bearing Precautions Per Order No   Other Precautions Pain; Fall Risk   General   Chart Reviewed Yes   Response to Previous Treatment Patient with no complaints from previous session  Family/Caregiver Present No   Cognition   Overall Cognitive Status WFL   Subjective   Subjective It's hard to get OOB  Bed Mobility   Supine to Sit 3  Moderate assistance   Additional items Assist x 1;HOB elevated; Bedrails; Increased time required;Verbal cues;LE management   Additional Comments Increased time to scoot to EOB   Transfers   Sit to Stand 4  Minimal assistance   Additional items Assist x 1;Bedrails;Verbal cues   Stand to Sit 4  Minimal assistance   Additional items Assist x 1; Armrests; Verbal cues   Stand pivot 4  Minimal assistance  (CGA)   Ambulation/Elevation   Gait pattern Improper Weight shift; Wide KODY; Excessively slow   Gait Assistance 4  Minimal assist  (CGA)   Additional items Assist x 1;Verbal cues   Assistive Device Rolling walker   Distance 35' x 2 with seated rest   Balance   Static Sitting Fair +   Dynamic Sitting Fair +   Static Standing Fair   Dynamic Standing Fair -   Ambulatory Fair -   Endurance Deficit   Endurance Deficit Yes   Activity Tolerance   Activity Tolerance Patient limited by fatigue;Patient limited by pain   Assessment   Prognosis Good   Problem List Decreased strength;Decreased endurance; Impaired balance;Decreased mobility;Pain;Obesity   Assessment Pt  seen for PT treatment session this date with interventions consisting of   bed mobility, transfers and  gait training w/ emphasis on improving pt's ability to ambulate   Pt  Currently performing tx and ambulation at (mod-min ) x 1 level of function  Requires increased time for bed mobility  Cues for technique and encouragement due to anxiety  Please also refer to physical therapy recommendation for safe DC planning  In comparison to previous session, Pt  With improvements in activity tolerance  Pt is in need of continued activity in PT to improve strength balance endurance mobility transfers and ambulation with return to maximize LOF  From PT/mobility standpoint, recommendation at time of d/c would be post acute rehab  in order to promote return to PLOF and independence  The patient's AM-PAC Basic Mobility Inpatient Short Form Raw Score is 14  A Raw score of less than or equal to 16 suggests the patient may benefit from discharge to post-acute rehabilitation services  Goals   LTG Expiration Date 01/21/22   Plan   Treatment/Interventions Functional transfer training;LE strengthening/ROM; Therapeutic exercise; Endurance training;Bed mobility;Gait training   Progress Slow progress, decreased activity tolerance   Recommendation   PT Discharge Recommendation Post acute rehabilitation services   AM-PAC Basic Mobility Inpatient   Turning in Bed Without Bedrails 2   Lying on Back to Sitting on Edge of Flat Bed 2   Moving Bed to Chair 3   Standing Up From Chair 3   Walk in Room 3   Climb 3-5 Stairs 1   Basic Mobility Inpatient Raw Score 14   Basic Mobility Standardized Score 35 55   Highest Level Of Mobility   JH-HLM Goal 4: Move to chair/commode   JH-HLM Highest Level of Mobility 7: Walk 25 feet or more   JH-HLM Goal Achieved Yes   Education   Education Provided Mobility training   Patient Reinforcement needed; Explanation/teachback used   End of Consult   Patient Position at End of Consult Bedside chair;Bed/Chair alarm activated; All needs within reach

## 2022-01-14 NOTE — PROGRESS NOTES
Progress Note- Lisa Epperson 60 y o  female MRN: [id number]    Unit/Bed#: 418-01 Encounter: [id number]      Assessment and Plan:    1  Dysphagia/globus sensation    Raysa Choudhary is a 22-year-old female with a past medical history of hypertension and morbid obesity that presented to the emergency department with open sores and was found to have cellulitis of the abdominal and chest wall as well as in the groin, acute renal failure and diverticulitis with microperforation  GI had been consulted due to dysphagia  Patient had reported a feeling of food getting stuck as well as feeling like there was a pill stuck in her throat even when she did not eat or drink  The patient had been started on fluconazole 400 milligrams daily due to thrush with a possibility of Candida esophagus diet is causing her dysphagia  She was also started on a daily PPI  She has also been evaluated by speech with no abnormalities found  Patient currently reports feeling improved  She states that the sensation of a pill stuck in her throat is improved and swallowing seems easier  Would recommend patient continue course of fluconazole and continue daily PPI outpatient  Will follow-up with patient in the office      - outpatient follow-up  - continue daily PPI outpatient  - finish course of fluconazole  ______________________________________________________________________    Subjective:     Patient reports that she is feeling much improved  She states that the sensation that a pill stuck in her throat seems to be improved and swallowing seems easier      Medication Administration - last 24 hours from 01/13/2022 0910 to 01/14/2022 0910       Date/Time Order Dose Route Action Action by     01/13/2022 2057 albuterol (PROVENTIL HFA,VENTOLIN HFA) inhaler 2 puff 2 puff Inhalation Given Jamaica Aguiar RN     01/13/2022 1008 fluticasone (FLONASE) 50 mcg/act nasal spray 1 spray 1 spray Each Nare Given Catracho Blue RN 01/13/2022 1727 metoprolol tartrate (LOPRESSOR) tablet 25 mg 25 mg Oral Given Krystle Simmons RN     01/13/2022 1000 metoprolol tartrate (LOPRESSOR) tablet 25 mg 25 mg Oral Given Krystle Simmons RN     01/13/2022 1000 sertraline (ZOLOFT) tablet 50 mg 50 mg Oral Given Krystle Simmons RN     01/13/2022 1727 docusate sodium (COLACE) capsule 100 mg 100 mg Oral Given Krystle Simmons RN     01/13/2022 1000 docusate sodium (COLACE) capsule 100 mg 100 mg Oral Given Krystle Simmons RN     01/13/2022 1022 nicotine (NICODERM CQ) 7 mg/24hr TD 24 hr patch 1 patch 1 patch Transdermal Not Given Krystle Simmons RN     01/13/2022 2121 nystatin (MYCOSTATIN) powder 1 application Topical Given Valentina Mckeon RN     01/13/2022 1731 nystatin (MYCOSTATIN) powder   Topical Given Krystle Simmons RN     01/13/2022 1007 nystatin (MYCOSTATIN) powder   Topical Given Krystle Simmons RN     01/14/2022 0603 metroNIDAZOLE (FLAGYL) tablet 500 mg 500 mg Oral Given Valentina Mckeon RN     01/13/2022 2120 metroNIDAZOLE (FLAGYL) tablet 500 mg 500 mg Oral Given Valentina Mckeon, MICHELLE     01/13/2022 1356 metroNIDAZOLE (FLAGYL) tablet 500 mg 500 mg Oral Given Krystle Simmons RN     01/13/2022 1402 al [facility] [place] oxide-diphenhydramine-lidocaine viscous (MAGIC MOUTHWASH) suspension 10 mL 10 mL Swish & Swallow Given Krystle Simmons RN     01/13/2022 1735 nystatin (MYCOSTATIN) cream   Topical Given Krystle Simmons RN     01/13/2022 1022 nystatin (MYCOSTATIN) cream   Topical Given Krystle Simmons RN     01/13/2022 2019 enoxaparin (LOVENOX) subcutaneous injection 60 mg 60 mg Subcutaneous Given Valentina Mckeon RN     01/13/2022 1000 enoxaparin (LOVENOX) subcutaneous injection 60 mg 60 mg Subcutaneous Given Krystle Simmons RN     01/14/2022 0603 acetaminophen (TYLENOL) tablet 975 mg 975 mg Oral Given Valentina Mckeon RN     01/13/2022 2120 acetaminophen (TYLENOL) tablet 975 mg 975 mg Oral Given Valentina Mckeon RN     01/13/2022 1356 acetaminophen (TYLENOL) tablet 975 mg 975 mg Oral Given Warden Ever RN     01/14/2022 0640 oxyCODONE (ROXICODONE) IR tablet 5 mg   Oral See Alternative Vernon Selby RN     01/13/2022 1402 oxyCODONE (ROXICODONE) IR tablet 5 mg 5 mg Oral Given Warden Ever RN     01/14/2022 1644 oxyCODONE (ROXICODONE) immediate release tablet 10 mg 10 mg Oral Given Vernon Selby, MICHELLE     01/13/2022 1402 oxyCODONE (ROXICODONE) immediate release tablet 10 mg   Oral See Alternative Warden Ever RN     01/14/2022 0603 methocarbamol (ROBAXIN) tablet 500 mg 500 mg Oral Given Vernon Selby, MICHELLE     01/13/2022 2120 methocarbamol (ROBAXIN) tablet 500 mg 500 mg Oral Given Vernon Selby, MICHELLE     01/13/2022 1356 methocarbamol (ROBAXIN) tablet 500 mg 500 mg Oral Given Warden Ever RN     01/13/2022 2221 lidocaine (LIDODERM) 5 % patch 1 patch 1 patch Topical Patch Removed Everette Vidal, MICHELLE     01/13/2022 1000 lidocaine (LIDODERM) 5 % patch 1 patch 1 patch Topical Medication Applied Warden Ever RN     01/13/2022 2120 nystatin (MYCOSTATIN) oral suspension 500,000 Units 500,000 Units Swish & Swallow Given Vernon Selby RN     01/13/2022 1727 nystatin (MYCOSTATIN) oral suspension 500,000 Units 500,000 Units Swish & Swallow Given Warden Ever RN     01/13/2022 1356 nystatin (MYCOSTATIN) oral suspension 500,000 Units 500,000 Units Swish & Swallow Given Warden Ever RN     01/13/2022 1000 nystatin (MYCOSTATIN) oral suspension 500,000 Units 500,000 Units Swish & Swallow Given Warden Ever RN     01/13/2022 1728 cefTRIAXone (ROCEPHIN) IVPB (premix in dextrose) 2,000 mg 50 mL 2,000 mg Intravenous New Bag Warden Ever RN     01/13/2022 1000 fluconazole (DIFLUCAN) tablet 400 mg 400 mg Oral Given Warden Ever RN     01/14/2022 0603 pantoprazole (PROTONIX) EC tablet 40 mg 40 mg Oral Given Vernon Selby RN     01/14/2022 0055 vancomycin (VANCOCIN) 1,500 mg in sodium chloride 0 9 % 500 mL IVPB 1,500 mg Intravenous New Bag Pavel Padron RN     01/13/2022 1410 vancomycin (VANCOCIN) 1,500 mg in sodium chloride 0 9 % 500 mL IVPB 1,500 mg Intravenous New Bag Teddy Orr, [address] [facility]     01/13/2022 1402 saliva substitute (MOUTH KOTE) mucosal solution 5 spray 5 spray Mouth/Throat Given Teddy Orr RN     01/13/2022 1410 magnesium sulfate 2 g/50 mL IVPB (premix) 2 g 2 g Intravenous New Bag Teddy Orr RN          Objective:     Vitals: Blood pressure 141/84, pulse 98, temperature 98 6 °F (37 °C), resp  rate 18, height 5' 7" (1 702 m), weight (!) 168 kg (369 lb 11 4 oz), SpO2 91 %  ,Body mass index is 57 9 kg/m²  Intake/Output Summary (Last 24 hours) at 1/14/2022 0910  Last data filed at 1/14/2022 4718  Gross per 24 hour   Intake 480 ml   Output 1500 ml   Net -1020 ml       Physical Exam:   PHYSICAL EXAMINATION:    General Appearance:   Alert, cooperative, no distress   HEENT:  Normocephalic, atraumatic, anicteric  Neck supple, symmetrical, trachea midline  Lungs:   Equal chest rise and unlabored breathing, normal effort, no coughing  Cardiovascular:   No visualized JVD  Abdomen:   No abdominal distension  Skin:   No jaundice, rashes, or lesions  Musculoskeletal:   Normal range of motion visualized  Psych:  Normal affect and normal insight  Neuro:  Alert and appropriate  Invasive Devices  Report    Peripheral Intravenous Line            Peripheral IV 01/12/22 Dorsal (posterior); Right Hand 1 day          Drain            Urethral Catheter 16 Fr  7 days                Lab Results:  No results displayed because visit has over 200 results  Imaging Studies: I have personally reviewed pertinent imaging studies

## 2022-01-15 NOTE — ASSESSMENT & PLAN NOTE
Patient complains of choking sensation, initially with solids, recently she noticed that she is choking on water  In patient words " I feel like there is a pill struck in my throat even with drinking plain water"  Poor dental heigine with multiple dental caries noted on exam  No thrush noted  Being treated for aphthous ulcer and sore throat  Speech and swallow eval  GI consult to rule out obstructive causes     Increase diflucan to 400mg daily per ID for total 2 weeks 1/11/21

## 2022-01-15 NOTE — ASSESSMENT & PLAN NOTE
61 yo F hx obesity, GERD, anxiety now HD#10 with perforated diverticulitis and abdominal wall cellulitis  Worsening pain this morning, 10/10, stabbing, nausea without vomiting, last BM 5 days ago, R flank/back pain  Abdomen obese, soft, fullness in low RLQ, generalized TTP without guarding/rigidity, bowel sounds hypoactive  T 98 3, HR 87, /83, SpO2 92% on RA  Lactic acid drawn, in process  Assessment:  Acute sigmoid diverticulitis with localized perforation, 4 cm abscess, abutting R external iliac and R ureter  Recurrent abdominal/flank/back pain this morning: differential to include disease progression, worsening hydronephrosis, constipation/obstipation/ileus  Plan:  Discussed with Dr Teresa Lee and Dr Celeste Redman  STAT abdominal/pelvic CT with IV contrast ordered and pending  Lactic acid drawn and pending  Follow-up on results when available

## 2022-01-15 NOTE — PROGRESS NOTES
Progress Note - Nephrology   Rosalia Cadet 60 y o  female MRN: [id number]  Unit/Bed#: 342-84 Encounter: [id number]    Assessment and Plan    1  Acute kidney injury (POA) atop chronic kidney disease  ? Renal function has significantly improved from presenting creatinine 4 56 mg/dL on 01/06/2022 -> baseline serum creatinine 1 10 mg/dL on 01/14/2022   ? Patient at risk for contrast induced ZAHIRA  IV contrast exposure 01/15/2022  Renal will continue to monitor  Avoid nephrotoxins  ? Permanently discontinue Celebrex  Avoid NSAIDs  Hold lisinopril at discharge  Consider restarting at outpatient follow-up  ? Recommend discharge with 2 L fluid restriction  ? Recommend outpatient nephrology follow-up within 2 weeks with BMP prior  2  Stage 2 chronic kidney disease baseline creatinine around 0 9-1 0 mg/dL  3  Obstructive uropathy, mild right hydroureteronephrosis  ? Mildly increased on CT 01/15/2022  Management per Urology  4  Hypertension in the setting of CKD  ? Blood pressure remains at goal   Hold lisinopril at discharge  Plan to restart outpatient follow-up  5  Proteinuria  ? Again, lisinopril currently on hold  Check SPEP at outpatient follow-up  6  Abdominal cellulitis with superficial wounds  7  Fungal dermatitis  8  Hypomagnesemia  ? At goal   Periodically monitor and replete as needed  Would avoid oral repletion given GI upset  9  Hyponatremia  ? At goal   Continue 2 L fluid restriction at discharge  Discussed with patient  10  Microperforation of sigmoid diverticulitis with small pelvic abscess  ? Surgery and Infectious disease following    CT abdomen pelvis with contrast on 01/15/2022 reveals worsening inflammatory changes throughout the midline pelvis surrounding the rectosigmoid junction"      Follow up reason for today's visit:     Cellulitis of abdominal wall    Patient Active Problem List   Diagnosis    Lymphedema    Primary hypertension    Morbid obesity with BMI of 60 0-69 9, adult ([facility] [address] )    Hidradenitis suppurativa    Chronic midline low back pain with left-sided sciatica    Other specified anxiety disorders    Lumbar paraspinal muscle spasm    Borderline hyperlipidemia    Iron deficiency    GERD (gastroesophageal reflux disease)    Acute renal failure (ARF) (HCC)    Cellulitis of abdominal wall, chest wall and groin with wounds    Diverticulitis of large intestine with perforation    Right Hydroureteronephrosis    Subacute vaginitis    Dysphagia         Subjective:   Complains of abdominal pain, thirst   Request loosening of fluid restriction  A complete 10 point review of systems was performed and is otherwise negative  Objective:     Vitals: Blood pressure 144/88, pulse 95, temperature 99 7 °F (37 6 °C), resp  rate 20, height 5' 7" (1 702 m), weight (!) 168 kg (369 lb 11 4 oz), SpO2 90 %  ,Body mass index is 57 9 kg/m²  Weight (last 2 days)     Date/Time Weight    01/14/22 0600 168 (369 71)    01/13/22 0600 167 (368 83)            Intake/Output Summary (Last 24 hours) at 1/15/2022 1548  Last data filed at 1/15/2022 1144  Gross per 24 hour   Intake 480 ml   Output 1950 ml   Net -1470 ml     I/O last 3 completed shifts: In: 4508 [P O :720; IV Piggyback:500]  Out: 2600 [Urine:2600]    Urethral Catheter 16 Fr  (Active)   Reasons to continue Urinary Catheter  Acute urinary retention/obstruction failing urinary retention protocol 01/14/22 0955   Goal for Removal Other (Comment) 01/14/22 0955   Site Assessment Clean;Skin intact 01/14/22 0955   Galan Care Done 01/15/22 0900   Collection Container Standard drainage bag 01/14/22 0955   Securement Method Securing device (Describe) 01/14/22 0955   Output (mL) 450 mL 01/15/22 1144       Physical Exam: /88   Pulse 95   Temp 99 7 °F (37 6 °C)   Resp 20   Ht 5' 7" (1 702 m)   Wt (!) 168 kg (369 lb 11 4 oz)   SpO2 90%   BMI 57 90 kg/m²     General Appearance:    No acute distress  Cooperative  Appears stated age    Morbidly obese appearing  Head:    Normocephalic  Atraumatic  Normal jaw occlusion  Eyes:    Lids, conjunctiva normal  No scleral icterus  Ears:    Normal external ears  Nose:   Nares normal  No drainage  Mouth:   Lips, tongue normal  Mucosa normal  Phonation normal    Neck:   Supple  Symmetrical    Back:     Symmetric  No CVA tenderness  Lungs:     Normal respiratory effort  Clear to auscultation bilaterally  Chest wall:    No tenderness or deformity  Heart:    Regular rate and rhythm  Normal S1 and S2  No murmur  No JVD  No definitive pitting edema, but difficult to assess due to adiposity  Abdomen:     Soft  Non-tender  Bowel sounds active  Genitourinary:   No Galan catheter present  Extremities:   Extremities normal  Atraumatic  No cyanosis  Skin:   Warm and dry  No pallor, jaundice, rash, ecchymoses  Neurologic:   Alert and oriented to person, place, time  No focal deficit  Lab, Imaging and other studies: I have personally reviewed pertinent labs  CBC: No results found for: WBC, HGB, HCT, MCV, PLT, ADJUSTEDWBC, MCH, MCHC, RDW, MPV, NRBC  CMP: No results found for: NA, K, CL, CO2, ANIONGAP, BUN, CREATININE, GLUCOSE, CALCIUM, AST, ALT, ALKPHOS, PROT, BILITOT, EGFR      Results from last 7 days   Lab Units 01/14/22  0618 01/13/22  0626 01/12/22  0434 01/11/22  0810 01/10/22  0529   POTASSIUM mmol/L 4 6 4 4 4 5   < > 4 9   CHLORIDE mmol/L 104 104 105   < > 111*   CO2 mmol/L 24 23 22   < > 19*   BUN mg/dL 12 13 18   < > 34*   CREATININE mg/dL 1 10 1 11 1 18   < > 1 79*   CALCIUM mg/dL 8 8 8 5 8 4   < > 8 6   ALK PHOS U/L 92 93  --   --  88   ALT U/L 19 21  --   --  22   AST U/L 16 14  --   --  16    < > = values in this interval not displayed           Phosphorus: No results found for: PHOS  Magnesium: No results found for: MG  Urinalysis: No results found for: Angelita Comber, SPECGRAV, PHUR, LEUKOCYTESUR, NITRITE, PROTEINUA, GLUCOSEU, KETONESU, BILIRUBINUR, BLOODU  Ionized Calcium: No results found for: CAION  Coagulation: No results found for: PT, INR, APTT  Troponin: No results found for: TROPONINI  ABG: No results found for: PHART, MUN1RYN, PO2ART, PPZ5QGN, O9YFANWU, BEART, SOURCE  Radiology review:     IMAGING  Procedure: CT abdomen pelvis w contrast    Result Date: 1/15/2022  Narrative: CT ABDOMEN AND PELVIS WITH IV CONTRAST INDICATION:   patient with 10/10 abdominal pain, abdomen is soft and but tender diffusely    COMPARISON:  January 6, 2022, January 12, 2022 TECHNIQUE:  CT examination of the abdomen and pelvis was performed  Axial, sagittal, and coronal 2D reformatted images were created from the source data and submitted for interpretation  Radiation dose length product (DLP) for this visit:  2250 mGy-cm   This examination, like all CT scans performed in the [facility], was performed utilizing techniques to minimize radiation dose exposure, including the use of iterative reconstruction and automated exposure control  IV Contrast:  100 mL of iohexol (OMNIPAQUE) Enteric Contrast:  Enteric contrast was not administered  FINDINGS: ABDOMEN LOWER CHEST:  No clinically significant abnormality identified in the visualized lower chest  LIVER/BILIARY TREE:  Mild hepatomegaly, stable  GALLBLADDER:  There are gallstone(s) within the gallbladder, without pericholecystic inflammatory changes  SPLEEN:  Mild splenomegaly, stable  Greatest size 15 cm in AP dimension  PANCREAS:  Unremarkable  ADRENAL GLANDS:  Unremarkable  KIDNEYS/URETERS:  Mild to moderate right-sided hydronephrosis, slightly increased compared to prior which appears secondary to relative obstructive changes from the worsening pelvic process  Enhancement and perfusion remains symmetric  No perinephric collections  No calculi  STOMACH AND BOWEL:  Diffuse abnormal inflammatory changes throughout the midline pelvis immediately adjacent to the rectosigmoid junction    As before, numerous extraluminal foci of air are seen in the midline and left pelvis within the inflammatory enhancing soft tissues which appears similar  The loculated air-fluid collection in the right pelvis along the right external iliac vasculature is mildly increasing overall size and shows increasing component of layering air-fluid  d currently measuring 3 7 x 2 6 cm  Partial encasement of the right common iliac artery which appears patent  A separate area interposed between this collection and the colon on the right is a rounded structure which was previously homogeneous and similar in attenuation to the adjacent uterus, now shows it to be a discrete air-fluid collection, not readily appreciated on the 2 prior unenhanced exam   This finding is seen on image 2/67 and measures 3 1 cm  Generalized stranding and inflammatory changes within the pelvic soft tissues is also increased  Remainder of the bowel appears otherwise within normal limits  No obstruction  No additional free air elsewhere I is seen within the remainder of the abdominal cavity  APPENDIX:  No findings to suggest appendicitis  ABDOMINOPELVIC CAVITY:  No ascites  No pneumoperitoneum  No lymphadenopathy  VESSELS:  Unremarkable for patient's age  PELVIS REPRODUCTIVE ORGANS:  Unremarkable for patient's age  URINARY BLADDER:  Nondistended, Galan catheter present  Foci of air within the bladder presumed to represent iatrogenic etiology  ABDOMINAL WALL/INGUINAL REGIONS:  Unremarkable  OSSEOUS STRUCTURES:  No acute fracture or destructive osseous lesion  Impression: Worsening inflammatory changes throughout the midline pelvis surrounding the rectosigmoid junction  Numerous foci of extraluminal air remain within these inflamed enhancing pericolonic soft tissues  Interval worsening in size of the right pelvic collection with increasing volume of layering air-fluid    Additional 2nd air-fluid collection interposed between this and the adjacent colon with interval development of air fluid layering  Stable hepatosplenomegaly  Right-sided hydronephrosis, continued mild interval increase in caliber compared to the prior 2 studies, secondary to pelvic inflammatory process described above  The study was marked in [facility] for immediate notification   Workstation performed: [id number]       Current Facility-Administered Medications   Medication Dose Route Frequency    acetaminophen (TYLENOL) tablet 975 mg  975 mg Oral Q8H [address]    al mag oxide-diphenhydramine-lidocaine viscous (MAGIC MOUTHWASH) suspension 10 mL  10 mL Swish & Swallow Q4H PRN    albuterol (PROVENTIL HFA,VENTOLIN HFA) inhaler 2 puff  2 puff Inhalation Q6H PRN    aluminum-magnesium hydroxide-simethicone (MYLANTA) oral suspension 30 mL  30 mL Oral Q4H PRN    amoxicillin-clavulanate (AUGMENTIN) 875-125 mg per tablet 1 tablet  1 tablet Oral Q12H IRIS    docusate sodium (COLACE) capsule 100 mg  100 mg Oral BID    enoxaparin (LOVENOX) subcutaneous injection 60 mg  60 mg Subcutaneous Q12H [address]    fluconazole (DIFLUCAN) tablet 400 mg  400 mg Oral Daily    fluticasone (FLONASE) 50 mcg/act nasal spray 1 spray  1 spray Each Nare Daily    HYDROmorphone (DILAUDID) injection 0 2 mg  0 2 mg Intravenous Q4H PRN    hydrOXYzine HCL (ATARAX) tablet 25 mg  25 mg Oral TID PRN    lidocaine (LIDODERM) 5 % patch 1 patch  1 patch Topical Daily    methocarbamol (ROBAXIN) tablet 500 mg  500 mg Oral Q8H [address]    metoprolol tartrate (LOPRESSOR) tablet 25 mg  25 mg Oral BID    mineral oil liquid 15 mL  15 mL Oral BID    nicotine (NICODERM CQ) 7 mg/24hr TD 24 hr patch 1 patch  1 patch Transdermal Daily    nystatin (MYCOSTATIN) cream   Topical BID    nystatin (MYCOSTATIN) oral suspension 500,000 Units  500,000 Units Swish & Swallow 4x Daily    nystatin (MYCOSTATIN) powder   Topical TID    ondansetron (ZOFRAN) injection 4 mg  4 mg Intravenous Q6H PRN    oxyCODONE (ROXICODONE) IR tablet 5 mg  5 mg Oral Q4H PRN    Or    oxyCODONE (ROXICODONE) immediate release tablet 10 mg  10 mg Oral Q4H PRN    pantoprazole (PROTONIX) EC tablet 40 mg  40 mg Oral Early Morning    saliva substitute (MOUTH KOTE) mucosal solution 5 spray  5 spray Mouth/Throat 4x Daily PRN    sertraline (ZOLOFT) tablet 50 mg  50 mg Oral Daily     Medications Discontinued During This Encounter   Medication Reason    iohexol (OMNIPAQUE) 350 MG/ML injection (SINGLE-DOSE) 100 mL     piperacillin-tazobactam (ZOSYN) 3 375 g in sodium chloride 0 9 % 100 mL IVPB     piperacillin-tazobactam (ZOSYN) 2 25 g in sodium chloride 0 9 % 50 mL IVPB     lisinopril (ZESTRIL) tablet 10 mg     aluminum-magnesium hydroxide-simethicone (MYLANTA) oral suspension 30 mL     cefepime (MAXIPIME) 500 mg in sodium chloride 0 9 % 50 mL IVPB     vancomycin (VANCOCIN) 2,000 mg in sodium chloride 0 9 % 500 mL IVPB     cefepime (MAXIPIME) IVPB (premix in dextrose) 1,000 mg 50 mL     acetaminophen (TYLENOL) tablet 650 mg     HYDROcodone-acetaminophen (NORCO) 5-325 mg per tablet 1 tablet     vancomycin (VANCOCIN) 2,000 mg in sodium chloride 0 9 % 500 mL IVPB     sodium chloride 0 9 % infusion     cefepime (MAXIPIME) IVPB (premix in dextrose) 1,000 mg 50 mL     vancomycin (VANCOCIN) 1,250 mg in sodium chloride 0 9 % 250 mL IVPB     sodium bicarbonate tablet 1,300 mg     metroNIDAZOLE (FLAGYL) tablet 500 mg     cefTRIAXone (ROCEPHIN) IVPB (premix in dextrose) 2,000 mg 50 mL     vancomycin (VANCOCIN) 1,500 mg in sodium chloride 0 9 % 500 mL IVPB        Cortney Engel PA-C    Portions of the record may have been created with voice recognition software  Occasional wrong word or "sound a like" substitutions may have occurred due to the inherent limitations of voice recognition software  Read the chart carefully and recognize, using context, where substitutions have occurred

## 2022-01-15 NOTE — PROGRESS NOTES
[facility] [address]  Progress Note - Vincent Spring 1965, 60 y o  female MRN: [id number]  Unit/Bed#: 037-57 Encounter: [id number]  Primary Care Provider: Shirin Avendano PA-C   Date and time admitted to hospital: 1/6/2022  2:43 AM    Diverticulitis of large intestine with perforation  Assessment & Plan  61 yo F hx obesity, GERD, anxiety now HD#10 with perforated diverticulitis and abdominal wall cellulitis  Worsening pain this morning, 10/10, stabbing, nausea without vomiting, last BM 5 days ago, R flank/back pain  Abdomen obese, soft, fullness in low RLQ, generalized TTP without guarding/rigidity, bowel sounds hypoactive  T 98 3, HR 87, /83, SpO2 92% on RA  Lactic acid drawn, in process  Assessment:  Acute sigmoid diverticulitis with localized perforation, 4 cm abscess, abutting R external iliac and R ureter  Recurrent abdominal/flank/back pain this morning: differential to include disease progression, worsening hydronephrosis, constipation/obstipation/ileus  Plan:  Discussed with Dr Maira Machuca and Dr Renuka Benedict  STAT abdominal/pelvic CT with IV contrast ordered and pending  Lactic acid drawn and pending  Follow-up on results when available  Subjective/Objective   Chief Complaint: "pain"    Subjective: pain increased this morning since she woke up  Described as severe  Affects lower abdomen and around right flank/back  Stabbing sensation  Worse/different than yesterday  Had nausea last night, no vomiting  Last BM diarrhea 5 days ago  No irritation from Galan  Passing flatus, no bloating/distention  Appetite mildly reduced, eating most of meal trays still  No fever or chills  Some chest congestion, having trouble clearing due to abdominal pain when coughing  No difficulty breathing or chest tightness  Objective: no overnight events    Blood pressure 141/83, pulse 87, temperature 98 3 °F (36 8 °C), resp   rate 18, height 5' 7" (1 702 m), weight (!) 168 kg (369 lb 11 4 oz), SpO2 92 %  ,Body mass index is 57 9 kg/m²  Intake/Output Summary (Last 24 hours) at 1/15/2022 0932  Last data filed at 1/15/2022 0639  Gross per 24 hour   Intake 720 ml   Output 1500 ml   Net -780 ml       Invasive Devices  Report    Peripheral Intravenous Line            Peripheral IV 01/12/22 Dorsal (posterior); Right Hand 2 days          Drain            Urethral Catheter 16 Fr  8 days                Physical Exam  Vitals and nursing note reviewed  Constitutional:       General: She is not in acute distress  Appearance: She is well-developed  She is not diaphoretic  HENT:      Head: Normocephalic and atraumatic  Eyes:      Conjunctiva/sclera: Conjunctivae normal       Pupils: Pupils are equal, round, and reactive to light  Cardiovascular:      Rate and Rhythm: Normal rate and regular rhythm  Pulmonary:      Effort: Pulmonary effort is normal  No respiratory distress  Breath sounds: Wheezing present  Abdominal:      Comments: Obese  No distention  Soft without rigidity  Fullness in low RLQ  Hypoactive bowel sounds  Musculoskeletal:         General: Normal range of motion  Cervical back: Normal range of motion  Skin:     General: Skin is warm and dry  Capillary Refill: Capillary refill takes less than 2 seconds  Neurological:      Mental Status: She is alert and oriented to person, place, and time  Psychiatric:         Behavior: Behavior normal            Lab, Imaging and other studies:I have personally reviewed pertinent lab results      VTE Pharmacologic Prophylaxis: Enoxaparin (Lovenox)  VTE Mechanical Prophylaxis: sequential compression device

## 2022-01-15 NOTE — ASSESSMENT & PLAN NOTE
Patient presented with pain, erythema, swelling with overlying wounds, actively draining purulent material involving the inframammary area, lower abdomen, groin and inner thighs        Continue vancomycin, Cefepime and Metronidazole day#5   wound culture growing E faecalis and coag negative staph, vanco added back  ID recommending 5 days of IV Diflucan  Follow-up MRSA screenWound care consult, input appreciated    Discontinue vancomycin/Flagyl/Rocephin, transitioned to Augmentin 875/125 for 3-4 weeks 1/14/21

## 2022-01-15 NOTE — NURSING NOTE
Pt  Refused to stand for a weight at this time due to having pain  Pt  Is agreeable to stand at a later time for a weight

## 2022-01-15 NOTE — ASSESSMENT & PLAN NOTE
CT abdomen on 01/06/22 shows - sigmoid diverticulitis with microperforation and adjacent small pelvic abscess  Surgical input appreciated  IR unable to place drain due to body habitus  Discontinue vancomycin/Flagyl/Rocephin, transitioned to Augmentin 875/125 for 3-4 weeks 1/14/21  Patient with 10/10 abdominal pain since 0500, discussed with surgery   Check lactic and STAT CT abdomen 1/15

## 2022-01-15 NOTE — PROGRESS NOTES
[facility] [address] [place]  Progress Note - Juan Diego Nair 1965, 60 y o  female MRN: [id number]  Unit/Bed#: 989-61 Encounter: [id number]  Primary Care Provider: Leonardo Yip PA-C   Date and time admitted to hospital: 1/6/2022  2:43 AM    Diverticulitis of large intestine with perforation  Assessment & Plan    CT abdomen on 01/06/22 shows - sigmoid diverticulitis with microperforation and adjacent small pelvic abscess  Surgical input appreciated  IR unable to place drain due to body habitus  Discontinue vancomycin/Flagyl/Rocephin, transitioned to Augmentin 875/125 for 3-4 weeks 1/14/21  Patient with 10/10 abdominal pain since 0500, discussed with surgery  Check lactic and STAT CT abdomen 1/15      * Cellulitis of abdominal wall, chest wall and groin with wounds  Assessment & Plan  Patient presented with pain, erythema, swelling with overlying wounds, actively draining purulent material involving the inframammary area, lower abdomen, groin and inner thighs  Continue vancomycin, Cefepime and Metronidazole day#5   wound culture growing E faecalis and coag negative staph, vanco added back  ID recommending 5 days of IV Diflucan  Follow-up MRSA screenWound care consult, input appreciated    Discontinue vancomycin/Flagyl/Rocephin, transitioned to Augmentin 875/125 for 3-4 weeks 1/14/21          Dysphagia  Assessment & Plan  Patient complains of choking sensation, initially with solids, recently she noticed that she is choking on water  In patient words " I feel like there is a pill struck in my throat even with drinking plain water"  Poor dental heigine with multiple dental caries noted on exam  No thrush noted  Being treated for aphthous ulcer and sore throat  Speech and swallow eval  GI consult to rule out obstructive causes     Increase diflucan to 400mg daily per ID for total 2 weeks 1/11/21    Acute renal failure (ARF) (HCC)  Assessment & Plan  Superimposed on chronic kidney disease with a baseline creatinine of 1  Continue to hold lisinopril and Celebrex  Continue Galan catheter and will have patient follow-up as an outpatient  DC IV fluids, patient eating and drinking 1/11/21    Morbid obesity with BMI of 60 0-69 9, adult [facility])  Assessment & Plan  Patient is morbidly obese with BMI of 63 02        Primary hypertension  Assessment & Plan  Lisinopril on hold due to ZAHIRA  Continue Metoprolol 25mg          Progress Note - Simba Ellison 60 y o  female MRN: [id number]    Unit/Bed#: 183-23 Encounter: [id number]        Subjective:     Patient seen and examined she states she began having 10/10 abdominal pain  No nauea/vomiting/cheset pressure  Objective:     Vitals:   Vitals:    01/15/22 0753   BP: 141/83   Pulse: 87   Resp: 18   Temp: 98 3 °F (36 8 °C)   SpO2: 92%     Body mass index is 57 9 kg/m²  Intake/Output Summary (Last 24 hours) at 1/15/2022 0903  Last data filed at 1/15/2022 0639  Gross per 24 hour   Intake 720 ml   Output 1500 ml   Net -780 ml       Physical Exam:   /83   Pulse 87   Temp 98 3 °F (36 8 °C)   Resp 18   Ht 5' 7" (1 702 m)   Wt (!) 168 kg (369 lb 11 4 oz)   SpO2 92%   BMI 57 90 kg/m²   General appearance: alert and oriented, in no acute distress  Head: Normocephalic, without obvious abnormality, atraumatic  Lungs: clear to auscultation bilaterally  Heart: regular rate and rhythm, S1, S2 normal, no murmur, click, rub or gallop  Abdomen: diffusely tender without rebound, hypoactive bs  Extremities: extremities normal, warm and well-perfused; no cyanosis, clubbing, or edema  Pulses: 2+ and symmetric  Neurologic: Grossly normal     Invasive Devices  Report    Peripheral Intravenous Line            Peripheral IV 01/12/22 Dorsal (posterior); Right Hand 2 days          Drain            Urethral Catheter 16 Fr  8 days                Results from last 7 days   Lab Units 01/14/22  0618 01/13/22  0626 01/12/22  0434   WBC Thousand/uL 7 70 8 91 6 84   HEMOGLOBIN g/dL 9 9* 9 4* 9 2*   HEMATOCRIT % 32 6* 31 8* 30 8*   PLATELETS Thousands/uL 262 263 257       Results from last 7 days   Lab Units 01/14/22  0618 01/13/22  0626 01/12/22  0434 01/11/22  0810 01/10/22  0529   POTASSIUM mmol/L 4 6 4 4 4 5   < > 4 9   CHLORIDE mmol/L 104 104 105   < > 111*   CO2 mmol/L 24 23 22   < > 19*   BUN mg/dL 12 13 18   < > 34*   CREATININE mg/dL 1 10 1 11 1 18   < > 1 79*   CALCIUM mg/dL 8 8 8 5 8 4   < > 8 6   ALK PHOS U/L 92 93  --   --  88   ALT U/L 19 21  --   --  22   AST U/L 16 14  --   --  16    < > = values in this interval not displayed         Medication Administration - last 24 hours from 01/14/2022 0903 to 01/15/2022 0894       Date/Time Order Dose Route Action Action by     01/14/2022 1930 albuterol (PROVENTIL HFA,VENTOLIN HFA) inhaler 2 puff 2 puff Inhalation Given Kaushik Sam, MICHELLE     01/15/2022 0803 fluticasone (FLONASE) 50 mcg/act nasal spray 1 spray 1 spray Each Nare Given Joanie Sanders, RN     01/14/2022 0955 fluticasone (FLONASE) 50 mcg/act nasal spray 1 spray 1 spray Each Nare Given Jessica Juarez, MICHELLE     01/15/2022 0802 metoprolol tartrate (LOPRESSOR) tablet 25 mg 25 mg Oral Given Joanie Sanders, MICHELLE     01/14/2022 1756 metoprolol tartrate (LOPRESSOR) tablet 25 mg 25 mg Oral Given Jessica Juarez, MICHELLE     01/14/2022 0953 metoprolol tartrate (LOPRESSOR) tablet 25 mg 25 mg Oral Given Jessica Juarez RN     01/15/2022 0801 sertraline (ZOLOFT) tablet 50 mg 50 mg Oral Given Joanie Sanders, RN     01/14/2022 0953 sertraline (ZOLOFT) tablet 50 mg 50 mg Oral Given Jessica Juarez RN     01/15/2022 0801 docusate sodium (COLACE) capsule 100 mg 100 mg Oral Given Joanie Sanders RN     01/14/2022 1756 docusate sodium (COLACE) capsule 100 mg 100 mg Oral Given Jessica Juarez RN     01/14/2022 0953 docusate sodium (COLACE) capsule 100 mg 100 mg Oral Given Jessica Juarez RN     01/15/2022 0802 nicotine (NICODERM CQ) 7 mg/24hr TD 24 hr patch 1 patch 1 patch Transdermal Not Given Micheline Higgins RN     01/14/2022 0956 nicotine (NICODERM CQ) 7 mg/24hr TD 24 hr patch 1 patch 1 patch Transdermal Not Given Catarino Patient, RN     01/14/2022 2304 nystatin (MYCOSTATIN) powder   Topical Given Broderick Dewitt, RN     01/14/2022 1758 nystatin (MYCOSTATIN) powder   Topical Given Catarino Patient, RN     01/14/2022 0956 nystatin (MYCOSTATIN) powder   Topical Given Anne Patient, RN     01/14/2022 1206 al mag oxide-diphenhydramine-lidocaine viscous (MAGIC MOUTHWASH) suspension 10 mL 10 mL Swish & Swallow Given Anne Patient, RN     01/14/2022 1758 nystatin (MYCOSTATIN) cream   Topical Given Anne Patient, RN     01/14/2022 0956 nystatin (MYCOSTATIN) cream   Topical Given Catarino Patient, RN     01/15/2022 0801 enoxaparin (LOVENOX) subcutaneous injection 60 mg 60 mg Subcutaneous Given Micheline Higgins RN     01/14/2022 2257 enoxaparin (LOVENOX) subcutaneous injection 60 mg 60 mg Subcutaneous Given Broderick Dewitt, RN     01/14/2022 0953 enoxaparin (LOVENOX) subcutaneous injection 60 mg 60 mg Subcutaneous Given Catarino Jarquin, RN     01/15/2022 0631 acetaminophen (TYLENOL) tablet 975 mg 975 mg Oral Given Broderick Dewitt, RN     01/14/2022 2257 acetaminophen (TYLENOL) tablet 975 mg 975 mg Oral Given Broderick Dewitt, RN     01/14/2022 1756 acetaminophen (TYLENOL) tablet 975 mg 975 mg Oral Given Catarino Jarquin, RN     01/15/2022 0802 oxyCODONE (ROXICODONE) IR tablet 5 mg   Oral See Alternative Micheline Higgins RN     01/14/2022 1109 oxyCODONE (ROXICODONE) IR tablet 5 mg 5 mg Oral Given Catarino Jarquin, RN     01/14/2022 0953 oxyCODONE (ROXICODONE) IR tablet 5 mg 0 mg Oral Return to [facility], RN     01/15/2022 0802 oxyCODONE (ROXICODONE) immediate release tablet 10 mg 10 mg Oral Given Micheline Higgins RN     01/14/2022 5087 oxyCODONE (ROXICODONE) immediate release tablet 10 mg   Oral See Khadar Jarquin, MICHELLE     01/14/2022 2649 oxyCODONE (ROXICODONE) immediate release tablet 10 mg   Oral See Alternative Wilman Goldberg RN     01/15/2022 0631 methocarbamol (ROBAXIN) tablet 500 mg 500 mg Oral Given Phoebe Rojo RN     01/14/2022 2258 methocarbamol (ROBAXIN) tablet 500 mg 500 mg Oral Given Phoebe Rojo RN     01/14/2022 1756 methocarbamol (ROBAXIN) tablet 500 mg 500 mg Oral Given Wilman Goldberg RN     01/15/2022 0802 lidocaine (LIDODERM) 5 % patch 1 patch 1 patch Topical Medication Applied Lazarusbari Henry RN     01/14/2022 2257 lidocaine (LIDODERM) 5 % patch 1 patch 1 patch Topical Patch Removed Phoebe Rojo RN     01/14/2022 0953 lidocaine (LIDODERM) 5 % patch 1 patch 1 patch Topical Medication Applied Wilman Goldberg RN     01/14/2022 2257 nystatin (MYCOSTATIN) oral suspension 500,000 Units 500,000 Units Swish & Swallow Given Phoebe Rojo RN     01/14/2022 1801 nystatin (MYCOSTATIN) oral suspension 500,000 Units 500,000 Units Swish & Swallow Given Wilman Goldberg RN     01/14/2022 1204 nystatin (MYCOSTATIN) oral suspension 500,000 Units 500,000 Units Swish & Swallow Given Wilman Goldberg RN     01/14/2022 0953 nystatin (MYCOSTATIN) oral suspension 500,000 Units 500,000 Units Swish & Swallow Given Wilman Goldberg RN     01/15/2022 0801 fluconazole (DIFLUCAN) tablet 400 mg 400 mg Oral Given Lazarus Henry RN     01/14/2022 0953 fluconazole (DIFLUCAN) tablet 400 mg 400 mg Oral Given Wilman Goldberg RN     01/15/2022 0631 pantoprazole (PROTONIX) EC tablet 40 mg 40 mg Oral Given Phoebe Rojo RN     01/15/2022 0801 amoxicillin-clavulanate (AUGMENTIN) 875-125 mg per tablet 1 tablet 1 tablet Oral Given Lazarus Henry RN     01/14/2022 2257 amoxicillin-clavulanate (AUGMENTIN) 875-125 mg per tablet 1 tablet 1 tablet Oral Given Phoebe Rojo RN     01/14/2022 1205 amoxicillin-clavulanate (AUGMENTIN) 875-125 mg per tablet 1 tablet 1 tablet Oral Given Wilman Goldberg RN            Lab, Imaging and other studies: I have personally reviewed pertinent reports      VTE Pharmacologic Prophylaxis: Heparin  VTE Mechanical Prophylaxis: sequential compression device     Wayne West MD  1/15/2022,9:03 AM

## 2022-01-16 NOTE — H&P
H&P Exam - General Surgery   Zoey Jorge 60 y o  female MRN: [id number]  Unit/Bed#: ED 29 Encounter: [id number]    Assessment/Plan     Assessment:  61 y/o F w morbid obesity and perforated diverticulitis complicated by R hydronephrosis 2/2 intraabdominal phlegmon/ abscess  Vitals are currently stable  Afebrile  Abdomen is obese, w distension, and focal peritonitis on R hemiabdomen  Open sores in intertriginous area beneath pannus  Pertinent labs:   Wbc: 15  Hgb: 10 1  Cr: 1 3    Plan:  Admit to general surgery  IV abx, Zosyn  To OR for Ortega's procedure  Urology consult for preop cysto and ureteral stents  High risk of post op complications and ICU admission    History of Present Illness   History, ROS and PFSH unobtainable from any source due to   HPI:  Zoey Jorge is a 60 y o  female w PMH of severe morbid obesity, anxiety, GERD, gastric ulcer, HTN, who presents with perforated diverticulitis and intrabdominal abscess/ phlegmon causing mass effect on R ureter and hydronephrosis  Surgical history significant for C section  Last h/o c scope 2 years ago aborted 2/2 poor bowel prep  Denied any prior MI or stroke  No h/o blood clots  No a/c or a/p at home  Does not smoke  Initially presented to [facility] [facility] on 1/6 for workup for hidradenitis, however ultimately she was admitted 2/2 diverticulitis and has failed treatment with abx, and does not have intrabdominal collection amendable to IR drainage  Pt is admitted now to SICU and will undergo ex lap, Ortega's procedure  Urology has been consulted for preop ureteral stents  Review of Systems   Constitutional: Negative for chills and fever  HENT: Negative  Eyes: Negative  Respiratory: Negative  Cardiovascular: Negative  Gastrointestinal: Positive for abdominal distention, abdominal pain, constipation and nausea  Negative for vomiting  Endocrine: Negative  Genitourinary: Negative  Musculoskeletal: Negative      Skin: Positive for wound  Allergic/Immunologic: Negative  Neurological: Negative  Hematological: Negative  Psychiatric/Behavioral: Negative  Historical Information   Past Medical History:   Diagnosis Date    Anxiety     Arthritis     GERD (gastroesophageal reflux disease)     Hidradenitis suppurativa     Hypertension     Lipodermatosclerosis of both lower extremities     Lymphedema 2006    Sciatic leg pain 2006    left side    SOB (shortness of breath)     Stomach ulcer      Past Surgical History:   Procedure Laterality Date     SECTION      MS EXC SKIN BENIG 3 1-4 CM TRUNK,ARM,LEG Right 2020    Procedure: EXCISION BIOPSY TISSUE LESION/MASS UPPER EXTREMITY;  Surgeon: Florentino Dominguez DO;  Location: [facility];  Service: General    TONSILLECTOMY      TUMOR REMOVAL Right     5th finger     Social History   Social History     Substance and Sexual Activity   Alcohol Use Not Currently    Comment: holidays     Social History     Substance and Sexual Activity   Drug Use No     Social History     Tobacco Use   Smoking Status Light Tobacco Smoker    Packs/day: 0 25    Years: 20 00    Pack years: 5 00    Types: Cigarettes   Smokeless Tobacco Never Used   Tobacco Comment    Only smoke once in awhile     E-Cigarette/Vaping    E-Cigarette Use Never User      E-Cigarette/Vaping Substances    Nicotine No     THC No     CBD No     Flavoring No     Other No     Unknown No      Family History: non-contributory    Meds/Allergies   all current active meds have been reviewed  Allergies   Allergen Reactions    Bee Venom Anaphylaxis, Shortness Of Breath and Swelling       Objective   Vitals: Blood pressure 134/70, pulse (!) 106, temperature 99 8 °F (37 7 °C), resp  rate 20, SpO2 92 %  ,There is no height or weight on file to calculate BMI    No intake or output data in the 24 hours ending 22 8718  Invasive Devices  Report    Peripheral Intravenous Line            Peripheral IV 22 Dorsal (posterior); Right Hand 3 days    Peripheral IV 01/15/22 Dorsal (posterior); Right Forearm 1 day          Drain            Urethral Catheter 16 Fr  10 days                Physical Exam  Vitals reviewed  Constitutional:       Appearance: She is obese  HENT:      Head: Normocephalic and atraumatic  Nose: Nose normal    Eyes:      Pupils: Pupils are equal, round, and reactive to light  Cardiovascular:      Rate and Rhythm: Normal rate  Pulses: Normal pulses  Pulmonary:      Effort: Pulmonary effort is normal    Abdominal:      General: There is distension  Palpations: Abdomen is soft  Tenderness: There is abdominal tenderness  There is guarding  Genitourinary:     Comments: deferred  Musculoskeletal:      Cervical back: Normal range of motion  Comments: Reduced range of motion 2/2 severe obesity  Skin:     General: Skin is warm  Neurological:      General: No focal deficit present  Mental Status: She is alert  Psychiatric:         Mood and Affect: Mood normal          Lab Results:   I have personally reviewed pertinent reports  , Coags: No results found for: PT, PTT, INR, Creatinine:   Lab Results   Component Value Date    CREATININE 1 30 01/16/2022   , Lipid Panel: No results found for: CHOL, CBC with diff:   Lab Results   Component Value Date    WBC 15 41 (H) 01/16/2022    HGB 10 1 (L) 01/16/2022    HCT 33 1 (L) 01/16/2022    MCV 81 (L) 01/16/2022     01/16/2022    MCH 24 6 (L) 01/16/2022    MCHC 30 5 (L) 01/16/2022    RDW 19 1 (H) 01/16/2022    MPV 8 9 01/16/2022    NRBC 0 01/16/2022   , BMP/CMP:   Lab Results   Component Value Date    SODIUM 143 01/16/2022    K 3 9 01/16/2022     01/16/2022    CO2 30 01/16/2022    BUN 39 (H) 01/16/2022    CREATININE 1 30 01/16/2022    CALCIUM 8 2 (L) 01/16/2022    AST 20 01/16/2022    ALT 14 01/16/2022    ALKPHOS 72 01/16/2022    EGFR 45 01/16/2022     Imaging: I have personally reviewed pertinent reports      EKG, Pathology, and Other Studies: I have personally reviewed pertinent reports  Code Status: Level 1 - Full Code  Advance Directive and Living Will:      Power of :    POLST:      Counseling / Coordination of Care  Counseling/Coordination of Care: Total floor / unit time spent today 30 minutes  Greater than 50% of total time was spent with the patient and / or family counseling and / or coordination of care   A description of the counseling / coordination of care: 30

## 2022-01-16 NOTE — QUICK NOTE
After examining the patient, reviewing the labs, and discussing the situation with Dr Bert Opitz, I [place] [facility] [place] has clearly worsening LLQ abdominal pain/tenderness, and rising WBC  She may require ureteral stenting or perhaps nephrostomy, as well as diversion to manage the acute sepsis  We cannot offer this a 's    She has been accepted in Transfer by Carloz Mckeon, DO

## 2022-01-16 NOTE — ANESTHESIA PREPROCEDURE EVALUATION
Procedure:  LAPAROTOMY EXPLORATORY, Ortega's procedure  (N/A Abdomen)    Diverticulitis with perforation    Patient with active URI symptoms and wheezing clearly audible bedside    Cr  1 3, WBC 15 4, Hgb 10 1  k 3 9, lactate 0 8    Relevant Problems   CARDIO   (+) Borderline hyperlipidemia   (+) Primary hypertension      GI/HEPATIC   (+) Dysphagia   (+) GERD (gastroesophageal reflux disease)      /RENAL   (+) Acute renal failure (ARF) (HCC)   (+) Right Hydroureteronephrosis      MUSCULOSKELETAL   (+) Chronic midline low back pain with left-sided sciatica      NEURO/PSYCH   (+) Other specified anxiety disorders        Physical Exam    Airway    Mallampati score: III  TM Distance: >3 FB  Neck ROM: full     Dental       Cardiovascular  Rhythm: regular, Rate: normal, Cardiovascular exam normal    Pulmonary  Pulmonary exam normal Wheezes,     Other Findings  Extremely poor dentition with multiple missing teeth, and several loose teeth  Anesthesia Plan  ASA Score- 4 Emergent    Anesthesia Type- general with ASA Monitors  Additional Monitors: arterial line  Airway Plan: ETT  Comment: Risks/benefits and alternatives discussed with patient including likely possibility of PONV and sore throat, as well as the rare possibilities of aspiration, dental/oropharyngeal/ocular injuries, or grave/life threatening anesthetic and surgical emergencies          Plan Factors-Exercise tolerance (METS): <4 METS  Patient summary reviewed  Patient instructed to abstain from smoking on day of procedure  Patient did not smoke on day of surgery  Induction- intravenous  Postoperative Plan- Plan for postoperative opioid use  Planned trial extubation    Informed Consent- Anesthetic plan and risks discussed with patient  I personally reviewed this patient with the CRNA  Discussed and agreed on the Anesthesia Plan with the CRNA  Shaye Machuca

## 2022-01-16 NOTE — ASSESSMENT & PLAN NOTE
CT abdomen on 01/06/22 shows - sigmoid diverticulitis with microperforation and adjacent small pelvic abscess  Surgical input appreciated  IR unable to place drain due to body habitus  Discontinue vancomycin/Flagyl/Rocephin, transitioned to Augmentin 875/125 for 3-4 weeks 1/14/21  Patient with 10/10 abdominal pain since 0500, discussed with surgery   Check lactic and STAT CT abdomen 1/15  Abdominal pain unchanged, CT shows progression, DC augmentin and revert back to Rocephin/Flagy 1/16/21

## 2022-01-16 NOTE — DISCHARGE SUMMARY
Discharge Summary - Fiorella Estes 60 y o  female MRN: [id number]    Unit/Bed#: 366-13 Encounter: [id number]    Admission Date:   Admission Orders (From admission, onward)     Ordered        01/06/22 0858  INPATIENT ADMISSION  Once                        Admitting Diagnosis: Cellulitis of abdominal wall [L03 311]  Diverticulitis [K57 92]  Rash [R21]  ZAHIRA (acute kidney injury) ([facility] [address] ) [N17 9]  Wound cellulitis [L03 90]  Morbid obesity with BMI of 60 0-69 9, adult ([facility] [address] ) [E66 01, Z68 44]    HPI: Fiorella Estes is a 60 y o  female with PMH significant for HTN, morbid obesity, sciatica, hidradenitis suppurative, fungal dermatitis presents to the ED with open sores under breasts, lower abdomen, inner thighs and buttocks  Patient complains started 1 month ago, using antibiotics on out-patinet basis  Patient reports that she is having fungal infection     Procedures Performed: No orders of the defined types were placed in this encounter  Summary of Hospital Course:     1) Diverticulitis of large intestine with perforation  2) Right Hydronephrosis / acute kidney injury  3) Acute kidney injury    Patient presents emergency room with complaints of abdominal pain, CT of the abdomen and pelvis was requested and is discussed below  She was found to have perforated diverticulitis and initiated on vancomycin/cefepime/Flagyl  There is associated hydronephrosis does associated inflammation  She was also noted to have acute renal failure that resolved with IV fluids  Consultation with Infectious Disease was obtained along with Interventional Radiology  Unfortunately Interventional Radiology was unable to safely place a drain in the abscess  Once patient was transitioned to oral augmentin her abdominal and flank pain worsened  A STAT ct revealed increasin inflmamation   When wbc worsened the following day it was recommended she be transferred to surgical team in [facility]    Dysphagia/possible candidal esophagitis: Patient was seen in consultation by Gastroenterology, she did not undergo EGD but they did recommend 14 days of 400 mg of daily fluconazole  Abdominal wall cellulitis:  Wound culture revealed Enterococcus, patient was maintained on vancomycin and transitioned to Augmentin, will need to be placed back on vancomycin for treatment  Significant Findings, Care, Treatment and Services Provided:     CT 1/16/21    Worsening inflammatory changes throughout the midline pelvis surrounding the rectosigmoid junction   Numerous foci of extraluminal air remain within these inflamed enhancing pericolonic soft tissues   Interval worsening in size of the right pelvic   collection with increasing volume of layering air-fluid   Additional 2nd air-fluid collection interposed between this and the adjacent colon with interval development of air fluid layering  Stable hepatosplenomegaly  Right-sided hydronephrosis, continued mild interval increase in caliber compared to the prior 2 studies, secondary to pelvic inflammatory process described above  Complications: see above    Discharge Diagnosis: see above    Medical Problems             Resolved Problems  Date Reviewed: 1/12/2022          Resolved    Hyperkalemia 1/7/2022     Resolved by  Nilsa Storey MD                Condition at Discharge: stable         Discharge instructions/Information to patient and family:   See after visit summary for information provided to patient and family  Provisions for Follow-Up Care:  See after visit summary for information related to follow-up care and any pertinent home health orders        PCP: Alpa Landry PA-C    Disposition: [facility] Dr Armand Denise    Planned Readmission: No  Future Encounters      1/20/2022 10:00 AM (30 min) [facility]    FOLLOW UP PG    Rfl Status: Authorized    GASTRO COAL (Med Sp[facility])    MIRTA Meredith     Linked requests:                      2/8/2022  1:00 PM (60 min) Aashish    FOLLOW UP PG    ID AL (Hospitalist)    Layla Leos, [facility]                        Discharge Statement   I spent 80 minutes discharging the patient  This time was spent on the day of discharge  I had direct contact with the patient on the day of discharge  Additional documentation is required if more than 30 minutes were spent on discharge  Discharge Medications:  See after visit summary for reconciled discharge medications provided to patient and family

## 2022-01-16 NOTE — PROGRESS NOTES
[address] [facility] [address] [place]  Progress Note - Denzel League 1965, 60 y o  female MRN: [id number]  Unit/Bed#: 636-52 Encounter: [id number]  Primary Care Provider: Bebo Cobos PA-C   Date and time admitted to hospital: 1/6/2022  2:43 AM    Diverticulitis of large intestine with perforation  Assessment & Plan  61 yo F hx obesity, GERD, anxiety now HD#11 with perforated diverticulitis and abdominal wall cellulitis  Ongoing 10/10 abdominal pain, nausea w/o vomiting, flatus w/o BM  Abdomen with generalized TTP and grimacing, occasional bowel sounds  T 97 9, HR 98 (max 118), RR 18, /89, SpO2 93% RA  WBC 15 41 (7 70), Hgb 10 1, BUN 39, Cr 1 30    Assessment:  Acute sigmoid diverticulitis with perforation  Plan:  Discussed with Dr Kp Quinteros who will discuss with SLIM  Continue IV Abx, consider restarting IVF, consider NPO, consider holding AM dose Lovenox  Further recommendations/definitive plan pending  Addendum 1/16 09:26  Per Dr Goodrich  have made patient NPO  Will start LR @75 for maintenance  D/C Lovenox pending decision for surgery  Subjective/Objective   Chief Complaint: "pain"    Subjective: ongoing pain overnight, 10/10 at times, poorly controlled  Passing flatus and minimal BM  Acutely nauseous with no appetite  Has not had anything to eat or drink this morning  No vomiting  Objective: CT yesterday    Blood pressure 131/89, pulse 98, temperature 97 9 °F (36 6 °C), resp  rate 18, height 5' 7" (1 702 m), weight (!) 166 kg (365 lb 15 4 oz), SpO2 93 %  ,Body mass index is 57 32 kg/m²  Intake/Output Summary (Last 24 hours) at 1/16/2022 0902  Last data filed at 1/16/2022 0630  Gross per 24 hour   Intake 960 ml   Output 1400 ml   Net -440 ml       Invasive Devices  Report    Peripheral Intravenous Line            Peripheral IV 01/12/22 Dorsal (posterior); Right Hand 3 days    Peripheral IV 01/15/22 Dorsal (posterior); Right Forearm <1 day          Drain            Urethral Catheter 16 Fr  9 days                Physical Exam  Vitals and nursing note reviewed  Constitutional:       General: She is not in acute distress  Appearance: She is well-developed  She is not diaphoretic  HENT:      Head: Normocephalic and atraumatic  Eyes:      Conjunctiva/sclera: Conjunctivae normal       Pupils: Pupils are equal, round, and reactive to light  Pulmonary:      Comments: Mild wheezing and increased work of breathing  Abdominal:      Comments: Obese, some distention in epigastrium, firm in RLQ, generalized TTP with grimacing  Occasional bowel sounds  Musculoskeletal:         General: Normal range of motion  Cervical back: Normal range of motion  Skin:     General: Skin is warm and dry  Capillary Refill: Capillary refill takes less than 2 seconds  Neurological:      Mental Status: She is alert and oriented to person, place, and time  Psychiatric:         Behavior: Behavior normal            Lab, Imaging and other studies:  I have personally reviewed pertinent lab results    , CBC:   Lab Results   Component Value Date    WBC 15 41 (H) 01/16/2022    HGB 10 1 (L) 01/16/2022    HCT 33 1 (L) 01/16/2022    MCV 81 (L) 01/16/2022     01/16/2022    MCH 24 6 (L) 01/16/2022    MCHC 30 5 (L) 01/16/2022    RDW 19 1 (H) 01/16/2022    MPV 8 9 01/16/2022    NRBC 0 01/16/2022   , CMP:   Lab Results   Component Value Date    SODIUM 143 01/16/2022    K 3 9 01/16/2022     01/16/2022    CO2 30 01/16/2022    BUN 39 (H) 01/16/2022    CREATININE 1 30 01/16/2022    CALCIUM 8 2 (L) 01/16/2022    AST 20 01/16/2022    ALT 14 01/16/2022    ALKPHOS 72 01/16/2022    EGFR 45 01/16/2022     VTE Pharmacologic Prophylaxis: Enoxaparin (Lovenox)  VTE Mechanical Prophylaxis: sequential compression device

## 2022-01-16 NOTE — PROGRESS NOTES
[address] [facility] [address] [place]  Progress Note - Almaz Tang 1965, 60 y o  female MRN: [id number]  Unit/Bed#: 333-54 Encounter: [id number]  Primary Care Provider: Kami Nguyen PA-C   Date and time admitted to hospital: 1/6/2022  2:43 AM    Diverticulitis of large intestine with perforation  Assessment & Plan    CT abdomen on 01/06/22 shows - sigmoid diverticulitis with microperforation and adjacent small pelvic abscess  Surgical input appreciated  IR unable to place drain due to body habitus  Discontinue vancomycin/Flagyl/Rocephin, transitioned to Augmentin 875/125 for 3-4 weeks 1/14/21  Patient with 10/10 abdominal pain since 0500, discussed with surgery  Check lactic and STAT CT abdomen 1/15  Abdominal pain unchanged, CT shows progression, DC augmentin and revert back to Rocephin/Flagy 1/16/21      * Cellulitis of abdominal wall, chest wall and groin with wounds  Assessment & Plan  Patient presented with pain, erythema, swelling with overlying wounds, actively draining purulent material involving the inframammary area, lower abdomen, groin and inner thighs  Continue vancomycin, Cefepime and Metronidazole day#5   wound culture growing E faecalis and coag negative staph, vanco added back  ID recommending 5 days of IV Diflucan  Follow-up MRSA screenWound care consult, input appreciated    Discontinue vancomycin/Flagyl/Rocephin, transitioned to Augmentin 875/125 for 3-4 weeks 1/14/21    Resolved           Right Hydroureteronephrosis  Assessment & Plan  Right Hydroureteronephrosis noted on CT abdomen pelvis on 01/06/22    Renal US : right renal collecting system appears minimally dilated similar to prior CT  Per radiology suspecting related to inflammatory changes in the sigmoid colon  Galan in place - draining clear urine with creatinine continuing to improve   Outpatient follow up with urology and surgical team       Acute renal failure (ARF) ([address] )  Assessment & Plan  Superimposed on chronic kidney disease with a baseline creatinine of 1  Continue to hold lisinopril and Celebrex  Continue Galan catheter and will have patient follow-up as an outpatient  DC IV fluids, patient eating and drinking 1/11/21    Morbid obesity with BMI of 60 0-69 9, adult [facility])  Assessment & Plan  Patient is morbidly obese with BMI of 63 02            Progress Note - Ted Lozoya 60 y o  female MRN: [id number]    Unit/Bed#: 418-01 Encounter: [id number]        Subjective:     Patient seen and examined at bedside  States she  Does not feel well, abdominal pain about same    Objective:     Vitals:   Vitals:    01/16/22 0705   BP: 131/89   Pulse: 98   Resp: 18   Temp: 97 9 °F (36 6 °C)   SpO2: 93%     Body mass index is 57 32 kg/m²  Intake/Output Summary (Last 24 hours) at 1/16/2022 0813  Last data filed at 1/16/2022 0630  Gross per 24 hour   Intake 960 ml   Output 1400 ml   Net -440 ml       Physical Exam:   /89   Pulse 98   Temp 97 9 °F (36 6 °C)   Resp 18   Ht 5' 7" (1 702 m)   Wt (!) 166 kg (365 lb 15 4 oz)   SpO2 93%   BMI 57 32 kg/m²   General appearance: alert and oriented, in no acute distress  Head: Normocephalic, without obvious abnormality, atraumatic  Lungs: clear to auscultation bilaterally  Heart: regular rate and rhythm, S1, S2 normal, no murmur, click, rub or gallop  Abdomen: soft, mild tenderness without rebound / guarding   Extremities: extremities normal, warm and well-perfused; no cyanosis, clubbing, or edema  Pulses: 2+ and symmetric  Skin: Skin color, texture, turgor normal  No rashes or lesions  Neurologic: Grossly normal     Invasive Devices  Report    Peripheral Intravenous Line            Peripheral IV 01/12/22 Dorsal (posterior); Right Hand 3 days    Peripheral IV 01/15/22 Dorsal (posterior); Right Forearm <1 day          Drain            Urethral Catheter 16 Fr  9 days                Results from last 7 days   Lab Units 01/16/22  0515 01/14/22  0618 01/13/22  0626   WBC Thousand/uL 15 41* 7 70 8 91   HEMOGLOBIN g/dL 10 1* 9 9* 9 4*   HEMATOCRIT % 33 1* 32 6* 31 8*   PLATELETS Thousands/uL 271 262 263       Results from last 7 days   Lab Units 01/16/22  0515 01/14/22  0618 01/13/22  0626   POTASSIUM mmol/L 3 9 4 6 4 4   CHLORIDE mmol/L 105 104 104   CO2 mmol/L 30 24 23   BUN mg/dL 39* 12 13   CREATININE mg/dL 1 30 1 10 1 11   CALCIUM mg/dL 8 2* 8 8 8 5   ALK PHOS U/L 72 92 93   ALT U/L 14 19 21   AST U/L 20 16 14       Medication Administration - last 24 hours from 01/15/2022 0813 to 01/16/2022 0813       Date/Time Order Dose Route Action Action by     01/15/2022 1745 metoprolol tartrate (LOPRESSOR) tablet 25 mg 25 mg Oral Given Virgin Cower, RN     01/15/2022 1745 docusate sodium (COLACE) capsule 100 mg 100 mg Oral Given Virgin Cower, RN     01/15/2022 2130 nystatin (MYCOSTATIN) powder   Topical Given Sudhir Clap, RN     01/15/2022 1746 nystatin (MYCOSTATIN) powder   Topical Given Virgin Cower, RN     01/15/2022 1052 nystatin (MYCOSTATIN) powder   Topical Given Virgin Cower, RN     01/15/2022 1748 al mag oxide-diphenhydramine-lidocaine viscous (MAGIC MOUTHWASH) suspension 10 mL 10 mL Swish & Swallow Given Virgin Cower, RN     01/15/2022 1745 nystatin (MYCOSTATIN) cream   Topical Given Virgin Cower, RN     01/15/2022 1052 nystatin (MYCOSTATIN) cream   Topical Given Virgin Cower, RN     01/15/2022 2131 enoxaparin (LOVENOX) subcutaneous injection 60 mg 60 mg Subcutaneous Given Sudhir Carmelo RN     01/16/2022 7037 acetaminophen (TYLENOL) tablet 975 mg 975 mg Oral Given Sudhir Carmelo RN     01/15/2022 2131 acetaminophen (TYLENOL) tablet 975 mg 975 mg Oral Given [facility], RN     01/15/2022 1339 acetaminophen (TYLENOL) tablet 975 mg 975 mg Oral Given Virgin Cower, RN     01/16/2022 0624 oxyCODONE (ROXICODONE) IR tablet 5 mg   Oral See Alternative Sudhir Rhodes RN     01/16/2022 9219 oxyCODONE (ROXICODONE) immediate release tablet 10 mg 10 mg Oral Given Sudhir Rhodes,      01/16/2022 0725 HYDROmorphone (DILAUDID) injection 0 2 mg 0 2 mg Intravenous Given Darreld Nic, RN     01/15/2022 1051 HYDROmorphone (DILAUDID) injection 0 2 mg 0 2 mg Intravenous Given Darreld Hoschton, RN     01/16/2022 3846 methocarbamol (ROBAXIN) tablet 500 mg 500 mg Oral Given Danica [facility], RN     01/15/2022 2132 methocarbamol (ROBAXIN) tablet 500 mg 500 mg Oral Given Danica [facility], RN     01/15/2022 1339 methocarbamol (ROBAXIN) tablet 500 mg 500 mg Oral Given Darreld Hoschton, RN     01/15/2022 2132 lidocaine (LIDODERM) 5 % patch 1 patch 1 patch Topical Patch Removed [facility], RN     01/15/2022 2132 nystatin (MYCOSTATIN) oral suspension 500,000 Units 500,000 Units Swish & Swallow Given Danica [facility], RN     01/15/2022 1745 nystatin (MYCOSTATIN) oral suspension 500,000 Units 500,000 Units Swish & Swallow Given Darreld Hoschton, RN     01/15/2022 1339 nystatin (MYCOSTATIN) oral suspension 500,000 Units 500,000 Units Swish & Swallow Given Darreld Nic, RN     01/15/2022 1058 nystatin (MYCOSTATIN) oral suspension 500,000 Units 500,000 Units Swish & Swallow Given Darreld Hoschton, RN     01/16/2022 0623 pantoprazole (PROTONIX) EC tablet 40 mg 40 mg Oral Given [facility], RN     01/15/2022 2132 amoxicillin-clavulanate (AUGMENTIN) 875-125 mg per tablet 1 tablet 1 tablet [facility]     01/15/2022 2130 mineral oil liquid 15 mL 15 mL Oral Given [facility], RN     01/15/2022 1314 iohexol (OMNIPAQUE) 350 MG/ML injection (SINGLE-DOSE) 100 mL 100 mL Intravenous Given Beatriz GRAHAM [facility]            Lab, Imaging and other studies: I have personally reviewed pertinent reports      VTE Pharmacologic Prophylaxis: Enoxaparin (Lovenox)  VTE Mechanical Prophylaxis: sequential compression device     Santiago Xiong MD  1/16/2022,8:13 AM

## 2022-01-16 NOTE — ASSESSMENT & PLAN NOTE
59 yo F hx obesity, GERD, anxiety now HD#11 with perforated diverticulitis and abdominal wall cellulitis  Ongoing 10/10 abdominal pain, nausea w/o vomiting, flatus w/o BM  Abdomen with generalized TTP and grimacing, occasional bowel sounds  T 97 9, HR 98 (max 118), RR 18, /89, SpO2 93% RA  WBC 15 41 (7 70), Hgb 10 1, BUN 39, Cr 1 30    Assessment:  Acute sigmoid diverticulitis with perforation  Plan:  Discussed with Dr Kimmie Yoon who will discuss with SLIM  Continue IV Abx, consider restarting IVF, consider NPO, consider holding AM dose Lovenox  Further recommendations/definitive plan pending

## 2022-01-16 NOTE — ED NOTES
Delfino text sent to Dr Schuyler Nieto to inform of pt arrival       Gatito Galvan, RN  01/16/22 7049

## 2022-01-16 NOTE — ASSESSMENT & PLAN NOTE
Patient presented with pain, erythema, swelling with overlying wounds, actively draining purulent material involving the inframammary area, lower abdomen, groin and inner thighs        Continue vancomycin, Cefepime and Metronidazole day#5   wound culture growing E faecalis and coag negative staph, vanco added back  ID recommending 5 days of IV Diflucan  Follow-up MRSA screenWound care consult, input appreciated    Discontinue vancomycin/Flagyl/Rocephin, transitioned to Augmentin 875/125 for 3-4 weeks 1/14/21    Resolved

## 2022-01-17 NOTE — UTILIZATION REVIEW
Inpatient Admission Authorization Request   NOTIFICATION OF INPATIENT ADMISSION/INPATIENT AUTHORIZATION REQUEST   SERVICING FACILITY:   [facility]  Address: [address] [place] [address], [address] [place], [place] [id number]  Tax ID: [id number]  NPI: [id number]  Place of Service: Inpatient [facility]  [address]  Place of Service Code: 24     ATTENDING PROVIDER:  Attending Name and NPI#: Diogenes Porter [[id number]]  Address: [address] [place] [address], [address] [place] [id number]  Phone: [phone]     UTILIZATION REVIEW CONTACT:  Naveen Rhodes Utilization   Network Utilization Review Department  Phone: [phone]  Fax: [phone]  Email: [email or web address]     PHYSICIAN ADVISORY SERVICES:  FOR YRLG-QN-MDIZ REVIEW - MEDICAL NECESSITY DENIAL  Phone: [phone]  Fax: [phone]  Email: [email or web address]     TYPE OF REQUEST:  Inpatient Status     ADMISSION INFORMATION:  ADMISSION DATE/TIME: 1/16/22  5:57 PM  PATIENT DIAGNOSIS CODE/DESCRIPTION:  Cellulitis of abdominal wall [L03 311]  Diverticulitis of large intestine with perforation without bleeding [K57 20]  DISCHARGE DATE/TIME: No discharge date for patient encounter  DISCHARGE DISPOSITION (IF DISCHARGED): [address] [facility]     IMPORTANT INFORMATION:  Please contact the Naveen Rhodes directly with any questions or concerns regarding this request  Department voicemails are confidential     Send requests for admission clinical reviews, concurrent reviews, approvals, and administrative denials due to lack of clinical to fax [phone]

## 2022-01-17 NOTE — CONSULTS
77 Adama Nichole 60 y o  female MRN: [id number]  Unit/Bed#: OR POOL Encounter: [id number]      -------------------------------------------------------------------------------------------------------------  Chief Complaint: Perforated Diverticulitis, Hydronephrosis,     History of Present Illness   HX and PE limited by: Intubated patient  Swati Castro is a 60 y o  female who presented on 1/6 to [facility] with abdominal wall cellulitis and open wounds of her breast, lower abdomen, inner thighs, and buttocks with drainage concerning for fungal infection  Upon investigation, she was found to have an ZAHIRA superimposed on her CKD with a Cr of 4 56 and imaging showed acute sigmoid diveritculitis with microperforation and small right pelvic abscess as well as right hydroureteronephrosis but no subcutaneous abscesses on her abdominal wall, breasts, inguinal area or buttocks  She was admitted and started on Cefepime, flagyl and vancomycin as well as fluconazole  ID was consulted for antibiotic management, nephrology was consulted for her ZAHIRA and surgery was consulted for her diverticulitis  Recommendation was made for IR drainage of her abscess and continued antibiotics  IR evaluated and deemed the abscess inaccessible for percutaneous drainage  Over the next few days, diet was slowly advanced and ZAHIRA was improving  ID discontinued the cefepime and flagyl and started ceftriaxone  Patient developed some dysphagia to liquids for which GI was consulted and recommended PPI and continuation of antifungals for possible candida esophagitis with plan for follow up with GI as an outpatient  Repeat CT scan was obtained on HD5 that showed mild interval increase in the size of her right pelvic collection which now encased the right ureter and right external iliac artery as well as slight worsening of her right hydroureteronephrosis    Wound cultures grew enterococcus faecalis and coagulase negative staph so vancomycin was restarted  IR was re-consulted given interval increase in collection but still advised that risk of drain placement outweighed the benefits  Second repeat CT scan was obtained on HD9 and showed worsening inflammatory changes throughout the midline pelvis surrounding the rectosigmoid junction  Given worsening findings on imaging, worsening LLQ pain and tenderness and rising WBC, surgery now recommended diversion versus resection with concomitant insertion of ureteral stent  Given that this could not be offered at [address] [place] [address], case was discussed with [facility] and patient was transferred for intervention  She was seen by surgery and urology upon arrival and exploratory laparotomy was recommended  She was taken to the operating room for bilateral ureteral stent placement with right retrograde pyelogram as well as exploratory laparotomy and Payal's procedure  Intraoperatively, patient's ovaries and right fallopian tube were noted to be mottled and necrotic so right salpingo-oophorectomy and left oophorectomy were performed  Rectal stump was extremely friable so two pelvic ADIA drains were left along with a malecot drain GA  She was brought to the ICU intubated postoperatively off pressors  Intraoperative blood loss was 250mL and she received 4 5L of crystalloid and 750 of albumin  Despite this, she remained oliguric with only 70cc of urine output during the case  History obtained from chart review    -------------------------------------------------------------------------------------------------------------  Assessment and Plan:    Neuro:    Analgesia  o PRN meds  - Fentanyl 50mcg q1 hour     Sedation  o [email or web address]      CV:    Septic Shock  o 2/2 perforated sigmoid diverticulitis with abscess  o Now s/p ex-lap, Payal's procedure on 1/16  - Pressors off   HLD  o Not on medication at home  o Follow up with PCP   HTN  o Home lisinopril held given ZAHIRA and hypotension  o Hold metoprolol      Pulm:   COVID infection  o Previously with stable saturations on room air, hold on anticoagulation, IV corticosteroid and remdesivir for now given no oxygen requirement pre-operatively  o Remains intubated postoperatively  - PS 20/6 100%  - Wean to extubate      GI:    Acute perforated sigmoid diverticulitis  o CT abdomen pelvis 1/6 showing sigmoid diverticulitis with microperforation and adjacent small pelvic abscess    o Repeat CT abdomen pelvis 1/12 showing mild interval increase in pelvic collection now encasing right ureter and right external iliac artery  o Second repeat CT abdomen pelvis 1/15 with worsening worsening inflammatory changes throughout the midline pelvis surrounding the rectosigmoid junction  o S/P exploratory laparotomy with Payal's procedure 1/16  o Continue antibiotics, zosyn  o Surgery following  o NPO/NGT, IVF with ongoing resuscitation  o Await return of bowel function  o Check post op labs   Dysphagia  o GI consulted at [address] [place] [address]  o Continue PPI daily  o Possibly secondary to candida esophagitis, continue fluconazole  o If no improvement may require EGD with biopsies  o Follow up as an outpatient      :    Right Hydroureteronephrosis  o 2/2 abscess/phlegmon from perforated sigmoid diverticulitis  o Urology following  o Urine culture negative 1/6, mixed contaminants  o S/P Bilateral ureteral stent placement with right retrograde pyelogram on 1/16  o Right ureteral stent remains in place, left removed postop  o Maintain villanueva catheter   ZAHIRA on CKD  o Baseline Cr 1 10  o Cr on presentation 4 56  o Now improving, Cr 1 30  o Likely ATN in the setting of decreased oral intake and sepsis  o Monitor urine output  o Monitor BUN/Cr  o Avoid hypotension and nephrotoxins  - Lisinopril and celecoxib on hold      F/E/N:    F: [email or web address]   E: Replete as needed   N: NPO with NGT for now, await return of bowel function, surgery managing      Heme/Onc:  No acute issues  o SQH for DVT PPX      Endo:    No acute issues      ID:    COVID infection  o Positive 1/16/22  o On room air at presentation  o Hold on anticoagulation, IV corticosteroids and remdesivir for now given no oxygen requirement   Acute perforated sigmoid diverticulitis  o S/P exploratory laparotomy with Payal's procedure on 1/16  o Blood cultures x 2 on 1/6 no growth at 5 days  o Tissue culture and gram stain, fungal culture, and anaerobic culture from 1/16 pending  o ID consult, following while at [address] [place] [address]  o Continue zosyn   Cellulitis of abdominal wall  o With open wounds of abdomen, inframammary and inguinal areas, and buttocks with drainage concerning for possible fungal infection  o Wound culture 1/8 growing enterococcus and coagulase negative staph  o ID consult  o Continue zosyn and fluconazole  o Wound care consult      MSK/Skin:    Cellulitis of abdominal wall, chest wall and groin with wounds  o Presented with pain, erythema, and swelling with active drainage of purulent material concerning for bacterial/fungal infection  o Antibiotics as above, zosyn and fluconazole  o Wound care consult   PT/OT    Disposition: Continue Critical Care   Code Status: Level 1 - Full Code  --------------------------------------------------------------------------------------------------------------  Review of Systems    A 12-point, complete review of systems was reviewed and negative except as stated above     Physical Exam  Constitutional:       Appearance: She is obese  Comments: Sedated   HENT:      Head: Normocephalic and atraumatic  Right Ear: External ear normal       Left Ear: External ear normal       Nose: Nose normal       Mouth/Throat:      Mouth: Mucous membranes are moist       Pharynx: Oropharynx is clear  Eyes:      Conjunctiva/sclera: Conjunctivae normal       Pupils: Pupils are equal, round, and reactive to light     Cardiovascular:      Rate and Rhythm: Normal rate and regular rhythm  Pulses: Normal pulses  Pulmonary:      Comments: PS 20/6 100%  Abdominal:      General: Abdomen is flat  Palpations: Abdomen is soft  Comments: LLQ ADIA serosanguinous output  RLQ ADIA serosanguinous output  Malecot drain with serosanguinous output  Stoma dusky but viable  Incision cleanly dressed     Musculoskeletal:         General: Normal range of motion  Cervical back: Normal range of motion  Skin:     General: Skin is warm and dry  Comments: Open wounds of chest, abdomen, and inguinal areas   Neurological:      Comments: Sedated       --------------------------------------------------------------------------------------------------------------  Vitals:   Vitals:    01/16/22 1645   BP: 134/70   Pulse: (!) 106   Resp: 20   Temp: 99 8 °F (37 7 °C)   SpO2: 92%     Temp  Min: 97 °F (36 1 °C)  Max: 100 5 °F (38 1 °C)        There is no height or weight on file to calculate BMI    N/A    Laboratory and Diagnostics:  Results from last 7 days   Lab Units 01/16/22  1826 01/16/22 0515 01/14/22 0618 01/13/22  0626 01/12/22  0434 01/10/22  0529   WBC Thousand/uL  --  15 41* 7 70 8 91 6 84 7 00   HEMOGLOBIN g/dL  --  10 1* 9 9* 9 4* 9 2* 9 4*   HEMATOCRIT %  --  33 1* 32 6* 31 8* 30 8* 31 3*   PLATELETS Thousands/uL 293 271 262 263 257 340   NEUTROS PCT %  --  82* 63 69 63 63   MONOS PCT %  --  6 8 8 10 9     Results from last 7 days   Lab Units 01/16/22  0515 01/14/22  0618 01/13/22  0626 01/12/22  0434 01/11/22  0810 01/10/22  0529   SODIUM mmol/L 143 136 135* 136 139 141   POTASSIUM mmol/L 3 9 4 6 4 4 4 5 4 6 4 9   CHLORIDE mmol/L 105 104 104 105 108 111*   CO2 mmol/L 30 24 23 22 21 19*   ANION GAP mmol/L 8 8 8 9 10 11   BUN mg/dL 39* 12 13 18 27* 34*   CREATININE mg/dL 1 30 1 10 1 11 1 18 1 65* 1 79*   CALCIUM mg/dL 8 2* 8 8 8 5 8 4 8 2* 8 6   GLUCOSE RANDOM mg/dL 117 96 107 94 102 96   ALT U/L 14 19 21  --   --  22   AST U/L 20 16 14  --   --  16   ALK PHOS U/L 72 92 93 --   --  88   ALBUMIN g/dL 2 7* 2 5* 2 4*  --   --  2 3*   TOTAL BILIRUBIN mg/dL 0 28 0 25 0 23  --   --  0 36     Results from last 7 days   Lab Units 22  0515 22  0618 22  0626 22  0434 22  0810 01/10/22  0529   MAGNESIUM mg/dL 1 6 1 9 1 7 2 0 1 3* 1 5*   PHOSPHORUS mg/dL  --   --   --   --   --  3 4               Results from last 7 days   Lab Units 01/15/22  0909   LACTIC ACID mmol/L 0 8     ABG:    VBG:    Results from last 7 days   Lab Units 22  0626 22  0810 01/10/22  0530   PROCALCITONIN ng/ml 0 24 0 43* 0 28*       Micro:        EKG: NSR  Imaging: I have personally reviewed pertinent reports        Historical Information   Past Medical History:   Diagnosis Date    Anxiety     Arthritis     GERD (gastroesophageal reflux disease)     Hidradenitis suppurativa     Hypertension     Lipodermatosclerosis of both lower extremities     Lymphedema     Sciatic leg pain     left side    SOB (shortness of breath)     Stomach ulcer      Past Surgical History:   Procedure Laterality Date     SECTION      DC EXC SKIN BENIG 3 1-4 CM TRUNK,ARM,LEG Right 2020    Procedure: EXCISION BIOPSY TISSUE LESION/MASS UPPER EXTREMITY;  Surgeon: Sonia Sow DO;  Location: [facility];  Service: General    TONSILLECTOMY      TUMOR REMOVAL Right     5th finger     Social History   Social History     Substance and Sexual Activity   Alcohol Use Not Currently    Comment: holidays     Social History     Substance and Sexual Activity   Drug Use No     Social History     Tobacco Use   Smoking Status Light Tobacco Smoker    Packs/day: 0 25    Years: 20 00    Pack years: 5 00    Types: Cigarettes   Smokeless Tobacco Never Used   Tobacco Comment    Only smoke once in awhile     Family History:   Family History   Problem Relation Age of Onset    Cancer Mother     Hypertension Mother     COPD Father     Diabetes Father      I have reviewed this patient's family history and commented on sigificant items within the HPI      Medications:  Current Facility-Administered Medications   Medication Dose Route Frequency    [MAR Hold] heparin (porcine) subcutaneous injection 7,500 Units  7,500 Units Subcutaneous Q8H [facility]    iohexol (OMNIPAQUE) 240 MG/ML solution    PRN    multi-electrolyte (PLASMALYTE-A/ISOLYTE-S PH 7 4) IV solution  125 mL/hr Intravenous Continuous    [MAR Hold] nystatin (MYCOSTATIN) powder   Topical BID    [MAR Hold] pantoprazole (PROTONIX) injection 40 mg  40 mg Intravenous Q12H [facility]    [MAR Hold] piperacillin-tazobactam (ZOSYN) 4 5 g in sodium chloride 0 9 % 100 mL IVPB  4 5 g Intravenous Q6H    sterile water irrigation solution    PRN     Facility-Administered Medications Ordered in Other Encounters   Medication Dose Route Frequency    albumin human (FLEXBUMIN) 5 % injection   Intravenous Continuous PRN    albuterol (PROVENTIL HFA,VENTOLIN HFA) inhaler   Intratracheal PRN    ceFAZolin (ANCEF) injection   Intravenous PRN    dexamethasone (PF) (DECADRON) injection   Intravenous PRN    fentanyl citrate (PF) 100 MCG/2ML   Intravenous PRN    Ketamine HCl   Intravenous PRN    lactated ringers infusion   Intravenous Continuous PRN    lidocaine (PF) (XYLOCAINE-MPF) 1 % injection   Intravenous PRN    metroNIDAZOLE (FLAGYL) IVPB (premix)   Intravenous PRN    midazolam (VERSED) injection   Intravenous PRN    norepinephrine (LEVOPHED) 4 mg (STANDARD CONCENTRATION) IV in sodium chloride 0 9% 250 mL   Intravenous Continuous PRN    norepinephrine (LEVOPHED) bolus from bag   Intravenous PRN    ondansetron (ZOFRAN) injection   Intravenous PRN    propofol (DIPRIVAN) 200 MG/20ML bolus injection   Intravenous PRN    ROCuronium (ZEMURON) injection   Intravenous PRN    sodium chloride 0 9 % infusion   Intravenous Continuous PRN    Succinylcholine Chloride 100 mg/5 mL syringe   Intravenous PRN     Home medications:  Prior to Admission Medications   Prescriptions Last Dose Informant Patient Reported? Taking?    Diclofenac Sodium (VOLTAREN) 1 %   Yes No   Sig: Apply 2 g topically 4 (four) times a day   SUMAtriptan (IMITREX) 25 mg tablet   No No   Sig: TAKE 1 TABLET BY MOUTH ONCE AS NEEDED FOR MIGRAINE FOR UP TO 1 DOSE   albuterol (Ventolin HFA) 90 mcg/act inhaler   No No   Sig: Inhale 2 puffs every 6 (six) hours as needed for wheezing or shortness of breath   celecoxib (CeleBREX) 200 mg capsule   No No   Sig: take 1 capsule by mouth twice a day   clindamycin (CLEOCIN T) 1 % external solution   No No   Sig: Apply topically 2 (two) times a day   Patient not taking: Reported on 11/22/2021    clotrimazole (LOTRIMIN) 1 % cream   No No   Sig: Apply topically 2 (two) times a day   cyclobenzaprine (FLEXERIL) 10 mg tablet   No No   Sig: take 1 tablet by mouth three times a day if needed for muscle spasm   dicyclomine (BENTYL) 20 mg tablet   No No   Sig: take 1 tablet by mouth four times a day if needed for ABDOMINAL CRAMPING   ferrous sulfate 324 (65 Fe) mg   No No   Sig: take 1 tablet by mouth twice a day before meals   Patient not taking: Reported on 11/9/2021   fluticasone (FLONASE) 50 mcg/act nasal spray   No No   Sig: instill 1 spray into each nostril once daily   hydrOXYzine HCL (ATARAX) 25 mg tablet   No No   Sig: take 1 tablet by mouth three times a day if needed for anxiety   lidocaine (LIDODERM) 5 %  Self No No   Sig: Apply 1 patch topically daily Remove & Discard patch within 12 hours or as directed by MD   lisinopril (ZESTRIL) 10 mg tablet   No No   Sig: Take 1 tablet (10 mg total) by mouth daily   loratadine (CLARITIN) 10 mg tablet   No No   Sig: take 1 tablet by mouth once daily   metoprolol tartrate (LOPRESSOR) 25 mg tablet   No No   Sig: take 1 tablet by mouth twice a day   omeprazole (PriLOSEC) 20 mg delayed release capsule   No No   Sig: Take 1 capsule (20 mg total) by mouth daily   sertraline (ZOLOFT) 50 mg tablet   No No   Sig: Take 1 tablet (50 mg total) by mouth daily      Facility-Administered Medications: None     Allergies: Allergies   Allergen Reactions    Bee Venom Anaphylaxis, Shortness Of Breath and Swelling     ------------------------------------------------------------------------------------------------------------  Advance Directive and Living Will:      Power of :    POLST:    ------------------------------------------------------------------------------------------------------------  Anticipated Length of Stay is > 2 midnights    Care Time Delivered:   Upon my evaluation, this patient had a high probability of imminent or life-threatening deterioration due to hemodynamic and respiratory compromise secondary to acute perforated sigmoid diverticulitis, which required my direct attention, intervention, and personal management  I have personally provided 45 minutes (0145 to 0230) of critical care time, exclusive of procedures, teaching, family meetings, and any prior time recorded by providers other than myself  Mary Olivera MD        Portions of the record may have been created with voice recognition software  Occasional wrong word or "sound a like" substitutions may have occurred due to the inherent limitations of voice recognition software    Read the chart carefully and recognize, using context, where substitutions have occurred

## 2022-01-17 NOTE — CASE MANAGEMENT
Case Management Assessment & Discharge Planning Note    Patient name Denzel Watson  Location ICU 07/ICU 07 MRN [id number]  : 1965 Date 2022       Current Admission Date: 2022  Current Admission Diagnosis:Diverticulitis of large intestine with perforation   Patient Active Problem List    Diagnosis Date Noted    Dysphagia 2022    [facility] 2022    Cellulitis of abdominal wall, chest wall and groin with wounds 2022    Diverticulitis of large intestine with perforation 2022    Right Hydroureteronephrosis 2022    Subacute vaginitis 2022    GERD (gastroesophageal reflux disease)     Other specified anxiety disorders 2019    Lumbar paraspinal muscle spasm 2019    Borderline hyperlipidemia 2019    Iron deficiency 2019    Chronic midline low back pain with left-sided sciatica 2019    Primary hypertension 2019    Morbid obesity with BMI of 60 0-69 9, adult ([facility] ) 2019    Hidradenitis suppurativa 2019    Lymphedema 2006      LOS (days): 1  Geometric Mean LOS (GMLOS) (days):   Days to GMLOS:     OBJECTIVE:    [facility]  The following CM assessment was completed by case management prior to pt's transfer:    [facility]  of Unplanned Readmission Score: 14      Current admission status: Inpatient    Preferred Pharmacy:   [facility] [address] [place] [address] C/ [facility] [address]  Phone: [phone] Fax: [phone]    Primary Care Provider: Bebo Cobos PA-C    Primary Insurance: [facility]  Secondary Insurance:     ASSESSMENT:  [place] [employer] Agents    There are no active Health Care Agents on file         Patient Information  Admitted from[de-identified] Home  Mental Status: Alert  Assessment information provided by[de-identified] Patient  Primary Caregiver: Self  Support Systems: Self  [place] of Residence: [address] do you live in?: [address] [facility] entry access options  Select all that apply : Ramp,Elevator  Type of Current Residence: Apartment  Floor Level: 4  Upon entering residence, is there a bedroom on the main floor (no further steps)?: Yes  Upon entering residence, is there a bathroom on the main floor (no further steps)?: Yes  In the last 12 months, how many places have you lived?: 1  In the last 12 months, was there a time when you did not have a steady place to sleep or slept in a shelter (including now)?: No  Homeless/housing insecurity resource given?: N/A  Living Arrangements: Lives Alone  Is patient a ?: No     Activities of Daily Living Prior to Admission  Functional Status: Independent (Pt said she has a hard time doing ADL's    Pt is somewhat unkempt)  Completes ADLs independently?: Yes  Ambulates independently?: Yes  Does patient use assisted devices?: Yes  Assisted Devices (DME) used: Crutches  Does patient currently own DME?: Yes  What DME does the patient currently own?: Crutches  Does patient have a history of Outpatient Therapy (PT/OT)?: No  Does the patient have a history of Short-Term Rehab?: No  Does patient have a history of HHC?: Yes (Pt had [facility] home care in the past )  Does patient currently have [place] [address]?: No     Patient Information Continued  Income Source: SSI/SSD  Does patient have prescription coverage?: Yes  Within the past 12 months, you worried that your food would run out before you got the money to buy more : Never true  Within the past 12 months, the food you bought just didnt last and you didnt have money to get more : Never true  Food insecurity resource given?: N/A  Does patient receive dialysis treatments?: No  Does patient have a history of substance abuse?: No  Does patient have a history of Mental Health Diagnosis?: No     Means of Transportation  Means of Transport to hospitals[de-identified] Alison Cox (Pt uses the [employer] bus [facility] [place]  In the past 12 months, has lack of transportation kept you from medical appointments or from getting medications?: No  In the past 12 months, has lack of transportation kept you from meetings, work, or from getting things needed for daily living?: No  Was application for public transport provided?: N/A     PT is recommending patient go to [facility]  Pt is agreeable to same  Pt is unvaccinated  Pt is agreeable to me making referrals to places and see who will accept her  I will give patient phone number for [facility] so she can pursue getting help in the home when she gets home from rehab  CM sent referrals back to facilities previously following patient via [facility]  This CM to follow for d/c planning

## 2022-01-17 NOTE — OP NOTE
Assessment/Plan:  Medicare annual wellness visit, initial  The patient is a 55-year-old female who presents today for initial annual Medicare wellness visit  All components of the examination were reviewed and completed  Colonoscopy and mammography are current  We need to obtain results of her colonoscopy from [facility] which occurred approximately 2 years ago  She is going to call back with the name of the physician so that we may contact them for results  She has not seen gynecology recently  She did have a total abdominal hysterectomy and Pap smear would not be appropriate for her  She declines further follow-up with gynecology  Cardiovascular screening is current  We are getting lipids today  Blood sugar/diabetes is current with A1c of less than 5 this fall  Colorectal cancer and breast cancer screening is current as noted previously  Patient declines osteoporosis screening  She does take calcium and vitamin-D as well as regular exercise  Glaucoma is current  She sees Dr Josr Altman again and her intra-ocular pressures have been normal   Hepatitis-C has been negative, HIV declined  We did discuss advanced directives, gave her copy of 5 wishes and freezer pack which she is interested in completing  We discussed immunizations  She currently declines though we did have informed discussion on pneumococcal vaccination in she may change her mind in regard to this  Acquired hypothyroidism  Patient is going to continue on her levothyroxine 50 mcg daily  Will get TSH today  Benign essential HTN  CBC and CMP today  Blood pressure well controlled  Continue on lisinopril/hydrochlorothiazide           Diagnoses and all orders for this visit:    Medicare annual wellness visit, initial    Screening for iron deficiency anemia  -     CBC    Impaired fasting glucose  -     Comprehensive metabolic panel    Acquired hypothyroidism  -     TSH, 3rd generation    Benign essential HTN  -     Lipid PERATIVE REPORT  PATIENT NAME: Megan Strauss    :  1965  MRN: [id number]  Pt Location: [facility] OR ROOM 08    SURGERY DATE: 2022    Surgeon(s) and Role:  Panel 1:     * Soila Alan DO - Primary     * Mirtha Lucero MD - Assisting     * Miller Quiroz MD - Assisting  Panel 2:     * Dee Polo MD - Primary    Preop Diagnosis:  Diverticulitis of large intestine with perforation without bleeding [K57 20]    Post-Op Diagnosis Codes:     * Diverticulitis of large intestine with perforation without bleeding [K57 20]     * Hydronephrosis of right kidney [N13 30]    Procedure(s) (LRB):  LAPAROTOMY EXPLORATORY, DRAINAGE OF INTRA -ABDOMINAL ABSCESS (N/A)  CYSTOSCOPY RETROGRADE PYELOGRAM WITH INSERTION STENT URETERAL (Right)  END COLOSTOMY (N/A)  RIGHT SALPINGO-OOPHORECTOMY, LEFT OOPHORECTOMY (N/A)    Specimen(s):  ID Type Source Tests Collected by Time Destination   1 : Sigmoid Colon Tissue Large Intestine, Sigmoid Colon TISSUE EXAM Soila Alan,  2022 2158    2 : Right Fallopian Tube and Ovary Tissue Fallopian Tube, Right TISSUE EXAM Soila Alan, DO 2022 2214    3 :  Tissue Ovary, Left TISSUE EXAM Soila Alan, DO 2022 2220    A : Abdominal area Tissue Abdominal ANAEROBIC CULTURE AND GRAM STAIN, FUNGAL CULTURE, CULTURE, TISSUE AND GRAM STAIN Soila Alan,  2022 2102        Estimated Blood Loss:   250 mL    Drains:  Closed/Suction Drain LLQ Bulb 19 Fr  (Active)   Number of days: 1       Closed/Suction Drain RLQ Bulb 19 Fr  (Active)   Number of days: 1       NG/OG/Enteral Tube Nasogastric 18 Fr Right nare (Active)   Number of days: 1       Colostomy Other (comment) LUQ (Active)   Stomal Appliance 1 piece;Clean 22 0013   Number of days: 0       Rectal Tube Without balloon (Active)   Number of days: 1       Urethral Catheter Latex 18 Fr  (Active)   Number of days: 1       [REMOVED] Urethral Catheter 16 Fr   (Removed)   Reasons to continue Urinary Catheter  Acute urinary panel          Subjective:   Chief Complaint   Patient presents with   [facility] Wellness Visit     here for her intial medicare wellness today and fbw done  freezer pack given to patient  will look into colonoscopy  The patient is a 70-year-old female who presents today for a Medicare initial wellness visit  Additionally she has other issues to address  These include essential hypertension, hypothyroidism as well as obesity  She has no cardiovascular pulmonary complaint  She has no GI or  complaint  Her energy level is good  She is working on improving her weight and has been watching her diet  She tries to get exercise when she can  Patient ID: Tha Forman is a 77 y o  female  HPI  See above for HPI  The following portions of the patient's history were reviewed and updated as appropriate: allergies, current medications, past family history, past medical history, past social history, past surgical history and problem list     Review of Systems   Constitution: Negative for chills, decreased appetite, fever, malaise/fatigue and weight loss  Cardiovascular: Negative for chest pain, irregular heartbeat, leg swelling, orthopnea and paroxysmal nocturnal dyspnea  Respiratory: Negative for cough, shortness of breath and wheezing  Endocrine: Negative for heat intolerance, polyphagia and polyuria  Hematologic/Lymphatic: Negative for adenopathy and bleeding problem  Does not bruise/bleed easily  Skin: Negative for rash and suspicious lesions  Gastrointestinal: Negative for abdominal pain, constipation, diarrhea, dysphagia, heartburn, nausea and vomiting  Genitourinary: Negative for bladder incontinence, frequency, hematuria and hesitancy  Neurological: Negative for dizziness, focal weakness and headaches  Psychiatric/Behavioral: The patient does not have insomnia and is not nervous/anxious            Objective:    Physical Exam   Constitutional: She is oriented to person, place, and retention/obstruction failing urinary retention protocol 01/15/22 2005   Goal for Removal Other (Comment) 01/14/22 0955   Site Assessment Clean;Skin intact 01/15/22 2005   Galan Care Done 01/16/22 0900   Collection Container Standard drainage bag 01/15/22 2005   Securement Method Securing device (Describe) 01/15/22 2005   Output (mL) 300 mL 01/16/22 1328   Number of days: 10       [REMOVED] External Urinary Catheter (Removed)   Number of days: 0       Anesthesia Type:   General    Operative Indications:  Diverticulitis of large intestine with perforation without bleeding [K57 20]    Operative Findings:  Perforated diverticulitis with tissue necrosis of proximal rectum and ovaries at site of perforation, severely inflamed tissue of rectal stump precluding stump closure, diseased sigmoid removed and end colostomy made  Rectal Malecot drain placed  2 pelvic ADIA drains placed      Complications:   None    Procedure and Technique:  Patient is a 60 y  o female who presented with perforated diverticulitis after about 4 days of pain  After discussing the risks, benefits, and alternatives the patient decided to move forward with exploratory laparotomy and colostomy  The patient was identified in the [address] [place] [address] by [address]  General endotracheal anesthesia was easily induced  The patient was placed on the operating table  Urology placed ureteral stents  Please refer to Dr Francisco Javier Mercado op note for details of his portion of the procedure  The patients abdomen was then prepped and draped in the usual sterile fashion  A time out was performed identifying the correct patient, procedure, laterality, and necessary equipment  Everyone was in agreement  A midline laparotomy incision was made with scalpel the dissection was carried down to the level of the fascia using electrocautery  The fascia was grasped with Aggie Faria is a and entered sharply with Metzenbaum scissors    The peritoneum was similarly entered sharply and both were opened the entire length of the incision  Purulence fluid was encountered when opening the lower abdomen and this fluid was cultured  In the pelvis there was a significant amount of densely adhesed bowel with pockets of purulent fluid and air encountered with finger fracture  Densely adhesed in the deep pelvis was a proximal rectum to bilateral ovaries, which all appeared necrotic  Buren Deacon stool was noted in this area as well  A large hole could be felt penetrating into the rectal lumen  At this point we copiously irrigated the abdomen  We then turned our attention to the healthy portion of descending colon and proximal sigmoid where we took down the white line of Toldt and freed the mesentery on the medial side  We identified a healthy portion of sigmoid where a MOHAMUD stapler with blue load 75 mm was used to transect the bowel  The mesentery of the diseased sigmoid to perforated necrosed proximal rectum was transected using the Ethicon super jaw  Necrotic appearing bilateral ovaries were also excised using the Super jaw  All specimens were sent to pathology  The rectal stump tissue fell apart with manipulation and would not be able to accomodate stapled or sutured closure  A Malecot drain was therefore inserted through the anus to the open end of the rectal stump  Two ADIA drains were then placed in the pelvis and secured to the right and left lower quadrants with nylon suture  We then identified an adequate location for the end colostomy  The skin of the left abdomen was grasped and a circular incision was made with electrocautery  The dissection was carried down to the fascia  The fascia was incised in a cruciate fashion and bluntly spread until it could easily accomodate 2 fingers  The stapled end of the sigmoid was then easily brought up through the ostomy site under no tension and not twisted  We copiously irrigated all 4 quadrants of the abdominal cavity  Everything was noted to be hemostatic   The fascia was time  She appears well-developed and well-nourished  No distress  Obese   HENT:   Mouth/Throat: No oropharyngeal exudate  Eyes: Conjunctivae are normal  No scleral icterus  Neck: Neck supple  No JVD present  No thyromegaly present  Cardiovascular: Normal rate and regular rhythm  Murmur heard  Pulmonary/Chest: Effort normal and breath sounds normal  No respiratory distress  She has no wheezes  Musculoskeletal: She exhibits no edema  Lymphadenopathy:     She has no cervical adenopathy  Neurological: She is alert and oriented to person, place, and time  Skin: No rash noted  Psychiatric: She has a normal mood and affect  Nursing note and vitals reviewed  closed using 1 PDS  The dermis was loosely re-approximated using interrupted 00 vicryl deep dermal sutures  The skin was loosely stapled and 8 iodoform packing strips were placed in between each staple suture combo in order to allow the wound to drain  The wound was dressed with 4x4s and ABDs  We then turn our attention to maturing the Ostomy  The distal portion was clamped and sharply transected exposing the lumen of the colon  000 vicryl suture was used in 4 quadrants to brook the ostomy at the fascia and dermis  0000 monocryl was then used to suture the mucosa to the epithilium creating the carolin bud appearance  The ostomy was probed with one digit down to the fascia to ensure it was open and it was  A ostomy bag was placed over the end colostomy  The Malecot drain was secure to the patients buttock with nylon suture  All instrument and sponge counts were correct x2  The patient tolerated the procedure and was transferred to the ICU intubated and in critical condition  Dr Linette Solomon was present for or performed all critical portions of the procedure        Patient Disposition:  Critical Care Unit      SIGNATURE: Ryan Gutierrez MD  DATE: January 17, 2022  TIME: 1:05 AM

## 2022-01-17 NOTE — RESPIRATORY THERAPY NOTE
RT Ventilator Management Note  Trion Pac 60 y o  female MRN: [id number]  Unit/Bed#: ICU 07 Encounter: [id number]      Daily Screen       1/17/2022  0231 1/17/2022  0800          Patient safety screen outcome[de-identified] Failed Passed      Not Ready for Weaning due to[de-identified] -- --              Physical Exam:   Assessment Type: Assess only  General Appearance: Sedated  Respiratory Pattern: Assisted  Chest Assessment: Chest expansion symmetrical  Bilateral Breath Sounds: Diminished,Clear      Resp Comments: PS weaned to 12 at this time, pt is tolerating the change well

## 2022-01-17 NOTE — PROGRESS NOTES
The patients standard-infusion Piperacillin-Tazobactam / Zosyn (infused over 30-60 minutes) has been converted to extended-infusion (infused over 4 hours) per [facility] Extended-Infusion Piperacillin-Tazobactam Protocol for Adults as approved by the Pharmacy and Therapeutics Committee (accessible here on MyNET)       The patient met ALL eligible criteria:    Age >= 25years old   Critical Care patient    And did NOT have ANY exclusions:     Emergency Department or Operating Room patient  Drug incompatibilities that could NOT be avoided with timing or separate line administration    The following are reminders for Nursing regarding administration:  Infuse the first dose of Zosyn over 30min as a load (if new start), and then all subsequent doses will be given as an extended-infusion over 4 hours (see dosing below)  Use primary tubing as an intermittent infusion; change out primary tubing every 24 hours   Ensure full dose of the medication is given at the appropriate rate  Most incompatible drugs can be scheduled during times when the Zosyn is not being infused; however, if one requires administration during the same time, a separate site or lumen MUST be used  If access is limited and an incompatible medication urgently needs to be given, the Zosyn extended-infusion can be held for up to 30min (remember to flush line before/after)  Extended-infusion Zosyn does NOT require special timing around hemodialysis (it can even be given simultaneously)  If a patient needs an urgent MRI while Zosyn is infusing and there is not a MRI-compatible pump available for use, finish the infusion over the traditional length (30min) and ask Pharmacy to reschedule the next doses so that they start a few hours earlier  Pharmacy will assist nursing in troubleshooting other administration issues as they arise  Dosing for Piperacillin-Tazobactam  CrCl (mL/min) Traditional Dosing Extended-Infusion Dosing #   CrCl > 40 High-Dose  CrCl > 40 Low-Dose 4 5g Q6H (over 30min)  3 375g IV Q6H (over 30min) 3 375g IV Q8H (over 4hr)*    *1st dose loaded over 30min, then start extended-infusion dosing 4hr later   CrCl 20-40 High-Dose  CrCl 20-40 Low-Dose 3 375g IV Q6H (over 30min)  2 25g IV Q6H (over 30min)    CrCl < 20 High-Dose  CrCl < 20 Low-Dose 2 25g IV Q6H (over 30min)  2 25g IV Q8H (over 30min) 3 375g IV Q12H (over 4hr)*    *1st dose loaded over 30min, then start extended-infusion dosing 6hr later   Hemo/Peritoneal Dialysis High-Dose  Hemo/Peritoneal Dialysis Low-Dose 2 25g IV Q6H (over 30min)  2 25g IV Q8H (over 30min)    CVVH/D High-Dose  CVVH/D Low-Dose 3 375g IV Q6H (over 30min)  2 25 IV Q6H (over 30min) 3 375g IV Q8H (over 4hr)*    *1st dose loaded over 30min, then start extended-infusion dosing 4hr later   # = Use 4 5g dosing (same interval) if morbidly obese (BMI ?40)    Please call the Pharmacy with any questions or concerns    Severiano Felix, PharmD, [address] Clinical Pharmacist  [phone]

## 2022-01-17 NOTE — CONSULTS
Consultation - Infectious Disease   Baron Wood 60 y o  female MRN: [id number]  Unit/Bed#: ICU 07 Encounter: [id number]      IMPRESSION & RECOMMENDATIONS:   Impression/Recommendations:  1  Sepsis, POA  HR, WBC  Due to #3 +/- 4  No other appreciable source of infection  Exam otherwise benign  Initial blood/urine cultures, serial CXR negative  Unclear relevance of COVID PCR as CXR and patient intubated largely for recent surgery  Hemodynamically stable and nontoxic  Rec:  · Continue antibiotics as below  · Follow temperatures closely  · Recheck CBC in AM  · Supportive care as per the primary service    2  Perforated diverticulitis with abscess  Also complicated by # 3  Status post ex lap, drainage of abscess, end colostomy, right salpingo oophorectomy and left oophorectomy 1/16  Intraoperative findings notable for perforated diverticulitis with tissue necrosis proximal rectum, ovaries at site of perforation  Rec:  · Continue Zosyn for now  · Follow up OR cultures and tailor antibiotics as indicated  · Postoperative care per surgery    3  Right hydroureteronephrosis  Due to #2  Status post cystoscopy with right ureteral stent placement 1/16  Rec:  · Follow urine output closely  · Galan per Urology with close follow-up ongoing    4  ZAHIRA on CKD  Baseline Cr 0 9-1 0  Likely multifactorial   Rec:  · Follow creatinine closely and dose-adjust antibiotics as indicated  · Recheck BMP in AM      5  Thrush with possible Candida esophagitis  Patient reported dysphagia, choking preoperatively  Rec:  · Continue fluconazole for now  · PPI per GI  · If no improvement may need eventual EGD per GI    6   COVID infection  Appears unvaccinated  Suspect coincidental finding on preoperative PCR  Unable to assess symptoms  CXR without infiltrates and remains intubated due to recent surgery    Rec:  · No treatment indicated  · Check CRP as baseline  · Follow respiratory symptoms closely    7   MO   BMI 58     8   Hidradenitis  With multiple wounds in abdominal/groin skin folds  No apparent superinfection  Antibiotics:  Zosyn #1  Antibiotics #12  FLuconazole #10  POD #1    Thank you for this consultation  We will follow along with you  HISTORY OF PRESENT ILLNESS:  Reason for Consult: Diverticulitis    HPI: Camron Johnson is a 60 y o  female with MO, hidradenitis  She initially presented to the ED at [facility] on 01/06 complaining of weakness, diarrhea, poor p o  intake  Upon presentation she was found to have a mild leukocytosis as well as ZAHIRA  A CT of the abdomen and pelvis showed acute sigmoid diverticulitis with micro perforation and small pelvic abscess with associated right hydroureteronephrosis  She was started on antibiotics  She was seen by surgery who recommended drainage by IR  However upon their evaluation there was felt to be no safe window of access for drain placement  She was continued on IV antibiotics  She was also started on oral fluconazole for fungal dermatitis as well as thrush and possible Candida esophagitis  A repeat CT on 01/12 showed mild interval increase in the collection which was now felt to be encasing the right external iliac artery  There was also increase of right hydroureteronephrosis  She was re-evaluated by IR on 01/14 who continue to think that there were no safe windows for drain placement  She was ultimately transition to oral antibiotics on 01/14 with the plan for a prolonged course of treatment  1/15 she began to have severe abdominal pain with worsening leukocytosis  CT at that time showed worsening inflammatory changes, increased size of the abscess, and persistent right hydroureteronephrosis  She was transferred to [facility] and was taken to the OR overnight on 01/16 for ex lap, drainage of abscess, and colostomy, cystoscopy with right ureteral stent placement, right salpingo oophorectomy and left oophorectomy    Intraoperative findings were notable for perforated diverticulitis with tissue necrosis of the proximal rectum and ovaries at the site of perforation  She had necrotic rectum which did not allow closure of the rectal stump  Postoperatively she remained intubated and on pressors and was admitted to the ICU  Of note prior to surgery she had a COVID PCR which was performed and came back positive  In performing this consult, I have reviewed prior admission and outpatient visit records in detail  REVIEW OF SYSTEMS:  Unable to obtain due to intubation  A complete system-based review of systems is otherwise negative      PAST MEDICAL HISTORY:  Past Medical History:   Diagnosis Date    Anxiety     Arthritis     GERD (gastroesophageal reflux disease)     Hidradenitis suppurativa     Hypertension     Lipodermatosclerosis of both lower extremities     Lymphedema     Sciatic leg pain     left side    SOB (shortness of breath)     Stomach ulcer      Past Surgical History:   Procedure Laterality Date    BILATERAL SALPINGOOPHORECTOMY N/A 2022    Procedure: RIGHT SALPINGO-OOPHORECTOMY, LEFT OOPHORECTOMY;  Surgeon: Yusef Toussaint DO;  [facility];  Service: General     SECTION      FL RETROGRADE PYELOGRAM  2022    HARTMANS PROCEDURE N/A 2022    Procedure: END COLOSTOMY;  Surgeon: Yusef Toussaint DO;  [facility];  Service: General    LAPAROTOMY N/A 2022    Procedure: LAPAROTOMY EXPLORATORY, DRAINAGE OF INTRA -ABDOMINAL ABSCESS;  Surgeon: Yusef Toussaint DO;  [facility];  Service: General    OH CYSTOURETHROSCOPY,URETER CATHETER Right 2022    Procedure: CYSTOSCOPY RETROGRADE PYELOGRAM WITH INSERTION STENT URETERAL;  Surgeon: Janeth Valles MD;  Location[facility];  Service: Urology     [address] 3 1-4 CM TRUNK,ARM,LEG Right 2020    Procedure: EXCISION BIOPSY TISSUE LESION/MASS UPPER EXTREMITY;  Surgeon: Shade Johnston DO;  Location: [facility]; Service: General    TONSILLECTOMY      TUMOR REMOVAL Right     5th finger       FAMILY HISTORY:  Non-contributory    SOCIAL HISTORY:  Social History     Substance and Sexual Activity   Alcohol Use Not Currently    Comment: holidays     Social History     Substance and Sexual Activity   Drug Use No     Social History     Tobacco Use   Smoking Status Light Tobacco Smoker    Packs/day: 0 25    Years: 20 00    Pack years: 5 00    Types: Cigarettes   Smokeless Tobacco Never Used   Tobacco Comment    Only smoke once in awhile       ALLERGIES:  Allergies   Allergen Reactions    Bee Venom Anaphylaxis, Shortness Of Breath and Swelling       MEDICATIONS:  All current active medications have been reviewed  PHYSICAL EXAM:  Vitals:  Temp:  [97 8 °F (36 6 °C)-100 5 °F (38 1 °C)] 98 °F (36 7 °C)  HR:  [] 84  Resp:  [12-29] 21  BP: (100-134)/(50-88) 112/71  SpO2:  [92 %-100 %] 99 %  Temp (24hrs), Av 8 °F (37 1 °C), Min:97 8 °F (36 6 °C), Max:100 5 °F (38 1 °C)  Current: Temperature: 98 °F (36 7 °C)     Physical Exam:  General:  Well-nourished, well-developed, in no acute distress  Eyes:  Normal lids, eyes closed  Oropharynx:  No ulcers, no lesions  Neck:  Supple, no lymphadenopathy  Lungs:  Ventilated  respiratory excursion, no accessory muscle use  Cardiac:  Regular rate and rhythm, extremities well perfused  Abdomen:  Soft, non-tender, non-distended  Extremities:  No peripheral cyanosis, clubbing, or edema  Skin:  No rashes, no ulcers  Neurological:  Moves all four extremities spontaneously, sensation grossly intact    LABS, IMAGING, & OTHER STUDIES:  Lab Results:  I have personally reviewed pertinent labs    Results from last 7 days   Lab Units 22  0615 22  0221 222 227 22  2127 22  0515 22  0618 22  0618   POTASSIUM mmol/L 4 5 4 5  --   --   --  3 9   < > 4 6   CHLORIDE mmol/L 110* 111*  --   --   --  105   < > 104   CO2 mmol/L 21 21  --   --   --  30 < > 24   CO2, I-STAT mmol/L  --   --  22   < > 22  --   --   --    BUN mg/dL 20 19  --   --   --  39*   < > 12   CREATININE mg/dL 1 46* 1 59*  --   --   --  1 30   < > 1 10   EGFR ml/min/1 73sq m 39 35  --   --   --  45   < > 55   GLUCOSE, ISTAT mg/dl  --   --  172*  --  147*  --   --   --    CALCIUM mg/dL 8 4 7 7*  --   --   --  8 2*   < > 8 8   AST U/L  --  8  --   --   --  20  --  16   ALT U/L  --  10*  --   --   --  14  --  19   ALK PHOS U/L  --  62  --   --   --  72  --  92    < > = values in this interval not displayed  Results from last 7 days   Lab Units 01/17/22  0615 01/17/22  0221 01/16/22  2322 01/16/22  2127 01/16/22  1826 01/16/22  0515 01/16/22  0515   WBC Thousand/uL 10 75* 8 93  --   --   --   --  15 41*   HEMOGLOBIN g/dL 8 0* 8 1*  --   --   --   --  10 1*   I STAT HEMOGLOBIN g/dl  --   --  8 8*   < >  --   --   --    PLATELETS Thousands/uL 252 226  --   --  293   < > 271    < > = values in this interval not displayed  Results from last 7 days   Lab Units 01/16/22  2102   GRAM STAIN RESULT  1+ Polys  No organisms seen       Imaging Studies:   I have personally reviewed pertinent imaging study reports and images in PACS  CT A/P reviewed personally extensive pelvic abscess with right ureteronephrosis  EKG, Pathology, and Other Studies:   I have personally reviewed pertinent reports

## 2022-01-17 NOTE — H&P (VIEW-ONLY)
UROLOGY CONSULTATION NOTE     Patient Identifiers: Willi Snyder (MRN [id number])  Service Requesting Consultation:  Surgery  Service Providing Consultation:  Urology, Saskia Jha MD    Date of Service: 2022  Inpatient consult to Urology  Consult performed by: Saskia Jha MD  Consult ordered by: Deysi Beltran MD          Reason for Consultation:  acute diverticulitis with abscess formation, right hydronephrosis    History of Present Illness:     Willi Snyder is a 60 y o  old female with morbid obesity, anxiety, GERD, gastric ulcer, hypertension who presented with perforated diverticulitis and intra-abdominal abscess with phlegmon  She is to be taken to the operating room emergently this evening for [facility] procedure  Urology has been consulted for placement of preoperative stents  The patient notes she has been having difficulty voiding and has a Galan catheter in place  She denies gross hematuria  ASSESSMENT & PLAN:     Moderate right hydronephrosis secondary to worsening pelvic phlegmon/abscess secondary to perforated diverticulitis  Recommendation:  I will place a right ureteral stent as well as a left ureteral catheter to aid in the patient's apartment procedure  The right ureteral stent can remain indwelling well the patient heals      Past Medical, Past Surgical History:     Past Medical History:   Diagnosis Date    Anxiety     Arthritis     GERD (gastroesophageal reflux disease)     Hidradenitis suppurativa     Hypertension     Lipodermatosclerosis of both lower extremities     Lymphedema     Sciatic leg pain 2006    left side    SOB (shortness of breath)     Stomach ulcer    :    Past Surgical History:   Procedure Laterality Date     SECTION      MT EXC SKIN BENIG 3 1-4 CM TRUNK,ARM,LEG Right 2020    Procedure: EXCISION BIOPSY TISSUE LESION/MASS UPPER EXTREMITY;  Surgeon: Corinna Villafuerte DO;  [facility];  Service: Kelton Barnard TONSILLECTOMY      TUMOR REMOVAL Right     5th finger   :    Medications, Allergies:     Current Facility-Administered Medications   Medication Dose Route Frequency    [MAR Hold] heparin (porcine) subcutaneous injection 7,500 Units  7,500 Units Subcutaneous Q8H [address]    multi-electrolyte (PLASMALYTE-A/ISOLYTE-S PH 7 4) IV solution  125 mL/hr Intravenous Continuous    [MAR Hold] nystatin (MYCOSTATIN) powder   Topical BID    [MAR Hold] pantoprazole (PROTONIX) injection 40 mg  40 mg Intravenous Q12H [address]    [MAR Hold] piperacillin-tazobactam (ZOSYN) 4 5 g in sodium chloride 0 9 % 100 mL IVPB  4 5 g Intravenous Q6H       Allergies: Allergies   Allergen Reactions    Bee Venom Anaphylaxis, Shortness Of Breath and Swelling   :    Social and Family History:   Social History:   Social History     Tobacco Use    Smoking status: Light Tobacco Smoker     Packs/day: 0 25     Years: 20 00     Pack years: 5 00     Types: Cigarettes    Smokeless tobacco: Never Used    Tobacco comment: Only smoke once in awhile   Vaping Use    Vaping Use: Never used   Substance Use Topics    Alcohol use: Not Currently     Comment: holidays    Drug use: No        Social History     Tobacco Use   Smoking Status Light Tobacco Smoker    Packs/day: 0 25    Years: 20 00    Pack years: 5 00    Types: Cigarettes   Smokeless Tobacco Never Used   Tobacco Comment    Only smoke once in awhile       Family History:  Family History   Problem Relation Age of Onset    Cancer Mother     Hypertension Mother     COPD Father     Diabetes Father    :     Review of Systems:     General: +Fever,- chills, or night sweats  Cardiac: Negative for chest pain  Pulmonary: Negative for shortness of breath  Gastrointestinal: + Abdominal pain, no  nausea, vomiting, or diarrhea   Genitourinary: See HPI above  Neurologic: No focal signs   Musculo-skeletal: No complaints    All other systems queried were negative      Physical Exam:     /70   Pulse (!) 106   Temp 99 8 °F (37 7 °C)   Resp 20   SpO2 92%   General appearance: alert and oriented, in no acute distress and morbidly obese  Head: Normocephalic, without obvious abnormality, atraumatic  Neck: supple, symmetrical, trachea midline  Back: symmetric, no curvature  ROM normal  No CVA tenderness  Lungs: Normal respiratory effort  Heart: S1, S2 normal  Abdomen: Obese, with tenderness and guarding  Extremities: Severe obesity  Neurologic: Grossly normal      Labs:     Lab Results   Component Value Date    HGB 10 1 (L) 01/16/2022    HCT 33 1 (L) 01/16/2022    WBC 15 41 (H) 01/16/2022     01/16/2022   ]    Lab Results   Component Value Date    K 3 9 01/16/2022     01/16/2022    CO2 30 01/16/2022    BUN 39 (H) 01/16/2022    CREATININE 1 30 01/16/2022    CALCIUM 8 2 (L) 01/16/2022   ]    Imaging:   I personally reviewed the images and report of the following studies, and reviewed them with the patient:  CT abdomen/pelvis          Thank you for allowing me to participate in this patients care  Please do not hesitate to call with any additional questions    Matt Rasmussen MD

## 2022-01-17 NOTE — TREATMENT PLAN
**Please note, this is a socially distanced encounter due to patient's COVID status  Patient was seen face to face at patient's door  Not all components of physical examination could be completed  All pertinent data & imaging reviewed with  attending, Dr Francisco Javier Mercado  who will provide attestation/addendum above  Plan provided below  HPI:   Patient is a 57-year-old COVID positive female was transferred from [facility] for perforated diverticulum with associated right hydronephrosis  She was taken to the operating room and is now postop day 1 exploratory laparotomy drainage of abdominal abscess, sigmoid resection, left spacing oophorectomy, right oophorectomy, and colostomy  Along with cystoscopy right JJ stent placement and left ureteral catheter placement  Left urinary catheter removed in OR  Subjective information unable to be obtained at this time  Objective:  Physical Exam  Constitutional:       Appearance: She is obese  She is ill-appearing and toxic-appearing  Comments: Intubated   HENT:      Head: Normocephalic and atraumatic  Right Ear: External ear normal       Left Ear: External ear normal       Nose: Nose normal    Cardiovascular:      Rate and Rhythm: Normal rate  Pulmonary:      Effort: Pulmonary effort is normal       Comments: Intubated  Skin:     General: Skin is warm and dry           Lab Results   Component Value Date    WBC 10 75 (H) 01/17/2022    HGB 8 0 (L) 01/17/2022    HCT 26 8 (L) 01/17/2022    MCV 82 01/17/2022     01/17/2022     Lab Results   Component Value Date    SODIUM 137 01/17/2022    K 4 5 01/17/2022     (H) 01/17/2022    CO2 21 01/17/2022    BUN 20 01/17/2022    CREATININE 1 46 (H) 01/17/2022    GLUC 141 (H) 01/17/2022    CALCIUM 8 4 01/17/2022       Assessment:  57-year-old female with septic shock secondary to perforated diverticulum with hydronephrosis on the right postop day 1 ex lap drainage of abdominal abscesssigmoid resection, left spacing oophorectomy, right oophorectomy, and colostomy  Plan:  -maintain urethral Galan catheter until patient is ambulatory and on medical-surgical unit  -encourage ambulation  -provided good bowel regimen  -ureteral stent to remain in place for approximately 6 weeks  Urology office will contact patient when she is discharged to schedule removal   -continue to trend creatinine currently 1 46 originally 4 on admission   -no further  intervention required this admission  Urology will sign off  We are always available for additional questions and concerns regarding this patient          Riley Bhagat PA-C

## 2022-01-17 NOTE — ANESTHESIA POSTPROCEDURE EVALUATION
Post-Op Assessment Note    CV Status:  Stable  Pain Score: 0    Pain management: adequate     Mental Status:  Somnolent   Hydration Status:  Stable   PONV Controlled:  None   Airway Patency:  Patent  Airway: intubated      Post Op Vitals Reviewed: Yes      Staff: CRNA         No complications documented      BP   110/53   Temp   97 8   Pulse 82   Resp   16   SpO2   99

## 2022-01-17 NOTE — PROGRESS NOTES
Progress Note - Steve Bo 60 y o  female MRN: [id number]    Unit/Bed#: ICU 07 Encounter: [id number]      Assessment:  59 y/o F w perforated diverticulitis s/p Ortega's procedure on   Remains intubated  Now on pressure support  Ab 3/ 37/156/19/-6   Ostomy: maroon  No output  UOP: minimal  380 cc    ADIA x2  (L) 40 cc serosang  (R) 70 cc serosang  Labs:   Wbc 15-> 8  Hgb: 10-> 8  Cr: 1 3-> 1 59    Plan:  Wean vent  Spontaneous breathing trial  Wean sedation  Continue NPO/ NGT  Colostomy care  Maintain ADIA drains  Maintain rectal malecot drain  Continue villanueva and R ureteral stent  dvt ppx     Subjective:   Transferred to ICU post op around 3 am  Has been off pressors since transfer  Tolerating pressure support this morning  Objective:     Vitals: Blood pressure 134/70, pulse 82, temperature 99 8 °F (37 7 °C), resp  rate 12, SpO2 100 %  ,There is no height or weight on file to calculate BMI  Intake/Output Summary (Last 24 hours) at 2022 0304  Last data filed at 2022 0137  Gross per 24 hour   Intake 6350 ml   Output 330 ml   Net 6020 ml       Physical Exam  General: intubated, sedated  HEENT: NC/AT  MMM  Cv: RRR  Lungs: normal effort  Ab: Soft, non distended  ADIA x2 serosang  Rectal malecot minimal seropurulent output  Ex: b/l LE severe lymph edema  Neuro: intubated, sedated  Invasive Devices  Report    Central Venous Catheter Line            CVC Central Lines 22 Triple <1 day          Peripheral Intravenous Line            Peripheral IV 22 Dorsal (posterior); Right Hand 4 days    Peripheral IV 01/15/22 Dorsal (posterior); Right Forearm 1 day    Peripheral IV 22 Left Arm <1 day    Peripheral IV 22 Right Arm <1 day    Peripheral IV 22 Right Arm <1 day          Arterial Line            Arterial Line 22 Left Radial <1 day          Drain            Closed/Suction Drain LLQ Bulb 19 Fr  <1 day    Closed/Suction Drain RLQ Bulb 19 Fr  <1 day    Colostomy Other (comment) LUQ <1 day    NG/OG/Enteral Tube Nasogastric 18 Fr Right nare <1 day    Rectal Tube Without balloon <1 day    Urethral Catheter Latex 18 Fr  <1 day          Airway            ETT  Hi-Lo; Cuffed;Oral;Inflated 7 5 mm <1 day                Lab, Imaging and other studies: I have personally reviewed pertinent reports      VTE Pharmacologic Prophylaxis: Sequential compression device (Venodyne)   VTE Mechanical Prophylaxis: sequential compression device

## 2022-01-17 NOTE — OP NOTE
PERATIVE REPORT  PATIENT NAME: Everardo Villanueva    :  1965  MRN: [id number]  Pt Location: [facility] OR ROOM 08    SURGERY DATE: 2022    Surgeon(s) and Role:  Panel 1:     * Emil Enamorado DO - Primary     * Anastacio Gonzales MD - Assisting     * Daniel Borrero MD - Assisting  Panel 2:     * Daniel Chapman MD - Primary    Preop Diagnosis:  Diverticulitis of large intestine with perforation without bleeding [K57 20]    Post-Op Diagnosis Codes:     * Diverticulitis of large intestine with perforation without bleeding [K57 20]     * Hydronephrosis of right kidney [N13 30]    Procedure:  Cystoscopy, right stent placement with right retrograde pyelogram, placement of left ureteral catheter    Specimen(s):  * No specimens in log *    Estimated Blood Loss:   Minimal    Drains:  Urethral Catheter Latex 18 Fr  (Active)   Number of days: 0       Anesthesia Type:   General    Operative Indications:  Diverticulitis of large intestine with perforation without bleeding [K57 20]  Right hydronephrosis    Urology was asked by surgery to place a right stent secondary to the hydronephrosis and a left ureteral catheter to help identify the ureters during the surgical portion of the patient's procedure  Operative Findings:  Normal bladder and orifices  A 6 Georgian contour stent was placed on the right side to treat the hydronephrosis and aid in identification of the right ureter  Right retrograde study confirmed  moderate right hydronephrosis  A left ureteral catheter (5 Georgian tiger tail) was placed and left attached to the Galan catheter to aid in identification of the left ureter  An 25 Georgian Galan catheter was placed at the conclusion the urologic portion of the procedure  There was 10 cc in the balloon  Complications:   None    Procedure and Technique:  The patient was brought to the operating room properly identified  General anesthesia was administered    She was placed in lithotomy position and padded appropriately  Compression boots were not utilized secondary to the large size of her legs  Intravenous antibiotic was administered by Anesthesia  An appropriate time-out was performed  Cystourethroscopy was performed with 25 Yemeni cystoscope with findings as above  A Bentson wire was easily passed up the right collecting system under fluoroscopic guidance into the area the right kidney  A 6 Yemeni contour stent was then passed over the wire and pushed into position with the pusher  With withdrawal of the guidewire nice coil was noted in the area the renal pelvis  This was confirmed with a retrograde injection of contrast   The stent was seen to be well coiled in the renal pelvis  The stent was detached from the pusher leaving a nice coil in the bladder  Next a 5 [place] tiger tail catheter was easily placed up the left collecting system into the area of the left renal pelvis  This was confirmed fluoroscopically  The cystoscope was then removed leaving both the stent and the left ureteral catheter in place  An 25 Yemeni Galan catheter was then placed in the bladder and the balloon filled to 10 cc  The catheter drained and irrigated well  The left ureteral catheter was then plugged into the drainage bag with an adapter  The ureteral catheter was secured to the Galan   The patient tolerated the urologic portion of the procedure well  The case was turned over to surgery with patient satisfactory condition  I was present for the entire urologic portion of the procedure      Patient Disposition:  hemodynamically stable      SIGNATURE: Sandie Keating MD  DATE: January 16, 2022  TIME: 8:49 PM

## 2022-01-17 NOTE — CONSULTS
UROLOGY CONSULTATION NOTE     Patient Identifiers: Tamela Gamez (MRN [id number])  Service Requesting Consultation:  Surgery  Service Providing Consultation:  Urology, Myra Woody MD    Date of Service: 2022  Inpatient consult to Urology  Consult performed by: Myra Woody MD  Consult ordered by: Jaz Song MD          Reason for Consultation:  acute diverticulitis with abscess formation, right hydronephrosis    History of Present Illness:     Tamela Gamez is a 60 y o  old female with morbid obesity, anxiety, GERD, gastric ulcer, hypertension who presented with perforated diverticulitis and intra-abdominal abscess with phlegmon  She is to be taken to the operating room emergently this evening for [facility] procedure  Urology has been consulted for placement of preoperative stents  The patient notes she has been having difficulty voiding and has a Galan catheter in place  She denies gross hematuria  ASSESSMENT & PLAN:     Moderate right hydronephrosis secondary to worsening pelvic phlegmon/abscess secondary to perforated diverticulitis  Recommendation:  I will place a right ureteral stent as well as a left ureteral catheter to aid in the patient's apartment procedure  The right ureteral stent can remain indwelling well the patient heals      Past Medical, Past Surgical History:     Past Medical History:   Diagnosis Date    Anxiety     Arthritis     GERD (gastroesophageal reflux disease)     Hidradenitis suppurativa     Hypertension     Lipodermatosclerosis of both lower extremities     Lymphedema     Sciatic leg pain 2006    left side    SOB (shortness of breath)     Stomach ulcer    :    Past Surgical History:   Procedure Laterality Date     SECTION      KY EXC SKIN BENIG 3 1-4 CM TRUNK,ARM,LEG Right 2020    Procedure: EXCISION BIOPSY TISSUE LESION/MASS UPPER EXTREMITY;  Surgeon: Werner Foley DO;  [facility];  [facility]      TUMOR REMOVAL Right     5th finger   :    Medications, Allergies:     Current Facility-Administered Medications   Medication Dose Route Frequency    [MAR Hold] heparin (porcine) subcutaneous injection 7,500 Units  7,500 Units Subcutaneous Q8H [address]    multi-electrolyte (PLASMALYTE-A/ISOLYTE-S PH 7 4) IV solution  125 mL/hr Intravenous Continuous    [MAR Hold] nystatin (MYCOSTATIN) powder   Topical BID    [MAR Hold] pantoprazole (PROTONIX) injection 40 mg  40 mg Intravenous Q12H [address]    [MAR Hold] piperacillin-tazobactam (ZOSYN) 4 5 g in sodium chloride 0 9 % 100 mL IVPB  4 5 g Intravenous Q6H       Allergies: Allergies   Allergen Reactions    Bee Venom Anaphylaxis, Shortness Of Breath and Swelling   :    Social and Family History:   Social History:   Social History     Tobacco Use    Smoking status: Light Tobacco Smoker     Packs/day: 0 25     Years: 20 00     Pack years: 5 00     Types: Cigarettes    Smokeless tobacco: Never Used    Tobacco comment: Only smoke once in awhile   Vaping Use    Vaping Use: Never used   Substance Use Topics    Alcohol use: Not Currently     Comment: holidays    Drug use: No        Social History     Tobacco Use   Smoking Status Light Tobacco Smoker    Packs/day: 0 25    Years: 20 00    Pack years: 5 00    Types: Cigarettes   Smokeless Tobacco Never Used   Tobacco Comment    Only smoke once in awhile       Family History:  Family History   Problem Relation Age of Onset    Cancer Mother     Hypertension Mother     COPD Father     Diabetes Father    :     Review of Systems:     General: +Fever,- chills, or night sweats  Cardiac: Negative for chest pain  Pulmonary: Negative for shortness of breath  Gastrointestinal: + Abdominal pain, no  nausea, vomiting, or diarrhea   Genitourinary: See HPI above  Neurologic: No focal signs   Musculo-skeletal: No complaints    All other systems queried were negative      Physical Exam:     /70   Pulse (!) 106   Temp 99 8 °F (37 7 °C)   Resp 20   SpO2 92%   General appearance: alert and oriented, in no acute distress and morbidly obese  Head: Normocephalic, without obvious abnormality, atraumatic  Neck: supple, symmetrical, trachea midline  Back: symmetric, no curvature  ROM normal  No CVA tenderness  Lungs: Normal respiratory effort  Heart: S1, S2 normal  Abdomen: Obese, with tenderness and guarding  Extremities: Severe obesity  Neurologic: Grossly normal      Labs:     Lab Results   Component Value Date    HGB 10 1 (L) 01/16/2022    HCT 33 1 (L) 01/16/2022    WBC 15 41 (H) 01/16/2022     01/16/2022   ]    Lab Results   Component Value Date    K 3 9 01/16/2022     01/16/2022    CO2 30 01/16/2022    BUN 39 (H) 01/16/2022    CREATININE 1 30 01/16/2022    CALCIUM 8 2 (L) 01/16/2022   ]    Imaging:   I personally reviewed the images and report of the following studies, and reviewed them with the patient:  CT abdomen/pelvis          Thank you for allowing me to participate in this patients care  Please do not hesitate to call with any additional questions    Dee Polo MD

## 2022-01-17 NOTE — ANESTHESIA PROCEDURE NOTES
Arterial Line Insertion  Performed by: Idalmis Gutierrez CRNA  Authorized by: Hussain Cotto MD   Consent: Verbal consent obtained  Written consent obtained  Risks and benefits: risks, benefits and alternatives were discussed  Consent given by: patient  Patient understanding: patient states understanding of the procedure being performed  Patient consent: the patient's understanding of the procedure matches consent given  Procedure consent: procedure consent matches procedure scheduled  Relevant documents: relevant documents present and verified  Required items: required blood products, implants, devices, and special equipment available  Patient identity confirmed: arm band and hospital-assigned identification number  Time out: Immediately prior to procedure a "time out" was called to verify the correct patient, procedure, equipment, support staff and site/side marked as required  Preparation: Patient was prepped and draped in the usual sterile fashion  Indications: hemodynamic monitoring  Orientation:  Left  Location: radial artery  Sedation:  Patient sedated: geta      Procedure Details:  Federico's test normal: yes  Needle gauge: 20  Seldinger technique: Seldinger technique used  Number of attempts: 3 (micro punture under ultrasound guidance)    Post-procedure:  Post-procedure: dressing applied  Waveform: good waveform  Post-procedure CNS: normal  Patient tolerance: patient tolerated the procedure well with no immediate complications

## 2022-01-17 NOTE — ANESTHESIA PROCEDURE NOTES
Central Line Insertion  Performed by: Giovanni Arellano MD  Authorized by: Giovanni Arellano MD     Date/Time: 1/17/2022 1:25 AM  Catheter Type:  triple lumen  Consent: Written consent obtained  Risks and benefits: risks, benefits and alternatives were discussed  Consent given by: patient  Patient identity confirmed: verbally with patient  Time out: Immediately prior to procedure a "time out" was called to verify the correct patient, procedure, equipment, support staff and site/side marked as required  Indications: vascular access  Location details: right internal jugular  Catheter size: 7 Fr  Patient position: Trendelenburg  Assessment: blood return through all ports and free fluid flow  Preparation: skin prepped with 2% chlorhexidine  Skin prep agent dried: skin prep agent completely dried prior to procedure  Sterile barriers: all five maximum sterile barriers used - cap, mask, sterile gown, sterile gloves, and large sterile sheet  Hand hygiene: hand hygiene performed prior to central venous catheter insertion  Ultrasound guidance: yes  sterile gel and probe cover used in ultrasound-guided central venous catheter insertionultrasound permanent image saved  Number of attempts: 1  Successful placement: yes  Post-procedure: line sutured,  dressing applied and chlorhexidine patch applied  Patient tolerance: Patient tolerated the procedure well with no immediate complications  Comments: Unremarkable central line

## 2022-01-17 NOTE — UTILIZATION REVIEW
Initial Clinical Review    Patient was transferred from [facility] to [facility] for inpatient surgery due to perforation of the large bowel  01-16-22  CT abdomen pelvis 1/6 showing sigmoid diverticulitis with microperforation and adjacent small pelvic abscess  Repeat CT abdomen pelvis 1/12 showing mild interval increase in pelvic collection now encasing right ureter and right external iliac artery  Second repeat CT abdomen pelvis 1/15 with worsening worsening inflammatory changes throughout the midline pelvis surrounding the rectosigmoid junction  S/P exploratory laparotomy with Payal's procedure 1/16      Elective inpatient surgical procedure  Age/Sex: 60 y o  female  Surgery Date: 01-16-22  Procedure:   Anesthesia: General  Phase 1   Urology  Post-Op Diagnosis Codes:     * Diverticulitis of large intestine with perforation without bleeding [K57 20]     * Hydronephrosis of right kidney [N13 30]     Procedure:  Cystoscopy, right stent placement with right retrograde pyelogram, placement of left ureteral catheter   Operative Findings:  Normal bladder and orifices  A 6 Vietnamese contour stent was placed on the right side to treat the hydronephrosis and aid in identification of the right ureter  Right retrograde study confirmed  moderate right hydronephrosis  A left ureteral catheter (5 Vietnamese tiger tail) was placed and left attached to the Galan catheter to aid in identification of the left ureter  An 25 Vietnamese Galan catheter was placed at the conclusion the urologic portion of the procedure  There was 10 cc in the balloon      Procedure:   Preop Diagnosis:  Diverticulitis of large intestine with perforation without bleeding [K57 20]   Post-Op Diagnosis Codes:     * Diverticulitis of large intestine with perforation without bleeding [K57 20]     * Hydronephrosis of right kidney [N13 30]  Procedure(s) (LRB):  LAPAROTOMY EXPLORATORY, DRAINAGE OF INTRA -ABDOMINAL ABSCESS (N/A)  CYSTOSCOPY RETROGRADE PYELOGRAM WITH INSERTION STENT URETERAL (Right)  END COLOSTOMY (N/A)  RIGHT SALPINGO-OOPHORECTOMY, LEFT OOPHORECTOMY (N/A)    Anesthesia: general  Operative Findings: Perforated diverticulitis with tissue necrosis of proximal rectum and ovaries at site of perforation, severely inflamed tissue of rectal stump precluding stump closure, diseased sigmoid removed and end colostomy made  Rectal Malecot drain placed  2 pelvic ADIA drains placed     POD#1 Progress Note:  s/p ex lap, drainage of abdominal abscess, sigmoid resection, left salping oophorectomy, right oophorectomy, end colostomy  She had a necrotic rectum which did not allow closure of the rectal stump Patient remains on vent NPO,NG tube to to suction IVF,IV propofol drip, neuro q 4 hrs continuous cardio-pulmonary and pulse oximetry  SCD, CVC A-line ADIA drains x 2   CXR 01-17-22  FINDINGS:   ET tube tip terminates about 4 5 cm above the louis  Enteric tube courses to the stomach  Right IJ central line tip terminates in the region of the distal SVC  Cardiomediastinal silhouette appears unremarkable  The lungs are clear   No pneumothorax or pleural effusion  Osseous structures appear within normal limits for patient age  Impression:       Lines and tubes as above   No pneumothorax  Admission Orders: Date/Time/Statement:   Admission Orders (From admission, onward)     Ordered        01/16/22 1801  Inpatient Admission  Once                      Orders Placed This Encounter   Procedures    Inpatient Admission     Standing Status:   Standing     Number of Occurrences:   1     Order Specific Question:   Level of Care     Answer:   Critical Care [15]     Order Specific Question:   Estimated length of stay     Answer:   More than 2 Midnights     Order Specific Question:   Certification     Answer:   I certify that inpatient services are medically necessary for this patient for a duration of greater than two midnights   See H&P and MD Progress Notes for additional information about the patient's course of treatment  Vital Signs: /71   Pulse 84   Temp 98 °F (36 7 °C) (Oral)   Resp 21   Ht 5' 7" (1 702 m)   Wt (!) 170 kg (374 lb 12 5 oz)   SpO2 99%   BMI 58 70 kg/m²     Pertinent Labs/Diagnostic Test Results:   Results from last 7 days   Lab Units 01/16/22  1850   SARS-COV-2  Positive*     Results from last 7 days   Lab Units 01/17/22 0615 01/17/22 0221 01/16/22 2322 01/16/22 2127 01/16/22 1826 01/16/22 0515 01/16/22  0515 01/14/22 0618 01/14/22 0618 01/13/22 0626 01/13/22  0626   WBC Thousand/uL 10 75* 8 93  --   --   --   --  15 41*   < > 7 70   < > 8 91   HEMOGLOBIN g/dL 8 0* 8 1*  --   --   --   --  10 1*   < > 9 9*   < > 9 4*   I STAT HEMOGLOBIN g/dl  --   --  8 8* 9 5*  --   --   --   --   --   --   --    HEMATOCRIT % 26 8* 27 3*  --   --   --   --  33 1*   < > 32 6*   < > 31 8*   HEMATOCRIT, ISTAT %  --   --  26* 28*  --   --   --   --   --   --   --    PLATELETS Thousands/uL 252 226  --   --  293   < > 271   < > 262   < > 263   NEUTROS ABS Thousands/µL  --   --   --   --   --   --  12 56*  --  4 88  --  6 13   BANDS PCT %  --  11*  --   --   --   --   --   --   --   --   --     < > = values in this interval not displayed           Results from last 7 days   Lab Units 01/17/22 0615 01/17/22 0221 01/16/22 2322 01/16/22 2127 01/16/22 0515 01/14/22  0618 01/14/22  0618 01/13/22  0626 01/13/22  0626   SODIUM mmol/L 137 139  --   --  143  --  136  --  135*   POTASSIUM mmol/L 4 5 4 5  --   --  3 9  --  4 6  --  4 4   CHLORIDE mmol/L 110* 111*  --   --  105  --  104  --  104   CO2 mmol/L 21 21  --   --  30   < > 24   < > 23   CO2, I-STAT mmol/L  --   --  22 22  --   --   --   --   --    ANION GAP mmol/L 6 7  --   --  8  --  8  --  8   BUN mg/dL 20 19  --   --  39*  --  12  --  13   CREATININE mg/dL 1 46* 1 59*  --   --  1 30  --  1 10  --  1 11   EGFR ml/min/1 73sq m 39 35  --   --  45  --  55  --  54   CALCIUM mg/dL 8 4 7 7*  -- --  8 2*  --  8 8  --  8 5   CALCIUM, IONIZED mmol/L 1 09* 1 03*  --   --   --   --   --   --   --    CALCIUM, IONIZED, ISTAT mmol/L  --   --  1 09* 1 11*  --   --   --   --   --    MAGNESIUM mg/dL 3 5* 1 6  --   --  1 6  --  1 9  --  1 7   PHOSPHORUS mg/dL 5 3* 5 4*  --   --   --   --   --   --   --     < > = values in this interval not displayed       Results from last 7 days   Lab Units 01/17/22 0221 01/16/22  0515 01/14/22  0618 01/13/22  0626   AST U/L 8 20 16 14   ALT U/L 10* 14 19 21   ALK PHOS U/L 62 72 92 93   TOTAL PROTEIN g/dL 5 4* 7 3 6 8 6 5   ALBUMIN g/dL 2 1* 2 7* 2 5* 2 4*   TOTAL BILIRUBIN mg/dL 0 43 0 28 0 25 0 23         Results from last 7 days   Lab Units 01/17/22  0615 01/17/22 0221 01/16/22  0515 01/14/22  0618 01/13/22  0626 01/12/22  0434 01/11/22  0810   GLUCOSE RANDOM mg/dL 141* 158* 117 96 107 94 102         Results from last 7 days   Lab Units 01/16/22  1826   HEMOGLOBIN A1C % 5 8*   EAG mg/dl 120     No results found for: BETA-HYDROXYBUTYRATE   Results from last 7 days   Lab Units 01/17/22 0222   PH ART  7 329*   PCO2 ART mm Hg 37 4   PO2 ART mm Hg 156 1*   HCO3 ART mmol/L 19 2*   BASE EXC ART mmol/L -6 1   O2 CONTENT ART mL/dL 13 4*   O2 HGB, ARTERIAL % 97 4*   ABG SOURCE  Line, Arterial         Results from last 7 days   Lab Units 01/16/22 2322 01/16/22 2127   I STAT BASE EXC mmol/L -6* -5*   I STAT O2 SAT % 98* 98*   ISTAT PH ART  7 282* 7 309*   I STAT ART PCO2 mm HG 44 0 41 9   I STAT ART PO2 mm  0 108 0   I STAT ART HCO3 mmol/L 20 8* 21 1*                 Results from last 7 days   Lab Units 01/17/22  0221   PROTIME seconds 17 9*   INR  1 55*         Results from last 7 days   Lab Units 01/13/22  0626 01/11/22  0810   PROCALCITONIN ng/ml 0 24 0 43*     Results from last 7 days   Lab Units 01/17/22  0221 01/15/22  0909   LACTIC ACID mmol/L 1 4 0 8     Results from last 7 days   Lab Units 01/16/22  1850   INFLUENZA A PCR  Negative   INFLUENZA B PCR  Negative   RSV PCR Negative       Results from last 7 days   Lab Units 01/16/22 2102   GRAM STAIN RESULT  1+ Polys  No organisms seen       Diet: *NPO  Mobility: sedated on vent   DVT Prophylaxis: heparin/ SCD    Medications/Pain Control:   Scheduled Medications:  chlorhexidine, 15 mL, Mouth/Throat, Q12H RIIS  fluconazole, 400 mg, Intravenous, Q24H  heparin (porcine), 7,500 Units, Subcutaneous, Q8H IRIS  nystatin, , Topical, BID  pantoprazole, 40 mg, Intravenous, Q12H IRIS  piperacillin-tazobactam, 4 5 g, Intravenous, Q6H      Continuous IV Infusions:  multi-electrolyte, 100 mL/hr, Intravenous, Continuous  propofol, 5-50 mcg/kg/min, Intravenous, Titrated      PRN Meds:  fentanyl citrate (PF), 50 mcg, Intravenous, Q1H PRN        Network Utilization Review Department  ATTENTION: Please call with any questions or concerns to [phone] and carefully listen to the prompts so that you are directed to the right person  All voicemails are confidential   Ty Limb all requests for admission clinical reviews, approved or denied determinations and any other requests to dedicated fax number below belonging to the campus where the patient is receiving treatment   List of dedicated fax numbers for the Facilities:  [address] DENIALS (Administrative/Medical Necessity) [phone]   [address] (Maternity/NICU/Pediatrics) [phone]   [address] [place] [address] [place] [address] [address] [address]   Sudha Beal  [phone]   [place] [address] [address] [phone]

## 2022-01-17 NOTE — INTERVAL H&P NOTE
H&P reviewed  After examining the patient I find no changes in the patients condition since the H&P had been written  Vitals:    01/16/22 1645   BP: 134/70   Pulse: (!) 106   Resp: 20   Temp: 99 8 °F (37 7 °C)   SpO2: 92%   Procedure reviewed with the patient in the holding area  Risks were discussed and consent form signed  We will plan to place a right ureteral stent and a left ureteral catheter

## 2022-01-17 NOTE — CASE MANAGEMENT
Case Management Discharge Planning Note    Patient name Swati Castro  Location [address] [id number] MRN [id number]  : 1965 Date 2022       Current Admission Date: 2022  Current Admission Diagnosis:Cellulitis of abdominal wall, chest wall and groin with wounds   Patient Active Problem List    Diagnosis Date Noted    Dysphagia 2022    Acute renal failure (ARF) ([facility] ) 2022    Cellulitis of abdominal wall, chest wall and groin with wounds 2022    Diverticulitis of large intestine with perforation 2022    Right Hydroureteronephrosis 2022    Subacute vaginitis 2022    GERD (gastroesophageal reflux disease)     Other specified anxiety disorders 2019    Lumbar paraspinal muscle spasm 2019    Borderline hyperlipidemia 2019    Iron deficiency 2019    Chronic midline low back pain with left-sided sciatica 2019    Primary hypertension 2019    Morbid obesity with BMI of 60 0-69 9, adult ([facility] ) 2019    Hidradenitis suppurativa 2019    Lymphedema 2006      LOS (days): 10  Geometric Mean LOS (GMLOS) (days):   Days to GMLOS:     OBJECTIVE:  Risk of Unplanned Readmission Score: 12         Current admission status: Inpatient   Preferred Pharmacy:   [facility] [address] [place] [address] F[address]IP[address]/ Josiah Simons  [facility] [address]  Phone: [phone] Fax: [phone]    Primary Care Provider: Robert Oquendo PA-C    Primary Insurance: [employer] [facility]  Secondary Insurance:     Gretchen Guerra entry:Pt was transferred to a higher level of care on 21

## 2022-01-17 NOTE — RESPIRATORY THERAPY NOTE
RT Ventilator Management Note  Fiorella Estes 60 y o  female MRN: [id number]  Unit/Bed#: ICU 07 Encounter: [id number]      Daily Screen       1/17/2022  0231             Patient safety screen outcome[de-identified] Failed            Physical Exam:   Assessment Type: (P) Assess only  General Appearance: (P) Sedated  Respiratory Pattern: (P) Assisted  Chest Assessment: (P) Chest expansion symmetrical  Bilateral Breath Sounds: (P) Diminished,Clear      Resp Comments: (P) Recieved pt from OR already intubated with ETT size 7 5, 22 at the lip, secured with the commercial tube neumann  Pt placed on the 840 on cpap/ps 20/6  Pt admitted for Deverticulitis of large intestine with perforation without bleeding, cellulitis of the abdominal wall  Pt assess for respiratory protocol at  this time with no history of pulmonary disease nor takes any respiratory treatment at home   Will continue to monitor per respiratory protocol

## 2022-01-17 NOTE — TELEPHONE ENCOUNTER
Patient will require cysto stent removal in approximately 6 weeks  This was discussed with Dr Yinka Blum

## 2022-01-17 NOTE — QUICK NOTE
Updated son Rambo Hurtado regarding surgery, findings and need for ICU care  All questions answered       Toña Long,

## 2022-01-17 NOTE — TELEPHONE ENCOUNTER
Case reviewed (right stent for hydro with diverticulitis/phlegmon) OK for cysto stent removal with me in 6 weeks  I am OK with the march 3rd date but I prefer in a morning spot while I have ZP backup just in case   I will be solo in the afternoon and I just worry that she is a very big lady and I may need extra hands or his help

## 2022-01-17 NOTE — PLAN OF CARE
Problem: Prexisting or High Potential for Compromised Skin Integrity  Goal: Skin integrity is maintained or improved  Description: INTERVENTIONS:  - Identify patients at risk for skin breakdown  - Assess and monitor skin integrity  - Assess and monitor nutrition and hydration status  - Monitor labs   - Assess for incontinence   - Turn and reposition patient  - Assist with mobility/ambulation  - Relieve pressure over bony prominences  - Avoid friction and shearing  - Provide appropriate hygiene as needed including keeping skin clean and dry  - Evaluate need for skin moisturizer/barrier cream  - Collaborate with interdisciplinary team   - Patient/family teaching  - Consider wound care consult   Outcome: Progressing     Problem: MOBILITY - ADULT  Goal: Maintain or return to baseline ADL function  Description: INTERVENTIONS:  -  Assess patient's ability to carry out ADLs; assess patient's baseline for ADL function and identify physical deficits which impact ability to perform ADLs (bathing, care of mouth/teeth, toileting, grooming, dressing, etc )  - Assess/evaluate cause of self-care deficits   - Assess range of motion  - Assess patient's mobility; develop plan if impaired  - Assess patient's need for assistive devices and provide as appropriate  - Encourage maximum independence but intervene and supervise when necessary  - Involve family in performance of ADLs  - Assess for home care needs following discharge   - Consider OT consult to assist with ADL evaluation and planning for discharge  - Provide patient education as appropriate  Outcome: Progressing  Goal: Maintains/Returns to pre admission functional level  Description: INTERVENTIONS:  - Perform BMAT or MOVE assessment daily    - Set and communicate daily mobility goal to care team and patient/family/caregiver  - Collaborate with rehabilitation services on mobility goals if consulted  - Perform Range of Motion 3 times a day    - Reposition patient every 2 hours   - Dangle patient 3 times a day  - Stand patient 3 times a day  - Ambulate patient 3 times a day  - Out of bed to chair 3 times a day   - Out of bed for meals 3 times a day  - Out of bed for toileting  - Record patient progress and toleration of activity level   Outcome: Progressing     Problem: Potential for Falls  Goal: Patient will remain free of falls  Description: INTERVENTIONS:  - Educate patient/family on patient safety including physical limitations  - Instruct patient to call for assistance with activity   - Consult OT/PT to assist with strengthening/mobility   - Keep Call bell within reach  - Keep bed low and locked with side rails adjusted as appropriate  - Keep care items and personal belongings within reach  - Initiate and maintain comfort rounds  - Make Fall Risk Sign visible to staff  - Offer Toileting every 2 Hours, in advance of need  - Initiate/Maintain bed alarm  - Apply yellow socks and bracelet for high fall risk patients  - Consider moving patient to room near nurses station  Outcome: Progressing     Problem: GASTROINTESTINAL - ADULT  Goal: Minimal or absence of nausea and/or vomiting  Description: INTERVENTIONS:  - Administer IV fluids if ordered to ensure adequate hydration  - Maintain NPO status until nausea and vomiting are resolved  - Nasogastric tube if ordered  - Administer ordered antiemetic medications as needed  - Provide nonpharmacologic comfort measures as appropriate  - Advance diet as tolerated, if ordered  - Consider nutrition services referral to assist patient with adequate nutrition and appropriate food choices  Outcome: Progressing  Goal: Maintains or returns to baseline bowel function  Description: INTERVENTIONS:  - Assess bowel function  - Encourage oral fluids to ensure adequate hydration  - Administer IV fluids if ordered to ensure adequate hydration  - Administer ordered medications as needed  - Encourage mobilization and activity  - Consider nutritional services referral to assist patient with adequate nutrition and appropriate food choices  Outcome: Progressing  Goal: Maintains adequate nutritional intake  Description: INTERVENTIONS:  - Monitor percentage of each meal consumed  - Identify factors contributing to decreased intake, treat as appropriate  - Assist with meals as needed  - Monitor I&O, weight, and lab values if indicated  - Obtain nutrition services referral as needed  Outcome: Progressing  Goal: Establish and maintain optimal ostomy function  Description: INTERVENTIONS:  - Assess bowel function  - Encourage oral fluids to ensure adequate hydration  - Administer IV fluids if ordered to ensure adequate hydration   - Administer ordered medications as needed  - Encourage mobilization and activity  - Nutrition services referral to assist patient with appropriate food choices  - Assess stoma site  - Consider wound care consult   Outcome: Progressing  Goal: Oral mucous membranes remain intact  Description: INTERVENTIONS  - Assess oral mucosa and hygiene practices  - Implement preventative oral hygiene regimen  - Implement oral medicated treatments as ordered  - Initiate Nutrition services referral as needed  Outcome: Progressing     Problem: GENITOURINARY - ADULT  Goal: Maintains or returns to baseline urinary function  Description: INTERVENTIONS:  - Assess urinary function  - Encourage oral fluids to ensure adequate hydration if ordered  - Administer IV fluids as ordered to ensure adequate hydration  - Administer ordered medications as needed  - Offer frequent toileting  - Follow urinary retention protocol if ordered  Outcome: Progressing  Goal: Absence of urinary retention  Description: INTERVENTIONS:  - Assess patient's ability to void and empty bladder  - Monitor I/O  - Bladder scan as needed  - Discuss with physician/AP medications to alleviate retention as needed  - Discuss catheterization for long term situations as appropriate  Outcome: Progressing  Goal: Urinary catheter remains patent  Description: INTERVENTIONS:  - Assess patency of urinary catheter  - If patient has a chronic villanueva, consider changing catheter if non-functioning  - Follow guidelines for intermittent irrigation of non-functioning urinary catheter  Outcome: Progressing     Problem: SKIN/TISSUE INTEGRITY - ADULT  Goal: Skin Integrity remains intact(Skin Breakdown Prevention)  Description: Assess:  -Perform Surendra assessment every 12  -Clean and moisturize skin every 4  -Inspect skin when repositioning, toileting, and assisting with ADLS  -Assess extremities for adequate circulation and sensation     Bed Management:  -Have minimal linens on bed & keep smooth, unwrinkled  -Change linens as needed when moist or perspiring  -Avoid sitting or lying in one position for more than 2 hours while in bed  -Keep HOB at 30degrees     Toileting:  -Offer bedside commode  -Assess for incontinence every 2    Activity:  -Mobilize patient 3 times a day  -Encourage activity and walks on unit  -Encourage or provide ROM exercises   -Turn and reposition patient every 2 Hours  -Use appropriate equipment to lift or move patient in bed  -Instruct/ Assist with weight shifting every 2 hour when out of bed in chair  -Consider limitation of chair time 8 hour intervals    Skin Care:  -Avoid use of baby powder, tape, friction and shearing, hot water or constrictive clothing  -Relieve pressure over bony prominences using allevyns  -Do not massage red bony areas  Outcome: Progressing  Goal: Incision(s), wounds(s) or drain site(s) healing without S/S of infection  Description: INTERVENTIONS  - Assess and document dressing, incision, wound bed, drain sites and surrounding tissue  - Provide patient and family education  - Perform skin care/dressing changes as needed  Outcome: Progressing  Goal: Pressure injury heals and does not worsen  Description: Interventions:  - Implement low air loss mattress or specialty surface (Criteria met)  - Apply silicone foam dressing  - Instruct/assist with weight shifting every 120 minutes when in chair   - Limit chair time to 8 hour intervals  - Use special pressure reducing interventions such as allevyns when in chair   - Apply fecal or urinary incontinence containment device   - Perform passive or active ROM every 4  - Turn and reposition patient & offload bony prominences every 2 hours   - Utilize friction reducing device or surface for transfers   - Consider nutrition services referral as needed  Outcome: Progressing     Problem: PAIN - ADULT  Goal: Verbalizes/displays adequate comfort level or baseline comfort level  Description: Interventions:  - Encourage patient to monitor pain and request assistance  - Assess pain using appropriate pain scale  - Administer analgesics based on type and severity of pain and evaluate response  - Implement non-pharmacological measures as appropriate and evaluate response  - Consider cultural and social influences on pain and pain management  - Notify physician/advanced practitioner if interventions unsuccessful or patient reports new pain  Outcome: Progressing     Problem: INFECTION - ADULT  Goal: Absence or prevention of progression during hospitalization  Description: INTERVENTIONS:  - Assess and monitor for signs and symptoms of infection  - Monitor lab/diagnostic results  - Monitor all insertion sites, i e  indwelling lines, tubes, and drains  - Monitor endotracheal if appropriate and nasal secretions for changes in amount and color  - [place] appropriate cooling/warming therapies per order  - Administer medications as ordered  - Instruct and encourage patient and family to use good hand hygiene technique  - Identify and instruct in appropriate isolation precautions for identified infection/condition  Outcome: Progressing     Problem: SAFETY ADULT  Goal: Maintain or return to baseline ADL function  Description: INTERVENTIONS:  -  Assess patient's ability to carry out ADLs; assess patient's baseline for ADL function and identify physical deficits which impact ability to perform ADLs (bathing, care of mouth/teeth, toileting, grooming, dressing, etc )  - Assess/evaluate cause of self-care deficits   - Assess range of motion  - Assess patient's mobility; develop plan if impaired  - Assess patient's need for assistive devices and provide as appropriate  - Encourage maximum independence but intervene and supervise when necessary  - Involve family in performance of ADLs  - Assess for home care needs following discharge   - Consider OT consult to assist with ADL evaluation and planning for discharge  - Provide patient education as appropriate  Outcome: Progressing  Goal: Maintains/Returns to pre admission functional level  Description: INTERVENTIONS:  - Perform BMAT or MOVE assessment daily    - Set and communicate daily mobility goal to care team and patient/family/caregiver  - Collaborate with rehabilitation services on mobility goals if consulted  - Perform Range of Motion 3 times a day  - Reposition patient every 2 hours    - Dangle patient 3 times a day  - Stand patient 3 times a day  - Ambulate patient 3 times a day  - Out of bed to chair 3 times a day   - Out of bed for meals 3 times a day  - Out of bed for toileting  - Record patient progress and toleration of activity level   Outcome: Progressing  Goal: Patient will remain free of falls  Description: INTERVENTIONS:  - Educate patient/family on patient safety including physical limitations  - Instruct patient to call for assistance with activity   - Consult OT/PT to assist with strengthening/mobility   - Keep Call bell within reach  - Keep bed low and locked with side rails adjusted as appropriate  - Keep care items and personal belongings within reach  - Initiate and maintain comfort rounds  - Make Fall Risk Sign visible to staff  - Offer Toileting every 2 Hours, in advance of need  - Initiate/Maintain bed alarm  - Apply yellow socks and bracelet for high fall risk patients  - Consider moving patient to room near nurses station  Outcome: Progressing     Problem: DISCHARGE PLANNING  Goal: Discharge to home or other facility with appropriate resources  Description: INTERVENTIONS:  - Identify barriers to discharge w/patient and caregiver  - Arrange for needed discharge resources and transportation as appropriate  - Identify discharge learning needs (meds, wound care, etc )  - Arrange for interpretive services to assist at discharge as needed  - Refer to Case Management Department for coordinating discharge planning if the patient needs post-hospital services based on physician/advanced practitioner order or complex needs related to functional status, cognitive ability, or social support system  Outcome: Progressing     Problem: Knowledge Deficit  Goal: Patient/family/caregiver demonstrates understanding of disease process, treatment plan, medications, and discharge instructions  Description: Complete learning assessment and assess knowledge base    Interventions:  - Provide teaching at level of understanding  - Provide teaching via preferred learning methods  Outcome: Progressing     Problem: SAFETY,RESTRAINT: NV/NON-SELF DESTRUCTIVE BEHAVIOR  Goal: Remains free of harm/injury (restraint for non violent/non self-detsructive behavior)  Description: INTERVENTIONS:  - Instruct patient/family regarding restraint use   - Assess and monitor physiologic and psychological status   - Provide interventions and comfort measures to meet assessed patient needs   - Identify and implement measures to help patient regain control  - Assess readiness for release of restraint   Outcome: Progressing  Goal: Returns to optimal restraint-free functioning  Description: INTERVENTIONS:  - Assess the patient's behavior and symptoms that indicate continued need for restraint  - Identify and implement measures to help patient regain control  - Assess readiness for release of restraint   Outcome: Progressing     Problem: Nutrition/Hydration-ADULT  Goal: Nutrient/Hydration intake appropriate for improving, restoring or maintaining nutritional needs  Description: Monitor and assess patient's nutrition/hydration status for malnutrition  Collaborate with interdisciplinary team and initiate plan and interventions as ordered  Monitor patient's weight and dietary intake as ordered or per policy  Utilize nutrition screening tool and intervene as necessary  Determine patient's food preferences and provide high-protein, high-caloric foods as appropriate       INTERVENTIONS:  - Monitor oral intake, urinary output, labs, and treatment plans  - Assess nutrition and hydration status and recommend course of action  - Evaluate amount of meals eaten  - Assist patient with eating if necessary   - Allow adequate time for meals  - Recommend/ encourage appropriate diets, oral nutritional supplements, and vitamin/mineral supplements  - Order, calculate, and assess calorie counts as needed  - Recommend, monitor, and adjust tube feedings and TPN/PPN based on assessed needs  - Assess need for intravenous fluids  - Provide specific nutrition/hydration education as appropriate  - Include patient/family/caregiver in decisions related to nutrition  Outcome: Progressing     Problem: CHANGE IN BODY IMAGE  Goal: Pt/Family communicate acceptance of loss or change in body image and expresses psychological comfort and peace  Description: INTERVENTIONS:  - Assess patient/family anxiety and grief process related to change in body image, loss of functional status and loss of sense of self  - Assess patient/family's coping skills and provide emotional, spiritual and psychosocial support  - Provide information about the patient's health status with consideration of family and cultural values  - Communicate willingness to discuss loss and facilitate grief process with patient/family as appropriate  - Emphasize sustaining relationships within family system and community, or farzaneh/spiritual traditions  - Refer to community support groups as appropriate  - [id number], Pastoral care or other ancillary consults as needed  Outcome: Progressing

## 2022-01-17 NOTE — UTILIZATION REVIEW
Notification of Discharge   This is a Notification of Discharge from our facility [address]  Please be advised that this patient has been discharge from our facility  Below you will find the admission and discharge date and time including the patients disposition  UTILIZATION REVIEW CONTACT:  Lendel Carrel  Utilization   Network Utilization Review Department  Phone: [phone] x carefully listen to the prompts  All voicemails are confidential   Email: [email or web address]     PHYSICIAN ADVISORY SERVICES:  FOR QWOO-UK-[id number] REVIEW - MEDICAL NECESSITY DENIAL  Phone: [phone]  Fax: [phone]  Email: [email or web address]     PRESENTATION DATE: 1/6/2022  2:43 AM  OBERVATION ADMISSION DATE:   INPATIENT ADMISSION DATE: 1/6/22  7:12 AM   DISCHARGE DATE: 1/16/2022  3:34 PM  DISPOSITION: [address] [facility]      IMPORTANT INFORMATION:  Send all requests for admission clinical reviews, approved or denied determinations and any other requests to dedicated fax number below belonging to the campus where the patient is receiving treatment   List of dedicated fax numbers:  [address] DENIALS (Administrative/Medical Necessity) [phone]   [address] (Maternity/NICU/Pediatrics) [phone]   Henna Dixon [phone]   130 [facility] [phone]   [address] [place] [address] [phone]   2000 [facility] [address] [place] [address],[address] [phone]   [facility]  [phone]   [address] [facility] [address]  2401 [facility] 1000 [address] [facility] [phone]

## 2022-01-18 NOTE — PROGRESS NOTES
Progress Note - Infectious Disease   Simba Ellison 60 y o  female MRN: [id number]  Unit/Bed#: ICU 07 Encounter: [id number]      Impression/Recommendations:  1  Sepsis, POA  HR, WBC  Due to #3 +/- 4  No other appreciable source of infection  Exam otherwise benign  Initial blood/urine cultures, serial CXR negative  Unclear relevance of COVID PCR as CXR and patient intubated largely for recent surgery  Hemodynamically stable and nontoxic  Rec:  · Continue antibiotics as below  · Follow temperatures and WBC closely  · Supportive care as per the primary service     2   Perforated diverticulitis with abscess  Also complicated by #3  Status post ex lap, drainage of abscess, end colostomy, right salpingo oophorectomy and left oophorectomy 1/16  Intraoperative findings notable for perforated diverticulitis with tissue necrosis proximal rectum, ovaries at site of perforation  OR cultures pending  Rec:  · Continue Zosyn for now  · Follow up OR cultures and tailor antibiotics as indicated  · Postoperative care per surgery     3  Right hydroureteronephrosis  Due to #2  Status post cystoscopy with right ureteral stent placement 1/16  Rec:  · Follow urine output closely  · Galan per Urology until patient ambulatory  · Outpatient Urology follow-up     4  ZAHIRA on CKD  Baseline Cr 0 9-1 0  Likely multifactorial   Improving  Rec:  · Follow creatinine closely and dose-adjust antibiotics as indicated  · Recheck BMP in AM       5  Thrush with possible Candida esophagitis  Patient reported dysphagia, choking preoperatively  Rec:  · Continue fluconazole for 3 more days  · PPI per GI  · If no improvement may need eventual EGD per GI     6   COVID infection  Unvaccinated but had COVID 12/2020  Suspect coincidental finding on preoperative PCR  Unable to assess symptoms    CXR without infiltrates and hypoxia may be due to recent intubation, surgery, MO   Rec:  · No treatment indicated  · Check CRP as baseline  · Follow respiratory symptoms closely     7  MO   BMI 58      8   Hidradenitis  With multiple wounds in abdominal/groin skin folds  No apparent superinfection      Antibiotics:  Zosyn #2  Antibiotics #13  Fluconazole #11  POD #2    Subjective:  Patient seen on AM rounds  Has abdominal soreness  Had nasal congestion and sorethroat "at the other hospital"  Wants to drink water  24 Hour Events:  No documented fevers, chills, sweats, nausea, vomiting, or diarrhea  Extubated, now on 4L  Objective:  Vitals:  Temp:  [97 9 °F (36 6 °C)-99 °F (37 2 °C)] 99 °F (37 2 °C)  HR:  [] 106  Resp:  [20-43] 32  BP: ()/(55-64) 104/60  SpO2:  [94 %-100 %] 97 %  Temp (24hrs), Av 4 °F (36 9 °C), Min:97 9 °F (36 6 °C), Max:99 °F (37 2 °C)  Current: Temperature: 99 °F (37 2 °C)    Physical Exam:   General:  No acute distress  Psychiatric:  Awake and alert  Pulmonary:  Normal respiratory excursion without accessory muscle use  Abdomen:  Obese, ostomy with scant bloody output  Extremities:  No edema  Skin:  No rashes    Lab Results:  I have personally reviewed pertinent labs    Results from last 7 days   Lab Units 22  1052 22  0506 22  0615 22  0221 22  0221 22  2322 22  2127 22  2127 22  0515 22  0515   POTASSIUM mmol/L 4 5 4 3 4 5   < > 4 5  --   --   --    < > 3 9   CHLORIDE mmol/L 110* 110* 110*   < > 111*  --   --   --    < > 105   CO2 mmol/L 24 23 21   < > 21  --   --   --    < > 30   CO2, I-STAT mmol/L  --   --   --   --   --  22   < > 22  --   --    BUN mg/dL 19 20 20   < > 19  --   --   --    < > 39*   CREATININE mg/dL 1 27 1 27 1 46*   < > 1 59*  --   --   --    < > 1 30   EGFR ml/min/1 73sq m 46 46 39   < > 35  --   --   --    < > 45   GLUCOSE, ISTAT mg/dl  --   --   --   --   --  172*  --  147*  --   --    CALCIUM mg/dL 8 3 8 4 8 4   < > 7 7*  --   --   --    < > 8 2*   AST U/L 11  --   --   --  8  --   --   --   --  20   ALT U/L 9* --   --   --  10*  --   --   --   --  14   ALK PHOS U/L 62  --   --   --  62  --   --   --   --  72    < > = values in this interval not displayed  Results from last 7 days   Lab Units 01/18/22  1052 01/18/22  0506 01/17/22  0615   WBC [place]/uL 10 72* 10 38* 10 75*   HEMOGLOBIN g/dL 7 1* 7 3* 8 0*   PLATELETS Thousands/uL 328 317 252     Results from last 7 days   Lab Units 01/16/22  2102   GRAM STAIN RESULT  1+ Polys  No organisms seen       Imaging Studies:   I have personally reviewed pertinent imaging study reports and images in PACS  EKG, Pathology, and Other Studies:   I have personally reviewed pertinent reports

## 2022-01-18 NOTE — WOUND OSTOMY CARE
Consult Note - Wound   Almaz Tang 60 y o  female MRN: [id number]  Unit/Bed#: ICU 07 Encounter: [id number]        History and Present Illness: Patient is seen for wound care consult today   The patient is a 60year old female with COVID - 19 on a ventilator at the time of the assessment   Patient with a history of hidradenitis suppurative , HTN , morbid obesity with a BMI of 58 70 , diverticulitis of the large bowel with perforation , lymphedema ,and  fungal dermatitis  Patient transferred from [facility] for perforated diverticulitis complicated by R hydronephrosis   She has multiple wounds related to the hidradenitis and fungal component   Urethral stent placed and end colostomy located on the left mid abdominal area with moist pink stoma that is slightly budded  1 piece appliance intact   Max a of 3 for rolling in the bed   Assessment Findings:    1  Bilateral heels dry and intact   2  Pannus and bilateral breast scattered partial thickness wounds 100% pink   3  Hidradenitis areas of the bilateral breasts , pannus , inner groin , bilateral buttocks - scattered partial thickness areas with pink wound bed scant drainage of serosanguinous   Please refer to flowsheets and pictures     Plan:     Skin and Wound Care Plan:   1  Apply skin nourishing cream to intact skin daily  2  Bariatric ehob offloading cushion to chair when OOB  3  Elevate heels off of bed with pillows to offload  4  Kreg bariatric bed  5  Turn and reposition patient Q2 hours  6  Gently cleanse areas beneath b/l breasts, abdominal folds,wounds with soap and water  Pat dry and  Dust lightly with  Nystatin antifungal powder as ordered by provider then apply maxorb then place cut abdominal pad and secure with minimal tape change every other day   7  Bilateral buttocks bilateral groin and perineum area - cleanse with soap and water then apply janice antifungal cream bid and prn       Wounds:  Wound 11/22/21 Other (comment) Breast Left; Lower (Active)   Wound Image   01/14/22 1018   Wound Description Clean;Dry; Intact 01/17/22 2000   Luisa-wound Assessment Dry; Intact 01/17/22 2000   Wound Length (cm) 10 cm 01/14/22 1018   Wound Width (cm) 6 cm 01/14/22 1018   Wound Depth (cm) 0 1 cm 01/10/22 1043   Wound Surface Area (cm^2) 60 cm^2 01/14/22 1018   Wound Volume (cm^3) 6 3 cm^3 01/10/22 1043   Calculated Wound Volume (cm^3) 6 3 cm^3 01/10/22 1043   Change in Wound Size % -530 01/10/22 1043   Drainage Amount None 01/17/22 2000   Drainage Description Bloody 01/11/22 2152   Non-staged Wound Description Partial thickness 01/14/22 1018   Treatments Cleansed 01/11/22 2152   Dressing Foam, Silicon (eg  Allevyn, etc) 01/17/22 2000   Wound packed? No 01/09/22 1000   Dressing Changed New 01/14/22 1018   Patient Tolerance Tolerated well 01/14/22 1018   Dressing Status Clean;Dry; Intact 01/17/22 2000       Wound 11/22/21 Other (comment) Abdomen Medial;Lower (Active)   Wound Image   01/14/22 1015   Wound Description  01/17/22 2000   Luisa-wound Assessment Intact;Fragile 01/17/22 2000   Wound Length (cm) 5 cm 01/14/22 1015   Wound Width (cm) 30 cm 01/14/22 1015   Wound Depth (cm) 0 1 cm 01/10/22 1045   Wound Surface Area (cm^2) 150 cm^2 01/14/22 1015   Wound Volume (cm^3) 33 6 cm^3 01/10/22 1045   Calculated Wound Volume (cm^3) 33 6 cm^3 01/10/22 1045   Change in Wound Size % -522 22 01/10/22 1045   Drainage Amount Small 01/17/22 2000   Drainage Description Bloody;Clear 01/17/22 2000   Non-staged Wound Description Partial thickness 01/14/22 1015   Treatments Cleansed;Site care 01/17/22 0200   Dressing ABD;Calcium Alginate with Silver 01/17/22 2000   Wound packed? No 01/12/22 1751   Dressing Changed New 01/14/22 1015   Patient Tolerance Tolerated well 01/14/22 1015   Dressing Status Clean;Dry; Intact 01/17/22 2000       Wound 11/22/21 Other (comment) Thigh Anterior;Right;Medial (Active)   Wound Image   01/17/22 1143   Wound Description Beefy red 01/17/22 2000   Luisa-wound Assessment Erythema;Fragile 01/17/22 2000   Wound Length (cm) 5 cm 01/10/22 1101   Wound Width (cm) 4 cm 01/10/22 1101   Wound Depth (cm) 0 1 cm 01/10/22 1101   Wound Surface Area (cm^2) 20 cm^2 01/10/22 1101   Wound Volume (cm^3) 2 cm^3 01/10/22 1101   Calculated Wound Volume (cm^3) 2 cm^3 01/10/22 1101   Change in Wound Size % -900 01/10/22 1101   Drainage Amount Small 01/17/22 2000   Drainage Description Bloody;Brown 01/17/22 2000   Non-staged Wound Description Partial thickness 01/10/22 1101   Treatments Cleansed;Site care 01/17/22 0200   Dressing Foam, Silicon (eg  Allevyn, etc) 01/17/22 2000   Dressing Changed Changed 01/15/22 2005   Patient Tolerance Tolerated well 01/15/22 2005   Dressing Status Clean;Dry; Intact 01/17/22 2000       Wound 11/22/21 Other (comment) Thigh Anterior;Left;Medial (Active)   Wound Image   01/17/22 1148   Wound Description Beefy red;Bleeding 01/17/22 2000   Luisa-wound Assessment Intact; Erythema;Fragile 01/17/22 2000   Wound Length (cm) 4 cm 01/17/22 1148   Wound Width (cm) 15 cm 01/17/22 1148   Wound Surface Area (cm^2) 60 cm^2 01/17/22 1148   Drainage Amount None 01/09/22 1000   Treatments Cleansed;Site care 01/17/22 0200   Dressing Foam, Silicon (eg  Allevyn, etc) 01/17/22 2000   Wound packed? No 01/12/22 1751   Dressing Changed Changed 01/12/22 1751   Patient Tolerance Tolerated well 01/12/22 1751   Dressing Status Clean;Dry; Intact 01/17/22 2000       Wound 01/06/22 Other (comment) Other (Comment) Perineum (Active)   Wound Image   01/17/22 1217   Wound Description SHRUTI 01/17/22 2000   Pressure Injury Stage  01/12/22 1751   Luisa-wound Assessment Fragile 01/17/22 2000   Wound Length (cm) 10 cm 01/17/22 1217   Wound Width (cm) 6 cm 01/17/22 1217   Wound Depth (cm) 0 1 cm 01/17/22 1217   Wound Surface Area (cm^2) 60 cm^2 01/17/22 1217   Wound Volume (cm^3) 6 cm^3 01/17/22 1217   Calculated Wound Volume (cm^3) 6 cm^3 01/17/22 1217   Change in Wound Size % 36 51 01/17/22 1217   Drainage Amount Scant 01/17/22 2000   Drainage Description Bloody 01/17/22 2000   Non-staged Wound Description Partial thickness 01/17/22 1217   Treatments Cleansed;Site care 01/17/22 1217   Dressing Foam, Silicon (eg  Allevyn, etc) 01/17/22 2000   Wound packed? No 01/12/22 1751   Dressing Changed Changed 01/14/22 1005   Patient Tolerance Tolerated well 01/17/22 1217   Dressing Status Clean;Dry; Intact 01/17/22 2000       Wound 01/07/22 Breast Right; Lower (Active)   Wound Image   01/17/22 1150   Wound Description Clean; Intact; Bleeding 01/17/22 2000   Luisa-wound Assessment Intact;Fragile 01/17/22 2000   Wound Length (cm) 4 cm 01/17/22 1150   Wound Width (cm) 1 cm 01/17/22 1150   Wound Depth (cm) 0 1 cm 01/17/22 1150   Wound Surface Area (cm^2) 4 cm^2 01/17/22 1150   Wound Volume (cm^3) 0 4 cm^3 01/17/22 1150   Calculated Wound Volume (cm^3) 0 4 cm^3 01/17/22 1150   Change in Wound Size % 97 44 01/17/22 1150   Drainage Amount Scant 01/17/22 2000   Drainage Description Bloody 01/17/22 2000   Non-staged Wound Description Partial thickness 01/14/22 1017   Treatments Cleansed;Site care 01/17/22 1150   Dressing ABD;Calcium Alginate 01/17/22 1150   Wound packed? No 01/09/22 1000   Dressing Changed Changed 01/17/22 1150   Patient Tolerance Tolerated well 01/17/22 1150   Dressing Status Clean;Dry; Intact 01/15/22 2005       Wound 01/11/22 Pressure Injury Thigh Posterior;Left;Upper (Active)   Wound Image   01/17/22 1222   Wound Description Clean;Dry; Intact 01/17/22 2000   Pressure Injury Stage 2 01/17/22 2100   Luisa-wound Assessment Fragile 01/17/22 2000   Wound Length (cm) 8 cm 01/17/22 1222   Wound Width (cm) 8 cm 01/17/22 1222   Wound Depth (cm) 0 1 cm 01/14/22 1009   Wound Surface Area (cm^2) 64 cm^2 01/17/22 1222   Wound Volume (cm^3) 6 cm^3 01/14/22 1009   Calculated Wound Volume (cm^3) 6 cm^3 01/14/22 1009   Drainage Amount Small 01/17/22 2100   Drainage Description Serosanguineous 01/17/22 2100   Non-staged Wound Description Partial thickness 01/14/22 1009   Treatments Cleansed;Site care 01/17/22 2100   Dressing Calcium Alginate; Foam, Silicon (eg  Allevyn, etc) 01/17/22 2100   Dressing Changed Changed 01/17/22 2100   Patient Tolerance Tolerated well 01/17/22 2100   Dressing Status Dry; Intact 01/17/22 2000           Wound care will follow weekly call or tiger text with questions     Jesusita Chatterjee RN BSN CWOCN

## 2022-01-18 NOTE — PROGRESS NOTES
Daily Progress Note - [facility] Shayna 60 y o  female MRN: [id number]  Unit/Bed#: ICU 07 Encounter: [id number]        ----------------------------------------------------------------------------------------  HPI/24hr events: 56F who presented to [facility] on 1/6 for new osnet of abdominal wall cellulitis as well as mutliple open wounds including breast, abdomen, inner thight and buttocks with concern for fungal infection  While at OSH developed ZAHIRA on CKD  Patient ultimately found to have CT changes consistent with hydronephrosis from intra abdominal collection  Taken to the OR on 1/16 where we confirmed perforated diverticulitis and ischemia of bilateral ovaries  Underwent bilateral stent placement with ex-lap, Payal's procedure, right salpingo-oopherectomy and left oopherectomy  Pt then transferred to ICU where she was resuscitation and yesterday (1/17) extubated  Overnight patient continued to improve and appear well however this morning noted to have tachypnea without SOB or hypoxia      ---------------------------------------------------------------------------------------  SUBJECTIVE  Complains of mild abdominal pain but is improving  Deneis n/v/d, chest pain or sob  Review of Systems   Constitutional: Negative for chills and fever  HENT: Negative for ear pain and sore throat  Eyes: Negative for pain and visual disturbance  Respiratory: Negative for cough and shortness of breath  Cardiovascular: Negative for chest pain and palpitations  Gastrointestinal: Negative for abdominal pain and vomiting  Genitourinary: Negative for dysuria and hematuria  Musculoskeletal: Negative for arthralgias and back pain  Skin: Negative for color change and rash  Neurological: Negative for seizures and syncope     All other systems reviewed and are negative     ---------------------------------------------------------------------------------------  Assessment and Plan:    Neuro:  Analgesia  o Fentanyl 100mcg prn q1h  o Add po regimen when cleared by surgical team        CV:    HLD  o Resume home medication when able   HTN  o Holding home medications at this time  o Labetolol 10mg Q8h prn for SBP > 180      Pulm:   COVID +  o Successfully extubated, maintaining O2 satruation on 5L  o Will obtain repeat CXR this morning to evaluate for possible viral or bacterial pneumonia  o Monitor for change in respiratory status        GI:    Acute sigmoid diverticulitis  ? CT abdomen pelvis 1/6 showing sigmoid diverticulitis with microperforation and adjacent small pelvic abscess  ? Repeat CT abdomen pelvis 1/12 showing mild interval increase in pelvic collection now encasing right ureter and right external iliac artery  ? Second repeat CT abdomen pelvis 1/15 with worsening worsening inflammatory changes throughout the midline pelvis surrounding the rectosigmoid junction  ? S/P exploratory laparotomy with Payal's procedure 1/16  ? Continue antibiotics, zosyn  ? Surgery following  ?  NPO/NGT, IVF until return of bowel function   Dyphagia  o Improving  o Continue daily PPI  o Consideration for candida induced dsyphagia, continue course of fluconazole  o Will require outpatient GI follow up      :    Right hydroureteronephrosis  o 2/2 abscess/phlegmon from perforated sigmoid diverticulitis  o Urology following  o Urine culture negative 1/6  o S/p bilateral stent placement with right retrograde pyelogram on 1/16  o R ureteral stent in place  o Maintain villanueva   ZAHIRA on CKD  o Baseline Cr 1 10  o Cr on presentation 4 56 -> now 1 27  o Likely ATN secondary to dehydration and acute sepsis  o Continue to monitor UOP  o Monitor daily BUN/Cr      F/E/N:    F: Isolyte 100/hr   E: Monitored and repleted   N:  Holding PO intake at this time       Heme/Onc:    No active issues  o SQH for dvt ppx      Endo:    No active issues      ID:    COVID 19:   o Positive on 12/30 then again on 1/16  o On 5L NC  o CXR to evaluate for effusions this AM  o Hold AC, IV steroids and remdesivir at this time   Acute perforated sigmoid diverticulitis  o S/p ex lap with Payal's on 1/16  o Blood cultures negative x2  o Tissue culture, gram stain, fungal and anaerobic pending  o Continue Zosyn   Abdominal wall cellulitis  o Wound culture 1/8 growing enteroccocus and coag negative staph  o ID consult  o Zosyn and fluconazole  o Wound care consult      MSK/Skin:    Cellulitis and wounds of abdominal wall, inguinal area, buttocks, chest wall and groin  o Antibiotics as above  o PT/OT      Disposition: Continue Critical Care   Code Status: Level 1 - Full Code  ---------------------------------------------------------------------------------------  ICU CORE MEASURES    Prophylaxis   VTE Pharmacologic Prophylaxis: Heparin  VTE Mechanical Prophylaxis: sequential compression device  Stress Ulcer Prophylaxis: Pantoprazole PO and Pantoprazole IV     ABCDE Protocol (if indicated)  Plan to perform spontaneous awakening trial today? Yes  Plan to perform spontaneous breathing trial today? Not applicable  CAM-ICU: Negative  Obvious barriers to extubation? no    Invasive Devices Review  Invasive Devices  Report    Central Venous Catheter Line            CVC Central Lines 01/17/22 Triple 1 day          Peripheral Intravenous Line            Peripheral IV 01/15/22 Dorsal (posterior); Right Forearm 3 days    Peripheral IV 01/16/22 Left Arm 1 day    Peripheral IV 01/16/22 Right Arm 1 day          Drain            Closed/Suction Drain LLQ Bulb 19 Fr  1 day    Closed/Suction Drain RLQ Bulb 19 Fr  1 day    Colostomy Other (comment) LUQ 1 day    NG/OG/Enteral Tube Nasogastric 18 Fr Right nare 1 day    Rectal Tube Without balloon 1 day    Urethral Catheter Latex 18 Fr  1 day              Can any invasive devices be discontinued today?  No (provide explanation): ---------------------------------------------------------------------------------------  OBJECTIVE    Physical Exam  Vitals and nursing note reviewed  Constitutional:       General: She is not in acute distress  Appearance: Normal appearance  She is well-developed  HENT:      Head: Normocephalic and atraumatic  Mouth/Throat:      Mouth: Mucous membranes are moist       Pharynx: Oropharynx is clear  Eyes:      Conjunctiva/sclera: Conjunctivae normal    Cardiovascular:      Rate and Rhythm: Normal rate and regular rhythm  Heart sounds: No murmur heard  Pulmonary:      Effort: Pulmonary effort is normal  No respiratory distress  Breath sounds: Normal breath sounds  Abdominal:      Palpations: Abdomen is soft  Tenderness: There is no abdominal tenderness  Comments: LLQ ADIA serosanguinous output  RLQ ADIA serosanguinous output  [place] drain with serosanguinous output  Stoma dusky but viable  Incision cleanly dressed   Musculoskeletal:         General: Normal range of motion  Cervical back: Neck supple  Skin:     General: Skin is warm and dry  Capillary Refill: Capillary refill takes less than 2 seconds  Comments: Open woudns to abdomen, groin and chest   Neurological:      General: No focal deficit present  Mental Status: She is alert     Psychiatric:         Mood and Affect: Mood normal          Behavior: Behavior normal          Vitals   Vitals:    22 0900 22 1000 22 1100 22 1200   BP: 90/58 106/57 104/60 114/61   BP Location:       Pulse: 104 (!) 108 (!) 106 (!) 108   Resp: (!) 30 (!) 32 (!) 32 (!) 32   Temp:   99 °F (37 2 °C)    TempSrc:   Oral    SpO2: 97% 97% 97% 97%   Weight:       Height:         Temp (24hrs), Av 4 °F (36 9 °C), Min:97 9 °F (36 6 °C), Max:99 °F (37 2 °C)  Current: Temperature: 99 °F (37 2 °C)      Invasive/non-invasive ventilation settings   Respiratory  Report   Lab Data (Last 4 hours)    None O2/Vent Data (Last 4 hours)    None                Height and Weights   Height: 5' 7" (170 2 cm)     Body mass index is 58 7 kg/m²  Weight (last 2 days)     Date/Time Weight    01/17/22 0600 170 (374 78)    01/17/22 0334 167 (367 51)            Intake and Output  I/O       01/16 0701 01/17 0700 01/17 0701  01/18 0700 01/18 0701 01/19 0700    I V  (mL/kg) 6269 9 (36 9) 2924 8 (17 2) 599 (3 5)    NG/GT  40 30    IV Piggyback 1110 650 300    Total Intake(mL/kg) 7379 9 (43 4) 3614 8 (21 3) 929 (5 5)    Urine (mL/kg/hr) 380 1875 (0 5) 450 (0 4)    Emesis/NG output  225     Drains 110 270     Blood 250      Total Output 740 2370 450    Net +6639 9 +1244 8 +479                   Nutrition       Diet Orders   (From admission, onward)             Start     Ordered    01/16/22 1759  Diet NPO; Sips with meds  Diet effective now        References:    Nutrtion Support Algorithm Enteral vs  Parenteral   Question Answer Comment   Diet Type NPO    NPO Except: Sips with meds    RD to adjust diet per protocol?  No        01/16/22 1801                Laboratory and Diagnostics:  Results from last 7 days   Lab Units 01/18/22  1052 01/18/22  0506 01/17/22  0615 01/17/22  0221 01/16/22  2322 01/16/22  2127 01/16/22  1826 01/16/22  0515 01/14/22  0618 01/14/22  0618 01/13/22  0626 01/13/22  0626 01/12/22  0434 01/12/22  0434   WBC Thousand/uL 10 72* 10 38* 10 75* 8 93  --   --   --  15 41*  --  7 70  --  8 91   < > 6 84   HEMOGLOBIN g/dL 7 1* 7 3* 8 0* 8 1*  --   --   --  10 1*   < > 9 9*   < > 9 4*   < > 9 2*   I STAT HEMOGLOBIN g/dl  --   --   --   --  8 8* 9 5*  --   --   --   --   --   --   --   --    HEMATOCRIT % 23 6* 24 5* 26 8* 27 3*  --   --   --  33 1*   < > 32 6*   < > 31 8*   < > 30 8*   HEMATOCRIT, ISTAT %  --   --   --   --  26* 28*  --   --   --   --   --   --   --   --    PLATELETS Thousands/uL 328 317 252 226  --   --  293 271  --  262   < > 263   < > 257   NEUTROS PCT % 81* 81*  --   --   --   --   --  82*  --  63 --  69  --  63   BANDS PCT %  --   --   --  11*  --   --   --   --   --   --   --   --   --   --    MONOS PCT % 6 6  --   --   --   --   --  6  --  8  --  8  --  10   MONO PCT %  --   --   --  4  --   --   --   --   --   --   --   --   --   --     < > = values in this interval not displayed  Results from last 7 days   Lab Units 01/18/22  1052 01/18/22  0506 01/17/22  0615 01/17/22  0221 01/16/22  2322 01/16/22  2127 01/16/22  0515 01/14/22  0618 01/14/22  0618 01/13/22  0626 01/13/22  0626   SODIUM mmol/L 139 140 137 139  --   --  143  --  136  --  135*   POTASSIUM mmol/L 4 5 4 3 4 5 4 5  --   --  3 9  --  4 6  --  4 4   CHLORIDE mmol/L 110* 110* 110* 111*  --   --  105  --  104  --  104   CO2 mmol/L 24 23 21 21  --   --  30   < > 24   < > 23   CO2, I-STAT mmol/L  --   --   --   --  22 22  --   --   --   --   --    ANION GAP mmol/L 5 7 6 7  --   --  8  --  8  --  8   BUN mg/dL 19 20 20 19  --   --  39*  --  12  --  13   CREATININE mg/dL 1 27 1 27 1 46* 1 59*  --   --  1 30  --  1 10  --  1 11   CALCIUM mg/dL 8 3 8 4 8 4 7 7*  --   --  8 2*  --  8 8  --  8 5   GLUCOSE RANDOM mg/dL 98 89 141* 158*  --   --  117  --  96  --  107   ALT U/L 9*  --   --  10*  --   --  14  --  19  --  21   AST U/L 11  --   --  8  --   --  20  --  16  --  14   ALK PHOS U/L 62  --   --  62  --   --  72  --  92  --  93   ALBUMIN g/dL 1 9*  --   --  2 1*  --   --  2 7*  --  2 5*  --  2 4*   TOTAL BILIRUBIN mg/dL 0 36  --   --  0 43  --   --  0 28  --  0 25  --  0 23    < > = values in this interval not displayed       Results from last 7 days   Lab Units 01/18/22  0506 01/17/22  0615 01/17/22  0221 01/16/22  0515 01/14/22  0618 01/13/22  0626 01/12/22  0434   MAGNESIUM mg/dL 2 5 3 5* 1 6 1 6 1 9 1 7 2 0   PHOSPHORUS mg/dL 3 5 5 3* 5 4*  --   --   --   --       Results from last 7 days   Lab Units 01/17/22  0221   INR  1 55*          Results from last 7 days   Lab Units 01/18/22  1052 01/17/22  0221 01/15/22  0909   LACTIC ACID mmol/L 1 0 1 4 0 8     ABG:  Results from last 7 days   Lab Units 01/17/22  0222   PH ART  7 329*   PCO2 ART mm Hg 37 4   PO2 ART mm Hg 156 1*   HCO3 ART mmol/L 19 2*   BASE EXC ART mmol/L -6 1   ABG SOURCE  Line, Arterial     VBG:  Results from last 7 days   Lab Units 01/18/22  1052 01/17/22 0222   PH DL  7 354  --    PCO2 DL mm Hg 43 5  --    PO2 DL mm Hg 37 7  --    HCO3 DL mmol/L 23 7*  --    BASE EXC DL mmol/L -1 8  --    ABG SOURCE   --  Line, Arterial     Results from last 7 days   Lab Units 01/13/22  0626   PROCALCITONIN ng/ml 0 24       Micro  Results from last 7 days   Lab Units 01/16/22  2102   GRAM STAIN RESULT  1+ Polys  No organisms seen     Imaging: I have personally reviewed pertinent reports        Active Medications  Scheduled Meds:  Current Facility-Administered Medications   Medication Dose Route Frequency Provider Last Rate    fentanyl citrate (PF)  50 mcg Intravenous Q1H PRN Bakari Tavares PA-FELIPA      fluconazole  400 mg Intravenous Q24H Bakari Tavares PA-C 400 mg (01/18/22 0836)    heparin (porcine)  7,500 Units Subcutaneous [place], [place]      CAROLINE ANTIFUNGAL   Topical BID Bakari Tavares PA-C      multi-electrolyte  100 mL/hr Intravenous Continuous Bakari Tavares, PA-C 100 mL/hr (01/18/22 0836)    nystatin  500,000 Units Swish & Swallow 4x Daily Rl Castro MD      nystatin   Topical BID Villegasceli Tavares PA-FELIPA      pantoprazole  40 mg Intravenous Q12H [facility] [place], [place]      piperacillin-tazobactam  4 5 g Intravenous Q8H Bakari Tavares, PA-C 4 5 g (01/18/22 0835)     Continuous Infusions:  multi-electrolyte, 100 mL/hr, Last Rate: 100 mL/hr (01/18/22 0836)      PRN Meds:   fentanyl citrate (PF), 50 mcg, Q1H PRN        Allergies   Allergies   Allergen Reactions    Bee Venom Anaphylaxis, Shortness Of Breath and Swelling       Advance Directive and Living Will:      Power of :    POLST:        Counseling / Coordination of Care  Total Critical Care time spent 30 minutes excluding procedures, teaching and family updates  Monique Laguerre MD        Portions of the record may have been created with voice recognition software  Occasional wrong word or "sound a like" substitutions may have occurred due to the inherent limitations of voice recognition software    Read the chart carefully and recognize, using context, where substitutions have occurred

## 2022-01-18 NOTE — PROGRESS NOTES
Progress Note - Yanely Mascorro 60 y o  female MRN: [id number]    Unit/Bed#: ICU 07 Encounter: [id number]      Assessment:  61 y/o F w perforated diverticulitis s/p Ortega's procedure on 1/17  Vitals stable  Extubated yesterday to [facility]  Off pressors  Abdomen soft, diffusely tender  Colostomy is maroon  serosang bowel sweat in ostomy appliance  No stool yet  Midline abdominal akash removed and replaced bedside this AM  Midline without purulence  UOP: 1 57 L  NGT: minimal bilious, 225 cc last 24h  ADIA x2, (L) 90 cc serosang, (R) 100 cc serosang  Plan:  Continue npo/ ngt until return of bowel function and ostomy function  Continue ADIA x2  Continue malecot  Continue zosyn, diflucan  Once ostomy functions then needs ngt removal and speech and swallow  Needs aggressive pulm toilet  Incentive spirometer  Pt/ot    Subjective:   She is happy she is extubated  Reports worse abd pain today  Denied nausea or vomiting  Denied chest pain or shortness of breath  Objective:     Vitals: Blood pressure 102/62, pulse 100, temperature 98 °F (36 7 °C), temperature source Oral, resp  rate (!) 31, height 5' 7" (1 702 m), weight (!) 170 kg (374 lb 12 5 oz), SpO2 97 %  ,Body mass index is 58 7 kg/m²  Intake/Output Summary (Last 24 hours) at 1/18/2022 0553  Last data filed at 1/18/2022 0400  Gross per 24 hour   Intake 3939 26 ml   Output 2090 ml   Net 1849 26 ml       Physical Exam  General: NAD  HEENT: NC/AT  MMM  Cv: RRR  Lungs: normal effort  Ab: Soft, mid tender  Non distended  ADIA x2 serosang  malecot minimal serosang  Ex: no CCE  Neuro: AAOx3        Invasive Devices  Report    Central Venous Catheter Line            CVC Central Lines 01/17/22 Triple 1 day          Peripheral Intravenous Line            Peripheral IV 01/15/22 Dorsal (posterior); Right Forearm 2 days    Peripheral IV 01/16/22 Left Arm 1 day    Peripheral IV 01/16/22 Right Arm 1 day          Drain            Closed/Suction Drain LLQ Bulb 19 Fr  1 day    Closed/Suction Drain RLQ Bulb 19 Fr  1 day    Colostomy Other (comment) LUQ 1 day    NG/OG/Enteral Tube Nasogastric 18 Fr Right nare 1 day    Rectal Tube Without balloon 1 day    Urethral Catheter Latex 18 Fr  1 day                Lab, Imaging and other studies: I have personally reviewed pertinent reports      VTE Pharmacologic Prophylaxis: Sequential compression device (Venodyne)   VTE Mechanical Prophylaxis: sequential compression device

## 2022-01-18 NOTE — DISCHARGE INSTR - OTHER ORDERS
Skin and Wound Care Plan:   1  Apply skin nourishing cream to intact skin daily  2  Bariatric ehob offloading cushion to chair when OOB  3  Elevate heels off of bed with pillows to offload  4  Kreg bariatric bed  5  Turn and reposition patient every 2 hours  6  Right and left abdominal pannus - cleanse with soap and water then pat dry dust lightly with nystatin powder then apply maxorb to open areas then apply cut to fit ABD pad and secure lightly with tape change every other day  7  Bilateral buttocks bilateral groin and perineum area - cleanse with soap and water then apply janice antifungal cream bid and prn   8  Cleanse left breast wounds with NSS, pat dry, and apply bordered Allevyn foam dressing  Change every other day and as needed for soilage/dislodgement  9  Left posterior thigh--cleanse with soap and water, pat dry  Apply allevyn Life foam dressing  Change dressing every other day    10   Mid line incisional care as per surgery team

## 2022-01-19 NOTE — PROGRESS NOTES
Daily Progress Note - [facility] Shayna 60 y o  female MRN: [id number]  Unit/Bed#: ICCU 206-01 Encounter: [id number]        ----------------------------------------------------------------------------------------  HPI/24hr events: 57F who presented to [facility] on 1/6 for new osnet of abdominal wall cellulitis as well as mutliple open wounds including breast, abdomen, inner thight and buttocks with concern for fungal infection  While at OSH developed ZAHIRA on CKD  Patient ultimately found to have CT changes consistent with hydronephrosis from intra abdominal collection  Taken to the OR on 1/16 where we confirmed perforated diverticulitis and ischemia of bilateral ovaries  Underwent bilateral stent placement with ex-lap, Payal's procedure, right salpingo-oopherectomy and left oopherectomy  Pt then transferred to ICU where she was resuscitation and yesterday (1/17) extubated  ---------------------------------------------------------------------------------------  SUBJECTIVE  No acute events overnight  Pain well controlled  Working with incentive spirometer and pulling ~500cc  No colostomy gas/stool  Denies fever/SOB     ---------------------------------------------------------------------------------------  Assessment and Plan:    Neuro:    Analgesia  o Continue PRN IV Dilaudid while NPO/NGT    CV:    HTN  o Holding home medications at this time  o Labetolol 10mg Q8h prn for SBP > 180      Pulm:   COVID +  o Successfully extubated, maintaining O2 satruation on 4L  Continue supportive care  GI:    Acute sigmoid diverticulitis   ? CT abdomen pelvis 1/6 showing sigmoid diverticulitis with microperforation and adjacent small pelvic abscess  ? Repeat CT abdomen pelvis 1/12 showing mild interval increase in pelvic collection now encasing right ureter and right external iliac artery  ?  Second repeat CT abdomen pelvis 1/15 with worsening worsening inflammatory changes throughout the midline pelvis surrounding the rectosigmoid junction  ? S/P exploratory laparotomy with Payal's procedure 1/16  ? Continue antibiotics, Zosyn  OR cultures NGTD   ID following   ? NPO/NGT, IVF until return of bowel function     Dyphagia  o Improving  o Continue daily PPI  o Consideration for candida induced dsyphagia, continue course of fluconazole (2 days remaining; per ID)  o Will require outpatient GI follow up      :    Right hydroureteronephrosis  o 2/2 abscess/phlegmon from perforated sigmoid diverticulitis  o Urine culture negative 1/6  o S/p bilateral stent placement with right retrograde pyelogram on 1/16  o R ureteral stent in place - to remain in place 6 weeks and have Urology outpatient follow up for removal  o Maintain villanueva     ZAHIRA on CKD  o Baseline Cr 1 10  o Cr on presentation 4 56  o Likely ATN secondary to dehydration and acute sepsis  o Continue to monitor UOP  o Monitor daily BUN/Cr      F/E/N:    F: Isolyte 100/hr   E: Monitored and repleted   N: Continue NPO, sips w/ meds      Heme/Onc:    No active issues  o SQH for dvt ppx      Endo:    No active issues      ID:    COVID 19:   o Positive on 12/30 then again on 1/16  o Plan as above   Acute perforated sigmoid diverticulitis  o S/p ex lap with Payal's on 1/16  o Blood cultures negative x2  o Tissue culture, gram stain, fungal and anaerobic NGT  o Continue Zosyn, ID following   Abdominal wall cellulitis  o Wound culture 1/8 growing enteroccocus and coag negative staph  o Abx as above    MSK/Skin:    Cellulitis and wounds of abdominal wall, inguinal area, buttocks, chest wall and groin  o Antibiotics as above   Wound care for ostomy teaching   PT/OT      Disposition: Transfer to Stepdown Level 2  Code Status: Level 1 - Full Code  ---------------------------------------------------------------------------------------  ICU CORE MEASURES    Prophylaxis   VTE Pharmacologic Prophylaxis: Heparin  VTE Mechanical Prophylaxis: sequential compression device  Stress Ulcer Prophylaxis: Pantoprazole IV     ABCDE Protocol (if indicated)  Plan to perform spontaneous awakening trial today? Yes  Plan to perform spontaneous breathing trial today? Not applicable  CAM-ICU: Negative  Obvious barriers to extubation? no    Invasive Devices Review  Invasive Devices  Report    Central Venous Catheter Line            CVC Central Lines 22 Triple 2 days          Peripheral Intravenous Line            Peripheral IV 01/15/22 Dorsal (posterior); Right Forearm 3 days    Peripheral IV 22 Left Arm 2 days    Peripheral IV 22 Right Arm 2 days          Drain            Closed/Suction Drain LLQ Bulb 19 Fr  2 days    Closed/Suction Drain RLQ Bulb 19 Fr  2 days    Colostomy Other (comment) LUQ 2 days    NG/OG/Enteral Tube Nasogastric 18 Fr Right nare 2 days    Rectal Tube Without balloon 2 days    Urethral Catheter Latex 18 Fr  2 days              Can any invasive devices be discontinued today? No (provide explanation):   ---------------------------------------------------------------------------------------  OBJECTIVE    Physical exam  GEN: NAD  HEENT: 4L NC  CV: warm/well perfused  Lung: normal effort  Ab: Soft/obese abdomen, appropriately tender  ADIA drains bilaterally w/ serous drainage  Midline incision loosely approximated with akash in place  Extrem: No CCE  Neuro:  A+Ox3, motor and sensation grossly intact    Vitals   Vitals:    22 0300 22 0400 22 0553 22 0700   BP: 126/65 121/68 147/65 116/75   Pulse: (!) 114 (!) 112 (!) 114 (!) 114   Resp: (!) 36 (!) 30 (!) 28 (!) 27   Temp:       TempSrc:       SpO2: 97% 97% 97% 96%   Weight:       Height:         Temp (24hrs), Av 6 °F (37 °C), Min:97 9 °F (36 6 °C), Max:99 °F (37 2 °C)  Current: Temperature: 98 9 °F (37 2 °C)      Invasive/non-invasive ventilation settings   Respiratory  Report   Lab Data (Last 4 hours)    None         O2/Vent Data (Last 4 hours)    None Height and Weights   Height: 5' 7" (170 2 cm)     Body mass index is 58 7 kg/m²  Weight (last 2 days)     Date/Time Weight    01/17/22 0600 170 (374 78)    01/17/22 0334 167 (367 51)            Intake and Output    Intake/Output Summary (Last 24 hours) at 1/19/2022 0754  Last data filed at 1/19/2022 0532  Gross per 24 hour   Intake 2883 67 ml   Output 2918 ml   Net -34 33 ml         Nutrition       Diet Orders   (From admission, onward)             Start     Ordered    01/16/22 1759  Diet NPO; Sips with meds  Diet effective now        References:    Nutrtion Support Algorithm Enteral vs  Parenteral   Question Answer Comment   Diet Type NPO    NPO Except: Sips with meds    RD to adjust diet per protocol? No        01/16/22 1801                Laboratory and Diagnostics:  Results from last 7 days   Lab Units 01/19/22  0536 01/18/22  1052 01/18/22  0506 01/17/22  0615 01/17/22  0221 01/16/22  2322 01/16/22  2127 01/16/22  1826 01/16/22  0515 01/14/22  0618 01/14/22  0618 01/13/22  0626 01/13/22  0626   WBC Thousand/uL 11 11* 10 72* 10 38* 10 75* 8 93  --   --   --  15 41*  --  7 70   < > 8 91   HEMOGLOBIN g/dL 7 1* 7 1* 7 3* 8 0* 8 1*  --   --   --  10 1*   < > 9 9*   < > 9 4*   I STAT HEMOGLOBIN g/dl  --   --   --   --   --  8 8* 9 5*  --   --   --   --   --   --    HEMATOCRIT % 23 6* 23 6* 24 5* 26 8* 27 3*  --   --   --  33 1*   < > 32 6*   < > 31 8*   HEMATOCRIT, ISTAT %  --   --   --   --   --  26* 28*  --   --   --   --   --   --    PLATELETS Thousands/uL 404* 328 317 252 226  --   --  293 271   < > 262   < > 263   NEUTROS PCT %  --  81* 81*  --   --   --   --   --  82*  --  63  --  69   BANDS PCT %  --   --   --   --  11*  --   --   --   --   --   --   --   --    MONOS PCT %  --  6 6  --   --   --   --   --  6  --  8  --  8   MONO PCT %  --   --   --   --  4  --   --   --   --   --   --   --   --     < > = values in this interval not displayed       Results from last 7 days   Lab Units 01/19/22  0536 01/18/22  1052 01/18/22  0506 01/17/22  0615 01/17/22  0221 01/16/22  2322 01/16/22  2127 01/16/22  0515 01/14/22  0618 01/14/22  0618 01/13/22  0626 01/13/22  0626   SODIUM mmol/L 141 139 140 137 139  --   --  143  --  136   < > 135*   POTASSIUM mmol/L 4 3 4 5 4 3 4 5 4 5  --   --  3 9  --  4 6   < > 4 4   CHLORIDE mmol/L 111* 110* 110* 110* 111*  --   --  105  --  104   < > 104   CO2 mmol/L 25 24 23 21 21  --   --  30   < > 24   < > 23   CO2, I-STAT mmol/L  --   --   --   --   --  22 22  --   --   --   --   --    ANION GAP mmol/L 5 5 7 6 7  --   --  8  --  8   < > 8   BUN mg/dL 16 19 20 20 19  --   --  39*  --  12   < > 13   CREATININE mg/dL 1 12 1 27 1 27 1 46* 1 59*  --   --  1 30  --  1 10   < > 1 11   CALCIUM mg/dL 8 5 8 3 8 4 8 4 7 7*  --   --  8 2*  --  8 8   < > 8 5   GLUCOSE RANDOM mg/dL 94 98 89 141* 158*  --   --  117  --  96   < > 107   ALT U/L  --  9*  --   --  10*  --   --  14  --  19  --  21   AST U/L  --  11  --   --  8  --   --  20  --  16  --  14   ALK PHOS U/L  --  62  --   --  62  --   --  72  --  92  --  93   ALBUMIN g/dL  --  1 9*  --   --  2 1*  --   --  2 7*  --  2 5*  --  2 4*   TOTAL BILIRUBIN mg/dL  --  0 36  --   --  0 43  --   --  0 28  --  0 25  --  0 23    < > = values in this interval not displayed       Results from last 7 days   Lab Units 01/19/22  0536 01/18/22  0506 01/17/22 0615 01/17/22 0221 01/16/22  0515 01/14/22  0618 01/13/22  0626   MAGNESIUM mg/dL 2 3 2 5 3 5* 1 6 1 6 1 9 1 7   PHOSPHORUS mg/dL 3 3 3 5 5 3* 5 4*  --   --   --       Results from last 7 days   Lab Units 01/17/22 0221   INR  1 55*          Results from last 7 days   Lab Units 01/18/22  1052 01/17/22  0221 01/15/22  0909   LACTIC ACID mmol/L 1 0 1 4 0 8     ABG:  Results from last 7 days   Lab Units 01/17/22 0222   PH ART  7 329*   PCO2 ART mm Hg 37 4   PO2 ART mm Hg 156 1*   HCO3 ART mmol/L 19 2*   BASE EXC ART mmol/L -6 1   ABG SOURCE  Line, Arterial     VBG:  Results from last 7 days   Lab Units 01/18/22  1052 01/17/22  0222   PH DL  7 354  --    PCO2 DL mm Hg 43 5  --    PO2 DL mm Hg 37 7  --    HCO3 DL mmol/L 23 7*  --    BASE EXC DL mmol/L -1 8  --    ABG SOURCE   --  Line, Arterial     Results from last 7 days   Lab Units 01/13/22  0626   PROCALCITONIN ng/ml 0 24       Micro  Results from last 7 days   Lab Units 01/16/22  2102   GRAM STAIN RESULT  1+ Polys  No organisms seen     Imaging: I have personally reviewed pertinent reports        Active Medications  Scheduled Meds:  Current Facility-Administered Medications   Medication Dose Route Frequency Provider Last Rate    fentanyl citrate (PF)  50 mcg Intravenous Q2H PRN Sherie Ibrahim PA-C      fluconazole  400 mg Intravenous Q24H Jonah Phillips PA-C 400 mg (01/18/22 0836)    heparin (porcine)  7,500 Units Subcutaneous [place], [place]      HYDROmorphone  0 2 mg Intravenous Q3H PRN Sherie Ibrahim PA-C      Or    HYDROmorphone  0 5 mg Intravenous Q3H PRN Sherie Ibrahim PA-C      Labetalol HCl  10 mg Intravenous Q4H PRN Lisa Fox MD      CAROLINE ANTIFUNGAL   Topical BID Jonah Phillips PA-C      multi-electrolyte  100 mL/hr Intravenous Continuous Jonah Phillips PA-C 100 mL/hr (01/19/22 0532)    nystatin  500,000 Units Swish & Swallow 4x Daily Jeff Tyler MD      nystatin   Topical BID [facility]      pantoprazole  40 mg Intravenous Q12H [address] [place], [place]      piperacillin-tazobactam  4 5 g Intravenous [id number] Ron Corral PA-C Stopped (01/19/22 0201)    saliva substitute  5 spray Mouth/Throat 4x Daily PRN Daniela Mederos PA-C       Continuous Infusions:  multi-electrolyte, 100 mL/hr, Last Rate: 100 mL/hr (01/19/22 0532)      PRN Meds:   fentanyl citrate (PF), 50 mcg, Q2H PRN  HYDROmorphone, 0 2 mg, Q3H PRN   Or  HYDROmorphone, 0 5 mg, Q3H PRN  Labetalol HCl, 10 mg, Q4H PRN  saliva substitute, 5 spray, 4x Daily PRN        Allergies   Allergies   Allergen Reactions  Bee Venom Anaphylaxis, Shortness Of Breath and Swelling       Advance Directive and Living Will:      Power of :    POLST:        Counseling / Coordination of Care  Total Critical Care time spent 30 minutes excluding procedures, teaching and family updates  Akash Duran MD        Portions of the record may have been created with voice recognition software  Occasional wrong word or "sound a like" substitutions may have occurred due to the inherent limitations of voice recognition software    Read the chart carefully and recognize, using context, where substitutions have occurred

## 2022-01-19 NOTE — PHYSICAL THERAPY NOTE
Physical Therapy Evaluation and Treatment    Patient's Name: Willian Sheets    Admitting Diagnosis  Cellulitis of abdominal wall [L03 311]  Diverticulitis of large intestine with perforation without bleeding [K57 20]    Problem List  Patient Active Problem List   Diagnosis    Lymphedema    Primary hypertension    Morbid obesity with BMI of 60 0-69 9, adult ([facility] [address] )    Hidradenitis suppurativa    Chronic midline low back pain with left-sided sciatica    Other specified anxiety disorders    Lumbar paraspinal muscle spasm    Borderline hyperlipidemia    Iron deficiency    GERD (gastroesophageal reflux disease)    Acute renal failure (ARF) (HCC)    Cellulitis of abdominal wall, chest wall and groin with wounds    Diverticulitis of large intestine with perforation    Right Hydroureteronephrosis    Subacute vaginitis    Dysphagia       Past Medical History  Past Medical History:   Diagnosis Date    Anxiety     Arthritis     GERD (gastroesophageal reflux disease)     Hidradenitis suppurativa     Hypertension     Lipodermatosclerosis of both lower extremities     Lymphedema     Sciatic leg pain     left side    SOB (shortness of breath)     Stomach ulcer        Past Surgical History  Past Surgical History:   Procedure Laterality Date    BILATERAL SALPINGOOPHORECTOMY N/A 2022    Procedure: RIGHT SALPINGO-OOPHORECTOMY, LEFT OOPHORECTOMY;  Surgeon: Tiffanie Moreno DO;  [facility];  Service: General     SECTION      FL RETROGRADE PYELOGRAM  2022    HARTMANS PROCEDURE N/A 2022    Procedure: END COLOSTOMY;  Surgeon: Tiffanie Moreno DO;  [facility];  Service: General    LAPAROTOMY N/A 2022    Procedure: LAPAROTOMY EXPLORATORY, DRAINAGE OF INTRA -ABDOMINAL ABSCESS;  Surgeon: Tiffanie Moreno DO;  [facility];  Service: General    CA CYSTOURETHROSCOPY,URETER CATHETER Right 2022    Procedure: CYSTOSCOPY RETROGRADE PYELOGRAM WITH INSERTION STENT URETERAL;  Surgeon: Emily Arevalo MD;  [facility];  Service: Urology     [address] 3 1-4 CM TRUNK,ARM,LEG Right 12/8/2020    Procedure: EXCISION BIOPSY TISSUE LESION/MASS UPPER EXTREMITY;  Surgeon: Ezekiel Oh DO;  Location: [facility];  Service: General    TONSILLECTOMY      TUMOR REMOVAL Right     5th finger        01/19/22 0944   PT Last Visit   PT Visit Date 01/19/22   Note Type   Note type Evaluation  (and treatment )   Pain Assessment   Pain Assessment Tool 0-10   Pain Score 9   Pain Location/Orientation Location: Abdomen   Effect of Pain on Daily Activities tolerated OOB to chair well  pain decreased to 6  Patient's Stated Pain Goal No pain   Hospital Pain Intervention(s) Ambulation/increased activity;Repositioned   Restrictions/Precautions   Weight Bearing Precautions Per Order No   Braces or Orthoses   (none)   Other Precautions Contact/isolation; Airborne/isolation; Chair Alarm; Bed Alarm;Multiple lines;Telemetry;O2;Fall Risk;Pain  (2 ADIA drains, rectal tube, villanueva catheter, colostomy bag )   Home Living   Type of Home Apartment   Home Layout One level;Elevator   Bathroom Shower/Tub Tub/shower unit   Bathroom Toilet Standard   Bathroom Equipment Grab bars in [address]   Additional Comments  Prior to this admission patient reports residing alone in a one level apartment (elevator access) where she was previously I with mobility (reports using crutches) and ADLS  Denies family/friend support (Although son is watching her dog at this time)  Prior Function   Level of [place] Independent with ADLs and functional mobility   Lives With Alone   ADL Assistance Independent   IADLs Needs assistance   Falls in the last 6 months 1 to 4   Vocational On disability   General   Additional Pertinent History Admit to [facility] on 1/6 for wounds       Patient was transferred to [facility] for perforated diverticulitis complicated by R hydronephrosis  On 1/16, patient tested + for COVID  On 1/16 patient underwent the following urological procedures: cystoscopy with right stent placement with right retrograde pyelogram and placement of left ureteral catheter and with general surgery: "hartmans procedure, right salpino-oophrectomy and left oophrectomy "    Family/Caregiver Present No   Cognition   Arousal/Participation Alert   Attention Within functional limits   Orientation Level Oriented X4   Memory Decreased recall of precautions   Following Commands Follows one step commands with increased time or repetition   Comments cooperative with encouragement, limited insight into deficits, "I want to go home"   Subjective   Subjective "I want to go home"   RUE Assessment   RUE Assessment   (gross weakness; + edema)   LUE Assessment   LUE Assessment   (gross weakness; + edema)   RLE Assessment   RLE Assessment X   Strength RLE   RLE Overall Strength 2/5  (+ edema)   LLE Assessment   LLE Assessment X   Strength LLE   LLE Overall Strength 2/5  (+ edema)   Bed Mobility   Supine to Sit 3  Moderate assistance   Additional items Assist x 2; Increased time required;Verbal cues   Sit to Supine Unable to assess   Additional Comments in chair post eval/treat   Transfers   Sit to Stand 3  Moderate assistance   Additional items Assist x 2; Increased time required;Verbal cues   Stand to Sit 3  Moderate assistance   Additional items Assist x 2; Increased time required;Verbal cues   Stand pivot 3  Moderate assistance   Additional items Assist x 2; Increased time required;Verbal cues   Additional Comments transferred to chair with b/l HHA (RW not available at this time- plan to trial in future sesssion)    Ambulation/Elevation   Gait pattern Excessively slow; Short stride;Decreased foot clearance   Gait Assistance 3  Moderate assist   Additional items Assist x 2   Assistive Device Other (Comment)  (b/l HHA)   Distance 3 feet (bed to chair)    Balance Static Sitting Fair   Static Standing Poor   Ambulatory Poor   Endurance Deficit   Endurance Deficit Yes   Endurance Deficit Description 2 L O2 (sats 96% pre mobility and 93% post mobility), pain, gross weakness   Activity Tolerance   Activity Tolerance Patient limited by pain; Patient limited by fatigue   Medical Staff Made Aware This high complexity evaluation was performed with an occupational therapist due to the patient's co-morbidities, clinically unstable presentation, and present impairments which are a regression from the patient's baseline  Nurse Made Aware bhanu to see per RN    Assessment   Prognosis Fair   Problem List Decreased strength;Decreased endurance; Impaired balance;Decreased mobility;Pain;Decreased skin integrity;Obesity; Impaired judgement   Assessment PT completed evaluation of 60year old female admitted to [facility] on 1/16/2022 as a transfer from [facility] where she initially presented on 1/6/2022 with multiple wounds and cellulitis  Patient was transferred to [facility] for perforated diverticulitis complicated by R hydronephrosis  On 1/16, patient tested + for COVID  On 1/16 patient underwent the following urological procedures: cystoscopy with right stent placement with right retrograde pyelogram and placement of left ureteral catheter and with general surgery: "hartmans procedure, right salpino-oophrectomy and left oophrectomy " Patient presents with the following lines/drains: NG tube, colostomy bag, rectal tube, villanueva catheter, 2 ADIA drains, 2 L O2, and continuous O2/HR monitoring  Current status instabilities include pain, the previously mentioned lines, contact and airborne precautions, falls risk, bed/chair alarms, and a regression in functional status from baseline  Prior to this admission patient reports residing alone in a one level apartment (elevator access) where she was previously I with mobility (reports using crutches) and ADLS   Denies family/friend support (Although son is watching her dog at this time)  Current impairments include pain, decreased activity tolerance and gross strength, and gait deviations  During PT evaluation patient required mod-AX2 for supine-->sit transfer (toward L side), sit<-->stand transfer, and ambulation  She ambulated 3 feet (bed to chair) with b/l HHA  Once seated in chair, patient performed additional sit<-->stand from chair with mod-AX2 for repositioning  PT d/c recommendation is for rehab  Patient will continue to benefit from continued skilled PT this admission to achieve maximal function and safety  Goals   Patient Goals "to go home"   LTG Expiration Date 02/02/22   Long Term Goal #1 1) Perform bed mobility mod-I to participate in frequent repositioning and improve skin integrity; 2) Perform functional transfers mod-I to promote I with toileting and OOB mobility; 3) Ambulate 150 feet mod-I with least restrictive device to participate in household and community level mobility; 4) Improve b/l LE strength by 1/2 grade in order to improve efficiency of tranfers; 5) Improve balance by 1 grade to reduce risk for falls; 6) Improve overall activity tolerance to 60 minutes in order to increase patient's ability to engage in mobility tasks   PT Treatment Day 1   Plan   Treatment/Interventions Functional transfer training;LE strengthening/ROM; Therapeutic exercise; Endurance training;Patient/family training;Equipment eval/education; Bed mobility;Gait training;OT;Spoke to nursing   PT Frequency 3-5x/wk   Recommendation   PT Discharge Recommendation Post acute rehabilitation services   Equipment Recommended Walker  (bariatric RW )   AM-PAC Basic Mobility Inpatient   Turning in Bed Without Bedrails 1   Lying on Back to Sitting on Edge of Flat Bed 1   Moving Bed to Chair 1   Standing Up From Chair 1   Walk in Room 1   Climb 3-5 Stairs 1   Basic Mobility Inpatient Raw Score 6   Turning Head Towards Sound 4   Follow Simple Instructions 4   Low Function Basic Mobility Raw Score 14   Low Function Basic Mobility Standardized Score 22 01   Highest Level Of Mobility   [facility] Goal 2: Bed activities/Dependent transfer   Additional Treatment Session   Start Time 0934   End Time 0944   Treatment Assessment Patient participated in  10 minutes of education regarding mobility this admission and upon d/c  This included the following: educational benefit of sitting OOB in chair (facilitate positional changes, improve ability to breathe in setting of COVID), encouragement to be out of bed for meals/increase tolerance to sit OOB each day, maintaining b/l LE elevated and performing ankle pumps to aid in reducing edema, expectations for PT and mobility this admission (services typically every other day however can mobilize with RN staff on days therapy is not present), and explained d/c recommendation fo rehab (rather than home which patient desire) at this time  Additional Treatment Day 1   End of Consult   Patient Position at End of Consult Bedside chair; All needs within reach;Bed/Chair alarm activated     The patient's AM-PAC Basic Mobility Inpatient Standardized Score is less than 42 9, suggesting this patient may benefit from discharge to post-acute rehabilitation services  Please also refer to the recommendation of the Physical Therapist for safe discharge planning          Heather Ball, PT, DPT

## 2022-01-19 NOTE — PLAN OF CARE
Problem: PHYSICAL THERAPY ADULT  Goal: Performs mobility at highest level of function for planned discharge setting  See evaluation for individualized goals  Description: Treatment/Interventions: Functional transfer training,LE strengthening/ROM,Therapeutic exercise,Endurance training,Patient/family training,Equipment eval/education,Bed mobility,Gait training,OT,Spoke to nursing  Equipment Recommended: Dariela Loyd (bariatric RW )       See flowsheet documentation for full assessment, interventions and recommendations  Note: Prognosis: Fair  Problem List: Decreased strength,Decreased endurance,Impaired balance,Decreased mobility,Pain,Decreased skin integrity,Obesity,Impaired judgement  Assessment: PT completed evaluation of 60year old female admitted to [facility] on 1/16/2022 as a transfer from [facility] where she initially presented on 1/6/2022 with multiple wounds and cellulitis  Patient was transferred to [facility] for perforated diverticulitis complicated by R hydronephrosis  On 1/16, patient tested + for COVID  On 1/16 patient underwent the following urological procedures: cystoscopy with right stent placement with right retrograde pyelogram and placement of left ureteral catheter and with general surgery: "hartmans procedure, right salpino-oophrectomy and left oophrectomy " Patient presents with the following lines/drains: NG tube, colostomy bag, rectal tube, villanueva catheter, 2 ADIA drains, 2 L O2, and continuous O2/HR monitoring  Current status instabilities include pain, the previously mentioned lines, contact and airborne precautions, falls risk, bed/chair alarms, and a regression in functional status from baseline  Prior to this admission patient reports residing alone in a one level apartment (elevator access) where she was previously I with mobility (reports using crutches) and ADLS  Denies family/friend support (Although son is watching her dog at this time)   Current impairments include pain, decreased activity tolerance and gross strength, and gait deviations  During PT evaluation patient required mod-AX2 for supine-->sit transfer (toward L side), sit<-->stand transfer, and ambulation  She ambulated 3 feet (bed to chair) with b/l HHA  Once seated in chair, patient performed additional sit<-->stand from chair with mod-AX2 for repositioning  PT d/c recommendation is for rehab  Patient will continue to benefit from continued skilled PT this admission to achieve maximal function and safety  PT Discharge Recommendation: Post acute rehabilitation services          See flowsheet documentation for full assessment

## 2022-01-19 NOTE — OCCUPATIONAL THERAPY NOTE
Occupational Therapy Evaluation     Patient Name: Osvaldo Barriga  YXRAB'H Date: 2022  Problem List  Principal Problem:    Diverticulitis of large intestine with perforation  Active Problems:    Lymphedema    Primary hypertension    Morbid obesity with BMI of 60 0-69 9, adult (HCC)    Hidradenitis suppurativa    GERD (gastroesophageal reflux disease)    Right Hydroureteronephrosis    Past Medical History  Past Medical History:   Diagnosis Date    Anxiety     Arthritis     GERD (gastroesophageal reflux disease)     Hidradenitis suppurativa     Hypertension     Lipodermatosclerosis of both lower extremities     Lymphedema     Sciatic leg pain     left side    SOB (shortness of breath)     Stomach ulcer      Past Surgical History  Past Surgical History:   Procedure Laterality Date    BILATERAL SALPINGOOPHORECTOMY N/A 2022    Procedure: RIGHT SALPINGO-OOPHORECTOMY, LEFT OOPHORECTOMY;  Surgeon: Maxi Ibrahim DO;  [facility];  Service: General     SECTION      FL RETROGRADE PYELOGRAM  2022    HARTMANS PROCEDURE N/A 2022    Procedure: END COLOSTOMY;  Surgeon: Maxi Ibrahim DO;  [facility];  Service: General    LAPAROTOMY N/A 2022    Procedure: LAPAROTOMY EXPLORATORY, DRAINAGE OF INTRA -ABDOMINAL ABSCESS;  Surgeon: Maxi Ibrahim DO;  [facility];  Service: General    AR CYSTOURETHROSCOPY,URETER CATHETER Right 2022    Procedure: CYSTOSCOPY RETROGRADE PYELOGRAM WITH INSERTION STENT URETERAL;  Surgeon: Armond Goodman MD;  [facility];  Service: Urology     [address] 3 1-4 CM TRUNK,ARM,LEG Right 2020    Procedure: EXCISION BIOPSY TISSUE LESION/MASS UPPER EXTREMITY;  Surgeon: Ana Larson DO;  [facility]: [facility];  Service: General    TONSILLECTOMY      TUMOR REMOVAL Right     5th finger             22 0943   OT Last Visit   OT Visit Date 22   Note Type   Note type Evaluation Restrictions/Precautions   Weight Bearing Precautions Per Order No   Other Precautions Contact/isolation; Airborne/isolation;Multiple lines;Telemetry; Fall Risk;Pain;Bed Alarm;O2  (COVID(+); 2 ADIA drains, villanueva, 2L O2, NGT, rectal tube, colostomy)   Pain Assessment   Pain Assessment Tool 0-10   Pain Score 9   Pain Location/Orientation Location: Abdomen   Pain Onset/Description Onset: Ongoing; Descriptor: Aching;Descriptor: Discomfort   Effect of Pain on Daily Activities limits functional tasks   Patient's Stated Pain Goal No pain   Hospital Pain Intervention(s) Repositioned; Ambulation/increased activity; Emotional support   Home Living   Type of [address] One level;Elevator;Ramped entrance   Bathroom Shower/Tub Tub/shower unit   Bathroom Toilet Standard   Bathroom Equipment Grab bars in shower   [address]   Prior Function   Level of [place] Independent with ADLs and functional mobility   Lives With Alone   Receives Help From Family; Other (Comment)  (son is watching dog; limited other supports)   ADL Assistance Independent   IADLs Independent   Falls in the last 6 months 1 to 4   Vocational On disability   Comments Uses [address] to get around, does not drive  Lifestyle   Autonomy Pt reports being I w/ ADLS/IADLs, Mod I w/ transfers/functional mobility w/ use of crutches   Reciprocal Relationships Lives alone; reports no social supports but has son watching her dog   Service to Others On disability   Intrinsic Gratification Likes spending time w/ her dog   Psychosocial   Psychosocial (WDL) X   Patient Behaviors/Mood Anxious;Brightens with approach; Cooperative   ADL   Eating Assistance 5  Supervision/Setup   Grooming Assistance 4  Minimal Assistance   UB Bathing Assistance 4  Minimal Assistance   LB Bathing Assistance 2  Maximal Assistance   UB Dressing Assistance 4  Minimal [address] 2  Maximal Assistance Toileting Assistance  1  Total Assistance   Toileting Deficit   (has villanueva, rectal tube and colostomy bag)   Functional Assistance 3  Moderate Assistance   Functional Deficit Steadying;Verbal cueing;Supervision/safety; Increased time to complete  (Ax2)   Bed Mobility   Supine to Sit 3  Moderate assistance   Additional items Assist x 2;HOB elevated; Increased time required;Verbal cues;LE management   Sit to Supine Unable to assess   Additional Comments Pt sat EOB w/ CGA for sitting balance/trunk control  Transfers   Sit to Stand 3  Moderate assistance   Additional items Assist x 2; Increased time required;Verbal cues   Stand to Sit 3  Moderate assistance   Additional items Assist x 2; Increased time required;Verbal cues   Additional Comments HHA used into standing; VC for safety/hand placement, SBA 3rd for line management  X2 attempts w/ standing (1 @ EOB, 1 from recliner)   Functional Mobility   Functional Mobility 3  Moderate assistance   Additional Comments Pt took few small steps from EOB to chair w/ Mod A x2; HHA used  VC for safety  Increased time required; 3rd person present for line management   Additional items Hand hold assistance   Balance   Static Sitting Fair   Dynamic Sitting Fair -   Static Standing Poor   Dynamic Standing Poor   Ambulatory Poor   Activity Tolerance   Activity Tolerance Patient limited by fatigue;Patient limited by pain   Medical Staff Made Aware PT, Mandy   Nurse Made Aware yes, Letty Mares   RUE Assessment   RUE Assessment X  (generalized weakness)   LUE Assessment   LUE Assessment X  (generalized weakness)   Hand Function   Gross Motor Coordination Functional   Fine Motor Coordination Functional   Cognition   Overall Cognitive Status WFL   Arousal/Participation Responsive; Cooperative   Attention Within functional limits   Orientation Level Oriented X4   Memory Decreased recall of precautions   Following Commands Follows one step commands without difficulty   Comments Pt is pleasant and cooperative; needs occasional cues for safety; has limited insight into deficits (wants to return home; overall unkempt appearance despite reporting I w/ all functional activities PTA)   Assessment   Limitation Decreased ADL status; Decreased UE strength;Decreased Safe judgement during ADL;Decreased cognition;Decreased endurance;Decreased self-care trans;Decreased high-level ADLs   Prognosis Fair   Assessment Pt is a 61 y/o female seen for OT eval s/p adm to [facility] w/ perforated diverticulitis and intraabdominal abscess/phlegmon causing mass effect on R ureter and hydronephrosis  [facility], transferred to [facility] for further care  Pt is now s/p the following procedure "Cystoscopy, right stent placement with right retrograde pyelogram, placement of left ureteral catheter" performed on 1/16  Pt is COVID (+)  Pt  has a past medical history of Anxiety, Arthritis, GERD (gastroesophageal reflux disease), Hidradenitis suppurativa, Hypertension, Lipodermatosclerosis of both lower extremities, Lymphedema (2006), Sciatic leg pain (2006), SOB (shortness of breath), and Stomach ulcer  Pt with active OT orders and up with assistance  orders  Pt lives alone in a 1 floor apt w/ elevator access, ramp entrance  Pt was I w/  ADLS and IADLS, does not drive (uses public transportation), & required use of DME PTA including crutches  Pt is currently demonstrating the following occupational deficits:Min A UB ADLS, Max A LB ADLS, Mod A x2 bed mobility, Mod A x2 functional mobility and transfers w/ HHA  These deficits that are impacting pt's baseline areas of occupation are a result of the following impairments: pain, endurance, activity tolerance, functional mobility, forward functional reach, balance, trunk control, functional standing tolerance, unsupportive home environment, decreased I w/ ADLS/IADLS, strength, ROM, cognitive impairments, decreased safety awareness and decreased insight into deficits  The following Occupational Performance Areas to address include: eating, grooming, bathing/shower, toilet hygiene, dressing, medication management, socialization, health maintenance, functional mobility, clothing management and household maintenance  Recommend inpatient rehab  upon D/C  Pt to continue to benefit from acute immediate OT services to address the following goals 3-5x/week to  w/in 10-14 days:    Goals   Patient Goals to go home   LTG Time Frame 10-14   Long Term Goal #1 see below listed goals   Plan   Treatment Interventions ADL retraining;Functional transfer training;UE strengthening/ROM; Endurance training;Cognitive reorientation;Patient/family training;Equipment evaluation/education; Compensatory technique education;Continued evaluation; Energy conservation; Activityengagement   Goal Expiration Date 22   OT Frequency 3-5x/wk   Recommendation   OT Discharge Recommendation Post acute rehabilitation services   OT - OK to Discharge Yes  (when medically stable)   Additional Comments  The patient's raw score on the AM-PAC Daily Activity inpatient short form is 14, standardized score is 33 39, less than 39 4  Patients at this level are likely to benefit from discharge to post-acute rehabilitation services  Please refer to the recommendation of the Occupational Therapist for safe discharge planning  Additional Comments 2 Pt seen as a co-evlaution due to the patient's co-morbidities, clinically unstable presentation, and present impairments which are a regression from the patient's baseline     AM-PAC Daily Activity Inpatient   Lower Body Dressing 2   Bathing 2   Toileting 2   Upper Body Dressing 2   Grooming 3   Eating 3   Daily Activity Raw Score 14   Daily Activity Standardized Score (Calc for Raw Score >=11) 33 39   AM-PAC Applied Cognition Inpatient   Following a Speech/Presentation 3   Understanding Ordinary Conversation 4   Taking Medications 3   Remembering Where Things Are Placed or Put Away 3 Remembering List of 4-5 Errands 3   Taking Care of Complicated Tasks 3   Applied Cognition Raw Score 19   Applied Cognition Standardized Score 39 77     GOALS    1) Pt will complete rolling left/right in bed with Min assist, as prerequisite for further engagement in ADLS   2) Pt will complete supine to sit transfer with Min A using B/L UEs to initiate bed mobility   3) Pt will tolerate sitting at EOB 20 minutes with S assist and stable vital signs, as prerequisite for further engagement in ADLS   4) Pt will complete grooming task with S assist and increased time to increase independence in functional tasks  5) Pt will increase B/L UE ROM 1/2 MMT to increase functional UB use during ADLS   6) Pt will complete UB ADLS with S and use of AD/DME as needed to increase independence in functional tasks  7) Pt will complete LB ADLS with Min A and use of AD/DME as needed to increase independence in functional tasks  8) Pt will complete sit to stand transfer / sit pivot transfer / stand pivot transfer with Min assist on/off all ADL surfaces   9) Pt will follow 100% simple one step verbal commands and be A/Ox4 consistently t/o use of external environmental cues to increase awareness for functional tasks  10) Pt will demonstrate 100% carryover of E C  techniques t/o fx'l I/ADL/ leisure tasks w/o cues s/p skilled education  11) Pt will improve functional mobility to Min A w/ DME as needed to increase engagement in meaningful tasks  12) Pt will be attentive 100% of the time during ongoing formal cognitive assessment to assist w/ safe D/C planning and increase safety for meaningful tasks    Frieda Estes MS, OTR/L

## 2022-01-19 NOTE — PLAN OF CARE
Problem: Prexisting or High Potential for Compromised Skin Integrity  Goal: Skin integrity is maintained or improved  Description: INTERVENTIONS:  - Identify patients at risk for skin breakdown  - Assess and monitor skin integrity  - Assess and monitor nutrition and hydration status  - Monitor labs   - Assess for incontinence   - Turn and reposition patient  - Assist with mobility/ambulation  - Relieve pressure over bony prominences  - Avoid friction and shearing  - Provide appropriate hygiene as needed including keeping skin clean and dry  - Evaluate need for skin moisturizer/barrier cream  - Collaborate with interdisciplinary team   - Patient/family teaching  - Consider wound care consult   Outcome: Progressing     Problem: MOBILITY - ADULT  Goal: Maintain or return to baseline ADL function  Description: INTERVENTIONS:  -  Assess patient's ability to carry out ADLs; assess patient's baseline for ADL function and identify physical deficits which impact ability to perform ADLs (bathing, care of mouth/teeth, toileting, grooming, dressing, etc )  - Assess/evaluate cause of self-care deficits   - Assess range of motion  - Assess patient's mobility; develop plan if impaired  - Assess patient's need for assistive devices and provide as appropriate  - Encourage maximum independence but intervene and supervise when necessary  - Involve family in performance of ADLs  - Assess for home care needs following discharge   - Consider OT consult to assist with ADL evaluation and planning for discharge  - Provide patient education as appropriate  Outcome: Progressing  Goal: Maintains/Returns to pre admission functional level  Description: INTERVENTIONS:  - Perform BMAT or MOVE assessment daily    - Set and communicate daily mobility goal to care team and patient/family/caregiver  - Collaborate with rehabilitation services on mobility goals if consulted  - Perform Range of Motion 3 times a day    - Reposition patient every 2 hours   - Dangle patient 3 times a day  - Stand patient 3 times a day  - Ambulate patient 3 times a day  - Out of bed to chair 3 times a day   - Out of bed for meals 3 times a day  - Out of bed for toileting  - Record patient progress and toleration of activity level   Outcome: Progressing     Problem: Potential for Falls  Goal: Patient will remain free of falls  Description: INTERVENTIONS:  - Educate patient/family on patient safety including physical limitations  - Instruct patient to call for assistance with activity   - Consult OT/PT to assist with strengthening/mobility   - Keep Call bell within reach  - Keep bed low and locked with side rails adjusted as appropriate  - Keep care items and personal belongings within reach  - Initiate and maintain comfort rounds  - Make Fall Risk Sign visible to staff  - Offer Toileting every 2 Hours, in advance of need  - Initiate/Maintain bed alarm  - Apply yellow socks and bracelet for high fall risk patients  - Consider moving patient to room near nurses station  Outcome: Progressing     Problem: GASTROINTESTINAL - ADULT  Goal: Minimal or absence of nausea and/or vomiting  Description: INTERVENTIONS:  - Administer IV fluids if ordered to ensure adequate hydration  - Maintain NPO status until nausea and vomiting are resolved  - Nasogastric tube if ordered  - Administer ordered antiemetic medications as needed  - Provide nonpharmacologic comfort measures as appropriate  - Advance diet as tolerated, if ordered  - Consider nutrition services referral to assist patient with adequate nutrition and appropriate food choices  Outcome: Progressing  Goal: Maintains or returns to baseline bowel function  Description: INTERVENTIONS:  - Assess bowel function  - Encourage oral fluids to ensure adequate hydration  - Administer IV fluids if ordered to ensure adequate hydration  - Administer ordered medications as needed  - Encourage mobilization and activity  - Consider nutritional services referral to assist patient with adequate nutrition and appropriate food choices  Outcome: Progressing  Goal: Maintains adequate nutritional intake  Description: INTERVENTIONS:  - Monitor percentage of each meal consumed  - Identify factors contributing to decreased intake, treat as appropriate  - Assist with meals as needed  - Monitor I&O, weight, and lab values if indicated  - Obtain nutrition services referral as needed  Outcome: Progressing  Goal: Establish and maintain optimal ostomy function  Description: INTERVENTIONS:  - Assess bowel function  - Encourage oral fluids to ensure adequate hydration  - Administer IV fluids if ordered to ensure adequate hydration   - Administer ordered medications as needed  - Encourage mobilization and activity  - Nutrition services referral to assist patient with appropriate food choices  - Assess stoma site  - Consider wound care consult   Outcome: Progressing  Goal: Oral mucous membranes remain intact  Description: INTERVENTIONS  - Assess oral mucosa and hygiene practices  - Implement preventative oral hygiene regimen  - Implement oral medicated treatments as ordered  - Initiate Nutrition services referral as needed  Outcome: Progressing     Problem: GENITOURINARY - ADULT  Goal: Maintains or returns to baseline urinary function  Description: INTERVENTIONS:  - Assess urinary function  - Encourage oral fluids to ensure adequate hydration if ordered  - Administer IV fluids as ordered to ensure adequate hydration  - Administer ordered medications as needed  - Offer frequent toileting  - Follow urinary retention protocol if ordered  Outcome: Progressing  Goal: Absence of urinary retention  Description: INTERVENTIONS:  - Assess patient's ability to void and empty bladder  - Monitor I/O  - Bladder scan as needed  - Discuss with physician/AP medications to alleviate retention as needed  - Discuss catheterization for long term situations as appropriate  Outcome: Progressing  Goal: Urinary catheter remains patent  Description: INTERVENTIONS:  - Assess patency of urinary catheter  - If patient has a chronic villanueva, consider changing catheter if non-functioning  - Follow guidelines for intermittent irrigation of non-functioning urinary catheter  Outcome: Progressing     Problem: PAIN - ADULT  Goal: Verbalizes/displays adequate comfort level or baseline comfort level  Description: Interventions:  - Encourage patient to monitor pain and request assistance  - Assess pain using appropriate pain scale  - Administer analgesics based on type and severity of pain and evaluate response  - Implement non-pharmacological measures as appropriate and evaluate response  - Consider cultural and social influences on pain and pain management  - Notify physician/advanced practitioner if interventions unsuccessful or patient reports new pain  Outcome: Progressing     Problem: INFECTION - ADULT  Goal: Absence or prevention of progression during hospitalization  Description: INTERVENTIONS:  - Assess and monitor for signs and symptoms of infection  - Monitor lab/diagnostic results  - Monitor all insertion sites, i e  indwelling lines, tubes, and drains  - Monitor endotracheal if appropriate and nasal secretions for changes in amount and color  - [place] appropriate cooling/warming therapies per order  - Administer medications as ordered  - Instruct and encourage patient and family to use good hand hygiene technique  - Identify and instruct in appropriate isolation precautions for identified infection/condition  Outcome: Progressing     Problem: SAFETY ADULT  Goal: Maintain or return to baseline ADL function  Description: INTERVENTIONS:  -  Assess patient's ability to carry out ADLs; assess patient's baseline for ADL function and identify physical deficits which impact ability to perform ADLs (bathing, care of mouth/teeth, toileting, grooming, dressing, etc )  - Assess/evaluate cause of self-care deficits   - Assess range of motion  - Assess patient's mobility; develop plan if impaired  - Assess patient's need for assistive devices and provide as appropriate  - Encourage maximum independence but intervene and supervise when necessary  - Involve family in performance of ADLs  - Assess for home care needs following discharge   - Consider OT consult to assist with ADL evaluation and planning for discharge  - Provide patient education as appropriate  Outcome: Progressing  Goal: Maintains/Returns to pre admission functional level  Description: INTERVENTIONS:  - Perform BMAT or MOVE assessment daily    - Set and communicate daily mobility goal to care team and patient/family/caregiver  - Collaborate with rehabilitation services on mobility goals if consulted  - Perform Range of Motion 3 times a day  - Reposition patient every 2 hours    - Dangle patient 3 times a day  - Stand patient 3 times a day  - Ambulate patient 3 times a day  - Out of bed to chair 3 times a day   - Out of bed for meals 3 times a day  - Out of bed for toileting  - Record patient progress and toleration of activity level   Outcome: Progressing  Goal: Patient will remain free of falls  Description: INTERVENTIONS:  - Educate patient/family on patient safety including physical limitations  - Instruct patient to call for assistance with activity   - Consult OT/PT to assist with strengthening/mobility   - Keep Call bell within reach  - Keep bed low and locked with side rails adjusted as appropriate  - Keep care items and personal belongings within reach  - Initiate and maintain comfort rounds  - Make Fall Risk Sign visible to staff  - Offer Toileting every 2 Hours, in advance of need  - Initiate/Maintain bed alarm  - Apply yellow socks and bracelet for high fall risk patients  - Consider moving patient to room near nurses station  Outcome: Progressing     Problem: DISCHARGE PLANNING  Goal: Discharge to home or other facility with appropriate resources  Description: INTERVENTIONS:  - Identify barriers to discharge w/patient and caregiver  - Arrange for needed discharge resources and transportation as appropriate  - Identify discharge learning needs (meds, wound care, etc )  - Arrange for interpretive services to assist at discharge as needed  - Refer to Case Management Department for coordinating discharge planning if the patient needs post-hospital services based on physician/advanced practitioner order or complex needs related to functional status, cognitive ability, or social support system  Outcome: Progressing     Problem: SAFETY,RESTRAINT: NV/NON-SELF DESTRUCTIVE BEHAVIOR  Goal: Remains free of harm/injury (restraint for non violent/non self-detsructive behavior)  Description: INTERVENTIONS:  - Instruct patient/family regarding restraint use   - Assess and monitor physiologic and psychological status   - Provide interventions and comfort measures to meet assessed patient needs   - Identify and implement measures to help patient regain control  - Assess readiness for release of restraint   Outcome: Progressing  Goal: Returns to optimal restraint-free functioning  Description: INTERVENTIONS:  - Assess the patient's behavior and symptoms that indicate continued need for restraint  - Identify and implement measures to help patient regain control  - Assess readiness for release of restraint   Outcome: Progressing     Problem: Nutrition/Hydration-ADULT  Goal: Nutrient/Hydration intake appropriate for improving, restoring or maintaining nutritional needs  Description: Monitor and assess patient's nutrition/hydration status for malnutrition  Collaborate with interdisciplinary team and initiate plan and interventions as ordered  Monitor patient's weight and dietary intake as ordered or per policy  Utilize nutrition screening tool and intervene as necessary  Determine patient's food preferences and provide high-protein, high-caloric foods as appropriate       INTERVENTIONS:  - Monitor oral intake, urinary output, labs, and treatment plans  - Assess nutrition and hydration status and recommend course of action  - Evaluate amount of meals eaten  - Assist patient with eating if necessary   - Allow adequate time for meals  - Recommend/ encourage appropriate diets, oral nutritional supplements, and vitamin/mineral supplements  - Order, calculate, and assess calorie counts as needed  - Recommend, monitor, and adjust tube feedings and TPN/PPN based on assessed needs  - Assess need for intravenous fluids  - Provide specific nutrition/hydration education as appropriate  - Include patient/family/caregiver in decisions related to nutrition  Outcome: Progressing     Problem: CHANGE IN BODY IMAGE  Goal: Pt/Family communicate acceptance of loss or change in body image and expresses psychological comfort and peace  Description: INTERVENTIONS:  - Assess patient/family anxiety and grief process related to change in body image, loss of functional status and loss of sense of self  - Assess patient/family's coping skills and provide emotional, spiritual and psychosocial support  - Provide information about the patient's health status with consideration of family and cultural values  - Communicate willingness to discuss loss and facilitate grief process with patient/family as appropriate  - Emphasize sustaining relationships within family system and community, or farzaneh/spiritual traditions  - Refer to community support groups as appropriate  - Initiate Spiritual Care, Pastoral care or other ancillary consults as needed  Outcome: Progressing

## 2022-01-19 NOTE — PLAN OF CARE
Problem: OCCUPATIONAL THERAPY ADULT  Goal: Performs self-care activities at highest level of function for planned discharge setting  See evaluation for individualized goals  Description: Treatment Interventions: ADL retraining,Functional transfer training,UE strengthening/ROM,Endurance training,Cognitive reorientation,Patient/family training,Equipment evaluation/education,Compensatory technique education,Continued evaluation,Energy conservation,Activityengagement          See flowsheet documentation for full assessment, interventions and recommendations  Note: Limitation: Decreased ADL status,Decreased UE strength,Decreased Safe judgement during ADL,Decreased cognition,Decreased endurance,Decreased self-care trans,Decreased high-level ADLs  Prognosis: Fair  Assessment: Pt is a 59 y/o female seen for OT eval s/p adm to B w/ perforated diverticulitis and intraabdominal abscess/phlegmon causing mass effect on R ureter and hydronephrosis  [facility], transferred to [facility] for further care  Pt is now s/p the following procedure "Cystoscopy, right stent placement with right retrograde pyelogram, placement of left ureteral catheter" performed on 1/16  Pt is COVID (+)  Pt  has a past medical history of Anxiety, Arthritis, GERD (gastroesophageal reflux disease), Hidradenitis suppurativa, Hypertension, Lipodermatosclerosis of both lower extremities, Lymphedema (2006), Sciatic leg pain (2006), SOB (shortness of breath), and Stomach ulcer  Pt with active OT orders and up with assistance  orders  Pt lives alone in a 1 floor apt w/ elevator access, ramp entrance  Pt was I w/  ADLS and IADLS, does not drive (uses public transportation), & required use of DME PTA including crutches  Pt is currently demonstrating the following occupational deficits:Min A UB ADLS, Max A LB ADLS, Mod A x2 bed mobility, Mod A x2 functional mobility and transfers w/ HHA   These deficits that are impacting pt's baseline areas of occupation are a result of the following impairments: pain, endurance, activity tolerance, functional mobility, forward functional reach, balance, trunk control, functional standing tolerance, unsupportive home environment, decreased I w/ ADLS/IADLS, strength, ROM, cognitive impairments, decreased safety awareness and decreased insight into deficits  The following Occupational Performance Areas to address include: eating, grooming, bathing/shower, toilet hygiene, dressing, medication management, socialization, health maintenance, functional mobility, clothing management and household maintenance  Recommend inpatient rehab  upon D/C   Pt to continue to benefit from acute immediate OT services to address the following goals 3-5x/week to  w/in 10-14 days:      OT Discharge Recommendation: Post acute rehabilitation services  OT - OK to Discharge: Yes (when medically stable)  Melvina Viveros MS, OTR/L

## 2022-01-19 NOTE — PROGRESS NOTES
Progress Note - Infectious Disease   Megan Strauss 60 y o  female MRN: [id number]  Unit/Bed#: [facility] 206-01 Encounter: [id number]      Impression/Recommendations:  1   Sepsis, POA   HR, WBC   Due to #3 +/- 4   No other appreciable source of infection   Exam otherwise benign   Initial blood/urine cultures, serial CXR negative   Unclear relevance of COVID PCR as CXR and patient intubated largely for recent surgery   Hemodynamically stable and nontoxic  Improved  Rec:  · Continue antibiotics as below  · Follow temperatures and WBC closely  · Supportive care as per the primary service     2   Perforated diverticulitis with abscess    Also complicated by #6   FHEWJU post ex lap, drainage of abscess, end colostomy, right salpingo oophorectomy and left oophorectomy 1/16   Intraoperative findings notable for perforated diverticulitis with tissue necrosis proximal rectum, ovaries at site of perforation  OR cultures pending  Rec:  · Continue Zosyn for now  · Follow up OR cultures and tailor antibiotics as indicated  · Postoperative care per surgery     3   Right hydroureteronephrosis   Due to #2   Status post cystoscopy with right ureteral stent placement 1/16    Rec:  · Follow urine output closely  · Galan per Urology until patient ambulatory  · Outpatient Urology follow-up     4   ZAHIRA on CKD   Baseline Cr 0 9-1 0   Likely multifactorial   Improving  Rec:  · Follow creatinine closely and dose-adjust antibiotics as indicated  · Recheck BMP in AM       5   Thrush with possible Candida esophagitis   Patient reported dysphagia, choking preoperatively  Rec:  · Continue fluconazole for 2 more days  · PPI per GI  · If no improvement may need eventual EGD per GI     6   COVID infection   Unvaccinated but had COVID 12/2020   Suspect coincidental finding on preoperative PCR   Unable to assess symptoms   CXR without infiltrates and hypoxia may be due to recent intubation, surgery, MO, atelectasis    Rec:  · No treatment indicated  · Follow respiratory symptoms closely     7   MO   BMI 58      8   Hidradenitis   With multiple wounds in abdominal/groin skin folds   No apparent superinfection      Antibiotics:  Zosyn #3  Antibiotics #14  Fluconazole #12  POD #3    Subjective:  Patient seen on AM rounds  Doing OK  Denies fevers, chills, sweats, nausea, vomiting, or diarrhea  24 Hour Events:  No documented fevers, chills, sweats, nausea, vomiting, or diarrhea  Down to 2L O2  Objective:  Vitals:  Temp:  [98 1 °F (36 7 °C)-98 9 °F (37 2 °C)] 98 1 °F (36 7 °C)  HR:  [110-138] 122  Resp:  [25-54] 38  BP: ()/(57-98) 142/68  SpO2:  [92 %-98 %] 96 %  Temp (24hrs), Av 5 °F (36 9 °C), Min:98 1 °F (36 7 °C), Max:98 9 °F (37 2 °C)  Current: Temperature: 98 1 °F (36 7 °C)    Physical Exam:   General:  No acute distress  Psychiatric:  Awake and alert  Pulmonary:  Normal respiratory excursion without accessory muscle use  Abdomen:  Obese, wound photo reviewed:  Liquid brown stool in ostomy, wound closed with staples  Extremities:  Nonpitting lymphedema  Skin:  No rashes    Lab Results:  I have personally reviewed pertinent labs    Results from last 7 days   Lab Units 22  0536 22  1052 22  0506 22  0615 22  0221 22  2322 22  2127 22  2127 22  0515 22  0515   POTASSIUM mmol/L 4 3 4 5 4 3   < > 4 5  --   --   --    < > 3 9   CHLORIDE mmol/L 111* 110* 110*   < > 111*  --   --   --    < > 105   CO2 mmol/L 25 24 23   < > 21  --   --   --    < > 30   CO2, I-STAT mmol/L  --   --   --   --   --  22   < > 22  --   --    BUN mg/dL 16 19 20   < > 19  --   --   --    < > 39*   CREATININE mg/dL 1 12 1 27 1 27   < > 1 59*  --   --   --    < > 1 30   EGFR ml/min/1 73sq m 54 46 46   < > 35  --   --   --    < > 45   GLUCOSE, ISTAT mg/dl  --   --   --   --   --  172*  --  147*  --   --    CALCIUM mg/dL 8 5 8 3 8 4   < > 7 7*  --   --   --    < > 8 2*   AST U/L  --  11  --   --  8  --   -- --   --  20   ALT U/L  --  9*  --   --  10*  --   --   --   --  14   ALK PHOS U/L  --  62  --   --  62  --   --   --   --  72    < > = values in this interval not displayed  Results from last 7 days   Lab Units 01/19/22  0536 01/18/22  1052 01/18/22  0506   WBC Thousand/uL 11 11* 10 72* 10 38*   HEMOGLOBIN g/dL 7 1* 7 1* 7 3*   PLATELETS Thousands/uL 404* 328 317     Results from last 7 days   Lab Units 01/16/22  2102   GRAM STAIN RESULT  1+ Polys  No organisms seen       Imaging Studies:   I have personally reviewed pertinent imaging study reports and images in PACS  EKG, Pathology, and Other Studies:   I have personally reviewed pertinent reports

## 2022-01-20 NOTE — QUICK NOTE
Surgery  Quick note    Ostomy appliance taken down and test tube test performed  The ostomy is pink beneath the fascia indicating viability  Ostomy appliance reapplied               Lashae Francisco MD  1/20/22

## 2022-01-20 NOTE — PROGRESS NOTES
Progress Note - Quan Moore 60 y o  female MRN: [id number]    Unit/Bed#: [facility] 511-01 Encounter: [id number]      Assessment:  59 y/o F w perforated diverticulitis s/p Ortega's procedure on 1/17      CTA on 1/19 negative for PE  Afebrile  Vitals stable except for mild persistent tachycardia 110-115  Abdomen soft, nontender, non distended  Midline akash removed this morning  Colostomy is maroon, engorged  Minimal bowel sweat in appliance  Uop: 1925  NGT: 350  STIVEN: L- 105cc ss  STIVEN: R- 35cc ss  Malecot: 180    Plan:  Discontinue ngt, start sips of clears  Continue malecot  Continue stiven x2  Dc villanueva  Up in chair, out of bed, ambulate  dvt ppx, SQH  Continue zosyn, diflucan    Subjective:   Abdominal pain improved  Got out of bed and into chair yesterday  Wants to drink something  Denied any flatus into the stoma  Objective:     Vitals: Blood pressure 137/78, pulse (!) 117, temperature 98 9 °F (37 2 °C), resp  rate (!) 25, height 5' 7" (1 702 m), weight (!) 176 kg (387 lb 2 oz), SpO2 93 %  ,Body mass index is 60 63 kg/m²  Intake/Output Summary (Last 24 hours) at 1/20/2022 0621  Last data filed at 1/20/2022 0529  Gross per 24 hour   Intake 5355 47 ml   Output 2595 ml   Net 2760 47 ml       Physical Exam  General: NAD  HEENT: NC/AT  MMM  Cv: RRR  Lungs: normal effort  Ab: Soft, NT/ND  Ex: no CCE  Neuro: AAOx3      Invasive Devices  Report    Central Venous Catheter Line            CVC Central Lines 01/17/22 Triple 3 days          Drain            Closed/Suction Drain LLQ Bulb 19 Fr  3 days    Closed/Suction Drain RLQ Bulb 19 Fr  3 days    Colostomy Other (comment) LUQ 3 days    NG/OG/Enteral Tube Nasogastric 18 Fr Right nare 3 days    Rectal Tube Without balloon 3 days    Urethral Catheter Latex 18 Fr  3 days                Lab, Imaging and other studies: I have personally reviewed pertinent reports      VTE Pharmacologic Prophylaxis: Sequential compression device (Venodyne)   VTE Mechanical Prophylaxis: sequential compression device

## 2022-01-20 NOTE — PLAN OF CARE
Problem: Prexisting or High Potential for Compromised Skin Integrity  Goal: Skin integrity is maintained or improved  Description: INTERVENTIONS:  - Identify patients at risk for skin breakdown  - Assess and monitor skin integrity  - Assess and monitor nutrition and hydration status  - Monitor labs   - Assess for incontinence   - Turn and reposition patient  - Assist with mobility/ambulation  - Relieve pressure over bony prominences  - Avoid friction and shearing  - Provide appropriate hygiene as needed including keeping skin clean and dry  - Evaluate need for skin moisturizer/barrier cream  - Collaborate with interdisciplinary team   - Patient/family teaching  - Consider wound care consult   Outcome: Progressing     Problem: MOBILITY - ADULT  Goal: Maintain or return to baseline ADL function  Description: INTERVENTIONS:  -  Assess patient's ability to carry out ADLs; assess patient's baseline for ADL function and identify physical deficits which impact ability to perform ADLs (bathing, care of mouth/teeth, toileting, grooming, dressing, etc )  - Assess/evaluate cause of self-care deficits   - Assess range of motion  - Assess patient's mobility; develop plan if impaired  - Assess patient's need for assistive devices and provide as appropriate  - Encourage maximum independence but intervene and supervise when necessary  - Involve family in performance of ADLs  - Assess for home care needs following discharge   - Consider OT consult to assist with ADL evaluation and planning for discharge  - Provide patient education as appropriate  Outcome: Progressing  Goal: Maintains/Returns to pre admission functional level  Description: INTERVENTIONS:  - Perform BMAT or MOVE assessment daily    - Set and communicate daily mobility goal to care team and patient/family/caregiver  - Collaborate with rehabilitation services on mobility goals if consulted  - Perform Range of Motion 3 times a day    - Reposition patient every 2 hours   - Dangle patient 3 times a day  - Stand patient 3 times a day  - Ambulate patient 3 times a day  - Out of bed to chair 3 times a day   - Out of bed for meals 3 times a day  - Out of bed for toileting  - Record patient progress and toleration of activity level   Outcome: Progressing     Problem: Potential for Falls  Goal: Patient will remain free of falls  Description: INTERVENTIONS:  - Educate patient/family on patient safety including physical limitations  - Instruct patient to call for assistance with activity   - Consult OT/PT to assist with strengthening/mobility   - Keep Call bell within reach  - Keep bed low and locked with side rails adjusted as appropriate  - Keep care items and personal belongings within reach  - Initiate and maintain comfort rounds  - Make Fall Risk Sign visible to staff  - Offer Toileting every 2 Hours, in advance of need  - Initiate/Maintain bed alarm  - Apply yellow socks and bracelet for high fall risk patients  - Consider moving patient to room near nurses station  Outcome: Progressing     Problem: GASTROINTESTINAL - ADULT  Goal: Minimal or absence of nausea and/or vomiting  Description: INTERVENTIONS:  - Administer IV fluids if ordered to ensure adequate hydration  - Maintain NPO status until nausea and vomiting are resolved  - Nasogastric tube if ordered  - Administer ordered antiemetic medications as needed  - Provide nonpharmacologic comfort measures as appropriate  - Advance diet as tolerated, if ordered  - Consider nutrition services referral to assist patient with adequate nutrition and appropriate food choices  Outcome: Progressing  Goal: Maintains or returns to baseline bowel function  Description: INTERVENTIONS:  - Assess bowel function  - Encourage oral fluids to ensure adequate hydration  - Administer IV fluids if ordered to ensure adequate hydration  - Administer ordered medications as needed  - Encourage mobilization and activity  - Consider nutritional services referral to assist patient with adequate nutrition and appropriate food choices  Outcome: Progressing  Goal: Maintains adequate nutritional intake  Description: INTERVENTIONS:  - Monitor percentage of each meal consumed  - Identify factors contributing to decreased intake, treat as appropriate  - Assist with meals as needed  - Monitor I&O, weight, and lab values if indicated  - Obtain nutrition services referral as needed  Outcome: Progressing  Goal: Establish and maintain optimal ostomy function  Description: INTERVENTIONS:  - Assess bowel function  - Encourage oral fluids to ensure adequate hydration  - Administer IV fluids if ordered to ensure adequate hydration   - Administer ordered medications as needed  - Encourage mobilization and activity  - Nutrition services referral to assist patient with appropriate food choices  - Assess stoma site  - Consider wound care consult   Outcome: Progressing  Goal: Oral mucous membranes remain intact  Description: INTERVENTIONS  - Assess oral mucosa and hygiene practices  - Implement preventative oral hygiene regimen  - Implement oral medicated treatments as ordered  - Initiate Nutrition services referral as needed  Outcome: Progressing     Problem: GENITOURINARY - ADULT  Goal: Maintains or returns to baseline urinary function  Description: INTERVENTIONS:  - Assess urinary function  - Encourage oral fluids to ensure adequate hydration if ordered  - Administer IV fluids as ordered to ensure adequate hydration  - Administer ordered medications as needed  - Offer frequent toileting  - Follow urinary retention protocol if ordered  Outcome: Progressing  Goal: Absence of urinary retention  Description: INTERVENTIONS:  - Assess patient's ability to void and empty bladder  - Monitor I/O  - Bladder scan as needed  - Discuss with physician/AP medications to alleviate retention as needed  - Discuss catheterization for long term situations as appropriate  Outcome: Progressing  Goal: Urinary catheter remains patent  Description: INTERVENTIONS:  - Assess patency of urinary catheter  - If patient has a chronic villanueva, consider changing catheter if non-functioning  - Follow guidelines for intermittent irrigation of non-functioning urinary catheter  Outcome: Progressing     Problem: SKIN/TISSUE INTEGRITY - ADULT  Goal: Skin Integrity remains intact(Skin Breakdown Prevention)  Description: Assess:  -Perform Surendra assessment every 12  -Clean and moisturize skin every 4  -Inspect skin when repositioning, toileting, and assisting with ADLS  -Assess extremities for adequate circulation and sensation     Bed Management:  -Have minimal linens on bed & keep smooth, unwrinkled  -Change linens as needed when moist or perspiring  -Avoid sitting or lying in one position for more than 2 hours while in bed  -Keep HOB at 30degrees     Toileting:  -Offer bedside commode  -Assess for incontinence every 2    Activity:  -Mobilize patient 3 times a day  -Encourage activity and walks on unit  -Encourage or provide ROM exercises   -Turn and reposition patient every 2 Hours  -Use appropriate equipment to lift or move patient in bed  -Instruct/ Assist with weight shifting every 2 hour when out of bed in chair  -Consider limitation of chair time 8 hour intervals    Skin Care:  -Avoid use of baby powder, tape, friction and shearing, hot water or constrictive clothing  -Relieve pressure over bony prominences using allevyns  -Do not massage red bony areas  Outcome: Progressing  Goal: Incision(s), wounds(s) or drain site(s) healing without S/S of infection  Description: INTERVENTIONS  - Assess and document dressing, incision, wound bed, drain sites and surrounding tissue  - Provide patient and family education  - Perform skin care/dressing changes as needed  Outcome: Progressing  Goal: Pressure injury heals and does not worsen  Description: Interventions:  - Implement low air loss mattress or specialty surface (Criteria met)  - Apply silicone foam dressing  - Instruct/assist with weight shifting every 120 minutes when in chair   - Limit chair time to 8 hour intervals  - Use special pressure reducing interventions such as allevyns when in chair   - Apply fecal or urinary incontinence containment device   - Perform passive or active ROM every 4  - Turn and reposition patient & offload bony prominences every 2 hours   - Utilize friction reducing device or surface for transfers   - Consider nutrition services referral as needed  Outcome: Progressing     Problem: PAIN - ADULT  Goal: Verbalizes/displays adequate comfort level or baseline comfort level  Description: Interventions:  - Encourage patient to monitor pain and request assistance  - Assess pain using appropriate pain scale  - Administer analgesics based on type and severity of pain and evaluate response  - Implement non-pharmacological measures as appropriate and evaluate response  - Consider cultural and social influences on pain and pain management  - Notify physician/advanced practitioner if interventions unsuccessful or patient reports new pain  Outcome: Progressing     Problem: INFECTION - ADULT  Goal: Absence or prevention of progression during hospitalization  Description: INTERVENTIONS:  - Assess and monitor for signs and symptoms of infection  - Monitor lab/diagnostic results  - Monitor all insertion sites, i e  indwelling lines, tubes, and drains  - Monitor endotracheal if appropriate and nasal secretions for changes in amount and color  - [place] appropriate cooling/warming therapies per order  - Administer medications as ordered  - Instruct and encourage patient and family to use good hand hygiene technique  - Identify and instruct in appropriate isolation precautions for identified infection/condition  Outcome: Progressing     Problem: SAFETY ADULT  Goal: Maintain or return to baseline ADL function  Description: INTERVENTIONS:  -  Assess patient's ability to carry out ADLs; assess patient's baseline for ADL function and identify physical deficits which impact ability to perform ADLs (bathing, care of mouth/teeth, toileting, grooming, dressing, etc )  - Assess/evaluate cause of self-care deficits   - Assess range of motion  - Assess patient's mobility; develop plan if impaired  - Assess patient's need for assistive devices and provide as appropriate  - Encourage maximum independence but intervene and supervise when necessary  - Involve family in performance of ADLs  - Assess for home care needs following discharge   - Consider OT consult to assist with ADL evaluation and planning for discharge  - Provide patient education as appropriate  Outcome: Progressing  Goal: Maintains/Returns to pre admission functional level  Description: INTERVENTIONS:  - Perform BMAT or MOVE assessment daily    - Set and communicate daily mobility goal to care team and patient/family/caregiver  - Collaborate with rehabilitation services on mobility goals if consulted  - Perform Range of Motion 3 times a day  - Reposition patient every 2 hours    - Dangle patient 3 times a day  - Stand patient 3 times a day  - Ambulate patient 3 times a day  - Out of bed to chair 3 times a day   - Out of bed for meals 3 times a day  - Out of bed for toileting  - Record patient progress and toleration of activity level   Outcome: Progressing  Goal: Patient will remain free of falls  Description: INTERVENTIONS:  - Educate patient/family on patient safety including physical limitations  - Instruct patient to call for assistance with activity   - Consult OT/PT to assist with strengthening/mobility   - Keep Call bell within reach  - Keep bed low and locked with side rails adjusted as appropriate  - Keep care items and personal belongings within reach  - Initiate and maintain comfort rounds  - Make Fall Risk Sign visible to staff  - Offer Toileting every 2 Hours, in advance of need  - Initiate/Maintain bed alarm  - Apply yellow socks and bracelet for high fall risk patients  - Consider moving patient to room near nurses station  Outcome: Progressing     Problem: DISCHARGE PLANNING  Goal: Discharge to home or other facility with appropriate resources  Description: INTERVENTIONS:  - Identify barriers to discharge w/patient and caregiver  - Arrange for needed discharge resources and transportation as appropriate  - Identify discharge learning needs (meds, wound care, etc )  - Arrange for interpretive services to assist at discharge as needed  - Refer to Case Management Department for coordinating discharge planning if the patient needs post-hospital services based on physician/advanced practitioner order or complex needs related to functional status, cognitive ability, or social support system  Outcome: Progressing     Problem: Knowledge Deficit  Goal: Patient/family/caregiver demonstrates understanding of disease process, treatment plan, medications, and discharge instructions  Description: Complete learning assessment and assess knowledge base    Interventions:  - Provide teaching at level of understanding  - Provide teaching via preferred learning methods  Outcome: Progressing     Problem: SAFETY,RESTRAINT: NV/NON-SELF DESTRUCTIVE BEHAVIOR  Goal: Remains free of harm/injury (restraint for non violent/non self-detsructive behavior)  Description: INTERVENTIONS:  - Instruct patient/family regarding restraint use   - Assess and monitor physiologic and psychological status   - Provide interventions and comfort measures to meet assessed patient needs   - Identify and implement measures to help patient regain control  - Assess readiness for release of restraint   Outcome: Progressing  Goal: Returns to optimal restraint-free functioning  Description: INTERVENTIONS:  - Assess the patient's behavior and symptoms that indicate continued need for restraint  - Identify and implement measures to help patient regain control  - Assess readiness for release of restraint   Outcome: Progressing     Problem: Nutrition/Hydration-ADULT  Goal: Nutrient/Hydration intake appropriate for improving, restoring or maintaining nutritional needs  Description: Monitor and assess patient's nutrition/hydration status for malnutrition  Collaborate with interdisciplinary team and initiate plan and interventions as ordered  Monitor patient's weight and dietary intake as ordered or per policy  Utilize nutrition screening tool and intervene as necessary  Determine patient's food preferences and provide high-protein, high-caloric foods as appropriate       INTERVENTIONS:  - Monitor oral intake, urinary output, labs, and treatment plans  - Assess nutrition and hydration status and recommend course of action  - Evaluate amount of meals eaten  - Assist patient with eating if necessary   - Allow adequate time for meals  - Recommend/ encourage appropriate diets, oral nutritional supplements, and vitamin/mineral supplements  - Order, calculate, and assess calorie counts as needed  - Recommend, monitor, and adjust tube feedings and TPN/PPN based on assessed needs  - Assess need for intravenous fluids  - Provide specific nutrition/hydration education as appropriate  - Include patient/family/caregiver in decisions related to nutrition  Outcome: Progressing     Problem: CHANGE IN BODY IMAGE  Goal: Pt/Family communicate acceptance of loss or change in body image and expresses psychological comfort and peace  Description: INTERVENTIONS:  - Assess patient/family anxiety and grief process related to change in body image, loss of functional status and loss of sense of self  - Assess patient/family's coping skills and provide emotional, spiritual and psychosocial support  - Provide information about the patient's health status with consideration of family and cultural values  - Communicate willingness to discuss loss and facilitate grief process with patient/family as appropriate  - Emphasize sustaining relationships within family system and community, or farzaneh/spiritual traditions  - Refer to community support groups as appropriate  - [address], Pastoral care or other ancillary consults as needed  Outcome: Progressing

## 2022-01-20 NOTE — WOUND OSTOMY CARE
Progress Note- Ostomy  Everardo Villanueva 60 y o  female  [id number]  [facility]        Assessment:  Patient seen for ostomy education  She is status post exploratory laparotomy, drainage of intra-abdominal abscess, right salpingo-oophorectomy, left oophorectomy, cystoscopy retrograde pyelogram with insertion of ureteral stent and end colostomy on 1/16/2022 secondary to diverticulitis of large intestine with perforation and right hydronephrosis  Colostomy stoma is dusky, maroon, with black slough to edges  Positive for flatus, but no stool output in pouch  Luisa-stomal skin and mucocutaneous junction are intact  Stoma measures 2 x 2 1/4 inches and is in a deep crease circumferentially  Patient is agreeable to assessment  She will be the primary learner--does not wish to identify a support person for ostomy  Education:  Colostomy education materials reviewed and left at bedside--encouraged patient to review materials prior to next ostomy education visit  Reviewed surgery and creation of colostomy/stoma  Instructed patient on healthy characteristics of stoma and when to contact a physician  Demonstrated types of pouching systems--one/two piece, moldable, cut-to-fit, closed, drainable  Instructed patient to empty pouch when 1/3-1/2 full of gas or stool  Instructed patient to change pouch every 3-5 days or with signs of leakage  Best time to change-first thing in the morning  Instructed patient that she may shower with pouch on or off  Demonstrated pouching system change using one piece convatec cut-to-fit pouch and 4 inch gege ring:  Removal of pouch with push/pull method  Cleansing of peristomal skin with warm water only, pat dry  Measuring of stoma and creation of barrier opening  Skin prep application  Explained to patient the importance of filling crease around stoma for good pouch adherence  Application of the pouch with gentle pressure from warm hand    Briefly described how to obtain ostomy supplies once discharged--demonstrated supply catalog and left at bedside  Encourage patient to be open to visiting nurses for ongoing ostomy education and support  Plan:  1-Wound care team to follow for ostomy education  2-Encourage patient to participate in her ostomy care  3-Empty pouch when 1/3-1/2 full of gas or stool  4-Pouch change:  Remove pouch gently  Cleanse stoma and praveen-stomal skin with warm water only, pat dry  Apply skin prep to praveen-stomal skin  Roll center of 4 inch gege ring to create raised edge to fill crease around stoma  Place Gege ring on back of pouch and apply pouch  Use one piece cut-to-fit large pouch  Patient should be discharged home with [facility] for ongoing ostomy support and education      Roula CASTELLONN, RN, [employer]

## 2022-01-20 NOTE — PROGRESS NOTES
Progress Note - Infectious Disease   [facility] 60 y o  female MRN: [id number]  Unit/Bed#: [facility] 511-01 Encounter: [id number]      Impression/Recommendations:  1   Sepsis, POA   HR, WBC   Due to #3 +/- 4   No other appreciable source of infection   Exam otherwise benign   Initial blood/urine cultures, serial CXR negative   Unclear relevance of COVID PCR as CXR and patient intubated largely for recent surgery   Hemodynamically stable and nontoxic  Improved  Rec:  · Continue antibiotics as below  · Follow temperatures and WBC closely  · Supportive care as per the primary service     2   Perforated diverticulitis with abscess    Also complicated by #8   [id number] post ex lap, drainage of abscess, end colostomy, right salpingo oophorectomy and left oophorectomy 1/16   Intraoperative findings notable for perforated diverticulitis with tissue necrosis proximal rectum, ovaries at site of perforation   OR cultures with few Lactobacillus, Enterococcus  Rec:  · Continue Zosyn for 1 more day (5 days post op, 14 days total antibiotics)  · Postoperative care per surgery     3   Right hydroureteronephrosis   Due to #2   Status post cystoscopy with right ureteral stent placement 1/16    Rec:  · Follow urine output closely  · Galan per Urology until patient ambulatory  · Outpatient Urology follow-up     4   Thrush with possible Candida esophagitis   Patient reported dysphagia, choking preoperatively  Rec:  · Continue fluconazole for 1 more day  · PPI per GI  · Reassess symptoms as diet advances  · If no improvement may need eventual EGD per GI     5   COVID infection   Unvaccinated but had COVID 12/2020   Suspect coincidental finding on preoperative PCR   Unable to assess symptoms   CXR without infiltrates and hypoxia may be due to recent intubation, surgery, MO, atelectasis    Rec:  · No treatment indicated  · Follow respiratory symptoms closely     6   ZAHIRA on CKD   Baseline Cr 0 9-1 0   Likely multifactorial   Improved    7   MO   BMI 58      8   Hidradenitis   With multiple wounds in abdominal/groin skin folds   No apparent superinfection      Antibiotics:  Zosyn #4  Antibiotics #15  Fluconazole #13  POD #4    Subjective:  Patient seen on AM rounds  Doing better  NGT removed and glad she can have water  Denies fevers, chills, sweats, nausea, vomiting  24 Hour Events:  No documented fevers, chills, sweats, nausea, vomiting, or diarrhea  Objective:  Vitals:  Temp:  [97 6 °F (36 4 °C)-98 9 °F (37 2 °C)] 98 °F (36 7 °C)  HR:  [105-122] 105  Resp:  [20-38] 20  BP: (126-144)/(68-90) 144/90  SpO2:  [93 %-96 %] 95 %  Temp (24hrs), Av 1 °F (36 7 °C), Min:97 6 °F (36 4 °C), Max:98 9 °F (37 2 °C)  Current: Temperature: 98 °F (36 7 °C)    Physical Exam:   General:  No acute distress  Psychiatric:  Awake and alert  Pulmonary:  Normal respiratory excursion without accessory muscle use  Abdomen:  Soft, obese  Extremities:  No edema  Skin:  No rashes    Lab Results:  I have personally reviewed pertinent labs    Results from last 7 days   Lab Units 22  0455 22  0536 22  1052 22  0615 22  0221 22  2322 227 22  2127 22  0515 22  0515   POTASSIUM mmol/L 4 3 4 3 4 5   < > 4 5  --   --   --    < > 3 9   CHLORIDE mmol/L 113* 111* 110*   < > 111*  --   --   --    < > 105   CO2 mmol/L 27 25 24   < > 21  --   --   --    < > 30   CO2, I-STAT mmol/L  --   --   --   --   --  22   < > 22  --   --    BUN mg/dL 11 16 19   < > 19  --   --   --    < > 39*   CREATININE mg/dL 1 01 1 12 1 27   < > 1 59*  --   --   --    < > 1 30   EGFR ml/min/1 73sq m 61 54 46   < > 35  --   --   --    < > 45   GLUCOSE, ISTAT mg/dl  --   --   --   --   --  172*  --  147*  --   --    CALCIUM mg/dL 8 4 8 5 8 3   < > 7 7*  --   --   --    < > 8 2*   AST U/L  --   --  11  --  8  --   --   --   --  20   ALT U/L  --   --  9*  --  10*  --   --   --   --  14   ALK PHOS U/L  --   --  62  -- 62  --   --   --   --  72    < > = values in this interval not displayed  Results from last 7 days   Lab Units 01/20/22  0455 01/19/22  0536 01/18/22  1052   WBC Thousand/uL 10 34* 11 11* 10 72*   HEMOGLOBIN g/dL 6 9* 7 1* 7 1*   PLATELETS Thousands/uL 449* 404* 328     Results from last 7 days   Lab Units 01/16/22  2102   GRAM STAIN RESULT  1+ Polys  No organisms seen       Imaging Studies:   I have personally reviewed pertinent imaging study reports and images in PACS  EKG, Pathology, and Other Studies:   I have personally reviewed pertinent reports

## 2022-01-20 NOTE — QUICK NOTE
QUICK NOTE - Deterioration Index  Simba Ellison 60 y o  female MRN: [id number]  Unit/Bed#: [facility] 700-95 Encounter: [id number]    Date Paged: 22  Time Paged: [phone]  Room #: 575  Arrival Time: 919  Deterioration index score at time of page: 63 2  %  Spoke with Arlyn Console RN  Need to escalate level of care: no     PROBLEMS resulting in high DI score:    Hypotension (SBP 62)   NC O2    PLAN:     Patient at baseline  SBP inaccurate  No need for intervention  Please contact critical care via [address] [place] [address] with any questions or concerns  Vitals:   Vitals:    22 2250 22 0531 22 0815 22 0830   BP: 137/78  140/87 140/87   Pulse: (!) 117  (!) 114 (!) 108   Resp:    20   Temp: 98 9 °F (37 2 °C)   97 8 °F (36 6 °C)   TempSrc:    Oral   SpO2: 93%  95% 94%   Weight:  (!) 176 kg (387 lb 2 oz)     Height:           Respiratory:  SpO2: SpO2: 94 %, SpO2 Activity: SpO2 Activity: At Rest, SpO2 Device: O2 Device: Nasal cannula  Nasal Cannula O2 Flow Rate (L/min): 2 L/min    Temperature: Temp (24hrs), Av 1 °F (36 7 °C), Min:97 6 °F (36 4 °C), Max:98 9 °F (37 2 °C)  Current: Temperature: 97 8 °F (36 6 °C)    Labs:   Results from last 7 days   Lab Units 22  0455 22  0536 22  1052 22  0506 22  0506   WBC Thousand/uL 10 34* 11 11* 10 72*   < > 10 38*   HEMOGLOBIN g/dL 6 9* 7 1* 7 1*   < > 7 3*   HEMATOCRIT % 23 7* 23 6* 23 6*   < > 24 5*   PLATELETS Thousands/uL 449* 404* 328   < > 317   NEUTROS PCT % 69  --  81*  --  81*   MONOS PCT % 9  --  6  --  6    < > = values in this interval not displayed       Results from last 7 days   Lab Units 22  0455 22  0536 22  1052 22  0615 2215   SODIUM mmol/L 144 141 139   < > 139  --   --   --    < > 143   POTASSIUM mmol/L 4 3 4 3 4 5   < > 4 5  --   --   --    < > 3 9   CHLORIDE mmol/L 113* 111* 110*   < > 111*  --   --   -- < > 105   CO2 mmol/L 27 25 24   < > 21  --   --   --    < > 30   CO2, I-STAT mmol/L  --   --   --   --   --  22   < > 22  --   --    BUN mg/dL 11 16 19   < > 19  --   --   --    < > 39*   CREATININE mg/dL 1 01 1 12 1 27   < > 1 59*  --   --   --    < > 1 30   CALCIUM mg/dL 8 4 8 5 8 3   < > 7 7*  --   --   --    < > 8 2*   ALK PHOS U/L  --   --  62  --  62  --   --   --   --  72   ALT U/L  --   --  9*  --  10*  --   --   --   --  14   AST U/L  --   --  11  --  8  --   --   --   --  20   GLUCOSE, ISTAT mg/dl  --   --   --   --   --  172*  --  147*  --   --     < > = values in this interval not displayed       Results from last 7 days   Lab Units 01/20/22  0455 01/19/22  0536 01/18/22  0506   MAGNESIUM mg/dL 2 1 2 3 2 5     Results from last 7 days   Lab Units 01/18/22  1052 01/17/22  0221 01/15/22  0909   LACTIC ACID mmol/L 1 0 1 4 0 8               Code Status: Level 1 - Full Code

## 2022-01-21 NOTE — PLAN OF CARE
Problem: PHYSICAL THERAPY ADULT  Goal: Performs mobility at highest level of function for planned discharge setting  See evaluation for individualized goals  Description: Treatment/Interventions: Functional transfer training,LE strengthening/ROM,Therapeutic exercise,Endurance training,Patient/family training,Gait training,Bed mobility,Equipment eval/education,Spoke to nursing  Equipment Recommended: Phoebe Pichardo (bariatric RW )       See flowsheet documentation for full assessment, interventions and recommendations  Outcome: Progressing  Note: Prognosis: Fair  Problem List: Decreased strength,Decreased range of motion,Decreased endurance,Impaired balance,Decreased mobility,Decreased coordination,Decreased safety awareness,Pain  Assessment: Pt seen for PT treatment session with focus on bed mobility, functional transfers, functional mobility  Pt making slow but steady progress toward goals this session  Pt continues to require assist of two people for all mobility due to remaining strength and balance impairments  Pt with poor endurance resulting in decreased activity tolerance  Pt body habitus is also limiting factor in participation  Pt left upright in bedside chair with all needs in reach  Pt will benefit from skilled therapy in order to address current impairments and functional limitations  PT to follow pt and recommending rehab  The patient's AM-PAC Basic Mobility Inpatient Short Form Raw Score is 6  A Raw score of less than or equal to 16 suggests the patient may benefit from discharge to post-acute rehabilitation services  Please also refer to the recommendation of the Physical Therapist for safe discharge planning  Barriers to Discharge: Inaccessible home environment,Decreased caregiver support        PT Discharge Recommendation: Post acute rehabilitation services          See flowsheet documentation for full assessment

## 2022-01-21 NOTE — QUICK NOTE
Patient with emesis  Staff with concerns for increasing distention,    Examined bedside  Patient states she drank OJ and water- more than a few sips and started feeling nauseous prior to vomiting  Abdomen soft, mild tenderness to palpation, nondistended    Discussed if continued vomiting will need NGT replacement  Discussed keeping to sips of liquids  PRN nausea medications given

## 2022-01-21 NOTE — OCCUPATIONAL THERAPY NOTE
Occupational Therapy Progress Note     Patient Name: Tracy Cardona  [id number] Date: 1/21/2022  Problem List  Principal Problem:    Diverticulitis of large intestine with perforation  Active Problems:    Lymphedema    Primary hypertension    Morbid obesity with BMI of 60 0-69 9, adult ([facility] [address] )    Hidradenitis suppurativa    GERD (gastroesophageal reflux disease)    Right Hydroureteronephrosis          01/21/22 0953   OT Last Visit   OT Visit Date 01/21/22   Note Type   Note Type Treatment   Restrictions/Precautions   Weight Bearing Precautions Per Order No   Other Precautions Contact/isolation; Airborne/isolation;Droplet precautions;Multiple lines;Telemetry; Fall Risk;Pain  (rectal tube, ostomy bag, x2 ADIA drain, COVID +)   General   Response to Previous Treatment Patient with no complaints from previous session   Lifestyle   Autonomy Pt reports being I w/ ADLS/IADLs, Mod I w/ transfers/functional mobility w/ use of crutches   Reciprocal Relationships Lives alone; reports no social supports but has son watching her dog   Service to Others On disability   Intrinsic Gratification Likes spending time w/ her dog   Pain Assessment   Pain Assessment Tool 0-10   Pain Score 8   Pain Location/Orientation Location: Abdomen   Patient's Stated Pain Goal No pain   Hospital Pain Intervention(s) Repositioned; Ambulation/increased activity; Emotional support   ADL   Where Assessed Chair   Bed Mobility   Supine to Sit 3  Moderate assistance   Additional items Assist x 2; Increased time required;Verbal cues;LE management   Additional Comments pt found supine in bed, left OOB in chair w all needs in reach and RN present    Transfers   Sit to Stand 3  Moderate assistance   Additional items Assist x 2; Increased time required;Verbal cues   Stand to Sit 3  Moderate assistance   Additional items Assist x 2; Increased time required;Verbal cues   Additional Comments c b/l HHA and knee blocking x2   Pt took small steps from EOB to chair, tolerated well     Functional Mobility   Functional Mobility 3  Moderate assistance   Additional Comments small steps EOB> chair w b/l HHA and knee blocking  Tolerated well, VC for hand and foot placmeent and for inc overall safety awareness  h   Additional items Hand hold assistance   Cognition   Overall Cognitive Status WFL   Arousal/Participation Alert; Responsive; Cooperative   Attention Within functional limits   Orientation Level Oriented X4   Memory Decreased recall of precautions   Following Commands Follows one step commands without difficulty   Comments pt pleasant and cooperative, does require VC to inc overall safety awareness and insight to condition  Activity Tolerance   Activity Tolerance Patient limited by pain   Medical Staff Made Aware ok to see per RN   Assessment   Assessment Pt seen on this date for OT session focusing on cognitive reorientation, body mechanics, transfer retraining, increasing activity tolerance/endurance and EOB sitting to increase ability to participate in ADL/functional tasks  Pt was found in bed and was left in chair w/ all needs within reach  Pt completed bed mobility with supervision, STS transfers with MOD Ax2 w b/l HHA and knee blocking x2  Pt completed functional mobility with MOD AX2 w b/l HHA and knee blocking x2, short steps from EOB to chair  Pt requires VC for increasing overall safety awareness throughout session  Pt w/ improvements in activity tolerance and transfer ability, however is still limited 2* decreased ADL/High-level ADL status, decreased activity tolerance/endurance, decreased cognition, decreased self-care trans, decreased safety awareness and insight to condition  The patient's raw score on the AM-PAC Daily Activity inpatient short form is 14, standardized score is 33 39, less than 39 4  Patients at this level are likely to benefit from discharge to post-acute rehabilitation services   Please refer to the recommendation of the Occupational Therapist for safe discharge planning  Recommending pt D/C to STR when medically stable  Pt will continue to benefit from acute OT services to meet goals  Plan   Treatment Interventions Endurance training;Functional transfer training; Compensatory technique education; Energy conservation; Activityengagement   Goal Expiration Date 02/02/22   OT Treatment Day 1   OT Frequency 3-5x/wk   Recommendation   OT Discharge Recommendation Post acute rehabilitation services   OT - OK to Discharge Yes   AM-PAC Daily Activity Inpatient   Lower Body Dressing 2   Bathing 2   Toileting 2   Upper Body Dressing 2   Grooming 3   Eating 3   Daily Activity Raw Score 14   Daily Activity Standardized Score (Calc for Raw Score >=11) 33 39   AM-PAC Applied Cognition Inpatient   Following a Speech/Presentation 3   Understanding Ordinary Conversation 4   Taking Medications 3   Remembering Where Things Are Placed or Put Away 3   Remembering List of 4-5 Errands 3   Taking Care of Complicated Tasks 3   Applied Cognition Raw Score 19   Applied Cognition Standardized Score 39 77   Modified [place] Scale   Modified [place] Scale 4              Chava Real, [address] [facility], OTR/L

## 2022-01-21 NOTE — PROGRESS NOTES
Progress Note - Infectious Disease   Baron Wood 60 y o  female MRN: [id number]  Unit/Bed#: [facility] 511-01 Encounter: [id number]      Impression/Recommendations:  1   Sepsis, POA   HR, WBC   Due to #3 +/- 4   No other appreciable source of infection   Exam otherwise benign   Initial blood/urine cultures, serial CXR negative   Unclear relevance of COVID PCR as CXR and patient intubated largely for recent surgery   Hemodynamically stable and nontoxic   Improved  Rec:  · Discontinue antibiotics as below to complete treatment course  · Follow temperatures and WBC closely  · Supportive care as per the primary service     2   Perforated diverticulitis with abscess    Also complicated by #8   HXITQF post ex lap, drainage of abscess, end colostomy, right salpingo oophorectomy and left oophorectomy 1/16   Intraoperative findings notable for perforated diverticulitis with tissue necrosis proximal rectum, ovaries at site of perforation   OR cultures with few Lactobacillus, Enterococcus  Rec:  · Discontinue Zosyn today to complete 5 days post op, 14 days total antibiotics  · Postoperative care per surgery     3   Right hydroureteronephrosis   Due to #2   Status post cystoscopy with right ureteral stent placement 1/16    Rec:  · Follow urine output closely  · Galan per Urology until patient ambulatory  · Outpatient Urology follow-up     4   Thrush with possible Candida esophagitis   Patient reported dysphagia, choking preoperatively  Rec:  · Discontinue fluconazole to complete 14 days total treatment  · PPI per GI  · Reassess symptoms as diet advances  · If no improvement may need eventual EGD per GI     5   COVID infection   Unvaccinated but had COVID 12/2020   Suspect coincidental finding on preoperative PCR   Unable to assess symptoms   CXR without infiltrates and hypoxia may be due to recent intubation, surgery, MO, atelectasis    Rec:  · No treatment indicated  · Follow respiratory symptoms closely     6   Postop ileus   Status post NGT replacement      7   MO   BMI 58      8   Hidradenitis   With multiple wounds in abdominal/groin skin folds   No apparent superinfection      The patient is stable from an ID standpoint  I will reassess the patient on   Please call in the interim with new questions  Antibiotics:  Zosyn #5  Antibiotics #16  Fluconazole #14  POD #5    Subjective:  Patient seen on AM rounds  Getting NGT placed  24 Hour Events: Had vomiting with increased distension  NGT place just prior to my arrival with large output of bilious gastic contents  No documented fevers, chills, sweats, or diarrhea  Objective:  Vitals:  Temp:  [96 8 °F (36 °C)-98 3 °F (36 8 °C)] 98 2 °F (36 8 °C)  HR:  [] 94  Resp:  [20] 20  BP: (143-154)/(83-98) 154/98  SpO2:  [95 %-98 %] 98 %  Temp (24hrs), Av 8 °F (36 6 °C), Min:96 8 °F (36 °C), Max:98 3 °F (36 8 °C)  Current: Temperature: 98 2 °F (36 8 °C)    Physical Exam:   General:  No acute distress  Psychiatric:  Awake and alert  Pulmonary:  Normal respiratory excursion without accessory muscle use  Abdomen:  Soft, nontender  Extremities:  No edema  Skin:  No rashes    Lab Results:  I have personally reviewed pertinent labs    Results from last 7 days   Lab Units 22  0443 22  0455 22  0536 22  1052 22  1052 22  0615 22  0221 22  2322 22  2127 22  2127 22  0515 22  0515   POTASSIUM mmol/L 4 1 4 3 4 3   < > 4 5   < > 4 5  --   --   --    < > 3 9   CHLORIDE mmol/L 112* 113* 111*   < > 110*   < > 111*  --   --   --    < > 105   CO2 mmol/L 27 27 25   < > 24   < > 21  --   --   --    < > 30   CO2, I-STAT mmol/L  --   --   --   --   --   --   --  22   < > 22  --   --    BUN mg/dL 8 11 16   < > 19   < > 19  --   --   --    < > 39*   CREATININE mg/dL 0 97 1 01 1 12   < > 1 27   < > 1 59*  --   --   --    < > 1 30   EGFR ml/min/1 73sq m 64 61 54   < > 46   < > 35  --   --   --    < > 45 GLUCOSE, ISTAT mg/dl  --   --   --   --   --   --   --  172*  --  147*  --   --    CALCIUM mg/dL 8 2* 8 4 8 5   < > 8 3   < > 7 7*  --   --   --    < > 8 2*   AST U/L  --   --   --   --  11  --  8  --   --   --   --  20   ALT U/L  --   --   --   --  9*  --  10*  --   --   --   --  14   ALK PHOS U/L  --   --   --   --  62  --  62  --   --   --   --  72    < > = values in this interval not displayed  Results from last 7 days   Lab Units 01/21/22  0443 01/20/22  0455 01/19/22  0536   WBC Thousand/uL 10 67* 10 34* 11 11*   HEMOGLOBIN g/dL 7 7* 6 9* 7 1*   PLATELETS Thousands/uL 487* 449* 404*     Results from last 7 days   Lab Units 01/16/22  2102   GRAM STAIN RESULT  1+ Polys  No organisms seen       Imaging Studies:   I have personally reviewed pertinent imaging study reports and images in PACS  EKG, Pathology, and Other Studies:   I have personally reviewed pertinent reports

## 2022-01-21 NOTE — PROGRESS NOTES
Progress Note - Isauro Shah 60 y o  female MRN: [id number]    Unit/Bed#: [facility] 511-01 Encounter: [id number]      Assessment:  59 y/o F w perforated diverticulitis s/p Ortega's procedure on 1/17      Vitals are stable  Midline incision is clean, approximated loosely with staples  Ostomy is maroon, no stool yet, however reports passing flatus into ostomy appliance  ADIA x2 serosang  L 20 cc, R 10 cc    malecot with minimal output, 35 cc maroon  Plan:  Continue npo with sips of liquids  conitnue MIVF @ 101cc/h  Continue antibiotics and antifungal  Needs ambulation daily  Up in chair  dvt ppx, SQH  Bowel regimen, colace and sennakot    Subjective:   Vomited 2 x last night but denied any nausea this morning  She thinks she drank too much and then vomited  Denied any chest pain or shortness of breath today  Denied fever or chills  Denied abd pain today  Objective:     Vitals: Blood pressure 143/83, pulse 97, temperature (!) 96 8 °F (36 °C), temperature source Axillary, resp  rate 20, height 5' 7" (1 702 m), weight (!) 176 kg (387 lb 2 oz), SpO2 95 %  ,Body mass index is 60 63 kg/m²  Intake/Output Summary (Last 24 hours) at 1/21/2022 0621  Last data filed at 1/21/2022 0400  Gross per 24 hour   Intake 3910 82 ml   Output 2041 ml   Net 1869 82 ml     Physical Exam  General: NAD  HEENT: NC/AT  MMM  Cv: RRR  Lungs: normal effort  Ab: Soft, NT/ND  Midline loosely approximated  Colostomy maroon  No stool output  ADIA x 2 serosang  malecot with minimal maroon output     Ex: no CCE  Neuro: AAOx3      Invasive Devices  Report    Central Venous Catheter Line            CVC Central Lines 01/17/22 Triple 4 days          Drain            Closed/Suction Drain LLQ Bulb 19 Fr  4 days    Closed/Suction Drain RLQ Bulb 19 Fr  4 days    Colostomy Other (comment) LUQ 4 days    Rectal Tube Without balloon 4 days    Urethral Catheter Latex 18 Fr  4 days                Lab, Imaging and other studies: I have personally reviewed pertinent reports      VTE Pharmacologic Prophylaxis: Sequential compression device (Venodyne)   VTE Mechanical Prophylaxis: sequential compression device

## 2022-01-21 NOTE — UTILIZATION REVIEW
Continued Stay Review    Date: 1/21/2022                          Current Patient Class:  Inpatient   Current Level of Care:  Level 2 step down    HPI:56 y o  female initially admitted on 1/16/22 as a transfer from [facility] for surgery  Related to perforation of her large bowel with abscess, R hydroureteronephrosis  Thrush  with possible Candida esophagitis - And COVID positive  Procedures :  LAPAROTOMY EXPLORATORY, DRAINAGE OF INTRA -ABDOMINAL ABSCESS   CYSTOSCOPY RETROGRADE PYELOGRAM WITH INSERTION STENT URETERAL (Right)  END COLOSTOMY   RIGHT SALPINGO-OOPHORECTOMY, LEFT OOPHORECTOMY     Assessment/Plan: 1/21 - Pt with emesis x 2 last night, she denies nausea this morning  She thinks she drank too much  and then vomited  VSS ,midline incision is clean, Ostomy is maroon, no stool yet, She does report passing flatus in to her ostomy appliance  ADIA x 2 with serosang   Plan: NPO with sips of liquids, On IVF's, abx's and antifungal  Needs daily ambulation, up in chair, Bowel regimen, colace and sennakot         Vital Signs:    Date/Time Temp Pulse Resp BP MAP (mmHg) SpO2 Calculated FIO2 (%) - Nasal Cannula O2 Flow Rate (L/min) Nasal Cannula O2 Flow Rate (L/min) O2 Device Patient Position - Orthostatic VS   01/21/22 0000 96 8 °F (36 °C) Abnormal  97 20 143/83 103 95 % -- -- -- Nasal cannula Lying   01/20/22 2100 98 3 °F (36 8 °C) 102 -- 146/89 108 95 % 28 -- 2 L/min Nasal cannula --   01/20/22 1215 98 1 °F (36 7 °C) 104 22 136/86 -- -- -- -- -- -- --   01/20/22 1040 -- 105 -- 144/90 108 95 % -- -- -- -- --   01/20/22 1037 98 °F (36 7 °C) -- -- -- -- -- -- -- -- -- --   01/20/22 1020 98 °F (36 7 °C) 108 Abnormal  -- 126/89 101 95 % 28 -- 2 L/min Nasal cannula --   01/20/22 1019 98 °F (36 7 °C) -- -- -- -- -- -- -- -- -- --   01/20/22 0915 -- 109 Abnormal  -- 130/89 -- 94 % 28 -- 2 L/min Nasal cannula --   01/20/22 0830 97 8 °F (36 6 °C) 108 Abnormal  20 140/87 105 94 % -- -- -- -- --   01/20/22 0815 -- 114 Abnormal  -- 140/87 105 95 % -- -- -- -- --   01/19/22 2250 98 9 °F (37 2 °C) 117 Abnormal  -- 137/78 98 93 % 28 -- 2 L/min Nasal cannula --   01/19/22 1546 97 6 °F (36 4 °C) -- -- -- -- -- -- -- -- -- --   01/19/22 1400 -- 116 Abnormal  25 Abnormal  -- -- 95 % -- 2 L/min -- Nasal cannula --   01/19/22 1300 -- 122 Abnormal  38 Abnormal  142/68 94 96 % -- -- -- -- --   01/19/22 1200 -- 124 Abnormal  25 Abnormal  136/91 109 95 % -- -- -- -- --   01/19/22 1100 98 1 °F (36 7 °C) 122 Abnormal  32 Abnormal  135/87 107 95 % -- -- -- -- --   01/19/22 1000 -- 122 Abnormal  34 Abnormal  135/95 109 93 % -- -- -- -- --   01/19/22 0950 -- 126 Abnormal  54 Abnormal  -- -- 94 % -- -- -- -- --   01/19/22 0940 -- 138 Abnormal  49 Abnormal  -- -- 92 % -- -- -- -- --   01/19/22 0900 -- 110 Abnormal  28 Abnormal  124/75 93 95 % -- 2 L/min -- Nasal cannula --   01/19/22 0800 98 7 °F (37 1 °C) 110 Abnormal  30 Abnormal  116/71 92 96 % -- -- -- -- --   01/19/22 0700 -- 114 Abnormal  27 Abnormal  116/75 88 96 % -- -- -- -- --   01/19/22 0553 -- 114 Abnormal  28 Abnormal  147/65 84 97 % -- -- -- -- --   01/19/22 0400 -- 112 Abnormal  30 Abnormal  121/68 91 97 % -- -- -- -- --   01/19/22 0300 -- 114 Abnormal  36 Abnormal  126/65 88 97 % -- -- -- -- --     Pertinent Labs/Diagnostic Results:   Results from last 7 days   Lab Units 01/16/22  1850   SARS-COV-2  Positive*     Results from last 7 days   Lab Units 01/21/22  0443 01/20/22  0455 01/19/22  0536 01/18/22  1052 01/18/22  1052 01/18/22  0506 01/18/22  0506 01/17/22  0615 01/17/22  0221   WBC Thousand/uL 10 67* 10 34* 11 11*   < > 10 72*   < > 10 38*   < > 8 93   HEMOGLOBIN g/dL 7 7* 6 9* 7 1*  --  7 1*  --  7 3*   < > 8 1*   HEMATOCRIT % 26 4* 23 7* 23 6*  --  23 6*  --  24 5*   < > 27 3*   PLATELETS Thousands/uL 487* 449* 404*   < > 328   < > 317   < > 226   NEUTROS ABS Thousands/µL 7 73* 7 19  --   --  8 75*   < > 8 43*  --   --    BANDS PCT %  --   --   --   --   --   --   --   --  11*    < > = values in this interval not displayed  Results from last 7 days   Lab Units 01/21/22  0443 01/20/22  0455 01/19/22  0536 01/18/22  1052 01/18/22  0506 01/17/22  0615 01/17/22  0615 01/17/22 0221 01/17/22 0221 01/16/22 2322 01/16/22 2127 01/16/22 2127 01/16/22  0515   SODIUM mmol/L 142 144 141 139 140   < > 137   < > 139  --   --   --    < >   POTASSIUM mmol/L 4 1 4 3 4 3 4 5 4 3   < > 4 5   < > 4 5  --   --   --    < >   CHLORIDE mmol/L 112* 113* 111* 110* 110*   < > 110*   < > 111*  --   --   --    < >   CO2 mmol/L 27 27 25 24 23   < > 21   < > 21  --   --   --    < >   CO2, I-STAT mmol/L  --   --   --   --   --   --   --   --   --  22   < > 22  --    ANION GAP mmol/L 3* 4 5 5 7   < > 6   < > 7  --   --   --    < >   BUN mg/dL 8 11 16 19 20   < > 20   < > 19  --   --   --    < >   CREATININE mg/dL 0 97 1 01 1 12 1 27 1 27   < > 1 46*   < > 1 59*  --   --   --    < >   EGFR ml/min/1 73sq m 64 61 54 46 46   < > 39   < > 35  --   --   --    < >   CALCIUM mg/dL 8 2* 8 4 8 5 8 3 8 4   < > 8 4   < > 7 7*  --   --   --    < >   CALCIUM, IONIZED mmol/L  --   --   --   --  1 08*  --  1 09*  --  1 03*  --   --   --   --    CALCIUM, IONIZED, ISTAT mmol/L  --   --   --   --   --   --   --   --   --  1 09*  --  1 11*  --    MAGNESIUM mg/dL  --  2 1 2 3  --  2 5  --  3 5*  --  1 6  --   --   --    < >   PHOSPHORUS mg/dL  --  2 4* 3 3  --  3 5  --  5 3*  --  5 4*  --   --   --   --     < > = values in this interval not displayed       Results from last 7 days   Lab Units 01/18/22  1052 01/17/22  0221 01/16/22  0515   AST U/L 11 8 20   ALT U/L 9* 10* 14   ALK PHOS U/L 62 62 72   TOTAL PROTEIN g/dL 5 7* 5 4* 7 3   ALBUMIN g/dL 1 9* 2 1* 2 7*   TOTAL BILIRUBIN mg/dL 0 36 0 43 0 28         Results from last 7 days   Lab Units 01/21/22  0443 01/20/22  0455 01/19/22  0536 01/18/22  1052 01/18/22  0506 01/17/22  0615 01/17/22  0221 01/16/22  0515   GLUCOSE RANDOM mg/dL 126 110 94 98 89 141* 158* 117         Results from last 7 days   Lab Units 01/16/22  1826   HEMOGLOBIN A1C % 5 8*   EAG mg/dl 120      Results from last 7 days   Lab Units 01/17/22  0222   PH ART  7 329*   PCO2 ART mm Hg 37 4   PO2 ART mm Hg 156 1*   HCO3 ART mmol/L 19 2*   BASE EXC ART mmol/L -6 1   O2 CONTENT ART mL/dL 13 4*   O2 HGB, ARTERIAL % 97 4*   ABG SOURCE  Line, Arterial     Results from last 7 days   Lab Units 01/18/22  1052   PH DL  7 354   PCO2 DL mm Hg 43 5   PO2 DL mm Hg 37 7   HCO3 DL mmol/L 23 7*   BASE EXC DL mmol/L -1 8   O2 CONTENT DL ml/dL 8 0   O2 HGB, VENOUS % 66 3     Results from last 7 days   Lab Units 01/16/22  2322 01/16/22  2127   I STAT BASE EXC mmol/L -6* -5*   I STAT O2 SAT % 98* 98*   ISTAT PH ART  7 282* 7 309*   I STAT ART PCO2 mm HG 44 0 41 9   I STAT ART PO2 mm  0 108 0   I STAT ART HCO3 mmol/L 20 8* 21 1*     Results from last 7 days   Lab Units 01/17/22  0221   PROTIME seconds 17 9*   INR  1 55*       Results from last 7 days   Lab Units 01/18/22  1052 01/17/22  0221 01/15/22  0909   LACTIC ACID mmol/L 1 0 1 4 0 8       Results from last 7 days   Lab Units 01/19/22  0536   CRP mg/L 201 0*       Results from last 7 days   Lab Units 01/16/22  1850   INFLUENZA A PCR  Negative   INFLUENZA B PCR  Negative   RSV PCR  Negative       Results from last 7 days   Lab Units 01/16/22  2102   GRAM STAIN RESULT  1+ Polys  No organisms seen           Medications:   Scheduled Medications:  docusate sodium, 100 mg, Oral, BID  fluconazole, 400 mg, Intravenous, Q24H  heparin (porcine), 7,500 Units, Subcutaneous, Q8H IRIS  metoclopramide, 5 mg, Intravenous, Once  CAROLINE ANTIFUNGAL, , Topical, BID  nystatin, 500,000 Units, Swish & Swallow, 4x Daily  nystatin, , Topical, BID  pantoprazole, 40 mg, Intravenous, Q12H IRIS  piperacillin-tazobactam, 4 5 g, Intravenous, Q8H  scopolamine, 1 patch, Transdermal, Q72H  senna, 1 tablet, Oral, BID      Continuous IV Infusions:  dextrose 5 % and sodium chloride 0 45 % with KCl 20 mEq/L, 101 mL/hr, Intravenous, Continuous      PRN Meds:  fentanyl citrate (PF), 50 mcg, Intravenous, Q2H PRN  HYDROmorphone, 0 2 mg, Intravenous, Q3H PRN   Or  HYDROmorphone, 0 5 mg, Intravenous, Q3H PRN  Labetalol HCl, 10 mg, Intravenous, Q4H PRN  ondansetron, 4 mg, Intravenous, Q8H PRN  saliva substitute, 5 spray, Mouth/Throat, 4x Daily PRN        Discharge Plan: D     Network Utilization Review Department  ATTENTION: Please call with any questions or concerns to [phone] and carefully listen to the prompts so that you are directed to the right person  All voicemails are confidential   Michelelina Blue all requests for admission clinical reviews, approved or denied determinations and any other requests to dedicated fax number below belonging to the campus where the patient is receiving treatment   List of dedicated fax numbers for the Facilities:  [address] DENIALS (Administrative/Medical Necessity) [phone]   [address] (Maternity/NICU/Pediatrics) [phone]   [address] [place] [address]  [address] [facility] [address] [address] [address] [place] [address] [address] [facility] [place] [address] [phone]

## 2022-01-21 NOTE — PLAN OF CARE
Problem: OCCUPATIONAL THERAPY ADULT  Goal: Performs self-care activities at highest level of function for planned discharge setting  See evaluation for individualized goals  Description: Treatment Interventions: ADL retraining,Functional transfer training,UE strengthening/ROM,Endurance training,Cognitive reorientation,Patient/family training,Equipment evaluation/education,Compensatory technique education,Continued evaluation,Energy conservation,Activityengagement          See flowsheet documentation for full assessment, interventions and recommendations  Outcome: Progressing  Note: Limitation: Decreased ADL status,Decreased UE strength,Decreased Safe judgement during ADL,Decreased cognition,Decreased endurance,Decreased self-care trans,Decreased high-level ADLs  Prognosis: Fair  Assessment: Pt seen on this date for OT session focusing on cognitive reorientation, body mechanics, transfer retraining, increasing activity tolerance/endurance and EOB sitting to increase ability to participate in ADL/functional tasks  Pt was found in bed and was left in chair w/ all needs within reach  Pt completed bed mobility with supervision, STS transfers with MOD Ax2 w b/l HHA and knee blocking x2  Pt completed functional mobility with MOD AX2 w b/l HHA and knee blocking x2, short steps from EOB to chair  Pt requires VC for increasing overall safety awareness throughout session  Pt w/ improvements in activity tolerance and transfer ability, however is still limited 2* decreased ADL/High-level ADL status, decreased activity tolerance/endurance, decreased cognition, decreased self-care trans, decreased safety awareness and insight to condition  The patient's raw score on the AM-PAC Daily Activity inpatient short form is 14, standardized score is 33 39, less than 39 4  Patients at this level are likely to benefit from discharge to post-acute rehabilitation services   Please refer to the recommendation of the Occupational Therapist for safe discharge planning  Recommending pt D/C to STR when medically stable  Pt will continue to benefit from acute OT services to meet goals  OT Discharge Recommendation: Post acute rehabilitation services  OT - OK to Discharge:  Yes

## 2022-01-21 NOTE — PHYSICAL THERAPY NOTE
PHYSICAL THERAPY NOTE          Patient Name: Magen Quick  GWQUA'WILMA Date: 1/21/2022 01/21/22 0954   PT Last Visit   PT Visit Date 01/21/22   Note Type   Note Type Treatment   Pain Assessment   Pain Assessment Tool 0-10   Pain Score 8   Pain Location/Orientation Location: Abdomen   Restrictions/Precautions   Weight Bearing Precautions Per Order No   Other Precautions Contact/isolation; Airborne/isolation;Multiple lines;Telemetry; Fall Risk;Pain  (ADIA x2, rectal tube, ostomy bag, COVID+)   General   Chart Reviewed Yes   Response to Previous Treatment Patient with no complaints from previous session  Family/Caregiver Present No   Cognition   Overall Cognitive Status WFL   Arousal/Participation Alert   Attention Within functional limits   Memory Decreased recall of precautions   Following Commands Follows one step commands with increased time or repetition   Comments Pt with some decreased safety awareness and insight  Requires some encouragement to participate  Subjective   Subjective Pt requests pain meds prior to future sessions  Bed Mobility   Supine to Sit 3  Moderate assistance   Additional items Assist x 2; Increased time required;Verbal cues;LE management   Additional Comments Supine in bed upon PT arrival   Pt left upright in bedside chair with all needs in reach  Transfers   Sit to Stand 3  Moderate assistance   Additional items Assist x 2; Increased time required;Verbal cues   Stand to Sit 3  Moderate assistance   Additional items Assist x 2; Increased time required;Verbal cues   Additional Comments Transfer with b/l HHA and knee block  VC for hand placement and safety  Ambulation/Elevation   Gait pattern Excessively slow; Short stride; Step to;Decreased foot clearance; Improper Weight shift  (increased lateral sway)   Gait Assistance 3  Moderate assist   Additional items Assist x 2;Verbal cues; Tactile cues   Assistive Device Other (Comment)  (b/l HHA and knee block )   Balance   Static Sitting Fair   Dynamic Sitting Fair -   Static Standing Poor   Dynamic Standing Poor   Ambulatory Poor   Endurance Deficit   Endurance Deficit Yes   Endurance Deficit Description fatigue, weakness, pain, dizziness, body habitus   Activity Tolerance   Activity Tolerance Patient limited by fatigue;Patient limited by pain; Other (Comment)  (dizziness upon sitting upright)   Medical Staff Made Aware PENELOPE Navarro   Nurse Made Aware RN cleared pt to be seen by PT   Assessment   Prognosis Fair   Problem List Decreased strength;Decreased range of motion;Decreased endurance; Impaired balance;Decreased mobility; Decreased coordination;Decreased safety awareness;Pain   Assessment Pt seen for PT treatment session with focus on bed mobility, functional transfers, functional mobility  Pt making slow but steady progress toward goals this session  Pt continues to require assist of two people for all mobility due to remaining strength and balance impairments  Pt with poor endurance resulting in decreased activity tolerance  Pt body habitus is also limiting factor in participation  Pt left upright in bedside chair with all needs in reach  Pt will benefit from skilled therapy in order to address current impairments and functional limitations  PT to follow pt and recommending rehab  The patient's AM-PAC Basic Mobility Inpatient Short Form Raw Score is 6  A Raw score of less than or equal to 16 suggests the patient may benefit from discharge to post-acute rehabilitation services  Please also refer to the recommendation of the Physical Therapist for safe discharge planning  Barriers to Discharge Inaccessible home environment;Decreased caregiver support   Goals   Patient Goals to feel better   Short Term Goal #2 2/2/22   Plan   Treatment/Interventions Functional transfer training;LE strengthening/ROM; Therapeutic exercise; Endurance training;Patient/family training;Gait training;Bed mobility; Equipment eval/education;Spoke to nursing   Progress Slow progress, decreased activity tolerance   PT Frequency 3-5x/wk   Recommendation   PT Discharge Recommendation Post acute rehabilitation services   Additional Comments PT cleared pt to [id number] Mobility Inpatient   Turning in Bed Without Bedrails 1   Lying on Back to Sitting on Edge of Flat Bed 1   Moving Bed to Chair 1   Standing Up From Chair 1   Walk in Room 1   Climb 3-5 Stairs 1   Basic Mobility Inpatient Raw Score 6   Turning Head Towards Sound 4   Follow Simple Instructions 4   Low Function Basic Mobility Raw Score 14   Low Function Basic Mobility Standardized Score 22 01   Highest Level Of Mobility   -[facility] Goal 2: Bed activities/Dependent transfer   [facility] Highest Level of Mobility 4: Move to chair/commode   [facility] Goal Achieved Yes   Education   Education Provided Mobility training;Assistive device   Patient Demonstrates acceptance/verbal understanding   End of Consult   Patient Position at End of Consult Bedside chair; All needs within reach     Elmer Hawkins, PT, DPT

## 2022-01-21 NOTE — PLAN OF CARE
Problem: Prexisting or High Potential for Compromised Skin Integrity  Goal: Skin integrity is maintained or improved  Description: INTERVENTIONS:  - Identify patients at risk for skin breakdown  - Assess and monitor skin integrity  - Assess and monitor nutrition and hydration status  - Monitor labs   - Assess for incontinence   - Turn and reposition patient  - Assist with mobility/ambulation  - Relieve pressure over bony prominences  - Avoid friction and shearing  - Provide appropriate hygiene as needed including keeping skin clean and dry  - Evaluate need for skin moisturizer/barrier cream  - Collaborate with interdisciplinary team   - Patient/family teaching  - Consider wound care consult   Outcome: Progressing     Problem: MOBILITY - ADULT  Goal: Maintain or return to baseline ADL function  Description: INTERVENTIONS:  -  Assess patient's ability to carry out ADLs; assess patient's baseline for ADL function and identify physical deficits which impact ability to perform ADLs (bathing, care of mouth/teeth, toileting, grooming, dressing, etc )  - Assess/evaluate cause of self-care deficits   - Assess range of motion  - Assess patient's mobility; develop plan if impaired  - Assess patient's need for assistive devices and provide as appropriate  - Encourage maximum independence but intervene and supervise when necessary  - Involve family in performance of ADLs  - Assess for home care needs following discharge   - Consider OT consult to assist with ADL evaluation and planning for discharge  - Provide patient education as appropriate  Outcome: Progressing  Goal: Maintains/Returns to pre admission functional level  Description: INTERVENTIONS:  - Perform BMAT or MOVE assessment daily    - Set and communicate daily mobility goal to care team and patient/family/caregiver  - Collaborate with rehabilitation services on mobility goals if consulted  - Perform Range of Motion 3 times a day    - Reposition patient every 2 hours   - Dangle patient 3 times a day  - Stand patient 3 times a day  - Ambulate patient 3 times a day  - Out of bed to chair 3 times a day   - Out of bed for meals 3 times a day  - Out of bed for toileting  - Record patient progress and toleration of activity level   Outcome: Progressing     Problem: Potential for Falls  Goal: Patient will remain free of falls  Description: INTERVENTIONS:  - Educate patient/family on patient safety including physical limitations  - Instruct patient to call for assistance with activity   - Consult OT/PT to assist with strengthening/mobility   - Keep Call bell within reach  - Keep bed low and locked with side rails adjusted as appropriate  - Keep care items and personal belongings within reach  - Initiate and maintain comfort rounds  - Make Fall Risk Sign visible to staff  - Offer Toileting every 2 Hours, in advance of need  - Initiate/Maintain bed alarm  - Apply yellow socks and bracelet for high fall risk patients  - Consider moving patient to room near nurses station  Outcome: Progressing     Problem: GASTROINTESTINAL - ADULT  Goal: Minimal or absence of nausea and/or vomiting  Description: INTERVENTIONS:  - Administer IV fluids if ordered to ensure adequate hydration  - Maintain NPO status until nausea and vomiting are resolved  - Nasogastric tube if ordered  - Administer ordered antiemetic medications as needed  - Provide nonpharmacologic comfort measures as appropriate  - Advance diet as tolerated, if ordered  - Consider nutrition services referral to assist patient with adequate nutrition and appropriate food choices  Outcome: Progressing  Goal: Maintains or returns to baseline bowel function  Description: INTERVENTIONS:  - Assess bowel function  - Encourage oral fluids to ensure adequate hydration  - Administer IV fluids if ordered to ensure adequate hydration  - Administer ordered medications as needed  - Encourage mobilization and activity  - Consider nutritional services referral to assist patient with adequate nutrition and appropriate food choices  Outcome: Progressing  Goal: Maintains adequate nutritional intake  Description: INTERVENTIONS:  - Monitor percentage of each meal consumed  - Identify factors contributing to decreased intake, treat as appropriate  - Assist with meals as needed  - Monitor I&O, weight, and lab values if indicated  - Obtain nutrition services referral as needed  Outcome: Progressing  Goal: Establish and maintain optimal ostomy function  Description: INTERVENTIONS:  - Assess bowel function  - Encourage oral fluids to ensure adequate hydration  - Administer IV fluids if ordered to ensure adequate hydration   - Administer ordered medications as needed  - Encourage mobilization and activity  - Nutrition services referral to assist patient with appropriate food choices  - Assess stoma site  - Consider wound care consult   Outcome: Progressing  Goal: Oral mucous membranes remain intact  Description: INTERVENTIONS  - Assess oral mucosa and hygiene practices  - Implement preventative oral hygiene regimen  - Implement oral medicated treatments as ordered  - Initiate Nutrition services referral as needed  Outcome: Progressing     Problem: GENITOURINARY - ADULT  Goal: Maintains or returns to baseline urinary function  Description: INTERVENTIONS:  - Assess urinary function  - Encourage oral fluids to ensure adequate hydration if ordered  - Administer IV fluids as ordered to ensure adequate hydration  - Administer ordered medications as needed  - Offer frequent toileting  - Follow urinary retention protocol if ordered  Outcome: Progressing  Goal: Absence of urinary retention  Description: INTERVENTIONS:  - Assess patient's ability to void and empty bladder  - Monitor I/O  - Bladder scan as needed  - Discuss with physician/AP medications to alleviate retention as needed  - Discuss catheterization for long term situations as appropriate  Outcome: Progressing  Goal: Urinary catheter remains patent  Description: INTERVENTIONS:  - Assess patency of urinary catheter  - If patient has a chronic villanueva, consider changing catheter if non-functioning  - Follow guidelines for intermittent irrigation of non-functioning urinary catheter  Outcome: Progressing     Problem: SKIN/TISSUE INTEGRITY - ADULT  Goal: Skin Integrity remains intact(Skin Breakdown Prevention)  Description: Assess:  -Perform Surendra assessment every 12  -Clean and moisturize skin every 4  -Inspect skin when repositioning, toileting, and assisting with ADLS  -Assess extremities for adequate circulation and sensation     Bed Management:  -Have minimal linens on bed & keep smooth, unwrinkled  -Change linens as needed when moist or perspiring  -Avoid sitting or lying in one position for more than 2 hours while in bed  -Keep HOB at 30degrees     Toileting:  -Offer bedside commode  -Assess for incontinence every 2    Activity:  -Mobilize patient 3 times a day  -Encourage activity and walks on unit  -Encourage or provide ROM exercises   -Turn and reposition patient every 2 Hours  -Use appropriate equipment to lift or move patient in bed  -Instruct/ Assist with weight shifting every 2 hour when out of bed in chair  -Consider limitation of chair time 8 hour intervals    Skin Care:  -Avoid use of baby powder, tape, friction and shearing, hot water or constrictive clothing  -Relieve pressure over bony prominences using allevyns  -Do not massage red bony areas  Outcome: Progressing  Goal: Incision(s), wounds(s) or drain site(s) healing without S/S of infection  Description: INTERVENTIONS  - Assess and document dressing, incision, wound bed, drain sites and surrounding tissue  - Provide patient and family education  - Perform skin care/dressing changes as needed  Outcome: Progressing  Goal: Pressure injury heals and does not worsen  Description: Interventions:  - Implement low air loss mattress or specialty surface (Criteria met)  - Apply silicone foam dressing  - Instruct/assist with weight shifting every 120 minutes when in chair   - Limit chair time to 8 hour intervals  - Use special pressure reducing interventions such as allevyns when in chair   - Apply fecal or urinary incontinence containment device   - Perform passive or active ROM every 4  - Turn and reposition patient & offload bony prominences every 2 hours   - Utilize friction reducing device or surface for transfers   - Consider nutrition services referral as needed  Outcome: Progressing     Problem: PAIN - ADULT  Goal: Verbalizes/displays adequate comfort level or baseline comfort level  Description: Interventions:  - Encourage patient to monitor pain and request assistance  - Assess pain using appropriate pain scale  - Administer analgesics based on type and severity of pain and evaluate response  - Implement non-pharmacological measures as appropriate and evaluate response  - Consider cultural and social influences on pain and pain management  - Notify physician/advanced practitioner if interventions unsuccessful or patient reports new pain  Outcome: Progressing     Problem: INFECTION - ADULT  Goal: Absence or prevention of progression during hospitalization  Description: INTERVENTIONS:  - Assess and monitor for signs and symptoms of infection  - Monitor lab/diagnostic results  - Monitor all insertion sites, i e  indwelling lines, tubes, and drains  - Monitor endotracheal if appropriate and nasal secretions for changes in amount and color  - [place] appropriate cooling/warming therapies per order  - Administer medications as ordered  - Instruct and encourage patient and family to use good hand hygiene technique  - Identify and instruct in appropriate isolation precautions for identified infection/condition  Outcome: Progressing     Problem: SAFETY ADULT  Goal: Maintain or return to baseline ADL function  Description: INTERVENTIONS:  -  Assess patient's ability to carry out ADLs; assess patient's baseline for ADL function and identify physical deficits which impact ability to perform ADLs (bathing, care of mouth/teeth, toileting, grooming, dressing, etc )  - Assess/evaluate cause of self-care deficits   - Assess range of motion  - Assess patient's mobility; develop plan if impaired  - Assess patient's need for assistive devices and provide as appropriate  - Encourage maximum independence but intervene and supervise when necessary  - Involve family in performance of ADLs  - Assess for home care needs following discharge   - Consider OT consult to assist with ADL evaluation and planning for discharge  - Provide patient education as appropriate  Outcome: Progressing  Goal: Maintains/Returns to pre admission functional level  Description: INTERVENTIONS:  - Perform BMAT or MOVE assessment daily    - Set and communicate daily mobility goal to care team and patient/family/caregiver  - Collaborate with rehabilitation services on mobility goals if consulted  - Perform Range of Motion 3 times a day  - Reposition patient every 2 hours    - Dangle patient 3 times a day  - Stand patient 3 times a day  - Ambulate patient 3 times a day  - Out of bed to chair 3 times a day   - Out of bed for meals 3 times a day  - Out of bed for toileting  - Record patient progress and toleration of activity level   Outcome: Progressing  Goal: Patient will remain free of falls  Description: INTERVENTIONS:  - Educate patient/family on patient safety including physical limitations  - Instruct patient to call for assistance with activity   - Consult OT/PT to assist with strengthening/mobility   - Keep Call bell within reach  - Keep bed low and locked with side rails adjusted as appropriate  - Keep care items and personal belongings within reach  - Initiate and maintain comfort rounds  - Make Fall Risk Sign visible to staff  - Offer Toileting every 2 Hours, in advance of need  - Initiate/Maintain bed alarm  - Apply yellow socks and bracelet for high fall risk patients  - Consider moving patient to room near nurses station  Outcome: Progressing     Problem: DISCHARGE PLANNING  Goal: Discharge to home or other facility with appropriate resources  Description: INTERVENTIONS:  - Identify barriers to discharge w/patient and caregiver  - Arrange for needed discharge resources and transportation as appropriate  - Identify discharge learning needs (meds, wound care, etc )  - Arrange for interpretive services to assist at discharge as needed  - Refer to Case Management Department for coordinating discharge planning if the patient needs post-hospital services based on physician/advanced practitioner order or complex needs related to functional status, cognitive ability, or social support system  Outcome: Progressing     Problem: Knowledge Deficit  Goal: Patient/family/caregiver demonstrates understanding of disease process, treatment plan, medications, and discharge instructions  Description: Complete learning assessment and assess knowledge base    Interventions:  - Provide teaching at level of understanding  - Provide teaching via preferred learning methods  Outcome: Progressing     Problem: SAFETY,RESTRAINT: NV/NON-SELF DESTRUCTIVE BEHAVIOR  Goal: Remains free of harm/injury (restraint for non violent/non self-detsructive behavior)  Description: INTERVENTIONS:  - Instruct patient/family regarding restraint use   - Assess and monitor physiologic and psychological status   - Provide interventions and comfort measures to meet assessed patient needs   - Identify and implement measures to help patient regain control  - Assess readiness for release of restraint   Outcome: Progressing  Goal: Returns to optimal restraint-free functioning  Description: INTERVENTIONS:  - Assess the patient's behavior and symptoms that indicate continued need for restraint  - Identify and implement measures to help patient regain control  - Assess readiness for release of restraint   Outcome: Progressing     Problem: Nutrition/Hydration-ADULT  Goal: Nutrient/Hydration intake appropriate for improving, restoring or maintaining nutritional needs  Description: Monitor and assess patient's nutrition/hydration status for malnutrition  Collaborate with interdisciplinary team and initiate plan and interventions as ordered  Monitor patient's weight and dietary intake as ordered or per policy  Utilize nutrition screening tool and intervene as necessary  Determine patient's food preferences and provide high-protein, high-caloric foods as appropriate       INTERVENTIONS:  - Monitor oral intake, urinary output, labs, and treatment plans  - Assess nutrition and hydration status and recommend course of action  - Evaluate amount of meals eaten  - Assist patient with eating if necessary   - Allow adequate time for meals  - Recommend/ encourage appropriate diets, oral nutritional supplements, and vitamin/mineral supplements  - Order, calculate, and assess calorie counts as needed  - Recommend, monitor, and adjust tube feedings and TPN/PPN based on assessed needs  - Assess need for intravenous fluids  - Provide specific nutrition/hydration education as appropriate  - Include patient/family/caregiver in decisions related to nutrition  Outcome: Progressing     Problem: CHANGE IN BODY IMAGE  Goal: Pt/Family communicate acceptance of loss or change in body image and expresses psychological comfort and peace  Description: INTERVENTIONS:  - Assess patient/family anxiety and grief process related to change in body image, loss of functional status and loss of sense of self  - Assess patient/family's coping skills and provide emotional, spiritual and psychosocial support  - Provide information about the patient's health status with consideration of family and cultural values  - Communicate willingness to discuss loss and facilitate grief process with patient/family as appropriate  - Emphasize sustaining relationships within family system and community, or [place]h/spiritual traditions  - Refer to community support groups as appropriate  - [phone], Pastoral care or other ancillary consults as needed  Outcome: Progressing

## 2022-01-22 NOTE — PROGRESS NOTES
Progress Note - Danna Downing 60 y o  female MRN: [id number]    Unit/Bed#: [facility] 511-01 Encounter: [id number]      Assessment:  61 y/o F w perforated diverticulitis s/p Ortega's procedure on 1/17      Multiple episodes of vomiting earlier in day yesterday, NGT placed and now 1200 cc output  Abdomen soft, nontender, obese, non distended  Ostomy maroon with small amount thin serosang stool flakes in bag  STIVEN x2  (L) 170cc ss, (R) 10cc ss  Rectal maelot with 10 cc seropurulent  Uop: 1250    Labs: wbc: 10-> 9  Hgb: 7 7-> 7 4    Plan:  Continue npo  Continue ngt  Continue IVF  Antibiotics stopped yesterday to complete 5 d post op zosyn and 14 d diflucan  Continue STIVEN drains x2  Continue rectal malecot  Ambulate and up in chair, out of bed  Needs aggressive pulm toilet  dvt ppx, SQH    Subjective:   Reports discomfort from ngt and wet cough this morning  Unable to cough up the sputum  Denied chest pain  Reports passing flatus into ostomy  Denied any further nausea or vomiting since ngt placed yesterday  Objective:     Vitals: Blood pressure 153/86, pulse 94, temperature 97 5 °F (36 4 °C), resp  rate 20, height 5' 7" (1 702 m), weight (!) 176 kg (387 lb 2 oz), SpO2 97 %  ,Body mass index is 60 63 kg/m²  Intake/Output Summary (Last 24 hours) at 1/22/2022 0642  Last data filed at 1/22/2022 0547  Gross per 24 hour   Intake 3000 75 ml   Output 2640 ml   Net 360 75 ml       Physical Exam  General: NAD  HEENT: NC/AT  MMM  Cv: RRR  Lungs: normal effort  Ab: Soft, NT/ND  stiven x2 serosang  Rectal malecot seropurulent     Ex: no CCE  Neuro: AAOx3      Invasive Devices  Report    Central Venous Catheter Line            CVC Central Lines 01/17/22 Triple 5 days          Drain            Closed/Suction Drain LLQ Bulb 19 Fr  5 days    Closed/Suction Drain RLQ Bulb 19 Fr  5 days    Colostomy Other (comment) LUQ 5 days    Rectal Tube Without balloon 5 days    Urethral Catheter Latex 18 Fr  5 days    NG/OG/Enteral Tube Right nare <1 day                Lab, Imaging and other studies: I have personally reviewed pertinent reports      VTE Pharmacologic Prophylaxis: Sequential compression device (Venodyne)   VTE Mechanical Prophylaxis: sequential compression device

## 2022-01-23 NOTE — CONSULTS
Nutrition consult completed  Dosing wt= 61 4kg IBW  pt unsure of UBW and wt is 10kg up from admit/s/p OR + IVF    1/23 TPN macronutrient recommendations: 243ml 70% dextrose, 833ml 15% AA (125g, 2g/kg IBW)   225ml 20% lipids (45g, 0 7g/kg IBW)        Goal TPN macronutrient recommendations: 385ml 70% dextrose (270g, glucose infusion rate= 3mg/kg/minute), 833ml 15% amino acids (125g, 2g/kg IBW), 225ml 20% lipids (45g, 0 7g/kg), nonprotein:nitrogen=88, 1868kcal (30kcal/kg)

## 2022-01-23 NOTE — PROGRESS NOTES
Progress Note - Shirline Aus 60 y o  female MRN: [id number]    Unit/Bed#: [facility] 511-01 Encounter: [id number]      Assessment:  59 y/o F w perforated diverticulitis s/p Ortega's procedure on 1/17      Pathology showing adenoca  CEA: 3 6  Afebrile  Vitals stable  Abdomen soft, mild tenderness, non distended  Ostomy is dusky and maroon  Minimal output  NGT: 525 cc    ADIA (L)25cc serosang; ADIA (R)10cc serosang; rectal malecot: 35 cc serosang  UOP: 1 1 L     Plan:  D/c ngt  Start sips of clears  Continue tpn  Continue enema per ostomy  Monitor ostomy for ischemia, f/u lactate  Consider c scope via ostomy  dvt ppx    Subjective:   Passing flatus into ostomy, but no stool yet  Reports worsening abd pain after sitting in chair most of the day yesterday  Refusing to sit in chair today  Denied chest pain or shortness of breath today  Objective:     Vitals: Blood pressure (!) 155/105, pulse 97, temperature 97 9 °F (36 6 °C), temperature source Oral, resp  rate 20, height 5' 7" (1 702 m), weight (!) 176 kg (387 lb 2 oz), SpO2 96 %  ,Body mass index is 60 63 kg/m²  Intake/Output Summary (Last 24 hours) at 1/23/2022 0037  Last data filed at 1/22/2022 2144  Gross per 24 hour   Intake 2622 05 ml   Output 1535 ml   Net 1087 05 ml     Physical Exam  General: NAD  HEENT: NC/AT  MMM  Cv: RRR     Lungs: normal effort  Ab: Soft, NT/ND  Ex: no CCE  Neuro: AAOx3    Scheduled Meds:  Current Facility-Administered Medications   Medication Dose Route Frequency Provider Last Rate    Adult TPN (STANDARD BASE/STANDARD ELECTROLYTE)   Intravenous Continuous TPN Queenie Phalen How, MD 42 mL/hr at 01/22/22 2148    albuterol  2 puff Inhalation Q4H PRN Particia Orts, DO      dextrose 5 % and sodium chloride 0 45 % with KCl 20 mEq/L  50 mL/hr Intravenous Continuous Sierra Ash MD 50 mL/hr (01/22/22 2144)    docusate sodium  100 mg Oral BID Sierra Ash MD      fentanyl citrate (PF)  50 mcg Intravenous Q2H PRN Karen Springer MIRTA Montiel      heparin (porcine)  7,500 Units Subcutaneous [facility] MIRTA Mahoney      HYDROmorphone  0 2 mg Intravenous Q3H PRN MIRTA Troy      Or    HYDROmorphone  0 5 mg Intravenous Q3H PRN MIRTA Troy      Labetalol HCl  10 mg Intravenous Q4H PRN MIRTA Drew      CAROLINE ANTIFUNGAL   Topical BID MIRTA Troy      nystatin  500,000 Units Swish & Swallow 4x Daily MIRTA Troy      nystatin   Topical BID MIRTA Troy      ondansetron  4 mg Intravenous Q8H PRN Fran Byrd MD      pantoprazole  40 mg Intravenous Q12H [address] MIRTA Troy      saliva substitute  5 spray Mouth/Throat 4x Daily PRN MIRTA Drew      scopolamine  1 patch Transdermal Q72H Ernie Lipscomb MD      senna  1 tablet Oral BID Ernie Lipscomb MD       Continuous Infusions:Adult TPN (STANDARD BASE/STANDARD ELECTROLYTE), , Last Rate: 42 mL/hr at 01/22/22 2148  dextrose 5 % and sodium chloride 0 45 % with KCl 20 mEq/L, 50 mL/hr, Last Rate: 50 mL/hr (01/22/22 2144)      PRN Meds: albuterol    fentanyl citrate (PF)    HYDROmorphone **OR** HYDROmorphone    Labetalol HCl    ondansetron    saliva substitute      Invasive Devices  Report    Central Venous Catheter Line            CVC Central Lines 01/17/22 Triple 5 days          Drain            Closed/Suction Drain LLQ Bulb 19 Fr  6 days    Closed/Suction Drain RLQ Bulb 19 Fr  6 days    Colostomy Other (comment) LUQ 6 days    Rectal Tube Without balloon 6 days    Urethral Catheter Latex 18 Fr  6 days    NG/OG/Enteral Tube Right nare 1 day                Lab, Imaging and other studies: I have personally reviewed pertinent reports      VTE Pharmacologic Prophylaxis: Sequential compression device (Venodyne)   VTE Mechanical Prophylaxis: sequential compression device

## 2022-01-23 NOTE — PLAN OF CARE
Problem: SAFETY ADULT  Goal: Patient will remain free of falls  Description: INTERVENTIONS:  - Educate patient/family on patient safety including physical limitations  - Instruct patient to call for assistance with activity   - Consult OT/PT to assist with strengthening/mobility   - Keep Call bell within reach  - Keep bed low and locked with side rails adjusted as appropriate  - Keep care items and personal belongings within reach  - Initiate and maintain comfort rounds  - Make Fall Risk Sign visible to staff  - Offer Toileting every 2 Hours, in advance of need  - Initiate/Maintain bed alarm  - Apply yellow socks and bracelet for high fall risk patients  - Consider moving patient to room near nurses station  Outcome: Progressing

## 2022-01-24 NOTE — PLAN OF CARE
Problem: PHYSICAL THERAPY ADULT  Goal: Performs mobility at highest level of function for planned discharge setting  See evaluation for individualized goals  Description: Treatment/Interventions: Functional transfer training,LE strengthening/ROM,Therapeutic exercise,Endurance training,Patient/family training,Equipment eval/education,Bed mobility,Gait training,OT,Spoke to nursing  Equipment Recommended: Obie Castleman (bariatric RW )       See flowsheet documentation for full assessment, interventions and recommendations  Outcome: Progressing  Note: Prognosis: Fair  Problem List: Decreased strength,Decreased endurance,Impaired balance,Decreased mobility,Decreased safety awareness,Obesity,Pain  Assessment: Pt seen for PT treatment session with focus on bed mobility, functional transfers, functional mobility  Pt making slow progress toward goals this session  Pt continues to present with significant strength deficits evident by need for assist of 2 for all mobility  Pt limited in progression this session by significant dizziness with limited mobility; hypertensive, RN aware  Pt left upright in bedside chair with chair alarm intact and with all needs in reach  Pt will benefit from skilled therapy in order to address current impairments and functional limitations  PT to follow pt and recommending rehab once medically cleared  The patient's AM-PAC Basic Mobility Inpatient Short Form Raw Score is 6  A Raw score of less than or equal to 16 suggests the patient may benefit from discharge to post-acute rehabilitation services  Please also refer to the recommendation of the Physical Therapist for safe discharge planning  Barriers to Discharge: Decreased caregiver support,Inaccessible home environment        PT Discharge Recommendation: Post acute rehabilitation services          See flowsheet documentation for full assessment

## 2022-01-24 NOTE — CONSULTS
Oncology Consult Note  Nikole Crowe 60 y o  female MRN: [id number]  Unit/Bed#: [facility] 1-1 Encounter: [id number]      Presenting Complaint:  Sigmoid adenocarcinoma  History of Presenting Illness:  42-year-old with obesity, anxiety, GERD, history of gastric ulcer, hypertension, presented in January 2022 with abdominal pain Ki was found to have perforated diverticulitis and intra-abdominal abscess/phlegmon formation causing a mass effect on the right ureter and subsequent hydronephrosis, she failed antibiotics treatment s/p ex laparotomy and Payal's procedure after CT scan did not show any evidence of distant metastases    Final pathology of the sigmoid colon showed adenocarcinoma moderately to poorly differentiated arising in tubular adenoma, with invasion of the visceral peritoneum with perforation, and tumor present at this distal margin, 10/18 positive lymph nodes with at least 3 tumor deposits,Status post right salpingo-oophorectomy the ovary showed adenocarcinoma however the left ovary was negative for malignancy at least stage IIIC (pT4, pN2b, MX) with adherence to the right ovary, grade 2    She was diagnosed with COVID-19 infection      Review of Systems - As stated in the HPI otherwise the fourteen point review of systems was negative      Past Medical History:   Diagnosis Date    Anxiety     Arthritis     GERD (gastroesophageal reflux disease)     Hidradenitis suppurativa     Hypertension     Lipodermatosclerosis of both lower extremities     Lymphedema 2006    Sciatic leg pain 2006    left side    SOB (shortness of breath)     Stomach ulcer        Social History     Socioeconomic History    Marital status:      Spouse name: None    Number of children: 2    Years of education: Some college    Highest education level: Some college, no degree   Occupational History    None   Tobacco Use    Smoking status: Light Tobacco Smoker     Packs/day: 0 25     Years: 20 00     Pack years: 5 00     Types: Cigarettes    Smokeless tobacco: Never Used    Tobacco comment: Only smoke once in awhile   Vaping Use    Vaping Use: Never used   Substance and Sexual Activity    Alcohol use: Not Currently     Comment: holidays    Drug use: No    Sexual activity: Not Currently   Other Topics Concern    None   Social History Narrative    None     Social Determinants of Health     Financial Resource Strain: Not on file   Food Insecurity: No Food Insecurity    Worried About Running Out of Food in the Last Year: Never true    Latoya of Food in the Last Year: Never true   Transportation Needs: No Transportation Needs    Lack of Transportation (Medical): No    Lack of Transportation (Non-Medical):  No   Physical Activity: Not on file   Stress: Not on file   Social Connections: Not on file   Intimate Partner Violence: Not on file   Housing Stability: Unknown    Unable to Pay for Housing in the Last Year: Not on file    Number of Places Lived in the Last Year: 1    Unstable Housing in the Last Year: No       Family History   Problem Relation Age of Onset    Cancer Mother     Hypertension Mother     COPD Father     Diabetes Father        Allergies   Allergen Reactions    Bee Venom Anaphylaxis, Shortness Of Breath and Swelling         Current Facility-Administered Medications:     Adult 3-in-1 TPN (custom base / standard electrolytes), , Intravenous, Continuous TPN, Ady De Jesus MD    Adult 3-in-1 TPN (standard base / standard electrolytes), , Intravenous, Continuous TPN, Rikki Ty DO, Last Rate: 42 mL/hr at 01/23/22 2159, New Bag at 01/23/22 2159    albuterol (PROVENTIL HFA,VENTOLIN HFA) inhaler 2 puff, 2 puff, Inhalation, Q4H PRN, Rubio Fisher DO    dextrose 5 % and sodium chloride 0 45 % with KCl 20 mEq/L infusion, 50 mL/hr, Intravenous, Continuous, Jameel Padgett MD, Last Rate: 50 mL/hr at 01/22/22 2144, 50 mL/hr at 01/22/22 2144    docusate sodium (COLACE) capsule 100 mg, 100 mg, Oral, BID, Kathryn Mobley MD    fentanyl citrate (PF) 100 MCG/2ML 50 mcg, 50 mcg, Intravenous, Q2H PRN, MIRTA Eden    heparin (porcine) subcutaneous injection 7,500 Units, 7,500 Units, Subcutaneous, Q8H [facility], 7,500 Units at 01/24/22 0514 **AND** [COMPLETED] Platelet count, , , Once, Kathryn Mobley MD    HYDROmorphone HCl (DILAUDID) injection 0 2 mg, 0 2 mg, Intravenous, Q3H PRN, 0 2 mg at 01/20/22 1220 **OR** HYDROmorphone (DILAUDID) injection 0 5 mg, 0 5 mg, Intravenous, Q3H PRN, MIRTA Eden, 0 5 mg at 01/24/22 1159    Labetalol HCl (NORMODYNE) injection 10 mg, 10 mg, Intravenous, Q4H PRN, MIRTA Eden    moisture barrier miconazole 2% cream (aka CAROLINE MOISTURE BARRIER ANTIFUNGAL CREAM), , Topical, BID, MIRTA Capone, Given at 01/24/22 2675    nystatin (MYCOSTATIN) oral suspension 500,000 Units, 500,000 Units, Swish & Swallow, 4x Daily, MIRTA Eden, 500,000 Units at 01/24/22 8786    nystatin (MYCOSTATIN) powder, , Topical, BID, MIRTA Eden, Given at 01/24/22 0827    ondansetron [facility]) injection 4 mg, 4 mg, Intravenous, Q8H PRN, Helio Leiva MD, 4 mg at 01/21/22 0218    pantoprazole (PROTONIX) injection 40 mg, 40 mg, Intravenous, Q12H [facility], MIRTA Eden, 40 mg at 01/24/22 0514    saliva substitute (MOUTH KOTE) mucosal solution 5 spray, 5 spray, Mouth/Throat, 4x Daily PRN, MIRTA Eden    scopolamine (TRANSDERM-SCOP) 1 mg/3 days TD 72 hr patch 1 patch, 1 patch, Transdermal, Q72H, Kathryn Mobley MD, 1 patch at 01/24/22 0530    senna (SENOKOT) tablet 8 6 mg, 1 tablet, Oral, BID, Kathryn Mobley MD, 8 6 mg at 01/23/22 1743      /86   Pulse 91   Temp (!) 97 4 °F (36 3 °C)   Resp 18   Ht 5' 7" (1 702 m)   Wt (!) 171 kg (377 lb 13 9 oz)   SpO2 96%   BMI 59 18 kg/m²       General Appearance:   Through the window she has tachypnea, she has NG tube in place she had ecchymosis, she is obese       Eyes:     Ears: Nose:    Throat:    Neck:        Lungs:      Chest Wall:      Heart:         Abdomen:              Extremities:        Skin:    Lymph nodes:    Neurologic:        Recent Results (from the past 48 hour(s))   CBC and differential    Collection Time: 01/23/22  5:18 AM   Result Value Ref Range    WBC 10 13 4 31 - 10 16 Thousand/uL    RBC 3 13 (L) 3 81 - 5 12 Million/uL    Hemoglobin 7 8 (L) 11 5 - 15 4 g/dL    Hematocrit 27 2 (L) 34 8 - 46 1 %    MCV 87 82 - 98 fL    MCH 24 9 (L) 26 8 - 34 3 pg    MCHC 28 7 (L) 31 4 - 37 4 g/dL    RDW 20 3 (H) 11 6 - 15 1 %    MPV 8 5 (L) 8 9 - 12 7 fL    Platelets 761 (H) 868 - 390 Thousands/uL   Basic metabolic panel    Collection Time: 01/23/22  5:18 AM   Result Value Ref Range    Sodium 141 136 - 145 mmol/L    Potassium 3 9 3 5 - 5 3 mmol/L    Chloride 110 (H) 100 - 108 mmol/L    CO2 28 21 - 32 mmol/L    ANION GAP 3 (L) 4 - 13 mmol/L    BUN 8 5 - 25 mg/dL    Creatinine 0 92 0 60 - 1 30 mg/dL    Glucose 111 65 - 140 mg/dL    Calcium 8 6 8 3 - 10 1 mg/dL    eGFR 68 ml/min/1 73sq m   Lactic acid, plasma    Collection Time: 01/23/22 10:30 AM   Result Value Ref Range    LACTIC ACID 0 8 0 5 - 2 0 mmol/L   CBC and differential    Collection Time: 01/24/22  5:28 AM   Result Value Ref Range    WBC 9 39 4 31 - 10 16 Thousand/uL    RBC 3 21 (L) 3 81 - 5 12 Million/uL    Hemoglobin 8 1 (L) 11 5 - 15 4 g/dL    Hematocrit 27 8 (L) 34 8 - 46 1 %    MCV 87 82 - 98 fL    MCH 25 2 (L) 26 8 - 34 3 pg    MCHC 29 1 (L) 31 4 - 37 4 g/dL    RDW 20 8 (H) 11 6 - 15 1 %    MPV 8 8 (L) 8 9 - 12 7 fL    Platelets 610 (H) 158 - 390 Thousands/uL   Basic metabolic panel    Collection Time: 01/24/22  5:28 AM   Result Value Ref Range    Sodium 142 136 - 145 mmol/L    Potassium 3 8 3 5 - 5 3 mmol/L    Chloride 109 (H) 100 - 108 mmol/L    CO2 28 21 - 32 mmol/L    ANION GAP 5 4 - 13 mmol/L    BUN 7 5 - 25 mg/dL    Creatinine 0 91 0 60 - 1 30 mg/dL    Glucose 120 65 - 140 mg/dL    Calcium 8 9 8 3 - 10 1 mg/dL    eGFR 71 ml/min/1 73sq m         CT abdomen pelvis wo contrast    Result Date: 1/12/2022  Narrative: CT ABDOMEN AND PELVIS WITHOUT IV CONTRAST INDICATION:   follow up abscess  no contrast due to kidneys  COMPARISON:  CT 1/6/22 TECHNIQUE:  CT examination of the abdomen and pelvis was performed without intravenous contrast   Axial, sagittal, and coronal 2D reformatted images were created from the source data and submitted for interpretation  Radiation dose length product (DLP) for this visit:  1475 63 mGy-cm   This examination, like all CT scans performed in the [facility], was performed utilizing techniques to minimize radiation dose exposure, including the use of iterative reconstruction and automated exposure control  Enteric contrast was administered  FINDINGS: ABDOMEN LOWER CHEST:  No clinically significant abnormality identified in the visualized lower chest  LIVER/BILIARY TREE:  Unremarkable  GALLBLADDER:  Cholelithiasis SPLEEN:  Unremarkable  PANCREAS:  Unremarkable  ADRENAL GLANDS:  Unremarkable  KIDNEYS/URETERS:  Moderate The study was marked in EPIC for immediate notification  right hydroureteronephrosis, increased from prior  The right ureter courses through the below mentioned collection STOMACH AND BOWEL:  Again noted are inflammatory changes surrounding the sigmoid colon, with adjacent locules of extraluminal air  Collection is present to the right of the sigmoid colon which measures about 3 1 x 4 0 cm on series 2/68, mildly in size from prior when measured in same manner  This collection appears to fully encase the right external iliac artery, and abuts right external iliac vein  APPENDIX:  No findings to suggest appendicitis  ABDOMINOPELVIC CAVITY:  No ascites  No pneumoperitoneum  No lymphadenopathy  VESSELS:  Unremarkable for patient's age  PELVIS REPRODUCTIVE ORGANS:  Unremarkable for patient's age  URINARY BLADDER:  Unremarkable  ABDOMINAL WALL/INGUINAL REGIONS:  Unremarkable  OSSEOUS STRUCTURES:  No acute fracture or destructive osseous lesion  Impression: 1  Right pelvic collection shows very mild interval increase in size  In addition to encasing the right ureter, this collection also encases the right external iliac artery, and may cause arterial injury  2   Persistent inflammatory changes surrounding the sigmoid colon with surrounding free air 3  Moderate right hydroureteronephrosis, increased from prior The study was marked in [facility] for immediate notification  Workstation performed: [id number]     CT chest abdomen pelvis wo contrast    Result Date: 1/6/2022  Narrative: CT CHEST, ABDOMEN AND PELVIS WITHOUT IV CONTRAST INDICATION:   r/o deep infection, abscesses to b/l breasts, inguinal, rectal areas; now with ARF  COMPARISON:  None  TECHNIQUE: CT examination of the chest, abdomen and pelvis was performed without intravenous contrast   Axial, sagittal, and coronal 2D reformatted images were created from the source data and submitted for interpretation  Radiation dose length product (DLP) for this visit:  1948  97 mGy-cm   This examination, like all CT scans performed in the [facility], was performed utilizing techniques to minimize radiation dose exposure, including the use of iterative reconstruction and automated exposure control  Enteric contrast was not administered  FINDINGS: CHEST LUNGS:  There is no infiltrate or pleural effusion  There is a thin-walled air cysts adjacent to the minor fissure measuring 15 mm on (series 3, image 43)  There is no tracheal or endobronchial lesion  PLEURA:  Unremarkable  HEART/GREAT VESSELS: Heart is unremarkable for patient's age  No thoracic aortic aneurysm  MEDIASTINUM AND EZE:  Unremarkable  CHEST WALL AND LOWER NECK:   Unremarkable  ABDOMEN LIVER/BILIARY TREE:  Unremarkable  GALLBLADDER:  There are gallstone(s) within the gallbladder, without pericholecystic inflammatory changes  SPLEEN:  Unremarkable   PANCREAS: Unremarkable  ADRENAL GLANDS:  Unremarkable  KIDNEYS/URETERS:  There is minimal to mild right-sided hydroureter and hydronephrosis with no evidence of a genitourinary tract calculus  The left kidney is unremarkable  STOMACH AND BOWEL:  There is a segment of moderate pericolonic inflammatory stranding involving the distal sigmoid colon with multiple diverticula most compatible with acute diverticulitis  There a few adjacent foci of extraluminal air most compatible with microperforation  To the right of the sigmoid colon is a 3 2 x 2 8 cm air-fluid collection (series 2, image 99) is compatible with an abscess  APPENDIX:  A normal appendix was visualized  ABDOMINOPELVIC CAVITY:  No ascites  No pneumoperitoneum  No lymphadenopathy  VESSELS:  Unremarkable for patient's age  PELVIS REPRODUCTIVE ORGANS:  Unremarkable for patient's age  URINARY BLADDER:  Unremarkable  ABDOMINAL WALL/INGUINAL REGIONS:  Unremarkable  OSSEOUS STRUCTURES:  No acute fracture or destructive osseous lesion  Impression: Findings compatible with acute sigmoid diverticulitis with microperforation and an adjacent small right pelvic abscess  The study was limited by the lack of oral contrast   Surgical consultation is recommended  No evidence of large or small bowel obstruction  No infiltrate or pleural effusion  No evidence of a subcutaneous abscess in the region of the visualized breast, inguinal or rectal areas  Minimal to mild right-sided hydroureteronephrosis with no obstructing calculus  The findings may be secondary to the active inflammatory changes adjacent to the sigmoid colon  I personally discussed this study with Kasandra Brennan on 1/6/2022 at 6:31 AM  Workstation performed: [id number]     XR chest portable    Result Date: 1/22/2022  Narrative: CHEST INDICATION:   follow up rule out aspiration  Patient has confirmed COVID-19  Positive on 1/16/2022  COMPARISON:  Chest radiograph from 1/18/2022 and chest CT from 1/19/2022   EXAM PERFORMED/VIEWS:  XR CHEST PORTABLE FINDINGS:  Right jugular catheter in upper SVC  NG tube below the diaphragm  Cardiomediastinal silhouette appears unremarkable  Mild opacity in the medial right base  No effusion or pneumothorax  Osseous structures appear within normal limits for patient age  Impression: Mild opacity in the medial right base which could be due to atelectasis or aspiration  Workstation performed: [id number]     XR chest portable    Result Date: 1/17/2022  Narrative: CHEST INDICATION:  ETT, central line  Patient has confirmed COVID-19  COMPARISON:  1/16/2022, CT chest, abdomen and pelvis 1/6/2022 EXAM PERFORMED/VIEWS:  XR CHEST PORTABLE Images: 3 FINDINGS: ET tube tip terminates about 4 5 cm above the louis  Enteric tube courses to the stomach  Right IJ central line tip terminates in the region of the distal SVC  Cardiomediastinal silhouette appears unremarkable  The lungs are clear  No pneumothorax or pleural effusion  Osseous structures appear within normal limits for patient age  Impression: Lines and tubes as above  No pneumothorax  Workstation performed: [id number]     XR abdomen 1 view kub    Result Date: 1/24/2022  Narrative: ABDOMEN INDICATION:   F/u gastrografin  COMPARISON:  Abdominal radiographs 1/23/2022  VIEWS:  AP supine FINDINGS: Persistently dilated small bowel loops  There is possible dilute contrast in some small bowel loops in the right hemiabdomen seen on the 1st image  Stool in the right hemicolon is relatively dense, which could be related to some dilute enteric contrast   However, this appearance is not significantly different from 1/23/2022 when the the contrast was seen in the stomach  Contrast is no longer seen in the stomach  Enteric tube with tip in the stomach  Right nephroureteral stent  Rectal tube  Pelvic drains  Multiple left-sided skin staples  No discernible free air on this supine study    Upright or left lateral decubitus imaging is more sensitive to detect subtle free air in the appropriate setting  No pathologic calcifications or soft tissue masses  Visualized lung bases are clear  Visualized osseous structures are unremarkable for the patient's age  Impression: Persistently dilated small bowel loops with dilution of oral contrast, as described  Workstation performed: [id number]     XR abdomen 1 view kub    Result Date: 1/23/2022  Narrative: ABDOMEN INDICATION:   f/u ileus  COMPARISON:  1/21/2022 and 1/15/2022 VIEWS:  AP supine Images: 5 FINDINGS: These images apparently represent a 6 hour post contrast administration image set after Gastrografin was administered enterically  A portion of the gastric tube is visible along the lower esophagus into the stomach  There is dense contrast filling the gastric fundus  There is only very faint amount of enteric contrast seen within some of the left-sided small bowel loops in the mid to lower abdomen  Otherwise, the remainder of the bowel is unopacified  The small bowel loops are dilated  There are multiple drains and tubes projecting over the pelvic area  These are presumably to drain the pelvic abscess seen on the prior CT  There is a right-sided nephroureteral stent  A large caliber tube near the midline of the pelvis is of uncertain etiology, perhaps a rectal tube or an unusually large bladder catheter  No discernible free air on this supine study  Upright or left lateral decubitus imaging is more sensitive to detect subtle free air in the appropriate setting  No pathologic calcifications or soft tissue masses  Visualized lung bases are clear  Osseous structures stable  Impression: The majority of the enteric contrast is in the gastric fundus  There is a small amount seen within some small bowel loops in the left mid to lower abdomen  There are persistently dilated bowel loops seen in the abdomen  The study was marked in [facility] for immediate notification   Workstation performed: [id number] XR abdomen 1 view kub    Result Date: 1/21/2022  Narrative: ABDOMEN INDICATION:   nausea and vomiting  COMPARISON:  CT scan 1/15/2022 VIEWS:  AP supine Images: 4 FINDINGS: There are distended loops of small bowel in the left upper quadrant of the abdomen  The distal aspect of an enteric tube is in the left upper quadrant of the abdomen  There is a right ureteral stent as well as multiple surgical drains  No discernible free air on this supine study  Upright or left lateral decubitus imaging is more sensitive to detect subtle free air in the appropriate setting  No pathologic calcifications or soft tissue masses  Visualized lung bases are clear  Visualized osseous structures are unremarkable for the patient's age  Impression: Distended loops of small bowel within the left hemiabdomen  This may represent an ileus given the recent postoperative state  Cannot exclude an early obstructive process and continued follow-up would be recommended if symptoms persist  Workstation performed: [id number]     FL retrograde pyelogram    Result Date: 1/17/2022  Narrative: C-ARM - INDICATION: Diverticulitis of large intestine with perforation without bleeding [K57 [id number]]  Procedure guidance  COMPARISON:  None TECHNIQUE: FLUOROSCOPY TIME:   1 MINUTE 38 SECONDS 7 FLUOROSCOPIC IMAGES FINDINGS: Fluoroscopic guidance provided for procedure guidance  Osseous and soft tissue detail limited by technique  Impression: Fluoroscopic guidance provided for procedure guidance  Please refer to the separate procedure notes for additional details  Workstation performed: [id number]     XR chest portable ICU    Result Date: 1/18/2022  Narrative: CHEST INDICATION:   tachypneic  Rupali Skipper Patient has confirmed COVID-19  COMPARISON:  1/17/2022 EXAM PERFORMED/VIEWS:  XR CHEST PORTABLE ICU FINDINGS: The cardiac silhouette is without change from the prior study  Central line is unchanged in position from prior  Prior endotracheal tube removed  Nasogastric tube side port at level of thoracic inlet with tip in midesophagus  Study performed with patient rotated toward the left  No pneumothorax or pleural effusion  Bibasilar subsegmental atelectasis noted Osseous structures appear within normal limits for patient age  Impression: Endotracheal tube no longer visualized  Nasogastric tube tip in mid esophagus  Advancement recommended as clinically indicated The examination demonstrates a significant  finding and was documented as such in [facility] for liaison and referring practitioner notification  Workstation performed: [id number]     XR chest portable ICU    Result Date: 1/17/2022  Narrative: CHEST INDICATION:  Preoperative exam   Patient has confirmed COVID-19  COMPARISON:  12/4/2019, CT chest, abdomen and pelvis 1/6/2022 EXAM PERFORMED/VIEWS:  XR CHEST PORTABLE ICU FINDINGS:  Study is limited by patient body habitus and portable technique  Cardiomediastinal silhouette appears unremarkable  The lungs are clear  No pneumothorax or pleural effusion  Osseous structures appear within normal limits for patient age  Impression: No acute cardiopulmonary disease  In the setting of clinically suspected/proven COVID-19, this plain film appearance does not contain findings that raise concern for viral pneumonia such as COVID-19, but does not rule out this diagnosis  Workstation performed: [id number]     CTA chest pe study    Result Date: 1/19/2022  Narrative: CTA - CHEST WITH IV CONTRAST - PULMONARY ANGIOGRAM INDICATION:   Pulmonary embolism (PE) suspected, unknown D-dimer r/o PE  Patient has confirmed COVID-19  COMPARISON: CT chest 1/6/2022  AP chest from yesterday  TECHNIQUE: CTA examination of the chest was performed using angiographic technique according to a protocol specifically tailored to evaluate for pulmonary embolism  Axial, sagittal, and coronal 2D reformatted images were created from the source data and  submitted for interpretation    In addition, coronal 3D MIP postprocessing was performed on the acquisition scanner  Radiation dose length product (DLP) for this visit:  1557 89 mGy-cm   This examination, like all CT scans performed in the [place], was performed utilizing techniques to minimize radiation dose exposure, including the use of iterative reconstruction and automated exposure control  IV Contrast:  100 mL of iohexol (OMNIPAQUE)  FINDINGS: PULMONARY ARTERIAL TREE:  Somewhat limited by motion artifact; some of the segmental vasculature, especially through the lower lobes is obscured  No pulmonary arterial embolism as visualized  LUNGS:  New small focal left upper lobe groundglass infiltrate #4/29 and #601/82  Mild right lower lobe linear atelectasis  No endotracheal or endobronchial lesion  PLEURA:  Unremarkable  HEART/GREAT VESSELS:  Unremarkable for patient's age  MEDIASTINUM AND EZE:  No NG tube courses into the gastric lumen  CHEST WALL AND LOWER NECK:   Unremarkable  VISUALIZED STRUCTURES IN THE UPPER ABDOMEN:  Unremarkable  OSSEOUS STRUCTURES:  No acute fracture or destructive osseous lesion  Impression: New small focal left upper lobe groundglass infiltrate #4/29 and #601/82  The study was marked in [facility] for immediate notification  Workstation performed: [id number]     US kidney and bladder    Result Date: 1/6/2022  Narrative: RENAL ULTRASOUND INDICATION:   right hydronephrosis  COMPARISON: Same-day CT chest/abdomen/pelvis  TECHNIQUE:   Ultrasound of the retroperitoneum was performed with a curvilinear transducer utilizing volumetric sweeps and still imaging techniques  FINDINGS: KIDNEYS: Symmetric and normal size  Right kidney:  10 3 x 5 1 x 5 0 cm  Left kidney:  10 6 x 6 9 x 5 8 cm  Right kidney Normal echogenicity and contour  No suspicious masses detected  No hydronephrosis  No shadowing calculi  No perinephric fluid collections  Left kidney Normal echogenicity and contour  No suspicious masses detected  No hydronephrosis  No shadowing calculi  No perinephric fluid collections  URETERS: Nonvisualized  BLADDER: A villanueva catheter is in place, decompressing the bladder and limiting its evaluation  OTHER: Again noted is a solitary gallstone  Impression: 1  Challenging exam due to habitus, however the right renal collecting system appears minimally dilated similar to prior CT  Yi [place] No shadowing calculi  2   Normal kidney size and echogenicity  3   Cholelithiasis  Workstation performed: [id number]     CT abdomen pelvis w contrast    Result Date: 1/15/2022  Narrative: CT ABDOMEN AND PELVIS WITH IV CONTRAST INDICATION:   patient with 10/10 abdominal pain, abdomen is soft and but tender diffusely    COMPARISON:  January 6, 2022, January 12, 2022 TECHNIQUE:  CT examination of the abdomen and pelvis was performed  Axial, sagittal, and coronal 2D reformatted images were created from the source data and submitted for interpretation  Radiation dose length product (DLP) for this visit:  2250 mGy-cm   This examination, like all CT scans performed in the [facility], was performed utilizing techniques to minimize radiation dose exposure, including the use of iterative reconstruction and automated exposure control  IV Contrast:  100 mL of iohexol (OMNIPAQUE) Enteric Contrast:  Enteric contrast was not administered  FINDINGS: ABDOMEN LOWER CHEST:  No clinically significant abnormality identified in the visualized lower chest  LIVER/BILIARY TREE:  Mild hepatomegaly, stable  GALLBLADDER:  There are gallstone(s) within the gallbladder, without pericholecystic inflammatory changes  SPLEEN:  Mild splenomegaly, stable  Greatest size 15 cm in AP dimension  PANCREAS:  Unremarkable  ADRENAL GLANDS:  Unremarkable  KIDNEYS/URETERS:  Mild to moderate right-sided hydronephrosis, slightly increased compared to prior which appears secondary to relative obstructive changes from the worsening pelvic process    Enhancement and perfusion remains symmetric  No perinephric collections  No calculi  STOMACH AND BOWEL:  Diffuse abnormal inflammatory changes throughout the midline pelvis immediately adjacent to the rectosigmoid junction  As before, numerous extraluminal foci of air are seen in the midline and left pelvis within the inflammatory enhancing soft tissues which appears similar  The loculated air-fluid collection in the right pelvis along the right external iliac vasculature is mildly increasing overall size and shows increasing component of layering air-fluid  d currently measuring 3 7 x 2 6 cm  Partial encasement of the right common iliac artery which appears patent  A separate area interposed between this collection and the colon on the right is a rounded structure which was previously homogeneous and similar in attenuation to the adjacent uterus, now shows it to be a discrete air-fluid collection, not readily appreciated on the 2 prior unenhanced exam   This finding is seen on image 2/67 and measures 3 1 cm  Generalized stranding and inflammatory changes within the pelvic soft tissues is also increased  Remainder of the bowel appears otherwise within normal limits  No obstruction  No additional free air elsewhere I is seen within the remainder of the abdominal cavity  APPENDIX:  No findings to suggest appendicitis  ABDOMINOPELVIC CAVITY:  No ascites  No pneumoperitoneum  No lymphadenopathy  VESSELS:  Unremarkable for patient's age  PELVIS REPRODUCTIVE ORGANS:  Unremarkable for patient's age  URINARY BLADDER:  Nondistended, Galan catheter present  Foci of air within the bladder presumed to represent iatrogenic etiology  ABDOMINAL WALL/INGUINAL REGIONS:  Unremarkable  OSSEOUS STRUCTURES:  No acute fracture or destructive osseous lesion  Impression: Worsening inflammatory changes throughout the midline pelvis surrounding the rectosigmoid junction    Numerous foci of extraluminal air remain within these inflamed enhancing pericolonic soft tissues  Interval worsening in size of the right pelvic collection with increasing volume of layering air-fluid  Additional 2nd air-fluid collection interposed between this and the adjacent colon with interval development of air fluid layering  Stable hepatosplenomegaly  Right-sided hydronephrosis, continued mild interval increase in caliber compared to the prior 2 studies, secondary to pelvic inflammatory process described above  The study was marked in [facility] for immediate notification   Workstation performed: [id number]     ECOG :1      Assessment and plan:    24-year-old  female with obesity, was found to have perforated diverticulitis status post right hemicolectomy emergently consistent with adenocarcinoma grade 2 with invasion throughout the wall of the colon, distal margins are positive for malignancy, 10/18 positive lymph nodes, with 3 tumor deposits, status post right oophorectomy with invasion into the right ovary at least stage IIIC (T4, N2, MX)    CT scan did not show any distant metastases    With positive margin and ovary involvement this is most likely stage IV disease, after discharge from the hospital she needs chemotherapy, also for molecular tests on the tumor specimen    Now she has COVID-19 infection    She needs to follow-up with medical oncology in the next 2-3 weeks

## 2022-01-24 NOTE — PROGRESS NOTES
Progress Note - General Surgery   Zoey Jorge 60 y o  female MRN: [id number]  Unit/Bed#: [facility] 511-01 Encounter: [id number]    Assessment:  59 y/o COVID+ F w perforated diverticulitis s/p Ortega's procedure on 1/17  Pathology showing Adenocarcinoma      Ostomy: minimal bowel sweat  Malecot: 200cc  JPx2: 35cc SS total  Ostomy: dusky on surface but pink mucosa visible on digitization     Plan:  · NPO sips  · Adv to clears  · Reorder TPN  · PICC today/ d/c IJ  · Monitor I/Os  · Incentive Spirometry  · Appreciate Med Onc input  · Patient would require chemo  · Appreciate ID input  · Monitor off abx/antifungals  · No covid tx indicated  · PT/OT  · Please Tiger[employer] on call Red Surgery or Acute Care Surgery Floor Call with any questions     Subjective/Objective     Subjective:   No acute events overnight  Working with PT/OT    Pertinent review of systems as above  All other review of systems negative  Objective:    Blood pressure 142/92, pulse 96, temperature 98 3 °F (36 8 °C), temperature source Oral, resp  rate 16, height 5' 7" (1 702 m), weight (!) 171 kg (378 lb 1 4 oz), SpO2 97 %  ,Body mass index is 59 22 kg/m²  Intake/Output Summary (Last 24 hours) at 1/25/2022 0900  Last data filed at 1/25/2022 9802  Gross per 24 hour   Intake 3722 97 ml   Output 1645 ml   Net 2077 97 ml       Invasive Devices  Report    Central Venous Catheter Line            CVC Central Lines 01/17/22 Triple 8 days          Drain            Closed/Suction Drain LLQ Bulb 19 Fr  8 days    Closed/Suction Drain RLQ Bulb 19 Fr  8 days    Colostomy Other (comment) LUQ 8 days    Rectal Tube Without balloon 8 days    Urethral Catheter Latex 18 Fr  8 days                Physical Exam:   Gen:  NAD  HEENT: NCAT  MMM  CV: well perfused  Lungs: Normal respiratory effort  Abd: soft, nt/nd,incisions cdi  Ostomy intact- pink on inside  Dusky outside  Skin: warm/ dry  Extremities: no peripheral edema, no clubbing or cyanosis  Neuro:  AxO x3      Results from last 7 days   Lab Units 01/25/22  0312 01/24/22  0528 01/23/22  0518   WBC Thousand/uL 10 72* 9 39 10 13   HEMOGLOBIN g/dL 8 2* 8 1* 7 8*   HEMATOCRIT % 28 7* 27 8* 27 2*   PLATELETS Thousands/uL 514* 510* 476*     Results from last 7 days   Lab Units 01/25/22  0312 01/24/22  0528 01/23/22  0518   POTASSIUM mmol/L 4 0 3 8 3 9   CHLORIDE mmol/L 108 109* 110*   CO2 mmol/L 28 28 28   BUN mg/dL 11 7 8   CREATININE mg/dL 1 02 0 91 0 92   CALCIUM mg/dL 9 2 8 9 8 6            I have personally reviewed pertinent films in PACS      Medications:   Scheduled Meds:  Current Facility-Administered Medications   Medication Dose Route Frequency Provider Last Rate    Adult TPN (CUSTOM BASE/STANDARD ELECTROLYTE)   Intravenous Continuous TPN Maurizio Harrison MD 57 3 mL/hr at 01/24/22 2220    Adult TPN (CUSTOM BASE/STANDARD ELECTROLYTE)   Intravenous Continuous TPN Yuri Szymanski MD      albuterol  2 puff Inhalation Q4H PRN Raul Araya DO      dextrose 5 % and sodium chloride 0 45 % with KCl 20 mEq/L  50 mL/hr Intravenous Continuous Yuri Szymanski MD 50 mL/hr (01/24/22 1736)    docusate sodium  100 mg Oral BID Yuri Szymanski MD      fentanyl citrate (PF)  50 mcg Intravenous Q2H PRN MIRTA Barnes      heparin (porcine)  7,500 Units Subcutaneous [facility] MIRTA Nath      HYDROmorphone  0 2 mg Intravenous Q3H PRN Moses Rater MIRTA Montiel      Or    HYDROmorphone  0 5 mg Intravenous Q3H PRN MIRTA Nath      Labetalol HCl  10 mg Intravenous Q4H PRN MIRTA Nath      magnesium sulfate  2 g Intravenous Once Yuri Szymanski MD 2 g (01/25/22 0701)   Faraz Blanton CAROLINE ANTIFUNGAL   Topical BID MIRTA Barnes      nystatin  500,000 Units Swish & Swallow 4x Daily MIRTA Nath      nystatin   Topical BID MIRTA Nath      ondansetron  4 mg Intravenous Q8H PRN Monroe Greenberg MD      pantoprazole  40 mg Intravenous Q12H Prosperllegade 12, CRNP  saliva substitute  5 spray Mouth/Throat 4x Daily PRN MIRTA Alexis      senna  1 tablet Oral BID River Cho MD       Continuous Infusions:Adult TPN (CUSTOM BASE/STANDARD ELECTROLYTE), , Last Rate: 57 3 mL/hr at 01/24/22 2220  Adult TPN (CUSTOM BASE/STANDARD ELECTROLYTE),   dextrose 5 % and sodium chloride 0 45 % with KCl 20 mEq/L, 50 mL/hr, Last Rate: 50 mL/hr (01/24/22 1736)      PRN Meds:  albuterol, 2 puff, Q4H PRN  fentanyl citrate (PF), 50 mcg, Q2H PRN  HYDROmorphone, 0 2 mg, Q3H PRN   Or  HYDROmorphone, 0 5 mg, Q3H PRN  Labetalol HCl, 10 mg, Q4H PRN  ondansetron, 4 mg, Q8H PRN  saliva substitute, 5 spray, 4x Daily PRN      VTE Pharmacologic Prophylaxis: Heparin  VTE Mechanical Prophylaxis: sequential compression device

## 2022-01-24 NOTE — PHYSICAL THERAPY NOTE
PHYSICAL THERAPY NOTE          Patient Name: Denzel Watson  [id number] Date: 1/24/2022 01/24/22 0919   PT Last Visit   PT Visit Date 01/24/22   Note Type   Note Type Treatment   Pain Assessment   Pain Assessment Tool 0-10   Pain Score 9   Pain Location/Orientation Location: Abdomen   Restrictions/Precautions   Weight Bearing Precautions Per Order No   Other Precautions Contact/isolation; Airborne/isolation; Fall Risk;Pain;Multiple lines;Telemetry  (NGT, ADIA drains x2, ostomy, COVID+)   General   Chart Reviewed Yes   Response to Previous Treatment Other (Comment)  (pt with complaints of being left in chair for too long)   Family/Caregiver Present No   Cognition   Overall Cognitive Status WFL   Arousal/Participation Alert   Attention Within functional limits   Orientation Level Oriented X4   Memory Within functional limits   Following Commands Follows all commands and directions without difficulty   Comments Pt requires some encouragement to participate  Some decreased safety awareness and insight  Subjective   Subjective Pt requiring some encouragement to participate  Bed Mobility   Supine to Sit 3  Moderate assistance   Additional items Assist x 2;HOB elevated; Increased time required;Verbal cues;LE management   Additional Comments Supine in bed upon PT arrival   Pt left upright in bedside chair with chair alarm intact and all needs in reach  Transfers   Sit to Stand 3  Moderate assistance   Additional items Assist x 2; Increased time required;Verbal cues   Stand to Sit 3  Moderate assistance   Additional items Assist x 2; Increased time required;Verbal cues   Additional Comments Transfers with b/l HHA and knee block  VC for hand placement and safety  Ambulation/Elevation   Gait pattern Excessively slow; Short stride;Decreased foot clearance; Improper Weight shift;R Knee Meghann;L Knee Meghann   Gait Assistance 3  Moderate assist   Additional items Assist x 2;Verbal cues; Tactile cues   Assistive Device Other (Comment)  (b/l HHA and knee block)   Distance 3 ft from bed to chair  (limited by weakness, pain, dizziness)   Balance   Static Sitting Fair   Dynamic Sitting Fair -   Static Standing Poor   Dynamic Standing Poor   Ambulatory Poor   Endurance Deficit   Endurance Deficit Yes   Endurance Deficit Description fatigue, weakness, pain   Activity Tolerance   Activity Tolerance Patient limited by fatigue;Patient limited by pain   Medical Staff Made Aware restorative tech Racheal Angry   Nurse Made Aware RN cleared pt to be seen by PT   Assessment   Prognosis Fair   Problem List Decreased strength;Decreased endurance; Impaired balance;Decreased mobility; Decreased safety awareness; Obesity;Pain   Assessment Pt seen for PT treatment session with focus on bed mobility, functional transfers, functional mobility  Pt making slow progress toward goals this session  Pt continues to present with significant strength deficits evident by need for assist of 2 for all mobility  Pt limited in progression this session by significant dizziness with limited mobility; hypertensive, RN aware  Pt left upright in bedside chair with chair alarm intact and with all needs in reach  Pt will benefit from skilled therapy in order to address current impairments and functional limitations  PT to follow pt and recommending rehab once medically cleared  The patient's AM-PAC Basic Mobility Inpatient Short Form Raw Score is 6  A Raw score of less than or equal to 16 suggests the patient may benefit from discharge to post-acute rehabilitation services  Please also refer to the recommendation of the Physical Therapist for safe discharge planning  Barriers to Discharge Decreased caregiver support; Inaccessible home environment   Goals   Patient Goals to stay in bed and sleep   LTG Expiration Date 02/02/22   Plan   Treatment/Interventions Functional transfer training;LE strengthening/ROM; Therapeutic exercise; Endurance training;Patient/family training;Equipment eval/education;Gait training;Bed mobility;Spoke to nursing   Progress Slow progress, decreased activity tolerance   PT Frequency 3-5x/wk   Recommendation   PT Discharge Recommendation Post acute rehabilitation services   Additional Comments PT cleared pt to DC to rehab once medically cleared   AM-PAC Basic Mobility Inpatient   Turning in Bed Without Bedrails 1   Lying on Back to Sitting on Edge of Flat Bed 1   Moving Bed to Chair 1   Standing Up From Chair 1   Walk in Room 1   Climb 3-5 Stairs 1   Basic Mobility Inpatient Raw Score 6   Turning Head Towards Sound 4   Follow Simple Instructions 4   Low Function Basic Mobility Raw Score 14   Low Function Basic Mobility Standardized Score 22 01   Highest Level Of Mobility   JH-HL Goal 2: Bed activities/Dependent transfer   JH-HLM Highest Level of Mobility 4: Move to chair/commode   JH-HLM Goal Achieved Yes   Education   Education Provided Mobility training   Patient Demonstrates acceptance/verbal understanding   End of Consult   Patient Position at End of Consult Bedside chair;Bed/Chair alarm activated; All needs within reach     Loraine Heck, PT, DPT

## 2022-01-24 NOTE — PROGRESS NOTES
Progress Note - Infectious Disease   Lisa Glassmsted 60 y o  female MRN: [id number]  Unit/Bed#: [facility] 511-01 Encounter: [id number]      Impression/Recommendations:  1   Sepsis, POA   HR, WBC   Due to #3 +/- 4   No other appreciable source of infection   Exam otherwise benign   Initial blood/urine cultures, serial CXR negative   Unclear relevance of COVID PCR as CXR and patient intubated largely for recent surgery   Hemodynamically stable and nontoxic   Improved  Rec:  · Continue to follow off antibiotics/antifungals  · Follow temperatures and WBC closely  · Supportive care as per the primary service     2   Perforated diverticulitis with abscess    Also complicated by #5   BCDQYW post ex lap, drainage of abscess, end colostomy, right salpingo oophorectomy and left oophorectomy 1/16   Intraoperative findings notable for perforated diverticulitis with tissue necrosis proximal rectum, ovaries at site of perforation   OR cultures with few Lactobacillus, Enterococcus  Status post 5 days post op, 14 days total antibiotics  Rec:  · Continue to follow off antibiotics/antifungals     3   Right hydroureteronephrosis   Due to #2   Status post cystoscopy with right ureteral stent placement 1/16    Rec:  · Consider D/C Galan as patient more ambulatory  · Outpatient Urology follow-up     4   Thrush with possible Candida esophagitis   Patient reported dysphagia, choking preoperatively  Status post 14 days fluconazole  Rec:  · Reassess symptoms as diet advances  · If no improvement may need eventual EGD per GI     5   COVID infection   Unvaccinated but had COVID 12/2020   PCR positive 1/16 Suspect coincidental finding on preoperative PCR   Unable to assess symptoms   CXR without infiltrates and hypoxia may be due to recent intubation, surgery, MO, atelectasis  Now on RA      Rec:  · No treatment indicated  · Follow respiratory symptoms closely  · Continue isolation through 1/26     6   Postop ileus   Status post NGT replacement      7   MO   BMI 58      8   Hidradenitis   With multiple wounds in abdominal/groin skin folds   No apparent superinfection      The patient is stable from an ID standpoint      Antibiotics:  Off antibiotics/antifungals #3  POD #8    Subjective:  Patient seen on AM rounds  Complains of back pain from sitting in chair  Denies fevers, chills, sweats, nausea, vomiting, or diarrhea  24 Hour Events:  NGT on clamp trial   No documented fevers, chills, sweats, nausea, vomiting, or diarrhea  On RA    Objective:  Vitals:  Temp:  [97 3 °F (36 3 °C)-97 6 °F (36 4 °C)] 97 4 °F (36 3 °C)  HR:  [] 91  Resp:  [17-18] 18  BP: (118-170)/() 118/86  SpO2:  [94 %-97 %] 96 %  Temp (24hrs), Av 5 °F (36 4 °C), Min:97 3 °F (36 3 °C), Max:97 6 °F (36 4 °C)  Current: Temperature: (!) 97 4 °F (36 3 °C)    Physical Exam:   General:  No acute distress  Psychiatric:  Awake and alert  Pulmonary:  Normal respiratory excursion without accessory muscle use  Abdomen:  Obese  Extremities:  Nonpitting leg edema  Skin:  No rashes    Lab Results:  I have personally reviewed pertinent labs  Results from last 7 days   Lab Units 22  0528 22  0518 22  1201 22  0536 22  1052   POTASSIUM mmol/L 3 8 3 9 4 2   < > 4 5   CHLORIDE mmol/L 109* 110* 111*   < > 110*   CO2 mmol/L 28 28 26   < > 24   BUN mg/dL 7 8 7   < > 19   CREATININE mg/dL 0 91 0 92 0 89   < > 1 27   EGFR ml/min/1 73sq m 69 68 71   < > 46   CALCIUM mg/dL 8 9 8 6 8 4   < > 8 3   AST U/L  --   --  9  --  11   ALT U/L  --   --  11*  --  9*   ALK PHOS U/L  --   --  74  --  62    < > = values in this interval not displayed  Results from last 7 days   Lab Units 22  0528 22  0518 22  0546   WBC Thousand/uL 9 39 10 13 9 84   HEMOGLOBIN g/dL 8 1* 7 8* 7 4*   PLATELETS Thousands/uL 510* 476* 482*           Imaging Studies:   I have personally reviewed pertinent imaging study reports and images in PACS      EKG, Pathology, and Other Studies:   I have personally reviewed pertinent reports

## 2022-01-24 NOTE — WOUND OSTOMY CARE
Progress Note - Wound   Lisa Epperson 60 y o  female MRN: [id number]  Unit/Bed#: [facility] 511-01 Encounter: [id number]      Assessment:  Wound care to see patient for weekly follow-up visit of her multiple wounds and for ostomy care  Patient admitted with diverticulitis of the large intestine with perforation and is s/p colostomy formation and is +COVID-19  History of - obesity, HLD, hidradenitis suppurative, and HTN  Patient seen Bibiana Evnas in recliner chair on [facility] waffle cushion  Patient is alert and oriented x 4, with anxiety  Patient agreeable and cooperative for the assessment  Patient has villanueva catheter in place  TPN and NG tube in place  Nutrition is following along  Patient was unable to stand for assessment of the groin/buttocks/posterior thighs due to pain and being unable to stand for long period time  Primary RN at bedside to give patient pain medication  Visit was also shortened due to patient having to go down for x-ray  Remainder of wound assessment will be performed on a later date    B/l heels are intact with no redness or wounds  Dry skin noted  1  R breast fold wounds are resolved on assessment as evidenced by intact blanchable pink/hyperpigmented scarring  Okay to keep SINAN at this time  2  L breast fold - scattered round/oval shaped partial thickness wounds related to patient diagnosis of hidradenitis  Wound beds are mixed yellow and pink/red colored tissue  All aspects measured together  Edges fragile and attached, no maceration  Luisa-wounds are intact with blanchable hyperpigmented skin  Image is below in the flow-sheets   -maxorb noted to be sticking the wound beds  Will change dressing to foam     3  B/l abdominal pannus - difficult to fully assessed due to body habitus, and patient being in the recliner chair and unable to lean back  Wounds present as scattered round/oval shaped partial thickness wounds related to patient diagnosis of hidradenitis   Wound beds are mixed yellow and pink/red colored tissue  All aspects visualized measured together  Edges fragile and attached, no maceration  Praveen-wounds are intact with blanchable hyperpigmented skin  Images are below in the flow-sheets  4  Patients colostomy pouch was noted to be dislodged and lifting at time of the assessment  Noted dusky stomal appearance, per surgical note from today they are aware of the appearance  No stool or flatus noted in the pouch at time of the assessment  Stoma is moist oval very well budded and is dusky in color  mucocutaneous junction and praveen-stomal skin is intact  Os is noted superiorly  Stoma is noted in a deep skin crease  Barely in view of the patient    -Applied gege ring praveen-stomally to fill in the creases, and apply 1 piece ostomy pouch for flexibility    -Verbally reviewed the steps of ostomy pouch change with the patient  -patient remains disengaged with ostomy teaching due to anxiety  Patient noted to be not playing attention to the pouch change, and to have her eyes closed during the pouch change today  Patient states, "I don't know how I'm going to handle at home"    -educational materials are at the bedside, patient states she has not read them  -supplies at the bedside      Educated patient on the importance of frequent offloading of pressure via turning, repositioning and weight-shifting  Verbalized understanding of plan of care  No induration, fluctuance, odor, warmth, redness, or purulence noted to the above noted wounds/skin areas assessed  New dressings applied  Patient tolerated wound care well- denies pain to the areas assessed  Primary nurse aware of plan of care  See flow sheets for more detailed assessment findings  Will follow along  Plan:   1  Apply skin nourishing cream to intact skin daily  2  Bariatric ehob offloading cushion to chair when OOB  3  Elevate heels off of bed with pillows to offload  4  Kreg bariatric bed  5  Turn and reposition patient Q2 hours  6   Right and left abdominal pannus - cleanse with soap and water then pat dry dust lightly with nystatin powder then apply maxorb to open areas then apply cut to fit ABD pad and secure lightly with tape change every other day  7  Bilateral buttocks bilateral groin and perineum area - cleanse with soap and water then apply janice antifungal cream bid and prn   8  Cleanse L breast wounds with NSS, pat dry, and apply bordered Allevyn foam dressing  Sancho dressing with T  Change every other day and as needed for soilage/dislodgement  9  Continue with ostomy care and teaching  10  Wound care will follow along with patient weekly, please call or tiger text with questions and concerns  11  Mid line incisional care as per surgery team        Objective:    Vitals: Blood pressure 136/94, pulse 72, temperature 98 2 °F (36 8 °C), temperature source Oral, resp  rate 22, height 5' 7" (1 702 m), weight (!) 171 kg (377 lb 13 9 oz), SpO2 98 %  ,Body mass index is 59 18 kg/m²  Wound 11/22/21 Other (comment) Breast Left; Lower (Active)   Wound Image   01/24/22 1203   Wound Description Beefy red;Yellow;Pink;Slough 01/24/22 1203   Luisa-wound Assessment Dry; Intact; Hyperpigmented 01/24/22 1203   Wound Length (cm) 8 cm 01/24/22 1203   Wound Width (cm) 11 cm 01/24/22 1203   Wound Depth (cm) 0 1 cm 01/24/22 1203   Wound Surface Area (cm^2) 88 cm^2 01/24/22 1203   Wound Volume (cm^3) 8 8 cm^3 01/24/22 1203   Calculated Wound Volume (cm^3) 8 8 cm^3 01/24/22 1203   Change in Wound Size % -780 01/24/22 1203   Tunneling 0 cm 01/24/22 1203   Tunneling in depth located at 0 01/24/22 1203   Undermining 0 01/24/22 1203   Undermining is depth extending from 0 01/24/22 1203   Drainage Amount Scant 01/24/22 1203   Drainage Description Serosanguineous 01/24/22 1203   Non-staged Wound Description Partial thickness 01/24/22 1203   Treatments Cleansed;Elevated;Site care 01/24/22 1203   Dressing Foam, Silicon (eg  Allevyn, etc) 01/24/22 1203   Wound packed?  No 01/24/22 1203   Packing- # removed 0 01/24/22 1203   Packing- # inserted 0 01/24/22 1203   Dressing Changed New 01/24/22 1203   Patient Tolerance Tolerated well 01/24/22 1203   Dressing Status Clean;Dry; Intact 01/24/22 1203       Wound 11/22/21 Other (comment) Abdomen Medial;Lower (Active)   Wound Image           01/24/22 1210   Wound Description Beefy red;Yellow;Slough;Pink 01/24/22 1208   Luisa-wound Assessment Dry; Intact; Hyperpigmented 01/24/22 1208   Wound Length (cm) 1 cm 01/24/22 1208   Wound Width (cm) 20 cm 01/24/22 1208   Wound Depth (cm) 0 1 cm 01/24/22 1208   Wound Surface Area (cm^2) 20 cm^2 01/24/22 1208   Wound Volume (cm^3) 2 cm^3 01/24/22 1208   Calculated Wound Volume (cm^3) 2 cm^3 01/24/22 1208   Change in Wound Size % 62 96 01/24/22 1208   Tunneling 0 cm 01/24/22 1208   Tunneling in depth located at 0 01/24/22 1208   Undermining 0 01/24/22 1208   Undermining is depth extending from 0 01/24/22 1208   Drainage Amount Scant 01/24/22 1208   Drainage Description Serosanguineous 01/24/22 1208   Non-staged Wound Description Partial thickness 01/24/22 1208   Treatments Cleansed;Site care;Elevated 01/24/22 1208   Dressing Calcium Alginate;ABD 01/24/22 1208   Wound packed? No 01/24/22 1208   Packing- # removed 0 01/24/22 1208   Packing- # inserted 0 01/24/22 1468   Dressing Changed New 01/24/22 1208   Patient Tolerance Tolerated well 01/24/22 1208   Dressing Status Clean;Dry; Intact 01/24/22 1208       Recommendations written as orders  AVS updated    Caryl Driscoll RN BSN CWON

## 2022-01-24 NOTE — QUICK NOTE
Nurse-Patient-Provider rounds were completed with the patient's nurse today, Alex Jolly  We discussed the plan is to keep her NPO with NG tube in place  Will attempt NG tube clamp trial today and repeat abdominal x-ray  Patient with minimal ostomy output to date, but NG tube output has been declining  Continue TPN for ongoing nutrition while NPO  Plan for removal of right IJ triple-lumen catheter once alternative access achieved  Plan for PICC line insertion due to ongoing TPN need  Appreciate ongoing evaluation recommendations from ID, plan for continued monitoring off of antibiotics and antifungals at this time  Continue urinary catheter, but will consider discontinuation of catheter in the next 24-48 hours if ambulatory function/mobility continues to improve  Continue local wound care to midline abdominal wound and stoma per routine  Continue malecot rectal drain indefinitely  Await evaluation and input from Medical Oncology       We reviewed all of the invasive devices/lines/telemetry orders   - Right IJ TLC to be removed once alternative IV access obtained  - Maintain urinary catheter, but consider discontinuation of catheter in the next 24-48 hours pending continued improvement in mobility  DVT Prophylaxis:  - Subcutaneous heparin and SCDs  Pain Assessment / Plan:  - Continue current analgesic regimen  Mobility Assessment / Plan:  - Activity as tolerated with assistance  - Continue PT and OT evaluation and treatment as indicated  Goals / Barriers for discharge:  - Not yet appropriate for discharge while continuing postoperative care and recovery  Patient still requiring inpatient care for bowel decompression while awaiting return of bowel function, parental nutrition, and additional workup  - Case management following; case and discharge needs discussed  All questions and concerns were addressed      I spent greater than 30 minutes reviewing the plan with the patient and the nurse, and coordinating care for the day      Amelia Rosario PA-C  1/24/2022 10:12 AM

## 2022-01-24 NOTE — PROGRESS NOTES
Progress Note - General Surgery   Lisa Epperson 60 y o  female MRN: [id number]  Unit/Bed#: [facility] 511-01 Encounter: [id number]    Assessment:  59 y/o F w perforated diverticulitis s/p Ortega's procedure on 1/17  Pathology showing Adenocarcinoma  NGT: 500cc  Ostomy: minimal bowel sweat  Malecot: minimal  JPx2: 10cc SS total  Ostomy: dusky on surface but pink mucosa visible on digitization    Plan:   NPO NGT  o Clamp trial today   Monitor Ostomy   Reorder TPN   Monitor I/Os   Incentive Spirometry   PT/OT   Please TigerText on call Red Surgery or Acute Care Surgery Floor Call with any questions     Subjective/Objective     Subjective:   No acute events overnight  Per nursing pain difficult to control  Patient states she feels gas but nursing states nothing has been noted in the ostomy appliance (ostomy appliance is not attached medially  Patient denies nausea  Pertinent review of systems as above  All other review of systems negative  Objective:    Blood pressure 153/98, pulse 101, temperature (!) 97 3 °F (36 3 °C), temperature source Oral, resp  rate 17, height 5' 7" (1 702 m), weight (!) 171 kg (377 lb 13 9 oz), SpO2 94 %  ,Body mass index is 59 18 kg/m²  Intake/Output Summary (Last 24 hours) at 1/24/2022 5710  Last data filed at 1/24/2022 0535  Gross per 24 hour   Intake 856 ml   Output 2485 ml   Net -1629 ml       Invasive Devices  Report    Central Venous Catheter Line            CVC Central Lines 01/17/22 Triple 7 days          Drain            Closed/Suction Drain LLQ Bulb 19 Fr  7 days    Closed/Suction Drain RLQ Bulb 19 Fr  7 days    Colostomy Other (comment) LUQ 7 days    Rectal Tube Without balloon 7 days    Urethral Catheter Latex 18 Fr  7 days    NG/OG/Enteral Tube Right nare 2 days                Physical Exam:   Gen:  NAD  HEENT: NCAT   MMM  CV: well perfused  Lungs: Normal respiratory effort  Abd: soft, nt/nd,inicions cdi, drains intact/ ostomy intact  Skin: warm/ dry  Extremities: no peripheral edema, no clubbing or cyanosis  Neuro: AxO x3      Results from last 7 days   Lab Units 01/24/22  0528 01/23/22  0518 01/22/22  0546   WBC Thousand/uL 9 39 10 13 9 84   HEMOGLOBIN g/dL 8 1* 7 8* 7 4*   HEMATOCRIT % 27 8* 27 2* 25 5*   PLATELETS Thousands/uL 510* 476* 482*     Results from last 7 days   Lab Units 01/23/22  0518 01/22/22  1201 01/22/22  0546   POTASSIUM mmol/L 3 9 4 2 4 0   CHLORIDE mmol/L 110* 111* 112*   CO2 mmol/L 28 26 27   BUN mg/dL 8 7 7   CREATININE mg/dL 0 92 0 89 0 87   CALCIUM mg/dL 8 6 8 4 8 4            I have personally reviewed pertinent films in PACS      Medications:   Scheduled Meds:  Current Facility-Administered Medications   Medication Dose Route Frequency Provider Last Rate    Adult TPN (STANDARD BASE/STANDARD ELECTROLYTE)   Intravenous Continuous TPN Cincinnatifabiola Aldrich DO 42 mL/hr at 01/23/22 2159    albuterol  2 puff Inhalation Q4H PRN Tiffanie Moreno DO      dextrose 5 % and sodium chloride 0 45 % with KCl 20 mEq/L  50 mL/hr Intravenous Continuous Deysi Beltran MD 50 mL/hr (01/22/22 2144)    docusate sodium  100 mg Oral BID Deysi Beltran MD      fentanyl citrate (PF)  50 mcg Intravenous Q2H PRN MIRTA Andrade      heparin (porcine)  7,500 Units Subcutaneous [facility] MIRTA Mena      HYDROmorphone  0 2 mg Intravenous Q3H PRN MIRTA Mena      Or    HYDROmorphone  0 5 mg Intravenous Q3H PRN MIRTA Mena      Labetalol HCl  10 mg Intravenous Q4H PRN MIRTA Andrade      CAROLINE ANTIFUNGAL   Topical BID MIRTA Mena      nystatin  500,000 Units Swish & Swallow 4x Daily MIRTA Mena      nystatin   Topical BID MIRTA Mena      ondansetron  4 mg Intravenous Q8H PRN Idris Herring MD      pantoprazole  40 mg Intravenous Q12H [address] MIRTA Mena      saliva substitute  5 spray Mouth/Throat 4x Daily PRN MIRTA Mena      scopolamine  1 patch Transdermal Q72H Daniel Borrero MD      senna  1 tablet Oral BID Daniel Borrero MD       Continuous Infusions:Adult TPN (STANDARD BASE/STANDARD ELECTROLYTE), , Last Rate: 42 mL/hr at 01/23/22 2159  dextrose 5 % and sodium chloride 0 45 % with KCl 20 mEq/L, 50 mL/hr, Last Rate: 50 mL/hr (01/22/22 2144)      PRN Meds:  albuterol, 2 puff, Q4H PRN  fentanyl citrate (PF), 50 mcg, Q2H PRN  HYDROmorphone, 0 2 mg, Q3H PRN   Or  HYDROmorphone, 0 5 mg, Q3H PRN  Labetalol HCl, 10 mg, Q4H PRN  ondansetron, 4 mg, Q8H PRN  saliva substitute, 5 spray, 4x Daily PRN      VTE Pharmacologic Prophylaxis: Heparin  VTE Mechanical Prophylaxis: sequential compression device

## 2022-01-25 NOTE — RESTORATIVE TECHNICIAN NOTE
Restorative Technician Note      Patient Name: Lucinda Rojas     Note Type: Mobility  Patient Position Upon Consult: Supine  Activity Performed: Transferred; Stood; Dangled  Assistive Device: Other (Comment) (x2)  Patient Position at End of Consult: Bedside chair;  All needs within reach

## 2022-01-25 NOTE — PROGRESS NOTES
Progress Note - Infectious Disease   Esmer Gambino 60 y o  female MRN: [id number]  Unit/Bed#: [facility] 511-01 Encounter: [id number]      Impression/Recommendations:  1   Sepsis, POA   HR, WBC   Due to #3 +/- 4   No other appreciable source of infection   Exam otherwise benign   Initial blood/urine cultures, serial CXR negative   Unclear relevance of COVID PCR as CXR and patient intubated largely for recent surgery   Hemodynamically stable and nontoxic   Improved  Rec:  · Continue to follow off antibiotics/antifungals  · Follow temperatures and WBC closely  · Supportive care as per the primary service     2   Perforated diverticulitis with abscess    Also complicated by #8   NYPZQQ post ex lap, drainage of abscess, end colostomy, right salpingo oophorectomy and left oophorectomy 1/16   Intraoperative findings notable for perforated diverticulitis with tissue necrosis proximal rectum, ovaries at site of perforation   OR cultures with few Lactobacillus, Enterococcus  Status post 5 days post op, 14 days total antibiotics  Rec:  · Continue to follow off antibiotics/antifungals     3   Right hydroureteronephrosis   Due to #2   Status post cystoscopy with right ureteral stent placement 1/16    Rec:  · Consider D/C Galan as patient more ambulatory  · Outpatient Urology follow-up     4   Thrush with possible Candida esophagitis   Patient reported dysphagia, choking preoperatively  Status post 14 days fluconazole  Rec:  · Reassess symptoms as diet advances  · If no improvement may need eventual EGD per GI     5   COVID infection   Unvaccinated but had COVID 12/2020   PCR positive 1/16 Suspect coincidental finding on preoperative PCR   Unable to assess symptoms   CXR without infiltrates and hypoxia may be due to recent intubation, surgery, MO, atelectasis  Now on RA      Rec:  · No treatment indicated  · Follow respiratory symptoms closely  · Continue isolation through 1/26     6   Postop ileus   Status post NGT replacement, now removed      7   MO   BMI 58      8   Hidradenitis   With multiple wounds in abdominal/groin skin folds   No apparent superinfection      The patient is stable from an ID standpoint      Antibiotics:  Off antibiotics/antifungals #4  POD #9    Subjective:  Patient seen on AM rounds  Doing better today  NGT out and tolerating sips  Denies fevers, chills, sweats, nausea, vomiting, or diarrhea  24 Hour Events:  No documented fevers, chills, sweats, nausea, vomiting, or diarrhea  Objective:  Vitals:  Temp:  [98 °F (36 7 °C)-98 5 °F (36 9 °C)] 98 °F (36 7 °C)  HR:  [] 112  Resp:  [16-22] 20  BP: (136-155)/(88-99) 152/88  SpO2:  [97 %-98 %] 97 %  Temp (24hrs), Av 2 °F (36 8 °C), Min:98 °F (36 7 °C), Max:98 5 °F (36 9 °C)  Current: Temperature: 98 °F (36 7 °C)    Physical Exam:   General:  No acute distress  Psychiatric:  Awake and alert  Pulmonary:  Normal respiratory excursion without accessory muscle use  Abdomen:  Soft, nontender  Extremities:  Nonpitting LE lymphedema  Skin:  No rashes    Lab Results:  I have personally reviewed pertinent labs  Results from last 7 days   Lab Units 22  0528 22  0518 22  1201 22  1201   POTASSIUM mmol/L 4 0 3 8 3 9   < > 4 2   CHLORIDE mmol/L 108 109* 110*   < > 111*   CO2 mmol/L 28 28 28   < > 26   BUN mg/dL 11 7 8   < > 7   CREATININE mg/dL 1 02 0 91 0 92   < > 0 89   EGFR ml/min/1 73sq m 60 69 68   < > 71   CALCIUM mg/dL 9 2 8 9 8 6   < > 8 4   AST U/L  --   --   --   --  9   ALT U/L  --   --   --   --  11*   ALK PHOS U/L  --   --   --   --  74    < > = values in this interval not displayed  Results from last 7 days   Lab Units 22  0528 22  0518   WBC Thousand/uL 10 72* 9 39 10 13   HEMOGLOBIN g/dL 8 2* 8 1* 7 8*   PLATELETS Thousands/uL 514* 510* 476*           Imaging Studies:   I have personally reviewed pertinent imaging study reports and images in PACS      EKG, Pathology, and Other Studies:   I have personally reviewed pertinent reports

## 2022-01-25 NOTE — PROCEDURES
Insert PICC line    Date/Time: 1/25/2022 10:01 AM  Performed by: Donavon Hendrix RN  Authorized by: Sallie Razo MD     Patient location:  Bedside  Procedure performed by consultant: Laura Vieyra Infusion tech  Consent:     Consent obtained:  Written    Consent given by:  Patient    Procedural risks discussed: with md     Alternatives discussed: with md   [place] protocol:     Procedure explained and questions answered to patient or proxy's satisfaction: yes      Relevant documents present and verified: yes      Test results available and properly labeled: yes      Radiology Images displayed and confirmed  If images not available, report reviewed: yes      Required blood products, implants, devices, and special equipment available: yes      Site/side marked: yes      Immediately prior to procedure, a time out was called: yes      Patient identity confirmed:  Verbally with patient and arm band  Pre-procedure details:     Hand hygiene: Hand hygiene performed prior to insertion      Sterile barrier technique: All elements of maximal sterile technique followed      Skin preparation:  ChloraPrep  Indications:     PICC line indications: total parenteral nutrition    Anesthesia (see MAR for exact dosages):      Anesthesia method:  Local infiltration (3ml)    Local anesthetic:  Lidocaine 1% w/o epi  Procedure details:     Location:  Basilic    Vessel type: vein      Laterality:  Left    Site selection rationale:  Unable to thread wire in right basilic vein  right brachial vein too small    Approach: percutaneous technique used      Patient position:  Flat    Procedural supplies:  Double lumen    Catheter size:  5 Fr    Landmarks identified: yes      Ultrasound guidance: yes      Ultrasound image availability:  Not saved    Sterile ultrasound techniques: Sterile gel and sterile probe covers were used      Number of attempts:  3    Successful placement: yes      Vessel of catheter tip end:  Sherlock 3CG confirmed    Total catheter length (cm):  55    Catheter out on skin (cm):  0    Max flow rate:  999    Arm circumference:  49  Post-procedure details:     Post-procedure:  Dressing applied and securement device placed    Assessment:  Blood return through all ports and free fluid flow    Post-procedure complications: none      Patient tolerance of procedure: Tolerated well, no immediate complications  Comments:      Unable to thread wire in right basilic vein  Right brachial vein too small  Pt unable to lay flat procedure performed with pt in sitting position

## 2022-01-26 NOTE — PLAN OF CARE
Problem: PHYSICAL THERAPY ADULT  Goal: Performs mobility at highest level of function for planned discharge setting  See evaluation for individualized goals  Description: Treatment/Interventions: Functional transfer training,LE strengthening/ROM,Therapeutic exercise,Endurance training,Patient/family training,Equipment eval/education,Bed mobility,Gait training,OT,Spoke to nursing  Equipment Recommended: Mignon Barba (bariatric RW )       See flowsheet documentation for full assessment, interventions and recommendations  Outcome: Progressing  Note: Prognosis: Fair  Problem List: Decreased strength,Decreased endurance,Impaired balance,Decreased mobility,Decreased safety awareness,Pain  Assessment: Pt seen for PT treatment session with focus on bed mobility, functional transfers, functional mobility  Pt making slow but steady progress toward goals this session  Pt continues to require encouragement to participate, cites pain and not wanting to be left in chair  Pt appears to be with somewhat increased LE strength, however, does continue to require assist x 2 for STS and short distance ambulation from bed to chair  Suggested to pt we performed LE therex, however, pt refusing this session, reports she wants to rest   Pt left upright in bedside chair with all needs in reach  Pt will benefit from skilled therapy in order to address current impairments and functional limitations  PT to follow pt and recommending rehab once medically cleared  The patient's AM-PAC Basic Mobility Inpatient Short Form Raw Score is 8  A Raw score of less than or equal to 16 suggests the patient may benefit from discharge to post-acute rehabilitation services  Please also refer to the recommendation of the Physical Therapist for safe discharge planning    Barriers to Discharge: Inaccessible home environment,Decreased caregiver support        PT Discharge Recommendation: Post acute rehabilitation services          See flowsheet documentation for full assessment

## 2022-01-26 NOTE — PROGRESS NOTES
Progress Note - Steve Bo 60 y o  female MRN: [id number]    Unit/Bed#: [facility] 511-01 Encounter: [id number]      Assessment:  59 y/o COVID+ F w perforated diverticulitis s/p Ortega's procedure on 1/17  Pathology showing Adenocarcinoma  Abdomen soft, nontender, non distended  Ostomy is functional  Productive of thin brown stool  ADIA x2 serosang, 10 cc (L) and 10 cc (R)  Malecot: 70 cc serosang  Plan:  Advance to fulls toast crackers  Continue picc TPN  Maintain abdominal drains  Maintain malecot    Subjective:   Feels much better today  Passing flatus and stool into ostomy  Denied any chest pain or shortness of breath today  Objective:     Vitals: Blood pressure 135/89, pulse 91, temperature (!) 97 4 °F (36 3 °C), temperature source Oral, resp  rate 20, height 5' 7" (1 702 m), weight (!) 167 kg (368 lb 2 7 oz), SpO2 97 %  ,Body mass index is 57 66 kg/m²  Intake/Output Summary (Last 24 hours) at 1/26/2022 0625  Last data filed at 1/26/2022 0456  Gross per 24 hour   Intake 2798 54 ml   Output 2300 ml   Net 498 54 ml     Physical Exam  General: NAD  HEENT: NC/AT  MMM  Cv: RRR     Lungs: normal effort  Ab: Soft, NT/ND  Ex: no CCE  Neuro: AAOx3    Scheduled Meds:  Current Facility-Administered Medications   Medication Dose Route Frequency Provider Last Rate    Adult TPN (CUSTOM BASE/STANDARD ELECTROLYTE)   Intravenous Continuous TPN Sherri Cronin MD 57 3 mL/hr at 01/25/22 2109    albuterol  2 puff Inhalation Q4H PRN Cecelia Poster, DO      dextrose 5 % and sodium chloride 0 45 % with KCl 20 mEq/L  50 mL/hr Intravenous Continuous Sherri Cronin MD 50 mL/hr (01/25/22 1303)    docusate sodium  100 mg Oral BID Sherri Cronin MD      fentanyl citrate (PF)  50 mcg Intravenous Q2H PRN MIRTA Obregon      heparin (porcine)  7,500 Units Subcutaneous [facility] MIRTA Fraser      HYDROmorphone  0 2 mg Intravenous Q3H PRN Marie Colquitt Lucke, CRNP      Or    HYDROmorphone  0 5 mg Intravenous Q3H PRN Nora Naegeli Lucke, CRNP      Labetalol HCl  10 mg Intravenous Q4H PRN Divina Arias, MIRTA      CAROLINE ANTIFUNGAL   Topical BID [place] Naegeli Lucke, MIRTA      nystatin  500,000 Units Swish & Swallow 4x Daily [place] Naegeli Lucke, CRNP      nystatin   Topical BID [place] Naegeli Lucke, CRNP      ondansetron  4 mg Intravenous Q8H PRN Helio Leiva MD      pantoprazole  40 mg Intravenous Q12H [address] [place] Naegeli Lucke, CRNP      saliva substitute  5 spray Mouth/Throat 4x Daily PRN [place] Naegeli Lucke, CRNP      senna  1 tablet Oral BID Kathryn Mobley MD       Continuous Infusions:Adult TPN (CUSTOM BASE/STANDARD ELECTROLYTE), , Last Rate: 57 3 mL/hr at 01/25/22 2109  dextrose 5 % and sodium chloride 0 45 % with KCl 20 mEq/L, 50 mL/hr, Last Rate: 50 mL/hr (01/25/22 1303)      PRN Meds: albuterol    fentanyl citrate (PF)    HYDROmorphone **OR** HYDROmorphone    Labetalol HCl    ondansetron    saliva substitute      Invasive Devices  Report    Peripherally Inserted Central Catheter Line            PICC Line 72/26/48 Left Basilic <1 day          Drain            Closed/Suction Drain LLQ Bulb 19 Fr  9 days    Closed/Suction Drain RLQ Bulb 19 Fr  9 days    Colostomy Other (comment) LUQ 9 days    Rectal Tube Without balloon 9 days    Urethral Catheter Latex 18 Fr  9 days                Lab, Imaging and other studies: I have personally reviewed pertinent reports      VTE Pharmacologic Prophylaxis: Sequential compression device (Venodyne)   VTE Mechanical Prophylaxis: sequential compression device

## 2022-01-26 NOTE — PHYSICAL THERAPY NOTE
PHYSICAL THERAPY NOTE          Patient Name: Rosalind Peña  QGFJP'D Date: 1/26/2022 01/26/22 1024   PT Last Visit   PT Visit Date 01/26/22   Note Type   Note Type Treatment   Pain Assessment   Pain Assessment Tool 0-10   Pain Score 8   Pain Location/Orientation Location: Abdomen   Restrictions/Precautions   Weight Bearing Precautions Per Order No   Other Precautions Contact/isolation; Airborne/isolation;Multiple lines;Telemetry; Fall Risk;Pain  (ostomy, COVID+, ADIA drains x2)   General   Chart Reviewed Yes   Response to Previous Treatment Patient with no complaints from previous session  Family/Caregiver Present No   Cognition   Overall Cognitive Status WFL   Arousal/Participation Alert   Attention Within functional limits   Memory Within functional limits   Following Commands Follows one step commands with increased time or repetition   Comments Pt with decreased safety awareness and insight, requires encouragement to particiapte  Subjective   Subjective Pt agreeable to participate in therapy with some encouragement  Bed Mobility   Supine to Sit 3  Moderate assistance   Additional items Assist x 1;HOB elevated; Increased time required;Verbal cues;LE management   Additional Comments Supine in bed upon PT arrival   Pt left upright in bedside chair with all needs in reach  Transfers   Sit to Stand 3  Moderate assistance   Additional items Assist x 2; Increased time required;Verbal cues   Stand to Sit 3  Moderate assistance   Additional items Assist x 2; Increased time required;Verbal cues   Additional Comments Transfers with b/l HHA  Pt left upright in bedside chair with all needs in reach  Ambulation/Elevation   Gait pattern Excessively slow; Short stride; Foward flexed;Decreased foot clearance; Improper Weight shift  (increased lateral weight shift)   Gait Assistance 3  Moderate assist   Additional items Assist x 2;Verbal cues; Tactile cues   Assistive Device Other (Comment)  (b/l HHA)   Distance 3 ft from bed to chair   Balance   Static Sitting Fair   Dynamic Sitting Fair -   Static Standing Poor +   Dynamic Standing Poor   Ambulatory Poor   Endurance Deficit   Endurance Deficit Yes   Endurance Deficit Description fatigue, weakness, pain   Activity Tolerance   Activity Tolerance Patient limited by fatigue;Patient limited by pain   Medical Staff Made Aware PENELOPE Degroot   Nurse Made Aware RN cleared pt to be seen by PT   Assessment   Prognosis Fair   Problem List Decreased strength;Decreased endurance; Impaired balance;Decreased mobility; Decreased safety awareness;Pain   Assessment Pt seen for PT treatment session with focus on bed mobility, functional transfers, functional mobility  Pt making slow but steady progress toward goals this session  Pt continues to require encouragement to participate, cites pain and not wanting to be left in chair  Pt appears to be with somewhat increased LE strength, however, does continue to require assist x 2 for STS and short distance ambulation from bed to chair  Suggested to pt we performed LE therex, however, pt refusing this session, reports she wants to rest   Pt left upright in bedside chair with all needs in reach  Pt will benefit from skilled therapy in order to address current impairments and functional limitations  PT to follow pt and recommending rehab once medically cleared  The patient's AM-PAC Basic Mobility Inpatient Short Form Raw Score is 8  A Raw score of less than or equal to 16 suggests the patient may benefit from discharge to post-acute rehabilitation services  Please also refer to the recommendation of the Physical Therapist for safe discharge planning  Barriers to Discharge Inaccessible home environment;Decreased caregiver support   Goals   Patient Goals to rest   Short Term Goal #2 2/2/22   Plan   Treatment/Interventions Functional transfer training;LE strengthening/ROM; Therapeutic exercise; Endurance training;Patient/family training;Equipment eval/education; Bed mobility;Gait training;Spoke to nursing   Progress Slow progress, decreased activity tolerance   PT Frequency 3-5x/wk   Recommendation   PT Discharge Recommendation Post acute rehabilitation services   Additional Comments PT cleared pt to DC to rehab once medically cleared   AM-PAC Basic Mobility Inpatient   Turning in Bed Without Bedrails 2   Lying on Back to Sitting on Edge of Flat Bed 2   Moving Bed to Chair 1   Standing Up From Chair 1   Walk in Room 1   Climb 3-5 Stairs 1   Basic Mobility Inpatient Raw Score 8   Turning Head Towards Sound 4   Follow Simple Instructions 4   Low Function Basic Mobility Raw Score 16   Low Function Basic Mobility Standardized Score 25 72   Highest Level Of Mobility   JH-HLM Goal 3: Sit at edge of bed   JH-HLM Highest Level of Mobility 4: Move to chair/commode   JH-HLM Goal Achieved Yes   Education   Education Provided Mobility training   End of Consult   Patient Position at End of Consult Bedside chair; All needs within reach     Jenn Pod, PT, DPT

## 2022-01-26 NOTE — OCCUPATIONAL THERAPY NOTE
Occupational Therapy Progress Note     Patient Name: Willian Sheets AEZAMEENA Date: 1/26/2022  Problem List  Principal Problem:    Diverticulitis of large intestine with perforation  Active Problems:    Lymphedema    Primary hypertension    Morbid obesity with BMI of 60 0-69 9, adult (HCC)    Hidradenitis suppurativa    GERD (gastroesophageal reflux disease)    Right Hydroureteronephrosis            01/26/22 1025   OT Last Visit   OT Visit Date 01/26/22   Note Type   Note Type Treatment   Restrictions/Precautions   Weight Bearing Precautions Per Order No   Other Precautions Airborne/isolation;Droplet precautions;Contact/isolation;Multiple lines;Telemetry; Fall Risk;Pain  (villanueva and retal tube, 2 ADIA drains)   General   Response to Previous Treatment Patient with no complaints from previous session   Lifestyle   Autonomy Pt reports being I w/ ADLS/IADLs, Mod I w/ transfers/functional mobility w/ use of crutches   Reciprocal Relationships Lives alone; reports no social supports but has son watching her dog   Service to Others On disability   Intrinsic Gratification Likes spending time w/ her dog   Pain Assessment   Pain Assessment Tool 0-10   Pain Score 8   Pain Location/Orientation Location: Abdomen   Patient's Stated Pain Goal No pain   Hospital Pain Intervention(s) Repositioned; Ambulation/increased activity   ADL   Where Assessed Supine, bed   LB Dressing Assistance 1  Total Assistance   LB Dressing Deficit Setup; Increased time to complete; Don/doff R sock; Don/doff L sock   LB Dressing Comments pt total A for LB dressing while supine in bed  Asked pt to attempt to complete while sitting EOB, however pt deferring at this time 2* Fatigue  Bed Mobility   Supine to Sit 3  Moderate assistance   Additional items Assist x 1; Increased time required;Verbal cues   Additional Comments pt found supine in bed, left OOB in chair w all needs in reach     Transfers   Sit to Stand 3  Moderate assistance   Additional items Assist x 2; Increased time required;Verbal cues   Stand to Sit 3  Moderate assistance   Additional items Assist x 2; Increased time required;Verbal cues   Additional Comments c b/l HHA and knee blocking  VC for hand placement during transfers and for inc overall safety awareness  Functional Mobility   Functional Mobility 3  Moderate assistance   Additional Comments ax2, few steps from EOB to chair  Additional items Hand hold assistance   Cognition   Overall Cognitive Status WFL   Arousal/Participation Alert; Responsive; Cooperative   Attention Within functional limits   Orientation Level Oriented X4   Memory Within functional limits   Following Commands Follows one step commands without difficulty   Comments pt pleasant and cooperative, overall fair safety awareness and insight to condition  Activity Tolerance   Activity Tolerance Patient limited by fatigue   Medical Staff Made Aware DPT [facility] 2* pts medical complexity and level of A rq for transfer completion  OT focusing on inc pt's ability to complete functional mobility and transfers, as well as activity tolerance, to promote overall ability to engage in functional daily routine  Assessment   Assessment Pt seen on this date for OT session focusing on ADL retraining, body mechanics, transfer retraining, increasing activity tolerance/endurance and EOB sitting to increase ability to participate in ADL/functional tasks  Pt was found in bed and was left in chair w/ all needs within reach  LB dressing tasks completed while pt supine in bed, required total A for donning/doffing socks  Pt required mod A x1 to achieve EOB sitting and maintains EOB sitting with supervision, occasional min A (when completing dynamic sitting tasks)  Pt then completed STS transfer with mod Ax2 w b/l HHA and knee blocking  Pt able to take small steps from EOB to chair  Fatigues easily   Pt w/ improvements in activity tolerance and transfer ability, however is still limited 2* decreased ADL/High-level ADL status, decreased activity tolerance/endurance, decreased self-care trans, decreased safety awareness and insight to condition  The patient's raw score on the AM-PAC Daily Activity inpatient short form is 12, standardized score is 30 6, less than 39 4  Patients at this level are likely to benefit from discharge to post-acute rehabilitation services  Please refer to the recommendation of the Occupational Therapist for safe discharge planning  Recommending pt D/C to STR when medically stable  Pt will continue to benefit from acute OT services to meet goals  Plan   Treatment Interventions Functional transfer training; Endurance training;ADL retraining;Energy conservation; Activityengagement   Goal Expiration Date 02/02/22   OT Treatment Day 2   OT Frequency 3-5x/wk   Recommendation   OT Discharge Recommendation Post acute rehabilitation services   OT - OK to Discharge Yes   AM-PAC Daily Activity Inpatient   Lower Body Dressing 1   Bathing 1   Toileting 2   Upper Body Dressing 2   Grooming 3   Eating 3   Daily Activity Raw Score 12   Daily Activity Standardized Score (Calc for Raw Score >=11) 30 6   AM-PAC Applied Cognition Inpatient   Following a Speech/Presentation 3   Understanding Ordinary Conversation 4   Taking Medications 3   Remembering Where Things Are Placed or Put Away 3   Remembering List of 4-5 Errands 3   Taking Care of Complicated Tasks 3   Applied Cognition Raw Score 19   Applied Cognition Standardized Score 39 77   Modified [place] Scale   Modified [place] Scale 4       Milton Orr, OTR/L

## 2022-01-26 NOTE — NUTRITION
01/26/22 1501   Recommendations/Interventions   Nutrition Recommendations Adjust EN/PN  (advance to goal TPN as previously recommended: 385ml 70% dextrose, 833ml 15% AA, 225ml 20% lipids  Once able to tolerate solid food, wean TPN and add ensure compact TID   Monitor triglycerides weekly while on TPN )

## 2022-01-26 NOTE — RESTORATIVE TECHNICIAN NOTE
Restorative Technician Note      Patient Name: Elver Schultz     Note Type: Mobility  Patient Position Upon Consult: Supine  Activity Performed: Repositioned; Range of motion

## 2022-01-26 NOTE — RESTORATIVE TECHNICIAN NOTE
Restorative Technician Note      Patient Name: Simba Ellison     Note Type: Mobility  Patient Position Upon Consult: Bedside chair  Activity Performed: Transferred; Stood; Dangled  Assistive Device: Roller walker  Patient Position at [employer] of Consult: Supine;  All needs within reach

## 2022-01-26 NOTE — UTILIZATION REVIEW
Continued Stay Review    Date: 1/26/22                         Current Patient Class: 1/26/22   Current Level of Care: level 2 stepdown/HOT    HPI:56 y o  female initially admitted on  1/16/22 inpatient as transfer from [facility] due to perforated diverticulitis complicated by right hydronephrosis  Surgical intervention required  Path shows adenocarcinoma  Completed antibiotics 1/21/22  Completed course of fluconazole for thrush with possible candida esophagitis  Developed post op Ileus, NGT placed 1/22/22 and tpn started  Procedure 1/16/22: LAPAROTOMY EXPLORATORY, DRAINAGE OF INTRA -ABDOMINAL ABSCESS (N/A)  CYSTOSCOPY RETROGRADE PYELOGRAM WITH INSERTION STENT URETERAL (Right)  END COLOSTOMY (N/A)  RIGHT SALPINGO-OOPHORECTOMY, LEFT OOPHORECTOMY    Assessment/Plan:   1/26/22:  Patient is post operative day 10 status pos bunn's procedure  Has flatus and stool in ostomy  On exam:  Ostomy functional of thin brown stool  ADIA X2 serosang, 10 cc left and 10 cc right  malecot 70 cc serosang  To continue TPN  Advance diet to fulls  Continue ADIA and Malecot  Continue off antibiotics  Vital Signs:   01/26/22 08:05:53 97 5 °F (36 4 °C) 83 20 155/85 108 96 % -- -- -- -- Lying   01/26/22 0412 -- -- -- -- -- 97 % -- -- -- None (Room air) --   01/26/22 02:48:15 97 4 °F (36 3 °C) Abnormal  91 20 135/89 104 97 % 28 -- 2 L/min Nasal cannula --   01/26/22 0025 -- -- -- -- -- 95 % 28 -- 2 L/min Nasal cannula        Pertinent Labs/Diagnostic Results:  Updated  1/21/22 CxR - Mild opacity in the medial right base which could be due to atelectasis or aspiration  1/23/22 abdomen KUB The majority of the enteric contrast is in the gastric fundus  There is a small amount seen within some small bowel loops in the left mid to lower abdomen  There are persistently dilated bowel loops seen in the abdomen      1/24/22 abdomen KUB Persistently dilated small bowel loops with dilution of oral contrast, as described      Results from last 7 days   Lab Units 01/26/22  0502 01/25/22  0398 01/24/22  8258 01/23/22  0518 01/23/22  0518 01/22/22  0719 01/22/22  6658 01/21/22  0443 01/21/22  0443 01/20/22  0455 01/20/22  0455   WBC Thousand/uL 9 86 10 72* 9 39   < > 10 13   < > 9 84   < > 10 67*   < > 10 34*   HEMOGLOBIN g/dL 8 0* 8 2* 8 1*  --  7 8*  --  7 4*   < > 7 7*   < > 6 9*   HEMATOCRIT % 25 8* 28 7* 27 8*  --  27 2*  --  25 5*   < > 26 4*   < > 23 7*   PLATELETS Thousands/uL 385 514* 510*   < > 476*   < > 482*   < > 487*   < > 449*   NEUTROS ABS Thousands/µL  --   --   --   --   --   --   --   --  7 73*  --  7 19   BANDS PCT %  --   --   --   --   --   --  2  --   --   --   --     < > = values in this interval not displayed  Results from last 7 days   Lab Units 01/26/22  0622 01/26/22  0000 01/25/22  0312 01/24/22  1209 01/24/22  0528 01/23/22  0518 01/22/22  1201 01/21/22  0443 01/20/22  0455   SODIUM mmol/L 140  --  140  --  142 141 141   < > 144   POTASSIUM mmol/L 4 1  --  4 0  --  3 8 3 9 4 2   < > 4 3   CHLORIDE mmol/L 108  --  108  --  109* 110* 111*   < > 113*   CO2 mmol/L 27  --  28  --  28 28 26   < > 27   ANION GAP mmol/L 5  --  4  --  5 3* 4   < > 4   BUN mg/dL 21  --  11  --  7 8 7   < > 11   CREATININE mg/dL 0 90  --  1 02  --  0 91 0 92 0 89   < > 1 01   EGFR ml/min/1 73sq m 70  --  60  --  69 68 71   < > 61   CALCIUM mg/dL 8 5  --  9 2  --  8 9 8 6 8 4   < > 8 4   MAGNESIUM mg/dL  --  2 1 1 8 1 9  --   --  1 6  --  2 1   PHOSPHORUS mg/dL 3 0  --  3 1 2 9  --   --  2 5*  --  2 4*    < > = values in this interval not displayed       Results from last 7 days   Lab Units 01/22/22  1201   AST U/L 9   ALT U/L 11*   ALK PHOS U/L 74   TOTAL PROTEIN g/dL 6 2*   ALBUMIN g/dL 2 0*   TOTAL BILIRUBIN mg/dL 0 21     Results from last 7 days   Lab Units 01/26/22  0622 01/25/22  0312 01/24/22  0528 01/23/22  0518 01/22/22  1201 01/22/22  0546 01/21/22  0443 01/20/22  0455   GLUCOSE RANDOM mg/dL 120 110 120 111 100 94 126 110     Results from last 7 days   Lab Units 01/23/22  1030   LACTIC ACID mmol/L 0 8     Results from last 7 days   Lab Units 01/22/22  1201   CEA ng/mL 3 6*         Medications:   Scheduled Medications:  docusate sodium, 100 mg, Oral, BID  heparin (porcine), 7,500 Units, Subcutaneous, Q8H [address]  JANICE ANTIFUNGAL, , Topical, BID  nystatin, 500,000 Units, Swish & Swallow, 4x Daily  nystatin, , Topical, BID  pantoprazole, 40 mg, Intravenous, Q12H IRIS  senna, 1 tablet, Oral, BID    Continuous IV Infusions:  Adult TPN (CUSTOM BASE/STANDARD ELECTROLYTE), , Intravenous, Continuous TPN  dextrose 5 % and sodium chloride 0 45 % with KCl 20 mEq/L, 50 mL/hr, Intravenous, Continuous      PRN Meds:  albuterol, 2 puff, Inhalation, Q4H PRN  fentanyl citrate (PF), 50 mcg, Intravenous, Q2H PRN  HYDROmorphone, 0 2 mg, Intravenous, Q3H PRN   Or  HYDROmorphone, 0 5 mg, Intravenous, Q3H PRN - used x 2 1/26/22   Labetalol HCl, 10 mg, Intravenous, Q4H PRN  ondansetron, 4 mg, Intravenous, Q8H PRN  saliva substitute, 5 spray, Mouth/Throat, 4x Daily PRN    Skin and Wound Care Plan:    1  Apply skin nourishing cream to intact skin daily   2  Bariatric ehob offloading cushion to chair when OOB   3  Elevate heels off of bed with pillows to offload   4  Kreg bariatric bed   5  Turn and reposition patient Q2 hours   6  Right and left abdominal pannus - cleanse with soap and water then pat dry dust lightly with nystatin powder then apply maxorb to open areas then apply cut to fit ABD pad and secure lightly with tape change every other day   7  Bilateral buttocks bilateral groin and perineum area - cleanse with soap and water then apply janice antifungal cream bid and prn   8  Cleanse L breast wounds with NSS, pat dry, and apply bordered Allevyn foam dressing  Sancho dressing with T  Change every other day and as needed for soilage/dislodgement  9  Continue with ostomy care and teaching   10   Wound care will follow along with patient weekly, please call or tiger text with questions and concerns     Discharge Plan: to be determined  Network Utilization Review Department  ATTENTION: Please call with any questions or concerns to [phone] and carefully listen to the prompts so that you are directed to the right person  All voicemails are confidential   Esme Corcoran all requests for admission clinical reviews, approved or denied determinations and any other requests to dedicated fax number below belonging to the campus where the patient is receiving treatment   List of dedicated fax numbers for the Facilities:  [address] DENIALS (Administrative/Medical Necessity) [phone]   [address] (Maternity/NICU/Pediatrics) [phone]   [address]  [address] [facility] [place] [address] [phone] [address] Corm[address] [facility] [facility] [phone]

## 2022-01-26 NOTE — PROGRESS NOTES
Progress Note - Infectious Disease   [facility] 60 y o  female MRN: [id number]  Unit/Bed#: [facility] 511-01 Encounter: [id number]      Impression/Recommendations:  1   Sepsis, POA   HR, WBC   Due to #3 +/- 4   No other appreciable source of infection   Exam otherwise benign   Initial blood/urine cultures, serial CXR negative   Unclear relevance of COVID PCR as CXR and patient intubated largely for recent surgery   Hemodynamically stable and nontoxic   Improved  Rec:  · Continue to follow off antibiotics/antifungals  · Follow temperatures and WBC closely  · Supportive care as per the primary service     2   Perforated diverticulitis with abscess    Also complicated by #9   [id number] post ex lap, drainage of abscess, end colostomy, right salpingo oophorectomy and left oophorectomy 1/16   Intraoperative findings notable for perforated diverticulitis with tissue necrosis proximal rectum, ovaries at site of perforation   OR cultures with few Lactobacillus, Enterococcus   Status post 5 days post op, 14 days total antibiotics  Rec:  · Continue to follow off antibiotics/antifungals     3   Right hydroureteronephrosis   Due to #2   Status post cystoscopy with right ureteral stent placement 1/16    Rec:  · Consider D/C [facility] as patient now more ambulatory  · Outpatient Urology follow-up     4   Thrush with possible Candida esophagitis   Patient reported dysphagia, choking preoperatively   Status post 14 days fluconazole  Rec:  · Reassess symptoms as diet advances  · If no improvement may need eventual EGD per GI     5   COVID infection   Unvaccinated but had COVID 12/2020   PCR positive 1/16 Suspect coincidental finding on preoperative PCR   Unable to assess symptoms    CXR without infiltrates and hypoxia may be due to recent intubation, surgery, MO, atelectasis   Now on RA     Rec:  · No treatment indicated  · Follow respiratory symptoms closely  · Discontinue isolation today     6   Postop ileus   Status post NGT replacement, now removed      7   MO   BMI 58      8   Hidradenitis   With multiple wounds in abdominal/groin skin folds   No apparent superinfection      The patient is stable from an ID standpoint      Antibiotics:  Off antibiotics/antifungals #5  POD #10    Subjective:  Patient seen on AM rounds  Doing well  Tolerating clears  Denies fevers, chills, sweats, nausea, vomiting, or diarrhea  24 Hour Events:  No documented fevers, chills, sweats, nausea, vomiting, or diarrhea  Remains on RA  Objective:  Vitals:  Temp:  [97 4 °F (36 3 °C)-98 °F (36 7 °C)] 97 5 °F (36 4 °C)  HR:  [] 83  Resp:  [20-22] 20  BP: (135-163)/(85-94) 155/85  SpO2:  [95 %-97 %] 96 %  Temp (24hrs), Av 6 °F (36 4 °C), Min:97 4 °F (36 3 °C), Max:98 °F (36 7 °C)  Current: Temperature: 97 5 °F (36 4 °C)    Physical Exam:   General:  No acute distress  Psychiatric:  Awake and alert  Pulmonary:  Normal respiratory excursion without accessory muscle use  Abdomen:  Soft, obese  Extremities:  Nonpitting lymphedema  Skin:  No rashes    Lab Results:  I have personally reviewed pertinent labs  Results from last 7 days   Lab Units 22  0622 22  0528 22  0518 22  1201   POTASSIUM mmol/L 4 1 4 0 3 8   < > 4 2   CHLORIDE mmol/L 108 108 109*   < > 111*   CO2 mmol/L 27 28 28   < > 26   BUN mg/dL 21 11 7   < > 7   CREATININE mg/dL 0 90 1 02 0 91   < > 0 89   EGFR ml/min/1 73sq m 70 60 69   < > 71   CALCIUM mg/dL 8 5 9 2 8 9   < > 8 4   AST U/L  --   --   --   --  9   ALT U/L  --   --   --   --  11*   ALK PHOS U/L  --   --   --   --  74    < > = values in this interval not displayed  Results from last 7 days   Lab Units 22  0502 22  0312 22  0528   WBC Thousand/uL 9 86 10 72* 9 39   HEMOGLOBIN g/dL 8 0* 8 2* 8 1*   PLATELETS Thousands/uL 385 514* 510*           Imaging Studies:   I have personally reviewed pertinent imaging study reports and images in PACS      EKG, Pathology, and Other Studies:   I have personally reviewed pertinent reports

## 2022-01-26 NOTE — PLAN OF CARE
Problem: OCCUPATIONAL THERAPY ADULT  Goal: Performs self-care activities at highest level of function for planned discharge setting  See evaluation for individualized goals  Description: Treatment Interventions: ADL retraining,Functional transfer training,UE strengthening/ROM,Endurance training,Cognitive reorientation,Patient/family training,Equipment evaluation/education,Compensatory technique education,Continued evaluation,Energy conservation,Activityengagement          See flowsheet documentation for full assessment, interventions and recommendations  Outcome: Progressing  Note: Limitation: Decreased ADL status,Decreased UE strength,Decreased Safe judgement during ADL,Decreased cognition,Decreased endurance,Decreased self-care trans,Decreased high-level ADLs  Prognosis: Fair  Assessment: Pt seen on this date for OT session focusing on ADL retraining, body mechanics, transfer retraining, increasing activity tolerance/endurance and EOB sitting to increase ability to participate in ADL/functional tasks  Pt was found in bed and was left in chair w/ all needs within reach  LB dressing tasks completed while pt supine in bed, required total A for donning/doffing socks  Pt required mod A x1 to achieve EOB sitting and maintains EOB sitting with supervision, occasional min A (when completing dynamic sitting tasks)  Pt then completed STS transfer with mod Ax2 w b/l HHA and knee blocking  Pt able to take small steps from EOB to chair  Fatigues easily  Pt w/ improvements in activity tolerance and transfer ability, however is still limited 2* decreased ADL/High-level ADL status, decreased activity tolerance/endurance, decreased self-care trans, decreased safety awareness and insight to condition  The patient's raw score on the AM-PAC Daily Activity inpatient short form is 12, standardized score is 30 6, less than 39 4  Patients at this level are likely to benefit from discharge to post-acute rehabilitation services  Please refer to the recommendation of the Occupational Therapist for safe discharge planning  Recommending pt D/C to STR when medically stable  Pt will continue to benefit from acute OT services to meet goals  OT Discharge Recommendation: Post acute rehabilitation services  OT - OK to Discharge:  Yes

## 2022-01-27 NOTE — WOUND OSTOMY CARE
Progress Note - Wound   Archana Rivers 60 y o  female MRN: [id number]  Unit/Bed#: [facility] 638-88 Encounter: [id number]        Assessment:   Patient seen for wound/ostomy follow-up  She is agreeable to assessment  Pre-medicated for pain by primary RN  Patient is on Kreg bed  She is able to turn in bed with assist x 1  Incontinent of urine with purewick in place  Bilateral heels intact  1   Hidradenitis wounds to bilateral breasts, pannus, inner groin, buttocks, and perineum--scattered round, partial thickness open areas with serosanguinous drainage  Luisa-wound areas intact  No induration or evidence of infection to any of the above areas at this time  Buttocks/perineum:    Left Abdominal Pannus:    Left breast:    Anterior abdominal pannus:      2  Present on admission stage 2 pressure injury to left posterior upper thigh--wound bed pink/beefy red with scant serosanguinous drainage  Luisa-wound hyperpigmented  Epithelializing  3   Midline abdominal incision--approximated with staples  Full and partial thickness pink open areas between each staple  Small serosanguinous drainage from distal incision  Luisa-wound intact without redness  Dressed with ABD  Colostomy stoma is brown/black loose slough in deep crease, measuring 1 3/4 x 2 1/2 inches  Mucocutaneous separation noted from 3-7 o'clock  Painful to touch with care per patient  Peristoma skin is intact without redness or induration  Soft brown stool in pouch  Pouch lifting medially  Pouching system changed using strip paste to fill crease around stoma and large one piece cut-to-fit pouch  Patient not interested in learning anything about her colostomy care  Provided verbal instruction on steps of pouching system change while performed  Wound Care Plan:   1  Apply skin nourishing cream to intact skin daily  2  Bariatric ehob offloading cushion to chair when OOB  3  Elevate heels off of bed with pillows to offload    4  Abhishek Plants bariatric bed  5  Turn and reposition patient Q2 hours  6  Right and left abdominal pannus - cleanse with soap and water then pat dry dust lightly with nystatin powder then apply maxorb to open areas then apply cut to fit ABD pad and secure lightly with tape, change every other day  7  Bilateral buttocks bilateral groin and perineum area - cleanse with soap and water then apply janice antifungal cream bid and prn   8  Cleanse L breast wounds with NSS, pat dry, and apply bordered Allevyn foam dressing  Sancho dressing with T  Change every other day and as needed for soilage/dislodgement  9  Continue with ostomy care and teaching  10  Left posterior thigh--cleanse with soap and water, pat dry  Apply allevyn life foam dressing  Change dressing every other day  11   Mid line incisional care as per surgery team      Wound care team to follow  Please call or text with questions/concerns  Surgery resident, Dr Keturah Payne, made aware of sloughing stoma and mucocutaneous separation      Jarvis CASTELLONN, RN, [employer]

## 2022-01-27 NOTE — PROGRESS NOTES
Progress Note - Ted Lozoya 60 y o  female MRN: [id number]    Unit/Bed#: [facility] 511-01 Encounter: [id number]      Assessment:  59 y/o COVID+ F w perforated diverticulitis s/p Ortega's procedure on 1/17  Pathology showing Adenocarcinoma  Vss  Afebrile  Saturating 100% on 2 L NC  Abdomen soft, non tender  Non distended  Colostomy functioning w 75 cc thin brown stool  Rectal malecot in place 50 cc seropurulent output  UOP: 2 3L  ADIA x2 serous output  L: 10 cc, R 0cc  Plan:  surg soft diet  Renew TPN one more day  Ambulate  D/c abdominal drains  Continue rectal malecot  dvt ppx  Monitor off abx  Pt/ot  Ambulate  dispo planning    Subjective:   Feels better every day  Passing flatus and stool into ostomy  Denied fever, chills, chest pain, shortness of breath, nausea, vomiting, or abdominal pain this morning  Objective:     Vitals: Blood pressure 142/94, pulse 92, temperature 98 °F (36 7 °C), resp  rate 18, height 5' 7" (1 702 m), weight (!) 167 kg (368 lb 9 8 oz), SpO2 93 %  ,Body mass index is 57 73 kg/m²  Intake/Output Summary (Last 24 hours) at 1/27/2022 0620  Last data filed at 1/27/2022 0501  Gross per 24 hour   Intake 4155 74 ml   Output 2510 ml   Net 1645 74 ml     Physical Exam  General: NAD  HEENT: NC/AT  MMM  Cv: RRR  Lungs: normal effort  Ab: Soft, NT/ND  ADIA minimal serous output  Rectal malecot 50 cc seropurulent  Ostomy functional w brown thin stool  Ex: no CCE  Neuro: AAOx3      Invasive Devices  Report    Peripherally Inserted Central Catheter Line            PICC Line 25/53/20 Left Basilic 1 day          Drain            Closed/Suction Drain LLQ Bulb 19 Fr  10 days    Closed/Suction Drain RLQ Bulb 19 Fr  10 days    Colostomy Other (comment) LUQ 10 days    Rectal Tube Without balloon 10 days    Urethral Catheter Latex 18 Fr  10 days                Lab, Imaging and other studies: I have personally reviewed pertinent reports      VTE Pharmacologic Prophylaxis: Sequential compression device (Venodyne)   VTE Mechanical Prophylaxis: sequential compression device

## 2022-01-27 NOTE — PLAN OF CARE
Problem: Prexisting or High Potential for Compromised Skin Integrity  Goal: Skin integrity is maintained or improved  Description: INTERVENTIONS:  - Identify patients at risk for skin breakdown  - Assess and monitor skin integrity  - Assess and monitor nutrition and hydration status  - Monitor labs   - Assess for incontinence   - Turn and reposition patient  - Assist with mobility/ambulation  - Relieve pressure over bony prominences  - Avoid friction and shearing  - Provide appropriate hygiene as needed including keeping skin clean and dry  - Evaluate need for skin moisturizer/barrier cream  - Collaborate with interdisciplinary team   - Patient/family teaching  - Consider wound care consult   1/26/2022 1932 by Anny Garg RN  Outcome: Progressing  1/26/2022 1556 by Anny Garg RN  Outcome: Progressing     Problem: MOBILITY - ADULT  Goal: Maintain or return to baseline ADL function  Description: INTERVENTIONS:  -  Assess patient's ability to carry out ADLs; assess patient's baseline for ADL function and identify physical deficits which impact ability to perform ADLs (bathing, care of mouth/teeth, toileting, grooming, dressing, etc )  - Assess/evaluate cause of self-care deficits   - Assess range of motion  - Assess patient's mobility; develop plan if impaired  - Assess patient's need for assistive devices and provide as appropriate  - Encourage maximum independence but intervene and supervise when necessary  - Involve family in performance of ADLs  - Assess for home care needs following discharge   - Consider OT consult to assist with ADL evaluation and planning for discharge  - Provide patient education as appropriate  1/26/2022 1932 by Anny Garg RN  Outcome: Progressing  1/26/2022 1556 by Anny Garg RN  Outcome: Progressing  Goal: Maintains/Returns to pre admission functional level  Description: INTERVENTIONS:  - Perform BMAT or MOVE assessment daily    - Set and communicate daily mobility goal to care team and patient/family/caregiver  - Collaborate with rehabilitation services on mobility goals if consulted  - Perform Range of Motion 3 times a day  - Reposition patient every 2 hours    - Dangle patient 3 times a day  - Stand patient 3 times a day  - Ambulate patient 3 times a day  - Out of bed to chair 3 times a day   - Out of bed for meals 3 times a day  - Out of bed for toileting  - Record patient progress and toleration of activity level   1/26/2022 1932 by Anselmo Lorenzana RN  Outcome: Progressing  1/26/2022 1556 by Anselmo Lorenzana RN  Outcome: Progressing     Problem: Potential for Falls  Goal: Patient will remain free of falls  Description: INTERVENTIONS:  - Educate patient/family on patient safety including physical limitations  - Instruct patient to call for assistance with activity   - Consult OT/PT to assist with strengthening/mobility   - Keep Call bell within reach  - Keep bed low and locked with side rails adjusted as appropriate  - Keep care items and personal belongings within reach  - Initiate and maintain comfort rounds  - Make Fall Risk Sign visible to staff  - Offer Toileting every 2 Hours, in advance of need  - Initiate/Maintain bed alarm  - Apply yellow socks and bracelet for high fall risk patients  - Consider moving patient to room near nurses station  1/26/2022 1932 by Anselmo Lorenzana RN  Outcome: Progressing  1/26/2022 1556 by Anselmo Lorenzana RN  Outcome: Progressing     Problem: GASTROINTESTINAL - ADULT  Goal: Minimal or absence of nausea and/or vomiting  Description: INTERVENTIONS:  - Administer IV fluids if ordered to ensure adequate hydration  - Maintain NPO status until nausea and vomiting are resolved  - Nasogastric tube if ordered  - Administer ordered antiemetic medications as needed  - Provide nonpharmacologic comfort measures as appropriate  - Advance diet as tolerated, if ordered  - Consider nutrition services referral to assist patient with adequate nutrition and appropriate food choices  1/26/2022 1932 by Charu Walker RN  Outcome: Progressing  1/26/2022 1556 by Charu Walker RN  Outcome: Progressing  Goal: Maintains or returns to baseline bowel function  Description: INTERVENTIONS:  - Assess bowel function  - Encourage oral fluids to ensure adequate hydration  - Administer IV fluids if ordered to ensure adequate hydration  - Administer ordered medications as needed  - Encourage mobilization and activity  - Consider nutritional services referral to assist patient with adequate nutrition and appropriate food choices  1/26/2022 1932 by Charu Walker RN  Outcome: Progressing  1/26/2022 1556 by Charu Walker RN  Outcome: Progressing  Goal: Maintains adequate nutritional intake  Description: INTERVENTIONS:  - Monitor percentage of each meal consumed  - Identify factors contributing to decreased intake, treat as appropriate  - Assist with meals as needed  - Monitor I&O, weight, and lab values if indicated  - Obtain nutrition services referral as needed  1/26/2022 1932 by Charu Walker RN  Outcome: Progressing  1/26/2022 1556 by Charu Walker RN  Outcome: Progressing  Goal: Establish and maintain optimal ostomy function  Description: INTERVENTIONS:  - Assess bowel function  - Encourage oral fluids to ensure adequate hydration  - Administer IV fluids if ordered to ensure adequate hydration   - Administer ordered medications as needed  - Encourage mobilization and activity  - Nutrition services referral to assist patient with appropriate food choices  - Assess stoma site  - Consider wound care consult   1/26/2022 1932 by Charu Walker RN  Outcome: Progressing  1/26/2022 1556 by Charu Walker RN  Outcome: Progressing  Goal: Oral mucous membranes remain intact  Description: INTERVENTIONS  - Assess oral mucosa and hygiene practices  - Implement preventative oral hygiene regimen  - Implement oral medicated treatments as ordered  - Initiate Nutrition services referral as needed  1/26/2022 1932 by Yoselyn Bagley RN  Outcome: Progressing  1/26/2022 1556 by Yoselyn Bagley RN  Outcome: Progressing     Problem: GENITOURINARY - ADULT  Goal: Maintains or returns to baseline urinary function  Description: INTERVENTIONS:  - Assess urinary function  - Encourage oral fluids to ensure adequate hydration if ordered  - Administer IV fluids as ordered to ensure adequate hydration  - Administer ordered medications as needed  - Offer frequent toileting  - Follow urinary retention protocol if ordered  1/26/2022 1932 by Yoselyn Bagley RN  Outcome: Progressing  1/26/2022 1556 by Yoselyn Bagley RN  Outcome: Progressing  Goal: Absence of urinary retention  Description: INTERVENTIONS:  - Assess patient's ability to void and empty bladder  - Monitor I/O  - Bladder scan as needed  - Discuss with physician/AP medications to alleviate retention as needed  - Discuss catheterization for long term situations as appropriate  1/26/2022 1932 by Yoselyn Bagley RN  Outcome: Progressing  1/26/2022 1556 by Yoselyn Bagley RN  Outcome: Progressing  Goal: Urinary catheter remains patent  Description: INTERVENTIONS:  - Assess patency of urinary catheter  - If patient has a chronic villanueva, consider changing catheter if non-functioning  - Follow guidelines for intermittent irrigation of non-functioning urinary catheter  1/26/2022 1932 by Yoselyn Bagley RN  Outcome: Progressing  1/26/2022 1556 by Yoselyn Bagley RN  Outcome: Progressing     Problem: SKIN/TISSUE INTEGRITY - ADULT  Goal: Skin Integrity remains intact(Skin Breakdown Prevention)  Description: Assess:  -Perform Surendra assessment every 12  -Clean and moisturize skin every 4  -Inspect skin when repositioning, toileting, and assisting with ADLS  -Assess extremities for adequate circulation and sensation     Bed Management:  -Have minimal linens on bed & keep smooth, unwrinkled  -Change linens as needed when moist or perspiring  -Avoid sitting or lying in one position for more than 2 hours while in bed  -Keep HOB at [address]:  -Offer bedside commode  -Assess for incontinence every 2    Activity:  -Mobilize patient 3 times a day  -Encourage activity and walks on unit  -Encourage or provide ROM exercises   -Turn and reposition patient every 2 Hours  -Use appropriate equipment to lift or move patient in bed  -Instruct/ Assist with weight shifting every 2 hour when out of bed in chair  -Consider limitation of chair time 8 hour intervals    Skin Care:  -Avoid use of baby powder, tape, friction and shearing, hot water or constrictive clothing  -Relieve pressure over bony prominences using allevyns  -Do not massage red bony areas  1/26/2022 1932 by Mahi Reddy RN  Outcome: Progressing  1/26/2022 1556 by Mahi Reddy RN  Outcome: Progressing  Goal: Incision(s), wounds(s) or drain site(s) healing without S/S of infection  Description: INTERVENTIONS  - Assess and document dressing, incision, wound bed, drain sites and surrounding tissue  - Provide patient and family education  - Perform skin care/dressing changes as needed  1/26/2022 1932 by Mhai Reddy RN  Outcome: Progressing  1/26/2022 1556 by Mahi Reddy RN  Outcome: Progressing  Goal: Pressure injury heals and does not worsen  Description: Interventions:  - Implement low air loss mattress or specialty surface (Criteria met)  - Apply silicone foam dressing  - Instruct/assist with weight shifting every 120 minutes when in chair   - Limit chair time to 8 hour intervals  - Use special pressure reducing interventions such as allevyns when in chair   - Apply fecal or urinary incontinence containment device   - Perform passive or active ROM every 4  - Turn and reposition patient & offload bony prominences every 2 hours   - Utilize friction reducing device or surface for transfers   - Consider nutrition services referral as needed  1/26/2022 1932 by Erika Edwards RN  Outcome: Progressing  1/26/2022 1556 by Erika Edwards RN  Outcome: Progressing     Problem: PAIN - ADULT  Goal: Verbalizes/displays adequate comfort level or baseline comfort level  Description: Interventions:  - Encourage patient to monitor pain and request assistance  - Assess pain using appropriate pain scale  - Administer analgesics based on type and severity of pain and evaluate response  - Implement non-pharmacological measures as appropriate and evaluate response  - Consider cultural and social influences on pain and pain management  - Notify physician/advanced practitioner if interventions unsuccessful or patient reports new pain  1/26/2022 1932 by Erika Edwards RN  Outcome: Progressing  1/26/2022 1556 by Erika Edwards RN  Outcome: Progressing     Problem: INFECTION - ADULT  Goal: Absence or prevention of progression during hospitalization  Description: INTERVENTIONS:  - Assess and monitor for signs and symptoms of infection  - Monitor lab/diagnostic results  - Monitor all insertion sites, i e  indwelling lines, tubes, and drains  - Monitor endotracheal if appropriate and nasal secretions for changes in amount and color  - [place] appropriate cooling/warming therapies per order  - Administer medications as ordered  - Instruct and encourage patient and family to use good hand hygiene technique  - Identify and instruct in appropriate isolation precautions for identified infection/condition  1/26/2022 1932 by Erika Edwards RN  Outcome: Progressing  1/26/2022 1556 by Erika Edwards RN  Outcome: Progressing     Problem: SAFETY ADULT  Goal: Maintain or return to baseline ADL function  Description: INTERVENTIONS:  -  Assess patient's ability to carry out ADLs; assess patient's baseline for ADL function and identify physical deficits which impact ability to perform ADLs (bathing, care of mouth/teeth, toileting, grooming, dressing, etc )  - Assess/evaluate cause of self-care deficits   - Assess range of motion  - Assess patient's mobility; develop plan if impaired  - Assess patient's need for assistive devices and provide as appropriate  - Encourage maximum independence but intervene and supervise when necessary  - Involve family in performance of ADLs  - Assess for home care needs following discharge   - Consider OT consult to assist with ADL evaluation and planning for discharge  - Provide patient education as appropriate  1/26/2022 1932 by Krystina Quevedo RN  Outcome: Progressing  1/26/2022 1556 by Krystina Quevedo RN  Outcome: Progressing  Goal: Maintains/Returns to pre admission functional level  Description: INTERVENTIONS:  - Perform BMAT or MOVE assessment daily    - Set and communicate daily mobility goal to care team and patient/family/caregiver  - Collaborate with rehabilitation services on mobility goals if consulted  - Perform Range of Motion 3 times a day  - Reposition patient every 2 hours    - Dangle patient 3 times a day  - Stand patient 3 times a day  - Ambulate patient 3 times a day  - Out of bed to chair 3 times a day   - Out of bed for meals 3 times a day  - Out of bed for toileting  - Record patient progress and toleration of activity level   1/26/2022 1932 by Krystina Quevedo RN  Outcome: Progressing  1/26/2022 1556 by Krystina Quevedo RN  Outcome: Progressing  Goal: Patient will remain free of falls  Description: INTERVENTIONS:  - Educate patient/family on patient safety including physical limitations  - Instruct patient to call for assistance with activity   - Consult OT/PT to assist with strengthening/mobility   - Keep Call bell within reach  - Keep bed low and locked with side rails adjusted as appropriate  - Keep care items and personal belongings within reach  - Initiate and maintain comfort rounds  - Make Fall Risk Sign visible to staff  - Offer Toileting every 2 Hours, in advance of need  - Initiate/Maintain bed alarm  - Apply yellow socks and bracelet for high fall risk patients  - Consider moving patient to room near nurses station  1/26/2022 1932 by Arun Briceño RN  Outcome: Progressing  1/26/2022 1556 by Arun Briceño RN  Outcome: Progressing     Problem: DISCHARGE PLANNING  Goal: Discharge to home or other facility with appropriate resources  Description: INTERVENTIONS:  - Identify barriers to discharge w/patient and caregiver  - Arrange for needed discharge resources and transportation as appropriate  - Identify discharge learning needs (meds, wound care, etc )  - Arrange for interpretive services to assist at discharge as needed  - Refer to Case Management Department for coordinating discharge planning if the patient needs post-hospital services based on physician/advanced practitioner order or complex needs related to functional status, cognitive ability, or social support system  1/26/2022 1932 by Arun Briceño RN  Outcome: Progressing  1/26/2022 1556 by Arun Briceño RN  Outcome: Progressing     Problem: Knowledge Deficit  Goal: Patient/family/caregiver demonstrates understanding of disease process, treatment plan, medications, and discharge instructions  Description: Complete learning assessment and assess knowledge base  Interventions:  - Provide teaching at level of understanding  - Provide teaching via preferred learning methods  1/26/2022 1932 by Arun Briceño RN  Outcome: Progressing  1/26/2022 1556 by Arun Briceño RN  Outcome: Progressing     Problem: Nutrition/Hydration-ADULT  Goal: Nutrient/Hydration intake appropriate for improving, restoring or maintaining nutritional needs  Description: Monitor and assess patient's nutrition/hydration status for malnutrition  Collaborate with interdisciplinary team and initiate plan and interventions as ordered  Monitor patient's weight and dietary intake as ordered or per policy   Utilize nutrition screening tool and intervene as necessary  Determine patient's food preferences and provide high-protein, high-caloric foods as appropriate       INTERVENTIONS:  - Monitor oral intake, urinary output, labs, and treatment plans  - Assess nutrition and hydration status and recommend course of action  - Evaluate amount of meals eaten  - Assist patient with eating if necessary   - Allow adequate time for meals  - Recommend/ encourage appropriate diets, oral nutritional supplements, and vitamin/mineral supplements  - Order, calculate, and assess calorie counts as needed  - Recommend, monitor, and adjust tube feedings and TPN/PPN based on assessed needs  - Assess need for intravenous fluids  - Provide specific nutrition/hydration education as appropriate  - Include patient/family/caregiver in decisions related to nutrition  1/26/2022 1932 by Anny Garg RN  Outcome: Progressing  1/26/2022 1556 by Anny Garg RN  Outcome: Progressing

## 2022-01-27 NOTE — PROGRESS NOTES
Progress Note - Infectious Disease   Simba Ellison 60 y o  female MRN: [id number]  Unit/Bed#: [facility] 511-01 Encounter: [id number]      Impression/Recommendations:  1   Sepsis, POA   HR, WBC   Due to #3 +/- 4   No other appreciable source of infection   Exam otherwise benign   Initial blood/urine cultures, serial CXR negative   Unclear relevance of COVID PCR as CXR and patient intubated largely for recent surgery   Hemodynamically stable and nontoxic   Improved  Rec:  · Continue to follow off antibiotics/antifungals  · Follow temperatures and WBC closely  · Supportive care as per the primary service     2   Perforated diverticulitis with abscess    Also complicated by #5   HTUQEP post ex lap, drainage of abscess, end colostomy, right salpingo oophorectomy and left oophorectomy 1/16   Intraoperative findings notable for perforated diverticulitis with tissue necrosis proximal rectum, ovaries at site of perforation   OR cultures with few Lactobacillus, Enterococcus   Status post 5 days post op, 14 days total antibiotics  Rec:  · Continue to follow off antibiotics/antifungals     3   Right hydroureteronephrosis   Due to #2   Status post cystoscopy with right ureteral stent placement 1/16    Rec:  · Outpatient Urology follow-up     4   Thrush with possible Candida esophagitis   Patient reported dysphagia, choking preoperatively   Status post 14 days fluconazole  Rec:  · Reassess symptoms as diet advances  · If no improvement may need eventual EGD per GI     5   COVID infection   Unvaccinated but had COVID 12/2020   PCR positive 1/16 Suspect coincidental finding on preoperative PCR   Unable to assess symptoms   CXR without infiltrates and hypoxia may be due to recent intubation, surgery, MO, atelectasis   No treatment indicated    Now on RA, off isolation      6   Postop ileus   Status post NGT replacement, now removed      7   MO   BMI 58      8   Hidradenitis   With multiple wounds in abdominal/groin skin folds   No apparent superinfection      The patient is stable from an ID standpoint      Antibiotics:  Off antibiotics/antifungals #6  POD #11    Subjective:  Patient seen on AM rounds  Denies fevers, chills, sweats, nausea, vomiting, or diarrhea  24 Hour Events:  No documented fevers, chills, sweats, nausea, vomiting, or diarrhea  Galan removed  Objective:  Vitals:  Temp:  [97 8 °F (36 6 °C)-98 6 °F (37 °C)] 98 °F (36 7 °C)  HR:  [88-97] 88  Resp:  [18-20] 19  BP: ()/(61-94) 148/87  SpO2:  [93 %-100 %] 100 %  Temp (24hrs), Av 1 °F (36 7 °C), Min:97 8 °F (36 6 °C), Max:98 6 °F (37 °C)  Current: Temperature: 98 °F (36 7 °C)    Physical Exam:   General:  No acute distress  Psychiatric:  Awake and alert  Pulmonary:  Normal respiratory excursion without accessory muscle use  Abdomen:  Obese  Extremities:  No edema  Skin:  No rashes    Lab Results:  I have personally reviewed pertinent labs  Results from last 7 days   Lab Units 22  0532 22  0622 22  0312 22  0518 22  1201   POTASSIUM mmol/L 4 1 4 1 4 0   < > 4 2   CHLORIDE mmol/L 106 108 108   < > 111*   CO2 mmol/L 26 27 28   < > 26   BUN mg/dL 25 21 11   < > 7   CREATININE mg/dL 0 90 0 90 1 02   < > 0 89   EGFR ml/min/1 73sq m 70 70 60   < > 71   CALCIUM mg/dL 8 9 8 5 9 2   < > 8 4   AST U/L  --   --   --   --  9   ALT U/L  --   --   --   --  11*   ALK PHOS U/L  --   --   --   --  74    < > = values in this interval not displayed  Results from last 7 days   Lab Units 22  0532 22  0502 22  0312   WBC Thousand/uL 11 40* 9 86 10 72*   HEMOGLOBIN g/dL 8 2* 8 0* 8 2*   PLATELETS Thousands/uL 314 385 514*           Imaging Studies:   I have personally reviewed pertinent imaging study reports and images in PACS  EKG, Pathology, and Other Studies:   I have personally reviewed pertinent reports

## 2022-01-27 NOTE — PLAN OF CARE
Problem: Prexisting or High Potential for Compromised Skin Integrity  Goal: Skin integrity is maintained or improved  Description: INTERVENTIONS:  - Identify patients at risk for skin breakdown  - Assess and monitor skin integrity  - Assess and monitor nutrition and hydration status  - Monitor labs   - Assess for incontinence   - Turn and reposition patient  - Assist with mobility/ambulation  - Relieve pressure over bony prominences  - Avoid friction and shearing  - Provide appropriate hygiene as needed including keeping skin clean and dry  - Evaluate need for skin moisturizer/barrier cream  - Collaborate with interdisciplinary team   - Patient/family teaching  - Consider wound care consult   Outcome: Progressing     Problem: MOBILITY - ADULT  Goal: Maintain or return to baseline ADL function  Description: INTERVENTIONS:  -  Assess patient's ability to carry out ADLs; assess patient's baseline for ADL function and identify physical deficits which impact ability to perform ADLs (bathing, care of mouth/teeth, toileting, grooming, dressing, etc )  - Assess/evaluate cause of self-care deficits   - Assess range of motion  - Assess patient's mobility; develop plan if impaired  - Assess patient's need for assistive devices and provide as appropriate  - Encourage maximum independence but intervene and supervise when necessary  - Involve family in performance of ADLs  - Assess for home care needs following discharge   - Consider OT consult to assist with ADL evaluation and planning for discharge  - Provide patient education as appropriate  Outcome: Progressing  Goal: Maintains/Returns to pre admission functional level  Description: INTERVENTIONS:  - Perform BMAT or MOVE assessment daily    - Set and communicate daily mobility goal to care team and patient/family/caregiver  - Collaborate with rehabilitation services on mobility goals if consulted  - Perform Range of Motion 3 times a day    - Reposition patient every 2 hours   - Dangle patient 3 times a day  - Stand patient 3 times a day  - Ambulate patient 3 times a day  - Out of bed to chair 3 times a day   - Out of bed for meals 3 times a day  - Out of bed for toileting  - Record patient progress and toleration of activity level   Outcome: Progressing     Problem: Potential for Falls  Goal: Patient will remain free of falls  Description: INTERVENTIONS:  - Educate patient/family on patient safety including physical limitations  - Instruct patient to call for assistance with activity   - Consult OT/PT to assist with strengthening/mobility   - Keep Call bell within reach  - Keep bed low and locked with side rails adjusted as appropriate  - Keep care items and personal belongings within reach  - Initiate and maintain comfort rounds  - Make Fall Risk Sign visible to staff  - Offer Toileting every 2 Hours, in advance of need  - Initiate/Maintain bed alarm  - Apply yellow socks and bracelet for high fall risk patients  - Consider moving patient to room near nurses station  Outcome: Progressing     Problem: GASTROINTESTINAL - ADULT  Goal: Minimal or absence of nausea and/or vomiting  Description: INTERVENTIONS:  - Administer IV fluids if ordered to ensure adequate hydration  - Maintain NPO status until nausea and vomiting are resolved  - Nasogastric tube if ordered  - Administer ordered antiemetic medications as needed  - Provide nonpharmacologic comfort measures as appropriate  - Advance diet as tolerated, if ordered  - Consider nutrition services referral to assist patient with adequate nutrition and appropriate food choices  Outcome: Progressing  Goal: Maintains or returns to baseline bowel function  Description: INTERVENTIONS:  - Assess bowel function  - Encourage oral fluids to ensure adequate hydration  - Administer IV fluids if ordered to ensure adequate hydration  - Administer ordered medications as needed  - Encourage mobilization and activity  - Consider nutritional services referral to assist patient with adequate nutrition and appropriate food choices  Outcome: Progressing  Goal: Maintains adequate nutritional intake  Description: INTERVENTIONS:  - Monitor percentage of each meal consumed  - Identify factors contributing to decreased intake, treat as appropriate  - Assist with meals as needed  - Monitor I&O, weight, and lab values if indicated  - Obtain nutrition services referral as needed  Outcome: Progressing  Goal: Establish and maintain optimal ostomy function  Description: INTERVENTIONS:  - Assess bowel function  - Encourage oral fluids to ensure adequate hydration  - Administer IV fluids if ordered to ensure adequate hydration   - Administer ordered medications as needed  - Encourage mobilization and activity  - Nutrition services referral to assist patient with appropriate food choices  - Assess stoma site  - Consider wound care consult   Outcome: Progressing  Goal: Oral mucous membranes remain intact  Description: INTERVENTIONS  - Assess oral mucosa and hygiene practices  - Implement preventative oral hygiene regimen  - Implement oral medicated treatments as ordered  - Initiate Nutrition services referral as needed  Outcome: Progressing     Problem: GENITOURINARY - ADULT  Goal: Maintains or returns to baseline urinary function  Description: INTERVENTIONS:  - Assess urinary function  - Encourage oral fluids to ensure adequate hydration if ordered  - Administer IV fluids as ordered to ensure adequate hydration  - Administer ordered medications as needed  - Offer frequent toileting  - Follow urinary retention protocol if ordered  Outcome: Progressing  Goal: Absence of urinary retention  Description: INTERVENTIONS:  - Assess patient's ability to void and empty bladder  - Monitor I/O  - Bladder scan as needed  - Discuss with physician/AP medications to alleviate retention as needed  - Discuss catheterization for long term situations as appropriate  Outcome: Progressing  Goal: Urinary catheter remains patent  Description: INTERVENTIONS:  - Assess patency of urinary catheter  - If patient has a chronic villanueva, consider changing catheter if non-functioning  - Follow guidelines for intermittent irrigation of non-functioning urinary catheter  Outcome: Progressing     Problem: SKIN/TISSUE INTEGRITY - ADULT  Goal: Skin Integrity remains intact(Skin Breakdown Prevention)  Description: Assess:  -Perform Surendra assessment every 12  -Clean and moisturize skin every 4  -Inspect skin when repositioning, toileting, and assisting with ADLS  -Assess extremities for adequate circulation and sensation     Bed Management:  -Have minimal linens on bed & keep smooth, unwrinkled  -Change linens as needed when moist or perspiring  -Avoid sitting or lying in one position for more than 2 hours while in bed  -Keep HOB at 30degrees     Toileting:  -Offer bedside commode  -Assess for incontinence every 2    Activity:  -Mobilize patient 3 times a day  -Encourage activity and walks on unit  -Encourage or provide ROM exercises   -Turn and reposition patient every 2 Hours  -Use appropriate equipment to lift or move patient in bed  -Instruct/ Assist with weight shifting every 2 hour when out of bed in chair  -Consider limitation of chair time 8 hour intervals    Skin Care:  -Avoid use of baby powder, tape, friction and shearing, hot water or constrictive clothing  -Relieve pressure over bony prominences using allevyns  -Do not massage red bony areas  Outcome: Progressing  Goal: Incision(s), wounds(s) or drain site(s) healing without S/S of infection  Description: INTERVENTIONS  - Assess and document dressing, incision, wound bed, drain sites and surrounding tissue  - Provide patient and family education  - Perform skin care/dressing changes as needed  Outcome: Progressing  Goal: Pressure injury heals and does not worsen  Description: Interventions:  - Implement low air loss mattress or specialty surface (Criteria met)  - Apply silicone foam dressing  - Instruct/assist with weight shifting every 120 minutes when in chair   - Limit chair time to 8 hour intervals  - Use special pressure reducing interventions such as allevyns when in chair   - Apply fecal or urinary incontinence containment device   - Perform passive or active ROM every 4  - Turn and reposition patient & offload bony prominences every 2 hours   - Utilize friction reducing device or surface for transfers   - Consider nutrition services referral as needed  Outcome: Progressing     Problem: PAIN - ADULT  Goal: Verbalizes/displays adequate comfort level or baseline comfort level  Description: Interventions:  - Encourage patient to monitor pain and request assistance  - Assess pain using appropriate pain scale  - Administer analgesics based on type and severity of pain and evaluate response  - Implement non-pharmacological measures as appropriate and evaluate response  - Consider cultural and social influences on pain and pain management  - Notify physician/advanced practitioner if interventions unsuccessful or patient reports new pain  Outcome: Progressing     Problem: INFECTION - ADULT  Goal: Absence or prevention of progression during hospitalization  Description: INTERVENTIONS:  - Assess and monitor for signs and symptoms of infection  - Monitor lab/diagnostic results  - Monitor all insertion sites, i e  indwelling lines, tubes, and drains  - Monitor endotracheal if appropriate and nasal secretions for changes in amount and color  - [place] appropriate cooling/warming therapies per order  - Administer medications as ordered  - Instruct and encourage patient and family to use good hand hygiene technique  - Identify and instruct in appropriate isolation precautions for identified infection/condition  Outcome: Progressing     Problem: SAFETY ADULT  Goal: Maintain or return to baseline ADL function  Description: INTERVENTIONS:  -  Assess patient's ability to carry out ADLs; assess patient's baseline for ADL function and identify physical deficits which impact ability to perform ADLs (bathing, care of mouth/teeth, toileting, grooming, dressing, etc )  - Assess/evaluate cause of self-care deficits   - Assess range of motion  - Assess patient's mobility; develop plan if impaired  - Assess patient's need for assistive devices and provide as appropriate  - Encourage maximum independence but intervene and supervise when necessary  - Involve family in performance of ADLs  - Assess for home care needs following discharge   - Consider OT consult to assist with ADL evaluation and planning for discharge  - Provide patient education as appropriate  Outcome: Progressing  Goal: Maintains/Returns to pre admission functional level  Description: INTERVENTIONS:  - Perform BMAT or MOVE assessment daily    - Set and communicate daily mobility goal to care team and patient/family/caregiver  - Collaborate with rehabilitation services on mobility goals if consulted  - Perform Range of Motion 3 times a day  - Reposition patient every 2 hours    - Dangle patient 3 times a day  - Stand patient 3 times a day  - Ambulate patient 3 times a day  - Out of bed to chair 3 times a day   - Out of bed for meals 3 times a day  - Out of bed for toileting  - Record patient progress and toleration of activity level   Outcome: Progressing  Goal: Patient will remain free of falls  Description: INTERVENTIONS:  - Educate patient/family on patient safety including physical limitations  - Instruct patient to call for assistance with activity   - Consult OT/PT to assist with strengthening/mobility   - Keep Call bell within reach  - Keep bed low and locked with side rails adjusted as appropriate  - Keep care items and personal belongings within reach  - Initiate and maintain comfort rounds  - Make Fall Risk Sign visible to staff  - Offer Toileting every 2 Hours, in advance of need  - Initiate/Maintain bed alarm  - Apply yellow socks and bracelet for high fall risk patients  - Consider moving patient to room near nurses station  Outcome: Progressing     Problem: DISCHARGE PLANNING  Goal: Discharge to home or other facility with appropriate resources  Description: INTERVENTIONS:  - Identify barriers to discharge w/patient and caregiver  - Arrange for needed discharge resources and transportation as appropriate  - Identify discharge learning needs (meds, wound care, etc )  - Arrange for interpretive services to assist at discharge as needed  - Refer to Case Management Department for coordinating discharge planning if the patient needs post-hospital services based on physician/advanced practitioner order or complex needs related to functional status, cognitive ability, or social support system  Outcome: Progressing     Problem: Knowledge Deficit  Goal: Patient/family/caregiver demonstrates understanding of disease process, treatment plan, medications, and discharge instructions  Description: Complete learning assessment and assess knowledge base  Interventions:  - Provide teaching at level of understanding  - Provide teaching via preferred learning methods  Outcome: Progressing     Problem: Nutrition/Hydration-ADULT  Goal: Nutrient/Hydration intake appropriate for improving, restoring or maintaining nutritional needs  Description: Monitor and assess patient's nutrition/hydration status for malnutrition  Collaborate with interdisciplinary team and initiate plan and interventions as ordered  Monitor patient's weight and dietary intake as ordered or per policy  Utilize nutrition screening tool and intervene as necessary  Determine patient's food preferences and provide high-protein, high-caloric foods as appropriate       INTERVENTIONS:  - Monitor oral intake, urinary output, labs, and treatment plans  - Assess nutrition and hydration status and recommend course of action  - Evaluate amount of meals eaten  - Assist patient with eating if necessary   - Allow adequate time for meals  - Recommend/ encourage appropriate diets, oral nutritional supplements, and vitamin/mineral supplements  - Order, calculate, and assess calorie counts as needed  - Recommend, monitor, and adjust tube feedings and TPN/PPN based on assessed needs  - Assess need for intravenous fluids  - Provide specific nutrition/hydration education as appropriate  - Include patient/family/caregiver in decisions related to nutrition  Outcome: Progressing     Problem: CHANGE IN BODY IMAGE  Goal: Pt/Family communicate acceptance of loss or change in body image and expresses psychological comfort and peace  Description: INTERVENTIONS:  - Assess patient/family anxiety and grief process related to change in body image, loss of functional status and loss of sense of self  - Assess patient/family's coping skills and provide emotional, spiritual and psychosocial support  - Provide information about the patient's health status with consideration of family and cultural values  - Communicate willingness to discuss loss and facilitate grief process with patient/family as appropriate  - Emphasize sustaining relationships within family system and community, or farzaneh/spiritual traditions  - Refer to community support groups as appropriate  - Initiate Spiritual Care, Pastoral care or other ancillary consults as needed  Outcome: Progressing

## 2022-01-28 NOTE — PROGRESS NOTES
Progress Note - Everardo Villanueva 60 y o  female MRN: [id number]    Unit/Bed#: [facility] 511-01 Encounter: [id number]      Assessment:  61 y/o COVID+ F w perforated diverticulitis s/p Ortega's procedure on 1/17  Pathology showing Adenocarcinoma  Vitals stable  abd soft, nontender, non distended  Ostomy functional with formed stool in appliance  ADIA x2 minimal serous output  Rectal malecot in place, no output  Plan:  Regular diet  Ambulate  D/c TPN  Discharge to rehab when bed available  Appreciate case management    Subjective:   Feels well  No complaints  Denied fever, chills, chest pain, shortness of breath, nausea, vomiting, or abdominal pain this morning  Objective:     Vitals: Blood pressure 146/81, pulse 102, temperature 99 °F (37 2 °C), temperature source Axillary, resp  rate 20, height 5' 7" (1 702 m), weight (!) 167 kg (369 lb 0 8 oz), SpO2 96 %  ,Body mass index is 57 8 kg/m²  Intake/Output Summary (Last 24 hours) at 1/28/2022 0625  Last data filed at 1/28/2022 0556  Gross per 24 hour   Intake 1631 38 ml   Output 2035 ml   Net -403 62 ml       Physical Exam  General: NAD  HEENT: NC/AT  MMM  Cv: RRR     Lungs: normal effort  Ab: Soft, NT/ND  Ex: no CCE  Neuro: AAOx3    Scheduled Meds:  Current Facility-Administered Medications   Medication Dose Route Frequency Provider Last Rate    Adult TPN (CUSTOM BASE/STANDARD ELECTROLYTE)   Intravenous Continuous TPN Daniel Borrero MD 57 3 mL/hr at 01/27/22 2141    albuterol  2 puff Inhalation Q4H PRN Merwyn Challenger, DO      dextrose 5 % and sodium chloride 0 45 % with KCl 20 mEq/L  50 mL/hr Intravenous Continuous Daniel Borrero MD 50 mL/hr (01/28/22 0451)    docusate sodium  100 mg Oral BID Daniel Borrero MD      fentanyl citrate (PF)  50 mcg Intravenous Q2H PRN MIRTA Yarbrough      heparin (porcine)  7,500 Units Subcutaneous [facility] MIRTA Carias      HYDROmorphone  0 2 mg Intravenous Q3H PRN MIRTA Yarbrough      Or  HYDROmorphone  0 5 mg Intravenous Q3H PRN Junnie Pals Lucke, CRNP      Labetalol HCl  10 mg Intravenous Q4H PRN Vidal Fast, MIRTA      CAROLINE ANTIFUNGAL   Topical BID Junnie Pals Lucke, CRNP      nystatin  500,000 Units Swish & Swallow 4x Daily Junnie Pals Lucke, CRNP      nystatin   Topical BID Junnie Pals Lucke, CRNP      ondansetron  4 mg Intravenous Q8H PRN Stella Heart MD      pantoprazole  40 mg Intravenous Q12H [facility] Junnie Pals Lucke, CRNP      saliva substitute  5 spray Mouth/Throat 4x Daily PRN Junnie Pals Lucke, CRNP      senna  1 tablet Oral BID Clay Rivera MD       Continuous Infusions:Adult TPN (CUSTOM BASE/STANDARD ELECTROLYTE), , Last Rate: 57 3 mL/hr at 01/27/22 2141  dextrose 5 % and sodium chloride 0 45 % with KCl 20 mEq/L, 50 mL/hr, Last Rate: 50 mL/hr (01/28/22 0451)      PRN Meds: albuterol    fentanyl citrate (PF)    HYDROmorphone **OR** HYDROmorphone    Labetalol HCl    ondansetron    saliva substitute      Invasive Devices  Report    Peripherally Inserted Central Catheter Line            PICC Line 90/51/67 Left Basilic 2 days          Drain            Closed/Suction Drain LLQ Bulb 19 Fr  11 days    Closed/Suction Drain RLQ Bulb 19 Fr  11 days    Colostomy Other (comment) LUQ 11 days    Rectal Tube Without balloon 11 days    External Urinary Catheter Medium <1 day                Lab, Imaging and other studies: I have personally reviewed pertinent reports      VTE Pharmacologic Prophylaxis: Sequential compression device (Venodyne)   VTE Mechanical Prophylaxis: sequential compression device

## 2022-01-28 NOTE — CASE MANAGEMENT
Case Management Discharge Planning Note    Patient name Quan Moore  Location [address] [place] [address] [address] 750-89 MRN [id number]  : 1965 Date 2022       Current Admission Date: 2022  Current Admission Diagnosis:Diverticulitis of large intestine with perforation   Patient Active Problem List    Diagnosis Date Noted    Dysphagia 2022    Acute renal failure (ARF) ([facility] ) 2022    Cellulitis of abdominal wall, chest wall and groin with wounds 2022    Diverticulitis of large intestine with perforation 2022    Right Hydroureteronephrosis 2022    Subacute vaginitis 2022    GERD (gastroesophageal reflux disease)     Other specified anxiety disorders 2019    Lumbar paraspinal muscle spasm 2019    Borderline hyperlipidemia 2019    Iron deficiency 2019    Chronic midline low back pain with left-sided sciatica 2019    Primary hypertension 2019    Morbid obesity with BMI of 60 0-69 9, adult ([facility] ) 2019    Hidradenitis suppurativa 2019    Lymphedema 2006      LOS (days): 12  Geometric Mean LOS (GMLOS) (days):   Days to GMLOS:     OBJECTIVE:  Risk of Unplanned Readmission Score: 20         Current admission status: Inpatient   Preferred Pharmacy:   [facility] [address] [place] [address] C/ [facility] [id number]  Phone: [phone] Fax: [phone]    Primary Care Provider: Kris Monroy PA-C    Primary Insurance: [facility]  Secondary Insurance:     DISCHARGE DETAILS:    Discharge planning discussed with[de-identified] patient  Freedom of Choice: Yes  Comments - Freedom of Choice: SNF referrals placed in [facility]  Other Referral/Resources/Interventions Provided:  Interventions: Short Term Rehab  Referral Comments: [facility] referrals made to 12 facilities  [facility], [address], [address]   Update was sent to [facility] but they now have no available bed  Treatment Team Recommendation: Short Term Rehab  Discharge Destination Plan[de-identified] Short Term Rehab    Additional Comments: Request made to therapy to see pt 1/30/22  Met with patient to discuss plan for dc to snf  She is aware we may need to expand the search if no accepting facility  Patient declines for her family contact/ friend to be called

## 2022-01-28 NOTE — PHYSICAL THERAPY NOTE
PHYSICAL THERAPY NOTE          Patient Name: Rosalind Peña  IZMJD'X Date: 1/28/2022 01/28/22 1052   PT Last Visit   PT Visit Date 01/28/22   Note Type   Note Type Treatment   End of Consult   Patient Position at End of Consult Bedside chair; All needs within reach   Pain Assessment   Pain Assessment Tool 0-10   Pain Score 8   Pain Location/Orientation Location: Buttocks; Location: Abdomen   Restrictions/Precautions   Weight Bearing Precautions Per Order No   Other Precautions Multiple lines;Telemetry;O2;Fall Risk;Pain   General   Chart Reviewed Yes   Response to Previous Treatment Patient with no complaints from previous session  Family/Caregiver Present No   Cognition   Overall Cognitive Status WFL   Arousal/Participation Alert   Attention Attends with cues to redirect   Memory Within functional limits;Decreased recall of precautions   Following Commands Follows one step commands without difficulty   Subjective   Subjective Pt resistant to participation at first      Bed Mobility   Rolling R 2  Maximal assistance   Additional items Assist x 2; Increased time required;Verbal cues;LE management  (Ax1-2)   Rolling L 2  Maximal assistance   Additional items Assist x 2; Increased time required;Verbal cues;LE management   Supine to Sit 2  Maximal assistance   Additional items Assist x 2; Increased time required;Verbal cues;LE management   Additional Comments Supine in bed upon PT arrival   Assisted RN in rolling pt for hygiene and linen change  Pt sits EOB with supervision for prolonged time prior to standing due to dizziness  Pt left upright in bedside chair with all needs in reach  Transfers   Sit to Stand 3  Moderate assistance   Additional items Assist x 2; Increased time required;Verbal cues   Stand to Sit 3  Moderate assistance   Additional items Assist x 2; Increased time required;Verbal cues   Toilet transfer 3  Moderate assistance   Additional items Assist x 2; Increased time required;Verbal cues; Commode Additional Comments Initial transfer with b/l HHA  Subsequent transfers with RW   VC for hand placement and safety  Ambulation/Elevation   Gait pattern Excessively slow; Short stride; Foward flexed;Decreased foot clearance; Wide KODY; Improper Weight shift; Poor UE support; Shuffling   Gait Assistance 3  Moderate assist   Additional items Assist x 2;Verbal cues; Tactile cues   Assistive Device Rolling walker; Other (Comment)  (b/l HHA)   Distance 3 ft from bed>commode with b/l HHA, 5 ft forward with RW   Balance   Static Sitting Fair   Dynamic Sitting Fair -   Static Standing Poor +   Dynamic Standing Poor   Ambulatory Poor   Endurance Deficit   Endurance Deficit Yes   Endurance Deficit Description fatigue, weakness, SOB   Activity Tolerance   Activity Tolerance Patient limited by fatigue   Medical Staff Made Aware OT Adela Merino, restorative tech Rolfe Aase   Nurse Made Aware RN cleared pt to be seen by PT   Assessment   Prognosis Fair   Problem List Decreased strength;Decreased range of motion;Decreased endurance; Impaired balance;Decreased mobility; Decreased safety awareness; Impaired judgement;Pain   Assessment Pt seen for PT treatment session with focus on bed mobility, functional transfers, functional mobility, activity tolerance  Pt making slow but steaday progress toward goals this session  Pt continues to require significant assist for bed mobility and transfers due to remaining strength deficits  Pt is motivated to trial gait training today and covered slightly increased distance of forward progression compared to previous side stepping  Pt does continues to present with balance impairments during mobility as well as significant endurance deficits and SOB  Pt left upright in bedside chair with all needs in reach  Pt will benefit from skilled therapy in order to address current impairments and functional limitations  PT to follow pt and recommending rehab once medically cleared    The patient's AM-PAC Basic Mobility Inpatient Short Form Raw Score is 6  A Raw score of less than or equal to 16 suggests the patient may benefit from discharge to post-acute rehabilitation services  Please also refer to the recommendation of the Physical Therapist for safe discharge planning  Barriers to Discharge Inaccessible home environment;Decreased caregiver support   Goals   Patient Goals to not stay in the chair all day   Short Term Goal #2 2/2/22   Plan   Treatment/Interventions Functional transfer training;LE strengthening/ROM; Therapeutic exercise; Endurance training;Patient/family training;Equipment eval/education; Bed mobility;Gait training;Spoke to nursing   Progress Slow progress, decreased activity tolerance   PT Frequency 3-5x/wk   Recommendation   PT Discharge Recommendation Post acute rehabilitation services   Additional Comments PT cleared pt to DC once medically cleared   AM-PAC Basic Mobility Inpatient   Turning in Bed Without Bedrails 1   Lying on Back to Sitting on Edge of Flat Bed 1   Moving Bed to Chair 1   Standing Up From Chair 1   Walk in Room 1   Climb 3-5 Stairs 1   Basic Mobility Inpatient Raw Score 6   Turning Head Towards Sound 4   Follow Simple Instructions 4   Low Function Basic Mobility Raw Score 14   Low Function Basic Mobility Standardized Score 22 01   Highest Level Of Mobility   JH-HLM Goal 2: Bed activities/Dependent transfer   JH-HLM Highest Level of Mobility 4: Move to chair/commode   JH-HLM Goal Achieved Yes   Education   Education Provided Mobility training;Assistive device   Patient Demonstrates verbal understanding   End of Consult   Patient Position at End of Consult Bedside chair; All needs within reach     Wilmer Taveras, PT, DPT

## 2022-01-28 NOTE — PLAN OF CARE
Problem: OCCUPATIONAL THERAPY ADULT  Goal: Performs self-care activities at highest level of function for planned discharge setting  See evaluation for individualized goals  Description: Treatment Interventions: ADL retraining,Functional transfer training,UE strengthening/ROM,Endurance training,Cognitive reorientation,Patient/family training,Equipment evaluation/education,Compensatory technique education,Continued evaluation,Energy conservation,Activityengagement          See flowsheet documentation for full assessment, interventions and recommendations  Outcome: Progressing  Note: Limitation: Decreased ADL status,Decreased UE strength,Decreased Safe judgement during ADL,Decreased cognition,Decreased endurance,Decreased self-care trans,Decreased high-level ADLs  Prognosis: Fair  Assessment: Pt seen on this date for OT session focusing on ADL retraining, body mechanics, transfer retraining, increasing activity tolerance/endurance and EOB sitting to increase ability to participate in ADL/functional tasks  Pt was found in bed and was left in chair w/ all needs within reach  Pt rolled to R and L with max Ax2, maintains sidelying with max Ax2 as well  Pt completed bed mobility with Max Ax2 and maintains EOB sitting position with supervision  Pt completed transfers with mod Ax2 w RW for support  toileting tasks completed with max A this date, pt utilized commode this date  Pt w/ improvements in activity tolerance, transfer ability and ADL task completion, however is still limited 2* decreased ADL/High-level ADL status, decreased activity tolerance/endurance, decreased cognition, decreased self-care trans, decreased safety awareness and insight to condition  The patient's raw score on the AM-PAC Daily Activity inpatient short form is 12, standardized score is 30 6, less than 39 4  Patients at this level are likely to benefit from discharge to post-acute rehabilitation services   Please refer to the recommendation of the Occupational Therapist for safe discharge planning  Recommending pt D/C to STR when medically stable  Pt will continue to benefit from acute OT services to meet goals  OT Discharge Recommendation: Post acute rehabilitation services  OT - OK to Discharge:  Yes

## 2022-01-28 NOTE — OCCUPATIONAL THERAPY NOTE
Occupational Therapy Progress Note     Patient Name: Megan Strauss  IIRIT'C Date: 1/28/2022  Problem List  Principal Problem:    Diverticulitis of large intestine with perforation  Active Problems:    Lymphedema    Primary hypertension    Morbid obesity with BMI of 60 0-69 9, adult ([address] )    Hidradenitis suppurativa    GERD (gastroesophageal reflux disease)    Right Hydroureteronephrosis       01/28/22 1051   OT Last Visit   OT Visit Date 01/28/22   Note Type   Note Type Treatment   Restrictions/Precautions   Weight Bearing Precautions Per Order No   Other Precautions Multiple lines;Telemetry; Fall Risk;Pain   General   Response to Previous Treatment Patient with no complaints from previous session   Lifestyle   Autonomy Pt reports being I w/ ADLS/IADLs, Mod I w/ transfers/functional mobility w/ use of crutches   Reciprocal Relationships Lives alone; reports no social supports but has son watching her dog   Service to Others On disability   Intrinsic Gratification Likes spending time w/ her dog   Pain Assessment   Pain Assessment Tool 0-10   Pain Score 8   Pain Location/Orientation Location: Buttocks   Patient's Stated Pain Goal No pain   Hospital Pain Intervention(s) Ambulation/increased activity;Repositioned   ADL   Where Assessed Commode   Toileting Assistance  2  Maximal Assistance   Toileting Deficit Perineal hygiene; Bedside commode; Increased time to complete;Supervison/safety   Toileting Comments pt rq max A for toileting tasks this date, completed while on commode  She is able to maintain upright positoin without A while on commode, however does require A for perineal hygiene  Bed Mobility   Rolling R 2  Maximal assistance   Additional items Assist x 2; Increased time required;LE management   Rolling L 2  Maximal assistance   Additional items Assist x 2; Increased time required;LE management   Supine to Sit 2  Maximal assistance   Additional items Assist x 2; Increased time required;LE management;Verbal cues   Additional Comments found supine in bed, left OOB in chair w all needs in reach  She requires Max Ax2 for rolling to R and L for pericare  Maintains sidelying position with max ax2 as well  Pt then rq max Ax2 to achieve EOB sitting position, however is able to maintain EOB sitting with supervision  Pt tolerates well  Transfers   Sit to Stand 3  Moderate assistance   Additional items Assist x 2; Increased time required;Verbal cues   Stand to Sit 3  Moderate assistance   Additional items Assist x 2; Increased time required;Verbal cues   Toilet transfer 3  Moderate assistance   Additional items Assist x 2; Increased time required;Verbal cues; Commode   Additional Comments initally transfers with b/l HHA and knee blocking, progresses to using RW for support  She is a bit apprehensive to complete transfers 2* fear of falling, adjusts well with encouragement and reassurance from theapist     Functional Mobility   Functional Mobility 3  Moderate assistance   Additional Comments ax2, short steps to commode and chair  Additional items Rolling walker   Toilet Transfers   Toilet Transfer From Rolling walker   Toilet Transfer Type To and from   Toilet Transfer to Standard bedside commode   Toilet Transfer Technique Ambulating   Toilet Transfers Moderate assistance   Toilet Transfers Comments mod Ax2 for toilet transfer to bedside commode, VC for hand placement  Cognition   Overall Cognitive Status WFL   Arousal/Participation Alert   Attention Within functional limits   Orientation Level Oriented X4   Memory Within functional limits   Following Commands Follows one step commands without difficulty   Comments pt overall cooperative, does require some VC and enocuragement to participate  Activity Tolerance   Activity Tolerance Patient limited by fatigue   Medical Staff Made Aware DPT Criss Paulino 2* pt's complexity  OT focusing on ADL task retraining     Assessment   Assessment Pt seen on this date for OT session focusing on ADL retraining, body mechanics, transfer retraining, increasing activity tolerance/endurance and EOB sitting to increase ability to participate in ADL/functional tasks  Pt was found in bed and was left in chair w/ all needs within reach  Pt rolled to R and L with max Ax2, maintains sidelying with max Ax2 as well  Pt completed bed mobility with Max Ax2 and maintains EOB sitting position with supervision  Pt completed transfers with mod Ax2 w RW for support  toileting tasks completed with max A this date, pt utilized commode this date  Pt w/ improvements in activity tolerance, transfer ability and ADL task completion, however is still limited 2* decreased ADL/High-level ADL status, decreased activity tolerance/endurance, decreased cognition, decreased self-care trans, decreased safety awareness and insight to condition  The patient's raw score on the AM-PAC Daily Activity inpatient short form is 12, standardized score is 30 6, less than 39 4  Patients at this level are likely to benefit from discharge to post-acute rehabilitation services  Please refer to the recommendation of the Occupational Therapist for safe discharge planning  Recommending pt D/C to STR when medically stable  Pt will continue to benefit from acute OT services to meet goals  Plan   Treatment Interventions ADL retraining;Functional transfer training; Endurance training; Activityengagement; Energy conservation   Goal Expiration Date 02/02/22   OT Treatment Day 3   OT Frequency 3-5x/wk   Recommendation   OT Discharge Recommendation Post acute rehabilitation services   OT - OK to Discharge Yes   AM-PAC Daily Activity Inpatient   Lower Body Dressing 1   Bathing 1   Toileting 2   Upper Body Dressing 2   Grooming 3   Eating 3   Daily Activity Raw Score 12   Daily Activity Standardized Score (Calc for Raw Score >=11) 30 6   AM-PAC Applied Cognition Inpatient   Following a Speech/Presentation 3   Understanding Ordinary Conversation 4   Taking Medications 3   Remembering Where Things Are Placed or Put Away 3   Remembering List of 4-5 Errands 3   Taking Care of Complicated Tasks 3   Applied Cognition Raw Score 19   Applied Cognition Standardized Score 39 77   Modified [place] Scale   Modified [place] Scale 4         Sharan Saleem, [facility], OTR/L

## 2022-01-28 NOTE — PLAN OF CARE
Problem: Prexisting or High Potential for Compromised Skin Integrity  Goal: Skin integrity is maintained or improved  Description: INTERVENTIONS:  - Identify patients at risk for skin breakdown  - Assess and monitor skin integrity  - Assess and monitor nutrition and hydration status  - Monitor labs   - Assess for incontinence   - Turn and reposition patient  - Assist with mobility/ambulation  - Relieve pressure over bony prominences  - Avoid friction and shearing  - Provide appropriate hygiene as needed including keeping skin clean and dry  - Evaluate need for skin moisturizer/barrier cream  - Collaborate with interdisciplinary team   - Patient/family teaching  - Consider wound care consult   1/27/2022 1919 by Anselmo Lorenzana RN  Outcome: Progressing  1/27/2022 1556 by Anselmo Lorenzana RN  Outcome: Progressing     Problem: MOBILITY - ADULT  Goal: Maintain or return to baseline ADL function  Description: INTERVENTIONS:  -  Assess patient's ability to carry out ADLs; assess patient's baseline for ADL function and identify physical deficits which impact ability to perform ADLs (bathing, care of mouth/teeth, toileting, grooming, dressing, etc )  - Assess/evaluate cause of self-care deficits   - Assess range of motion  - Assess patient's mobility; develop plan if impaired  - Assess patient's need for assistive devices and provide as appropriate  - Encourage maximum independence but intervene and supervise when necessary  - Involve family in performance of ADLs  - Assess for home care needs following discharge   - Consider OT consult to assist with ADL evaluation and planning for discharge  - Provide patient education as appropriate  1/27/2022 1919 by Anselmo Lorenzana RN  Outcome: Progressing  1/27/2022 1556 by Anselmo Lorenzana RN  Outcome: Progressing  Goal: Maintains/Returns to pre admission functional level  Description: INTERVENTIONS:  - Perform BMAT or MOVE assessment daily    - Set and communicate daily mobility goal to care team and patient/family/caregiver  - Collaborate with rehabilitation services on mobility goals if consulted  - Perform Range of Motion 3 times a day  - Reposition patient every 2 hours    - Dangle patient 3 times a day  - Stand patient 3 times a day  - Ambulate patient 3 times a day  - Out of bed to chair 3 times a day   - Out of bed for meals 3 times a day  - Out of bed for toileting  - Record patient progress and toleration of activity level   1/27/2022 1919 by Elizabeth Mckeon RN  Outcome: Progressing  1/27/2022 1556 by Elizabeth Mckeon RN  Outcome: Progressing     Problem: Potential for Falls  Goal: Patient will remain free of falls  Description: INTERVENTIONS:  - Educate patient/family on patient safety including physical limitations  - Instruct patient to call for assistance with activity   - Consult OT/PT to assist with strengthening/mobility   - Keep Call bell within reach  - Keep bed low and locked with side rails adjusted as appropriate  - Keep care items and personal belongings within reach  - Initiate and maintain comfort rounds  - Make Fall Risk Sign visible to staff  - Offer Toileting every 2 Hours, in advance of need  - Initiate/Maintain bed alarm  - Apply yellow socks and bracelet for high fall risk patients  - Consider moving patient to room near nurses station  1/27/2022 1919 by Elizabeth Mckeon RN  Outcome: Progressing  1/27/2022 1556 by Elizabeth Mckeon RN  Outcome: Progressing     Problem: GASTROINTESTINAL - ADULT  Goal: Minimal or absence of nausea and/or vomiting  Description: INTERVENTIONS:  - Administer IV fluids if ordered to ensure adequate hydration  - Maintain NPO status until nausea and vomiting are resolved  - Nasogastric tube if ordered  - Administer ordered antiemetic medications as needed  - Provide nonpharmacologic comfort measures as appropriate  - Advance diet as tolerated, if ordered  - Consider nutrition services referral to assist patient with adequate nutrition and appropriate food choices  1/27/2022 1919 by Cierra Greco RN  Outcome: Progressing  1/27/2022 1556 by Cierra Greco RN  Outcome: Progressing  Goal: Maintains or returns to baseline bowel function  Description: INTERVENTIONS:  - Assess bowel function  - Encourage oral fluids to ensure adequate hydration  - Administer IV fluids if ordered to ensure adequate hydration  - Administer ordered medications as needed  - Encourage mobilization and activity  - Consider nutritional services referral to assist patient with adequate nutrition and appropriate food choices  1/27/2022 1919 by Cierra Greco RN  Outcome: Progressing  1/27/2022 1556 by Cierra Greco RN  Outcome: Progressing  Goal: Maintains adequate nutritional intake  Description: INTERVENTIONS:  - Monitor percentage of each meal consumed  - Identify factors contributing to decreased intake, treat as appropriate  - Assist with meals as needed  - Monitor I&O, weight, and lab values if indicated  - Obtain nutrition services referral as needed  1/27/2022 1919 by Cierra Greco RN  Outcome: Progressing  1/27/2022 1556 by Cierra Greco RN  Outcome: Progressing  Goal: Establish and maintain optimal ostomy function  Description: INTERVENTIONS:  - Assess bowel function  - Encourage oral fluids to ensure adequate hydration  - Administer IV fluids if ordered to ensure adequate hydration   - Administer ordered medications as needed  - Encourage mobilization and activity  - Nutrition services referral to assist patient with appropriate food choices  - Assess stoma site  - Consider wound care consult   1/27/2022 1919 by Cierra Greco RN  Outcome: Progressing  1/27/2022 1556 by Cierra Greco RN  Outcome: Progressing  Goal: Oral mucous membranes remain intact  Description: INTERVENTIONS  - Assess oral mucosa and hygiene practices  - Implement preventative oral hygiene regimen  - Implement oral medicated treatments as ordered  - Initiate Nutrition services referral as needed  1/27/2022 1919 by Serina Patel RN  Outcome: Progressing  1/27/2022 1556 by Serina Patel RN  Outcome: Progressing     Problem: GENITOURINARY - ADULT  Goal: Maintains or returns to baseline urinary function  Description: INTERVENTIONS:  - Assess urinary function  - Encourage oral fluids to ensure adequate hydration if ordered  - Administer IV fluids as ordered to ensure adequate hydration  - Administer ordered medications as needed  - Offer frequent toileting  - Follow urinary retention protocol if ordered  1/27/2022 1919 by Serina Patel RN  Outcome: Progressing  1/27/2022 1556 by Serina Patel RN  Outcome: Progressing  Goal: Absence of urinary retention  Description: INTERVENTIONS:  - Assess patient's ability to void and empty bladder  - Monitor I/O  - Bladder scan as needed  - Discuss with physician/AP medications to alleviate retention as needed  - Discuss catheterization for long term situations as appropriate  1/27/2022 1919 by Serina Patel RN  Outcome: Progressing  1/27/2022 1556 by Serina Patel RN  Outcome: Progressing  Goal: Urinary catheter remains patent  Description: INTERVENTIONS:  - Assess patency of urinary catheter  - If patient has a chronic villanueva, consider changing catheter if non-functioning  - Follow guidelines for intermittent irrigation of non-functioning urinary catheter  1/27/2022 1919 by Serina Patel RN  Outcome: Progressing  1/27/2022 1556 by Serina Patel RN  Outcome: Progressing     Problem: Nutrition/Hydration-ADULT  Goal: Nutrient/Hydration intake appropriate for improving, restoring or maintaining nutritional needs  Description: Monitor and assess patient's nutrition/hydration status for malnutrition  Collaborate with interdisciplinary team and initiate plan and interventions as ordered  Monitor patient's weight and dietary intake as ordered or per policy   Utilize nutrition screening tool and intervene as necessary  Determine patient's food preferences and provide high-protein, high-caloric foods as appropriate  INTERVENTIONS:  - Monitor oral intake, urinary output, labs, and treatment plans  - Assess nutrition and hydration status and recommend course of action  - Evaluate amount of meals eaten  - Assist patient with eating if necessary   - Allow adequate time for meals  - Recommend/ encourage appropriate diets, oral nutritional supplements, and vitamin/mineral supplements  - Order, calculate, and assess calorie counts as needed  - Recommend, monitor, and adjust tube feedings and TPN/PPN based on assessed needs  - Assess need for intravenous fluids  - Provide specific nutrition/hydration education as appropriate  - Include patient/family/caregiver in decisions related to nutrition  1/27/2022 1919 by Nikkie Bhandari RN  Outcome: Progressing  1/27/2022 1919 by Nikkie Bhandari RN  Outcome: Progressing  1/27/2022 1556 by Nikkie Bhandari RN  Outcome: Progressing     Problem: Knowledge Deficit  Goal: Patient/family/caregiver demonstrates understanding of disease process, treatment plan, medications, and discharge instructions  Description: Complete learning assessment and assess knowledge base    Interventions:  - Provide teaching at level of understanding  - Provide teaching via preferred learning methods  1/27/2022 1919 by Nikkie Bhandari RN  Outcome: Progressing  1/27/2022 1919 by Nikkie Bhandari RN  Outcome: Progressing  1/27/2022 1556 by Nikkie Bhandari RN  Outcome: Progressing     Problem: DISCHARGE PLANNING  Goal: Discharge to home or other facility with appropriate resources  Description: INTERVENTIONS:  - Identify barriers to discharge w/patient and caregiver  - Arrange for needed discharge resources and transportation as appropriate  - Identify discharge learning needs (meds, wound care, etc )  - Arrange for interpretive services to assist at discharge as needed  - Refer to Case Management Department for coordinating discharge planning if the patient needs post-hospital services based on physician/advanced practitioner order or complex needs related to functional status, cognitive ability, or social support system  1/27/2022 1919 by Sandie Guerrero RN  Outcome: Progressing  1/27/2022 1919 by Sandie Guerrero RN  Outcome: Progressing  1/27/2022 1556 by Sandie Guerrero RN  Outcome: Progressing     Problem: SAFETY ADULT  Goal: Maintain or return to baseline ADL function  Description: INTERVENTIONS:  -  Assess patient's ability to carry out ADLs; assess patient's baseline for ADL function and identify physical deficits which impact ability to perform ADLs (bathing, care of mouth/teeth, toileting, grooming, dressing, etc )  - Assess/evaluate cause of self-care deficits   - Assess range of motion  - Assess patient's mobility; develop plan if impaired  - Assess patient's need for assistive devices and provide as appropriate  - Encourage maximum independence but intervene and supervise when necessary  - Involve family in performance of ADLs  - Assess for home care needs following discharge   - Consider OT consult to assist with ADL evaluation and planning for discharge  - Provide patient education as appropriate  1/27/2022 1919 by Sandie Guerrero RN  Outcome: Progressing  1/27/2022 1919 by Sandie Guerrero RN  Outcome: Progressing  1/27/2022 1556 by Sandie Guerrero RN  Outcome: Progressing  Goal: Maintains/Returns to pre admission functional level  Description: INTERVENTIONS:  - Perform BMAT or MOVE assessment daily    - Set and communicate daily mobility goal to care team and patient/family/caregiver  - Collaborate with rehabilitation services on mobility goals if consulted  - Perform Range of Motion 3 times a day  - Reposition patient every 2 hours    - Dangle patient 3 times a day  - Stand patient 3 times a day  - Ambulate patient 3 times a day  - Out of bed to chair 3 times a day   - Out of bed for meals 3 times a day  - Out of bed for toileting  - Record patient progress and toleration of activity level   1/27/2022 1919 by Mahi Reddy RN  Outcome: Progressing  1/27/2022 1919 by Mahi Reddy RN  Outcome: Progressing  1/27/2022 1556 by Mahi Reddy RN  Outcome: Progressing  Goal: Patient will remain free of falls  Description: INTERVENTIONS:  - Educate patient/family on patient safety including physical limitations  - Instruct patient to call for assistance with activity   - Consult OT/PT to assist with strengthening/mobility   - Keep Call bell within reach  - Keep bed low and locked with side rails adjusted as appropriate  - Keep care items and personal belongings within reach  - Initiate and maintain comfort rounds  - Make Fall Risk Sign visible to staff  - Offer Toileting every 2 Hours, in advance of need  - Initiate/Maintain bed alarm  - Apply yellow socks and bracelet for high fall risk patients  - Consider moving patient to room near nurses station  1/27/2022 1919 by Mahi Reddy RN  Outcome: Progressing  1/27/2022 1919 by Mahi Reddy RN  Outcome: Progressing  1/27/2022 1556 by Mahi Reddy RN  Outcome: Progressing

## 2022-01-28 NOTE — PLAN OF CARE
Problem: PHYSICAL THERAPY ADULT  Goal: Performs mobility at highest level of function for planned discharge setting  See evaluation for individualized goals  Description: Treatment/Interventions: Functional transfer training,LE strengthening/ROM,Therapeutic exercise,Endurance training,Patient/family training,Equipment eval/education,Bed mobility,Gait training,OT,Spoke to nursing  Equipment Recommended: Juan Pablo Hooks (bariatric RW )       See flowsheet documentation for full assessment, interventions and recommendations  Outcome: Progressing  Note: Prognosis: Fair  Problem List: Decreased strength,Decreased range of motion,Decreased endurance,Impaired balance,Decreased mobility,Decreased safety awareness,Impaired judgement,Pain  Assessment: Pt seen for PT treatment session with focus on bed mobility, functional transfers, functional mobility, activity tolerance  Pt making slow but steaday progress toward goals this session  Pt continues to require significant assist for bed mobility and transfers due to remaining strength deficits  Pt is motivated to trial gait training today and covered slightly increased distance of forward progression compared to previous side stepping  Pt does continues to present with balance impairments during mobility as well as significant endurance deficits and SOB  Pt left upright in bedside chair with all needs in reach  Pt will benefit from skilled therapy in order to address current impairments and functional limitations  PT to follow pt and recommending rehab once medically cleared  The patient's AM-PAC Basic Mobility Inpatient Short Form Raw Score is 6  A Raw score of less than or equal to 16 suggests the patient may benefit from discharge to post-acute rehabilitation services  Please also refer to the recommendation of the Physical Therapist for safe discharge planning    Barriers to Discharge: Inaccessible home environment,Decreased caregiver support        PT Discharge Recommendation: Post acute rehabilitation services          See flowsheet documentation for full assessment

## 2022-01-28 NOTE — PROGRESS NOTES
Progress Note - Infectious Disease   Fadumo Marcano 60 y o  female MRN: [id number]  Unit/Bed#: [facility] 511-01 Encounter: [id number]      Impression/Recommendations:  1   Sepsis, POA   HR, WBC   Due to #3 +/- 4   No other appreciable source of infection   Exam otherwise benign   Initial blood/urine cultures, serial CXR negative   Unclear relevance of COVID PCR as CXR and patient intubated largely for recent surgery   Hemodynamically stable and nontoxic   Improved  Rec:  · Continue to follow off antibiotics/antifungals  · Follow temperatures and WBC closely  · Supportive care as per the primary service     2   Perforated diverticulitis with abscess    Also complicated by #3   ZCHNSB post ex lap, drainage of abscess, end colostomy, right salpingo oophorectomy and left oophorectomy 1/16   Intraoperative findings notable for perforated diverticulitis with tissue necrosis proximal rectum, ovaries at site of perforation   OR cultures with few Lactobacillus, Enterococcus   Status post 5 days post op, 14 days total antibiotics  Rec:  · Continue to follow off antibiotics/antifungals     3   Right hydroureteronephrosis   Due to #2   Status post cystoscopy with right ureteral stent placement 1/16    Rec:  · Outpatient Urology follow-up     4   Thrush with possible Candida esophagitis   Patient reported dysphagia, choking preoperatively   Status post 14 days fluconazole  Seems improved      5   COVID infection   Unvaccinated but had COVID 12/2020   PCR positive 1/16 Suspect coincidental finding on preoperative PCR   Unable to assess symptoms   CXR without infiltrates and hypoxia may be due to recent intubation, surgery, MO, atelectasis   No treatment indicated  Now on RA, off isolation      6   Postop ileus   Status post NGT replacement, now removed      7   MO   BMI 58      8   Hidradenitis   With multiple wounds in abdominal/groin skin folds   No apparent superinfection      The patient is stable from an ID standpoint    We will sign off for now  Please call with new questions      Antibiotics:  Off antibiotics/antifungals #7  POD #12    Subjective:  Patient seen on AM rounds  Denies fevers, chills, sweats, nausea, vomiting, or diarrhea  24 Hour Events:  No documented fevers, chills, sweats, nausea, vomiting, or diarrhea  Objective:  Vitals:  Temp:  [97 7 °F (36 5 °C)-99 2 °F (37 3 °C)] 97 7 °F (36 5 °C)  HR:  [] 102  Resp:  [16-21] 21  BP: (142-163)/(75-92) 146/86  SpO2:  [92 %-98 %] 98 %  Temp (24hrs), Av 7 °F (37 1 °C), Min:97 7 °F (36 5 °C), Max:99 2 °F (37 3 °C)  Current: Temperature: 97 7 °F (36 5 °C)    Physical Exam:   General:  No acute distress  Psychiatric:  Awake and alert  Pulmonary:  Normal respiratory excursion without accessory muscle use  Abdomen:  Soft, nontender  Extremities:  Chronic nonpitting lymphedema  Skin:  No rashes    Lab Results:  I have personally reviewed pertinent labs  Results from last 7 days   Lab Units 22  0858 22  0532 22  0622 22  0518 22  1201   POTASSIUM mmol/L 4 4 4 1 4 1   < > 4 2   CHLORIDE mmol/L 103 106 108   < > 111*   CO2 mmol/L 26 26 27   < > 26   BUN mg/dL 25 25 21   < > 7   CREATININE mg/dL 0 93 0 90 0 90   < > 0 89   EGFR ml/min/1 73sq m 67 70 70   < > 71   CALCIUM mg/dL 8 9 8 9 8 5   < > 8 4   AST U/L  --   --   --   --  9   ALT U/L  --   --   --   --  11*   ALK PHOS U/L  --   --   --   --  74    < > = values in this interval not displayed  Results from last 7 days   Lab Units 22  0446 22  0532 22  0502   WBC Thousand/uL 10 40* 11 40* 9 86   HEMOGLOBIN g/dL 8 0* 8 2* 8 0*   PLATELETS Thousands/uL 346 314 385           Imaging Studies:   I have personally reviewed pertinent imaging study reports and images in PACS  EKG, Pathology, and Other Studies:   I have personally reviewed pertinent reports

## 2022-01-29 NOTE — PROGRESS NOTES
Progress Note - Raul Aus 60 y o  female MRN: [id number]    Unit/Bed#: [facility] 511-01 Encounter: [id number]      Assessment:  59 y/o COVID+ F w perforated diverticulitis s/p Ortega's procedure on 1/17  Pathology showing Adenocarcinoma  Vitals stable  abd soft, nontender, non distended  Ostomy functional with formed stool in appliance  ADIA x2 minimal serous output  Rectal malecot in place, no output  Plan:  Regular diet  Ambulate  Discharge to rehab when bed available - needs 14 days post COVID diagnosis prior to acceptance  OK for d/c 1/30  Appreciate case management    Subjective:   No acute events overnight  Having bowel function, tolerating diet      Objective:     Vitals: Blood pressure 91/78, pulse 93, temperature (!) 97 4 °F (36 3 °C), temperature source Oral, resp  rate 20, height 5' 7" (1 702 m), weight (!) 168 kg (369 lb 14 9 oz), SpO2 97 %  ,Body mass index is 57 94 kg/m²  Intake/Output Summary (Last 24 hours) at 1/29/2022 0944  Last data filed at 1/29/2022 0918  Gross per 24 hour   Intake 0 ml   Output 1760 ml   Net -1760 ml       Physical Exam  General: NAD  HEENT: NC/AT  MMM  Cv: RRR     Lungs: normal effort  Ab: Soft, NT/ND, midline incision is healing with loose approximation  Stoma is moist and functioning however is flush with skin  Ex: no CCE  Neuro: AAOx3    Scheduled Meds:  Current Facility-Administered Medications   Medication Dose Route Frequency Provider Last Rate    albuterol  2 puff Inhalation Q4H PRN Particia Orts, DO      docusate sodium  100 mg Oral BID Sierra Ash MD      fentanyl citrate (PF)  50 mcg Intravenous Q2H PRN MIRTA Howe      heparin (porcine)  7,500 Units Subcutaneous [facility] MIRTA Lewis      HYDROmorphone  0 2 mg Intravenous Q3H PRN MIRTA Martines      Or    HYDROmorphone  0 5 mg Intravenous Q3H PRN MIRTA Martines      Labetalol HCl  10 mg Intravenous Q4H PRN MIRTA Howe ANTIFUNGAL   Topical BID Wilmer MIRTA Medina      nystatin  500,000 Units Swish & Swallow 4x Daily WilmerMIRTA Mai      nystatin   Topical BID MIRTA Douglas      ondansetron  4 mg Intravenous Q8H PRN Maria Victoria Parkinson MD      pantoprazole  40 mg Oral BID AC Alvera Quarry, DO      saliva substitute  5 spray Mouth/Throat 4x Daily PRN MIRTA Barahona      senna  1 tablet Oral BID Pedro Kang MD       Continuous Infusions:   PRN Meds:   albuterol    fentanyl citrate (PF)    HYDROmorphone **OR** HYDROmorphone    Labetalol HCl    ondansetron    saliva substitute      Invasive Devices  Report    Peripherally Inserted Central Catheter Line            PICC Line 48/68/74 Left Basilic 3 days          Drain            Closed/Suction Drain LLQ Bulb 19 Fr  12 days    Closed/Suction Drain RLQ Bulb 19 Fr  12 days    Colostomy Other (comment) LUQ 12 days    Rectal Tube Without balloon 12 days    External Urinary Catheter Medium 2 days                Lab, Imaging and other studies: I have personally reviewed pertinent reports      VTE Pharmacologic Prophylaxis: Sequential compression device (Venodyne)   VTE Mechanical Prophylaxis: sequential compression device

## 2022-01-29 NOTE — PLAN OF CARE
Problem: Prexisting or High Potential for Compromised Skin Integrity  Goal: Skin integrity is maintained or improved  Description: INTERVENTIONS:  - Identify patients at risk for skin breakdown  - Assess and monitor skin integrity  - Assess and monitor nutrition and hydration status  - Monitor labs   - Assess for incontinence   - Turn and reposition patient  - Assist with mobility/ambulation  - Relieve pressure over bony prominences  - Avoid friction and shearing  - Provide appropriate hygiene as needed including keeping skin clean and dry  - Evaluate need for skin moisturizer/barrier cream  - Collaborate with interdisciplinary team   - Patient/family teaching  - Consider wound care consult   Outcome: Progressing     Problem: MOBILITY - ADULT  Goal: Maintain or return to baseline ADL function  Description: INTERVENTIONS:  -  Assess patient's ability to carry out ADLs; assess patient's baseline for ADL function and identify physical deficits which impact ability to perform ADLs (bathing, care of mouth/teeth, toileting, grooming, dressing, etc )  - Assess/evaluate cause of self-care deficits   - Assess range of motion  - Assess patient's mobility; develop plan if impaired  - Assess patient's need for assistive devices and provide as appropriate  - Encourage maximum independence but intervene and supervise when necessary  - Involve family in performance of ADLs  - Assess for home care needs following discharge   - Consider OT consult to assist with ADL evaluation and planning for discharge  - Provide patient education as appropriate  Outcome: Progressing  Goal: Maintains/Returns to pre admission functional level  Description: INTERVENTIONS:  - Perform BMAT or MOVE assessment daily    - Set and communicate daily mobility goal to care team and patient/family/caregiver  - Collaborate with rehabilitation services on mobility goals if consulted  - Perform Range of Motion 3 times a day    - Reposition patient every 2 hours   - Dangle patient 3 times a day  - Out of bed to chair 3 times a day   - Out of bed for meals 3 times a day  - Out of bed for toileting  - Record patient progress and toleration of activity level   Outcome: Progressing     Problem: Potential for Falls  Goal: Patient will remain free of falls  Description: INTERVENTIONS:  - Educate patient/family on patient safety including physical limitations  - Instruct patient to call for assistance with activity   - Consult OT/PT to assist with strengthening/mobility   - Keep Call bell within reach  - Keep bed low and locked with side rails adjusted as appropriate  - Keep care items and personal belongings within reach  - Initiate and maintain comfort rounds  - Make Fall Risk Sign visible to staff  - Offer Toileting every 2 Hours, in advance of need  - Initiate/Maintain bed alarm  - Apply yellow socks and bracelet for high fall risk patients  - Consider moving patient to room near nurses station  Outcome: Progressing     Problem: GASTROINTESTINAL - ADULT  Goal: Minimal or absence of nausea and/or vomiting  Description: INTERVENTIONS:  - Administer IV fluids if ordered to ensure adequate hydration  - Maintain NPO status until nausea and vomiting are resolved  - Nasogastric tube if ordered  - Administer ordered antiemetic medications as needed  - Provide nonpharmacologic comfort measures as appropriate  - Advance diet as tolerated, if ordered  - Consider nutrition services referral to assist patient with adequate nutrition and appropriate food choices  Outcome: Progressing  Goal: Maintains or returns to baseline bowel function  Description: INTERVENTIONS:  - Assess bowel function  - Encourage oral fluids to ensure adequate hydration  - Administer IV fluids if ordered to ensure adequate hydration  - Administer ordered medications as needed  - Encourage mobilization and activity  - Consider nutritional services referral to assist patient with adequate nutrition and appropriate food choices  Outcome: Progressing  Goal: Maintains adequate nutritional intake  Description: INTERVENTIONS:  - Monitor percentage of each meal consumed  - Identify factors contributing to decreased intake, treat as appropriate  - Assist with meals as needed  - Monitor I&O, weight, and lab values if indicated  - Obtain nutrition services referral as needed  Outcome: Progressing  Goal: Establish and maintain optimal ostomy function  Description: INTERVENTIONS:  - Assess bowel function  - Encourage oral fluids to ensure adequate hydration  - Administer IV fluids if ordered to ensure adequate hydration   - Administer ordered medications as needed  - Encourage mobilization and activity  - Nutrition services referral to assist patient with appropriate food choices  - Assess stoma site  - Consider wound care consult   Outcome: Progressing  Goal: Oral mucous membranes remain intact  Description: INTERVENTIONS  - Assess oral mucosa and hygiene practices  - Implement preventative oral hygiene regimen  - Implement oral medicated treatments as ordered  - Initiate Nutrition services referral as needed  Outcome: Progressing     Problem: GENITOURINARY - ADULT  Goal: Maintains or returns to baseline urinary function  Description: INTERVENTIONS:  - Assess urinary function  - Encourage oral fluids to ensure adequate hydration if ordered  - Administer IV fluids as ordered to ensure adequate hydration  - Administer ordered medications as needed  - Offer frequent toileting  - Follow urinary retention protocol if ordered  Outcome: Progressing     Problem: SKIN/TISSUE INTEGRITY - ADULT  Goal: Skin Integrity remains intact(Skin Breakdown Prevention)  Description: Assess:  -Perform Surendra assessment every 12  -Clean and moisturize skin every 4  -Inspect skin when repositioning, toileting, and assisting with ADLS  -Assess extremities for adequate circulation and sensation     Bed Management:  -Have minimal linens on bed & keep smooth, unwrinkled  -Change linens as needed when moist or perspiring  -Avoid sitting or lying in one position for more than 2 hours while in bed  -Keep HOB at [address]:  -Offer bedside commode  -Assess for incontinence every 2    Activity:  -Mobilize patient 3 times a day  -Encourage activity and walks on unit  -Encourage or provide ROM exercises   -Turn and reposition patient every 2 Hours  -Use appropriate equipment to lift or move patient in bed  -Instruct/ Assist with weight shifting every 2 hour when out of bed in chair  -Consider limitation of chair time 8 hour intervals    Skin Care:  -Avoid use of baby powder, tape, friction and shearing, hot water or constrictive clothing  -Relieve pressure over bony prominences using allevyns  -Do not massage red bony areas  Outcome: Progressing  Goal: Incision(s), wounds(s) or drain site(s) healing without S/S of infection  Description: INTERVENTIONS  - Assess and document dressing, incision, wound bed, drain sites and surrounding tissue  - Provide patient and family education  - Perform skin care/dressing changes as needed  Outcome: Progressing  Goal: Pressure injury heals and does not worsen  Description: Interventions:  - Implement low air loss mattress or specialty surface (Criteria met)  - Apply silicone foam dressing  - Instruct/assist with weight shifting every 120 minutes when in chair   - Limit chair time to 8 hour intervals  - Use special pressure reducing interventions such as allevyns when in chair   - Apply fecal or urinary incontinence containment device   - Perform passive or active ROM every 4  - Turn and reposition patient & offload bony prominences every 2 hours   - Utilize friction reducing device or surface for transfers   - Consider nutrition services referral as needed  Outcome: Progressing     Problem: PAIN - ADULT  Goal: Verbalizes/displays adequate comfort level or baseline comfort level  Description: Interventions:  - Encourage patient to monitor pain and request assistance  - Assess pain using appropriate pain scale  - Administer analgesics based on type and severity of pain and evaluate response  - Implement non-pharmacological measures as appropriate and evaluate response  - Consider cultural and social influences on pain and pain management  - Notify physician/advanced practitioner if interventions unsuccessful or patient reports new pain  Outcome: Progressing     Problem: INFECTION - ADULT  Goal: Absence or prevention of progression during hospitalization  Description: INTERVENTIONS:  - Assess and monitor for signs and symptoms of infection  - Monitor lab/diagnostic results  - Monitor all insertion sites, i e  indwelling lines, tubes, and drains  - Monitor endotracheal if appropriate and nasal secretions for changes in amount and color  - [place] appropriate cooling/warming therapies per order  - Administer medications as ordered  - Instruct and encourage patient and family to use good hand hygiene technique  - Identify and instruct in appropriate isolation precautions for identified infection/condition  Outcome: Progressing     Problem: SAFETY ADULT  Goal: Maintain or return to baseline ADL function  Description: INTERVENTIONS:  -  Assess patient's ability to carry out ADLs; assess patient's baseline for ADL function and identify physical deficits which impact ability to perform ADLs (bathing, care of mouth/teeth, toileting, grooming, dressing, etc )  - Assess/evaluate cause of self-care deficits   - Assess range of motion  - Assess patient's mobility; develop plan if impaired  - Assess patient's need for assistive devices and provide as appropriate  - Encourage maximum independence but intervene and supervise when necessary  - Involve family in performance of ADLs  - Assess for home care needs following discharge   - Consider OT consult to assist with ADL evaluation and planning for discharge  - Provide patient education as appropriate  Outcome: Progressing  Goal: Patient will remain free of falls  Description: INTERVENTIONS:  - Educate patient/family on patient safety including physical limitations  - Instruct patient to call for assistance with activity   - Consult OT/PT to assist with strengthening/mobility   - Keep Call bell within reach  - Keep bed low and locked with side rails adjusted as appropriate  - Keep care items and personal belongings within reach  - Initiate and maintain comfort rounds  - Make Fall Risk Sign visible to staff  - Offer Toileting every 2 Hours, in advance of need  - Initiate/Maintain bed alarm  - Apply yellow socks and bracelet for high fall risk patients  - Consider moving patient to room near nurses station  Outcome: Progressing     Problem: DISCHARGE PLANNING  Goal: Discharge to home or other facility with appropriate resources  Description: INTERVENTIONS:  - Identify barriers to discharge w/patient and caregiver  - Arrange for needed discharge resources and transportation as appropriate  - Identify discharge learning needs (meds, wound care, etc )  - Arrange for interpretive services to assist at discharge as needed  - Refer to Case Management Department for coordinating discharge planning if the patient needs post-hospital services based on physician/advanced practitioner order or complex needs related to functional status, cognitive ability, or social support system  Outcome: Progressing     Problem: Knowledge Deficit  Goal: Patient/family/caregiver demonstrates understanding of disease process, treatment plan, medications, and discharge instructions  Description: Complete learning assessment and assess knowledge base    Interventions:  - Provide teaching at level of understanding  - Provide teaching via preferred learning methods  Outcome: Progressing     Problem: Nutrition/Hydration-ADULT  Goal: Nutrient/Hydration intake appropriate for improving, restoring or maintaining nutritional needs  Description: Monitor and assess patient's nutrition/hydration status for malnutrition  Collaborate with interdisciplinary team and initiate plan and interventions as ordered  Monitor patient's weight and dietary intake as ordered or per policy  Utilize nutrition screening tool and intervene as necessary  Determine patient's food preferences and provide high-protein, high-caloric foods as appropriate       INTERVENTIONS:  - Monitor oral intake, urinary output, labs, and treatment plans  - Assess nutrition and hydration status and recommend course of action  - Evaluate amount of meals eaten  - Assist patient with eating if necessary   - Allow adequate time for meals  - Recommend/ encourage appropriate diets, oral nutritional supplements, and vitamin/mineral supplements  - Order, calculate, and assess calorie counts as needed  - Recommend, monitor, and adjust tube feedings and TPN/PPN based on assessed needs  - Assess need for intravenous fluids  - Provide specific nutrition/hydration education as appropriate  - Include patient/family/caregiver in decisions related to nutrition  Outcome: Progressing     Problem: CHANGE IN BODY IMAGE  Goal: Pt/Family communicate acceptance of loss or change in body image and expresses psychological comfort and peace  Description: INTERVENTIONS:  - Assess patient/family anxiety and grief process related to change in body image, loss of functional status and loss of sense of self  - Assess patient/family's coping skills and provide emotional, spiritual and psychosocial support  - Provide information about the patient's health status with consideration of family and cultural values  - Communicate willingness to discuss loss and facilitate grief process with patient/family as appropriate  - Emphasize sustaining relationships within family system and community, or farzaneh/spiritual traditions  - Refer to community support groups as appropriate  - Initiate Spiritual Care, Pastoral care or other ancillary consults as needed  Outcome: Progressing

## 2022-01-30 NOTE — PHYSICAL THERAPY NOTE
PHYSICAL THERAPY Treatment NOTE    Patient Name: João Miranda  WXACA'A Date: 1/30/2022 01/30/22 2163   PT Last Visit   PT Visit Date 01/30/22   Note Type   Note Type Treatment for insurance authorization   End of Consult   Patient Position at End of Consult Supine; All needs within reach   Pain Assessment   Pain Assessment Tool 0-10   Pain Score 8   Restrictions/Precautions   Weight Bearing Precautions Per Order No   Other Precautions Multiple lines;Telemetry; Fall Risk;Pain  (ADIA drains (2), rectal tube)   General   Chart Reviewed Yes   Additional Pertinent History Pt  is a 59 yo F who presents with perforation of the large intestine; diverticulitis  Family/Caregiver Present No   Cognition   Overall Cognitive Status WFL   Arousal/Participation Lethargic;Cooperative   Attention Attends with cues to redirect   Orientation Level Oriented X4   Memory Within functional limits;Decreased recall of precautions   Following Commands Follows one step commands without difficulty   Comments Pt  was identified with full name and birthdate   Subjective   Subjective Pt  agreeable to PT session   Bed Mobility   Supine to Sit 2  Maximal assistance   Additional items Assist x 2;HOB elevated; Bedrails; Increased time required;Verbal cues;LE management   Sit to Supine 2  Maximal assistance   Additional items Assist x 2;HOB elevated; Bedrails; Increased time required;Verbal cues;LE management   Additional Comments Pt  sat EOB with supervision level assitance for approx  15 mins; complaints of dizzines, BP taken 159/107  Transfers   Sit to Stand 3  Moderate assistance   Additional items Assist x 2; Increased time required   Stand to Sit 3  Moderate assistance   Additional items Assist x 2; Increased time required   Additional Comments Pt  reports increased dizziness following STS transfer and request to return to supine due to not feeling well  Pt  returned safely to supine position and BP assessed, 147/89  Balance   Static Sitting Fair   Dynamic Sitting Fair -   Static Standing Poor   Dynamic Standing Poor   Endurance Deficit   Endurance Deficit Yes   Endurance Deficit Description fatigue and dizziness with mobilithy noted   Activity Tolerance   Activity Tolerance Patient limited by fatigue   Medical Staff Made [id number], OT   Nurse Made Aware spoke to RN, cleared for PT session   Assessment   Prognosis Fair   Problem List Decreased strength;Decreased range of motion;Decreased endurance; Impaired balance;Decreased mobility; Decreased coordination; Impaired judgement;Decreased safety awareness;Pain   Assessment Pt  is a 59 yo F who presents with perforation of the large intestine; diverticulitis  Pt  Agreeable to PT session, Pt  Identified with full name and birthdate  Pt  Demonstrated increased functional independence and increased activity tolerance as evidenced above  Pt  Tolerated sitting on EOB x 15 mins with supervision and demonstrated ability to perform STS transfers with ModAx2 Pt  Is progressing towards goals, however progress is slowed by fatigue and limited activity tolerance and will benefit from continued skilled therapy to increase functional independence and aid patient in return to PLOF with decreased burden of care  D/C recommendation is rehab when medically appropriate  Goals   Patient Goals to get better   LTG Expiration Date 02/02/22   Plan   Treatment/Interventions Functional transfer training;LE strengthening/ROM; Therapeutic exercise; Endurance training;Cognitive reorientation;Patient/family training;Bed mobility; Equipment eval/education;Gait training;Spoke to nursing;OT   Progress Slow progress, decreased activity tolerance   PT Frequency 3-5x/wk   Recommendation   PT Discharge Recommendation Post acute rehabilitation services   Equipment Recommended [address] Mobility Inpatient   Turning in Bed Without Bedrails 1 Lying on Back to Sitting on Edge of Flat Bed 1   Moving Bed to Chair 1   Standing Up From Chair 2   Walk in Room 1   Climb 3-5 Stairs 1   Basic Mobility Inpatient Raw Score 7   Turning Head [place] 4   Follow Simple Instructions 4   Low Function Basic Mobility Raw Score 15   Low Function Basic Mobility Standardized Score 23 9   Highest Level Of Mobility   JH-HL Goal 2: Bed activities/Dependent transfer   JH-HLM Highest Level of Mobility 3: Sit at edge of bed   JH-HLM Goal Achieved Yes   Education   Education Provided Mobility training;Assistive device   Patient Demonstrates acceptance/verbal understanding;Reinforcement needed   End of Consult   Patient Position at End of Consult Supine;Bed/Chair alarm activated; All needs within reach     The patient's AM-PAC Basic Mobility Inpatient Short Form Raw Score is 7  A Raw score of less than or equal to 16 suggests the patient may benefit from discharge to post-acute rehabilitation services  Please also refer to the recommendation of the Physical Therapist for safe discharge planning      Kylie Sanchez, PT, DPT 1/30/2022

## 2022-01-30 NOTE — PLAN OF CARE
Problem: Prexisting or High Potential for Compromised Skin Integrity  Goal: Skin integrity is maintained or improved  Description: INTERVENTIONS:  - Identify patients at risk for skin breakdown  - Assess and monitor skin integrity  - Assess and monitor nutrition and hydration status  - Monitor labs   - Assess for incontinence   - Turn and reposition patient  - Assist with mobility/ambulation  - Relieve pressure over bony prominences  - Avoid friction and shearing  - Provide appropriate hygiene as needed including keeping skin clean and dry  - Evaluate need for skin moisturizer/barrier cream  - Collaborate with interdisciplinary team   - Patient/family teaching  - Consider wound care consult   Outcome: Progressing     Problem: MOBILITY - ADULT  Goal: Maintain or return to baseline ADL function  Description: INTERVENTIONS:  -  Assess patient's ability to carry out ADLs; assess patient's baseline for ADL function and identify physical deficits which impact ability to perform ADLs (bathing, care of mouth/teeth, toileting, grooming, dressing, etc )  - Assess/evaluate cause of self-care deficits   - Assess range of motion  - Assess patient's mobility; develop plan if impaired  - Assess patient's need for assistive devices and provide as appropriate  - Encourage maximum independence but intervene and supervise when necessary  - Involve family in performance of ADLs  - Assess for home care needs following discharge   - Consider OT consult to assist with ADL evaluation and planning for discharge  - Provide patient education as appropriate  Outcome: Progressing  Goal: Maintains/Returns to pre admission functional level  Description: INTERVENTIONS:  - Perform BMAT or MOVE assessment daily    - Set and communicate daily mobility goal to care team and patient/family/caregiver  - Collaborate with rehabilitation services on mobility goals if consulted  - Perform Range of Motion 3 times a day    - Reposition patient every 2 hours   - Dangle patient 3 times a day  - Stand patient 3 times a day  - Ambulate patient 3 times a day  - Out of bed to chair 3 times a day   - Out of bed for meals 3 times a day  - Out of bed for toileting  - Record patient progress and toleration of activity level   Outcome: Progressing     Problem: Potential for Falls  Goal: Patient will remain free of falls  Description: INTERVENTIONS:  - Educate patient/family on patient safety including physical limitations  - Instruct patient to call for assistance with activity   - Consult OT/PT to assist with strengthening/mobility   - Keep Call bell within reach  - Keep bed low and locked with side rails adjusted as appropriate  - Keep care items and personal belongings within reach  - Initiate and maintain comfort rounds  - Make Fall Risk Sign visible to staff  - Offer Toileting every 2 Hours, in advance of need  - Initiate/Maintain bed alarm  - Apply yellow socks and bracelet for high fall risk patients  - Consider moving patient to room near nurses station  Outcome: Progressing     Problem: GASTROINTESTINAL - ADULT  Goal: Minimal or absence of nausea and/or vomiting  Description: INTERVENTIONS:  - Administer IV fluids if ordered to ensure adequate hydration  - Maintain NPO status until nausea and vomiting are resolved  - Nasogastric tube if ordered  - Administer ordered antiemetic medications as needed  - Provide nonpharmacologic comfort measures as appropriate  - Advance diet as tolerated, if ordered  - Consider nutrition services referral to assist patient with adequate nutrition and appropriate food choices  Outcome: Progressing  Goal: Maintains or returns to baseline bowel function  Description: INTERVENTIONS:  - Assess bowel function  - Encourage oral fluids to ensure adequate hydration  - Administer IV fluids if ordered to ensure adequate hydration  - Administer ordered medications as needed  - Encourage mobilization and activity  - Consider nutritional services referral to assist patient with adequate nutrition and appropriate food choices  Outcome: Progressing  Goal: Maintains adequate nutritional intake  Description: INTERVENTIONS:  - Monitor percentage of each meal consumed  - Identify factors contributing to decreased intake, treat as appropriate  - Assist with meals as needed  - Monitor I&O, weight, and lab values if indicated  - Obtain nutrition services referral as needed  Outcome: Progressing  Goal: Establish and maintain optimal ostomy function  Description: INTERVENTIONS:  - Assess bowel function  - Encourage oral fluids to ensure adequate hydration  - Administer IV fluids if ordered to ensure adequate hydration   - Administer ordered medications as needed  - Encourage mobilization and activity  - Nutrition services referral to assist patient with appropriate food choices  - Assess stoma site  - Consider wound care consult   Outcome: Progressing  Goal: Oral mucous membranes remain intact  Description: INTERVENTIONS  - Assess oral mucosa and hygiene practices  - Implement preventative oral hygiene regimen  - Implement oral medicated treatments as ordered  - Initiate Nutrition services referral as needed  Outcome: Progressing     Problem: GENITOURINARY - ADULT  Goal: Maintains or returns to baseline urinary function  Description: INTERVENTIONS:  - Assess urinary function  - Encourage oral fluids to ensure adequate hydration if ordered  - Administer IV fluids as ordered to ensure adequate hydration  - Administer ordered medications as needed  - Offer frequent toileting  - Follow urinary retention protocol if ordered  Outcome: Progressing     Problem: SKIN/TISSUE INTEGRITY - ADULT  Goal: Skin Integrity remains intact(Skin Breakdown Prevention)  Description: Assess:  -Perform Surendra assessment every 12  -Clean and moisturize skin every 4  -Inspect skin when repositioning, toileting, and assisting with ADLS  -Assess extremities for adequate circulation and sensation Bed Management:  -Have minimal linens on bed & keep smooth, unwrinkled  -Change linens as needed when moist or perspiring  -Avoid sitting or lying in one position for more than 2 hours while in bed  -Keep HOB at 30degrees     Toileting:  -Offer bedside commode  -Assess for incontinence every 2    Activity:  -Mobilize patient 3 times a day  -Encourage activity and walks on unit  -Encourage or provide ROM exercises   -Turn and reposition patient every 2 Hours  -Use appropriate equipment to lift or move patient in bed  -Instruct/ Assist with weight shifting every 2 hour when out of bed in chair  -Consider limitation of chair time 8 hour intervals    Skin Care:  -Avoid use of baby powder, tape, friction and shearing, hot water or constrictive clothing  -Relieve pressure over bony prominences using allevyns  -Do not massage red bony areas  Outcome: Progressing  Goal: Incision(s), wounds(s) or drain site(s) healing without S/S of infection  Description: INTERVENTIONS  - Assess and document dressing, incision, wound bed, drain sites and surrounding tissue  - Provide patient and family education  - Perform skin care/dressing changes as needed  Outcome: Progressing  Goal: Pressure injury heals and does not worsen  Description: Interventions:  - Implement low air loss mattress or specialty surface (Criteria met)  - Apply silicone foam dressing  - Instruct/assist with weight shifting every 120 minutes when in chair   - Limit chair time to 8 hour intervals  - Use special pressure reducing interventions such as allevyns when in chair   - Apply fecal or urinary incontinence containment device   - Perform passive or active ROM every 4  - Turn and reposition patient & offload bony prominences every 2 hours   - Utilize friction reducing device or surface for transfers   - Consider nutrition services referral as needed  Outcome: Progressing     Problem: PAIN - ADULT  Goal: Verbalizes/displays adequate comfort level or baseline comfort level  Description: Interventions:  - Encourage patient to monitor pain and request assistance  - Assess pain using appropriate pain scale  - Administer analgesics based on type and severity of pain and evaluate response  - Implement non-pharmacological measures as appropriate and evaluate response  - Consider cultural and social influences on pain and pain management  - Notify physician/advanced practitioner if interventions unsuccessful or patient reports new pain  Outcome: Progressing     Problem: INFECTION - ADULT  Goal: Absence or prevention of progression during hospitalization  Description: INTERVENTIONS:  - Assess and monitor for signs and symptoms of infection  - Monitor lab/diagnostic results  - Monitor all insertion sites, i e  indwelling lines, tubes, and drains  - Monitor endotracheal if appropriate and nasal secretions for changes in amount and color  - [place] appropriate cooling/warming therapies per order  - Administer medications as ordered  - Instruct and encourage patient and family to use good hand hygiene technique  - Identify and instruct in appropriate isolation precautions for identified infection/condition  Outcome: Progressing     Problem: SAFETY ADULT  Goal: Maintain or return to baseline ADL function  Description: INTERVENTIONS:  -  Assess patient's ability to carry out ADLs; assess patient's baseline for ADL function and identify physical deficits which impact ability to perform ADLs (bathing, care of mouth/teeth, toileting, grooming, dressing, etc )  - Assess/evaluate cause of self-care deficits   - Assess range of motion  - Assess patient's mobility; develop plan if impaired  - Assess patient's need for assistive devices and provide as appropriate  - Encourage maximum independence but intervene and supervise when necessary  - Involve family in performance of ADLs  - Assess for home care needs following discharge   - Consider OT consult to assist with ADL evaluation and planning for discharge  - Provide patient education as appropriate  Outcome: Progressing  Goal: Maintains/Returns to pre admission functional level  Description: INTERVENTIONS:  - Perform BMAT or MOVE assessment daily    - Set and communicate daily mobility goal to care team and patient/family/caregiver  - Collaborate with rehabilitation services on mobility goals if consulted  - Perform Range of Motion 3 times a day  - Reposition patient every 2 hours    - Dangle patient 3 times a day  - Stand patient 3 times a day  - Ambulate patient 3 times a day  - Out of bed to chair 3 times a day   - Out of bed for meals 3 times a day  - Out of bed for toileting  - Record patient progress and toleration of activity level   Outcome: Progressing  Goal: Patient will remain free of falls  Description: INTERVENTIONS:  - Educate patient/family on patient safety including physical limitations  - Instruct patient to call for assistance with activity   - Consult OT/PT to assist with strengthening/mobility   - Keep Call bell within reach  - Keep bed low and locked with side rails adjusted as appropriate  - Keep care items and personal belongings within reach  - Initiate and maintain comfort rounds  - Make Fall Risk Sign visible to staff  - Offer Toileting every 2 Hours, in advance of need  - Initiate/Maintain bed alarm  - Apply yellow socks and bracelet for high fall risk patients  - Consider moving patient to room near nurses station  Outcome: Progressing     Problem: DISCHARGE PLANNING  Goal: Discharge to home or other facility with appropriate resources  Description: INTERVENTIONS:  - Identify barriers to discharge w/patient and caregiver  - Arrange for needed discharge resources and transportation as appropriate  - Identify discharge learning needs (meds, wound care, etc )  - Arrange for interpretive services to assist at discharge as needed  - Refer to Case Management Department for coordinating discharge planning if the patient needs post-hospital services based on physician/advanced practitioner order or complex needs related to functional status, cognitive ability, or social support system  Outcome: Progressing     Problem: Knowledge Deficit  Goal: Patient/family/caregiver demonstrates understanding of disease process, treatment plan, medications, and discharge instructions  Description: Complete learning assessment and assess knowledge base  Interventions:  - Provide teaching at level of understanding  - Provide teaching via preferred learning methods  Outcome: Progressing     Problem: Nutrition/Hydration-ADULT  Goal: Nutrient/Hydration intake appropriate for improving, restoring or maintaining nutritional needs  Description: Monitor and assess patient's nutrition/hydration status for malnutrition  Collaborate with interdisciplinary team and initiate plan and interventions as ordered  Monitor patient's weight and dietary intake as ordered or per policy  Utilize nutrition screening tool and intervene as necessary  Determine patient's food preferences and provide high-protein, high-caloric foods as appropriate       INTERVENTIONS:  - Monitor oral intake, urinary output, labs, and treatment plans  - Assess nutrition and hydration status and recommend course of action  - Evaluate amount of meals eaten  - Assist patient with eating if necessary   - Allow adequate time for meals  - Recommend/ encourage appropriate diets, oral nutritional supplements, and vitamin/mineral supplements  - Order, calculate, and assess calorie counts as needed  - Recommend, monitor, and adjust tube feedings and TPN/PPN based on assessed needs  - Assess need for intravenous fluids  - Provide specific nutrition/hydration education as appropriate  - Include patient/family/caregiver in decisions related to nutrition  Outcome: Progressing     Problem: CHANGE IN BODY IMAGE  Goal: Pt/Family communicate acceptance of loss or change in body image and expresses psychological comfort and peace  Description: INTERVENTIONS:  - Assess patient/family anxiety and grief process related to change in body image, loss of functional status and loss of sense of self  - Assess patient/family's coping skills and provide emotional, spiritual and psychosocial support  - Provide information about the patient's health status with consideration of family and cultural values  - Communicate willingness to discuss loss and facilitate grief process with patient/family as appropriate  - Emphasize sustaining relationships within family system and community, or farzaneh/spiritual traditions  - Refer to community support groups as appropriate  - Initiate Spiritual Care, Pastoral care or other ancillary consults as needed  Outcome: Progressing

## 2022-01-30 NOTE — RESTORATIVE TECHNICIAN NOTE
Restorative Technician Note      Patient Name: Rosalia Cadet     Note Type: Mobility (Pt defering OOB at this time due to dizzyness)

## 2022-01-30 NOTE — PLAN OF CARE
Problem: OCCUPATIONAL THERAPY ADULT  Goal: Performs self-care activities at highest level of function for planned discharge setting  See evaluation for individualized goals  Description: Treatment Interventions: ADL retraining,Functional transfer training,UE strengthening/ROM,Endurance training,Cognitive reorientation,Patient/family training,Equipment evaluation/education,Compensatory technique education,Continued evaluation,Energy conservation,Activityengagement          See flowsheet documentation for full assessment, interventions and recommendations  Outcome: Progressing  Note: Limitation: Decreased ADL status,Decreased UE strength,Decreased Safe judgement during ADL,Decreased cognition,Decreased endurance,Decreased self-care trans,Decreased high-level ADLs  Prognosis: Fair  Assessment: Upon arrival of OT, pt was supine in bed  Pt participated in skilled OT session this date with interventions consisting of ADL re training with the use of correct body mechnaics, safety awareness and fall prevention techniques and  functional transfer training*   In comparison to previous session, pt demonstrates improvements in activity tolerance  Pt continues to be functioning below baseline level  Pt demonstrated the following tasks: maxA X2 supine to sit and modA X2 sit to stand, additional mobility and transfers deferred as pt c/o diziness and fatigue  BP taken both sitting (159/107) and supine (147/89)  RN made aware  Pt completed the following ADL sitting EOB: grooming w SUP, UB bathing w Rafael, UB dressing w Rafael, and LB bathing w maxA  Occupational performance remains limited secondary to factors listed above and increased risk for falls and injury  From OT standpoint, recommendation at time of d/c would be Short Term Rehab   Patient to benefit from continued Occupational Therapy treatment while in the hospital to address deficits as defined above and maximize level of functional independence with ADLs and functional mobility  Upon completion of OT session pt was left supine in bed w all current needs met and call bell within reach  The patient's raw score on the AM-PAC Daily Activity inpatient short form is 15, standardized score is 34 69, less than 39 4  Patients at this level are likely to benefit from discharge to post-acute rehabilitation services  Please refer to the recommendation of the Occupational Therapist for safe discharge planning       OT Discharge Recommendation: Post acute rehabilitation services  OT - OK to Discharge: Yes (When medically appropriate)

## 2022-01-30 NOTE — OCCUPATIONAL THERAPY NOTE
Occupational Therapy Progress Note     Patient Name: Lisa Epperson  [id number] Date: 1/30/2022  Problem List  Principal Problem:    Diverticulitis of large intestine with perforation  Active Problems:    Lymphedema    Primary hypertension    Morbid obesity with BMI of 60 0-69 9, adult ([address] )    Hidradenitis suppurativa    GERD (gastroesophageal reflux disease)    Right Hydroureteronephrosis            01/30/22 0928   OT Last Visit   OT Visit Date 01/30/22   Note Type   Note Type Treatment for insurance authorization   Restrictions/Precautions   Weight Bearing Precautions Per Order No   Braces or Orthoses   (None )   Other Precautions Multiple lines;Telemetry; Fall Risk;Pain  (ADIA drain X2)   General   Response to Previous Treatment Patient with no complaints from previous session   Lifestyle   Autonomy Pt reports being I w/ ADLS/IADLs, Mod I w/ transfers/functional mobility w/ use of crutches   Reciprocal Relationships Lives alone; reports no social supports but has son watching her dog   Service to Others On disability   Intrinsic Gratification Likes spending time w/ her dog   Pain Assessment   Pain Assessment Tool 0-10   Pain Score 8   ADL   Where Assessed Edge of bed   Grooming Assistance 5  Supervision/Setup   Grooming Deficit Setup;Supervision/safety; Increased time to complete;Wash/dry hands; Wash/dry face   Grooming Comments Pt completed sitting EOB w SUP    UB Bathing Assistance 4  Minimal Assistance   UB Bathing Deficit Setup;Verbal cueing;Supervision/safety; Increased time to complete   UB Bathing Comments Pt completed w Rafael sitting EOB    LB Bathing Assistance 2  Maximal Assistance   LB Bathing Deficit Setup;Verbal cueing;Supervision/safety; Increased time to complete   LB Bathing Comments Pt required maxA to complete sitting EOB    UB Dressing Assistance 4  Minimal Assistance   UB Dressing Deficit Setup; Increased time to complete; Thread RUE; Thread LUE   UB Dressing Comments [facility]OB    Bed Mobility   Supine to Sit 2  Maximal assistance   Additional items Assist x 2;HOB elevated; Bedrails; Increased time required;Verbal cues;LE management   Sit to Supine 2  Maximal assistance   Additional items Assist x 2;HOB elevated; Bedrails; Increased time required;Verbal cues;LE management   Additional Comments Pt sat EOB w SUP for ~15 min; pt c/o dizziness; BP taken and RN made aware   Transfers   Sit to Stand 3  Moderate assistance   Additional items Assist x 2; Increased time required;Verbal cues   Stand to Sit 3  Moderate assistance   Additional items Assist x 2; Increased time required;Verbal cues   Additional Comments RW used for transfers    Functional Mobility   Additional Comments unable to assess this date; pt c/o dizziness and fatigue    Cognition   Overall Cognitive Status WFL   Arousal/Participation Lethargic;Cooperative   Attention Attends with cues to redirect   Orientation Level Oriented X4   Memory Within functional limits;Decreased recall of precautions   Following Commands Follows one step commands without difficulty   Comments Pt overall cooperative and pleasant this date; pt lethargic and requiring increased VC to attend to task and engage; pt c/o of fatigue and dizziness   Activity Tolerance   Activity Tolerance Patient limited by fatigue   Medical Staff Made Aware Co-treat w PT 2* assistance required for skilled trasnfers; RN cleared pt for OT treat    Assessment   Assessment Upon arrival of OT, pt was supine in bed  Pt participated in skilled OT session this date with interventions consisting of ADL re training with the use of correct body mechnaics, safety awareness and fall prevention techniques and  functional transfer training*   In comparison to previous session, pt demonstrates improvements in activity tolerance  Pt continues to be functioning below baseline level   Pt demonstrated the following tasks: maxA X2 supine to sit and modA X2 sit to stand, additional mobility and transfers deferred as pt c/o diziness and fatigue  BP taken both sitting (159/107) and supine (147/89)  RN made aware  Pt completed the following ADL sitting EOB: grooming w SUP, UB bathing w Rafael, UB dressing w Rafael, and LB bathing w maxA  Occupational performance remains limited secondary to factors listed above and increased risk for falls and injury  From OT standpoint, recommendation at time of d/c would be Short Term Rehab  Patient to benefit from continued Occupational Therapy treatment while in the hospital to address deficits as defined above and maximize level of functional independence with ADLs and functional mobility  Upon completion of OT session pt was left supine in bed w all current needs met and call bell within reach  The patient's raw score on the AM-PAC Daily Activity inpatient short form is 15, standardized score is 34 69, less than 39 4  Patients at this level are likely to benefit from discharge to post-acute rehabilitation services  Please refer to the recommendation of the Occupational Therapist for safe discharge planning     Plan   Treatment Interventions ADL retraining;Functional transfer training   Goal Expiration Date 02/02/22   OT Treatment Day 4   OT Frequency 3-5x/wk   Recommendation   OT Discharge Recommendation Post acute rehabilitation services   OT - OK to Discharge Yes  (When medically appropriate)   AM-PAC Daily Activity Inpatient   Lower Body Dressing 2   Bathing 2   Toileting 2   Upper Body Dressing 3   Grooming 3   Eating 3   Daily Activity Raw Score 15   Daily Activity Standardized Score (Calc for Raw Score >=11) 34 69   AM-PAC Applied Cognition Inpatient   Following a Speech/Presentation 3   Understanding Ordinary Conversation 4   Taking Medications 3   Remembering Where Things Are Placed or Put Away 3   Remembering List of 4-5 Errands 3   Taking Care of Complicated Tasks 3   Applied Cognition Raw Score 19   Applied Cognition Standardized Score 39 77         Lucas Honeycutt OTR/L

## 2022-01-30 NOTE — PLAN OF CARE
Problem: PHYSICAL THERAPY ADULT  Goal: Performs mobility at highest level of function for planned discharge setting  See evaluation for individualized goals  Description: Treatment/Interventions: Functional transfer training,LE strengthening/ROM,Therapeutic exercise,Endurance training,Cognitive reorientation,Patient/family training,Bed mobility,Equipment eval/education,Gait training,Spoke to nursing,OT  Equipment Recommended: Merlin Sovereign       See flowsheet documentation for full assessment, interventions and recommendations  Outcome: Progressing  Note: Prognosis: Fair  Problem List: Decreased strength,Decreased range of motion,Decreased endurance,Impaired balance,Decreased mobility,Decreased coordination,Impaired judgement,Decreased safety awareness,Pain  Assessment: Pt  is a 61 yo F who presents with perforation of the large intestine; diverticulitis  Pt  Agreeable to PT session, Pt  Identified with full name and birthdate  Pt  Demonstrated increased functional independence and increased activity tolerance as evidenced above  Pt  Tolerated sitting on EOB x 15 mins with supervision and demonstrated ability to perform STS transfers with ModAx2 Pt  Is progressing towards goals, however progress is slowed by fatigue and limited activity tolerance and will benefit from continued skilled therapy to increase functional independence and aid patient in return to PLOF with decreased burden of care  D/C recommendation is rehab when medically appropriate  Barriers to Discharge: Inaccessible home environment,Decreased caregiver support        PT Discharge Recommendation: Post acute rehabilitation services          See flowsheet documentation for full assessment

## 2022-01-30 NOTE — PROGRESS NOTES
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     Tested positive for COVID on 1/16, postponing discharge until 14d after positive test      Plan:  Regular diet  Bowel regimen  Off IVF  Off Abx  OOB / Ambulate / IS  Discharge today to rehab if possible, 14 d after positive COVID test  SQH while admitted    Subjective/Objective     Subjective: No major complaints, endorses some discomfort with PO intake that goes away if she slows down or eats soft food  Feels well overall, ready for discharge      Objective:     Physical Exam:  GEN: NAD  HEENT: MMM  CV: RRR  Lung: Normal effort  Ab: Soft, ND/NT, stool in colostomy bag  L ADIA SS, R ADIA serous, rectal malecot with feculent output  Extrem: No CCE   Neuro: A+Ox3     Vitals: Temp:  [97 4 °F (36 3 °C)-98 3 °F (36 8 °C)] 98 3 °F (36 8 °C)  HR:  [91-97] 93  Resp:  [18-22] 18  BP: ()/(78-93) 126/90  Body mass index is 57 94 kg/m²  I/O       01/28 0701  01/29 0700 01/29 0701  01/30 0700    P  O  118 0    I V  (mL/kg) 146 7 (0 9)     NG/GT 0      1     Total Intake(mL/kg) 432 8 (2 6) 0 (0)    Urine (mL/kg/hr) 1900 (0 5) 1600 (0 6)    Drains 3 10    Stool 50 0    Total Output 1953 1610    Net -1520 3 -1610          Unmeasured Stool Occurrence 1 x             Lab, Imaging and other studies: I have personally reviewed pertinent reports    , CBC with diff:   Lab Results   Component Value Date    WBC 9 79 01/29/2022    HGB 8 3 (L) 01/29/2022    HCT 28 0 (L) 01/29/2022    MCV 86 01/29/2022     01/29/2022    MCH 25 5 (L) 01/29/2022    MCHC 29 6 (L) 01/29/2022    RDW 21 3 (H) 01/29/2022    MPV 9 3 01/29/2022   , BMP/CMP:   Lab Results   Component Value Date    SODIUM 136 01/29/2022    K 4 4 01/29/2022     01/29/2022 CO2 27 01/29/2022    BUN 20 01/29/2022    CREATININE 0 92 01/29/2022    CALCIUM 9 5 01/29/2022    EGFR 68 01/29/2022     VTE Pharmacologic Prophylaxis: Heparin  VTE Mechanical Prophylaxis: sequential compression device      Steve Silveira MD  1/29/2022 11:06 PM

## 2022-01-31 NOTE — PHYSICAL THERAPY NOTE
Physical Therapy Cancellation Note       01/31/22 1447   PT Last Visit   PT Visit Date 01/31/22   Note Type   Note Type Cancelled Session     PT consult received  Chart reviewed  Attempted to see patient however she refused, reported "I am too tired"  PT will continue to follow on caseload and perform future treatments as medically appropriate        TAMMY JEFFERSON PT, DPT

## 2022-01-31 NOTE — CASE MANAGEMENT
Case Management Discharge Planning Note    Patient name Lucinda Rojas  Location [address] [place] [address] 511/PPHP [id number] MRN [id number]  : 1965 Date 2022       Current Admission Date: 2022  Current Admission Diagnosis:Diverticulitis of large intestine with perforation   Patient Active Problem List    Diagnosis Date Noted    Dysphagia 2022    Acute renal failure (ARF) ([facility] ) 2022    Cellulitis of abdominal wall, chest wall and groin with wounds 2022    Diverticulitis of large intestine with perforation 2022    Right Hydroureteronephrosis 2022    Subacute vaginitis 2022    GERD (gastroesophageal reflux disease)     Other specified anxiety disorders 2019    Lumbar paraspinal muscle spasm 2019    Borderline hyperlipidemia 2019    Iron deficiency 2019    Chronic midline low back pain with left-sided sciatica 2019    Primary hypertension 2019    Morbid obesity with BMI of 60 0-69 9, adult ([facility] ) 2019    Hidradenitis suppurativa 2019    Lymphedema 2006      LOS (days): 15  Geometric Mean LOS (GMLOS) (days):   Days to GMLOS:     OBJECTIVE:  Risk of Unplanned Readmission Score: 19         Current admission status: Inpatient   Preferred Pharmacy:   [facility] [address] [place]/ [facility]   Phone: [phone] Fax: [phone]    Primary Care Provider: Renée Kyle PA-C    Primary Insurance: [facility]  Secondary Insurance:     DISCHARGE DETAILS:    Discharge planning discussed with[de-identified] patient  Freedom of Choice: Yes  Comments - Freedom of Choice: Patient has no accepting snf  met with patient brandy discuss need to expand snf search  Additional [facility] referrals were made  Other Referral/Resources/Interventions Provided:  Referral Comments: [facility] referrals made to additional facilities  Patient asking if she can go directly home  reviewed with her her medial needs and therapy notes and discussed this would not be safe dc plan

## 2022-01-31 NOTE — PLAN OF CARE
Problem: Prexisting or High Potential for Compromised Skin Integrity  Goal: Skin integrity is maintained or improved  Description: INTERVENTIONS:  - Identify patients at risk for skin breakdown  - Assess and monitor skin integrity  - Assess and monitor nutrition and hydration status  - Monitor labs   - Assess for incontinence   - Turn and reposition patient  - Assist with mobility/ambulation  - Relieve pressure over bony prominences  - Avoid friction and shearing  - Provide appropriate hygiene as needed including keeping skin clean and dry  - Evaluate need for skin moisturizer/barrier cream  - Collaborate with interdisciplinary team   - Patient/family teaching  - Consider wound care consult   Outcome: Progressing     Problem: MOBILITY - ADULT  Goal: Maintain or return to baseline ADL function  Description: INTERVENTIONS:  -  Assess patient's ability to carry out ADLs; assess patient's baseline for ADL function and identify physical deficits which impact ability to perform ADLs (bathing, care of mouth/teeth, toileting, grooming, dressing, etc )  - Assess/evaluate cause of self-care deficits   - Assess range of motion  - Assess patient's mobility; develop plan if impaired  - Assess patient's need for assistive devices and provide as appropriate  - Encourage maximum independence but intervene and supervise when necessary  - Involve family in performance of ADLs  - Assess for home care needs following discharge   - Consider OT consult to assist with ADL evaluation and planning for discharge  - Provide patient education as appropriate  Outcome: Progressing  Goal: Maintains/Returns to pre admission functional level  Description: INTERVENTIONS:  - Perform BMAT or MOVE assessment daily    - Set and communicate daily mobility goal to care team and patient/family/caregiver  - Collaborate with rehabilitation services on mobility goals if consulted  - Perform Range of Motion 3 times a day    - Reposition patient every 2 hours   - Dangle patient 3 times a day  - Stand patient 3 times a day  - Ambulate patient 3 times a day  - Out of bed to chair 3 times a day   - Out of bed for meals 3 times a day  - Out of bed for toileting  - Record patient progress and toleration of activity level   Outcome: Progressing     Problem: Potential for Falls  Goal: Patient will remain free of falls  Description: INTERVENTIONS:  - Educate patient/family on patient safety including physical limitations  - Instruct patient to call for assistance with activity   - Consult OT/PT to assist with strengthening/mobility   - Keep Call bell within reach  - Keep bed low and locked with side rails adjusted as appropriate  - Keep care items and personal belongings within reach  - Initiate and maintain comfort rounds  - Make Fall Risk Sign visible to staff  - Offer Toileting every 2 Hours, in advance of need  - Initiate/Maintain bed alarm  - Apply yellow socks and bracelet for high fall risk patients  - Consider moving patient to room near nurses station  Outcome: Progressing     Problem: GASTROINTESTINAL - ADULT  Goal: Minimal or absence of nausea and/or vomiting  Description: INTERVENTIONS:  - Administer IV fluids if ordered to ensure adequate hydration  - Maintain NPO status until nausea and vomiting are resolved  - Nasogastric tube if ordered  - Administer ordered antiemetic medications as needed  - Provide nonpharmacologic comfort measures as appropriate  - Advance diet as tolerated, if ordered  - Consider nutrition services referral to assist patient with adequate nutrition and appropriate food choices  Outcome: Progressing  Goal: Maintains or returns to baseline bowel function  Description: INTERVENTIONS:  - Assess bowel function  - Encourage oral fluids to ensure adequate hydration  - Administer IV fluids if ordered to ensure adequate hydration  - Administer ordered medications as needed  - Encourage mobilization and activity  - Consider nutritional services referral to assist patient with adequate nutrition and appropriate food choices  Outcome: Progressing  Goal: Maintains adequate nutritional intake  Description: INTERVENTIONS:  - Monitor percentage of each meal consumed  - Identify factors contributing to decreased intake, treat as appropriate  - Assist with meals as needed  - Monitor I&O, weight, and lab values if indicated  - Obtain nutrition services referral as needed  Outcome: Progressing  Goal: Establish and maintain optimal ostomy function  Description: INTERVENTIONS:  - Assess bowel function  - Encourage oral fluids to ensure adequate hydration  - Administer IV fluids if ordered to ensure adequate hydration   - Administer ordered medications as needed  - Encourage mobilization and activity  - Nutrition services referral to assist patient with appropriate food choices  - Assess stoma site  - Consider wound care consult   Outcome: Progressing  Goal: Oral mucous membranes remain intact  Description: INTERVENTIONS  - Assess oral mucosa and hygiene practices  - Implement preventative oral hygiene regimen  - Implement oral medicated treatments as ordered  - Initiate Nutrition services referral as needed  Outcome: Progressing     Problem: GENITOURINARY - ADULT  Goal: Maintains or returns to baseline urinary function  Description: INTERVENTIONS:  - Assess urinary function  - Encourage oral fluids to ensure adequate hydration if ordered  - Administer IV fluids as ordered to ensure adequate hydration  - Administer ordered medications as needed  - Offer frequent toileting  - Follow urinary retention protocol if ordered  Outcome: Progressing     Problem: SKIN/TISSUE INTEGRITY - ADULT  Goal: Skin Integrity remains intact(Skin Breakdown Prevention)  Description: Assess:  -Perform Surendra assessment every 12  -Clean and moisturize skin every 4  -Inspect skin when repositioning, toileting, and assisting with ADLS  -Assess extremities for adequate circulation and sensation Bed Management:  -Have minimal linens on bed & keep smooth, unwrinkled  -Change linens as needed when moist or perspiring  -Avoid sitting or lying in one position for more than 2 hours while in bed  -Keep HOB at 30degrees     Toileting:  -Offer bedside commode  -Assess for incontinence every 2    Activity:  -Mobilize patient 3 times a day  -Encourage activity and walks on unit  -Encourage or provide ROM exercises   -Turn and reposition patient every 2 Hours  -Use appropriate equipment to lift or move patient in bed  -Instruct/ Assist with weight shifting every 2 hour when out of bed in chair  -Consider limitation of chair time 8 hour intervals    Skin Care:  -Avoid use of baby powder, tape, friction and shearing, hot water or constrictive clothing  -Relieve pressure over bony prominences using allevyns  -Do not massage red bony areas  Outcome: Progressing  Goal: Incision(s), wounds(s) or drain site(s) healing without S/S of infection  Description: INTERVENTIONS  - Assess and document dressing, incision, wound bed, drain sites and surrounding tissue  - Provide patient and family education  - Perform skin care/dressing changes as needed  Outcome: Progressing  Goal: Pressure injury heals and does not worsen  Description: Interventions:  - Implement low air loss mattress or specialty surface (Criteria met)  - Apply silicone foam dressing  - Instruct/assist with weight shifting every 120 minutes when in chair   - Limit chair time to 8 hour intervals  - Use special pressure reducing interventions such as allevyns when in chair   - Apply fecal or urinary incontinence containment device   - Perform passive or active ROM every 4  - Turn and reposition patient & offload bony prominences every 2 hours   - Utilize friction reducing device or surface for transfers   - Consider nutrition services referral as needed  Outcome: Progressing     Problem: PAIN - ADULT  Goal: Verbalizes/displays adequate comfort level or baseline comfort level  Description: Interventions:  - Encourage patient to monitor pain and request assistance  - Assess pain using appropriate pain scale  - Administer analgesics based on type and severity of pain and evaluate response  - Implement non-pharmacological measures as appropriate and evaluate response  - Consider cultural and social influences on pain and pain management  - Notify physician/advanced practitioner if interventions unsuccessful or patient reports new pain  Outcome: Progressing     Problem: INFECTION - ADULT  Goal: Absence or prevention of progression during hospitalization  Description: INTERVENTIONS:  - Assess and monitor for signs and symptoms of infection  - Monitor lab/diagnostic results  - Monitor all insertion sites, i e  indwelling lines, tubes, and drains  - Monitor endotracheal if appropriate and nasal secretions for changes in amount and color  - Kenvir appropriate cooling/warming therapies per order  - Administer medications as ordered  - Instruct and encourage patient and family to use good hand hygiene technique  - Identify and instruct in appropriate isolation precautions for identified infection/condition  Outcome: Progressing     Problem: SAFETY ADULT  Goal: Maintain or return to baseline ADL function  Description: INTERVENTIONS:  -  Assess patient's ability to carry out ADLs; assess patient's baseline for ADL function and identify physical deficits which impact ability to perform ADLs (bathing, care of mouth/teeth, toileting, grooming, dressing, etc )  - Assess/evaluate cause of self-care deficits   - Assess range of motion  - Assess patient's mobility; develop plan if impaired  - Assess patient's need for assistive devices and provide as appropriate  - Encourage maximum independence but intervene and supervise when necessary  - Involve family in performance of ADLs  - Assess for home care needs following discharge   - Consider OT consult to assist with ADL evaluation and planning for discharge  - Provide patient education as appropriate  Outcome: Progressing  Goal: Maintains/Returns to pre admission functional level  Description: INTERVENTIONS:  - Perform BMAT or MOVE assessment daily    - Set and communicate daily mobility goal to care team and patient/family/caregiver  - Collaborate with rehabilitation services on mobility goals if consulted  - Perform Range of Motion 3 times a day  - Reposition patient every 2 hours    - Dangle patient 3 times a day  - Stand patient 3 times a day  - Ambulate patient 3 times a day  - Out of bed to chair 3 times a day   - Out of bed for meals 3 times a day  - Out of bed for toileting  - Record patient progress and toleration of activity level   Outcome: Progressing  Goal: Patient will remain free of falls  Description: INTERVENTIONS:  - Educate patient/family on patient safety including physical limitations  - Instruct patient to call for assistance with activity   - Consult OT/PT to assist with strengthening/mobility   - Keep Call bell within reach  - Keep bed low and locked with side rails adjusted as appropriate  - Keep care items and personal belongings within reach  - Initiate and maintain comfort rounds  - Make Fall Risk Sign visible to staff  - Offer Toileting every 2 Hours, in advance of need  - Initiate/Maintain bed alarm  - Apply yellow socks and bracelet for high fall risk patients  - Consider moving patient to room near nurses station  Outcome: Progressing     Problem: DISCHARGE PLANNING  Goal: Discharge to home or other facility with appropriate resources  Description: INTERVENTIONS:  - Identify barriers to discharge w/patient and caregiver  - Arrange for needed discharge resources and transportation as appropriate  - Identify discharge learning needs (meds, wound care, etc )  - Arrange for interpretive services to assist at discharge as needed  - Refer to Case Management Department for coordinating discharge planning if the patient needs post-hospital services based on physician/advanced practitioner order or complex needs related to functional status, cognitive ability, or social support system  Outcome: Progressing     Problem: Knowledge Deficit  Goal: Patient/family/caregiver demonstrates understanding of disease process, treatment plan, medications, and discharge instructions  Description: Complete learning assessment and assess knowledge base  Interventions:  - Provide teaching at level of understanding  - Provide teaching via preferred learning methods  Outcome: Progressing     Problem: Nutrition/Hydration-ADULT  Goal: Nutrient/Hydration intake appropriate for improving, restoring or maintaining nutritional needs  Description: Monitor and assess patient's nutrition/hydration status for malnutrition  Collaborate with interdisciplinary team and initiate plan and interventions as ordered  Monitor patient's weight and dietary intake as ordered or per policy  Utilize nutrition screening tool and intervene as necessary  Determine patient's food preferences and provide high-protein, high-caloric foods as appropriate       INTERVENTIONS:  - Monitor oral intake, urinary output, labs, and treatment plans  - Assess nutrition and hydration status and recommend course of action  - Evaluate amount of meals eaten  - Assist patient with eating if necessary   - Allow adequate time for meals  - Recommend/ encourage appropriate diets, oral nutritional supplements, and vitamin/mineral supplements  - Order, calculate, and assess calorie counts as needed  - Recommend, monitor, and adjust tube feedings and TPN/PPN based on assessed needs  - Assess need for intravenous fluids  - Provide specific nutrition/hydration education as appropriate  - Include patient/family/caregiver in decisions related to nutrition  Outcome: Progressing     Problem: CHANGE IN BODY IMAGE  Goal: Pt/Family communicate acceptance of loss or change in body image and expresses psychological comfort and peace  Description: INTERVENTIONS:  - Assess patient/family anxiety and grief process related to change in body image, loss of functional status and loss of sense of self  - Assess patient/family's coping skills and provide emotional, spiritual and psychosocial support  - Provide information about the patient's health status with consideration of family and cultural values  - Communicate willingness to discuss loss and facilitate grief process with patient/family as appropriate  - Emphasize sustaining relationships within family system and community, or farzaneh/spiritual traditions  - Refer to community support groups as appropriate  - Initiate Spiritual Care, Pastoral care or other ancillary consults as needed  Outcome: Progressing

## 2022-01-31 NOTE — UTILIZATION REVIEW
Continued Stay Review  Date: 1/30/22 Duncan  POD # 13  Current Clas:  Inpatient  Current Level of Care: Level 2 Stepdown - Acute Med Surg at 0930    HPI: 61 y/o female initially admitted Inpatient to [facility] on 1/16/22 inpatient as a transfer from [facility] due to perforated diverticulitis complicated by right hydronephrosis for emergent Surgery  Path shows adenocarcinoma  Also Right hydroureteronephrosis s/p Status post cystoscopy with right ureteral stent placement 1/16  Completed antibiotics 1/21/22  Completed course of fluconazole for thrush with possible candida esophagitis  Developed post op Ileus - NGT placed 1/22/22 and TPN  started  1/16/22 OPERATIVE REPORT  Procedure:  LAPAROTOMY EXPLORATORY, DRAINAGE OF INTRA -ABDOMINAL ABSCESS (N/A)  CYSTOSCOPY RETROGRADE PYELOGRAM WITH INSERTION STENT URETERAL (Right)  END COLOSTOMY (N/A)  RIGHT SALPINGO-OOPHORECTOMY, LEFT OOPHORECTOMY  Operative Findings: Perforated diverticulitis with tissue necrosis of proximal rectum and ovaries at site of perforation, severely inflamed tissue of rectal stump precluding stump closure, diseased sigmoid removed and end colostomy made  Rectal Malecot drain placed  2 pelvic ADIA drains placed    *TESTED positive for COVID on 1/16, postponing discharge until 14  Days after positive test     1/30/20:  POD # 13 s/p Ortega's for perforation, path turned out to be Adenocarcinoma   positive test for COVID on 1/16, postponing discharge until 14d after positive test   TPN d/c 1/28  Exam: Abdomen soft, ND/NT, stool in colostomy bag   L ADIA SS, R ADIA serous, rectal malecot with feculent output  Subjective: some discomfort with PO intake that goes away if she slows down or eats soft food     Plan:  Regular diet  Bowel regimen  Off IVF  Off Abx  OOB / Ambulate / IS  SQH while admitted    Pertinent Labs/Diagnostic Results:   Results from last 7 days   Lab Units 01/29/22  0559 01/28/22  0446 01/27/22  0532 01/26/22  0502 01/26/22  0502 01/25/22 0312 01/25/22 0312   WBC Thousand/uL 9 79 10 40* 11 40*   < > 9 86   < > 10 72*   HEMOGLOBIN g/dL 8 3* 8 0* 8 2*  --  8 0*  --  8 2*   HEMATOCRIT % 28 0* 26 2* 27 9*  --  25 8*  --  28 7*   PLATELETS Thousands/uL 338 346 314   < > 385   < > 514*       Results from last 7 days   Lab Units 01/29/22  0559 01/28/22  0858 01/27/22  0532 01/26/22  0622 01/26/22  0000 01/25/22 0312   SODIUM mmol/L 136 136 136 140  --  140   POTASSIUM mmol/L 4 4 4 4 4 1 4 1  --  4 0   CHLORIDE mmol/L 102 103 106 108  --  108   CO2 mmol/L 27 26 26 27  --  28   ANION GAP mmol/L 7 7 4 5  --  4   BUN mg/dL 20 25 25 21  --  11   CREATININE mg/dL 0 92 0 93 0 90 0 90  --  1 02   EGFR ml/min/1 73sq m 68 67 70 70  --  60   CALCIUM mg/dL 9 5 8 9 8 9 8 5  --  9 2   MAGNESIUM mg/dL  --   --   --   --  2 1 1 8   PHOSPHORUS mg/dL  --   --   --  3 0  --  3 1     Results from last 7 days   Lab Units 01/29/22  0559 01/28/22  0858 01/27/22  0532 01/26/22 0622 01/25/22 0312   GLUCOSE RANDOM mg/dL 104 114 111 120 110     Vital Signs:   Temp:  [97 4 °F (36 3 °C)-98 3 °F (36 8 °C)] 98 3 °F (36 8 °C)  HR:  [91-97] 93  Resp:  [18-22] 18  BP: ()/(78-93) 126/90  Body mass index is 57 94 kg/m²      I/O        01/28 0701 01/29 0700 01/29 0701 01/30 0700     P O  118 0     I V  (mL/kg) 146 7 (0 9)       NG/GT 0         1       Total Intake(mL/kg) 432 8 (2 6) 0 (0)     Urine (mL/kg/hr) 1900 (0 5) 1600 (0 6)     Drains 3 10     Stool 50 0     Total Output 1953 1610     Net -1520 3 -1610               Unmeasured Stool Occurrence 1 x       Diet Regular; Regular House;  No Carbonation     Scheduled Medications:  docusate sodium, 100 mg, Oral, BID  heparin (porcine), 7,500 Units, Subcutaneous, Q8H [facility]  CAROLINE ANTIFUNGAL, , Topical, BID  nystatin, 500,000 Units, Swish & Swallow, 4x Daily  nystatin, , Topical, BID  pantoprazole, 40 mg, Oral, BID AC  senna, 1 tablet, Oral, BID    Continuous IV Infusions:   NONE    PRN Meds:  acetaminophen, 975 mg, Oral, Q6H PRN  albuterol, 2 puff, Inhalation, Q4H PRN  IV HYDROmorphone, 0 5 mg, Intravenous, Q3H PRN - 1/28 X 6, 1/29 X 5, 1/30 X 1  Labetalol HCl, 10 mg, Intravenous, Q4H PRN  IV ondansetron, 4 mg, Intravenous, Q8H PRN - 1/28 X 1, 1/29 X 1, 1/30 X 1  oxyCODONE, 10 mg, Oral, Q4H PRN - 1/29 X 1, 1/30 X 4  oxyCODONE, 5 mg, Oral, Q4H PRN  saliva substitute, 5 spray, Mouth/Throat, 4x Daily PRN    Discharge Plan: To be determined   Inpatient Case Management following for all discharge needs    Network Utilization Review Department  ATTENTION: Please call with any questions or concerns to [phone] and carefully listen to the prompts so that you are directed to the right person  All voicemails are confidential   Georgie Jimenez all requests for admission clinical reviews, approved or denied determinations and any other requests to dedicated fax number below belonging to the campus where the patient is receiving treatment   List of dedicated fax numbers for the Facilities:  [address] DENIALS (Administrative/Medical Necessity) [phone]   [address] (Maternity/NICU/Pediatrics) [phone]   [address]  [address] [place] [address] [address] [place] [address] [address] [facility] [place] [address] [phone]

## 2022-01-31 NOTE — CASE MANAGEMENT
Case Management Discharge Planning Note    Patient name Danna Downing  Location [address] 660-68 MRN [id number]  : 1965 Date 2022       Current Admission Date: 2022  Current Admission Diagnosis:Diverticulitis of large intestine with perforation   Patient Active Problem List    Diagnosis Date Noted    Dysphagia 2022    Acute renal failure (ARF) ([facility] ) 2022    Cellulitis of abdominal wall, chest wall and groin with wounds 2022    Diverticulitis of large intestine with perforation 2022    Right Hydroureteronephrosis 2022    Subacute vaginitis 2022    GERD (gastroesophageal reflux disease)     Other specified anxiety disorders 2019    Lumbar paraspinal muscle spasm 2019    Borderline hyperlipidemia 2019    Iron deficiency 2019    Chronic midline low back pain with left-sided sciatica 2019    Primary hypertension 2019    Morbid obesity with BMI of 60 0-69 9, adult ([facility] ) 2019    Hidradenitis suppurativa 2019    Lymphedema 2006      LOS (days): 15  Geometric Mean LOS (GMLOS) (days):   Days to GMLOS:     OBJECTIVE:  Risk of Unplanned Readmission Score: 19         Current admission status: Inpatient   Preferred Pharmacy:   [place] [address] [address] [place] [address] C/ Josiah Simons  [facility] [address]  Phone: [phone] Fax: [phone]    Primary Care Provider: Daniel Simon PA-C    Primary Insurance: [facility]  Secondary Insurance:     DISCHARGE DETAILS:    Discharge planning discussed with[de-identified] pt  Freedom of Choice: Yes  Comments - Freedom of Choice: CM made aware pt ready for discharge  CM spoke with pt and made her aware no accepting STR facilities  Pt agreeable for CM to place blanket referral  CM placed referral to 17 additional STR facilities  CM placed call to F/U on referral at [facility]    CM  spoke to admissions and they will review referral and respond in [facility]

## 2022-01-31 NOTE — PROGRESS NOTES
Progress Note - General Surgery   Raul Aus 60 y o  female MRN: [id number]  Unit/Bed#: [facility] 511-01 Encounter: [id number]    Assessment:  61 yo female with PMHx of anxiety, HTN, lymphedma, gastric ulcer, GERD, arthritis POD 14 s/p cysto, ureteral stents, Ortega's procedure secondary to perforated diverticulitis with pathology returned as adenocarcinoma  Off COVID precautions (diagnosed 1/16)    ADIA Drains x 2: LLQ 5cc, dark clear brown    RLQ 10cc, serous  Rectal Malecot: 50cc connected to villanueva bag, feculent   Colostomy: Stool and air in bag    Plan:  -Regular diet  -Bowel regimen  -OOB / Ambulate / IS  -Monitor colostomy output  -Malecot rectal monitor output  -ADIA x2 Monitor output  -Discharge today to rehab  -[facility] for DVT prophylaxis    Subjective/Objective   Chief Complaint: Patient offers no complaints this AM     Subjective:   General:  Patient states she feels well  She does note mild abdominal pain along incision site and where drain sites are  Pain:  Controlled on current regimen  N/V:  Denies  Tolerating Diet:  Yes  +Flatus and +BM  OOB and ambulating with PT/OT    Objective:     Blood pressure 101/73, pulse 98, temperature 97 6 °F (36 4 °C), resp  rate 20, height 5' 7" (1 702 m), weight (!) 157 kg (347 lb 0 1 oz), SpO2 93 %  ,Body mass index is 54 35 kg/m²  Intake/Output Summary (Last 24 hours) at 1/31/2022 0902  Last data filed at 1/31/2022 6023  Gross per 24 hour   Intake 0 ml   Output 800 ml   Net -800 ml       Invasive Devices  Report    Peripherally Inserted Central Catheter Line            PICC Line 89/37/51 Left Basilic 5 days          Drain            Closed/Suction Drain LLQ Bulb 19 Fr  14 days    Closed/Suction Drain RLQ Bulb 19 Fr  14 days    Colostomy Other (comment) LUQ 14 days    Rectal Tube Without balloon 14 days    External Urinary Catheter Medium 4 days                Physical Exam:    General: Pt is AAOx3, lying down in bed in NAD     HEENT:  normocephalic, no scleral icterus, moist mucous membranes   Neck: Supple, non-tender, no JVD, ROM intact, no tracheal deviation   CV: RRR, no murmurs, gallops, rubs  S1 and S2  Resp: Lung sounds diminished to auscultation B/L, poor respiratory effort, no wheezes, rhonchi, rhales   Abd: Soft, morbidly obese, with minimal tenderness to palpation near incision site and near drain sites, non-distended, non-tympanitic  Normoactive  bowelsounds all 4 quadrants  No rebound or guarding  No CVA tenderness  Stool noted in colostomy bag as well as air  Abdominal incision clean, dry, intact, with appropriate postoperative tenderness  Left lower quadrant ADIA drain with dark clear brown fluid  Right lower quadrant ADIA drain serous  Rectal malecot with feculent output  Ext: Warm with no cyanosis, 2+ BL LE edema, no deformities  ROM intact   Skin: B/L LE erythema, stasis changes, no bruises or ulcers  Neuro: Sensation intact all 4 extremities  5+ strength all 4 extremities  Lab, Imaging and other studies:I have personally reviewed pertinent lab results      VTE Pharmacologic Prophylaxis: Heparin  VTE Mechanical Prophylaxis: sequential compression device    Asya Jerez PA-C

## 2022-01-31 NOTE — PROGRESS NOTES
Progress Note - Everardo Villanueva 60 y o  female MRN: [id number]    Unit/Bed#: [facility] 511-01 Encounter: [id number]      Assessment:  61 y/o COVID+ F w perforated diverticulitis s/p Ortega's procedure on 1/17  Pathology showing Adenocarcinoma  Ostomy: ***cc  Malecot:***cc  JPx2: ***cc SS    Plan:  · Regular diet  · I/Os  · Ambulate  · Discharge to rehab when bed available - needs 14 days post COVID diagnosis prior to acceptance  OK for d/c 1/30  · Appreciate case management  · Hopeful d/c 1/31    Subjective:   No acute events overnight  ***      Objective:     Vitals: Blood pressure 170/97, pulse 78, temperature 97 7 °F (36 5 °C), temperature source Oral, resp  rate 17, height 5' 7" (1 702 m), weight (!) 157 kg (347 lb 0 1 oz), SpO2 99 %  ,Body mass index is 54 35 kg/m²  Intake/Output Summary (Last 24 hours) at 1/30/2022 1950  Last data filed at 1/30/2022 1801  Gross per 24 hour   Intake 100 ml   Output 715 ml   Net -615 ml       Physical Exam:   Gen:  NAD  HEENT: NCAT  MMM  CV: well perfused, pulses palpable  Lungs: Normal respiratory effort  Abd: soft, nt/nd, wounds cdi  Drains intact  Ostomy intact  Skin: warm/ dry  Extremities: no peripheral edema, no cyanosis  Neuro:  AxO x3      Scheduled Meds:  Current Facility-Administered Medications   Medication Dose Route Frequency Provider Last Rate    acetaminophen  975 mg Oral Q6H PRN Cassi Valente MD      albuterol  2 puff Inhalation Q4H PRN Merwyn Challenger,       docusate sodium  100 mg Oral BID Daniel Borrero MD      heparin (porcine)  7,500 Units Subcutaneous [facility] MIRTA Dunn      HYDROmorphone  0 5 mg Intravenous Q3H PRN Cassi Valente MD      Labetalol HCl  10 mg Intravenous Q4H PRN MIRTA Yarbrough      CAROLINE ANTIFUNGAL   Topical BID MIRTA Carias      nystatin  500,000 Units Swish & Swallow 4x Daily MIRTA Carias      nystatin   Topical BID MIRTA Carias      ondansetron  4 mg Intravenous Q8H PRN Adam Valiente MD      oxyCODONE  10 mg Oral Q4H PRN Sonam Alarcon MD      oxyCODONE  5 mg Oral Q4H PRN Sonam Alarcon MD      pantoprazole  40 mg Oral BID HUMERA Toussaint DO      saliva substitute  5 spray Mouth/Throat 4x Daily PRN MIRTA Perry      senna  1 tablet Oral BID Vero Blank MD       Continuous Infusions:   PRN Meds:   acetaminophen    albuterol    HYDROmorphone    Labetalol HCl    ondansetron    oxyCODONE    oxyCODONE    saliva substitute      Invasive Devices  Report    Peripherally Inserted Central Catheter Line            PICC Line 61/46/99 Left Basilic 5 days          Drain            Closed/Suction Drain LLQ Bulb 19 Fr  13 days    Closed/Suction Drain RLQ Bulb 19 Fr  13 days    Colostomy Other (comment) LUQ 13 days    Rectal Tube Without balloon 13 days    External Urinary Catheter Medium 3 days                Lab, Imaging and other studies: I have personally reviewed pertinent reports      VTE Pharmacologic Prophylaxis:sqh  VTE Mechanical Prophylaxis: sequential compression device

## 2022-01-31 NOTE — PROGRESS NOTES
Pastoral Care Progress Note    2022  Patient: Lisa Epperson : 1965  Admission Date & Time: 2022 1640  MRN: [id number] [facility]: [id number]      Visited with Livia Bauer today as her time at [place] [address] is about two weeks  Livia Bauer stated that she is to be released to rehab today  Livia Bauer is coping with humor but also struggles sometimes with her medical issues  Follow-up as needed/requested                Chaplaincy Interventions Utilized:   Empowerment: Encouraged self-care    Exploration: Explored spiritual needs & resources    Relationship Building: Cultivated a relationship of care and support and Hospitality    Ritual: Provided prayer       22 1300   Clinical Encounter Type   Visited With Patient   Routine Visit Introduction   Gnosticist Encounters   Gnosticist Needs Prayer

## 2022-02-01 NOTE — NUTRITION
02/01/22 1600   Recommendations/Interventions   Interventions Diet: continued as ordered; Supplement initiate   Nutrition Recommendations Continue diet as ordered; Other (Specify)  (No RD protocol;  Recommend adding Pollo BID and Ensure Compact TID to increase kcal/PRO intake and optimize wound healing )

## 2022-02-01 NOTE — OCCUPATIONAL THERAPY NOTE
Occupational Therapy Progress Note     Patient Name: Juan Diego Nair  [id number] Date: 2/1/2022  Problem List  Principal Problem:    Diverticulitis of large intestine with perforation  Active Problems:    Lymphedema    Primary hypertension    Morbid obesity with BMI of 60 0-69 9, adult (HCC)    Hidradenitis suppurativa    GERD (gastroesophageal reflux disease)    Right Hydroureteronephrosis        02/01/22 0933   OT Last Visit   OT Visit Date 02/01/22   Note Type   Note Type Treatment   Restrictions/Precautions   Weight Bearing Precautions Per Order No   Other Precautions   (ADIA drain x2, rectal tube)   General   Response to Previous Treatment Patient with no complaints from previous session   Lifestyle   Autonomy Pt reports being I w/ ADLS/IADLs, Mod I w/ transfers/functional mobility w/ use of crutches   Reciprocal Relationships Lives alone; reports no social supports but has son watching her dog   Service to Others On disability   Intrinsic Gratification Likes spending time w/ her dog   Pain Assessment   Pain Assessment Tool 0-10   Pain Score 5   Pain Location/Orientation Location: Abdomen   Hospital Pain Intervention(s) Repositioned; Ambulation/increased activity; Emotional support   ADL   Where Assessed Commode   Toileting Assistance  2  Maximal Assistance   Toileting Deficit Setup; Increased time to complete;Perineal hygiene   Toileting Comments pt completed toileting tasks while on bedside commode, required max A for perineal hygiene  Functional Standing Tolerance   Time 30 sec   Activity pt tolerates standing for ~30 seconds, then reports inc dizziness  Extensive time spent to recover from said dizziness  Bed Mobility   Supine to Sit 4  Minimal assistance   Additional items Assist x 1; Increased time required;Verbal cues;LE management   Additional Comments pt found supine in bed, left OOB in chair w all needs in reach  Pt sits EOB w supervision   improvements in bed mobility this date as compared to previous session  Transfers   Sit to Stand 3  Moderate assistance   Additional items Assist x 2; Increased time required;Verbal cues   Stand to Sit 3  Moderate assistance   Additional items Assist x 2; Increased time required;Verbal cues   Additional Comments c RW, VC for hand placement and encouragement to engage, as pt is apprehensive to move  Functional Mobility   Functional Mobility 3  Moderate assistance   Additional Comments ax2, short distance  Additional items Rolling walker   Toilet Transfers   Toilet Transfer From Rolling walker   Toilet Transfer Type To and from   Toilet Transfer to Standard bedside commode   Toilet Transfer Technique Ambulating   Toilet Transfers Moderate assistance   Toilet Transfers Comments mod Ax2 for toileting transfers and VC for hand placements on arms of commode  Cognition   Overall Cognitive Status WFL   Arousal/Participation Alert; Responsive; Cooperative   Attention Within functional limits   Orientation Level Oriented X4   Memory Within functional limits   Following Commands Follows one step commands without difficulty   Comments pt requires VC for encouragement to engage in therapy session this date  She reports inc dizziness this date with movement  Activity Tolerance   Activity Tolerance Patient limited by pain; Patient limited by fatigue   Medical Staff Made Aware DPT Alvaro Lott 2* pts med complexity, comorbidities and regression from baseline  OT focusing on ADL task retraining and inc endurance for functional daily routine  Assessment   Assessment Pt seen on this date for OT session focusing on ADL retraining, body mechanics, transfer retraining, increasing activity tolerance/endurance and EOB sitting to increase ability to participate in ADL/functional tasks  Pt was found in bed and was left in chair w/ all needs within reach  Pt completed bed mobility with Min A this date and sits EOB with supervision   Pt then completed transfers and functional mobility with mod Ax2, VC for hand placement  Toileting tasks completed with max Ax1 while on bedside commode  Pt w/ improvements in activity tolerance, transfer ability, however is still limited 2* decreased ADL/High-level ADL status, decreased activity tolerance/endurance, decreased self-care trans, decreased safety awareness and insight to condition  The patient's raw score on the AM-PAC Daily Activity inpatient short form is 15, standardized score is 34 69, less than 39 4  Patients at this level are likely to benefit from discharge to post-acute rehabilitation services  Please refer to the recommendation of the Occupational Therapist for safe discharge planning  Recommending pt D/C to STR when medically stable  Pt will continue to benefit from acute OT services to meet goals  Plan   Treatment Interventions ADL retraining;Functional transfer training; Activityengagement; Energy conservation   Goal Expiration Date 02/15/22   OT Treatment Day 5   OT Frequency 3-5x/wk   Recommendation   OT Discharge Recommendation Post acute rehabilitation services   OT - OK to Discharge Yes   AM-PAC Daily Activity Inpatient   Lower Body Dressing 2   Bathing 2   Toileting 2   Upper Body Dressing 3   Grooming 3   Eating 3   Daily Activity Raw Score 15   Daily Activity Standardized Score (Calc for Raw Score >=11) 34 69   AM-PAC Applied Cognition Inpatient   Following a Speech/Presentation 3   Understanding Ordinary Conversation 4   Taking Medications 3   Remembering Where Things Are Placed or Put Away 3   Remembering List of 4-5 Errands 3   Taking Care of Complicated Tasks 3   Applied Cognition Raw Score 19   Applied Cognition Standardized Score 39 77   Modified Jair Scale   Modified Marvell Scale 4     Goals remain applicable at this time, ext +14 days      FILI Fernando, OTR/L

## 2022-02-01 NOTE — PHYSICAL THERAPY NOTE
PHYSICAL THERAPY NOTE          Patient Name: Esmer Gambino  [id number] Date: 2/1/2022 02/01/22 0836   PT Last Visit   PT Visit Date 02/01/22   Note Type   Note Type Treatment   Pain Assessment   Pain Assessment Tool 0-10   Pain Score 5   Pain Location/Orientation Location: Abdomen   Restrictions/Precautions   Weight Bearing Precautions Per Order No   Other Precautions   (ADIA drainx2, ostomy, rectal tube)   General   Chart Reviewed Yes   Response to Previous Treatment Patient with no complaints from previous session  Family/Caregiver Present No   Cognition   Overall Cognitive Status WFL   Arousal/Participation Alert   Attention Within functional limits   Memory Decreased recall of precautions   Following Commands Follows one step commands with increased time or repetition   Comments Pt presents with anxious like behaviors, requires cues for calming and encouragement  Pt with decreased safety awareness and insight  Subjective   Subjective Pt requests to perform therapy tomorrow and also reports she can walk when all of her drains come out  Bed Mobility   Supine to Sit 4  Minimal assistance   Additional items Assist x 1;HOB elevated; Increased time required;LE management   Additional Comments Supine in bed upon PT arrival   Pt left upright in bedside chair with all needs in reach  Transfers   Sit to Stand 3  Moderate assistance   Additional items Assist x 2; Increased time required;Verbal cues   Stand to Sit 3  Moderate assistance   Additional items Assist x 2; Increased time required;Verbal cues   Toilet transfer 3  Moderate assistance   Additional items Assist x 2; Increased time required;Verbal cues; Commode   Additional Comments Transfer with [facility] for hand placement and safety  Ambulation/Elevation   Gait pattern Excessively slow; Short stride; Foward flexed;Decreased foot clearance;Shuffling;Narrow KODY; Improper Weight shift   Gait Assistance 3  Moderate assist   Additional items Assist x 2;Verbal cues;Tactile cues  (assist of second for chair follow)   Assistive Device Bariatric Rolling walker   Distance 3 ft from bed>commode, 5 ft x 1  (limited by reported dizziness and fatigue)   Balance   Static Sitting Fair   Dynamic Sitting Fair -   Static Standing Poor   Dynamic Standing Poor   Ambulatory Poor   Endurance Deficit   Endurance Deficit Yes   Endurance Deficit Description fatigue, anxious like behaviors, dizziness, SOB   Activity Tolerance   Activity Tolerance Patient limited by fatigue   Medical Staff Made Aware OT Markos Villafana, restorative tech [address] RN cleared pt to be seen by PT   Exercises   Neuro re-ed Pt provided significant education/encouragement regarding participation in therapy session in order to progress toward goals/independence  Assessment   Prognosis Fair   Problem List Decreased strength; Impaired balance;Decreased endurance;Decreased mobility;Pain;Decreased safety awareness; Impaired judgement   Assessment Pt seen for PT treatment session with focus on bed mobility, functional transfers, functional mobility, pt education  Pt making slow progress toward goals this session due to decreased activity tolerance and anxious like behaviors  Pt requires significant motivation to participate in next steps of therapy to progress toward goals  Believe pt is fearful and this is limiting her participation  Pt continues to present with decreased strength and endurance evident by need for physical assist and frequent rests during transfers and gait training  Pt left upright in bedside chair with all needs in reach  Pt will benefit from skilled therapy in order to address current impairments and functional limitations  PT to follow pt and recommending rehab once medically cleared  The patient's AM-PAC Basic Mobility Inpatient Short Form Raw Score is 9  A Raw score of less than or equal to 16 suggests the patient may benefit from discharge to post-acute rehabilitation services   Please also refer to the recommendation of the Physical Therapist for safe discharge planning  Barriers to Discharge Inaccessible home environment;Decreased caregiver support   Goals   Patient Goals to get better   Short Term Goal #2 2/2/22   Plan   Treatment/Interventions Functional transfer training;LE strengthening/ROM; Therapeutic exercise; Endurance training;Equipment eval/education;Patient/family training;Bed mobility;Gait training;Spoke to nursing   Progress Slow progress, decreased activity tolerance   PT Frequency 3-5x/wk   Recommendation   PT Discharge Recommendation Post acute rehabilitation services   Additional Comments PT cleared pt to DC once medically cleared   AM-PAC Basic Mobility Inpatient   Turning in Bed Without Bedrails 2   Lying on Back to Sitting on Edge of Flat Bed 3   Moving Bed to Chair 1   Standing Up From Chair 1   Walk in Room 1   Climb 3-5 Stairs 1   Basic Mobility Inpatient Raw Score 9   Turning Head Towards Sound 4   Follow Simple Instructions 3   Low Function Basic Mobility Raw Score 16   Low Function Basic Mobility Standardized Score 25 72   Highest Level Of Mobility   JH-HLM Goal 3: Sit at edge of bed   JH-HLM Highest Level of Mobility 4: Move to chair/commode   JH-HLM Goal Achieved Yes   Education   Education Provided Mobility training;Assistive device   Patient Reinforcement needed   End of Consult   Patient Position at End of Consult Bedside chair; All needs within reach     Fabrizio Gustafson PT, DPT

## 2022-02-01 NOTE — PLAN OF CARE
Problem: OCCUPATIONAL THERAPY ADULT  Goal: Performs self-care activities at highest level of function for planned discharge setting  See evaluation for individualized goals  Description: Treatment Interventions: ADL retraining,Functional transfer training,UE strengthening/ROM,Endurance training,Cognitive reorientation,Patient/family training,Equipment evaluation/education,Compensatory technique education,Continued evaluation,Energy conservation,Activityengagement          See flowsheet documentation for full assessment, interventions and recommendations  Outcome: Progressing  Note: Limitation: Decreased ADL status,Decreased UE strength,Decreased Safe judgement during ADL,Decreased cognition,Decreased endurance,Decreased self-care trans,Decreased high-level ADLs  Prognosis: Fair  Assessment: Pt seen on this date for OT session focusing on ADL retraining, body mechanics, transfer retraining, increasing activity tolerance/endurance and EOB sitting to increase ability to participate in ADL/functional tasks  Pt was found in bed and was left in chair w/ all needs within reach  Pt completed bed mobility with Min A this date and sits EOB with supervision  Pt then completed transfers and functional mobility with mod Ax2, VC for hand placement  Toileting tasks completed with max Ax1 while on bedside commode  Pt w/ improvements in activity tolerance, transfer ability, however is still limited 2* decreased ADL/High-level ADL status, decreased activity tolerance/endurance, decreased self-care trans, decreased safety awareness and insight to condition  The patient's raw score on the AM-PAC Daily Activity inpatient short form is 15, standardized score is 34 69, less than 39 4  Patients at this level are likely to benefit from discharge to post-acute rehabilitation services  Please refer to the recommendation of the Occupational Therapist for safe discharge planning  Recommending pt D/C to STR when medically stable   Pt will continue to benefit from acute OT services to meet goals  OT Discharge Recommendation: Post acute rehabilitation services  OT - OK to Discharge:  Yes

## 2022-02-01 NOTE — PROGRESS NOTES
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, s/p Ortega's procedure on 1/17  Vitals stable  Afebrile  abd soft, nontender, non distended  Ostomy viable and functioning  Urine: 1 2 L   [place]: minimal purulent  ADIA x2: minimal serous output  Plan:  Regular diet  Ambulate  Continue dvt ppx  Colostomy care  Discharge planning, rehab    Subjective:   No complaints  Denied fever, chills, chest pain, shortness of breath, nausea, vomiting, or abdominal pain this morning  Objective:     Vitals: Blood pressure (!) 166/102, pulse 92, temperature 97 8 °F (36 6 °C), resp  rate 20, height 5' 7" (1 702 m), weight (!) 157 kg (346 lb 12 5 oz), SpO2 94 %  ,Body mass index is 54 31 kg/m²  Intake/Output Summary (Last 24 hours) at 1/31/2022 2221  Last data filed at 1/31/2022 1839  Gross per 24 hour   Intake 120 ml   Output 850 ml   Net -730 ml       Physical Exam  General: NAD  HEENT: NC/AT  MMM  Cv: RRR     Lungs: normal effort  Ab: Soft, NT/ND  Ex: no CCE  Neuro: AAOx3    Scheduled Meds:  Current Facility-Administered Medications   Medication Dose Route Frequency Provider Last Rate    acetaminophen  975 mg Oral Q6H PRN Cassi Valente MD      albuterol  2 puff Inhalation Q4H PRN Emil Challenger,       docusate sodium  100 mg Oral BID Daniel Borrero MD      heparin (porcine)  7,500 Units Subcutaneous [facility] MIRTA Dunn      HYDROmorphone  0 5 mg Intravenous Q3H PRN Cassi Valente MD      Labetalol HCl  10 mg Intravenous Q4H PRN MIRTA Yarbrough      CAROLINE ANTIFUNGAL   Topical BID MIRTA Carias      nystatin  500,000 Units Swish & Swallow 4x Daily MIRTA Carias      nystatin   Topical BID MIRTA Carias      ondansetron  4 mg Intravenous Q8H PRN Liz Oconnor MD      oxyCODONE  10 mg Oral Q4H PRN Idris Guerrero MD      oxyCODONE  5 mg Oral Q4H PRN Idris Guerrero MD      pantoprazole  40 mg Oral BID AC Sharad Pontiff, DO      saliva substitute  5 spray Mouth/Throat 4x Daily PRN Akhil Friendly, MIRTA      senna  1 tablet Oral BID Chris Gordon MD       Continuous Infusions:   PRN Meds:   acetaminophen    albuterol    HYDROmorphone    Labetalol HCl    ondansetron    oxyCODONE    oxyCODONE    saliva substitute      Invasive Devices  Report    Peripherally Inserted Central Catheter Line            PICC Line 60/66/77 Left Basilic 6 days          Drain            Closed/Suction Drain LLQ Bulb 19 Fr  14 days    Closed/Suction Drain RLQ Bulb 19 Fr  14 days    Colostomy Other (comment) LUQ 14 days    Rectal Tube Without balloon 14 days    External Urinary Catheter Medium 4 days                Lab, Imaging and other studies: I have personally reviewed pertinent reports      VTE Pharmacologic Prophylaxis: Sequential compression device (Venodyne)   VTE Mechanical Prophylaxis: sequential compression device

## 2022-02-01 NOTE — PLAN OF CARE
Problem: PHYSICAL THERAPY ADULT  Goal: Performs mobility at highest level of function for planned discharge setting  See evaluation for individualized goals  Description: Treatment/Interventions: Functional transfer training,LE strengthening/ROM,Therapeutic exercise,Endurance training,Patient/family training,Equipment eval/education,Bed mobility,Gait training,OT,Spoke to nursing  Equipment Recommended: Bel Avila (bariatric RW )       See flowsheet documentation for full assessment, interventions and recommendations  Outcome: Progressing  Note: Prognosis: Fair  Problem List: Decreased strength,Impaired balance,Decreased endurance,Decreased mobility,Pain,Decreased safety awareness,Impaired judgement  Assessment: Pt seen for PT treatment session with focus on bed mobility, functional transfers, functional mobility, pt education  Pt making slow progress toward goals this session due to decreased activity tolerance and anxious like behaviors  Pt requires significant motivation to participate in next steps of therapy to progress toward goals  Believe pt is fearful and this is limiting her participation  Pt continues to present with decreased strength and endurance evident by need for physical assist and frequent rests during transfers and gait training  Pt left upright in bedside chair with all needs in reach  Pt will benefit from skilled therapy in order to address current impairments and functional limitations  PT to follow pt and recommending rehab once medically cleared  The patient's AM-PAC Basic Mobility Inpatient Short Form Raw Score is 9  A Raw score of less than or equal to 16 suggests the patient may benefit from discharge to post-acute rehabilitation services  Please also refer to the recommendation of the Physical Therapist for safe discharge planning    Barriers to Discharge: Inaccessible home environment,Decreased caregiver support        PT Discharge Recommendation: Post acute rehabilitation services          See flowsheet documentation for full assessment

## 2022-02-01 NOTE — WOUND OSTOMY CARE
Progress Note- Ostomy  [place] Pac 60 y o  female  [id number]  [facility] 511-[facility] 511-01        Assessment:  Patient seen for skin and ostomy care  On arrival patient out of bed in chair with no complaints, however patient not agreeable to stand up for assessment of sacro-buttock skin, patient asking to go back to bed  Patient reports her colostomy pouch was already changed last night due to leakage but agreed to allow wound care nurse assess her pouch  On assessment noted one piece pouch in place leaking stool from medial aspect  Mid line abdominal incision with superficial dehiscence, clean, dry, dressing in place  Colostomy:  Colostomy is necrosed with no viable stoma visible, stoma is now just blob of dead devitalize tissue  Depth is 4 5cm (+) stool output  Skin opening measures 1 1/2 in x 2 3/4 in oval and sitting in shallow crease  Plan: We pouch using a four inch pouching system with 4 inch gege seal to build soft convexity on pouch  Good seal obtained, patient teaching provided  Vitals:    02/01/22 1518   BP: 150/96   Pulse: 95   Resp:    Temp: 98 1 °F (36 7 °C)   SpO2: 94%         Colostomy Other (comment) LUQ (Active)   Stomal Appliance 1 piece 01/31/22 2201   Stoma Assessment Budded 01/31/22 2201   Stoma Shape Round 01/31/22 2201   Peristomal Assessment Clean; Intact; Pink 01/31/22 2201   Treatment Bag change 01/31/22 2201   Output (mL) 0 mL 02/01/22 0245   Number of days: 15         Our skin care recommendations remains as nursing orders, we will continue following with teaching and ostomy care        Annamarie Moya, RN, BSN, Amalia Ward

## 2022-02-02 NOTE — CONSULTS
Consultation - Palliative and [address] 60 y o  female [id number]    Patient Active Problem List   Diagnosis    Lymphedema    Primary hypertension    Morbid obesity with BMI of 60 0-69 9, adult ([facility] [address] )    Hidradenitis suppurativa    Chronic midline low back pain with left-sided sciatica    Other specified anxiety disorders    Lumbar paraspinal muscle spasm    Borderline hyperlipidemia    Iron deficiency    GERD (gastroesophageal reflux disease)    Acute renal failure (ARF) (HCC)    Cellulitis of abdominal wall, chest wall and groin with wounds    Diverticulitis of large intestine with perforation    Right Hydroureteronephrosis    Subacute vaginitis    Dysphagia     Active issues specifically addressed today include:   Metastatic colon cancer  Morbid obesity  Goals of care    Plan:   1  Symptom management  -  Per primary team    2  Goals  -  LImits set at DNAR/DNI  -  Patient would like information on chemotherapy however she is reluctant  -  Agreeable to outpatient follow up with oncology  Code Status: DNAR/DNI  - Level 3   Decisional apparatus:  Patient is competent on my exam today  If competence is lost, patient's substitute decision maker would default to adult sons  by [place]  Advance Directive / Living Will / POLST:  None      I have reviewed the patient's controlled substance dispensing history in the Prescription Drug Monitoring Program in compliance with the [facility] regulations before prescribing any controlled substances  We appreciate the invitation to be involved in this patient's care  We will follow peripherally  Please do not hesitate to reach our on call provider through our clinic answering service at  should you have acute symptom control concerns  MIRTA Hoffman  Palliative and Supportive Care  Clinic/Answering Service: [phone]  You can find me on [employer]!        IDENTIFICATION:  Inpatient consult to Palliative Care  Consult performed by: MIRTA Chadwick  Consult ordered by: Antonio Bartholomew MD        Physician Requesting Consult: Martin Bahena DO  Reason for Consult / Principal Problem: goals of care   Hx and PE limited by: no limitations     HISTORY OF PRESENT ILLNESS:       Nikole Crowe is a 60 y o  female with a past medical history significant for morbid obesity, GERD, gastric ulcer, COVID19, htn,   who presented on 1/6 with perforated bowel which was originally thought to be diverticulitis which was found to be advanced colon cancer  Initially she was started on IV antibiotics and treated conservatively  On 1/16, the patient had worsening inflammatory changes as well as hydronephrosis on CT and was transferred to [facility] for higher level of care  She was emergently taken t o t he OR for a Hartmans procedure and placement of ureteral stents  She was found to have ischemia of b/l ovaries and right salpingo-oopherectomy and left oopherectomy  Pathology from surgical procedure revealed colon adenocarcinoma  Oncology was consulted on 1/24  Prior to admission the patient lived independently and ambulated with a walker  She has two adult sons who are supportive  They are aware she has cancer  Per Oncology note the patient has at least stage IIIC most likely stage IV adenocarcinoma  The right ovary, sigmoid colon, and lymph nodes were all positive  Palliative care was consulted as patient stated to surgical team she would not pursue any treatment  The patient was aware of her cancer diagnosis but she was unaware this is likely stage IV disease  She states that she would consider chemotherapy and would like more information however she likely would not pursue treatment  She understands that without treatment, the cancer will progress and she will die  Explained palliative care and hospice care to the patient    She is familiar with hospice services as her mom was previously on hospice  Discussed code status  The patient is clear that if her heart stops she does not want to be resuscitated and she would not want to be on a ventilator under any circumstances  COde status changed to reflect DNAR/DNI  She is aware of outpatient services of palliative care as well as oncology  Review of Systems   Constitutional: Positive for decreased appetite and malaise/fatigue  Gastrointestinal: Positive for abdominal pain  Neurological: Positive for weakness  All other systems reviewed and are negative        Past Medical History:   Diagnosis Date    Anxiety     Arthritis     GERD (gastroesophageal reflux disease)     Hidradenitis suppurativa     Hypertension     Lipodermatosclerosis of both lower extremities     Lymphedema     Sciatic leg pain     left side    SOB (shortness of breath)     Stomach ulcer      Past Surgical History:   Procedure Laterality Date    BILATERAL SALPINGOOPHORECTOMY N/A 2022    Procedure: RIGHT SALPINGO-OOPHORECTOMY, LEFT OOPHORECTOMY;  Surgeon: Yusef Toussaint DO;  [facility];  Service: General     SECTION      FL RETROGRADE PYELOGRAM  2022    HARTMANS PROCEDURE N/A 2022    Procedure: END COLOSTOMY;  Surgeon: Yusef Toussaint DO;  [facility];  Service: General    LAPAROTOMY N/A 2022    Procedure: LAPAROTOMY EXPLORATORY, DRAINAGE OF INTRA -ABDOMINAL ABSCESS;  Surgeon: Yusef Toussaint DO;  [facility];  Service: General    NV CYSTOURETHROSCOPY,URETER CATHETER Right 2022    Procedure: CYSTOSCOPY RETROGRADE PYELOGRAM WITH INSERTION STENT URETERAL;  Surgeon: Janeth Valles MD;  [facility];  Service: Urology     [address] 1-4 CM TRUNK,ARM,LEG Right 2020    Procedure: EXCISION BIOPSY TISSUE LESION/MASS UPPER EXTREMITY;  Surgeon: Shade Johnston DO;  Location: [facility];  Service: General    TONSILLECTOMY      TUMOR REMOVAL Right     5th finger     Social History     Socioeconomic History    Marital status:      Spouse name: Not on file    Number of children: 2    Years of education: Some college    Highest education level: Some college, no degree   Occupational History    Not on file   Tobacco Use    Smoking status: Light Tobacco Smoker     Packs/day: 0 25     Years: 20 00     Pack years: 5 00     Types: Cigarettes    Smokeless tobacco: Never Used    Tobacco comment: Only smoke once in awhile   Vaping Use    Vaping Use: Never used   Substance and Sexual Activity    Alcohol use: Not Currently     Comment: holidays    Drug use: No    Sexual activity: Not Currently   Other Topics Concern    Not on file   Social History Narrative    Not on file     Social Determinants of Health     Financial Resource Strain: Not on file   Food Insecurity: No Food Insecurity    Worried About Running Out of Food in the Last Year: Never true    Latoya of Food in the Last Year: Never true   Transportation Needs: No Transportation Needs    Lack of Transportation (Medical): No    Lack of Transportation (Non-Medical):  No   Physical Activity: Not on file   Stress: Not on file   Social Connections: Not on file   Intimate Partner Violence: Not on file   Housing Stability: Unknown    Unable to Pay for Housing in the Last Year: Not on file    Number of Places Lived in the Last Year: 1    Unstable Housing in the Last Year: No     Family History   Problem Relation Age of Onset    Cancer Mother     Hypertension Mother     COPD Father     Diabetes Father        MEDICATIONS / ALLERGIES:    current meds:   Current Facility-Administered Medications   Medication Dose Route Frequency    acetaminophen (TYLENOL) tablet 975 mg  975 mg Oral Q6H PRN    albuterol (PROVENTIL HFA,VENTOLIN HFA) inhaler 2 puff  2 puff Inhalation Q4H PRN    docusate sodium (COLACE) capsule 100 mg  100 mg Oral BID    heparin (porcine) subcutaneous injection 7,500 Units  7,500 Units Subcutaneous Q8H [address]    HYDROmorphone (DILAUDID) injection 0 5 mg  0 5 mg Intravenous Q3H PRN    Labetalol HCl (NORMODYNE) injection 10 mg  10 mg Intravenous Q4H PRN    magnesium sulfate 2 g/50 mL IVPB (premix) 2 g  2 g Intravenous Once    moisture barrier miconazole 2% cream (aka CAROLINE MOISTURE BARRIER ANTIFUNGAL CREAM)   Topical BID    nystatin (MYCOSTATIN) oral suspension 500,000 Units  500,000 Units Swish & Swallow 4x Daily    nystatin (MYCOSTATIN) powder   Topical BID    ondansetron (ZOFRAN-ODT) dispersible tablet 4 mg  4 mg Oral Q6H [address]    oxyCODONE (ROXICODONE) immediate release tablet 10 mg  10 mg Oral Q4H PRN    oxyCODONE (ROXICODONE) IR tablet 5 mg  5 mg Oral Q4H PRN    pantoprazole (PROTONIX) EC tablet 40 mg  40 mg Oral BID AC    saliva substitute (MOUTH KOTE) mucosal solution 5 spray  5 spray Mouth/Throat 4x Daily PRN    senna (SENOKOT) tablet 8 6 mg  1 tablet Oral BID       Allergies   Allergen Reactions    Bee Venom Anaphylaxis, Shortness Of Breath and Swelling       OBJECTIVE:    Physical Exam  Physical Exam  Vitals and nursing note reviewed  Constitutional:       General: She is awake  Appearance: She is morbidly obese  She is ill-appearing  Interventions: Nasal cannula in place  HENT:      Head: Normocephalic and atraumatic  Mouth/Throat:      Lips: Pink  Mouth: Mucous membranes are moist       Dentition: Abnormal dentition  Dental caries present  Cardiovascular:      Rate and Rhythm: Normal rate and regular rhythm  Pulmonary:      Effort: Pulmonary effort is normal       Breath sounds: Decreased air movement present  Abdominal:      Palpations: Abdomen is soft  Tenderness: There is abdominal tenderness  Comments: colostomy   Musculoskeletal:      Cervical back: Full passive range of motion without pain  Comments: Generally weak   Skin:     General: Skin is warm and dry  Coloration: Skin is pale     Neurological:      General: No focal deficit present  Mental Status: She is alert and oriented to person, place, and time  GCS: GCS eye subscore is 4  GCS verbal subscore is 5  GCS motor subscore is 6  Psychiatric:         Attention and Perception: Attention and perception normal          Mood and Affect: Affect is flat  Speech: Speech normal          Behavior: Behavior is cooperative  Cognition and Memory: Cognition normal          Lab Results:   CBC: No results found for: WBC, HGB, HCT, MCV, PLT, ADJUSTEDWBC, MCH, MCHC, RDW, MPV, NRBC, CMP:   Lab Results   Component Value Date    SODIUM 137 02/02/2022    K 3 6 02/02/2022     02/02/2022    CO2 28 02/02/2022    BUN 15 02/02/2022    CREATININE 1 16 02/02/2022    CALCIUM 8 7 02/02/2022    EGFR 52 02/02/2022   , PT/PTT:No results found for: PT, PTT      Counseling / Coordination of Care    Total floor / unit time spent today 60+  minutes  Greater than 50% of total time was spent with the patient and / or family counseling and / or coordination of care  A description of the counseling / coordination of care: review of chart, goalsof care, change in code status, supportive listening, offered information, introduction of palliative care , identification of support system

## 2022-02-02 NOTE — PROGRESS NOTES
Progress Note - General Surgery   Archana Rivers 60 y o  female MRN: [id number]  Unit/Bed#: [facility] 511-01 Encounter: [id number]    Assessment:  59 y/o F w morbid obesity and sigmoid ca complicated by perforation, s/p Ortega's procedure on 1/17       Ostomy: formed stool this AM  Malecot: minimal  JPx2: 20 cc SS    Plan:   Regular diet   OOB   DVT ppx   Ostomy care- appreciate wound care recs  o Concerns for devitalization and separation  o Will discuss will team   Please [employer] on call [facility] or Acute Care Surgery Floor Call with any questions     Subjective/Objective     Subjective:   No acute events overnight  States appetite has been down  Has had some episodes of nausea but no vomiting  Tolerating Jell-O and liquids  But states she is not hungry for much more  Thinks that her taste buds are not working well after covid and this is contributing  Pertinent review of systems as above  All other review of systems negative  Objective:    Blood pressure 118/89, pulse 100, temperature 97 8 °F (36 6 °C), temperature source Oral, resp  rate 19, height 5' 7" (1 702 m), weight (!) 157 kg (346 lb 12 5 oz), SpO2 93 %  ,Body mass index is 54 31 kg/m²  Intake/Output Summary (Last 24 hours) at 2/2/2022 0611  Last data filed at 2/2/2022 0350  Gross per 24 hour   Intake 300 ml   Output 370 ml   Net -70 ml       Invasive Devices  Report    Peripherally Inserted Central Catheter Line            PICC Line 86/39/75 Left Basilic 7 days          Drain            Closed/Suction Drain LLQ Bulb 19 Fr  16 days    Closed/Suction Drain RLQ Bulb 19 Fr  16 days    Colostomy Other (comment) LUQ 16 days    Rectal Tube Without balloon 16 days    External Urinary Catheter Medium 5 days                Physical Exam:   Gen:  NAD  HEENT: NCAT  MMM  CV: well perfused  Lungs: Normal respiratory effort  Abd: soft, nt/nd,ostomy intact with solid stool  ADIA drains intact   Incisions cdi  Skin: warm/ dry  Extremities: no peripheral edema, no clubbing or cyanosis  Neuro: AxO x3      Results from last 7 days   Lab Units 01/29/22  0559 01/28/22  0446 01/27/22  0532   WBC Thousand/uL 9 79 10 40* 11 40*   HEMOGLOBIN g/dL 8 3* 8 0* 8 2*   HEMATOCRIT % 28 0* 26 2* 27 9*   PLATELETS Thousands/uL 338 346 314     Results from last 7 days   Lab Units 01/29/22  0559 01/28/22  0858 01/27/22  0532   POTASSIUM mmol/L 4 4 4 4 4 1   CHLORIDE mmol/L 102 103 106   CO2 mmol/L 27 26 26   BUN mg/dL 20 25 25   CREATININE mg/dL 0 92 0 93 0 90   CALCIUM mg/dL 9 5 8 9 8 9            I have personally reviewed pertinent films in PACS      Medications:   Scheduled Meds:  Current Facility-Administered Medications   Medication Dose Route Frequency Provider Last Rate    acetaminophen  975 mg Oral Q6H PRN Justin Ojeda MD      albuterol  2 puff Inhalation Q4H PRN Maxi Ibrahim DO      docusate sodium  100 mg Oral BID Clay Rivera MD      heparin (porcine)  7,500 Units Subcutaneous [facility] Junnie Pals [place], CRNP      HYDROmorphone  0 5 mg Intravenous Q3H PRN Justin Ojeda MD      Labetalol HCl  10 mg Intravenous Q4H PRN MIRTA Velasquez      CAROLINE ANTIFUNGAL   Topical BID Junnie Pals Lucke, CRNP      nystatin  500,000 Units Swish & Swallow 4x Daily Junnie Pals Lucke, CRNP      nystatin   Topical BID Junnie Pals Lucke, CRNP      ondansetron  4 mg Intravenous Q8H PRN Stella Heart MD      oxyCODONE  10 mg Oral Q4H PRN Justin Ojeda MD      oxyCODONE  5 mg Oral Q4H PRN Justin Ojeda MD      pantoprazole  40 mg Oral BID AC Maxi Ibrahim DO      saliva substitute  5 spray Mouth/Throat 4x Daily PRN Junnie Pals Lucke, CRNP      senna  1 tablet Oral BID Clay Rivera MD       Continuous Infusions:   PRN Meds:  acetaminophen, 975 mg, Q6H PRN  albuterol, 2 puff, Q4H PRN  HYDROmorphone, 0 5 mg, Q3H PRN  Labetalol HCl, 10 mg, Q4H PRN  ondansetron, 4 mg, Q8H PRN  oxyCODONE, 10 mg, Q4H PRN  oxyCODONE, 5 mg, Q4H PRN  saliva substitute, 5 spray, 4x Daily PRN      VTE Pharmacologic Prophylaxis: Heparin  VTE Mechanical Prophylaxis: sequential compression device

## 2022-02-02 NOTE — TELEPHONE ENCOUNTER
Soft Intake Form   Patient Details   Willian Sheets     1965     [id number]     Reason For Appointment   Who is Calling? Patient   If not patient, Name? Who is the Referring Doctor? Radha Larson    What is the diagnosis? Colon cancer    Has this diagnosis been confirmed by a biopsy/surgery? If yes, what is the date it was done? Yes   1/16/2022   Biopsy done at [place] [address]? If not, where was it done? yes   Was imaging done, and was it done at [facility]? If not, where was it done? yes   Have you been seen by another Oncologist?  If so, who and where (name of facility, city and state) no   For 2nd Opinions Only: Are you currently undergoing treatment, or are you scheduled to start treatment? If yes, name of facility, city and state n/a   For "History Of" only: Have you completed treatment? N/a    Have you had Genetic Testing done in the past?  If so, advise to bring test results to their visit N/a    Record Gathering Information   Did you advise to have records faxed to [phone]? N/a    Did you advise to have disks sent to the proper address with imaging? ("History of" Patients)  5 years of imaging for breast patients-Mammos, US etc N/a    Scheduling Information   What is the best call back number?    (If the [facility] is calling, please use their number) [phone]   Miscellaneous Information

## 2022-02-02 NOTE — PLAN OF CARE
Problem: Prexisting or High Potential for Compromised Skin Integrity  Goal: Skin integrity is maintained or improved  Description: INTERVENTIONS:  - Identify patients at risk for skin breakdown  - Assess and monitor skin integrity  - Assess and monitor nutrition and hydration status  - Monitor labs   - Assess for incontinence   - Turn and reposition patient  - Assist with mobility/ambulation  - Relieve pressure over bony prominences  - Avoid friction and shearing  - Provide appropriate hygiene as needed including keeping skin clean and dry  - Evaluate need for skin moisturizer/barrier cream  - Collaborate with interdisciplinary team   - Patient/family teaching  - Consider wound care consult   Outcome: Progressing     Problem: MOBILITY - ADULT  Goal: Maintain or return to baseline ADL function  Description: INTERVENTIONS:  -  Assess patient's ability to carry out ADLs; assess patient's baseline for ADL function and identify physical deficits which impact ability to perform ADLs (bathing, care of mouth/teeth, toileting, grooming, dressing, etc )  - Assess/evaluate cause of self-care deficits   - Assess range of motion  - Assess patient's mobility; develop plan if impaired  - Assess patient's need for assistive devices and provide as appropriate  - Encourage maximum independence but intervene and supervise when necessary  - Involve family in performance of ADLs  - Assess for home care needs following discharge   - Consider OT consult to assist with ADL evaluation and planning for discharge  - Provide patient education as appropriate  Outcome: Progressing  Goal: Maintains/Returns to pre admission functional level  Description: INTERVENTIONS:  - Perform BMAT or MOVE assessment daily    - Set and communicate daily mobility goal to care team and patient/family/caregiver  - Collaborate with rehabilitation services on mobility goals if consulted  - Perform Range of Motion 3 times a day    - Reposition patient every 2 hours   - Dangle patient 3 times a day  - Stand patient 3 times a day  - Ambulate patient 3 times a day  - Out of bed to chair 3 times a day   - Out of bed for meals 3 times a day  - Out of bed for toileting  - Record patient progress and toleration of activity level   Outcome: Progressing     Problem: Potential for Falls  Goal: Patient will remain free of falls  Description: INTERVENTIONS:  - Educate patient/family on patient safety including physical limitations  - Instruct patient to call for assistance with activity   - Consult OT/PT to assist with strengthening/mobility   - Keep Call bell within reach  - Keep bed low and locked with side rails adjusted as appropriate  - Keep care items and personal belongings within reach  - Initiate and maintain comfort rounds  - Make Fall Risk Sign visible to staff  - Offer Toileting every 2 Hours, in advance of need  - Initiate/Maintain bed alarm  - Apply yellow socks and bracelet for high fall risk patients  - Consider moving patient to room near nurses station  Outcome: Progressing     Problem: GASTROINTESTINAL - ADULT  Goal: Minimal or absence of nausea and/or vomiting  Description: INTERVENTIONS:  - Administer IV fluids if ordered to ensure adequate hydration  - Maintain NPO status until nausea and vomiting are resolved  - Nasogastric tube if ordered  - Administer ordered antiemetic medications as needed  - Provide nonpharmacologic comfort measures as appropriate  - Advance diet as tolerated, if ordered  - Consider nutrition services referral to assist patient with adequate nutrition and appropriate food choices  Outcome: Progressing  Goal: Maintains or returns to baseline bowel function  Description: INTERVENTIONS:  - Assess bowel function  - Encourage oral fluids to ensure adequate hydration  - Administer IV fluids if ordered to ensure adequate hydration  - Administer ordered medications as needed  - Encourage mobilization and activity  - Consider nutritional services referral to assist patient with adequate nutrition and appropriate food choices  Outcome: Progressing  Goal: Maintains adequate nutritional intake  Description: INTERVENTIONS:  - Monitor percentage of each meal consumed  - Identify factors contributing to decreased intake, treat as appropriate  - Assist with meals as needed  - Monitor I&O, weight, and lab values if indicated  - Obtain nutrition services referral as needed  Outcome: Progressing  Goal: Establish and maintain optimal ostomy function  Description: INTERVENTIONS:  - Assess bowel function  - Encourage oral fluids to ensure adequate hydration  - Administer IV fluids if ordered to ensure adequate hydration   - Administer ordered medications as needed  - Encourage mobilization and activity  - Nutrition services referral to assist patient with appropriate food choices  - Assess stoma site  - Consider wound care consult   Outcome: Progressing  Goal: Oral mucous membranes remain intact  Description: INTERVENTIONS  - Assess oral mucosa and hygiene practices  - Implement preventative oral hygiene regimen  - Implement oral medicated treatments as ordered  - Initiate Nutrition services referral as needed  Outcome: Progressing     Problem: GENITOURINARY - ADULT  Goal: Maintains or returns to baseline urinary function  Description: INTERVENTIONS:  - Assess urinary function  - Encourage oral fluids to ensure adequate hydration if ordered  - Administer IV fluids as ordered to ensure adequate hydration  - Administer ordered medications as needed  - Offer frequent toileting  - Follow urinary retention protocol if ordered  Outcome: Progressing     Problem: SKIN/TISSUE INTEGRITY - ADULT  Goal: Skin Integrity remains intact(Skin Breakdown Prevention)  Description: Assess:  -Perform Surendra assessment every 12  -Clean and moisturize skin every 4  -Inspect skin when repositioning, toileting, and assisting with ADLS  -Assess extremities for adequate circulation and sensation Bed Management:  -Have minimal linens on bed & keep smooth, unwrinkled  -Change linens as needed when moist or perspiring  -Avoid sitting or lying in one position for more than 2 hours while in bed  -Keep HOB at 30degrees     Toileting:  -Offer bedside commode  -Assess for incontinence every 2    Activity:  -Mobilize patient 3 times a day  -Encourage activity and walks on unit  -Encourage or provide ROM exercises   -Turn and reposition patient every 2 Hours  -Use appropriate equipment to lift or move patient in bed  -Instruct/ Assist with weight shifting every 2 hour when out of bed in chair  -Consider limitation of chair time 8 hour intervals    Skin Care:  -Avoid use of baby powder, tape, friction and shearing, hot water or constrictive clothing  -Relieve pressure over bony prominences using allevyns  -Do not massage red bony areas  Outcome: Progressing  Goal: Incision(s), wounds(s) or drain site(s) healing without S/S of infection  Description: INTERVENTIONS  - Assess and document dressing, incision, wound bed, drain sites and surrounding tissue  - Provide patient and family education  - Perform skin care/dressing changes as needed  Outcome: Progressing  Goal: Pressure injury heals and does not worsen  Description: Interventions:  - Implement low air loss mattress or specialty surface (Criteria met)  - Apply silicone foam dressing  - Instruct/assist with weight shifting every 120 minutes when in chair   - Limit chair time to 8 hour intervals  - Use special pressure reducing interventions such as allevyns when in chair   - Apply fecal or urinary incontinence containment device   - Perform passive or active ROM every 4  - Turn and reposition patient & offload bony prominences every 2 hours   - Utilize friction reducing device or surface for transfers   - Consider nutrition services referral as needed  Outcome: Progressing     Problem: PAIN - ADULT  Goal: Verbalizes/displays adequate comfort level or baseline comfort level  Description: Interventions:  - Encourage patient to monitor pain and request assistance  - Assess pain using appropriate pain scale  - Administer analgesics based on type and severity of pain and evaluate response  - Implement non-pharmacological measures as appropriate and evaluate response  - Consider cultural and social influences on pain and pain management  - Notify physician/advanced practitioner if interventions unsuccessful or patient reports new pain  Outcome: Progressing     Problem: INFECTION - ADULT  Goal: Absence or prevention of progression during hospitalization  Description: INTERVENTIONS:  - Assess and monitor for signs and symptoms of infection  - Monitor lab/diagnostic results  - Monitor all insertion sites, i e  indwelling lines, tubes, and drains  - Monitor endotracheal if appropriate and nasal secretions for changes in amount and color  - Center appropriate cooling/warming therapies per order  - Administer medications as ordered  - Instruct and encourage patient and family to use good hand hygiene technique  - Identify and instruct in appropriate isolation precautions for identified infection/condition  Outcome: Progressing     Problem: SAFETY ADULT  Goal: Maintain or return to baseline ADL function  Description: INTERVENTIONS:  -  Assess patient's ability to carry out ADLs; assess patient's baseline for ADL function and identify physical deficits which impact ability to perform ADLs (bathing, care of mouth/teeth, toileting, grooming, dressing, etc )  - Assess/evaluate cause of self-care deficits   - Assess range of motion  - Assess patient's mobility; develop plan if impaired  - Assess patient's need for assistive devices and provide as appropriate  - Encourage maximum independence but intervene and supervise when necessary  - Involve family in performance of ADLs  - Assess for home care needs following discharge   - Consider OT consult to assist with ADL evaluation and planning for discharge  - Provide patient education as appropriate  Outcome: Progressing  Goal: Maintains/Returns to pre admission functional level  Description: INTERVENTIONS:  - Perform BMAT or MOVE assessment daily    - Set and communicate daily mobility goal to care team and patient/family/caregiver  - Collaborate with rehabilitation services on mobility goals if consulted  - Perform Range of Motion 3 times a day  - Reposition patient every 2 hours    - Dangle patient 3 times a day  - Stand patient 3 times a day  - Ambulate patient 3 times a day  - Out of bed to chair 3 times a day   - Out of bed for meals 3 times a day  - Out of bed for toileting  - Record patient progress and toleration of activity level   Outcome: Progressing  Goal: Patient will remain free of falls  Description: INTERVENTIONS:  - Educate patient/family on patient safety including physical limitations  - Instruct patient to call for assistance with activity   - Consult OT/PT to assist with strengthening/mobility   - Keep Call bell within reach  - Keep bed low and locked with side rails adjusted as appropriate  - Keep care items and personal belongings within reach  - Initiate and maintain comfort rounds  - Make Fall Risk Sign visible to staff  - Offer Toileting every 2 Hours, in advance of need  - Initiate/Maintain bed alarm  - Apply yellow socks and bracelet for high fall risk patients  - Consider moving patient to room near nurses station  Outcome: Progressing     Problem: DISCHARGE PLANNING  Goal: Discharge to home or other facility with appropriate resources  Description: INTERVENTIONS:  - Identify barriers to discharge w/patient and caregiver  - Arrange for needed discharge resources and transportation as appropriate  - Identify discharge learning needs (meds, wound care, etc )  - Arrange for interpretive services to assist at discharge as needed  - Refer to Case Management Department for coordinating discharge planning if the patient needs post-hospital services based on physician/advanced practitioner order or complex needs related to functional status, cognitive ability, or social support system  Outcome: Progressing     Problem: Knowledge Deficit  Goal: Patient/family/caregiver demonstrates understanding of disease process, treatment plan, medications, and discharge instructions  Description: Complete learning assessment and assess knowledge base  Interventions:  - Provide teaching at level of understanding  - Provide teaching via preferred learning methods  Outcome: Progressing     Problem: Nutrition/Hydration-ADULT  Goal: Nutrient/Hydration intake appropriate for improving, restoring or maintaining nutritional needs  Description: Monitor and assess patient's nutrition/hydration status for malnutrition  Collaborate with interdisciplinary team and initiate plan and interventions as ordered  Monitor patient's weight and dietary intake as ordered or per policy  Utilize nutrition screening tool and intervene as necessary  Determine patient's food preferences and provide high-protein, high-caloric foods as appropriate       INTERVENTIONS:  - Monitor oral intake, urinary output, labs, and treatment plans  - Assess nutrition and hydration status and recommend course of action  - Evaluate amount of meals eaten  - Assist patient with eating if necessary   - Allow adequate time for meals  - Recommend/ encourage appropriate diets, oral nutritional supplements, and vitamin/mineral supplements  - Order, calculate, and assess calorie counts as needed  - Recommend, monitor, and adjust tube feedings and TPN/PPN based on assessed needs  - Assess need for intravenous fluids  - Provide specific nutrition/hydration education as appropriate  - Include patient/family/caregiver in decisions related to nutrition  Outcome: Progressing     Problem: CHANGE IN BODY IMAGE  Goal: Pt/Family communicate acceptance of loss or change in body image and expresses psychological comfort and peace  Description: INTERVENTIONS:  - Assess patient/family anxiety and grief process related to change in body image, loss of functional status and loss of sense of self  - Assess patient/family's coping skills and provide emotional, spiritual and psychosocial support  - Provide information about the patient's health status with consideration of family and cultural values  - Communicate willingness to discuss loss and facilitate grief process with patient/family as appropriate  - Emphasize sustaining relationships within family system and community, or farzaneh/spiritual traditions  - Refer to community support groups as appropriate  - Initiate Spiritual Care, Pastoral care or other ancillary consults as needed  Outcome: Progressing

## 2022-02-03 NOTE — PROGRESS NOTES
Progress note - Palliative and Supportive Care   Megan Strauss 60 y o  female [id number]    Patient Active Problem List   Diagnosis    Lymphedema    Primary hypertension    Morbid obesity with BMI of 60 0-69 9, adult (HCC)    Hidradenitis suppurativa    Chronic midline low back pain with left-sided sciatica    Other specified anxiety disorders    Lumbar paraspinal muscle spasm    Borderline hyperlipidemia    Iron deficiency    GERD (gastroesophageal reflux disease)    Acute renal failure (ARF) (HCC)    Cellulitis of abdominal wall, chest wall and groin with wounds    Diverticulitis of large intestine with perforation    Right Hydroureteronephrosis    Subacute vaginitis    Dysphagia     Active issues specifically addressed today include:   Metastatic colon cancer  Morbid obesity  Goals of care     Plan:  1  Symptom management   -  Per primary team     2  Goals  -  Limits set at DNAR/DNI  -  Patient receptive to outpatient oncology and palliative, appointments made  -     Code Status: DNAR/DNI  - Level 3   Decisional apparatus:  Patient is competent on my exam today  If competence is lost, patient's substitute decision maker would default to adult children  [place]  Advance Directive / Living Will / POLST:  None on file       Interval history:       Patient seen resting in bed  The patient expressed concern that she is going to be sent to a nursing home  Discussed with case management  Per CM the patient has been refusing PT/OT and is being denied at rehab facilities  Discussed with patient who states she is willing to participate in PT/OT and would like rehab  She is receptive to outpatient oncology and palliative followup  MEDICATIONS / ALLERGIES:     PTA meds:   Prior to Admission Medications   Prescriptions Last Dose Informant Patient Reported? Taking?    Diclofenac Sodium (VOLTAREN) 1 %   Yes No   Sig: Apply 2 g topically 4 (four) times a day   SUMAtriptan (IMITREX) 25 mg tablet   No No   Sig: TAKE 1 TABLET BY MOUTH ONCE AS NEEDED FOR MIGRAINE FOR UP TO 1 DOSE   albuterol (Ventolin HFA) 90 mcg/act inhaler   No No   Sig: Inhale 2 puffs every 6 (six) hours as needed for wheezing or shortness of breath   celecoxib (CeleBREX) 200 mg capsule   No No   Sig: take 1 capsule by mouth twice a day   clindamycin (CLEOCIN T) 1 % external solution   No No   Sig: Apply topically 2 (two) times a day   Patient not taking: Reported on 11/22/2021    clotrimazole (LOTRIMIN) 1 % cream   No No   Sig: Apply topically 2 (two) times a day   cyclobenzaprine (FLEXERIL) 10 mg tablet   No No   Sig: take 1 tablet by mouth three times a day if needed for muscle spasm   dicyclomine (BENTYL) 20 mg tablet   No No   Sig: take 1 tablet by mouth four times a day if needed for ABDOMINAL CRAMPING   ferrous sulfate 324 (65 Fe) mg   No No   Sig: take 1 tablet by mouth twice a day before meals   Patient not taking: Reported on 11/9/2021   fluticasone (FLONASE) 50 mcg/act nasal spray   No No   Sig: instill 1 spray into each nostril once daily   hydrOXYzine HCL (ATARAX) 25 mg tablet   No No   Sig: take 1 tablet by mouth three times a day if needed for anxiety   lidocaine (LIDODERM) 5 %  Self No No   Sig: Apply 1 patch topically daily Remove & Discard patch within 12 hours or as directed by MD   lisinopril (ZESTRIL) 10 mg tablet   No No   Sig: Take 1 tablet (10 mg total) by mouth daily   loratadine (CLARITIN) 10 mg tablet   No No   Sig: take 1 tablet by mouth once daily   metoprolol tartrate (LOPRESSOR) 25 mg tablet   No No   Sig: take 1 tablet by mouth twice a day   omeprazole (PriLOSEC) 20 mg delayed release capsule   No No   Sig: Take 1 capsule (20 mg total) by mouth daily   sertraline (ZOLOFT) 50 mg tablet   No No   Sig: Take 1 tablet (50 mg total) by mouth daily      Facility-Administered Medications: None       Allergies   Allergen Reactions    Bee Venom Anaphylaxis, Shortness Of Breath and Swelling OBJECTIVE:    Physical Exam  Physical Exam  Vitals and nursing note reviewed  Constitutional:       General: She is awake  Appearance: She is morbidly obese  She is ill-appearing  HENT:      Head: Normocephalic and atraumatic  Mouth/Throat:      Lips: Pink  Mouth: Mucous membranes are moist       Dentition: Abnormal dentition  Dental caries present  Pharynx: Oropharynx is clear  Eyes:      General: Lids are normal       Extraocular Movements: Extraocular movements intact  Cardiovascular:      Rate and Rhythm: Normal rate  Pulses: Decreased pulses  Abdominal:      Palpations: Abdomen is soft  Comments: Colostomy    Musculoskeletal:      Cervical back: Normal range of motion and neck supple  Comments: Generally weak    Skin:     General: Skin is warm and dry  Coloration: Skin is pale  Neurological:      General: No focal deficit present  Mental Status: She is alert and oriented to person, place, and time  GCS: GCS eye subscore is 4  GCS verbal subscore is 5  GCS motor subscore is 6  Psychiatric:         Attention and Perception: Attention and perception normal          Mood and Affect: Mood and affect normal          Speech: Speech normal          Behavior: Behavior is cooperative  Cognition and Memory: Cognition normal          Lab Results:   CBC: No results found for: WBC, HGB, HCT, MCV, PLT, ADJUSTEDWBC, MCH, MCHC, RDW, MPV, NRBC, CMP:   Lab Results   Component Value Date    SODIUM 136 02/03/2022    K 4 1 02/03/2022     02/03/2022    CO2 28 02/03/2022    BUN 16 02/03/2022    CREATININE 1 09 02/03/2022    CALCIUM 8 7 02/03/2022    EGFR 56 02/03/2022   , PT/PTT:No results found for: PT, PTT      Counseling / Coordination of Care    Total floor / unit time spent today 25+ minutes  Greater than 50% of total time was spent with the patient and / or family counseling and / or coordination of care   A description of the counseling / coordination of care: supportive listening offered information collaboration with Case management, review of chart

## 2022-02-03 NOTE — PROGRESS NOTES
Progress Note - General Surgery   Lucinda Rojas 60 y o  female MRN: [id number]  Unit/Bed#: [facility] 511-01 Encounter: [id number]    Assessment:  59 y/o F w morbid obesity and sigmoid ca complicated by perforation, s/p Ortega's procedure on 1/17       Ostomy: formed stool this AM  Malecot: 100cc  JPx2: minimal serous    Plan:   Regular diet   OOB   DVT ppx   Ostomy care- appreciate wound care recs   Appreciate Palliative Recs   Dispo Planning   Please TigerText on call Red Surgery or Acute Care Surgery Floor Call with any questions     Subjective/Objective     Subjective:   No acute events overnight  Patient states she had a good conversation palliative care  She is willing to look more into chemotherapy  Still preferring mostly liquid foods and Jell-O   Does not have much of an appetite    Pertinent review of systems as above  All other review of systems negative  Objective:    Blood pressure 145/83, pulse 90, temperature 98 °F (36 7 °C), resp  rate 18, height 5' 7" (1 702 m), weight (!) 155 kg (342 lb 9 5 oz), SpO2 92 %  ,Body mass index is 53 66 kg/m²  Intake/Output Summary (Last 24 hours) at 2/3/2022 0855  Last data filed at 2/3/2022 0603  Gross per 24 hour   Intake 798 ml   Output 983 ml   Net -185 ml       Invasive Devices  Report    Peripherally Inserted Central Catheter Line            PICC Line 06/81/76 Left Basilic 8 days          Drain            Closed/Suction Drain LLQ Bulb 19 Fr  17 days    Closed/Suction Drain RLQ Bulb 19 Fr  17 days    Colostomy Other (comment) LUQ 17 days    Rectal Tube Without balloon 17 days    External Urinary Catheter Medium 7 days                Physical Exam:   Gen:  NAD  HEENT: NCAT  MMM  CV: well perfused, pulses palpable  Lungs: Normal respiratory effort  Abd: soft, nt/nd  Ostomy intact with formed stool  Skin: warm/ dry  Extremities: no peripheral edema, no cyanosis  Neuro:  AxO x3        Results from last 7 days   Lab Units 01/29/22  0578 01/28/22  0446   WBC Thousand/uL 9 79 10 40*   HEMOGLOBIN g/dL 8 3* 8 0*   HEMATOCRIT % 28 0* 26 2*   PLATELETS Thousands/uL 338 346     Results from last 7 days   Lab Units 02/03/22  0403 02/02/22  0937 01/29/22  0559   POTASSIUM mmol/L 4 1 3 6 4 4   CHLORIDE mmol/L 101 100 102   CO2 mmol/L 28 28 27   BUN mg/dL 16 15 20   CREATININE mg/dL 1 09 1 16 0 92   CALCIUM mg/dL 8 7 8 7 9 5            I have personally reviewed pertinent films in PACS      Medications:   Scheduled Meds:  Current Facility-Administered Medications   Medication Dose Route Frequency Provider Last Rate    acetaminophen  975 mg Oral Q6H PRN Kathy Gresham MD      albuterol  2 puff Inhalation Q4H PRN Yane Stringer DO      docusate sodium  100 mg Oral BID Woodrow Ramos MD      heparin (porcine)  7,500 Units Subcutaneous [facility] Leopold Mcardle [place], CRNP      HYDROmorphone  0 5 mg Intravenous Q3H PRN Kathy Gresham MD      Labetalol HCl  10 mg Intravenous Q4H PRN MIRTA Thompson      CAROLINE ANTIFUNGAL   Topical BID Leopold Mcardle [place], CRNP      nystatin  500,000 Units Swish & Swallow 4x Daily Leopold Mcardle Lucke, CRNP      nystatin   Topical BID Leopold Mcardle Lucke, CRNP      ondansetron  4 mg Oral Q6H [address] Josie Mo MD      oxyCODONE  10 mg Oral Q4H PRN Kathy Gresham MD      oxyCODONE  5 mg Oral Q4H PRN Kathy Gresham MD      pantoprazole  40 mg Oral BID HUMERA Stringer DO      saliva substitute  5 spray Mouth/Throat 4x Daily PRN Leopold Mcardle Lucke, CRNP      senna  1 tablet Oral BID Woodrow Ramos MD       Continuous Infusions:   PRN Meds:  acetaminophen, 975 mg, Q6H PRN  albuterol, 2 puff, Q4H PRN  HYDROmorphone, 0 5 mg, Q3H PRN  Labetalol HCl, 10 mg, Q4H PRN  oxyCODONE, 10 mg, Q4H PRN  oxyCODONE, 5 mg, Q4H PRN  saliva substitute, 5 spray, 4x Daily PRN      VTE Pharmacologic Prophylaxis: Heparin  VTE Mechanical Prophylaxis: sequential compression device

## 2022-02-03 NOTE — PHYSICAL THERAPY NOTE
PHYSICAL THERAPY NOTE          Patient Name: Denzel Watson  [id number] Date: 2/3/2022     02/03/22 1204   PT Last Visit   PT Visit Date 02/03/22   Note Type   Note Type Treatment   Pain Assessment   Pain Assessment Tool 0-10   Pain Score 8   Pain Location/Orientation Location: Abdomen   Restrictions/Precautions   Weight Bearing Precautions Per Order No   Other Precautions   (ADIA drain x2, rectal tube)   General   Chart Reviewed Yes   Response to Previous Treatment Patient with no complaints from previous session  Family/Caregiver Present No   Cognition   Overall Cognitive Status WFL   Arousal/Participation Alert   Attention Attends with cues to redirect   Orientation Level Oriented X4   Memory Decreased recall of precautions   Following Commands Follows one step commands with increased time or repetition   Comments Pt again presents with anxious like behaviors this session that limits participation in [address] - spoke with RN about about this and to f/u  Pt requires encouragement to participate  Subjective   Subjective Pt pleasant and agreeable to participate  Bed Mobility   Supine to Sit 4  Minimal assistance   Additional items Assist x 1;HOB elevated; Increased time required;Verbal cues   Additional Comments Supine in bed upon PT arrival   Pt left upright in bedside chair with all needs in reach  Transfers   Sit to Stand 3  Moderate assistance   Additional items Assist x 2; Increased time required;Verbal cues   Stand to Sit 3  Moderate assistance   Additional items Assist x 2; Increased time required;Verbal cues   Toilet transfer 3  Moderate assistance   Additional items Assist x 2; Increased time required;Verbal cues  (commode over toilet seat)   Additional Comments Transfers with robe QUIGLEY    for hand placement and safety  Transfers initially Mod A x 2 progressing to Mod A x 1  Ambulation/Elevation   Gait pattern Excessively slow; Short stride;Decreased foot clearance; Improper Weight shift; Poor UE support Gait Assistance 3  Moderate assist   Additional items Assist x 1  (SBA of second for safety)   Assistive Device Bariatric Rolling walker   Distance 6 ft from bed to bathroom, 8 ft in room    Balance   Static Sitting Fair   Dynamic Sitting Fair -   Static Standing Poor   Dynamic Standing Poor   Ambulatory Poor   Endurance Deficit   Endurance Deficit Yes   Endurance Deficit Description fatigue, weakness, anxious like behaviors, SOB   Activity Tolerance   Activity Tolerance Patient limited by fatigue; Other (Comment)  (anxious like behaviors)   Medical Staff Made Aware OT Chelsey   Nurse Made Aware RN cleared pt to be seen by PT   Exercises   Balance training  Static standing at sink for ~30 seconds while performing ADLs with OT  Unable to tolerate longer 2/2 SOB and dizziness  Assessment   Prognosis Fair   Problem List Decreased strength;Decreased endurance; Impaired balance;Decreased mobility; Decreased safety awareness;Pain   Assessment Pt seen for PT treatment session with focus on bed mobility, functional transfers, functional mobility  Pt making steady progress toward goals this session  Pt appears more motivated to participate in therapy, however, presents with anxious like behaviors that limit participation  Pt demonstrating strength gains evident by ease of STS transfers  Pt becomes severely SOB with minimal activity, however, is able to ambulate slightly increased distance  Pt left upright in bedside chair with all needs in reach  Pt will benefit from skilled therapy in order to address current impairments and functional limitations  PT to follow pt and recommending rehab once medically cleared  The patient's AM-PAC Basic Mobility Inpatient Short Form Raw Score is 9  A Raw score of less than or equal to 16 suggests the patient may benefit from discharge to post-acute rehabilitation services  Please also refer to the recommendation of the Physical Therapist for safe discharge planning     Barriers to Discharge Inaccessible home environment;Decreased caregiver support   Goals   Patient Goals to get better   STG Expiration Date 02/17/22   Short Term Goal #1 1  Pt will demonstrate ability to perform all aspects of bed mobility with I in order to increase independence and decrease burden on caregivers  2  Pt will demonstrate ability to perform functional transfers with Mod I in order to increase independence and decrease burden on caregivers  3  Pt will demonstrate ability to ambulate 200 ft with least restrictive AD with Mod I in order to return to mobility safely  4  Pt will demonstrate ability to negotiate full flight steps with/without HR and Mod I in order to return to household/community mobility safely  5  Pt will demonstrate improved balance by one grade order to decrease risk of falls  6  Pt will increase b/l LE strength by 1 grade in order to increase ease of functional mobility and transfers  Plan   Treatment/Interventions Functional transfer training;LE strengthening/ROM; Therapeutic exercise; Endurance training;Patient/family training;Equipment eval/education; Bed mobility;Gait training;Spoke to nursing   Progress Progressing toward goals   PT Frequency 3-5x/wk   Recommendation   PT Discharge Recommendation Post acute rehabilitation services   Additional Comments PT cleared pt to DC once medically cleared   AM-PAC Basic Mobility Inpatient   Turning in Bed Without Bedrails 2   Lying on Back to Sitting on Edge of Flat Bed 3   Moving Bed to Chair 1   Standing Up From Chair 1   Walk in Room 1   Climb 3-5 Stairs 1   Basic Mobility Inpatient Raw Score 9   Turning Head Towards Sound 4   Follow Simple Instructions 3   Low Function Basic Mobility Raw Score 16   Low Function Basic Mobility Standardized Score 25 72   Highest Level Of Mobility   JH-HLM Goal 3: Sit at edge of bed   JH-HLM Highest Level of Mobility 6: Walk 10 steps or more   JH-HLM Goal Achieved Yes   Education   Education Provided Mobility training;Assistive device   Patient Reinforcement needed   End of Consult   Patient Position at End of Consult Bedside chair; All needs within reach     Adalberto Brooks, PT, DPT

## 2022-02-03 NOTE — PLAN OF CARE
Problem: PHYSICAL THERAPY ADULT  Goal: Performs mobility at highest level of function for planned discharge setting  See evaluation for individualized goals  Description: Treatment/Interventions: Functional transfer training,LE strengthening/ROM,Therapeutic exercise,Endurance training,Patient/family training,Equipment eval/education,Bed mobility,Gait training,OT,Spoke to nursing  Equipment Recommended: Merlin Sovereign (bariatric RW )       See flowsheet documentation for full assessment, interventions and recommendations  2/3/2022 1543 by Tia Cornelius, PT  Outcome: Progressing  Note: Prognosis: Fair  Problem List: Decreased strength,Decreased endurance,Impaired balance,Decreased mobility,Decreased safety awareness,Pain  Assessment: Pt seen for PT treatment session with focus on bed mobility, functional transfers, functional mobility  Pt making steady progress toward goals this session  Pt appears more motivated to participate in therapy, however, presents with anxious like behaviors that limit participation  Pt demonstrating strength gains evident by ease of STS transfers  Pt becomes severely SOB with minimal activity, however, is able to ambulate slightly increased distance  Pt left upright in bedside chair with all needs in reach  Pt will benefit from skilled therapy in order to address current impairments and functional limitations  PT to follow pt and recommending rehab once medically cleared  The patient's AM-PAC Basic Mobility Inpatient Short Form Raw Score is 9  A Raw score of less than or equal to 16 suggests the patient may benefit from discharge to post-acute rehabilitation services  Please also refer to the recommendation of the Physical Therapist for safe discharge planning  Barriers to Discharge: Inaccessible home environment,Decreased caregiver support        PT Discharge Recommendation: Post acute rehabilitation services          See flowsheet documentation for full assessment

## 2022-02-03 NOTE — WOUND OSTOMY CARE
Progress Note - Wound   Bryce Rm 60 y o  female MRN: [id number]  Unit/Bed#: [facility] 511-01 Encounter: [id number]        Assessment:   Patient seen for wound care follow up, on arrival to bed side, patient out of bed in chair with no complaints, patient agreeable for wound care nurse to assess skin  Ostomy pouch change this morning due to blow out, however when assessed noted pouch lifted and leaking to medial aspect and pouch changed  Findings  1- L breast with one small residual wound remaining  Wound bed is 100% pink with hypopigmented scar tissues surrounding wound  2-B/L perineum/buttocks with healed wounds  Wounds presenting as dried, hypopigmented scar tissue  3-L posterior thigh stage 2 pressure injury healing, remains with 100% pink wound bed and intact periwound  Heels are intact, abdomen with superficially dehisced surgical incision and L abdomen with necrosed stoma debrided at bed side by ORALIA Wilson  Skin and Wound Care Plan:    1  Apply skin nourishing cream to intact skin daily   2  Bariatric ehob offloading cushion to chair when OOB   3  Elevate heels off of bed with pillows to offload   4  Kreg bariatric bed   5  Turn and reposition patient Q2 hours   6  Right and left abdominal pannus - cleanse with soap and water then pat dry dust lightly with nystatin powder then apply maxorb to open areas then apply cut to fit ABD pad and secure lightly with tape change every other day   7  Bilateral buttocks bilateral groin and perineum area - cleanse with soap and water then apply janice antifungal cream bid and prn   8  Cleanse L breast wounds with NSS, pat dry, and apply bordered Allevyn foam dressing  Sancho dressing with T  Change every other day and as needed for soilage/dislodgement  9  Continue with ostomy care and teaching   10  Wound care will follow along with patient weekly, please call or tiger text with questions and concerns   11   Left posterior thigh--cleanse, pat dry  Apply Allevyn Life foam dressing   Change dressing every other day  12  Mid line incisional care as per surgery team       Vitals: Blood pressure 133/85, pulse 95, temperature 97 8 °F (36 6 °C), resp  rate 18, height 5' 7" (1 702 m), weight (!) 155 kg (342 lb 9 5 oz), SpO2 95 %  ,Body mass index is 53 66 kg/m²  Wound 11/22/21 Other (comment) Breast Left; Lower (Active)   Wound Image   02/03/22 1343   Wound Description Pink 02/03/22 1343   Pressure Injury Stage 2 02/03/22 1343   Luisa-wound Assessment Intact 02/03/22 1343   Wound Length (cm) 1 5 cm 02/03/22 1343   Wound Width (cm) 2 cm 02/03/22 1343   Wound Depth (cm) 0 1 cm 02/03/22 1343   Wound Surface Area (cm^2) 3 cm^2 02/03/22 1343   Wound Volume (cm^3) 0 3 cm^3 02/03/22 1343   Calculated Wound Volume (cm^3) 0 3 cm^3 02/03/22 1343   Change in Wound Size % 70 02/03/22 1343   Tunneling 0 cm 01/24/22 1203   Tunneling in depth located at 0 01/24/22 1203   Undermining 0 01/24/22 1203   Undermining is depth extending from 0 01/24/22 1203   Drainage Amount Scant 01/31/22 0752   Drainage Description Serous 02/03/22 1343   Non-staged Wound Description Partial thickness 01/31/22 0752   Treatments Cleansed;Site care 02/03/22 1343   Dressing Foam, Silicon (eg  Allevyn, etc) 02/03/22 1343   Wound packed? No 01/24/22 1203   Packing- # removed 0 01/24/22 1203   Packing- # inserted 0 01/24/22 1203   Dressing Changed Changed 01/29/22 0900   Patient Tolerance Tolerated well 02/03/22 1343   Dressing Status Clean;Dry; Intact 02/03/22 0800       Wound 11/22/21 Other (comment) Abdomen Medial;Lower (Active)   Wound Description Pink; Beefy red 02/03/22 0800   Luisa-wound Assessment Pink; Intact 02/03/22 0800   Wound Length (cm) 2 cm 01/27/22 1401   Wound Width (cm) 5 cm 01/27/22 1401   Wound Depth (cm) 0 1 cm 01/27/22 1401   Wound Surface Area (cm^2) 10 cm^2 01/27/22 1401   Wound Volume (cm^3) 1 cm^3 01/27/22 1401   Calculated Wound Volume (cm^3) 1 cm^3 01/27/22 1401   Change in Wound Size % 81 48 01/27/22 1401   Tunneling 0 cm 01/24/22 1208   Tunneling in depth located at 0 01/24/22 1208   Undermining 0 01/24/22 1208   Undermining is depth extending from 0 01/24/22 1208   Drainage Amount Small 02/02/22 2130   Drainage Description Serosanguineous 02/02/22 2130   Non-staged Wound Description Partial thickness 02/02/22 2130   Treatments Cleansed;Site care 02/01/22 2054   Dressing ABD;Calcium Alginate with Silver 02/02/22 2130   Wound packed? No 01/24/22 1208   Packing- # removed 0 01/24/22 1208   Packing- # inserted 0 01/24/22 5046   Dressing Changed Changed 02/03/22 0800   Patient Tolerance Tolerated well 02/01/22 2054   Dressing Status Clean;Dry; Intact 02/03/22 0800       Wound 01/06/22 Other (comment) Other (Comment) Perineum (Active)   Wound Image   02/03/22 1336   Wound Description Beefy red;Pink 02/03/22 0800   Pressure Injury Stage 2 02/03/22 0800   Luisa-wound Assessment Fragile 02/02/22 2130   Wound Length (cm) 11 cm 01/27/22 1416   Wound Width (cm) 6 cm 01/27/22 1416   Wound Depth (cm) 0 1 cm 01/27/22 1416   Wound Surface Area (cm^2) 66 cm^2 01/27/22 1416   Wound Volume (cm^3) 6 6 cm^3 01/27/22 1416   Calculated Wound Volume (cm^3) 6 6 cm^3 01/27/22 1416   Change in Wound Size % 30 16 01/27/22 1416   Drainage Amount Scant 02/02/22 2130   Drainage Description Serosanguineous 02/02/22 2130   Non-staged Wound Description Partial thickness 02/02/22 2130   Treatments Cleansed;Site care 02/01/22 2054   Dressing Moisture barrier 02/02/22 2130   Wound packed? No 01/12/22 1751   Dressing Changed Changed 02/03/22 0800   Patient Tolerance Tolerated well 02/03/22 0800   Dressing Status Dry; Intact 02/03/22 0800       Wound 01/11/22 Pressure Injury Thigh Posterior;Left;Upper (Active)   Wound Image   02/03/22 1339   Wound Description Pink 02/03/22 1339   Pressure Injury Stage 2 02/03/22 1339   Luisa-wound Assessment Dry; Intact 02/03/22 1339   Wound Length (cm) 5 cm 02/03/22 1339   Wound Width (cm) 3 cm 02/03/22 1339   Wound Depth (cm) 0 1 cm 02/03/22 1339   Wound Surface Area (cm^2) 15 cm^2 02/03/22 1339   Wound Volume (cm^3) 1 5 cm^3 02/03/22 1339   Calculated Wound Volume (cm^3) 1 5 cm^3 02/03/22 1339   Change in Wound Size % 75 02/03/22 1339   Drainage Amount Scant 02/03/22 1339   Drainage Description Purulent 02/03/22 1339   Non-staged Wound Description Partial thickness 01/31/22 0752   Treatments Cleansed;Site care 02/03/22 1339   Dressing Foam, Silicon (eg  Allevyn, etc) 02/02/22 2130   Dressing Changed Changed 02/03/22 0800   Patient Tolerance Tolerated well 02/03/22 1339   Dressing Status Clean;Dry; Intact 02/03/22 0800       Wound 01/17/22 Surgical Abdomen N/A;Mid (Active)   Wound Description Pink;Swelling;Beefy red 02/02/22 2130   Luisa-wound Assessment Fragile;[place] 02/02/22 2130   Wound Site Closure Staples 02/02/22 2130   Drainage Amount Small 02/02/22 2130   Drainage Description Serosanguineous 02/02/22 2130   Treatments Site care 02/01/22 2054   Dressing ABD 02/02/22 2130   Wound packed? Yes 01/19/22 1600   Packing- # inserted 8 01/17/22 0013   Dressing Changed Changed 02/03/22 0800   Patient Tolerance Tolerated well 02/02/22 0615   Dressing Status Clean;Dry; Intact 02/03/22 0800         Our skin care recommendations remains as nursing orders, wound care to continue following, please call or tiger text with questions and concerns        Shazia Ny, RN, BSN, [facility]

## 2022-02-03 NOTE — PLAN OF CARE
Problem: OCCUPATIONAL THERAPY ADULT  Goal: Performs self-care activities at highest level of function for planned discharge setting  See evaluation for individualized goals  Description: Treatment Interventions: ADL retraining,Functional transfer training,UE strengthening/ROM,Endurance training,Cognitive reorientation,Patient/family training,Equipment evaluation/education,Compensatory technique education,Continued evaluation,Energy conservation,Activityengagement          See flowsheet documentation for full assessment, interventions and recommendations  Note: Limitation: Decreased ADL status,Decreased UE strength,Decreased Safe judgement during ADL,Decreased cognition,Decreased endurance,Decreased self-care trans,Decreased high-level ADLs  Prognosis: Fair  Assessment: Pt seen on this date for OT session focusing on ADL retraining, body mechanics, transfer retraining, increasing activity tolerance/endurance and EOB sitting to increase ability to participate in ADL/functional tasks  Pt was found in bed and was left in chair w/ all needs within reach  Pt completed bed mobility with Min Ax1, transfers with MOD AX2 w robe RW for support  Toilet transfers mod Ax2 to commode, toileting tasks max A  Pt stood for about 30 seconds at sink to attempt to engage in grooming tasks- however unable to tolerate stance  Therefore, grooming tasks completed while sitting at sink w/ min A  Pt completes funcitonal mobiltiy with mod AX1-2  Pt very anxious this date, is aware that anxiety is impacting her overall ability to engage in therapy sessions  Provided pt w emotional support and A w identifying coping strategies/ anxiety mgmt  Pt w fair carryover  Pt w/ improvements in activity tolerance, transfer ability, ADL task completion, however is still limited 2* decreased ADL/High-level ADL status, decreased activity tolerance/endurance, decreased self-care trans, decreased safety awareness and insight to condition   The patient's raw score on the AM-PAC Daily Activity inpatient short form is 14, standardized score is 33 39, less than 39 4  Patients at this level are likely to benefit from discharge to post-acute rehabilitation services  Please refer to the recommendation of the Occupational Therapist for safe discharge planning  Recommending pt D/C to STR when medically stable  Pt will continue to benefit from acute OT services to meet goals  OT Discharge Recommendation: Post acute rehabilitation services  OT - OK to Discharge:  Yes

## 2022-02-03 NOTE — UTILIZATION REVIEW
Continued Stay Review    Date: 02/03/2022                         Current Patient Class: Inpatient Current Level of Care: Med Surg    HPI:56 y o  female initially admitted on 01/16/2022 02/02 Pallaitive Consult; Palliative care was consulted as patient stated to surgical team she would not pursue any treatment  The patient was aware of her cancer diagnosis but she was unaware this is likely stage IV disease  She states that she would consider chemotherapy and would like more information however she likely would not pursue treatment  She understands that without treatment, the cancer will progress and she will die  Explained palliative care and hospice care to the patient  She is familiar with hospice services as her mom was previously on hospice  Discussed code status  The patient is clear that if her heart stops she does not want to be resuscitated and she would not want to be on a ventilator under any circumstances  COde status changed to reflect DNAR/DNI  Assessment/Plan:   02/03 General Surgery Notes; No acute events overnight  Patient had a good conversation palliative care  She is willing to look more into chemotherapy  Preferred mostly liquid foods and Jell-O   Does not have much of an appetite  Plans for ostomy-to continue to observe due to patient having good ostomy function  She does not want to undergo another big surgery for ostomy creation if it could be avoided        Vital Signs: /83   Pulse 90   Temp 98 °F (36 7 °C)   Resp 18   Ht 5' 7" (1 702 m)   Wt (!) 155 kg (342 lb 9 5 oz)   SpO2 92%   BMI 53 66 kg/m²     Pertinent Labs/Diagnostic Results:     Results from last 7 days   Lab Units 01/29/22  0559 01/28/22  0446   WBC Thousand/uL 9 79 10 40*   HEMOGLOBIN g/dL 8 3* 8 0*   HEMATOCRIT % 28 0* 26 2*   PLATELETS Thousands/uL 338 346         Results from last 7 days   Lab Units 02/03/22  0403 02/02/22  0937 01/29/22  0559 01/28/22  0858   SODIUM mmol/L 136 137 136 136 POTASSIUM mmol/L 4 1 3 6 4 4 4 4   CHLORIDE mmol/L 101 100 102 103   CO2 mmol/L 28 28 27 26   ANION GAP mmol/L 7 9 7 7   BUN mg/dL 16 15 20 25   CREATININE mg/dL 1 09 1 16 0 92 0 93   EGFR ml/min/1 73sq m 56 52 68 67   CALCIUM mg/dL 8 7 8 7 9 5 8 9   MAGNESIUM mg/dL 2 3 1 9  --   --    PHOSPHORUS mg/dL 3 1  --   --   --              Results from last 7 days   Lab Units 02/03/22  0403 02/02/22  0937 01/29/22  0559 01/28/22  0858   GLUCOSE RANDOM mg/dL 114 117 104 114           Medications:   Scheduled Medications:  docusate sodium, 100 mg, Oral, BID  heparin (porcine), 7,500 Units, Subcutaneous, Q8H [address]  CAROLINE ANTIFUNGAL, , Topical, BID  nystatin, 500,000 Units, Swish & Swallow, 4x Daily  nystatin, , Topical, BID  ondansetron, 4 mg, Oral, Q6H IRIS  pantoprazole, 40 mg, Oral, BID AC  senna, 1 tablet, Oral, BID      Continuous IV Infusions:     PRN Meds:  acetaminophen, 975 mg, Oral, Q6H PRN  albuterol, 2 puff, Inhalation, Q4H PRN  HYDROmorphone, 0 5 mg, Intravenous, Q3H PRN 02/03 x 2  hydrOXYzine HCL, 10 mg, Oral, Q6H PRN  Labetalol HCl, 10 mg, Intravenous, Q4H PRN  oxyCODONE, 10 mg, Oral, Q4H PRN 02/03 x 2  oxyCODONE, 5 mg, Oral, Q4H PRN  saliva substitute, 5 spray, Mouth/Throat, 4x Daily PRN        Discharge Plan: TBD    Network Utilization Review Department  ATTENTION: Please call with any questions or concerns to [phone] and carefully listen to the prompts so that you are directed to the right person  All voicemails are confidential   Dandy Drea all requests for admission clinical reviews, approved or denied determinations and any other requests to dedicated fax number below belonging to the campus where the patient is receiving treatment   List of dedicated fax numbers for the Facilities:  [address] DENIALS (Administrative/Medical Necessity) [phone]   [address] (Maternity/NICU/Pediatrics) 261 [facility],7Th Floor [phone]   Parris Bernstein [address]  [phone]   Priyank Yu [address] [facility] [address] [facility] 3934 [address] [place] [address] Marcy Cormier 1481 [address] 0214 [facility] [phone]

## 2022-02-04 NOTE — CASE MANAGEMENT
Case Management Discharge Planning Note    Patient name Vincent Packer  Location [facility] 019-35 MRN [id number]  : 1965 Date 2022       Current Admission Date: 2022  Current Admission Diagnosis:Diverticulitis of large intestine with perforation   Patient Active Problem List    Diagnosis Date Noted    Dysphagia 2022    Acute renal failure (ARF) ([facility] ) 2022    Cellulitis of abdominal wall, chest wall and groin with wounds 2022    Diverticulitis of large intestine with perforation 2022    Right Hydroureteronephrosis 2022    Subacute vaginitis 2022    GERD (gastroesophageal reflux disease)     Other specified anxiety disorders 2019    Lumbar paraspinal muscle spasm 2019    Borderline hyperlipidemia 2019    Iron deficiency 2019    Chronic midline low back pain with left-sided sciatica 2019    Primary hypertension 2019    Morbid obesity with BMI of 60 0-69 9, adult ([facility] ) 2019    Hidradenitis suppurativa 2019    Lymphedema 2006      LOS (days): 19  Geometric Mean LOS (GMLOS) (days):   Days to GMLOS:     OBJECTIVE:  Risk of Unplanned Readmission Score: 20         Current admission status: Inpatient   Preferred Pharmacy:   [facility] [address] [place] [address] C/ [facility]- [address] [address]  Phone: [phone] Fax: [phone]    Primary Care Provider: Shirin Avendano PA-C    Primary Insurance: Merged with [facility]  Secondary Insurance:     DISCHARGE DETAILS: Updated [place] about status of bed search for SNF  Additional Comments: SNF referrals made to 43 facilities with no accepting bed- barriers are her insurance, her size, recent covid + status and amount of assistance needed for care  Calls placed to [address] [place] [address] to reassess the referral  [facility] and [facility] for admissions   called [facility], [address] [place] [address] Loop and [facility] and [facility] who have no beds

## 2022-02-04 NOTE — QUICK NOTE
Nurse-Patient-Provider rounds were completed with the patient's nurse today, Larry Raygoza  We discussed the plan is to continue the regular diet without carbonated beverages and the Ensure supplements 4 times daily  Maintain current drains and monitor output  Continue rectal drain to gravity  Continue routine stoma care  Continue current analgesic regimen    We reviewed all of the invasive devices/lines/telemetry orders   - Maintain PICC line for ongoing IV access, but plan to discontinue prior to discharge  DVT Prophylaxis:  - Subcutaneous heparin and SCDs  Pain Assessment / Plan:  - Continue current analgesic regimen  Mobility Assessment / Plan:  - Activity as tolerated with assistance  - Continue PT and OT evaluation and treatment as indicated  Goals / Barriers for discharge:  - Patient is medically appropriate for discharge when appropriate placement is available  - Case management following; case and discharge needs discussed  All questions and concerns were addressed  I spent greater than 25 minutes reviewing the plan with the patient and the nurse, and coordinating care for the day      Luz Elena Amador PA-C  2/4/2022 10:39 AM

## 2022-02-04 NOTE — PROGRESS NOTES
Progress Note - General Surgery   Isauro Shah 60 y o  female MRN: [id number]  Unit/Bed#: [facility] 511-01 Encounter: [id number]    Assessment:  59 y/o F w morbid obesity and sigmoid ca complicated by perforation, s/p Ortega's procedure on 1/17    Vitals stable, afebrile  No labs today    UOP 1350  LUQ ADIA 5cc serous  RUQ ADIA 0cc   Malecot 0cc recorded  Ostomy 0 recorded but stool present in bag, functional    Plan:  - regular diet  - ADIA drains, monitor output and character  - Rectal malecot, monitor output and character  - ostomy, monitor output and character  - pain and nausea control prn  - SQH  - encourage ambulation  - IS  - placement per CM    Subjective/Objective   Subjective:   Pt doing well, denies complaints, had some nausea with her josé miguel yesterday but denies episodes of emesis, nausea has since resolved, continues to have ostomy function, OOB with PT who rec post acute rehab  Objective:     Blood pressure 104/73, pulse 92, temperature 97 6 °F (36 4 °C), resp  rate 18, height 5' 7" (1 702 m), weight (!) 154 kg (339 lb 4 6 oz), SpO2 94 %  ,Body mass index is 53 14 kg/m²        Intake/Output Summary (Last 24 hours) at 2/5/2022 0720  Last data filed at 2/5/2022 0601  Gross per 24 hour   Intake 120 ml   Output 1355 ml   Net -1235 ml       Invasive Devices  Report    Peripherally Inserted Central Catheter Line            PICC Line 77/85/98 Left Basilic 10 days          Drain            Closed/Suction Drain LLQ Bulb 19 Fr  19 days    Closed/Suction Drain RLQ Bulb 19 Fr  19 days    Colostomy Other (comment) LUQ 19 days    Rectal Tube Without balloon 19 days    External Urinary Catheter Medium 8 days                Physical Exam:  General: NAD  Skin: Warm, dry, anicteric  HEENT: Normocephalic, atraumatic  CV: RRR, no m/r/g  Pulm: CTA b/l, no inc WOB  Abd: Soft, ND, nonspecific TTP diffusely, JPx2 serous, ostomy functional, abdominal binder in place  MSK: Symmetric, no edema, no tenderness, no deformity  Neuro: AOx3, GCS 15    Lab, Imaging and other studies:I have personally reviewed pertinent lab results    , CBC: No results found for: WBC, HGB, HCT, MCV, PLT, ADJUSTEDWBC, MCH, MCHC, RDW, MPV, NRBC, CMP: No results found for: SODIUM, K, CL, CO2, ANIONGAP, BUN, CREATININE, GLUCOSE, CALCIUM, AST, ALT, ALKPHOS, PROT, BILITOT, EGFR  VTE Pharmacologic Prophylaxis: Sequential compression device (Venodyne)  and Heparin  VTE Mechanical Prophylaxis: sequential compression device

## 2022-02-04 NOTE — PROGRESS NOTES
Progress Note - Yanely Mascorro 60 y o  female MRN: [id number]    Unit/Bed#: [facility] 511-01 Encounter: [id number]      Assessment:  61 y/o F w morbid obesity and sigmoid ca complicated by perforation, s/p Ortega's procedure on 1/17  Vitals stable  abd soft, nontender, non distended  Anterior ostomy has sloughed off, however still viable and productive  STIVEN x2 serous output  Rectal malecot w purulent output  Plan:  Regular diet  Continue stiven drains  Continue rectal malecot  dvt ppx, sqh  dispo planning, SNF    Subjective:   Feels well  No complaints  Denied fever, chills, chest pain, shortness of breath, nausea, vomiting, or abdominal pain this morning  Objective:     Vitals: Blood pressure 140/90, pulse 94, temperature 98 3 °F (36 8 °C), temperature source Oral, resp  rate 18, height 5' 7" (1 702 m), weight (!) 155 kg (342 lb 9 5 oz), SpO2 92 %  ,Body mass index is 53 66 kg/m²  Intake/Output Summary (Last 24 hours) at 2/3/2022 2353  Last data filed at 2/3/2022 2100  Gross per 24 hour   Intake 360 ml   Output 506 ml   Net -146 ml       Physical Exam  General: NAD  HEENT: NC/AT  MMM  Cv: RRR  Lungs: normal effort  Ab: Soft, NT/ND  stiven x2 serous output  Rectal malecot w purulent output     Ex: no CCE  Neuro: AAOx3    Scheduled Meds:  Current Facility-Administered Medications   Medication Dose Route Frequency Provider Last Rate    acetaminophen  975 mg Oral Q6H PRN Justino Michaels MD      albuterol  2 puff Inhalation Q4H PRN Jaylen Jefferson DO      docusate sodium  100 mg Oral BID Mariposa Viveros MD      heparin (porcine)  7,500 Units Subcutaneous [facility] MIRTA Paniagua      HYDROmorphone  0 5 mg Intravenous Q3H PRN Justino Michaels MD      hydrOXYzine HCL  10 mg Oral Q6H PRN Charlie Greenfield PA-C      Labetalol HCl  10 mg Intravenous Q4H PRN MIRTA Singleton      CAROLINE ANTIFUNGAL   Topical BID MIRTA Dunn      nystatin  500,000 Units Swish & Swallow 4x Daily MIRTA Andino      nystatin   Topical BID MIRTA Andino      ondansetron  4 mg Oral Q6H [address] Haily Sharp MD      oxyCODONE  10 mg Oral Q4H PRN Paulo Middleton MD      oxyCODONE  5 mg Oral Q4H PRN Paulo Middleton MD      pantoprazole  40 mg Oral BID AC Rumalda Dire, DO      saliva substitute  5 spray Mouth/Throat 4x Daily PRN MIRTA Carpio      senna  1 tablet Oral BID Jolie Drew MD       Continuous Infusions:   PRN Meds:   acetaminophen    albuterol    HYDROmorphone    hydrOXYzine HCL    Labetalol HCl    oxyCODONE    oxyCODONE    saliva substitute      Invasive Devices  Report    Peripherally Inserted Central Catheter Line            PICC Line 65/22/33 Left Basilic 9 days          Drain            Closed/Suction Drain LLQ Bulb 19 Fr  18 days    Closed/Suction Drain RLQ Bulb 19 Fr  18 days    Rectal Tube Without balloon 18 days    Colostomy Other (comment) LUQ 17 days    External Urinary Catheter Medium 7 days                Lab, Imaging and other studies: I have personally reviewed pertinent reports      VTE Pharmacologic Prophylaxis: Sequential compression device (Venodyne)   VTE Mechanical Prophylaxis: sequential compression device

## 2022-02-06 NOTE — PROGRESS NOTES
Progress Note - General Surgery   [facility] 60 y o  female MRN: [id number]  Unit/Bed#: [facility] 511-01 Encounter: [id number]    Assessment:  61 y/o F w morbid obesity and sigmoid ca complicated by perforation, s/p Ortega's procedure on 1/17    Plan:   CM- dispo planning awaiting placement   I/Os   Monitor drain output    OOB TID   Pulm toilet   Please TigerText on call Red Surgery or Acute Care Surgery Floor Call with any questions     Subjective/Objective     Subjective:   No acute events overnight  States she has some nausea when she had just eaten much  No vomiting  Focusing mostly on contents like a full liquid diet  Pertinent review of systems as above  All other review of systems negative  Objective:    Blood pressure 152/96, pulse 100, temperature 97 7 °F (36 5 °C), resp  rate 16, height 5' 7" (1 702 m), weight (!) 154 kg (340 lb 9 8 oz), SpO2 96 %  ,Body mass index is 53 35 kg/m²  Intake/Output Summary (Last 24 hours) at 2/6/2022 0834  Last data filed at 2/6/2022 0502  Gross per 24 hour   Intake 0 ml   Output 950 ml   Net -950 ml       Invasive Devices  Report    Peripherally Inserted Central Catheter Line            PICC Line 30/44/80 Left Basilic 11 days          Drain            Closed/Suction Drain LLQ Bulb 19 Fr  20 days    Closed/Suction Drain RLQ Bulb 19 Fr  20 days    Colostomy Other (comment) LUQ 20 days    Rectal Tube Without balloon 20 days    External Urinary Catheter Medium 9 days                Physical Exam:   Gen:  NAD  HEENT: NCAT  MMM  CV: well perfused  Lungs: Normal respiratory effort  Abd: soft, nt/nd, dressings cdi  Formed stool in ostomy  Skin: warm/ dry  Extremities: no peripheral edema, no clubbing or cyanosis  Neuro:  AxO x3          Results from last 7 days   Lab Units 02/03/22  0403 02/02/22  0937   POTASSIUM mmol/L 4 1 3 6   CHLORIDE mmol/L 101 100   CO2 mmol/L 28 28   BUN mg/dL 16 15   CREATININE mg/dL 1 09 1 16   CALCIUM mg/dL 8 7 8 7            I have personally reviewed pertinent films in PACS      Medications:   Scheduled Meds:  Current Facility-Administered Medications   Medication Dose Route Frequency Provider Last Rate    acetaminophen  975 mg Oral Q6H PRN Sonam Alarcon MD      albuterol  2 puff Inhalation Q4H PRN Yusef Toussaint DO      docusate sodium  100 mg Oral BID Vero Blank MD      heparin (porcine)  7,500 Units Subcutaneous [facility] MIRTA Hayden      HYDROmorphone  0 5 mg Intravenous Q3H PRN Sonam Alarcon MD      hydrOXYzine HCL  10 mg Oral Q6H PRN Jong Tafoya PA-C      Labetalol HCl  10 mg Intravenous Q4H PRN MIRTA Perry      CAROLINE ANTIFUNGAL   Topical BID Lizbeth Faye Lucke, MIRTA      nystatin  500,000 Units Swish & Swallow 4x Daily Lizbeth Faye LuckeMIRTA      nystatin   Topical BID Lizbeth Faye LucMIRTA zhao      ondansetron  4 mg Oral Q6H [address] Adam Valiente MD      oxyCODONE  10 mg Oral Q4H PRN Sonam Alarcon MD      oxyCODONE  5 mg Oral Q4H PRN Sonam Alarcon MD      pantoprazole  40 mg Oral BID AC Yusef Toussaint DO      saliva substitute  5 spray Mouth/Throat 4x Daily PRN MIRTA Courtney      senna  1 tablet Oral BID Vero Blank MD       Continuous Infusions:   PRN Meds:  acetaminophen, 975 mg, Q6H PRN  albuterol, 2 puff, Q4H PRN  HYDROmorphone, 0 5 mg, Q3H PRN  hydrOXYzine HCL, 10 mg, Q6H PRN  Labetalol HCl, 10 mg, Q4H PRN  oxyCODONE, 10 mg, Q4H PRN  oxyCODONE, 5 mg, Q4H PRN  saliva substitute, 5 spray, 4x Daily PRN      VTE Pharmacologic Prophylaxis: Heparin  VTE Mechanical Prophylaxis: sequential compression device

## 2022-02-06 NOTE — PLAN OF CARE
Problem: Prexisting or High Potential for Compromised Skin Integrity  Goal: Skin integrity is maintained or improved  Description: INTERVENTIONS:  - Identify patients at risk for skin breakdown  - Assess and monitor skin integrity  - Assess and monitor nutrition and hydration status  - Monitor labs   - Assess for incontinence   - Turn and reposition patient  - Assist with mobility/ambulation  - Relieve pressure over bony prominences  - Avoid friction and shearing  - Provide appropriate hygiene as needed including keeping skin clean and dry  - Evaluate need for skin moisturizer/barrier cream  - Collaborate with interdisciplinary team   - Patient/family teaching  - Consider wound care consult   Outcome: Progressing     Problem: MOBILITY - ADULT  Goal: Maintain or return to baseline ADL function  Description: INTERVENTIONS:  -  Assess patient's ability to carry out ADLs; assess patient's baseline for ADL function and identify physical deficits which impact ability to perform ADLs (bathing, care of mouth/teeth, toileting, grooming, dressing, etc )  - Assess/evaluate cause of self-care deficits   - Assess range of motion  - Assess patient's mobility; develop plan if impaired  - Assess patient's need for assistive devices and provide as appropriate  - Encourage maximum independence but intervene and supervise when necessary  - Involve family in performance of ADLs  - Assess for home care needs following discharge   - Consider OT consult to assist with ADL evaluation and planning for discharge  - Provide patient education as appropriate  Outcome: Progressing  Goal: Maintains/Returns to pre admission functional level  Description: INTERVENTIONS:  - Perform BMAT or MOVE assessment daily    - Set and communicate daily mobility goal to care team and patient/family/caregiver  - Collaborate with rehabilitation services on mobility goals if consulted  - Perform Range of Motion 3 times a day    - Reposition patient every 2 hours   - Dangle patient 3 times a day  - Stand patient 3 times a day  - Ambulate patient 3 times a day  - Out of bed to chair 3 times a day   - Out of bed for meals 3 times a day  - Out of bed for toileting  - Record patient progress and toleration of activity level   Outcome: Progressing     Problem: Potential for Falls  Goal: Patient will remain free of falls  Description: INTERVENTIONS:  - Educate patient/family on patient safety including physical limitations  - Instruct patient to call for assistance with activity   - Consult OT/PT to assist with strengthening/mobility   - Keep Call bell within reach  - Keep bed low and locked with side rails adjusted as appropriate  - Keep care items and personal belongings within reach  - Initiate and maintain comfort rounds  - Make Fall Risk Sign visible to staff  - Offer Toileting every 2 Hours, in advance of need  - Initiate/Maintain bed alarm  - Apply yellow socks and bracelet for high fall risk patients  - Consider moving patient to room near nurses station  Outcome: Progressing     Problem: GASTROINTESTINAL - ADULT  Goal: Minimal or absence of nausea and/or vomiting  Description: INTERVENTIONS:  - Administer IV fluids if ordered to ensure adequate hydration  - Maintain NPO status until nausea and vomiting are resolved  - Nasogastric tube if ordered  - Administer ordered antiemetic medications as needed  - Provide nonpharmacologic comfort measures as appropriate  - Advance diet as tolerated, if ordered  - Consider nutrition services referral to assist patient with adequate nutrition and appropriate food choices  Outcome: Progressing  Goal: Maintains or returns to baseline bowel function  Description: INTERVENTIONS:  - Assess bowel function  - Encourage oral fluids to ensure adequate hydration  - Administer IV fluids if ordered to ensure adequate hydration  - Administer ordered medications as needed  - Encourage mobilization and activity  - Consider nutritional services referral to assist patient with adequate nutrition and appropriate food choices  Outcome: Progressing  Goal: Maintains adequate nutritional intake  Description: INTERVENTIONS:  - Monitor percentage of each meal consumed  - Identify factors contributing to decreased intake, treat as appropriate  - Assist with meals as needed  - Monitor I&O, weight, and lab values if indicated  - Obtain nutrition services referral as needed  Outcome: Progressing  Goal: Establish and maintain optimal ostomy function  Description: INTERVENTIONS:  - Assess bowel function  - Encourage oral fluids to ensure adequate hydration  - Administer IV fluids if ordered to ensure adequate hydration   - Administer ordered medications as needed  - Encourage mobilization and activity  - Nutrition services referral to assist patient with appropriate food choices  - Assess stoma site  - Consider wound care consult   Outcome: Progressing  Goal: Oral mucous membranes remain intact  Description: INTERVENTIONS  - Assess oral mucosa and hygiene practices  - Implement preventative oral hygiene regimen  - Implement oral medicated treatments as ordered  - Initiate Nutrition services referral as needed  Outcome: Progressing     Problem: GENITOURINARY - ADULT  Goal: Maintains or returns to baseline urinary function  Description: INTERVENTIONS:  - Assess urinary function  - Encourage oral fluids to ensure adequate hydration if ordered  - Administer IV fluids as ordered to ensure adequate hydration  - Administer ordered medications as needed  - Offer frequent toileting  - Follow urinary retention protocol if ordered  Outcome: Progressing     Problem: SKIN/TISSUE INTEGRITY - ADULT  Goal: Skin Integrity remains intact(Skin Breakdown Prevention)  Description: Assess:  -Perform Surendra assessment every 12  -Clean and moisturize skin every 4  -Inspect skin when repositioning, toileting, and assisting with ADLS  -Assess extremities for adequate circulation and sensation Bed Management:  -Have minimal linens on bed & keep smooth, unwrinkled  -Change linens as needed when moist or perspiring  -Avoid sitting or lying in one position for more than 2 hours while in bed  -Keep HOB at 30degrees     Toileting:  -Offer bedside commode  -Assess for incontinence every 2    Activity:  -Mobilize patient 3 times a day  -Encourage activity and walks on unit  -Encourage or provide ROM exercises   -Turn and reposition patient every 2 Hours  -Use appropriate equipment to lift or move patient in bed  -Instruct/ Assist with weight shifting every 2 hour when out of bed in chair  -Consider limitation of chair time 8 hour intervals    Skin Care:  -Avoid use of baby powder, tape, friction and shearing, hot water or constrictive clothing  -Relieve pressure over bony prominences using allevyns  -Do not massage red bony areas  Outcome: Progressing  Goal: Incision(s), wounds(s) or drain site(s) healing without S/S of infection  Description: INTERVENTIONS  - Assess and document dressing, incision, wound bed, drain sites and surrounding tissue  - Provide patient and family education  - Perform skin care/dressing changes as needed  Outcome: Progressing  Goal: Pressure injury heals and does not worsen  Description: Interventions:  - Implement low air loss mattress or specialty surface (Criteria met)  - Apply silicone foam dressing  - Instruct/assist with weight shifting every 120 minutes when in chair   - Limit chair time to 8 hour intervals  - Use special pressure reducing interventions such as allevyns when in chair   - Apply fecal or urinary incontinence containment device   - Perform passive or active ROM every 4  - Turn and reposition patient & offload bony prominences every 2 hours   - Utilize friction reducing device or surface for transfers   - Consider nutrition services referral as needed  Outcome: Progressing     Problem: PAIN - ADULT  Goal: Verbalizes/displays adequate comfort level or baseline comfort level  Description: Interventions:  - Encourage patient to monitor pain and request assistance  - Assess pain using appropriate pain scale  - Administer analgesics based on type and severity of pain and evaluate response  - Implement non-pharmacological measures as appropriate and evaluate response  - Consider cultural and social influences on pain and pain management  - Notify physician/advanced practitioner if interventions unsuccessful or patient reports new pain  Outcome: Progressing     Problem: INFECTION - ADULT  Goal: Absence or prevention of progression during hospitalization  Description: INTERVENTIONS:  - Assess and monitor for signs and symptoms of infection  - Monitor lab/diagnostic results  - Monitor all insertion sites, i e  indwelling lines, tubes, and drains  - Monitor endotracheal if appropriate and nasal secretions for changes in amount and color  - [place] appropriate cooling/warming therapies per order  - Administer medications as ordered  - Instruct and encourage patient and family to use good hand hygiene technique  - Identify and instruct in appropriate isolation precautions for identified infection/condition  Outcome: Progressing     Problem: SAFETY ADULT  Goal: Maintain or return to baseline ADL function  Description: INTERVENTIONS:  -  Assess patient's ability to carry out ADLs; assess patient's baseline for ADL function and identify physical deficits which impact ability to perform ADLs (bathing, care of mouth/teeth, toileting, grooming, dressing, etc )  - Assess/evaluate cause of self-care deficits   - Assess range of motion  - Assess patient's mobility; develop plan if impaired  - Assess patient's need for assistive devices and provide as appropriate  - Encourage maximum independence but intervene and supervise when necessary  - Involve family in performance of ADLs  - Assess for home care needs following discharge   - Consider OT consult to assist with ADL evaluation and planning for discharge  - Provide patient education as appropriate  Outcome: Progressing  Goal: Maintains/Returns to pre admission functional level  Description: INTERVENTIONS:  - Perform BMAT or MOVE assessment daily    - Set and communicate daily mobility goal to care team and patient/family/caregiver  - Collaborate with rehabilitation services on mobility goals if consulted  - Perform Range of Motion 3 times a day  - Reposition patient every 2 hours    - Dangle patient 3 times a day  - Stand patient 3 times a day  - Ambulate patient 3 times a day  - Out of bed to chair 3 times a day   - Out of bed for meals 3 times a day  - Out of bed for toileting  - Record patient progress and toleration of activity level   Outcome: Progressing  Goal: Patient will remain free of falls  Description: INTERVENTIONS:  - Educate patient/family on patient safety including physical limitations  - Instruct patient to call for assistance with activity   - Consult OT/PT to assist with strengthening/mobility   - Keep Call bell within reach  - Keep bed low and locked with side rails adjusted as appropriate  - Keep care items and personal belongings within reach  - Initiate and maintain comfort rounds  - Make Fall Risk Sign visible to staff  - Offer Toileting every 2 Hours, in advance of need  - Initiate/Maintain bed alarm  - Apply yellow socks and bracelet for high fall risk patients  - Consider moving patient to room near nurses station  Outcome: Progressing     Problem: DISCHARGE PLANNING  Goal: Discharge to home or other facility with appropriate resources  Description: INTERVENTIONS:  - Identify barriers to discharge w/patient and caregiver  - Arrange for needed discharge resources and transportation as appropriate  - Identify discharge learning needs (meds, wound care, etc )  - Arrange for interpretive services to assist at discharge as needed  - Refer to Case Management Department for coordinating discharge planning if the patient needs post-hospital services based on physician/advanced practitioner order or complex needs related to functional status, cognitive ability, or social support system  Outcome: Progressing     Problem: Knowledge Deficit  Goal: Patient/family/caregiver demonstrates understanding of disease process, treatment plan, medications, and discharge instructions  Description: Complete learning assessment and assess knowledge base  Interventions:  - Provide teaching at level of understanding  - Provide teaching via preferred learning methods  Outcome: Progressing     Problem: Nutrition/Hydration-ADULT  Goal: Nutrient/Hydration intake appropriate for improving, restoring or maintaining nutritional needs  Description: Monitor and assess patient's nutrition/hydration status for malnutrition  Collaborate with interdisciplinary team and initiate plan and interventions as ordered  Monitor patient's weight and dietary intake as ordered or per policy  Utilize nutrition screening tool and intervene as necessary  Determine patient's food preferences and provide high-protein, high-caloric foods as appropriate       INTERVENTIONS:  - Monitor oral intake, urinary output, labs, and treatment plans  - Assess nutrition and hydration status and recommend course of action  - Evaluate amount of meals eaten  - Assist patient with eating if necessary   - Allow adequate time for meals  - Recommend/ encourage appropriate diets, oral nutritional supplements, and vitamin/mineral supplements  - Order, calculate, and assess calorie counts as needed  - Recommend, monitor, and adjust tube feedings and TPN/PPN based on assessed needs  - Assess need for intravenous fluids  - Provide specific nutrition/hydration education as appropriate  - Include patient/family/caregiver in decisions related to nutrition  Outcome: Progressing     Problem: CHANGE IN BODY IMAGE  Goal: Pt/Family communicate acceptance of loss or change in body image and expresses psychological comfort and peace  Description: INTERVENTIONS:  - Assess patient/family anxiety and grief process related to change in body image, loss of functional status and loss of sense of self  - Assess patient/family's coping skills and provide emotional, spiritual and psychosocial support  - Provide information about the patient's health status with consideration of family and cultural values  - Communicate willingness to discuss loss and facilitate grief process with patient/family as appropriate  - Emphasize sustaining relationships within family system and community, or farzaneh/spiritual traditions  - Refer to community support groups as appropriate  - Initiate Spiritual Care, Pastoral care or other ancillary consults as needed  Outcome: Progressing

## 2022-02-07 NOTE — PROGRESS NOTES
Progress note - Palliative and Supportive Care   Magen Quick 60 y o  female [id number]    Patient Active Problem List   Diagnosis    Lymphedema    Primary hypertension    Morbid obesity with BMI of 60 0-69 9, adult (HCC)    Hidradenitis suppurativa    Chronic midline low back pain with left-sided sciatica    Other specified anxiety disorders    Lumbar paraspinal muscle spasm    Borderline hyperlipidemia    Iron deficiency    GERD (gastroesophageal reflux disease)    Acute renal failure (ARF) (HCC)    Cellulitis of abdominal wall, chest wall and groin with wounds    Diverticulitis of large intestine with perforation    Right Hydroureteronephrosis    Subacute vaginitis    Dysphagia     Active issues specifically addressed today include: Metastatic colon cancer  Morbid obesity  Goals of care     Plan:  1  Symptom management   -  Per primary team      2  Goals  -  Limits set at DNAR/DNI  -  Patient receptive to outpatient oncology and palliative, appointments made  Code Status: DNAR/DNI  - Level 3   Decisional apparatus:  Patient is competent on my exam today  If competence is lost, patient's substitute decision maker would default to adult sons  by [place]  Advance Directive / Living Will / POLST:  None on file   Interval history:       Patient OOB in the chair  Patient looking forward to pursuing rehab with goal of getting h ome  She understands she has follow up scheduled with Oncology and palliative and is receptive  At this time she is intersted in pursuing cancer treatment      MEDICATIONS / ALLERGIES:     current meds:   Current Facility-Administered Medications   Medication Dose Route Frequency    acetaminophen (TYLENOL) tablet 975 mg  975 mg Oral Q6H PRN    albuterol (PROVENTIL HFA,VENTOLIN HFA) inhaler 2 puff  2 puff Inhalation Q4H PRN    docusate sodium (COLACE) capsule 100 mg  100 mg Oral BID    heparin (porcine) subcutaneous injection 7,500 Units  7,500 Units Subcutaneous Q8H [address]    HYDROmorphone (DILAUDID) injection 0 5 mg  0 5 mg Intravenous Q3H PRN    hydrOXYzine HCL (ATARAX) tablet 10 mg  10 mg Oral Q6H PRN    Labetalol HCl (NORMODYNE) injection 10 mg  10 mg Intravenous Q4H PRN    moisture barrier miconazole 2% cream (aka CAROLINE MOISTURE BARRIER ANTIFUNGAL CREAM)   Topical BID    nystatin (MYCOSTATIN) oral suspension 500,000 Units  500,000 Units Swish & Swallow 4x Daily    nystatin (MYCOSTATIN) powder   Topical BID    ondansetron (ZOFRAN-ODT) dispersible tablet 4 mg  4 mg Oral Q6H [address]    oxyCODONE (ROXICODONE) immediate release tablet 10 mg  10 mg Oral Q4H PRN    oxyCODONE (ROXICODONE) IR tablet 5 mg  5 mg Oral Q4H PRN    pantoprazole (PROTONIX) EC tablet 40 mg  40 mg Oral BID AC    saliva substitute (MOUTH KOTE) mucosal solution 5 spray  5 spray Mouth/Throat 4x Daily PRN    senna (SENOKOT) tablet 8 6 mg  1 tablet Oral BID       Allergies   Allergen Reactions    Bee Venom Anaphylaxis, Shortness Of Breath and Swelling       OBJECTIVE:    Physical Exam  Physical Exam  Vitals and nursing note reviewed  Constitutional:       Appearance: She is morbidly obese  She is ill-appearing  HENT:      Head: Normocephalic and atraumatic  Jaw: There is normal jaw occlusion  Eyes:      General: Lids are normal       Conjunctiva/sclera: Conjunctivae normal    Cardiovascular:      Rate and Rhythm: Normal rate and regular rhythm  Pulses: Decreased pulses  Pulmonary:      Effort: Pulmonary effort is normal       Breath sounds: Decreased air movement present  Abdominal:      General: Bowel sounds are normal       Palpations: Abdomen is soft  Comments: Colostomy,    Musculoskeletal:      Cervical back: Neck supple  Skin:     General: Skin is warm and dry  Coloration: Skin is not jaundiced or pale  Neurological:      General: No focal deficit present  Mental Status: She is alert and oriented to person, place, and time        GCS: GCS eye subscore is 4  GCS verbal subscore is 5  GCS motor subscore is 6  Psychiatric:         Attention and Perception: Attention normal          Mood and Affect: Mood normal          Cognition and Memory: Cognition and memory normal          Lab Results: CBC: No results found for: WBC, HGB, HCT, MCV, PLT, ADJUSTEDWBC, MCH, MCHC, RDW, MPV, NRBC, CMP: No results found for: SODIUM, K, CL, CO2, ANIONGAP, BUN, CREATININE, GLUCOSE, CALCIUM, AST, ALT, ALKPHOS, PROT, BILITOT, EGFR, PT/PTT:No results found for: PT, PTT      Counseling / Coordination of Care    Total floor / unit time spent today 25+  minutes  Greater than 50% of total time was spent with the patient and / or family counseling and / or coordination of care  A description of the counseling / coordination of care: review of outpatient follow up, supportive listening, encouraged expression offered informations

## 2022-02-07 NOTE — UTILIZATION REVIEW
Continued Stay Review  Date: 2/7/22 Monday        POD # 22  Current Clas:  Inpatient  Current Level of Care: Level 2 Stepdown - Acute Med Surg since 1/30/220     HPI: 61 y/o female initially admitted Inpatient to [facility] on 1/16/22 inpatient as a transfer from [facility] due to perforated diverticulitis complicated by right hydronephrosis for emergent Surgery  Path shows adenocarcinoma   Also Right hydroureteronephrosis s/p Status post cystoscopy with right ureteral stent placement 1/16  Completed antibiotics 1/21/22  Completed course of fluconazole for thrush with possible candida esophagitis    Developed post op Ileus - NGT placed 1/22/22 and TPN  started       1/16/22 OPERATIVE REPORT  Procedure:  LAPAROTOMY EXPLORATORY, DRAINAGE OF INTRA -ABDOMINAL ABSCESS (N/A)  CYSTOSCOPY RETROGRADE PYELOGRAM WITH INSERTION STENT URETERAL (Right)  END COLOSTOMY (N/A)  RIGHT SALPINGO-OOPHORECTOMY, LEFT OOPHORECTOMY     *TESTED positive for COVID on 1/16 2/7/22:  Abdomen soft, ND, minimally TTP to palpation diffusely, colostomy functional with stool output present in bed, incisions clean dry and intact, JPs serous  Reports continued abdominal pain diffusely, otherwise tolerating regular diet without nausea vomiting  Colostomy functioning  Using incentive spirometer  Vitals stable, afebrile  Ambulating with PT       LLQ ADIA 25cc serous  RLQ ADIA 0cc record  [place] 0cc recorded  Ostomy 50 recorded     Plan: Regular diet, ADIA drains - monitor output and character, Rectal malecot, monitor output and character  Ostomy, monitor output and character, - pain and nausea control prn, SQH, encourage ambulation  Placement per CM    Vital Signs:   Blood pressure 147/82, pulse 98, temperature 98 6 °F (37 °C), resp  rate 18, height 5' 7" (1 702 m), weight (!) 154 kg (340 lb), SpO2 94 %  ,Body mass index is 53 25 kg/m²       Intake/Output Summary (Last 24 hours) at 2/7/2022 0829:  Gross per 24 hour   Intake 840 ml   Output 1000 ml   Net -160 ml       Invasive Devices       Peripherally Inserted Central Catheter Line               PICC Line 99/15/25 Left Basilic 12 days         Drain               Closed/Suction Drain LLQ Bulb 19 Fr  21 days      Closed/Suction Drain RLQ Bulb 19 Fr  21 days      Colostomy Other (comment) LUQ 21 days      Rectal Tube Without balloon 21 days      External Urinary Catheter Medium 10 days         Pertinent Labs/Diagnostic Results:   Results from last 7 days   Lab Units 02/03/22  0403 02/02/22  0937   SODIUM mmol/L 136 137   POTASSIUM mmol/L 4 1 3 6   CHLORIDE mmol/L 101 100   CO2 mmol/L 28 28   ANION GAP mmol/L 7 9   BUN mg/dL 16 15   CREATININE mg/dL 1 09 1 16   EGFR ml/min/1 73sq m 56 52   CALCIUM mg/dL 8 7 8 7   MAGNESIUM mg/dL 2 3 1 9   PHOSPHORUS mg/dL 3 1  --      Results from last 7 days   Lab Units 02/03/22  0403 02/02/22  0937   GLUCOSE RANDOM mg/dL 114 117     Diet Regular; Regular House; No Carbonation     Scheduled Medications:  docusate sodium, 100 mg, Oral, BID  heparin (porcine), 7,500 Units, Subcutaneous, Q8H [address]  CAROLINE ANTIFUNGAL, , Topical, BID  nystatin, 500,000 Units, Swish & Swallow, 4x Daily  nystatin, , Topical, BID  ondansetron, 4 mg, Oral, Q6H IRIS  pantoprazole, 40 mg, Oral, BID AC  senna, 1 tablet, Oral, BID    Continuous IV Infusions:  NONE    PRN Meds:  acetaminophen, 975 mg, Oral, Q6H PRN  albuterol, 2 puff, Inhalation, Q4H PRN  IV HYDROmorphone, 0 5 mg, Intravenous, Q3H PRN - 2/4 X 4, 2/5 X 3, 2/6 X 4, 2/7 X 2  hydrOXYzine HCL, 10 mg, Oral, Q6H PRN - 2/7 X 1  Labetalol HCl, 10 mg, Intravenous, Q4H PRN  oxyCODONE, 10 mg, Oral, Q4H PRN - 2/4 X 3, 2/5 X 4, 2/6 X 4, 2/7 X 2  oxyCODONE, 5 mg, Oral, Q4H PRN  saliva substitute, 5 spray, Mouth/Throat, 4x Daily PRN    Discharge Plan:    To be determined   Inpatient Case Management following for all discharge needs    Network Utilization Review Department  ATTENTION: Please call with any questions or concerns to [phone] and carefully listen to the prompts so that you are directed to the right person  All voicemails are confidential   Jocelyn Grand all requests for admission clinical reviews, approved or denied determinations and any other requests to dedicated fax number below belonging to the campus where the patient is receiving treatment   List of dedicated fax numbers for the Facilities:  [address] DENIALS (Administrative/Medical Necessity) [phone]   1000  16[facility] (Maternity/NICU/Pediatrics) [phone]   [address]  [address] [facility] [place] [address] [address] [place] [address] [address] [facility] [place] [address] [phone]

## 2022-02-07 NOTE — PLAN OF CARE
Problem: OCCUPATIONAL THERAPY ADULT  Goal: Performs self-care activities at highest level of function for planned discharge setting  See evaluation for individualized goals  Description: Treatment Interventions: ADL retraining,Functional transfer training,UE strengthening/ROM,Endurance training,Cognitive reorientation,Patient/family training,Equipment evaluation/education,Compensatory technique education,Continued evaluation,Energy conservation,Activityengagement          See flowsheet documentation for full assessment, interventions and recommendations  Note: Limitation: Decreased ADL status,Decreased UE strength,Decreased Safe judgement during ADL,Decreased cognition,Decreased endurance,Decreased self-care trans,Decreased high-level ADLs  Prognosis: Fair  Assessment: Pt seen for skilled OT treatment session this AM  Pt seen as a co-treat w physical therapy due to the patient's co-morbidities, clinically unstable presentation, and present impairments which are a regression from the patient's baseline  Pt in bed upon arrival, agreeable therapy  "I need to get OOB, I really want to get better and go home"  Pt able to get to EOB w min assist  Good balance demonstrated while seated at EOB x 20-25 min  Pt c/o on/off nauses and dizziness  RN aware  Pt is very anxious and somewhat self limiting  Pt encouraged, and educated on ECT/self pacing as well as coping strategies for her anxiety  Pt receptive to education  Pt attempted to perform grooming activities such as combing her hair  Pt able to comb the front, but needs assist w back  Pt also with lots of tangles in her hair which made it harder  Pt needs max/total assist wall LB self care due to low act tolerance, fear of falls, large body habitus etc  Pt does need a lot of time for completion of simple self care/mobility w frequent breaks  She has demonstrated good progress since last OT session    The patient's raw score on the AM-PAC Daily Activity inpatient short form is 15, standardized score is 34 69, less than 39 4  Patients at this level are likely to benefit from discharge to post-acute rehabilitation services  OT will continue to follow and treat as able while in acute care  OT Discharge Recommendation: Post acute rehabilitation services  OT - OK to Discharge:  Yes

## 2022-02-07 NOTE — PHYSICAL THERAPY NOTE
PHYSICAL THERAPY NOTE          Patient Name: Joselyn Nelson  DTYXA'P Date: 2/7/2022 02/07/22 1015   PT Last Visit   PT Visit Date 02/07/22   Note Type   Note Type Treatment   End of Consult   Patient Position at End of Consult Bedside chair; All needs within reach   Pain Assessment   Pain Assessment Tool 0-10   Pain Score No Pain   Restrictions/Precautions   Weight Bearing Precautions Per Order No   Other Precautions Multiple lines; Fall Risk;Telemetry  (ADIA drains x2, rectal tube)   General   Chart Reviewed Yes   Response to Previous Treatment Patient with no complaints from previous session  Family/Caregiver Present No   Cognition   Overall Cognitive Status WFL   Arousal/Participation Alert   Attention Attends with cues to redirect   Orientation Level Oriented X4   Memory Decreased recall of precautions   Following Commands Follows multistep commands with increased time or repetition   Comments Pt self limiting 2/2 anxious like behaviors  Pt requires encouragement and increased time for all activities due to anxious like bbehaviors  Subjective   Subjective Pt pleasant and agreeable to participate in therapy session  Bed Mobility   Supine to Sit 4  Minimal assistance   Additional items Assist x 1;HOB elevated; Increased time required;Verbal cues   Additional Comments Supine in bed upon PT arrival   Pt left upright in bedside chair with all needs in reach  Transfers   Sit to Stand 3  Moderate assistance   Additional items Assist x 1; Increased time required;Verbal cues   Stand to Sit 3  Moderate assistance   Additional items Assist x 1; Increased time required;Verbal cues   Additional Comments Transfers with RW   VC for hand placement and safety  Initially Mod A x 1, progressing to Min A x 1  Ambulation/Elevation   Gait pattern Excessively slow; Short stride;Poor UE support; Improper Weight shift;Decreased foot clearance  (increased lateral sway)   Gait Assistance 3  Moderate assist   Additional items Assist x 1;Verbal cues; Tactile cues  (assist of second for chair follow)   Assistive Device Bariatric Rolling walker   Distance 3 ft x 1, 10 ft x 1, 12 ft x 1 with sitting rest breaks   Balance   Static Sitting Fair   Dynamic Sitting Fair   Static Standing Fair -   Dynamic Standing Poor +   Ambulatory Poor   Endurance Deficit   Endurance Deficit Yes   Endurance Deficit Description fatiuge, weakness, SOB, anxious like behavios   Activity Tolerance   Activity Tolerance Patient limited by fatigue; Other (Comment)  (SOB, anxious like behaviors)   Medical Staff Made Aware OT Cayden White, restortative tech Lidia Cooper   Nurse Made Aware RN cleared pt to be seen by PT   Exercises   Balance training  Sitting EOB for ~20 min with supervision while performing ADLs with OT  Standing for ~1 min with with RW and Min A for stability  Assessment   Prognosis Fair   Problem List Decreased strength;Decreased range of motion;Decreased endurance; Impaired balance;Decreased mobility; Decreased safety awareness;Pain   Assessment Pt seen for PT treatment session with focus on bed mobility, functional tranfers, functional mobility, pt education  Pt making slow but steady progress toward goals this session  Pt continues to require encouragement and education due to self limiting anxious like behaviors  Pt demonstrating significant strength gains in last few session evident by ability to perform bed mobility and transfers with decreased assist   Endurance appears to be improving as pt is able to ambulate increased distance, however, pt quickly becomes SOB and requires sitting rest - unclear poor endurance vs anxiety vs combimnation  Pt left upright in bedside chair with all needs in reach  Pt will benefit from skilled therapy in order to address current impairments and functional limitations  PT to follow pt and recommending rehab once medically cleared  The patient's AM-PAC Basic Mobility Inpatient Short Form Raw Score is 13   A Raw score of less than or equal to 16 suggests the patient may benefit from discharge to post-acute rehabilitation services  Please also refer to the recommendation of the Physical Therapist for safe discharge planning  Barriers to Discharge Decreased caregiver support; Inaccessible home environment   Goals   Patient Goals to walk   STG Expiration Date 02/17/22   Plan   Treatment/Interventions Functional transfer training;LE strengthening/ROM; Elevations; Therapeutic exercise; Endurance training;Patient/family training;Equipment eval/education; Bed mobility;Gait training;Spoke to nursing   Progress Progressing toward goals   PT Frequency 3-5x/wk   Recommendation   PT Discharge Recommendation Post acute rehabilitation services   Additional Comments PT cleared pt to DC once medically cleared   AM-PAC Basic Mobility Inpatient   Turning in Bed Without Bedrails 3   Lying on Back to Sitting on Edge of Flat Bed 3   Moving Bed to Chair 2   Standing Up From Chair 2   Walk in Room 2   Climb 3-5 Stairs 1   Basic Mobility Inpatient Raw Score 13   Basic Mobility Standardized Score 33 99   Highest Level Of Mobility   JH-HLM Goal 4: Move to chair/commode   JH-HLM Highest Level of Mobility 6: Walk 10 steps or more   JH-HLM Goal Achieved Yes   Education   Education Provided Mobility training;Assistive device   Patient Demonstrates acceptance/verbal understanding   End of Consult   Patient Position at End of Consult Bedside chair; All needs within reach     Loraine Heck, PT, DPT

## 2022-02-07 NOTE — PLAN OF CARE
Problem: Prexisting or High Potential for Compromised Skin Integrity  Goal: Skin integrity is maintained or improved  Description: INTERVENTIONS:  - Identify patients at risk for skin breakdown  - Assess and monitor skin integrity  - Assess and monitor nutrition and hydration status  - Monitor labs   - Assess for incontinence   - Turn and reposition patient  - Assist with mobility/ambulation  - Relieve pressure over bony prominences  - Avoid friction and shearing  - Provide appropriate hygiene as needed including keeping skin clean and dry  - Evaluate need for skin moisturizer/barrier cream  - Collaborate with interdisciplinary team   - Patient/family teaching  - Consider wound care consult   Outcome: Progressing     Problem: MOBILITY - ADULT  Goal: Maintain or return to baseline ADL function  Description: INTERVENTIONS:  -  Assess patient's ability to carry out ADLs; assess patient's baseline for ADL function and identify physical deficits which impact ability to perform ADLs (bathing, care of mouth/teeth, toileting, grooming, dressing, etc )  - Assess/evaluate cause of self-care deficits   - Assess range of motion  - Assess patient's mobility; develop plan if impaired  - Assess patient's need for assistive devices and provide as appropriate  - Encourage maximum independence but intervene and supervise when necessary  - Involve family in performance of ADLs  - Assess for home care needs following discharge   - Consider OT consult to assist with ADL evaluation and planning for discharge  - Provide patient education as appropriate  Outcome: Progressing  Goal: Maintains/Returns to pre admission functional level  Description: INTERVENTIONS:  - Perform BMAT or MOVE assessment daily    - Set and communicate daily mobility goal to care team and patient/family/caregiver  - Collaborate with rehabilitation services on mobility goals if consulted  - Perform Range of Motion 3 times a day    - Reposition patient every 2 hours   - Dangle patient 3 times a day  - Stand patient 3 times a day  - Ambulate patient 3 times a day  - Out of bed to chair 3 times a day   - Out of bed for meals 3 times a day  - Out of bed for toileting  - Record patient progress and toleration of activity level   Outcome: Progressing     Problem: Potential for Falls  Goal: Patient will remain free of falls  Description: INTERVENTIONS:  - Educate patient/family on patient safety including physical limitations  - Instruct patient to call for assistance with activity   - Consult OT/PT to assist with strengthening/mobility   - Keep Call bell within reach  - Keep bed low and locked with side rails adjusted as appropriate  - Keep care items and personal belongings within reach  - Initiate and maintain comfort rounds  - Make Fall Risk Sign visible to staff  - Offer Toileting every 2 Hours, in advance of need  - Initiate/Maintain bed alarm  - Apply yellow socks and bracelet for high fall risk patients  - Consider moving patient to room near nurses station  Outcome: Progressing     Problem: GASTROINTESTINAL - ADULT  Goal: Minimal or absence of nausea and/or vomiting  Description: INTERVENTIONS:  - Administer IV fluids if ordered to ensure adequate hydration  - Maintain NPO status until nausea and vomiting are resolved  - Nasogastric tube if ordered  - Administer ordered antiemetic medications as needed  - Provide nonpharmacologic comfort measures as appropriate  - Advance diet as tolerated, if ordered  - Consider nutrition services referral to assist patient with adequate nutrition and appropriate food choices  Outcome: Progressing  Goal: Maintains or returns to baseline bowel function  Description: INTERVENTIONS:  - Assess bowel function  - Encourage oral fluids to ensure adequate hydration  - Administer IV fluids if ordered to ensure adequate hydration  - Administer ordered medications as needed  - Encourage mobilization and activity  - Consider nutritional services referral to assist patient with adequate nutrition and appropriate food choices  Outcome: Progressing  Goal: Maintains adequate nutritional intake  Description: INTERVENTIONS:  - Monitor percentage of each meal consumed  - Identify factors contributing to decreased intake, treat as appropriate  - Assist with meals as needed  - Monitor I&O, weight, and lab values if indicated  - Obtain nutrition services referral as needed  Outcome: Progressing  Goal: Establish and maintain optimal ostomy function  Description: INTERVENTIONS:  - Assess bowel function  - Encourage oral fluids to ensure adequate hydration  - Administer IV fluids if ordered to ensure adequate hydration   - Administer ordered medications as needed  - Encourage mobilization and activity  - Nutrition services referral to assist patient with appropriate food choices  - Assess stoma site  - Consider wound care consult   Outcome: Progressing  Goal: Oral mucous membranes remain intact  Description: INTERVENTIONS  - Assess oral mucosa and hygiene practices  - Implement preventative oral hygiene regimen  - Implement oral medicated treatments as ordered  - Initiate Nutrition services referral as needed  Outcome: Progressing     Problem: GENITOURINARY - ADULT  Goal: Maintains or returns to baseline urinary function  Description: INTERVENTIONS:  - Assess urinary function  - Encourage oral fluids to ensure adequate hydration if ordered  - Administer IV fluids as ordered to ensure adequate hydration  - Administer ordered medications as needed  - Offer frequent toileting  - Follow urinary retention protocol if ordered  Outcome: Progressing     Problem: SKIN/TISSUE INTEGRITY - ADULT  Goal: Skin Integrity remains intact(Skin Breakdown Prevention)  Description: Assess:  -Perform Surendra assessment every 12  -Clean and moisturize skin every 4  -Inspect skin when repositioning, toileting, and assisting with ADLS  -Assess extremities for adequate circulation and sensation Bed Management:  -Have minimal linens on bed & keep smooth, unwrinkled  -Change linens as needed when moist or perspiring  -Avoid sitting or lying in one position for more than 2 hours while in bed  -Keep HOB at 30degrees     Toileting:  -Offer bedside commode  -Assess for incontinence every 2    Activity:  -Mobilize patient 3 times a day  -Encourage activity and walks on unit  -Encourage or provide ROM exercises   -Turn and reposition patient every 2 Hours  -Use appropriate equipment to lift or move patient in bed  -Instruct/ Assist with weight shifting every 2 hour when out of bed in chair  -Consider limitation of chair time 8 hour intervals    Skin Care:  -Avoid use of baby powder, tape, friction and shearing, hot water or constrictive clothing  -Relieve pressure over bony prominences using allevyns  -Do not massage red bony areas  Outcome: Progressing  Goal: Incision(s), wounds(s) or drain site(s) healing without S/S of infection  Description: INTERVENTIONS  - Assess and document dressing, incision, wound bed, drain sites and surrounding tissue  - Provide patient and family education  - Perform skin care/dressing changes as needed  Outcome: Progressing  Goal: Pressure injury heals and does not worsen  Description: Interventions:  - Implement low air loss mattress or specialty surface (Criteria met)  - Apply silicone foam dressing  - Instruct/assist with weight shifting every 120 minutes when in chair   - Limit chair time to 8 hour intervals  - Use special pressure reducing interventions such as allevyns when in chair   - Apply fecal or urinary incontinence containment device   - Perform passive or active ROM every 4  - Turn and reposition patient & offload bony prominences every 2 hours   - Utilize friction reducing device or surface for transfers   - Consider nutrition services referral as needed  Outcome: Progressing     Problem: PAIN - ADULT  Goal: Verbalizes/displays adequate comfort level or baseline comfort level  Description: Interventions:  - Encourage patient to monitor pain and request assistance  - Assess pain using appropriate pain scale  - Administer analgesics based on type and severity of pain and evaluate response  - Implement non-pharmacological measures as appropriate and evaluate response  - Consider cultural and social influences on pain and pain management  - Notify physician/advanced practitioner if interventions unsuccessful or patient reports new pain  Outcome: Progressing     Problem: INFECTION - ADULT  Goal: Absence or prevention of progression during hospitalization  Description: INTERVENTIONS:  - Assess and monitor for signs and symptoms of infection  - Monitor lab/diagnostic results  - Monitor all insertion sites, i e  indwelling lines, tubes, and drains  - Monitor endotracheal if appropriate and nasal secretions for changes in amount and color  - [place] appropriate cooling/warming therapies per order  - Administer medications as ordered  - Instruct and encourage patient and family to use good hand hygiene technique  - Identify and instruct in appropriate isolation precautions for identified infection/condition  Outcome: Progressing     Problem: SAFETY ADULT  Goal: Maintain or return to baseline ADL function  Description: INTERVENTIONS:  -  Assess patient's ability to carry out ADLs; assess patient's baseline for ADL function and identify physical deficits which impact ability to perform ADLs (bathing, care of mouth/teeth, toileting, grooming, dressing, etc )  - Assess/evaluate cause of self-care deficits   - Assess range of motion  - Assess patient's mobility; develop plan if impaired  - Assess patient's need for assistive devices and provide as appropriate  - Encourage maximum independence but intervene and supervise when necessary  - Involve family in performance of ADLs  - Assess for home care needs following discharge   - Consider OT consult to assist with ADL evaluation and planning for discharge  - Provide patient education as appropriate  Outcome: Progressing  Goal: Maintains/Returns to pre admission functional level  Description: INTERVENTIONS:  - Perform BMAT or MOVE assessment daily    - Set and communicate daily mobility goal to care team and patient/family/caregiver  - Collaborate with rehabilitation services on mobility goals if consulted  - Perform Range of Motion 3 times a day  - Reposition patient every 2 hours    - Dangle patient 3 times a day  - Stand patient 3 times a day  - Ambulate patient 3 times a day  - Out of bed to chair 3 times a day   - Out of bed for meals 3 times a day  - Out of bed for toileting  - Record patient progress and toleration of activity level   Outcome: Progressing  Goal: Patient will remain free of falls  Description: INTERVENTIONS:  - Educate patient/family on patient safety including physical limitations  - Instruct patient to call for assistance with activity   - Consult OT/PT to assist with strengthening/mobility   - Keep Call bell within reach  - Keep bed low and locked with side rails adjusted as appropriate  - Keep care items and personal belongings within reach  - Initiate and maintain comfort rounds  - Make Fall Risk Sign visible to staff  - Offer Toileting every 2 Hours, in advance of need  - Initiate/Maintain bed alarm  - Apply yellow socks and bracelet for high fall risk patients  - Consider moving patient to room near nurses station  Outcome: Progressing     Problem: DISCHARGE PLANNING  Goal: Discharge to home or other facility with appropriate resources  Description: INTERVENTIONS:  - Identify barriers to discharge w/patient and caregiver  - Arrange for needed discharge resources and transportation as appropriate  - Identify discharge learning needs (meds, wound care, etc )  - Arrange for interpretive services to assist at discharge as needed  - Refer to Case Management Department for coordinating discharge planning if the patient needs post-hospital services based on physician/advanced practitioner order or complex needs related to functional status, cognitive ability, or social support system  Outcome: Progressing     Problem: Knowledge Deficit  Goal: Patient/family/caregiver demonstrates understanding of disease process, treatment plan, medications, and discharge instructions  Description: Complete learning assessment and assess knowledge base  Interventions:  - Provide teaching at level of understanding  - Provide teaching via preferred learning methods  Outcome: Progressing     Problem: Nutrition/Hydration-ADULT  Goal: Nutrient/Hydration intake appropriate for improving, restoring or maintaining nutritional needs  Description: Monitor and assess patient's nutrition/hydration status for malnutrition  Collaborate with interdisciplinary team and initiate plan and interventions as ordered  Monitor patient's weight and dietary intake as ordered or per policy  Utilize nutrition screening tool and intervene as necessary  Determine patient's food preferences and provide high-protein, high-caloric foods as appropriate       INTERVENTIONS:  - Monitor oral intake, urinary output, labs, and treatment plans  - Assess nutrition and hydration status and recommend course of action  - Evaluate amount of meals eaten  - Assist patient with eating if necessary   - Allow adequate time for meals  - Recommend/ encourage appropriate diets, oral nutritional supplements, and vitamin/mineral supplements  - Order, calculate, and assess calorie counts as needed  - Recommend, monitor, and adjust tube feedings and TPN/PPN based on assessed needs  - Assess need for intravenous fluids  - Provide specific nutrition/hydration education as appropriate  - Include patient/family/caregiver in decisions related to nutrition  Outcome: Progressing     Problem: CHANGE IN BODY IMAGE  Goal: Pt/Family communicate acceptance of loss or change in body image and expresses psychological comfort and peace  Description: INTERVENTIONS:  - Assess patient/family anxiety and grief process related to change in body image, loss of functional status and loss of sense of self  - Assess patient/family's coping skills and provide emotional, spiritual and psychosocial support  - Provide information about the patient's health status with consideration of family and cultural values  - Communicate willingness to discuss loss and facilitate grief process with patient/family as appropriate  - Emphasize sustaining relationships within family system and community, or farzaneh/spiritual traditions  - Refer to community support groups as appropriate  - Initiate Spiritual Care, Pastoral care or other ancillary consults as needed  Outcome: Progressing

## 2022-02-07 NOTE — QUICK NOTE
Nurse-Patient-Provider rounds were completed with the patient's nurse today, Cici Kumar  We discussed the plan is to continue regular diet  Continue to monitor abdominal exam, drain output and ostomy function  Will check labs on 2/9/2022 for weekly monitoring while admitted  We reviewed all of the invasive devices/lines/telemetry orders   - None  DVT Prophylaxis:  - Continue subcutaneous heparin and SCDs  Pain Assessment / Plan:  - Continue current analgesic regimen  Mobility Assessment / Plan:  - Activity as tolerated with assistance  - Continue PT and OT evaluation and treatment as indicated  Goals / Barriers for discharge:  - Patient remains appropriate for discharge to post acute care facility for rehab when placement is available  - Case management following; case and discharge needs discussed  All questions and concerns were addressed  I spent greater than 25 minutes reviewing the plan with the patient and the nurse, and coordinating care for the day      Deonte Marcelo PA-C  2/7/2022 09:27 AM

## 2022-02-07 NOTE — PLAN OF CARE
Problem: PHYSICAL THERAPY ADULT  Goal: Performs mobility at highest level of function for planned discharge setting  See evaluation for individualized goals  Description: Treatment/Interventions: Functional transfer training,LE strengthening/ROM,Therapeutic exercise,Endurance training,Patient/family training,Equipment eval/education,Bed mobility,Gait training,OT,Spoke to nursing  Equipment Recommended: Alma Joyce (bariatric RW )       See flowsheet documentation for full assessment, interventions and recommendations  Outcome: Progressing  Note: Prognosis: Fair  Problem List: Decreased strength,Decreased range of motion,Decreased endurance,Impaired balance,Decreased mobility,Decreased safety awareness,Pain  Assessment: Pt seen for PT treatment session with focus on bed mobility, functional tranfers, functional mobility, pt education  Pt making slow but steady progress toward goals this session  Pt continues to require encouragement and education due to self limiting anxious like behaviors  Pt demonstrating significant strength gains in last few session evident by ability to perform bed mobility and transfers with decreased assist   Endurance appears to be improving as pt is able to ambulate increased distance, however, pt quickly becomes SOB and requires sitting rest - unclear poor endurance vs anxiety vs combimnation  Pt left upright in bedside chair with all needs in reach  Pt will benefit from skilled therapy in order to address current impairments and functional limitations  PT to follow pt and recommending rehab once medically cleared  The patient's AM-PAC Basic Mobility Inpatient Short Form Raw Score is 13  A Raw score of less than or equal to 16 suggests the patient may benefit from discharge to post-acute rehabilitation services  Please also refer to the recommendation of the Physical Therapist for safe discharge planning    Barriers to Discharge: Decreased caregiver support,Inaccessible home environment        PT Discharge Recommendation: Post acute rehabilitation services          See flowsheet documentation for full assessment

## 2022-02-07 NOTE — OCCUPATIONAL THERAPY NOTE
Occupational Therapy Treatment Note      Shirline Aus    2022    Principal Problem:    Diverticulitis of large intestine with perforation  Active Problems:    Lymphedema    Primary hypertension    Morbid obesity with BMI of 60 0-69 9, adult (HCC)    Hidradenitis suppurativa    GERD (gastroesophageal reflux disease)    Right Hydroureteronephrosis      Past Medical History:   Diagnosis Date    Anxiety     Arthritis     GERD (gastroesophageal reflux disease)     Hidradenitis suppurativa     Hypertension     Lipodermatosclerosis of both lower extremities     Lymphedema     Sciatic leg pain     left side    SOB (shortness of breath)     Stomach ulcer        Past Surgical History:   Procedure Laterality Date    BILATERAL SALPINGOOPHORECTOMY N/A 2022    Procedure: RIGHT SALPINGO-OOPHORECTOMY, LEFT OOPHORECTOMY;  Surgeon: Dale Fernandez DO;  [facility];  Service: General     SECTION      FL RETROGRADE PYELOGRAM  2022    HARTMANS PROCEDURE N/A 2022    Procedure: END COLOSTOMY;  Surgeon: Dale Fernandez DO;  [facility];  Service: General    LAPAROTOMY N/A 2022    Procedure: LAPAROTOMY EXPLORATORY, DRAINAGE OF INTRA -ABDOMINAL ABSCESS;  Surgeon: Dale Fernandez DO;  [facility];  Service: General    AK CYSTOURETHROSCOPY,URETER CATHETER Right 2022    Procedure: CYSTOSCOPY RETROGRADE PYELOGRAM WITH INSERTION STENT URETERAL;  Surgeon: Vamsi Hoffman MD;  [facility];  Service: Urology     [address] 1-4 CM TRUNK,ARM,LEG Right 2020    Procedure: EXCISION BIOPSY TISSUE LESION/MASS UPPER EXTREMITY;  Surgeon: Violeta Haney DO;  Location: [facility];  Service: General    TONSILLECTOMY      TUMOR REMOVAL Right     5th finger        22 1014   OT Last Visit   OT Visit Date 22   Note Type   Note Type Treatment   Restrictions/Precautions   Weight Bearing Precautions Per Order No   Other Precautions Multiple lines; Fall Risk;Pain  (ADIA drains)   General   Response to Previous Treatment Patient with no complaints from previous session   Lifestyle   Autonomy Pt reports being I w/ ADLS/IADLs, Mod I w/ transfers/functional mobility w/ use of crutches   Reciprocal Relationships Lives alone; reports no social supports but has son watching her dog   Service to Others On disability   Intrinsic Gratification Likes spending time w/ her dog   Pain Assessment   Pain Assessment Tool   (pt w no c/o specific pain  c/o nausea  RN aware)   ADL   Where Assessed Edge of bed   Eating Assistance 7  Independent   Eating Deficit Setup   Grooming Assistance 4  Minimal Assistance   Grooming Deficit Setup; Increased time to complete;Brushing hair   UB Bathing Assistance 4  Minimal Assistance   UB Bathing Deficit Setup; Increased time to complete   UB Bathing Comments simulated UB bathing  fatiuges easily   LB Bathing Assistance 2  Maximal Assistance   LB Bathing Deficit Setup; Increased time to complete   UB Dressing Assistance 4  Minimal Assistance   LB Dressing Assistance 1  Total Assistance   Toileting Assistance  2  Maximal Assistance   Functional Standing Tolerance   Time 55 seconds   Activity much encouragment needed for standing holding on to RW   Comments pt stood just short of 1 min   Bed Mobility   Supine to Sit 4  Minimal assistance   Additional items Assist x 1; Increased time required;LE management   Additional Comments pt left sitting up in recliner chair at end of session   Transfers   Sit to Stand 3  Moderate assistance   Additional items Assist x 1; Increased time required;Verbal cues   Stand to Sit 3  Moderate assistance   Additional items Assist x 1; Increased time required   Stand pivot 3  Moderate assistance   Additional items Assist x 1; Increased time required;Verbal cues   Functional Mobility   Functional Mobility 3  Moderate assistance   Additional Comments assist of 1 w chair follow   Additional items Rolling walker Cognition   Overall Cognitive Status WFL  (anxiety, self limiting)   Arousal/Participation Alert; Cooperative   Attention Attends with cues to redirect   Orientation Level Oriented X4   Memory Decreased recall of precautions   Following Commands Follows multistep commands with increased time or repetition   Comments pt cooperative w session, needs encoragement  pt is self limiting due to anxiety   Activity Tolerance   Activity Tolerance Patient limited by fatigue   Assessment   Assessment Pt seen for skilled OT treatment session this AM  Pt seen as a co-treat w physical therapy due to the patient's co-morbidities, clinically unstable presentation, and present impairments which are a regression from the patient's baseline  Pt in bed upon arrival, agreeable therapy  "I need to get OOB, I really want to get better and go home"  Pt able to get to EOB w min assist  Good balance demonstrated while seated at EOB x 20-25 min  Pt c/o on/off nauses and dizziness  RN aware  Pt is very anxious and somewhat self limiting  Pt encouraged, and educated on ECT/self pacing as well as coping strategies for her anxiety  Pt receptive to education  Pt attempted to perform grooming activities such as combing her hair  Pt able to comb the front, but needs assist w back  Pt also with lots of tangles in her hair which made it harder  Pt needs max/total assist wall LB self care due to low act tolerance, fear of falls, large body habitus etc  Pt does need a lot of time for completion of simple self care/mobility w frequent breaks  She has demonstrated good progress since last OT session  The patient's raw score on the AM-PAC Daily Activity inpatient short form is 15, standardized score is 34 69, less than 39 4  Patients at this level are likely to benefit from discharge to post-acute rehabilitation services  OT will continue to follow and treat as able while in acute care      Plan   Treatment Interventions ADL retraining;Functional transfer training; Endurance training;Cognitive reorientation;Patient/family training;Equipment evaluation/education; Compensatory technique education; Energy conservation; Activityengagement   Goal Expiration Date 02/15/22   OT Treatment Day 7   OT Frequency 3-5x/wk   Recommendation   OT Discharge Recommendation Post acute rehabilitation services   OT - OK to Discharge Yes   AM-PAC Daily Activity Inpatient   Lower Body Dressing 1   Bathing 2   Toileting 2   Upper Body Dressing 3   Grooming 3   Eating 4   Daily Activity Raw Score 15   Daily Activity Standardized Score (Calc for Raw Score >=11) 34 49

## 2022-02-07 NOTE — PROGRESS NOTES
Progress Note - General Surgery   Willian Sheets 60 y o  female MRN: [id number]  Unit/Bed#: [facility] 511-01 Encounter: [id number]    Assessment:  59 y/o F w morbid obesity and sigmoid ca complicated by perforation, s/p Ortega's procedure on 1/17     Vitals stable, afebrile  No labs today       LLQ ADIA 25cc serous  RLQ ADIA 0cc record  Malecot 0cc recorded  Ostomy 50 recorded    Plan:  - regular diet  - ADIA drains, monitor output and character  - Rectal malecot, monitor output and character  - ostomy, monitor output and character  - pain and nausea control prn  - SQH  - encourage ambulation  - IS  - placement per CM    Subjective/Objective   Subjective:   Patient reports continued abdominal pain diffusely, otherwise doing well and tolerating regular diet without nausea vomiting, continues to have colostomy function, ambulating with PT, using incentive spirometer  Objective:     Blood pressure 147/82, pulse 98, temperature 98 6 °F (37 °C), resp  rate 18, height 5' 7" (1 702 m), weight (!) 154 kg (340 lb), SpO2 94 %  ,Body mass index is 53 25 kg/m²        Intake/Output Summary (Last 24 hours) at 2/7/2022 4241  Last data filed at 2/7/2022 9652  Gross per 24 hour   Intake 840 ml   Output 1000 ml   Net -160 ml       Invasive Devices  Report    Peripherally Inserted Central Catheter Line            PICC Line 11/78/34 Left Basilic 12 days          Drain            Closed/Suction Drain LLQ Bulb 19 Fr  21 days    Closed/Suction Drain RLQ Bulb 19 Fr  21 days    Colostomy Other (comment) LUQ 21 days    Rectal Tube Without balloon 21 days    External Urinary Catheter Medium 10 days                Physical Exam:  General: NAD  Skin: Warm, dry, anicteric  HEENT: Normocephalic, atraumatic  CV: RRR, no m/r/g  Pulm: CTA b/l, no inc WOB  Abd: Soft, ND, minimally TTP to palpation diffusely, colostomy functional with stool output present in bed, incisions clean dry and intact, JPs serous  MSK: Symmetric, no edema, no tenderness, no deformity  Neuro: AOx3, GCS 15    Lab, Imaging and other studies:I have personally reviewed pertinent lab results      VTE Pharmacologic Prophylaxis: Sequential compression device (Venodyne)  and Heparin  VTE Mechanical Prophylaxis: sequential compression device

## 2022-02-08 NOTE — CASE MANAGEMENT
Case Management Discharge Planning Note    Patient name Siddharth Singh  [facility] [id number]  : 1965 Date 2022       Current Admission Date: 2022  Current Admission Diagnosis:Diverticulitis of large intestine with perforation   Patient Active Problem List    Diagnosis Date Noted    Dysphagia 2022    Acute renal failure (ARF) ([facility] ) 2022    Cellulitis of abdominal wall, chest wall and groin with wounds 2022    Diverticulitis of large intestine with perforation 2022    Right Hydroureteronephrosis 2022    Subacute vaginitis 2022    GERD (gastroesophageal reflux disease)     Other specified anxiety disorders 2019    Lumbar paraspinal muscle spasm 2019    Borderline hyperlipidemia 2019    Iron deficiency 2019    Chronic midline low back pain with left-sided sciatica 2019    Primary hypertension 2019    Morbid obesity with BMI of 60 0-69 9, adult ([facility] ) 2019    Hidradenitis suppurativa 2019    Lymphedema 2006      LOS (days): 23  Geometric Mean LOS (GMLOS) (days):   Days to GMLOS:     OBJECTIVE:  Risk of Unplanned Readmission Score: 20         Current admission status: Inpatient   Preferred Pharmacy:   [facility] [address] [place] [address]/ [facility] [address]  Phone: [phone] Fax: [phone]    Primary Care Provider: Sariah Robles PA-C    Primary Insurance: Fannie Dupree MA [facility]  Secondary Insurance:     DISCHARGE DETAILS:      Additional Comments: Discussed patient with Carmen Vargas  Patient has no accepting snf at this time  [facility] updates were sent today to multiple SNF's  Patient will need insurance authwhen approved and bed is available

## 2022-02-08 NOTE — RESTORATIVE TECHNICIAN NOTE
Restorative Technician Note      Patient Name: Juan Diego Nair     Note Type: Mobility  Patient Position Upon Consult: Supine  Activity Performed: Ambulated; Transferred; Stood  Assistive Device: Roller walker  Patient Position at [employer] of Consult: Bedside chair;  All needs within reach

## 2022-02-08 NOTE — WOUND OSTOMY CARE
Progress Note - Wound   Megan Strauss 60 y o  female MRN: [id number]  Unit/Bed#: [facility] 511-01 Encounter: [id number]        Assessment:   Patient seen for continued skin and wound care she is awake, alert and oriented, out of bed ambulating with PT the hallway with no signs of discomfort  Patient agreeable to have wound care nurse assess skin however is only allowing certain amount of time to complete assessment  Findings  1-R sacro-perineal area with residual stage 2 wounds  Wound beds are 100% pink with intact periwound  2- L lateral thigh/buttock with bleeding stage 2 pressure injury  Wound bed is 100% pink with bleeding wound bed and intact surrounding  3-L breast stage 2 wounds remains with 100% pink wound bed, smaller in size and intact periwound  Heels remains intact, patient is ambulating with PT and showing improved mobility, colostomy pouch changed by staff nurse yesterday due to leakage  Skin and Wound Care Plan:    1  Apply skin nourishing cream to intact skin daily   2  Bariatric ehob offloading cushion to chair when OOB   3  Elevate heels off of bed with pillows to offload   4  Kreg bariatric bed   5  Turn and reposition patient Q2 hours   6  Right and left abdominal pannus - cleanse with soap and water then pat dry dust lightly with nystatin powder then apply maxorb to open areas then apply cut to fit ABD pad and secure lightly with tape change every other day   7  Bilateral buttocks bilateral groin and perineum area - cleanse with soap and water then apply janice antifungal cream bid and prn   8  Cleanse L breast wounds with NSS, pat dry, and apply bordered Allevyn foam dressing  Sancho dressing with T  Change every other day and as needed for soilage/dislodgement  9  Continue with ostomy care and teaching   10  Wound care will follow along with patient weekly, please call or tiger text with questions and concerns   11  Left posterior thigh--cleanse, pat dry   Apply Allevyn Life foam dressing   Change dressing every other day  12  Mid line incisional care as per surgery team       Vitals: Blood pressure 131/91, pulse 101, temperature 98 3 °F (36 8 °C), temperature source Oral, resp  rate 18, height 5' 7" (1 702 m), weight (!) 155 kg (341 lb 11 4 oz), SpO2 97 %  ,Body mass index is 53 52 kg/m²  @Surendra     Wound 11/22/21 Other (comment) Breast Left; Lower (Active)   Wound Description Pink;Dry 02/08/22 0801   Pressure Injury Stage 2 02/08/22 1135   Luisa-wound Assessment Intact 02/08/22 1135   Wound Length (cm) 1 cm 02/08/22 1135   Wound Width (cm) 1 5 cm 02/08/22 1135   Wound Depth (cm) 0 01 cm 02/08/22 1135   Wound Surface Area (cm^2) 1 5 cm^2 02/08/22 1135   Wound Volume (cm^3) 0 015 cm^3 02/08/22 1135   Calculated Wound Volume (cm^3) 0 02 cm^3 02/08/22 1135   Change in Wound Size % 98 02/08/22 1135   Tunneling 0 cm 01/24/22 1203   Tunneling in depth located at 0 01/24/22 1203   Undermining 0 01/24/22 1203   Undermining is depth extending from 0 01/24/22 1203   Drainage Amount None 02/08/22 1135   Drainage Description Serous 02/03/22 1343   Non-staged Wound Description Partial thickness 01/31/22 0752   Treatments Cleansed;Site care 02/03/22 1343   Dressing Foam, Silicon (eg  Allevyn, etc) 02/08/22 1135   Wound packed? No 01/24/22 1203   Packing- # removed 0 01/24/22 1203   Packing- # inserted 0 01/24/22 1203   Dressing Changed Changed 02/06/22 1300   Patient Tolerance Tolerated well 02/08/22 1135   Dressing Status Clean;Dry; Intact 02/08/22 0801   Wound 01/06/22 Other (comment) Other (Comment) Perineum (Active)   Wound Image    02/08/22 1133   Wound Description Beefy red 02/08/22 1133   Pressure Injury Stage 2 02/08/22 1133   Luisa-wound Assessment Intact 02/08/22 1133   Wound Length (cm) 5 cm 02/08/22 1133   Wound Width (cm) 3 cm 02/08/22 1133   Wound Depth (cm) 0 1 cm 02/08/22 1133   Wound Surface Area (cm^2) 15 cm^2 02/08/22 1133   Wound Volume (cm^3) 1 5 cm^3 02/08/22 1133   Calculated Wound Volume (cm^3) 1 5 cm^3 02/08/22 1133   Change in Wound Size % 84 13 02/08/22 1133   Drainage Amount None 02/08/22 1133   Drainage Description Bloody 02/06/22 2000   Non-staged Wound Description Partial thickness 02/08/22 0400   Treatments Site care 02/08/22 1133   Dressing Protective barrier 02/08/22 1133   Wound packed? No 01/12/22 1751   Dressing Changed Changed 02/03/22 0800   Patient Tolerance Tolerated well 02/08/22 1133   Dressing Status Dry; Intact 02/04/22 2100       Wound 01/11/22 Pressure Injury Thigh Posterior;Left;Upper (Active)   Wound Image   02/08/22 1135   Wound Description Beefy red;Bleeding 02/08/22 1135   Pressure Injury Stage 2 02/08/22 1135   Luisa-wound Assessment Intact 02/08/22 1135   Wound Length (cm) 5 cm 02/08/22 1135   Wound Width (cm) 5 cm 02/08/22 1135   Wound Depth (cm) 0 01 cm 02/08/22 1135   Wound Surface Area (cm^2) 25 cm^2 02/08/22 1135   Wound Volume (cm^3) 0 25 cm^3 02/08/22 1135   Calculated Wound Volume (cm^3) 0 25 cm^3 02/08/22 1135   Change in Wound Size % 95 83 02/08/22 1135   Drainage Amount Scant 02/03/22 1339   Drainage Description Purulent 02/03/22 1339   Non-staged Wound Description Partial thickness 01/31/22 0752   Treatments Cleansed;Site care 02/03/22 1339   Dressing Foam, Silicon (eg  Allevyn, etc) 02/08/22 0801   Dressing Changed Changed 02/06/22 1300   Patient Tolerance Tolerated well 02/08/22 1135   Dressing Status Clean;Dry; Intact 02/08/22 0801       Wound 01/17/22 Surgical Abdomen N/A;Mid (Active)   Wound Description Dry;Pink;Fragile 02/08/22 0801   Luisa-wound Assessment Pink;Fragile 02/08/22 0801   Wound Site Closure Unable to assess 02/08/22 0801   Drainage Amount None 02/08/22 0400   Drainage Description Serosanguineous 02/02/22 2130   Treatments Site care 02/01/22 2054   Dressing Gauze 02/08/22 0801   Wound packed?  Yes 01/19/22 1600   Packing- # inserted 8 01/17/22 0013   Dressing Changed Changed 02/03/22 0800   Patient Tolerance Tolerated well 02/02/22 1057   Dressing Status Clean;Dry; Intact 02/08/22 0801         Our skin care recommendations remains as nursing orders, wound care to continue following, please call or tiger text with questions and concerns          Benito Kimbrough RN, BSN, [facility]

## 2022-02-08 NOTE — PROGRESS NOTES
Progress Note - Rosalind Peña 60 y o  female MRN: [id number]    Unit/Bed#: [facility] 511-01 Encounter: [id number]      Assessment:  61 y/o F w morbid obesity and sigmoid ca complicated by perforation, s/p Ortega's procedure on 1/17    Vitals stable  Afebrile  abd soft, nontender, non distended  Midline intact  Ostomy with stool in appliance  L ADIA: scant serous  R ADIA: scant serous  Malecot: sero purulent  Colostomy: with 300 cc soft stool  Plan:  Continue regular diet  Maintain abdominal drains  Continue rectal malecot  Aggressive pt/ot  Ambulate  dvt ppx  Rehab planning, appreciate case mgt    Subjective:   Feels great  No complaints  Denied fever, chills, chest pain, shortness of breath, nausea, vomiting, or abdominal pain this morning  Objective:     Vitals: Blood pressure 148/96, pulse 104, temperature (!) 97 4 °F (36 3 °C), temperature source Oral, resp  rate 18, height 5' 7" (1 702 m), weight (!) 154 kg (340 lb), SpO2 93 %  ,Body mass index is 53 25 kg/m²  Intake/Output Summary (Last 24 hours) at 2/8/2022 0143  Last data filed at 2/7/2022 0956  Gross per 24 hour   Intake 240 ml   Output 400 ml   Net -160 ml       Physical Exam  General: NAD  HEENT: NC/AT  MMM  Cv: RRR     Lungs: normal effort  Ab: Soft, NT/ND  Ex: no CCE  Neuro: AAOx3    Scheduled Meds:  Current Facility-Administered Medications   Medication Dose Route Frequency Provider Last Rate    acetaminophen  975 mg Oral Q6H PRN Onel Stevenson MD      albuterol  2 puff Inhalation Q4H PRN Sherrel Heart, DO      docusate sodium  100 mg Oral BID Shelagh Bumpers, MD      heparin (porcine)  7,500 Units Subcutaneous [facility] Barbara Casillas, [address]      HYDROmorphone  0 5 mg Intravenous Q3H PRN Onel Stevenson MD      hydrOXYzine HCL  10 mg Oral Q6H PRN Yousif Smith PA-C      Labetalol HCl  10 mg Intravenous Q4H PRN MIRTA Toussaint      CAROLINE ANTIFUNGAL   Topical BID Dale MIRTA Santos      nystatin  500,000 Units Swish & Swallow 4x Daily MIRTA Nath      nystatin   Topical BID MIRTA Nath      ondansetron  4 mg Oral Q6H [facility] Monroe Greenberg MD      oxyCODONE  10 mg Oral Q4H PRN Minda Hernandez MD      oxyCODONE  5 mg Oral Q4H PRN Minda Hernandez MD      pantoprazole  40 mg Oral BID AC Raul Marshal, DO      saliva substitute  5 spray Mouth/Throat 4x Daily PRN MIRTA Barnes      senna  1 tablet Oral BID Yuri Szymanski MD       Continuous Infusions:   PRN Meds:   acetaminophen    albuterol    HYDROmorphone    hydrOXYzine HCL    Labetalol HCl    oxyCODONE    oxyCODONE    saliva substitute      Invasive Devices  Report    Peripherally Inserted Central Catheter Line            PICC Line 10/71/43 Left Basilic 13 days          Drain            Closed/Suction Drain LLQ Bulb 19 Fr  22 days    Closed/Suction Drain RLQ Bulb 19 Fr  22 days    Colostomy Other (comment) LUQ 22 days    Rectal Tube Without balloon 22 days    External Urinary Catheter Medium 11 days                Lab, Imaging and other studies: I have personally reviewed pertinent reports      VTE Pharmacologic Prophylaxis: Sequential compression device (Venodyne)   VTE Mechanical Prophylaxis: sequential compression device

## 2022-02-08 NOTE — CASE MANAGEMENT
Case Management Discharge Planning Note    Patient name Esmer Gambino  Location [facility] 536-70 MRN [id number]  : 1965 Date 2022       Current Admission Date: 2022  Current Admission Diagnosis:Diverticulitis of large intestine with perforation   Patient Active Problem List    Diagnosis Date Noted    Dysphagia 2022    Acute renal failure (ARF) ([facility] ) 2022    Cellulitis of abdominal wall, chest wall and groin with wounds 2022    Diverticulitis of large intestine with perforation 2022    Right Hydroureteronephrosis 2022    Subacute vaginitis 2022    GERD (gastroesophageal reflux disease)     Other specified anxiety disorders 2019    Lumbar paraspinal muscle spasm 2019    Borderline hyperlipidemia 2019    Iron deficiency 2019    Chronic midline low back pain with left-sided sciatica 2019    Primary hypertension 2019    Morbid obesity with BMI of 60 0-69 9, adult ([facility] ) 2019    Hidradenitis suppurativa 2019    Lymphedema 2006      LOS (days): 23  Geometric Mean LOS (GMLOS) (days):   Days to GMLOS:     OBJECTIVE:  Risk of Unplanned Readmission Score: 20         Current admission status: Inpatient   Preferred Pharmacy:   [facility] [address] [place] [address]/ Josiah Simons  [facility] [address]  Phone: [phone] Fax: [phone]    Primary Care Provider: Leopoldo Oregon, PA-C    Primary Insurance: [facility]  Secondary Insurance:     DISCHARGE DETAILS:                  Additional Comments: Discussed patient with Joaquín Martines  Patient has no accepting snf at this time  [facility] updates were sent today to multiple SNF's  Patient will need insurance authwhen approved and bed is available

## 2022-02-09 NOTE — PLAN OF CARE
Problem: GENITOURINARY - ADULT  Goal: Maintains or returns to baseline urinary function  Description: INTERVENTIONS:  - Assess urinary function  - Encourage oral fluids to ensure adequate hydration if ordered  - Administer IV fluids as ordered to ensure adequate hydration  - Administer ordered medications as needed  - Offer frequent toileting  - Follow urinary retention protocol if ordered  Outcome: Progressing

## 2022-02-09 NOTE — QUICK NOTE
Nurse-Patient-Provider rounds were completed with the patient's nurse today, Fatimah Ybarra  We discussed the plan is to continue diet as tolerated  Monitor abdominal exam and ostomy function; stoma remains viable and functional  Continue current drains  Labs from this AM reviewed  Continue therapy and encourage patient being active and out of bed with assistance  We reviewed all of the invasive devices/lines/telemetry orders   - Discontinue PICC prior to discharge  DVT Prophylaxis:  - Subcutaneous heparin and SCDs  Pain Assessment / Plan:  - Continue current analgesic regimen  Mobility Assessment / Plan:  - Activity as tolerated with assistance  - Continue PT and OT evaluation and treatment as indicated  Goals / Barriers for discharge:  - Patient remains medically ready for discharge once appropriate placement available  - Case management following; case and discharge needs discussed  All questions and concerns were addressed  I spent greater than 25 minutes reviewing the plan with the patient and the nurse, and coordinating care for the day      Geovanna Becerra PA-C  2/9/2022 09:38 AM

## 2022-02-09 NOTE — PROGRESS NOTES
Progress Note - General Surgery   Swati Castro 60 y o  female MRN: [id number]  Unit/Bed#: [facility] 511-01 Encounter: [id number]    Assessment:  61 y/o F w morbid obesity and sigmoid ca complicated by perforation, s/p Ortega's procedure on 1/17     Vitals stable, afebrile  No labs today     UOP 1150  LLQ ADIA 5cc serous  RLQ ADIA 5cc serous  Malecot 0cc recorded  Ostomy  240 recorded    Plan:  - regular diet  - ADIA drains, monitor output and character  - Rectal malecot, monitor output and character  - ostomy, monitor output and character  - pain and nausea control prn  - SQH  - encourage ambulation  - IS  - placement per CM    Subjective/Objective   Subjective:   Doing well, awaiting placement, denies pain, tolerating diet without nausea or vomiting, continues to have colostomy function, out of bed ambulating with PT, continues to use incentive spirometry  Objective:     Blood pressure 139/67, pulse 85, temperature 98 3 °F (36 8 °C), temperature source Oral, resp  rate 18, height 5' 7" (1 702 m), weight (!) 155 kg (341 lb 11 4 oz), SpO2 97 %  ,Body mass index is 53 52 kg/m²        Intake/Output Summary (Last 24 hours) at 2/9/2022 0919  Last data filed at 2/9/2022 0631  Gross per 24 hour   Intake 500 ml   Output 1250 ml   Net -750 ml       Invasive Devices  Report    Peripherally Inserted Central Catheter Line            PICC Line 76/45/31 Left Basilic 14 days          Drain            Closed/Suction Drain LLQ Bulb 19 Fr  23 days    Closed/Suction Drain RLQ Bulb 19 Fr  23 days    Colostomy Other (comment) LUQ 23 days    Rectal Tube Without balloon 23 days    External Urinary Catheter Medium 13 days                Physical Exam:   General: NAD  Skin: Warm, dry, anicteric  HEENT: Normocephalic, atraumatic  CV: RRR, no m/r/g  Pulm: CTA b/l, no inc WOB  Abd: Soft, ND/NT, colostomy functional, ADIA x2 serous  MSK: Symmetric, no edema, no tenderness, no deformity  Neuro: AOx3, GCS 15    Lab, Imaging and other studies:I have personally reviewed pertinent lab results      VTE Pharmacologic Prophylaxis: Sequential compression device (Venodyne)  and Heparin  VTE Mechanical Prophylaxis: sequential compression device

## 2022-02-10 NOTE — CASE MANAGEMENT
Case Management Discharge Planning Note    Patient name Celestine Mckinnon  Location [facility] 653-73 MRN [id number]  : 1965 Date 2/10/2022       Current Admission Date: 2022  Current Admission Diagnosis:Diverticulitis of large intestine with perforation   Patient Active Problem List    Diagnosis Date Noted    Dysphagia 2022    Acute renal failure (ARF) ([facility] ) 2022    Cellulitis of abdominal wall, chest wall and groin with wounds 2022    Diverticulitis of large intestine with perforation 2022    Right Hydroureteronephrosis 2022    Subacute vaginitis 2022    GERD (gastroesophageal reflux disease)     Other specified anxiety disorders 2019    Lumbar paraspinal muscle spasm 2019    Borderline hyperlipidemia 2019    Iron deficiency 2019    Chronic midline low back pain with left-sided sciatica 2019    Primary hypertension 2019    Morbid obesity with BMI of 60 0-69 9, adult ([facility] ) 2019    Hidradenitis suppurativa 2019    Lymphedema 2006      LOS (days): 25  Geometric Mean LOS (GMLOS) (days):   Days to GMLOS:     OBJECTIVE:  Risk of Unplanned Readmission Score: 22         Current admission status: Inpatient   Preferred Pharmacy:   [facility] [address]/ [facility] [address]  Phone: [phone] Fax: [phone]    Primary Care Provider: Max Shah PA-C    Primary Insurance: [facility]  Secondary Insurance:     DISCHARGE DETAILS:    Additional Comments: Multiple snf referrals have been made with no accepting SNF at this time  Messages were left with Spencer Bass,[address], [address], [address],  [address]

## 2022-02-10 NOTE — NURSING NOTE
Pt refused wound care changes today  Attempted multiple times throughout the day  Pt refused and would like them changed tomorrow

## 2022-02-10 NOTE — QUICK NOTE
House contacted by the nursing staff noting that 1 of the patient's ADIA drain had become dislodged  I evaluated the patient and noted that the left lower quadrant ADIA drain was nearly out  I removed the remainder of the ADIA drain and placed a clean dry gauze dressing over the drain site  Continue to monitor abdominal exam at this time      Claudia Yost PA-C  2/10/2022 03:51 PM

## 2022-02-10 NOTE — WOUND OSTOMY CARE
Progress Note- Ostomy  Baron Wood 60 y o  female  [id number]  [facility] 511-[facility] 511-01        Assessment:  Patient seen for ostomy care and teaching this morning, per patient she will no be able to take care of her pouch since there is no way she can see her stoma  Body habitus makes pouch change a challenger for patient, also stoma completely necrosed with stomal area remains as an open wound with stool drainage  Opening where stoma was measures 11/8in x 11/2 in oval and sitting within a shallow crease, surrounding skin is intact with no breakdown or erythema  Patient was pouch using a 4 inch one piece pouching system with a 4 inch and 2 inch gege seal to create a soft convex barrier  Pouch applied and good seal obtained, patient's L lower abdominal drain with abnormal appearance, surgical PA Keanu Daugherty notified  Plan:  Wound care to continue following with ostomy care and teaching  Vitals:    02/10/22 1547   BP: 167/100   Pulse: 104   Resp: 17   Temp: 98 1 °F (36 7 °C)   SpO2: 95%         Colostomy Other (comment) LUQ (Active)   Stomal Appliance 1 piece;Leaking 02/10/22 1000   Stoma Assessment Other (Comment) 02/10/22 1000   Stoma size (in) 25 in 02/10/22 1000   Stoma Shape Oval 02/10/22 0800   Peristomal Assessment Intact 02/10/22 1000   Treatment Bag change 02/10/22 1000   Output (mL) 100 mL 02/10/22 1000   Number of days: 24         Please call or tiger text with questions and concerns, we will continue following          Dilcia Matos, RN, BSN, [facility]

## 2022-02-10 NOTE — QUICK NOTE
Nurse-Patient-Provider rounds were completed with the patient's nurse today, Daniel Martinez  We discussed the plan is to continue diet as tolerated  Patient has been off of IV medications and requires no further IV interventions at this time; plan to remove PICC line today  Maintain current drains including ADIA drains in the abdomen as well as rectal malecot drain  Continue routine ostomy care  Continue to work with therapy while awaiting placement at a post acute care facility for rehab  We reviewed all of the invasive devices/lines/telemetry orders   - PICC line no longer necessary; plan to remove it today  DVT Prophylaxis:  - Continue subcutaneous heparin and SCDs  Pain Assessment / Plan:  - Continue current analgesic regimen  Mobility Assessment / Plan:  - Activity as tolerated  - Continue PT and OT evaluation and treatment as indicated  Goals / Barriers for discharge:  - Stable for discharge to post acute care facility for rehab when placement available  - Case management following; case and discharge needs discussed  All questions and concerns were addressed  I spent greater than 25 minutes reviewing the plan with the patient and the nurse, and coordinating care for the day      Hari Rubi PA-C  2/10/2022 1:08 PM

## 2022-02-10 NOTE — PHYSICAL THERAPY NOTE
Physical Therapy Evaluation     Patient's Name: Almaz Tang    Admitting Diagnosis  Cellulitis of abdominal wall [L47 688]  Diverticulitis of large intestine with perforation without bleeding [K57 20]    Problem List  Patient Active Problem List   Diagnosis    Lymphedema    Primary hypertension    Morbid obesity with BMI of 60 0-69 9, adult ([facility] )    Hidradenitis suppurativa    Chronic midline low back pain with left-sided sciatica    Other specified anxiety disorders    Lumbar paraspinal muscle spasm    Borderline hyperlipidemia    Iron deficiency    GERD (gastroesophageal reflux disease)    Acute renal failure (ARF) (HCC)    Cellulitis of abdominal wall, chest wall and groin with wounds    Diverticulitis of large intestine with perforation    Right Hydroureteronephrosis    Subacute vaginitis    Dysphagia       Past Medical History  Past Medical History:   Diagnosis Date    Anxiety     Arthritis     GERD (gastroesophageal reflux disease)     Hidradenitis suppurativa     Hypertension     Lipodermatosclerosis of both lower extremities     Lymphedema     Sciatic leg pain     left side    SOB (shortness of breath)     Stomach ulcer        Past Surgical History  Past Surgical History:   Procedure Laterality Date    BILATERAL SALPINGOOPHORECTOMY N/A 2022    Procedure: RIGHT SALPINGO-OOPHORECTOMY, LEFT OOPHORECTOMY;  Surgeon: Oralia Herman DO;  [facility];  Service: General     SECTION      FL RETROGRADE PYELOGRAM  2022    HARTMANS PROCEDURE N/A 2022    Procedure: END COLOSTOMY;  Surgeon: Oralia Herman DO;  [facility];  Service: General    LAPAROTOMY N/A 2022    Procedure: LAPAROTOMY EXPLORATORY, DRAINAGE OF INTRA -ABDOMINAL ABSCESS;  Surgeon: Oralia Herman DO;  [facility];  Service: General    PA CYSTOURETHROSCOPY,URETER CATHETER Right 2022    Procedure: CYSTOSCOPY RETROGRADE PYELOGRAM WITH INSERTION STENT URETERAL;  Surgeon: Roselia Cooper MD;  Location:  MAIN OR;  Service: Urology     [address] 1-4 CM TRUNK,ARM,LEG Right 12/8/2020    Procedure: EXCISION BIOPSY TISSUE LESION/MASS UPPER EXTREMITY;  Surgeon: Megha Perla DO;  Location: [facility];  Service: General    TONSILLECTOMY      TUMOR REMOVAL Right     5th finger          02/10/22 1125   PT Last Visit   PT Visit Date 02/10/22   Note Type   Note Type Treatment   End of Consult   Patient Position at End of Consult Bedside chair; All needs within reach   Pain Assessment   Pain Assessment Tool 0-10   Pain Score 8   Pain Location/Orientation Location: Abdomen   Restrictions/Precautions   Weight Bearing Precautions Per Order No   Other Precautions Multiple lines; Fall Risk;Pain  (ADIA drain x2, ostomy bag)   General   Chart Reviewed Yes   Response to Previous Treatment Patient with no complaints from previous session  Family/Caregiver Present No   Cognition   Overall Cognitive Status WFL   Arousal/Participation Alert   Attention Attends with cues to redirect   Orientation Level Oriented X4   Memory Within functional limits   Following Commands Follows multistep commands with increased time or repetition   Comments Pt appears to be gaining confidence in physical abilities, however, still somewhat self limiting, requires some encouragement   Subjective   Subjective Pt pleasant and agreeable to participate in therapy session  Bed Mobility   Supine to Sit 4  Minimal assistance   Additional items Assist x 1;HOB elevated; Increased time required;Verbal cues;LE management   Sit to Supine 3  Moderate assistance   Additional items Assist x 1;HOB elevated; Increased time required;LE management;Verbal cues   Additional Comments Pt performed sup>sit 2x this session as pt required to return to supine to check leaking ostomy bag  Pt left upright in bedside chair with all needs in reach     Transfers   Sit to Stand 4  Minimal assistance  (progressing to supervision) Additional items Assist x 1; Increased time required;Verbal cues   Stand to Sit 5  Supervision   Additional items Increased time required;Verbal cues   Additional Comments Trasnfers with RW   VC for hand placement and safety  Ambulation/Elevation   Gait pattern Excessively slow; Short stride;Decreased foot clearance;Poor UE support; Improper Weight shift  (increased lateral sway)   Gait Assistance 3  Moderate assist   Additional items Assist x 1;Verbal cues; Tactile cues   Assistive Device Bariatric Rolling walker   Distance 25 ft x 1, 30 ft x 1   Balance   Static Sitting Fair +   Dynamic Sitting Fair   Static Standing Fair -   Dynamic Standing Fair -   Ambulatory Poor +   Endurance Deficit   Endurance Deficit Yes   Endurance Deficit Description fatigue, weakness, anxious like behaviors, SOB   Activity Tolerance   Activity Tolerance Patient limited by fatigue; Other (Comment)  (SOB)   Medical Staff Made Aware OT Marsahll Coburn, [facility] tech Iesha Knapp   Nurse Made Aware RN cleared pt to be seen by PT   Exercises   Balance training  Pt sat EOB with supervision while performing ADLs with OT  Assessment   Prognosis Fair   Problem List Decreased strength; Impaired balance;Decreased endurance;Decreased mobility; Decreased safety awareness;Pain   Assessment Pt seen for PT treatment session with focus on bed mobility, functional transfers, functional mobility  Pt making steady progress toward goals this session  Pt performing all bed mobility and transfers consistently with decreased assist as a result of improving strength  Pt able to ambulate increased distance, this session with encouragement, however, does continue to be limited by SOB and anxious like behaviors  Continue to push pt further distance each session  Pt left upright in bedside chair with all needs in reach  Pt will benefit from skilled therapy in order to address current impairments and functional limitations   PT to follow pt and recommending rehab once medically cleared  The patient's AM-PAC Basic Mobility Inpatient Short Form Raw Score is 15  A Raw score of less than or equal to 16 suggests the patient may benefit from discharge to post-acute rehabilitation services  Please also refer to the recommendation of the Physical Therapist for safe discharge planning  Barriers to Discharge Inaccessible home environment;Decreased caregiver support   Goals   Patient Goals to get better   STG Expiration Date 02/17/22   Plan   Treatment/Interventions Functional transfer training;LE strengthening/ROM; Therapeutic exercise; Endurance training;Equipment eval/education;Patient/family training;Bed mobility;Gait training;Spoke to nursing   Progress Progressing toward goals   PT Frequency 3-5x/wk   Recommendation   PT Discharge Recommendation Post acute rehabilitation services   Additional Comments PT cleared pt to be seen by PT   AM-PAC Basic Mobility Inpatient   Turning in Bed Without Bedrails 3   Lying on Back to Sitting on Edge of Flat Bed 3   Moving Bed to Chair 3   Standing Up From Chair 3   Walk in Room 2   Climb 3-5 Stairs 1   Basic Mobility Inpatient Raw Score 15   Basic Mobility Standardized Score 36 97   Highest Level Of Mobility   JH-HLM Goal 4: Move to chair/commode   JH-HLM Highest Level of Mobility 7: Walk 25 feet or more   JH-HLM Goal Achieved Yes         Vickey Guthrie, PT, DPT

## 2022-02-10 NOTE — OCCUPATIONAL THERAPY NOTE
Occupational Therapy Progress Note     Patient Name: Fadumo HAN Date: 2/10/2022  Problem List  Principal Problem:    Diverticulitis of large intestine with perforation  Active Problems:    Lymphedema    Primary hypertension    Morbid obesity with BMI of 60 0-69 9, adult (HCC)    Hidradenitis suppurativa    GERD (gastroesophageal reflux disease)    Right Hydroureteronephrosis            02/10/22 1110   OT Last Visit   OT Visit Date 02/10/22   Note Type   Note Type Treatment   Restrictions/Precautions   Weight Bearing Precautions Per Order No   Other Precautions Multiple lines; Fall Risk  (2 ADIA drains, ostomy)   Pain Assessment   Pain Assessment Tool 0-10   Pain Score 8   Pain Location/Orientation Location: Abdomen   Hospital Pain Intervention(s) Repositioned; Ambulation/increased activity; Emotional support   ADL   Where Assessed Edge of bed   UB Bathing Assistance 4  Minimal Assistance   UB Bathing Deficit Increased time to complete; Chest;Right arm;Left arm; Abdomen;Supervision/safety;Setup   UB Bathing Comments min A for UB bathing, fatigues easily  Able to wash UE's and chest  Joon Allen present to A pt w mgmt of ostomy bag, leaking  LB Bathing Assistance 2  Maximal Assistance   LB Bathing Deficit Setup; Increased time to complete;Perineal area; Buttocks;Right upper leg;Left upper leg;Right lower leg including foot; Left lower leg including foot   LB Bathing Comments max A for LB bathing this date, specifically for washing lower LE's and perineal area  UB Dressing Assistance 4  Minimal Assistance   UB Dressing Deficit Increased time to complete; Thread RUE; Thread LUE;Pull around back   UB Dressing Comments min A for threading UE's    LB Dressing Assistance 1  Total Assistance   LB Dressing Deficit Don/doff R sock; Don/doff L sock   LB Dressing Comments total A for doffing/donning socks  Bed Mobility   Supine to Sit 4  Minimal assistance   Additional items Assist x 1;HOB elevated; Increased time required;Verbal cues   Sit to Supine 3  Moderate assistance   Additional items Assist x 1;HOB elevated; Increased time required;LE management   Additional Comments found sitting EOB, returned to supine position for RN to address ostomy bag needs  Then returned to EOB sitting to engage in ADL tasks  left EOB w PT  Cognition   Overall Cognitive Status WFL   Arousal/Participation Alert; Responsive; Cooperative   Attention Within functional limits   Orientation Level Oriented X4   Memory Within functional limits   Following Commands Follows all commands and directions without difficulty   Comments remains self-limiting at this time, though this is improving  Appears pts anxiety regarding fxnl tasks is improving as well  Activity Tolerance   Activity Tolerance Patient tolerated treatment well   Medical Staff Made Aware rn   Assessment   Assessment Pt seen on this date for OT session focusing on ADL retraining, body mechanics, transfer retraining, increasing activity tolerance/endurance and EOB sitting to increase ability to participate in ADL/functional tasks  Pt was found in bed and was left sitting EOB w physical therapist present  Pt sits EOB w supervision while completing ADL tasks- UB ADL w min A, LB bathing w max A, LB dressing total A  Returned to supine position for RN to address ostomy bag needs during therapy session  Then, brought EOB and left w PT  Pt w/ improvements in activity tolerance, ADL task completion, transfer ability, however is still limited 2* decreased ADL/High-level ADL status, decreased activity tolerance/endurance, decreased self-care trans, decreased safety awareness and insight to condition  The patient's raw score on the AM-PAC Daily Activity inpatient short form is 15, standardized score is 34 69, less than 39 4  Patients at this level are likely to benefit from discharge to post-acute rehabilitation services   Please refer to the recommendation of the Occupational Therapist for safe discharge planning  Recommending pt D/C to STR when medically stable  Pt will continue to benefit from acute OT services to meet goals  Plan   Treatment Interventions ADL retraining;Functional transfer training; Endurance training;Energy conservation; Activityengagement   Goal Expiration Date 02/15/22   OT Treatment Day 8   OT Frequency 3-5x/wk   Recommendation   OT Discharge Recommendation Post acute rehabilitation services   OT - OK to Discharge Yes   AM-PAC Daily Activity Inpatient   Lower Body Dressing 1   Bathing 2   Toileting 2   Upper Body Dressing 3   Grooming 3   Eating 4   Daily Activity Raw Score 15   Daily Activity Standardized Score (Calc for Raw Score >=11) 34 69   AM-PAC Applied Cognition Inpatient   Following a Speech/Presentation 3   Understanding Ordinary Conversation 4   Taking Medications 3   Remembering Where Things Are Placed or Put Away 3   Remembering List of 4-5 Errands 3   Taking Care of Complicated Tasks 3   Applied Cognition Raw Score 19   Applied Cognition Standardized Score 39 77   Modified [place] Scale   Modified Jair Scale 4                Ed [place], OTR/L

## 2022-02-10 NOTE — PROGRESS NOTES
Progress Note - General Surgery   Bryce Rm 60 y o  female MRN: [id number]  Unit/Bed#: [facility] 511-01 Encounter: [id number]    Assessment:  61 y/o F w morbid obesity and sigmoid ca complicated by perforation, s/p Ortega's procedure on 1/17     Vitals stable, afebrile     LLQ ADIA 0 cc serous  RLQ ADIA 5 cc serous  Malecot 0 cc recorded  Ostomy  235 recorded    Plan:  - regular diet  - ADIA drains, monitor output and character  - Rectal malecot, monitor output and character  - ostomy, monitor output and character  - pain and nausea control prn  - SQH  - encourage ambulation  - IS  - placement per CM  - patient is medically stable for DC  - will increase hydroxyzine for anxiety    Subjective/Objective   Subjective:   Patient seen and examined at bedside  No acute events overnight  Pain controlled  Denies nausea, vomiting, fever, chills  Ostomy functional  Up out of bed, ambulating      Objective:     Blood pressure 146/80, pulse 92, temperature 98 °F (36 7 °C), temperature source Oral, resp  rate 19, height 5' 7" (1 702 m), weight (!) 155 kg (341 lb 11 4 oz), SpO2 98 %  ,Body mass index is 53 52 kg/m²        Intake/Output Summary (Last 24 hours) at 2/9/2022 2131  Last data filed at 2/9/2022 1825  Gross per 24 hour   Intake 240 ml   Output 1910 ml   Net -1670 ml       Invasive Devices  Report    Peripherally Inserted Central Catheter Line            PICC Line 52/27/73 Left Basilic 15 days          Drain            Closed/Suction Drain LLQ Bulb 19 Fr  23 days    Closed/Suction Drain RLQ Bulb 19 Fr  23 days    Colostomy Other (comment) LUQ 23 days    Rectal Tube Without balloon 23 days    External Urinary Catheter Medium 13 days                Physical Exam:   General: NAD  Head: normocephalic, atraumatic  CV: Pulse regular  Lungs: no conversational dyspnea  Abdomen: soft, non distended, non tender, no guarding or rebound, left sided ostomy with stool and gas in bag  Extremities: GOMEZ, motor sensory intact  Neuro: awake, alert, answers questions appropriately      Lab, Imaging and other studies:I have personally reviewed pertinent lab results      VTE Pharmacologic Prophylaxis: Sequential compression device (Venodyne)  and Heparin  VTE Mechanical Prophylaxis: sequential compression device

## 2022-02-10 NOTE — PLAN OF CARE
Problem: PHYSICAL THERAPY ADULT  Goal: Performs mobility at highest level of function for planned discharge setting  See evaluation for individualized goals  Description: Treatment/Interventions: Functional transfer training,LE strengthening/ROM,Therapeutic exercise,Endurance training,Equipment eval/education,Patient/family training,Bed mobility,Gait training,Spoke to nursing  Equipment Recommended: Faustino Alvarado       See flowsheet documentation for full assessment, interventions and recommendations  Outcome: Progressing  Note: Prognosis: Fair  Problem List: Decreased strength,Impaired balance,Decreased endurance,Decreased mobility,Decreased safety awareness,Pain  Assessment: Pt seen for PT treatment session with focus on bed mobility, functional transfers, functional mobility  Pt making steady progress toward goals this session  Pt performing all bed mobility and transfers consistently with decreased assist as a result of improving strength  Pt able to ambulate increased distance, this session with encouragement, however, does continue to be limited by SOB and anxious like behaviors  Continue to push pt further distance each session  Pt left upright in bedside chair with all needs in reach  Pt will benefit from skilled therapy in order to address current impairments and functional limitations  PT to follow pt and recommending rehab once medically cleared  The patient's AM-PAC Basic Mobility Inpatient Short Form Raw Score is 15  A Raw score of less than or equal to 16 suggests the patient may benefit from discharge to post-acute rehabilitation services  Please also refer to the recommendation of the Physical Therapist for safe discharge planning  Barriers to Discharge: Inaccessible home environment,Decreased caregiver support        PT Discharge Recommendation: Post acute rehabilitation services          See flowsheet documentation for full assessment

## 2022-02-10 NOTE — PLAN OF CARE
Problem: OCCUPATIONAL THERAPY ADULT  Goal: Performs self-care activities at highest level of function for planned discharge setting  See evaluation for individualized goals  Description: Treatment Interventions: ADL retraining,Functional transfer training,UE strengthening/ROM,Endurance training,Cognitive reorientation,Patient/family training,Equipment evaluation/education,Compensatory technique education,Continued evaluation,Energy conservation,Activityengagement          See flowsheet documentation for full assessment, interventions and recommendations  Outcome: Progressing  Note: Limitation: Decreased ADL status,Decreased UE strength,Decreased Safe judgement during ADL,Decreased cognition,Decreased endurance,Decreased self-care trans,Decreased high-level ADLs  Prognosis: Fair  Assessment: Pt seen on this date for OT session focusing on ADL retraining, body mechanics, transfer retraining, increasing activity tolerance/endurance and EOB sitting to increase ability to participate in ADL/functional tasks  Pt was found in bed and was left sitting EOB w physical therapist present  Pt sits EOB w supervision while completing ADL tasks- UB ADL w min A, LB bathing w max A, LB dressing total A  Returned to supine position for RN to address ostomy bag needs during therapy session  Then, brought EOB and left w PT  Pt w/ improvements in activity tolerance, ADL task completion, transfer ability, however is still limited 2* decreased ADL/High-level ADL status, decreased activity tolerance/endurance, decreased self-care trans, decreased safety awareness and insight to condition  The patient's raw score on the AM-PAC Daily Activity inpatient short form is 15, standardized score is 34 69, less than 39 4  Patients at this level are likely to benefit from discharge to post-acute rehabilitation services  Please refer to the recommendation of the Occupational Therapist for safe discharge planning   Recommending pt D/C to STR when medically stable  Pt will continue to benefit from acute OT services to meet goals  OT Discharge Recommendation: Post acute rehabilitation services  OT - OK to Discharge:  Yes

## 2022-02-11 NOTE — PLAN OF CARE
Problem: Prexisting or High Potential for Compromised Skin Integrity  Goal: Skin integrity is maintained or improved  Description: INTERVENTIONS:  - Identify patients at risk for skin breakdown  - Assess and monitor skin integrity  - Assess and monitor nutrition and hydration status  - Monitor labs   - Assess for incontinence   - Turn and reposition patient  - Assist with mobility/ambulation  - Relieve pressure over bony prominences  - Avoid friction and shearing  - Provide appropriate hygiene as needed including keeping skin clean and dry  - Evaluate need for skin moisturizer/barrier cream  - Collaborate with interdisciplinary team   - Patient/family teaching  - Consider wound care consult   Outcome: Progressing     Problem: MOBILITY - ADULT  Goal: Maintain or return to baseline ADL function  Description: INTERVENTIONS:  -  Assess patient's ability to carry out ADLs; assess patient's baseline for ADL function and identify physical deficits which impact ability to perform ADLs (bathing, care of mouth/teeth, toileting, grooming, dressing, etc )  - Assess/evaluate cause of self-care deficits   - Assess range of motion  - Assess patient's mobility; develop plan if impaired  - Assess patient's need for assistive devices and provide as appropriate  - Encourage maximum independence but intervene and supervise when necessary  - Involve family in performance of ADLs  - Assess for home care needs following discharge   - Consider OT consult to assist with ADL evaluation and planning for discharge  - Provide patient education as appropriate  Outcome: Progressing  Goal: Maintains/Returns to pre admission functional level  Description: INTERVENTIONS:  - Perform BMAT or MOVE assessment daily    - Set and communicate daily mobility goal to care team and patient/family/caregiver  - Collaborate with rehabilitation services on mobility goals if consulted  - Perform Range of Motion 3 times a day    - Reposition patient every 2 hours   - Dangle patient 3 times a day  - Stand patient 3 times a day  - Ambulate patient 3 times a day  - Out of bed to chair 3 times a day   - Out of bed for meals 3 times a day  - Out of bed for toileting  - Record patient progress and toleration of activity level   Outcome: Progressing     Problem: Potential for Falls  Goal: Patient will remain free of falls  Description: INTERVENTIONS:  - Educate patient/family on patient safety including physical limitations  - Instruct patient to call for assistance with activity   - Consult OT/PT to assist with strengthening/mobility   - Keep Call bell within reach  - Keep bed low and locked with side rails adjusted as appropriate  - Keep care items and personal belongings within reach  - Initiate and maintain comfort rounds  - Make Fall Risk Sign visible to staff  - Offer Toileting every 2 Hours, in advance of need  - Initiate/Maintain bed alarm  - Apply yellow socks and bracelet for high fall risk patients  - Consider moving patient to room near nurses station  Outcome: Progressing     Problem: GASTROINTESTINAL - ADULT  Goal: Minimal or absence of nausea and/or vomiting  Description: INTERVENTIONS:  - Administer IV fluids if ordered to ensure adequate hydration  - Maintain NPO status until nausea and vomiting are resolved  - Nasogastric tube if ordered  - Administer ordered antiemetic medications as needed  - Provide nonpharmacologic comfort measures as appropriate  - Advance diet as tolerated, if ordered  - Consider nutrition services referral to assist patient with adequate nutrition and appropriate food choices  Outcome: Progressing  Goal: Maintains or returns to baseline bowel function  Description: INTERVENTIONS:  - Assess bowel function  - Encourage oral fluids to ensure adequate hydration  - Administer IV fluids if ordered to ensure adequate hydration  - Administer ordered medications as needed  - Encourage mobilization and activity  - Consider nutritional services referral to assist patient with adequate nutrition and appropriate food choices  Outcome: Progressing  Goal: Maintains adequate nutritional intake  Description: INTERVENTIONS:  - Monitor percentage of each meal consumed  - Identify factors contributing to decreased intake, treat as appropriate  - Assist with meals as needed  - Monitor I&O, weight, and lab values if indicated  - Obtain nutrition services referral as needed  Outcome: Progressing  Goal: Establish and maintain optimal ostomy function  Description: INTERVENTIONS:  - Assess bowel function  - Encourage oral fluids to ensure adequate hydration  - Administer IV fluids if ordered to ensure adequate hydration   - Administer ordered medications as needed  - Encourage mobilization and activity  - Nutrition services referral to assist patient with appropriate food choices  - Assess stoma site  - Consider wound care consult   Outcome: Progressing  Goal: Oral mucous membranes remain intact  Description: INTERVENTIONS  - Assess oral mucosa and hygiene practices  - Implement preventative oral hygiene regimen  - Implement oral medicated treatments as ordered  - Initiate Nutrition services referral as needed  Outcome: Progressing     Problem: GENITOURINARY - ADULT  Goal: Maintains or returns to baseline urinary function  Description: INTERVENTIONS:  - Assess urinary function  - Encourage oral fluids to ensure adequate hydration if ordered  - Administer IV fluids as ordered to ensure adequate hydration  - Administer ordered medications as needed  - Offer frequent toileting  - Follow urinary retention protocol if ordered  Outcome: Progressing     Problem: SKIN/TISSUE INTEGRITY - ADULT  Goal: Skin Integrity remains intact(Skin Breakdown Prevention)  Description: Assess:  -Perform Surendra assessment every 12  -Clean and moisturize skin every 4  -Inspect skin when repositioning, toileting, and assisting with ADLS  -Assess extremities for adequate circulation and sensation Bed Management:  -Have minimal linens on bed & keep smooth, unwrinkled  -Change linens as needed when moist or perspiring  -Avoid sitting or lying in one position for more than 2 hours while in bed  -Keep HOB at 30degrees     Toileting:  -Offer bedside commode  -Assess for incontinence every 2    Activity:  -Mobilize patient 3 times a day  -Encourage activity and walks on unit  -Encourage or provide ROM exercises   -Turn and reposition patient every 2 Hours  -Use appropriate equipment to lift or move patient in bed  -Instruct/ Assist with weight shifting every 2 hour when out of bed in chair  -Consider limitation of chair time 8 hour intervals    Skin Care:  -Avoid use of baby powder, tape, friction and shearing, hot water or constrictive clothing  -Relieve pressure over bony prominences using allevyns  -Do not massage red bony areas  Outcome: Progressing  Goal: Incision(s), wounds(s) or drain site(s) healing without S/S of infection  Description: INTERVENTIONS  - Assess and document dressing, incision, wound bed, drain sites and surrounding tissue  - Provide patient and family education  - Perform skin care/dressing changes as needed  Outcome: Progressing  Goal: Pressure injury heals and does not worsen  Description: Interventions:  - Implement low air loss mattress or specialty surface (Criteria met)  - Apply silicone foam dressing  - Instruct/assist with weight shifting every 120 minutes when in chair   - Limit chair time to 8 hour intervals  - Use special pressure reducing interventions such as allevyns when in chair   - Apply fecal or urinary incontinence containment device   - Perform passive or active ROM every 4  - Turn and reposition patient & offload bony prominences every 2 hours   - Utilize friction reducing device or surface for transfers   - Consider nutrition services referral as needed  Outcome: Progressing     Problem: PAIN - ADULT  Goal: Verbalizes/displays adequate comfort level or baseline comfort level  Description: Interventions:  - Encourage patient to monitor pain and request assistance  - Assess pain using appropriate pain scale  - Administer analgesics based on type and severity of pain and evaluate response  - Implement non-pharmacological measures as appropriate and evaluate response  - Consider cultural and social influences on pain and pain management  - Notify physician/advanced practitioner if interventions unsuccessful or patient reports new pain  Outcome: Progressing     Problem: INFECTION - ADULT  Goal: Absence or prevention of progression during hospitalization  Description: INTERVENTIONS:  - Assess and monitor for signs and symptoms of infection  - Monitor lab/diagnostic results  - Monitor all insertion sites, i e  indwelling lines, tubes, and drains  - Monitor endotracheal if appropriate and nasal secretions for changes in amount and color  - [place] appropriate cooling/warming therapies per order  - Administer medications as ordered  - Instruct and encourage patient and family to use good hand hygiene technique  - Identify and instruct in appropriate isolation precautions for identified infection/condition  Outcome: Progressing     Problem: SAFETY ADULT  Goal: Maintain or return to baseline ADL function  Description: INTERVENTIONS:  -  Assess patient's ability to carry out ADLs; assess patient's baseline for ADL function and identify physical deficits which impact ability to perform ADLs (bathing, care of mouth/teeth, toileting, grooming, dressing, etc )  - Assess/evaluate cause of self-care deficits   - Assess range of motion  - Assess patient's mobility; develop plan if impaired  - Assess patient's need for assistive devices and provide as appropriate  - Encourage maximum independence but intervene and supervise when necessary  - Involve family in performance of ADLs  - Assess for home care needs following discharge   - Consider OT consult to assist with ADL evaluation and planning for discharge  - Provide patient education as appropriate  Outcome: Progressing  Goal: Maintains/Returns to pre admission functional level  Description: INTERVENTIONS:  - Perform BMAT or MOVE assessment daily    - Set and communicate daily mobility goal to care team and patient/family/caregiver  - Collaborate with rehabilitation services on mobility goals if consulted  - Perform Range of Motion 3 times a day  - Reposition patient every 2 hours    - Dangle patient 3 times a day  - Stand patient 3 times a day  - Ambulate patient 3 times a day  - Out of bed to chair 3 times a day   - Out of bed for meals 3 times a day  - Out of bed for toileting  - Record patient progress and toleration of activity level   Outcome: Progressing  Goal: Patient will remain free of falls  Description: INTERVENTIONS:  - Educate patient/family on patient safety including physical limitations  - Instruct patient to call for assistance with activity   - Consult OT/PT to assist with strengthening/mobility   - Keep Call bell within reach  - Keep bed low and locked with side rails adjusted as appropriate  - Keep care items and personal belongings within reach  - Initiate and maintain comfort rounds  - Make Fall Risk Sign visible to staff  - Offer Toileting every 2 Hours, in advance of need  - Initiate/Maintain bed alarm  - Apply yellow socks and bracelet for high fall risk patients  - Consider moving patient to room near nurses station  Outcome: Progressing     Problem: DISCHARGE PLANNING  Goal: Discharge to home or other facility with appropriate resources  Description: INTERVENTIONS:  - Identify barriers to discharge w/patient and caregiver  - Arrange for needed discharge resources and transportation as appropriate  - Identify discharge learning needs (meds, wound care, etc )  - Arrange for interpretive services to assist at discharge as needed  - Refer to Case Management Department for coordinating discharge planning if the patient needs post-hospital services based on physician/advanced practitioner order or complex needs related to functional status, cognitive ability, or social support system  Outcome: Progressing     Problem: Knowledge Deficit  Goal: Patient/family/caregiver demonstrates understanding of disease process, treatment plan, medications, and discharge instructions  Description: Complete learning assessment and assess knowledge base  Interventions:  - Provide teaching at level of understanding  - Provide teaching via preferred learning methods  Outcome: Progressing     Problem: Nutrition/Hydration-ADULT  Goal: Nutrient/Hydration intake appropriate for improving, restoring or maintaining nutritional needs  Description: Monitor and assess patient's nutrition/hydration status for malnutrition  Collaborate with interdisciplinary team and initiate plan and interventions as ordered  Monitor patient's weight and dietary intake as ordered or per policy  Utilize nutrition screening tool and intervene as necessary  Determine patient's food preferences and provide high-protein, high-caloric foods as appropriate       INTERVENTIONS:  - Monitor oral intake, urinary output, labs, and treatment plans  - Assess nutrition and hydration status and recommend course of action  - Evaluate amount of meals eaten  - Assist patient with eating if necessary   - Allow adequate time for meals  - Recommend/ encourage appropriate diets, oral nutritional supplements, and vitamin/mineral supplements  - Order, calculate, and assess calorie counts as needed  - Recommend, monitor, and adjust tube feedings and TPN/PPN based on assessed needs  - Assess need for intravenous fluids  - Provide specific nutrition/hydration education as appropriate  - Include patient/family/caregiver in decisions related to nutrition  Outcome: Progressing     Problem: CHANGE IN BODY IMAGE  Goal: Pt/Family communicate acceptance of loss or change in body image and expresses psychological comfort and peace  Description: INTERVENTIONS:  - Assess patient/family anxiety and grief process related to change in body image, loss of functional status and loss of sense of self  - Assess patient/family's coping skills and provide emotional, spiritual and psychosocial support  - Provide information about the patient's health status with consideration of family and cultural values  - Communicate willingness to discuss loss and facilitate grief process with patient/family as appropriate  - Emphasize sustaining relationships within family system and community, or farzaneh/spiritual traditions  - Refer to community support groups as appropriate  - Initiate Spiritual Care, Pastoral care or other ancillary consults as needed  Outcome: Progressing

## 2022-02-11 NOTE — UTILIZATION REVIEW
Continued Stay Review    Date: 02/11/2022                         Current Patient Class: Inpatient  Current Level of Care: Med Surg    HPI:56 y o  female initially admitted on 01/16/2022    Assessment/Plan: No acute events overnight, continues to tolerate diet without nausea or vomiting, continues to have colostomy function  Afebrile  Cont to mon out and character of R ADIA drain, rectal malecot and ostomy  Cont pain and nausea control  Cont ostomy care and teaching  Encourage ambulation  IS, SQ Heparin  Cont PT/OT  Therapy's recommendation is post acute rehab  Case Management actively working for IP rehab placement       Vital Signs: /75   Pulse 95   Temp 98 4 °F (36 9 °C)   Resp 17   Ht 5' 7" (1 702 m)   Wt (!) 154 kg (339 lb 11 7 oz)   SpO2 97%   BMI 53 21 kg/m²       Pertinent Labs/Diagnostic Results:       Results from last 7 days   Lab Units 02/09/22  0512   WBC Thousand/uL 11 36*   HEMOGLOBIN g/dL 10 0*   HEMATOCRIT % 33 6*   PLATELETS Thousands/uL 244         Results from last 7 days   Lab Units 02/09/22  0512   SODIUM mmol/L 135*   POTASSIUM mmol/L 3 5   CHLORIDE mmol/L 102   CO2 mmol/L 25   ANION GAP mmol/L 8   BUN mg/dL 18   CREATININE mg/dL 1 16   EGFR ml/min/1 73sq m 52   CALCIUM mg/dL 8 8   MAGNESIUM mg/dL 1 8             Results from last 7 days   Lab Units 02/09/22  0512   GLUCOSE RANDOM mg/dL 107             Medications:   Scheduled Medications:  docusate sodium, 100 mg, Oral, BID  heparin (porcine), 7,500 Units, Subcutaneous, Q8H [address]  melatonin, 3 mg, Oral, HS  CAROLINE ANTIFUNGAL, , Topical, BID  nystatin, 500,000 Units, Swish & Swallow, 4x Daily  nystatin, , Topical, BID  ondansetron, 4 mg, Oral, Q6H IRIS  pantoprazole, 40 mg, Oral, BID AC  senna, 1 tablet, Oral, BID      Continuous IV Infusions: none     PRN Meds:  acetaminophen, 975 mg, Oral, Q6H PRN  albuterol, 2 puff, Inhalation, Q4H PRN  diazepam, 2 mg, Oral, Q8H PRN  Labetalol HCl, 10 mg, Intravenous, Q4H PRN  oxyCODONE, 10 mg, Oral, Q4H PRN 02/11 x 2  oxyCODONE, 5 mg, Oral, Q4H PRN  saliva substitute, 5 spray, Mouth/Throat, 4x Daily PRN        Discharge Plan: D    Network Utilization Review Department  ATTENTION: Please call with any questions or concerns to [phone] and carefully listen to the prompts so that you are directed to the right person  All voicemails are confidential   Bryant Snyder all requests for admission clinical reviews, approved or denied determinations and any other requests to dedicated fax number below belonging to the campus where the patient is receiving treatment   List of dedicated fax numbers for the Facilities:  [address] DENIALS (Administrative/Medical Necessity) [phone]   [address] (Maternity/NICU/Pediatrics) [phone]   [address]  [address] [place] [address] [address] [place] [address] [address] [place] [address] [phone]

## 2022-02-11 NOTE — PROGRESS NOTES
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, s/p Ortega's procedure on 1/17     Vitals stable, afebrile  No labs today     UOP 500  RLQ ADIA 0cc recorded  Malecot 0cc recorded  Ostomy 100 recorded    Plan:  - regular diet  - R ADIA drain, monitor output and character  - Rectal malecot, monitor output and character  - ostomy, monitor output and character  - pain and nausea control prn  - SQH  - encourage ambulation  - IS  - placement per CM, pt has been medically stable for d/c and awaiting placement    Subjective/Objective   Subjective:   No acute events overnight, continues to tolerate diet without nausea or vomiting, continues to have colostomy function, ambulating with PT recommend post acute rehab, awaiting placement and medically cleared for discharge  Objective:     Blood pressure 131/79, pulse 98, temperature 98 6 °F (37 °C), resp  rate 18, height 5' 7" (1 702 m), weight (!) 155 kg (341 lb 14 9 oz), SpO2 96 %  ,Body mass index is 53 55 kg/m²        Intake/Output Summary (Last 24 hours) at 2/11/2022 0541  Last data filed at 2/10/2022 1940  Gross per 24 hour   Intake 720 ml   Output 600 ml   Net 120 ml       Invasive Devices  Report    Peripherally Inserted Central Catheter Line            PICC Line 07/71/91 Left Basilic 16 days          Drain            Closed/Suction Drain RLQ Bulb 19 Fr  25 days    Colostomy Other (comment) LUQ 25 days    Rectal Tube Without balloon 25 days    External Urinary Catheter Medium 14 days                Physical Exam:   General: NAD  Skin: Warm, dry, anicteric  HEENT: Normocephalic, atraumatic  CV: RRR, no m/r/g  Pulm: CTA b/l, no inc WOB  Abd: Soft, ND/NT, colostomy functional with stool output present  MSK: Symmetric, no edema, no tenderness, no deformity  Neuro: AOx3, GCS 15    Lab, Imaging and other studies:I have personally reviewed pertinent lab results    , CBC: No results found for: WBC, HGB, HCT, MCV, PLT, ADJUSTEDWBC, MCH, MCHC, RDW, MPV, NRBC, CMP: No results found for: SODIUM, K, CL, CO2, ANIONGAP, BUN, CREATININE, GLUCOSE, CALCIUM, AST, ALT, ALKPHOS, PROT, BILITOT, EGFR  VTE Pharmacologic Prophylaxis: Sequential compression device (Venodyne)  and Heparin  VTE Mechanical Prophylaxis: sequential compression device

## 2022-02-11 NOTE — QUICK NOTE
Met with Madeline Ross from Case Management  At this point in time there is no accepting facilities for this patient

## 2022-02-11 NOTE — RESTORATIVE TECHNICIAN NOTE
Restorative Technician Note      Patient Name: Osvaldo Barriga     Note Type: Mobility  Patient Position Upon Consult: Supine  Activity Performed: Ambulated; Transferred; Stood; Dangled  Assistive Device: Roller walker  Patient Position at [employer] of Consult: Bedside chair;  All needs within reach

## 2022-02-12 NOTE — PROGRESS NOTES
Progress Note - General Surgery   Nikole Crowe 60 y o  female MRN: [id number]  Unit/Bed#: [facility] 511-01 Encounter: [id number]    Assessment:  61 y/o F w/ morbid obesity, p/w perforated colon adenocarcinoma, s/p Ortega's procedure on 1/17    Plan:  --COVID vaccine administered yesterday  --Regular diet  --Continue rectal tube   --Continue RLQ ADIA drain   --Continue ostomy care  --Local wound care  --CM assistance with SNF referrals    Subjective/Objective     Subjective:     No acute events overnight  This morning, pt feels well, denies any complaints  Objective:     Blood pressure 133/77, pulse 103, temperature (!) 97 3 °F (36 3 °C), resp  rate 18, height 5' 7" (1 702 m), weight (!) 154 kg (340 lb 2 7 oz), SpO2 96 %  ,Body mass index is 53 28 kg/m²  Intake/Output Summary (Last 24 hours) at 2/12/2022 1435  Last data filed at 2/12/2022 0955  Gross per 24 hour   Intake 0 ml   Output 1235 ml   Net -1235 ml       Invasive Devices  Report    Peripherally Inserted Central Catheter Line            PICC Line 67/54/38 Left Basilic 18 days          Drain            Closed/Suction Drain RLQ Bulb 19 Fr  26 days    Colostomy Other (comment) LUQ 26 days    Rectal Tube Without balloon 26 days                Physical Exam:     GEN: NAD  HEENT: MMM  CV: RRR  Lung: normal effort  Ab: Soft, NT/ND, ostomy viable with stool in bag, incisions c/d/i  Extrem: No CCE  Neuro:  A+Ox3, motor and sensation grossly intact    Lab, Imaging and other studies:CBC: No results found for: WBC, HGB, HCT, MCV, PLT, ADJUSTEDWBC, MCH, MCHC, RDW, MPV, NRBC, CMP: No results found for: SODIUM, K, CL, CO2, ANIONGAP, BUN, CREATININE, GLUCOSE, CALCIUM, AST, ALT, ALKPHOS, PROT, BILITOT, EGFR, Coagulation: No results found for: PT, INR, APTT, Urinalysis: No results found for: COLORU, CLARITYU, SPECGRAV, PHUR, LEUKOCYTESUR, NITRITE, PROTEINUA, GLUCOSEU, KETONESU, BILIRUBINUR, BLOODU  VTE Pharmacologic Prophylaxis: Heparin  VTE Mechanical Prophylaxis: sequential compression device

## 2022-02-12 NOTE — CASE MANAGEMENT
Case Management Discharge Planning Note    [facility]  Location [address]/[facility] 972-80 MRN [id number]  : 1965 Date 2022       Current Admission Date: 2022  Current Admission Diagnosis:Diverticulitis of large intestine with perforation   Patient Active Problem List    Diagnosis Date Noted    Dysphagia 2022    Acute renal failure (ARF) ([facility] ) 2022    Cellulitis of abdominal wall, chest wall and groin with wounds 2022    Diverticulitis of large intestine with perforation 2022    Right Hydroureteronephrosis 2022    Subacute vaginitis 2022    GERD (gastroesophageal reflux disease)     Other specified anxiety disorders 2019    Lumbar paraspinal muscle spasm 2019    Borderline hyperlipidemia 2019    Iron deficiency 2019    Chronic midline low back pain with left-sided sciatica 2019    Primary hypertension 2019    Morbid obesity with BMI of 60 0-69 9, adult ([facility] ) 2019    Hidradenitis suppurativa 2019    Lymphedema 2006      LOS (days): 27  Geometric Mean LOS (GMLOS) (days):   Days to GMLOS:     OBJECTIVE:  Risk of Unplanned Readmission Score: 21         Current admission status: Inpatient   Preferred Pharmacy:   [place] [address] [place] [address] [address]/ Josiah [address]ons  [facility] [address]  Phone: [phone] Fax: [phone]    Primary Care Provider: Katelyn Pandya PA-C    Primary Insurance: Eneida Ríos MA [facility]  Secondary Insurance:     DISCHARGE DETAILS:    Discharge planning discussed with[de-identified] Pt       Additional Comments: Pt agreeable to COVID vaccination  Provider was in contact with pharmacist and pt to receive 1st dose today   Cm will continue to follow for d/c planning

## 2022-02-12 NOTE — PROGRESS NOTES
Progress Note - Quan Moore 60 y o  female MRN: [id number]    Unit/Bed#: [facility] 511-01 Encounter: [id number]      Assessment:  59 y/o F w morbid obesity and sigmoid ca complicated by perforation, s/p Ortega's procedure on 1/17    Avss  abd soft, nontender, non distended  RLQ ADIA no output  Rectal tube 75 cc seropurulent    Plan:  Administer covid vaccine and continue to work on dispo to SNF  Regular diet  continue rectal tube  Continue RLQ ADIA drain  Local wound care midline incision  Continue ostomy care    Subjective:   Feels well other than intermittent anxiety  She is agreeable to covid vaccine  Explained that this is important for her discharge to snf  Otherwise Denied fever, chills, chest pain, shortness of breath, nausea, vomiting, or abdominal pain this morning  Objective:     Vitals: Blood pressure 127/78, pulse 103, temperature 99 7 °F (37 6 °C), resp  rate 17, height 5' 7" (1 702 m), weight (!) 154 kg (339 lb 11 7 oz), SpO2 95 %  ,Body mass index is 53 21 kg/m²  Intake/Output Summary (Last 24 hours) at 2/12/2022 0001  Last data filed at 2/11/2022 2326  Gross per 24 hour   Intake 0 ml   Output 1427 ml   Net -1427 ml       Physical Exam  General: NAD  HEENT: NC/AT  MMM  Cv: RRR  Lungs: normal effort  Ab: Soft, NT/ND  Rectal tube 75 cc seropurulent  ADIA serous  Ex: no CCE  Neuro: AAOx3    Invasive Devices  Report    Peripherally Inserted Central Catheter Line            PICC Line 16/10/79 Left Basilic 17 days          Drain            Closed/Suction Drain RLQ Bulb 19 Fr  26 days    Rectal Tube Without balloon 26 days    Colostomy Other (comment) LUQ 25 days                Lab, Imaging and other studies: I have personally reviewed pertinent reports      VTE Pharmacologic Prophylaxis: Sequential compression device (Venodyne)   VTE Mechanical Prophylaxis: sequential compression device

## 2022-02-13 NOTE — PROGRESS NOTES
Progress Note - Ted Lozoya 60 y o  female MRN: [id number]    Unit/Bed#: [facility] 511-01 Encounter: [id number]      Assessment:  61 y/o F w/ morbid obesity, p/w perforated colon adenocarcinoma, s/p Ortega's procedure on 1/17    Avss  abd soft, nontender, non distended  RLQ ADIA scant output output  Rectal tube no recorded output today, but approximately 50 cc seropurulent output in appliance  Plan:  Regular diet  Ambulate  Continue RLQ ADIA until discharge  Continue rectal malecot until discharge  Continue local wound care to midline  Ostomy care per protocol    Subjective:   Denied fever, chills, chest pain, shortness of breath, nausea, vomiting, or abdominal pain this morning  Objective:     Vitals: Blood pressure 152/98, pulse 101, temperature 98 6 °F (37 °C), temperature source Oral, resp  rate 19, height 5' 7" (1 702 m), weight (!) 159 kg (351 lb 6 4 oz), SpO2 95 %  ,Body mass index is 55 04 kg/m²  Intake/Output Summary (Last 24 hours) at 2/13/2022 1454  Last data filed at 2/13/2022 0611  Gross per 24 hour   Intake 2600 ml   Output 520 ml   Net 2080 ml     Physical Exam  General: NAD  HEENT: NC/AT  MMM  Cv: RRR     Lungs: normal effort  Ab: Soft, NT/ND  Ex: no CCE  Neuro: AAOx3    Scheduled Meds:  Current Facility-Administered Medications   Medication Dose Route Frequency Provider Last Rate    acetaminophen  975 mg Oral Q6H PRN Minda Hernandez MD      albuterol  2 puff Inhalation Q4H PRN Raul Araya DO      diazepam  2 mg Oral Q8H PRN Anselmo Alvarenga PA-C      docusate sodium  100 mg Oral BID Yuri Szymanski MD      gabapentin  300 mg Oral TID Yuri Szymanski MD      heparin (porcine)  7,500 Units Subcutaneous [facility]iel, [address]      Labetalol HCl  10 mg Intravenous Q4H PRN MIRTA Nath      melatonin  3 mg Oral HS MIRTA Edouard      methocarbamol  500 mg Oral Q6H MD Tisha Burton ANTIFUNGAL   Topical BID MIRTA Barnes      nystatin  500,000 Units Swish & Swallow 4x Daily Ulysees MIRTA Obrien      nystatin   Topical BID Ulysees MIRTA Obrien      ondansetron  4 mg Oral Q6H [facility] Zachary Buchanan MD      oxyCODONE  10 mg Oral Q4H PRN Camron Foreman MD      oxyCODONE  5 mg Oral Q4H PRN Camron Foreman MD      pantoprazole  40 mg Oral BID AC Cedric Horton,       saliva substitute  5 spray Mouth/Throat 4x Daily PRN MIRTA Guzmán      senna  1 tablet Oral BID Jaz Song MD       Continuous Infusions:   PRN Meds:   acetaminophen    albuterol    diazepam    Labetalol HCl    oxyCODONE    oxyCODONE    saliva substitute      Invasive Devices  Report    Peripherally Inserted Central Catheter Line            PICC Line 34/59/17 Left Basilic 19 days          Drain            Closed/Suction Drain RLQ Bulb 19 Fr  27 days    Colostomy Other (comment) LUQ 27 days    Rectal Tube Without balloon 27 days                Lab, Imaging and other studies: I have personally reviewed pertinent reports      VTE Pharmacologic Prophylaxis: Sequential compression device (Venodyne)   VTE Mechanical Prophylaxis: sequential compression device

## 2022-02-13 NOTE — PLAN OF CARE
Problem: Prexisting or High Potential for Compromised Skin Integrity  Goal: Skin integrity is maintained or improved  Description: INTERVENTIONS:  - Identify patients at risk for skin breakdown  - Assess and monitor skin integrity  - Assess and monitor nutrition and hydration status  - Monitor labs   - Assess for incontinence   - Turn and reposition patient  - Assist with mobility/ambulation  - Relieve pressure over bony prominences  - Avoid friction and shearing  - Provide appropriate hygiene as needed including keeping skin clean and dry  - Evaluate need for skin moisturizer/barrier cream  - Collaborate with interdisciplinary team   - Patient/family teaching  - Consider wound care consult   Outcome: Progressing     Problem: MOBILITY - ADULT  Goal: Maintain or return to baseline ADL function  Description: INTERVENTIONS:  -  Assess patient's ability to carry out ADLs; assess patient's baseline for ADL function and identify physical deficits which impact ability to perform ADLs (bathing, care of mouth/teeth, toileting, grooming, dressing, etc )  - Assess/evaluate cause of self-care deficits   - Assess range of motion  - Assess patient's mobility; develop plan if impaired  - Assess patient's need for assistive devices and provide as appropriate  - Encourage maximum independence but intervene and supervise when necessary  - Involve family in performance of ADLs  - Assess for home care needs following discharge   - Consider OT consult to assist with ADL evaluation and planning for discharge  - Provide patient education as appropriate  Outcome: Progressing  Goal: Maintains/Returns to pre admission functional level  Description: INTERVENTIONS:  - Perform BMAT or MOVE assessment daily    - Set and communicate daily mobility goal to care team and patient/family/caregiver  - Collaborate with rehabilitation services on mobility goals if consulted  - Perform Range of Motion 3 times a day    - Reposition patient every 2 hours   - Dangle patient 3 times a day  - Stand patient 3 times a day  - Ambulate patient 3 times a day  - Out of bed to chair 3 times a day   - Out of bed for meals 3 times a day  - Out of bed for toileting  - Record patient progress and toleration of activity level   Outcome: Progressing     Problem: Potential for Falls  Goal: Patient will remain free of falls  Description: INTERVENTIONS:  - Educate patient/family on patient safety including physical limitations  - Instruct patient to call for assistance with activity   - Consult OT/PT to assist with strengthening/mobility   - Keep Call bell within reach  - Keep bed low and locked with side rails adjusted as appropriate  - Keep care items and personal belongings within reach  - Initiate and maintain comfort rounds  - Make Fall Risk Sign visible to staff  - Offer Toileting every 2 Hours, in advance of need  - Initiate/Maintain bed alarm  - Apply yellow socks and bracelet for high fall risk patients  - Consider moving patient to room near nurses station  Outcome: Progressing     Problem: GASTROINTESTINAL - ADULT  Goal: Minimal or absence of nausea and/or vomiting  Description: INTERVENTIONS:  - Administer IV fluids if ordered to ensure adequate hydration  - Maintain NPO status until nausea and vomiting are resolved  - Nasogastric tube if ordered  - Administer ordered antiemetic medications as needed  - Provide nonpharmacologic comfort measures as appropriate  - Advance diet as tolerated, if ordered  - Consider nutrition services referral to assist patient with adequate nutrition and appropriate food choices  Outcome: Progressing  Goal: Maintains or returns to baseline bowel function  Description: INTERVENTIONS:  - Assess bowel function  - Encourage oral fluids to ensure adequate hydration  - Administer IV fluids if ordered to ensure adequate hydration  - Administer ordered medications as needed  - Encourage mobilization and activity  - Consider nutritional services referral to assist patient with adequate nutrition and appropriate food choices  Outcome: Progressing  Goal: Maintains adequate nutritional intake  Description: INTERVENTIONS:  - Monitor percentage of each meal consumed  - Identify factors contributing to decreased intake, treat as appropriate  - Assist with meals as needed  - Monitor I&O, weight, and lab values if indicated  - Obtain nutrition services referral as needed  Outcome: Progressing  Goal: Establish and maintain optimal ostomy function  Description: INTERVENTIONS:  - Assess bowel function  - Encourage oral fluids to ensure adequate hydration  - Administer IV fluids if ordered to ensure adequate hydration   - Administer ordered medications as needed  - Encourage mobilization and activity  - Nutrition services referral to assist patient with appropriate food choices  - Assess stoma site  - Consider wound care consult   Outcome: Progressing  Goal: Oral mucous membranes remain intact  Description: INTERVENTIONS  - Assess oral mucosa and hygiene practices  - Implement preventative oral hygiene regimen  - Implement oral medicated treatments as ordered  - Initiate Nutrition services referral as needed  Outcome: Progressing     Problem: GENITOURINARY - ADULT  Goal: Maintains or returns to baseline urinary function  Description: INTERVENTIONS:  - Assess urinary function  - Encourage oral fluids to ensure adequate hydration if ordered  - Administer IV fluids as ordered to ensure adequate hydration  - Administer ordered medications as needed  - Offer frequent toileting  - Follow urinary retention protocol if ordered  Outcome: Progressing     Problem: SKIN/TISSUE INTEGRITY - ADULT  Goal: Skin Integrity remains intact(Skin Breakdown Prevention)  Description: Assess:  -Perform Surendra assessment every 12  -Clean and moisturize skin every 4  -Inspect skin when repositioning, toileting, and assisting with ADLS  -Assess extremities for adequate circulation and sensation Bed Management:  -Have minimal linens on bed & keep smooth, unwrinkled  -Change linens as needed when moist or perspiring  -Avoid sitting or lying in one position for more than 2 hours while in bed  -Keep HOB at 30degrees     Toileting:  -Offer bedside commode  -Assess for incontinence every 2    Activity:  -Mobilize patient 3 times a day  -Encourage activity and walks on unit  -Encourage or provide ROM exercises   -Turn and reposition patient every 2 Hours  -Use appropriate equipment to lift or move patient in bed  -Instruct/ Assist with weight shifting every 2 hour when out of bed in chair  -Consider limitation of chair time 8 hour intervals    Skin Care:  -Avoid use of baby powder, tape, friction and shearing, hot water or constrictive clothing  -Relieve pressure over bony prominences using allevyns  -Do not massage red bony areas  Outcome: Progressing  Goal: Incision(s), wounds(s) or drain site(s) healing without S/S of infection  Description: INTERVENTIONS  - Assess and document dressing, incision, wound bed, drain sites and surrounding tissue  - Provide patient and family education  - Perform skin care/dressing changes as needed  Outcome: Progressing  Goal: Pressure injury heals and does not worsen  Description: Interventions:  - Implement low air loss mattress or specialty surface (Criteria met)  - Apply silicone foam dressing  - Instruct/assist with weight shifting every 120 minutes when in chair   - Limit chair time to 8 hour intervals  - Use special pressure reducing interventions such as allevyns when in chair   - Apply fecal or urinary incontinence containment device   - Perform passive or active ROM every 4  - Turn and reposition patient & offload bony prominences every 2 hours   - Utilize friction reducing device or surface for transfers   - Consider nutrition services referral as needed  Outcome: Progressing     Problem: PAIN - ADULT  Goal: Verbalizes/displays adequate comfort level or baseline comfort level  Description: Interventions:  - Encourage patient to monitor pain and request assistance  - Assess pain using appropriate pain scale  - Administer analgesics based on type and severity of pain and evaluate response  - Implement non-pharmacological measures as appropriate and evaluate response  - Consider cultural and social influences on pain and pain management  - Notify physician/advanced practitioner if interventions unsuccessful or patient reports new pain  Outcome: Progressing     Problem: INFECTION - ADULT  Goal: Absence or prevention of progression during hospitalization  Description: INTERVENTIONS:  - Assess and monitor for signs and symptoms of infection  - Monitor lab/diagnostic results  - Monitor all insertion sites, i e  indwelling lines, tubes, and drains  - Monitor endotracheal if appropriate and nasal secretions for changes in amount and color  - [place] appropriate cooling/warming therapies per order  - Administer medications as ordered  - Instruct and encourage patient and family to use good hand hygiene technique  - Identify and instruct in appropriate isolation precautions for identified infection/condition  Outcome: Progressing     Problem: SAFETY ADULT  Goal: Maintain or return to baseline ADL function  Description: INTERVENTIONS:  -  Assess patient's ability to carry out ADLs; assess patient's baseline for ADL function and identify physical deficits which impact ability to perform ADLs (bathing, care of mouth/teeth, toileting, grooming, dressing, etc )  - Assess/evaluate cause of self-care deficits   - Assess range of motion  - Assess patient's mobility; develop plan if impaired  - Assess patient's need for assistive devices and provide as appropriate  - Encourage maximum independence but intervene and supervise when necessary  - Involve family in performance of ADLs  - Assess for home care needs following discharge   - Consider OT consult to assist with ADL evaluation and planning for discharge  - Provide patient education as appropriate  Outcome: Progressing  Goal: Maintains/Returns to pre admission functional level  Description: INTERVENTIONS:  - Perform BMAT or MOVE assessment daily    - Set and communicate daily mobility goal to care team and patient/family/caregiver  - Collaborate with rehabilitation services on mobility goals if consulted  - Perform Range of Motion 3 times a day  - Reposition patient every 2 hours    - Dangle patient 3 times a day  - Stand patient 3 times a day  - Ambulate patient 3 times a day  - Out of bed to chair 3 times a day   - Out of bed for meals 3 times a day  - Out of bed for toileting  - Record patient progress and toleration of activity level   Outcome: Progressing  Goal: Patient will remain free of falls  Description: INTERVENTIONS:  - Educate patient/family on patient safety including physical limitations  - Instruct patient to call for assistance with activity   - Consult OT/PT to assist with strengthening/mobility   - Keep Call bell within reach  - Keep bed low and locked with side rails adjusted as appropriate  - Keep care items and personal belongings within reach  - Initiate and maintain comfort rounds  - Make Fall Risk Sign visible to staff  - Offer Toileting every 2 Hours, in advance of need  - Initiate/Maintain bed alarm  - Apply yellow socks and bracelet for high fall risk patients  - Consider moving patient to room near nurses station  Outcome: Progressing     Problem: DISCHARGE PLANNING  Goal: Discharge to home or other facility with appropriate resources  Description: INTERVENTIONS:  - Identify barriers to discharge w/patient and caregiver  - Arrange for needed discharge resources and transportation as appropriate  - Identify discharge learning needs (meds, wound care, etc )  - Arrange for interpretive services to assist at discharge as needed  - Refer to Case Management Department for coordinating discharge planning if the patient needs post-hospital services based on physician/advanced practitioner order or complex needs related to functional status, cognitive ability, or social support system  Outcome: Progressing     Problem: Knowledge Deficit  Goal: Patient/family/caregiver demonstrates understanding of disease process, treatment plan, medications, and discharge instructions  Description: Complete learning assessment and assess knowledge base  Interventions:  - Provide teaching at level of understanding  - Provide teaching via preferred learning methods  Outcome: Progressing     Problem: Nutrition/Hydration-ADULT  Goal: Nutrient/Hydration intake appropriate for improving, restoring or maintaining nutritional needs  Description: Monitor and assess patient's nutrition/hydration status for malnutrition  Collaborate with interdisciplinary team and initiate plan and interventions as ordered  Monitor patient's weight and dietary intake as ordered or per policy  Utilize nutrition screening tool and intervene as necessary  Determine patient's food preferences and provide high-protein, high-caloric foods as appropriate       INTERVENTIONS:  - Monitor oral intake, urinary output, labs, and treatment plans  - Assess nutrition and hydration status and recommend course of action  - Evaluate amount of meals eaten  - Assist patient with eating if necessary   - Allow adequate time for meals  - Recommend/ encourage appropriate diets, oral nutritional supplements, and vitamin/mineral supplements  - Order, calculate, and assess calorie counts as needed  - Recommend, monitor, and adjust tube feedings and TPN/PPN based on assessed needs  - Assess need for intravenous fluids  - Provide specific nutrition/hydration education as appropriate  - Include patient/family/caregiver in decisions related to nutrition  Outcome: Progressing     Problem: CHANGE IN BODY IMAGE  Goal: Pt/Family communicate acceptance of loss or change in body image and expresses psychological comfort and peace  Description: INTERVENTIONS:  - Assess patient/family anxiety and grief process related to change in body image, loss of functional status and loss of sense of self  - Assess patient/family's coping skills and provide emotional, spiritual and psychosocial support  - Provide information about the patient's health status with consideration of family and cultural values  - Communicate willingness to discuss loss and facilitate grief process with patient/family as appropriate  - Emphasize sustaining relationships within family system and community, or farzaneh/spiritual traditions  - Refer to community support groups as appropriate  - Initiate Spiritual Care, Pastoral care or other ancillary consults as needed  Outcome: Progressing

## 2022-02-14 NOTE — RESTORATIVE TECHNICIAN NOTE
Restorative Technician Note      Patient Name: Lisa Epperson     Note Type: Mobility  Patient Position Upon Consult: Bedside chair  Activity Performed: Ambulated  Assistive Device: Roller walker

## 2022-02-14 NOTE — QUICK NOTE
Nurse-Patient-Provider rounds were completed with the patient's nurse today, Gayatri  We discussed the plan is to continue diet as tolerated  Continue to encourage working with therapy and be out of bed throughout the day  Remaining right lower quadrant ADIA drain removed without difficulty and dry gauze dressing placed over prior drain site  Rectal drain (malecot drain) suture has pulled free and drain has migrated outward, but remains partially intact  At this time, we will plan to leave drain as is; do not plan to reinsert drain at bedside over concern for possibly disrupting healing process of rectal stump  Plan to discuss further management of rectal drain with the team later today       We reviewed all of the invasive devices/lines/telemetry orders   - None  DVT Prophylaxis:  - Subcutaneous heparin and SCDs  Pain Assessment / Plan:  - Continue current analgesic regimen  Mobility Assessment / Plan:  - Activity as tolerated  - Continue PT and OT evaluation and treatment as indicated  Goals / Barriers for discharge:  - Patient remains appropriate for discharge to post acute care facility for rehab when placement is available  - Case management following; case and discharge needs discussed  All questions and concerns were addressed  I spent greater than 30 minutes reviewing the plan with the patient and the nurse, and coordinating care for the day      Amelia Rosario PA-C  2/14/2022 11:04 AM

## 2022-02-15 NOTE — UTILIZATION REVIEW
Continued Stay Review  Date: 2/15/22 Tuesday                      Current Patient Class:Inpatient     Current Level of Care: Acute Med Surg since 1/30/220    HPI:  59 y/o female initially admitted Inpatient to [place] [address] 1/16/22 inpatient as a transfer from [facility] due to perforated diverticulitis complicated by right hydronephrosis for emergent Surgery  Path shows adenocarcinoma   Also Right hydroureteronephrosis s/p Status post cystoscopy with right ureteral stent placement 1/16   Completed antibiotics 1/21/22  Completed course of fluconazole for thrush with possible candida esophagitis    Developed post op Ileus - NGT placed 1/22/22 and TPN  started  1/16/22 OR - LAROTOMY EXPLORATORY, DRAINAGE OF INTRA -ABDOMINAL ABSCESS (N/A)  CYSTOSCOPY RETROGRADE PYELOGRAM WITH INSERTION STENT URETERAL (Right)  END COLOSTOMY (N/A)  RIGHT SALPINGO-OOPHORECTOMY, LEFT OOPHORECTOMY     *TESTED positive for COVID on 1/16     2/15/22:  complains of having some abdominal pain overnight relieved by Dilaudid  tolerating diet  aving stool and gas in her ostomy  Abdomen soft, non-distended, minimally tender along surgical site  Ostomy has air and it is semi-solid brown stool  Midline wound is clean and dry with some areas of skin opening showing underlying subcutaneous tissue - there is no drainage or other signs of infection  Plan - Diet Regular House; No Carbonation, nausea control PRN, Incentive spirometry, OOB, ambulate - keep legs elevated  - allow malecot drain to continue draining as long as it is in place, d/c abdominal surgical staples    Vital Signs:   Blood pressure 114/72, pulse (!) 114, temperature 99 9 °F (37 7 °C), resp  rate 20, height 5' 7" (1 702 m), weight (!) 164 kg (360 lb 14 3 oz), SpO2 95 %  ,Body mass index is 56 52 kg/m²          02/13 0701 02/14 0700 02/14 0701  02/15 0700 02/15 0701  -     P O  100         Total Intake(mL/kg) 100 (0 6)         Urine (mL/kg/hr) 100 (0) 1400 (0 4)       Drains           Stool   400       Total Output 100 1800       Net 0 -1800        Pertinent Labs/Diagnostic Results:   Results from last 7 days   Lab Units 02/15/22  0745 02/13/22  0611 02/09/22  0512   WBC Thousand/uL 8 67 10 25* 11 36*   HEMOGLOBIN g/dL 8 6* 9 9* 10 0*   HEMATOCRIT % 28 3* 32 6* 33 6*   PLATELETS Thousands/uL 179 220 244   NEUTROS ABS Thousands/µL 5 04 5 27  --      Results from last 7 days   Lab Units 02/15/22  0745 02/13/22  0611 02/09/22  0512   SODIUM mmol/L 137 136 135*   POTASSIUM mmol/L 3 8 3 8 3 5   CHLORIDE mmol/L 107 105 102   CO2 mmol/L 23 23 25   ANION GAP mmol/L 7 8 8   BUN mg/dL 17 19 18   CREATININE mg/dL 0 88 1 05 1 16   EGFR ml/min/1 73sq m 72 58 52   CALCIUM mg/dL 7 8* 8 8 8 8   MAGNESIUM mg/dL  --   --  1 8       Results from last 7 days   Lab Units 02/15/22  0745 02/13/22  0611 02/09/22  0512   GLUCOSE RANDOM mg/dL 124 119 107     Diet Regular;  House; No Carbonation     Scheduled Medications:  docusate sodium, 100 mg, Oral, BID  gabapentin, 300 mg, Oral, TID  heparin (porcine), 7,500 Units, Subcutaneous, Q8H [address]  melatonin, 3 mg, Oral, HS  methocarbamol, 500 mg, Oral, Q6H [address]  CAROLINE ANTIFUNGAL, , Topical, BID  nystatin, 500,000 Units, Swish & Swallow, 4x Daily  nystatin, , Topical, BID  ondansetron, 4 mg, Oral, Q6H IRIS  pantoprazole, 40 mg, Oral, BID AC  senna, 1 tablet, Oral, BID    Continuous IV Infusions:  NONE    PRN Meds:  acetaminophen, 975 mg, Oral, Q6H PRN  albuterol, 2 puff, Inhalation, Q4H PRN  diazepam, 2 mg, Oral, Q8H PRN - 2/12 X 2, 2/13 X 2, 2/14 X 2, 2/15 X 2  Labetalol HCl, 10 mg, Intravenous, Q4H PRN  oxyCODONE, 10 mg, Oral, Q4H PRN - 2/12 X 4, 2/13 X 4, 2/14 X 3, 2/15 X 1  oxyCODONE, 5 mg, Oral, Q4H PRN  saliva substitute, 5 spray, Mouth/Throat, 4x Daily PRN    Discharge Plan:    To be determined   Inpatient Case Management following for all discharge needs    Network Utilization Review Department  ATTENTION: Please call with any questions or concerns to [phone] and carefully listen to the prompts so that you are directed to the right person  All voicemails are confidential   Bryant Snyder all requests for admission clinical reviews, approved or denied determinations and any other requests to dedicated fax number below belonging to the campus where the patient is receiving treatment   List of dedicated fax numbers for the Facilities:  [address] DENIALS (Administrative/Medical Necessity) [phone]   [address] (Maternity/NICU/Pediatrics) [phone]   [address] [place] [address]  [address] [facility] [place] [address] [place] [address] Marcy [address] [place] [address] [facility] [phone]

## 2022-02-15 NOTE — PLAN OF CARE
Problem: OCCUPATIONAL THERAPY ADULT  Goal: Performs self-care activities at highest level of function for planned discharge setting  See evaluation for individualized goals  Description: Treatment Interventions: ADL retraining,Functional transfer training,UE strengthening/ROM,Endurance training,Cognitive reorientation,Patient/family training,Equipment evaluation/education,Compensatory technique education,Continued evaluation,Energy conservation,Activityengagement          See flowsheet documentation for full assessment, interventions and recommendations  Outcome: Progressing  Note: Limitation: Decreased ADL status,Decreased UE strength,Decreased Safe judgement during ADL,Decreased cognition,Decreased endurance,Decreased self-care trans,Decreased high-level ADLs  Prognosis: Fair  Assessment: Pt seen on this date for OT session focusing on ADL retraining, body mechanics, transfer retraining, increasing activity tolerance/endurance to increase ability to participate in ADL/functional tasks  Pt was found in bed and was left in bed w/ all needs within reach  Pt completed grooming tasks with min A this date, toileting tasks with max A, and hand strengthening exercises with A from therapist, applying resistance in all digits while pt performs digit extension  Tolerates well  Pt w/ improvements in activity tolerance and transfer ability, however is still limited 2* decreased ADL/High-level ADL status, decreased activity tolerance/endurance, decreased self-care trans, decreased safety awareness and insight to condition  The patient's raw score on the AM-PAC Daily Activity inpatient short form is 15, standardized score is 34 69, less than 39 4  Patients at this level are likely to benefit from discharge to post-acute rehabilitation services  Please refer to the recommendation of the Occupational Therapist for safe discharge planning  Recommending pt D/C to STR when medically stable   Pt will continue to benefit from acute OT services to meet goals  OT Discharge Recommendation: Post acute rehabilitation services  OT - OK to Discharge:  Yes

## 2022-02-15 NOTE — PROGRESS NOTES
Progress Note - [address] [facility] 60 y o  female MRN: [id number]  Unit/Bed#: [facility] 511-01 Encounter: [id number]    Assessment:  60 y o  female now 27 Days Post-Op s/p Procedure(s) (LRB):  LAPAROTOMY EXPLORATORY, DRAINAGE OF INTRA -ABDOMINAL ABSCESS (N/A)  CYSTOSCOPY RETROGRADE PYELOGRAM WITH INSERTION STENT URETERAL (Right)  END COLOSTOMY (N/A)  RIGHT SALPINGO-OOPHORECTOMY, LEFT OOPHORECTOMY (N/A)    Plan:  - Diet Regular; Regular House; No Carbonation  - Pain and Nausea control PRN  - Incentive spirometry  - OOB, ambulate  - keep legs elevated  - allow malecot drain to continue draining as long as it is in place  - DC abdominal surgical staples  - dispo planning    Subjective/Objective     Subjective: The patient complains of having some abdominal pain overnight relieved by Dilaudid  She is tolerating diet  She is having stool and gas in her ostomy  Objective:   Vitals: Blood pressure 114/72, pulse (!) 114, temperature 99 9 °F (37 7 °C), resp  rate 20, height 5' 7" (1 702 m), weight (!) 164 kg (360 lb 14 3 oz), SpO2 95 %  ,Body mass index is 56 52 kg/m²  I/O       02/13 0701  02/14 0700 02/14 0701  02/15 0700 02/15 0701  02/16 0700    P  O  100      Total Intake(mL/kg) 100 (0 6)      Urine (mL/kg/hr) 100 (0) 1400 (0 4)     Drains       Stool  400     Total Output 100 1800     Net 0 -1800                  Physical Exam:  Gen: NAD, Aox3, Comfortable in bed  Chest: Normal work of breathing, no respiratory distress  Abd: Soft, ND, minimally tender along surgical site  Ostomy has air and it is semi-solid brown stool  Midline wound is clean and dry with some areas of skin opening showing underlying subcutaneous tissue  There is no drainage or other signs of infection  Ext: No Edema  Skin: Warm, bilateral lower extremities appear to be warm and cellulitic at the level of the ankles  Legs are tender to touch  There is no induration or blistering present        Lab, Imaging and other studies: I have personally reviewed pertinent reports    , CBC with diff:   Lab Results   Component Value Date    WBC 8 67 02/15/2022    HGB 8 6 (L) 02/15/2022    HCT 28 3 (L) 02/15/2022    MCV 88 02/15/2022     02/15/2022    MCH 26 6 (L) 02/15/2022    MCHC 30 4 (L) 02/15/2022    RDW 20 9 (H) 02/15/2022    MPV 8 7 (L) 02/15/2022    NRBC 0 02/15/2022   , BMP/CMP: No results found for: SODIUM, K, CL, CO2, ANIONGAP, BUN, CREATININE, GLUCOSE, CALCIUM, AST, ALT, ALKPHOS, PROT, BILITOT, EGFR  VTE Pharmacologic Prophylaxis: Heparin  VTE Mechanical Prophylaxis: sequential compression device        Gigi Macias MD  2/15/2022  7:03 AM

## 2022-02-15 NOTE — WOUND OSTOMY CARE
Progress Note - Wound   Simba Ellison 60 y o  female MRN: [id number]  Unit/Bed#: [facility] 511-01 Encounter: [id number]        Assessment:   Patient seen for continued skin and wound care, she is awake, alert in bed with no complaints, patient is agreeable for wound care nurse to assess skin and provide care  Findings:  1-Bilateral buttock with scattered stage 2 pressure injuries  Wound bed is 100% pink with intact periwound  2-L lateral thigh with superficial stage 2 pressure injury  Wound bed is 100% red with intact periwound  3-L under breast with healing stage 2 pressure injury, Wound bed is 100% pink with intact periwound  Bilateral heels are intact, colostomy pouch is intact, however noted no stool output only yellow purulent drainage from site  Dr Kristyn Mann from red surgery notified via tiger text of noted change and ostomy output  Skin and Wound Care Plan:   1  Apply skin nourishing cream to intact skin daily  2  Bariatric ehob offloading cushion to chair when OOB  3  Elevate heels off of bed with pillows to offload  4  Kreg bariatric bed  5  Turn and reposition patient Q2 hours  6  Right and left abdominal pannus - cleanse with soap and water then pat dry dust lightly with nystatin powder then apply maxorb to open areas then apply cut to fit ABD pad and secure lightly with tape change every other day  7  Bilateral buttocks bilateral groin and perineum area - cleanse with soap and water then apply janice antifungal cream bid and prn   8  Cleanse L breast wounds with NSS, pat dry, and apply bordered Allevyn foam dressing  Sancho dressing with T  Change every other day and as needed for soilage/dislodgement  9  Continue with ostomy care and teaching  10  Wound care will follow along with patient weekly, please call or tiger text with questions and concerns  11  Left posterior thigh--cleanse, pat dry  Apply Allevyn Life foam dressing  Change dressing every other day    12  Mid line incisional care as per surgery team         Vitals: Blood pressure 118/75, pulse 97, temperature 98 6 °F (37 °C), resp  rate 16, height 5' 7" (1 702 m), weight (!) 164 kg (360 lb 14 3 oz), SpO2 97 %  ,Body mass index is 56 52 kg/m²  Wound 11/22/21 Other (comment) Breast Left; Lower (Active)   Wound Image   02/15/22 1112   Wound Description SHRUTI 02/15/22 0900   Pressure Injury Stage 2 02/15/22 1112   Luisa-wound Assessment Intact 02/15/22 1112   Wound Length (cm) 1 cm 02/15/22 1112   Wound Width (cm) 0 6 cm 02/15/22 1112   Wound Depth (cm) 0 01 cm 02/15/22 1112   Wound Surface Area (cm^2) 0 6 cm^2 02/15/22 1112   Wound Volume (cm^3) 0 006 cm^3 02/15/22 1112   Calculated Wound Volume (cm^3) 0 01 cm^3 02/15/22 1112   Change in Wound Size % 99 02/15/22 1112   Tunneling 0 cm 01/24/22 1203   Tunneling in depth located at 0 01/24/22 1203   Undermining 0 01/24/22 1203   Undermining is depth extending from 0 01/24/22 1203   Drainage Amount Scant 02/15/22 1112   Drainage Description Serous 02/15/22 1112   Non-staged Wound Description Partial thickness 02/11/22 1028   Treatments Site care 02/15/22 1112   Dressing Foam, Silicon (eg  Allevyn, etc) 02/15/22 1112   Wound packed? No 01/24/22 1203   Packing- # removed 0 01/24/22 1203   Packing- # inserted 0 01/24/22 1203   Dressing Changed Changed 02/14/22 1700   Patient Tolerance Tolerated well 02/15/22 1112   Dressing Status Clean;Dry; Intact 02/15/22 0900       Wound 01/06/22 Other (comment) Other (Comment) Buttocks (Active)   Wound Image   02/15/22 1110   Wound Description Beefy red;Pink 02/15/22 0900   Pressure Injury Stage 2 02/14/22 0805   Luisa-wound Assessment Maceration;Fragile 02/15/22 0900   Wound Length (cm) 5 cm 02/08/22 1133   Wound Width (cm) 3 cm 02/08/22 1133   Wound Depth (cm) 0 1 cm 02/08/22 1133   Wound Surface Area (cm^2) 15 cm^2 02/08/22 1133   Wound Volume (cm^3) 1 5 cm^3 02/08/22 1133   Calculated Wound Volume (cm^3) 1 5 cm^3 02/08/22 1133   Change in Wound Size % 84 13 02/08/22 1133   Drainage Amount Scant 02/13/22 2000   Drainage Description Serosanguineous 02/13/22 2000   Non-staged Wound Description Partial thickness 02/13/22 2000   Treatments Cleansed;Site care 02/12/22 0900   Dressing Protective barrier 02/13/22 2000   Wound packed? No 01/12/22 1751   Dressing Changed Changed 02/03/22 0800   Patient Tolerance Tolerated well 02/14/22 0805   Dressing Status Clean;Dry; Intact 02/15/22 0900       Wound 01/11/22 Pressure Injury Thigh Posterior;Left;Upper (Active)   Wound Image   02/15/22 1112   Wound Description Beefy red 02/15/22 1112   Pressure Injury Stage 2 02/15/22 1112   Luisa-wound Assessment Intact 02/15/22 1112   Wound Length (cm) 4 cm 02/15/22 1112   Wound Width (cm) 4 cm 02/15/22 1112   Wound Depth (cm) 0 1 cm 02/15/22 1112   Wound Surface Area (cm^2) 16 cm^2 02/15/22 1112   Wound Volume (cm^3) 1 6 cm^3 02/15/22 1112   Calculated Wound Volume (cm^3) 1 6 cm^3 02/15/22 1112   Change in Wound Size % 73 33 02/15/22 1112   Drainage Amount Scant 02/15/22 1112   Drainage Description Serosanguineous; Yellow 02/11/22 1028   Non-staged Wound Description Partial thickness 02/11/22 1028   Treatments Cleansed;Site care 02/11/22 1028   Dressing Foam, Silicon (eg  Allevyn, etc) 02/15/22 0900   Dressing Changed Changed 02/14/22 1700   Patient Tolerance Tolerated well 02/15/22 1112   Dressing Status Clean;Dry; Intact 02/15/22 0900       Wound 01/17/22 Surgical Abdomen N/A;Mid (Active)   Wound Description SHRUTI 02/15/22 0900   Luisa-wound Assessment SHRUTI 02/15/22 0900   Wound Site Closure Unapproximated 02/14/22 1700   Drainage Amount None 02/13/22 2000   Drainage Description Serosanguineous 02/09/22 1827   Treatments Site care 02/14/22 1700   Dressing ABD 02/14/22 1700   Wound packed? No 02/14/22 1700   Packing- # inserted 8 01/17/22 0013   Dressing Changed Changed 02/14/22 1700   Patient Tolerance Tolerated well 02/14/22 1700   Dressing Status Clean;Dry; Intact 02/15/22 0900 Wound care to continue following, please call or tiger text with questions and concerns           Minor Erwin RN, BSN, [facility]

## 2022-02-15 NOTE — OCCUPATIONAL THERAPY NOTE
Occupational Therapy Progress Note     Patient Name: Setve Bo  [id number] Date: 2/15/2022  Problem List  Principal Problem:    Diverticulitis of large intestine with perforation  Active Problems:    Lymphedema    Primary hypertension    Morbid obesity with BMI of 60 0-69 9, adult (HCC)    Hidradenitis suppurativa    GERD (gastroesophageal reflux disease)    Right Hydroureteronephrosis       02/15/22 1151   OT Last Visit   OT Visit Date 02/15/22   Note Type   Note Type Treatment   Restrictions/Precautions   Weight Bearing Precautions Per Order No   Other Precautions Fall Risk   General   Response to Previous Treatment Patient with no complaints from previous session   Pain Assessment   Pain Assessment Tool 0-10   Pain Score No Pain   ADL   Where Assessed Supine, bed   Grooming Assistance 4  Minimal Assistance   Grooming Deficit Setup; Increased time to complete;Brushing hair   Grooming Comments min A for washing and brushing hair this date while supine in bed  ( bed in chair position)  Toileting Assistance  2  Maximal Assistance   Toileting Deficit Setup;Perineal hygiene   Toileting Comments max a for perineal hygiene while in sidelying position  Bed Mobility   Rolling R 2  Maximal assistance   Additional items Assist x 1; Increased time required;Verbal cues;LE management   Rolling L 2  Maximal assistance   Additional items Assist x 1; Increased time required;Verbal cues   Additional Comments rolled to R and L for perineal hygiene, max Ax1, pt able to maintain sidelying position with min a  max Ax2 for repositioning    Therapeutic Excerise-Strength   UE Strength Yes   Right Upper Extremity- Strength   R Hand Thumb; Long finger; Index finger;Ring finger;Little finger   R Position Against gravity   R Weight/Reps/Sets 2x10   RUE Strength Comment extension in all digits performed this date w therapist applying resistance, as pt reports she feels she has decreased strength in hands, weakness when holding objects  Left Upper Extremity-Strength   L Hand Thumb; Index finger; Long finger;Ring finger;Little finger   L Position Against gravity   L Weights/Reps/Sets 2x10   LUE Strength Comment extension in all digits performed this date w therapist applying resistance, as pt reports she feels she has decreased strength in hands, weakness when holding objects  Cognition   Overall Cognitive Status WFL   Arousal/Participation Alert; Responsive   Attention Within functional limits   Orientation Level Oriented X4   Memory Within functional limits   Following Commands Follows all commands and directions without difficulty   Comments cooperative this date  Activity Tolerance   Activity Tolerance Patient tolerated treatment well   Medical Staff Made Aware rn   Assessment   Assessment Pt seen on this date for OT session focusing on ADL retraining, body mechanics, transfer retraining, increasing activity tolerance/endurance to increase ability to participate in ADL/functional tasks  Pt was found in bed and was left in bed w/ all needs within reach  Pt completed grooming tasks with min A this date, toileting tasks with max A, and hand strengthening exercises with A from therapist, applying resistance in all digits while pt performs digit extension  Tolerates well  Pt w/ improvements in activity tolerance and transfer ability, however is still limited 2* decreased ADL/High-level ADL status, decreased activity tolerance/endurance, decreased self-care trans, decreased safety awareness and insight to condition  The patient's raw score on the AM-PAC Daily Activity inpatient short form is 15, standardized score is 34 69, less than 39 4  Patients at this level are likely to benefit from discharge to post-acute rehabilitation services  Please refer to the recommendation of the Occupational Therapist for safe discharge planning  Recommending pt D/C to STR when medically stable  Pt will continue to benefit from acute OT services to meet goals  Plan   Treatment Interventions ADL retraining;UE strengthening/ROM; Energy conservation; Activityengagement   Goal Expiration Date 03/01/22   OT Treatment Day 9   OT Frequency 3-5x/wk   Recommendation   OT Discharge Recommendation Post acute rehabilitation services   OT - OK to Discharge Yes   AM-PAC Daily Activity Inpatient   Lower Body Dressing 1   Bathing 2   Toileting 2   Upper Body Dressing 3   Grooming 3   Eating 4   Daily Activity Raw Score 15   Daily Activity Standardized Score (Calc for Raw Score >=11) 34 69   AM-PAC Applied Cognition Inpatient   Following a Speech/Presentation 3   Understanding Ordinary Conversation 4   Taking Medications 3   Remembering Where Things Are Placed or Put Away 3   Remembering List of 4-5 Errands 3   Taking Care of Complicated Tasks 3   Applied Cognition Raw Score 19   Applied Cognition Standardized Score 39 77   Modified Jair Scale   Modified [place] Scale 4     Goals remain applicable at this time, ext +14 days  New exp date 3/1/22        FILI Dixon, OTR/L

## 2022-02-15 NOTE — PLAN OF CARE
Problem: Prexisting or High Potential for Compromised Skin Integrity  Goal: Skin integrity is maintained or improved  Description: INTERVENTIONS:  - Identify patients at risk for skin breakdown  - Assess and monitor skin integrity  - Assess and monitor nutrition and hydration status  - Monitor labs   - Assess for incontinence   - Turn and reposition patient  - Assist with mobility/ambulation  - Relieve pressure over bony prominences  - Avoid friction and shearing  - Provide appropriate hygiene as needed including keeping skin clean and dry  - Evaluate need for skin moisturizer/barrier cream  - Collaborate with interdisciplinary team   - Patient/family teaching  - Consider wound care consult   Outcome: Progressing     Problem: MOBILITY - ADULT  Goal: Maintain or return to baseline ADL function  Description: INTERVENTIONS:  -  Assess patient's ability to carry out ADLs; assess patient's baseline for ADL function and identify physical deficits which impact ability to perform ADLs (bathing, care of mouth/teeth, toileting, grooming, dressing, etc )  - Assess/evaluate cause of self-care deficits   - Assess range of motion  - Assess patient's mobility; develop plan if impaired  - Assess patient's need for assistive devices and provide as appropriate  - Encourage maximum independence but intervene and supervise when necessary  - Involve family in performance of ADLs  - Assess for home care needs following discharge   - Consider OT consult to assist with ADL evaluation and planning for discharge  - Provide patient education as appropriate  Outcome: Progressing  Goal: Maintains/Returns to pre admission functional level  Description: INTERVENTIONS:  - Perform BMAT or MOVE assessment daily    - Set and communicate daily mobility goal to care team and patient/family/caregiver  - Collaborate with rehabilitation services on mobility goals if consulted  - Perform Range of Motion 3 times a day    - Reposition patient every 2 hours   - Dangle patient 3 times a day  - Stand patient 3 times a day  - Ambulate patient 3 times a day  - Out of bed to chair 3 times a day   - Out of bed for meals 3 times a day  - Out of bed for toileting  - Record patient progress and toleration of activity level   Outcome: Progressing     Problem: Potential for Falls  Goal: Patient will remain free of falls  Description: INTERVENTIONS:  - Educate patient/family on patient safety including physical limitations  - Instruct patient to call for assistance with activity   - Consult OT/PT to assist with strengthening/mobility   - Keep Call bell within reach  - Keep bed low and locked with side rails adjusted as appropriate  - Keep care items and personal belongings within reach  - Initiate and maintain comfort rounds  - Make Fall Risk Sign visible to staff  - Offer Toileting every 2 Hours, in advance of need  - Initiate/Maintain bed alarm  - Apply yellow socks and bracelet for high fall risk patients  - Consider moving patient to room near nurses station  Outcome: Progressing     Problem: GASTROINTESTINAL - ADULT  Goal: Minimal or absence of nausea and/or vomiting  Description: INTERVENTIONS:  - Administer IV fluids if ordered to ensure adequate hydration  - Maintain NPO status until nausea and vomiting are resolved  - Nasogastric tube if ordered  - Administer ordered antiemetic medications as needed  - Provide nonpharmacologic comfort measures as appropriate  - Advance diet as tolerated, if ordered  - Consider nutrition services referral to assist patient with adequate nutrition and appropriate food choices  Outcome: Progressing  Goal: Maintains or returns to baseline bowel function  Description: INTERVENTIONS:  - Assess bowel function  - Encourage oral fluids to ensure adequate hydration  - Administer IV fluids if ordered to ensure adequate hydration  - Administer ordered medications as needed  - Encourage mobilization and activity  - Consider nutritional services referral to assist patient with adequate nutrition and appropriate food choices  Outcome: Progressing  Goal: Maintains adequate nutritional intake  Description: INTERVENTIONS:  - Monitor percentage of each meal consumed  - Identify factors contributing to decreased intake, treat as appropriate  - Assist with meals as needed  - Monitor I&O, weight, and lab values if indicated  - Obtain nutrition services referral as needed  Outcome: Progressing  Goal: Establish and maintain optimal ostomy function  Description: INTERVENTIONS:  - Assess bowel function  - Encourage oral fluids to ensure adequate hydration  - Administer IV fluids if ordered to ensure adequate hydration   - Administer ordered medications as needed  - Encourage mobilization and activity  - Nutrition services referral to assist patient with appropriate food choices  - Assess stoma site  - Consider wound care consult   Outcome: Progressing  Goal: Oral mucous membranes remain intact  Description: INTERVENTIONS  - Assess oral mucosa and hygiene practices  - Implement preventative oral hygiene regimen  - Implement oral medicated treatments as ordered  - Initiate Nutrition services referral as needed  Outcome: Progressing     Problem: GENITOURINARY - ADULT  Goal: Maintains or returns to baseline urinary function  Description: INTERVENTIONS:  - Assess urinary function  - Encourage oral fluids to ensure adequate hydration if ordered  - Administer IV fluids as ordered to ensure adequate hydration  - Administer ordered medications as needed  - Offer frequent toileting  - Follow urinary retention protocol if ordered  Outcome: Progressing     Problem: SKIN/TISSUE INTEGRITY - ADULT  Goal: Skin Integrity remains intact(Skin Breakdown Prevention)  Description: Assess:  -Perform Surendra assessment every 12  -Clean and moisturize skin every 4  -Inspect skin when repositioning, toileting, and assisting with ADLS  -Assess extremities for adequate circulation and sensation Bed Management:  -Have minimal linens on bed & keep smooth, unwrinkled  -Change linens as needed when moist or perspiring  -Avoid sitting or lying in one position for more than 2 hours while in bed  -Keep HOB at 30degrees     Toileting:  -Offer bedside commode  -Assess for incontinence every 2    Activity:  -Mobilize patient 3 times a day  -Encourage activity and walks on unit  -Encourage or provide ROM exercises   -Turn and reposition patient every 2 Hours  -Use appropriate equipment to lift or move patient in bed  -Instruct/ Assist with weight shifting every 2 hour when out of bed in chair  -Consider limitation of chair time 8 hour intervals    Skin Care:  -Avoid use of baby powder, tape, friction and shearing, hot water or constrictive clothing  -Relieve pressure over bony prominences using allevyns  -Do not massage red bony areas  Outcome: Progressing  Goal: Incision(s), wounds(s) or drain site(s) healing without S/S of infection  Description: INTERVENTIONS  - Assess and document dressing, incision, wound bed, drain sites and surrounding tissue  - Provide patient and family education  - Perform skin care/dressing changes as needed  Outcome: Progressing  Goal: Pressure injury heals and does not worsen  Description: Interventions:  - Implement low air loss mattress or specialty surface (Criteria met)  - Apply silicone foam dressing  - Instruct/assist with weight shifting every 120 minutes when in chair   - Limit chair time to 8 hour intervals  - Use special pressure reducing interventions such as allevyns when in chair   - Apply fecal or urinary incontinence containment device   - Perform passive or active ROM every 4  - Turn and reposition patient & offload bony prominences every 2 hours   - Utilize friction reducing device or surface for transfers   - Consider nutrition services referral as needed  Outcome: Progressing     Problem: PAIN - ADULT  Goal: Verbalizes/displays adequate comfort level or baseline comfort level  Description: Interventions:  - Encourage patient to monitor pain and request assistance  - Assess pain using appropriate pain scale  - Administer analgesics based on type and severity of pain and evaluate response  - Implement non-pharmacological measures as appropriate and evaluate response  - Consider cultural and social influences on pain and pain management  - Notify physician/advanced practitioner if interventions unsuccessful or patient reports new pain  Outcome: Progressing     Problem: INFECTION - ADULT  Goal: Absence or prevention of progression during hospitalization  Description: INTERVENTIONS:  - Assess and monitor for signs and symptoms of infection  - Monitor lab/diagnostic results  - Monitor all insertion sites, i e  indwelling lines, tubes, and drains  - Monitor endotracheal if appropriate and nasal secretions for changes in amount and color  - [place] appropriate cooling/warming therapies per order  - Administer medications as ordered  - Instruct and encourage patient and family to use good hand hygiene technique  - Identify and instruct in appropriate isolation precautions for identified infection/condition  Outcome: Progressing     Problem: SAFETY ADULT  Goal: Maintain or return to baseline ADL function  Description: INTERVENTIONS:  -  Assess patient's ability to carry out ADLs; assess patient's baseline for ADL function and identify physical deficits which impact ability to perform ADLs (bathing, care of mouth/teeth, toileting, grooming, dressing, etc )  - Assess/evaluate cause of self-care deficits   - Assess range of motion  - Assess patient's mobility; develop plan if impaired  - Assess patient's need for assistive devices and provide as appropriate  - Encourage maximum independence but intervene and supervise when necessary  - Involve family in performance of ADLs  - Assess for home care needs following discharge   - Consider OT consult to assist with ADL evaluation and planning for discharge  - Provide patient education as appropriate  Outcome: Progressing  Goal: Maintains/Returns to pre admission functional level  Description: INTERVENTIONS:  - Perform BMAT or MOVE assessment daily    - Set and communicate daily mobility goal to care team and patient/family/caregiver  - Collaborate with rehabilitation services on mobility goals if consulted  - Perform Range of Motion 3 times a day  - Reposition patient every 2 hours    - Dangle patient 3 times a day  - Stand patient 3 times a day  - Ambulate patient 3 times a day  - Out of bed to chair 3 times a day   - Out of bed for meals 3 times a day  - Out of bed for toileting  - Record patient progress and toleration of activity level   Outcome: Progressing  Goal: Patient will remain free of falls  Description: INTERVENTIONS:  - Educate patient/family on patient safety including physical limitations  - Instruct patient to call for assistance with activity   - Consult OT/PT to assist with strengthening/mobility   - Keep Call bell within reach  - Keep bed low and locked with side rails adjusted as appropriate  - Keep care items and personal belongings within reach  - Initiate and maintain comfort rounds  - Make Fall Risk Sign visible to staff  - Offer Toileting every 2 Hours, in advance of need  - Initiate/Maintain bed alarm  - Apply yellow socks and bracelet for high fall risk patients  - Consider moving patient to room near nurses station  Outcome: Progressing     Problem: DISCHARGE PLANNING  Goal: Discharge to home or other facility with appropriate resources  Description: INTERVENTIONS:  - Identify barriers to discharge w/patient and caregiver  - Arrange for needed discharge resources and transportation as appropriate  - Identify discharge learning needs (meds, wound care, etc )  - Arrange for interpretive services to assist at discharge as needed  - Refer to Case Management Department for coordinating discharge planning if the patient needs post-hospital services based on physician/advanced practitioner order or complex needs related to functional status, cognitive ability, or social support system  Outcome: Progressing     Problem: Knowledge Deficit  Goal: Patient/family/caregiver demonstrates understanding of disease process, treatment plan, medications, and discharge instructions  Description: Complete learning assessment and assess knowledge base  Interventions:  - Provide teaching at level of understanding  - Provide teaching via preferred learning methods  Outcome: Progressing     Problem: Nutrition/Hydration-ADULT  Goal: Nutrient/Hydration intake appropriate for improving, restoring or maintaining nutritional needs  Description: Monitor and assess patient's nutrition/hydration status for malnutrition  Collaborate with interdisciplinary team and initiate plan and interventions as ordered  Monitor patient's weight and dietary intake as ordered or per policy  Utilize nutrition screening tool and intervene as necessary  Determine patient's food preferences and provide high-protein, high-caloric foods as appropriate       INTERVENTIONS:  - Monitor oral intake, urinary output, labs, and treatment plans  - Assess nutrition and hydration status and recommend course of action  - Evaluate amount of meals eaten  - Assist patient with eating if necessary   - Allow adequate time for meals  - Recommend/ encourage appropriate diets, oral nutritional supplements, and vitamin/mineral supplements  - Order, calculate, and assess calorie counts as needed  - Recommend, monitor, and adjust tube feedings and TPN/PPN based on assessed needs  - Assess need for intravenous fluids  - Provide specific nutrition/hydration education as appropriate  - Include patient/family/caregiver in decisions related to nutrition  Outcome: Progressing     Problem: CHANGE IN BODY IMAGE  Goal: Pt/Family communicate acceptance of loss or change in body image and expresses psychological comfort and peace  Description: INTERVENTIONS:  - Assess patient and family anxiety and grief process related to change in body image, loss of functional status and loss of sense of self  - Assess patient/family's coping skills and provide emotional, spiritual and psychosocial support  - Provide information about the patient's health status with consideration of family and cultural values  - Communicate willingness to discuss loss and facilitate grief process with patient/family as appropriate  - Emphasize sustaining relationships within family system and community, or farzaneh/spiritual traditions  - Refer to community support groups as appropriate  - Initiate Spiritual Care, Pastoral care or other ancillary consults as needed  Outcome: Progressing

## 2022-02-16 NOTE — RESTORATIVE TECHNICIAN NOTE
Restorative Technician Note      Patient Name: Celestine Mckinnon     Note Type: Mobility  Patient Position Upon Consult: Bedside chair  Activity Performed: Ambulated  Assistive Device: Roller walker  Patient Position at End of Consult: Bedside chair;  All needs within reach

## 2022-02-16 NOTE — PROGRESS NOTES
Progress Note - General Surgery  Celestine Mckinnon 60 y o  female MRN: [id number]  Unit/Bed#: [facility] 511-01 Encounter: [id number]    Assessment:  60 y o  female now 32 Days Post-Op s/p Procedure(s) (LRB):  LAPAROTOMY EXPLORATORY, DRAINAGE OF INTRA -ABDOMINAL ABSCESS (N/A)  CYSTOSCOPY RETROGRADE PYELOGRAM WITH INSERTION STENT URETERAL (Right)  END COLOSTOMY (N/A)  RIGHT SALPINGO-OOPHORECTOMY, LEFT OOPHORECTOMY (N/A)    Plan:  - Diet Regular; Regular House; No Carbonation  - Pain and Nausea control PRN  - keep legs elevated  - local wound care of mid on abdominal wound and pressure ulcers by wound care team  - dispo planning to acute rehab when able  - leave rectal malecot in place for the time being to facilitate any additional drainage    Subjective/Objective     Subjective:  Patient did well overnight  She has minimal complaints of pain episode of her midline wound  She is tolerating regular diet and has proper ostomy function  Objective:   Vitals: Blood pressure 128/74, pulse 97, temperature 99 6 °F (37 6 °C), temperature source Axillary, resp  rate 20, height 5' 7" (1 702 m), weight (!) 166 kg (365 lb 3 2 oz), SpO2 97 %  ,Body mass index is 57 2 kg/m²  I/O       02/14 0701  02/15 0700 02/15 0701  02/16 0700    Urine (mL/kg/hr) 1400 (0 4) 1300 (0 3)    Stool 400     Total Output 1800 1300    Net -1800 -1300                Physical Exam:  Gen: NAD, Aox3, Comfortable in bed  Chest: Normal work of breathing, no respiratory distress  Abd: Soft, ND, minimally tender on long midline incision  Midline incision clean and dry with no drainage  There are areas of skin opening which are appropriately scabbing over  Pain is 1 area with dried subcutaneous fat exposed  Ostomy is function appropriately with gas and stool in the appliance  Ext: Edema  Skin: Warm, bilateral lower extremity erythema mildly improved  Lab, Imaging and other studies:   I have personally reviewed pertinent reports    , CBC with diff: Lab Results   Component Value Date    WBC 8 67 02/15/2022    HGB 8 6 (L) 02/15/2022    HCT 28 3 (L) 02/15/2022    MCV 88 02/15/2022     02/15/2022    MCH 26 6 (L) 02/15/2022    MCHC 30 4 (L) 02/15/2022    RDW 20 9 (H) 02/15/2022    MPV 8 7 (L) 02/15/2022    NRBC 0 02/15/2022   , BMP/CMP:   Lab Results   Component Value Date    SODIUM 137 02/15/2022    K 3 8 02/15/2022     02/15/2022    CO2 23 02/15/2022    BUN 17 02/15/2022    CREATININE 0 88 02/15/2022    CALCIUM 7 8 (L) 02/15/2022    EGFR 72 02/15/2022     VTE Pharmacologic Prophylaxis: Heparin  VTE Mechanical Prophylaxis: sequential compression device        Keyla Phillips MD  2/16/2022  5:50 AM

## 2022-02-16 NOTE — PROGRESS NOTES
Pastoral Care Progress Note    2022  Patient: Isauro Shah : 1965  Admission Date & Time: 2022 1640  MRN: [id number] [facility]: [id number]        Visited with Torin Jerez to follow-up since last visit as she still has not been released to rehab unit as expected  Torin Jerez is making strides toward accepting her medical issues  She is focused on recovery mainly for her two sons  Prayer and hospitality offered  Follow-up as needed or requested                Chaplaincy Interventions Utilized:   Empowerment: Encouraged self-care and Normalized experience of patient/family    Exploration: Explored spiritual needs & resources    Relationship Building: Cultivated a relationship of care and support and Listened empathically    Ritual: Provided prayer      Chaplaincy Outcomes Achieved:  Expressed gratitude and Expressed humor    Spiritual Coping Strategies Utilized:   Positive spiritual reframing       22 1100   Clinical Encounter Type   Visited With Patient   Routine Visit Follow-up   [facility] Encounters   [facility] Needs Prayer

## 2022-02-16 NOTE — PROGRESS NOTES
Approximately 8 staples from midline abdominal incision removed at bedside  Midline abdominal incision appeared clean with healthy tissue present, no purulent discharge or signs of infection appreciated on exam   Patient tolerated this well, without complications  Nursing made aware, told reach out with any questions or concerns

## 2022-02-17 NOTE — OCCUPATIONAL THERAPY NOTE
Occupational Therapy Progress Note     Patient Name: Magen Quick  ATMHK'Y Date: 2/17/2022  Problem List  Principal Problem:    Diverticulitis of large intestine with perforation  Active Problems:    Lymphedema    Primary hypertension    Morbid obesity with BMI of 60 0-69 9, adult (HCC)    Hidradenitis suppurativa    GERD (gastroesophageal reflux disease)    Right Hydroureteronephrosis            02/17/22 1435   OT Last Visit   OT Visit Date 02/17/22   Note Type   Note Type Treatment   Restrictions/Precautions   Weight Bearing Precautions Per Order No   Other Precautions Fall Risk   General   Response to Previous Treatment Patient with no complaints from previous session   Therapeutic Exercise - ROM   UE-ROM Yes   ROM- Right Upper Extremities   R Hand AROM; Thumb; Index finger; Long finger;Ring finger;Little finger   R Position Against gravity   R Weight/Reps/Sets 2x10   RUE ROM Comment R hand ROM remains intact at this time  Pt is reporting b/l pinky numbness/tingling, however ROM not impacted  Additoinally, 4th digit with no numbness/tingling  ROM - Left Upper Extremities    L Hand AROM; Thumb; Index finger; Long finger;Ring finger;Little finger   L Position Against gravity   L Weight/Reps/Sets 2x10   LUE ROM Comment L hand ROM remains intact  Pt reports L pinky numbness/tingling, however ROM not impacted  Therapeutic Excerise-Strength   UE Strength Yes   Right Upper Extremity- Strength   R Hand Thumb; Index finger;Ring finger;Little finger; Long finger   R Position Against gravity   Equipment Other (Comment)  (therapyball)   R Weight/Reps/Sets 2x10   RUE Strength Comment 2 sets 10 prolonged grasp in RUE this date to enhance overall muscle strength- HER R GRASP IS WEAKER AS COMPARED TO L AND THIS IS NEW FINDING  MADE RN AWARE  PT REPORTS THAT SHE HAS HAD DIFFICULTY WITH MAINTAINING GRASP ON OBJECTS, DROPS PHONE AND CHOPSTICKS OFTEN  STATES SHE TELLS HER HAND TO HOLD OBJECT, BUT IT GIVES OUT     Left Upper Extremity-Strength   L Hand Thumb; Index finger; Long finger;Ring finger;Little finger   L Position Against gravity   Equipment Other (Comment)  (THERAPY BALL)   L Weights/Reps/Sets 2X10   LUE Strength Comment prolonged grasp on therapy ball as well as 10 squeezes 2x through  Pt reports numbness in pinky, noted that when performing grasp, pinky does not flex as thoroughly as other digits  Coordination   Fine Motor impared due to b/l pinky numbness/tingling    Dexterity impared 2' b/l pinky numbness/tingling   Cognition   Overall Cognitive Status WFL   Arousal/Participation Alert; Responsive; Cooperative   Attention Within functional limits   Orientation Level Oriented X4   Memory Within functional limits   Following Commands Follows all commands and directions without difficulty   Comments cooperative, brightens w approach and remains motivated to engage in therapy  Activity Tolerance   Activity Tolerance Patient tolerated treatment well   Medical Staff Made Aware updated RN   Assessment   Assessment Pt seen on this date for OT session focusing on UE/digit ROM and strength  Pt made OT aware that she has b/l numbness/tingling in pinkies  ROM appears intact at this time, however R hand  weaker than L (about 3/5)  Pt reports this is new onset, and she is having trouble with maintaining grasp on objects  AROM in all digits performed, AROM remains intact, however b/l pinkies do appear to have decreased strength, which is impacting fine motor and dexterity at this time  Pt also engaged in therapy ball squeezes 2x10 this date, and has difficulty with sustaining grasp in R hand as compared to L  Made RN aware  Pt w improvements in her overall functional ability, however remains limited 2* decreased ADL/High-level ADL status, decreased activity tolerance/endurance, decreased UE strength/ROM, decreased self-care trans, decreased safety awareness and insight to condition   The patient's raw score on the AM-PAC Daily Activity inpatient short form is 15, standardized score is 34 69, less than 39 4  Patients at this level are likely to benefit from discharge to post-acute rehabilitation services  Please refer to the recommendation of the Occupational Therapist for safe discharge planning  Recommending pt D/C to STR when medically stable  Pt will continue to benefit from acute OT services to meet goals      Plan   Treatment Interventions UE strengthening/ROM   Goal Expiration Date 03/01/22   OT Treatment Day 10   OT Frequency 3-5x/wk   Recommendation   OT Discharge Recommendation Post acute rehabilitation services   OT - OK to Discharge Yes   AM-PAC Daily Activity Inpatient   Lower Body Dressing 1   Bathing 2   Toileting 2   Upper Body Dressing 3   Grooming 3   Eating 4   Daily Activity Raw Score 15   Daily Activity Standardized Score (Calc for Raw Score >=11) 34 69   AM-PAC Applied Cognition Inpatient   Following a Speech/Presentation 3   Understanding Ordinary Conversation 4   Taking Medications 3   Remembering Where Things Are Placed or Put Away 3   Remembering List of 4-5 Errands 3   Taking Care of Complicated Tasks 3   Applied Cognition Raw Score 19   Applied Cognition Standardized Score 39 77   Modified [place] Scale   Modified Jair Scale 4       Sharan Saleem [place] Laura, OTR/L

## 2022-02-17 NOTE — PROGRESS NOTES
Pt w/lower extremity redness- worse than yesterday, per patient and PT/OT  Pt also c/o pins & needles in her pinky and weaker hand grasps than prior per OT    Susan Escoto aware of new findings and will f/u with patient shortly

## 2022-02-17 NOTE — RESTORATIVE TECHNICIAN NOTE
Restorative Technician Note      Patient Name: Swati Castro     Note Type: Mobility  Patient Position Upon Consult: Bedside chair  Activity Performed: Stood; Repositioned  Assistive Device: Roller walker  Patient Position at End of Consult: Bedside chair;  All needs within reach

## 2022-02-17 NOTE — PHYSICAL THERAPY NOTE
PHYSICAL THERAPY NOTE          Patient Name: Dexter Lara  FWHSX'Z Date: 2/17/2022 02/17/22 1150   PT Last Visit   PT Visit Date 02/17/22   Note Type   Note Type Treatment   Pain Assessment   Pain Assessment Tool 0-10   Pain Score No Pain   Restrictions/Precautions   Weight Bearing Precautions Per Order No   Other Precautions Fall Risk   General   Chart Reviewed Yes   Response to Previous Treatment Patient with no complaints from previous session  Family/Caregiver Present No   Cognition   Overall Cognitive Status WFL   Arousal/Participation Alert   Attention Within functional limits   Memory Within functional limits   Following Commands Follows all commands and directions without difficulty   Comments Pt becoming more confident in self and abilities  Subjective   Subjective Pt pleasant and agreeable to participate in therapy session   Bed Mobility   Additional Comments OOB in chair upon PT arrival   Pt left upright in bedside chair with all needs in reach  Transfers   Sit to Stand 5  Supervision   Additional items Increased time required   Stand to Sit 5  Supervision   Additional items Increased time required   Additional Comments Transfers with RW   VC for hand placement and safety  Ambulation/Elevation   Gait pattern Excessively slow; Short stride;Decreased foot clearance; Improper Weight shift  (increased lateral sway)   Gait Assistance 4  Minimal assist   Additional items Assist x 1;Verbal cues; Tactile cues   Assistive Device Bariatric Rolling walker   Distance 70 ft x 2 with sitting rest breaks   Balance   Static Sitting Fair +   Dynamic Sitting Fair   Static Standing Fair -   Dynamic Standing Fair -   Ambulatory Poor +   Endurance Deficit   Endurance Deficit Yes   Endurance Deficit Description fatigue, weakness   Activity Tolerance   Activity Tolerance Patient tolerated treatment well   Medical Staff Made Aware restorative [facility]   Nurse Made Aware RN cleared pt to be seen by PT   Assessment Prognosis Good   Problem List Decreased strength;Decreased range of motion;Decreased endurance; Impaired balance;Decreased mobility;Pain   Assessment Pt seen for PT treatment session with focus on functional transfers, functional mobility  Pt making steady progress toward goals this session  Pt continues to progress with ambulatory distance as well as LE strength evident by ability to perform transfers without assist this session  Pt continues to be limited by decreased endurance, however, not nearly as significantly as prior  Pt has become more confident and self and abilities allowing for increased participation in therapy  Pt left upright in bedside chair with all needs in reach  Pt will benefit from skilled therapy in order to address current impairments and functional limitations  PT to follow pt and recommending rehab once medically cleared  The patient's AM-PAC Basic Mobility Inpatient Short Form Raw Score is 17  A Raw score of greater than 16 suggests the patient may benefit from discharge to home  Please also refer to the recommendation of the Physical Therapist for safe discharge planning  Based on pt current functional status, recommend DC to rehab  Barriers to Discharge Inaccessible home environment;Decreased caregiver support   Goals   Patient Goals to get better   STG Expiration Date 03/03/22   Short Term Goal #2 goals remain appropriate with extended time   Plan   Treatment/Interventions Functional transfer training;LE strengthening/ROM; Elevations; Therapeutic exercise; Endurance training;Patient/family training;Equipment eval/education; Bed mobility;Gait training;Spoke to nursing   Progress Progressing toward goals   PT Frequency 3-5x/wk   Recommendation   PT Discharge Recommendation Post acute rehabilitation services   Additional Comments PT cleared pt to DC once medically cleared   AM-PAC Basic Mobility Inpatient   Turning in Bed Without Bedrails 3   Lying on Back to Sitting on Edge of Flat Bed 3   Moving Bed to Chair 3   Standing Up From Chair 3   Walk in Room 3   Climb 3-5 Stairs 2   Basic Mobility Inpatient Raw Score 17   Basic Mobility Standardized Score 39 67   Highest Level Of Mobility   -HL Goal 5: Stand one or more mins   -[facility] Highest Level of Mobility 7: Walk 25 feet or more   -HLM Goal Achieved Yes     Yohana Guthrie, PT, DPT

## 2022-02-17 NOTE — PLAN OF CARE
Problem: PHYSICAL THERAPY ADULT  Goal: Performs mobility at highest level of function for planned discharge setting  See evaluation for individualized goals  Description: Treatment/Interventions: Functional transfer training,LE strengthening/ROM,Therapeutic exercise,Endurance training,Patient/family training,Equipment eval/education,Bed mobility,Gait training,OT,Spoke to nursing  Equipment Recommended: Dina Watson (bariatric RW )       See flowsheet documentation for full assessment, interventions and recommendations  Outcome: Progressing  Note: Prognosis: Good  Problem List: Decreased strength,Decreased range of motion,Decreased endurance,Impaired balance,Decreased mobility,Pain  Assessment: Pt seen for PT treatment session with focus on functional transfers, functional mobility  Pt making steady progress toward goals this session  Pt continues to progress with ambulatory distance as well as LE strength evident by ability to perform transfers without assist this session  Pt continues to be limited by decreased endurance, however, not nearly as significantly as prior  Pt has become more confident and self and abilities allowing for increased participation in therapy  Pt left upright in bedside chair with all needs in reach  Pt will benefit from skilled therapy in order to address current impairments and functional limitations  PT to follow pt and recommending rehab once medically cleared  The patient's AM-PAC Basic Mobility Inpatient Short Form Raw Score is 17  A Raw score of greater than 16 suggests the patient may benefit from discharge to home  Please also refer to the recommendation of the Physical Therapist for safe discharge planning  Based on pt current functional status, recommend DC to rehab  Barriers to Discharge: Inaccessible home environment,Decreased caregiver support        PT Discharge Recommendation: Post acute rehabilitation services          See flowsheet documentation for full assessment

## 2022-02-17 NOTE — PLAN OF CARE
Problem: OCCUPATIONAL THERAPY ADULT  Goal: Performs self-care activities at highest level of function for planned discharge setting  See evaluation for individualized goals  Description: Treatment Interventions: ADL retraining,Functional transfer training,UE strengthening/ROM,Endurance training,Cognitive reorientation,Patient/family training,Equipment evaluation/education,Compensatory technique education,Continued evaluation,Energy conservation,Activityengagement          See flowsheet documentation for full assessment, interventions and recommendations  Outcome: Progressing  Note: Limitation: Decreased ADL status,Decreased UE strength,Decreased Safe judgement during ADL,Decreased cognition,Decreased endurance,Decreased self-care trans,Decreased high-level ADLs  Prognosis: Fair  Assessment: Pt seen on this date for OT session focusing on UE/digit ROM and strength  Pt made OT aware that she has b/l numbness/tingling in pinkies  ROM appears intact at this time, however R hand  weaker than L (about 3/5)  Pt reports this is new onset, and she is having trouble with maintaining grasp on objects  AROM in all digits performed, AROM remains intact, however b/l pinkies do appear to have decreased strength, which is impacting fine motor and dexterity at this time  Pt also engaged in therapy ball squeezes 2x10 this date, and has difficulty with sustaining grasp in R hand as compared to L  Made RN aware  Pt w improvements in her overall functional ability, however remains limited 2* decreased ADL/High-level ADL status, decreased activity tolerance/endurance, decreased UE strength/ROM, decreased self-care trans, decreased safety awareness and insight to condition  The patient's raw score on the AM-PAC Daily Activity inpatient short form is 15, standardized score is 34 69, less than 39 4  Patients at this level are likely to benefit from discharge to post-acute rehabilitation services   Please refer to the recommendation of the Occupational Therapist for safe discharge planning  Recommending pt D/C to STR when medically stable  Pt will continue to benefit from acute OT services to meet goals  OT Discharge Recommendation: Post acute rehabilitation services  OT - OK to Discharge:  Yes

## 2022-02-17 NOTE — PROGRESS NOTES
Progress Note - General Surgery   Bryce Rm 60 y o  female MRN: [id number]  Unit/Bed#: [facility] 511-01 Encounter: [id number]    Assessment:  61 y/o F w morbid obesity and sigmoid ca complicated by perforation, s/p Ortega's procedure on 1/17     Vitals stable, afebrile  Labs pending     CWZ 444 recorded  Malecot 0cc recorded  Ostomy 200cc recorded    Plan:  - regular diet  - Rectal malecot, monitor output and character  - ostomy, monitor output and character  - pain and nausea control prn  - SQH  - encourage ambulation  - IS  - placement per CM, pt has been medically stable for d/c and awaiting placement, pt received first dose of covid-19 vaccine on 2/12    Subjective/Objective   Subjective:   Doing well, no complaints, no acute events overnight, continues to tolerate diet without n/v, continues to have colostomy function, OOB with assistance  Objective:     Blood pressure 125/78, pulse 101, temperature 98 2 °F (36 8 °C), temperature source Oral, resp  rate 20, height 5' 7" (1 702 m), weight (!) 166 kg (366 lb 11 2 oz), SpO2 95 %  ,Body mass index is 57 43 kg/m²  Intake/Output Summary (Last 24 hours) at 2/17/2022 2336  Last data filed at 2/17/2022 0501  Gross per 24 hour   Intake --   Output 447 ml   Net -447 ml       Invasive Devices  Report    Peripherally Inserted Central Catheter Line            PICC Line 14/42/12 Left Basilic 22 days          Drain            Closed/Suction Drain RLQ Bulb 19 Fr  31 days    Colostomy Other (comment) LUQ 31 days    Rectal Tube Without balloon 31 days                Physical Exam:   General: NAD, in chair  Skin: Warm, dry, anicteric  HEENT: Normocephalic, atraumatic  CV: RRR, no m/r/g  Pulm: CTA b/l, no inc WOB  Abd: Soft, ND/obese, NT, ostomy functional  MSK: Symmetric, no edema, no tenderness, no deformity  Neuro: AOx3, GCS 15    Lab, Imaging and other studies:I have personally reviewed pertinent lab results    , CBC: No results found for: WBC, HGB, HCT, MCV, PLT, ADJUSTEDWBC, MCH, MCHC, RDW, MPV, NRBC, CMP: No results found for: SODIUM, K, CL, CO2, ANIONGAP, BUN, CREATININE, GLUCOSE, CALCIUM, AST, ALT, ALKPHOS, PROT, BILITOT, EGFR  VTE Pharmacologic Prophylaxis: Sequential compression device (Venodyne)  and Heparin  VTE Mechanical Prophylaxis: sequential compression device

## 2022-02-18 NOTE — WOUND OSTOMY CARE
Progress Note- Ostomy  Danna Appl 60 y o  female  [id number]  [facility] 511-[facility] 511-01        Assessment:  Patient is awake and alert, agreeable to visit  Patient's ostomy bag leaking at the time of assessment  Removed and discarded old bag  Peristomal skin cleansed  Stoma is oval in shape and inverted, measuring 4X5cm  Peristomal skin is pink and intact  Skin prep applied, then large gege ring to mold around peristomal skin and flat, one piece bag applied  No stool output during assessment and teaching, just some gas  Plan:  Patient today asked questions about colostomy care  Patient very anxious at this time due to resistance to teaching in the past and current lack of knowledge of care  As this nurse was performing the colostomy care went over step by step instructions to how to measure and change bag  Saved template from measurement today and explained to patient that she may use this template to cut bags moving forward as long as stoma measurement does not change  Patient reports that she has been trying to empty the bag herself and encouraged her to continue to learn about care  Will continue educating patient with every appliance  change and encouraged patient to participate in care with every appliance change  Colostomy Other (comment) LUQ (Active)   Stomal Appliance 1 piece 02/18/22 1401   Stoma Assessment Pale;Pink; Other (Comment) 02/18/22 1401   Stoma Shape Oval 02/18/22 1401   Peristomal Assessment Pink; Intact 02/18/22 1401   Treatment Bag change;Site care 02/18/22 1401   Output (mL) 200 mL 02/17/22 0301   Number of days: 28                         Floresita CASTELLONN, RN, [employer]

## 2022-02-18 NOTE — PROGRESS NOTES
Progress Note - General Surgery   Rosalind Peña 60 y o  female MRN: [id number]  Unit/Bed#: [facility] 511-01 Encounter: [id number]    Assessment:  59 y/o F w morbid obesity and sigmoid ca complicated by perforation, s/p Ortega's procedure on 1/17     Vitals stable, afebrile  Labs pending     UOP 200 recorded  Malecot 0cc recorded  Ostomy 0cc recorded    Plan:  - regular diet  - ACE wraps/elevation initiated for b/l LE swelling yesterday 2/17  - Rectal malecot, monitor output and character  - ostomy, monitor output and character  - pain and nausea control prn  - SQH  - encourage ambulation  - IS  - placement per CM, pt has been medically stable for d/c and awaiting placement, pt received first dose of covid-19 vaccine on 2/12, post acute rehab per PT/OT    Subjective/Objective   Subjective:   No acute events overnight, continues to tolerate diet and have colostomy function  Ambulating with assistance, using IS  Pt has had onset of b/l LE edema, counseled on ACE wraps and elevation for treatment and also counseled that large amounts of food with high sodium that she is ordering from [employer]/other delivery services is not helping the LE edema, reports understanding of this  Objective:     Blood pressure 149/90, pulse 102, temperature 98 2 °F (36 8 °C), temperature source Axillary, resp  rate 22, height 5' 7" (1 702 m), weight (!) 167 kg (367 lb 4 6 oz), SpO2 95 %  ,Body mass index is 57 53 kg/m²        Intake/Output Summary (Last 24 hours) at 2/18/2022 0829  Last data filed at 2/17/2022 1746  Gross per 24 hour   Intake 0 ml   Output 200 ml   Net -200 ml       Invasive Devices  Report    Peripherally Inserted Central Catheter Line            PICC Line 63/06/58 Left Basilic 23 days          Drain            Closed/Suction Drain RLQ Bulb 19 Fr  32 days    Colostomy Other (comment) LUQ 32 days    Rectal Tube Without balloon 32 days                Physical Exam:   General: NAD  Skin: Warm, dry, anicteric  HEENT: Normocephalic, atraumatic  CV: RRR, no m/r/g  Pulm: CTA b/l, no inc WOB  Abd: Soft, ND/NT, colostomy functional  MSK: Symmetric, no edema, no tenderness, no deformity  Neuro: AOx3, GCS 15    Lab, Imaging and other studies:I have personally reviewed pertinent lab results      VTE Pharmacologic Prophylaxis: Sequential compression device (Venodyne)  and Heparin  VTE Mechanical Prophylaxis: sequential compression device

## 2022-02-18 NOTE — RESTORATIVE TECHNICIAN NOTE
Restorative Technician Note      Patient Name: Baron Wood     Note Type: Mobility  Patient Position Upon Consult: Supine  Activity Performed: Ambulated  Assistive Device: Roller walker  Patient Position at End of Consult: Supine;  All needs within reach

## 2022-02-19 NOTE — PROGRESS NOTES
Progress Note - General Surgery   Archana Rivers 60 y o  female MRN: [id number]  Unit/Bed#: [facility] 511-01 Encounter: [id number]    Assessment:  60 y o  F with multiple medical comorbidities, recent COVID19 infection, and sigmoid colon cancer complicated by perforation, s/p Payal's procedure 1/17    Afebrile  VSS, on room air  Urine output 1 9L  Colostomy 600 cc liquid stool     Plan:  Continue regular diet + Ensure  Maintain PICC while inpatient, remove on d/c  Monitor colostomy output  Prn analgesia   OOB/Ambulate  PT/OT  Incentive spirometry  DVT ppx     Subjective/Objective     Subjective: No acute events overnight  Pain is controlled on prn analgesia  Colostomy is functioning  Tolerating diet without nausea/vomiting  No fevers, chills  She is complaining of numbness in bilateral 5th digits  Objective:     Blood pressure 125/88, pulse 98, temperature 97 9 °F (36 6 °C), temperature source Oral, resp  rate 20, height 5' 7" (1 702 m), weight (!) 168 kg (370 lb 4 8 oz), SpO2 96 %  ,Body mass index is 58 kg/m²  Intake/Output Summary (Last 24 hours) at 2/19/2022 0719  Last data filed at 2/19/2022 0401  Gross per 24 hour   Intake 960 ml   Output 2500 ml   Net -1540 ml       Invasive Devices  Report    Peripherally Inserted Central Catheter Line            PICC Line 04/43/99 Left Basilic 24 days          Drain            Closed/Suction Drain RLQ Bulb 19 Fr  33 days    Colostomy Other (comment) LUQ 33 days    Rectal Tube Without balloon 33 days                Physical Exam:   NAD, alert and oriented x3  Normocephalic, atraumatic  MMM  Norm resp effort on room air   Regular rate  Abd soft, nondistended, appropriately tender, midline incision is c/d/i  Retracted but pink colostomy with liquid stool and gas in bag     Bilateral lower extremity edema    -rash/lesions      Lab, Imaging and other studies:CBC: No results found for: WBC, HGB, HCT, MCV, PLT, ADJUSTEDWBC, MCH, MCHC, RDW, MPV, NRBC, CMP: No results found for: SODIUM, K, CL, CO2, ANIONGAP, BUN, CREATININE, GLUCOSE, CALCIUM, AST, ALT, ALKPHOS, PROT, BILITOT, EGFR  VTE Pharmacologic Prophylaxis: Heparin  VTE Mechanical Prophylaxis: sequential compression device

## 2022-02-20 NOTE — PROGRESS NOTES
Progress Note - General Surgery   Lucinda Rojas 60 y o  female MRN: [id number]  Unit/Bed#: [facility] 511-01 Encounter: [id number]    Assessment:  60 y o  F with multiple medical comorbidities, recent COVID19 infection, and sigmoid colon cancer complicated by perforation, s/p Payal's procedure 1/17    Afebrile  VSS, on room air  Urine output 1 5L  Colostomy 4 unmeasured BMs    Plan:  Continue regular diet + Ensure  Maintain PICC while inpatient, remove on d/c  Monitor colostomy output  Prn analgesia   OOB/Ambulate  PT/OT  Incentive spirometry  DVT ppx     Subjective/Objective     Subjective: No acute events overnight  Pain is controlled on prn analgesia  Her colostomy is functioning  Tolerating diet without nausea/vomiting  No fevers, chills  Objective:     Blood pressure 120/81, pulse (!) 110, temperature 99 8 °F (37 7 °C), temperature source Oral, resp  rate 16, height 5' 7" (1 702 m), weight (!) 168 kg (369 lb 4 8 oz), SpO2 94 %  ,Body mass index is 57 84 kg/m²  Intake/Output Summary (Last 24 hours) at 2/20/2022 0735  Last data filed at 2/20/2022 0458  Gross per 24 hour   Intake 1560 ml   Output 1576 ml   Net -16 ml       Invasive Devices  Report    Peripherally Inserted Central Catheter Line            PICC Line 86/50/57 Left Basilic 25 days          Drain            Colostomy Other (comment) LUQ 34 days                Physical Exam:   NAD, alert and oriented x3  Normocephalic, atraumatic  MMM  Norm resp effort on room air   Regular rate  Abd soft, NT/ND, incision c/d/i  Colostomy is retracted, but pink and with stool and gas in bag     Bilateral lower extremity edema    -rash/lesions        Lab, Imaging and other studies:CBC: No results found for: WBC, HGB, HCT, MCV, PLT, ADJUSTEDWBC, MCH, MCHC, RDW, MPV, NRBC, CMP: No results found for: SODIUM, K, CL, CO2, ANIONGAP, BUN, CREATININE, GLUCOSE, CALCIUM, AST, ALT, ALKPHOS, PROT, BILITOT, EGFR  VTE Pharmacologic Prophylaxis: Heparin  VTE Mechanical Prophylaxis: sequential compression device

## 2022-02-20 NOTE — PLAN OF CARE
Problem: Prexisting or High Potential for Compromised Skin Integrity  Goal: Skin integrity is maintained or improved  Description: INTERVENTIONS:  - Identify patients at risk for skin breakdown  - Assess and monitor skin integrity  - Assess and monitor nutrition and hydration status  - Monitor labs   - Assess for incontinence   - Turn and reposition patient  - Assist with mobility/ambulation  - Relieve pressure over bony prominences  - Avoid friction and shearing  - Provide appropriate hygiene as needed including keeping skin clean and dry  - Evaluate need for skin moisturizer/barrier cream  - Collaborate with interdisciplinary team   - Patient/family teaching  - Consider wound care consult   Outcome: Progressing     Problem: MOBILITY - ADULT  Goal: Maintain or return to baseline ADL function  Description: INTERVENTIONS:  -  Assess patient's ability to carry out ADLs; assess patient's baseline for ADL function and identify physical deficits which impact ability to perform ADLs (bathing, care of mouth/teeth, toileting, grooming, dressing, etc )  - Assess/evaluate cause of self-care deficits   - Assess range of motion  - Assess patient's mobility; develop plan if impaired  - Assess patient's need for assistive devices and provide as appropriate  - Encourage maximum independence but intervene and supervise when necessary  - Involve family in performance of ADLs  - Assess for home care needs following discharge   - Consider OT consult to assist with ADL evaluation and planning for discharge  - Provide patient education as appropriate  Outcome: Progressing  Goal: Maintains/Returns to pre admission functional level  Description: INTERVENTIONS:  - Perform BMAT or MOVE assessment daily    - Set and communicate daily mobility goal to care team and patient/family/caregiver  - Collaborate with rehabilitation services on mobility goals if consulted  - Perform Range of Motion 3 times a day    - Reposition patient every 2 hours   - Dangle patient 3 times a day  - Stand patient 3 times a day  - Ambulate patient 3 times a day  - Out of bed to chair 3 times a day   - Out of bed for meals 3 times a day  - Out of bed for toileting  - Record patient progress and toleration of activity level   Outcome: Progressing     Problem: Potential for Falls  Goal: Patient will remain free of falls  Description: INTERVENTIONS:  - Educate patient/family on patient safety including physical limitations  - Instruct patient to call for assistance with activity   - Consult OT/PT to assist with strengthening/mobility   - Keep Call bell within reach  - Keep bed low and locked with side rails adjusted as appropriate  - Keep care items and personal belongings within reach  - Initiate and maintain comfort rounds  - Make Fall Risk Sign visible to staff  - Offer Toileting every 2 Hours, in advance of need  - Initiate/Maintain bed alarm  - Apply yellow socks and bracelet for high fall risk patients  - Consider moving patient to room near nurses station  Outcome: Progressing     Problem: GASTROINTESTINAL - ADULT  Goal: Minimal or absence of nausea and/or vomiting  Description: INTERVENTIONS:  - Administer IV fluids if ordered to ensure adequate hydration  - Maintain NPO status until nausea and vomiting are resolved  - Nasogastric tube if ordered  - Administer ordered antiemetic medications as needed  - Provide nonpharmacologic comfort measures as appropriate  - Advance diet as tolerated, if ordered  - Consider nutrition services referral to assist patient with adequate nutrition and appropriate food choices  Outcome: Progressing  Goal: Maintains or returns to baseline bowel function  Description: INTERVENTIONS:  - Assess bowel function  - Encourage oral fluids to ensure adequate hydration  - Administer IV fluids if ordered to ensure adequate hydration  - Administer ordered medications as needed  - Encourage mobilization and activity  - Consider nutritional services referral to assist patient with adequate nutrition and appropriate food choices  Outcome: Progressing  Goal: Maintains adequate nutritional intake  Description: INTERVENTIONS:  - Monitor percentage of each meal consumed  - Identify factors contributing to decreased intake, treat as appropriate  - Assist with meals as needed  - Monitor I&O, weight, and lab values if indicated  - Obtain nutrition services referral as needed  Outcome: Progressing  Goal: Establish and maintain optimal ostomy function  Description: INTERVENTIONS:  - Assess bowel function  - Encourage oral fluids to ensure adequate hydration  - Administer IV fluids if ordered to ensure adequate hydration   - Administer ordered medications as needed  - Encourage mobilization and activity  - Nutrition services referral to assist patient with appropriate food choices  - Assess stoma site  - Consider wound care consult   Outcome: Progressing  Goal: Oral mucous membranes remain intact  Description: INTERVENTIONS  - Assess oral mucosa and hygiene practices  - Implement preventative oral hygiene regimen  - Implement oral medicated treatments as ordered  - Initiate Nutrition services referral as needed  Outcome: Progressing     Problem: GENITOURINARY - ADULT  Goal: Maintains or returns to baseline urinary function  Description: INTERVENTIONS:  - Assess urinary function  - Encourage oral fluids to ensure adequate hydration if ordered  - Administer IV fluids as ordered to ensure adequate hydration  - Administer ordered medications as needed  - Offer frequent toileting  - Follow urinary retention protocol if ordered  Outcome: Progressing     Problem: SKIN/TISSUE INTEGRITY - ADULT  Goal: Skin Integrity remains intact(Skin Breakdown Prevention)  Description: Assess:  -Perform Surendra assessment every 12  -Clean and moisturize skin every 4  -Inspect skin when repositioning, toileting, and assisting with ADLS  -Assess extremities for adequate circulation and sensation Bed Management:  -Have minimal linens on bed & keep smooth, unwrinkled  -Change linens as needed when moist or perspiring  -Avoid sitting or lying in one position for more than 2 hours while in bed  -Keep HOB at 30degrees     Toileting:  -Offer bedside commode  -Assess for incontinence every 2    Activity:  -Mobilize patient 3 times a day  -Encourage activity and walks on unit  -Encourage or provide ROM exercises   -Turn and reposition patient every 2 Hours  -Use appropriate equipment to lift or move patient in bed  -Instruct/ Assist with weight shifting every 2 hour when out of bed in chair  -Consider limitation of chair time 8 hour intervals    Skin Care:  -Avoid use of baby powder, tape, friction and shearing, hot water or constrictive clothing  -Relieve pressure over bony prominences using allevyns  -Do not massage red bony areas  Outcome: Progressing  Goal: Incision(s), wounds(s) or drain site(s) healing without S/S of infection  Description: INTERVENTIONS  - Assess and document dressing, incision, wound bed, drain sites and surrounding tissue  - Provide patient and family education  - Perform skin care/dressing changes as needed  Outcome: Progressing  Goal: Pressure injury heals and does not worsen  Description: Interventions:  - Implement low air loss mattress or specialty surface (Criteria met)  - Apply silicone foam dressing  - Instruct/assist with weight shifting every 120 minutes when in chair   - Limit chair time to 8 hour intervals  - Use special pressure reducing interventions such as allevyns when in chair   - Apply fecal or urinary incontinence containment device   - Perform passive or active ROM every 4  - Turn and reposition patient & offload bony prominences every 2 hours   - Utilize friction reducing device or surface for transfers   - Consider nutrition services referral as needed  Outcome: Progressing     Problem: PAIN - ADULT  Goal: Verbalizes/displays adequate comfort level or baseline comfort level  Description: Interventions:  - Encourage patient to monitor pain and request assistance  - Assess pain using appropriate pain scale  - Administer analgesics based on type and severity of pain and evaluate response  - Implement non-pharmacological measures as appropriate and evaluate response  - Consider cultural and social influences on pain and pain management  - Notify physician/advanced practitioner if interventions unsuccessful or patient reports new pain  Outcome: Progressing     Problem: INFECTION - ADULT  Goal: Absence or prevention of progression during hospitalization  Description: INTERVENTIONS:  - Assess and monitor for signs and symptoms of infection  - Monitor lab/diagnostic results  - Monitor all insertion sites, i e  indwelling lines, tubes, and drains  - Monitor endotracheal if appropriate and nasal secretions for changes in amount and color  - [place] appropriate cooling/warming therapies per order  - Administer medications as ordered  - Instruct and encourage patient and family to use good hand hygiene technique  - Identify and instruct in appropriate isolation precautions for identified infection/condition  Outcome: Progressing     Problem: SAFETY ADULT  Goal: Maintain or return to baseline ADL function  Description: INTERVENTIONS:  -  Assess patient's ability to carry out ADLs; assess patient's baseline for ADL function and identify physical deficits which impact ability to perform ADLs (bathing, care of mouth/teeth, toileting, grooming, dressing, etc )  - Assess/evaluate cause of self-care deficits   - Assess range of motion  - Assess patient's mobility; develop plan if impaired  - Assess patient's need for assistive devices and provide as appropriate  - Encourage maximum independence but intervene and supervise when necessary  - Involve family in performance of ADLs  - Assess for home care needs following discharge   - Consider OT consult to assist with ADL evaluation and planning for discharge  - Provide patient education as appropriate  Outcome: Progressing  Goal: Maintains/Returns to pre admission functional level  Description: INTERVENTIONS:  - Perform BMAT or MOVE assessment daily    - Set and communicate daily mobility goal to care team and patient/family/caregiver  - Collaborate with rehabilitation services on mobility goals if consulted  - Perform Range of Motion 3 times a day  - Reposition patient every 2 hours    - Dangle patient 3 times a day  - Stand patient 3 times a day  - Ambulate patient 3 times a day  - Out of bed to chair 3 times a day   - Out of bed for meals 3 times a day  - Out of bed for toileting  - Record patient progress and toleration of activity level   Outcome: Progressing  Goal: Patient will remain free of falls  Description: INTERVENTIONS:  - Educate patient/family on patient safety including physical limitations  - Instruct patient to call for assistance with activity   - Consult OT/PT to assist with strengthening/mobility   - Keep Call bell within reach  - Keep bed low and locked with side rails adjusted as appropriate  - Keep care items and personal belongings within reach  - Initiate and maintain comfort rounds  - Make Fall Risk Sign visible to staff  - Offer Toileting every 2 Hours, in advance of need  - Initiate/Maintain bed alarm  - Apply yellow socks and bracelet for high fall risk patients  - Consider moving patient to room near nurses station  Outcome: Progressing     Problem: DISCHARGE PLANNING  Goal: Discharge to home or other facility with appropriate resources  Description: INTERVENTIONS:  - Identify barriers to discharge w/patient and caregiver  - Arrange for needed discharge resources and transportation as appropriate  - Identify discharge learning needs (meds, wound care, etc )  - Arrange for interpretive services to assist at discharge as needed  - Refer to Case Management Department for coordinating discharge planning if the patient needs post-hospital services based on physician/advanced practitioner order or complex needs related to functional status, cognitive ability, or social support system  Outcome: Progressing     Problem: Knowledge Deficit  Goal: Patient/family/caregiver demonstrates understanding of disease process, treatment plan, medications, and discharge instructions  Description: Complete learning assessment and assess knowledge base  Interventions:  - Provide teaching at level of understanding  - Provide teaching via preferred learning methods  Outcome: Progressing     Problem: Nutrition/Hydration-ADULT  Goal: Nutrient/Hydration intake appropriate for improving, restoring or maintaining nutritional needs  Description: Monitor and assess patient's nutrition/hydration status for malnutrition  Collaborate with interdisciplinary team and initiate plan and interventions as ordered  Monitor patient's weight and dietary intake as ordered or per policy  Utilize nutrition screening tool and intervene as necessary  Determine patient's food preferences and provide high-protein, high-caloric foods as appropriate       INTERVENTIONS:  - Monitor oral intake, urinary output, labs, and treatment plans  - Assess nutrition and hydration status and recommend course of action  - Evaluate amount of meals eaten  - Assist patient with eating if necessary   - Allow adequate time for meals  - Recommend/ encourage appropriate diets, oral nutritional supplements, and vitamin/mineral supplements  - Order, calculate, and assess calorie counts as needed  - Recommend, monitor, and adjust tube feedings and TPN/PPN based on assessed needs  - Assess need for intravenous fluids  - Provide specific nutrition/hydration education as appropriate  - Include patient/family/caregiver in decisions related to nutrition  Outcome: Progressing     Problem: CHANGE IN BODY IMAGE  Goal: Pt/Family communicate acceptance of loss or change in body image and expresses psychological comfort and peace  Description: INTERVENTIONS:  - Assess patient/family anxiety and grief process related to change in body image, loss of functional status and loss of sense of self  - Assess patient/family's coping skills and provide emotional, spiritual and psychosocial support  - Provide information about the patient's health status with consideration of family and cultural values  - Communicate willingness to discuss loss and facilitate grief process with patient/family as appropriate  - Emphasize sustaining relationships within family system and community, or farzaneh/spiritual traditions  - Refer to community support groups as appropriate  - Initiate Spiritual Care, Pastoral care or other ancillary consults as needed  Outcome: Progressing

## 2022-02-20 NOTE — PHYSICAL THERAPY NOTE
PHYSICAL THERAPY Treatment NOTE    Patient Name: Simba JIM Date: 2/20/2022 02/20/22 1132   PT Last Visit   PT Visit Date 02/20/22   Note Type   Note Type Treatment   End of Consult   Patient Position at End of Consult Bedside chair; All needs within reach   Pain Assessment   Pain Score 8   Pain Location/Orientation Location: Abdomen   Restrictions/Precautions   Weight Bearing Precautions Per Order No   Other Precautions Fall Risk   General   Chart Reviewed Yes   Additional Pertinent History Pt  is a 59 yo F who presents with complicated hospitalization due to: wounds, perforated diverticulitis, hydronephrosis, + CoVID, and multiple urological procedures  Family/Caregiver Present No   Cognition   Overall Cognitive Status WFL   Arousal/Participation Cooperative   Attention Within functional limits   Orientation Level Oriented X4   Memory Within functional limits   Following Commands Follows all commands and directions without difficulty   Comments Pt  was identified with full name and birthdate   Subjective   Subjective Pt  agreeable to PT session   Bed Mobility   Supine to Sit 5  Supervision   Additional items Assist x 1; Increased time required;HOB elevated; Bedrails   Additional Comments Pt  seated OOB in recliner following PT session   Transfers   Sit to Stand 5  Supervision   Additional items Increased time required   Stand to Sit 5  Supervision   Additional items Increased time required   Ambulation/Elevation   Gait pattern Improper Weight shift; Forward Flexion; Wide KODY; Decreased foot clearance   Gait Assistance 4  Minimal assist   Additional items Assist x 1;Verbal cues  (for posture and gait mechanics)   Assistive Device Bariatric Rolling walker   Distance 10'x1, Pt  declines further ambulation at this time      Balance   Static Sitting Fair +   Dynamic Sitting Fair   Static Standing Fair -   Dynamic Standing Poor +   Ambulatory Poor +   Endurance Deficit   Endurance Deficit Yes   Endurance Deficit Description Postural and gait degradation noted with fatigue   Activity Tolerance   Activity Tolerance Patient limited by fatigue;Patient limited by pain   Nurse Made Aware Spoke to RN, requested Pt  OOB for lunch   Assessment   Prognosis Good   Problem List Decreased strength;Decreased range of motion;Decreased endurance; Impaired balance;Decreased mobility; Decreased coordination;Decreased safety awareness;Pain   Assessment Pt  is a 61 yo F who presents with complicated hospitalization due to: wounds, perforated diverticulitis, hydronephrosis, + CoVID, and multiple urological procedures  Pt  Agreeable to PT session, Pt  Identified with full name and birthdate  Pt  Demonstrated increased functional independence and increased activity tolerance as evidenced above  Pt  Tolerated OOB functional mobility including supervision with bed mobility, transfers and Rafael with ambulation  Pt  Limits gait distances at this time  Pt  Is progressing towards goals, as expected and will benefit from continued skilled therapy to increase functional independence and aid patient in return to PLOF with decreased burden of care  D/C recommendation is rehab when medically appropriate  Goals   Patient Goals to get stronger   STG Expiration Date 03/03/22   Plan   Treatment/Interventions Functional transfer training;LE strengthening/ROM; Therapeutic exercise; Endurance training;Cognitive reorientation;Equipment eval/education; Bed mobility;Gait training;Spoke to nursing   Progress Progressing toward goals   PT Frequency 3-5x/wk   Recommendation   PT Discharge Recommendation Post acute rehabilitation services   Equipment Recommended [address] Mobility Inpatient   Turning in Bed Without Bedrails 3   Lying on Back to Sitting on Edge of Flat Bed 3   Moving Bed to Chair 3   Standing Up From Chair 3   Walk in Room 3   Climb 3-5 Stairs 2 Basic Mobility Inpatient Raw Score 17   Basic Mobility Standardized Score 39 67   Highest Level Of Mobility   -[facility] Goal 5: Stand one or more mins   -M Highest Level of Mobility 6: Walk 10 steps or more   -[facility] Goal Achieved Yes   Education   Education Provided Mobility training   Patient Demonstrates acceptance/verbal understanding     The patient's AM-PAC Basic Mobility Inpatient Short Form Raw Score is 17  A Raw score of greater than 16 suggests the patient may benefit from discharge to home  Please also refer to the recommendation of the Physical Therapist for safe discharge planning      Monique Peña, PT, DPT 2/20/2022

## 2022-02-20 NOTE — PLAN OF CARE
Problem: PHYSICAL THERAPY ADULT  Goal: Performs mobility at highest level of function for planned discharge setting  See evaluation for individualized goals  Description: Treatment/Interventions: Functional transfer training,LE strengthening/ROM,Therapeutic exercise,Endurance training,Patient/family training,Equipment eval/education,Bed mobility,Gait training,OT,Spoke to nursing  Equipment Recommended: Kyle Resendiz (bariatric RW )       See flowsheet documentation for full assessment, interventions and recommendations  Outcome: Progressing  Note: Prognosis: Good  Problem List: Decreased strength,Decreased range of motion,Decreased endurance,Impaired balance,Decreased mobility,Decreased coordination,Decreased safety awareness,Pain  Assessment: Pt  is a 59 yo F who presents with complicated hospitalization due to: wounds, perforated diverticulitis, hydronephrosis, + CoVID, and multiple urological procedures  Pt  Agreeable to PT session, Pt  Identified with full name and birthdate  Pt  Demonstrated increased functional independence and increased activity tolerance as evidenced above  Pt  Tolerated OOB functional mobility including supervision with bed mobility, transfers and Rafael with ambulation  Pt  Limits gait distances at this time  Pt  Is progressing towards goals, as expected and will benefit from continued skilled therapy to increase functional independence and aid patient in return to PLOF with decreased burden of care  D/C recommendation is rehab when medically appropriate  Barriers to Discharge: Inaccessible home environment,Decreased caregiver support        PT Discharge Recommendation: Post acute rehabilitation services          See flowsheet documentation for full assessment

## 2022-02-21 NOTE — PROGRESS NOTES
Patient very frustrated that she feels she is being "blown off " Patient states that she feels her pain isnt being managed on her current regimen  This RN also has concerns, which the patient mimics, about her b/l LEs being reddened and painful to touch  Notified Gigi Macias with red surgery

## 2022-02-21 NOTE — QUICK NOTE
Nurse-Patient-Provider rounds were completed with the patient's nurse today, Shyla Schmitt  We discussed the plan is to consult pain management, she states nothing is controlling her pain    We reviewed all of the invasive devices/lines/telemetry orders   - None  PICC Line    DVT Prophylaxis:  - sq heparin    Pain Assessment / Plan:  - Continue current analgesic regimen  - consult to Acute Pain Team    Mobility Assessment / Plan:  - Activity as tolerated  Goals / Barriers for discharge:  - accepting facility  Case management following; case and discharge needs discussed  All questions and concerns were addressed  I spent greater than 15 minutes reviewing the plan with the patient and the nurse, and coordinating care for the day      Jarvis Meigs, CRNP  2/21/2022

## 2022-02-21 NOTE — RESTORATIVE TECHNICIAN NOTE
Restorative Technician Note      Patient Name: Everardo Villanueva     Note Type: Mobility  Patient Position Upon Consult: Seated edge of bed  Activity Performed: Ambulated  Assistive Device: Roller walker  Patient Position at End of Consult: Bedside chair;  All needs within reach

## 2022-02-21 NOTE — PLAN OF CARE
Problem: Prexisting or High Potential for Compromised Skin Integrity  Goal: Skin integrity is maintained or improved  Description: INTERVENTIONS:  - Identify patients at risk for skin breakdown  - Assess and monitor skin integrity  - Assess and monitor nutrition and hydration status  - Monitor labs   - Assess for incontinence   - Turn and reposition patient  - Assist with mobility/ambulation  - Relieve pressure over bony prominences  - Avoid friction and shearing  - Provide appropriate hygiene as needed including keeping skin clean and dry  - Evaluate need for skin moisturizer/barrier cream  - Collaborate with interdisciplinary team   - Patient/family teaching  - Consider wound care consult   Outcome: Progressing     Problem: MOBILITY - ADULT  Goal: Maintain or return to baseline ADL function  Description: INTERVENTIONS:  -  Assess patient's ability to carry out ADLs; assess patient's baseline for ADL function and identify physical deficits which impact ability to perform ADLs (bathing, care of mouth/teeth, toileting, grooming, dressing, etc )  - Assess/evaluate cause of self-care deficits   - Assess range of motion  - Assess patient's mobility; develop plan if impaired  - Assess patient's need for assistive devices and provide as appropriate  - Encourage maximum independence but intervene and supervise when necessary  - Involve family in performance of ADLs  - Assess for home care needs following discharge   - Consider OT consult to assist with ADL evaluation and planning for discharge  - Provide patient education as appropriate  Outcome: Progressing  Goal: Maintains/Returns to pre admission functional level  Description: INTERVENTIONS:  - Perform BMAT or MOVE assessment daily    - Set and communicate daily mobility goal to care team and patient/family/caregiver  - Collaborate with rehabilitation services on mobility goals if consulted  - Perform Range of Motion 3 times a day    - Reposition patient every 2 hours   - Dangle patient 3 times a day  - Stand patient 3 times a day  - Ambulate patient 3 times a day  - Out of bed to chair 3 times a day   - Out of bed for meals 3 times a day  - Out of bed for toileting  - Record patient progress and toleration of activity level   Outcome: Progressing     Problem: Potential for Falls  Goal: Patient will remain free of falls  Description: INTERVENTIONS:  - Educate patient/family on patient safety including physical limitations  - Instruct patient to call for assistance with activity   - Consult OT/PT to assist with strengthening/mobility   - Keep Call bell within reach  - Keep bed low and locked with side rails adjusted as appropriate  - Keep care items and personal belongings within reach  - Initiate and maintain comfort rounds  - Make Fall Risk Sign visible to staff  - Offer Toileting every 2 Hours, in advance of need  - Initiate/Maintain bed alarm  - Apply yellow socks and bracelet for high fall risk patients  - Consider moving patient to room near nurses station  Outcome: Progressing     Problem: GASTROINTESTINAL - ADULT  Goal: Minimal or absence of nausea and/or vomiting  Description: INTERVENTIONS:  - Administer IV fluids if ordered to ensure adequate hydration  - Maintain NPO status until nausea and vomiting are resolved  - Nasogastric tube if ordered  - Administer ordered antiemetic medications as needed  - Provide nonpharmacologic comfort measures as appropriate  - Advance diet as tolerated, if ordered  - Consider nutrition services referral to assist patient with adequate nutrition and appropriate food choices  Outcome: Progressing  Goal: Maintains or returns to baseline bowel function  Description: INTERVENTIONS:  - Assess bowel function  - Encourage oral fluids to ensure adequate hydration  - Administer IV fluids if ordered to ensure adequate hydration  - Administer ordered medications as needed  - Encourage mobilization and activity  - Consider nutritional services referral to assist patient with adequate nutrition and appropriate food choices  Outcome: Progressing  Goal: Maintains adequate nutritional intake  Description: INTERVENTIONS:  - Monitor percentage of each meal consumed  - Identify factors contributing to decreased intake, treat as appropriate  - Assist with meals as needed  - Monitor I&O, weight, and lab values if indicated  - Obtain nutrition services referral as needed  Outcome: Progressing  Goal: Establish and maintain optimal ostomy function  Description: INTERVENTIONS:  - Assess bowel function  - Encourage oral fluids to ensure adequate hydration  - Administer IV fluids if ordered to ensure adequate hydration   - Administer ordered medications as needed  - Encourage mobilization and activity  - Nutrition services referral to assist patient with appropriate food choices  - Assess stoma site  - Consider wound care consult   Outcome: Progressing  Goal: Oral mucous membranes remain intact  Description: INTERVENTIONS  - Assess oral mucosa and hygiene practices  - Implement preventative oral hygiene regimen  - Implement oral medicated treatments as ordered  - Initiate Nutrition services referral as needed  Outcome: Progressing     Problem: GENITOURINARY - ADULT  Goal: Maintains or returns to baseline urinary function  Description: INTERVENTIONS:  - Assess urinary function  - Encourage oral fluids to ensure adequate hydration if ordered  - Administer IV fluids as ordered to ensure adequate hydration  - Administer ordered medications as needed  - Offer frequent toileting  - Follow urinary retention protocol if ordered  Outcome: Progressing     Problem: SKIN/TISSUE INTEGRITY - ADULT  Goal: Skin Integrity remains intact(Skin Breakdown Prevention)  Description: Assess:  -Perform Surendra assessment every 12  -Clean and moisturize skin every 4  -Inspect skin when repositioning, toileting, and assisting with ADLS  -Assess extremities for adequate circulation and sensation Bed Management:  -Have minimal linens on bed & keep smooth, unwrinkled  -Change linens as needed when moist or perspiring  -Avoid sitting or lying in one position for more than 2 hours while in bed  -Keep HOB at 30degrees     Toileting:  -Offer bedside commode  -Assess for incontinence every 2    Activity:  -Mobilize patient 3 times a day  -Encourage activity and walks on unit  -Encourage or provide ROM exercises   -Turn and reposition patient every 2 Hours  -Use appropriate equipment to lift or move patient in bed  -Instruct/ Assist with weight shifting every 2 hour when out of bed in chair  -Consider limitation of chair time 8 hour intervals    Skin Care:  -Avoid use of baby powder, tape, friction and shearing, hot water or constrictive clothing  -Relieve pressure over bony prominences using allevyns  -Do not massage red bony areas  Outcome: Progressing  Goal: Incision(s), wounds(s) or drain site(s) healing without S/S of infection  Description: INTERVENTIONS  - Assess and document dressing, incision, wound bed, drain sites and surrounding tissue  - Provide patient and family education  - Perform skin care/dressing changes as needed  Outcome: Progressing  Goal: Pressure injury heals and does not worsen  Description: Interventions:  - Implement low air loss mattress or specialty surface (Criteria met)  - Apply silicone foam dressing  - Instruct/assist with weight shifting every 120 minutes when in chair   - Limit chair time to 8 hour intervals  - Use special pressure reducing interventions such as allevyns when in chair   - Apply fecal or urinary incontinence containment device   - Perform passive or active ROM every 4  - Turn and reposition patient & offload bony prominences every 2 hours   - Utilize friction reducing device or surface for transfers   - Consider nutrition services referral as needed  Outcome: Progressing     Problem: PAIN - ADULT  Goal: Verbalizes/displays adequate comfort level or baseline comfort level  Description: Interventions:  - Encourage patient to monitor pain and request assistance  - Assess pain using appropriate pain scale  - Administer analgesics based on type and severity of pain and evaluate response  - Implement non-pharmacological measures as appropriate and evaluate response  - Consider cultural and social influences on pain and pain management  - Notify physician/advanced practitioner if interventions unsuccessful or patient reports new pain  Outcome: Progressing     Problem: INFECTION - ADULT  Goal: Absence or prevention of progression during hospitalization  Description: INTERVENTIONS:  - Assess and monitor for signs and symptoms of infection  - Monitor lab/diagnostic results  - Monitor all insertion sites, i e  indwelling lines, tubes, and drains  - Monitor endotracheal if appropriate and nasal secretions for changes in amount and color  - [place] appropriate cooling/warming therapies per order  - Administer medications as ordered  - Instruct and encourage patient and family to use good hand hygiene technique  - Identify and instruct in appropriate isolation precautions for identified infection/condition  Outcome: Progressing     Problem: SAFETY ADULT  Goal: Maintain or return to baseline ADL function  Description: INTERVENTIONS:  -  Assess patient's ability to carry out ADLs; assess patient's baseline for ADL function and identify physical deficits which impact ability to perform ADLs (bathing, care of mouth/teeth, toileting, grooming, dressing, etc )  - Assess/evaluate cause of self-care deficits   - Assess range of motion  - Assess patient's mobility; develop plan if impaired  - Assess patient's need for assistive devices and provide as appropriate  - Encourage maximum independence but intervene and supervise when necessary  - Involve family in performance of ADLs  - Assess for home care needs following discharge   - Consider OT consult to assist with ADL evaluation and planning for discharge  - Provide patient education as appropriate  Outcome: Progressing  Goal: Maintains/Returns to pre admission functional level  Description: INTERVENTIONS:  - Perform BMAT or MOVE assessment daily    - Set and communicate daily mobility goal to care team and patient/family/caregiver  - Collaborate with rehabilitation services on mobility goals if consulted  - Perform Range of Motion 3 times a day  - Reposition patient every 2 hours    - Dangle patient 3 times a day  - Stand patient 3 times a day  - Ambulate patient 3 times a day  - Out of bed to chair 3 times a day   - Out of bed for meals 3 times a day  - Out of bed for toileting  - Record patient progress and toleration of activity level   Outcome: Progressing  Goal: Patient will remain free of falls  Description: INTERVENTIONS:  - Educate patient/family on patient safety including physical limitations  - Instruct patient to call for assistance with activity   - Consult OT/PT to assist with strengthening/mobility   - Keep Call bell within reach  - Keep bed low and locked with side rails adjusted as appropriate  - Keep care items and personal belongings within reach  - Initiate and maintain comfort rounds  - Make Fall Risk Sign visible to staff  - Offer Toileting every 2 Hours, in advance of need  - Initiate/Maintain bed alarm  - Apply yellow socks and bracelet for high fall risk patients  - Consider moving patient to room near nurses station  Outcome: Progressing     Problem: DISCHARGE PLANNING  Goal: Discharge to home or other facility with appropriate resources  Description: INTERVENTIONS:  - Identify barriers to discharge w/patient and caregiver  - Arrange for needed discharge resources and transportation as appropriate  - Identify discharge learning needs (meds, wound care, etc )  - Arrange for interpretive services to assist at discharge as needed  - Refer to Case Management Department for coordinating discharge planning if the patient needs post-hospital services based on physician/advanced practitioner order or complex needs related to functional status, cognitive ability, or social support system  Outcome: Progressing     Problem: Knowledge Deficit  Goal: Patient/family/caregiver demonstrates understanding of disease process, treatment plan, medications, and discharge instructions  Description: Complete learning assessment and assess knowledge base  Interventions:  - Provide teaching at level of understanding  - Provide teaching via preferred learning methods  Outcome: Progressing     Problem: Nutrition/Hydration-ADULT  Goal: Nutrient/Hydration intake appropriate for improving, restoring or maintaining nutritional needs  Description: Monitor and assess patient's nutrition/hydration status for malnutrition  Collaborate with interdisciplinary team and initiate plan and interventions as ordered  Monitor patient's weight and dietary intake as ordered or per policy  Utilize nutrition screening tool and intervene as necessary  Determine patient's food preferences and provide high-protein, high-caloric foods as appropriate       INTERVENTIONS:  - Monitor oral intake, urinary output, labs, and treatment plans  - Assess nutrition and hydration status and recommend course of action  - Evaluate amount of meals eaten  - Assist patient with eating if necessary   - Allow adequate time for meals  - Recommend/ encourage appropriate diets, oral nutritional supplements, and vitamin/mineral supplements  - Order, calculate, and assess calorie counts as needed  - Recommend, monitor, and adjust tube feedings and TPN/PPN based on assessed needs  - Assess need for intravenous fluids  - Provide specific nutrition/hydration education as appropriate  - Include patient/family/caregiver in decisions related to nutrition  Outcome: Progressing     Problem: CHANGE IN BODY IMAGE  Goal: Pt/Family communicate acceptance of loss or change in body image and expresses psychological comfort and peace  Description: INTERVENTIONS:  - Assess patient/family anxiety and grief process related to change in body image, loss of functional status and loss of sense of self  - Assess patient/family's coping skills and provide emotional, spiritual and psychosocial support  - Provide information about the patient's health status with consideration of family and cultural values  - Communicate willingness to discuss loss and facilitate grief process with patient/family as appropriate  - Emphasize sustaining relationships within family system and community, or farzaneh/spiritual traditions  - Refer to community support groups as appropriate  - Initiate Spiritual Care, Pastoral care or other ancillary consults as needed  Outcome: Progressing

## 2022-02-21 NOTE — UTILIZATION REVIEW
Continued Stay Review  Date: 2/19/22 Saturday                      Current Patient Class:Inpatient               Current Level of Care: Acute Med Surg since 1/30/220     HPI:  23 y/o female initially admitted Inpatient to [place] [address] 1/16/22 inpatient as a transfer from [facility] due to perforated diverticulitis complicated by right hydronephrosis for emergent Surgery  Path shows adenocarcinoma   Also Right hydroureteronephrosis s/p Status post cystoscopy with right ureteral stent placement 1/16   Completed antibiotics 1/21/22  Completed course of fluconazole for thrush with possible candida esophagitis    Developed post op Ileus - NGT placed 1/22/22 and TPN  started  1/16/22 OR - LAROTOMY EXPLORATORY, DRAINAGE OF INTRA -ABDOMINAL ABSCESS (N/A)  CYSTOSCOPY RETROGRADE PYELOGRAM WITH INSERTION STENT URETERAL (Right)  END COLOSTOMY (N/A)  RIGHT SALPINGO-OOPHORECTOMY, LEFT OOPHORECTOMY     *TESTED positive for COVID on 1/16 2/19/22:  No acute events overnight  afebrile  vital signs wnl, on room air  pain controlled on prn analgesia  Colostomy is functioning - 600 cc liquid stool  1/9 L urine output  Tolerating diet without nausea/ vomiting  No fevers, chills  Ccomplaining of numbness in bilateral 5th digits  Plan - continue regular diet + ensure, maintain PICC while inpt + remove when d/c, prn analgesis, OOB + ambulate, incentive spirometry, PT + OT, DVT PPx      Vital Signs:  125/88, pulse 98, temperature 97 9 °F (36 6 °C), temperature source Oral, resp  rate 20, height 5' 7" (1 702 m), weight (!) 168 kg (370 lb 4 8 oz), SpO2 96 %  ,Body mass index is 58 kg/m²       Intake/Output Summary (Last 24 hours) at 2/19/2022 0719:  Gross per 24 hour   Intake 960 ml   Output 2500 ml   Net -1540 ml     Invasive Devices  Report     Peripherally Inserted Central Catheter Line               PICC Line 75/89/54 Left Basilic 24 days         Drain               Closed/Suction Drain RLQ Bulb 19 Fr  33 days      Colostomy Other (comment) LUQ 33 days      Rectal Tube Without balloon 33 days         Pertinent Labs/Diagnostic Results:     Results from last 7 days   Lab Units 02/17/22  1037 02/15/22  0745   WBC Thousand/uL 8 23 8 67   HEMOGLOBIN g/dL 8 9* 8 6*   HEMATOCRIT % 30 2* 28 3*   PLATELETS Thousands/uL 265 179   NEUTROS ABS Thousands/µL  --  5 04     Results from last 7 days   Lab Units 02/17/22  1037 02/15/22  0745   SODIUM mmol/L 136 137   POTASSIUM mmol/L 4 3 3 8   CHLORIDE mmol/L 106 107   CO2 mmol/L 24 23   ANION GAP mmol/L 6 7   BUN mg/dL 19 17   CREATININE mg/dL 0 90 0 88   EGFR ml/min/1 73sq m 70 72   CALCIUM mg/dL 8 6 7 8*     Results from last 7 days   Lab Units 02/17/22  1037 02/15/22  0745   GLUCOSE RANDOM mg/dL 111 124     Diet Regular; House; No Carbonation    Scheduled Medications:  Diclofenac Sodium, 2 g, Topical, 4x Daily  docusate sodium, 100 mg, Oral, BID  gabapentin, 300 mg, Oral, TID  heparin (porcine), 7,500 Units, Subcutaneous, Q8H [address]  melatonin, 6 mg, Oral, HS  methocarbamol, 500 mg, Oral, Q6H [address]  CAROLINE ANTIFUNGAL, , Topical, BID  nystatin, , Topical, BID  ondansetron, 4 mg, Oral, Q6H IRIS  pantoprazole, 40 mg, Oral, BID AC  senna, 1 tablet, Oral, BID    Continuous IV Infusions:  NONE    PRN Meds:  acetaminophen, 975 mg, Oral, Q6H PRN  albuterol, 2 puff, Inhalation, Q4H PRN  diazepam, 2 mg, Oral, Q8H PRN - 2/17 X 2, 2/18 X 2,  2/19 X 2  Labetalol HCl, 10 mg, Intravenous, Q4H PRN  oxyCODONE, 10 mg, Oral, Q4H PRN - 2/17 X 4, 2/18 X 4, 2/19 X 3  oxyCODONE, 5 mg, Oral, Q4H PRN  saliva substitute, 5 spray, Mouth/Throat, 4x Daily PRN    Discharge Plan: To be determined   Inpatient Case Management following for all discharge needs    Network Utilization Review Department  ATTENTION: Please call with any questions or concerns to [phone] and carefully listen to the prompts so that you are directed to the right person   All voicemails are confidential   Rupert Hancock all requests for admission clinical reviews, approved or denied determinations and any other requests to dedicated fax number below belonging to the campus where the patient is receiving treatment   List of dedicated fax numbers for the Facilities:  [address] [place] [address] DENIALS (Administrative/Medical Necessity) [phone]   [address]  [facility] (Maternity/NICU/Pediatrics) [phone]   [address] [place] [address]  [address] [facility] [place] [address] [id number] [address] [facility] [place] [address] [phone]

## 2022-02-21 NOTE — RESTORATIVE TECHNICIAN NOTE
Restorative Technician Note      Patient Name: Archana Rivers     Note Type: Mobility  Patient Position Upon Consult: Seated edge of bed  Activity Performed: Ambulated  Assistive Device: Roller walker

## 2022-02-21 NOTE — PROGRESS NOTES
Progress Note - General Surgery   Osvaldo Barriga 60 y o  female MRN: [id number]  Unit/Bed#: [facility] 511-01 Encounter: [id number]    Assessment:  61 y/o F w morbid obesity and sigmoid ca complicated by perforation, s/p Ortega's procedure on 1/17     Vitals stable, afebrile  WBC 8 4 from 8 2, Hb 8 6 from 8 9    PO intake 1220  BDH 4924 recorded  Malecot 0cc recorded  Ostomy 0cc recorded    Plan:  - regular diet  - ACE wraps/elevation initiated for b/l LE swelling yesterday 2/17  - Rectal malecot, monitor output and character  - ostomy, monitor output and character  - pain and nausea control prn  - SQH  - encourage ambulation  - IS  - placement per CM, pt has been medically stable for d/c and awaiting placement, pt received first dose of covid-19 vaccine on 2/12, post acute rehab per PT/OT    Subjective/Objective   Subjective:   No acute events overnight, continues to tolerate diet, continues to have bowel function, ambulating with assistance  Objective:     Blood pressure 137/83, pulse 104, temperature 99 4 °F (37 4 °C), temperature source Oral, resp  rate 20, height 5' 7" (1 702 m), weight (!) 167 kg (368 lb 6 4 oz), SpO2 95 %  ,Body mass index is 57 7 kg/m²  Intake/Output Summary (Last 24 hours) at 2/21/2022 7372  Last data filed at 2/21/2022 0325  Gross per 24 hour   Intake 1220 ml   Output 1395 ml   Net -175 ml       Invasive Devices  Report    Peripherally Inserted Central Catheter Line            PICC Line 63/57/53 Left Basilic 26 days          Drain            Colostomy Other (comment) LUQ 35 days    External Urinary Catheter <1 day                Physical Exam:  General: NAD  Skin: Warm, dry, anicteric  HEENT: Normocephalic, atraumatic  CV: RRR, no m/r/g  Pulm: CTA b/l, no inc WOB  Abd: Soft, ND/NT, colostomy functioning  MSK: Symmetric, no edema, no tenderness, no deformity  Neuro: AOx3, GCS 15    Lab, Imaging and other studies:  I have personally reviewed pertinent lab results    , CBC:   Lab Results Component Value Date    WBC 8 40 02/21/2022    HGB 8 6 (L) 02/21/2022    HCT 28 9 (L) 02/21/2022    MCV 88 02/21/2022     02/21/2022    MCH 26 1 (L) 02/21/2022    MCHC 29 8 (L) 02/21/2022    RDW 20 2 (H) 02/21/2022    MPV 8 6 (L) 02/21/2022    NRBC 0 02/21/2022   , CMP: No results found for: SODIUM, K, CL, CO2, ANIONGAP, BUN, CREATININE, GLUCOSE, CALCIUM, AST, ALT, ALKPHOS, PROT, BILITOT, EGFR  VTE Pharmacologic Prophylaxis: Sequential compression device (Venodyne)  and Heparin  VTE Mechanical Prophylaxis: sequential compression device

## 2022-02-21 NOTE — PROGRESS NOTES
Progress note - Palliative and Supportive Care   Megan Strauss 60 y o  female [id number]    Patient Active Problem List   Diagnosis    Lymphedema    Primary hypertension    Morbid obesity with BMI of 60 0-69 9, adult (HCC)    Hidradenitis suppurativa    Chronic midline low back pain with left-sided sciatica    Other specified anxiety disorders    Lumbar paraspinal muscle spasm    Borderline hyperlipidemia    Iron deficiency    GERD (gastroesophageal reflux disease)    Acute renal failure (ARF) (HCC)    Cellulitis of abdominal wall, chest wall and groin with wounds    Diverticulitis of large intestine with perforation    Right Hydroureteronephrosis    Subacute vaginitis    Dysphagia     Active issues specifically addressed today include:   Colon cancer complicated by perf s/p star's procedure  Cancer related pain   Abdominal pain  B/L LE pain and edema  Morbid obesity  Palliative care patient  Goals of care    Plan:  1  Symptom management -    - pain: not well controlled, changes made to meds in Epic are noted below:   - start oxycodone IR 10/15mg Q4P for moderate/severe pain  Dilaudid IV 0 5mg Q4P for BT pain  Schedule tylenol 975mg Q8H  Continue with gabapentin 300mg TID, Voltaren gel and robaxin 500mg Q6H  PSC will follow  Interval history:       Seen and examined at bedside  Patient is alert and pleasant on approach  Reports she did not sleep well last night due to pain  States pain meds are usually helpful but last night they did not seem to help her pain  Patient became tearful stating that she feels like giving up because of pain  Most of her pain is located in her abdomen  Patient is also requesting IV dilaudid as it helped with pain relief immediately  Explained I am happy to start her on IV dilaudid however she cannot be discharged on IV pain meds, she was understanding of the plan  Denies any other concerns   Denies constipation stating she is producing output via colostomy bag daily  MEDICATIONS / ALLERGIES:     all current active meds have been reviewed    Allergies   Allergen Reactions    Bee Venom Anaphylaxis, Shortness Of Breath and Swelling       OBJECTIVE:    Physical Exam  Physical Exam  Constitutional:       General: She is in acute distress  Appearance: She is obese  She is ill-appearing  HENT:      Head: Normocephalic and atraumatic  Nose: No congestion or rhinorrhea  Mouth/Throat:      Pharynx: No oropharyngeal exudate or posterior oropharyngeal erythema  Eyes:      General:         Right eye: No discharge  Left eye: No discharge  Extraocular Movements: Extraocular movements intact  Pulmonary:      Effort: Pulmonary effort is normal  No respiratory distress  Musculoskeletal:      Right lower leg: Edema present  Left lower leg: Edema present  Skin:     Capillary Refill: Capillary refill takes less than 2 seconds  Neurological:      Mental Status: She is alert and oriented to person, place, and time  Psychiatric:         Mood and Affect: Mood is anxious  Affect is tearful

## 2022-02-21 NOTE — CASE MANAGEMENT
Case Management Discharge Planning Note    Patient name Juan Diego Nair  Location [facility]-46 MRN [id number]  : 1965 Date 2022       Current Admission Date: 2022  Current Admission Diagnosis:Diverticulitis of large intestine with perforation   Patient Active Problem List    Diagnosis Date Noted    Dysphagia 2022    Acute renal failure (ARF) ([facility] ) 2022    Cellulitis of abdominal wall, chest wall and groin with wounds 2022    Diverticulitis of large intestine with perforation 2022    Right Hydroureteronephrosis 2022    Subacute vaginitis 2022    GERD (gastroesophageal reflux disease)     Other specified anxiety disorders 2019    Lumbar paraspinal muscle spasm 2019    Borderline hyperlipidemia 2019    Iron deficiency 2019    Chronic midline low back pain with left-sided sciatica 2019    Primary hypertension 2019    Morbid obesity with BMI of 60 0-69 9, adult ([facility] ) 2019    Hidradenitis suppurativa 2019    Lymphedema 2006      LOS (days): 36  Geometric Mean LOS (GMLOS) (days):   Days to GMLOS:     OBJECTIVE:  Risk of Unplanned Readmission Score: 24         Current admission status: Inpatient   Preferred Pharmacy:   [facility] [address] [facility] [address] Josiah Simons  [facility] [address]  Phone: [phone] Fax: [phone]    Primary Care Provider: Leonardo Yip PA-C    Primary Insurance: [employer] [place] [facility]  Secondary Insurance:     DISCHARGE DETAILS:    Other Referral/Resources/Interventions Provided:  Referral Comments: [facility] is not accepting patient, at this time she has no accepting snf

## 2022-02-22 NOTE — RESTORATIVE TECHNICIAN NOTE
Restorative Technician Note      Patient Name: Lizbeth [facility]     Restorative Tech Visit Date: 02/22/22  Note Type: Mobility  Patient Position Upon Consult: Bedside chair  Activity Performed: Ambulated; Transferred  Assistive Device: Roller walker  Patient Position at End of Consult: Supine;  All needs within reach

## 2022-02-22 NOTE — WOUND OSTOMY CARE
Progress Note - Wound   [place] Pac 60 y o  female MRN: [id number]  Unit/Bed#: [facility] 930-01 Encounter: [id number]        Assessment:   Patient seen for continued skin and wound care, she is awake, alert and oriented in bed agreeable for assessment and care  Findings  1- Stage 2 pressure injury under L breast slowly healing as evidence by surrounding hypopigmented scar tissue  Wound bed is 100% pink with no drainage  2-R under under pannus with small open wound  Wound bed 100% red and blanchable  3-Scattered stage 2 pressure injuries to sacro-buttock all closed except one small area to R buttock  Wound bed is 100% pink with intact surrounding  4- Left lateral thigh with healing stage 2  All wounds healed except two small residual wound  Heels remains intact, no other wound seen, mid abdominal incision is well approximated, closed with scattered scabs along incisional line  Skin and Wound Care Plan:    1  Apply skin nourishing cream to intact skin daily   2  Bariatric ehob offloading cushion to chair when OOB   3  Elevate heels off of bed with pillows to offload   4  Kreg bariatric bed   5  Turn and reposition patient Q2 hours   6  Right and left abdominal pannus - cleanse with soap and water then pat dry dust lightly with nystatin powder then apply maxorb to open areas then apply cut to fit ABD pad and secure lightly with tape change every other day   7  Bilateral buttocks bilateral groin and perineum area - cleanse with soap and water then apply janice antifungal cream bid and prn   8  Cleanse L breast wounds with NSS, pat dry, and apply bordered Allevyn foam dressing  Sancho dressing with T  Change every other day and as needed for soilage/dislodgement  9  Continue with ostomy care and teaching   10  Wound care will follow along with patient weekly, please call or tiger text with questions and concerns   11  Left posterior thigh--cleanse, pat dry  Apply Allevyn Life foam dressing   Change dressing every other day  12  Mid line incisional care as per surgery team     Vitals: Blood pressure 114/79, pulse 101, temperature 98 4 °F (36 9 °C), resp  rate 17, height 5' 7" (1 702 m), weight (!) 167 kg (368 lb 6 4 oz), SpO2 96 %  ,Body mass index is 57 7 kg/m²  Wound 11/22/21 Other (comment) Breast Left; Lower (Active)   Wound Image   02/22/22 1041   Wound Description Beefy red 02/22/22 1041   Pressure Injury Stage 2 02/22/22 1041   Luisa-wound Assessment Intact 02/22/22 1041   Wound Length (cm) 0 5 cm 02/22/22 1041   Wound Width (cm) 0 5 cm 02/22/22 1041   Wound Depth (cm) 0 01 cm 02/22/22 1041   Wound Surface Area (cm^2) 0 25 cm^2 02/22/22 1041   Wound Volume (cm^3) 0 0025 cm^3 02/22/22 1041   Calculated Wound Volume (cm^3) 0 cm^3 02/22/22 1041   Change in Wound Size % 100 02/22/22 1041   Tunneling 0 cm 01/24/22 1203   Tunneling in depth located at 0 01/24/22 1203   Undermining 0 01/24/22 1203   Undermining is depth extending from 0 01/24/22 1203   Drainage Amount None 02/22/22 1041   Drainage Description Serous 02/20/22 0838   Non-staged Wound Description Partial thickness 02/20/22 0838   Treatments Cleansed;Site care 02/20/22 0838   Dressing Foam, Silicon (eg  Allevyn, etc) 02/22/22 1041   Wound packed? No 01/24/22 1203   Packing- # removed 0 01/24/22 1203   Packing- # inserted 0 01/24/22 1203   Dressing Changed Changed 02/20/22 0838   Patient Tolerance Tolerated well 02/22/22 1041   Dressing Status Clean;Dry; Intact 02/21/22 1600       Wound 01/06/22 Other (comment) Other (Comment) Buttocks (Active)   Wound Image   02/22/22 1042   Wound Description Beefy red 02/22/22 1042   Pressure Injury Stage 2 02/22/22 1042   Luisa-wound Assessment Intact 02/22/22 1042   Wound Length (cm) 0 5 cm 02/22/22 1042   Wound Width (cm) 0 5 cm 02/22/22 1042   Wound Depth (cm) 0 01 cm 02/22/22 1042   Wound Surface Area (cm^2) 0 25 cm^2 02/22/22 1042   Wound Volume (cm^3) 0 0025 cm^3 02/22/22 1042   Calculated Wound Volume (cm^3) 0 cm^3 02/22/22 1042   Change in Wound Size % 100 02/22/22 1042   Drainage Amount None 02/22/22 1042   Drainage Description Serosanguineous 02/21/22 1600   Non-staged Wound Description Partial thickness 02/13/22 2000   Treatments Cleansed;Site care 02/19/22 1001   Dressing Protective barrier 02/22/22 1042   Wound packed? No 01/12/22 1751   Dressing Changed Changed 02/03/22 0800   Patient Tolerance Tolerated well 02/22/22 1042   Dressing Status Clean;Dry; Intact 02/21/22 1600       Wound 01/11/22 Pressure Injury Thigh Posterior;Left;Upper (Active)   Wound Image   02/22/22 1041   Wound Description Beefy red 02/22/22 1041   Pressure Injury Stage 2 02/22/22 1041   Luisa-wound Assessment Intact 02/22/22 1041   Wound Length (cm) 1 cm 02/22/22 1041   Wound Width (cm) 1 cm 02/22/22 1041   Wound Depth (cm) 0 01 cm 02/22/22 1041   Wound Surface Area (cm^2) 1 cm^2 02/22/22 1041   Wound Volume (cm^3) 0 01 cm^3 02/22/22 1041   Calculated Wound Volume (cm^3) 0 01 cm^3 02/22/22 1041   Change in Wound Size % 99 83 02/22/22 1041   Drainage Amount Scant 02/15/22 1112   Drainage Description Serosanguineous 02/21/22 1600   Non-staged Wound Description Partial thickness 02/21/22 1600   Treatments Cleansed;Site care 02/20/22 0838   Dressing Foam, Silicon (eg  Allevyn, etc) 02/22/22 1041   Dressing Changed Changed 02/20/22 0838   Patient Tolerance Tolerated well 02/22/22 1041   Dressing Status Clean;Dry; Intact 02/21/22 1600       Wound 01/17/22 Surgical Abdomen N/A;Mid (Active)   Wound Description Clean;Dry; Intact 02/21/22 1600   Luisa-wound Assessment Clean;Dry; Intact 02/21/22 1600   Wound Site Closure SHRUTI 02/21/22 1600   Drainage Amount None 02/20/22 2000   Drainage Description Serosanguineous 02/15/22 1700   Treatments Cleansed;Site care 02/20/22 0838   Dressing Open to air 02/20/22 2000   Wound packed?  No 02/15/22 1700   Packing- # inserted 8 01/17/22 0013   Dressing Changed Changed 02/15/22 1700   Patient Tolerance Tolerated well 02/20/22 2000   Dressing Status Clean;Dry; Intact 02/21/22 1600       Wound 02/21/22 Pressure Injury Abdomen Lower;Right (Active)   Wound Image   02/22/22 0928   Wound Description Beefy red 02/22/22 0928   Pressure Injury Stage 2 02/22/22 0928   Luisa-wound Assessment Intact 02/22/22 0928   Wound Length (cm) 0 3 cm 02/22/22 0928   Wound Width (cm) 0 3 cm 02/22/22 0928   Wound Depth (cm) 0 01 cm 02/22/22 0928   Wound Surface Area (cm^2) 0 09 cm^2 02/22/22 0928   Wound Volume (cm^3) 0 0009 cm^3 02/22/22 0928   Calculated Wound Volume (cm^3) 0 cm^3 02/22/22 0928   Drainage Amount None 02/22/22 0928   Treatments Site care 02/22/22 0928   Dressing Foam, Silicon (eg  Allevyn, etc) 02/22/22 0928   Dressing Changed Reinforced 02/22/22 0928   Patient Tolerance Tolerated well 02/21/22 0300   Dressing Status Clean;Dry; Intact 02/21/22 1600         Wound care to continue following, please call or tiger with questions and concerns        Dashawn Torres, RN, BSN, [facility]

## 2022-02-22 NOTE — QUICK NOTE
Nurse-Patient-Provider rounds were completed with the patient's nurse today, Elia Wills  We discussed the plan is to continue regular diet with ensures  Continue PICC line for IV access  Continue monitoring strict I's/O's from colostomy and malecot drain  Appreciate acute pain service assistance with pain control regimen  Await LE duplex study final read  Monitor LE swelling/pain/erythema  We reviewed all of the invasive devices/lines/telemetry orders  -PICC line  -Malecot Drain   -Colostomy    DVT Prophylaxis:  SQH and SCDs    Pain Assessment / Plan:  - Continue current analgesic regimen  -Appreciate APS assistance    Mobility Assessment / Plan:  - Activity as tolerated  Goals / Barriers for discharge:  -Patient is cleared for dc from a surgical standpoint   -Awaiting placement acceptance  -Case management following; case and discharge needs discussed  All questions and concerns were addressed  I spent greater than 15 minutes reviewing the plan with the patient and the nurse, and coordinating care for the day      René Valdovinos PA-C  2/22/2022

## 2022-02-22 NOTE — PROGRESS NOTES
Progress note - Palliative and Supportive Care   Simba Ellison 60 y o  female [id number]    Patient Active Problem List   Diagnosis    Lymphedema    Primary hypertension    Morbid obesity with BMI of 60 0-69 9, adult (HCC)    Hidradenitis suppurativa    Chronic midline low back pain with left-sided sciatica    Other specified anxiety disorders    Lumbar paraspinal muscle spasm    Borderline hyperlipidemia    Iron deficiency    GERD (gastroesophageal reflux disease)    Acute renal failure (ARF) (HCC)    Cellulitis of abdominal wall, chest wall and groin with wounds    Diverticulitis of large intestine with perforation    Right Hydroureteronephrosis    Subacute vaginitis    Dysphagia     Active issues specifically addressed today include:   Colon cancer complicated by perf s/p bunn procedure  Cancer related pain  Palliative care patient    Plan:  1  Symptom management -    - pain: well controlled on current regimen, continue   - health related anxiety: d/c valium  Trial of ativan 1mg BID PRN    2  Goals - disease directed cares  Interested in following with [facility] outpatient, our office will arrange  [facility] will continue to follow    Interval history:       Seen and examined at bedside, noted to be smiling upon arrival to the room  States pain is much better controlled with increase in pain meds  Denies side effects from pain meds  States most of her pain is with dressing changes and when she has severe pain she goes in to a panic attack  States at home she was started on hydroxyzine which has not helped with panic attacks  Here she was started on valium which has helped somewhat but continues with health anxiety and panic attacks with dressing changes  Discussed we can trial ativan while she is in the hospital  Explained side effects of taking opioid pain meds with benzodiazepines  Discussed need for follow-up outpatient with palliative for medication titration, refills, support and she agrees  MEDICATIONS / ALLERGIES:     all current active meds have been reviewed    Allergies   Allergen Reactions    Bee Venom Anaphylaxis, Shortness Of Breath and Swelling       OBJECTIVE:    Physical Exam  Physical Exam  Constitutional:       General: She is not in acute distress  Appearance: She is obese  She is not ill-appearing  HENT:      Head: Normocephalic and atraumatic  Nose: No congestion or rhinorrhea  Mouth/Throat:      Pharynx: No oropharyngeal exudate or posterior oropharyngeal erythema  Eyes:      General:         Right eye: No discharge  Left eye: No discharge  Extraocular Movements: Extraocular movements intact  Pulmonary:      Effort: Pulmonary effort is normal  No respiratory distress  Skin:     General: Skin is warm and dry  Capillary Refill: Capillary refill takes less than 2 seconds  Neurological:      Mental Status: She is alert and oriented to person, place, and time     Psychiatric:         Mood and Affect: Mood normal

## 2022-02-22 NOTE — PROGRESS NOTES
Progress Note - General Surgery   Shari Branch 60 y o  female MRN: [id number]  Unit/Bed#: [facility] 930-01 Encounter: [id number]    Assessment:  59 y/o F w/ morbid obesity, p/w perforated colon adenocarcinoma, s/p Ortega's procedure on 1/17    Plan:  --Regular diet  --Continue rectal malecot at rectal stump site   --Continue RLQ ADIA drain   --Continue ostomy care  --Local wound care  --CM assistance with SNF referrals    Subjective/Objective     Subjective:     No acute events overnight  Pt feels well, denies any complaints  Objective:     Blood pressure 142/87, pulse 104, temperature 97 6 °F (36 4 °C), resp  rate 20, height 5' 7" (1 702 m), weight (!) 167 kg (368 lb 6 4 oz), SpO2 95 %  ,Body mass index is 57 7 kg/m²  Intake/Output Summary (Last 24 hours) at 2/21/2022 2152  Last data filed at 2/21/2022 1201  Gross per 24 hour   Intake 0 ml   Output 400 ml   Net -400 ml       Invasive Devices  Report    Peripherally Inserted Central Catheter Line            PICC Line 82/95/34 Left Basilic 27 days          Drain            Colostomy Other (comment) LUQ 35 days                Physical Exam:    GEN: NAD  HEENT: MMM  CV: RRR  Lung: normal effort  Ab: Soft, NT/ND  Extrem: No CCE  Neuro:  A+Ox3, motor and sensation grossly intact    Lab, Imaging and other studies:  CBC:   Lab Results   Component Value Date    WBC 8 40 02/21/2022    HGB 8 6 (L) 02/21/2022    HCT 28 9 (L) 02/21/2022    MCV 88 02/21/2022     02/21/2022    MCH 26 1 (L) 02/21/2022    MCHC 29 8 (L) 02/21/2022    RDW 20 2 (H) 02/21/2022    MPV 8 6 (L) 02/21/2022    NRBC 0 02/21/2022   , CMP: No results found for: SODIUM, K, CL, CO2, ANIONGAP, BUN, CREATININE, GLUCOSE, CALCIUM, AST, ALT, ALKPHOS, PROT, BILITOT, EGFR, Coagulation: No results found for: PT, INR, APTT, Urinalysis: No results found for: COLORU, CLARITYU, SPECGRAV, PHUR, LEUKOCYTESUR, NITRITE, PROTEINUA, GLUCOSEU, KETONESU, BILIRUBINUR, BLOODU, Amylase: No results found for: AMYLASE  VTE Pharmacologic Prophylaxis: Heparin  VTE Mechanical Prophylaxis: sequential compression device

## 2022-02-22 NOTE — PLAN OF CARE
Problem: OCCUPATIONAL THERAPY ADULT  Goal: Performs self-care activities at highest level of function for planned discharge setting  See evaluation for individualized goals  Description: Treatment Interventions: ADL retraining,Functional transfer training,UE strengthening/ROM,Endurance training,Cognitive reorientation,Patient/family training,Equipment evaluation/education,Compensatory technique education,Continued evaluation,Energy conservation,Activityengagement          See flowsheet documentation for full assessment, interventions and recommendations  2/22/2022 1531 by Jonn Emanuel OT  Outcome: Progressing  Note: Limitation: Decreased ADL status,Decreased UE strength,Decreased Safe judgement during ADL,Decreased cognition,Decreased endurance,Decreased self-care trans,Decreased high-level ADLs  Prognosis: Fair  Assessment: Patient participated in Skilled OT session this date with interventions consisting of ADL re training with the use of correct body mechnaics, safety awareness and fall prevention techniques,  therapeutic activities to: increase activity tolerance, increase dynamic sit/ stand balance during functional activity  and increase OOB/ sitting tolerance   Upon arrival patient was found supine in bed  Pt demonstrated the following tasks: S sup to sit, STS, toilet transfer, and fnxl mobility with RW  Pt performed grooming MI, S for UB ADLs, and MAX A for LB ADLs  MAX A for toileting hygiene  Patient continues to be functioning below baseline level, occupational performance remains limited secondary to factors listed above and increased risk for falls and injury  From OT standpoint, recommendation at time of d/c would be home with skilled therapy and increased social support vs STR - pending progress    Patient to benefit from continued Occupational Therapy treatment while in the hospital to address deficits as defined above and maximize level of functional independence with ADLs and functional mobility  Pt was left in chair with alarm on after session with all current needs met  The patient's raw score on the AM-PAC Daily Activity inpatient short form is 17, standardized score is 37 26, less than 39 4  Patients at this level are likely to benefit from discharge to post-acute rehabilitation services  Please refer to the recommendation of the Occupational Therapist for safe discharge planning  OT Discharge Recommendation: Home with home health rehabilitation (vs STR - pending progress)  OT - OK to Discharge: Yes (yes to rehab, no to home )    2/22/2022 1530 by Ruby Radford OT  Note: Limitation: Decreased ADL status,Decreased UE strength,Decreased Safe judgement during ADL,Decreased cognition,Decreased endurance,Decreased self-care trans,Decreased high-level ADLs  Prognosis: Fair  Assessment: Patient participated in Skilled OT session this date with interventions consisting of ADL re training with the use of correct body mechnaics, safety awareness and fall prevention techniques,  therapeutic activities to: increase activity tolerance, increase dynamic sit/ stand balance during functional activity  and increase OOB/ sitting tolerance   Upon arrival patient was found supine in bed  Pt demonstrated the following tasks: S sup to sit, STS, toilet transfer, and fnxl mobility with RW  Pt performed grooming MI, S for UB ADLs, and MAX A for LB ADLs  MAX A for toileting hygiene  Patient continues to be functioning below baseline level, occupational performance remains limited secondary to factors listed above and increased risk for falls and injury  From OT standpoint, recommendation at time of d/c would be home with skilled therapy and increased social support vs STR - pending progress  Patient to benefit from continued Occupational Therapy treatment while in the hospital to address deficits as defined above and maximize level of functional independence with ADLs and functional mobility   Pt was left in chair with alarm on after session with all current needs met  The patient's raw score on the AM-PAC Daily Activity inpatient short form is 17, standardized score is 37 26, less than 39 4  Patients at this level are likely to benefit from discharge to post-acute rehabilitation services  Please refer to the recommendation of the Occupational Therapist for safe discharge planning       OT Discharge Recommendation: Home with home health rehabilitation (vs STR - pending progress)  OT - OK to Discharge: Yes (yes to rehab, no to home )

## 2022-02-22 NOTE — OCCUPATIONAL THERAPY NOTE
Occupational Therapy Progress Note     Patient Name: Joselyn Nelson  GIQWI'U Date: 2/22/2022  Problem List  Principal Problem:    Diverticulitis of large intestine with perforation  Active Problems:    Lymphedema    Primary hypertension    Morbid obesity with BMI of 60 0-69 9, adult ([address] )    Hidradenitis suppurativa    GERD (gastroesophageal reflux disease)    Right Hydroureteronephrosis          02/22/22 1158   OT Last Visit   OT Visit Date 02/22/22   Note Type   Note Type Treatment   Restrictions/Precautions   Weight Bearing Precautions Per Order No   Other Precautions Chair Alarm; Bed Alarm; Fall Risk;Pain   Lifestyle   Autonomy Pt reports being I w/ ADLS/IADLs, Mod I w/ transfers/functional mobility w/ use of crutches   Reciprocal Relationships Lives alone; reports no social supports but has son watching her dog   Service to Others On disability   Intrinsic Gratification Likes spending time w/ her dog   Pain Assessment   Pain Assessment Tool 0-10   Pain Score 8   Pain Location/Orientation Location: Abdomen   Hospital Pain Intervention(s) Repositioned; Ambulation/increased activity   ADL   Where Assessed Commode   Grooming Assistance 6  Modified Independent   Grooming Deficit Wash/dry face   UB Bathing Assistance 5  Supervision/Setup   LB Bathing Assistance 2  Maximal Assistance   LB Bathing Deficit Right lower leg including foot; Left lower leg including foot; Buttocks   UB Dressing Assistance 5  Supervision/Setup   UB Dressing Deficit Thread RUE; Thread LUE   LB Dressing Assistance 2  Maximal Assistance   LB Dressing Deficit Don/doff R sock; Don/doff L sock   Toileting Assistance  2  Maximal Assistance   Toileting Deficit Perineal hygiene   Bed Mobility   Supine to Sit 5  Supervision   Additional items Assist x 1;HOB elevated; Bedrails; Increased time required   Sit to Supine Unable to assess   Transfers   Sit to Stand 5  Supervision   Additional items Assist x 1; Increased time required   Stand to Sit 5 Supervision   Additional items Assist x 1; Increased time required   Toilet transfer 5  Supervision   Additional items Assist x 1;Commode   Additional Comments Transfers with RW    Functional Mobility   Functional Mobility 5  Supervision   Additional Comments Ax1   Additional items Rolling walker   Cognition   Overall Cognitive Status WFL   Arousal/Participation Responsive; Cooperative   Attention Attends with cues to redirect   Orientation Level Oriented X4   Memory Within functional limits   Following Commands Follows one step commands without difficulty   Comments Pt pleasant and cooperative t/o session    Activity Tolerance   Activity Tolerance Patient tolerated treatment well   Medical Staff Made Aware RN clearance for session    Assessment   Assessment Patient participated in Skilled OT session this date with interventions consisting of ADL re training with the use of correct body mechnaics, safety awareness and fall prevention techniques,  therapeutic activities to: increase activity tolerance, increase dynamic sit/ stand balance during functional activity  and increase OOB/ sitting tolerance   Upon arrival patient was found supine in bed  Pt demonstrated the following tasks: S sup to sit, STS, toilet transfer, and fnxl mobility with RW  Pt performed grooming MI, S for UB ADLs, and MAX A for LB ADLs  MAX A for toileting hygiene  Patient continues to be functioning below baseline level, occupational performance remains limited secondary to factors listed above and increased risk for falls and injury  From OT standpoint, recommendation at time of d/c would be home with skilled therapy and increased social support vs STR - pending progress  Patient to benefit from continued Occupational Therapy treatment while in the hospital to address deficits as defined above and maximize level of functional independence with ADLs and functional mobility   Pt was left in chair with alarm on after session with all current needs met  The patient's raw score on the AM-PAC Daily Activity inpatient short form is 17, standardized score is 37 26, less than 39 4  Patients at this level are likely to benefit from discharge to post-acute rehabilitation services  Please refer to the recommendation of the Occupational Therapist for safe discharge planning  Plan   Treatment Interventions ADL retraining;Functional transfer training;UE strengthening/ROM; Endurance training;Patient/family training;Equipment evaluation/education; Compensatory technique education;Continued evaluation; Activityengagement; Energy conservation   Goal Expiration Date 03/01/22   OT Treatment Day 11   OT Frequency 3-5x/wk   Recommendation   OT Discharge Recommendation Home with home health rehabilitation  (vs STR - pending progress)   OT - OK to Discharge Yes  (yes to rehab, no to home )   AM-[facility] Daily Activity Inpatient   Lower Body Dressing 2   Bathing 2   Toileting 2   Upper Body Dressing 3   Grooming 4   Eating 4   Daily Activity Raw Score 17   Daily Activity Standardized Score (Calc for Raw Score >=11) 37 26   AM-[facility] Applied Cognition Inpatient   Following a Speech/Presentation 3   Understanding Ordinary Conversation 4   Taking Medications 4   Remembering Where Things Are Placed or Put Away 3   Remembering List of 4-5 Errands 3   Taking Care of Complicated Tasks 3   Applied Cognition Raw Score 20   Applied Cognition Standardized Score 41 76     Eileen Tavares MS, OTR/L

## 2022-02-22 NOTE — PLAN OF CARE
Problem: Prexisting or High Potential for Compromised Skin Integrity  Goal: Skin integrity is maintained or improved  Description: INTERVENTIONS:  - Identify patients at risk for skin breakdown  - Assess and monitor skin integrity  - Assess and monitor nutrition and hydration status  - Monitor labs   - Assess for incontinence   - Turn and reposition patient  - Assist with mobility/ambulation  - Relieve pressure over bony prominences  - Avoid friction and shearing  - Provide appropriate hygiene as needed including keeping skin clean and dry  - Evaluate need for skin moisturizer/barrier cream  - Collaborate with interdisciplinary team   - Patient/family teaching  - Consider wound care consult   Outcome: Progressing     Problem: Potential for Falls  Goal: Patient will remain free of falls  Description: INTERVENTIONS:  - Educate patient/family on patient safety including physical limitations  - Instruct patient to call for assistance with activity   - Consult OT/PT to assist with strengthening/mobility   - Keep Call bell within reach  - Keep bed low and locked with side rails adjusted as appropriate  - Keep care items and personal belongings within reach  - Initiate and maintain comfort rounds  - Make Fall Risk Sign visible to staff  - Offer Toileting every 2 Hours, in advance of need  - Initiate/Maintain bed alarm  - Apply yellow socks and bracelet for high fall risk patients  - Consider moving patient to room near nurses station  Outcome: Progressing     Problem: GASTROINTESTINAL - ADULT  Goal: Minimal or absence of nausea and/or vomiting  Description: INTERVENTIONS:  - Administer IV fluids if ordered to ensure adequate hydration  - Maintain NPO status until nausea and vomiting are resolved  - Nasogastric tube if ordered  - Administer ordered antiemetic medications as needed  - Provide nonpharmacologic comfort measures as appropriate  - Advance diet as tolerated, if ordered  - Consider nutrition services referral to assist patient with adequate nutrition and appropriate food choices  Outcome: Progressing     Problem: GENITOURINARY - ADULT  Goal: Maintains or returns to baseline urinary function  Description: INTERVENTIONS:  - Assess urinary function  - Encourage oral fluids to ensure adequate hydration if ordered  - Administer IV fluids as ordered to ensure adequate hydration  - Administer ordered medications as needed  - Offer frequent toileting  - Follow urinary retention protocol if ordered  Outcome: Progressing     Problem: SKIN/TISSUE INTEGRITY - ADULT  Goal: Skin Integrity remains intact(Skin Breakdown Prevention)  Description: Assess:  -Perform Surendra assessment every 12  -Clean and moisturize skin every 4  -Inspect skin when repositioning, toileting, and assisting with ADLS  -Assess extremities for adequate circulation and sensation     Bed Management:  -Have minimal linens on bed & keep smooth, unwrinkled  -Change linens as needed when moist or perspiring  -Avoid sitting or lying in one position for more than 2 hours while in bed  -Keep HOB at 30degrees     Toileting:  -Offer bedside commode  -Assess for incontinence every 2    Activity:  -Mobilize patient 3 times a day  -Encourage activity and walks on unit  -Encourage or provide ROM exercises   -Turn and reposition patient every 2 Hours  -Use appropriate equipment to lift or move patient in bed  -Instruct/ Assist with weight shifting every 2 hour when out of bed in chair  -Consider limitation of chair time 8 hour intervals    Skin Care:  -Avoid use of baby powder, tape, friction and shearing, hot water or constrictive clothing  -Relieve pressure over bony prominences using allevyns  -Do not massage red bony areas  Outcome: Progressing     Problem: PAIN - ADULT  Goal: Verbalizes/displays adequate comfort level or baseline comfort level  Description: Interventions:  - Encourage patient to monitor pain and request assistance  - Assess pain using appropriate pain scale  - Administer analgesics based on type and severity of pain and evaluate response  - Implement non-pharmacological measures as appropriate and evaluate response  - Consider cultural and social influences on pain and pain management  - Notify physician/advanced practitioner if interventions unsuccessful or patient reports new pain  Outcome: Progressing

## 2022-02-23 NOTE — PLAN OF CARE
Problem: PHYSICAL THERAPY ADULT  Goal: Performs mobility at highest level of function for planned discharge setting  See evaluation for individualized goals  Description: Treatment/Interventions: Functional transfer training,LE strengthening/ROM,Therapeutic exercise,Endurance training,Patient/family training,Equipment eval/education,Bed mobility,Gait training,OT,Spoke to nursing  Equipment Recommended: Tiffanie Keys (bariatric RW )       See flowsheet documentation for full assessment, interventions and recommendations  Outcome: Progressing  Note: Prognosis: Good  Problem List: Decreased strength,Decreased range of motion,Decreased endurance,Impaired balance,Decreased mobility,Decreased coordination,Pain  Assessment: Pt seen for PT treatment session this date  Therapy session focused on functional transfers, standing tolerance, gait training, endurance training in order to improve overall mobility and independence  Pt requires assist of 1 for all mobility performed this date  Pt performed multiple STS throughout therapy session at S level using RW; improved standing tolerance compared to previous sessions  Pt ambulating increased distances this date with RW and close S level; seated rest break required 2* increased fatigue and MCDONOUGH  Pt educated on energy conservation tasks as well as therapeutic breathing techniques  Pt making steady progress toward goals  Pt was left sitting OOB in bedside chair with alarm on at the end of PT session with all needs in reach  Pt would benefit from continued PT services while in hospital to address remaining limitations  PT to continue to follow pt and recommends STR vs HHPT pending continued progress  The patient's AM-PAC Basic Mobility Inpatient Short Form Raw Score is 17  A Raw score of greater than 16 suggests the patient may benefit from discharge to home  Please also refer to the recommendation of the Physical Therapist for safe discharge planning    Barriers to Discharge: Decreased caregiver support        PT Discharge Recommendation: Post acute rehabilitation services (vs HHPT + increased support pending continued progress )          See flowsheet documentation for full assessment

## 2022-02-23 NOTE — PLAN OF CARE
Problem: Prexisting or High Potential for Compromised Skin Integrity  Goal: Skin integrity is maintained or improved  Description: INTERVENTIONS:  - Identify patients at risk for skin breakdown  - Assess and monitor skin integrity  - Assess and monitor nutrition and hydration status  - Monitor labs   - Assess for incontinence   - Turn and reposition patient  - Assist with mobility/ambulation  - Relieve pressure over bony prominences  - Avoid friction and shearing  - Provide appropriate hygiene as needed including keeping skin clean and dry  - Evaluate need for skin moisturizer/barrier cream  - Collaborate with interdisciplinary team   - Patient/family teaching  - Consider wound care consult   Outcome: Progressing     Problem: MOBILITY - ADULT  Goal: Maintain or return to baseline ADL function  Description: INTERVENTIONS:  -  Assess patient's ability to carry out ADLs; assess patient's baseline for ADL function and identify physical deficits which impact ability to perform ADLs (bathing, care of mouth/teeth, toileting, grooming, dressing, etc )  - Assess/evaluate cause of self-care deficits   - Assess range of motion  - Assess patient's mobility; develop plan if impaired  - Assess patient's need for assistive devices and provide as appropriate  - Encourage maximum independence but intervene and supervise when necessary  - Involve family in performance of ADLs  - Assess for home care needs following discharge   - Consider OT consult to assist with ADL evaluation and planning for discharge  - Provide patient education as appropriate  Outcome: Progressing     Problem: GASTROINTESTINAL - ADULT  Goal: Minimal or absence of nausea and/or vomiting  Description: INTERVENTIONS:  - Administer IV fluids if ordered to ensure adequate hydration  - Maintain NPO status until nausea and vomiting are resolved  - Nasogastric tube if ordered  - Administer ordered antiemetic medications as needed  - Provide nonpharmacologic comfort measures as appropriate  - Advance diet as tolerated, if ordered  - Consider nutrition services referral to assist patient with adequate nutrition and appropriate food choices  Outcome: Progressing     Problem: GENITOURINARY - ADULT  Goal: Maintains or returns to baseline urinary function  Description: INTERVENTIONS:  - Assess urinary function  - Encourage oral fluids to ensure adequate hydration if ordered  - Administer IV fluids as ordered to ensure adequate hydration  - Administer ordered medications as needed  - Offer frequent toileting  - Follow urinary retention protocol if ordered  Outcome: Progressing     Problem: SKIN/TISSUE INTEGRITY - ADULT  Goal: Skin Integrity remains intact(Skin Breakdown Prevention)  Description: Assess:  -Perform Surendra assessment every 12  -Clean and moisturize skin every 4  -Inspect skin when repositioning, toileting, and assisting with ADLS  -Assess extremities for adequate circulation and sensation     Bed Management:  -Have minimal linens on bed & keep smooth, unwrinkled  -Change linens as needed when moist or perspiring  -Avoid sitting or lying in one position for more than 2 hours while in bed  -Keep HOB at 30degrees     Toileting:  -Offer bedside commode  -Assess for incontinence every 2    Activity:  -Mobilize patient 3 times a day  -Encourage activity and walks on unit  -Encourage or provide ROM exercises   -Turn and reposition patient every 2 Hours  -Use appropriate equipment to lift or move patient in bed  -Instruct/ Assist with weight shifting every 2 hour when out of bed in chair  -Consider limitation of chair time 8 hour intervals    Skin Care:  -Avoid use of baby powder, tape, friction and shearing, hot water or constrictive clothing  -Relieve pressure over bony prominences using allevyns  -Do not massage red bony areas  Outcome: Progressing     Problem: PAIN - ADULT  Goal: Verbalizes/displays adequate comfort level or baseline comfort level  Description: Interventions:  - Encourage patient to monitor pain and request assistance  - Assess pain using appropriate pain scale  - Administer analgesics based on type and severity of pain and evaluate response  - Implement non-pharmacological measures as appropriate and evaluate response  - Consider cultural and social influences on pain and pain management  - Notify physician/advanced practitioner if interventions unsuccessful or patient reports new pain  Outcome: Progressing     Problem: INFECTION - ADULT  Goal: Absence or prevention of progression during hospitalization  Description: INTERVENTIONS:  - Assess and monitor for signs and symptoms of infection  - Monitor lab/diagnostic results  - Monitor all insertion sites, i e  indwelling lines, tubes, and drains  - Monitor endotracheal if appropriate and nasal secretions for changes in amount and color  - [place] appropriate cooling/warming therapies per order  - Administer medications as ordered  - Instruct and encourage patient and family to use good hand hygiene technique  - Identify and instruct in appropriate isolation precautions for identified infection/condition  Outcome: Progressing

## 2022-02-23 NOTE — PHYSICAL THERAPY NOTE
PHYSICAL THERAPY NOTE          Patient Name: Joselyn Nelson  NQUGW'K Date: 2/23/2022 02/23/22 1108   PT Last Visit   PT Visit Date 02/23/22   Note Type   Note Type Treatment   Pain Assessment   Pain Assessment Tool 0-10   Pain Score 8   Pain Location/Orientation Location: Abdomen   Hospital Pain Intervention(s) Repositioned; Ambulation/increased activity; Emotional support   Restrictions/Precautions   Weight Bearing Precautions Per Order No   Other Precautions Chair Alarm; Bed Alarm; Fall Risk;Pain   General   Chart Reviewed Yes   Response to Previous Treatment Patient with no complaints from previous session  Family/Caregiver Present No   Cognition   Overall Cognitive Status WFL   Arousal/Participation Responsive; Cooperative   Attention Attends with cues to redirect   Orientation Level Oriented X4   Memory Within functional limits   Following Commands Follows one step commands without difficulty   Comments Pt pleasant and cooperative to participate in therapy session    Bed Mobility   Supine to Sit Unable to assess   Sit to Supine Unable to assess   Additional Comments pt sitting OOB in bedside chair upon arrival  Pt returned to sitting OOB in bedside chair with chair alarm on with all needs within reach at the end of therapy session    Transfers   Sit to Stand 5  Supervision   Additional items Assist x 1; Armrests; Increased time required;Verbal cues   Stand to Sit 5  Supervision   Additional items Assist x 1; Armrests; Increased time required;Verbal cues   Additional Comments transfers with RW; cues for hand placement and sequencing  5x STS performed throughout therapy session    Ambulation/Elevation   Gait pattern Excessively slow; Short stride; Foward flexed;Decreased foot clearance; Improper Weight shift   Gait Assistance 5  Supervision   Additional items Assist x 1;Verbal cues   Assistive Device Rolling walker   Distance 80ft (seated rest break) 80ft    Balance   Static Sitting Fair +   Dynamic Sitting Fair   Static Standing Fair   Dynamic Standing Fair -   Ambulatory Fair -   Activity Tolerance   Activity Tolerance Patient tolerated treatment well   Nurse Made Aware RN cleared pt to participate in therapy session    Exercises   Balance training  pt with ~2 min standing tolerance to assist in ADL tasks; 3 attempts performed using RW for support at close S level    Assessment   Prognosis Good   Problem List Decreased strength;Decreased range of motion;Decreased endurance; Impaired balance;Decreased mobility; Decreased coordination;Pain   Assessment Pt seen for PT treatment session this date  Therapy session focused on functional transfers, standing tolerance, gait training, endurance training in order to improve overall mobility and independence  Pt requires assist of 1 for all mobility performed this date  Pt performed multiple STS throughout therapy session at S level using RW; improved standing tolerance compared to previous sessions  Pt ambulating increased distances this date with RW and close S level; seated rest break required 2* increased fatigue and MCDONOUGH  Pt educated on energy conservation tasks as well as therapeutic breathing techniques  Pt making steady progress toward goals  Pt was left sitting OOB in bedside chair with alarm on at the end of PT session with all needs in reach  Pt would benefit from continued PT services while in hospital to address remaining limitations  PT to continue to follow pt and recommends STR vs HHPT pending continued progress  The patient's AM-PAC Basic Mobility Inpatient Short Form Raw Score is 17  A Raw score of greater than 16 suggests the patient may benefit from discharge to home  Please also refer to the recommendation of the Physical Therapist for safe discharge planning     Barriers to Discharge Decreased caregiver support   Goals   Patient Goals To go home    STG Expiration Date 03/03/22   Plan Treatment/Interventions Functional transfer training;LE strengthening/ROM; Endurance training;Patient/family training;Equipment eval/education;Gait training;Spoke to nursing;Spoke to case management   Progress Progressing toward goals   PT Frequency 3-5x/wk   Recommendation   PT Discharge Recommendation Post acute rehabilitation services  (vs HHPT + increased support pending continued progress )   Equipment Recommended Walker + bariatric commode   Walker Package Recommended HD Bariatric wheeled walker   [address] Inpatient   Turning in Bed Without Bedrails 3   Lying on Back to Sitting on Edge of Flat Bed 3   Moving Bed to Chair 3   Standing Up From Chair 3   Walk in Room 3   Climb 3-5 Stairs 2   Basic Mobility Inpatient Raw Score 17   Basic Mobility Standardized Score 39 67   Highest Level Of Mobility   -HL Goal 5: Stand one or more mins   -HL Highest Level of Mobility 6: Walk 10 steps or more   -HLM Goal Achieved Yes   Education   Education Provided Mobility training;Assistive device   Patient Demonstrates acceptance/verbal understanding   End of Consult   Patient Position at End of Consult Bedside chair;Bed/Chair alarm activated; All needs within reach     Wilda Vazquez, PT, DPT

## 2022-02-23 NOTE — RESTORATIVE TECHNICIAN NOTE
Restorative Technician Note      Patient Name: Isauro Shah     Restorative Tech Visit Date: 02/23/22  Note Type: Mobility  Patient Position Upon Consult: Bedside chair  Activity Performed: Ambulated  Assistive Device: Roller walker  Patient Position at End of Consult: Bedside chair;  All needs within reach

## 2022-02-23 NOTE — PROGRESS NOTES
Progress Note - General Surgery  Joselyn Nelson 60 y o  female MRN: [id number]  Unit/Bed#: [facility] 930-01 Encounter: [id number]    Assessment:  60 y o  female with perforated colon adenocarcinoma, is now 45 Days Post-Op s/p Procedure(s) (LRB):  LAPAROTOMY EXPLORATORY, DRAINAGE OF INTRA -ABDOMINAL ABSCESS (N/A)  CYSTOSCOPY RETROGRADE PYELOGRAM WITH INSERTION STENT URETERAL (Right)  END COLOSTOMY (N/A)  RIGHT SALPINGO-OOPHORECTOMY, LEFT OOPHORECTOMY (N/A)  Lower venous duplex study performed on 02/22 was negative for DVT  Plan:  - Diet Regular; Regular House; No Carbonation  - Pain and Nausea control PRN  - Incentive spirometry  - OOB, ambulate  - ostomy care  - awaiting placement to SNF    Subjective/Objective     Subjective:  Patient has no complaints overnight  Pain is controlled  Objective:   Vitals: Blood pressure 117/77, pulse 101, temperature 99 2 °F (37 3 °C), resp  rate 17, height 5' 7" (1 702 m), weight (!) 167 kg (368 lb 6 4 oz), SpO2 96 %  ,Body mass index is 57 7 kg/m²  I/O       02/21 0701  02/22 0700 02/22 0701  02/23 0700    P  O  0     Total Intake(mL/kg) 0 (0)     Urine (mL/kg/hr)  400 (0 1)    Stool  0    Total Output  400    Net 0 -400                Physical Exam:  Gen: NAD, Aox3, Comfortable in bed  Chest: Normal work of breathing, no respiratory distress  Abd: Soft, ND, NT   Midline wound healing nicely  Ostomy functioning with gas and stool  Ext: No Edema  Skin: Warm, Dry, Intact      Lab, Imaging and other studies: I have personally reviewed pertinent reports    , CBC with diff: No results found for: WBC, HGB, HCT, MCV, PLT, ADJUSTEDWBC, MCH, MCHC, RDW, MPV, NRBC, BMP/CMP: No results found for: SODIUM, K, CL, CO2, ANIONGAP, BUN, CREATININE, GLUCOSE, CALCIUM, AST, ALT, ALKPHOS, PROT, BILITOT, EGFR  VTE Pharmacologic Prophylaxis: Sequential compression device (Venodyne)   VTE Mechanical Prophylaxis: sequential compression device        Josie Serrano MD  2/23/2022  6:26 AM

## 2022-02-23 NOTE — UTILIZATION REVIEW
Continued Stay Review    Date: 2/23/2022                        Current Patient Class: inpatient  Current Level of Care: med/surg  HPI:56 y o  female initially admitted on 1/16 with perforated colon adenocarcinoma, is now 45 Days Post-Op s/p Procedure(s) (LRB):  LAPAROTOMY EXPLORATORY, DRAINAGE OF INTRA -ABDOMINAL ABSCESS (N/A)  CYSTOSCOPY RETROGRADE PYELOGRAM WITH INSERTION STENT URETERAL (Right)  END COLOSTOMY (N/A)  RIGHT SALPINGO-OOPHORECTOMY, LEFT OOPHORECTOMY (N/A)  Lower venous duplex study performed on 02/22 was negative for DVT  Assessment/Plan: Pt states pain is controlled  Continue regular diet, no carbonation  Continue pain and nausea control prn  Incentive spirometry  OOB/ambulate  Ostomy care  Plan for SNF for rehab on d/c  CM working on finding available bed       Vital Signs:   Date/Time Temp Pulse Resp BP MAP (mmHg) SpO2 O2 Device   02/23/22 1016 -- -- -- -- -- -- None (Room air)   02/22/22 22:15:03 99 2 °F (37 3 °C) -- -- 117/77 90 -- --   02/22/22 08:49:12 98 4 °F (36 9 °C) -- 17 114/79 91 -- --   02/22/22 0000 -- -- -- -- -- -- None (Room air)   02/21/22 22:22:14 98 7 °F (37 1 °C) 101 18 112/79 90 96 % --   02/21/22 14:43:43 97 6 °F (36 4 °C) -- -- 142/87 105 -- --   02/21/22 0800 -- -- -- -- -- -- None (Room air)       Pertinent Labs/Diagnostic Results:     Results from last 7 days   Lab Units 02/21/22  0510 02/17/22  1037   WBC Thousand/uL 8 40 8 23   HEMOGLOBIN g/dL 8 6* 8 9*   HEMATOCRIT % 28 9* 30 2*   PLATELETS Thousands/uL 320 265   NEUTROS ABS Thousands/µL 5 21  --      Results from last 7 days   Lab Units 02/17/22  1037   SODIUM mmol/L 136   POTASSIUM mmol/L 4 3   CHLORIDE mmol/L 106   CO2 mmol/L 24   ANION GAP mmol/L 6   BUN mg/dL 19   CREATININE mg/dL 0 90   EGFR ml/min/1 73sq m 70   CALCIUM mg/dL 8 6     Results from last 7 days   Lab Units 02/17/22  1037   GLUCOSE RANDOM mg/dL 111       Medications:   Scheduled Medications:  acetaminophen, 975 mg, Oral, Q8H IRIS  Diclofenac Sodium, 2 g, Topical, 4x Daily  docusate sodium, 100 mg, Oral, BID  gabapentin, 300 mg, Oral, TID  heparin (porcine), 7,500 Units, Subcutaneous, Q8H [address]  melatonin, 6 mg, Oral, HS  methocarbamol, 500 mg, Oral, Q6H [address]  CAROLINE ANTIFUNGAL, , Topical, BID  nystatin, , Topical, BID  ondansetron, 4 mg, Oral, Q6H IRIS  pantoprazole, 40 mg, Oral, BID AC  senna, 1 tablet, Oral, BID     PRN Meds:  albuterol, 2 puff, Inhalation, Q4H PRN  HYDROmorphone, 0 5 mg, Intravenous, Q4H PRN 2/22 x2, 2/23 x2  Labetalol HCl, 10 mg, Intravenous, Q4H PRN  LORazepam, 1 mg, Oral, Q8H PRN 2/23 x1  oxyCODONE, 10 mg, Oral, Q4H PRN 2/22 x1  oxyCODONE, 15 mg, Oral, Q4H PRN 2/22 x3, 2/23 x2  saliva substitute, 5 spray, Mouth/Throat, 4x Daily PRN      Discharge Plan: D    Network Utilization Review Department  ATTENTION: Please call with any questions or concerns to [phone] and carefully listen to the prompts so that you are directed to the right person  All voicemails are confidential   Mary Skipper all requests for admission clinical reviews, approved or denied determinations and any other requests to dedicated fax number below belonging to the campus where the patient is receiving treatment   List of dedicated fax numbers for the Facilities:  [address] DENIALS (Administrative/Medical Necessity) [phone]   [address] (Maternity/NICU/Pediatrics) [phone]   [address] 40 [address] [facility] [address] [facility] Jacqueline Tom [address] [address] [facility] [phone]   Priyank Yu  [phone]

## 2022-02-23 NOTE — CASE MANAGEMENT
Case Management Discharge Planning Note    Patient name Danna Downing  Location [facility] 930/[facility] 930-01 MRN [id number]  : 1965 Date 2022       Current Admission Date: 2022  Current Admission Diagnosis:Diverticulitis of large intestine with perforation   Patient Active Problem List    Diagnosis Date Noted    Dysphagia 2022    Acute renal failure (ARF) ([facility] ) 2022    Cellulitis of abdominal wall, chest wall and groin with wounds 2022    Diverticulitis of large intestine with perforation 2022    Right Hydroureteronephrosis 2022    Subacute vaginitis 2022    GERD (gastroesophageal reflux disease)     Other specified anxiety disorders 2019    Lumbar paraspinal muscle spasm 2019    Borderline hyperlipidemia 2019    Iron deficiency 2019    Chronic midline low back pain with left-sided sciatica 2019    Primary hypertension 2019    Morbid obesity with BMI of 60 0-69 9, adult ([facility] ) 2019    Hidradenitis suppurativa 2019    Lymphedema 2006      LOS (days): 38  Geometric Mean LOS (GMLOS) (days):   Days to GMLOS:     OBJECTIVE:  Risk of Unplanned Readmission Score: 24         Current admission status: Inpatient   Preferred Pharmacy:   [facility] [address] [facility] [address] [place] [address] C/ Josiah [address] [address]  Phone: [phone] Fax: [phone]    Primary Care Provider: Daniel Simon PA-C    Primary Insurance: Shonna [facility]  Secondary Insurance:     DISCHARGE DETAILS:    Additional Comments: CHARBEL spoke to Deedee from [address] who stated that she will be able to offer pt a bed at [address]  CHARBEL spoke to pt who stated that she would be in agreement to going to [facility]  CHARBEL notified pt's LUCY and requested a COVID test  CM notified Deedee at [facility] that pt accepted the bed  CM will follow

## 2022-02-23 NOTE — PROGRESS NOTES
Progress note - Palliative and [address] 60 y o  female [id number]    Patient Active Problem List   Diagnosis    Lymphedema    Primary hypertension    Morbid obesity with BMI of 60 0-69 9, adult (HCC)    Hidradenitis suppurativa    Chronic midline low back pain with left-sided sciatica    Other specified anxiety disorders    Lumbar paraspinal muscle spasm    Borderline hyperlipidemia    Iron deficiency    GERD (gastroesophageal reflux disease)    Acute renal failure (ARF) (HCC)    Cellulitis of abdominal wall, chest wall and groin with wounds    Diverticulitis of large intestine with perforation    Right Hydroureteronephrosis    Subacute vaginitis    Dysphagia     Active issues specifically addressed today include:   Metastatic Colon cancer complicated by perf s/p bunn procedure  Cancer related pain  Palliative care patient     Plan:  1  Symptom management   Pain  -  Acetaminophen ATC   -  Oxycodone 10 mg -15 mg Q 4 hours prn  -  Hydromorphone 0 5 mg Q 4 hours prn breakthrough pain    Anxiety  -  Lorazepam 1 mg BID     2  Goals   -  Limits set at DNAR/DNI      Code Status: DNAR/DNI  - Level 3   Decisional apparatus:  Patient is competent on my exam today  If competence is lost, patient's substitute decision maker would default to adult sons by [place]  Advance Directive / Living Will / POLST:  May benefit from POLST prior to discharge  Interval history:       Patient reports improvement in both pain and anxiety over the past 24 hours  She states she still has abdominal which is as high as 8/10 without medication  She states the pain is around a 5/10 with medication which she finds acceptable and controlled       MEDICATIONS / ALLERGIES:     current meds:   Current Facility-Administered Medications   Medication Dose Route Frequency    acetaminophen (TYLENOL) tablet 975 mg  975 mg Oral Q8H [address]    albuterol (PROVENTIL HFA,VENTOLIN HFA) inhaler 2 puff  2 puff Inhalation Q4H PRN    Diclofenac Sodium (VOLTAREN) 1 % topical gel 2 g  2 g Topical 4x Daily    docusate sodium (COLACE) capsule 100 mg  100 mg Oral BID    gabapentin (NEURONTIN) capsule 300 mg  300 mg Oral TID    heparin (porcine) subcutaneous injection 7,500 Units  7,500 Units Subcutaneous Q8H [address]    HYDROmorphone (DILAUDID) injection 0 5 mg  0 5 mg Intravenous Q4H PRN    Labetalol HCl (NORMODYNE) injection 10 mg  10 mg Intravenous Q4H PRN    LORazepam (ATIVAN) tablet 1 mg  1 mg Oral Q8H PRN    melatonin tablet 6 mg  6 mg Oral HS    methocarbamol (ROBAXIN) tablet 500 mg  500 mg Oral Q6H [address]    moisture barrier miconazole 2% cream (aka CAROLINE MOISTURE BARRIER ANTIFUNGAL CREAM)   Topical BID    nystatin (MYCOSTATIN) powder   Topical BID    ondansetron (ZOFRAN-ODT) dispersible tablet 4 mg  4 mg Oral Q6H IRIS    oxyCODONE (ROXICODONE) immediate release tablet 10 mg  10 mg Oral Q4H PRN    oxyCODONE (ROXICODONE) IR tablet 15 mg  15 mg Oral Q4H PRN    pantoprazole (PROTONIX) EC tablet 40 mg  40 mg Oral BID AC    saliva substitute (MOUTH KOTE) mucosal solution 5 spray  5 spray Mouth/Throat 4x Daily PRN    senna (SENOKOT) tablet 8 6 mg  1 tablet Oral BID       Allergies   Allergen Reactions    Bee Venom Anaphylaxis, Shortness Of Breath and Swelling       OBJECTIVE:    Physical Exam  Physical Exam  Vitals and nursing note reviewed  Constitutional:       General: She is awake  Appearance: She is morbidly obese  She is ill-appearing  HENT:      Head: Normocephalic and atraumatic  Mouth/Throat:      Mouth: Mucous membranes are moist       Dentition: Abnormal dentition  Pharynx: Oropharynx is clear  Eyes:      General: Lids are normal       Extraocular Movements: Extraocular movements intact  Cardiovascular:      Rate and Rhythm: Normal rate and regular rhythm  Pulses: Decreased pulses     Pulmonary:      Effort: Pulmonary effort is normal  No prolonged expiration, respiratory distress or retractions  Breath sounds: Decreased air movement present  Abdominal:      General: Bowel sounds are normal       Comments: luq colostomy   Musculoskeletal:      Cervical back: Normal range of motion and neck supple  Comments: Equal extremity strength    Skin:     General: Skin is warm and dry  Coloration: Skin is not jaundiced  Neurological:      General: No focal deficit present  Mental Status: She is alert and oriented to person, place, and time  GCS: GCS eye subscore is 4  GCS verbal subscore is 5  GCS motor subscore is 6  Psychiatric:         Attention and Perception: Attention and perception normal          Mood and Affect: Mood normal  Mood is not anxious  Speech: Speech normal          Behavior: Behavior is cooperative  Cognition and Memory: Cognition normal          Lab Results: CBC: No results found for: WBC, HGB, HCT, MCV, PLT, ADJUSTEDWBC, MCH, MCHC, RDW, MPV, NRBC, CMP: No results found for: SODIUM, K, CL, CO2, ANIONGAP, BUN, CREATININE, GLUCOSE, CALCIUM, AST, ALT, ALKPHOS, PROT, BILITOT, EGFR, PT/PTT:No results found for: PT, PTT      Counseling / Coordination of Care    Total floor / unit time spent today 30+  minutes  Greater than 50% of total time was spent with the patient and / or family counseling and / or coordination of care  A description of the counseling / coordination of care: review of chart, review of medications, support given, offered information, review of symptoms

## 2022-02-24 NOTE — CASE MANAGEMENT
Case Management Discharge Planning Note    Patient name Fadumo Marcnao  Location [facility] 930/[facility] 930-01 MRN [id number]  : 1965 Date 2022       Current Admission Date: 2022  Current Admission Diagnosis:Diverticulitis of large intestine with perforation   Patient Active Problem List    Diagnosis Date Noted    Dysphagia 2022    Acute renal failure (ARF) ([facility] 75 ) 2022    Cellulitis of abdominal wall, chest wall and groin with wounds 2022    Diverticulitis of large intestine with perforation 2022    Right Hydroureteronephrosis 2022    Subacute vaginitis 2022    GERD (gastroesophageal reflux disease)     Other specified anxiety disorders 2019    Lumbar paraspinal muscle spasm 2019    Borderline hyperlipidemia 2019    Iron deficiency 2019    Chronic midline low back pain with left-sided sciatica 2019    Primary hypertension 2019    Morbid obesity with BMI of 60 0-69 9, adult ([facility] ) 2019    Hidradenitis suppurativa 2019    Lymphedema 2006      LOS (days): 39  Geometric Mean LOS (GMLOS) (days):   Days to GMLOS:     OBJECTIVE:  Risk of Unplanned Readmission Score: 23         Current admission status: Inpatient   Preferred Pharmacy:   [facility] [address] [place] [address] C/ Josiah Ronak  [facility] [address]  Phone: [phone] Fax: [phone]    Primary Care Provider: Norma Villa PA-C    Primary Insurance: Kelli Stephens MA [facility]  Secondary Insurance:     DISCHARGE DETAILS:       Additional Comments: CM was notified that pt is cleared for d/c today  CM requested transport   pt was made aware that transport will be via [address] [place] [address] and pt is in agreement with cost

## 2022-02-24 NOTE — PROGRESS NOTES
Progress Note - General Surgery   Ambrosioee Brendan 60 y o  female MRN: [id number]  Unit/Bed#: [facility] 930-01 Encounter: [id number]    Assessment:  59 y/o F w morbid obesity and sigmoid ca complicated by perforation, s/p Ortega's procedure on 1/17     Vitals stable, afebrile  No labs    UOP 400 recorded  Malecot 0cc recorded  Ostomy 0cc recorded    Plan:  - regular diet  - Rectal malecot, monitor output and character  - ostomy, monitor output and character  - pain and nausea control prn  - SQH  - encourage ambulation  - IS  - remove PICC  - plan for d/c today 2/24    Subjective/Objective   Subjective:   No acute events overnight, continues tolerate diet without nausea or vomiting, continues to have bowel function, ambulating with assistance  Objective:     Blood pressure 131/95, pulse 99, temperature 98 7 °F (37 1 °C), resp  rate 21, height 5' 7" (1 702 m), weight (!) 167 kg (368 lb 6 4 oz), SpO2 94 %  ,Body mass index is 57 7 kg/m²  Intake/Output Summary (Last 24 hours) at 2/24/2022 5534  Last data filed at 2/23/2022 1401  Gross per 24 hour   Intake --   Output 400 ml   Net -400 ml       Invasive Devices  Report    Peripherally Inserted Central Catheter Line            PICC Line 84/48/98 Left Basilic 29 days          Drain            Colostomy Other (comment) LUQ 38 days                Physical Exam:   General: NAD  Skin: Warm, dry, anicteric  HEENT: Normocephalic, atraumatic  CV: RRR, no m/r/g  Pulm: CTA b/l, no inc WOB  Abd: Soft, ND/obese, NT  MSK: Symmetric, b/l LE edema, no tenderness, no deformity  Neuro: AOx3, GCS 15    Lab, Imaging and other studies:I have personally reviewed pertinent lab results      VTE Pharmacologic Prophylaxis: Sequential compression device (Venodyne)  and Heparin  VTE Mechanical Prophylaxis: sequential compression device

## 2022-02-24 NOTE — PROGRESS NOTES
Progress note - Palliative and Supportive Care   Esmer Gambino 60 y o  female [id number]    Patient Active Problem List   Diagnosis    Lymphedema    Primary hypertension    Morbid obesity with BMI of 60 0-69 9, adult (HCC)    Hidradenitis suppurativa    Chronic midline low back pain with left-sided sciatica    Other specified anxiety disorders    Lumbar paraspinal muscle spasm    Borderline hyperlipidemia    Iron deficiency    GERD (gastroesophageal reflux disease)    Acute renal failure (ARF) (HCC)    Cellulitis of abdominal wall, chest wall and groin with wounds    Diverticulitis of large intestine with perforation    Right Hydroureteronephrosis    Subacute vaginitis    Dysphagia     Active issues specifically addressed today include:   Colon cancer  Cancer related pain  Health related anxiety  Palliative care patient    Plan:  1  Symptom management - continue current regimen while in the hospital  Scripts for pain control printed and placed in her chart     - Dialudid 1-2mg tablets Q4H PRN moderate/severe pain   - Xanax 2mg BID PRN for anxiety    2  Goals - disease focused treatments with limits set to DNI/DNR     Code Status: DNI/DNR - Level 3     Interval history:       Seen and examined at bedside  She is alert, awake and oriented  Aware she is being discharged and reports anxiety over pain meds  States she is worried that her pain will not be controlled  We discussed to switch her from oxy to dilaudid PO upon discharge  She is aware only dialudid will be sent  Discussed regimen of dilaudid 1-2 tab q 4 prn for moderate to severe pain and she is in agreement with this plan  Also states she does better with xanax for anxiety controlled  Will send xanax on discharge, script printed and placed in her chart  Also sent message to [facility] office to schedule patient for sooner follow-up for symptoms  She had no other concerns or questions   Patient given [facility] office number to call with questions/concerns  MEDICATIONS / ALLERGIES:     all current active meds have been reviewed    Allergies   Allergen Reactions    Bee Venom Anaphylaxis, Shortness Of Breath and Swelling       OBJECTIVE:    Physical Exam  Physical Exam  Constitutional:       General: She is not in acute distress  Appearance: She is obese  She is not ill-appearing  HENT:      Mouth/Throat:      Pharynx: No oropharyngeal exudate or posterior oropharyngeal erythema  Eyes:      General:         Right eye: No discharge  Left eye: No discharge  Pulmonary:      Effort: Pulmonary effort is normal  No respiratory distress  Skin:     Capillary Refill: Capillary refill takes less than 2 seconds  Neurological:      Mental Status: She is alert and oriented to person, place, and time     Psychiatric:         Mood and Affect: Mood normal

## 2022-02-24 NOTE — DISCHARGE SUMMARY
Discharge Summary - General Surgery   [facility] 60 y o  female MRN: [id number]  [facility] Encounter: [id number]    Admission Date: 1/16/2022     Discharge Date: 2/24/2022    Admitting Diagnosis: Cellulitis of abdominal wall [L03 311]  Diverticulitis of large intestine with perforation without bleeding [K57 20]    Discharge Diagnosis:   COVID+ (off precautions) - RESOLVED  Perforated diverticulitis, hydronephrosis sigmoid adenoCA S/p cysto, ureteral stents, Ortega's procedureKatharyn Karime    Attending and Service: Dr Tonny Titus, Acute Care Surgical Services  Consulting Physician(s): urology, med onc, ID, wound care, palliative    Imaging and Procedures Performed:   1/17 cysto, ureteral stents, Ortega's procedure- Ullrich    XR chest portable    Result Date: 1/17/2022  Impression: Lines and tubes as above  No pneumothorax  Workstation performed: [id number]     XR abdomen 1 view kub    Result Date: 1/21/2022  Impression: Distended loops of small bowel within the left hemiabdomen  This may represent an ileus given the recent postoperative state  Cannot exclude an early obstructive process and continued follow-up would be recommended if symptoms persist  Workstation performed: [id number]     FL retrograde pyelogram    Result Date: 1/17/2022  Impression: Fluoroscopic guidance provided for procedure guidance  Please refer to the separate procedure notes for additional details  Workstation performed: [id number]     XR chest portable ICU    Result Date: 1/18/2022  Impression: Endotracheal tube no longer visualized  Nasogastric tube tip in mid esophagus  Advancement recommended as clinically indicated The examination demonstrates a significant  finding and was documented as such in [facility] for liaison and referring practitioner notification  Workstation performed: [id number]     XR chest portable ICU    Result Date: 1/17/2022  Impression: No acute cardiopulmonary disease   In the setting of clinically suspected/proven COVID-19, this plain film appearance does not contain findings that raise concern for viral pneumonia such as COVID-19, but does not rule out this diagnosis  Workstation performed: [id number]     CTA chest pe study    Result Date: 1/19/2022  Impression: New small focal left upper lobe groundglass infiltrate #4/29 and #601/82  The study was marked in [facility] for immediate notification  Workstation performed: [id number]     Pathology:   Addendum 2   RESULTS OF IMMUNOHISTOCHEMICAL ANALYSIS FOR MISMATCH REPAIR PROTEIN LOSS  INTERPRETATION: NO LOSS OF NUCLEAR EXPRESSION OF MMR PROTEINS: LOW PROBABILITY OF MSI-H  Note: Background non-neoplastic tissue and/or internal control with intact nuclear expression       RESULTS:  Antibody          Clone               Description                           Results  CNP4               J9                  Mismatch repair protein       Intact nuclear expression  NPH3              [id number]       Mismatch repair protein       Intact nuclear expression  XPR6              65                   Mismatch repair protein       Intact nuclear expression  ROC3              [id number]         Mismatch repair protein       Intact nuclear expression     Comment:  A negative control and a positive control for each antibody have been reviewed and accepted  [employer] Specimen ID: [id number], Evaluator: Krystina Mcdonnell MD  These tests were developed and their performance characteristics determined by [facility]  [facility] may not be cleared or approved by the [place]   Food and Drug Administration   The FDA determined that such clearance or approval is not necessary   These tests are used for clinical purposes  [facility] should not be regarded as investigational or for research   This laboratory has been approved by CLIA 88, designated as a high-complexity laboratory and is qualified to perform these tests      Comments: Patients whose tumors demonstrate lack of expression of one or more DNA mismatch repair proteins might be at risk for Espitia Syndrome  This cancer susceptibility syndrome greatly increases the risk of synchronous and/or metachronous cancers in the affected patients and their family members  Normal expression of all proteins does not completely rule out familial cancer predisposition      The [facility] Espitia Syndrome Surveillance Program Task Forces recommends that all patients with lack of expression of one or more DNA mismatch repair proteins and those with concerning personal or family history should undergo thorough evaluation, counseling and possibly genetic testing  Hospital Course: Swati Castro is a 75-year-old male with PMHx of anxiety, HTN, lymphedma, gastric ulcer, GERD, arthritis, c scope 2019 aborted poor prep presented to the hospital for abdominal cellulitis with multiple superficial wounds and sigmoid diverticulitis with micro perforation at [facility]  Patient worsening left lower quadrant abdominal pain and persistent elevated rising white blood cell count, and continued with a worsening sepsis picture for which she was transferred to [facility] for surgical critical care and potential operative intervention  HPI per Dr Rj Merino upon arrival at [facility]  "60 yr old female presenting as a transfer from [facility] for perforated diverticulitis complicated by R hydronephrosis  Patient was admitted 1/6/2022 for multiple wounds and cellulitis  She underwent CT imaging at that time that demonstrated sigmoid diverticulitis with microperforation and a small abscess with R hydronephrosis likely secondary to her intraabdominal process  She was not amendable to drainage       Surgical history significant for multiple c-sections  She underwent colonoscopy in 2019 for blood in her stool but it was aborted due to poor prep       Her exam worsened today and CT scan showed worsening diverticulitis and abscess   One abscess is noted to be in close proximity to her right external iliac artery  Due to concerns for her hydronephrosis and need for multiple procedures and ICU level of care she was transferred here       On exam she is morbidly obese and is diffusely tender with voluntary guarding on exam  She has multiple open wounds to her abdominal wall "    Patient was taken emergently to the operating room for an exploratory laparotomy, cystoscopy, ureteral stents, Payal's procedure by Dr Masters Ser  Intraoperative findings included perforated diverticulitis with tissue necrosis of the proximal rectum and ovaries at the site of perforation, severely inflamed tissues of the rectal stump precluding stump closure, disease sigmoid removed and end colostomy made  Rectal mallet cod drain placed, 2 pelvic ADIA drains placed  Postoperatively she was admitted to the surgical critical care unit for acute postoperative monitoring secondary to extensive multiple comorbidities and sepsis  Postoperative day 1, she tested positive for COVID and she was extubated with an NG tube in place, colostomy, bilateral ADIA drains, mallet cod drain, Galan with a right ureteral stent  Infectious Disease as well as wound care were consulted to assist with postoperative care  She remained in the critical care unit for ongoing acute care until postoperative day 2  On postoperative day 4 patient developed ostomy function however she developed significant emesis and an NG tube was replaced at bedside  By postoperative day 7 a contrast study showed movement of contrast through the colon and a clamp trial study was performed in which the NG tube was removed once again  She was initiated on TPN through PICC line secondary to prolonged NPO status throughout this hospitalization  Her diet was slowly advanced throughout the rest of her hospital stay with increase in ostomy function    On postoperative day 10 patient has bilateral ADIA drains were removed, the Mallecot drain remained in place and disposition planning commenced  Patient remained clear for discharge since postoperative day 12 on 01/28/2022 however was unable to find acute rehab placement until discharge date  Unfortunately, pathology of the specimen from intraoperative reports indicated that patient had T4b colon adenocarcinoma that had perforated into the adnexal structures, the distal margin is positive in 10/18 lymph nodes were also positive  We consulted medical oncology for evaluation and status min of care for postoperative outpatient follow-up as well  Once on the medical-surgical floor PT/OT were consulted to assist with postoperative ambulation and discharge planning  ID remained on board with assistance for antibiotic regimen  All discharge instructions were clearly written on discharge summary  For transport to [address] [place] [address]  On discharge, the patient is instructed to follow-up with the patient's primary care provider within the next 2 weeks to review the events of the recent hospitalization  The patient is instructed to follow-up with the Acute Care Surgical Services in 2 weeks  Condition at Discharge: fair     Discharge instructions/Information to patient and family:   See after visit summary for information provided to patient and family  Provisions for Follow-Up Care:  See after visit summary for information related to follow-up care and any pertinent home health orders  Disposition: [facility]illed [facility] [address]    Planned Readmission: No    Discharge Statement   I spent 30 minutes discharging the patient  This time was spent on the day of discharge  I had direct contact with the patient on the day of discharge  Additional documentation is required if more than 30 minutes were spent on discharge  Discharge Medications:  See after visit summary for reconciled discharge medications provided to patient and family      Carolyne Preston PA-C  2/24/2022  2:05 PM

## 2022-02-24 NOTE — INCIDENTAL FINDINGS
The following findings require follow up:  Radiographic finding   Finding:   CTA: New small focal left upper lobe groundglass infiltrate #4/29 and #601/82  - TREATED WHILE INPATIENT, RESOLVED    KUB:The majority of the enteric contrast is in the gastric fundus  There is a small amount seen within some small bowel loops in the left mid to lower abdomen  There are persistently dilated bowel loops seen in the abdomen  - RESOLVED AS INPATIENT     XR Chest:  Endotracheal tube no longer visualized  Nasogastric tube tip in mid esophagus    Advancement recommended as clinically indicated -RESOLVED     Follow up required: F/U in Surgical office in 2 weeks post discharge   Follow up should be done within 2 week(s)    Please notify the following clinician to assist with the follow up:   Shae Zimmerman, PAOralC

## 2022-02-24 NOTE — DISCHARGE INSTRUCTIONS
DISCHARGE INSTRUCTIONS    Follow Up: Follow up with Dr Awa Lombardi on 3/17 at [facility] rectal drain in place, record output every 24 hrs, follow-up in office     Diet: You may resume a regular diet    Pain: Per Palliative Care Recommendations    Shower: You may shower over the wound  Do not bathe or use a pool or hot tub until cleared by the physician  Activity: As tolerated  You may go up and down stairs, walk as much as you are comfortable, but walk at least 3 times each day  Do not lift anything heavier than 15 pounds for at least 2-4 weeks, unless cleared by the doctor  Driving: Do not drive or make any important decisions while on narcotic pain medication  Generally, you may drive when your off all narcotic pain medications  Medications: Resume all of your previous medications, unless told otherwise by the doctor  You do not need to take the narcotic pain medications unless you are having significant pain and discomfort  Call the office: If you are experiencing any of the following, fevers above 101 5° or chills, significant nausea or vomiting, increase in abdominal pain, if the wound develops drainage and/or is excessive redness around the wound, or if you have significant diarrhea or other worsening symptoms  Colostomy Care   WHAT YOU NEED TO KNOW:   Colostomy care is how to change, empty, or clean your pouch system  You and your family will be taught colostomy care before you leave the hospital    DISCHARGE INSTRUCTIONS:   Call your local emergency number (911 in the [address] [place] [address],[address]) if:   · Your bowel movements are black or bloody  · Your stoma is bleeding and you cannot stop the bleeding  · You are too weak to stand up  · You have severe abdominal pain  Call your doctor if:   · You have a fever  · You have a foul odor coming from your colostomy bag or stoma that lasts longer than a week  · Your skin around the stoma becomes red and irritated      · You have nausea, vomiting, pain, cramping, or bloating  · You do not have regular bowel movements through your stoma  · The size of your stoma changes  · You have questions or concerns about your condition or care  Empty your pouch:   · Wash your hands  Put on medical gloves  · Empty the pouch when it is ? to ½ full and before you change the system  Do not wait until the pouch is completely full  This could put pressure on the seal and cause it to leak or spill  · Hold the pouch up by the bottom end  If the pouch has a clamp system, remove the clamp  You may need to roll the end back to keep it from getting soiled  · Drain the pouch  Place toilet paper into the toilet before you empty the pouch to reduce splash back  Drain the pouch by squeezing the contents into the toilet  · Clean the end of the pouch  Use toilet paper or a moist paper towel  You may also rinse the pouch but it is not necessary  Keep the end of the pouch clean  · Close the end of the pouch  Unroll the end of the pouch  Replace the clamp or close the end of the pouch as directed  Change your pouch system:  How you change your pouch system depends on the type you have  Your healthcare provider will give you specific instructions on how to change your system  The following is general information about how to change it:  · Ask how often to change your pouch system  The type of system you use affects the amount of time it can be worn  Some pouch systems can stay in place for 3 to 7 days  Others can last 1 to 3 months and the pouch is changed more frequently  · Wash your hands  Put on medical gloves  · For a one-piece system:      ? Remove the pouch  Empty the pouch before removing  Gently remove the pouch by pushing the skin down and away from the adhesive skin barrier with one hand  With the other hand, pull the pouch up and away from the stoma  ? Gently clean the skin around your stoma  Use mild soap and water   Do not use soaps that have oil or perfumes  Pat your skin dry  ? Use a pouch with the right size opening  Use a pouch that has an opening that is ? of an inch larger than your stoma  You may need to cut the opening to fit around your stoma  If the opening is too large, bowel movement can leak onto your skin and cause irritation  ? Use skin barrier products to help reduce irritation  These products can help protect your skin and keep it dry  ? Use slight pressure to place your pouch  Center the pouch over the stoma and press it firmly into place on clean, dry skin  It may be helpful to hold your hand over the new pouch for 30 seconds  The warmth of your hand can help stick the adhesive skin barrier into place  ? Dispose of the used pouch correctly  If the pouch is disposable, place the old pouch in another plastic bag and throw it in the trash  If you use a reusable pouch, talk to your healthcare provider about how to clean it  ? Remove gloves and wash  your hands  · For 2 piece systems:      ? Remove pouch and faceplate  Empty the pouch before removing  Gently remove the faceplate by pushing the skin down and away from the adhesive skin barrier with one hand  ? Gently clean the skin around your stoma  Use mild soap and water  Do not use soaps that have oil or perfumes  Pat your skin dry  ? Use a faceplate with the right size opening  The opening needs to be ? of an inch larger than your stoma  You may need to cut the opening to fit around your stoma  If the opening is too large, bowel movement can leak onto your skin and cause irritation  ? Use skin barrier products to help reduce irritation  These products can help protect your skin and keep it dry  ? Use slight pressure to place your faceplate  Center the hole in the faceplate over the stoma  Press it firmly into place on clean, dry skin  It may be helpful to hold your hand over the new faceplate for 30 seconds   The warmth of your hand can help stick the adhesive skin barrier into place  ? Dispose of the used pouch correctly  If the pouch is disposable, place the old pouch in another plastic bag and throw it in the trash  ? Remove gloves and wash  your hands  Skin care:  Look at the skin around your stoma each time you change your pouch system  Your stoma should be pink or red and moist  You may have a small amount of bleeding when you clean your stoma  This is normal  Your stoma will get smaller and become its normal size in about 8 weeks  For support and more information:   · [place] [facility]  [address] , [address]  Phone: [phone]  Web Address: [employer] pt  org    · Wound Ostomy and Continence Nurses Society  [facility] , [address] [facility]   Web Address: www wocn  org    Follow up with your doctor as directed: You may need to return to have your stoma and colostomy checked  Bring your ostomy equipment with you to your appointments and any time you have to go to the hospital  Write down your questions so you remember to ask them during your visits  © Copyright [employer] 2021 Information is for End User's use only and may not be sold, redistributed or otherwise used for commercial purposes  All illustrations and images included in CareNotes® are the copyrighted property of A D A M , Inc  or [address] [place] [address]  The above information is an  only  It is not intended as medical advice for individual conditions or treatments  Talk to your doctor, nurse or pharmacist before following any medical regimen to see if it is safe and effective for you

## 2022-02-24 NOTE — RESTORATIVE TECHNICIAN NOTE
Restorative Technician Note      Patient Name: Elver Schultz     Restorative Tech Visit Date: 02/24/22  Note Type: Mobility  Patient Position Upon Consult: Supine  Activity Performed: Ambulated  Assistive Device: Roller walker  Patient Position at End of Consult: All needs within reach;  Supine

## 2022-02-25 PROBLEM — E66.01 MORBID OBESITY (HCC): Status: ACTIVE | Noted: 2022-01-01

## 2022-02-25 PROBLEM — C18.9 COLON ADENOCARCINOMA (HCC): Status: ACTIVE | Noted: 2022-01-01

## 2022-02-25 NOTE — UTILIZATION REVIEW
Notification of Discharge   This is a Notification of Discharge from our facility [address]  Please be advised that this patient has been discharge from our facility  Below you will find the admission and discharge date and time including the patients disposition  UTILIZATION REVIEW CONTACT:  [place]  Utilization   Network Utilization Review Department  Phone: [phone] x carefully listen to the prompts  All voicemails are confidential   Email: [email or web address]     PHYSICIAN ADVISORY SERVICES:  FOR [id number] REVIEW - MEDICAL NECESSITY DENIAL  Phone: [phone]  Fax: [phone]  Email: [email or web address]     PRESENTATION DATE: 1/16/2022  4:40 PM  OBERVATION ADMISSION DATE:   INPATIENT ADMISSION DATE: 1/16/22  5:57 PM   DISCHARGE DATE: 2/24/2022  6:53 PM  DISPOSITION: Non [facility] Non [facility] SN[facility]/SNU      IMPORTANT INFORMATION:  Send all requests for admission clinical reviews, approved or denied determinations and any other requests to dedicated fax number below belonging to the campus where the patient is receiving treatment   List of dedicated fax numbers:  [address] [place] [address] DENIALS (Administrative/Medical Necessity) [phone]   [address]  (Maternity/NICU/Pediatrics) [phone]   [facility] [phone]   [address] [facility] [phone]   [address] [place] [address] [phone]   [address] [place] [address],[address] [phone]   [facility]  [phone]   [address] [place] [address], [facility]  [facility] [phone]

## 2022-02-25 NOTE — PROGRESS NOTES
Valerie [address]  [address]  History and Physical    NAME: Elver Schultz  AGE: 60 y o  SEX: female [id number]    DATE OF ENCOUNTER: 2/25/2022    Code status:  CPR    Assessment and Plan     1  Diverticulitis of large intestine with perforation without bleeding  Sigmoid cancer complicated by perforation, s/p Ortega's procedure on 1/17    2  Primary hypertension  Currently stable on Lisinopril    3  Acute renal failure, unspecified acute renal failure type [facility])  Resolved now  4  Cellulitis of abdominal wall, chest wall and groin with wounds  Was the initial presentation when [facility]     5  Right Hydroureteronephrosis  S/p Cystoscopy, right stent placement with right retrograde pyelogram, placement of left ureteral catheter    6  Morbid obesity ([facility] [address] )  BMI 57      7  Colon adenocarcinoma  Diagnosed by intraop specimen collection which confirmed T4B colon adenocarcinoma  Will need medical onc fup and chemotherapy  All medications and routine orders were reviewed and updated as needed  Plan discussed with: Patient    Chief Complaint     Seen for admission at [address] [place] [address]    History of Present Illness     Patient presents to  on 2/24/22 from [address] [place] [address] after prolonged hospital stay, she was initially admitted on 01/6/2022 at [facility] for multiple wounds and cellulitis and then was transferred to [facility] on 1/16/22 with perforated diverticulitis complicated by Rt hydronephrosis  She was taken for ex-lap, cystoscopy, ureteral stents, Hartmanss procedure  Intra op findings were consistent with perforated diverticulitis, with necrosis of proximal rectum and ovaries, severely inflammed rectal stump  Post op she was admitted to surgical ICU  She tested positive for COVID, but remained stable from COVID standpoint  ID team was on board   On post op day 4 she developed ostomy function but had significant nausea and emesis requiring NG tube  She was also put on TPN through PICC line due to prolonged NPO status  She ultimately started tolerating diet well and it was slowly advanced as tolerated  The pathology of intraoperative specimen showed T4b colon adenocarcinoma with lymph node positive as well for cancer  She was later transferred to [facility]  Medical oncology team were consulted as well  HISTORY:  Past Medical History:   Diagnosis Date    Anxiety     Arthritis     GERD (gastroesophageal reflux disease)     Hidradenitis suppurativa     Hypertension     Lipodermatosclerosis of both lower extremities     Lymphedema 2006    Sciatic leg pain 2006    left side    SOB (shortness of breath)     Stomach ulcer      Family History   Problem Relation Age of Onset    Cancer Mother     Hypertension Mother     COPD Father     Diabetes Father      Social History     Socioeconomic History    Marital status:      Spouse name: None    Number of children: 2    Years of education: Some college    Highest education level: Some college, no degree   Occupational History    None   Tobacco Use    Smoking status: Light Tobacco Smoker     Packs/day: 0 25     Years: 20 00     Pack years: 5 00     Types: Cigarettes    Smokeless tobacco: Never Used    Tobacco comment: Only smoke once in awhile   Vaping Use    Vaping Use: Never used   Substance and Sexual Activity    Alcohol use: Not Currently     Comment: holidays    Drug use: No    Sexual activity: Not Currently   Other Topics Concern    None   Social History Narrative    None     Social Determinants of Health     Financial Resource Strain: Not on file   Food Insecurity: No Food Insecurity    Worried About Running Out of Food in the Last Year: Never true    Latoya of Food in the Last Year: Never true   Transportation Needs: No Transportation Needs    Lack of Transportation (Medical):  No    Lack of Transportation (Non-Medical): No   Physical Activity: Not on file   Stress: Not on file   Social Connections: Not on file   Intimate Partner Violence: Not on file   Housing Stability: Unknown    Unable to Pay for Housing in the Last Year: Not on file    Number of Places Lived in the Last Year: 1    Unstable Housing in the Last Year: No       Allergies: Allergies   Allergen Reactions    Bee Venom Anaphylaxis, Shortness Of Breath and Swelling       Review of Systems     Review of Systems   Constitutional: Positive for activity change, appetite change, fatigue and unexpected weight change  HENT: Negative for congestion and trouble swallowing  Respiratory: Negative for cough, shortness of breath and wheezing  Cardiovascular: Negative for chest pain and palpitations  Gastrointestinal: Positive for abdominal distention and abdominal pain  Endocrine: Negative for cold intolerance and heat intolerance  Genitourinary: Positive for difficulty urinating  Musculoskeletal: Positive for arthralgias and gait problem  Skin: Positive for color change  Neurological: Negative for dizziness and headaches  Psychiatric/Behavioral: Negative for agitation and confusion  The patient is nervous/anxious  Medications and orders     All medications reviewed and updated in Nursing Home EMR  Objective     Vitals: T 98 7, , RR 18, /90, Sat 94%, Wt 365 lbs       Physical Exam  Vitals and nursing note reviewed  Constitutional:       General: She is not in acute distress  Appearance: She is obese  She is ill-appearing  She is not toxic-appearing  HENT:      Head: Normocephalic and atraumatic  Nose: Nose normal       Mouth/Throat:      Comments: Poor oral hygiene  Eyes:      General: No scleral icterus  Conjunctiva/sclera: Conjunctivae normal    Cardiovascular:      Rate and Rhythm: Normal rate  Rhythm irregular  Pulses: Normal pulses     Pulmonary:      Effort: Pulmonary effort is normal  No respiratory distress  Abdominal:      General: There is distension  Palpations: Abdomen is soft  Tenderness: There is abdominal tenderness  Comments: Ostomy noted   Musculoskeletal:      Right lower leg: Edema present  Left lower leg: Edema present  Skin:     Capillary Refill: Capillary refill takes less than 2 seconds  Coloration: Skin is not jaundiced  Neurological:      Mental Status: She is alert and oriented to person, place, and time  Mental status is at baseline  Psychiatric:      Comments: Pleasant         Pertinent Laboratory/Diagnostic Studies: The following labs/studies were reviewed please see chart or hospital paperwork for details  Results for Wendy Chatterjee (MRN [id number]) as of 2/25/2022 11:35   Ref   Range 2/17/2022 10:37   Sodium Latest Ref Range: 136 - 145 mmol/L 136   Potassium Latest Ref Range: 3 5 - 5 3 mmol/L 4 3   Chloride Latest Ref Range: 100 - 108 mmol/L 106   CO2 Latest Ref Range: 21 - 32 mmol/L 24   Anion Gap Latest Ref Range: 4 - 13 mmol/L 6   BUN Latest Ref Range: 5 - 25 mg/dL 19   Creatinine Latest Ref Range: 0 60 - 1 30 mg/dL 0 90   Glucose, Random Latest Ref Range: 65 - 140 mg/dL 111   Calcium Latest Ref Range: 8 3 - 10 1 mg/dL 8 6   eGFR Latest Units: ml/min/1 73sq m 70       - Counseling Documentation: patient was counseled regarding: diagnostic results and prognosis

## 2022-02-28 NOTE — TELEPHONE ENCOUNTER
Called as patient requested I reach out to her re: colostomy bag issues  She did not answer the phone  Tried calling twice and was not able to leave VM  If patient calls back please direct her to contact physician at the nursing home as they may be better able to evaluate and address her concerns

## 2022-02-28 NOTE — TELEPHONE ENCOUNTER
Patient called office asking to speak to Dr Delia Nuno, patient stated " I'm having problems with my colostomy bag, is too small and no one is able to help me in this nursing home,  said I can call her every time I needed anything"    I explain to patient of the need of asking the Nurse staff at the facility or a , they will be able to assist her or the ordering Dr for this colostomy bag  Patient express frustration and demanded a call back from Dr Lin Ards  Please advise

## 2022-03-01 NOTE — PROGRESS NOTES
[address]  [phone]    NAME: Rosalia Cadet  AGE: 60 y o  SEX: female    Progress Note    Location:   POS: 32 (SNF)    Assessment/Plan:    Diverticulitis of large intestine with perforation  S/P Exploratory laparotomy and Payal's Procedure  Still with intermittent nausea w/o vomiting  Patient reported daily stools in colostomy bag  Surgical incision - healing well - no signs of infection or dehiscence  Continue Dicyclomine 10mg QID PRN  Continue Daily PRN Senna and Colace  To follow-up with [facility] office 2 weeks from hospital discharge  = nursing to set-up appointment    Primary hypertension  BP range (2/24/2022 to 3/1/2022) = 128/77 to 164/91  HR range (2/24/2022 to 3/1/2022) = 70 to 102/min  Continue Metoprolol tartrate 25mg BID + Lisinopril 10mg daily  Will continue to monitor for now  Chronic midline low back pain with left-sided sciatica  Start Diclofenac 1% gel BID to low back and spine BID (2G) each application  Start Robaxin 500mg Q12 hours  Continue Meloxicam 7 5mg daily  Continue Hydromorphone 2mg Q4 hours PRN  D/C the following per patient request:  * Lidocaine patch  * Flexeril  * PRN Diclofenac gel 1%  Nursing to continue to assess for pain Q shift    Colon adenocarcinoma ([facility] [place] [address] )  To follow-up with Hemo/Oncology office  Nursing to set-up appointments with office  Patient reported she is under Palliative Care prior to discharge from hospital    GERD (gastroesophageal reflux disease)  Continue Omeprazole 20mg daily    Other specified anxiety disorders  Easily anxious but redirectable  Continue PRN Alprazolam BID + Atarax 25mg TID PRN    Lumbar paraspinal muscle spasm  Please see management in setting of # 3    Ambulatory dysfunction  Continue PT/OT as scheduled  Chief complaint / Reason for visit: Follow-up visit    History of Present Illness:   This is a 57-year-old female patient currently admitted at [facility] (2/24/2022 to present) following discharge from acute care hospitalization H  C  [facility]) with Dx Diverticulitis of large intestine with perforation - S/P Exploratory laparotomy and Payal's Procedure, COVID-29 virus detected (resolved),  Right hydroureteronephrosis - S/P cystoscopy and ureteral stents (resolved), T4B Colon Adenocarcinoma    Patient is seen and examined today to follow up acute and chronic medical conditions as mentioned above and HTN, Anxiety, GERD Morbid Obesity and ambulatory dysfunction with deconditioning  Patient is out of bed for this visit sitting in chair in room - alert, cooperative, calm, pleasant and not in distress  Patient is verbal with clear coherent speech - oriented to name/birthday/current date and place  Patient presented with multiple nursing related complaints in this visit  Patient reported low back and spinal pain on this visit and requested to change Diclofenac gel to scheduled instead of PRN - okayed  Patient also pointed out medications that she has declined to take and would like to have it discontinued: Lidocaine patch and Flexeril - okayed  Patient complained of intermittent nausea but no vomiting to which she says has been ongoing since she was in the hospital - has disrupted appetite  Patient agreeable to start Zofran 4 mg 30 minutes before meals to improve meal completion  Coordinated with RD on this visit - patient requested bland meals, low-fat  Patient has also expressed concern regarding the size of her Colostomy bag, stated " It's too small" - will coordinated with nursing  Patient reported that she lives alone but has her son to help her  Patient denies any other acute medical concerns during ROS assessment for this visit  Patient has expressed wanting to go home on this visit - this provider was able to redirect patient at this time  Per nursing no acute medical concerns for this visit      Review of Systems:  Per history of present illness, all other systems reviewed and negative  HISTORY:  Medical Hx: Reviewed, unchanged  Family Hx: Reviewed, unchanged  Soc Hx: Reviewed,  unchanged    ALLERGY: Reviewed, unchanged  Allergies   Allergen Reactions    Bee Venom Anaphylaxis, Shortness Of Breath and Swelling        PHYSICAL EXAM:  Vital Signs: T97 9F -P64 -R19 BP: 112/68 SpO2: 96%  Weight: 365 0 lbs ()    General: NAD  Morbid obesity  Head: Atraumatic  Normocephalic  Eye Exam: anicteric sclera, no discharge, PERRLA, No injection  Oral Exam: moist mucous membranes, no buccaloropharyngeal erythema, palatine tonsils WNL  Poor dentition  Neck Exam: no anterior cervical lymphadenopathy noted, neck supple  Cardiovascular: regular rate, regular rhythm, no murmurs, rubs, or gallops  Pulmonary: (+) wheeze to bases - mild, no rhonchi, no rales  No chest tenderness  Abdominal: soft, non-tender, nondistended, bowel sounds audible x 4 quadrants  Surgical incision - healing well - 1 dry scab on upper part of incision - no signs of infection or dehiscence  Colostomy to LLQ - small amount of stools - no signs of erosion at the stoma site  Meal completion improving form 25% to 100%  : Non distended bladder  Continent  Extremities and skin: Significant Lymphedema - weeping or open skin to BLE  Left posterior thigh dry area - chronic  Neurological: alert, cooperative and responsive, Oriented x 3, moving all 4 extremities symmetrically  Laboratory results / Imaging reviewed: Hard copy/ies in medical chart:    * BMP (2022): Lab Results   Component Value Date    SODIUM 136 2022    K 4 3 2022     2022    CO2 24 2022    BUN 19 2022    CREATININE 0 90 2022    GLUC 111 2022    CALCIUM 8 6 2022   eGFR:70 (L)  Anion gap: 6 (WNL)    * CBC with diff (2022) = WNL except:  RBC: 3 29 (L)  Hb 6 (L)  Hct: 28 9 (L)  MCH: 26 1 (L)  MCHC: 29 8 (L)  RDW: 20 2 (H)  MPV: 8 6 (L)  Eos: 10 (H)  Ab   Eos: 0 81 (H)    * HbA1C (1/16/2022): Lab Results   Component Value Date    HGBA1C 5 8 (H) 01/16/2022       * TSH (1/6/1011): Lab Results   Component Value Date    IXR9FAWFLPVE 1 078 01/06/2022       * Viral Panel (1/16/2022) = WNL except:  COVID-19 (+)    Current Medications:   All medications reviewed and updated in Nursing Home Chart    CURRENT MEDICATION LIST IN OO-STR:    Current Outpatient Medications:     albuterol (Ventolin HFA) 90 mcg/act inhaler, Inhale 2 puffs every 6 (six) hours as needed for wheezing or shortness of breath, Disp: 18 g, Rfl: 5    ALPRAZolam (XANAX) 2 MG tablet, Take 1 tablet (2 mg total) by mouth 2 (two) times a day as needed for anxiety, Disp: 30 tablet, Rfl: 0    Diclofenac Sodium (VOLTAREN) 1 %, Apply 2 g topically 4 (four) times a day, Disp: , Rfl:     dicyclomine (BENTYL) 20 mg tablet, take 1 tablet by mouth four times a day if needed for ABDOMINAL CRAMPING, Disp: 60 tablet, Rfl: 2    docusate sodium (COLACE) 100 mg capsule, Take 100 mg by mouth daily as needed, Disp: , Rfl:     fluticasone (FLONASE) 50 mcg/act nasal spray, instill 1 spray into each nostril once daily, Disp: 16 g, Rfl: 5    HYDROmorphone (DILAUDID) 2 mg tablet, Take 1mg (1/2 tablet) PO Q4PRN for moderate pain and 2mg (1 tablet) PO Q4PRN for severe pain, Disp: 30 tablet, Rfl: 0    hydrOXYzine HCL (ATARAX) 25 mg tablet, take 1 tablet by mouth three times a day if needed for anxiety, Disp: 90 tablet, Rfl: 2    lisinopril (ZESTRIL) 10 mg tablet, Take 1 tablet (10 mg total) by mouth daily, Disp: 90 tablet, Rfl: 1    loratadine (CLARITIN) 10 mg tablet, take 1 tablet by mouth once daily, Disp: 30 tablet, Rfl: 11    meloxicam (MOBIC) 7 5 mg tablet, Take 7 5 mg by mouth daily, Disp: , Rfl:     methocarbamol (ROBAXIN) 500 mg tablet, Take 500 mg by mouth every 12 (twelve) hours, Disp: , Rfl:     metoprolol tartrate (LOPRESSOR) 25 mg tablet, take 1 tablet by mouth twice a day, Disp: 180 tablet, Rfl: 1    nystatin (MYCOSTATIN) powder, Apply topically 2 (two) times a day, Disp: 15 g, Rfl: 0    omeprazole (PriLOSEC) 20 mg delayed release capsule, Take 1 capsule (20 mg total) by mouth daily, Disp: 90 capsule, Rfl: 1    ondansetron (ZOFRAN) 4 mg tablet, Take 4 mg by mouth 3 (three) times a day before meals, Disp: , Rfl:     senna (SENOKOT) 8 6 MG tablet, Take 1 tablet by mouth daily as needed, Disp: , Rfl:     sertraline (ZOLOFT) 50 mg tablet, Take 1 tablet (50 mg total) by mouth daily, Disp: 90 tablet, Rfl: 1    SUMAtriptan (IMITREX) 25 mg tablet, TAKE 1 TABLET BY MOUTH ONCE AS NEEDED FOR MIGRAINE FOR UP TO 1 DOSE, Disp: 9 tablet, Rfl: 2      Please note:  Voice-recognition software may have been used in the preparation of this document  Occasional wrong word or "sound-alike" substitutions may have occurred due to the inherent limitations of voice recognition software  Interpretation should be guided by MIRTA Zuleta  3/4/2022

## 2022-03-02 PROBLEM — E66.01 MORBID OBESITY WITH BMI OF 60.0-69.9, ADULT (HCC): Status: ACTIVE | Noted: 2022-01-01

## 2022-03-02 PROBLEM — S31.109A OPEN WND ANTERIOR ABDOMEN: Status: ACTIVE | Noted: 2022-01-01

## 2022-03-02 NOTE — ASSESSMENT & PLAN NOTE
Left posterior thigh  - This is chronic, patient had been going to outpatient wound center    Assessment and plan  - no open wound, excoriated and dry, 7 x 3 5 x 0 1 cm  , with no obvious sign of infection  - local wound care with hydraguard  - sign of    Facility nurse will continue to monitor the wound

## 2022-03-02 NOTE — PATIENT INSTRUCTIONS
Orders Placed This Encounter   Procedures    Wound cleansing and dressings     Wound:  Left posterior thigh  Discontinue previous wound order  Cleanse the wound bed with NSS   Apply hydraguard to wound bed  Frequency : Daily and prn for soiling    Location : Abdomen ( mid abdomen)  Leave SINAN  Offload all wounds  Turn and reposition frequently, maximum of every two hours  Instruct / Assist with weight shifting every 15 - 20 minutes when in chair  Increase protein intake  Monitor for any sign of infection or worsening, inform PCP or patient's primary physician in your facility       Standing Status:   Future     Standing Expiration Date:   3/2/2023

## 2022-03-02 NOTE — LETTER
Patient:  Raul Bolanos   1965           MIRTA Weems saw Raul Bolanos for a wound care visit on 3/2/2022  See below for information relating to this visit  Chief Complaint   Patient presents with    New Patient Visit     left medial abdomen,left posterior upper thigh        Assessment/Plan:  1  Hidradenitis suppurativa  Assessment & Plan:  Left posterior thigh  - This is chronic, patient had been going to outpatient wound center    Assessment and plan  - no open wound, excoriated and dry, 7 x 3 5 x 0 1 cm  , with no obvious sign of infection  - local wound care with hydraguard  - sign of  Facility nurse will continue to monitor the wound    Orders:  -     Wound cleansing and dressings; Future    2  Open wound of anterior abdominal wall, initial encounter  Assessment & Plan: This is a chronic wound, patient was previously seen in outpatient wound center,  - wound size is 1 x 0 5 x 0 1 cm  , 100% eschar, with no obvious sign of infection  - Leave SINAN  - sign off  Facility nurse will continue to monitor and manage the wound    Orders:  -     Wound cleansing and dressings; Future    3  Morbid obesity with BMI of 60 0-69 9, adult [facility])  Assessment & Plan:  Under the care of RD             Orders:  Raul Bolanos  1965  Orders Placed This Encounter   Procedures    Wound cleansing and dressings     Wound:  Left posterior thigh  Discontinue previous wound order  Cleanse the wound bed with NSS   Apply hydraguard to wound bed  Frequency : Daily and prn for soiling    Location : Abdomen ( mid abdomen)  Leave SINAN  Offload all wounds  Turn and reposition frequently, maximum of every two hours  Instruct / Assist with weight shifting every 15 - 20 minutes when in chair  Increase protein intake  Monitor for any sign of infection or worsening, inform PCP or patient's primary physician in your facility       Standing Status:   Future     Standing Expiration Date:   3/2/2023 Follow Up:  Return sign off  [address] hours are 8:00 am - 4:30 pm Monday through Friday  The center phone number is [phone]  You can also contact me directly thru my email at [facility]  [email or web address] or thru tiger text  If it is an emergency, please contact the PCP or patient's attending physician in your facility       Sincerely,    Electronically signed by MIRTA Vogel    Patient : Fiorella Franklyn    1965

## 2022-03-02 NOTE — ASSESSMENT & PLAN NOTE
This is a chronic wound, patient was previously seen in outpatient wound center,  - wound size is 1 x 0 5 x 0 1 cm  , 100% eschar, with no obvious sign of infection  - Leave SINAN  - sign off    Facility nurse will continue to monitor and manage the wound

## 2022-03-03 NOTE — PROGRESS NOTES
Telephone call to patient to introduce myself and my role with [facility]  Patient is not happy about being at [address] and wants to go home  I reviewed her appointments with her but she stated she had no idea how she would get to them  I told her [address] should have her schedule and they would provide transportation to her  She complained the ostomy bags were too small and she was constantly leaking - "sitting in shit"  She then indicated she had to hang up as someone came in  She thanked me for the call

## 2022-03-04 PROBLEM — R26.2 AMBULATORY DYSFUNCTION: Status: ACTIVE | Noted: 2022-01-01

## 2022-03-04 NOTE — ASSESSMENT & PLAN NOTE
Start Diclofenac 1% gel BID to low back and spine BID (2G) each application  Start Robaxin 500mg Q12 hours  Continue Meloxicam 7 5mg daily  Continue Hydromorphone 2mg Q4 hours PRN  D/C the following per patient request:  * Lidocaine patch  * Flexeril  * PRN Diclofenac gel 1%  Nursing to continue to assess for pain Q shift

## 2022-03-04 NOTE — PROGRESS NOTES
[address] MANAGEMENT   AND [facility]PERBARIC MEDICINE CENTER       Patient ID: Denzel Watson is a 60 y o  female Date of Birth 1965     Location of Service: [facility]    Performed wound round with: Wound team       Chief Complaint   Patient presents with   [address] [facility] [address] Patient Visit     left medial abdomen,left posterior upper thigh       Wound Instructions:  Orders Placed This Encounter   Procedures    Wound cleansing and dressings     Wound:  Left posterior thigh  Discontinue previous wound order  Cleanse the wound bed with NSS   Apply hydraguard to wound bed  Frequency : Daily and prn for soiling    Location : Abdomen ( mid abdomen)  Leave SINAN  Offload all wounds  Turn and reposition frequently, maximum of every two hours  Instruct / Assist with weight shifting every 15 - 20 minutes when in chair  Increase protein intake  Monitor for any sign of infection or worsening, inform PCP or patient's primary physician in your facility  Standing Status:   Future     Standing Expiration Date:   3/2/2023       Allergies  Bee venom      Assessment & Plan:  1  Hidradenitis suppurativa  Assessment & Plan:  Left posterior thigh  - This is chronic, patient had been going to outpatient wound center    Assessment and plan  - no open wound, excoriated and dry, 7 x 3 5 x 0 1 cm  , with no obvious sign of infection  - local wound care with hydraguard  - sign of  Facility nurse will continue to monitor the wound    Orders:  -     Wound cleansing and dressings; Future    2  Open wound of anterior abdominal wall, initial encounter  Assessment & Plan: This is a chronic wound, patient was previously seen in outpatient wound center,  - wound size is 1 x 0 5 x 0 1 cm  , 100% eschar, with no obvious sign of infection  - Leave SINAN  - sign off  Facility nurse will continue to monitor and manage the wound    Orders:  -     Wound cleansing and dressings; Future    3   Morbid obesity with BMI of 60 0-69 9, adult [facility]  Assessment & Plan:  Under the care of RAMESH             Subjective: This is a 47-year-old female referred to our service for wound on the abdomen, bilateral breast and left posterior thigh    Patient was referred by Senior Care Team  Patient was seen in collaboration with the facility wound team   As per medical Record review, patient have been seen at outpatient wound center for hidradenitis suppurativa on her thigh  This is a chronic wound  Current treatment is nystatin and maxorb  Received patient in bed, not comfortable  Her colostomy break and needs to be changed  Denies pain  She have a good appetite  No other significant issues related to the wound  Review of Systems   Constitutional: Negative  HENT: Negative  Eyes: Negative  Respiratory: Negative  Cardiovascular: Negative for leg swelling  Gastrointestinal: Negative  Endocrine: Negative  Genitourinary: Negative  Musculoskeletal: Positive for gait problem  Skin: Positive for wound  See HPI   Neurological: Negative for dizziness and headaches  Psychiatric/Behavioral: Negative for behavioral problems  Objective: There were no vitals taken for this visit  Physical Exam  Constitutional:       Appearance: She is obese  HENT:      Head: Normocephalic and atraumatic  Nose: Nose normal    Eyes:      Conjunctiva/sclera: Conjunctivae normal    Cardiovascular:      Rate and Rhythm: Normal rate  Pulmonary:      Effort: Pulmonary effort is normal    Abdominal:      Tenderness: There is no abdominal tenderness  There is no guarding  Comments: With ostomy, pouch is leaking   Genitourinary:     Comments: continent  Musculoskeletal:         General: No tenderness  Normal range of motion  Cervical back: Normal range of motion  Right lower leg: No edema  Left lower leg: No edema  Skin:     General: Skin is warm  Findings: Lesion present        Comments: Left medial abdomen - wound size is 1 x 0 5 x 0 1 cm,, 100% eschar, no drainage, no obvious sign of infection, denies pain    Left lateral abdomen, left lateral breast,  left medial breast, right abdomen - no wound    Left posterior upper thigh - wound size is 7 x 3 5 x 0 1 cm , excoriated, dry, no drainage, no erythema, no obvious sign of infection, denies pain   Neurological:      Mental Status: She is alert and oriented to person, place, and time  Psychiatric:         Mood and Affect: Mood normal          Behavior: Behavior normal               Procedures     Results from last 6 Months   Lab Units 01/08/22  0046   WOUND CULTURE  Few Colonies of Enterococcus species*  Few Colonies of Staphylococcus coagulase negative*       Patient's care was coordinated with nursing facility staff  Recent vitals, labs and updated medications were reviewed on EMR or chart system of facility   Past Medical, surgical, social, medication and allergy history and patient's previous records were reviewed and updated as appropriate:     Patient Active Problem List   Diagnosis    Lymphedema    Primary hypertension    Hidradenitis suppurativa    Chronic midline low back pain with left-sided sciatica    Other specified anxiety disorders    Lumbar paraspinal muscle spasm    Borderline hyperlipidemia    Iron deficiency    GERD (gastroesophageal reflux disease)    Acute renal failure (ARF) (HCC)    Cellulitis of abdominal wall, chest wall and groin with wounds    Diverticulitis of large intestine with perforation    Right Hydroureteronephrosis    Subacute vaginitis    Dysphagia    Colon adenocarcinoma ([facility] [address] [facility]    Morbid obesity ([facility] [address] [facility]    Open wnd anterior abdomen    Morbid obesity with BMI of 60 0-69 9, adult ([facility] [address] )     Past Medical History:   Diagnosis Date    Anxiety     Arthritis     GERD (gastroesophageal reflux disease)     Hidradenitis suppurativa     Hypertension     Lipodermatosclerosis of both lower extremities     Lymphedema 2006  Sciatic leg pain 2006    left side    SOB (shortness of breath)     Stomach ulcer      Past Surgical History:   Procedure Laterality Date    BILATERAL SALPINGOOPHORECTOMY N/A 2022    Procedure: RIGHT SALPINGO-OOPHORECTOMY, LEFT OOPHORECTOMY;  Surgeon: Rubio Fisher DO;  [facility];  Service: General     SECTION      FL RETROGRADE PYELOGRAM  2022    HARTMANS PROCEDURE N/A 2022    Procedure: END COLOSTOMY;  Surgeon: Rubio Fisher DO;  [facility];  Service: General    LAPAROTOMY N/A 2022    Procedure: LAPAROTOMY EXPLORATORY, DRAINAGE OF INTRA -ABDOMINAL ABSCESS;  Surgeon: Rubio Fisher DO;  [facility];  Service: General    DE CYSTOURETHROSCOPY,URETER CATHETER Right 2022    Procedure: CYSTOSCOPY RETROGRADE PYELOGRAM WITH INSERTION STENT URETERAL;  Surgeon: Diana Foote MD;  [facility];  Service: Urology     [address] 3 1-4 CM TRUNK,ARM,LEG Right 2020    Procedure: EXCISION BIOPSY TISSUE LESION/MASS UPPER EXTREMITY;  Surgeon: Padmaja Anderson DO;  Location: [facility];  Service: General    TONSILLECTOMY      TUMOR REMOVAL Right     5th finger     Social History     Socioeconomic History    Marital status:      Spouse name: None    Number of children: 2    Years of education: Some college    Highest education level: Some college, no degree   Occupational History    None   Tobacco Use    Smoking status: Light Tobacco Smoker     Packs/day: 0 25     Years: 20 00     Pack years: 5 00     Types: Cigarettes    Smokeless tobacco: Never Used    Tobacco comment: Only smoke once in awhile   Vaping Use    Vaping Use: Never used   Substance and Sexual Activity    Alcohol use: Not Currently     Comment: holidays    Drug use: No    Sexual activity: Not Currently   Other Topics Concern    None   Social History Narrative    None     Social Determinants of Health     Financial Resource Strain: Not on file Food Insecurity: No Food Insecurity    Worried About Running Out of Food in the Last Year: Never true    Latoya of Food in the Last Year: Never true   Transportation Needs: No Transportation Needs    Lack of Transportation (Medical): No    Lack of Transportation (Non-Medical):  No   Physical Activity: Not on file   Stress: Not on file   Social Connections: Not on file   Intimate Partner Violence: Not on file   Housing Stability: Unknown    Unable to Pay for Housing in the Last Year: Not on file    Number of Places Lived in the Last Year: 1    Unstable Housing in the Last Year: No        Current Outpatient Medications:     albuterol (Ventolin HFA) 90 mcg/act inhaler, Inhale 2 puffs every 6 (six) hours as needed for wheezing or shortness of breath, Disp: 18 g, Rfl: 5    ALPRAZolam (XANAX) 2 MG tablet, Take 1 tablet (2 mg total) by mouth 2 (two) times a day as needed for anxiety, Disp: 30 tablet, Rfl: 0    Diclofenac Sodium (VOLTAREN) 1 %, Apply 2 g topically 4 (four) times a day, Disp: , Rfl:     dicyclomine (BENTYL) 20 mg tablet, take 1 tablet by mouth four times a day if needed for ABDOMINAL CRAMPING, Disp: 60 tablet, Rfl: 2    docusate sodium (COLACE) 100 mg capsule, Take 100 mg by mouth daily as needed, Disp: , Rfl:     fluticasone (FLONASE) 50 mcg/act nasal spray, instill 1 spray into each nostril once daily, Disp: 16 g, Rfl: 5    HYDROmorphone (DILAUDID) 2 mg tablet, Take 1mg (1/2 tablet) PO Q4PRN for moderate pain and 2mg (1 tablet) PO Q4PRN for severe pain, Disp: 30 tablet, Rfl: 0    hydrOXYzine HCL (ATARAX) 25 mg tablet, take 1 tablet by mouth three times a day if needed for anxiety, Disp: 90 tablet, Rfl: 2    lisinopril (ZESTRIL) 10 mg tablet, Take 1 tablet (10 mg total) by mouth daily, Disp: 90 tablet, Rfl: 1    loratadine (CLARITIN) 10 mg tablet, take 1 tablet by mouth once daily, Disp: 30 tablet, Rfl: 11    meloxicam (MOBIC) 7 5 mg tablet, Take 7 5 mg by mouth daily, Disp: , Rfl:    methocarbamol (ROBAXIN) 500 mg tablet, Take 500 mg by mouth every 12 (twelve) hours, Disp: , Rfl:     metoprolol tartrate (LOPRESSOR) 25 mg tablet, take 1 tablet by mouth twice a day, Disp: 180 tablet, Rfl: 1    nystatin (MYCOSTATIN) powder, Apply topically 2 (two) times a day, Disp: 15 g, Rfl: 0    omeprazole (PriLOSEC) 20 mg delayed release capsule, Take 1 capsule (20 mg total) by mouth daily, Disp: 90 capsule, Rfl: 1    ondansetron (ZOFRAN) 4 mg tablet, Take 4 mg by mouth 3 (three) times a day before meals, Disp: , Rfl:     senna (SENOKOT) 8 6 MG tablet, Take 1 tablet by mouth daily as needed, Disp: , Rfl:     sertraline (ZOLOFT) 50 mg tablet, Take 1 tablet (50 mg total) by mouth daily, Disp: 90 tablet, Rfl: 1    SUMAtriptan (IMITREX) 25 mg tablet, TAKE 1 TABLET BY MOUTH ONCE AS NEEDED FOR MIGRAINE FOR UP TO 1 DOSE, Disp: 9 tablet, Rfl: 2  Family History   Problem Relation Age of Onset    Cancer Mother     Hypertension Mother     COPD Father     Diabetes Father               Coordination of Care: Wound team aware of the treatment plan  Facility nurse will provide wound treatment and monitor the wound for any changes  Patient / Staff education : Patient / Staff was given education on sign of infection and pressure ulcer prevention  Patient/ Staff verbalized understanding     Follow up :  Return sign off  Voice-recognition software may have been used in the preparation of this document  Occasional wrong word or "sound-alike" substitutions may have occurred due to the inherent limitations of voice recognition software  Interpretation should be guided by context        MIRTA Horner

## 2022-03-04 NOTE — ASSESSMENT & PLAN NOTE
BP range (2/24/2022 to 3/1/2022) = 128/77 to 164/91  HR range (2/24/2022 to 3/1/2022) = 70 to 102/min  Continue Metoprolol tartrate 25mg BID + Lisinopril 10mg daily  Will continue to monitor for now

## 2022-03-04 NOTE — ASSESSMENT & PLAN NOTE
S/P Exploratory laparotomy and Payal's Procedure  Still with intermittent nausea w/o vomiting  Patient reported daily stools in colostomy bag  Surgical incision - healing well - no signs of infection or dehiscence  Continue Dicyclomine 10mg QID PRN  Continue Daily PRN Senna and Colace  To follow-up with Gen Sx office 2 weeks from hospital discharge  = nursing to set-up appointment

## 2022-03-04 NOTE — ASSESSMENT & PLAN NOTE
To follow-up with Hemo/Oncology office  Nursing to set-up appointments with office    Patient reported she is under Palliative Care prior to discharge from hospital

## 2022-03-07 NOTE — TELEPHONE ENCOUNTER
Spoke to pt and advised per Dr Argelia Perez that she have renal US performed one month after stent removal to diagnose recurrence of hydronephrosis  She is scheduled for cysto/stent removal on 3/10/22

## 2022-03-07 NOTE — TELEPHONE ENCOUNTER
The hydro could certainly recur but we will know until the stent is removed  She should definitely have a renal ultrasound 1 month post stent removal to ensure that the hydronephrosis does not recur

## 2022-03-07 NOTE — TELEPHONE ENCOUNTER
Primary palliative medicine provider: [place]    Medication requested:   2 medications  Dilaudid 2 mg and Alprazolam 2 mg     If for pain, how has the patient been taking their pain medicine?      Last appointment: Adry Pickering  scheduled for 3 14   dc'd from SNF see previous  note     Next scheduled appointment: HFU 3 16     PDMP review:   Dilaudid 2 mg filled 2 24 15/2    Alprazolam 2 mg filled on 2 24  15/7

## 2022-03-07 NOTE — TELEPHONE ENCOUNTER
This patient is scheduled to see Dr Rose Mittal on 3/16/22 in [facility]  Patient was discharged on 3/4/22 from Rehab with no medications  Dr Rose Mittal is willing to give a bridge script till that apt  I did leave patient a message that she should call us back and get the exact medications she needs and to let Dr Rose Mittal know

## 2022-03-07 NOTE — TELEPHONE ENCOUNTER
With hydronephrosis secondary to the abscess and with everything going on within her abdomen? I do not see any reason for concern for redevelopment of hydronephrosis once the stent is out  Do you agree?

## 2022-03-07 NOTE — TELEPHONE ENCOUNTER
I did speak with patient today and Dr Zia Sargent is seeing this patient on 3/16/22 at [facility] and is willing to send over bridge scripts to get this patient till this apt

## 2022-03-07 NOTE — TELEPHONE ENCOUNTER
Spoke with patient and scheduled her with Marget Friends at [facility] on 3/10/22, patient will arrive for 11:15 to check in

## 2022-03-07 NOTE — TELEPHONE ENCOUNTER
Patient s/p right stent placement for right hydronephrosis on 01/16/2022 by Dr Ekaterina Singh  Patient had an extended hospitalization stay secondary to perforated diverticulitis s/p Ortega's procedure and end colostomy made  Patient discharged on 02/24/2006  Called and spoke with patient about scheduling of stent removal   Patient concerned if stent is removed she will develop hydronephrosis again    I told patient will send encounter to [facility] for advice then call her back

## 2022-03-08 PROBLEM — E66.01 MORBID OBESITY (HCC): Status: RESOLVED | Noted: 2022-01-01 | Resolved: 2022-01-01

## 2022-03-08 PROBLEM — B37.2 YEAST DERMATITIS: Status: ACTIVE | Noted: 2022-01-01

## 2022-03-08 PROBLEM — R11.0 NAUSEA: Status: ACTIVE | Noted: 2022-01-01

## 2022-03-08 PROBLEM — N76.1 SUBACUTE VAGINITIS: Status: RESOLVED | Noted: 2022-01-01 | Resolved: 2022-01-01

## 2022-03-08 NOTE — PROGRESS NOTES
Assessment/Plan:     Diagnoses and all orders for this visit:    Encounter for support and coordination of transition of care    Colon adenocarcinoma ([address] )  -     Misc  Devices (Bariatric Rollator) MISC; Use in the morning  -     Shower chair  -     Misc  Devices (Commode Bedside) MISC; Use in the morning  -     Ostomy Supplies (Closed-End Colostomy Pouch) MISC; Use in the morning  -     Ambulatory Referral to [address] [place] [address] [place]; Future  -     docusate sodium (COLACE) 100 mg capsule; Take 1 capsule (100 mg total) by mouth daily as needed for constipation  -     senna (SENOKOT) 8 6 MG tablet; Take 1 tablet (8 6 mg total) by mouth daily as needed for constipation    Nausea  -     ondansetron (ZOFRAN) 4 mg tablet; Take 1 tablet (4 mg total) by mouth 3 (three) times a day before meals    Gastroesophageal reflux disease, unspecified whether esophagitis present  -     omeprazole (PriLOSEC) 20 mg delayed release capsule; Take 1 capsule (20 mg total) by mouth daily    Right Hydroureteronephrosis    Chronic midline low back pain with left-sided sciatica  -     Diclofenac Sodium (VOLTAREN) 1 %; Apply 4 g topically 4 (four) times a day  -     methocarbamol (ROBAXIN) 500 mg tablet; Take 1 tablet (500 mg total) by mouth every 12 (twelve) hours    Morbid obesity with BMI of 50 0-59 9, adult (HCC)  -     Misc  Devices (Bariatric Rollator) MISC; Use in the morning  -     Shower chair  -     Misc  Devices (Commode Bedside) MISC; Use in the morning    Essential hypertension  -     lisinopril (ZESTRIL) 10 mg tablet; Take 1 tablet (10 mg total) by mouth daily    Yeast dermatitis  -     nystatin (MYCOSTATIN) powder; Apply topically 2 (two) times a day        - Medications were reviewed in detail with the patient  Refills sent  - Patient will follow up with specialists as scheduled  - Orders placed for home health as well as a walker, shower chair, and bedside commode    Return in about 3 months (around 6/8/2022) for Next scheduled follow up  Subjective:        Patient ID: Vincent Packer is a 60 y o  female  Chief Complaint   Patient presents with   Sonu Jamil is a 60year old female with history of HTN, lymphedema, hydradenitis suppurativa, anxiety, and morbid obesity, presenting for TCM  Patient presented to the ED 1/6/22 initially due to multiple open wounds 2/2 hydradenitis suppurativa  She was subsequently found to have perforated diverticulitis with right hydronephrosis  Patient underwent exploratory laparotomy, cystoscopy, ureteral stents, and Payal's procedure  Intraoperative findings included perforated diverticulitis with tissue necrosis of the proximal rectum and ovaries at the site of perforation, severely inflamed tissues of the rectal stump precluding stump closure, disease sigmoid removed and end colostomy made  Unfortunately, pathology of the specimen from intraoperative reports indicated that patient had T4b colon adenocarcinoma that had perforated into the adnexal structures, the distal margin positive in 10/18 lymph nodes  Patient was discharged to [facility] 2/24  She presents today as she signed herself out on 3/4  Reports that she was not happy with the care she was receiving  Today her main concern is for nausea and pain  She was prescribed dilaudid from palliative care but states that the pharmacy will not have this in stock until tomorrow  She does require multiple refills on maintenance medications today  Does not currently have any home health care        The following portions of the patient's history were reviewed and updated as appropriate: allergies, current medications, past family history, past medical history, past social history, past surgical history and problem list     Patient Active Problem List   Diagnosis    Lymphedema    Essential hypertension    Hidradenitis suppurativa    Chronic midline low back pain with left-sided sciatica    Other specified anxiety disorders  Lumbar paraspinal muscle spasm    Borderline hyperlipidemia    Iron deficiency    GERD (gastroesophageal reflux disease)    [facility]F) ([facility])    Cellulitis of abdominal wall, chest wall and groin with wounds    Diverticulitis of large intestine with perforation    Right Hydroureteronephrosis    Dysphagia    Colon adenocarcinoma ([address] )    Open wnd anterior abdomen    Morbid obesity with BMI of 50 0-59 9, adult ([facility])    Ambulatory dysfunction    Nausea    Yeast dermatitis       Current Outpatient Medications   Medication Sig Dispense Refill    albuterol (Ventolin HFA) 90 mcg/act inhaler Inhale 2 puffs every 6 (six) hours as needed for wheezing or shortness of breath 18 g 5    ALPRAZolam (XANAX) 2 MG tablet Take 1 tablet (2 mg total) by mouth 2 (two) times a day as needed for anxiety 45 tablet 0    Diclofenac Sodium (VOLTAREN) 1 % Apply 4 g topically 4 (four) times a day 150 g 5    docusate sodium (COLACE) 100 mg capsule Take 1 capsule (100 mg total) by mouth daily as needed for constipation 90 capsule 1    fluticasone (FLONASE) 50 mcg/act nasal spray instill 1 spray into each nostril once daily 16 g 5    HYDROmorphone (DILAUDID) 2 mg tablet Take 1mg (1/2 tablet) PO Q4PRN for moderate pain and 2mg (1 tablet) PO Q4PRN for severe pain 90 tablet 0    lisinopril (ZESTRIL) 10 mg tablet Take 1 tablet (10 mg total) by mouth daily 90 tablet 1    loratadine (CLARITIN) 10 mg tablet take 1 tablet by mouth once daily 30 tablet 11    meloxicam (MOBIC) 7 5 mg tablet Take 7 5 mg by mouth daily      methocarbamol (ROBAXIN) 500 mg tablet Take 1 tablet (500 mg total) by mouth every 12 (twelve) hours 60 tablet 2    metoprolol tartrate (LOPRESSOR) 25 mg tablet take 1 tablet by mouth twice a day 180 tablet 1    nystatin (MYCOSTATIN) powder Apply topically 2 (two) times a day 15 g 5    omeprazole (PriLOSEC) 20 mg delayed release capsule Take 1 capsule (20 mg total) by mouth daily 90 capsule 1    ondansetron (ZOFRAN) 4 mg tablet Take 1 tablet (4 mg total) by mouth 3 (three) times a day before meals 90 tablet 2    senna (SENOKOT) 8 6 MG tablet Take 1 tablet (8 6 mg total) by mouth daily as needed for constipation 90 tablet 1    sertraline (ZOLOFT) 50 mg tablet Take 1 tablet (50 mg total) by mouth daily 90 tablet 1    SUMAtriptan (IMITREX) 25 mg tablet TAKE 1 TABLET BY MOUTH ONCE AS NEEDED FOR MIGRAINE FOR UP TO 1 DOSE 9 tablet 2    Misc  Devices (Bariatric Rollator) MISC Use in the morning 1 each 0    Misc  Devices (Commode Bedside) MISC Use in the morning 1 each 0    Ostomy Supplies (Closed-End Colostomy Pouch) MISC Use in the morning 1 each 11     No current facility-administered medications for this visit          Past Medical History:   Diagnosis Date    Anxiety     Arthritis     GERD (gastroesophageal reflux disease)     Hidradenitis suppurativa     Hypertension     Lipodermatosclerosis of both lower extremities     Lymphedema     Sciatic leg pain     left side    SOB (shortness of breath)     Stomach ulcer         Past Surgical History:   Procedure Laterality Date    BILATERAL SALPINGOOPHORECTOMY N/A 2022    Procedure: RIGHT SALPINGO-OOPHORECTOMY, LEFT OOPHORECTOMY;  Surgeon: Abdoulaye Machuca DO;  [facility];  Service: General     SECTION      FL RETROGRADE PYELOGRAM  2022    HARTMANS PROCEDURE N/A 2022    Procedure: END COLOSTOMY;  Surgeon: Abdoulaye Machuca DO;  [facility];  Service: General    LAPAROTOMY N/A 2022    Procedure: LAPAROTOMY EXPLORATORY, DRAINAGE OF INTRA -ABDOMINAL ABSCESS;  Surgeon: Abdoulaye Machuca DO;  [facility];  Service: General    LA CYSTOURETHROSCOPY,URETER CATHETER Right 2022    Procedure: CYSTOSCOPY RETROGRADE PYELOGRAM WITH INSERTION STENT URETERAL;  Surgeon: Fermin Mathew MD;  [facility];  Service: Urology    LA EXC SKIN BENIG 3 1-4 CM TRUNK,ARM,LEG Right 2020 Procedure: EXCISION BIOPSY TISSUE LESION/MASS UPPER EXTREMITY;  Surgeon: Kim Anders DO;  Location: [facility];  Service: General    TONSILLECTOMY      TUMOR REMOVAL Right     5th finger        Social History     Socioeconomic History    Marital status:      Spouse name: Not on file    Number of children: 2    Years of education: Some college    Highest education level: Some college, no degree   Occupational History    Not on file   Tobacco Use    Smoking status: Light Tobacco Smoker     Packs/day: 0 25     Years: 20 00     Pack years: 5 00     Types: Cigarettes    Smokeless tobacco: Never Used    Tobacco comment: Only smoke once in awhile   Vaping Use    Vaping Use: Never used   Substance and Sexual Activity    Alcohol use: Not Currently     Comment: holidays    Drug use: No    Sexual activity: Not Currently   Other Topics Concern    Not on file   Social History Narrative    Not on file     Social Determinants of Health     Financial Resource Strain: Not on file   Food Insecurity: No Food Insecurity    Worried About Running Out of Food in the Last Year: Never true    Latoya of Food in the Last Year: Never true   Transportation Needs: No Transportation Needs    Lack of Transportation (Medical): No    Lack of Transportation (Non-Medical): No   Physical Activity: Not on file   Stress: Not on file   Social Connections: Not on file   Intimate Partner Violence: Not on file   Housing Stability: Unknown    Unable to Pay for Housing in the Last Year: Not on file    Number of Places Lived in the Last Year: 1    Unstable Housing in the Last Year: No        Review of Systems   Constitutional: Positive for chills  Negative for diaphoresis and fever  HENT: Negative for congestion, ear pain, postnasal drip and sore throat  Respiratory: Positive for shortness of breath  Negative for cough, chest tightness and wheezing  Cardiovascular: Positive for leg swelling   Negative for chest pain and palpitations  Gastrointestinal: Positive for abdominal pain and nausea  Negative for vomiting  Genitourinary: Negative for difficulty urinating, dysuria, frequency, hematuria and urgency  Musculoskeletal: Positive for arthralgias, back pain and myalgias  Skin: Positive for wound  Neurological: Positive for dizziness, weakness and light-headedness  Negative for syncope and headaches  Psychiatric/Behavioral: Positive for sleep disturbance  Negative for self-injury and suicidal ideas  The patient is nervous/anxious  Objective:      /96 (BP Location: Left arm, Patient Position: Sitting, Cuff Size: Adult)   Pulse 97   Temp 97 6 °F (36 4 °C) (Temporal)   Resp 18   Ht 5' 7" (1 702 m)   Wt (!) 155 kg (342 lb 9 6 oz)   SpO2 95%   BMI 53 66 kg/m²          Physical Exam  Vitals and nursing note reviewed  Constitutional:       General: She is not in acute distress  Appearance: She is obese  Comments: Ambulating with crutches   HENT:      Head: Normocephalic and atraumatic  Eyes:      Extraocular Movements: Extraocular movements intact  Conjunctiva/sclera: Conjunctivae normal       Pupils: Pupils are equal, round, and reactive to light  Cardiovascular:      Rate and Rhythm: Normal rate and regular rhythm  Heart sounds: Normal heart sounds  No murmur heard  Pulmonary:      Effort: Pulmonary effort is normal  No respiratory distress  Breath sounds: Normal breath sounds  No wheezing  Abdominal:      Comments: Ostomy with ostomy bag in place  Midline surgical scar, healing   Musculoskeletal:      Right lower leg: Edema present  Left lower leg: Edema present  Skin:     General: Skin is warm and dry  Findings: Erythema (under bilateral breasts, no open wounds) present  Neurological:      General: No focal deficit present  Mental Status: She is alert and oriented to person, place, and time  Cranial Nerves: No cranial nerve deficit  Psychiatric:         Mood and Affect: Mood normal          Behavior: Behavior normal

## 2022-03-09 NOTE — TELEPHONE ENCOUNTER
Patient left message that what you ordered is still not in at [facility]  She is asking if you could please please just give her something for pain until that one comes trhough

## 2022-03-10 NOTE — TELEPHONE ENCOUNTER
Dilaudid was sent by palliative care 3/7  Patient stated that it would take a day or two for the pharmacy to order this in  They should have received this by now  If not, she will need to contact palliative for an alternative

## 2022-03-10 NOTE — TELEPHONE ENCOUNTER
Patient needs to cancel her stent removal for this morning because she is not feeling well  Please call the patient to assist in reschedule

## 2022-03-11 PROBLEM — N13.4 HYDROURETER: Status: ACTIVE | Noted: 2022-01-01

## 2022-03-11 PROBLEM — N39.0 UTI (URINARY TRACT INFECTION): Status: ACTIVE | Noted: 2022-01-01

## 2022-03-11 PROBLEM — Z93.3 S/P COLOSTOMY (HCC): Status: ACTIVE | Noted: 2022-01-01

## 2022-03-11 PROBLEM — D64.9 ANEMIA: Status: ACTIVE | Noted: 2022-01-01

## 2022-03-11 NOTE — PLAN OF CARE
Problem: PAIN - ADULT  Goal: Verbalizes/displays adequate comfort level or baseline comfort level  Description: Interventions:  - Encourage patient to monitor pain and request assistance  - Assess pain using appropriate pain scale (0-10 pain scale)  - Administer analgesics based on type and severity of pain and evaluate response  - Implement non-pharmacological measures as appropriate and evaluate response  - Consider cultural and social influences on pain and pain management  - Notify physician/advanced practitioner if interventions unsuccessful or patient reports new pain  Outcome: Progressing     Problem: INFECTION - ADULT  Goal: Absence or prevention of progression during hospitalization  Description: INTERVENTIONS:  - Assess and monitor for signs and symptoms of infection  - Monitor lab/diagnostic results  - Monitor all insertion sites, i e  indwelling lines  - Administer medications as ordered  - Instruct and encourage patient and family to use good hand hygiene technique  Outcome: Progressing     Problem: SAFETY ADULT  Goal: Patient will remain free of falls  Description: INTERVENTIONS:  - Educate patient/family on patient safety including physical limitations  - Instruct patient to call for assistance with activity (assist with walker)  - Consult OT/PT to assist with strengthening/mobility   - Keep Call bell within reach  - Keep bed low and locked with side rails adjusted as appropriate  - Keep care items and personal belongings within reach  - Initiate and maintain comfort rounds  - Make Fall Risk Sign visible to staff (high fall risk)  - Offer Toileting every 1-2 Hours, in advance of need  - Initiate/Maintain bed/chair alarm  - Obtain necessary fall risk management equipment: nonskid footwear, fall bracelet, alarms  - Apply yellow socks and bracelet for high fall risk patients  - Consider moving patient to room near nurses station  Outcome: Progressing  Goal: Maintain or return to baseline ADL function  Description: INTERVENTIONS:  -  Assess patient's ability to carry out ADLs; (min to mod assist with adls)  - Assess/evaluate cause of self-care deficits (fatigue, pain, limited movement, use of assistive device)  - Assess range of motion  - Assess patient's mobility; (assist and walker)  - Assess patient's need for assistive devices and provide as appropriate  - Encourage maximum independence but intervene and supervise when necessary  - Involve family in performance of ADLs  - Assess for home care needs following discharge   - Consider OT consult to assist with ADL evaluation and planning for discharge  - Provide patient education as appropriate  Outcome: Progressing  Goal: Maintains/Returns to pre admission functional level  Description: INTERVENTIONS:  - Perform BMAT or MOVE assessment daily    - Set and communicate daily mobility goal to care team and patient/family/caregiver  - Collaborate with rehabilitation services on mobility goals if consulted  - Perform Range of Motion 3-4 times a day  - Reposition patient every 2 hours    - Dangle patient 3 times a day  - Stand patient 3 times a day  - Ambulate patient 3 times a day  - Out of bed to chair 3 times a day   - Out of bed for meals 3 times a day  - Out of bed for toileting  - Record patient progress and toleration of activity level   Outcome: Progressing     Problem: DISCHARGE PLANNING  Goal: Discharge to home or other facility with appropriate resources  Description: INTERVENTIONS:  - Identify barriers to discharge w/patient and caregiver  - Arrange for needed discharge resources and transportation as appropriate  - Identify discharge learning needs (meds, wound care, etc )  - Refer to Case Management Department for coordinating discharge planning if the patient needs post-hospital services based on physician/advanced practitioner order or complex needs related to functional status, cognitive ability, or social support system  Outcome: Progressing     Problem: Knowledge Deficit  Goal: Patient/family/caregiver demonstrates understanding of disease process, treatment plan, medications, and discharge instructions  Description: Complete learning assessment and assess knowledge base    Interventions:  - Provide teaching at level of understanding  - Provide teaching via preferred learning methods  Outcome: Progressing     Problem: GASTROINTESTINAL - ADULT  Goal: Maintains adequate nutritional intake  Description: INTERVENTIONS:  - Monitor percentage of each meal consumed  - Identify factors contributing to decreased intake, treat as appropriate  - Assist with meals as needed  - Monitor I&O, weight, and lab values if indicated  - Obtain nutrition services referral as needed  Outcome: Progressing  Goal: Establish and maintain optimal ostomy function  Description: INTERVENTIONS:  - Assess bowel function  - Encourage oral fluids to ensure adequate hydration  - Administer IV fluids if ordered to ensure adequate hydration   - Administer ordered medications as needed  - Encourage mobilization and activity  - Nutrition services referral to assist patient with appropriate food choices  - Assess stoma site  - Consider wound care consult   Outcome: Progressing     Problem: METABOLIC, FLUID AND ELECTROLYTES - ADULT  Goal: Electrolytes maintained within normal limits  Description: INTERVENTIONS:  - Monitor labs and assess patient for signs and symptoms of electrolyte imbalances  - Administer electrolyte replacement as ordered  - Monitor response to electrolyte replacements, including repeat lab results as appropriate  - Instruct patient on fluid and nutrition as appropriate  Outcome: Progressing  Goal: Fluid balance maintained  Description: INTERVENTIONS:  - Monitor labs   - Monitor I/O and WT  - Instruct patient on fluid and nutrition as appropriate  - Assess for signs & symptoms of volume excess or deficit  Outcome: Progressing

## 2022-03-11 NOTE — ASSESSMENT & PLAN NOTE
Adenocarcinoma:  Status post interval sigmoid resection and Payal colostomy  CT abdomen shows soft tissue thickening around the stump ? possibility of residual tumor versus postsurgical changes  Patient complains of severe pain in the lower abdomen, don't have pain medication at home  P r n   Pain medication

## 2022-03-11 NOTE — Clinical Note
Case was discussed with Dr Librado nicholson  and the patient's admission status was agreed to be Admission Status: observation status to the service of Dr Librado nicholson

## 2022-03-11 NOTE — ED PROVIDER NOTES
History  Chief Complaint   Patient presents with    Weakness - Generalized     pt reports "being more tired that usual for a while " c/o of abd pain around colostomy sight  57-year-old female presents by ambulance with complaining of being more tired than usual for a while  She was admitted from 1/1622-2/24/22 please refer to the discharge summary briefly patient had cellulitis or abdominal wall with diverticulitis and perforation of her large intestine she has underwent a Payal's pouch procedure she also underwent cystoscopy with ureteral stent placement she had severely inflamed tissue the rectal stump son end colostomy was made she also underwent bilateral salpingo oophorectomy  She is ultimately diagnosed with colon adenocarcinoma as the perforated area has spread into adnexal structures patient was discharged to a nursing home for further treatment but it is unclear when discussing with the patient when she went home from the nursing home she states she did like it so she just went home she in February she cannot give me a specific date  Since being home she complains of being tired she has had some chills she denies chest pain or shortness of breath she denies any headache she states she has been tolerating a diet she has had no nausea or vomiting since discharge from the hospital her colostomy output remains good she has been urinating normally without any dysuria  When asked why she is so sleepy she states she is just tired  She does not use meter dose inhalers or nebulizers  She does complain of left lower quadrant abdominal pain near the site of the colostomy she states it is her cancer pain" she states the redness to the lower extremities is at baseline with respect to her lymphedema  Prior to Admission Medications   Prescriptions Last Dose Informant Patient Reported? Taking?    ALPRAZolam (XANAX) 2 MG tablet   No No   Sig: Take 1 tablet (2 mg total) by mouth 2 (two) times a day as needed for anxiety   Diclofenac Sodium (VOLTAREN) 1 %   No No   Sig: Apply 4 g topically 4 (four) times a day   HYDROmorphone (DILAUDID) 2 mg tablet   No No   Sig: Take 1mg (1/2 tablet) PO Q4PRN for moderate pain and 2mg (1 tablet) PO Q4PRN for severe pain   Misc  Devices (Bariatric Rollator) MISC   No No   Sig: Use in the morning   Misc   Devices (Commode Bedside) MISC   No No   Sig: Use in the morning   Ostomy Supplies (Closed-End Colostomy Pouch) MISC   No No   Sig: Use in the morning   SUMAtriptan (IMITREX) 25 mg tablet   No No   Sig: TAKE 1 TABLET BY MOUTH ONCE AS NEEDED FOR MIGRAINE FOR UP TO 1 DOSE   albuterol (Ventolin HFA) 90 mcg/act inhaler   No No   Sig: Inhale 2 puffs every 6 (six) hours as needed for wheezing or shortness of breath   docusate sodium (COLACE) 100 mg capsule   No No   Sig: Take 1 capsule (100 mg total) by mouth daily as needed for constipation   fluticasone (FLONASE) 50 mcg/act nasal spray   No No   Sig: instill 1 spray into each nostril once daily   lisinopril (ZESTRIL) 10 mg tablet   No No   Sig: Take 1 tablet (10 mg total) by mouth daily   loratadine (CLARITIN) 10 mg tablet   No No   Sig: take 1 tablet by mouth once daily   meloxicam (MOBIC) 7 5 mg tablet   Yes No   Sig: Take 7 5 mg by mouth daily   methocarbamol (ROBAXIN) 500 mg tablet   No No   Sig: Take 1 tablet (500 mg total) by mouth every 12 (twelve) hours   metoprolol tartrate (LOPRESSOR) 25 mg tablet   No No   Sig: take 1 tablet by mouth twice a day   nystatin (MYCOSTATIN) powder   No No   Sig: Apply topically 2 (two) times a day   omeprazole (PriLOSEC) 20 mg delayed release capsule   No No   Sig: Take 1 capsule (20 mg total) by mouth daily   ondansetron (ZOFRAN) 4 mg tablet   No No   Sig: Take 1 tablet (4 mg total) by mouth 3 (three) times a day before meals   senna (SENOKOT) 8 6 MG tablet   No No   Sig: Take 1 tablet (8 6 mg total) by mouth daily as needed for constipation      Facility-Administered Medications: None Past Medical History:   Diagnosis Date    Anxiety     Arthritis     GERD (gastroesophageal reflux disease)     Hidradenitis suppurativa     Hypertension     Lipodermatosclerosis of both lower extremities     Lymphedema 2006    Rectal cancer ([address] )     Sciatic leg pain 2006    left side    SOB (shortness of breath)     Stomach ulcer        Past Surgical History:   Procedure Laterality Date    BILATERAL SALPINGOOPHORECTOMY N/A 2022    Procedure: RIGHT SALPINGO-OOPHORECTOMY, LEFT OOPHORECTOMY;  Surgeon: Jana King DO;  [facility];  Service: General     SECTION      FL RETROGRADE PYELOGRAM  2022    HARTMANS PROCEDURE N/A 2022    Procedure: END COLOSTOMY;  Surgeon: Jana King DO;  [facility];  Service: General    LAPAROTOMY N/A 2022    Procedure: LAPAROTOMY EXPLORATORY, DRAINAGE OF INTRA -ABDOMINAL ABSCESS;  Surgeon: Jana King DO;  [facility];  Service: General    GA CYSTOURETHROSCOPY,URETER CATHETER Right 2022    Procedure: CYSTOSCOPY RETROGRADE PYELOGRAM WITH INSERTION STENT URETERAL;  Surgeon: Lisbet Layne MD;  [facility];  Service: Urology     [address] 1-4 CM TRUNK,ARM,LEG Right 2020    Procedure: EXCISION BIOPSY TISSUE LESION/MASS UPPER EXTREMITY;  Surgeon: Lisa Ya DO;  [facility];  Service: General    TONSILLECTOMY      TUMOR REMOVAL Right     5th finger       Family History   Problem Relation Age of Onset    Cancer Mother     Hypertension Mother     COPD Father     Diabetes Father      I have reviewed and agree with the history as documented      E-Cigarette/Vaping    E-Cigarette Use Never User      E-Cigarette/Vaping Substances    Nicotine No     THC No     CBD No     Flavoring No     Other No     Unknown No      Social History     Tobacco Use    Smoking status: Light Tobacco Smoker     Packs/day: 0 25     Years: 20 00     Pack years: 5 00     Types: Cigarettes    Smokeless tobacco: Never Used    Tobacco comment: Only smoke once in awhile   Vaping Use    Vaping Use: Never used   Substance Use Topics    Alcohol use: Not Currently     Comment: holidays    Drug use: No       Review of Systems   Constitutional: Positive for activity change and chills  Negative for appetite change and fever  HENT: Negative for congestion, ear pain, rhinorrhea, sneezing and sore throat  Eyes: Negative for discharge  Respiratory: Negative for cough and shortness of breath  Cardiovascular: Negative for chest pain and leg swelling  Gastrointestinal: Positive for abdominal pain (around the site of the colostomy)  Negative for blood in stool, diarrhea, nausea and vomiting  Endocrine: Negative for polyuria  Genitourinary: Negative for difficulty urinating, dysuria, frequency and urgency  Musculoskeletal: Negative for back pain and myalgias  Skin: Negative for rash  Neurological: Negative for dizziness, weakness, light-headedness and numbness  Hematological: Negative for adenopathy  Psychiatric/Behavioral: Negative for confusion  All other systems reviewed and are negative  Physical Exam  Physical Exam  Nursing note reviewed  Constitutional:       General: She is not in acute distress  Comments: Sleeping easily awakened   HENT:      Right Ear: Tympanic membrane normal       Nose: Nose normal       Mouth/Throat:      Mouth: Mucous membranes are moist    Eyes:      General:         Right eye: No discharge  Left eye: No discharge  Extraocular Movements: Extraocular movements intact  Conjunctiva/sclera: Conjunctivae normal       Pupils: Pupils are equal, round, and reactive to light  Cardiovascular:      Rate and Rhythm: Normal rate and regular rhythm  Abdominal:      General: Bowel sounds are normal       Tenderness: There is abdominal tenderness        Comments: Colostomy site is pink   Musculoskeletal:      Right lower leg: Edema present  Left lower leg: Edema present  Skin:     Findings: Erythema present  Comments: Bilateral lower extremities   Neurological:      General: No focal deficit present  Cranial Nerves: No cranial nerve deficit  Sensory: No sensory deficit  Motor: No weakness  Coordination: Coordination normal    Psychiatric:      Comments: Flat         Vital Signs  ED Triage Vitals [03/11/22 0935]   Temperature Pulse Respirations Blood Pressure SpO2   (!) 95 3 °F (35 2 °C) 85 18 119/69 96 %      Temp Source Heart Rate Source Patient Position - Orthostatic VS BP Location FiO2 (%)   Tympanic Monitor Lying Right arm --      Pain Score       9           Vitals:    03/11/22 0935 03/11/22 1130 03/11/22 1215 03/11/22 1610   BP: 119/69 137/70 132/70 138/83   Pulse: 85 92 92 100   Patient Position - Orthostatic VS: Lying Lying Lying          Visual Acuity      ED Medications  Medications   sodium chloride 0 9 % infusion (100 mL/hr Intravenous New Bag 3/11/22 1539)   lactated ringers bolus 1,000 mL (0 mL Intravenous Stopped 3/11/22 1537)   cefTRIAXone (ROCEPHIN) IVPB (premix in dextrose) 1,000 mg 50 mL (1,000 mg Intravenous New Bag 3/11/22 1537)       Diagnostic Studies  Results Reviewed     Procedure Component Value Units Date/Time    Blood culture #1 [[id number]] Collected: 03/11/22 1021    Lab Status: Preliminary result Specimen: Blood from Arm, Left Updated: 03/11/22 1801     Blood Culture Received in Microbiology Lab  Culture in Progress  Blood culture #2 [[id number]] Collected: 03/11/22 1031    Lab Status: Preliminary result Specimen: Blood from Arm, Left Updated: 03/11/22 1801     Blood Culture Received in Microbiology Lab  Culture in Progress      Urine Microscopic [[id number]]  (Abnormal) Collected: 03/11/22 1431    Lab Status: Final result Specimen: Urine, Straight Cath Updated: 03/11/22 1448     RBC, UA 10-20 /hpf      WBC, UA Innumerable /hpf      Epithelial Cells Occasional /hpf Bacteria, UA Occasional /hpf     Urine culture [[id number]] Collected: 03/11/22 1431    Lab Status:  In process Specimen: Urine, Straight Cath Updated: 03/11/22 1448    UA w Reflex to Microscopic w Reflex to Culture [[id number]]  (Abnormal) Collected: 03/11/22 1431    Lab Status: Final result Specimen: Urine, Straight Cath Updated: 03/11/22 1438     Color, UA Yellow     Clarity, UA Cloudy     Specific Gravity, UA <=1 005     pH, UA 6 0     Leukocytes, UA Large     Nitrite, UA Positive     Protein, UA Trace mg/dl      Glucose, UA Negative mg/dl      Ketones, UA Negative mg/dl      Urobilinogen, UA 0 2 E U /dl      Bilirubin, UA Negative     Blood, UA Moderate    Blood gas, arterial [[id number]]  (Abnormal) Collected: 03/11/22 1157    Lab Status: Final result Specimen: Blood, Arterial from Radial, Right Updated: 03/11/22 1204     pH, Arterial 7 390     pCO2, Arterial 43 7 mm Hg      pO2, Arterial 75 5 mm Hg      HCO3, Arterial 25 9 mmol/L      Base Excess, Arterial 0 7 mmol/L      O2 Content, Arterial 13 0 mL/dL      O2 HGB,Arterial  93 7 %      SOURCE Radial, Right     SHAHLA TEST Yes     ROOM AIR FIO2 RA %     High Sensitivity Troponin I Random [[id number]]  (Abnormal) Collected: 03/11/22 1126    Lab Status: Final result Specimen: Blood from Arm, Left Updated: 03/11/22 1151     HS TnI random 5 ng/L     Hepatic function panel [[id number]]  (Abnormal) Collected: 03/11/22 1021    Lab Status: Final result Specimen: Blood from Arm, Left Updated: 03/11/22 1109     Total Bilirubin 0 24 mg/dL      Bilirubin, Direct 0 06 mg/dL      Alkaline Phosphatase 100 U/L      AST 10 U/L      ALT 14 U/L      Total Protein 6 5 g/dL      Albumin 2 4 g/dL     TSH, 3rd generation with Free T4 reflex [[id number]]  (Normal) Collected: 03/11/22 1021    Lab Status: Final result Specimen: Blood from Arm, Left Updated: 03/11/22 1109     TSH 3RD GENERATON 1 092 uIU/mL     Narrative:      Patients undergoing fluorescein dye angiography may retain small amounts of fluorescein in the body for 48-72 hours post procedure  Samples containing fluorescein can produce falsely depressed TSH values  If the patient had this procedure,a specimen should be resubmitted post fluorescein clearance  Magnesium [[id number]]  (Normal) Collected: 03/11/22 1021    Lab Status: Final result Specimen: Blood from Arm, Left Updated: 03/11/22 1109     Magnesium 2 0 mg/dL     Lipase [[id number]]  (Normal) Collected: 03/11/22 1021    Lab Status: Final result Specimen: Blood from Arm, Left Updated: 03/11/22 1109     Lipase 139 u/L     Lactic acid [[id number]]  (Normal) Collected: 03/11/22 1021    Lab Status: Final result Specimen: Blood from Arm, Left Updated: 03/11/22 1102     LACTIC ACID 1 3 mmol/L     Narrative:      Result may be elevated if tourniquet was used during collection      Basic metabolic panel [[id number]]  (Abnormal) Collected: 03/11/22 1021    Lab Status: Final result Specimen: Blood from Arm, Left Updated: 03/11/22 1059     Sodium 138 mmol/L      Potassium 4 2 mmol/L      Chloride 104 mmol/L      CO2 30 mmol/L      ANION GAP 4 mmol/L      BUN 19 mg/dL      Creatinine 2 25 mg/dL      Glucose 99 mg/dL      Calcium 8 4 mg/dL      eGFR 23 ml/min/1 73sq m     Narrative:      Merrill guidelines for Chronic Kidney Disease (CKD):     Stage 1 with normal or high GFR (GFR > 90 mL/min/1 73 square meters)    Stage 2 Mild CKD (GFR = 60-89 mL/min/1 73 square meters)    Stage 3A Moderate CKD (GFR = 45-59 mL/min/1 73 square meters)    Stage 3B Moderate CKD (GFR = 30-44 mL/min/1 73 square meters)    Stage 4 Severe CKD (GFR = 15-29 mL/min/1 73 square meters)    Stage 5 End Stage CKD (GFR <15 mL/min/1 73 square meters)  Note: GFR calculation is accurate only with a steady state creatinine    CBC and differential [[id number]]  (Abnormal) Collected: 03/11/22 1021    Lab Status: Final result Specimen: Blood from Arm, Left Updated: 03/11/22 1044     WBC 10 04 Thousand/uL      RBC 3 53 Million/uL      Hemoglobin 9 1 g/dL      Hematocrit 29 8 %      MCV 84 fL      MCH 25 8 pg      MCHC 30 5 g/dL      RDW 18 6 %      MPV 9 0 fL      Platelets 927 Thousands/uL      nRBC 0 /100 WBCs      Neutrophils Relative 67 %      Immat GRANS % 0 %      Lymphocytes Relative 20 %      Monocytes Relative 6 %      Eosinophils Relative 6 %      Basophils Relative 1 %      Neutrophils Absolute 6 67 Thousands/µL      Immature Grans Absolute 0 04 Thousand/uL      Lymphocytes Absolute 2 05 Thousands/µL      Monocytes Absolute 0 64 Thousand/µL      Eosinophils Absolute 0 59 Thousand/µL      Basophils Absolute 0 05 Thousands/µL                  CT head without contrast   Final Result by Hillary Davis MD (03/11 1311)      No acute intracranial abnormality  Workstation performed: [id number]         CT abdomen pelvis wo contrast   Final Result by Hillary Davis MD (03/11 1332)      1  Interval sigmoid resection and Payal's colostomy for perforated sigmoid adenocarcinoma  There has been interval resolution of previously noted loculated abscess collections in the pelvis  There is persistent soft tissue thickening adjacent to    the rectal stump extending to the uterus, which may be related to postsurgical or residual postinflammatory changes  However, possibility of residual tumor is not excluded  Evaluation is limited due to lack of IV and oral contrast       2   Mild interval improvement in right side hydroureteronephrosis, status post placement of ureteral stent  There is mild left side hydroureteronephrosis, slightly more prominent from prior exam          Workstation performed: [id number]         XR chest 1 view portable   ED Interpretation by Amy Soto MD (03/11 1129)   Read by me; Radiologist to provide formal interpretation  No acute process      Final Result by Elza Harvey MD (03/11 1135)      No acute cardiopulmonary disease  Findings concur with the preliminary report by the referring clinician already in PACS and/or our electronic record EPIC  Workstation performed: Darinel Milligan kidney and bladder    (Results Pending)              Procedures  ECG 12 Lead Documentation Only    Date/Time: 3/11/2022 1:37 PM  Performed by: Chaitanya Rolon MD  Authorized by: Chaitanya Rolon MD     Indications / Diagnosis:  Abdm pain  ECG reviewed by me, the ED Provider: yes    Patient location:  ED  Previous ECG:     Previous ECG:  Unavailable (unable to view prior EKG on Epic)  Rate:     ECG rate:  98  Rhythm:     Rhythm: sinus rhythm    QRS:     QRS axis:  Normal  Comments:      nonspecfic ST-T changes             ED Course  ED Course as of 03/11/22 1802   Fri Mar 11, 2022   1231 Anemia is improved from previous 2 weeks ago   1345 Case reivewed with Dr Verenice Salazar requests urology consult secondary to slightly worse hydro in setting of ZAHIRA will discuss with urology and update Dr Shayla Rosales recommended obtaining a U/A; will proceed with straight cath   1436 CR 2 25 3 wks ago 0 90   1456 Updated Agnieszka Burton on u/a result via TC   1534 Dr Verenice Salazar recommends inpatient to his service                                             MDM  Number of Diagnoses or Management Options  ZAHIRA (acute kidney injury) ([facility] [address] )  LLQ abdominal pain  Diagnosis management comments: Mdm:  Patient is somewhat somnolent  There are no narcotics listed on her medication list   Will check ABG to assess for hypercarbia  Will evaluate electrolytes thyroid recheck lactic acid to assess for possibility of sepsis check head CT abdominal pelvis CT      D/c summary dated 2/24/22 reviewed      Disposition  Final diagnoses:   LLQ abdominal pain   ZAHIRA (acute kidney injury) ([address] )   UTI (urinary tract infection)     Time reflects when diagnosis was documented in both MDM as applicable and the Disposition within this note     Time User Action Codes Description Comment    3/11/2022  2:30 PM Clebrock Concepcion Add [R10 32] LLQ abdominal pain     3/11/2022  2:31 PM Titus Concepcion Add [N17 9] ZAHIRA (acute kidney injury) ([facility] [address] )     3/11/2022  5:56 PM Titus Concepcion Add [N39 0] UTI (urinary tract infection)       ED Disposition     ED Disposition Condition Date/Time Comment    Admit Stable Fri Mar 11, 2022  3:35 PM Case was discussed with Dr Librado nicholson  and the patient's admission status was agreed to be Admission Status: inpatient status to the service of Dr Librado nicholson   Follow-up Information    None         Current Discharge Medication List      CONTINUE these medications which have NOT CHANGED    Details   albuterol (Ventolin HFA) 90 mcg/act inhaler Inhale 2 puffs every 6 (six) hours as needed for wheezing or shortness of breath  Qty: 18 g, Refills: 5    Comments: Substitution to a formulary equivalent within the same pharmaceutical class is authorized    Associated Diagnoses: Bronchitis      ALPRAZolam (XANAX) 2 MG tablet Take 1 tablet (2 mg total) by mouth 2 (two) times a day as needed for anxiety  Qty: 45 tablet, Refills: 0    Associated Diagnoses: Cancer of sigmoid colon metastatic to intra-abdominal lymph node (HCC)      Diclofenac Sodium (VOLTAREN) 1 % Apply 4 g topically 4 (four) times a day  Qty: 150 g, Refills: 5    Associated Diagnoses: Chronic midline low back pain with left-sided sciatica      docusate sodium (COLACE) 100 mg capsule Take 1 capsule (100 mg total) by mouth daily as needed for constipation  Qty: 90 capsule, Refills: 1    Associated Diagnoses: Colon adenocarcinoma (HCC)      fluticasone (FLONASE) 50 mcg/act nasal spray instill 1 spray into each nostril once daily  Qty: 16 g, Refills: 5    Associated Diagnoses: Acute non-recurrent maxillary sinusitis      HYDROmorphone (DILAUDID) 2 mg tablet Take 1mg (1/2 tablet) PO Q4PRN for moderate pain and 2mg (1 tablet) PO Q4PRN for severe pain  Qty: 90 tablet, Refills: 0 Associated Diagnoses: Cancer of sigmoid colon metastatic to intra-abdominal lymph node (HCC)      lisinopril (ZESTRIL) 10 mg tablet Take 1 tablet (10 mg total) by mouth daily  Qty: 90 tablet, Refills: 1    Associated Diagnoses: Essential hypertension      loratadine (CLARITIN) 10 mg tablet take 1 tablet by mouth once daily  Qty: 30 tablet, Refills: 11    Associated Diagnoses: COVID-19; Rhinorrhea      meloxicam (MOBIC) 7 5 mg tablet Take 7 5 mg by mouth daily      methocarbamol (ROBAXIN) 500 mg tablet Take 1 tablet (500 mg total) by mouth every 12 (twelve) hours  Qty: 60 tablet, Refills: 2    Associated Diagnoses: Chronic midline low back pain with left-sided sciatica      metoprolol tartrate (LOPRESSOR) 25 mg tablet take 1 tablet by mouth twice a day  Qty: 180 tablet, Refills: 1    Associated Diagnoses: SOB (shortness of breath); Hypertension, unspecified type      !! Misc  Devices (Bariatric Rollator) MISC Use in the morning  Qty: 1 each, Refills: 0    Associated Diagnoses: Colon adenocarcinoma ([facility] ); Morbid obesity with BMI of 50 0-59 9, adult [facility])      ! ! Misc  Devices (Commode Bedside) MISC Use in the morning  Qty: 1 each, Refills: 0    Associated Diagnoses: Colon adenocarcinoma ([facility] );  Morbid obesity with BMI of 50 0-59 9, adult ([facility])      nystatin (MYCOSTATIN) powder Apply topically 2 (two) times a day  Qty: 15 g, Refills: 5    Associated Diagnoses: Yeast dermatitis      omeprazole (PriLOSEC) 20 mg delayed release capsule Take 1 capsule (20 mg total) by mouth daily  Qty: 90 capsule, Refills: 1    Associated Diagnoses: Gastroesophageal reflux disease, unspecified whether esophagitis present      ondansetron (ZOFRAN) 4 mg tablet Take 1 tablet (4 mg total) by mouth 3 (three) times a day before meals  Qty: 90 tablet, Refills: 2    Associated Diagnoses: Nausea      Ostomy Supplies (Closed-End Colostomy Pouch) MISC Use in the morning  Qty: 1 each, Refills: 11    Associated Diagnoses: Colon adenocarcinoma ([facility] ) senna (SENOKOT) 8 6 MG tablet Take 1 tablet (8 6 mg total) by mouth daily as needed for constipation  Qty: 90 tablet, Refills: 1    Associated Diagnoses: Colon adenocarcinoma (HCC)      SUMAtriptan (IMITREX) 25 mg tablet TAKE 1 TABLET BY MOUTH ONCE AS NEEDED FOR MIGRAINE FOR UP TO 1 DOSE  Qty: 9 tablet, Refills: 2    Associated Diagnoses: Migraine without aura and without status migrainosus, not intractable       !! - Potential duplicate medications found  Please discuss with provider  No discharge procedures on file      PDMP Review       Value Time User    PDMP Reviewed  Yes 3/7/2022  3:29 PM Ankita Deluca MD          ED Provider  Electronically Signed by           Gerard Guido MD  03/11/22 6308

## 2022-03-11 NOTE — ASSESSMENT & PLAN NOTE
Patient was treated for adenocarcinoma colon, s/p Payal's colostomy  Had issues with colostomy bag    Wound care consult

## 2022-03-11 NOTE — ASSESSMENT & PLAN NOTE
CT abdomen shows left-sided hydronephrosis    Status post ureteral stent on right side for right-sided hydronephrosis which is improved compared to previous imaging  Urology on consult

## 2022-03-11 NOTE — UTILIZATION REVIEW
Inpatient Admission Authorization Request   NOTIFICATION OF INPATIENT ADMISSION/INPATIENT AUTHORIZATION REQUEST   SERVICING FACILITY:   [address] [place] [address]  [address]  Tax ID:  [id number]  NPI: [id number]  Place of Service: [place] [address]  Place of Service Code: 24     ATTENDING PROVIDER:  Attending Name and NPI#: Carolina Gonzales [[id number]]  Address: [address]  Phone: [phone]     UTILIZATION REVIEW CONTACT:  [facility] Utilization   Network Utilization Review Department  Phone: [phone]  Fax [phone]  Email: [email or web address]     PHYSICIAN ADVISORY SERVICES:  FOR LLCO-SX-PMDZ REVIEW - MEDICAL NECESSITY DENIAL  Phone: [phone]  Fax: [phone]  Email: [email or web address]     TYPE OF REQUEST:  Inpatient Status     ADMISSION INFORMATION:  ADMISSION DATE/TIME: 3/11/22  3:34 PM  PATIENT DIAGNOSIS CODE/DESCRIPTION:  No admission diagnoses are documented for this encounter  DISCHARGE DATE/TIME: No discharge date for patient encounter  IMPORTANT INFORMATION:  Please contact the [facility] directly with any questions or concerns regarding this request  Department voicemails are confidential     Send requests for admission clinical reviews, concurrent reviews, approvals, and administrative denials due to lack of clinical to fax [phone]

## 2022-03-11 NOTE — H&P
[address] [facility] [address]  H&P- Osvaldo Barriga 1965, 60 y o  female MRN: [id number]  Unit/Bed#: 424-01 Encounter: [id number]  Primary Care Provider: Jane Ramirez PA-C   Date and time admitted to hospital: 3/11/2022  9:34 AM    * ZAHIRA (acute kidney injury) [facility])  Assessment & Plan  Patient complains of being more tired than usual   Creatinine elevated at 2 25, was 0 9, three weeks ago  Plan:  · IV fluids for rehydration  · Follow BUN, creatinine    UTI (urinary tract infection)  Assessment & Plan  UA positive with pending cultures, currently afebrile, denies any symptoms  IV Ceftriaxone q24h Day# 1  Continue IV fluids    S/P colostomy [facility])  Assessment & Plan  Patient was treated for adenocarcinoma colon, s/p Payal's colostomy  Had issues with colostomy bag  Wound care consult    Hydroureter  Assessment & Plan  CT abdomen shows left-sided hydronephrosis  Status post ureteral stent on right side for right-sided hydronephrosis which is improved compared to previous imaging  Urology on consult    Essential hypertension  Assessment & Plan  Continue home lisinopril and metoprolol    Colon adenocarcinoma [facility])  Assessment & Plan  Adenocarcinoma:  Status post interval sigmoid resection and Payal colostomy  CT abdomen shows soft tissue thickening around the stump ? possibility of residual tumor versus postsurgical changes  Patient complains of severe pain in the lower abdomen, don't have pain medication at home  P r n  Pain medication      VTE Prophylaxis: Enoxaparin (Lovenox)  / sequential compression device   Code Status: DNI agreeable with performing chest compressions  POLST: There is no POLST form on file for this patient (pre-hospital)  Discussion with family: called son and updated    Anticipated Length of Stay:  Patient will be admitted on an Inpatient basis with an anticipated length of stay of  2 midnights     Justification for Hospital Stay: ZAHIRA with hydroureter, UTI    Total Time for Visit, including Counseling / Coordination of Care: 60 minutes  Greater than 50% of this total time spent on direct patient counseling and coordination of care  Chief Complaint:   Lower abdominal pain    History of Present Illness:    Camron Johnson is a 60 y o  female with PMH of HTN, GERD, rectal adenocarcinoma s/p interval sigmoid resection and colostomy presented to hospital with lower abdominal pain  Patient recently dischrged from nursing home after her surgery for adenocarcinoma  Reports that she wasn't given any pain medication on discharge from nursing home  From past 2 weeks she did not take any pain medication and she could not take any more pain  She does reports of issues with colostomy supplies  She is changing her bag everyday  Last change was yesterday  Denies urinary symptoms  Denies chest pain, SOB, palpitations  ED: In the ED UA is positive  Received a dose of ceftriaxone  CT scan shows soft tissue thickening around surgical stump and left hydroureteronephrosis  Labs significant for ZAHIRA    Review of Systems:    Review of Systems   Constitutional: Positive for activity change and fatigue  Negative for chills and fever  HENT: Negative for congestion and trouble swallowing  Cardiovascular: Positive for leg swelling ( patient has leg swellings at baseline secondary to lympedema  )  Negative for chest pain and palpitations  Gastrointestinal: Positive for abdominal pain and nausea  Negative for constipation, diarrhea and vomiting  Genitourinary: Negative for difficulty urinating and dysuria  Skin: Negative for color change  Neurological: Positive for light-headedness  Negative for syncope and weakness  Psychiatric/Behavioral: Negative for agitation, confusion and decreased concentration  All other systems reviewed and are negative        Past Medical and Surgical History:     Past Medical History:   Diagnosis Date    Anxiety     Arthritis     GERD (gastroesophageal reflux disease)     Hidradenitis suppurativa     Hypertension     Lipodermatosclerosis of both lower extremities     Lymphedema 2006    Rectal cancer ([facility] [address] )     Sciatic leg pain 2006    left side    SOB (shortness of breath)     Stomach ulcer        Past Surgical History:   Procedure Laterality Date    BILATERAL SALPINGOOPHORECTOMY N/A 2022    Procedure: RIGHT SALPINGO-OOPHORECTOMY, LEFT OOPHORECTOMY;  Surgeon: Raul Araya DO;  [facility] MA[facility];  Service: General     SECTION      FL RETROGRADE PYELOGRAM  2022    HARTMANS PROCEDURE N/A 2022    Procedure: END COLOSTOMY;  Surgeon: Raul Araya DO;  Location: [facility] MAIN OR;  Service: General    LAPAROTOMY N/A 2022    Procedure: LAPAROTOMY EXPLORATORY, DRAINAGE OF INTRA -ABDOMINAL ABSCESS;  Surgeon: Raul Araya DO;  Location: [facility];  Service: General    MA CYSTOURETHROSCOPY,URETER CATHETER Right 2022    Procedure: CYSTOSCOPY RETROGRADE PYELOGRAM WITH INSERTION STENT URETERAL;  Surgeon: Gm Stanton MD;  Location: [facility];  Service: Urology     [address] 3 1-4 CM TRUNK,ARM,LEG Right 2020    Procedure: EXCISION BIOPSY TISSUE LESION/MASS UPPER EXTREMITY;  Surgeon: Bj Ritchie DO;  Location: [facility];  Service: General    TONSILLECTOMY      TUMOR REMOVAL Right     5th finger       Meds/Allergies:    Prior to Admission medications    Medication Sig Start Date End Date Taking?  Authorizing Provider   albuterol (Ventolin HFA) 90 mcg/act inhaler Inhale 2 puffs every 6 (six) hours as needed for wheezing or shortness of breath 5/10/21   Cornelius Enriquez PA-C   ALPRAZolam [facility]) 2 MG tablet Take 1 tablet (2 mg total) by mouth 2 (two) times a day as needed for anxiety 3/7/22   Rex Boxer, MD   Diclofenac Sodium (VOLTAREN) 1 % Apply 4 g topically 4 (four) times a day 3/8/22   Cornelius Enriquez PA-C   docusate sodium (COLACE) 100 mg capsule Take 1 capsule (100 mg total) by mouth daily as needed for constipation 3/8/22   Sherrine Handing, LUCY   fluticasone [facility]) 50 mcg/act nasal spray instill 1 spray into each nostril once daily 11/15/21   Sherrine Handing, LUCY   HYDROmorphone (DILAUDID) 2 mg tablet Take 1mg (1/2 tablet) PO Q4PRN for moderate pain and 2mg (1 tablet) PO Q4PRN for severe pain 3/7/22   Kristian Joe MD   lisinopril (ZESTRIL) 10 mg tablet Take 1 tablet (10 mg total) by mouth daily 3/8/22   Sherrine Handing, LUCY   loratadine (CLARITIN) 10 mg tablet take 1 tablet by mouth once daily 4/26/21   Sherrine Handing, LUCY   meloxicam (MOBIC) 7 5 mg tablet Take 7 5 mg by mouth daily    Historical Provider, MD   methocarbamol (ROBAXIN) 500 mg tablet Take 1 tablet (500 mg total) by mouth every 12 (twelve) hours 3/8/22   Sherrine Handing, LUCY   metoprolol tartrate (LOPRESSOR) 25 mg tablet take 1 tablet by mouth twice a day 11/29/21   Sherrine Handing, LUCY   Misc  Devices (Bariatric Rollator) MISC Use in the morning 3/8/22   Sherrine Handing, LUCY   Misc   Devices (Commode Bedside) MISC Use in the morning 3/8/22   Sherrine Handing, LUCY   nystatin (MYCOSTATIN) powder Apply topically 2 (two) times a day 3/8/22   Sherrine Handing, LUCY   omeprazole (PriLOSEC) 20 mg delayed release capsule Take 1 capsule (20 mg total) by mouth daily 3/8/22   Sherrine Handing, LUCY   ondansetron [facility]) 4 mg tablet Take 1 tablet (4 mg total) by mouth 3 (three) times a day before meals 3/8/22   Sherrine Handing, LUCY   Ostomy Supplies (Closed-End Colostomy Pouch) MISC Use in the morning 3/8/22   Sherrine Handing, LUCY   senna (SENOKOT) 8 6 MG tablet Take 1 tablet (8 6 mg total) by mouth daily as needed for constipation 3/8/22   Sunil Da Silva PA-C   SUMAtriptan (IMITREX) 25 mg tablet TAKE 1 TABLET BY MOUTH ONCE AS NEEDED FOR MIGRAINE FOR UP TO 1 DOSE 12/23/21   Sunil Da Silva PA-C   sertraline (ZOLOFT) 50 mg tablet Take 1 tablet (50 mg total) by mouth daily 8/16/21 3/11/22  Sunil Da Silva PA-C     I have reviewed home medications with patient personally  Allergies: Allergies   Allergen Reactions    Bee Venom Anaphylaxis, Shortness Of Breath and Swelling       Social History:     Marital Status:    Occupation: not working  Patient Pre-hospital Living Situation: Home  Patient Pre-hospital Level of Mobility: ambulating  Patient Pre-hospital Diet Restrictions: none  Substance Use History:   Social History     Substance and Sexual Activity   Alcohol Use Not Currently    Comment: holidays     Social History     Tobacco Use   Smoking Status Light Tobacco Smoker    Packs/day: 0 25    Years: 20 00    Pack years: 5 00    Types: Cigarettes   Smokeless Tobacco Never Used   Tobacco Comment    Only smoke once in awhile     Social History     Substance and Sexual Activity   Drug Use No       Family History:    Family History   Problem Relation Age of Onset    Cancer Mother     Hypertension Mother     COPD Father     Diabetes Father        Physical Exam:     Vitals:   Blood Pressure: 138/83 (03/11/22 1610)  Pulse: 100 (03/11/22 1610)  Temperature: (!) 95 3 °F (35 2 °C) (03/11/22 0935)  Temp Source: Tympanic (03/11/22 0935)  Respirations: 18 (03/11/22 1215)  Height: 5' 7" (170 2 cm) (03/11/22 0935)  Weight - Scale: (!) 156 kg (343 lb 4 1 oz) (03/11/22 0935)  SpO2: 98 % (03/11/22 1610)    Physical Exam  Vitals and nursing note reviewed  Constitutional:       General: She is not in acute distress  Appearance: She is well-developed  She is obese  She is ill-appearing  HENT:      Head: Normocephalic and atraumatic  Right Ear: External ear normal       Left Ear: External ear normal       Nose: Nose normal       Mouth/Throat:      Mouth: Mucous membranes are moist       Pharynx: Oropharynx is clear  Eyes:      General: No scleral icterus  Extraocular Movements: Extraocular movements intact  Conjunctiva/sclera: Conjunctivae normal       Pupils: Pupils are equal, round, and reactive to light     Cardiovascular:      Rate and Rhythm: Normal rate and regular rhythm  Pulses: Normal pulses  Heart sounds: Normal heart sounds  No murmur heard  Pulmonary:      Effort: Pulmonary effort is normal  No respiratory distress  Breath sounds: Normal breath sounds  Abdominal:      General: Bowel sounds are normal       Palpations: Abdomen is soft  Tenderness: There is abdominal tenderness ( in the lower abdomen)  Musculoskeletal:      Cervical back: Neck supple  Right lower leg: Edema present  Left lower leg: Edema present  Skin:     General: Skin is warm and dry  Capillary Refill: Capillary refill takes less than 2 seconds  Findings: Erythema (lower abdomen secondary to candidal infection) present  Neurological:      General: No focal deficit present  Mental Status: She is alert and oriented to person, place, and time  Motor: No weakness  Additional Data:     Lab Results: I have personally reviewed pertinent reports  Results from last 7 days   Lab Units 03/11/22  1021   WBC Thousand/uL 10 04   HEMOGLOBIN g/dL 9 1*   HEMATOCRIT % 29 8*   PLATELETS Thousands/uL 365   NEUTROS PCT % 67   LYMPHS PCT % 20   MONOS PCT % 6   EOS PCT % 6     Results from last 7 days   Lab Units 03/11/22  1021   SODIUM mmol/L 138   POTASSIUM mmol/L 4 2   CHLORIDE mmol/L 104   CO2 mmol/L 30   BUN mg/dL 19   CREATININE mg/dL 2 25*   ANION GAP mmol/L 4   CALCIUM mg/dL 8 4   ALBUMIN g/dL 2 4*   TOTAL BILIRUBIN mg/dL 0 24   ALK PHOS U/L 100   ALT U/L 14   AST U/L 10   GLUCOSE RANDOM mg/dL 99                 Results from last 7 days   Lab Units 03/11/22  1021   LACTIC ACID mmol/L 1 3       Imaging: I have personally reviewed pertinent reports  CT head without contrast   Final Result by Jeanna Noble MD (03/11 1311)      No acute intracranial abnormality                    Workstation performed: [id number]         CT abdomen pelvis wo contrast   Final Result by Jeanna Noble MD (03/11 1332)      1  Interval sigmoid resection and Payal's colostomy for perforated sigmoid adenocarcinoma  There has been interval resolution of previously noted loculated abscess collections in the pelvis  There is persistent soft tissue thickening adjacent to    the rectal stump extending to the uterus, which may be related to postsurgical or residual postinflammatory changes  However, possibility of residual tumor is not excluded  Evaluation is limited due to lack of IV and oral contrast       2   Mild interval improvement in right side hydroureteronephrosis, status post placement of ureteral stent  There is mild left side hydroureteronephrosis, slightly more prominent from prior exam          Workstation performed: [id number]         XR chest 1 view portable   ED Interpretation by Sierra Dillon MD (03/11 1129)   Read by me; Radiologist to provide formal interpretation  No acute process      Final Result by Guerda Serrano MD (03/11 1135)      No acute cardiopulmonary disease  Findings concur with the preliminary report by the referring clinician already in PACS and/or our electronic record EPIC  Workstation performed: Cathern Majestic kidney and bladder    (Results Pending)       EKG, Pathology, and Other Studies Reviewed on Admission:   · EKG: NSR with non-specific ST-T wave changes    Allscripts / Epic Records Reviewed: Yes     ** Please Note: This note has been constructed using a voice recognition system   **

## 2022-03-11 NOTE — ASSESSMENT & PLAN NOTE
UA positive with pending cultures, currently afebrile, denies any symptoms  IV Ceftriaxone q24h Day# 1  Continue IV fluids

## 2022-03-11 NOTE — CASE MANAGEMENT
Case Management Progress Note    Patient name Shari Branch  Location XI04/SV42 MRN [id number]  : 1965 Date 3/11/2022       LOS (days): 0  Geometric Mean LOS (GMLOS) (days):   Days to GMLOS:        OBJECTIVE:        Current admission status: Emergency  Preferred Pharmacy:   [facility] [address] [place] [address], [address] [place] [address] FELIPA/ Josiah Simons  [facility] [address]  Phone: [phone] Fax: [phone]    Primary Care Provider: Yany Sanchez PA-C    Primary Insurance: Anastacio Yin MA [facility]  Secondary Insurance:     PROGRESS NOTE:CM responded to Code R alert      RECENT ADMISSION: -22 at [facility]   AGE:56  SEX:F  DX:COVID resolved; perforated diverticulitis; hydronephrosis sigmoid adenocardinoma; s/p cysto; ureteral stents; Hartmans procedure  OUTCOME:was dc'd to rehab at [facility] and signed out [facility] on 3/4/22  Did see PCP since signing out of rehab and VNA arranged ([facility])  RESOURCES ON D/C (previous admission): went to rehab on dc from the hospital  CURRENT F/U APPTS: 3/15 Dr Lasha Pritchard; 3/16 Palliative w Dr Ilana Jade; 3/17 [facility] [place];  PCP [address] [place] [address]: weakness/more tired; abdominal pain around colostomy site  *INTERVENTIONS: provider notified; awaiting medical workup

## 2022-03-11 NOTE — ASSESSMENT & PLAN NOTE
Patient complains of being more tired than usual   Creatinine elevated at 2 25, was 0 9, three weeks ago      Plan:  · IV fluids for rehydration  · Follow BUN, creatinine

## 2022-03-12 PROBLEM — E78.6 LOW HDL (UNDER 40): Status: ACTIVE | Noted: 2022-01-01

## 2022-03-12 PROBLEM — E83.39 HYPERPHOSPHATEMIA: Status: ACTIVE | Noted: 2022-01-01

## 2022-03-12 NOTE — ASSESSMENT & PLAN NOTE
Patient complains of being more tired than usual   Creatinine elevated at 2 25, was 0 9, three weeks ago    Sr  Cr trending down    Plan:  · continue IV fluids for rehydration  · Follow BMP

## 2022-03-12 NOTE — PHYSICAL THERAPY NOTE
Physical Therapy Evaluation    Patient Name: Steve Bo    XVRZK'L Date: 3/12/2022     Problem List  Principal Problem:    ZAHIRA (acute kidney injury) [facility])  Active Problems:    Essential hypertension    Colon adenocarcinoma ([facility] [address] )    Hydroureter    [facility]    UTI (urinary tract infection)    Anemia       Past Medical History  Past Medical History:   Diagnosis Date    Anxiety     Arthritis     GERD (gastroesophageal reflux disease)     Hidradenitis suppurativa     Hypertension     Lipodermatosclerosis of both lower extremities     Lymphedema 2006    Rectal cancer ([facility] [address] )     Sciatic leg pain 2006    left side    SOB (shortness of breath)     Stomach ulcer         Past Surgical History  Past Surgical History:   Procedure Laterality Date    BILATERAL SALPINGOOPHORECTOMY N/A 2022    Procedure: RIGHT SALPINGO-OOPHORECTOMY, LEFT OOPHORECTOMY;  Surgeon: Cecelia Reed DO;  [facility];  Service: General     SECTION      FL RETROGRADE PYELOGRAM  2022    HARTMANS PROCEDURE N/A 2022    Procedure: END COLOSTOMY;  Surgeon: Cecelia Reed DO;  Location: [facility];  Service: General    LAPAROTOMY N/A 2022    Procedure: LAPAROTOMY EXPLORATORY, DRAINAGE OF INTRA -ABDOMINAL ABSCESS;  Surgeon: Cecelia Reed DO;  Location: [facility];  Service: General    NC CYSTOURETHROSCOPY,URETER CATHETER Right 2022    Procedure: CYSTOSCOPY RETROGRADE PYELOGRAM WITH INSERTION STENT URETERAL;  Surgeon: Climmie Lombard, MD;  Location: [facility];  Service: Urology     [address] 3 1-4 CM TRUNK,ARM,LEG Right 2020    Procedure: EXCISION BIOPSY TISSUE LESION/MASS UPPER EXTREMITY;  Surgeon: Randell Gurrola DO;  Location: [facility];  Service: General    TONSILLECTOMY      TUMOR REMOVAL Right     5th finger           22 1117   PT Last Visit   PT Visit Date 22   Note Type   Note type Evaluation Pain Assessment   Pain Assessment Tool 0-10   Pain Score 8   Pain Location/Orientation Orientation: Bilateral;Location: Abdomen   Pain Onset/Description Onset: Ongoing   Effect of Pain on Daily Activities   (Decreased mobility)   Patient's Stated Pain Goal No pain   Hospital Pain Intervention(s) Medication (See MAR)  (Pt medicated during eval)   Restrictions/Precautions   Weight Bearing Precautions Per Order No   Other Precautions Chair Alarm; Fall Risk;Pain  (colostomy)   Home Living   Type of [address] One level;Elevator;Ramped entrance   Bathroom Shower/Tub Tub/shower unit   [address]  (lift chair)   Additional Comments Pt reports that she is awaiting delivery of a rollator walker, BSC, and tub bench  Prior Function   Level of [place] Needs assistance with IADLs   Lives With Alone   Receives Help From Friend(s); Family   IADLs Needs assistance   Falls in the last 6 months 0   Comments Pt has been walking with crutches  Awaiting arrival of DME   Cognition   Overall Cognitive Status WFL   Orientation Level Oriented X4   Following Commands Follows all commands and directions without difficulty   Subjective   Subjective "I need to see an OT  My pinkies aren't working and are numb "   RLE Assessment   RLE Assessment X  (R hip ROM limited by body habitus)   Strength RLE   RLE Overall Strength 3+/5   LLE Assessment   LLE Assessment X  (L hip ROM limited by body habitus)   Strength LLE   LLE Overall Strength 3/5   Coordination   Movements are Fluid and Coordinated 1   Bed Mobility   Supine to Sit 5  Supervision   Additional items Increased time required; Bedrails  (HOB elevated)   Additional Comments Pt sleeps in her lift chair     Transfers   Sit to Stand   [facility])   Additional items Verbal cues   Stand to Sit   (CGA)   Additional items Verbal cues   Additional Comments Cues needed for safe hand placement Ambulation/Elevation   Gait pattern   (wide KODY, slow)   Gait Assistance   (CGA)   Assistive Device Bariatric Rolling walker   Distance   (125')   Balance   Static Sitting Good   Dynamic Sitting Good   Static Standing Fair   Dynamic Standing Fair   Ambulatory Fair   Activity Tolerance   Activity Tolerance Patient limited by fatigue  (Pt fatigued with increased gait distance)   Nurse Made Aware Melanie Calhoun RN   Assessment   Prognosis Good   Problem List Decreased strength;Decreased endurance; Impaired balance;Decreased mobility; Decreased safety awareness   Assessment Pt is 60 y o  female seen for PT evaluation on 3/12/22 s/p admit to [address] on 3/11/22 due to ZAHIRA, UTI  Pt with extensive medical hx with recent prolonged hospitalization from 1/6-2/24/22  Pt hospitalized for abdominal cellulitis  While hospitalized, pt underwent Ortega's procedure with placement of colostomy due to adenocarcinoma  Pt also diagnosed with COVID during the course of her hospitalization  Pt was discharged to short term rehab on 2/24/22, and later signed out [place] on 3/4/22  PT consulted to assess pt's functional mobility and d/c needs  Order placed for PT eval and tx    Performed at least 2 patient identifiers during session: Name and wristband  Comorbidities affecting pt's physical performance at time of assessment include: COVID, wounds, B knee DJD, diverticulitis, adenocarcinoma, colostomy  LOF prior to admission was gait with B crutches with I, household distances  Pt needed assist for transportation  Personal factors affecting pt at time of IE include: pt lives alone, needs assistive device for gait, new colostomy, awaiting DME and nursing and rehab services at home  Please find objective findings from PT assessment regarding body systems outlined above with impairments and limitations including weakness, impaired balance, decreased endurance, pain, decreased activity tolerance and fall risk, as well as mobility assessment  Pt's clinical presentation is currently unstable seen in pt's presentation of ongoing medical assessment  Given co-morbidities and presented presentation, moderate level PT eval was completed  Pt required supervision for bed mobility and CGA for gait with RW and transfers  A Co-eval was completed with OT due to pt just receiving pain meds, and concerns for pt's safety with mobility  Pt to benefit from continued PT tx to address deficits as defined above and maximize level of functional independent mobility and consistency in order to promote return to PLOF and independence  From PT/mobility standpoint, recommendation at time of d/c would be post acute rehabilitation vs home with services  Pt reports that she does not want to return to rehab  She reports that her friend has been providing assistance  If pt continues to make progress, pt may be safe to return home with home services  The patient's AM-PAC Basic Mobility Inpatient Short Form Raw Score is 17  A Raw score of greater than 16 suggests the patient may benefit from discharge to post-acute rehabilitation services vs home with home PT  Please refer to PT evaluation notes for further details  Barriers to Discharge Decreased caregiver support   Goals   Patient Goals To go home   LTG Expiration Date 03/25/22   Long Term Goal #1 Pt will be mod I with transfers from all surfaces  Long Term Goal #2 Gait - 100' with RW vs rollator walker with mod I   Plan   Treatment/Interventions Functional transfer training; Therapeutic exercise;LE strengthening/ROM; Endurance training;Patient/family training;Bed mobility;Gait training;Equipment eval/education   PT Frequency 3-5x/wk   Recommendation   PT Discharge Recommendation Post acute rehabilitation services   AM-PAC Basic Mobility Inpatient   Turning in Bed Without Bedrails 3   Lying on Back to Sitting on Edge of Flat Bed 3   Moving Bed to Chair 3   Standing Up From Chair 3   Walk in Room 3   Climb 3-5 Stairs 2   Basic Mobility Inpatient Raw Score 17   Basic Mobility Standardized Score 39 67   Highest Level Of Mobility   -[facility] Goal 5: Stand one or more mins   -[facility] Highest Level of Mobility 7: Walk 25 feet or more   -[facility] Goal Achieved Yes   End of Consult   Patient Position at End of Consult Bedside chair; All needs within reach;Bed/Chair alarm activated

## 2022-03-12 NOTE — RESPIRATORY THERAPY NOTE
RT Protocol Note  Magen Quick 60 y o  female MRN: [id number]  Unit/Bed#: 424-01 Encounter: [id number]    Assessment    Principal Problem:    ZAHIRA (acute kidney injury) [facility])  Active Problems:    Essential hypertension    Colon adenocarcinoma ([place] [address] )    Hydroureter    S/P colostomy ([place] [address] )    UTI (urinary tract infection)    Anemia      Home Pulmonary Medications:  Patient uses prn Ventolin  She denies oxygen and pap therapy       Past Medical History:   Diagnosis Date    Anxiety     Arthritis     GERD (gastroesophageal reflux disease)     Hidradenitis suppurativa     Hypertension     Lipodermatosclerosis of both lower extremities     Lymphedema 2006    Rectal cancer ([place] [address] )     Sciatic leg pain 2006    left side    SOB (shortness of breath)     Stomach ulcer      Social History     Socioeconomic History    Marital status:      Spouse name: None    Number of children: 2    Years of education: Some college    Highest education level: Some college, no degree   Occupational History    None   Tobacco Use    Smoking status: Light Tobacco Smoker     Packs/day: 0 25     Years: 20 00     Pack years: 5 00     Types: Cigarettes    Smokeless tobacco: Never Used    Tobacco comment: Only smoke once in awhile   Vaping Use    Vaping Use: Never used   Substance and Sexual Activity    Alcohol use: Not Currently     Comment: holidays    Drug use: No    Sexual activity: Not Currently   Other Topics Concern    None   Social History Narrative    None     Social Determinants of Health     Financial Resource Strain: Not on file   Food Insecurity: No Food Insecurity    Worried About Running Out of Food in the Last Year: Never true    Latoya of Food in the Last Year: Never true   Transportation Needs: No Transportation Needs    Lack of Transportation (Medical): No    Lack of Transportation (Non-Medical):  No   Physical Activity: Not on file   Stress: Not on file   Social Connections: Not on file Intimate Partner Violence: Not on file   Housing Stability: Unknown    Unable to Pay for Housing in the Last Year: Not on file    Number of Lior in the Last Year: 1    Unstable Housing in the Last Year: No       Subjective    Subjective Data: Patient stated her breathing was okay    Objective    Physical Exam:   General Appearance: Drowsy  Chest Assessment: Chest expansion symmetrical,Trachea midline  Bilateral Breath Sounds: Diminished,Expiratory wheezes  Cough: Non-productive  O2 Device: 2 5 lpm nasal cannula    Vitals:  Blood pressure 104/53, pulse (!) 114, temperature 97 8 °F (36 6 °C), resp  rate 20, height 5' 7" (1 702 m), weight (!) 146 kg (320 lb 12 3 oz), SpO2 96 %  Results from last 7 days   Lab Units 03/11/22  1157   PH ART  7 390   PCO2 ART mm Hg 43 7   PO2 ART mm Hg 75 5   HCO3 ART mmol/L 25 9   BASE EXC ART mmol/L 0 7   O2 CONTENT ART mL/dL 13 0*   O2 HGB, ARTERIAL % 93 7*   ABG SOURCE  Radial, Right   SHAHLA TEST  Yes   NON VENT ROOM AIR % RA       Imaging and other studies: I have personally reviewed pertinent reports  O2 Device: 2 5 lpm nasal cannula     Plan    Respiratory Plan: Home Bronchodilator Patient pathway  Airway Clearance Plan: Incentive Spirometer     Resp Comments: Patient received in her room, her nurse had just placed her on 2 5 lpm nasal cannula because her oxygen saturations had dropped  Patient is very groggy  I did ask her to cough, it was a dry cough  Patient denies oxygen and pap therapy at home  She stated she will not go for the sleep test  Patient does use a prn Ventolin that she states she doesn't use much  I have ordered her the prn Ventolin and also the Albuteral prn nebulizer because at this time she is so groggy

## 2022-03-12 NOTE — UTILIZATION REVIEW
Initial Clinical Review    Admission: Date/Time/Statement:   Admission Orders (From admission, onward)     Ordered        03/11/22 1534  Inpatient Admission  Once                      Orders Placed This Encounter   Procedures    Inpatient Admission     Standing Status:   Standing     Number of Occurrences:   1     Order Specific Question:   Level of Care     Answer:   Med Surg [16]     Order Specific Question:   Estimated length of stay     Answer:   More than 2 Midnights     Order Specific Question:   Certification     Answer:   I certify that inpatient services are medically necessary for this patient for a duration of greater than two midnights  See H&P and MD Progress Notes for additional information about the patient's course of treatment  ED Arrival Information     Expected Arrival Acuity    3/11/2022  3/11/2022 09:33 Emergent         Means of arrival Escorted by Service Admission type    Ambulance [facility] Emergency         Arrival complaint            Chief Complaint   Patient presents with    Weakness - Generalized     pt reports "being more tired that usual for a while " c/o of abd pain around colostomy sight  Initial Presentation: 60year old female, presented to the ED @ [facility] from Home via EMS  Admitted as Inpatient due to ZAHIRA  PHM:  GERD, HTN, rectal adenocarcinoma s/p interval sigmoid resection and colostomy  Date: 03/11/2022  Patient recently dischrged from nursing home after her surgery for adenocarcinoma  Reports that she wasn't given any pain medication on discharge from nursing home  From past 2 weeks she did not take any pain medication and she could not take any more pain  At shift change patient noted to have low oxygen saturation 88-90s with a slightly increased work of breathing with belly breathing  Patient placed on 2 5L NC and PA notified  Will continue to closely monitor the patient  Day 2: 03/12/2022   Continue NSS - IV flds  Continue IV Ceftriaxone  Consult Nephrology  Consult Urology  Monitor & trend BMP  PT/OT  VTE Prophylaxis: Enoxaparin (Lovenox)  / sequential compression device     ED Triage Vitals [03/11/22 0935]   Temperature Pulse Respirations Blood Pressure SpO2   (!) 95 3 °F (35 2 °C) 85 18 119/69 96 %      Temp Source Heart Rate Source Patient Position - Orthostatic VS BP Location FiO2 (%)   Tympanic Monitor Lying Right arm --      Pain Score       9          Wt Readings from Last 1 Encounters:   03/11/22 (!) 146 kg (320 lb 12 3 oz)     Additional Vital Signs:   Date/Time Temp Pulse Resp BP MAP (mmHg) SpO2 Calculated FIO2 (%) - Nasal Cannula Nasal Cannula O2 Flow Rate (L/min) O2 Device Patient Position - Orthostatic VS   03/12/22 15:54:46 98 2 °F (36 8 °C) 75 19 130/80 97 95 % -- -- -- --   03/12/22 07:50:50 -- 86 19 131/79 96 93 % -- -- -- --   03/12/22 0036 -- 114 Abnormal  20 -- -- 96 % -- -- -- --   03/11/22 22:02:57 -- 101 20 104/53 70 95 % -- -- -- --   03/11/22 1900 -- -- -- -- -- 91 % 30 2 5 L/min Nasal cannula --   03/11/22 18:45:27 97 8 °F (36 6 °C) 107 Abnormal  -- 137/71 93 98 % -- -- -- --   03/11/22 1650 -- -- -- -- -- -- -- -- None (Room air) --   03/11/22 16:10:35 -- 100 -- 138/83 101 98 % -- -- -- --   03/11/22 1215 -- 92 18 132/70 94 96 % -- -- None (Room air) Lying   03/11/22 1151 -- -- -- -- -- 96 % -- -- None (Room air) --   03/11/22 1130 -- 92 18 137/70 95 98 % -- -- None (Room air) Lying     Pertinent Labs/Diagnostic Test Results:   US kidney and bladder   Final Result by Claudia Boggs MD (03/12 0805)   Limited study  Essentially nondiagnostic  CT head without contrast   Final Result by Jeanna Noble MD (03/11 1311)   No acute intracranial abnormality  CT abdomen pelvis wo contrast   Final Result by Jeanna Noble MD (03/11 1332)   1  Interval sigmoid resection and Payal's colostomy for perforated sigmoid adenocarcinoma    There has been interval resolution of previously noted loculated abscess collections in the pelvis  There is persistent soft tissue thickening adjacent to    the rectal stump extending to the uterus, which may be related to postsurgical or residual postinflammatory changes  However, possibility of residual tumor is not excluded  Evaluation is limited due to lack of IV and oral contrast    2   Mild interval improvement in right side hydroureteronephrosis, status post placement of ureteral stent  There is mild left side hydroureteronephrosis, slightly more prominent from prior exam       XR chest 1 view portable   ED Interpretation by Farhan Hastings MD (03/11 1129)   Read by me; Radiologist to provide formal interpretation  No acute process   Final Result by Ju Roberts MD (03/11 1135)   No acute cardiopulmonary disease          Results from last 7 days   Lab Units 03/12/22  0525 03/11/22  1021   WBC Thousand/uL 11 77* 10 04   HEMOGLOBIN g/dL 8 8* 9 1*   HEMATOCRIT % 29 1* 29 8*   PLATELETS Thousands/uL 379 365   NEUTROS ABS Thousands/µL  --  6 67     Results from last 7 days   Lab Units 03/12/22  0525 03/11/22  1021   SODIUM mmol/L 138 138   POTASSIUM mmol/L 4 8 4 2   CHLORIDE mmol/L 104 104   CO2 mmol/L 26 30   ANION GAP mmol/L 8 4   BUN mg/dL 22 19   CREATININE mg/dL 2 03* 2 25*   EGFR ml/min/1 73sq m 26 23   CALCIUM mg/dL 8 3 8 4   MAGNESIUM mg/dL 2 0 2 0   PHOSPHORUS mg/dL 5 6*  --      Results from last 7 days   Lab Units 03/12/22  0525 03/11/22  1021   AST U/L 12 10   ALT U/L 12 14   ALK PHOS U/L 89 100   TOTAL PROTEIN g/dL 6 4 6 5   ALBUMIN g/dL 2 3* 2 4*   TOTAL BILIRUBIN mg/dL 0 29 0 24   BILIRUBIN DIRECT mg/dL  --  0 06     Results from last 7 days   Lab Units 03/12/22  0525 03/11/22  1021   GLUCOSE RANDOM mg/dL 115 99      Results from last 7 days   Lab Units 03/11/22  1157   PH ART  7 390   PCO2 ART mm Hg 43 7   PO2 ART mm Hg 75 5   HCO3 ART mmol/L 25 9   BASE EXC ART mmol/L 0 7   O2 CONTENT ART mL/dL 13 0*   O2 HGB, ARTERIAL % 93 7*   ABG SOURCE  Radial, Right     Results from last 7 days   Lab Units 03/11/22  1021   TSH 3RD GENERATON uIU/mL 1 092     Results from last 7 days   Lab Units 03/12/22  0525   PROCALCITONIN ng/ml 0 23     Results from last 7 days   Lab Units 03/12/22  0525 03/11/22  1021   LACTIC ACID mmol/L 0 8 1 3     Results from last 7 days   Lab Units 03/11/22  1021   LIPASE u/L 139     Results from last 7 days   Lab Units 03/11/22  1431   CLARITY UA  Cloudy   COLOR UA  Yellow   SPEC GRAV UA  <=1 005   PH UA  6 0   GLUCOSE UA mg/dl Negative   KETONES UA mg/dl Negative   BLOOD UA  Moderate*   PROTEIN UA mg/dl Trace*   NITRITE UA  Positive*   BILIRUBIN UA  Negative   UROBILINOGEN UA E U /dl 0 2   LEUKOCYTES UA  Large*   WBC UA /hpf Innumerable*   RBC UA /hpf 10-20*   BACTERIA UA /hpf Occasional   EPITHELIAL CELLS WET PREP /hpf Occasional     Results from last 7 days   Lab Units 03/11/22  1431 03/11/22  1031 03/11/22  1021   BLOOD CULTURE   --  No Growth at 24 hrs  No Growth at 24 hrs     URINE CULTURE  >100,000 cfu/ml Gram Negative [facility] Like*  --   --      ED Treatment:   Medication Administration from 03/11/2022 0933 to 03/11/2022 1556       Date/Time Order Dose Route Action     03/11/2022 1202 lactated ringers bolus 1,000 mL 1,000 mL Intravenous New Bag     03/11/2022 1537 cefTRIAXone (ROCEPHIN) IVPB (premix in dextrose) 1,000 mg 50 mL 1,000 mg Intravenous New Bag     03/11/2022 1539 sodium chloride 0 9 % infusion 100 mL/hr Intravenous New Bag        Past Medical History:   Diagnosis Date    Anxiety     Arthritis     GERD (gastroesophageal reflux disease)     Hidradenitis suppurativa     Hypertension     Lipodermatosclerosis of both lower extremities     Lymphedema 2006    [facility]al [facility] ([facility] )     Sciatic leg pain 2006    left side    SOB (shortness of breath)     Stomach ulcer      Present on Admission:   Essential hypertension   [facility] adenocarcinoma ([facility] )      Admitting Diagnosis: LLQ abdominal pain [R10 32]  ZAHIRA (acute kidney injury) ([facility] [address] ) [N17 9]  Age/Sex: 60 y o  female  Admission Orders:  Regular Diet  Up and OOB as tolerated  Daily Weight  I&O  Consult OT/PT  Ramos SCDs    Scheduled Medications:  cefTRIAXone, 1,000 mg, Intravenous, Q24H  docusate sodium, 100 mg, Oral, BID  enoxaparin, 60 mg, Subcutaneous, BID  metoprolol tartrate, 25 mg, Oral, BID  nicotine, 1 patch, Transdermal, Daily  nystatin, , Topical, BID      Continuous IV Infusions:  sodium chloride, 50 mL/hr, Intravenous, Continuous      PRN Meds:  acetaminophen, 650 mg, Oral, Q6H PRN  albuterol, 2 puff, Inhalation, Q4H PRN  albuterol, 2 5 mg, Nebulization, Q4H PRN  ALPRAZolam, 2 mg, Oral, BID PRN   X 1 dose 3/12  aluminum-magnesium hydroxide-simethicone, 30 mL, Oral, Q6H PRN  oxyCODONE, 5 mg, Oral, Q4H PRN   X 1 dose 3/12   Or  HYDROmorphone, 0 5 mg, Intravenous, Q4H PRN   X 1 dose 3/11;  X 1 dose 3/12  ondansetron, 4 mg, Intravenous, Q6H PRN  HYDROmorphone (DILAUDID) injection 1 mg  Dose: 1 mg   X 1 dose 3/11 then DCed  Freq: Every 6 hours PRN Route: IV  PRN Reason: severe pain  Start: 03/11/22 1821 End: 03/11/22 2016      IP CONSULT TO CASE MANAGEMENT  IP CONSULT TO CASE MANAGEMENT  IP CONSULT TO UROLOGY  IP CONSULT TO NEPHROLOGY    Network Utilization Review Department  ATTENTION: Please call with any questions or concerns to [phone] and carefully listen to the prompts so that you are directed to the right person  All voicemails are confidential   Candia Siemens all requests for admission clinical reviews, approved or denied determinations and any other requests to dedicated fax number below belonging to the campus where the patient is receiving treatment   List of dedicated fax numbers for the Facilities:  [address] DENIALS (Administrative/Medical Necessity) [phone]   [address] (Maternity/NICU/Pediatrics) 261 [facility],[address] 40 [facility] [address]  [phone]   [address] [address] [place] [address] Marcy Cormier [facility] [address] [facility] [phone]

## 2022-03-12 NOTE — PROGRESS NOTES
[facility] [address]  Progress Note - Ted Lozoya 1965, 60 y o  female MRN: [id number]  Unit/Bed#: 424-01 Encounter: [id number]  Primary Care Provider: Cornelius Enriquez PA-C   Date and time admitted to hospital: 3/11/2022  9:34 AM    * ZAHIRA (acute kidney injury) [facility])  Assessment & Plan  Patient complains of being more tired than usual   Creatinine elevated at 2 25, was 0 9, three weeks ago  Sr  Cr trending down    Plan:  · continue IV fluids for rehydration  · Follow BMP    UTI (urinary tract infection)  Assessment & Plan  UA positive with pending cultures, currently afebrile, denies any symptoms  IV Ceftriaxone q24h Day# 2  Continue IV fluids    S/P colostomy [facility])  Assessment & Plan  Patient was treated for adenocarcinoma colon, s/p Payal's colostomy  Had issues with colostomy bag  Wound care consult    Hydroureter  Assessment & Plan  CT abdomen shows left-sided hydronephrosis  Status post ureteral stent on right side for right-sided hydronephrosis which is improved compared to previous imaging  Urology on consult    Essential hypertension  Assessment & Plan  Continue home lisinopril and metoprolol    Anemia  Assessment & Plan  Hb 9 1 on admission  No active source of bleeding  clinically asymptomatic  Follow Hb trend      Colon adenocarcinoma [facility])  Assessment & Plan  Adenocarcinoma:  Status post interval sigmoid resection and Payal colostomy  CT abdomen shows soft tissue thickening around the stump ? possibility of residual tumor versus postsurgical changes  Patient complains of severe pain in the lower abdomen, don't have pain medication at home  P r n  Pain medication        VTE Pharmacologic Prophylaxis:   Pharmacologic: Enoxaparin (Lovenox)  Mechanical VTE Prophylaxis in Place: Yes    Patient Centered Rounds: I have performed bedside rounds with nursing staff today      Discussions with Specialists or Other Care Team Provider: yes    Education and Discussions with Family / Patient: yes    Time Spent for Care: 20 minutes  More than 50% of total time spent on counseling and coordination of care as described above  Current Length of Stay: 1 day(s)    Current Patient Status: Inpatient   Certification Statement: The patient will continue to require additional inpatient hospital stay due to on going managemnt    Discharge Plan: rehab    Code Status: Level 3 - DNAR and DNI      Subjective:   Feeling the same  Objective:     Vitals:   Temp (24hrs), Av 8 °F (36 6 °C), Min:97 8 °F (36 6 °C), Max:97 8 °F (36 6 °C)    Temp:  [97 8 °F (36 6 °C)] 97 8 °F (36 6 °C)  HR:  [] 86  Resp:  [18-20] 19  BP: (104-138)/(53-83) 131/79  SpO2:  [91 %-98 %] 93 %  Body mass index is 50 24 kg/m²  Input and Output Summary (last 24 hours): Intake/Output Summary (Last 24 hours) at 3/12/2022 1026  Last data filed at 3/11/2022 2202  Gross per 24 hour   Intake 293 33 ml   Output 850 ml   Net -556 67 ml       Physical Exam:     Physical Exam  Constitutional:       General: She is not in acute distress  Appearance: She is obese  HENT:      Head: Normocephalic and atraumatic  Right Ear: External ear normal       Left Ear: External ear normal       Nose: Nose normal       Mouth/Throat:      Mouth: Mucous membranes are moist       Pharynx: Oropharynx is clear  Eyes:      General: No scleral icterus  Conjunctiva/sclera: Conjunctivae normal       Pupils: Pupils are equal, round, and reactive to light  Cardiovascular:      Rate and Rhythm: Normal rate and regular rhythm  Pulses: Normal pulses  Heart sounds: Normal heart sounds  Pulmonary:      Effort: Pulmonary effort is normal  No respiratory distress  Breath sounds: Normal breath sounds  Abdominal:      General: Bowel sounds are normal       Palpations: Abdomen is soft  Tenderness: There is abdominal tenderness     Musculoskeletal:         General: Swelling ( bilateral leg swellings secondary to lymphedema present at baseline) present  Skin:     General: Skin is warm  Capillary Refill: Capillary refill takes less than 2 seconds  Comments: Colostomy at [facility]   Neurological:      Mental Status: She is alert and oriented to person, place, and time  Mental status is at baseline  Additional Data:     Labs:    Results from last 7 days   Lab Units 03/12/22  0525 03/11/22  1021 03/11/22  1021   WBC Thousand/uL 11 77*   < > 10 04   HEMOGLOBIN g/dL 8 8*   < > 9 1*   HEMATOCRIT % 29 1*   < > 29 8*   PLATELETS Thousands/uL 379   < > 365   NEUTROS PCT %  --   --  67   LYMPHS PCT %  --   --  20   MONOS PCT %  --   --  6   EOS PCT %  --   --  6    < > = values in this interval not displayed  Results from last 7 days   Lab Units 03/12/22  0525   SODIUM mmol/L 138   POTASSIUM mmol/L 4 8   CHLORIDE mmol/L 104   CO2 mmol/L 26   BUN mg/dL 22   CREATININE mg/dL 2 03*   ANION GAP mmol/L 8   CALCIUM mg/dL 8 3   ALBUMIN g/dL 2 3*   TOTAL BILIRUBIN mg/dL 0 29   ALK PHOS U/L 89   ALT U/L 12   AST U/L 12   GLUCOSE RANDOM mg/dL 115                 Results from last 7 days   Lab Units 03/12/22  0525 03/11/22  1021   LACTIC ACID mmol/L 0 8 1 3   PROCALCITONIN ng/ml 0 23  --            * I Have Reviewed All Lab Data Listed Above  * Additional Pertinent Lab Tests Reviewed: Willi [address] Admission Reviewed    Imaging:    Imaging Reports Reviewed Today Include: none  Imaging Personally Reviewed by Myself Includes:  none    Recent Cultures (last 7 days):     Results from last 7 days   Lab Units 03/11/22  1031 03/11/22  1021   BLOOD CULTURE  No Growth at 24 hrs  No Growth at 24 hrs         Last 24 Hours Medication List:   Current Facility-Administered Medications   Medication Dose Route Frequency Provider Last Rate    acetaminophen  650 mg Oral Q6H PRN Bi Retana MD      albuterol  2 puff Inhalation Q4H PRN Faith Houston DO      albuterol  2 5 mg Nebulization Q4H PRN Faith Nicholson DO Celso      ALPRAZolam  2 mg Oral BID PRN Isai Gonzalez PA-C      aluminum-magnesium hydroxide-simethicone  30 mL Oral Q6H PRN Christin Lloyd MD      cefTRIAXone  1,000 mg Intravenous Q24H Christin Lloyd MD 1,000 mg (03/12/22 5187)    docusate sodium  100 mg Oral BID Christin Lloyd MD      enoxaparin  60 mg Subcutaneous BID Christin Lloyd MD      oxyCODONE  5 mg Oral Q4H PRN Destini Houston DO      [facility] HYDROmorphone  0 5 mg Intravenous Q4H PRN Destini Houston DO      metoprolol tartrate  25 mg Oral BID Christin Lloyd MD      nicotine  1 patch Transdermal Daily Christin Lloyd MD      nystatin   Topical BID Christin Lloyd MD      ondansetron  4 mg Intravenous Q6H PRN Christin Lloyd MD      sodium chloride  50 mL/hr Intravenous Continuous Destini Houston DO 50 mL/hr (03/11/22 2019)        Today, Patient Was Seen By: Christin Lloyd MD    ** Please Note: Dictation voice to text software may have been used in the creation of this document   **

## 2022-03-12 NOTE — ASSESSMENT & PLAN NOTE
UA positive with pending cultures, currently afebrile, denies any symptoms  IV Ceftriaxone q24h Day# 2  Continue IV fluids

## 2022-03-12 NOTE — OCCUPATIONAL THERAPY NOTE
Occupational Therapy Evaluation     Patient Name: Shari Branch  HXMBX'A Date: 3/12/2022  Problem List  Principal Problem:    ZAHIRA (acute kidney injury) [facility])  Active Problems:    Essential hypertension    Colon adenocarcinoma ([facility] [address] )    Hydroureter    S/P col[facility] ([facility] )    UTI (urinary tract infection)    Anemia    Past Medical History  Past Medical History:   Diagnosis Date    Anxiety     Arthritis     GERD (gastroesophageal reflux disease)     Hidradenitis suppurativa     Hypertension     Lipodermatosclerosis of both lower extremities     Lymphedema 2006    Rectal cancer ([facility] [address] )     Sciatic leg pain 2006    left side    SOB (shortness of breath)     Stomach ulcer      Past Surgical History  Past Surgical History:   Procedure Laterality Date    BILATERAL SALPINGOOPHORECTOMY N/A 2022    Procedure: RIGHT SALPINGO-OOPHORECTOMY, LEFT OOPHORECTOMY;  Surgeon: Diego Ureña DO;  Location: [facility];  Service: General     SECTION      FL RETROGRADE PYELOGRAM  2022    HARTMANS PROCEDURE N/A 2022    Procedure: END COLOSTOMY;  Surgeon: Diego Ureña DO;  [facility]: [facility];  Service: General    LAPAROTOMY N/A 2022    Procedure: LAPAROTOMY EXPLORATORY, DRAINAGE OF INTRA -ABDOMINAL ABSCESS;  Surgeon: Diego Ureña DO;  Location: [facility];  Service: General    OH CYSTOURETHROSCOPY,URETER CATHETER Right 2022    Procedure: CYSTOSCOPY RETROGRADE PYELOGRAM WITH INSERTION STENT URETERAL;  Surgeon: Gretchen Crowe MD;  Location: [facility];  Service: Urology     [address] 1-4 CM TRUNK,ARM,LEG Right 2020    Procedure: EXCISION BIOPSY TISSUE LESION/MASS UPPER EXTREMITY;  Surgeon: Fabricio Garcia DO;  Location: [facility];  Service: General    TONSILLECTOMY      TUMOR REMOVAL Right     5th finger             22 1158   OT Last Visit   OT Visit Date 22   Note Type   Note type Evaluation   Restrictions/Precautions   Weight Bearing Precautions Per Order No   Other Precautions Chair Alarm; Fall Risk  (colostomy)   Pain Assessment   Pain Assessment Tool 0-10   Pain Score 8   Pain Location/Orientation Location: Abdomen   Hospital Pain Intervention(s)   (RN Edward Bauman present to give pain med during session)   Home Living   Type of [facility] One level;Elevator;Ramped entrance   Bathroom Shower/Tub Tub/shower unit   Bathroom Toilet Standard   Bathroom Accessibility Accessible   Home Equipment Crutches   Additional Comments Pt reports use of crutches for functional mobility since returning home from STR  Pt went to [facility] s/p hospitilization from 1/6-2/24  Pt states she did not like the STR and signed hereself out last week  (~3/4)  Pt states since being home she was scheduled to start PT and OT at home  Pt also states that a shower chair, RW, and BSC were ordered for her upon leaving [facility] but she did not yet receive these  Prior Function   Level of [place] Independent with ADLs and functional mobility; Needs assistance with IADLs   Lives With Alone   Receives Help From Family;Friend(s)  (son and friend/neighbor)   ADL Assistance Independent  (Pt reports independent, but notes she was unable to shower)   IADLs Needs assistance  (IADLs completed by friend who is next door neighbor and son)   Falls in the last 6 months 0   Comments Pt does not drive  Pt's neighbor and friend drives her to appointments  Lifestyle   Autonomy Pt reporting independence in ADLs since returning from STR one week ago, although she notes she was unable to shower because shower chair did not come yet and she is unable to bring legs over tub  Pt also reporting requiring A for IADLs and use of crutches for functional mobility  Pt lives in apartment alone with supportive friend and neighbor next door  Reciprocal Relationships friend   Intrinsic Gratification Pt enjoys spending time with her dog who is currently staying with her son until she can better clean her apartment  Psychosocial   Psychosocial (WDL) WDL   Subjective   Subjective "did I do everything you asked"   ADL   Where Assessed Edge of bed   LB Dressing Assistance 1  Total Assistance   LB Dressing Deficit Don/doff R sock; Don/doff L sock   Additional Comments Pt total A to don/doff socks while supine in bed  Pt noting she does not wear socks at home  She wears shoes that she can easily slide her feet into  Bed Mobility   Supine to Sit 5  Supervision   Additional items Increased time required;Verbal cues;HOB elevated   Additional Comments Pt supine in bed at start of session  Vitals WNL  Pt sat EOb for ~5 minutes with supervision  No LOB or unsteadiness  Pt at end of session seated in recliner for lunch  Transfers   Sit to Stand   (CGA)   Additional items Verbal cues   Stand to Sit   (CGA)   Additional items Verbal cues   Additional Comments Pt supported on RW  No LOB or unsteadiness  Denied any dizziness or lightheadedness  Functional Mobility   Functional Mobility   (CGA)   Additional Comments Pt performed functional mobility in hallway with RW and CGA, no LOB  Pt with noted faitgue at end of functional mobility  Vitals WNL during functional mobility  V C  for safety awareness required  Additional items Rolling walker   Balance   Static Sitting Good   Dynamic Sitting Good   Static Standing Fair   Dynamic Standing Fair   Ambulatory Fair   Activity Tolerance   Activity Tolerance Patient limited by fatigue   Medical Staff Made Aware MICHELLE Hebert verbalized pt appropriate for OT eval  PT Halle present due to pt's medical complexity requiring A of 2 to safely perform eval  PCA Britt made aware pt's colostomy leaking slightly with movement  RN made aware pt does not have purewik in currently     RUE Assessment   RUE Assessment X  (shoulder strenght 4+/5, ROM WFL,  strength 3+/5)   LUE Assessment   LUE Assessment X  (shoulder strenght 4+/5, ROM WFL,  strength 3+/5)   Hand Function   Gross Motor Coordination Functional   Fine Motor Coordination Functional   Sensation   Additional Comments Pt notes numbness and tingling in bilateral fifth digits and decreased  strength limiting functional tasks since her hospitlization in Jan-Feb  Noted in OT progress note in 02/2022 at [address] location  Cognition   Overall Cognitive Status WFL   Arousal/Participation Alert; Responsive; Cooperative   Attention Within functional limits   Orientation Level Oriented X4   Memory Within functional limits   Following Commands Follows all commands and directions without difficulty   Comments Pt pleasant and agreeable to OT   Assessment   Limitation Decreased ADL status; Decreased UE ROM; Decreased UE strength;Decreased Safe judgement during ADL;Decreased endurance;Decreased fine motor control;Decreased self-care trans;Decreased high-level ADLs   Prognosis Fair   Assessment  Pt is a 60 y o  female seen for OT evaluation s/p admit to [facility] on 3/11/2022 w/ ZAHIRA (acute kidney injury) ([facility] [address] )  Comorbidities affecting pt's functional performance at time of assessment include: HTN, colon adenocarcnoma, UTI, anemia, lymphedema, chronic midline low back pain, otherspecified anxiety disorders, hyperlipidemia, iron deficiency, GERD, cellulitis of abdominal wall  Personal factors affecting pt at time of IE include:difficulty performing ADLS, difficulty performing IADLS , limited insight into deficits and health management   Prior to admission, pt was independent in ADLs, although reporting unable to shower since signing herself out of STR, requiring A for IADLs, and use of crutches for functional mobility  Upon evaluation: Pt requires mod-max A to complete LB ADLs, min A to complete toileting, min A to complete UB dressing and CGA and RW to perform functional mobility 2* the following deficits impacting occupational performance: weakness, decreased ROM, decreased strength, decreased balance and decreased tolerance   Pt to benefit from continued skilled OT tx while in the hospital to address deficits as defined above and maximize level of functional independence w ADL's and functional mobility  Occupational Performance areas to address include: bathing/shower, toilet hygiene, dressing, health maintenance and functional mobility  The patient's raw score on the AM-PAC Daily Activity inpatient short form is 17, standardized score is 38 66, less than 39 4  Patients at this level are likely to benefit from discharge to post-acute rehabilitation services  Pending progress during hospitalization pt may be appropriate for home health , however, not if home equipment is still not obtained as pt stated RW, BSC, and shower chair were all ordered but she does not have at this time  Please refer to the recommendation of the Occupational Therapist for safe discharge planning  Goals   Patient Goals to go home   Plan   Treatment Interventions ADL retraining;Functional transfer training;UE strengthening/ROM; Endurance training;Patient/family training;Equipment evaluation/education; Fine motor coordination activities; Compensatory technique education; Energy conservation; Activityengagement   Goal Expiration Date 03/26/22   OT Frequency 3-5x/wk   Recommendation   OT Discharge Recommendation Post acute rehabilitation services   OT - OK to Discharge Yes  (once medically cleared)   Additional Comments  Pt left seated in recliner at end of session, all needs met, call bell in reach, chair alarm on      AM-PAC Daily Activity Inpatient   Lower Body Dressing 2   Bathing 2   Toileting 3   Upper Body Dressing 3   Grooming 4   Eating 4   Daily Activity Raw Score 18   Daily Activity Standardized Score (Calc for Raw Score >=11) 38 66   AM-PAC Applied Cognition Inpatient   Following a Speech/Presentation 4   Understanding Ordinary Conversation 4   Taking Medications 4   Remembering Where Things Are Placed or Put Away 4   Remembering List of 4-5 Errands 4   Taking Care of Complicated Tasks 4 Applied Cognition Raw Score 24   Applied Cognition Standardized Score 62 21     Goals    Pt will perform bed mobility with (I) to increase participation in functional tasks  Pt will perform UB ADLs with (I) to maximize participation in ADLs  Pt will perform LB ADLs with mod(I), use of DME (sock aid, reacher, and shoe horn) to maximize participation in ADLs  Pt will perform toileting/toilet hygiene mod(I) to increase independence in self care  Pt will perform ADL transfers with mod(I) to increase independence in ADLs  Pt will increase static and dynamic standing balance to good to increase safety awareness and participation in standing ADLs  Pt will increase standing tolerance to 10 minutes without LOB to increase participation in standing ADLs/purposeful tasks  Pt will complete B UE strengthening exercises to increase safety and participation in bed mobility, ADLs, and functional mobility       Milagro Blanca, OT

## 2022-03-12 NOTE — PLAN OF CARE
Problem: PAIN - ADULT  Goal: Verbalizes/displays adequate comfort level or baseline comfort level  Description: Interventions:  - Encourage patient to monitor pain and request assistance  - Assess pain using appropriate pain scale (0-10 pain scale)  - Administer analgesics based on type and severity of pain and evaluate response  - Implement non-pharmacological measures as appropriate and evaluate response  - Consider cultural and social influences on pain and pain management  - Notify physician/advanced practitioner if interventions unsuccessful or patient reports new pain  Outcome: Progressing

## 2022-03-12 NOTE — PLAN OF CARE
Problem: OCCUPATIONAL THERAPY ADULT  Goal: Performs self-care activities at highest level of function for planned discharge setting  See evaluation for individualized goals  Description: Treatment Interventions: ADL retraining,Functional transfer training,UE strengthening/ROM,Endurance training,Patient/family training,Equipment evaluation/education,Fine motor coordination activities,Compensatory technique education,Energy conservation,Activityengagement          See flowsheet documentation for full assessment, interventions and recommendations  Note: Limitation: Decreased ADL status,Decreased UE ROM,Decreased UE strength,Decreased Safe judgement during ADL,Decreased endurance,Decreased fine motor control,Decreased self-care trans,Decreased high-level ADLs  Prognosis: Fair  Assessment:  Pt is a 60 y o  female seen for OT evaluation s/p admit to [facility] on 3/11/2022 w/ ZAHIRA (acute kidney injury) ([facility] [address] )  Comorbidities affecting pt's functional performance at time of assessment include: HTN, colon adenocarcnoma, UTI, anemia, lymphedema, chronic midline low back pain, otherspecified anxiety disorders, hyperlipidemia, iron deficiency, GERD, cellulitis of abdominal wall  Personal factors affecting pt at time of IE include:difficulty performing ADLS, difficulty performing IADLS , limited insight into deficits and health management   Prior to admission, pt was independent in ADLs, although reporting unable to shower since signing herself out of STR, requiring A for IADLs, and use of crutches for functional mobility  Upon evaluation: Pt requires mod-max A to complete LB ADLs, min A to complete toileting, min A to complete UB dressing and CGA and RW to perform functional mobility 2* the following deficits impacting occupational performance: weakness, decreased ROM, decreased strength, decreased balance and decreased tolerance   Pt to benefit from continued skilled OT tx while in the hospital to address deficits as defined above and maximize level of functional independence w ADL's and functional mobility  Occupational Performance areas to address include: bathing/shower, toilet hygiene, dressing, health maintenance and functional mobility  The patient's raw score on the AM-PAC Daily Activity inpatient short form is 17, standardized score is 38 66, less than 39 4  Patients at this level are likely to benefit from discharge to post-acute rehabilitation services  Pending progress during hospitalization pt may be appropriate for home health if home equipment is available as pt stated RW, BSC, and shower chair were all ordered but she does not have at this time  Please refer to the recommendation of the Occupational Therapist for safe discharge planning        OT Discharge Recommendation: Post acute rehabilitation services  OT - OK to Discharge: Yes (once medically cleared)  Aishwarya Chung OT

## 2022-03-12 NOTE — QUICK NOTE
At shift change patient noted to have low oxygen saturation 88-90s with a slightly increased work of breathing with belly breathing  Patient placed on 2 5L NC and PA notified  Will continue to closely monitor the patient

## 2022-03-12 NOTE — PLAN OF CARE
Problem: PAIN - ADULT  Goal: Verbalizes/displays adequate comfort level or baseline comfort level  Description: Interventions:  - Encourage patient to monitor pain and request assistance  - Assess pain using appropriate pain scale (0-10 pain scale)  - Administer analgesics based on type and severity of pain and evaluate response  - Implement non-pharmacological measures as appropriate and evaluate response  - Consider cultural and social influences on pain and pain management  - Notify physician/advanced practitioner if interventions unsuccessful or patient reports new pain  Outcome: Progressing     Problem: INFECTION - ADULT  Goal: Absence or prevention of progression during hospitalization  Description: INTERVENTIONS:  - Assess and monitor for signs and symptoms of infection  - Monitor lab/diagnostic results  - Monitor all insertion sites, i e  indwelling lines  - Administer medications as ordered  - Instruct and encourage patient and family to use good hand hygiene technique  Outcome: Progressing     Problem: SAFETY ADULT  Goal: Patient will remain free of falls  Description: INTERVENTIONS:  - Educate patient/family on patient safety including physical limitations  - Instruct patient to call for assistance with activity (assist with walker)  - Consult OT/PT to assist with strengthening/mobility   - Keep Call bell within reach  - Keep bed low and locked with side rails adjusted as appropriate  - Keep care items and personal belongings within reach  - Initiate and maintain comfort rounds  - Make Fall Risk Sign visible to staff (high fall risk)  - Offer Toileting every 1-2 Hours, in advance of need  - Initiate/Maintain bed/chair alarm  - Obtain necessary fall risk management equipment: nonskid footwear, fall bracelet, alarms  - Apply yellow socks and bracelet for high fall risk patients  - Consider moving patient to room near nurses station  Outcome: Progressing  Goal: Maintain or return to baseline ADL function  Description: INTERVENTIONS:  -  Assess patient's ability to carry out ADLs; (min to mod assist with adls)  - Assess/evaluate cause of self-care deficits (fatigue, pain, limited movement, use of assistive device)  - Assess range of motion  - Assess patient's mobility; (assist and walker)  - Assess patient's need for assistive devices and provide as appropriate  - Encourage maximum independence but intervene and supervise when necessary  - Involve family in performance of ADLs  - Assess for home care needs following discharge   - Consider OT consult to assist with ADL evaluation and planning for discharge  - Provide patient education as appropriate  Outcome: Progressing  Goal: Maintains/Returns to pre admission functional level  Description: INTERVENTIONS:  - Perform BMAT or MOVE assessment daily    - Set and communicate daily mobility goal to care team and patient/family/caregiver  - Collaborate with rehabilitation services on mobility goals if consulted  - Perform Range of Motion 3-4 times a day  - Reposition patient every 2 hours    - Dangle patient 3 times a day  - Stand patient 3 times a day  - Ambulate patient 3 times a day  - Out of bed to chair 3 times a day   - Out of bed for meals 3 times a day  - Out of bed for toileting  - Record patient progress and toleration of activity level   Outcome: Progressing     Problem: DISCHARGE PLANNING  Goal: Discharge to home or other facility with appropriate resources  Description: INTERVENTIONS:  - Identify barriers to discharge w/patient and caregiver  - Arrange for needed discharge resources and transportation as appropriate  - Identify discharge learning needs (meds, wound care, etc )  - Refer to Case Management Department for coordinating discharge planning if the patient needs post-hospital services based on physician/advanced practitioner order or complex needs related to functional status, cognitive ability, or social support system  Outcome: Progressing     Problem: Knowledge Deficit  Goal: Patient/family/caregiver demonstrates understanding of disease process, treatment plan, medications, and discharge instructions  Description: Complete learning assessment and assess knowledge base    Interventions:  - Provide teaching at level of understanding  - Provide teaching via preferred learning methods  Outcome: Progressing     Problem: GASTROINTESTINAL - ADULT  Goal: Maintains adequate nutritional intake  Description: INTERVENTIONS:  - Monitor percentage of each meal consumed  - Identify factors contributing to decreased intake, treat as appropriate  - Assist with meals as needed  - Monitor I&O, weight, and lab values if indicated  - Obtain nutrition services referral as needed  Outcome: Progressing  Goal: Establish and maintain optimal ostomy function  Description: INTERVENTIONS:  - Assess bowel function  - Encourage oral fluids to ensure adequate hydration  - Administer IV fluids if ordered to ensure adequate hydration   - Administer ordered medications as needed  - Encourage mobilization and activity  - Nutrition services referral to assist patient with appropriate food choices  - Assess stoma site  - Consider wound care consult   Outcome: Progressing     Problem: METABOLIC, FLUID AND ELECTROLYTES - ADULT  Goal: Electrolytes maintained within normal limits  Description: INTERVENTIONS:  - Monitor labs and assess patient for signs and symptoms of electrolyte imbalances  - Administer electrolyte replacement as ordered  - Monitor response to electrolyte replacements, including repeat lab results as appropriate  - Instruct patient on fluid and nutrition as appropriate  Outcome: Progressing  Goal: Fluid balance maintained  Description: INTERVENTIONS:  - Monitor labs   - Monitor I/O and WT  - Instruct patient on fluid and nutrition as appropriate  - Assess for signs & symptoms of volume excess or deficit  Outcome: Progressing     Problem: Potential for Falls  Goal: Patient will remain free of falls  Description: INTERVENTIONS:  - Educate patient/family on patient safety including physical limitations  - Instruct patient to call for assistance with activity (assist with walker)  - Consult OT/PT to assist with strengthening/mobility   - Keep Call bell within reach  - Keep bed low and locked with side rails adjusted as appropriate  - Keep care items and personal belongings within reach  - Initiate and maintain comfort rounds  - Make Fall Risk Sign visible to staff (high fall risk)  - Offer Toileting every 1-2 Hours, in advance of need  - Initiate/Maintain bed/chair alarm  - Obtain necessary fall risk management equipment: nonskid footwear, fall bracelet, alarms  - Apply yellow socks and bracelet for high fall risk patients  - Consider moving patient to room near nurses station  Outcome: Progressing     Problem: Prexisting or High Potential for Compromised Skin Integrity  Goal: Skin integrity is maintained or improved  Description: INTERVENTIONS:  - Identify patients at risk for skin breakdown  - Assess and monitor skin integrity  - Assess and monitor nutrition and hydration status  - Monitor labs   - Assess for incontinence   - Turn and reposition patient  - Assist with mobility/ambulation  - Relieve pressure over bony prominences  - Avoid friction and shearing  - Provide appropriate hygiene as needed including keeping skin clean and dry  - Evaluate need for skin moisturizer/barrier cream  - Collaborate with interdisciplinary team   - Patient/family teaching  - Consider wound care consult   Outcome: Progressing     Problem: MOBILITY - ADULT  Goal: Maintain or return to baseline ADL function  Description: INTERVENTIONS:  -  Assess patient's ability to carry out ADLs; (min to mod assist with adls)  - Assess/evaluate cause of self-care deficits (fatigue, pain, limited movement, use of assistive device)  - Assess range of motion  - Assess patient's mobility; (assist and walker)  - Assess patient's need for assistive devices and provide as appropriate  - Encourage maximum independence but intervene and supervise when necessary  - Involve family in performance of ADLs  - Assess for home care needs following discharge   - Consider OT consult to assist with ADL evaluation and planning for discharge  - Provide patient education as appropriate  Outcome: Progressing  Goal: Maintains/Returns to pre admission functional level  Description: INTERVENTIONS:  - Perform BMAT or MOVE assessment daily    - Set and communicate daily mobility goal to care team and patient/family/caregiver  - Collaborate with rehabilitation services on mobility goals if consulted  - Perform Range of Motion 3-4 times a day  - Reposition patient every 2 hours    - Dangle patient 3 times a day  - Stand patient 3 times a day  - Ambulate patient 3 times a day  - Out of bed to chair 3 times a day   - Out of bed for meals 3 times a day  - Out of bed for toileting  - Record patient progress and toleration of activity level   Outcome: Progressing

## 2022-03-13 PROBLEM — E78.1 HYPERTRIGLYCERIDEMIA: Status: ACTIVE | Noted: 2022-01-01

## 2022-03-13 NOTE — QUICK NOTE
Discontinue IV ceftriaxone, and treat with IV cefepime based on the final urine culture result:    03/11/2022 1431 03/13/2022 1631 Urine culture [[id number]]    (Abnormal)   Urine, Straight Cath    Final result [address]  Component Value   Urine Culture >100,000 cfu/ml Klebsiella aerogenes Abnormal     Multi-Drug Resistant Organism   Please note: This patient requires contact precautions           Susceptibility      Klebsiella aerogenes (1)    Antibiotic Interpretation Microscan  Method Status    ZID Performed  Yes  CECI Final    Amoxicillin + Clavulanate Resistant >16/8 ug/ml CECI Final    Ampicillin ($$) Resistant >16 00 ug/ml CECI Final    Ampicillin + Sulbactam ($) Resistant >16/8 ug/ml CECI Final    Aztreonam ($$$)  Resistant 16 ug/ml CECI Final    Cefazolin ($) Resistant >16 00 ug/ml CECI Final    Cefepime ($) Susceptible <=2 00 ug/ml CECI Final    Ceftazidime ($$) Resistant >16 ug/ml CECI Final    Ceftriaxone ($$) Resistant 32 00 ug/ml CECI Final    Cefuroxime ($$) Resistant >16 ug/ml CECI Final    Ciprofloxacin ($)  Susceptible <=0 25 ug/ml CECI Final    Ertapenem ($$$) Susceptible <=0 5 ug/ml CECI Final    Gentamicin ($$) Susceptible <=2 ug/ml CECI Final    Levofloxacin ($) Susceptible <=0 50 ug/ml CECI Final    Nitrofurantoin Susceptible <=32 ug/ml CECI Final    Piperacillin + Tazobactam ($$$) Intermediate 32 ug/ml CECI Final    Tetracycline Susceptible <=4 ug/ml CECI Final    Tobramycin ($) Susceptible <=2 ug/ml CECI Final    Trimethoprim + Sulfamethoxazole ($$$) Susceptible <=0 5/9 5 ug/ml CECI Final

## 2022-03-13 NOTE — PROGRESS NOTES
[address] [facility] [address]  Progress Note - Isauro Shah 1965, 60 y o  female MRN: [id number]  Unit/Bed#: 424-01 Encounter: [id number]  Primary Care Provider: Dai Saravia PA-C   Date and time admitted to hospital: 3/11/2022  9:34 AM    * ZAHIRA (acute kidney injury) [facility])  Assessment & Plan  Patient complains of being more tired than usual   Creatinine elevated at 2 25, was 0 9, three weeks ago  Sr  Cr trending down slowly    Plan:  · continue IV fluids for rehydration  · Nephrology consulted: appreciate recommendations  · Follow BMP    UTI (urinary tract infection)  Assessment & Plan  UA positive with pending cultures, currently afebrile, denies any symptoms  IV Ceftriaxone q24h Day# 3  Continue IV fluids    S/P colostomy [facility])  Assessment & Plan  Patient was treated for adenocarcinoma colon, s/p Payal's colostomy  Had issues with colostomy bag  Wound care consult  Prn pain medication    Hydroureter  Assessment & Plan  CT abdomen shows left-sided hydronephrosis  Status post ureteral stent on right side for right-sided hydronephrosis which is improved compared to previous imaging  Urology on consult    Essential hypertension  Assessment & Plan  Continue home lisinopril and metoprolol    Low HDL (under 40)  Assessment & Plan   Outpatient follow-up with PCP in regards to this matter    Anemia  Assessment & Plan  Hb 9 1 on admission  No active source of bleeding  clinically asymptomatic  Follow Hb trend      Colon adenocarcinoma [facility])  Assessment & Plan  Adenocarcinoma:  Status post interval sigmoid resection and Payal colostomy  CT abdomen shows soft tissue thickening around the stump ? possibility of residual tumor versus postsurgical changes  Patient complains of severe pain in the lower abdomen, don't have pain medication at home  P r n  Pain medication      Patient Centered Rounds: I have performed bedside rounds with nursing staff today      Discussions with Specialists or Other Care Team Provider: yes    Education and Discussions with Family / Patient: yes    Time Spent for Care: 20 minutes  More than 50% of total time spent on counseling and coordination of care as described above  Current Length of Stay: 2 day(s)    Current Patient Status: Inpatient   Certification Statement: The patient will continue to require additional inpatient hospital stay due to on going treatment    Discharge Plan: home with VNA services    Code Status: Level 3 - DNAR and DNI      Subjective:   Still have pain, need more pain medication  Objective:     Vitals:   Temp (24hrs), Av 1 °F (36 7 °C), Min:98 °F (36 7 °C), Max:98 2 °F (36 8 °C)    Temp:  [98 °F (36 7 °C)-98 2 °F (36 8 °C)] 98 °F (36 7 °C)  HR:  [75-91] 87  Resp:  [18-19] 18  BP: (129-130)/(80-81) 129/81  SpO2:  [95 %-97 %] 97 %  Body mass index is 50 24 kg/m²  Input and Output Summary (last 24 hours): Intake/Output Summary (Last 24 hours) at 3/13/2022 1119  Last data filed at 3/13/2022 0854  Gross per 24 hour   Intake 1310 ml   Output 1350 ml   Net -40 ml       Physical Exam:     Physical Exam  Vitals and nursing note reviewed  Constitutional:       General: She is not in acute distress  Appearance: She is well-developed  HENT:      Head: Normocephalic and atraumatic  Right Ear: External ear normal       Left Ear: External ear normal       Nose: Nose normal       Mouth/Throat:      Mouth: Mucous membranes are moist       Pharynx: Oropharynx is clear  Eyes:      General: No scleral icterus  Extraocular Movements: Extraocular movements intact  Conjunctiva/sclera: Conjunctivae normal       Pupils: Pupils are equal, round, and reactive to light  Cardiovascular:      Rate and Rhythm: Normal rate and regular rhythm  Heart sounds: No murmur heard  Pulmonary:      Effort: Pulmonary effort is normal  No respiratory distress  Breath sounds: Normal breath sounds     Abdominal:      General: Bowel sounds are normal       Palpations: Abdomen is soft  Tenderness: There is abdominal tenderness ( around colostomy)  Musculoskeletal:         General: Swelling ( bilateral leg swelling secondary to lymphedema) present  Cervical back: Neck supple  Skin:     General: Skin is warm and dry  Capillary Refill: Capillary refill takes less than 2 seconds  Comments: Ostomy site devoid of infection/inflammation   Neurological:      Mental Status: She is alert and oriented to person, place, and time  Mental status is at baseline  Additional Data:     Labs:    Results from last 7 days   Lab Units 03/13/22  0446   WBC Thousand/uL 11 79*   HEMOGLOBIN g/dL 9 8*   HEMATOCRIT % 31 5*   PLATELETS Thousands/uL 420*   NEUTROS PCT % 67   LYMPHS PCT % 18   MONOS PCT % 7   EOS PCT % 7*     Results from last 7 days   Lab Units 03/13/22  0446   SODIUM mmol/L 136   POTASSIUM mmol/L 4 5   CHLORIDE mmol/L 102   CO2 mmol/L 26   BUN mg/dL 21   CREATININE mg/dL 1 91*   ANION GAP mmol/L 8   CALCIUM mg/dL 8 8   ALBUMIN g/dL 2 5*   TOTAL BILIRUBIN mg/dL 0 27   ALK PHOS U/L 109   ALT U/L 13   AST U/L 11   GLUCOSE RANDOM mg/dL 84                 Results from last 7 days   Lab Units 03/13/22  0446 03/12/22  0525 03/11/22  1021   LACTIC ACID mmol/L  --  0 8 1 3   PROCALCITONIN ng/ml 0 19 0 23  --            * I Have Reviewed All Lab Data Listed Above  * Additional Pertinent Lab Tests Reviewed: Willi 66 Admission Reviewed    Imaging:    Imaging Reports Reviewed Today Include: none  Imaging Personally Reviewed by Myself Includes:  none    Recent Cultures (last 7 days):     Results from last 7 days   Lab Units 03/11/22  1431 03/11/22  1031 03/11/22  1021   BLOOD CULTURE   --  No Growth at 24 hrs  No Growth at 24 hrs     URINE CULTURE  >100,000 cfu/ml Klebsiella aerogenes*  --   --        Last 24 Hours Medication List:   Current Facility-Administered Medications   Medication Dose Route Frequency Provider Last Rate    acetaminophen  650 mg Oral Q6H PRN Castillo Saini, MD      albuterol  2 puff Inhalation Q4H PRN Maikol Houston,       albuterol  2 5 mg Nebulization Q4H PRN Maikol Houston,       ALPRAZolam  2 mg Oral BID PRN Isai Gonzalez PA-C      aluminum-magnesium hydroxide-simethicone  30 mL Oral Q6H PRN Castillo Saini, MD      cefTRIAXone  1,000 mg Intravenous Q24H Castillo Saini, MD 1,000 mg (03/13/22 0815)    docusate sodium  100 mg Oral BID Castillo Saini, MD      enoxaparin  60 mg Subcutaneous BID Castillo Saini, MD      oxyCODONE  5 mg Oral Q4H PRN Maikol Houston,       Or   Bonita Leisure HYDROmorphone  0 5 mg Intravenous Q4H PRN Maikol Houston,       metoprolol tartrate  25 mg Oral BID Castillo Saini, MD      nicotine  1 patch Transdermal Daily Castillo Saini, MD      nystatin   Topical BID Castillo Saini, MD      ondansetron  4 mg Intravenous Q6H PRN Castillo Saini MD      sodium chloride  50 mL/hr Intravenous Continuous Maikol Houston DO 50 mL/hr (03/13/22 1047)        Today, Patient Was Seen By: Castillo Saini MD    ** Please Note: Dictation voice to text software may have been used in the creation of this document   **

## 2022-03-13 NOTE — QUICK NOTE
Ostomy and praveen care completed, bath given  Patient having copious runny yellow output requiring multiple bag changes throughout the day

## 2022-03-13 NOTE — CASE MANAGEMENT
Case Management Assessment & Discharge Planning Note    Patient name Vincent Packer  Location [address] [id number] MRN [id number]  : 1965 Date 3/13/2022       Current Admission Date: 3/11/2022  Current Admission Diagnosis:ZAHIRA (acute kidney injury) [facility])   Patient Active Problem List    Diagnosis Date Noted    Hypertriglyceridemia 2022    Hyperphosphatemia 2022    Low HDL (under 40) 2022    Hydroureter 2022    S/P colostomy ([facility] ) 2022    UTI (urinary tract infection) 2022    Anemia 2022    Nausea 2022    Yeast dermatitis 2022    Ambulatory dysfunction 2022    Open wnd anterior abdomen 2022    Morbid obesity with BMI of 50 0-59 9, adult ([facility] ) 2022    Colon adenocarcinoma ([facility] ) 2022    Dysphagia 2022    ZAHIRA (acute kidney injury) ([facility] ) 2022    Cellulitis of abdominal wall, chest wall and groin with wounds 2022    Diverticulitis of large intestine with perforation 2022    Right Hydroureteronephrosis 2022    GERD (gastroesophageal reflux disease)     Other specified anxiety disorders 2019    Lumbar paraspinal muscle spasm 2019    Borderline hyperlipidemia 2019    Iron deficiency 2019    Chronic midline low back pain with left-sided sciatica 2019    Essential hypertension 2019    Hidradenitis suppurativa 2019    Lymphedema 2006      LOS (days): 2  Geometric Mean LOS (GMLOS) (days):   Days to GMLOS:     OBJECTIVE:  PATIENT READMITTED TO HOSPITAL  Risk of Unplanned Readmission Score: 21         Current admission status: Inpatient       Preferred Pharmacy:   [address] [facility] [address] [facility] [address] [facility] [address] Josiah Ronak  [facility] [address]  Phone: [phone] Fax: [phone]    Primary Care Provider: Shirin Avendano PA-C    [facility]  Secondary Insurance: ASSESSMENT:  Active Health Care Proxies    There are no active Health Care Proxies on file  Readmission Root Cause  30 Day Readmission: Yes  Who directed you to return to the hospital?: Self  Did you understand whom to contact if you had questions or problems?: Yes  Did you get your prescriptions before you left the hospital?: Yes  Were you able to get your prescriptions filled when you left the hospital?: Yes  Did you take your medications as prescribed?: Yes  Were you able to get to your follow-up appointments?: Yes  During previous admission, was a post-acute recommendation made?: Yes  What post-acute resources were offered?: STR (Pt went to STR at [address] but left AMA)  Patient was readmitted due to: ZAHIRA , UTi    Patient Information  Admitted from[de-identified] Home  Mental Status: Alert  During Assessment patient was accompanied by: Not accompanied during assessment  Assessment information provided by[de-identified] Patient  Primary Caregiver: [facility] of Residence: [address] [place] [address] Avenue do you live in?: [address] [place] [address] entry access options   Select all that apply : Ramp  Type of Current Residence: Apartment  Floor Level: 4  Upon entering residence, is there a bedroom on the main floor (no further steps)?: Yes  Upon entering residence, is there a bathroom on the main floor (no further steps)?: Yes (Pt sleeps on lift chair on the 1rst floor )  In the last 12 months, was there a time when you were not able to pay the mortgage or rent on time?: No  In the last 12 months, how many places have you lived?: 1  In the last 12 months, was there a time when you did not have a steady place to sleep or slept in a shelter (including now)?: No  Homeless/housing insecurity resource given?: N/A  Living Arrangements: Lives Alone  Is patient a ?: No    Activities of Daily Living Prior to Admission  Functional Status: Independent  Completes ADLs independently?: Yes  Ambulates independently?: Yes  Does patient use assisted devices?: Yes (Pt was using crutches  PCP office ordered bariatric rollator, BSC and Tub bench   THey have not been delivered yet)  Does patient currently own DME?: Yes  What DME does the patient currently own?: Anna Hoffman (Comment) (lift chair )  Does patient have a history of Outpatient Therapy (PT/OT)?: No  Does the patient have a history of Short-Term Rehab?: Yes (Pt went to [address] [place] [address] on 2/24/22 and signed out AMA on 3/4/22 )  Does patient have a history of HHC?: Yes  Does patient currently have Marlon Bruno?: Yes    Current Home Health Care  Type of Current Home Care Services: Home PT,Nurse visit  [address][de-[address]] [facility] Provider[de-identified] PCP    Patient Information Continued  Income Source: SSI/SSD  Within the past 12 months, you worried that your food would run out before you got the money to buy more : Never true  Within the past 12 months, the food you bought just didnt last and you didnt have money to get more : Never true  Food insecurity resource given?: N/A  Does patient receive dialysis treatments?: No  Does patient have a history of substance abuse?: No  Does patient have a history of Mental Health Diagnosis?: No         Means of Transportation  Means of Transport to \[facility] [place] [facility]\""[de-identified] [employer] (Pt takes the [facility] bus or friends drive her to [facility] )  In the past 12 months, has lack of transportation kept you from medical appointments or from getting medications?: No  In the past 12 months, has lack of transportation kept you from meetings, work, or from getting things needed for daily living?: No  Was application for public transport provided?: N/A        DISCHARGE DETAILS:  PT is a 30 day readmission Pt was [facility] from [facility] on [facility]  after being treated for Covid , Cellulitis of Abd wall, Diverticulitis with Perf , Adenocarcinoma of Sigmued , Pt had hartmans procedure  Pt [facility] on 2 24 22   Pt signed out of [place] on 3/4/22  Pt's PCP orderd home care from [facility] for patient  Pt's PCP arranged for Bariatric Rollator , BSC and and Tub bench  None of the equipment has been delivered yet  Pt is not sure what company PCP ordered it from  I will try to find out what company the PCP ordered equipment from so I can follow up and see when they will be delivered  PT is recommending that patient should go to STR but patient is refusing to go to STR at this time  I will continue to follow for any Case Management needs

## 2022-03-13 NOTE — ASSESSMENT & PLAN NOTE
UA positive with pending cultures, currently afebrile, denies any symptoms  IV Ceftriaxone q24h Day# 3  Continue IV fluids

## 2022-03-13 NOTE — PROGRESS NOTES
Admission Report at [facility] has admitted your patient to [address] [place] [address] service with the following disciplines:      SN, PT, OT and MSW  This report is informational only, no responses is needed  Primary focus of home health care ostomy care  Patient stated goals of care to feel better  Anticipated visit pattern and next visit date 2w3, 1w2  Significant clinical findings   duplicate drug therapy between diclofenac and meloxicam  Also between colace and senna  Thank you for allowing us to participate in the care of your patient        Madhuri Sandoval

## 2022-03-13 NOTE — CONSULTS
Consultation - Nephrology   Nikole Crowe 60 y o  female MRN: [id number]  Unit/Bed#: 424-01 Encounter: [id number]      Assessment and Plan    1  Acute kidney injury, present on admission, superimposed on chronic kidney disease   With presenting creatinine renal function improved from peak creatinine 2 25 mg/dL on 03/11/2022 -> 1 19 mg/dL on 03/13/2022   Etiology:  - Suspect prerenal in the setting of poor fluid intake, high output ostomy, lisinopril use + urinary obstruction, hydroureteronephrosis seen on CT abdomen and pelvis on 03/11/2022   Plan:    o Renal function improving with gentle volume expansion  Will continue at current rate given the fact that patient does not like to drink the hospital water  She prefers spring water so her overall fluid intake has been low as she is drinking only milk and orange juice  Advised the importance of increased fluid intake in the setting of high output ostomy  Look to discontinue volume expansion tomorrow  Caution to prevent volume overload  May utilize loop diuretic as needed  Patient has known lymphedema and utilizes twice daily compression treatments at home, not available is inpatient  Consider compression wraps  o Trend renal function  Continue to provide supportive care    o Optimize hemodynamics  Maintain mean arterial pressure greater than 65 mmHg    o Renally dose medications  o Avoid nephrotoxins  Please discuss with renal any diuretics or possible nephrotoxic agents, such as NSAIDs or IV contrast  Recommend avoidance of PPIs in favor of H2 blocker, if appropriate for clinical scenario    o Monitor for urinary retention- strict I&Os, record bladder scan PVRs and follow urinary retention protocol  2  Chronic kidney disease, stage II with baseline creatinine around 0 9 - 1 0 mg/dL    3  Hydroureter  · Urology consultation pending  4  Hypertension the setting of chronic kidney disease  · Blood pressure is acceptable    Hold home lisinopril given acute kidney injury  5  Proteinuria  · Lisinopril is appropriately on hold  6  Urinary tract infection  · Renally dose antibiotics    7  Colon addenocarcinoma status post colostomy    8  Anemia  · IV iron currently contraindicated    Thank you for allowing us to participate in the care of your patient  Please feel free to contact us regarding the care of this patient, or any other questions/concerns that may be applicable  Patient Active Problem List   Diagnosis    Lymphedema    Essential hypertension    Hidradenitis suppurativa    Chronic midline low back pain with left-sided sciatica    Other specified anxiety disorders    Lumbar paraspinal muscle spasm    Borderline hyperlipidemia    Iron deficiency    GERD (gastroesophageal reflux disease)    [facility]) [facility]    Cellulitis of abdominal wall, chest wall and groin with wounds    Diverticulitis of large intestine with perforation    Right Hydroureteronephrosis    Dysphagia    Colon adenocarcinoma ([facility] )    Open wnd anterior abdomen    Morbid obesity with BMI of 50 0-59 9, adult ([place] [address] )    Ambulatory dysfunction    Nausea    Yeast dermatitis    Hydroureter    S/P colostomy ([place] [address] )    UTI (urinary tract infection)    Anemia    Hyperphosphatemia    Low HDL (under 40)       History of Present Illness     Physician Requesting Consult: Woody Victor DO    Reason for Consult / Principal Problem:  Acute kidney injury    Hx and PE limited by:  None    HPI: Yanely Mascorro is a 60y o  year old female who presented to [address] [place] [address] emergency department on 03/11/2022 with complaint of generalized weakness  Recent medical history is significant for:  Addendum carcinoma status post colostomy  Nephrology is consulted for evaluation management acute kidney injury with presenting creatinine 2 25 mg/dL on 03/11/2022  Upon speaking with the patient, she reports poor p o   Intake because she only likes to drink spring water  She reports dry heaves and nausea but denies diarrhea or vomiting  She reports taking lisinopril only 1 day  She is not on home diuretics  She denies any over-the-counter medications, herbal supplements, alcohol, illicit drugs  She has distant past smoking history  She has no lymphedema for which she uses compression treatments twice daily  She has been evaluated by Nephrology during prior hospitalization 2022  Her known renal baseline is a creatinine of 0 9 mg/dL  History obtained from chart review and the patient  Review of Systems  Complains of some nausea and dry heaves  Denies diarrhea, vomiting  Reports poor water intake as she only likes to drink spring water due to taste  Reports high output from ostomy  Reports lymphedema  A complete 10 point review of systems was performed and is otherwise negative      Historical Information   Past Medical History:   Diagnosis Date    Anxiety     Arthritis     GERD (gastroesophageal reflux disease)     Hidradenitis suppurativa     Hypertension     Lipodermatosclerosis of both lower extremities     Lymphedema     [facility]     Sciatic leg pain     left side    SOB (shortness of breath)     Stomach ulcer      Past Surgical History:   Procedure Laterality Date    BILATERAL SALPINGOOPHORECTOMY N/A 2022    Procedure: RIGHT SALPINGO-OOPHORECTOMY, LEFT OOPHORECTOMY;  Surgeon: Judy Hoyos DO;  [facility];  Service: General     SECTION      FL RETROGRADE PYELOGRAM  2022    HARTMANS PROCEDURE N/A 2022    Procedure: END COLOSTOMY;  Surgeon: Judy Hoyos DO;  [facility];  Service: General    LAPAROTOMY N/A 2022    Procedure: LAPAROTOMY EXPLORATORY, DRAINAGE OF INTRA -ABDOMINAL ABSCESS;  Surgeon: Judy Hoyos DO;  [facility];  Service: General    WI CYSTOURETHROSCOPY,URETER CATHETER Right 2022    Procedure: CYSTOSCOPY RETROGRADE PYELOGRAM WITH INSERTION STENT URETERAL;  Surgeon: Quinn Chavez MD;  [facility];  Service: Urology    CO EXC SKIN BENIG 3 1-4 CM TRUNK,ARM,LEG Right 12/8/2020    Procedure: EXCISION BIOPSY TISSUE LESION/MASS UPPER EXTREMITY;  Surgeon: Elayne Salazar DO;  Location: [facility];  Service: General    TONSILLECTOMY      TUMOR REMOVAL Right     5th finger     Social History   Social History     Substance and Sexual Activity   Alcohol Use Not Currently    Comment: holidays     Social History     Substance and Sexual Activity   Drug Use No     Social History     Tobacco Use   Smoking Status Light Tobacco Smoker    Packs/day: 0 25    Years: 20 00    Pack years: 5 00    Types: Cigarettes   Smokeless Tobacco Never Used   Tobacco Comment    Only smoke once in awhile     Family History   Problem Relation Age of Onset    Cancer Mother     Hypertension Mother     COPD Father     Diabetes Father        Meds/Allergies   all current active meds have been reviewed, current meds:   Current Facility-Administered Medications   Medication Dose Route Frequency    acetaminophen (TYLENOL) tablet 650 mg  650 mg Oral Q6H PRN    albuterol (PROVENTIL HFA,VENTOLIN HFA) inhaler 2 puff  2 puff Inhalation Q4H PRN    albuterol inhalation solution 2 5 mg  2 5 mg Nebulization Q4H PRN    ALPRAZolam (XANAX) tablet 2 mg  2 mg Oral BID PRN    aluminum-magnesium hydroxide-simethicone (MYLANTA) oral suspension 30 mL  30 mL Oral Q6H PRN    cefTRIAXone (ROCEPHIN) IVPB (premix in dextrose) 1,000 mg 50 mL  1,000 mg Intravenous Q24H    docusate sodium (COLACE) capsule 100 mg  100 mg Oral BID    enoxaparin (LOVENOX) subcutaneous injection 60 mg  60 mg Subcutaneous BID    oxyCODONE (ROXICODONE) IR tablet 5 mg  5 mg Oral Q4H PRN    Or    HYDROmorphone (DILAUDID) injection 0 5 mg  0 5 mg Intravenous Q4H PRN    metoprolol tartrate (LOPRESSOR) tablet 25 mg  25 mg Oral BID    nicotine (NICODERM CQ) 7 mg/24hr TD 24 hr patch 1 patch  1 patch Transdermal Daily    nystatin (MYCOSTATIN) powder   Topical BID    ondansetron (ZOFRAN) injection 4 mg  4 mg Intravenous Q6H PRN    sodium chloride 0 9 % infusion  50 mL/hr Intravenous Continuous    and PTA meds:    Medications Prior to Admission   Medication    albuterol (Ventolin HFA) 90 mcg/act inhaler    ALPRAZolam (XANAX) 2 MG tablet    Diclofenac Sodium (VOLTAREN) 1 %    docusate sodium (COLACE) 100 mg capsule    fluticasone (FLONASE) 50 mcg/act nasal spray    HYDROmorphone (DILAUDID) 2 mg tablet    lisinopril (ZESTRIL) 10 mg tablet    loratadine (CLARITIN) 10 mg tablet    meloxicam (MOBIC) 7 5 mg tablet    methocarbamol (ROBAXIN) 500 mg tablet    metoprolol tartrate (LOPRESSOR) 25 mg tablet    Misc  Devices (Bariatric Rollator) MISC    Misc  Devices (Commode Bedside) MISC    nystatin (MYCOSTATIN) powder    omeprazole (PriLOSEC) 20 mg delayed release capsule    ondansetron (ZOFRAN) 4 mg tablet    Ostomy Supplies (Closed-End Colostomy Pouch) MISC    senna (SENOKOT) 8 6 MG tablet    SUMAtriptan (IMITREX) 25 mg tablet         Allergies   Allergen Reactions    Bee Venom Anaphylaxis, Shortness Of Breath and Swelling       Objective     Intake/Output Summary (Last 24 hours) at 3/13/2022 1318  Last data filed at 3/13/2022 0854  Gross per 24 hour   Intake 860 ml   Output 400 ml   Net 460 ml       Invasive Devices:        Physical Exam  Vitals and nursing note reviewed  Exam conducted with a chaperone present  Constitutional:       General: She is not in acute distress  Appearance: Normal appearance  She is morbidly obese  She is not ill-appearing or toxic-appearing  HENT:      Head: Normocephalic and atraumatic  Nose: Nose normal  No congestion or rhinorrhea  Mouth/Throat:      Mouth: Mucous membranes are moist       Dentition: Abnormal dentition  Pharynx: Oropharynx is clear  Eyes:      General: No scleral icterus  Right eye: No discharge           Left eye: No discharge  Extraocular Movements: Extraocular movements intact  Conjunctiva/sclera: Conjunctivae normal       Pupils: Pupils are equal, round, and reactive to light  Cardiovascular:      Rate and Rhythm: Normal rate and regular rhythm  Pulses: Normal pulses  Heart sounds: Normal heart sounds  No murmur heard  Comments: Lymphedema  Pulmonary:      Effort: Pulmonary effort is normal  No respiratory distress  Breath sounds: Normal breath sounds  No wheezing  Abdominal:      General: Abdomen is flat and protuberant  The ostomy site is clean  Bowel sounds are normal  There is no distension  Palpations: Abdomen is soft  There is no mass  Tenderness: There is no abdominal tenderness  Comments: colostomy   Musculoskeletal:         General: No swelling or deformity  Normal range of motion  Cervical back: Normal range of motion and neck supple  No rigidity or tenderness  Skin:     General: Skin is warm and dry  Coloration: Skin is not jaundiced or pale  Findings: No bruising  Neurological:      General: No focal deficit present  Mental Status: She is alert and oriented to person, place, and time  Mental status is at baseline  Psychiatric:         Mood and Affect: Mood normal          Behavior: Behavior normal          Thought Content: Thought content normal          Judgment: Judgment normal            I/O last 3 completed shifts: In: 1550 [P O :1550]  Out: 2900 [Urine:2350; Stool:550]    Vitals:    03/13/22 0734   BP: 129/81   Pulse:    Resp:    Temp: 98 °F (36 7 °C)   SpO2:          Current Weight: Weight - Scale: (!) 146 kg (320 lb 12 3 oz)  First Weight: Weight - Scale: (!) 156 kg (343 lb 4 1 oz)    Lab Results:  I have personally reviewed pertinent labs      CBC:   Lab Results   Component Value Date    WBC 11 79 (H) 03/13/2022    HGB 9 8 (L) 03/13/2022    HCT 31 5 (L) 03/13/2022    MCV 84 03/13/2022     (H) 03/13/2022    MCH 26 0 (L) 03/13/2022    MCHC 31 1 (L) 03/13/2022    RDW 18 4 (H) 03/13/2022    MPV 9 7 03/13/2022    NRBC 0 03/13/2022     CMP:   Lab Results   Component Value Date    K 4 5 03/13/2022     03/13/2022    CO2 26 03/13/2022    BUN 21 03/13/2022    CREATININE 1 91 (H) 03/13/2022    CALCIUM 8 8 03/13/2022    AST 11 03/13/2022    ALT 13 03/13/2022    ALKPHOS 109 03/13/2022    EGFR 28 03/13/2022     Phosphorus:   Lab Results   Component Value Date    PHOS 4 7 (H) 03/13/2022     Magnesium:   Lab Results   Component Value Date    MG 1 9 03/13/2022     Urinalysis: No results found for: COLORU, CLARITYU, SPECGRAV, PHUR, LEUKOCYTESUR, NITRITE, PROTEINUA, GLUCOSEU, KETONESU, BILIRUBINUR, BLOODU  Ionized Calcium: No results found for: CAION  Coagulation: No results found for: PT, INR, APTT  Troponin: No results found for: TROPONINI  ABG: No results found for: PHART, FMS9HDM, PO2ART, VPS5NQP, O1GKRCIM, BEART, SOURCE    Results from last 7 days   Lab Units 03/13/22  0446 03/12/22  0525 03/11/22  1021   POTASSIUM mmol/L 4 5 4 8 4 2   CHLORIDE mmol/L 102 104 104   CO2 mmol/L 26 26 30   BUN mg/dL 21 22 19   CREATININE mg/dL 1 91* 2 03* 2 25*   CALCIUM mg/dL 8 8 8 3 8 4   ALK PHOS U/L 109 89 100   ALT U/L 13 12 14   AST U/L 11 12 10       Radiology review:  Procedure: CT abdomen pelvis wo contrast    Result Date: 3/11/2022  Narrative: CT ABDOMEN AND PELVIS WITHOUT IV CONTRAST INDICATION:   LLQ abdominal pain  COMPARISON:  CT abdomen and pelvis dated January 15, 2022  TECHNIQUE:  CT examination of the abdomen and pelvis was performed without intravenous contrast   Axial, sagittal, and coronal 2D reformatted images were created from the source data and submitted for interpretation  Radiation dose length product (DLP) for this visit:  1725 99 mGy-cm     This examination, like all CT scans performed in the [facility], was performed utilizing techniques to minimize radiation dose exposure, including the use of iterative reconstruction and automated exposure control  Enteric contrast was not administered  FINDINGS: ABDOMEN LOWER CHEST:  No clinically significant abnormality identified in the visualized lower chest  LIVER/BILIARY TREE:  Hepatomegaly with mild diffuse steatosis  GALLBLADDER:  Small calcified gallstones without pericholecystic inflammatory change  SPLEEN:  Stable mild splenomegaly measuring 14 2 cm  PANCREAS:  Unremarkable  ADRENAL GLANDS:  Unremarkable  KIDNEYS/URETERS:  Interval placement of right sided double-J ureteral stent with proximal pigtail tip in the renal pelvis and distal pigtail tip in the urinary bladder  There has been mild interval improvement in right side hydroureteronephrosis, with mild residual hydroureteronephrosis seen  There is mild left-sided hydroureteronephrosis, slightly more prominent from prior exam  STOMACH AND BOWEL:  Interval sigmoid resection and Payal's 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  There has been interval resolution of previously noted loculated abscess collections in the pelvis  There is persistent soft  tissue thickening adjacent to the rectal stump and involving the uterus (series 2 images 71/76, series 603 image 120), which may be related to postsurgical or residual postinflammatory changes  However, possibility of residual tumor is not excluded  Evaluation is limited due to lack of IV and oral contrast  APPENDIX:  No findings to suggest appendicitis  ABDOMINOPELVIC CAVITY:  No ascites  No pneumoperitoneum  No lymphadenopathy  VESSELS:  Unremarkable for patient's age  PELVIS REPRODUCTIVE ORGANS:  By report, patient is status post bilateral salpingo-oophorectomy  There is soft tissue thickening adjacent to the sigmoid stump and extending into the region of the uterus as described  URINARY BLADDER:  Distal pigtail tip of right ureteral stent  Otherwise, unremarkable   ABDOMINAL WALL/INGUINAL REGIONS:  Postsurgical changes in the anterior abdominal wall  Left lower quadrant Payal's colostomy  OSSEOUS STRUCTURES:  No acute fracture or destructive osseous lesion  Impression: 1  Interval sigmoid resection and Payal's colostomy for perforated sigmoid adenocarcinoma  There has been interval resolution of previously noted loculated abscess collections in the pelvis  There is persistent soft tissue thickening adjacent to the rectal stump extending to the uterus, which may be related to postsurgical or residual postinflammatory changes  However, possibility of residual tumor is not excluded  Evaluation is limited due to lack of IV and oral contrast  2   Mild interval improvement in right side hydroureteronephrosis, status post placement of ureteral stent  There is mild left side hydroureteronephrosis, slightly more prominent from prior exam  Workstation performed: [id number]     Procedure: XR chest 1 view portable    Result Date: 3/11/2022  Narrative: CHEST INDICATION:   generalized weakness  COMPARISON:  1/22/2022 EXAM PERFORMED/VIEWS:  XR CHEST PORTABLE FINDINGS: Cardiomediastinal silhouette appears unremarkable  The lungs are clear  No pneumothorax or pleural effusion  Osseous structures appear within normal limits for patient age  Impression: No acute cardiopulmonary disease  Findings concur with the preliminary report by the referring clinician already in PACS and/or our electronic record EPIC  Workstation performed: [id number]     Procedure: CT head without contrast    Result Date: 3/11/2022  Narrative: CT BRAIN - WITHOUT CONTRAST INDICATION:   Mental status change, persistent or worsening somulence  COMPARISON:  None  TECHNIQUE:  CT examination of the brain was performed  In addition to axial images, sagittal and coronal 2D reformatted images were created and submitted for interpretation  Radiation dose length product (DLP) for this visit:  900 89 mGy-cm     This examination, like all CT scans performed in the [facility]  [address], was performed utilizing techniques to minimize radiation dose exposure, including the use of iterative  reconstruction and automated exposure control  IMAGE QUALITY:  Diagnostic  FINDINGS: PARENCHYMA:  No intracranial mass, mass effect or midline shift  No CT signs of acute infarction  No acute parenchymal hemorrhage  VENTRICLES AND EXTRA-AXIAL SPACES:  Normal for the patient's age  VISUALIZED ORBITS AND PARANASAL SINUSES:  Unremarkable  CALVARIUM AND EXTRACRANIAL SOFT TISSUES:  Normal      Impression: No acute intracranial abnormality  Workstation performed: [id number]     Procedure: US kidney and bladder    Result Date: 3/12/2022  Narrative: RENAL ULTRASOUND INDICATION:   ZAHIRA, bilateral hydronephrosis, right-sided ureteral stenting  COMPARISON: CT scan from earlier today TECHNIQUE:   Ultrasound of the retroperitoneum was performed with a curvilinear transducer utilizing volumetric sweeps and still imaging techniques  FINDINGS: Limited study due to patient status and body habitus  KIDNEYS: Symmetric and normal size  Right kidney:  13 1 x 8 0 x 6 6 cm  Volume 365 4 mL Left kidney:  12 8 x 8 5 x 7 3 cm  Volume 411 8 mL Right kidney Poorly visualized  Left kidney Poorly visualized  URETERS: Nonvisualized  BLADDER: Nonvisualized     Impression: Limited study  Essentially nondiagnostic  Workstation performed: [id number]         Alondra Dow PA-C      Portions of the record may have been created with voice recognition software  Occasional wrong word or "sound a like" substitutions may have occurred due to the inherent limitations of voice recognition software  Read the chart carefully and recognize, using context, where substitutions have occurred

## 2022-03-13 NOTE — RESPIRATORY THERAPY NOTE
03/12/22 2017   Respiratory Assessment   Assessment Type Assess only   General Appearance Awake   Respiratory Pattern Dyspnea with exertion   Chest Assessment Chest expansion symmetrical;Trachea midline   Bilateral Breath Sounds Diminished; Expiratory wheezes   Resp Comments Patient does have a slight wheeze, but she stated she does not need a breathing treatment at this time   Patient stated she has a tough time using the MDI due to hand mobility, I told her to mention this to the physician   Oxygen Therapy/Pulse Ox   O2 Device None (Room air)   O2 Therapy Room air   SpO2 Activity At Rest   $ Pulse Oximetry Spot Check Charge Completed

## 2022-03-13 NOTE — ASSESSMENT & PLAN NOTE
Patient was treated for adenocarcinoma colon, s/p Payal's colostomy  Had issues with colostomy bag    Wound care consult  Prn pain medication

## 2022-03-13 NOTE — ASSESSMENT & PLAN NOTE
Patient complains of being more tired than usual   Creatinine elevated at 2 25, was 0 9, three weeks ago    Sr  Cr trending down slowly    Plan:  · continue IV fluids for rehydration  · Nephrology consulted: appreciate recommendations  · Follow BMP

## 2022-03-14 NOTE — PLAN OF CARE
Problem: PAIN - ADULT  Goal: Verbalizes/displays adequate comfort level or baseline comfort level  Description: Interventions:  - Encourage patient to monitor pain and request assistance  - Assess pain using appropriate pain scale (0-10 pain scale)  - Administer analgesics based on type and severity of pain and evaluate response  - Implement non-pharmacological measures as appropriate and evaluate response  - Consider cultural and social influences on pain and pain management  - Notify physician/advanced practitioner if interventions unsuccessful or patient reports new pain  Outcome: Progressing     Problem: INFECTION - ADULT  Goal: Absence or prevention of progression during hospitalization  Description: INTERVENTIONS:  - Assess and monitor for signs and symptoms of infection  - Monitor lab/diagnostic results  - Monitor all insertion sites, i e  indwelling lines  - Administer medications as ordered  - Instruct and encourage patient and family to use good hand hygiene technique  - Contact precautions  Outcome: Progressing     Problem: SAFETY ADULT  Goal: Patient will remain free of falls  Description: INTERVENTIONS:  - Educate patient/family on patient safety including physical limitations  - Instruct patient to call for assistance with activity (assist with walker)  - Consult OT/PT to assist with strengthening/mobility   - Keep Call bell within reach  - Keep bed low and locked with side rails adjusted as appropriate  - Keep care items and personal belongings within reach  - Initiate and maintain comfort rounds  - Make Fall Risk Sign visible to staff (high fall risk)  - Offer Toileting every 1-2 Hours, in advance of need  - Initiate/Maintain bed/chair alarm  - Obtain necessary fall risk management equipment: nonskid footwear, fall bracelet, alarms  - Apply yellow socks and bracelet for high fall risk patients  - Consider moving patient to room near nurses station  Outcome: Progressing  Goal: Maintain or return to baseline ADL function  Description: INTERVENTIONS:  -  Assess patient's ability to carry out ADLs; (min to mod assist with adls)  - Assess/evaluate cause of self-care deficits (fatigue, pain, limited movement, use of assistive device)  - Assess range of motion  - Assess patient's mobility; (assist and walker)  - Assess patient's need for assistive devices and provide as appropriate  - Encourage maximum independence but intervene and supervise when necessary  - Involve family in performance of ADLs  - Assess for home care needs following discharge   - Consider OT consult to assist with ADL evaluation and planning for discharge  - Provide patient education as appropriate  Outcome: Progressing  Goal: Maintains/Returns to pre admission functional level  Description: INTERVENTIONS:  - Perform BMAT or MOVE assessment daily    - Set and communicate daily mobility goal to care team and patient/family/caregiver  - Collaborate with rehabilitation services on mobility goals if consulted  - Perform Range of Motion 3-4 times a day  - Reposition patient every 2 hours    - Dangle patient 3 times a day  - Stand patient 3 times a day  - Ambulate patient 3 times a day  - Out of bed to chair 3 times a day   - Out of bed for meals 3 times a day  - Out of bed for toileting  - Record patient progress and toleration of activity level   Outcome: Progressing     Problem: DISCHARGE PLANNING  Goal: Discharge to home or other facility with appropriate resources  Description: INTERVENTIONS:  - Identify barriers to discharge w/patient and caregiver  - Arrange for needed discharge resources and transportation as appropriate  - Identify discharge learning needs (meds, wound care, etc )  - Refer to Case Management Department for coordinating discharge planning if the patient needs post-hospital services based on physician/advanced practitioner order or complex needs related to functional status, cognitive ability, or social support system  Outcome: Progressing     Problem: Knowledge Deficit  Goal: Patient/family/caregiver demonstrates understanding of disease process, treatment plan, medications, and discharge instructions  Description: Complete learning assessment and assess knowledge base    Interventions:  - Provide teaching at level of understanding  - Provide teaching via preferred learning methods  Outcome: Progressing     Problem: GASTROINTESTINAL - ADULT  Goal: Maintains adequate nutritional intake  Description: INTERVENTIONS:  - Monitor percentage of each meal consumed  - Identify factors contributing to decreased intake, treat as appropriate  - Assist with meals as needed  - Monitor I&O, weight, and lab values if indicated  - Obtain nutrition services referral as needed  Outcome: Progressing  Goal: Establish and maintain optimal ostomy function  Description: INTERVENTIONS:  - Assess bowel function  - Encourage oral fluids to ensure adequate hydration  - Administer IV fluids if ordered to ensure adequate hydration   - Administer ordered medications as needed  - Encourage mobilization and activity  - Nutrition services referral to assist patient with appropriate food choices  - Assess stoma site  - Consider wound care consult (ordered)  Outcome: Progressing     Problem: METABOLIC, FLUID AND ELECTROLYTES - ADULT  Goal: Electrolytes maintained within normal limits  Description: INTERVENTIONS:  - Monitor labs and assess patient for signs and symptoms of electrolyte imbalances  - Administer electrolyte replacement as ordered  - Monitor response to electrolyte replacements, including repeat lab results as appropriate  - Instruct patient on fluid and nutrition as appropriate  Outcome: Progressing  Goal: Fluid balance maintained  Description: INTERVENTIONS:  - Monitor labs   - Monitor I/O and WT  - Instruct patient on fluid and nutrition as appropriate  - Assess for signs & symptoms of volume excess or deficit  Outcome: Progressing Problem: SKIN/TISSUE INTEGRITY - ADULT  Goal: Skin Integrity remains intact(Skin Breakdown Prevention)  Description: Assess:  -Perform Surendra assessment every shift  -Clean and moisturize skin every shift  -Inspect skin when repositioning, toileting, and assisting with ADLS  -Assess extremities for adequate circulation and sensation     Bed Management:  -Have minimal linens on bed & keep smooth, unwrinkled  -Change linens as needed when moist or perspiring  -Avoid sitting or lying in one position for more than 2 hours while in bed  -Keep HOB at 30 degrees     Toileting:  -Offer bedside commode  -Assess for incontinence every 2 hours and prn  -Use incontinent care products after each incontinent episode     Activity:  -Mobilize patient 3 times a day  -Encourage activity and walks on unit  -Encourage or provide ROM exercises   -Turn and reposition patient every 2 Hours  -Use appropriate equipment to lift or move patient in bed  -Instruct/ Assist with weight shifting every 2 hours when out of bed in chair  -Consider limitation of chair time 4 hour intervals    Skin Care:  -Avoid use of baby powder, tape, friction and shearing, hot water or constrictive clothing  -Relieve pressure over bony prominences   -Do not massage red bony areas    Next Steps:  -Teach patient strategies to minimize risks   -Consider consults to  interdisciplinary teams such as nutrition, wound care, pt ot  Outcome: Progressing     Problem: Potential for Falls  Goal: Patient will remain free of falls  Description: INTERVENTIONS:  - Educate patient/family on patient safety including physical limitations  - Instruct patient to call for assistance with activity (assist with walker)  - Consult OT/PT to assist with strengthening/mobility   - Keep Call bell within reach  - Keep bed low and locked with side rails adjusted as appropriate  - Keep care items and personal belongings within reach  - Initiate and maintain comfort rounds  - Make Fall Risk Sign visible to staff (high fall risk)  - Offer Toileting every 1-2 Hours, in advance of need  - Initiate/Maintain bed/chair alarm  - Obtain necessary fall risk management equipment: nonskid footwear, fall bracelet, alarms  - Apply yellow socks and bracelet for high fall risk patients  - Consider moving patient to room near nurses station  Outcome: Progressing     Problem: Prexisting or High Potential for Compromised Skin Integrity  Goal: Skin integrity is maintained or improved  Description: INTERVENTIONS:  - Identify patients at risk for skin breakdown  - Assess and monitor skin integrity  - Assess and monitor nutrition and hydration status  - Monitor labs   - Assess for incontinence (every 2 hours and prn)  - Turn and reposition patient  - Assist with mobility/ambulation (assist x1 and walker)  - Relieve pressure over bony prominences  - Avoid friction and shearing  - Provide appropriate hygiene as needed including keeping skin clean and dry  - Evaluate need for skin moisturizer/barrier cream  - Collaborate with interdisciplinary team   - Patient/family teaching  - Consider wound care consult (ordered)  Outcome: Progressing     Problem: MOBILITY - ADULT  Goal: Maintain or return to baseline ADL function  Description: INTERVENTIONS:  -  Assess patient's ability to carry out ADLs; (min to mod assist with adls)  - Assess/evaluate cause of self-care deficits (fatigue, pain, limited movement, use of assistive device)  - Assess range of motion  - Assess patient's mobility; (assist and walker)  - Assess patient's need for assistive devices and provide as appropriate  - Encourage maximum independence but intervene and supervise when necessary  - Involve family in performance of ADLs  - Assess for home care needs following discharge   - Consider OT consult to assist with ADL evaluation and planning for discharge  - Provide patient education as appropriate  Outcome: Progressing  Goal: Maintains/Returns to pre admission functional level  Description: INTERVENTIONS:  - Perform BMAT or MOVE assessment daily    - Set and communicate daily mobility goal to care team and patient/family/caregiver  - Collaborate with rehabilitation services on mobility goals if consulted  - Perform Range of Motion 3-4 times a day  - Reposition patient every 2 hours    - Dangle patient 3 times a day  - Stand patient 3 times a day  - Ambulate patient 3 times a day  - Out of bed to chair 3 times a day   - Out of bed for meals 3 times a day  - Out of bed for toileting  - Record patient progress and toleration of activity level   Outcome: Progressing

## 2022-03-14 NOTE — PLAN OF CARE
Problem: PHYSICAL THERAPY ADULT  Goal: Performs mobility at highest level of function for planned discharge setting  See evaluation for individualized goals  Description  Outcome: Progressing  Note: Prognosis: Good  Problem List: Decreased strength,Decreased endurance,Impaired balance,Decreased mobility,Decreased safety awareness  Assessment: Pt  seen for PT treatment session this date with interventions consisting of  therapeutic exercises, transfers and  gait training w/ emphasis on improving pt's ability to ambulate  Pt  Requiring occasional cues for sequence and safety  In comparison to previous session, Pt  With improvements in activity tolerance  Pt is in need of continued activity in PT to improve strength balance endurance mobility transfers and ambulation with return to maximize LOF  From PT/mobility standpoint, recommendation at time of d/c would be home health rehab  in order to promote return to PLOF and independence  The patient's AM-PAC Basic Mobility Inpatient Short Form Raw Score is 17  A Raw score of greater than 16 suggests the patient may benefit from discharge to home  Please also refer to physical therapy recommendation for safe DC planning  Barriers to Discharge: Decreased caregiver support        PT Discharge Recommendation: Home with home health rehabilitation          See flowsheet documentation for full assessment

## 2022-03-14 NOTE — PROGRESS NOTES
[address] [facility] [address] [place]  Progress Note - Shari Jose Cralos 1965, 60 y o  female MRN: [id number]  Unit/Bed#: 424-01 Encounter: [id number]  Primary Care Provider: Yany Sanchez PA-C   Date and time admitted to hospital: 3/11/2022  9:34 AM    * ZAHIRA (acute kidney injury) [facility])  Assessment & Plan  Patient complains of being more tired than usual   Creatinine elevated at 2 25, was 0 9, three weeks ago  Sr  Cr trending down gradually 1 7 << 1 91<< 2 03<< 2 25    Plan:  · Continue IV fluids for rehydration  · Nephrology consulted: appreciate recommendations  · Follow BMP    UTI (urinary tract infection)  Assessment & Plan  UA positive with pending cultures, currently afebrile, denies any symptoms  Urine Culture positive for Klebsiella aerogenes  DC'd Ceftriaxone on 03/13   Antibiotics narrowed down to Cefepime based on the culture sensitivity      Plan:  Continue Cefepime day 2  Continue IV fluids    Hydroureter  Assessment & Plan  CT abdomen shows left-sided hydronephrosis  Status post ureteral stent on right side for right-sided hydronephrosis which is improved compared to previous imaging  No urinary retention, post void scan 0ml  Urology on consult      S/P colostomy [facility])  Assessment & Plan  Patient was treated for adenocarcinoma colon, s/p Payal's colostomy  Had issues with colostomy bag  Wound care consult  Prn pain medication    Essential hypertension  Assessment & Plan  Continue home metoprolol  lisinopril on hold because of hydronephrosis    Hypertriglyceridemia  Assessment & Plan   Outpatient follow-up with PCP in regards to this matter    Anemia  Assessment & Plan  Hb 9 1 on admission  No active source of bleeding    clinically asymptomatic  Hb improving without any intervention   Follow Hb trend        VTE Pharmacologic Prophylaxis:   Pharmacologic: Enoxaparin (Lovenox)  Mechanical VTE Prophylaxis in Place: Yes    Patient Centered Rounds: I have performed bedside rounds with nursing staff today  Discussions with Specialists or Other Care Team Provider: yes    Education and Discussions with Family / Patient: yes    Time Spent for Care: 20 minutes  More than 50% of total time spent on counseling and coordination of care as described above  Current Length of Stay: 3 day(s)    Current Patient Status: Inpatient   Certification Statement: The patient will continue to require additional inpatient hospital stay due to on going treatment    Discharge Plan: home with VNA    Code Status: Level 3 - DNAR and DNI      Subjective:   Seen and examined the patient at bedside today  Not in distress  No overnight events  Complaints of blood on toilet seat  No open wounds on back  Also reports of generalized weakness  Denies fever, chills, chest pain, SOB  Objective:     Vitals:   Temp (24hrs), Av 3 °F (36 3 °C), Min:97 3 °F (36 3 °C), Max:97 3 °F (36 3 °C)    Temp:  [97 3 °F (36 3 °C)] 97 3 °F (36 3 °C)  HR:  [86-96] 96  Resp:  [16-18] 18  BP: (127-128)/(64-84) 127/84  SpO2:  [94 %-98 %] 98 %  Body mass index is 50 24 kg/m²  Input and Output Summary (last 24 hours): Intake/Output Summary (Last 24 hours) at 3/14/2022 0908  Last data filed at 3/14/2022 0850  Gross per 24 hour   Intake 750 ml   Output 1200 ml   Net -450 ml       Physical Exam:     Physical Exam  Vitals and nursing note reviewed  Constitutional:       General: She is not in acute distress  Appearance: She is well-developed  HENT:      Head: Normocephalic and atraumatic  Right Ear: External ear normal       Left Ear: External ear normal       Nose: Nose normal       Mouth/Throat:      Mouth: Mucous membranes are moist       Pharynx: Oropharynx is clear  Eyes:      Extraocular Movements: Extraocular movements intact  Conjunctiva/sclera: Conjunctivae normal       Pupils: Pupils are equal, round, and reactive to light  Cardiovascular:      Rate and Rhythm: Normal rate and regular rhythm  Pulses: Normal pulses  Heart sounds: Normal heart sounds  No murmur heard  Pulmonary:      Effort: Pulmonary effort is normal  No respiratory distress  Breath sounds: Normal breath sounds  No wheezing  Abdominal:      General: Bowel sounds are normal       Palpations: Abdomen is soft  Tenderness: There is abdominal tenderness ( around colostomy site)  Musculoskeletal:         General: Swelling ( bilateral leg swellings secondary to lymphedema) present  Cervical back: Neck supple  Skin:     General: Skin is warm and dry  Capillary Refill: Capillary refill takes less than 2 seconds  Comments: Candidal rash at lower abdomen   Neurological:      Mental Status: She is alert and oriented to person, place, and time  Mental status is at baseline  Additional Data:     Labs:    Results from last 7 days   Lab Units 03/14/22  0517   WBC Thousand/uL 8 09   HEMOGLOBIN g/dL 9 6*   HEMATOCRIT % 30 8*   PLATELETS Thousands/uL 341   NEUTROS PCT % 59   LYMPHS PCT % 24   MONOS PCT % 7   EOS PCT % 9*     Results from last 7 days   Lab Units 03/14/22  0517   SODIUM mmol/L 136   POTASSIUM mmol/L 4 4   CHLORIDE mmol/L 102   CO2 mmol/L 27   BUN mg/dL 17   CREATININE mg/dL 1 72*   ANION GAP mmol/L 7   CALCIUM mg/dL 8 8   ALBUMIN g/dL 2 5*   TOTAL BILIRUBIN mg/dL 0 24   ALK PHOS U/L 90   ALT U/L 15   AST U/L 9   GLUCOSE RANDOM mg/dL 106                 Results from last 7 days   Lab Units 03/14/22  0517 03/13/22  0446 03/12/22  0525 03/11/22  1021   LACTIC ACID mmol/L  --   --  0 8 1 3   PROCALCITONIN ng/ml 0 18 0 19 0 23  --            * I Have Reviewed All Lab Data Listed Above  * Additional Pertinent Lab Tests Reviewed:  Willi 64 Admission Reviewed    Imaging:    Imaging Reports Reviewed Today Include: none  Imaging Personally Reviewed by Myself Includes:  none    Recent Cultures (last 7 days):     Results from last 7 days   Lab Units 03/11/22  1431 03/11/22  1031 03/11/22  1021   BLOOD CULTURE   --  No Growth at 48 hrs  No Growth at 48 hrs  URINE CULTURE  >100,000 cfu/ml Klebsiella aerogenes*  --   --        Last 24 Hours Medication List:   Current Facility-Administered Medications   Medication Dose Route Frequency Provider Last Rate    acetaminophen  650 mg Oral Q6H PRN Francisco Ector Bud, DO      albuterol  2 puff Inhalation Q4H PRN Francisco Ector Celso, DO      albuterol  2 5 mg Nebulization Q4H PRN Francisco Ector Bud, DO      ALPRAZolam  0 5 mg Oral 4x Daily PRN Francisco Ector Celso, DO      aluminum-magnesium hydroxide-simethicone  30 mL Oral Q6H PRN Lex Bond MD      cefepime  1,000 mg Intravenous Q12H Francisco Ector Bud, DO 1,000 mg (03/14/22 0650)    docusate sodium  100 mg Oral BID Lex Bond MD      enoxaparin  60 mg Subcutaneous BID Lex Bond MD      oxyCODONE  5 mg Oral Q4H PRN Francisco Ector Celso, DO      Or   Tomie Coop HYDROmorphone  0 5 mg Intravenous Q4H PRN Francisco Ector Bud, DO      metoprolol tartrate  25 mg Oral BID Francisco Ector Celso, DO      nicotine  1 patch Transdermal Daily Lex Bond MD      nystatin   Topical BID Lex Bond MD      ondansetron  4 mg Intravenous Q6H PRN Lex Bond MD      sodium chloride  50 mL/hr Intravenous Continuous Francisco Ector Celso, DO 50 mL/hr (03/14/22 7747)        Today, Patient Was Seen By: eLx Bond MD    ** Please Note: Dictation voice to text software may have been used in the creation of this document   **

## 2022-03-14 NOTE — ASSESSMENT & PLAN NOTE
UA positive with pending cultures, currently afebrile, denies any symptoms  Urine Culture positive for Klebsiella aerogenes  DC'd Ceftriaxone on 03/13   Antibiotics narrowed down to Cefepime based on the culture sensitivity      Plan:  Continue Cefepime day 2  Continue IV fluids

## 2022-03-14 NOTE — UTILIZATION REVIEW
Inpatient Admission Authorization Request   NOTIFICATION OF INPATIENT ADMISSION/INPATIENT AUTHORIZATION REQUEST   SERVICING FACILITY:   [address] [place] [address]  TIMOTHY [address]  Tax ID:  [id number]  NPI: [id number]  Place of Service: Inpatient [facility]  [address]  Place of Service Code: 24     ATTENDING PROVIDER:  Attending Name and NPI#: Noemi Grandy, [place] [[id number]]  Address: [address]  Phone: [phone]     UTILIZATION REVIEW CONTACT:  Keisha Hair Utilization   Network Utilization Review Department  Phone: [phone]  Fax [phone]  Email: [email or web address]     PHYSICIAN ADVISORY SERVICES:  FOR EPPP-AS-WTUD REVIEW - MEDICAL NECESSITY DENIAL  Phone: [phone]  Fax: [phone]  Email: [email or web address]     TYPE OF REQUEST:  Inpatient Status     ADMISSION INFORMATION:  ADMISSION DATE/TIME: 3/11/22  3:34 PM  PATIENT DIAGNOSIS CODE/DESCRIPTION:  LLQ abdominal pain [R10 32]  ZAHIRA (acute kidney injury) ([facility] [address] ) [N17 9]  DISCHARGE DATE/TIME: No discharge date for patient encounter  IMPORTANT INFORMATION:  Please contact the Keisha Hair directly with any questions or concerns regarding this request  Department voicemails are confidential     Send requests for admission clinical reviews, concurrent reviews, approvals, and administrative denials due to lack of clinical to fax [phone]

## 2022-03-14 NOTE — CONSULTS
Consultation - Sheri Needle 60 y o  female MRN: [id number]  Unit/Bed#: 424-01 Encounter: [id number]    Assessment/Plan     Assessment:  Acute kidney injury present on admission  CT scan on admission showed left hydronephrosis  Status post right ureteral stent for hydronephrosis, left ureteral stent was removed  History of adenocarcinoma of sigmoid colon with perforation and spread into adnexal structures, she has an end colostomy  WBC 8 09  Hemoglobin 9 6  Acute kidney injury present on admission, creatinine elevated 2 25, was 0 93 weeks ago  Creatinine is trending down, today creatinine 1 72  Urine output total 24 hours yesterday 1200 mL, today 400 mL so far clear yellow urine  Patient has pure wick attached to low wall suction  Patient hospitalized January 16th to February 24, 2022 [facility] for perforated sigmoid diverticulitis, Payal's procedure with colostomy by Dr Kobi Parr  Intraoperative findings included perforated diverticulitis with tissue necrosis of the proximal rectum and ovaries at site of perforation, severely inflamed tissues of the rectal stump precluding stump closure, disease of sigmoid colon involved area and end colostomy made  Patient 's pathology report showed T4b colon adenocarcinoma that perforated into the adnexal structures with distal margins positive for 10 of 18 lymph nodes  Patient underwent cystoscopy and bilateral ureteral stents placed by Dr Armond Goodman, urologist, on January 16th  The left ureteral stent was removed postop, the right ureteral stent remained indwelling while the patient heals and not removed     Patient was scheduled for stent removal in the office about a week ago, patient canceled because she was not feeling well      Renal ultrasound was unable to be adequately performed because of body habitus  Morbid obesity, BMI 50 24  Last CEA level elevated 3 6 01/22/2022  No prior history of kidney stones, denies any hematuria  Pure wick currently in place draining clear yellow urine  Probable urinary tract infection, urine culture positive for Klebsiella aerogenes  Patient remains afebrile  History of anxiety  Hypertension  Lymphedema  Gastric ulcer  GERD  Degenerative joint disease  Previous COVID-19 infection      Plan:  Continue to monitor creatinine  Repeat labs in a m  Avoid nephrotoxic agents  Treat urinary tract infection, continue present IV antibiotics with sensitivity for Klebsiella aerogenes  IV fluids for hydration  Nephrology consultation  The creatinine still remains elevated tomorrow, Tuesday, then consider CT scan abdomen pelvis with renal low-dose radiation without contrast to evaluate left hydronephrosis  Monitor urine output, pure wick in place  Dr Melanie Lundborg is the covering urologist for the hospital this week  He is available via [employer] messaging or telephone if any questions or problems  At this time no indication for any urgent urological intervention  Will continue to follow  History of Present Illness     HPI:  Magen Quick is a 60 y o  female who presents with acute kidney injury  Patient underwent Payal's procedure with end colostomy performed in [place] on January 16th  She had perforated viscus, preoperatively patient had moderate right hydronephrosis secondary to worsening pelvic phlegmon/abscess due to suspected diverticulitis  Bilateral ureteral stents were placed by Dr Bhavana Payton under the Payal's procedure  The left ureteral stent was removed after surgery but the right stent remains  Patient presented here seen through the ED on March 11th because of generalized weakness, she is brought to the hospital via ambulance complain of tiredness and overall weakness  Patient was found to have acute kidney injury with a creatinine 2 25  The patient's med id to Medicine Service    Renal scan was unable to perform due to the patient's body habitus without identified hydronephrosis  CT scan of the abdomen pelvis demonstrated left hydronephrosis as well as an indwelling right ureteral stent  Patient was diagnosed with colon adenocarcinoma as perforated area of the sigmoid colon with spread into the adnexal structures  Patient was discharged her nursing home for further treatment and then the patient ultimately was discharged home recently sometime in February  Patient denies any fever or chills  She has a colostomy with brown stool present  Currently a pure wick is applied because of urinary incontinence  Request by the hospitalist physician for urological evaluation with regards to the left hydronephrosis seen on CT scan of the abdomen pelvis  IV contrast was not used because of acute kidney injury  Patient has also been seen by nephrology in consultation  Her creatinine today is slowly improving down 1 79  Inpatient consult to Urology  Consult performed by: Susi Diaz PA-C  Consult ordered by: Patricia Pastor DO        Review of Systems   Constitutional: Negative for activity change, appetite change, chills, diaphoresis, fatigue, fever and unexpected weight change  HENT: Negative  Eyes: Negative  Respiratory: Negative for cough, shortness of breath, wheezing and stridor  Cardiovascular: Negative for chest pain, palpitations and leg swelling  Gastrointestinal: Positive for abdominal pain  Negative for abdominal distention, anal bleeding, blood in stool, constipation, diarrhea, nausea, rectal pain and vomiting  Brown stool in colostomy, some discomfort around colostomy site  Midline abdominal incision with staples removed  Endocrine: Negative  Genitourinary: Negative  Negative for decreased urine volume, frequency and hematuria  Urinary incontinence, pure wick in place  Allergic/Immunologic: Negative  Neurological: Negative  Hematological: Negative  Psychiatric/Behavioral: Negative           Historical Information Past Medical History:   Diagnosis Date    Anxiety     Arthritis     GERD (gastroesophageal reflux disease)     Hidradenitis suppurativa     Hypertension     Lipodermatosclerosis of both lower extremities     Lymphedema 2006    Rectal cancer ([address] )     Sciatic leg pain 2006    left side    SOB (shortness of breath)     Stomach ulcer      Past Surgical History:   Procedure Laterality Date    BILATERAL SALPINGOOPHORECTOMY N/A 2022    Procedure: RIGHT SALPINGO-OOPHORECTOMY, LEFT OOPHORECTOMY;  Surgeon: Diego Ureña DO;  [facility];  Service: General     SECTION      FL RETROGRADE PYELOGRAM  2022    HARTMANS PROCEDURE N/A 2022    Procedure: END COLOSTOMY;  Surgeon: Diego Ureña DO;  [facility];  Service: General    LAPAROTOMY N/A 2022    Procedure: LAPAROTOMY EXPLORATORY, DRAINAGE OF INTRA -ABDOMINAL ABSCESS;  Surgeon: Diego Ureña DO;  [facility];  Service: General    MN CYSTOURETHROSCOPY,URETER CATHETER Right 2022    Procedure: CYSTOSCOPY RETROGRADE PYELOGRAM WITH INSERTION STENT URETERAL;  Surgeon: Gretchen Crowe MD;  [facility];  Service: Urology    MN EXC SKIN BENIG 3 1-4 CM TRUNK,ARM,LEG Right 2020    Procedure: EXCISION BIOPSY TISSUE LESION/MASS UPPER EXTREMITY;  Surgeon: Fabricio Garcia DO;  [facility]: [facility];  Service: General    TONSILLECTOMY      TUMOR REMOVAL Right     5th finger     Social History   Social History     Substance and Sexual Activity   Alcohol Use Not Currently    Comment: holidays     Social History     Substance and Sexual Activity   Drug Use No     E-Cigarette/Vaping    E-Cigarette Use Never User      E-Cigarette/Vaping Substances    Nicotine No     THC No     CBD No     Flavoring No     Other No     Unknown No      Social History     Tobacco Use   Smoking Status Light Tobacco Smoker    Packs/day: 0 25    Years: 20 00    Pack years: 5 00    Types: Cigarettes   Smokeless Tobacco Never Used   Tobacco Comment    Only smoke once in awhile     Family History: non-contributory    Meds/Allergies   current meds:   Current Facility-Administered Medications   Medication Dose Route Frequency    acetaminophen (TYLENOL) tablet 650 mg  650 mg Oral Q6H PRN    albuterol (PROVENTIL HFA,VENTOLIN HFA) inhaler 2 puff  2 puff Inhalation Q4H PRN    albuterol inhalation solution 2 5 mg  2 5 mg Nebulization Q4H PRN    ALPRAZolam (XANAX) tablet 0 5 mg  0 5 mg Oral 4x Daily PRN    aluminum-magnesium hydroxide-simethicone (MYLANTA) oral suspension 30 mL  30 mL Oral Q6H PRN    cefepime (MAXIPIME) IVPB (premix in dextrose) 1,000 mg 50 mL  1,000 mg Intravenous Q12H    docusate sodium (COLACE) capsule 100 mg  100 mg Oral BID    enoxaparin (LOVENOX) subcutaneous injection 60 mg  60 mg Subcutaneous BID    oxyCODONE (ROXICODONE) IR tablet 5 mg  5 mg Oral Q4H PRN    Or    HYDROmorphone (DILAUDID) injection 0 5 mg  0 5 mg Intravenous Q4H PRN    metoprolol tartrate (LOPRESSOR) tablet 25 mg  25 mg Oral BID    nicotine (NICODERM CQ) 7 mg/24hr TD 24 hr patch 1 patch  1 patch Transdermal Daily    nystatin (MYCOSTATIN) powder   Topical BID    ondansetron (ZOFRAN) injection 4 mg  4 mg Intravenous Q6H PRN    sodium chloride 0 9 % infusion  50 mL/hr Intravenous Continuous     Allergies   Allergen Reactions    Bee Venom Anaphylaxis, Shortness Of Breath and Swelling       Objective   First Vitals:   Blood Pressure: 119/69 (03/11/22 0935)  Pulse: 85 (03/11/22 0935)  Temperature: (!) 95 3 °F (35 2 °C) (03/11/22 0935)  Temp Source: Tympanic (03/11/22 0935)  Respirations: 18 (03/11/22 0935)  Height: 5' 7" (170 2 cm) (03/11/22 0935)  Weight - Scale: (!) 156 kg (343 lb 4 1 oz) (03/11/22 0935)  SpO2: 96 % (03/11/22 0935)    Current Vitals:   Blood Pressure: 127/84 (03/14/22 0714)  Pulse: 96 (03/14/22 0714)  Temperature: (!) 97 3 °F (36 3 °C) (03/13/22 1615)  Temp Source: Temporal (03/13/22 1615)  Respirations: 18 (03/14/22 8920)  Height: 5' 7" (170 2 cm) (03/11/22 1610)  Weight - Scale: (!) 146 kg (320 lb 12 3 oz) (03/11/22 1815)  SpO2: 98 % (03/14/22 0714)      Intake/Output Summary (Last 24 hours) at 3/14/2022 1323  Last data filed at 3/14/2022 1100  Gross per 24 hour   Intake 1752 5 ml   Output 1100 ml   Net 652 5 ml       Invasive Devices  Report    Peripheral Intravenous Line            Peripheral IV 03/13/22 Left;Proximal;Ventral (anterior) Forearm 1 day          Drain            Colostomy Other (comment) LUQ 56 days                Physical Exam     Awake, appears ill appearing  Not toxic  She is afebrile  Speech is clear  ENT unremarkable  Heart regular rate and rhythm  Lungs clear to auscultation  No rales or rhonchi  Abdomen wide girth positive bowel sounds, obese  Midline surgical scar, wound intact, healed, no drainage or wound disruption  Colostomy with pink stoma, no erythema or surrounding cellulitis  Some discomfort with palpation over the left mid abdomen colostomy site  No guarding or rebound  No flank tenderness to percussion  Extremities with bilateral significant lymphedema to the shins noted and 2+ pitting edema with brawny venous discoloration  No ischemic changes in the feet  Skin with some erythema underneath the lower abdominal fold  There is a surgical scar of the right upper abdomen  Some erythema underneath both breasts  She has a history of hidradenitis  Neurological exam shows no focal motor sensory neurologic weakness  She is fully oriented x3 spheres  Ambulation could not be observed  Lab Results:   I have personally reviewed pertinent lab results    , CBC:   Lab Results   Component Value Date    WBC 8 09 03/14/2022    HGB 9 6 (L) 03/14/2022    HCT 30 8 (L) 03/14/2022    MCV 82 03/14/2022     03/14/2022    MCH 25 5 (L) 03/14/2022    MCHC 31 2 (L) 03/14/2022    RDW 17 9 (H) 03/14/2022    MPV 9 1 03/14/2022    NRBC 0 03/14/2022     Contains abnormal data Comprehensive metabolic panel  Order: [id number]   Status: Final result     Visible to patient: Yes (not seen)     Next appt: 03/15/2022 at 11:20 AM in Hematology and Oncology Teresa DO Janeen)     0 Result Notes    Component Ref Range & Units 3/14/22 0517 3/13/22 0446 3/12/22 0525 3/11/22 1021 3/11/22 1021 2/17/22 1037 2/15/22 0745   Sodium 136 - 145 mmol/L 136  136  138   138  136  137    Potassium 3 5 - 5 3 mmol/L 4 4  4 5  4 8   4 2  4 3  3 8    Chloride 100 - 108 mmol/L 102  102  104   104  106  107    CO2 21 - 32 mmol/L 27  26  26   30  24  23    ANION GAP 4 - 13 mmol/L 7  8  8   4  6  7    BUN 5 - 25 mg/dL 17  21  22   19  19  17    Creatinine 0 60 - 1 30 mg/dL 1 72 High   1 91 High  CM  2 03 High  CM   2 25 High  CM  0 90 CM  0 88 CM    Comment: Standardized to IDMS reference method   Glucose 65 - 140 mg/dL 106  84 CM  115 CM   99 CM  111 CM  124 CM    Comment: If the patient is fasting, the ADA then defines impaired fasting glucose as > 100 mg/dL and diabetes as > or equal to 123 mg/dL  Specimen collection should occur prior to Sulfasalazine administration due to the potential for falsely depressed results  Specimen collection should occur prior to Sulfapyridine administration due to the potential for falsely elevated results  Calcium 8 3 - 10 1 mg/dL 8 8  8 8  8 3   8 4  8 6  7 8 Low     Corrected Calcium 8 3 - 10 1 mg/dL 10 0  10 0  9 7        AST 5 - 45 U/L 9  11 CM  12 CM  10 CM       Comment: Specimen collection should occur prior to Sulfasalazine administration due to the potential for falsely depressed results  ALT 12 - 78 U/L 15  13 CM  12 CM  14 CM       Comment: Specimen collection should occur prior to Sulfasalazine administration due to the potential for falsely depressed results      Alkaline Phosphatase 46 - 116 U/L 90  109  89  100       Total Protein 6 4 - 8 2 g/dL 7 0  7 0  6 4  6 5       Albumin 3 5 - 5 0 g/dL 2 5 Low   2 5 Low   2 3 Low   2 4 Low        Total Bilirubin 0 20 - 1 00 mg/dL 0 24  0 27 CM  0 29 CM  0 24 CM       Comment: Use of this assay is not recommended for patients undergoing treatment with eltrombopag due to the potential for falsely elevated results  eGFR ml/min/1 73sq m 32  28  26   23  70  72              Narrative              Imaging: I have personally reviewed pertinent films in PACS     CT ABDOMEN AND PELVIS WITHOUT IV CONTRAST 03/11/2022     INDICATION:   LLQ abdominal pain      COMPARISON:  CT abdomen and pelvis dated January 15, 2022      TECHNIQUE:  CT examination of the abdomen and pelvis was performed without intravenous contrast   Axial, sagittal, and coronal 2D reformatted images were created from the source data and submitted for interpretation       Radiation dose length product (DLP) for this visit:  1725 99 mGy-cm   This examination, like all CT scans performed in the [facility], was performed utilizing techniques to minimize radiation dose exposure, including the use of   iterative reconstruction and automated exposure control       Enteric contrast was not administered       FINDINGS:     ABDOMEN     LOWER CHEST:  No clinically significant abnormality identified in the visualized lower chest      LIVER/BILIARY TREE:  Hepatomegaly with mild diffuse steatosis      GALLBLADDER:  Small calcified gallstones without pericholecystic inflammatory change      SPLEEN:  Stable mild splenomegaly measuring 14 2 cm      PANCREAS:  Unremarkable      ADRENAL GLANDS:  Unremarkable      KIDNEYS/URETERS:  Interval placement of right sided double-J ureteral stent with proximal pigtail tip in the renal pelvis and distal pigtail tip in the urinary bladder    There has been mild interval improvement in right side hydroureteronephrosis, with   mild residual hydroureteronephrosis seen      There is mild left-sided hydroureteronephrosis, slightly more prominent from prior exam      STOMACH AND BOWEL:  Interval sigmoid resection and Payal's 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  There has been interval resolution of previously noted loculated abscess collections in the pelvis  There is persistent soft   tissue thickening adjacent to the rectal stump and involving the uterus (series 2 images 71/76, series 603 image 120), which may be related to postsurgical or residual postinflammatory changes  However, possibility of residual tumor is not excluded  Evaluation is limited due to lack of IV and oral contrast      APPENDIX:  No findings to suggest appendicitis      ABDOMINOPELVIC CAVITY:  No ascites  No pneumoperitoneum  No lymphadenopathy      VESSELS:  Unremarkable for patient's age      PELVIS     REPRODUCTIVE ORGANS:  By report, patient is status post bilateral salpingo-oophorectomy  There is soft tissue thickening adjacent to the sigmoid stump and extending into the region of the uterus as described      URINARY BLADDER:  Distal pigtail tip of right ureteral stent  Otherwise, unremarkable      ABDOMINAL WALL/INGUINAL REGIONS:  Postsurgical changes in the anterior abdominal wall  Left lower quadrant Payal's colostomy      OSSEOUS STRUCTURES:  No acute fracture or destructive osseous lesion      IMPRESSION:     1  Interval sigmoid resection and Payal's colostomy for perforated sigmoid adenocarcinoma  There has been interval resolution of previously noted loculated abscess collections in the pelvis  There is persistent soft tissue thickening adjacent to   the rectal stump extending to the uterus, which may be related to postsurgical or residual postinflammatory changes  However, possibility of residual tumor is not excluded  Evaluation is limited due to lack of IV and oral contrast      2   Mild interval improvement in right side hydroureteronephrosis, status post placement of ureteral stent    There is mild left side hydroureteronephrosis, slightly more prominent from prior exam        RENAL ULTRASOUND 03/11/2022     INDICATION:   ZAHIRA, bilateral hydronephrosis, right-sided ureteral stenting      COMPARISON: CT scan from earlier today     TECHNIQUE:   Ultrasound of the retroperitoneum was performed with a curvilinear transducer utilizing volumetric sweeps and still imaging techniques       FINDINGS: Limited study due to patient status and body habitus      KIDNEYS:  Symmetric and normal size  Right kidney:  13 1 x 8 0 x 6 6 cm  Volume 365 4 mL  Left kidney:  12 8 x 8 5 x 7 3 cm  Volume 411 8 mL     Right kidney  Poorly visualized      Left kidney  Poorly visualized      URETERS:  Nonvisualized      BLADDER:   Nonvisualized        IMPRESSION:     Limited study  Essentially nondiagnostic         EKG, Pathology, and Other Studies: I have personally reviewed pertinent reports  Counseling / Coordination of Care  Total floor / unit time spent today 50 minutes  Greater than 50% of total time was spent with the patient and / or family counseling and / or coordination of care  A description of the counseling / coordination of care:  Review of the patient's past medical records including prior operative note and surgical management, review of existing medical diagnostic and laboratory values, discussion with the attending hospitalist physician, personal examination patient all conducted in the urological evaluation of the patient at this time  Will await and continue to monitor creatinine  If the creatinine continues to remain elevated, then consider low-dose CT scan abdomen pelvis with renal protocol without contrast to evaluate left hydro ureter        Marita Sheldon PA-C

## 2022-03-14 NOTE — CASE MANAGEMENT
Case Management Discharge Planning Note    Patient name Ted Lozoya  Location [address] [id number] MRN [id number]  : 1965 Date 3/14/2022       Current Admission Date: 3/11/2022  Current Admission Diagnosis:ZAHIRA (acute kidney injury) [facility])   Patient Active Problem List    Diagnosis Date Noted    Hypertriglyceridemia 2022    Hyperphosphatemia 2022    Low HDL (under 40) 2022    Hydroureter 2022    S/P colostomy [facility] ) 2022    UTI (urinary tract infection) 2022    Anemia 2022    Nausea 2022    Yeast dermatitis 2022    Ambulatory dysfunction 2022    Open wnd anterior abdomen 2022    Morbid obesity with BMI of 50 0-59 9, adult ([facility] ) 2022    [facility] ) 2022    Dysphagia 2022    ZAHIRA (acute kidney injury) ([facility] ) 2022    Cellulitis of abdominal wall, chest wall and groin with wounds 2022    Diverticulitis of large intestine with perforation 2022    Right Hydroureteronephrosis 2022    GERD (gastroesophageal reflux disease)     Other specified anxiety disorders 2019    Lumbar paraspinal muscle spasm 2019    Borderline hyperlipidemia 2019    Iron deficiency 2019    Chronic midline low back pain with left-sided sciatica 2019    Essential hypertension 2019    Hidradenitis suppurativa 2019    Lymphedema 2006      LOS (days): 3  Geometric Mean LOS (GMLOS) (days): 2 90  Days to GMLOS:0     OBJECTIVE:  Risk of Unplanned Readmission Score: 21         Current admission status: Inpatient   Preferred Pharmacy:   [facility] [address] [place] [id number]  Phone: [phone] Fax: [phone]    Primary Care Provider: Cornelius Enriquez PA-C    Primary Insurance: Luis Hawkins MA [facility]  Secondary Insurance:     DISCHARGE DETAILS:  PT is recommending that patient is good to go home with Home care   I spoke with patient and told he we would get home care : Nursing, PT , OT , aide and  when she is discharged   Pt is agreeable to same  I did check with Simone Javier at [facility], the medical equipment set up by her PCP , is in process of being shipped to PT's house  I will refer the patient to Arthur ( [phone]) to start the process of getting her some help in the home  I was going to refer patient to outpatient  but she said the patient should be followed by A  in the oncologist office  I called patient's son Geraldine Lares because I was messaged by Idris MANCILLA  That  patient's son was  concerned about patient's discharge plan

## 2022-03-14 NOTE — TELEPHONE ENCOUNTER
Deedee was in to  see Danii Figueroa on March 11th and she was sent to the ER and admitted  She was very lethargic and fatigue  EMT's came and said she needed to go to ER       Deedee - [id number]

## 2022-03-14 NOTE — ASSESSMENT & PLAN NOTE
Hb 9 1 on admission  No active source of bleeding    clinically asymptomatic  Hb improving without any intervention   Follow Hb trend

## 2022-03-14 NOTE — PROGRESS NOTES
Progress Note - Nephrology   Swati Castro 60 y o  female MRN: [id number]  Unit/Bed#: 424-01 Encounter: [id number]    A/P:  1  Acute injury top of chronic kidney disease   Improving, continue avoid nephrotoxins  Patient has been on normal saline 50 mL/hour, will discontinue this time into the patient does not room with respect to maintaining appropriate volume status  She has left hydronephrosis, and is also being treated for urinary tract infection  2  Chronic kidney disease stage 2 with baseline creatinine between 0 9-1 mg/dL  3  Proteinuria   Patient on ACE-inhibitor in the outpatient setting, this can be restarted when she has achieved usual kidney function  Patient had a serum electrophoresis in January 2022 which was negative for monoclonal gammopathy  For completeness sake, she should have a serum and urine electrophoresis performed with immunofixation to rule out a monoclonal gammopathy  4  Bilateral lower extremity edema with lymphedema   Stable, patient's skin distally is erythematous, continue to monitor and offer supportive care at this time  5  Urinary tract infection   Patient on cefepime due to resistance issues, is currently appropriately dosed at 1 g q 12 hours  6  Left hydroureter   Patient has a history of right hydroureter which is now status post stenting, Urology was asked to see and evaluate the patient regarding left hydroureter        Follow up reason for today's visit:  Acute kidney injury/chronic kidney disease/proteinuria    ZAHIRA (acute kidney injury) [facility])    Patient Active Problem List   Diagnosis    Lymphedema    Essential hypertension    Hidradenitis suppurativa    Chronic midline low back pain with left-sided sciatica    Other specified anxiety disorders    Lumbar paraspinal muscle spasm    Borderline hyperlipidemia    Iron deficiency    GERD (gastroesophageal reflux disease)    ZAHIRA (acute kidney injury) ([facility] [address] )    Cellulitis of abdominal wall, chest wall and groin with wounds    Diverticulitis of large intestine with perforation    Right Hydroureteronephrosis    Dysphagia    Colon adenocarcinoma (HCC)    Open wnd anterior abdomen    Morbid obesity with BMI of 50 0-59 9, adult (HCC)    Ambulatory dysfunction    Nausea    Yeast dermatitis    Hydroureter    S/P colostomy ([address] )    UTI (urinary tract infection)    Anemia    Hyperphosphatemia    Low HDL (under 40)    Hypertriglyceridemia         Subjective:   No Acute events overnight, patient's appetite remains poor the she is trying to eat and drink a bit better  Objective:     Vitals: Blood pressure 127/84, pulse 96, temperature (!) 97 3 °F (36 3 °C), temperature source Temporal, resp  rate 18, height 5' 7" (1 702 m), weight (!) 146 kg (320 lb 12 3 oz), SpO2 98 %  ,Body mass index is 50 24 kg/m²  Weight (last 2 days)     None            Intake/Output Summary (Last 24 hours) at 3/14/2022 1351  Last data filed at 3/14/2022 1100  Gross per 24 hour   Intake 1752 5 ml   Output 1100 ml   Net 652 5 ml     I/O last 3 completed shifts:   In: 3135 8 [P O :1080; I V :1955 8; IV Piggyback:100]  Out: 1200 [Urine:1200]         Physical Exam: /84   Pulse 96   Temp (!) 97 3 °F (36 3 °C) (Temporal)   Resp 18   Ht 5' 7" (1 702 m)   Wt (!) 146 kg (320 lb 12 3 oz)   SpO2 98%   BMI 50 24 kg/m²     General Appearance:    Alert, cooperative, no distress, appears stated age   Head:    Normocephalic, without obvious abnormality, atraumatic   Eyes:    Conjunctiva/corneas clear   Ears:    Normal external ears   Nose:   Nares normal, septum midline, mucosa normal, no drainage    or sinus tenderness   Throat:   Lips, mucosa, and tongue normal; teeth and gums normal   Neck:   Supple   Back:     Symmetric, no curvature, ROM normal, no CVA tenderness   Lungs:     Clear to auscultation bilaterally, respirations unlabored   Chest wall:    No tenderness or deformity   Heart:    Regular rate and rhythm, S1 and S2 normal, no murmur, rub   or gallop   Abdomen:     Soft, non-tender, bowel sounds active   Extremities:   Extremities normal, atraumatic, no cyanosis, + 3 lower extremity edema   Skin:   Skin color, texture, turgor normal, lesions  Monitor noted bilateral distal extremities   Lymph nodes:   Cervical normal   Neurologic:   CNII-XII intact            Lab, Imaging and other studies: I have personally reviewed pertinent labs  CBC:   Lab Results   Component Value Date    WBC 8 09 03/14/2022    HGB 9 6 (L) 03/14/2022    HCT 30 8 (L) 03/14/2022    MCV 82 03/14/2022     03/14/2022    MCH 25 5 (L) 03/14/2022    MCHC 31 2 (L) 03/14/2022    RDW 17 9 (H) 03/14/2022    MPV 9 1 03/14/2022    NRBC 0 03/14/2022     CMP:   Lab Results   Component Value Date    K 4 4 03/14/2022     03/14/2022    CO2 27 03/14/2022    BUN 17 03/14/2022    CREATININE 1 72 (H) 03/14/2022    CALCIUM 8 8 03/14/2022    AST 9 03/14/2022    ALT 15 03/14/2022    ALKPHOS 90 03/14/2022    EGFR 32 03/14/2022           Results from last 7 days   Lab Units 03/14/22  0517 03/13/22  0446 03/12/22  0525   POTASSIUM mmol/L 4 4 4 5 4 8   CHLORIDE mmol/L 102 102 104   CO2 mmol/L 27 26 26   BUN mg/dL 17 21 22   CREATININE mg/dL 1 72* 1 91* 2 03*   CALCIUM mg/dL 8 8 8 8 8 3   ALK PHOS U/L 90 109 89   ALT U/L 15 13 12   AST U/L 9 11 12         Phosphorus:   Lab Results   Component Value Date    PHOS 4 4 03/14/2022     Magnesium:   Lab Results   Component Value Date    MG 2 0 03/14/2022     Urinalysis: No results found for: COLORU, CLARITYU, SPECGRAV, PHUR, LEUKOCYTESUR, NITRITE, PROTEINUA, GLUCOSEU, KETONESU, BILIRUBINUR, BLOODU  Ionized Calcium: No results found for: CAION  Coagulation: No results found for: PT, INR, APTT  Troponin: No results found for: TROPONINI  ABG: No results found for: PHART, KKZ7SMO, PO2ART, BNA9PVO, T4TVKAFG, BEART, SOURCE  Radiology review:     IMAGING  Procedure: US kidney and bladder    Result Date: 3/12/2022  Narrative: RENAL ULTRASOUND INDICATION:   ZAHIRA, bilateral hydronephrosis, right-sided ureteral stenting  COMPARISON: CT scan from earlier today TECHNIQUE:   Ultrasound of the retroperitoneum was performed with a curvilinear transducer utilizing volumetric sweeps and still imaging techniques  FINDINGS: Limited study due to patient status and body habitus  KIDNEYS: Symmetric and normal size  Right kidney:  13 1 x 8 0 x 6 6 cm  Volume 365 4 mL Left kidney:  12 8 x 8 5 x 7 3 cm  Volume 411 8 mL Right kidney Poorly visualized  Left kidney Poorly visualized  URETERS: Nonvisualized  BLADDER: Nonvisualized     Impression: Limited study  Essentially nondiagnostic   Workstation performed: [id number]       Current Facility-Administered Medications   Medication Dose Route Frequency    acetaminophen (TYLENOL) tablet 650 mg  650 mg Oral Q6H PRN    albuterol (PROVENTIL HFA,VENTOLIN HFA) inhaler 2 puff  2 puff Inhalation Q4H PRN    albuterol inhalation solution 2 5 mg  2 5 mg Nebulization Q4H PRN    ALPRAZolam (XANAX) tablet 0 5 mg  0 5 mg Oral 4x Daily PRN    aluminum-magnesium hydroxide-simethicone (MYLANTA) oral suspension 30 mL  30 mL Oral Q6H PRN    cefepime (MAXIPIME) IVPB (premix in dextrose) 1,000 mg 50 mL  1,000 mg Intravenous Q12H    docusate sodium (COLACE) capsule 100 mg  100 mg Oral BID    enoxaparin (LOVENOX) subcutaneous injection 60 mg  60 mg Subcutaneous BID    oxyCODONE (ROXICODONE) IR tablet 5 mg  5 mg Oral Q4H PRN    Or    HYDROmorphone (DILAUDID) injection 0 5 mg  0 5 mg Intravenous Q4H PRN    metoprolol tartrate (LOPRESSOR) tablet 25 mg  25 mg Oral BID    nicotine (NICODERM CQ) 7 mg/24hr TD 24 hr patch 1 patch  1 patch Transdermal Daily    nystatin (MYCOSTATIN) powder   Topical BID    ondansetron (ZOFRAN) injection 4 mg  4 mg Intravenous Q6H PRN    sodium chloride 0 9 % infusion  50 mL/hr Intravenous Continuous     Medications Discontinued During This Encounter   Medication Reason    sertraline (ZOLOFT) 50 mg tablet Therapy completed    sodium chloride 0 9 % infusion     cefTRIAXone (ROCEPHIN) IVPB (premix in dextrose) 1,000 mg 50 mL     HYDROmorphone (DILAUDID) injection 1 mg     oxyCODONE-acetaminophen (PERCOCET) 5-325 mg per tablet 1 tablet     lisinopril (ZESTRIL) tablet 10 mg     ALPRAZolam (XANAX) tablet 2 mg     cefTRIAXone (ROCEPHIN) IVPB (premix in dextrose) 1,000 mg 50 mL        Willian Sullivan,       This progress note was produced in part using a dictation device which may document imprecise wording from author's original intent

## 2022-03-14 NOTE — ASSESSMENT & PLAN NOTE
Patient complains of being more tired than usual   Creatinine elevated at 2 25, was 0 9, three weeks ago    Sr  Cr trending down gradually 1 7 << 1 91<< 2 03<< 2 25    Plan:  · Continue IV fluids for rehydration  · Nephrology consulted: appreciate recommendations  · Follow BMP

## 2022-03-14 NOTE — RESPIRATORY THERAPY NOTE
03/13/22 1510   Respiratory Assessment   Assessment Type Assess only   Resp Comments patient is sleeping, she is not in respiratory distress   Oxygen Therapy/Pulse Ox   O2 Device None (Room air)   O2 Therapy Room air   SpO2 94 %   SpO2 Activity At Rest   $ Pulse Oximetry Spot Check Charge Completed

## 2022-03-14 NOTE — ASSESSMENT & PLAN NOTE
CT abdomen shows left-sided hydronephrosis  Status post ureteral stent on right side for right-sided hydronephrosis which is improved compared to previous imaging  No urinary retention, post void scan 0ml    Urology on consult

## 2022-03-14 NOTE — PHYSICAL THERAPY NOTE
PHYSICAL THERAPY NOTE          Patient Name: Steve Bo  ZVYBR'Y Date: 3/14/2022     03/14/22 4622   Note Type   Note Type Treatment   Pain Assessment   Pain Assessment Tool 0-10   Pain Score 8   Pain Location/Orientation Location: Abdomen   Restrictions/Precautions   Weight Bearing Precautions Per Order No   Other Precautions Chair Alarm; Fall Risk;Pain   General   Response to Previous Treatment Patient with no complaints from previous session  Family/Caregiver Present No   Cognition   Overall Cognitive Status WFL   Following Commands Follows all commands and directions without difficulty   Subjective   Subjective Agreeable to therapy  c/o abdominal pain  (nurse aware of pain)   Transfers   Sit to Stand 5  Supervision   Additional items Armrests; Increased time required;Verbal cues   Stand to Sit 5  Supervision   Additional items Armrests; Increased time required;Verbal cues   Stand pivot 5  Supervision   Additional items Verbal cues   Toilet transfer 4  Minimal assistance   Additional items Assist x 1; Increased time required;Verbal cues;Standard toilet   Ambulation/Elevation   Gait pattern Excessively slow; Short stride;Decreased foot clearance;Decreased L stance; Wide KODY   Gait Assistance 5  Supervision   Assistive Device Bariatric Rolling walker   Distance 160'   Balance   Static Sitting Good   Dynamic Sitting Fair +   Static Standing Fair   Dynamic Standing Fair   Ambulatory Fair  (RW)   Endurance Deficit   Endurance Deficit Yes   Activity Tolerance   Activity Tolerance Patient limited by fatigue;Patient limited by pain   Exercises   Hip Flexion Sitting;15 reps   Hip Abduction Sitting;15 reps   Hip Adduction Sitting;15 reps   Knee AROM Long Arc Quad Sitting;15 reps   Ankle Pumps Sitting;15 reps   Assessment   Prognosis Good   Problem List Decreased strength;Decreased endurance; Impaired balance;Decreased mobility; Decreased safety awareness   Assessment Pt  seen for PT treatment session this date with interventions consisting of  therapeutic exercises, transfers and  gait training w/ emphasis on improving pt's ability to ambulate  Pt  Requiring occasional cues for sequence and safety  In comparison to previous session, Pt  With improvements in activity tolerance  Pt is in need of continued activity in PT to improve strength balance endurance mobility transfers and ambulation with return to maximize LOF  From PT/mobility standpoint, recommendation at time of d/c would be home health rehab  in order to promote return to PLOF and independence  The patient's AM-[facility] Basic Mobility Inpatient Short Form Raw Score is 17  A Raw score of greater than 16 suggests the patient may benefit from discharge to home  Please also refer to physical therapy recommendation for safe DC planning  Goals   LTG Expiration Date 03/25/22   PT Treatment Day 1   Plan   Treatment/Interventions Functional transfer training;LE strengthening/ROM; Therapeutic exercise; Endurance training;Bed mobility;Gait training   Progress Progressing toward goals   Recommendation   PT Discharge Recommendation Home with home health rehabilitation   AM-PAC Basic Mobility Inpatient   Turning in Bed Without Bedrails 3   Lying on Back to Sitting on Edge of Flat Bed 3   Moving Bed to Chair 3   Standing Up From Chair 3   Walk in Room 3   Climb 3-5 Stairs 2   Basic Mobility Inpatient Raw Score 17   Basic Mobility Standardized Score 39 67   Highest Level Of Mobility   JH-HLM Goal 5: Stand one or more mins   JH-HLM Highest Level of Mobility 7: Walk 25 feet or more   JH-HLM Goal Achieved Yes   Education   Education Provided Mobility training   Patient Demonstrates acceptance/verbal understanding   End of Consult   Patient Position at End of Consult All needs within reach; Bedside chair   End of Consult Comments discussed POC with PT

## 2022-03-15 NOTE — PROGRESS NOTES
[facility] [address]  Progress Note - Esmer Gambino 1965, 60 y o  female MRN: [id number]  Unit/Bed#: 424-01 Encounter: [id number]  Primary Care Provider: Leopoldo Oregon, PA-C   Date and time admitted to hospital: 3/11/2022  9:34 AM    * ZAHIRA (acute kidney injury) [facility])  Assessment & Plan  Patient complains of being more tired than usual   Creatinine elevated at 2 25, was 0 9, three weeks ago  Increased Sr  Cr today at 1 84 (1 84<< 1 7 << 1 91<< 2 03<< 2 25)      Plan:  · Continue IV fluids at low rate for rehydration  · Nephrology on consult: appreciate recommendations         -  Loop diuretics PRN for HTN         - Continue to hold lisinopril  · Follow BMP    UTI (urinary tract infection)  Assessment & Plan  UA positive with pending cultures, currently afebrile, denies any symptoms  Urine Culture positive for Klebsiella aerogenes  DC'd Ceftriaxone on 03/13   Antibiotics narrowed down to Cefepime based on the culture sensitivity  ID consulted: appreciate recommendations      Plan:  Cefepime day 3/3      Hydroureter  Assessment & Plan  CT abdomen shows left-sided hydronephrosis  Status post ureteral stent on right side for right-sided hydronephrosis which is improved compared to previous imaging  No urinary retention, post void scan 0ml  Urology on consult: appreciate recommendations       - S/p bilateral ureteral stent at [facility], left ureteral stent removed  As per urology note, patient scheduled for in office right          ureteral stent removal 1 week ago, did not go because she is not feeling well  - CT abdomen, pelvis with renal low dose radiation without contrast to evaluate left hydronephrosis, if creatinine remains       Elevated  Plan:  Ct abdomen, pelvis wo contrast   Right ureteral stent removal on out patient basis        S/P colostomy [facility])  Assessment & Plan  Patient was treated for adenocarcinoma colon, s/p Payal's colostomy  Had issues with colostomy bag    Wound care consult  Prn pain medication    Essential hypertension  Assessment & Plan  Blood pressure at goal  Continue home metoprolol  lisinopril on hold because of hydronephrosis    Colon adenocarcinoma [facility])  Assessment & Plan  Adenocarcinoma:  Status post interval sigmoid resection and Payal colostomy  CT abdomen shows soft tissue thickening around the stump ? possibility of residual tumor versus postsurgical changes  Patient complains of severe pain in the lower abdomen, don't have pain medication at home  Pathology report: [id number] adenocarcinoma of sigmoid colon with positive margins, 10 of 18 LN positive  Plan:  Follow up with Oncology on outpatient basis for CT/RT  P r n  Pain medication      VTE Pharmacologic Prophylaxis:   Pharmacologic: Enoxaparin (Lovenox)  Mechanical VTE Prophylaxis in Place: Yes    Patient Centered Rounds: I have performed bedside rounds with nursing staff today  Discussions with Specialists or Other Care Team Provider: yes    Education and Discussions with Family / Patient: yes    Time Spent for Care: 20 minutes  More than 50% of total time spent on counseling and coordination of care as described above  Current Length of Stay: 4 day(s)    Current Patient Status: Inpatient   Certification Statement: The patient will continue to require additional inpatient hospital stay due to on going treatment    Discharge Plan: home with VNA    Code Status: Level 3 - DNAR and DNI      Subjective:   Seen and examined the patient at bedside today  Continue to complain abdominal pain around colostomy site  Reports of clear urine  Denies new complaints/concerns  Objective:     Vitals:   Temp (24hrs), Av 1 °F (36 7 °C), Min:97 9 °F (36 6 °C), Max:98 5 °F (36 9 °C)    Temp:  [97 9 °F (36 6 °C)-98 5 °F (36 9 °C)] 98 5 °F (36 9 °C)  HR:  [84-95] 95  Resp:  [17-19] 19  BP: (128-131)/(72-73) 128/73  SpO2:  [92 %-96 %] 92 %  Body mass index is 52 9 kg/m²       Input and Output Summary (last 24 hours): Intake/Output Summary (Last 24 hours) at 3/15/2022 0906  Last data filed at 3/15/2022 0222  Gross per 24 hour   Intake 1121 67 ml   Output 950 ml   Net 171 67 ml       Physical Exam:     Physical Exam  Vitals and nursing note reviewed  Constitutional:       General: She is not in acute distress  Appearance: She is well-developed  HENT:      Head: Normocephalic and atraumatic  Right Ear: External ear normal       Left Ear: External ear normal       Nose: Nose normal       Mouth/Throat:      Mouth: Mucous membranes are moist       Pharynx: Oropharynx is clear  Eyes:      General: No scleral icterus  Extraocular Movements: Extraocular movements intact  Conjunctiva/sclera: Conjunctivae normal       Pupils: Pupils are equal, round, and reactive to light  Cardiovascular:      Rate and Rhythm: Normal rate and regular rhythm  Pulses: Normal pulses  Heart sounds: Normal heart sounds  No murmur heard  Pulmonary:      Effort: Pulmonary effort is normal  No respiratory distress  Breath sounds: Normal breath sounds  Abdominal:      General: Bowel sounds are normal       Palpations: Abdomen is soft  Tenderness: There is abdominal tenderness  There is no guarding  Musculoskeletal:         General: Swelling ( bilateral LE swelling secondary to lymphedema, present at baseline) present  Cervical back: Neck supple  Skin:     General: Skin is warm and dry  Capillary Refill: Capillary refill takes less than 2 seconds  Findings: No lesion  Neurological:      Mental Status: She is alert and oriented to person, place, and time  Mental status is at baseline         Additional Data:     Labs:    Results from last 7 days   Lab Units 03/15/22  0616   WBC Thousand/uL 8 81   HEMOGLOBIN g/dL 9 4*   HEMATOCRIT % 29 7*   PLATELETS Thousands/uL 338   NEUTROS PCT % 61   LYMPHS PCT % 21   MONOS PCT % 8   EOS PCT % 9*     Results from last 7 days   Lab Units 03/15/22  0617 03/14/22  0517 03/14/22  0517   SODIUM mmol/L 138   < > 136   POTASSIUM mmol/L 3 9   < > 4 4   CHLORIDE mmol/L 102   < > 102   CO2 mmol/L 28   < > 27   BUN mg/dL 14   < > 17   CREATININE mg/dL 1 84*   < > 1 72*   ANION GAP mmol/L 8   < > 7   CALCIUM mg/dL 8 6   < > 8 8   ALBUMIN g/dL  --   --  2 5*   TOTAL BILIRUBIN mg/dL  --   --  0 24   ALK PHOS U/L  --   --  90   ALT U/L  --   --  15   AST U/L  --   --  9   GLUCOSE RANDOM mg/dL 102   < > 106    < > = values in this interval not displayed  Results from last 7 days   Lab Units 03/14/22  0517 03/13/22  0446 03/12/22  0525 03/11/22  1021   LACTIC ACID mmol/L  --   --  0 8 1 3   PROCALCITONIN ng/ml 0 18 0 19 0 23  --            * I Have Reviewed All Lab Data Listed Above  * Additional Pertinent Lab Tests Reviewed: Willi 64 Admission Reviewed    Imaging:    Imaging Reports Reviewed Today Include: none  Imaging Personally Reviewed by Myself Includes:  none    Recent Cultures (last 7 days):     Results from last 7 days   Lab Units 03/11/22  1431 03/11/22  1031 03/11/22  1021   BLOOD CULTURE   --  No Growth After 4 Days  No Growth After 4 Days     URINE CULTURE  >100,000 cfu/ml Klebsiella aerogenes*  --   --        Last 24 Hours Medication List:   Current Facility-Administered Medications   Medication Dose Route Frequency Provider Last Rate    acetaminophen  650 mg Oral Q6H PRN Gwendolyn Houston, DO      albuterol  2 puff Inhalation Q4H PRN Galena Ameena Houston, DO      albuterol  2 5 mg Nebulization Q4H PRN Gwendolyn Houston, DO      ALPRAZolam  0 5 mg Oral 4x Daily PRN Gwendolyn Houston DO      aluminum-magnesium hydroxide-simethicone  30 mL Oral Q6H PRN Katie Fleming MD      cefepime  1,000 mg Intravenous Q12H Gwendolyn Houston DO 1,000 mg (03/15/22 0503)    docusate sodium  100 mg Oral BID Katie Fleming MD      enoxaparin  60 mg Subcutaneous BID Ival MD Lonnie      oxyCODONE 5 mg Oral Q4H PRN Newville Leaven Celso, DO      Or    HYDROmorphone  0 5 mg Intravenous Q4H PRN [place] Leaven [place], DO      metoprolol tartrate  25 mg Oral BID Newville Leaven Celso, DO      nicotine  1 patch Transdermal Daily Rhonda Metzger MD      nystatin   Topical BID Rhonda Metzger MD      ondansetron  4 mg Intravenous Q6H PRN Rhonda Metzger MD      sodium chloride  75 mL/hr Intravenous Continuous Kaiser Chan MD 75 mL/hr (03/15/22 9395)        Today, Patient Was Seen By: Rhonda Metzger MD    ** Please Note: Dictation voice to text software may have been used in the creation of this document   **

## 2022-03-15 NOTE — PROGRESS NOTES
Progress Note - General Surgery   Bryce Rm 60 y o  female MRN: [id number]  Unit/Bed#: 424-01 Encounter: [id number]    Assessment:    CT scan of the abdomen pelvis without contrast today shows right nephroureteral stent in place  Similar mild right hydroureteronephrosis compared to prior CT  Mild left hydroureteronephrosis to a lesser degree is also similar to prior  Creatinine still remains elevated, today 1 84 (1 72, 1 91)  GFR 29  WBC 8 81, hemoglobin 9 4  Attestation from Dr Lauryn Howe yesterday was reviewed, he had noted that the left hydro ureteral nephrosis was very mild and again to a lesser degree on repeat CT scan imaging today  Dr Lauryn Howe did not think that the patient's hydro ureteral nephrosis was contributing to her acute kidney injury  The right nephroureteral stent remains in good position  Status post Payal's procedure with end colostomy performed this past January in [place], bilateral salpingo oophorectomy  Preoperative cystoscopy with retrograde pyelogram insertion of ureteral stent 01/16/2022  Sigmoid colon perforation secondary to adenocarcinoma  Acute on chronic kidney disease, patient with very poor oral intake over the past 24 hours  Nephrology notes the patient found have proteinuria, recommended to have surgery in electrophoresis to rule out monoclonal gammopathy which was noted in January 2022  Bilateral lower extremity lymphedema    Urinary tract infection, patient remains on renally dosed IV cefepime  Plan:  No plan for any urological intervention at this time  Right nephroureteral stent is in good position  Attestation by Dr Lauryn Howe, urologist covering this hospital [place] for any recommendations at this point    Nephrology recommendations  Continue to monitor kidney function  Continue IV antibiotics for UTI, urine culture sensitive for Klebsiella aerogenes  Medical management per the attending hospitalist team    Subjective/Objective   Chief Complaint:  Patient felt nauseated this morning  Decreased oral intake over the past day  Subjective:  Patient is asking about results of her CT scan performed early this morning  Her creatinine remains elevated which prompted medicine to order a noncontrast repeat CT scan of the abdomen pelvis to evaluate mild left hydro ureteral nephrosis, there is improvement to a lesser degree  Her creatinine still remains high, her baseline prior to admission was 0 93  The patient underwent surgery for emergent perforated sigmoid diverticulitis with Payal's procedure and end colostomy with intraoperative findings showing tissue necrosis of the proximal rectum and ovaries is site of perforation, pathology report showed T4 be colon adenocarcinoma that perforated into the adnexal structures with distal margins positive for 10 of 18 lymph nodes        Scheduled Meds:  Current Facility-Administered Medications   Medication Dose Route Frequency Provider Last Rate    acetaminophen  650 mg Oral Q6H PRN Thai Navarroetter, DO      albuterol  2 puff Inhalation Q4H PRN Thai Lozanor, DO      albuterol  2 5 mg Nebulization Q4H PRN Thai Lozanor, DO      ALPRAZolam  0 5 mg Oral 4x Daily PRN Thai Lozanor, DO      aluminum-magnesium hydroxide-simethicone  30 mL Oral Q6H PRN Iraida Dowd MD      cefepime  1,000 mg Intravenous Q12H Thai Houston, DO Stopped (03/15/22 0533)    docusate sodium  100 mg Oral BID Iraida Dowd MD      enoxaparin  60 mg Subcutaneous BID Iraida Dowd MD      oxyCODONE  5 mg Oral Q4H PRN Thai Lozanor, DO      Or   Mac Lemme HYDROmorphone  0 5 mg Intravenous Q4H PRN Thai Lozanor, DO      metoprolol tartrate  25 mg Oral BID Thai Houston, DO      nicotine  1 patch Transdermal Daily Iraida Dowd MD      nystatin   Topical BID Iraida Dowd MD      ondansetron  4 mg Intravenous Q6H PRN Iraida Dowd MD      sodium chloride  75 mL/hr Intravenous Continuous Amor Field MD 75 mL/hr (03/15/22 0854)     Continuous Infusions:sodium chloride, 75 mL/hr, Last Rate: 75 mL/hr (03/15/22 0854)      PRN Meds:   acetaminophen    albuterol    albuterol    ALPRAZolam    aluminum-magnesium hydroxide-simethicone    oxyCODONE **OR** HYDROmorphone    ondansetron    Objective:     Blood pressure 128/73, pulse 95, temperature 98 5 °F (36 9 °C), resp  rate 19, height 5' 7" (1 702 m), weight (!) 153 kg (337 lb 11 9 oz), SpO2 92 %  ,Body mass index is 52 9 kg/m²  Intake/Output Summary (Last 24 hours) at 3/15/2022 1440  Last data filed at 3/15/2022 1412  Gross per 24 hour   Intake 760 ml   Output 1650 ml   Net -890 ml       Invasive Devices  Report    Peripheral Intravenous Line            Peripheral IV 03/14/22 Right Forearm <1 day          Drain            Colostomy Other (comment) LUQ 57 days                Physical Exam:    Awake alert, sitting up in bedside chair  No acute distress  Skin warm dry to touch  No jaundice or rash  ENT clear and unremarkable  Oral mucosa pink and moist   Neck supple  No adenopathy or goiter  Heart regular rate and rhythm  No murmur or gallop is heard  Lungs clear to auscultation  No rales or rhonchi  Abdomen obese, positive bowel sounds are heard in 4 quadrants  Midline surgical scar is well healed  The abdomen is soft, no guarding or masses  No rebound tenderness  Extremities the patient has lymphedema bilateral lower extremities to the pretibial areas bilaterally  Neurological exam shows normal mental status, cranial nerves appear symmetric  No tremor noted  Ambulation not observed  Lab, Imaging and other studies:  I have personally reviewed pertinent lab results        CT ABDOMEN AND PELVIS WITHOUT IV CONTRAST     INDICATION:     Left sided Hydronephrosis      COMPARISON:  CT abdomen pelvis 3/11/2022     TECHNIQUE:  CT examination of the abdomen and pelvis was performed without intravenous contrast   Axial, sagittal, and coronal 2D reformatted images were created from the source data and submitted for interpretation       Radiation dose length product (DLP) for this visit:  1990 33 mGy-cm   This examination, like all CT scans performed in the [facility], was performed utilizing techniques to minimize radiation dose exposure, including the use of   iterative reconstruction and automated exposure control       Enteric contrast was not administered       FINDINGS:  The lack of intravenous contrast limits evaluation of the viscera      LOWER CHEST:  No clinically significant abnormality identified in the visualized lower chest      LIVER/BILIARY TREE:  Marginal hypodensity at the duong hepatis similar to prior could be due to focal fat      GALLBLADDER:  Cholelithiasis without pericholecystic inflammatory changes      SPLEEN:  Unremarkable      PANCREAS:  Unremarkable      ADRENAL GLANDS:  Unremarkable      KIDNEYS/URETERS: Right nephroureteral stent, the proximal tip in the renal pelvis and the distal tip in the bladder  Similar mild right hydroureteronephrosis compared to prior CT  Mild left hydroureteronephrosis to a lesser degree is also similar to   prior  STOMACH AND BOWEL:  Postsurgical changes from sigmoid resection and Payal's 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  Again noted soft tissue thickening of the rectal stump extending to the uterus which may be postsurgical,   inflammatory or due to residual/recurrent tumor  Evaluation limited due to lack of IV and oral contrast      APPENDIX:  No findings to suggest appendicitis  Appendix is visualized      ABDOMINOPELVIC CAVITY: Persistent fat stranding in the pelvis  No ascites    No pneumoperitoneum        Mildly enlarged left common iliac lymph node measuring 1 4 x 1 2 cm (series 2 image 53), presacral node measuring 1 1 x 1 4 cm (series 2 image 61), and right pelvic sidewall node measuring 1 2 x 1 1 cm (series 2 image 69)  There are several additional   para-aortic lymph nodes measuring up to 1 cm in short axis diameter  Left obturator node measuring 1 cm  These findings are similar to prior CT 3/11/2022 but enlarged since 1/15/2022  Metastasis is not excluded      VESSELS:  Unremarkable for patient's age        PELVIS     REPRODUCTIVE ORGANS:  Soft tissue thickening of the rectal stump contiguous with the uterus as above which may be postsurgical, inflammatory or due to residual/recurrent tumor  Apparent nodular soft tissue thickening in the left vaginal region measuring   1 7 x 1 6 cm (series 2 image 75), which is not seen on CT 1/15/2022  Cannot exclude tumor      URINARY BLADDER:  There appears to be focal thickening of the posterior superior bladder wall (series 2 image 75, series 601 image 102)      ABDOMINAL WALL/INGUINAL REGIONS:  Foci of subcutaneous emphysema in the anterior abdominal wall may be due to subcutaneous injections  Postsurgical changes in the anterior abdominal wall      OSSEOUS STRUCTURES:  No acute fracture or destructive osseous lesion      IMPRESSION:     1  Postsurgical changes from sigmoid resection and Payal's 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  Again noted soft tissue thickening of the rectal stump extending to the uterus which may be postsurgical, inflammatory or   due to residual/recurrent tumor  Evaluation limited due to lack of IV and oral contrast      2  Right nephroureteral stent in place  Similar mild right hydroureteronephrosis compared to prior CT  Mild left hydroureteronephrosis to a lesser degree is also similar to prior      3   Mildly enlarged pelvic lymph nodes as above similar to 3/11/2022 but enlarged since 1/15/2022  Metastasis not excluded      4  Nodular thickening in the left vaginal region measuring 1 7 x 1 6 cm which is not seen on CT 1/15/2022  Cannot exclude tumor      5  There appears to be focal thickening of the posterior superior bladder wall    Evaluation is limited        CBC:   Lab Results   Component Value Date    WBC 8 81 03/15/2022    HGB 9 4 (L) 03/15/2022    HCT 29 7 (L) 03/15/2022    MCV 82 03/15/2022     03/15/2022    MCH 25 8 (L) 03/15/2022    MCHC 31 6 03/15/2022    RDW 17 7 (H) 03/15/2022    MPV 9 4 03/15/2022    NRBC 0 03/15/2022   , CMP:   Lab Results   Component Value Date    SODIUM 138 03/15/2022    K 3 9 03/15/2022     03/15/2022    CO2 28 03/15/2022    BUN 14 03/15/2022    CREATININE 1 84 (H) 03/15/2022    CALCIUM 8 6 03/15/2022    EGFR 29 03/15/2022     VTE Pharmacologic Prophylaxis: Enoxaparin (Lovenox)  VTE Mechanical Prophylaxis: sequential compression device     Rosa M Titus PA-C

## 2022-03-15 NOTE — PHYSICAL THERAPY NOTE
PHYSICAL THERAPY NOTE          Patient Name: João REMYZ Date: 3/15/2022     03/15/22 1022   Note Type   Note Type Treatment   Pain Assessment   Pain Assessment Tool 0-10   Pain Score 7   Pain Location/Orientation Location: Abdomen   Restrictions/Precautions   Weight Bearing Precautions Per Order No   Other Precautions Chair Alarm; Fall Risk;Pain   General   Response to Previous Treatment Patient with no complaints from previous session  Family/Caregiver Present No   Cognition   Overall Cognitive Status WFL   Following Commands Follows all commands and directions without difficulty   Subjective   Subjective Agreeable to therapy  Bed Mobility   Sit to Supine 4  Minimal assistance   Additional items Assist x 1;HOB elevated; Increased time required;Verbal cues;LE management   Transfers   Sit to Stand 5  Supervision   Additional items Assist x 1; Armrests; Increased time required;Verbal cues   Stand to Sit 5  Supervision   Additional items Assist x 1; Armrests; Increased time required;Verbal cues   Stand pivot 5  Supervision   Additional items Increased time required;Verbal cues   Toilet transfer 5  Supervision   Additional items Assist x 1; Increased time required;Verbal cues;Standard toilet   Additional Comments Able to manage clothing and hygiene   Ambulation/Elevation   Gait pattern Improper Weight shift; Wide KODY; Foward flexed   Gait Assistance 5  Supervision   Assistive Device Rolling walker   Distance 125' x 1, 25' x 2   Balance   Static Sitting Good   Dynamic Sitting Good   Static Standing Fair   Dynamic Standing Fair   Ambulatory Fair  (RW)   Endurance Deficit   Endurance Deficit Yes   Activity Tolerance   Activity Tolerance Patient limited by fatigue;Patient limited by pain   Assessment   Prognosis Good   Problem List Decreased strength;Decreased endurance; Impaired balance;Decreased mobility; Decreased safety awareness Assessment Pt  seen for PT treatment session this date with interventions consisting of  therapeutic exercises, bed mobility, transfers and  gait training w/ emphasis on improving pt's ability to ambulate  Pt  Requiring occasional cues for sequence and safety  In comparison to previous session, Pt  With decrease in activity tolerance  C/o fatigue from no sleep limiting ambulation  Pt is in need of continued activity in PT to improve strength balance endurance mobility transfers and ambulation with return to maximize LOF  From PT/mobility standpoint, recommendation at time of d/c would be home health rehab  in order to promote return to PLOF and independence  The patient's AM-PAC Basic Mobility Inpatient Short Form Raw Score is 18  A Raw score of greater than 16 suggests the patient may benefit from discharge to home  Please also refer to physical therapy recommendation for safe DC planning  Goals   LTG Expiration Date 03/25/22   PT Treatment Day 2   Plan   Treatment/Interventions Functional transfer training;LE strengthening/ROM; Therapeutic exercise; Endurance training;Bed mobility;Gait training   Progress Progressing toward goals   Recommendation   PT Discharge Recommendation Post acute rehabilitation services   AM-PAC Basic Mobility Inpatient   Turning in Bed Without Bedrails 3   Lying on Back to Sitting on Edge of Flat Bed 3   Moving Bed to Chair 3   Standing Up From Chair 4   Walk in Room 3   Climb 3-5 Stairs 2   Basic Mobility Inpatient Raw Score 18   Basic Mobility Standardized Score 41 05   Highest Level Of Mobility   JH-HLM Goal 6: Walk 10 steps or more   JH-HLM Highest Level of Mobility 7: Walk 25 feet or more   Education   Education Provided Mobility training   Patient Demonstrates acceptance/verbal understanding   End of Consult   Patient Position at End of Consult Supine;Bed/Chair alarm activated; All needs within reach   End of Consult Comments discussed POC with PT

## 2022-03-15 NOTE — ASSESSMENT & PLAN NOTE
Patient complains of being more tired than usual   Creatinine elevated at 2 25, was 0 9, three weeks ago    Increased Sr  Cr today at 1 84 (1 84<< 1 7 << 1 91<< 2 03<< 2 25)      Plan:  · Continue IV fluids at low rate for rehydration  · Nephrology on consult: appreciate recommendations         -  Loop diuretics PRN for HTN         - Continue to hold lisinopril  · Follow BMP

## 2022-03-15 NOTE — PROGRESS NOTES
Progress Note - Nephrology   Elver Schultz 60 y o  female MRN: [id number]  Unit/Bed#: 424-01 Encounter: [id number]    A/P:  1  Acute injury top of chronic kidney disease               patient re-initiated on normal saline 75 mL/hour, patient with very poor intake over last 24 hours  2  Chronic kidney disease stage 2 with baseline creatinine between 0 9-1 mg/dL  3  Proteinuria               as mentioned previously, patient should have both a serum electrophoresis to rule out monoclonal gammopathy, she had a serum electrophoresis in January 2022  Re-initiation of ACE-inhibitor when she is in usual state health   4  Bilateral lower extremity edema with lymphedema               stable  5  Urinary tract infection               continue treatment according to hospitalist, on appropriately dosed cefepime  6  Left hydroureter   Continue conservative management, appreciate Urology input      Follow up reason for today's visit:  Acute kidney injury/chronic kidney disease/proteinuria    ZAHIRA (acute kidney injury) [facility])    Patient Active Problem List   Diagnosis    Lymphedema    Essential hypertension    Hidradenitis suppurativa    Chronic midline low back pain with left-sided sciatica    Other specified anxiety disorders    Lumbar paraspinal muscle spasm    Borderline hyperlipidemia    Iron deficiency    GERD (gastroesophageal reflux disease)    [facility]A (acute kidney injury) ([facility] [address] [facility]    Cellulitis of abdominal wall, chest wall and groin with wounds    Diverticulitis of large intestine with perforation    Right Hydroureteronephrosis    Dysphagia    Colon adenocarcinoma ([facility] [address] )    Open wnd anterior abdomen    Morbid obesity with BMI of 50 0-59 9, adult ([facility] [address] )    Ambulatory dysfunction    Nausea    Yeast dermatitis    Hydroureter    S/P colostomy ([facility] [address] [facility]    UTI (urinary tract infection)    Anemia    Hyperphosphatemia    Low HDL (under 40)    Hypertriglyceridemia         Subjective:   Patient feels nauseous, admits to not eating or drinking any significant amount of food or fluids in last 24 hours  Objective:     Vitals: Blood pressure 128/73, pulse 95, temperature 98 5 °F (36 9 °C), resp  rate 19, height 5' 7" (1 702 m), weight (!) 153 kg (337 lb 11 9 oz), SpO2 92 %  ,Body mass index is 52 9 kg/m²  Weight (last 2 days)     Date/Time Weight    03/15/22 0600 153 (337 75)            Intake/Output Summary (Last 24 hours) at 3/15/2022 1109  Last data filed at 3/15/2022 0533  Gross per 24 hour   Intake 1221 67 ml   Output 750 ml   Net 471 67 ml     I/O last 3 completed shifts: In: 2924 2 [P O :1440; I V :1334 2; IV Piggyback:150]  Out: 2986 [Urine:1850]         Physical Exam: /73   Pulse 95   Temp 98 5 °F (36 9 °C)   Resp 19   Ht 5' 7" (1 702 m)   Wt (!) 153 kg (337 lb 11 9 oz)   SpO2 92%   BMI 52 90 kg/m²     General Appearance:    Alert, cooperative, no distress, appears stated age   Head:    Normocephalic, without obvious abnormality, atraumatic   Eyes:    Conjunctiva/corneas clear   Ears:    Normal external ears   Nose:   Nares normal, septum midline, mucosa normal, no drainage    or sinus tenderness   Throat:   Lips, mucosa, and tongue normal; teeth and gums normal   Neck:   Supple   Back:     Symmetric, no curvature, ROM normal, no CVA tenderness   Lungs:     Clear to auscultation bilaterally, respirations unlabored   Chest wall:    No tenderness or deformity   Heart:    Regular rate and rhythm, S1 and S2 normal, no murmur, rub   or gallop   Abdomen:     Soft, non-tender, bowel sounds active   Extremities:   Extremities normal, atraumatic, no cyanosis, positive for lymphedema as well as pitting edema bilateral   Skin:   Skin color, texture, turgor normal, no rashes or lesions   Lymph nodes:   Cervical normal   Neurologic:   CNII-XII intact            Lab, Imaging and other studies: I have personally reviewed pertinent labs    CBC:   Lab Results   Component Value Date    WBC 8 81 03/15/2022    HGB 9 4 (L) 03/15/2022    HCT 29 7 (L) 03/15/2022    MCV 82 03/15/2022     03/15/2022    MCH 25 8 (L) 03/15/2022    MCHC 31 6 03/15/2022    RDW 17 7 (H) 03/15/2022    MPV 9 4 03/15/2022    NRBC 0 03/15/2022     CMP:   Lab Results   Component Value Date    K 3 9 03/15/2022     03/15/2022    CO2 28 03/15/2022    BUN 14 03/15/2022    CREATININE 1 84 (H) 03/15/2022    CALCIUM 8 6 03/15/2022    EGFR 29 03/15/2022         Results from last 7 days   Lab Units 03/15/22  0617 03/14/22  0517 03/13/22  0446 03/12/22  0525 03/12/22  0525   POTASSIUM mmol/L 3 9 4 4 4 5   < > 4 8   CHLORIDE mmol/L 102 102 102   < > 104   CO2 mmol/L 28 27 26   < > 26   BUN mg/dL 14 17 21   < > 22   CREATININE mg/dL 1 84* 1 72* 1 91*   < > 2 03*   CALCIUM mg/dL 8 6 8 8 8 8   < > 8 3   ALK PHOS U/L  --  90 109  --  89   ALT U/L  --  15 13  --  12   AST U/L  --  9 11  --  12    < > = values in this interval not displayed  Phosphorus: No results found for: PHOS  Magnesium:   Lab Results   Component Value Date    MG 2 1 03/15/2022     Urinalysis: No results found for: Hettie Khan, SPECGRAV, PHUR, LEUKOCYTESUR, NITRITE, PROTEINUA, GLUCOSEU, KETONESU, BILIRUBINUR, BLOODU  Ionized Calcium: No results found for: CAION  Coagulation: No results found for: PT, INR, APTT  Troponin: No results found for: TROPONINI  ABG: No results found for: PHART, MPC3DOT, PO2ART, VNH2ZRZ, S2ZABGNN, BEART, SOURCE  Radiology review:     IMAGING  Procedure: CT abdomen pelvis wo contrast    Result Date: 3/15/2022  Narrative: CT ABDOMEN AND PELVIS WITHOUT IV CONTRAST INDICATION:   Left sided Hydronephrosis  COMPARISON:  CT abdomen pelvis 3/11/2022 TECHNIQUE:  CT examination of the abdomen and pelvis was performed without intravenous contrast   Axial, sagittal, and coronal 2D reformatted images were created from the source data and submitted for interpretation  Radiation dose length product (DLP) for this visit:  1990 33 mGy-cm     This examination, like all CT scans performed in the [facility], was performed utilizing techniques to minimize radiation dose exposure, including the use of iterative reconstruction and automated exposure control  Enteric contrast was not administered  FINDINGS: The lack of intravenous contrast limits evaluation of the viscera  LOWER CHEST:  No clinically significant abnormality identified in the visualized lower chest  LIVER/BILIARY TREE:  Marginal hypodensity at the duong hepatis similar to prior could be due to focal fat  GALLBLADDER:  Cholelithiasis without pericholecystic inflammatory changes  SPLEEN:  Unremarkable  PANCREAS:  Unremarkable  ADRENAL GLANDS:  Unremarkable  KIDNEYS/URETERS: Right nephroureteral stent, the proximal tip in the renal pelvis and the distal tip in the bladder  Similar mild right hydroureteronephrosis compared to prior CT  Mild left hydroureteronephrosis to a lesser degree is also similar to prior  STOMACH AND BOWEL:  Postsurgical changes from sigmoid resection and Payal's 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  Again noted soft tissue thickening of the rectal stump extending to the uterus which may be postsurgical, inflammatory or due to residual/recurrent tumor  Evaluation limited due to lack of IV and oral contrast  APPENDIX:  No findings to suggest appendicitis  Appendix is visualized  ABDOMINOPELVIC CAVITY: Persistent fat stranding in the pelvis  No ascites  No pneumoperitoneum  Mildly enlarged left common iliac lymph node measuring 1 4 x 1 2 cm (series 2 image 53), presacral node measuring 1 1 x 1 4 cm (series 2 image 61), and right pelvic sidewall node measuring 1 2 x 1 1 cm (series 2 image 69)  There are several additional para-aortic lymph nodes measuring up to 1 cm in short axis diameter  Left obturator node measuring 1 cm  These findings are similar to prior CT 3/11/2022 but enlarged since 1/15/2022  Metastasis is not excluded   VESSELS: Unremarkable for patient's age  PELVIS REPRODUCTIVE ORGANS:  Soft tissue thickening of the rectal stump contiguous with the uterus as above which may be postsurgical, inflammatory or due to residual/recurrent tumor  Apparent nodular soft tissue thickening in the left vaginal region measuring  1 7 x 1 6 cm (series 2 image 75), which is not seen on CT 1/15/2022  Cannot exclude tumor  URINARY BLADDER:  There appears to be focal thickening of the posterior superior bladder wall (series 2 image 75, series 601 image 102)  ABDOMINAL WALL/INGUINAL REGIONS:  Foci of subcutaneous emphysema in the anterior abdominal wall may be due to subcutaneous injections  Postsurgical changes in the anterior abdominal wall  OSSEOUS STRUCTURES:  No acute fracture or destructive osseous lesion  Impression: 1  Postsurgical changes from sigmoid resection and Payal's 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  Again noted soft tissue thickening of the rectal stump extending to the uterus which may be postsurgical, inflammatory or due to residual/recurrent tumor  Evaluation limited due to lack of IV and oral contrast  2   Right nephroureteral stent in place  Similar mild right hydroureteronephrosis compared to prior CT  Mild left hydroureteronephrosis to a lesser degree is also similar to prior  3   Mildly enlarged pelvic lymph nodes as above similar to 3/11/2022 but enlarged since 1/15/2022  Metastasis not excluded  4   Nodular thickening in the left vaginal region measuring 1 7 x 1 6 cm which is not seen on CT 1/15/2022  Cannot exclude tumor  5   There appears to be focal thickening of the posterior superior bladder wall  Evaluation is limited  The study was marked in [facility] for immediate notification   Workstation performed: [id number]       Current Facility-Administered Medications   Medication Dose Route Frequency    acetaminophen (TYLENOL) tablet 650 mg  650 mg Oral Q6H PRN    albuterol (PROVENTIL HFA,VENTOLIN HFA) inhaler 2 puff  2 puff Inhalation Q4H PRN    albuterol inhalation solution 2 5 mg  2 5 mg Nebulization Q4H PRN    ALPRAZolam (XANAX) tablet 0 5 mg  0 5 mg Oral 4x Daily PRN    aluminum-magnesium hydroxide-simethicone (MYLANTA) oral suspension 30 mL  30 mL Oral Q6H PRN    cefepime (MAXIPIME) IVPB (premix in dextrose) 1,000 mg 50 mL  1,000 mg Intravenous Q12H    docusate sodium (COLACE) capsule 100 mg  100 mg Oral BID    enoxaparin (LOVENOX) subcutaneous injection 60 mg  60 mg Subcutaneous BID    oxyCODONE (ROXICODONE) IR tablet 5 mg  5 mg Oral Q4H PRN    Or    HYDROmorphone (DILAUDID) injection 0 5 mg  0 5 mg Intravenous Q4H PRN    metoprolol tartrate (LOPRESSOR) tablet 25 mg  25 mg Oral BID    nicotine (NICODERM CQ) 7 mg/24hr TD 24 hr patch 1 patch  1 patch Transdermal Daily    nystatin (MYCOSTATIN) powder   Topical BID    ondansetron (ZOFRAN) injection 4 mg  4 mg Intravenous Q6H PRN    sodium chloride 0 9 % infusion  75 mL/hr Intravenous Continuous     Medications Discontinued During This Encounter   Medication Reason    sertraline (ZOLOFT) 50 mg tablet Therapy completed    sodium chloride 0 9 % infusion     cefTRIAXone (ROCEPHIN) IVPB (premix in dextrose) 1,000 mg 50 mL     HYDROmorphone (DILAUDID) injection 1 mg     oxyCODONE-acetaminophen (PERCOCET) 5-325 mg per tablet 1 tablet     lisinopril (ZESTRIL) tablet 10 mg     ALPRAZolam (XANAX) tablet 2 mg     cefTRIAXone (ROCEPHIN) IVPB (premix in dextrose) 1,000 mg 50 mL     sodium chloride 0 9 % infusion        Keyon Barker DO      This progress note was produced in part using a dictation device which may document imprecise wording from author's original intent

## 2022-03-15 NOTE — PLAN OF CARE
Problem: PAIN - ADULT  Goal: Verbalizes/displays adequate comfort level or baseline comfort level  Description: Interventions:  - Encourage patient to monitor pain and request assistance  - Assess pain using appropriate pain scale (0-10 pain scale)  - Administer analgesics based on type and severity of pain and evaluate response  - Implement non-pharmacological measures as appropriate and evaluate response  - Consider cultural and social influences on pain and pain management  - Notify physician/advanced practitioner if interventions unsuccessful or patient reports new pain  Outcome: Progressing     Problem: INFECTION - ADULT  Goal: Absence or prevention of progression during hospitalization  Description: INTERVENTIONS:  - Assess and monitor for signs and symptoms of infection  - Monitor lab/diagnostic results  - Monitor all insertion sites, i e  indwelling lines  - Administer medications as ordered  - Instruct and encourage patient and family to use good hand hygiene technique  - Contact precautions  Outcome: Progressing     Problem: SAFETY ADULT  Goal: Patient will remain free of falls  Description: INTERVENTIONS:  - Educate patient/family on patient safety including physical limitations  - Instruct patient to call for assistance with activity (assist with walker)  - Consult OT/PT to assist with strengthening/mobility   - Keep Call bell within reach  - Keep bed low and locked with side rails adjusted as appropriate  - Keep care items and personal belongings within reach  - Initiate and maintain comfort rounds  - Make Fall Risk Sign visible to staff (high fall risk)  - Offer Toileting every 1-2 Hours, in advance of need  - Initiate/Maintain bed/chair alarm  - Obtain necessary fall risk management equipment: nonskid footwear, fall bracelet, alarms  - Apply yellow socks and bracelet for high fall risk patients  - Consider moving patient to room near nurses station  Outcome: Progressing  Goal: Maintain or return to baseline ADL function  Description: INTERVENTIONS:  -  Assess patient's ability to carry out ADLs; (min to mod assist with adls)  - Assess/evaluate cause of self-care deficits (fatigue, pain, limited movement, use of assistive device)  - Assess range of motion  - Assess patient's mobility; (assist and walker)  - Assess patient's need for assistive devices and provide as appropriate  - Encourage maximum independence but intervene and supervise when necessary  - Involve family in performance of ADLs  - Assess for home care needs following discharge   - Consider OT consult to assist with ADL evaluation and planning for discharge  - Provide patient education as appropriate  Outcome: Progressing  Goal: Maintains/Returns to pre admission functional level  Description: INTERVENTIONS:  - Perform BMAT or MOVE assessment daily    - Set and communicate daily mobility goal to care team and patient/family/caregiver  - Collaborate with rehabilitation services on mobility goals if consulted  - Perform Range of Motion 3-4 times a day  - Reposition patient every 2 hours    - Dangle patient 3 times a day  - Stand patient 3 times a day  - Ambulate patient 3 times a day  - Out of bed to chair 3 times a day   - Out of bed for meals 3 times a day  - Out of bed for toileting  - Record patient progress and toleration of activity level   Outcome: Progressing     Problem: DISCHARGE PLANNING  Goal: Discharge to home or other facility with appropriate resources  Description: INTERVENTIONS:  - Identify barriers to discharge w/patient and caregiver  - Arrange for needed discharge resources and transportation as appropriate  - Identify discharge learning needs (meds, wound care, etc )  - Refer to Case Management Department for coordinating discharge planning if the patient needs post-hospital services based on physician/advanced practitioner order or complex needs related to functional status, cognitive ability, or social support system  Outcome: Progressing     Problem: Knowledge Deficit  Goal: Patient/family/caregiver demonstrates understanding of disease process, treatment plan, medications, and discharge instructions  Description: Complete learning assessment and assess knowledge base    Interventions:  - Provide teaching at level of understanding  - Provide teaching via preferred learning methods  Outcome: Progressing     Problem: GASTROINTESTINAL - ADULT  Goal: Maintains adequate nutritional intake  Description: INTERVENTIONS:  - Monitor percentage of each meal consumed  - Identify factors contributing to decreased intake, treat as appropriate  - Assist with meals as needed  - Monitor I&O, weight, and lab values if indicated  - Obtain nutrition services referral as needed  Outcome: Progressing  Goal: Establish and maintain optimal ostomy function  Description: INTERVENTIONS:  - Assess bowel function  - Encourage oral fluids to ensure adequate hydration  - Administer IV fluids if ordered to ensure adequate hydration   - Administer ordered medications as needed  - Encourage mobilization and activity  - Nutrition services referral to assist patient with appropriate food choices  - Assess stoma site  - Consider wound care consult (ordered)  Outcome: Progressing     Problem: METABOLIC, FLUID AND ELECTROLYTES - ADULT  Goal: Electrolytes maintained within normal limits  Description: INTERVENTIONS:  - Monitor labs and assess patient for signs and symptoms of electrolyte imbalances  - Administer electrolyte replacement as ordered  - Monitor response to electrolyte replacements, including repeat lab results as appropriate  - Instruct patient on fluid and nutrition as appropriate  Outcome: Progressing  Goal: Fluid balance maintained  Description: INTERVENTIONS:  - Monitor labs   - Monitor I/O and WT  - Instruct patient on fluid and nutrition as appropriate  - Assess for signs & symptoms of volume excess or deficit  Outcome: Progressing Problem: SKIN/TISSUE INTEGRITY - ADULT  Goal: Skin Integrity remains intact(Skin Breakdown Prevention)  Description: Assess:  -Perform Surendra assessment every shift  -Clean and moisturize skin every shift  -Inspect skin when repositioning, toileting, and assisting with ADLS  -Assess extremities for adequate circulation and sensation     Bed Management:  -Have minimal linens on bed & keep smooth, unwrinkled  -Change linens as needed when moist or perspiring  -Avoid sitting or lying in one position for more than 2 hours while in bed  -Keep HOB at 30 degrees     Toileting:  -Offer bedside commode  -Assess for incontinence every 2 hours and prn  -Use incontinent care products after each incontinent episode     Activity:  -Mobilize patient 3 times a day  -Encourage activity and walks on unit  -Encourage or provide ROM exercises   -Turn and reposition patient every 2 Hours  -Use appropriate equipment to lift or move patient in bed  -Instruct/ Assist with weight shifting every 2 hours when out of bed in chair  -Consider limitation of chair time 4 hour intervals    Skin Care:  -Avoid use of baby powder, tape, friction and shearing, hot water or constrictive clothing  -Relieve pressure over bony prominences   -Do not massage red bony areas    Next Steps:  -Teach patient strategies to minimize risks   -Consider consults to  interdisciplinary teams such as nutrition, wound care, pt ot  Outcome: Progressing     Problem: Potential for Falls  Goal: Patient will remain free of falls  Description: INTERVENTIONS:  - Educate patient/family on patient safety including physical limitations  - Instruct patient to call for assistance with activity (assist with walker)  - Consult OT/PT to assist with strengthening/mobility   - Keep Call bell within reach  - Keep bed low and locked with side rails adjusted as appropriate  - Keep care items and personal belongings within reach  - Initiate and maintain comfort rounds  - Make Fall Risk Sign visible to staff (high fall risk)  - Offer Toileting every 1-2 Hours, in advance of need  - Initiate/Maintain bed/chair alarm  - Obtain necessary fall risk management equipment: nonskid footwear, fall bracelet, alarms  - Apply yellow socks and bracelet for high fall risk patients  - Consider moving patient to room near nurses station  Outcome: Progressing     Problem: Prexisting or High Potential for Compromised Skin Integrity  Goal: Skin integrity is maintained or improved  Description: INTERVENTIONS:  - Identify patients at risk for skin breakdown  - Assess and monitor skin integrity  - Assess and monitor nutrition and hydration status  - Monitor labs   - Assess for incontinence (every 2 hours and prn)  - Turn and reposition patient  - Assist with mobility/ambulation (assist x1 and walker)  - Relieve pressure over bony prominences  - Avoid friction and shearing  - Provide appropriate hygiene as needed including keeping skin clean and dry  - Evaluate need for skin moisturizer/barrier cream  - Collaborate with interdisciplinary team   - Patient/family teaching  - Consider wound care consult (ordered)  Outcome: Progressing     Problem: MOBILITY - ADULT  Goal: Maintain or return to baseline ADL function  Description: INTERVENTIONS:  -  Assess patient's ability to carry out ADLs; (min to mod assist with adls)  - Assess/evaluate cause of self-care deficits (fatigue, pain, limited movement, use of assistive device)  - Assess range of motion  - Assess patient's mobility; (assist and walker)  - Assess patient's need for assistive devices and provide as appropriate  - Encourage maximum independence but intervene and supervise when necessary  - Involve family in performance of ADLs  - Assess for home care needs following discharge   - Consider OT consult to assist with ADL evaluation and planning for discharge  - Provide patient education as appropriate  Outcome: Progressing  Goal: Maintains/Returns to pre admission functional level  Description: INTERVENTIONS:  - Perform BMAT or MOVE assessment daily    - Set and communicate daily mobility goal to care team and patient/family/caregiver  - Collaborate with rehabilitation services on mobility goals if consulted  - Perform Range of Motion 3-4 times a day  - Reposition patient every 2 hours  - Dangle patient 3 times a day  - Stand patient 3 times a day  - Ambulate patient 3 times a day  - Out of bed to chair 3 times a day   - Out of bed for meals 3 times a day  - Out of bed for toileting  - Record patient progress and toleration of activity level   Outcome: Progressing     Problem: Nutrition/Hydration-ADULT  Goal: Nutrient/Hydration intake appropriate for improving, restoring or maintaining nutritional needs  Description: Monitor and assess patient's nutrition/hydration status for malnutrition  Collaborate with interdisciplinary team and initiate plan and interventions as ordered  Monitor patient's weight and dietary intake as ordered or per policy  Utilize nutrition screening tool and intervene as necessary  Determine patient's food preferences and provide high-protein, high-caloric foods as appropriate       INTERVENTIONS:  - Monitor oral intake, urinary output, labs, and treatment plans  - Assess nutrition and hydration status and recommend course of action  - Evaluate amount of meals eaten  - Assist patient with eating if necessary   - Allow adequate time for meals  - Recommend/ encourage appropriate diets, oral nutritional supplements, and vitamin/mineral supplements  - Order, calculate, and assess calorie counts as needed  - Recommend, monitor, and adjust tube feedings and TPN/PPN based on assessed needs  - Assess need for intravenous fluids  - Provide specific nutrition/hydration education as appropriate  - Include patient/family/caregiver in decisions related to nutrition  Outcome: Progressing

## 2022-03-15 NOTE — MALNUTRITION/BMI
This medical record reflects one or more clinical indicators suggestive of malnutrition and/or morbid obesity  Malnutrition Findings:   Adult Malnutrition type: Chronic illness  Adult Degree of Malnutrition: Other severe protein calorie malnutrition  Malnutrition Characteristics: Inadequate energy,Weight loss                  360 Statement: severe PCM r/t significant wt loss of 12% x 1 month and 20% x 1 year with decrease appetite and suspected inadequate po intake for months due to nausea with recent dx of adenocarcinoma  Treated with regular diet; patient refuses supplements  BMI Findings:  Adult BMI Classifications: Morbid Obesity 50-59 9        Body mass index is 50 24 kg/m²  See Nutrition note dated 3/14/2022 for additional details  Completed nutrition assessment is viewable in the nutrition documentation

## 2022-03-15 NOTE — ASSESSMENT & PLAN NOTE
Adenocarcinoma:  Status post interval sigmoid resection and Payal colostomy  CT abdomen shows soft tissue thickening around the stump ? possibility of residual tumor versus postsurgical changes  Patient complains of severe pain in the lower abdomen, don't have pain medication at home  Pathology report: [id number] adenocarcinoma of sigmoid colon with positive margins, 10 of 18 LN positive  Plan:  Follow up with Oncology on outpatient basis for CT/RT  P r n   Pain medication

## 2022-03-15 NOTE — WOUND OSTOMY CARE
Progress Note- Ostomy  Dexter Lara 60 y o  female  [id number]  [address]     Patient admitted 3/11/22 for acute kidney injury,s/p end colostomy on 1/16/22  PHM significant for hidradenitis,lymphedema,Morbid obesity yeast dermatitis,urinary tract infection  Seen today for initial colostomy teaching this admission  at [address]  Seen for initial ostomy education 1/20/22 at [facility]    Patient states son measures and applies pouch at home  Patient is able to empty pouch when 1/3 full  Patient states she has had about 3 to 4 appliance and pouch changes since her discharge and these have been applied successfully remaining intact     Patient prefers 4 inch Convatec Esteem reference # [id number] and 4 inch Cohesive Chayo ring  Stoma is inverted and oval shaped with peristomal area 5 5cm x 3cm  Peristomal skin is no longer excoriated, skin is dry and intact  Patient and son  observed change and care  Peristomal skin cleansed with warm water, stoma measured with pattern, one piece 4 inch pouch cut to fit  Skin barrier applied allowed to air dry, lower aspect of peristomal lightly dusted with stomahesive powder at patients request     Pouch applied with 4 inch Ekin ring molded to size  Pt applied warmth to secure to abdomen  Patient directed care and did participate with hands on   She did look at stoma often which is an improvement for her from initial teaching sessions  Will place call to [facility] for initial supplies for home           Wound 02/21/22 Stoma Abdomen Lower; Left (Active)   Wound Image   03/15/22 1711   Wound Description Dry; Intact;Fragile;Pink 03/15/22 1711   Wound Length (cm) 5 5 cm 03/15/22 1711   Wound Width (cm) 3 cm 03/15/22 1711   Wound Surface Area (cm^2) 16 5 cm^2 03/15/22 1711   Treatments Cleansed;Site care 03/15/22 1711   Dressing Protective barrier 03/15/22 1711   Dressing Changed Other (Comment) 03/15/22 1711   Patient Tolerance Tolerated well 03/15/22 1711   Dressing Status Clean;Dry; Intact 03/15/22 1711   Number of days: 22         Skin care plans:  1-Hydraguard to bilateral sacrum, buttock and heels BID and PRN  2-Elevate heels to offload pressure  3-Ehob cushion in chair when out of bed  4-Moisturize skin daily with skin nourishing cream   5-Turn/reposition q2h or when medically stable for pressure re-distribution on skin     6-Patient currently using 4 inch Esteem Convatec brand stoma pouch reference number [id number] and Chayo 4 inch ring Cohesive reference number [id number]      Call or tigertext with any questions  Wound Care will continue to follow      Rogelia Gitelman

## 2022-03-15 NOTE — ASSESSMENT & PLAN NOTE
UA positive with pending cultures, currently afebrile, denies any symptoms  Urine Culture positive for Klebsiella aerogenes  DC'd Ceftriaxone on 03/13   Antibiotics narrowed down to Cefepime based on the culture sensitivity  ID consulted: appreciate recommendations      Plan:  Cefepime day 3/3

## 2022-03-15 NOTE — UTILIZATION REVIEW
Continued Stay Review    Date:     3-15-22                  Current Patient Class:  Inpatient  Current Level of Care: med surg    HPI:56 y o  female initially admitted on  3-11-22 recently diagnosed with colon cancer, status post Loral Ditto procedure and right ureteral stent placement, admitted secondary to development of ZAHIRA as well as mild left-sided hydronephrosis    Assessment/Plan:   Creatinine c slightly worsened with discontinuation of IV fluids   Patient's baseline appears to be at approximately 0 9-1, presented with a value of 2 2, had improved to 1 7 but now it 1 84 this morning  GFR 29   Ct shows right nephroureteral stent and mild left hydroureteronephrosis similar to prior not likely contributing to creatinine levels  Note poor po intake  Nephrology also notes proteinuria recommending s electrophoresis to rule out monoclonal gammopatthy, which was identified in the past     Continue renal dosed v cefepime for UTI  Iv  9% ns resumed 75/hr  Received iv dilaudid at [address] [place] [address], [place] [address], [facility] then discontinued          Vital Signs:     Date/Time Temp Pulse Resp BP MAP (mmHg) SpO2 O2 Device   03/15/22 15:02:37 98 4 °F (36 9 °C) 89 17 125/73 90 94 % None (Room air)   03/15/22 07:17:43 98 5 °F (36 9 °C) 95 19 128/73 91 92 % --   03/14/22 23:33:23 97 9 °F (36 6 °C) 84 17 131/72 92 93 % None (Room air)   03/14/22 1950 -- -- -- -- -- -- None (Room air)   03/14/22 15:44:04 -- 84 18 128/73 91 94 % --   03/14/22 15:33:50 97 9 °F (36 6 °C) 85 18 -- -- 96 % --   03/14/22 07:14:01 -- 96 18 127/84 98 98 % --         Date/Time Weight Weight Method   03/15/22 0600 153 kg (337 lb 11 9 oz) Abnormal  Standing scale   03/11/22 1815 146 kg (320 lb 12 3 oz) Abnormal  Bed scale   03/11/22 16:10:35 146 kg (320 lb 12 3 oz) Abnormal  Bed scale   03/11/22 0935 156 kg (343 lb 4 1 oz) Abnormal  Bed scale             Pertinent Labs/Diagnostic Results:       CT ABDOMEN AND PELVIS WITHOUT IV CONTRAST   3-15-22   INDICATION:     Left sided Hydronephrosis  1   Postsurgical changes from sigmoid resection and Payal's 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  Again noted soft tissue thickening of the rectal stump extending to the uterus which may be postsurgical, inflammatory or   due to residual/recurrent tumor  Evaluation limited due to lack of IV and oral contrast      2  Right nephroureteral stent in place  Similar mild right hydroureteronephrosis compared to prior CT  Mild left hydroureteronephrosis to a lesser degree is also similar to prior      3   Mildly enlarged pelvic lymph nodes as above similar to 3/11/2022 but enlarged since 1/15/2022  Metastasis not excluded      4  Nodular thickening in the left vaginal region measuring 1 7 x 1 6 cm which is not seen on CT 1/15/2022  Cannot exclude tumor      5  There appears to be focal thickening of the posterior superior bladder wall  Evaluation is limited  Results from last 7 days   Lab Units 03/15/22  0616 03/14/22  0517 03/13/22  0446 03/12/22  0525 03/12/22  0525 03/11/22  1021 03/11/22  1021   WBC Thousand/uL 8 81 8 09 11 79*   < > 11 77*   < > 10 04   HEMOGLOBIN g/dL 9 4* 9 6* 9 8*  --  8 8*  --  9 1*   HEMATOCRIT % 29 7* 30 8* 31 5*  --  29 1*  --  29 8*   PLATELETS Thousands/uL 338 341 420*   < > 379   < > 365   NEUTROS ABS Thousands/µL 5 46 4 75 7 83*  --   --    < > 6 67    < > = values in this interval not displayed           Results from last 7 days   Lab Units 03/15/22  0617 03/14/22  0517 03/13/22  0446 03/12/22  0525 03/11/22  1021   SODIUM mmol/L 138 136 136 138 138   POTASSIUM mmol/L 3 9 4 4 4 5 4 8 4 2   CHLORIDE mmol/L 102 102 102 104 104   CO2 mmol/L 28 27 26 26 30   ANION GAP mmol/L 8 7 8 8 4   BUN mg/dL 14 17 21 22 19   CREATININE mg/dL 1 84* 1 72* 1 91* 2 03* 2 25*   EGFR ml/min/1 73sq m 29 32 28 26 23   CALCIUM mg/dL 8 6 8 8 8 8 8 3 8 4   MAGNESIUM mg/dL 2 1 2 0 1 9 2 0 2 0   PHOSPHORUS mg/dL  --  4 4 4 7* 5 6*  --      Results from last 7 days   Lab Units 03/14/22  0517 03/13/22  0446 03/12/22  0525 03/11/22  1021   AST U/L 9 11 12 10   ALT U/L 15 13 12 14   ALK PHOS U/L 90 109 89 100   TOTAL PROTEIN g/dL 7 0 7 0 6 4 6 5   ALBUMIN g/dL 2 5* 2 5* 2 3* 2 4*   TOTAL BILIRUBIN mg/dL 0 24 0 27 0 29 0 24   BILIRUBIN DIRECT mg/dL  --   --   --  0 06         Results from last 7 days   Lab Units 03/15/22  0617 03/14/22  0517 03/13/22  0446 03/12/22  0525 03/11/22  1021   GLUCOSE RANDOM mg/dL 102 106 84 115 99       Results from last 7 days   Lab Units 03/11/22  1157   PH ART  7 390   PCO2 ART mm Hg 43 7   PO2 ART mm Hg 75 5   HCO3 ART mmol/L 25 9   BASE EXC ART mmol/L 0 7   O2 CONTENT ART mL/dL 13 0*   O2 HGB, ARTERIAL % 93 7*   ABG SOURCE  Radial, Right             Results from last 7 days   Lab Units 03/13/22  0446   CK TOTAL U/L 8*                 Results from last 7 days   Lab Units 03/11/22  1021   TSH 3RD GENERATON uIU/mL 1 092     Results from last 7 days   Lab Units 03/14/22  0517 03/13/22  0446 03/12/22  0525   PROCALCITONIN ng/ml 0 18 0 19 0 23     Results from last 7 days   Lab Units 03/12/22  0525 03/11/22  1021   LACTIC ACID mmol/L 0 8 1 3       Results from last 7 days   Lab Units 03/14/22  0517   FERRITIN ng/mL 83         Results from last 7 days   Lab Units 03/11/22  1021   LIPASE u/L 139                 Results from last 7 days   Lab Units 03/13/22  1400 03/11/22  1431   CLARITY UA   --  Cloudy   COLOR UA   --  Yellow   SPEC GRAV UA   --  <=1 005   PH UA   --  6 0   GLUCOSE UA mg/dl  --  Negative   KETONES UA mg/dl  --  Negative   BLOOD UA   --  Moderate*   PROTEIN UA mg/dl  --  Trace*   NITRITE UA   --  Positive*   BILIRUBIN UA   --  Negative   UROBILINOGEN UA E U /dl  --  0 2   LEUKOCYTES UA   --  Large*   WBC UA /hpf  --  Innumerable*   RBC UA /hpf  --  10-20*   BACTERIA UA /hpf  --  Occasional   EPITHELIAL CELLS WET PREP /hpf  --  Occasional   SODIUM UR  67  --    CREATININE UR mg/dL 57 3  --        Results from last 7 days   Lab Units 03/11/22  1431 03/11/22  1031 03/11/22  1021   BLOOD CULTURE   --  No Growth After 4 Days  No Growth After 4 Days  URINE CULTURE  >100,000 cfu/ml Klebsiella aerogenes*  --   --                  Medications:   Scheduled Medications:  cefepime, 1,000 mg, Intravenous, Q12H  docusate sodium, 100 mg, Oral, BID  enoxaparin, 60 mg, Subcutaneous, BID  metoprolol tartrate, 25 mg, Oral, BID  nicotine, 1 patch, Transdermal, Daily  nystatin, , Topical, BID      Continuous IV Infusions:  sodium chloride, 75 mL/hr, Intravenous, Continuous      PRN Meds:  acetaminophen, 650 mg, Oral, Q6H PRN  albuterol, 2 puff, Inhalation, Q4H PRN  albuterol, 2 5 mg, Nebulization, Q4H PRN  ALPRAZolam, 0 5 mg, Oral, 4x Daily PRN  aluminum-magnesium hydroxide-simethicone, 30 mL, Oral, Q6H PRN  HYDROmorphone, 1 mg, Intravenous, Q4H PRN   Or  oxyCODONE, 5 mg, Oral, Q4H PRN  ondansetron, 4 mg, Intravenous, Q6H PRN        Discharge Plan: to be determined     Network Utilization Review Department  ATTENTION: Please call with any questions or concerns to [phone] and carefully listen to the prompts so that you are directed to the right person  All voicemails are confidential   Brennen Wu all requests for admission clinical reviews, approved or denied determinations and any other requests to dedicated fax number below belonging to the campus where the patient is receiving treatment   List of dedicated fax numbers for the Facilities:  [address] DENIALS (Administrative/Medical Necessity) [phone]   [address] (Maternity/NICU/Pediatrics) [phone]   [address] 40 125 [facility]  [address] [facility] Jacqueline Tom [address]   [address] [address] [place] [address] Marcy Cormier [address] [place] [id number] [phone]

## 2022-03-15 NOTE — ASSESSMENT & PLAN NOTE
CT abdomen shows left-sided hydronephrosis  Status post ureteral stent on right side for right-sided hydronephrosis which is improved compared to previous imaging  No urinary retention, post void scan 0ml  Urology on consult: appreciate recommendations       - S/p bilateral ureteral stent at [facility], left ureteral stent removed  As per urology note, patient scheduled for in office right          ureteral stent removal 1 week ago, did not go because she is not feeling well  - CT abdomen, pelvis with renal low dose radiation without contrast to evaluate left hydronephrosis, if creatinine remains       Elevated      Plan:  Ct abdomen, pelvis wo contrast   Right ureteral stent removal on out patient basis

## 2022-03-16 PROBLEM — E43 SEVERE PROTEIN-CALORIE MALNUTRITION (HCC): Status: ACTIVE | Noted: 2022-01-01

## 2022-03-16 NOTE — ASSESSMENT & PLAN NOTE
UA positive with pending cultures, currently afebrile, denies any symptoms  Urine Culture positive for Klebsiella aerogenes  DC'd Ceftriaxone on 03/13   Antibiotics narrowed down to Cefepime based on the culture sensitivity  Id with recommendations for 3 day course of cefepime  To be concluded today

## 2022-03-16 NOTE — PROGRESS NOTES
[facility] [address] [place]  Progress Note - Swati Castro 1965, 60 y o  female MRN: [id number]  Unit/Bed#: 424-01 Encounter: [id number]  Primary Care Provider: Robert Oquendo PA-C   Date and time admitted to hospital: 3/11/2022  9:34 AM    * ZAHIRA (acute kidney injury) ([facility] [place] [address] )  Assessment & Plan  Creatinine elevated at 2 25, baseline of approximately 1  Increased Sr  Cr today at 1 84 (1 68<< 1 84<< 1 7 << 1 91<< 2 03<< 2 25)      Plan:  · Continue IV fluids at low rate for rehydration  · Nephrology on consult: appreciate recommendations         -  Loop diuretics PRN for volume overload         - Continue to hold lisinopril  · Follow BMP    Severe protein-calorie malnutrition (HCC)  Assessment & Plan  Malnutrition Findings:   Adult Malnutrition type: Chronic illness  Adult Degree of Malnutrition: Other severe protein calorie malnutrition  Malnutrition Characteristics: Inadequate energy,Weight loss              Patient with significantly poor oral intake since time of surgery  Stressed importance of adequate nutritional intake  Dietitian consulted as well  Continue nutritional supplements, continue to urge patient for improved intake  If decreased oral intake continues may need to consider PEG tube placement verses TPN via PICC line  360 Statement: severe PCM r/t significant wt loss of 12% x 1 month and 20% x 1 year with decrease appetite and suspected inadequate po intake for months due to nausea with recent dx of adenocarcinoma  Treated with regular diet; patient refuses supplements  BMI Findings:  Adult BMI Classifications: Morbid Obesity 50-59 9        Body mass index is 53 28 kg/m²  Anemia  Assessment & Plan  Hb 9 1 on admission  No active source of bleeding    clinically asymptomatic  Hb improving without any intervention   Follow Hb trend      UTI (urinary tract infection)  Assessment & Plan  UA positive with pending cultures, currently afebrile, denies any symptoms  Urine Culture positive for Klebsiella aerogenes  DC'd Ceftriaxone on 03/13   Antibiotics narrowed down to Cefepime based on the culture sensitivity  Id with recommendations for 3 day course of cefepime  To be concluded today  S/P colostomy [facility])  Assessment & Plan  Patient was treated for adenocarcinoma colon, s/p Payal's colostomy  Had issues with colostomy bag  Wound care consult  Prn pain medication    Hydroureter  Assessment & Plan  CT abdomen shows left-sided hydronephrosis  Status post ureteral stent on right side for right-sided hydronephrosis which is improved compared to previous imaging  No urinary retention, post void scan 0ml  Urology on consult: appreciate recommendations       - S/p bilateral ureteral stent at [facility], left ureteral stent removed  As per urology note, patient scheduled for in office right ureteral stent removal 1 week ago, did not go because she was not feeling well  Repeat CT with:     Right nephroureteral stent in place   Similar mild right hydroureteronephrosis compared to prior CT   Mild left hydroureteronephrosis to a lesser degree is also similar to prior  3   Mildly enlarged pelvic lymph nodes as above similar to 3/11/2022 but enlarged since 1/15/2022   Metastasis not excluded  Plan:  Right ureteral stent removal on out patient basis        Colon adenocarcinoma [facility])  Assessment & Plan  Adenocarcinoma:  Status post interval sigmoid resection and Payal colostomy  CT abdomen shows soft tissue thickening around the stump ? possibility of residual tumor versus postsurgical changes  Patient complains of severe pain in the lower abdomen, continued and unchanged since time of surgery  Pathology report: [id number] adenocarcinoma of sigmoid colon with positive margins, 10 of 18 LN positive  Plan:  Follow up with Oncology on outpatient basis for CT/RT  P r n  Pain medication  Per discussion with surgery, pain may be secondary to adhesions    No evidence of parastomal hernia by review of CT  Essential hypertension  Assessment & Plan  Blood pressure at goal  Continue home metoprolol  lisinopril on hold because of ZAHIRA      VTE Pharmacologic Prophylaxis: VTE Score: 11 High Risk (Score >/= 5) - Pharmacological DVT Prophylaxis Ordered: enoxaparin (Lovenox)  Sequential Compression Devices Ordered  Patient Centered Rounds: I performed bedside rounds with nursing staff today  Discussions with Specialists or Other Care Team Provider: Urology    Time Spent for Care: 30 minutes  More than 50% of total time spent on counseling and coordination of care as described above  Current Length of Stay: 5 day(s)  Current Patient Status: Inpatient   Certification Statement: The patient will continue to require additional inpatient hospital stay due to Continue treatment of ZAHIRA  Discharge Plan: Anticipate discharge in 48-72 hrs to home  Code Status: Level 3 - DNAR and DNI    Subjective:   Patient reports pain is better controlled  Still with poor oral intake  Creatinine was slight improvement today by labs  No overnight events reported  Remains afebrile  Objective:     Vitals:   Temp (24hrs), Av 5 °F (36 9 °C), Min:98 1 °F (36 7 °C), Max:98 8 °F (37 1 °C)    Temp:  [98 1 °F (36 7 °C)-98 8 °F (37 1 °C)] 98 8 °F (37 1 °C)  HR:  [80-85] 80  Resp:  [16-20] 16  BP: (123-124)/(61-85) 123/61  SpO2:  [93 %-96 %] 93 %  Body mass index is 53 28 kg/m²  Input and Output Summary (last 24 hours): Intake/Output Summary (Last 24 hours) at 3/16/2022 1539  Last data filed at 3/16/2022 1219  Gross per 24 hour   Intake 720 ml   Output 225 ml   Net 495 ml       Physical Exam:   Physical Exam  Constitutional:       General: She is not in acute distress  Appearance: Normal appearance  She is obese  She is ill-appearing  She is not toxic-appearing  Comments: Chronically ill-appearing     HENT:      Mouth/Throat:      Mouth: Mucous membranes are moist    Cardiovascular:      Rate and Rhythm: Normal rate and regular rhythm  Heart sounds: No murmur heard  No gallop  Pulmonary:      Effort: Pulmonary effort is normal  No respiratory distress  Breath sounds: Normal breath sounds  No wheezing, rhonchi or rales  Abdominal:      General: Abdomen is flat  Bowel sounds are normal  There is no distension  Palpations: Abdomen is soft  Tenderness: There is abdominal tenderness  There is no guarding or rebound  Comments: Obese in contour  Colostomy in place and noted, stoma appears intact and functioning  Tenderness surrounding the ostomy site  Musculoskeletal:         General: No swelling or tenderness  Skin:     General: Skin is warm and dry  Neurological:      General: No focal deficit present  Mental Status: She is alert and oriented to person, place, and time  Psychiatric:         Mood and Affect: Mood normal          Behavior: Behavior normal        Additional Data:     Labs:  Results from last 7 days   Lab Units 03/16/22  0535   WBC Thousand/uL 7 97   HEMOGLOBIN g/dL 9 4*   HEMATOCRIT % 30 2*   PLATELETS Thousands/uL 331   NEUTROS PCT % 62   LYMPHS PCT % 20   MONOS PCT % 8   EOS PCT % 9*     Results from last 7 days   Lab Units 03/16/22  0535 03/15/22  0617 03/14/22  0517   SODIUM mmol/L 138   < > 136   POTASSIUM mmol/L 4 0   < > 4 4   CHLORIDE mmol/L 102   < > 102   CO2 mmol/L 25   < > 27   BUN mg/dL 12   < > 17   CREATININE mg/dL 1 68*   < > 1 72*   ANION GAP mmol/L 11   < > 7   CALCIUM mg/dL 8 5   < > 8 8   ALBUMIN g/dL  --   --  2 5*   TOTAL BILIRUBIN mg/dL  --   --  0 24   ALK PHOS U/L  --   --  90   ALT U/L  --   --  15   AST U/L  --   --  9   GLUCOSE RANDOM mg/dL 97   < > 106    < > = values in this interval not displayed                   Results from last 7 days   Lab Units 03/14/22  0517 03/13/22  0446 03/12/22  0525 03/11/22  1021   LACTIC ACID mmol/L  --   --  0 8 1 3   PROCALCITONIN ng/ml 0 18 0 19 0 23  --        Lines/Drains:  Invasive Devices  Report    Peripheral Intravenous Line            Peripheral IV 03/16/22 Right;Ventral (anterior) Forearm <1 day          Drain            Colostomy Other (comment) LUQ 58 days                Imaging: Reviewed radiology reports from this admission including: abdominal/pelvic CT    Recent Cultures (last 7 days):   Results from last 7 days   Lab Units 03/11/22  1431 03/11/22  1031 03/11/22  1021   BLOOD CULTURE   --  No Growth After 5 Days  No Growth After 5 Days  URINE CULTURE  >100,000 cfu/ml Klebsiella aerogenes*  --   --        Last 24 Hours Medication List:   Current Facility-Administered Medications   Medication Dose Route Frequency Provider Last Rate    acetaminophen  650 mg Oral Q6H PRN Pamela Craig Celso, DO      albuterol  2 puff Inhalation Q4H PRN Pamela Craig Ramona, DO      albuterol  2 5 mg Nebulization Q4H PRN Pamela Craig Ramona, DO      ALPRAZolam  0 5 mg Oral 4x Daily PRN Pamela Craig Celso, DO      aluminum-magnesium hydroxide-simethicone  30 mL Oral Q6H PRN Jaquelin Bautista MD      cefepime  1,000 mg Intravenous Q12H Pamela Craig Celso, DO 1,000 mg (03/16/22 0534)    docusate sodium  100 mg Oral BID Jaquelin Bautista MD      enoxaparin  60 mg Subcutaneous BID Jaquelin Bautista MD      ergocalciferol  50,000 Units Oral Weekly Ignacia Solomon DO      HYDROmorphone  1 mg Intravenous Q4H PRN MD Jose Guadalupe Jenkins oxyCODONE  5 mg Oral Q4H PRN Babita Tao MD      metoprolol tartrate  25 mg Oral BID Pamela Houston,       nicotine  1 patch Transdermal Daily Jaquelin Bautista MD      nystatin   Topical BID Jaquelin Bautista MD      ondansetron  4 mg Intravenous Q6H PRN Jaquelin Bautista MD      pantoprazole  40 mg Intravenous Q24H Benedict Perez MD          Today, Patient Was Seen By: Babita Tao MD    **Please Note: This note may have been constructed using a voice recognition system  **

## 2022-03-16 NOTE — PROGRESS NOTES
Progress Note - Nephrology   Fiorella Estes 60 y o  female MRN: [id number]  Unit/Bed#: 424-01 Encounter: [id number]    A/P:  1  Acute injury top of chronic kidney disease                will discontinue IV fluids once again today to see if the patient is able to keep up with her volume via that route  Continue avoid potential nephrotoxins in look to optimize other medical interventions if possible, please refer below regarding nutritional status  2  Chronic kidney disease stage 2 with baseline creatinine between 0 9-1 mg/dL  3  Proteinuria                negative for monoclonal gammopathy in the past, re-initiation of ACE-inhibitor when clinically appropriate, would continue to hold for now  4  Bilateral lower extremity edema with lymphedema                remains stable  5  Poor oral intake of nutrition   Patient was agreeable, after considerable discussion, to be placed on a high protein supplement liquid which she can put in to her coffee  She has tried ensure boost and other supplements in the past which she has not liked  6  Vitamin-D deficiency   Will add a high-dose vitamin-D supplement once a week, patient will need to be on this supplement for the next 12 months, reassessment of vitamin-D at that time  7  Urinary tract infection                continue treatment according to hospitalist, patient is on cefepime  8  Left hydroureter   Continue care according to our Urology colleagues recommendations      Follow up reason for today's visit:  Acute kidney injury/chronic kidney disease/proteinuria/edema    ZAHIRA (acute kidney injury) [facility])    Patient Active Problem List   Diagnosis    Lymphedema    Essential hypertension    Hidradenitis suppurativa    Chronic midline low back pain with left-sided sciatica    Other specified anxiety disorders    Lumbar paraspinal muscle spasm    Borderline hyperlipidemia    Iron deficiency    GERD (gastroesophageal reflux disease)    [facility]) ([facility] )    Cellulitis of abdominal wall, chest wall and groin with wounds    Diverticulitis of large intestine with perforation    Right Hydroureteronephrosis    Dysphagia    [facility] )    Open wnd anterior abdomen    Morbid obesity with BMI of 50 0-59 9, adult (HCC)    Ambulatory dysfunction    Nausea    Yeast dermatitis    Hydroureter    S/P colostomy ([facility] )    UTI (urinary tract infection)    Anemia    Hyperphosphatemia    Low HDL (under 40)    Hypertriglyceridemia         Subjective:   Patient is drinking and eating carbohydrate based foods but is having a difficult time eating protein based foods such as landa, for example  Patient becomes immediately nauseated and was as appetite with those types of foods  Objective:     Vitals: Blood pressure 124/85, pulse 85, temperature 98 1 °F (36 7 °C), temperature source Oral, resp  rate 20, height 5' 7" (1 702 m), weight (!) 154 kg (340 lb 2 7 oz), SpO2 96 %  ,Body mass index is 53 28 kg/m²  Weight (last 2 days)     Date/Time Weight    03/16/22 0600 154 (340 17)    03/15/22 0600 153 (337 75)            Intake/Output Summary (Last 24 hours) at 3/16/2022 1111  Last data filed at 3/16/2022 0813  Gross per 24 hour   Intake 540 ml   Output 625 ml   Net -85 ml     I/O last 3 completed shifts:   In: 530 [P O :480; IV Piggyback:50]  Out: 1150 [Urine:1150]         Physical Exam: /85 (BP Location: Left arm)   Pulse 85   Temp 98 1 °F (36 7 °C) (Oral)   Resp 20   Ht 5' 7" (1 702 m)   Wt (!) 154 kg (340 lb 2 7 oz)   SpO2 96%   BMI 53 28 kg/m²     General Appearance:    Alert, cooperative, no distress, appears stated age   Head:    Normocephalic, without obvious abnormality, atraumatic   Eyes:    Conjunctiva/corneas clear   Ears:    Normal external ears   Nose:   Nares normal, septum midline, mucosa normal, no drainage    or sinus tenderness   Throat:   Lips, mucosa, and tongue normal; teeth and gums normal   Neck:   Supple   Back: Symmetric, no curvature, ROM normal, no CVA tenderness   Lungs:     Clear to auscultation bilaterally, respirations unlabored   Chest wall:    No tenderness or deformity   Heart:    Regular rate and rhythm, S1 and S2 normal, no murmur, rub   or gallop   Abdomen:     Soft, non-tender, bowel sounds active   Extremities:   Extremities normal, atraumatic, no cyanosis, +2 to +3 bilateral pitting edema with lymphedema   Skin:   Skin color, texture, turgor normal, no rashes or lesions   Lymph nodes:   Cervical normal   Neurologic:   CNII-XII intact            Lab, Imaging and other studies: I have personally reviewed pertinent labs  CBC:   Lab Results   Component Value Date    WBC 7 97 03/16/2022    HGB 9 4 (L) 03/16/2022    HCT 30 2 (L) 03/16/2022    MCV 82 03/16/2022     03/16/2022    MCH 25 5 (L) 03/16/2022    MCHC 31 1 (L) 03/16/2022    RDW 17 7 (H) 03/16/2022    MPV 9 4 03/16/2022    NRBC 0 03/16/2022     CMP:   Lab Results   Component Value Date    K 4 0 03/16/2022     03/16/2022    CO2 25 03/16/2022    BUN 12 03/16/2022    CREATININE 1 68 (H) 03/16/2022    CALCIUM 8 5 03/16/2022    EGFR 33 03/16/2022         Results from last 7 days   Lab Units 03/16/22  0535 03/15/22  0617 03/14/22  0517 03/13/22  0446 03/13/22  0446 03/12/22  0525 03/12/22  0525   POTASSIUM mmol/L 4 0 3 9 4 4   < > 4 5   < > 4 8   CHLORIDE mmol/L 102 102 102   < > 102   < > 104   CO2 mmol/L 25 28 27   < > 26   < > 26   BUN mg/dL 12 14 17   < > 21   < > 22   CREATININE mg/dL 1 68* 1 84* 1 72*   < > 1 91*   < > 2 03*   CALCIUM mg/dL 8 5 8 6 8 8   < > 8 8   < > 8 3   ALK PHOS U/L  --   --  90  --  109  --  89   ALT U/L  --   --  15  --  13  --  12   AST U/L  --   --  9  --  11  --  12    < > = values in this interval not displayed           Phosphorus: No results found for: PHOS  Magnesium:   Lab Results   Component Value Date    MG 2 0 03/16/2022     Urinalysis: No results found for: DUC [place], MICHEL, AMADOU, NITRITE, PROTEINUA, GLUCOSEU, KETONESU, BILIRUBINUR, BLOODU  Ionized Calcium: No results found for: CAION  Coagulation: No results found for: PT, INR, APTT  Troponin: No results found for: TROPONINI  ABG: No results found for: PHART, ING4DAH, PO2ART, OCG2ZJR, Z1VWBVOY, BEART, SOURCE  Radiology review:     IMAGING  Procedure: CT abdomen pelvis wo contrast    Result Date: 3/15/2022  Narrative: CT ABDOMEN AND PELVIS WITHOUT IV CONTRAST INDICATION:   Left sided Hydronephrosis  COMPARISON:  CT abdomen pelvis 3/11/2022 TECHNIQUE:  CT examination of the abdomen and pelvis was performed without intravenous contrast   Axial, sagittal, and coronal 2D reformatted images were created from the source data and submitted for interpretation  Radiation dose length product (DLP) for this visit:  1990 33 mGy-cm   This examination, like all CT scans performed in the [facility], was performed utilizing techniques to minimize radiation dose exposure, including the use of iterative reconstruction and automated exposure control  Enteric contrast was not administered  FINDINGS: The lack of intravenous contrast limits evaluation of the viscera  LOWER CHEST:  No clinically significant abnormality identified in the visualized lower chest  LIVER/BILIARY TREE:  Marginal hypodensity at the duong hepatis similar to prior could be due to focal fat  GALLBLADDER:  Cholelithiasis without pericholecystic inflammatory changes  SPLEEN:  Unremarkable  PANCREAS:  Unremarkable  ADRENAL GLANDS:  Unremarkable  KIDNEYS/URETERS: Right nephroureteral stent, the proximal tip in the renal pelvis and the distal tip in the bladder  Similar mild right hydroureteronephrosis compared to prior CT  Mild left hydroureteronephrosis to a lesser degree is also similar to prior  STOMACH AND BOWEL:  Postsurgical changes from sigmoid resection and Payal's 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    Again noted soft tissue thickening of the rectal stump extending to the uterus which may be postsurgical, inflammatory or due to residual/recurrent tumor  Evaluation limited due to lack of IV and oral contrast  APPENDIX:  No findings to suggest appendicitis  Appendix is visualized  ABDOMINOPELVIC CAVITY: Persistent fat stranding in the pelvis  No ascites  No pneumoperitoneum  Mildly enlarged left common iliac lymph node measuring 1 4 x 1 2 cm (series 2 image 53), presacral node measuring 1 1 x 1 4 cm (series 2 image 61), and right pelvic sidewall node measuring 1 2 x 1 1 cm (series 2 image 69)  There are several additional para-aortic lymph nodes measuring up to 1 cm in short axis diameter  Left obturator node measuring 1 cm  These findings are similar to prior CT 3/11/2022 but enlarged since 1/15/2022  Metastasis is not excluded  VESSELS:  Unremarkable for patient's age  PELVIS REPRODUCTIVE ORGANS:  Soft tissue thickening of the rectal stump contiguous with the uterus as above which may be postsurgical, inflammatory or due to residual/recurrent tumor  Apparent nodular soft tissue thickening in the left vaginal region measuring  1 7 x 1 6 cm (series 2 image 75), which is not seen on CT 1/15/2022  Cannot exclude tumor  URINARY BLADDER:  There appears to be focal thickening of the posterior superior bladder wall (series 2 image 75, series 601 image 102)  ABDOMINAL WALL/INGUINAL REGIONS:  Foci of subcutaneous emphysema in the anterior abdominal wall may be due to subcutaneous injections  Postsurgical changes in the anterior abdominal wall  OSSEOUS STRUCTURES:  No acute fracture or destructive osseous lesion  Impression: 1  Postsurgical changes from sigmoid resection and Payal's 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  Again noted soft tissue thickening of the rectal stump extending to the uterus which may be postsurgical, inflammatory or due to residual/recurrent tumor    Evaluation limited due to lack of IV and oral contrast  2   Right nephroureteral stent in place  Similar mild right hydroureteronephrosis compared to prior CT  Mild left hydroureteronephrosis to a lesser degree is also similar to prior  3   Mildly enlarged pelvic lymph nodes as above similar to 3/11/2022 but enlarged since 1/15/2022  Metastasis not excluded  4   Nodular thickening in the left vaginal region measuring 1 7 x 1 6 cm which is not seen on CT 1/15/2022  Cannot exclude tumor  5   There appears to be focal thickening of the posterior superior bladder wall  Evaluation is limited  The study was marked in [facility] for immediate notification   Workstation performed: [id number]       Current Facility-Administered Medications   Medication Dose Route Frequency    acetaminophen (TYLENOL) tablet 650 mg  650 mg Oral Q6H PRN    albuterol (PROVENTIL HFA,VENTOLIN HFA) inhaler 2 puff  2 puff Inhalation Q4H PRN    albuterol inhalation solution 2 5 mg  2 5 mg Nebulization Q4H PRN    ALPRAZolam (XANAX) tablet 0 5 mg  0 5 mg Oral 4x Daily PRN    aluminum-magnesium hydroxide-simethicone (MYLANTA) oral suspension 30 mL  30 mL Oral Q6H PRN    cefepime (MAXIPIME) IVPB (premix in dextrose) 1,000 mg 50 mL  1,000 mg Intravenous Q12H    docusate sodium (COLACE) capsule 100 mg  100 mg Oral BID    enoxaparin (LOVENOX) subcutaneous injection 60 mg  60 mg Subcutaneous BID    ergocalciferol (VITAMIN D2) capsule 50,000 Units  50,000 Units Oral Weekly    HYDROmorphone (DILAUDID) injection 1 mg  1 mg Intravenous Q4H PRN    Or    oxyCODONE (ROXICODONE) IR tablet 5 mg  5 mg Oral Q4H PRN    metoprolol tartrate (LOPRESSOR) tablet 25 mg  25 mg Oral BID    nicotine (NICODERM CQ) 7 mg/24hr TD 24 hr patch 1 patch  1 patch Transdermal Daily    nystatin (MYCOSTATIN) powder   Topical BID    ondansetron (ZOFRAN) injection 4 mg  4 mg Intravenous Q6H PRN    sodium chloride 0 9 % infusion  75 mL/hr Intravenous Continuous     Medications Discontinued During This Encounter   Medication Reason    sertraline (ZOLOFT) 50 mg tablet Therapy completed    sodium chloride 0 9 % infusion     cefTRIAXone (ROCEPHIN) IVPB (premix in dextrose) 1,000 mg 50 mL     HYDROmorphone (DILAUDID) injection 1 mg     oxyCODONE-acetaminophen (PERCOCET) 5-325 mg per tablet 1 tablet     lisinopril (ZESTRIL) tablet 10 mg     ALPRAZolam (XANAX) tablet 2 mg     cefTRIAXone (ROCEPHIN) IVPB (premix in dextrose) 1,000 mg 50 mL     sodium chloride 0 9 % infusion     oxyCODONE (ROXICODONE) IR tablet 5 mg     HYDROmorphone (DILAUDID) injection 0 5 mg        Tigist Haney, DO      This progress note was produced in part using a dictation device which may document imprecise wording from author's original intent

## 2022-03-16 NOTE — ASSESSMENT & PLAN NOTE
Malnutrition Findings:   Adult Malnutrition type: Chronic illness  Adult Degree of Malnutrition: Other severe protein calorie malnutrition  Malnutrition Characteristics: Inadequate energy,Weight loss              Patient with significantly poor oral intake since time of surgery  Stressed importance of adequate nutritional intake  Dietitian consulted as well  Continue nutritional supplements, continue to urge patient for improved intake  If decreased oral intake continues may need to consider PEG tube placement verses TPN via PICC line  360 Statement: severe PCM r/t significant wt loss of 12% x 1 month and 20% x 1 year with decrease appetite and suspected inadequate po intake for months due to nausea with recent dx of adenocarcinoma  Treated with regular diet; patient refuses supplements  BMI Findings:  Adult BMI Classifications: Morbid Obesity 50-59 9        Body mass index is 53 28 kg/m²

## 2022-03-16 NOTE — ASSESSMENT & PLAN NOTE
Creatinine elevated at 2 25, baseline of approximately 1     Increased Sr  Cr today at 1 84 (1 68<< 1 84<< 1 7 << 1 91<< 2 03<< 2 25)      Plan:  · Continue IV fluids at low rate for rehydration  · Nephrology on consult: appreciate recommendations         -  Loop diuretics PRN for volume overload         - Continue to hold lisinopril  · Follow BMP

## 2022-03-16 NOTE — ASSESSMENT & PLAN NOTE
CT abdomen shows left-sided hydronephrosis  Status post ureteral stent on right side for right-sided hydronephrosis which is improved compared to previous imaging  No urinary retention, post void scan 0ml  Urology on consult: appreciate recommendations       - S/p bilateral ureteral stent at [facility], left ureteral stent removed  As per urology note, patient scheduled for in office right ureteral stent removal 1 week ago, did not go because she was not feeling well  Repeat CT with:     Right nephroureteral stent in place   Similar mild right hydroureteronephrosis compared to prior CT   Mild left hydroureteronephrosis to a lesser degree is also similar to prior  3   Mildly enlarged pelvic lymph nodes as above similar to 3/11/2022 but enlarged since 1/15/2022   Metastasis not excluded       Plan:  Right ureteral stent removal on out patient basis

## 2022-03-16 NOTE — PHYSICAL THERAPY NOTE
PHYSICAL THERAPY NOTE          Patient Name: Camron Johnson  FYROU'V Date: 3/16/2022     03/16/22 0843   Pain Assessment   Pain Assessment Tool 0-10   Pain Score 9   Pain Location/Orientation Location: Abdomen   Restrictions/Precautions   Weight Bearing Precautions Per Order No   Other Precautions   (Chair Alarm; Fall Risk; Pain)   General   Family/Caregiver Present No   Cognition   Overall Cognitive Status WFL   Following Commands Follows all commands and directions without difficulty   Subjective   Subjective Agreeable to therapy  c/o abdominal pain and swelling LE's   Transfers   Sit to Stand 5  Supervision   Additional items Assist x 1; Armrests; Increased time required   Stand to Sit 5  Supervision   Additional items Assist x 1; Armrests; Increased time required;Verbal cues   Stand pivot 5  Supervision   Additional items Verbal cues   Ambulation/Elevation   Gait pattern Wide KODY; Decreased foot clearance; Excessively slow   Gait Assistance 5  Supervision   Assistive Device Rolling walker   Distance 160'   Balance   Static Sitting Good   Dynamic Sitting Fair +   Static Standing Fair   Dynamic Standing Fair   Ambulatory Fair  (RW)   Endurance Deficit   Endurance Deficit Yes   Activity Tolerance   Activity Tolerance Patient limited by fatigue;Patient limited by pain   Exercises   Hip Flexion Sitting;20 reps   Hip Abduction Sitting;20 reps   Hip Adduction Sitting;20 reps   Knee AROM Long Arc Quad Sitting;20 reps   Ankle Pumps Sitting;20 reps   Assessment   Prognosis Good   Problem List Decreased strength;Decreased endurance; Impaired balance;Decreased mobility; Decreased safety awareness   Assessment Pt  seen for PT treatment session this date with interventions consisting of  therapeutic exercises, transfers and  gait training w/ emphasis on improving pt's ability to ambulate  Pt  Requiring occasional cues for sequence and safety   In comparison to previous session, Pt  With improvements in activity tolerance  Limited by pain and fatigue  Pt is in need of continued activity in PT to improve strength balance endurance mobility transfers and ambulation with return to maximize LOF  From PT/mobility standpoint, recommendation at time of d/c would be home [facility] re[facility] in order to promote return to PLOF and independence  The patient's AM-PAC Basic Mobility Inpatient Short Form Raw Score is 18  A Raw score of greater than 16 suggests the patient may benefit from discharge to home  Please also refer to physical therapy recommendation for safe DC planning  Goals   LTG Expiration Date 03/25/22   PT Treatment Day 16   Plan   Treatment/Interventions Functional transfer training;LE strengthening/ROM; Therapeutic exercise; Endurance training;Bed mobility;Gait training   Progress Progressing toward goals   Recommendation   PT Discharge Recommendation Home with home health rehabilitation   AM-PAC Basic Mobility Inpatient   Turning in Bed Without Bedrails 3   Lying on Back to Sitting on Edge of Flat Bed 3   Moving Bed to Chair 3   Standing Up From Chair 4   Walk in Room 3   Climb 3-5 Stairs 2   Basic Mobility Inpatient Raw Score 18   Basic Mobility Standardized Score 41 05   Highest Level Of Mobility   -[facility] Goal 6: Walk 10 steps or more   Education   Education Provided Mobility training   Patient Demonstrates acceptance/verbal understanding   End of Consult   Patient Position at End of Consult Bedside chair; All needs within reach   End of Consult Comments discussed POC with PT

## 2022-03-16 NOTE — ASSESSMENT & PLAN NOTE
Adenocarcinoma:  Status post interval sigmoid resection and Payal colostomy  CT abdomen shows soft tissue thickening around the stump ? possibility of residual tumor versus postsurgical changes  Patient complains of severe pain in the lower abdomen, continued and unchanged since time of surgery  Pathology report: [id number] adenocarcinoma of sigmoid colon with positive margins, 10 of 18 LN positive  Plan:  Follow up with Oncology on outpatient basis for CT/RT  P r n  Pain medication  Per discussion with surgery, pain may be secondary to adhesions  No evidence of parastomal hernia by review of CT

## 2022-03-16 NOTE — PLAN OF CARE
Problem: PAIN - ADULT  Goal: Verbalizes/displays adequate comfort level or baseline comfort level  Description: Interventions:  - Encourage patient to monitor pain and request assistance  - Assess pain using appropriate pain scale (0-10 pain scale)  - Administer analgesics based on type and severity of pain and evaluate response  - Implement non-pharmacological measures as appropriate and evaluate response  - Consider cultural and social influences on pain and pain management  - Notify physician/advanced practitioner if interventions unsuccessful or patient reports new pain  Outcome: Progressing     Problem: INFECTION - ADULT  Goal: Absence or prevention of progression during hospitalization  Description: INTERVENTIONS:  - Assess and monitor for signs and symptoms of infection  - Monitor lab/diagnostic results  - Monitor all insertion sites, i e  indwelling lines  - Administer medications as ordered  - Instruct and encourage patient and family to use good hand hygiene technique  - Contact precautions  Outcome: Progressing     Problem: SAFETY ADULT  Goal: Patient will remain free of falls  Description: INTERVENTIONS:  - Educate patient/family on patient safety including physical limitations  - Instruct patient to call for assistance with activity (assist with walker)  - Consult OT/PT to assist with strengthening/mobility   - Keep Call bell within reach  - Keep bed low and locked with side rails adjusted as appropriate  - Keep care items and personal belongings within reach  - Initiate and maintain comfort rounds  - Make Fall Risk Sign visible to staff (high fall risk)  - Offer Toileting every 1-2 Hours, in advance of need  - Initiate/Maintain bed/chair alarm  - Obtain necessary fall risk management equipment: nonskid footwear, fall bracelet, alarms  - Apply yellow socks and bracelet for high fall risk patients  - Consider moving patient to room near nurses station  Outcome: Progressing  Goal: Maintain or return to baseline ADL function  Description: INTERVENTIONS:  -  Assess patient's ability to carry out ADLs; (min to mod assist with adls)  - Assess/evaluate cause of self-care deficits (fatigue, pain, limited movement, use of assistive device)  - Assess range of motion  - Assess patient's mobility; (assist and walker)  - Assess patient's need for assistive devices and provide as appropriate  - Encourage maximum independence but intervene and supervise when necessary  - Involve family in performance of ADLs  - Assess for home care needs following discharge   - Consider OT consult to assist with ADL evaluation and planning for discharge  - Provide patient education as appropriate  Outcome: Progressing  Goal: Maintains/Returns to pre admission functional level  Description: INTERVENTIONS:  - Perform BMAT or MOVE assessment daily    - Set and communicate daily mobility goal to care team and patient/family/caregiver  - Collaborate with rehabilitation services on mobility goals if consulted  - Perform Range of Motion 3-4 times a day  - Reposition patient every 2 hours    - Dangle patient 3 times a day  - Stand patient 3 times a day  - Ambulate patient 3 times a day  - Out of bed to chair 3 times a day   - Out of bed for meals 3 times a day  - Out of bed for toileting  - Record patient progress and toleration of activity level   Outcome: Progressing     Problem: DISCHARGE PLANNING  Goal: Discharge to home or other facility with appropriate resources  Description: INTERVENTIONS:  - Identify barriers to discharge w/patient and caregiver  - Arrange for needed discharge resources and transportation as appropriate  - Identify discharge learning needs (meds, wound care, etc )  - Refer to Case Management Department for coordinating discharge planning if the patient needs post-hospital services based on physician/advanced practitioner order or complex needs related to functional status, cognitive ability, or social support system  Outcome: Progressing     Problem: Knowledge Deficit  Goal: Patient/family/caregiver demonstrates understanding of disease process, treatment plan, medications, and discharge instructions  Description: Complete learning assessment and assess knowledge base    Interventions:  - Provide teaching at level of understanding  - Provide teaching via preferred learning methods  Outcome: Progressing     Problem: GASTROINTESTINAL - ADULT  Goal: Maintains adequate nutritional intake  Description: INTERVENTIONS:  - Monitor percentage of each meal consumed  - Identify factors contributing to decreased intake, treat as appropriate  - Assist with meals as needed  - Monitor I&O, weight, and lab values if indicated  - Obtain nutrition services referral as needed  Outcome: Progressing  Goal: Establish and maintain optimal ostomy function  Description: INTERVENTIONS:  - Assess bowel function  - Encourage oral fluids to ensure adequate hydration  - Administer IV fluids if ordered to ensure adequate hydration   - Administer ordered medications as needed  - Encourage mobilization and activity  - Nutrition services referral to assist patient with appropriate food choices  - Assess stoma site  - Consider wound care consult (ordered)  Outcome: Progressing     Problem: METABOLIC, FLUID AND ELECTROLYTES - ADULT  Goal: Electrolytes maintained within normal limits  Description: INTERVENTIONS:  - Monitor labs and assess patient for signs and symptoms of electrolyte imbalances  - Administer electrolyte replacement as ordered  - Monitor response to electrolyte replacements, including repeat lab results as appropriate  - Instruct patient on fluid and nutrition as appropriate  Outcome: Progressing  Goal: Fluid balance maintained  Description: INTERVENTIONS:  - Monitor labs   - Monitor I/O and WT  - Instruct patient on fluid and nutrition as appropriate  - Assess for signs & symptoms of volume excess or deficit  Outcome: Progressing Problem: Potential for Falls  Goal: Patient will remain free of falls  Description: INTERVENTIONS:  - Educate patient/family on patient safety including physical limitations  - Instruct patient to call for assistance with activity (assist with walker)  - Consult OT/PT to assist with strengthening/mobility   - Keep Call bell within reach  - Keep bed low and locked with side rails adjusted as appropriate  - Keep care items and personal belongings within reach  - Initiate and maintain comfort rounds  - Make Fall Risk Sign visible to staff (high fall risk)  - Offer Toileting every 1-2 Hours, in advance of need  - Initiate/Maintain bed/chair alarm  - Obtain necessary fall risk management equipment: nonskid footwear, fall bracelet, alarms  - Apply yellow socks and bracelet for high fall risk patients  - Consider moving patient to room near nurses station  Outcome: Progressing     Problem: Prexisting or High Potential for Compromised Skin Integrity  Goal: Skin integrity is maintained or improved  Description: INTERVENTIONS:  - Identify patients at risk for skin breakdown  - Assess and monitor skin integrity  - Assess and monitor nutrition and hydration status  - Monitor labs   - Assess for incontinence (every 2 hours and prn)  - Turn and reposition patient  - Assist with mobility/ambulation (assist x1 and walker)  - Relieve pressure over bony prominences  - Avoid friction and shearing  - Provide appropriate hygiene as needed including keeping skin clean and dry  - Evaluate need for skin moisturizer/barrier cream  - Collaborate with interdisciplinary team   - Patient/family teaching  - Consider wound care consult (ordered)  Outcome: Progressing     Problem: MOBILITY - ADULT  Goal: Maintain or return to baseline ADL function  Description: INTERVENTIONS:  -  Assess patient's ability to carry out ADLs; (min to mod assist with adls)  - Assess/evaluate cause of self-care deficits (fatigue, pain, limited movement, use of assistive device)  - Assess range of motion  - Assess patient's mobility; (assist and walker)  - Assess patient's need for assistive devices and provide as appropriate  - Encourage maximum independence but intervene and supervise when necessary  - Involve family in performance of ADLs  - Assess for home care needs following discharge   - Consider OT consult to assist with ADL evaluation and planning for discharge  - Provide patient education as appropriate  Outcome: Progressing  Goal: Maintains/Returns to pre admission functional level  Description: INTERVENTIONS:  - Perform BMAT or MOVE assessment daily    - Set and communicate daily mobility goal to care team and patient/family/caregiver  - Collaborate with rehabilitation services on mobility goals if consulted  - Perform Range of Motion 3-4 times a day  - Reposition patient every 2 hours    - Dangle patient 3 times a day  - Stand patient 3 times a day  - Ambulate patient 3 times a day  - Out of bed to chair 3 times a day   - Out of bed for meals 3 times a day  - Out of bed for toileting  - Record patient progress and toleration of activity level   Outcome: Progressing     Problem: SKIN/TISSUE INTEGRITY - ADULT  Goal: Skin Integrity remains intact(Skin Breakdown Prevention)  Description: Assess:  -Perform Surendra assessment every shift  -Clean and moisturize skin every shift  -Inspect skin when repositioning, toileting, and assisting with ADLS  -Assess extremities for adequate circulation and sensation     Bed Management:  -Have minimal linens on bed & keep smooth, unwrinkled  -Change linens as needed when moist or perspiring  -Avoid sitting or lying in one position for more than 2 hours while in bed  -Keep HOB at 30 degrees     Toileting:  -Offer bedside commode  -Assess for incontinence every 2 hours and prn  -Use incontinent care products after each incontinent episode     Activity:  -Mobilize patient 3 times a day  -Encourage activity and walks on unit  -Encourage or provide ROM exercises   -Turn and reposition patient every 2 Hours  -Use appropriate equipment to lift or move patient in bed  -Instruct/ Assist with weight shifting every 2 hours when out of bed in chair  -Consider limitation of chair time 4 hour intervals    Skin Care:  -Avoid use of baby powder, tape, friction and shearing, hot water or constrictive clothing  -Relieve pressure over bony prominences   -Do not massage red bony areas    Next Steps:  -Teach patient strategies to minimize risks   -Consider consults to  interdisciplinary teams such as nutrition, wound care, pt ot  Outcome: Progressing     Problem: Nutrition/Hydration-ADULT  Goal: Nutrient/Hydration intake appropriate for improving, restoring or maintaining nutritional needs  Description: Monitor and assess patient's nutrition/hydration status for malnutrition  Collaborate with interdisciplinary team and initiate plan and interventions as ordered  Monitor patient's weight and dietary intake as ordered or per policy  Utilize nutrition screening tool and intervene as necessary  Determine patient's food preferences and provide high-protein, high-caloric foods as appropriate       INTERVENTIONS:  - Monitor oral intake, urinary output, labs, and treatment plans  - Assess nutrition and hydration status and recommend course of action  - Evaluate amount of meals eaten  - Assist patient with eating if necessary   - Allow adequate time for meals  - Recommend/ encourage appropriate diets, oral nutritional supplements, and vitamin/mineral supplements  - Order, calculate, and assess calorie counts as needed  - Recommend, monitor, and adjust tube feedings and TPN/PPN based on assessed needs  - Assess need for intravenous fluids  - Provide specific nutrition/hydration education as appropriate  - Include patient/family/caregiver in decisions related to nutrition  Outcome: Progressing

## 2022-03-16 NOTE — PLAN OF CARE
Problem: PHYSICAL THERAPY ADULT  Goal: Performs mobility at highest level of function for planned discharge setting  See evaluation for individualized goals  Description:   Outcome: Progressing  Note: Prognosis: Good  Problem List: Decreased strength,Decreased endurance,Impaired balance,Decreased mobility,Decreased safety awareness  Assessment: Pt  seen for PT treatment session this date with interventions consisting of  therapeutic exercises, transfers and  gait training w/ emphasis on improving pt's ability to ambulate  Pt  Requiring occasional cues for sequence and safety  In comparison to previous session, Pt  With improvements in activity tolerance  Limited by pain and fatigue  Pt is in need of continued activity in PT to improve strength balance endurance mobility transfers and ambulation with return to maximize LOF  From PT/mobility standpoint, recommendation at time of d/c would be home [facility] rehab in order to promote return to PLOF and independence  The patient's AM-PAC Basic Mobility Inpatient Short Form Raw Score is 18  A Raw score of greater than 16 suggests the patient may benefit from discharge to home  Please also refer to physical therapy recommendation for safe DC planning  Barriers to Discharge: Decreased caregiver support        PT Discharge Recommendation: Home with home health rehabilitation          See flowsheet documentation for full assessment

## 2022-03-17 NOTE — PROGRESS NOTES
NEPHROLOGY PROGRESS NOTE   Archana Rivers 60 y o  female MRN: [id number]  Unit/Bed#: 865-96 Encounter: [id number]    Assessment/Plan:    Archana Rivers is a 60 y o  female CKD 2, obesity, hypertension, colon cancer status post colostomy, hydroureter admitted 03/11 for lower abdominal pain  Being treated for acute kidney injury, severe protein calorie malnutrition, Klebsiella UTI  Was evaluated by Urology while here  Nephrology following along for ZAHIRA-plan below  1  Acute kidney injury (POA) atop chronic kidney disease  · Suspect etiology this ATN and will confirm with urine studies  Volume expansion discontinued yesterday and creatinine stable  Continue to encourage appropriate oral intake and follow with nutritional supplements  Periodically monitor for urinary retention  Avoid potential nephrotoxins  Maintain appropriate hemodynamics  · Consider deescalating IV PPI  2  Stage 2 chronic kidney disease with baseline creatinine around 0 9-1 0 mg/dL  3  Proteinuria  · Monoclonal gammopathy was ruled out the past   Reinitiate ACE-inhibitor once acute kidney injury is resolved and she is stable  4  Chronic lymphedema, bilateral lower extremity edema  · Oral intake very poor  Continue to avoid diuretics  5  Left hydroureter  · Mild hydronephrosis on imaging and evaluated by Urology  Per Urology, likely not contributing well to immune acute kidney injury  6  Klebsiella UTI  · Per primary team      ROS  States she does not feel great, feels very fatigued, not eating or drinking well  A complete 10 point review of systems have been performed and are otherwise negative         Historical Information   Past Medical History:   Diagnosis Date    Anxiety     Arthritis     GERD (gastroesophageal reflux disease)     Hidradenitis suppurativa     Hypertension     Lipodermatosclerosis of both lower extremities     Lymphedema 2006    Rectal cancer ([facility] [address] )     Sciatic leg pain 2006    left side    SOB (shortness of breath)     Stomach ulcer      Past Surgical History:   Procedure Laterality Date    BILATERAL SALPINGOOPHORECTOMY N/A 2022    Procedure: RIGHT SALPINGO-OOPHORECTOMY, LEFT OOPHORECTOMY;  Surgeon: Jana King DO;  [facility];  Service: General     SECTION      FL RETROGRADE PYELOGRAM  2022    HARTMANS PROCEDURE N/A 2022    Procedure: END COLOSTOMY;  Surgeon: Jana King DO;  [facility];  Service: General    LAPAROTOMY N/A 2022    Procedure: LAPAROTOMY EXPLORATORY, DRAINAGE OF INTRA -ABDOMINAL ABSCESS;  Surgeon: Jana King DO;  [facility];  Service: General    MS CYSTOURETHROSCOPY,URETER CATHETER Right 2022    Procedure: CYSTOSCOPY RETROGRADE PYELOGRAM WITH INSERTION STENT URETERAL;  Surgeon: Libset Layne MD;  [facility];  Service: Urology     [address] 3 1-4 CM TRUNK,ARM,LEG Right 2020    Procedure: EXCISION BIOPSY TISSUE LESION/MASS UPPER EXTREMITY;  Surgeon: Lisa Ya DO;  Location: [facility];  Service: General    TONSILLECTOMY      TUMOR REMOVAL Right     5th finger     Social History   Social History     Substance and Sexual Activity   Alcohol Use Not Currently    Comment: holidays     Social History     Substance and Sexual Activity   Drug Use No     Social History     Tobacco Use   Smoking Status Light Tobacco Smoker    Packs/day: 0 25    Years: 20 00    Pack years: 5 00    Types: Cigarettes   Smokeless Tobacco Never Used   Tobacco Comment    Only smoke once in awhile       Family History:   Family History   Problem Relation Age of Onset    Cancer Mother     Hypertension Mother     COPD Father     Diabetes Father        Medications:  Pertinent medications were reviewed  Current Facility-Administered Medications   Medication Dose Route Frequency Provider Last Rate    acetaminophen  650 mg Oral Q6H PRN Vazquez Houston DO      albuterol  2 puff Inhalation Q4H PRN Lawernce Lesches, DO      albuterol  2 5 mg Nebulization Q4H PRN Martina Houston, DO      ALPRAZolam  0 5 mg Oral 4x Daily PRN Martina Houston,       aluminum-magnesium hydroxide-simethicone  30 mL Oral Q6H PRN Lauren Kee MD      docusate sodium  100 mg Oral BID Lauren Kee MD      enoxaparin  60 mg Subcutaneous BID Lauren Kee MD      ergocalciferol  50,000 Units Oral Weekly Lyndsey Colbert, DO      HYDROmorphone  1 mg Intravenous Q4H PRN MD Jose Guadalupe Kapadia oxyCODONE  5 mg Oral Q4H PRN Ludy Arredondo MD      metoprolol tartrate  25 mg Oral BID Martina Houston,       nicotine  1 patch Transdermal Daily Lauren Kee MD      nystatin   Topical BID Lauren Kee MD      ondansetron  4 mg Intravenous Q6H PRN Lauren Kee MD      pantoprazole  40 mg Intravenous Q24H [address] Aydin Duane Hands, MD           Allergies   Allergen Reactions    Bee Venom Anaphylaxis, Shortness Of Breath and Swelling         Vitals:   /84   Pulse 88   Temp 97 9 °F (36 6 °C)   Resp 18   Ht 5' 7" (1 702 m)   Wt (!) 154 kg (339 lb 15 2 oz)   SpO2 93%   BMI 53 24 kg/m²   Body mass index is 53 24 kg/m²    SpO2: 93 %,   SpO2 Activity: At Rest,   O2 Device: None (Room air)      Intake/Output Summary (Last 24 hours) at 3/17/2022 1312  Last data filed at 3/17/2022 0900  Gross per 24 hour   Intake 960 ml   Output 500 ml   Net 460 ml     Invasive Devices  Report    Peripheral Intravenous Line            Peripheral IV 03/16/22 Right;Ventral (anterior) Forearm 1 day          Drain            Colostomy Other (comment) LUQ 59 days                Physical Exam  General: conscious, cooperative, in no acute distress  Eyes: conjunctivae pink, anicteric sclerae  ENT: lips and mucous membranes moist  Neck: supple, no JVD, no masses  Chest:  Diminished breath sounds bilaterally, no crackles, ronchus or wheezings  CVS: S1 & S2, normal rate, regular rhythm  Abdomen: soft, non-tender, non-distended, normoactive bowel sounds morbidly obese  Extremities:  Moderate bilateral lymph edema of both legs  Skin: no rash  Neuro: awake, alert, oriented      Diagnostic Data:  Lab: I have personally reviewed pertinent lab results  ,   CBC:  Results from last 7 days   Lab Units 03/17/22  0456   WBC Thousand/uL 10 29*   HEMOGLOBIN g/dL 9 8*   HEMATOCRIT % 30 6*   PLATELETS Thousands/uL 353      CMP:   Lab Results   Component Value Date    SODIUM 136 03/17/2022    K 4 3 03/17/2022     03/17/2022    CO2 26 03/17/2022    BUN 12 03/17/2022    CREATININE 1 71 (H) 03/17/2022    CALCIUM 8 8 03/17/2022    EGFR 32 03/17/2022   ,   PT/INR: No results found for: PT, INR,   Magnesium: No components found for: MAG,  Phosphorous: No results found for: PHOS    Microbiology:  @LABRCNTIP,(urinecx:7)@        MIRTA Lopez    Portions of the record may have been created with voice recognition software  Occasional wrong word or "sound a like" substitutions may have occurred due to the inherent limitations of voice recognition software  Read the chart carefully and recognize, using context, where substitutions have occurred

## 2022-03-17 NOTE — ASSESSMENT & PLAN NOTE
Adenocarcinoma:  Status post interval sigmoid resection and Payal colostomy  CT abdomen shows soft tissue thickening around the stump ? possibility of residual tumor versus postsurgical changes  Patient complains of severe pain in the lower abdomen, continued and unchanged since time of surgery  Pathology report: [id number] adenocarcinoma of sigmoid colon with positive margins, 10 of 18 LN positive  Plan:  Follow up with Oncology on outpatient basis for CT/RT  P r n  Pain medication  Per discussion with surgery, pain may be secondary to adhesions  No evidence of parastomal hernia by review of CT

## 2022-03-17 NOTE — ASSESSMENT & PLAN NOTE
Creatinine elevated at 2 25, baseline of approximately 1  Sr  Cr stable around 1 7  Nephrology on consult      Plan:  · DC IV fluids, encourage P O intake  · Nephrology on consult: appreciate recommendations         -  Loop diuretics PRN for volume overload         - Continue to hold lisinopril  · IV PPI transitioned to P  O  · Follow BMP  · Urinary retention protocol

## 2022-03-17 NOTE — PLAN OF CARE
Problem: PHYSICAL THERAPY ADULT  Goal: Performs mobility at highest level of function for planned discharge setting  See evaluation for individualized goals  Description:   Outcome: Progressing  Note: Prognosis: Good  Problem List: Decreased strength,Decreased endurance,Impaired balance,Decreased mobility  Assessment: Pt  seen for PT treatment session this date with interventions consisting of  therapeutic exercises,  transfers and  gait training w/ emphasis on improving pt's ability to ambulate  Pt  Requiring occasional cues for sequence and safety  In comparison to previous session, Pt  With decrease in activity tolerance  Limited by fatigue and pain  Pt is in need of continued activity in PT to improve strength balance endurance mobility transfers and ambulation with return to maximize LOF  From PT/mobility standpoint, recommendation at time of d/c would be home health rehab  in order to promote return to PLOF and independence  The patient's AM-PAC Basic Mobility Inpatient Short Form Raw Score is 19  A Raw score of greater than 16 suggests the patient may benefit from discharge to home  Please also refer to physical therapy recommendation for safe DC planning  Barriers to Discharge: Decreased caregiver support        PT Discharge Recommendation: Home with home health rehabilitation          See flowsheet documentation for full assessment

## 2022-03-17 NOTE — ASSESSMENT & PLAN NOTE
UA positive with pending cultures, currently afebrile, denies any symptoms  Urine Culture positive for Klebsiella aerogenes  DC'd Ceftriaxone on 03/13   Antibiotics narrowed down to Cefepime based on the culture sensitivity  ID with recommendations for 3 day course of cefepime, completed by 03/16

## 2022-03-17 NOTE — NUTRITION
Asked by resident hospitalist to speak with pt as she had questions about what foods she is able to eat and whether certain foods are good for her to eat  Explained to pt  that she is prescribed a regular diet and that there are no restrictions on her, but that she should consume things she knows she can tolerate (pt  noted various foods that are unappealing to or nauseate her)  After discussion w/ pt , suggested that she eat small, frequent meals avoiding high-fat items  Provided her with and reviewed key points of handout General, Healthful Nutrition Therapy  Pt's questions answered to her satisfaction  Provided her also w/ RD contact info  in case of questions after D/C  No new recommendations, but will change supplement order to Ensure Compact (vanilla) at breakfast and dinner as Ensure Max n/a at [address] [employer] and also to provide less volume and fat than Ensure Enlive would provide  Will follow status prn

## 2022-03-17 NOTE — ASSESSMENT & PLAN NOTE
Malnutrition Findings:   Adult Malnutrition type: Chronic illness  Adult Degree of Malnutrition: Other severe protein calorie malnutrition  Malnutrition Characteristics: Inadequate energy,Weight loss      Patient with significantly poor oral intake since time of surgery  Stressed importance of adequate nutritional intake  Dietitian consulted as well  Continue nutritional supplements, continue to urge patient for improved intake  If decreased oral intake continues may need to consider PEG tube placement verses TPN via PICC line  Patient complaints of nausea and she is having questions about Colon Ca diet  Discussed with nutrition regarding  No dietary restriction at this point    - On prn Zofran  - Ensure nutritional supplements  - Encourage P O intake  360 Statement: severe PCM r/t significant wt loss of 12% x 1 month and 20% x 1 year with decrease appetite and suspected inadequate po intake for months due to nausea with recent dx of adenocarcinoma  Treated with regular diet; patient refuses supplements  BMI Findings:  Adult BMI Classifications: Morbid Obesity 50-59 9        Body mass index is 53 24 kg/m²

## 2022-03-17 NOTE — PROGRESS NOTES
[facility] [address] [place]  Progress Note - Bryce Rm 1965, 60 y o  female MRN: [id number]  Unit/Bed#: 424-01 Encounter: [id number]  Primary Care Provider: Truman Goldstein PA-C   Date and time admitted to hospital: 3/11/2022  9:34 AM    * ZAHIRA (acute kidney injury) ([facility] [address] )  Assessment & Plan  Creatinine elevated at 2 25, baseline of approximately 1  Sr  Cr stable around 1 71, ? new normal   Nephrology on consult: ZAHIRA probably due to ATN  Plan:  · DC IV fluids, encourage P O intake  · Nephrology on consult: appreciate recommendations         -  Loop diuretics PRN for volume overload         - Continue to hold lisinopril  · IV PPI transitioned to P  O  · Follow Urine Na  · Follow BMP  · Urinary retention protocol    UTI (urinary tract infection)  Assessment & Plan  UA positive with pending cultures, currently afebrile, denies any symptoms  Urine Culture positive for Klebsiella aerogenes  DC'd Ceftriaxone on 03/13   Antibiotics narrowed down to Cefepime based on the culture sensitivity  ID with recommendations for 3 day course of cefepime, completed by 03/16  Hydroureter  Assessment & Plan  CT abdomen shows left-sided hydronephrosis  Status post ureteral stent on right side for right-sided hydronephrosis which is improved compared to previous imaging  No urinary retention, post void scan 0ml  Urology on consult: appreciate recommendations       - S/p bilateral ureteral stent at [facility], left ureteral stent removed  As per urology note, patient scheduled for in office right ureteral stent removal 1 week ago, did not go because she was not feeling well  Repeat CT with:     Right nephroureteral stent in place   Similar mild right hydroureteronephrosis compared to prior CT   Mild left hydroureteronephrosis to a lesser degree is also similar to prior  3   Mildly enlarged pelvic lymph nodes as above similar to 3/11/2022 but enlarged since 1/15/2022   Metastasis not excluded  Plan:  Right ureteral stent removal on out patient basis        S/P colostomy [facility])  Assessment & Plan  Patient was treated for adenocarcinoma colon, s/p Payal's colostomy  Had issues with colostomy bag  Wound care consult  Prn pain medication    Essential hypertension  Assessment & Plan  Blood pressure at goal  Continue home metoprolol  lisinopril on hold because of ZAHIRA    Severe protein-calorie malnutrition (HCC)  Assessment & Plan  Malnutrition Findings:   Adult Malnutrition type: Chronic illness  Adult Degree of Malnutrition: Other severe protein calorie malnutrition  Malnutrition Characteristics: Inadequate energy,Weight loss      Patient with significantly poor oral intake since time of surgery  Stressed importance of adequate nutritional intake  Dietitian consulted as well  Continue nutritional supplements, continue to urge patient for improved intake  If decreased oral intake continues may need to consider PEG tube placement verses TPN via PICC line  Patient complaints of nausea and she is having questions about Colon Ca diet  Discussed with nutrition regarding  No dietary restriction at this point    - On prn Zofran  - Ensure nutritional supplements  - Encourage P O intake  360 Statement: severe PCM r/t significant wt loss of 12% x 1 month and 20% x 1 year with decrease appetite and suspected inadequate po intake for months due to nausea with recent dx of adenocarcinoma  Treated with regular diet; patient refuses supplements  BMI Findings:  Adult BMI Classifications: Morbid Obesity 50-59 9        Body mass index is 53 24 kg/m²  Anemia  Assessment & Plan  Hb 9 1 on admission  No active source of bleeding  clinically asymptomatic  Hb improving without any intervention   Follow Hb trend      Colon adenocarcinoma [facility])  Assessment & Plan  Adenocarcinoma:  Status post interval sigmoid resection and Payal colostomy  CT abdomen shows soft tissue thickening around the stump ? possibility of residual tumor versus postsurgical changes  Patient complains of severe pain in the lower abdomen, continued and unchanged since time of surgery  Pathology report: [id number] adenocarcinoma of sigmoid colon with positive margins, 10 of 18 LN positive  Plan:  Follow up with Oncology on outpatient basis for CT/RT  P r n  Pain medication  Per discussion with surgery, pain may be secondary to adhesions  No evidence of parastomal hernia by review of CT       VTE Pharmacologic Prophylaxis:   Pharmacologic: Enoxaparin (Lovenox)  Mechanical VTE Prophylaxis in Place: Yes    Patient Centered Rounds: I have performed bedside rounds with nursing staff today  Discussions with Specialists or Other Care Team Provider: yes    Education and Discussions with Family / Patient: yes    Time Spent for Care: 20 minutes  More than 50% of total time spent on counseling and coordination of care as described above  Current Length of Stay: 6 day(s)    Current Patient Status: Inpatient   Certification Statement: The patient will continue to require additional inpatient hospital stay due to on going management    Discharge Plan: home with home care    Code Status: Level 3 - DNAR and DNI      Subjective:   Seen and examined the patient today at bedside  Not in distress  Have questions regarding food restrictions  Complaints of nausea  Discussed regarding adequate P O intake  Objective:     Vitals:   Temp (24hrs), Av 3 °F (36 8 °C), Min:97 9 °F (36 6 °C), Max:98 7 °F (37 1 °C)    Temp:  [97 9 °F (36 6 °C)-98 7 °F (37 1 °C)] 98 2 °F (36 8 °C)  HR:  [77-88] 77  Resp:  [18] 18  BP: (129-138)/(48-84) 129/84  SpO2:  [92 %-96 %] 96 %  Body mass index is 53 24 kg/m²  Input and Output Summary (last 24 hours):        Intake/Output Summary (Last 24 hours) at 3/17/2022 1559  Last data filed at 3/17/2022 1230  Gross per 24 hour   Intake 1320 ml   Output 800 ml   Net 520 ml       Physical Exam:     Physical Exam  Vitals and nursing note reviewed  Constitutional:       General: She is not in acute distress  Appearance: She is well-developed  She is obese  HENT:      Head: Normocephalic and atraumatic  Right Ear: External ear normal       Left Ear: External ear normal       Nose: Nose normal       Mouth/Throat:      Mouth: Mucous membranes are moist       Pharynx: Oropharynx is clear  Eyes:      General: No scleral icterus  Extraocular Movements: Extraocular movements intact  Conjunctiva/sclera: Conjunctivae normal       Pupils: Pupils are equal, round, and reactive to light  Cardiovascular:      Rate and Rhythm: Normal rate and regular rhythm  Pulses: Normal pulses  Heart sounds: Normal heart sounds  No murmur heard  Pulmonary:      Effort: Pulmonary effort is normal  No respiratory distress  Breath sounds: Normal breath sounds  No wheezing or rales  Abdominal:      General: Bowel sounds are normal  There is distension  Palpations: Abdomen is soft  Tenderness: There is no abdominal tenderness  Musculoskeletal:         General: Swelling ( secondary to lymphedema, present at baseline ) present  Cervical back: Neck supple  Skin:     General: Skin is warm and dry  Capillary Refill: Capillary refill takes less than 2 seconds  Neurological:      Mental Status: She is alert and oriented to person, place, and time  Mental status is at baseline           Additional Data:     Labs:    Results from last 7 days   Lab Units 03/17/22  0456   WBC Thousand/uL 10 29*   HEMOGLOBIN g/dL 9 8*   HEMATOCRIT % 30 6*   PLATELETS Thousands/uL 353   NEUTROS PCT % 62   LYMPHS PCT % 22   MONOS PCT % 7   EOS PCT % 8*     Results from last 7 days   Lab Units 03/17/22  0456 03/15/22  0617 03/14/22  0517   SODIUM mmol/L 136   < > 136   POTASSIUM mmol/L 4 3   < > 4 4   CHLORIDE mmol/L 102   < > 102   CO2 mmol/L 26   < > 27   BUN mg/dL 12   < > 17   CREATININE mg/dL 1 71*   < > 1 72*   ANION GAP mmol/L 8   < > 7   CALCIUM mg/dL 8 8   < > 8 8   ALBUMIN g/dL  --   --  2 5*   TOTAL BILIRUBIN mg/dL  --   --  0 24   ALK PHOS U/L  --   --  90   ALT U/L  --   --  15   AST U/L  --   --  9   GLUCOSE RANDOM mg/dL 92   < > 106    < > = values in this interval not displayed  Results from last 7 days   Lab Units 03/14/22  0517 03/13/22  0446 03/12/22  0525 03/11/22  1021   LACTIC ACID mmol/L  --   --  0 8 1 3   PROCALCITONIN ng/ml 0 18 0 19 0 23  --            * I Have Reviewed All Lab Data Listed Above  * Additional Pertinent Lab Tests Reviewed: Arnaudingkelly 64 Admission Reviewed    Imaging:    Imaging Reports Reviewed Today Include: none  Imaging Personally Reviewed by Myself Includes:  none    Recent Cultures (last 7 days):     Results from last 7 days   Lab Units 03/11/22  1431 03/11/22  1031 03/11/22  1021   BLOOD CULTURE   --  No Growth After 5 Days  No Growth After 5 Days     URINE CULTURE  >100,000 cfu/ml Klebsiella aerogenes*  --   --        Last 24 Hours Medication List:   Current Facility-Administered Medications   Medication Dose Route Frequency Provider Last Rate    acetaminophen  650 mg Oral Q6H PRN Sheri Houston, DO      albuterol  2 puff Inhalation Q4H PRN Sheri Houston DO      albuterol  2 5 mg Nebulization Q4H PRN Sheri Houston DO      ALPRAZolam  0 5 mg Oral 4x Daily PRN Sheri Houston DO      aluminum-magnesium hydroxide-simethicone  30 mL Oral Q6H PRN Reinaldo Burk MD      docusate sodium  100 mg Oral BID Reinaldo Burk MD      enoxaparin  60 mg Subcutaneous BID Reinaldo Burk MD      ergocalciferol  50,000 Units Oral Weekly Luis Carlos Sarah,       HYDROmorphone  1 mg Intravenous Q4H PRN Sylwia Jolly MD      Or   Nixon Cade oxyCODONE  5 mg Oral Q4H PRN Sylwia Jolly MD      metoprolol tartrate  25 mg Oral BID Sheri Houston DO      nicotine  1 patch Transdermal Daily MD Nixon Carter Pert nystatin   Topical BID Julio Cesar Navarrete MD      ondansetron  4 mg Intravenous Q6H PRN Julio Cesar Navarrete MD     Molly Marybel [START ON 3/18/2022] pantoprazole  40 mg Oral Early Morning Kristie Huizar MD          Today, Patient Was Seen By: Julio Cesar Navarrete MD    ** Please Note: Dictation voice to text software may have been used in the creation of this document   **

## 2022-03-17 NOTE — PHYSICAL THERAPY NOTE
PHYSICAL THERAPY NOTE          Patient Name: Camron Johnson  PMVJW'H Date: 3/17/2022     03/17/22 1010   Note Type   Note Type Treatment   Restrictions/Precautions   Weight Bearing Precautions Per Order No   Other Precautions Chair Alarm; Fall Risk;Pain   General   Chart Reviewed Yes   Response to Previous Treatment Patient with no complaints from previous session  Family/Caregiver Present No   Cognition   Overall Cognitive Status WFL   Following Commands Follows all commands and directions without difficulty   Subjective   Subjective Pt  tearful  c/o abdominal pain  Initially pt very sleepy with head resting on tray table at start of session  Slow to wake  (nurse aware of pain  )   Transfers   Sit to Stand 5  Supervision   Additional items Assist x 1; Armrests; Increased time required;Verbal cues   Stand to Sit 5  Supervision   Additional items Assist x 1; Armrests; Increased time required;Verbal cues   Toilet transfer 5  Supervision   Additional items Increased time required;Verbal cues;Standard toilet   Additional Comments Able to mange clothjing and hygiene   Ambulation/Elevation   Gait pattern Excessively slow; Short stride; Foward flexed   Gait Assistance 5  Supervision   Additional items Assist x 1;Verbal cues   Assistive Device Rolling walker   Distance 25' x 2, 100' x 1   Balance   Static Sitting Good   Dynamic Sitting Fair +   Static Standing Fair   Dynamic Standing Fair   Ambulatory Fair   Endurance Deficit   Endurance Deficit Yes   Activity Tolerance   Activity Tolerance Patient limited by fatigue;Patient limited by pain   Exercises   Hip Flexion Sitting;15 reps   Hip Abduction Sitting;15 reps   Hip Adduction Sitting;15 reps   Knee AROM Long Arc Quad Sitting;15 reps   Ankle Pumps Sitting;15 reps   Assessment   Prognosis Good   Problem List Decreased strength;Decreased endurance; Impaired balance;Decreased mobility Assessment Pt  seen for PT treatment session this date with interventions consisting of  therapeutic exercises,  transfers and  gait training w/ emphasis on improving pt's ability to ambulate  Pt  Requiring occasional cues for sequence and safety  In comparison to previous session, Pt  With decrease in activity tolerance  Limited by fatigue and pain  Pt is in need of continued activity in PT to improve strength balance endurance mobility transfers and ambulation with return to maximize LOF  From PT/mobility standpoint, recommendation at time of d/c would be home health rehab  in order to promote return to PLOF and independence  The patient's AM-[facility] Basic Mobility Inpatient Short Form Raw Score is 19  A Raw score of greater than 16 suggests the patient may benefit from discharge to home  Please also refer to physical therapy recommendation for safe DC planning  Goals   LTG Expiration Date 03/25/22   Plan   Treatment/Interventions Functional transfer training;LE strengthening/ROM; Therapeutic exercise; Endurance training;Bed mobility;Gait training   Progress Progressing toward goals   Recommendation   PT Discharge Recommendation Home with home health rehabilitation   AM-[facility] Basic Mobility Inpatient   Turning in Bed Without Bedrails 3   Lying on Back to Sitting on Edge of Flat Bed 3   Moving Bed to Chair 3   Standing Up From Chair 4   Walk in Room 4   Climb 3-5 Stairs 2   Basic Mobility Inpatient Raw Score 19   Basic Mobility Standardized Score 42 48   Highest Level Of Mobility   JH-HLM Goal 6: Walk 10 steps or more   JH-HLM Highest Level of Mobility 7: Walk 25 feet or more   JH-HLM Goal Achieved No   Education   Education Provided Mobility training   Patient Demonstrates verbal understanding   End of Consult   Patient Position at End of Consult Bedside chair;Bed/Chair alarm activated; All needs within reach   End of Consult Comments discussed POC with PT

## 2022-03-18 NOTE — ASSESSMENT & PLAN NOTE
Creatinine elevated at 2 25, baseline of approximately 1  Sr  Cr trending down and at 1 5 at the time of discharge  Nephrology consulted  Plan:  · Discontinue Lisinopril for now  · Encourage P O intake  · Omeprazole P  O  · Avoid NSAID's (DC'd Meloxicam)  · Repeat BMP in 3 days and follow up with PCP

## 2022-03-18 NOTE — ASSESSMENT & PLAN NOTE
Adenocarcinoma:  Status post interval sigmoid resection and Payal colostomy  CT abdomen shows soft tissue thickening around the stump ? possibility of residual tumor versus postsurgical changes  Patient complains of severe pain in the lower abdomen, continued and unchanged since time of surgery  Pathology report: [id number] adenocarcinoma of sigmoid colon with positive margins, 10 of 18 LN positive  Plan:  Follow up with Oncology on outpatient basis   P r n   Pain medication

## 2022-03-18 NOTE — PLAN OF CARE
Problem: OCCUPATIONAL THERAPY ADULT  Goal: Performs self-care activities at highest level of function for planned discharge setting  See evaluation for individualized goals  Description: Treatment Interventions: ADL retraining,Functional transfer training,UE strengthening/ROM,Endurance training,Patient/family training,Equipment evaluation/education,Fine motor coordination activities,Compensatory technique education,Energy conservation,Activityengagement          See flowsheet documentation for full assessment, interventions and recommendations  Outcome: Progressing  Note: Limitation: Decreased ADL status,Decreased UE ROM,Decreased UE strength,Decreased Safe judgement during ADL,Decreased endurance,Decreased fine motor control,Decreased self-care trans,Decreased high-level ADLs  Prognosis: Fair  Assessment: Patient participated in Skilled OT session this date with interventions consisting of ADL re training with the use of correct body mechnaics   Patient agreeable to OT treatment session, upon arrival patient was found seated OOB to Recliner  Sit to stand txfrs from arik chair with S, stand pivot txfrs from arik chair to bed with use of RW and S, sit to supine txfrs with Min Ax1  Pt required Max A to complete LB dressing  Patient requiring verbal cues for safety and verbal cues for correct technique  Patient continues to be functioning below baseline level, occupational performance remains limited secondary to factors listed above and increased risk for falls and injury  From OT standpoint, recommendation at time of d/c would be Post acute rehabilitation services  The patient's raw score on the AM-PAC Daily Activity inpatient short form is 18, standardized score is 38 66, less than 39 4  Patients at this level are likely to benefit from discharge to post-acute rehabilitation services  Please refer to the recommendation of the Occupational Therapist for safe discharge planning       OT Discharge Recommendation: Post acute rehabilitation services  OT - OK to Discharge: Yes (once medically cleared)

## 2022-03-18 NOTE — ASSESSMENT & PLAN NOTE
Patient was treated for adenocarcinoma colon, s/p Payal's colostomy     Prn pain medication  Ambulatory referral to Home health

## 2022-03-18 NOTE — PLAN OF CARE
Problem: PAIN - ADULT  Goal: Verbalizes/displays adequate comfort level or baseline comfort level  Description: Interventions:  - Encourage patient to monitor pain and request assistance  - Assess pain using appropriate pain scale (0-10 pain scale)  - Administer analgesics based on type and severity of pain and evaluate response  - Implement non-pharmacological measures as appropriate and evaluate response  - Consider cultural and social influences on pain and pain management  - Notify physician/advanced practitioner if interventions unsuccessful or patient reports new pain  3/18/2022 1442 by Edward Sanchez RN  Outcome: Adequate for Discharge  3/18/2022 1025 by Edward Sanchez RN  Outcome: Progressing     Problem: INFECTION - ADULT  Goal: Absence or prevention of progression during hospitalization  Description: INTERVENTIONS:  - Assess and monitor for signs and symptoms of infection  - Monitor lab/diagnostic results  - Monitor all insertion sites, i e  indwelling lines  - Administer medications as ordered  - Instruct and encourage patient and family to use good hand hygiene technique  - Contact precautions  3/18/2022 1442 by Edward Sanchez RN  Outcome: Adequate for Discharge  3/18/2022 1025 by Edward Sanchez RN  Outcome: Progressing     Problem: SAFETY ADULT  Goal: Patient will remain free of falls  Description: INTERVENTIONS:  - Educate patient/family on patient safety including physical limitations  - Instruct patient to call for assistance with activity (assist with walker)  - Consult OT/PT to assist with strengthening/mobility   - Keep Call bell within reach  - Keep bed low and locked with side rails adjusted as appropriate  - Keep care items and personal belongings within reach  - Initiate and maintain comfort rounds  - Make Fall Risk Sign visible to staff (high fall risk)  - Offer Toileting every 1-2 Hours, in advance of need  - Initiate/Maintain bed/chair alarm  - Obtain necessary fall risk management equipment: nonskid footwear, fall bracelet, alarms  - Apply yellow socks and bracelet for high fall risk patients  - Consider moving patient to room near nurses station  3/18/2022 1442 by Raegan Rao RN  Outcome: Adequate for Discharge  3/18/2022 1025 by Raegan Rao RN  Outcome: Progressing  Goal: Maintain or return to baseline ADL function  Description: INTERVENTIONS:  -  Assess patient's ability to carry out ADLs; (min to mod assist with adls)  - Assess/evaluate cause of self-care deficits (fatigue, pain, limited movement, use of assistive device)  - Assess range of motion  - Assess patient's mobility; (assist and walker)  - Assess patient's need for assistive devices and provide as appropriate  - Encourage maximum independence but intervene and supervise when necessary  - Involve family in performance of ADLs  - Assess for home care needs following discharge   - Consider OT consult to assist with ADL evaluation and planning for discharge  - Provide patient education as appropriate  3/18/2022 1442 by Raegan Rao RN  Outcome: Adequate for Discharge  3/18/2022 1025 by Raegan Rao RN  Outcome: Progressing  Goal: Maintains/Returns to pre admission functional level  Description: INTERVENTIONS:  - Perform BMAT or MOVE assessment daily    - Set and communicate daily mobility goal to care team and patient/family/caregiver  - Collaborate with rehabilitation services on mobility goals if consulted  - Perform Range of Motion 3-4 times a day  - Reposition patient every 2 hours    - Dangle patient 3 times a day  - Stand patient 3 times a day  - Ambulate patient 3 times a day  - Out of bed to chair 3 times a day   - Out of bed for meals 3 times a day  - Out of bed for toileting  - Record patient progress and toleration of activity level   3/18/2022 1442 by Raegan Rao RN  Outcome: Adequate for Discharge  3/18/2022 1025 by Raegan Rao RN  Outcome: Progressing     Problem: DISCHARGE PLANNING  Goal: Discharge to home or other facility with appropriate resources  Description: INTERVENTIONS:  - Identify barriers to discharge w/patient and caregiver  - Arrange for needed discharge resources and transportation as appropriate  - Identify discharge learning needs (meds, wound care, etc )  - Refer to Case Management Department for coordinating discharge planning if the patient needs post-hospital services based on physician/advanced practitioner order or complex needs related to functional status, cognitive ability, or social support system  3/18/2022 1442 by Stacey Romero RN  Outcome: Adequate for Discharge  3/18/2022 1025 by Stacey Romero RN  Outcome: Progressing     Problem: Knowledge Deficit  Goal: Patient/family/caregiver demonstrates understanding of disease process, treatment plan, medications, and discharge instructions  Description: Complete learning assessment and assess knowledge base    Interventions:  - Provide teaching at level of understanding  - Provide teaching via preferred learning methods  3/18/2022 1442 by Stacey Romero RN  Outcome: Adequate for Discharge  3/18/2022 1025 by Stacey Romero RN  Outcome: Progressing     Problem: GASTROINTESTINAL - ADULT  Goal: Maintains adequate nutritional intake  Description: INTERVENTIONS:  - Monitor percentage of each meal consumed  - Identify factors contributing to decreased intake, treat as appropriate  - Assist with meals as needed  - Monitor I&O, weight, and lab values if indicated  - Obtain nutrition services referral as needed  3/18/2022 1442 by Stacey Romero RN  Outcome: Adequate for Discharge  3/18/2022 1025 by Stacey Romero RN  Outcome: Progressing  Goal: Establish and maintain optimal ostomy function  Description: INTERVENTIONS:  - Assess bowel function  - Encourage oral fluids to ensure adequate hydration  - Administer IV fluids if ordered to ensure adequate hydration   - Administer ordered medications as needed  - Encourage mobilization and activity  - Nutrition services referral to assist patient with appropriate food choices  - Assess stoma site  - Consider wound care consult (ordered)  3/18/2022 1442 by Stacey Romero RN  Outcome: Adequate for Discharge  3/18/2022 1025 by Stacey Romero RN  Outcome: Progressing     Problem: METABOLIC, FLUID AND ELECTROLYTES - ADULT  Goal: Electrolytes maintained within normal limits  Description: INTERVENTIONS:  - Monitor labs and assess patient for signs and symptoms of electrolyte imbalances  - Administer electrolyte replacement as ordered  - Monitor response to electrolyte replacements, including repeat lab results as appropriate  - Instruct patient on fluid and nutrition as appropriate  3/18/2022 1442 by Stacey Romero RN  Outcome: Adequate for Discharge  3/18/2022 1025 by Stacey Romero RN  Outcome: Progressing  Goal: Fluid balance maintained  Description: INTERVENTIONS:  - Monitor labs   - Monitor I/O and WT  - Instruct patient on fluid and nutrition as appropriate  - Assess for signs & symptoms of volume excess or deficit  3/18/2022 1442 by Stacey Romero RN  Outcome: Adequate for Discharge  3/18/2022 1025 by Stacey Romero RN  Outcome: Progressing     Problem: SKIN/TISSUE INTEGRITY - ADULT  Goal: Skin Integrity remains intact(Skin Breakdown Prevention)  Description: Assess:  -Perform Surendra assessment every shift  -Clean and moisturize skin every shift  -Inspect skin when repositioning, toileting, and assisting with ADLS  -Assess extremities for adequate circulation and sensation     Bed Management:  -Have minimal linens on bed & keep smooth, unwrinkled  -Change linens as needed when moist or perspiring  -Avoid sitting or lying in one position for more than 2 hours while in bed  -Keep HOB at 30 degrees     Toileting:  -Offer bedside commode  -Assess for incontinence every 2 hours and prn  -Use incontinent care products after each incontinent episode     Activity:  -Mobilize patient 3 times a day  -Encourage activity and walks on unit  -Encourage or provide ROM exercises   -Turn and reposition patient every 2 Hours  -Use appropriate equipment to lift or move patient in bed  -Instruct/ Assist with weight shifting every 2 hours when out of bed in chair  -Consider limitation of chair time 4 hour intervals    Skin Care:  -Avoid use of baby powder, tape, friction and shearing, hot water or constrictive clothing  -Relieve pressure over bony prominences   -Do not massage red bony areas    Next Steps:  -Teach patient strategies to minimize risks   -Consider consults to  interdisciplinary teams such as nutrition, wound care, pt ot  3/18/2022 1442 by Kylie Espinal RN  Outcome: Adequate for Discharge  3/18/2022 1025 by Kylie Espinal RN  Outcome: Progressing     Problem: Potential for Falls  Goal: Patient will remain free of falls  Description: INTERVENTIONS:  - Educate patient/family on patient safety including physical limitations  - Instruct patient to call for assistance with activity (assist with walker)  - Consult OT/PT to assist with strengthening/mobility   - Keep Call bell within reach  - Keep bed low and locked with side rails adjusted as appropriate  - Keep care items and personal belongings within reach  - Initiate and maintain comfort rounds  - Make Fall Risk Sign visible to staff (high fall risk)  - Offer Toileting every 1-2 Hours, in advance of need  - Initiate/Maintain bed/chair alarm  - Obtain necessary fall risk management equipment: nonskid footwear, fall bracelet, alarms  - Apply yellow socks and bracelet for high fall risk patients  - Consider moving patient to room near nurses station  3/18/2022 1442 by Kylie Espinal RN  Outcome: Adequate for Discharge  3/18/2022 1025 by Kylie Espinal RN  Outcome: Progressing     Problem: Prexisting or High Potential for Compromised Skin Integrity  Goal: Skin integrity is maintained or improved  Description: INTERVENTIONS:  - Identify patients at risk for skin breakdown  - Assess and monitor skin integrity  - Assess and monitor nutrition and hydration status  - Monitor labs   - Assess for incontinence (every 2 hours and prn)  - Turn and reposition patient  - Assist with mobility/ambulation (assist x1 and walker)  - Relieve pressure over bony prominences  - Avoid friction and shearing  - Provide appropriate hygiene as needed including keeping skin clean and dry  - Evaluate need for skin moisturizer/barrier cream  - Collaborate with interdisciplinary team   - Patient/family teaching  - Consider wound care consult (ordered)  3/18/2022 1442 by Lou Saini RN  Outcome: Adequate for Discharge  3/18/2022 1025 by Lou Saini RN  Outcome: Progressing     Problem: MOBILITY - ADULT  Goal: Maintain or return to baseline ADL function  Description: INTERVENTIONS:  -  Assess patient's ability to carry out ADLs; (min to mod assist with adls)  - Assess/evaluate cause of self-care deficits (fatigue, pain, limited movement, use of assistive device)  - Assess range of motion  - Assess patient's mobility; (assist and walker)  - Assess patient's need for assistive devices and provide as appropriate  - Encourage maximum independence but intervene and supervise when necessary  - Involve family in performance of ADLs  - Assess for home care needs following discharge   - Consider OT consult to assist with ADL evaluation and planning for discharge  - Provide patient education as appropriate  3/18/2022 1442 by Lou Saini RN  Outcome: Adequate for Discharge  3/18/2022 1025 by Lou Saini RN  Outcome: Progressing  Goal: Maintains/Returns to pre admission functional level  Description: INTERVENTIONS:  - Perform BMAT or MOVE assessment daily    - Set and communicate daily mobility goal to care team and patient/family/caregiver  - Collaborate with rehabilitation services on mobility goals if consulted  - Perform Range of Motion 3-4 times a day  - Reposition patient every 2 hours    - Dangle patient 3 times a day  - Stand patient 3 times a day  - Ambulate patient 3 times a day  - Out of bed to chair 3 times a day   - Out of bed for meals 3 times a day  - Out of bed for toileting  - Record patient progress and toleration of activity level   3/18/2022 1442 by Sukumar Lindquist RN  Outcome: Adequate for Discharge  3/18/2022 1025 by Sukumar Lindquist RN  Outcome: Progressing     Problem: Nutrition/Hydration-ADULT  Goal: Nutrient/Hydration intake appropriate for improving, restoring or maintaining nutritional needs  Description: Monitor and assess patient's nutrition/hydration status for malnutrition  Collaborate with interdisciplinary team and initiate plan and interventions as ordered  Monitor patient's weight and dietary intake as ordered or per policy  Utilize nutrition screening tool and intervene as necessary  Determine patient's food preferences and provide high-protein, high-caloric foods as appropriate       INTERVENTIONS:  - Monitor oral intake, urinary output, labs, and treatment plans  - Assess nutrition and hydration status and recommend course of action  - Evaluate amount of meals eaten  - Assist patient with eating if necessary   - Allow adequate time for meals  - Recommend/ encourage appropriate diets, oral nutritional supplements, and vitamin/mineral supplements  - Order, calculate, and assess calorie counts as needed  - Recommend, monitor, and adjust tube feedings and TPN/PPN based on assessed needs  - Assess need for intravenous fluids  - Provide specific nutrition/hydration education as appropriate  - Include patient/family/caregiver in decisions related to nutrition  3/18/2022 1442 by Sukumar Lindquist RN  Outcome: Adequate for Discharge  3/18/2022 1025 by Sukumar Lindquist RN  Outcome: Progressing

## 2022-03-18 NOTE — OCCUPATIONAL THERAPY NOTE
Occupational Therapy Progress Note     Patient Name: Magen Quick  [id number] Date: 3/18/2022  Problem List  Principal Problem:    ZAHIRA (acute kidney injury) [facility])  Active Problems:    Essential hypertension    Colon adenocarcinoma ([facility] )    Hydroureter    S/P colostomy ([facility] )    UTI (urinary tract infection)    Anemia    Severe protein-calorie malnutrition ([facility] )          03/18/22 1139   OT Last Visit   OT Visit Date 03/18/22   Note Type   Note Type Treatment   Restrictions/Precautions   Weight Bearing Precautions Per Order No   Other Precautions Chair Alarm; Fall Risk;Pain;Bed Alarm   ADL   LB Dressing Assistance 2  Maximal Assistance   LB Dressing Deficit Setup;Don/doff R sock; Don/doff L sock   LB Dressing Comments Pt reporting to complete with patient reporting not wearing socks at home unless going out for appointment   Bed Mobility   Sit to Supine 4  Minimal assistance   Additional items Assist x 1;HOB elevated; Bedrails; Increased time required;Verbal cues;LE management   Transfers   Sit to Stand 5  Supervision   Additional items Assist x 1;Verbal cues;Armrests; Increased time required   Stand to Sit 5  Supervision   Additional items Assist x 1; Increased time required;Verbal cues;Armrests   Stand pivot 5  Supervision   Additional items Assist x 1;Verbal cues   Additional Comments Use of RW   Cognition   Overall Cognitive Status WFL   Arousal/Participation Alert; Responsive   Attention Within functional limits   Orientation Level Oriented X4   Memory Within functional limits   Following Commands Follows all commands and directions without difficulty   Assessment   Assessment Patient participated in Skilled OT session this date with interventions consisting of ADL re training with the use of correct body mechnaics   Patient agreeable to OT treatment session, upon arrival patient was found seated OOB to Recliner   Sit to stand txfrs from arik chair with S, stand pivot txfrs from arik chair to bed with use of RW and S, sit to supine txfrs with Min Ax1  Pt required Max A to complete LB dressing  Patient requiring verbal cues for safety and verbal cues for correct technique  Patient continues to be functioning below baseline level, occupational performance remains limited secondary to factors listed above and increased risk for falls and injury  From OT standpoint, recommendation at time of d/c would be Post acute rehabilitation services  The patient's raw score on the AM-PAC Daily Activity inpatient short form is 18, standardized score is 38 66, less than 39 4  Patients at this level are likely to benefit from discharge to post-acute rehabilitation services  Please refer to the recommendation of the Occupational Therapist for safe discharge planning  Plan   Goal Expiration Date 03/26/22   OT Treatment Day 1   OT Frequency 3-5x/wk   Recommendation   OT Discharge Recommendation Post acute rehabilitation services   AM-[facility] Daily Activity Inpatient   Lower Body Dressing 2   Bathing 2   Toileting 3   Upper Body Dressing 3   Grooming 4   Eating 4   Daily Activity Raw Score 18   Daily Activity Standardized Score (Calc for Raw Score >=11) 38 66   AM-PAC Applied Cognition Inpatient   Following a Speech/Presentation 4   Understanding Ordinary Conversation 4   Taking Medications 4   Remembering Where Things Are Placed or Put Away 4   Remembering List of 4-5 Errands 4   Taking Care of Complicated Tasks 4   Applied Cognition Raw Score 24   Applied Cognition Standardized Score 62 21     Pt left supine on bed with bed alarm on and all needs in reach

## 2022-03-18 NOTE — PROGRESS NOTES
Progress Note - Nephrology   Lucinda Rojas 60 y o  female MRN: [id number]  Unit/Bed#: 424-01 Encounter: [id number]    Assessment and Plan    Acute kidney injury, present on admission, superimposed on chronic kidney disease  Renal function continues to improve from peak creatinine 2 25 mg/dL on 03/11/2022 down to 1 58 mg/dL on 03/18/2022  Patient has been eating and drinking appropriately and has not required IV volume expansion since 03/16/2022  Patient is stable for discharge from a renal standpoint  Do not restart meloxicam, avoid all NSAIDs at discharge besides topical Voltaren gel  Trend renal function  Continue to provide supportive care  Optimize hemodynamics  Maintain mean arterial pressure greater than 65 mmHg  Renally dose medications  Avoid nephrotoxins  Please discuss with renal any diuretics or possible nephrotoxic agents, such as NSAIDs or IV contrast  Recommend avoidance of PPIs in favor of H2 blocker, if appropriate for clinical scenario  Monitor for urinary retention- strict I&Os, record bladder scan PVRs and follow urinary retention protocol  Chronic kidney disease, stage II with baseline creatinine 0 9-1 0 mg/dL  · Recommend outpatient nephrology follow-up with BMP within 1 week followed by office visit  Proteinuria  · Continue to hold ACE-inhibitor at discharge  Will re-evaluate restarting in outpatient setting once acute kidney injury is resolved and patient has returned to usual state of health  Chronic lymphedema, bilateral lower extremity edema  · Patient was not on pre-hospital diuretics  She should continue twice daily compression treatments 1 at home  Left hydroureter  · Appreciate Urology input  Klebsiella urinary tract infection  · Management per primary  Renally dose antibiotics, as indicated      Follow up reason for today's visit:  Acute kidney injury    ZAHIRA (acute kidney injury) [facility])    Patient Active Problem List   Diagnosis    Lymphedema    Essential hypertension    Hidradenitis suppurativa    Chronic midline low back pain with left-sided sciatica    Other specified anxiety disorders    Lumbar paraspinal muscle spasm    Borderline hyperlipidemia    Iron deficiency    GERD (gastroesophageal reflux disease)    ZAHIRA (acute kidney injury) (HCC)    Cellulitis of abdominal wall, chest wall and groin with wounds    Diverticulitis of large intestine with perforation    Right Hydroureteronephrosis    Dysphagia    [facility] adenocarcinoma ([facility] )    Open wnd anterior abdomen    Morbid obesity with BMI of 50 0-59 9, adult ([place] [address] )    Ambulatory dysfunction    Nausea    Yeast dermatitis    Hydroureter    S/P colostomy ([place] [address] )    UTI (urinary tract infection)    Anemia    Hyperphosphatemia    Low HDL (under 40)    Hypertriglyceridemia    Severe protein-calorie malnutrition (HCC)         Subjective:   Denies physical complaints  Reports that she is eating and drinking well  A complete 10 point review of systems was performed and is otherwise negative  Objective:     Vitals: Blood pressure 124/85, pulse 96, temperature 98 2 °F (36 8 °C), temperature source Oral, resp  rate 16, height 5' 7" (1 702 m), weight (!) 154 kg (340 lb 2 7 oz), SpO2 95 %  ,Body mass index is 53 28 kg/m²  Weight (last 2 days)     Date/Time Weight    03/18/22 0600 154 (340 17)    03/17/22 0600 154 (339 95)    03/16/22 0600 154 (340 17)            Intake/Output Summary (Last 24 hours) at 3/18/2022 1034  Last data filed at 3/18/2022 1025  Gross per 24 hour   Intake 720 ml   Output 400 ml   Net 320 ml     I/O last 3 completed shifts: In: 960 [P O :960]  Out: 800 [Urine:800]         Physical Exam: /85   Pulse 96   Temp 98 2 °F (36 8 °C) (Oral)   Resp 16   Ht 5' 7" (1 702 m)   Wt (!) 154 kg (340 lb 2 7 oz)   SpO2 95%   BMI 53 28 kg/m²     General Appearance:    No acute distress  Cooperative  Appears stated age  Morbidly obese   Head:    Normocephalic  Atraumatic   Normal jaw occlusion  Eyes:    Lids, conjunctiva normal  No scleral icterus  Ears:    Normal external ears  Nose:   Nares normal  No drainage  Mouth:   Lips, tongue normal  Mucosa normal  Phonation normal   Poor dentition  Neck:   Supple  Symmetrical    Back:     Symmetric  No CVA tenderness  Lungs:     Normal respiratory effort  Clear to auscultation bilaterally  Chest wall:    No tenderness or deformity  Heart:    Regular rate and rhythm  Normal S1 and S2  No murmur  No JVD  Lymphedema  Abdomen:     Soft  Non-tender  Bowel sounds active  Genitourinary:   No Galan catheter present  Extremities:   Extremities normal  Atraumatic  No cyanosis  Skin:   Warm and dry  No pallor, jaundice, rash, ecchymoses  Neurologic:   Alert and oriented to person, place, time  No focal deficit  Lab, Imaging and other studies: I have personally reviewed pertinent labs  CBC:   Lab Results   Component Value Date    WBC 9 56 03/18/2022    HGB 9 3 (L) 03/18/2022    HCT 30 3 (L) 03/18/2022    MCV 82 03/18/2022     03/18/2022    MCH 25 0 (L) 03/18/2022    MCHC 30 7 (L) 03/18/2022    RDW 17 7 (H) 03/18/2022    MPV 9 5 03/18/2022    NRBC 0 03/18/2022     CMP:   Lab Results   Component Value Date    K 3 7 03/18/2022     03/18/2022    CO2 27 03/18/2022    BUN 11 03/18/2022    CREATININE 1 58 (H) 03/18/2022    CALCIUM 8 7 03/18/2022    EGFR 35 03/18/2022           Results from last 7 days   Lab Units 03/18/22  0441 03/17/22  0456 03/16/22  0535 03/15/22  0617 03/14/22  0517 03/13/22  0446 03/13/22  0446 03/12/22  0525 03/12/22  0525   POTASSIUM mmol/L 3 7 4 3 4 0   < > 4 4   < > 4 5   < > 4 8   CHLORIDE mmol/L 102 102 102   < > 102   < > 102   < > 104   CO2 mmol/L 27 26 25   < > 27   < > 26   < > 26   BUN mg/dL 11 12 12   < > 17   < > 21   < > 22   CREATININE mg/dL 1 58* 1 71* 1 68*   < > 1 72*   < > 1 91*   < > 2 03*   CALCIUM mg/dL 8 7 8 8 8 5   < > 8 8   < > 8 8   < > 8 3   ALK PHOS U/L  --   --   --   -- 90  --  109  --  89   ALT U/L  --   --   --   --  15  --  13  --  12   AST U/L  --   --   --   --  9  --  11  --  12    < > = values in this interval not displayed  Phosphorus: No results found for: PHOS  Magnesium:   Lab Results   Component Value Date    MG 2 0 03/18/2022     Urinalysis: No results found for: Dorthey Docker, SPECGRAV, PHUR, LEUKOCYTESUR, NITRITE, PROTEINUA, GLUCOSEU, KETONESU, BILIRUBINUR, BLOODU  Ionized Calcium: No results found for: CAION  Coagulation: No results found for: PT, INR, APTT  Troponin: No results found for: TROPONINI  ABG: No results found for: PHART, VZS4QDQ, PO2ART, CMU1KIZ, J2TOHXGB, BEART, SOURCE  Radiology review:     IMAGING  No results found      Current Facility-Administered Medications   Medication Dose Route Frequency    acetaminophen (TYLENOL) tablet 650 mg  650 mg Oral Q6H PRN    albuterol (PROVENTIL HFA,VENTOLIN HFA) inhaler 2 puff  2 puff Inhalation Q4H PRN    albuterol inhalation solution 2 5 mg  2 5 mg Nebulization Q4H PRN    ALPRAZolam (XANAX) tablet 0 5 mg  0 5 mg Oral 4x Daily PRN    aluminum-magnesium hydroxide-simethicone (MYLANTA) oral suspension 30 mL  30 mL Oral Q6H PRN    docusate sodium (COLACE) capsule 100 mg  100 mg Oral BID    enoxaparin (LOVENOX) subcutaneous injection 60 mg  60 mg Subcutaneous BID    ergocalciferol (VITAMIN D2) capsule 50,000 Units  50,000 Units Oral Weekly    HYDROmorphone (DILAUDID) injection 1 mg  1 mg Intravenous Q4H PRN    Or    oxyCODONE (ROXICODONE) IR tablet 5 mg  5 mg Oral Q4H PRN    metoprolol tartrate (LOPRESSOR) tablet 25 mg  25 mg Oral BID    nicotine (NICODERM CQ) 7 mg/24hr TD 24 hr patch 1 patch  1 patch Transdermal Daily    nystatin (MYCOSTATIN) powder   Topical BID    ondansetron (ZOFRAN) injection 4 mg  4 mg Intravenous Q6H PRN    pantoprazole (PROTONIX) EC tablet 40 mg  40 mg Oral Early Morning     Medications Discontinued During This Encounter   Medication Reason    sertraline (ZOLOFT) 50 mg tablet Therapy completed    sodium chloride 0 9 % infusion     cefTRIAXone (ROCEPHIN) IVPB (premix in dextrose) 1,000 mg 50 mL     HYDROmorphone (DILAUDID) injection 1 mg     oxyCODONE-acetaminophen (PERCOCET) 5-325 mg per tablet 1 tablet     lisinopril (ZESTRIL) tablet 10 mg     ALPRAZolam (XANAX) tablet 2 mg     cefTRIAXone (ROCEPHIN) IVPB (premix in dextrose) 1,000 mg 50 mL     sodium chloride 0 9 % infusion     oxyCODONE (ROXICODONE) IR tablet 5 mg     HYDROmorphone (DILAUDID) injection 0 5 mg     sodium chloride 0 9 % infusion     cefepime (MAXIPIME) IVPB (premix in dextrose) 1,000 mg 50 mL     pantoprazole (PROTONIX) injection 40 mg        Noe Pinto PA-C    Portions of the record may have been created with voice recognition software  Occasional wrong word or "sound a like" substitutions may have occurred due to the inherent limitations of voice recognition software  Read the chart carefully and recognize, using context, where substitutions have occurred

## 2022-03-18 NOTE — CASE MANAGEMENT
Case Management Discharge Planning Note    Patient name Vincent Packer  Location [address] [id number] MRN [id number]  : 1965 Date 3/18/2022       Current Admission Date: 3/11/2022  Current Admission Diagnosis:ZAHIRA (acute kidney injury) [facility])   Patient Active Problem List    Diagnosis Date Noted    [facility] 2022    Hypertriglyceridemia 2022    Hyperphosphatemia 2022    Low HDL (under 40) 2022    Hydroureter 2022    S/P colostomy ([place] [address] ) 2022    UTI (urinary tract infection) 2022    Anemia 2022    Nausea 2022    Yeast dermatitis 2022    Ambulatory dysfunction 2022    Open wnd anterior abdomen 2022    Morbid obesity with BMI of 50 0-59 9, adult ([place] [address] ) 2022    Colon adenocarcinoma ([place] [address] ) 2022    Dysphagia 2022    ZAHIRA (acute kidney injury) ([place] [address] ) 2022    Cellulitis of abdominal wall, chest wall and groin with wounds 2022    Diverticulitis of large intestine with perforation 2022    Right Hydroureteronephrosis 2022    GERD (gastroesophageal reflux disease)     Other specified anxiety disorders 2019    Lumbar paraspinal muscle spasm 2019    Borderline hyperlipidemia 2019    Iron deficiency 2019    Chronic midline low back pain with left-sided sciatica 2019    Essential hypertension 2019    Hidradenitis suppurativa 2019    Lymphedema 2006      LOS (days): 7  Geometric Mean LOS (GMLOS) (days): 3 80  Days to GMLOS:-3 1     OBJECTIVE:  Risk of Unplanned Readmission Score: 21         Current admission status: Inpatient   Preferred Pharmacy:   [facility] [address] [facility] [address] [facility] [address] [facility] [address] [place] [id number]  Phone: [phone] Fax: [phone]    Primary Care Provider: Shirin Avendaon PA-C    Primary Insurance: [facility]  Secondary Insurance: DISCHARGE DETAILS:    Discharge planning discussed with[de-identified] Pt  Freedom of Choice: Yes                        Requested [address]         Is the patient interested in [facility] at discharge?: Yes  [address] requested[de-identified] [place] Agency Name[de-identified] [facility] Provider[de-identified] PCP  Home Health Services Needed[de-identified] Evaluate Functional Status and Safety,Gait/ADL Training,Strengthening/Theraputic Exercises to Improve Function,Wound/Ostomy Care  Homebound Criteria Met[de-identified] Uses an Assist Device (i e  cane, walker, etc)  Supporting Clincal Findings[de-identified] Fatigues Easliy in Short Distances,Limited Endurance    DME Referral Provided  Referral made for DME?: No            Treatment Team Recommendation: Home with [address]  Discharge Destination Plan[de-identified] Home with [place] at Discharge : [facility] I in basket messaged Hematology to call Patient with a follow up appt   I also messaged in inNewsFixed message for Oncology social worker to follow a long with the patient  I notified Calvin MANCILLA that patient is being discharged today

## 2022-03-18 NOTE — NURSING NOTE
Patient to home via family, Liane MCCOY transporting to vehicle  AVS reviewed  No questions or concerns at this time

## 2022-03-18 NOTE — TELEPHONE ENCOUNTER
----- Message from Leti Gold RN sent at 3/18/2022  1:09 PM EDT -----  Regarding: Follow up appt  Pt is being discharged from [facility] today  Please call patient to schedule a follow up appointment                                                Thanks                                           Case Management

## 2022-03-18 NOTE — DISCHARGE SUMMARY
[address] [facility] [address] [place]  Discharge- Nikole Crowe 1965, 60 y o  female MRN: [id number]  Unit/Bed#: 424-01 Encounter: [id number]  Primary Care Provider: Alpa Landry PA-C   Date and time admitted to hospital: 3/11/2022  9:34 AM    * ZAHIRA (acute kidney injury) ([facility] )  Assessment & Plan  Creatinine elevated at 2 25, baseline of approximately 1  Sr  Cr trending down and at 1 5 at the time of discharge  Nephrology consulted  Plan:  · Discontinue Lisinopril for now  · Encourage P O intake  · Omeprazole P  O  · Avoid NSAID's (DC'd Meloxicam)  · Repeat BMP in 3 days and follow up with PCP      UTI (urinary tract infection)  Assessment & Plan  UA positive with pending cultures, currently afebrile, denies any symptoms  Urine Culture positive for Klebsiella aerogenes  ID with recommendations for 3 day course of cefepime, completed by 03/16  Hydroureter  Assessment & Plan  CT abdomen shows left-sided hydronephrosis  Status post ureteral stent on right side for right-sided hydronephrosis which is improved compared to previous imaging  No urinary retention, post void scan 0ml  Urology on consult: appreciate recommendations       - S/p bilateral ureteral stent at B, left ureteral stent removed  As per urology note, patient scheduled for in office right ureteral stent removal 1 week ago, did not go because she was not feeling well  Repeat CT with:     Right nephroureteral stent in place   Similar mild right hydroureteronephrosis compared to prior CT   Mild left hydroureteronephrosis to a lesser degree is also similar to prior  3   Mildly enlarged pelvic lymph nodes as above similar to 3/11/2022 but enlarged since 1/15/2022   Metastasis not excluded  Plan:  Right ureteral stent removal on out patient basis        S/P colostomy [facility])  Assessment & Plan  Patient was treated for adenocarcinoma colon, s/p Payal's colostomy     Prn pain medication  Ambulatory referral to Home health    Essential hypertension  Assessment & Plan  Blood pressure at goal  Continue home metoprolol  lisinopril on hold because of ZAHIRA    Severe protein-calorie malnutrition (HCC)  Assessment & Plan  Malnutrition Findings:   Adult Malnutrition type: Chronic illness  Adult Degree of Malnutrition: Other severe protein calorie malnutrition  Malnutrition Characteristics: Inadequate energy,Weight loss      Patient with significantly poor oral intake since time of surgery  Stressed importance of adequate nutritional intake  Dietitian consulted as well  Continue nutritional supplements, continue to urge patient for improved intake  If decreased oral intake continues may need to consider PEG tube placement verses TPN via PICC line  Patient complaints of nausea and she is having questions about Colon Ca diet  Discussed with nutrition regarding  No dietary restriction at this point      - Encourage P O intake    360 Statement: severe PCM r/t significant wt loss of 12% x 1 month and 20% x 1 year with decrease appetite and suspected inadequate po intake for months due to nausea with recent dx of adenocarcinoma  Treated with regular diet; patient refuses supplements  BMI Findings:  Adult BMI Classifications: Morbid Obesity 50-59 9        Body mass index is 53 28 kg/m²  Anemia  Assessment & Plan  Hb 9 1 on admission  No active source of bleeding  clinically asymptomatic  Follow up with PCP on outpatient basis        Colon adenocarcinoma [facility])  Assessment & Plan  Adenocarcinoma:  Status post interval sigmoid resection and Payal colostomy  CT abdomen shows soft tissue thickening around the stump ? possibility of residual tumor versus postsurgical changes  Patient complains of severe pain in the lower abdomen, continued and unchanged since time of surgery  Pathology report: [id number] adenocarcinoma of sigmoid colon with positive margins, 10 of 18 LN positive      Plan:  Follow up with Oncology on outpatient basis   P r n  Pain medication        Discharging Physician / Practitioner: Nigel Barragan MD  PCP: Gary Smith PA-C  Admission Date:   Admission Orders (From admission, onward)     Ordered        03/11/22 1534  Inpatient Admission  Once                      Discharge Date: 03/18/22    Medical Problems             Resolved Problems  Date Reviewed: 3/18/2022    None                Consultations During Hospital Stay:  · ID, Nephrology, Nutrition    Procedures Performed:   · none    Significant Findings / Test Results:   · CT abdomen pelvis:     1  Postsurgical changes from sigmoid resection and Payal's 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  Again noted soft tissue thickening of the rectal stump extending to the uterus which may be postsurgical, inflammatory or   due to residual/recurrent tumor  Evaluation limited due to lack of IV and oral contrast      2  Right nephroureteral stent in place  Similar mild right hydroureteronephrosis compared to prior CT  Mild left hydroureteronephrosis to a lesser degree is also similar to prior      3   Mildly enlarged pelvic lymph nodes as above similar to 3/11/2022 but enlarged since 1/15/2022  Metastasis not excluded      4  Nodular thickening in the left vaginal region measuring 1 7 x 1 6 cm which is not seen on CT 1/15/2022  Cannot exclude tumor      5  There appears to be focal thickening of the posterior superior bladder wall  Evaluation is limited  Incidental Findings:   · none    Test Results Pending at Discharge (will require follow up):   · none     Outpatient Tests Requested:  · BMP     Complications:  none    Reason for Admission:  ZAHIRA    Hospital Course:     Lizbeth Kee is a 60 y o  female with PMH of HTN, GERD, rectal adenocarcinoma s/p interval sigmoid resection and colostomy  patient who originally presented to the hospital on 3/11/2022 with lower abdominal pain   Patient recently dischrged from nursing home after her surgery for adenocarcinoma  Reports that she wasn't given any pain medication on discharge from nursing home  From past 2 weeks she did not take any pain medication and she could not take any more pain  She does reports of issues with colostomy supplies  She is changing her bag everyday  Last change was yesterday  Denies urinary symptoms  Denies chest pain, SOB, palpitations  In the ED UA is positive  Received Ceftriaxone which is narrowed down to Cefepime for klebsiella as per ID recommendation  CT scan shows soft tissue thickening around surgical stump and left hydroureteronephrosis  Labs significant for ZAHIRA  With adequate pain control and IV hydration sr  Creatinine gradually trended down  Avoid nephrotoxic agents  Lisinopril on hold for ZAHIRA  Patient needs follow up with Oncology on out patient basis  Please see above list of diagnoses and related plan for additional information  Condition at Discharge: stable     Discharge Day Visit / Exam:     Subjective:  Feeling better  Vitals: Blood Pressure: 124/85 (03/18/22 0907)  Pulse: 96 (03/18/22 1041)  Temperature: 98 2 °F (36 8 °C) (03/18/22 0731)  Temp Source: Oral (03/18/22 0731)  Respirations: 18 (03/18/22 1041)  Height: 5' 7" (170 2 cm) (03/11/22 1610)  Weight - Scale: (!) 154 kg (340 lb 2 7 oz) (03/18/22 0600)  SpO2: 95 % (03/18/22 1041)  Exam:   Physical Exam  Vitals and nursing note reviewed  Constitutional:       General: She is not in acute distress  Appearance: She is well-developed  HENT:      Head: Normocephalic and atraumatic  Right Ear: External ear normal       Left Ear: External ear normal       Nose: Nose normal       Mouth/Throat:      Mouth: Mucous membranes are moist       Pharynx: Oropharynx is clear  Eyes:      General: No scleral icterus  Extraocular Movements: Extraocular movements intact  Conjunctiva/sclera: Conjunctivae normal       Pupils: Pupils are equal, round, and reactive to light  Cardiovascular:      Rate and Rhythm: Normal rate and regular rhythm  Pulses: Normal pulses  Heart sounds: Normal heart sounds  No murmur heard  Pulmonary:      Effort: Pulmonary effort is normal  No respiratory distress  Breath sounds: Normal breath sounds  No wheezing  Abdominal:      General: Bowel sounds are normal       Palpations: Abdomen is soft  Musculoskeletal:         General: Swelling ( bilateral LE swelling at baseline secondary to Lymphedema ) present  Cervical back: Neck supple  Skin:     General: Skin is warm and dry  Capillary Refill: Capillary refill takes less than 2 seconds  Neurological:      Mental Status: She is alert and oriented to person, place, and time  Mental status is at baseline  Discussion with Family: called family and updated    Discharge instructions/Information to patient and family:   See after visit summary for information provided to patient and family  Provisions for Follow-Up Care:  See after visit summary for information related to follow-up care and any pertinent home health orders  Disposition:     Home with [facility] Services (Reminder: Complete face to face encounter)    For Discharges to [facility] SNF:   · Not Applicable to this Patient - Not Applicable to this Patient    Planned Readmission: no     Discharge Statement:  I spent 60 minutes discharging the patient  This time was spent on the day of discharge  I had direct contact with the patient on the day of discharge  Greater than 50% of the total time was spent examining patient, answering all patient questions, arranging and discussing plan of care with patient as well as directly providing post-discharge instructions  Additional time then spent on discharge activities  Discharge Medications:  See after visit summary for reconciled discharge medications provided to patient and family        ** Please Note: This note has been constructed using a voice recognition system **

## 2022-03-18 NOTE — RESPIRATORY THERAPY NOTE
RT Protocol Note  João Miranda 60 y o  female MRN: [id number]  Unit/Bed#: 424-01 Encounter: [id number]    Assessment    Principal Problem:    ZAHIRA (acute kidney injury) [facility])  Active Problems:    Essential hypertension    Colon adenocarcinoma ([place] [address] )    Hydroureter    S/P colostomy ([place] [address] )    UTI (urinary tract infection)    Anemia    Severe protein-calorie malnutrition ([place] [address] )      Home Pulmonary Medications:  Albuterol MDI PRN       Past Medical History:   Diagnosis Date    Anxiety     Arthritis     GERD (gastroesophageal reflux disease)     Hidradenitis suppurativa     Hypertension     Lipodermatosclerosis of both lower extremities     Lymphedema 2006    Rectal cancer ([place] [address] )     Sciatic leg pain 2006    left side    SOB (shortness of breath)     Stomach ulcer      Social History     Socioeconomic History    Marital status:      Spouse name: None    Number of children: 2    Years of education: Some college    Highest education level: Some college, no degree   Occupational History    None   Tobacco Use    Smoking status: Light Tobacco Smoker     Packs/day: 0 25     Years: 20 00     Pack years: 5 00     Types: Cigarettes    Smokeless tobacco: Never Used    Tobacco comment: Only smoke once in awhile   Vaping Use    Vaping Use: Never used   Substance and Sexual Activity    Alcohol use: Not Currently     Comment: holidays    Drug use: No    Sexual activity: Not Currently   Other Topics Concern    None   Social History Narrative    None     Social Determinants of Health     Financial Resource Strain: Not on file   Food Insecurity: No Food Insecurity    Worried About Running Out of Food in the Last Year: Never true    Latoya of Food in the Last Year: Never true   Transportation Needs: No Transportation Needs    Lack of Transportation (Medical): No    Lack of Transportation (Non-Medical):  No   Physical Activity: Not on file   Stress: Not on file   Social Connections: Not on file Intimate Partner Violence: Not on file   Housing Stability: Low Risk     Unable to Pay for Housing in the Last Year: No    Number of Places Lived in the Last Year: 1    Unstable Housing in the Last Year: No       Subjective    Subjective Data: Patient stated her breathing was okay    Objective    Physical Exam:   Assessment Type: Assess only  General Appearance: Awake  Respiratory Pattern: Normal  Chest Assessment: Chest expansion symmetrical  Bilateral Breath Sounds: Diminished  O2 Device: RA    Vitals:  Blood pressure 124/85, pulse 96, temperature 98 2 °F (36 8 °C), temperature source Oral, resp  rate 18, height 5' 7" (1 702 m), weight (!) 154 kg (340 lb 2 7 oz), SpO2 95 %  Results from last 7 days   Lab Units 03/11/22  1157   PH ART  7 390   PCO2 ART mm Hg 43 7   PO2 ART mm Hg 75 5   HCO3 ART mmol/L 25 9   BASE EXC ART mmol/L 0 7   O2 CONTENT ART mL/dL 13 0*   O2 HGB, ARTERIAL % 93 7*   ABG SOURCE  Radial, Right   SHAHLA TEST  Yes   NON VENT ROOM AIR % RA       Imaging and other studies: I have personally reviewed pertinent reports  O2 Device: RA     Plan    Respiratory Plan: Discontinue Protocol  Airway Clearance Plan: Incentive Spirometer     Albuterol udn discontinued, PRN MDI remains

## 2022-03-18 NOTE — TELEPHONE ENCOUNTER
I'm sorry I have not met this patient  I can see that she was followed by Nicho Galvez in the hospital, but not sure who should this patient be referred to   Thank you

## 2022-03-18 NOTE — PHYSICAL THERAPY NOTE
PHYSICAL THERAPY NOTE          Patient Name: Dexter Lara  CXATN'I Date: 3/18/2022     03/18/22 1143   Note Type   Note Type Treatment   Restrictions/Precautions   Weight Bearing Precautions Per Order No   Other Precautions Chair Alarm; Bed Alarm; Fall Risk;Pain   General   Response to Previous Treatment Patient with no complaints from previous session  Family/Caregiver Present No   Cognition   Overall Cognitive Status WFL   Arousal/Participation Alert   Following Commands Follows all commands and directions without difficulty   Subjective   Subjective "I'm just so tired, I want to take a nap"    Bed Mobility   Sit to Supine 4  Minimal assistance   Additional items Assist x 1; Increased time required;Verbal cues;LE management   Transfers   Sit to Stand 5  Supervision   Additional items Assist x 1; Increased time required;Verbal cues   Stand to Sit 5  Supervision   Additional items Assist x 1; Increased time required;Verbal cues   Stand pivot 5  Supervision   Additional Comments Decliens ambulation in miranda  wants to nap before d/c due to lack of sleep at night   Ambulation/Elevation   Gait pattern Not tested   Ambulation/Elevation Additional Comments declined   Balance   Static Sitting Good   Dynamic Sitting Good   Static Standing Fair   Dynamic Standing Fair   Ambulatory Fair   Endurance Deficit   Endurance Deficit Yes   Activity Tolerance   Activity Tolerance Patient limited by fatigue;Patient limited by pain   Assessment   Prognosis Fair   Problem List Decreased strength;Decreased endurance; Impaired balance;Decreased mobility; Decreased safety awareness; Impaired judgement   Assessment Pt  seen for PT treatment session this date with interventions consisting of  bed mobility, transfers w/ emphasis on improving pt's mobility  Pt  Requiring occasional cues for sequence and safety  In comparison to previous session, Pt   With decrease in activity tolerance  Limited by fatigue  Pt is in need of continued activity in PT to improve strength balance endurance mobility transfers and ambulation with return to maximize LOF  From PT/mobility standpoint, recommendation at time of d/c would be home health rehab  in order to promote return to PLOF and independence  The patient's AM-PAC Basic Mobility Inpatient Short Form Raw Score is 20  A Raw score of greater than 16 suggests the patient may benefit from discharge to home  Please also refer to physical therapy recommendation for safe DC planning  Goals   LTG Expiration Date 03/25/22   Plan   Treatment/Interventions Functional transfer training;LE strengthening/ROM; Therapeutic exercise; Endurance training;Bed mobility;Gait training   Progress Slow progress, decreased activity tolerance   Recommendation   PT Discharge Recommendation Home with home health rehabilitation   [address] Mobility Inpatient   Turning in Bed Without Bedrails 4   Lying on Back to Sitting on Edge of Flat Bed 3   Moving Bed to Chair 3   Standing Up From Chair 4   Walk in Room 4   Climb 3-5 Stairs 2   Basic Mobility Inpatient Raw Score 20   Basic Mobility Standardized Score 43 99   Highest Level Of Mobility   JH-HL Goal 6: Walk 10 steps or more   JH-HL Highest Level of Mobility 4: Move to chair/commode   JH-HLM Goal Achieved No   Education   Education Provided Mobility training   Patient Demonstrates acceptance/verbal understanding   End of Consult   Patient Position at End of Consult Supine;Bed/Chair alarm activated; All needs within reach   End of Consult Comments discussed POC with PT

## 2022-03-18 NOTE — ASSESSMENT & PLAN NOTE
UA positive with pending cultures, currently afebrile, denies any symptoms  Urine Culture positive for Klebsiella aerogenes  ID with recommendations for 3 day course of cefepime, completed by 03/16

## 2022-03-18 NOTE — ASSESSMENT & PLAN NOTE
Malnutrition Findings:   Adult Malnutrition type: Chronic illness  Adult Degree of Malnutrition: Other severe protein calorie malnutrition  Malnutrition Characteristics: Inadequate energy,Weight loss      Patient with significantly poor oral intake since time of surgery  Stressed importance of adequate nutritional intake  Dietitian consulted as well  Continue nutritional supplements, continue to urge patient for improved intake  If decreased oral intake continues may need to consider PEG tube placement verses TPN via PICC line  Patient complaints of nausea and she is having questions about Colon Ca diet  Discussed with nutrition regarding  No dietary restriction at this point      - Encourage P O intake    360 Statement: severe PCM r/t significant wt loss of 12% x 1 month and 20% x 1 year with decrease appetite and suspected inadequate po intake for months due to nausea with recent dx of adenocarcinoma  Treated with regular diet; patient refuses supplements  BMI Findings:  Adult BMI Classifications: Morbid Obesity 50-59 9        Body mass index is 53 28 kg/m²

## 2022-03-18 NOTE — PLAN OF CARE
Problem: PHYSICAL THERAPY ADULT  Goal: Performs mobility at highest level of function for planned discharge setting  See evaluation for individualized goals  Description:   Outcome: Progressing  Note: Prognosis: Fair  Problem List: Decreased strength,Decreased endurance,Impaired balance,Decreased mobility,Decreased safety awareness,Impaired judgement  Assessment: Pt  seen for PT treatment session this date with interventions consisting of  bed mobility, transfers w/ emphasis on improving pt's mobility  Pt  Requiring occasional cues for sequence and safety  In comparison to previous session, Pt  With decrease in activity tolerance  Limited by fatigue  Pt is in need of continued activity in PT to improve strength balance endurance mobility transfers and ambulation with return to maximize LOF  From PT/mobility standpoint, recommendation at time of d/c would be home health rehab  in order to promote return to PLOF and independence  The patient's AM-PAC Basic Mobility Inpatient Short Form Raw Score is 20  A Raw score of greater than 16 suggests the patient may benefit from discharge to home  Please also refer to physical therapy recommendation for safe DC planning  Barriers to Discharge: Decreased caregiver support        PT Discharge Recommendation: Home with home health rehabilitation          See flowsheet documentation for full assessment

## 2022-03-18 NOTE — ASSESSMENT & PLAN NOTE
Hb 9 1 on admission  No active source of bleeding    clinically asymptomatic  Follow up with PCP on outpatient basis

## 2022-03-21 NOTE — TELEPHONE ENCOUNTER
Can schedule with AP in [facility] or [facility] on a day when MD in office to help (her exam has been tricky in the past)   Or if Reinaldo Estevez has dates at [facility] she would probably prefer that thanks

## 2022-03-21 NOTE — UTILIZATION REVIEW
Notification of Discharge   This is a Notification of Discharge from our facility [address]  Please be advised that this patient has been discharge from our facility  Below you will find the admission and discharge date and time including the patients disposition  UTILIZATION REVIEW CONTACT:  Iman Sheehan  Utilization   Network Utilization Review Department  Phone: [phone] x carefully listen to the prompts  All voicemails are confidential   Email: [email or web address]     PHYSICIAN ADVISORY SERVICES:  FOR NSUS-RA-ZWWT REVIEW - MEDICAL NECESSITY DENIAL  Phone: [phone]  Fax: [phone]  Email: [email or web address]     PRESENTATION DATE: 3/11/2022  9:34 AM  OBERVATION ADMISSION DATE:   INPATIENT ADMISSION DATE: 3/11/22  3:34 PM   DISCHARGE DATE: 3/18/2022  2:41 PM  DISPOSITION: Home with [place] with [address] [place] [address] Road INFORMATION:  Send all requests for admission clinical reviews, approved or denied determinations and any other requests to dedicated fax number below belonging to the campus where the patient is receiving treatment   List of dedicated fax numbers:  [address] [place] [address] DENIALS (Administrative/Medical Necessity) [phone]   [address]  (Maternity/NICU/Pediatrics) [phone]   [facility] Se[facility] [phone]   [address] [facility] [address] [phone]   [address] [place] [address] [phone]   [address] [facility] [address] [place] [address],[address] [phone]   [facility]  [phone]   [address] [place] [address], [facility]  [address] [facility] [address] [facility] [phone]

## 2022-03-25 NOTE — PROGRESS NOTES
[facility] [address] [address]  [phone]  [address] [facility] Gauri Piedra,1965, [id number]  03/25/22    Discussion:   In summary, this is a 38-year-old female history of recent diagnosis of colon cancer  Patient had been hospitalized in January 2022  She was found to have bowel perforation with abscess resulting in mass effect and hydronephrosis  She underwent laparotomy with resection  Pathology indicates moderate to poorly differentiated adenocarcinoma of the sigmoid colon invading the visceral peritoneum with perforation  Positive distal margin  10/18 lymph nodes positive  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, N2, stage IIIC  She was transferred to rehab and eventually went home  Her performance status has declined since that time, however  She is very weak  Appetite is poor  She has abdominal/pelvic pain  She is only able to walk a few steps with crutches  She lives alone  Performance status is poor, ECOG-4  Based on all of this the risk benefit ratio for chemotherapy is poor, in my opinion  We reviewed that chemotherapy could hasten her death  Likelihood of response is modest, duration uncertain  Given all of this, I think that exploration of hospice care is reasonable  We reviewed the goals of hospice care to provide support, diminish disease related suffering, etcetera  Patient was agreeable  We made arrangements for hospice consultation as soon as possible  Consideration for inpatient hospice  I discussed the above with the patient  The patient  voiced understanding and agreement   ______________________________________________________________________    No chief complaint on file  HPI:  Oncology History    No history exists  Interval History:  Weak, tired  Anorexia  Abdominal pain  ECOG-  4-needs help with self-care  Review of Systems   Constitutional: Positive for appetite change and fatigue  Negative for diaphoresis and fever  HENT: Negative for sinus pain  Eyes: Negative for discharge  Respiratory: Positive for shortness of breath  Negative for cough  Cardiovascular: Negative for chest pain  Gastrointestinal: Positive for abdominal pain  Negative for constipation and diarrhea  Endocrine: Negative for cold intolerance  Genitourinary: Negative for difficulty urinating and hematuria  Musculoskeletal: Negative for joint swelling  Skin: Negative for rash  Allergic/Immunologic: Negative for environmental allergies  Neurological: Positive for weakness  Negative for dizziness and headaches  Hematological: Negative for adenopathy  Psychiatric/Behavioral: Negative for agitation         Past Medical History:   Diagnosis Date    Anxiety     Arthritis     GERD (gastroesophageal reflux disease)     Hidradenitis suppurativa     Hypertension     Lipodermatosclerosis of both lower extremities     Lymphedema 2006    [facility]     Sciatic leg pain 2006    left side    SOB (shortness of breath)     Stomach ulcer      Patient Active Problem List   Diagnosis    Lymphedema    Essential hypertension    Hidradenitis suppurativa    Chronic midline low back pain with left-sided sciatica    Other specified anxiety disorders    Lumbar paraspinal muscle spasm    Borderline hyperlipidemia    Iron deficiency    GERD (gastroesophageal reflux disease)    ZAHIRA [facility]acute kidney injury) ([facility] )    Cellulitis of abdominal wall, chest wall and groin with wounds    Diverticulitis of large intestine with perforation    Right Hydroureteronephrosis    Dysphagia    Colon [facility]    Open wnd anterior abdomen    Morbid obesity with BMI of 50 0-59 9, adult ([facility] )    Ambulatory dysfunction    Nausea    Yeast dermatitis    Hydroureter    S[facility]    UTI (urinary tract infection)    Anemia    Hyperphosphatemia    Low HDL (under 40)    Hypertriglyceridemia    Severe protein-calorie malnutrition (HCC)       Current Outpatient Medications:     albuterol (Ventolin HFA) 90 mcg/act inhaler, Inhale 2 puffs every 6 (six) hours as needed for wheezing or shortness of breath, Disp: 18 g, Rfl: 5    ALPRAZolam (XANAX) 2 MG tablet, Take 1 tablet (2 mg total) by mouth 2 (two) times a day as needed for anxiety, Disp: 45 tablet, Rfl: 0    Diclofenac Sodium (VOLTAREN) 1 %, Apply 4 g topically 4 (four) times a day, Disp: 150 g, Rfl: 5    docusate sodium (COLACE) 100 mg capsule, Take 1 capsule (100 mg total) by mouth daily as needed for constipation, Disp: 90 capsule, Rfl: 1    ergocalciferol (VITAMIN D2) 50,000 units, Take 1 capsule (50,000 Units total) by mouth once a week for 14 days, Disp: 2 capsule, Rfl: 0    fluticasone (FLONASE) 50 mcg/act nasal spray, instill 1 spray into each nostril once daily, Disp: 16 g, Rfl: 5    HYDROmorphone (DILAUDID) 2 mg tablet, Take 1mg (1/2 tablet) PO Q4PRN for moderate pain and 2mg (1 tablet) PO Q4PRN for severe pain, Disp: 50 tablet, Rfl: 0    loratadine (CLARITIN) 10 mg tablet, take 1 tablet by mouth once daily, Disp: 30 tablet, Rfl: 11    methocarbamol (ROBAXIN) 500 mg tablet, Take 1 tablet (500 mg total) by mouth every 12 (twelve) hours, Disp: 60 tablet, Rfl: 2    metoprolol tartrate (LOPRESSOR) 25 mg tablet, take 1 tablet by mouth twice a day, Disp: 180 tablet, Rfl: 1    Misc  Devices (Bariatric Rollator) MISC, Use in the morning, Disp: 1 each, Rfl: 0    Misc   Devices (Commode Bedside) MISC, Use in the morning, Disp: 1 each, Rfl: 0    nystatin (MYCOSTATIN) powder, Apply topically 2 (two) times a day, Disp: 15 g, Rfl: 5    omeprazole (PriLOSEC) 20 mg delayed release capsule, Take 1 capsule (20 mg total) by mouth daily, Disp: 90 capsule, Rfl: 1    ondansetron (ZOFRAN) 4 mg tablet, Take 1 tablet (4 mg total) by mouth 3 (three) times a day before meals, Disp: 90 tablet, Rfl: 2    Ostomy Supplies (Closed-End Colostomy Pouch) MISC, Use in the morning, Disp: 1 each, Rfl: 11    senna (SENOKOT) 8 6 MG tablet, Take 1 tablet (8 6 mg total) by mouth daily as needed for constipation, Disp: 90 tablet, Rfl: 1    SUMAtriptan (IMITREX) 25 mg tablet, TAKE 1 TABLET BY MOUTH ONCE AS NEEDED FOR MIGRAINE FOR UP TO 1 DOSE, Disp: 9 tablet, Rfl: 2  Allergies   Allergen Reactions    Bee Venom Anaphylaxis, Shortness Of Breath and Swelling     Past Surgical History:   Procedure Laterality Date    BILATERAL SALPINGOOPHORECTOMY N/A 2022    Procedure: RIGHT SALPINGO-OOPHORECTOMY, LEFT OOPHORECTOMY;  Surgeon: Yusef Toussaint DO;  [facility]: [facility];  Service: General     SECTION      FL RETROGRADE PYELOGRAM  2022    HARTMANS PROCEDURE N/A 2022    Procedure: END COLOSTOMY;  Surgeon: Yusef Toussaint DO;  [facility];  Service: General    LAPAROTOMY N/A 2022    Procedure: LAPAROTOMY EXPLORATORY, DRAINAGE OF INTRA -ABDOMINAL ABSCESS;  Surgeon: Yusef Toussaint DO;  [facility];  Service: General    NC CYSTOURETHROSCOPY,URETER CATHETER Right 2022    Procedure: CYSTOSCOPY RETROGRADE PYELOGRAM WITH INSERTION STENT URETERAL;  Surgeon: Janeth Valles MD;  [facility];  Service: Urology    NC EXC SKIN BENIG 3 1-4 CM TRUNK,ARM,LEG Right 2020    Procedure: EXCISION BIOPSY TISSUE LESION/MASS UPPER EXTREMITY;  Surgeon: Shade Johnston DO;  Location: [facility];  Service: General    TONSILLECTOMY      TUMOR REMOVAL Right     5th finger     Social History     Objective:  Vitals:    22 0835   BP: 120/70   Pulse: (!) 120   Temp: 99 9 °F (37 7 °C)   SpO2: 97%     Physical Exam      Labs: I personally reviewed the labs and imaging pertinent to this patient care

## 2022-03-25 NOTE — TELEPHONE ENCOUNTER
Deedee from Pt called on patient reported pt had a fall but no injuries and did not go to ER  Will be returning shower chair and walker due to wrong items ordered  Deedee will enter correct items through [place], rollator and shower bench

## 2022-04-21 NOTE — TELEPHONE ENCOUNTER
Called and spoke with patient about appointment on 04/29/22   Patient states that she is now on hospice and does not wish to follow up for stent removal

## 2022-05-05 ENCOUNTER — TELEPHONE (OUTPATIENT)
Dept: FAMILY MEDICINE CLINIC | Facility: CLINIC | Age: 57
End: 2022-05-05

## 2022-05-05 NOTE — TELEPHONE ENCOUNTER
New brian from [facility] called with a courtesy call to inform you this patient passed away on 05/02/2022

## 2022-05-31 DIAGNOSIS — R06.02 SOB (SHORTNESS OF BREATH): ICD-10-CM

## 2022-05-31 DIAGNOSIS — I10 HYPERTENSION, UNSPECIFIED TYPE: ICD-10-CM

## 2022-07-06 NOTE — PROGRESS NOTES
Daily Note     Today's date: 3/23/2021  Patient name: Eileen Casanova  : 1965  MRN: [id number]  Referring provider: Milad Can PA-C  Dx: No diagnosis found  Subjective: When I walk it feels like my R knee is going to pop out  R knee pain 9/10       L knee pain 5/10    Objective: See treatment diary below    Assessment: Pt with fair vel to initiation of TE as per flow sheet  Pt did well with variations in positions t/o session  Held R LE wt bearing ex to avoid exacerbation of R knee  Advised pt to use CP/MHP for relief at home  Pain reduced 2 levels at end of Tx  Patient would benefit from continued PT    Plan: Continue per plan of care  Add self stretch NV        Re-eval Date: 21    Date 3/18 3-23-21      Visit Count 1 2      FOTO              Precautions: Lipidemia; chronic pain       Manuals 3/18 3-23-21              Neuro Re-Ed                         Ther Ex        NuStep  L1  10 minutes  L 1    10'      HR/TR        Standing hip flex/abd/ext  R only  1 x 5 ea      Standing HS curl  1 x 5   R only      Step-ups   Fwd/Lat        Step-down        LAQ   Ramos 1 x 10      Seated march  1 x 10      Ham curl with tband  NV      QS  Ramos 5" 1 x 10      Heel slides        Hip add  Seated 1 x 10      Hip abd        SLR        Bridges  1 x 10              Self HS stretch   NV              Ther Activity        Education  Lipidemia condition and physiological impact on joints/knee pain   Reviewed management options                Gait Training                        Modalities Impression: Drusen (degenerative) of macula, right eye: H35.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 Plan: Pt edu on all findings. OCT appears stable. Continue to monitor yearly. Advised patient to call with any sudden vision changes. RTC CEE with MAC OCT.

## 2022-08-05 NOTE — PROGRESS NOTES
Urology Update Note    Patient remains afebrile & clinically stable on IV abx. After review of clinical data and imaging, low suspicion that patient has obstructing. Left renal stone does not appear to be obstructing at this time.    -No plan for urologic intervention today  -Ok for diet  -Continue empiric abx, follow urine culture  -Urology will continue to closely follow  -If patient clinically clinically decompensates or experiences worsening fever trend please page urology on-call for consideration of more urgent intervention  -Unable to contact  or son to discuss findings    Discussed with staff urologists, Dr. Lanier and Dr. Talbot.    Lino Pena MD  Urology, PGY-5  (p) [phone]     Virtual Regular Visit      Assessment/Plan:    Problem List Items Addressed This Visit        Other    Lymphedema - Primary    Morbid obesity ([address] )      Other Visit Diagnoses     Primary osteoarthritis of right knee        Relevant Medications    celecoxib (CeleBREX) 200 mg capsule        - Patient provided with referral for weight management  Advised to call her insurance for further information regarding coverage for the lymphedema specialist   - Script sent for Celebrex  Patient advised to start PT and follow up with ortho for injections  - Labs as previously ordered  Follow up in 1 month       Reason for visit is   Chief Complaint   Patient presents with    Edema     patient states she needs a referral for lymphedema with Isaac Sierra in [address] ( p [phone])    Medical Problem     patient states she was told no to take naproxen anymore and was given celebrex by the walk in clinic and isnt sure what she should take since she is out of celebrex    Virtual Regular Visit        Encounter provider [place] PA-C    Provider located at [address] [address]      Recent Visits  No visits were found meeting these conditions  Showing recent visits within past 7 days and meeting all other requirements     Today's Visits  Date Type Provider Dept   08/06/20 [address] [place] [address] today's visits and meeting all other requirements     Future Appointments  No visits were found meeting these conditions  Showing future appointments within next 150 days and meeting all other requirements        The patient was identified by name and date of birth  Zoey Jorge was informed that this is a telemedicine visit and that the visit is being conducted through [employer] and patient was informed that this is not a secure, HIPAA-complaint platform  She agrees to proceed     My office door was closed  No one else was in the room  She acknowledged consent and understanding of privacy and security of the video platform  The patient has agreed to participate and understands they can discontinue the visit at any time  Patient is aware this is a billable service  Subjective  Yanely Mascorro is a 62 y o  female  Renzo Gonzalez is a 62 y o  female with history of chronic back pain, lymphedema, morbid obesity, presenting for medication refill  Patient was seen at urgent care  for knee pain and prescribed Celebrex  She has since run out of medication  Reports that this helped her pain  Xray at that time showed mild osteoarthritis  She did see ortho on   They referred her to PT and recommended viscosupplementation injections for primary osteoarthritis  She has not started PT yet  Patient is also requesting a referral to a Dr Brendalyn Cowden in [address] for her lymphedema  Reports that she saw him on a webinar and would like to go for a consult however she is unsure whether her insurance will cover this  Patient is morbidly obese with BMI > 60  She has tried phentermine in the past with minimal weight loss  She has not seen weight management         Past Medical History:   Diagnosis Date    Anxiety     Hypertension     Lymphedema 2006    Sciatic leg pain 2006    left side       Past Surgical History:   Procedure Laterality Date     SECTION      TONSILLECTOMY         Current Outpatient Medications   Medication Sig Dispense Refill    albuterol (VENTOLIN HFA) 90 mcg/act inhaler Inhale 2 puffs every 6 (six) hours as needed for wheezing or shortness of breath 1 Inhaler 0    celecoxib (CeleBREX) 200 mg capsule Take 1 capsule (200 mg total) by mouth 2 (two) times a day 60 capsule 2    clotrimazole (LOTRIMIN) 1 % cream Apply 1 application topically daily as needed      cyclobenzaprine (FLEXERIL) 10 mg tablet take 1 tablet by mouth three times a day if needed for muscle spasm 90 tablet 1    ferrous sulfate 324 (65 Fe) mg take 1 tablet by mouth twice a day before meals 60 tablet 2    hydrOXYzine HCL (ATARAX) 10 mg tablet TAKE 1 TO 2 TABLETS BY MOUTH EVERY 8 HOURS IF NEEDED FOR ANXIETY 90 tablet 0    lisinopril (ZESTRIL) 10 mg tablet Take 1 tablet (10 mg total) by mouth daily 90 tablet 1    metoprolol tartrate (LOPRESSOR) 25 mg tablet Take 1 tablet (25 mg total) by mouth 2 (two) times a day 180 tablet 1    mupirocin (BACTROBAN) 2 % ointment Apply topically 3 (three) times a day (Patient not taking: Reported on 6/17/2020) 22 g 0    nystatin (MYCOSTATIN) powder Apply topically 3 (three) times a day 60 g 2    phentermine 37 5 MG capsule take 1 capsule by mouth every morning 30 capsule 0    polyethylene glycol (GOLYTELY) 4000 mL solution Take 4,000 mL by mouth once for 1 dose (Patient not taking: Reported on 4/22/2020) 4000 mL 0    sertraline (ZOLOFT) 25 mg tablet take 1 tablet by mouth once daily 30 tablet 2     No current facility-administered medications for this visit  Allergies   Allergen Reactions    Bee Venom Anaphylaxis, Shortness Of Breath and Swelling       Review of Systems   Constitutional: Negative for chills, diaphoresis and fever  Respiratory: Negative for cough, chest tightness, shortness of breath and wheezing  Cardiovascular: Positive for leg swelling  Negative for chest pain and palpitations  Gastrointestinal: Negative for abdominal pain, blood in stool, constipation, diarrhea, nausea and vomiting  Musculoskeletal: Positive for arthralgias and back pain  Skin: Negative for rash and wound  Neurological: Negative for dizziness, syncope, weakness, light-headedness and headaches  Video Exam    There were no vitals filed for this visit  Physical Exam   Constitutional: She is oriented to person, place, and time  No distress  HENT:   Head: Normocephalic and atraumatic  Pulmonary/Chest: Effort normal  No respiratory distress     Neurological: She is alert and oriented to person, place, and time  Psychiatric: Her behavior is normal  Mood normal         I spent 15 minutes directly with the patient during this visit      VIRTUAL VISIT Michelle 109 acknowledges that she has consented to an online visit or consultation  She understands that the online visit is based solely on information provided by her, and that, in the absence of a face-to-face physical evaluation by the physician, the diagnosis she receives is both limited and provisional in terms of accuracy and completeness  This is not intended to replace a full medical face-to-face evaluation by the physician  Lucinda Rojas understands and accepts these terms

## 2023-03-02 NOTE — TELEPHONE ENCOUNTER
Patient had a right ureteral stent placed on January 16 for right hydronephrosis  She was critically ill but when she improves she will need follow-up  Please place the patient on the stent list so she is not lost to follow-up      Thanks 20.4

## 2023-10-10 NOTE — ED NOTES
Provider at bedside  Request for lab work, ECG explained       Chance Seth, RN  02/07/19 9874 Statement Selected

## 2023-11-09 NOTE — PLAN OF CARE
Problem: PAIN - ADULT  Goal: Verbalizes/displays adequate comfort level or baseline comfort level  Description: Interventions:  - Encourage patient to monitor pain and request assistance  - Assess pain using appropriate pain scale (0-10 pain scale)  - Administer analgesics based on type and severity of pain and evaluate response  - Implement non-pharmacological measures as appropriate and evaluate response  - Consider cultural and social influences on pain and pain management  - Notify physician/advanced practitioner if interventions unsuccessful or patient reports new pain  Outcome: Progressing Pt declined

## 2024-09-19 NOTE — ASSESSMENT & PLAN NOTE
Chronic, stable. TSH wnl as of 1/2024. She does not maintain on any levothyroxine. Follow up with PCP at least annually for continued monitoring and management.   Latest Reference Range & Units 01/30/24 21:27   TSH 0.380 - 5.330 uIU/mL 2.600      Patient complains of choking sensation, initially with solids, recently she noticed that she is choking on water  In patient words " I feel like there is a pill struck in my throat even with drinking plain water"  Poor dental heigine with multiple dental caries noted on exam  No thrush noted  Being treated for aphthous ulcer and sore throat  Speech and swallow eval  GI consult to rule out obstructive causes     Increase diflucan to 400mg daily per ID for total 2 weeks 1/11/21

## 2025-03-14 NOTE — PLAN OF CARE
ED signout note:    In short, 21-year-old male presenting to the emergency department with correctional facility officers for concerns regarding potential seizure.  Patient does have history of seizure disorder, and also history of nonepileptic seizures.    I was called to bedside at 10 PM for patient experiencing an episode.  Upon my evaluation patient has some slight twitching primarily of the upper body.  He is not responding to verbal stimuli.  He does flinch and withdraw to painful stimuli.  With touching of the eyelashes, patient is blinking, however otherwise not responding to questions, or verbal stimuli.    At the time of this shaking episode, lactate was drawn.  Lactate is completely normal.  Therefore given his normal vitals at the time of this episode, with heart rates between , with no oxygen saturation changes, I feel that this likely represents nonepileptic event.    No additional medications given.  He had previously received Keppra load.    MRI results are reviewed.  These are unchanged compared to October when prior MRI was performed.  No acute findings, and therefore I feel it is reasonable for discharge back to the correctional facility.  Patient had  lactate x 2 which were drawn and both negative.  1 episode was occurring at the time of lactate being drawn, and this is negative, making true epileptic seizure activity less likely.    Upon my final discussion with the patient at 10:35 PM, patient has no further questions.  He is asking for something to eat.  Will discharge back to [place].    1. Seizure-like activity (H)      MD John Wright David James, MD  03/13/25 7534     Problem: Prexisting or High Potential for Compromised Skin Integrity  Goal: Skin integrity is maintained or improved  Description: INTERVENTIONS:  - Identify patients at risk for skin breakdown  - Assess and monitor skin integrity  - Assess and monitor nutrition and hydration status  - Monitor labs   - Assess for incontinence   - Turn and reposition patient  - Assist with mobility/ambulation  - Relieve pressure over bony prominences  - Avoid friction and shearing  - Provide appropriate hygiene as needed including keeping skin clean and dry  - Evaluate need for skin moisturizer/barrier cream  - Collaborate with interdisciplinary team   - Patient/family teaching  - Consider wound care consult   Outcome: Progressing     Problem: MOBILITY - ADULT  Goal: Maintain or return to baseline ADL function  Description: INTERVENTIONS:  -  Assess patient's ability to carry out ADLs; assess patient's baseline for ADL function and identify physical deficits which impact ability to perform ADLs (bathing, care of mouth/teeth, toileting, grooming, dressing, etc )  - Assess/evaluate cause of self-care deficits   - Assess range of motion  - Assess patient's mobility; develop plan if impaired  - Assess patient's need for assistive devices and provide as appropriate  - Encourage maximum independence but intervene and supervise when necessary  - Involve family in performance of ADLs  - Assess for home care needs following discharge   - Consider OT consult to assist with ADL evaluation and planning for discharge  - Provide patient education as appropriate  Outcome: Progressing  Goal: Maintains/Returns to pre admission functional level  Description: INTERVENTIONS:  - Perform BMAT or MOVE assessment daily    - Set and communicate daily mobility goal to care team and patient/family/caregiver  - Collaborate with rehabilitation services on mobility goals if consulted  - Perform Range of Motion 3 times a day    - Reposition patient every 2 hours   - Dangle patient 3 times a day  - Stand patient 3 times a day  - Ambulate patient 3 times a day  - Out of bed to chair 3 times a day   - Out of bed for meals 3 times a day  - Out of bed for toileting  - Record patient progress and toleration of activity level   Outcome: Progressing     Problem: Potential for Falls  Goal: Patient will remain free of falls  Description: INTERVENTIONS:  - Educate patient/family on patient safety including physical limitations  - Instruct patient to call for assistance with activity   - Consult OT/PT to assist with strengthening/mobility   - Keep Call bell within reach  - Keep bed low and locked with side rails adjusted as appropriate  - Keep care items and personal belongings within reach  - Initiate and maintain comfort rounds  - Make Fall Risk Sign visible to staff  - Offer Toileting every 2 Hours, in advance of need  - Initiate/Maintain bed alarm  - Apply yellow socks and bracelet for high fall risk patients  - Consider moving patient to room near nurses station  Outcome: Progressing     Problem: GASTROINTESTINAL - ADULT  Goal: Minimal or absence of nausea and/or vomiting  Description: INTERVENTIONS:  - Administer IV fluids if ordered to ensure adequate hydration  - Maintain NPO status until nausea and vomiting are resolved  - Nasogastric tube if ordered  - Administer ordered antiemetic medications as needed  - Provide nonpharmacologic comfort measures as appropriate  - Advance diet as tolerated, if ordered  - Consider nutrition services referral to assist patient with adequate nutrition and appropriate food choices  Outcome: Progressing  Goal: Maintains or returns to baseline bowel function  Description: INTERVENTIONS:  - Assess bowel function  - Encourage oral fluids to ensure adequate hydration  - Administer IV fluids if ordered to ensure adequate hydration  - Administer ordered medications as needed  - Encourage mobilization and activity  - Consider nutritional services referral to assist patient with adequate nutrition and appropriate food choices  Outcome: Progressing  Goal: Maintains adequate nutritional intake  Description: INTERVENTIONS:  - Monitor percentage of each meal consumed  - Identify factors contributing to decreased intake, treat as appropriate  - Assist with meals as needed  - Monitor I&O, weight, and lab values if indicated  - Obtain nutrition services referral as needed  Outcome: Progressing  Goal: Establish and maintain optimal ostomy function  Description: INTERVENTIONS:  - Assess bowel function  - Encourage oral fluids to ensure adequate hydration  - Administer IV fluids if ordered to ensure adequate hydration   - Administer ordered medications as needed  - Encourage mobilization and activity  - Nutrition services referral to assist patient with appropriate food choices  - Assess stoma site  - Consider wound care consult   Outcome: Progressing  Goal: Oral mucous membranes remain intact  Description: INTERVENTIONS  - Assess oral mucosa and hygiene practices  - Implement preventative oral hygiene regimen  - Implement oral medicated treatments as ordered  - Initiate Nutrition services referral as needed  Outcome: Progressing     Problem: GENITOURINARY - ADULT  Goal: Maintains or returns to baseline urinary function  Description: INTERVENTIONS:  - Assess urinary function  - Encourage oral fluids to ensure adequate hydration if ordered  - Administer IV fluids as ordered to ensure adequate hydration  - Administer ordered medications as needed  - Offer frequent toileting  - Follow urinary retention protocol if ordered  Outcome: Progressing  Goal: Absence of urinary retention  Description: INTERVENTIONS:  - Assess patient's ability to void and empty bladder  - Monitor I/O  - Bladder scan as needed  - Discuss with physician/AP medications to alleviate retention as needed  - Discuss catheterization for long term situations as appropriate  Outcome: Progressing  Goal: Urinary catheter remains patent  Description: INTERVENTIONS:  - Assess patency of urinary catheter  - If patient has a chronic villanueva, consider changing catheter if non-functioning  - Follow guidelines for intermittent irrigation of non-functioning urinary catheter  Outcome: Progressing     Problem: SKIN/TISSUE INTEGRITY - ADULT  Goal: Skin Integrity remains intact(Skin Breakdown Prevention)  Description: Assess:  -Perform Surendra assessment every 12  -Clean and moisturize skin every 4  -Inspect skin when repositioning, toileting, and assisting with ADLS  -Assess extremities for adequate circulation and sensation     Bed Management:  -Have minimal linens on bed & keep smooth, unwrinkled  -Change linens as needed when moist or perspiring  -Avoid sitting or lying in one position for more than 2 hours while in bed  -Keep HOB at 30degrees     Toileting:  -Offer bedside commode  -Assess for incontinence every 2    Activity:  -Mobilize patient 3 times a day  -Encourage activity and walks on unit  -Encourage or provide ROM exercises   -Turn and reposition patient every 2 Hours  -Use appropriate equipment to lift or move patient in bed  -Instruct/ Assist with weight shifting every 2 hour when out of bed in chair  -Consider limitation of chair time 8 hour intervals    Skin Care:  -Avoid use of baby powder, tape, friction and shearing, hot water or constrictive clothing  -Relieve pressure over bony prominences using allevyns  -Do not massage red bony areas  Outcome: Progressing  Goal: Incision(s), wounds(s) or drain site(s) healing without S/S of infection  Description: INTERVENTIONS  - Assess and document dressing, incision, wound bed, drain sites and surrounding tissue  - Provide patient and family education  - Perform skin care/dressing changes as needed  Outcome: Progressing  Goal: Pressure injury heals and does not worsen  Description: Interventions:  - Implement low air loss mattress or specialty surface (Criteria met)  - Apply silicone foam dressing  - Instruct/assist with weight shifting every 120 minutes when in chair   - Limit chair time to 8 hour intervals  - Use special pressure reducing interventions such as allevyns when in chair   - Apply fecal or urinary incontinence containment device   - Perform passive or active ROM every 4  - Turn and reposition patient & offload bony prominences every 2 hours   - Utilize friction reducing device or surface for transfers   - Consider nutrition services referral as needed  Outcome: Progressing     Problem: PAIN - ADULT  Goal: Verbalizes/displays adequate comfort level or baseline comfort level  Description: Interventions:  - Encourage patient to monitor pain and request assistance  - Assess pain using appropriate pain scale  - Administer analgesics based on type and severity of pain and evaluate response  - Implement non-pharmacological measures as appropriate and evaluate response  - Consider cultural and social influences on pain and pain management  - Notify physician/advanced practitioner if interventions unsuccessful or patient reports new pain  Outcome: Progressing     Problem: INFECTION - ADULT  Goal: Absence or prevention of progression during hospitalization  Description: INTERVENTIONS:  - Assess and monitor for signs and symptoms of infection  - Monitor lab/diagnostic results  - Monitor all insertion sites, i e  indwelling lines, tubes, and drains  - Monitor endotracheal if appropriate and nasal secretions for changes in amount and color  - Stacy appropriate cooling/warming therapies per order  - Administer medications as ordered  - Instruct and encourage patient and family to use good hand hygiene technique  - Identify and instruct in appropriate isolation precautions for identified infection/condition  Outcome: Progressing     Problem: SAFETY ADULT  Goal: Maintain or return to baseline ADL function  Description: INTERVENTIONS:  -  Assess patient's ability to carry out ADLs; assess patient's baseline for ADL function and identify physical deficits which impact ability to perform ADLs (bathing, care of mouth/teeth, toileting, grooming, dressing, etc )  - Assess/evaluate cause of self-care deficits   - Assess range of motion  - Assess patient's mobility; develop plan if impaired  - Assess patient's need for assistive devices and provide as appropriate  - Encourage maximum independence but intervene and supervise when necessary  - Involve family in performance of ADLs  - Assess for home care needs following discharge   - Consider OT consult to assist with ADL evaluation and planning for discharge  - Provide patient education as appropriate  Outcome: Progressing  Goal: Maintains/Returns to pre admission functional level  Description: INTERVENTIONS:  - Perform BMAT or MOVE assessment daily    - Set and communicate daily mobility goal to care team and patient/family/caregiver  - Collaborate with rehabilitation services on mobility goals if consulted  - Perform Range of Motion 3 times a day  - Reposition patient every 2 hours    - Dangle patient 3 times a day  - Stand patient 3 times a day  - Ambulate patient 3 times a day  - Out of bed to chair 3 times a day   - Out of bed for meals 3 times a day  - Out of bed for toileting  - Record patient progress and toleration of activity level   Outcome: Progressing  Goal: Patient will remain free of falls  Description: INTERVENTIONS:  - Educate patient/family on patient safety including physical limitations  - Instruct patient to call for assistance with activity   - Consult OT/PT to assist with strengthening/mobility   - Keep Call bell within reach  - Keep bed low and locked with side rails adjusted as appropriate  - Keep care items and personal belongings within reach  - Initiate and maintain comfort rounds  - Make Fall Risk Sign visible to staff  - Offer Toileting every 2 Hours, in advance of need  - Initiate/Maintain bed alarm  - Apply yellow socks and bracelet for high fall risk patients  - Consider moving patient to room near nurses station  Outcome: Progressing     Problem: DISCHARGE PLANNING  Goal: Discharge to home or other facility with appropriate resources  Description: INTERVENTIONS:  - Identify barriers to discharge w/patient and caregiver  - Arrange for needed discharge resources and transportation as appropriate  - Identify discharge learning needs (meds, wound care, etc )  - Arrange for interpretive services to assist at discharge as needed  - Refer to Case Management Department for coordinating discharge planning if the patient needs post-hospital services based on physician/advanced practitioner order or complex needs related to functional status, cognitive ability, or social support system  Outcome: Progressing     Problem: Knowledge Deficit  Goal: Patient/family/caregiver demonstrates understanding of disease process, treatment plan, medications, and discharge instructions  Description: Complete learning assessment and assess knowledge base    Interventions:  - Provide teaching at level of understanding  - Provide teaching via preferred learning methods  Outcome: Progressing

## 2025-07-16 NOTE — PLAN OF CARE
Problem: PHYSICAL THERAPY ADULT  Goal: Performs mobility at highest level of function for planned discharge setting  See evaluation for individualized goals  Description:   Note: Prognosis: Good  Problem List: Decreased strength,Decreased endurance,Impaired balance,Decreased mobility,Decreased safety awareness  Assessment: Pt is 60 y o  female seen for PT evaluation on 3/12/22 s/p admit to [address] on 3/11/22 due to ZAHIRA, UTI  Pt with extensive medical hx with recent prolonged hospitalization from 1/6-2/24/22  Pt hospitalized for abdominal cellulitis  While hospitalized, pt underwent Ortega's procedure with placement of colostomy due to adenocarcinoma  Pt also diagnosed with COVID during the course of her hospitalization  Pt was discharged to short term rehab on 2/24/22, and later signed out [place] on 3/4/22  PT consulted to assess pt's functional mobility and d/c needs  Order placed for PT eval and tx    Performed at least 2 patient identifiers during session: Name and wristband  Comorbidities affecting pt's physical performance at time of assessment include: COVID, wounds, B knee DJD, diverticulitis, adenocarcinoma, colostomy  LOF prior to admission was gait with B crutches with I, household distances  Pt needed assist for transportation  Personal factors affecting pt at time of IE include: pt lives alone, needs assistive device for gait, new colostomy, awaiting DME and nursing and rehab services at home  Please find objective findings from PT assessment regarding body systems outlined above with impairments and limitations including weakness, impaired balance, decreased endurance, pain, decreased activity tolerance and fall risk, as well as mobility assessment  Pt's clinical presentation is currently unstable seen in pt's presentation of ongoing medical assessment  Given co-morbidities and presented presentation, moderate level PT eval was completed   Pt required supervision for bed mobility and Vegan Regular diet     CGA for gait with RW and transfers  A Co-eval was completed with OT due to pt just receiving pain meds, and concerns for pt's safety with mobility  Pt to benefit from continued PT tx to address deficits as defined above and maximize level of functional independent mobility and consistency in order to promote return to PLOF and independence  From PT/mobility standpoint, recommendation at time of d/c would be post acute rehabilitation vs home with services  Pt reports that she does not want to return to rehab  She reports that her friend has been providing assistance  If pt continues to make progress, pt may be safe to return home with home services  The patient's AM-PAC Basic Mobility Inpatient Short Form Raw Score is 17  A Raw score of greater than 16 suggests the patient may benefit from discharge to post-acute rehabilitation services vs home with home PT  Please   Barriers to Discharge: Decreased caregiver support        PT Discharge Recommendation: Post acute rehabilitation services          See flowsheet documentation for full assessment

## (undated) DEVICE — SURGICEL 4 X 8

## (undated) DEVICE — SUT VICRYL 3-0 SH 27 IN J416H

## (undated) DEVICE — PAD GROUNDING ADULT

## (undated) DEVICE — CATH URETERAL 5FR X 70 CM FLEX TIP POLYUR BARD

## (undated) DEVICE — GLOVE INDICATOR UNDERGLOVE SZ 6 BLUE

## (undated) DEVICE — TIBURON SPLIT SHEET: Brand: CONVERTORS

## (undated) DEVICE — GLOVE SRG BIOGEL 7

## (undated) DEVICE — SUT VICRYL 2-0 REEL 54 IN J286G

## (undated) DEVICE — JACKSON-PRATT 100CC BULB RESERVOIR: Brand: CARDINAL HEALTH

## (undated) DEVICE — X-RAY DETECTABLE SPONGES,16 PLY: Brand: VISTEC

## (undated) DEVICE — GAUZE SPONGES,16 PLY: Brand: CURITY

## (undated) DEVICE — SUT PROLENE 3-0 SH 36 IN 8522H

## (undated) DEVICE — TRAY FOLEY 16FR URIMETER SURESTEP

## (undated) DEVICE — ASTOUND STANDARD SURGICAL GOWN, XL: Brand: CONVERTORS

## (undated) DEVICE — TUBING SUCTION 5MM X 12 FT

## (undated) DEVICE — 3M™ STERI-STRIP™ REINFORCED ADHESIVE SKIN CLOSURES, R1547, 1/2 IN X 4 IN (12 MM X 100 MM), 6 STRIPS/ENVELOPE: Brand: 3M™ STERI-STRIP™

## (undated) DEVICE — PROXIMATE RELOADABLE LINEAR CUTTER WITH SAFETY LOCK-OUT, 75MM: Brand: PROXIMATE

## (undated) DEVICE — SUT MONOCRYL 4-0 PS-2 27 IN Y426H

## (undated) DEVICE — HEAVY DUTY TABLE COVER: Brand: CONVERTORS

## (undated) DEVICE — 10FR FRAZIER SUCTION HANDLE: Brand: CARDINAL HEALTH

## (undated) DEVICE — GLOVE SRG BIOGEL 7.5

## (undated) DEVICE — 3M™ IOBAN™ 2 ANTIMICROBIAL INCISE DRAPE 6650EZ: Brand: IOBAN™ 2

## (undated) DEVICE — NEEDLE 25G X 1 1/2

## (undated) DEVICE — PROXIMATE SKIN STAPLERS (35 WIDE) CONTAINS 35 STAINLESS STEEL STAPLES (FIXED HEAD): Brand: PROXIMATE

## (undated) DEVICE — ABDOMINAL PAD: Brand: DERMACEA

## (undated) DEVICE — SUT PROLENE 4-0 PS-2 18 IN 8682H

## (undated) DEVICE — PLUMEPEN PRO 10FT

## (undated) DEVICE — GAUZE SPONGES,8 PLY: Brand: CURITY

## (undated) DEVICE — PROXIMATE LINEAR CUTTER RELOAD, BLUE, 75MM: Brand: PROXIMATE

## (undated) DEVICE — INTENDED FOR TISSUE SEPARATION, AND OTHER PROCEDURES THAT REQUIRE A SHARP SURGICAL BLADE TO PUNCTURE OR CUT.: Brand: BARD-PARKER SAFETY BLADES SIZE 15, STERILE

## (undated) DEVICE — CHLORAPREP HI-LITE 26ML ORANGE

## (undated) DEVICE — ENSEAL 20 CM SHAFT, LARGE JAW: Brand: ENSEAL X1

## (undated) DEVICE — 3M™ TEGADERM™ TRANSPARENT FILM DRESSING FRAME STYLE, 1624W, 2-3/8 IN X 2-3/4 IN (6 CM X 7 CM), 100/CT 4CT/CASE: Brand: 3M™ TEGADERM™

## (undated) DEVICE — GLOVE INDICATOR PI UNDERGLOVE SZ 7 BLUE

## (undated) DEVICE — JP CHANNEL DRAIN 19FR, FULL FLUTES: Brand: JACKSON-PRATT

## (undated) DEVICE — SPONGE LAP 18 X 18 IN

## (undated) DEVICE — POOLE SUCTION HANDLE: Brand: CARDINAL HEALTH

## (undated) DEVICE — STERILE CYSTO PACK: Brand: CARDINAL HEALTH

## (undated) DEVICE — Device

## (undated) DEVICE — IODOFORM PACKING STRIP: Brand: CURITY

## (undated) DEVICE — SPECIMEN CONTAINER STERILE PEEL PACK

## (undated) DEVICE — INTENDED FOR TISSUE SEPARATION, AND OTHER PROCEDURES THAT REQUIRE A SHARP SURGICAL BLADE TO PUNCTURE OR CUT.: Brand: BARD-PARKER SAFETY BLADES SIZE 10, STERILE

## (undated) DEVICE — BETHLEHEM MAJOR GENERAL PACK: Brand: CARDINAL HEALTH

## (undated) DEVICE — SPONGE LAP 18 X 18 IN STRL RFD

## (undated) DEVICE — GLOVE SRG BIOGEL 6

## (undated) DEVICE — URIMETER 2500ML

## (undated) DEVICE — SYRINGE 10ML LL

## (undated) DEVICE — PROXIMATE PLUS MD MULTI-DIRECTIONAL RELEASE SKIN STAPLERS CONTAINS 35 STAINLESS STEEL STAPLES APPROXIMATE CLOSED DIMENSIONS: 6.9MM X 3.9MM WIDE: Brand: PROXIMATE

## (undated) DEVICE — 3000CC GUARDIAN II: Brand: GUARDIAN

## (undated) DEVICE — MEDI-VAC YANK SUCT HNDL W/TPRD BULBOUS TIP: Brand: CARDINAL HEALTH

## (undated) DEVICE — 3M™ STERI-STRIP™ REINFORCED ADHESIVE SKIN CLOSURES, R1546, 1/4 IN X 4 IN (6 MM X 100 MM), 10 STRIPS/ENVELOPE: Brand: 3M™ STERI-STRIP™

## (undated) DEVICE — ANTIBACTERIAL UNDYED BRAIDED (POLYGLACTIN 910), SYNTHETIC ABSORBABLE SUTURE: Brand: COATED VICRYL

## (undated) DEVICE — 3M™ TEGADERM™ TRANSPARENT FILM DRESSING FRAME STYLE, 1626W, 4 IN X 4-3/4 IN (10 CM X 12 CM), 50/CT 4CT/CASE: Brand: 3M™ TEGADERM™

## (undated) DEVICE — SUT VICRYL 0 54 IN J207G

## (undated) DEVICE — 3M™ TEGADERM™ TRANSPARENT FILM DRESSING FRAME STYLE, 1628, 6 IN X 8 IN (15 CM X 20 CM), 10/CT 8CT/CASE: Brand: 3M™ TEGADERM™